# Patient Record
Sex: MALE | Race: WHITE | NOT HISPANIC OR LATINO | Employment: OTHER | URBAN - METROPOLITAN AREA
[De-identification: names, ages, dates, MRNs, and addresses within clinical notes are randomized per-mention and may not be internally consistent; named-entity substitution may affect disease eponyms.]

---

## 2017-06-15 ENCOUNTER — DOCUMENTATION ONLY (OUTPATIENT)
Dept: TRANSPLANT | Facility: CLINIC | Age: 77
End: 2017-06-15

## 2017-06-15 NOTE — PROGRESS NOTES
Received in basket message from this patients daughter.  I have returned the call and spoken with her.  She advises the following:  That her father has had placement of an LVAD in October of 2016.  This implant at [facility] in [place].  That she is relocating her father to [place] and in with her in July.  That she wishes to have our LVAD team take over his care due to this relocation..  When asked that  the LVAD was placed as destination therapy.  I have provided the process for communication with his LVAD team there and ours here.  That his medical records will be needed along with demographic and health insurance documentation.  That his anticoagulation therapy will need to be addressed, as well as establishment of Primary Care.  Mr. Vasquez's daughter advises that she will have this placed in motion today and will call his implant team with this information..  I have provided her with my name and contact information  For questions and assistance until his care is assumed for our LVAD group.  Her specific physician request is to Dr. Boss.

## 2017-07-14 ENCOUNTER — TELEPHONE (OUTPATIENT)
Dept: TRANSPLANT | Facility: CLINIC | Age: 77
End: 2017-07-14

## 2017-07-14 DIAGNOSIS — Z95.811 HEART REPLACED BY HEART ASSIST DEVICE: Primary | ICD-10-CM

## 2017-07-14 DIAGNOSIS — I50.32 CHRONIC DIASTOLIC HEART FAILURE: ICD-10-CM

## 2017-07-14 DIAGNOSIS — I50.22 CHRONIC SYSTOLIC CONGESTIVE HEART FAILURE: ICD-10-CM

## 2017-07-14 NOTE — TELEPHONE ENCOUNTER
Attempted to contact pt to notify him of his appts on 7/20/2017 on the only contact number in the system. I didn't get an answer but I did leave him a voice mail letting him know of the date and time of all appts and where the different clinics are located as well.We also will be mailing out a reminder as well.

## 2017-07-20 ENCOUNTER — RESEARCH ENCOUNTER (OUTPATIENT)
Dept: RESEARCH | Facility: HOSPITAL | Age: 77
End: 2017-07-20

## 2017-07-20 ENCOUNTER — CLINICAL SUPPORT (OUTPATIENT)
Dept: TRANSPLANT | Facility: CLINIC | Age: 77
End: 2017-07-20
Payer: MEDICARE

## 2017-07-20 ENCOUNTER — TELEPHONE (OUTPATIENT)
Dept: INFECTIOUS DISEASES | Facility: CLINIC | Age: 77
End: 2017-07-20

## 2017-07-20 ENCOUNTER — OFFICE VISIT (OUTPATIENT)
Dept: TRANSPLANT | Facility: CLINIC | Age: 77
End: 2017-07-20
Attending: INTERNAL MEDICINE
Payer: MEDICARE

## 2017-07-20 ENCOUNTER — LAB VISIT (OUTPATIENT)
Dept: LAB | Facility: HOSPITAL | Age: 77
End: 2017-07-20
Attending: INTERNAL MEDICINE
Payer: MEDICARE

## 2017-07-20 VITALS — HEIGHT: 72 IN | BODY MASS INDEX: 23.7 KG/M2 | TEMPERATURE: 98 F | WEIGHT: 175 LBS | SYSTOLIC BLOOD PRESSURE: 86 MMHG

## 2017-07-20 DIAGNOSIS — R55 SYNCOPE, UNSPECIFIED SYNCOPE TYPE: ICD-10-CM

## 2017-07-20 DIAGNOSIS — R53.81 DEBILITY: ICD-10-CM

## 2017-07-20 DIAGNOSIS — Z95.811 HEART REPLACED BY HEART ASSIST DEVICE: ICD-10-CM

## 2017-07-20 DIAGNOSIS — I25.5 ISCHEMIC CARDIOMYOPATHY: ICD-10-CM

## 2017-07-20 DIAGNOSIS — Z95.811 LVAD (LEFT VENTRICULAR ASSIST DEVICE) PRESENT: ICD-10-CM

## 2017-07-20 DIAGNOSIS — I50.22 CHRONIC SYSTOLIC CONGESTIVE HEART FAILURE: ICD-10-CM

## 2017-07-20 DIAGNOSIS — Z95.811 HEART REPLACED BY HEART ASSIST DEVICE: Primary | ICD-10-CM

## 2017-07-20 DIAGNOSIS — N18.2 STAGE 2 CHRONIC KIDNEY DISEASE: ICD-10-CM

## 2017-07-20 LAB
ALBUMIN SERPL BCP-MCNC: 3.4 G/DL
ALP SERPL-CCNC: 91 U/L
ALT SERPL W/O P-5'-P-CCNC: 14 U/L
ANION GAP SERPL CALC-SCNC: 7 MMOL/L
AST SERPL-CCNC: 20 U/L
BASOPHILS # BLD AUTO: 0.01 K/UL
BASOPHILS NFR BLD: 0.2 %
BILIRUB DIRECT SERPL-MCNC: 0.3 MG/DL
BILIRUB SERPL-MCNC: 0.6 MG/DL
BNP SERPL-MCNC: 828 PG/ML
BUN SERPL-MCNC: 23 MG/DL
CALCIUM SERPL-MCNC: 10 MG/DL
CHLORIDE SERPL-SCNC: 104 MMOL/L
CO2 SERPL-SCNC: 26 MMOL/L
CREAT SERPL-MCNC: 1.7 MG/DL
CRP SERPL-MCNC: 16.4 MG/L
DIFFERENTIAL METHOD: ABNORMAL
EOSINOPHIL # BLD AUTO: 0.2 K/UL
EOSINOPHIL NFR BLD: 3.1 %
ERYTHROCYTE [DISTWIDTH] IN BLOOD BY AUTOMATED COUNT: 17.4 %
EST. GFR  (AFRICAN AMERICAN): 44.9 ML/MIN/1.73 M^2
EST. GFR  (NON AFRICAN AMERICAN): 38.9 ML/MIN/1.73 M^2
GLUCOSE SERPL-MCNC: 98 MG/DL
HCT VFR BLD AUTO: 39.8 %
HGB BLD-MCNC: 13 G/DL
INR PPP: 3.4
LDH SERPL L TO P-CCNC: 172 U/L
LYMPHOCYTES # BLD AUTO: 0.8 K/UL
LYMPHOCYTES NFR BLD: 12.6 %
MAGNESIUM SERPL-MCNC: 2.1 MG/DL
MCH RBC QN AUTO: 30.2 PG
MCHC RBC AUTO-ENTMCNC: 32.7 G/DL
MCV RBC AUTO: 93 FL
MONOCYTES # BLD AUTO: 0.9 K/UL
MONOCYTES NFR BLD: 14.7 %
NEUTROPHILS # BLD AUTO: 4.4 K/UL
NEUTROPHILS NFR BLD: 69.2 %
PHOSPHATE SERPL-MCNC: 3.7 MG/DL
PLATELET # BLD AUTO: 149 K/UL
PMV BLD AUTO: 11.9 FL
POTASSIUM SERPL-SCNC: 4.2 MMOL/L
PREALB SERPL-MCNC: 22 MG/DL
PROT SERPL-MCNC: 7.2 G/DL
PROTHROMBIN TIME: 34.2 SEC
RBC # BLD AUTO: 4.3 M/UL
SODIUM SERPL-SCNC: 137 MMOL/L
T4 FREE SERPL-MCNC: 0.74 NG/DL
TSH SERPL DL<=0.005 MIU/L-ACNC: 1.44 UIU/ML
WBC # BLD AUTO: 6.41 K/UL

## 2017-07-20 PROCEDURE — 1159F MED LIST DOCD IN RCRD: CPT | Mod: ,,, | Performed by: INTERNAL MEDICINE

## 2017-07-20 PROCEDURE — 85610 PROTHROMBIN TIME: CPT

## 2017-07-20 PROCEDURE — 99215 OFFICE O/P EST HI 40 MIN: CPT | Mod: S$PBB,,, | Performed by: INTERNAL MEDICINE

## 2017-07-20 PROCEDURE — 80053 COMPREHEN METABOLIC PANEL: CPT

## 2017-07-20 PROCEDURE — 84100 ASSAY OF PHOSPHORUS: CPT

## 2017-07-20 PROCEDURE — 99999 PR PBB SHADOW E&M-EST. PATIENT-LVL V: CPT | Mod: PBBFAC,,, | Performed by: INTERNAL MEDICINE

## 2017-07-20 PROCEDURE — 86140 C-REACTIVE PROTEIN: CPT

## 2017-07-20 PROCEDURE — 1126F AMNT PAIN NOTED NONE PRSNT: CPT | Mod: ,,, | Performed by: INTERNAL MEDICINE

## 2017-07-20 PROCEDURE — 36415 COLL VENOUS BLD VENIPUNCTURE: CPT

## 2017-07-20 PROCEDURE — 84134 ASSAY OF PREALBUMIN: CPT

## 2017-07-20 PROCEDURE — 83880 ASSAY OF NATRIURETIC PEPTIDE: CPT

## 2017-07-20 PROCEDURE — 83615 LACTATE (LD) (LDH) ENZYME: CPT

## 2017-07-20 PROCEDURE — 82248 BILIRUBIN DIRECT: CPT

## 2017-07-20 PROCEDURE — 84443 ASSAY THYROID STIM HORMONE: CPT

## 2017-07-20 PROCEDURE — 83735 ASSAY OF MAGNESIUM: CPT

## 2017-07-20 PROCEDURE — 85025 COMPLETE CBC W/AUTO DIFF WBC: CPT

## 2017-07-20 PROCEDURE — 99999 PR PBB SHADOW E&M-EST. PATIENT-LVL I: CPT | Mod: PBBFAC,,,

## 2017-07-20 PROCEDURE — 84439 ASSAY OF FREE THYROXINE: CPT

## 2017-07-20 RX ORDER — WARFARIN SODIUM 5 MG/1
5 TABLET ORAL
Status: ON HOLD | COMMUNITY
End: 2017-08-08 | Stop reason: HOSPADM

## 2017-07-20 RX ORDER — DOXYCYCLINE 100 MG/1
100 CAPSULE ORAL EVERY 12 HOURS
Qty: 20 CAPSULE | Refills: 0 | Status: SHIPPED | OUTPATIENT
Start: 2017-07-20 | End: 2017-07-25

## 2017-07-20 RX ORDER — AMLODIPINE BESYLATE 2.5 MG/1
2.5 TABLET ORAL DAILY
Qty: 30 TABLET | Refills: 11 | Status: SHIPPED | OUTPATIENT
Start: 2017-07-20 | End: 2017-07-21 | Stop reason: SDUPTHER
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
## 2017-07-20 NOTE — PROGRESS NOTES
[facility]   Heart Transplant/VAD Clinic   [address] [place] [address]   [phone] after hours          [phone] fax     VAD HOME  HEALTH ORDERS      Admit to Home Health    Diagnosis:   Patient Active Problem List   Diagnosis    SOB (shortness of breath)    Chronic systolic heart failure    HTN (hypertension)    CAD (coronary artery disease)    S/P CABG (coronary artery bypass graft)    ICD (implantable cardioverter-defibrillator), biventricular, in situ    HILLARY on CPAP    Pulmonary hypertension due to left ventricular systolic dysfunction       Patient is homebound due to:      Diet: Low Fat, Low cholesterol, 2Gm Na, Coumadin restrictions.    Acitivities: No Swimming, bathing, vacuuming, contact sports.    Fresh implants= Sternal Precautions    Nursing:   SN to complete comprehensive assessment including routine vital signs. Instruct on disease process and s/s of complications to report to MD. Review/verify medication list sent home with the patient at time of discharge  and instruct patient/caregiver as needed. Frequency may be adjusted depending on start of care date.    **LVAD driveline exit site dressing change is to be completed per LVAD patient/caregiver only**.    Notify MD if SBP > 160 or < 90; DBP > 90 or < 50; HR > 120 or < 50; Temp > 101; Weight gain >3lbs in 1 day or 5lbs in 1 week.  Other:         LABS:  SN to perform labs: PT/INR per Coumadin clinic [phone].   Follow up INR date: 7/24/17  No Finger Sticks      CONSULTS:    Physical Therapy to evaluate and treat. Evaluate for home safety and equipment needs; Establish/upgrade home exercise program. Perform / instruct on therapeutic exercises, gait training, transfer training, and Range of Motion.    Occupational Therapy to evaluate and treat. Evaluate home environment for safety and equipment needs. Perform/Instruct on transfers, ADL training, ROM, and therapeutic  exercises.      Send initial Home Health orders to HTS attending physician on call.  Send follow up questions to VAD clinic MD [phone] or [phone].

## 2017-07-20 NOTE — PROGRESS NOTES
Subjective:   Mr. Vasquez is a 76 y.o. year old White male           HPI     Mr. Vasquez is a pleasant 76 year old gentleman with a past medical history of ischemic cardiomyopathy status post Heartmate III 10/16/16 LVAD , CKD II , CAD , VT on Tikosyn, HTN, HLD, Gout, Obesity , SSS, Depression, here to establish care. Mr Vasquez was a former resident of the [facility] area who had moved to [place] many years ago where he lived with his wife. He had a Heartmate III implanted 10/16 had a subsequently prolonged clinical course. He had prolonged extubation after implantation , lasting about 3 weeks. From this hospitalization he had a prolonged course in a skilled nursing facility. He returned home to live with his younger daughter and remained fairly weak. At his best he was able to ambulate with a cane however he has become progressively more debilitated. He moved to [place] with his wife to live with their older daughter. He remains debilitated, unable to walk , and is essentially maximally assisted with transfers. Prior to last week, he had a 12 hour aid, but now has 24 hour care . He sleeps upstairs in a bedroom with his wife. He has issues with adhering to a sleep schedule, staying up late and sleeping late per his daughter. She notes there are no windows and he is at times not oriented to time. His daughter states that he has a good appetite but notes he has lost weight and that his legs are weak.     Review of Systems   Constitution: Positive for malaise/fatigue and weight loss. Negative for chills, decreased appetite, fever and weakness.   Cardiovascular: Negative for chest pain, dyspnea on exertion, irregular heartbeat, leg swelling, orthopnea, palpitations, paroxysmal nocturnal dyspnea and syncope.   Respiratory: Negative for cough, shortness of breath, sputum production and wheezing.    Skin: Negative for poor wound healing.   Musculoskeletal: Positive for muscle weakness.   Gastrointestinal:  Negative for bloating, abdominal pain, constipation, diarrhea, nausea and vomiting.   Psychiatric/Behavioral: Negative for altered mental status.       Objective:   Blood pressure (!) 86/0, temperature 98 °F (36.7 °C), height 6' (1.829 m), weight 79.4 kg (175 lb).body mass index is 23.73 kg/m².    Physical Exam   Constitutional: He is oriented to person, place, and time. He appears well-developed and well-nourished.   HENT:   Head: Normocephalic and atraumatic.   Eyes: Pupils are equal, round, and reactive to light.   Neck: Neck supple. No JVD present. No tracheal deviation present.   Cardiovascular: Normal rate and regular rhythm.  Exam reveals no gallop and no friction rub.    No murmur heard.  Heartmate III sounds normal    Pulmonary/Chest: Effort normal and breath sounds normal. No respiratory distress. He has no wheezes. He exhibits no tenderness.   Abdominal: Soft. Bowel sounds are normal. He exhibits no distension. There is no tenderness. There is no rebound and no guarding.   Musculoskeletal:   Loss of lower extremity muscle mass   Neurological: He is alert and oriented to person, place, and time.   Skin: Skin is warm and dry. No erythema.   Psychiatric: He has a normal mood and affect.   Driveline Site :       Lab Results   Component Value Date    WBC 6.41 07/20/2017    HGB 13.0 (L) 07/20/2017    HCT 39.8 (L) 07/20/2017    MCV 93 07/20/2017     (L) 07/20/2017    CO2 26 07/20/2017    CREATININE 1.7 (H) 07/20/2017    CALCIUM 10.0 07/20/2017    ALBUMIN 3.4 (L) 07/20/2017    AST 20 07/20/2017     (H) 07/20/2017    ALT 14 07/20/2017       Lab Results   Component Value Date    INR 3.4 (H) 07/20/2017    INR 1.1 07/27/2015       BNP   Date Value Ref Range Status   07/20/2017 828 (H) 0 - 99 pg/mL Final     Comment:     Values of less than 100 pg/ml are consistent with non-CHF populations.   07/28/2015 1,649 (H) 0 - 99 pg/mL Final     Comment:     Values of less than 100 pg/ml are consistent with  non-CHF populations.   07/27/2015 1,647 (H) 0 - 99 pg/mL Final     Comment:     Values of less than 100 pg/ml are consistent with non-CHF populations.       LD   Date Value Ref Range Status   07/20/2017 172 110 - 260 U/L Final     Comment:     Results are increased in hemolyzed samples.       No results found for: TACROLIMUS  No results found for: SIROLIMUS  No results found for: CYCLOSPORINE    No results found for this or any previous visit.  No results found for this or any previous visit.    Labs were reviewed with the patient.    Assessment:     1. Heart replaced by heart assist device    2. Ischemic cardiomyopathy    3. Stage 2 chronic kidney disease    4. Syncope, unspecified syncope type    5. Debility    6. LVAD (left ventricular assist device) present        Plan:     Mr. Vasquez is a pleasant 76 year old gentleman with a past medical history of ischemic cardiomyopathy status post Heartmate III 10/16/16 LVAD , CKD II , CAD , VT on Tikosyn, HTN, HLD, Gout, Obesity , SSS, Depression, here to establish care.       1. ICM s/p Heartmate III   Warm and Dry   NYHA - Stage D   Debility precludes assessment of functional capacity , no shortness of breath at rest     No alarms/issues on VAD   Drive line site concerning for infection               -Cultures sent              -Doxy 100mg BID              -ID consult     Anticoag             -ASA 81mg             -Coumadin             -Refer to INR clinic    Elevated MAP             -Check echo            -Amlodepine 2.5mg 1      2. VT on Tikosyn   Check EKG   Refer to EP     3. Debility   Lower extremity strength markedly decreased/ decreased lower extremity mass  ? Neuropathy diagnosed prior to move here , ? Follow up with daughters neurologist   Home PT / OT     4. CKD   Monitor  Check EKG given tikosyn             UNOS Patient Status  Functional Status: 40% - Disabled: requires special care and assistance  Physical Capacity: Wheelchair bound or more limited  Working  for Income: Unknown    Jonn Bolaños MD

## 2017-07-20 NOTE — PATIENT INSTRUCTIONS
Start Doxycycline 100mg BID and follow up with ID for drive line infection concern. Start Amlodepine 2.5mg for elevated pressure. Home PT/OT .

## 2017-07-20 NOTE — PROGRESS NOTES
Met with pt in clinic. Went over monthly equipment checklist with pt and his family. Requested pt bring in his UBC, MPU, and 14V batteries x8 to next visit for visual inspection and inventory. Pt and family verbalized understanding and agreed. Will follow up with pt at next visit.

## 2017-07-20 NOTE — PROGRESS NOTES
WHITNEY met with pt, pt wife and dtr in LVAD clinic today. Pt received his LVAD in October of 2016 in [place] and has moved here 2 weeks ago to live with his dtr, Mitra and son in law.    MDs requested WHITNEY to arrange for home health. Orders written, whitney contacted Ximena at [facility], [id number] for referral. Referral accepted. Pt has Medicare and needs PT/OT eval and treat and INR labs drawn.     Pt and family had no agency preference.    SW continuing to follow and remains available.

## 2017-07-20 NOTE — PROGRESS NOTES
Date Consent signed: [id number]    Sponsor: [employer]    Study Title/IRB Number: [facility] 3 CAP [id number]    Principle Investigator: Dr. Bradley Hemphill    Present for Discussion: Cathleen Munson, Kev Vasquez, Mrs. Vasquez, patient's daughter    Prior to the Informed Consent (IC) being signed, or any study protocol required testing, procedure, or intervention being performed, the following was done or discussed:   Patient was given a copy of the IC for review    Purpose of the study    Follow up schedule and tests or procedures done at each follow up    Confidentiality and HIPAA Authorization for Release of Medical Records for the research trial/ subject's rights/research related injury   Risk, Benefits, Costs and Alternatives to the study   Participation in the research trial is voluntary and patient may withdraw at anytime   Contact information for study related questions    Patient verbalizes understanding of the above: Yes  Patients cardiologist Dr. Valladares notified patient signed consent form for study: Yes  Contact information for CRC and PI given to patient: Yes  Patient able to adequately summarize: the purpose of the study, the risks associated with the study, and all procedures, testing, and follow-ups associated with the study    Mr. Vasquez signed the informed consent form for the [facility] 3 CAP research study with an IRB approval date of 5/8/2017.  Each page of the consent form was reviewed with the patient (and pts family) and all questions answered satisfactorily. Mr. Vasquez signed the consent form and received a copy of same. The original consent was scanned into electronic medical records (EPIC) and filed into the subject's research study binder. Patient was transferred from [place] and was implanted with a HM 3 on 10/19/2016. One year follow up will be completed within the 1 year window.

## 2017-07-20 NOTE — TELEPHONE ENCOUNTER
----- Message from Nora Paulino MA sent at 7/20/2017  4:40 PM CDT -----  Contact: Dr. Young / Benjy  ext.   [phone]   Can you see this patient tomorrow??  ----- Message -----  From: Janay Iwona  Sent: 7/20/2017   4:24 PM  To: Elier Chambers MA    Triage dr. For this afternoon -/   Caller is asking if any of our drs. Can see this LVAD pt. At any time tomorrow.   Referral is in .

## 2017-07-21 ENCOUNTER — TELEPHONE (OUTPATIENT)
Dept: TRANSPLANT | Facility: CLINIC | Age: 77
End: 2017-07-21

## 2017-07-21 ENCOUNTER — TELEPHONE (OUTPATIENT)
Dept: INFECTIOUS DISEASES | Facility: CLINIC | Age: 77
End: 2017-07-21

## 2017-07-21 ENCOUNTER — TELEPHONE (OUTPATIENT)
Dept: INTERNAL MEDICINE | Facility: CLINIC | Age: 77
End: 2017-07-21

## 2017-07-21 ENCOUNTER — ANTI-COAG VISIT (OUTPATIENT)
Dept: CARDIOLOGY | Facility: CLINIC | Age: 77
End: 2017-07-21

## 2017-07-21 DIAGNOSIS — Z95.811 LVAD (LEFT VENTRICULAR ASSIST DEVICE) PRESENT: ICD-10-CM

## 2017-07-21 DIAGNOSIS — Z79.01 LONG TERM (CURRENT) USE OF ANTICOAGULANTS: ICD-10-CM

## 2017-07-21 LAB
GRAM STN SPEC: NORMAL
GRAM STN SPEC: NORMAL

## 2017-07-21 RX ORDER — WARFARIN 2.5 MG/1
2.5 TABLET ORAL DAILY
Status: ON HOLD | COMMUNITY
End: 2017-08-08

## 2017-07-21 RX ORDER — WARFARIN 4 MG/1
4 TABLET ORAL DAILY
Status: ON HOLD | COMMUNITY
End: 2017-08-08 | Stop reason: HOSPADM

## 2017-07-21 NOTE — PROGRESS NOTES
Spoke with Daughter Ena whom in charge regarding patient coumadin.  Dose for this week Mon, Tue ,Wed, and Thu 6.5 mg , Fri, sat, and sun 4 mg.  Daughter could not confirm previous dose but start an inr 3.2 Friday 7/14. Stated she will call doctors from [place] to find out.  Also reports patient having  broccoli on Sunday 7/16 and bruising on right lower ribcage area and a yellow (patch) on left breast .  Nothing else reported.

## 2017-07-21 NOTE — TELEPHONE ENCOUNTER
Went through pt's medication list with EVONNE/ Albino a lot of the listed medication pt does not have them in the home. Albino/ EVONNE wanted to let PCP know that the pt's family decided to catheterize pt at night. Please advise.

## 2017-07-21 NOTE — PROGRESS NOTES
" 76 year old with a past medical history of ischemic cardiomyopathy status post Heartmate III 10/16/16 LVAD , CKD II , CAD , VT on Tikosyn, HTN, HLD, Gout, Obesity , SSS, Depression,  establishing care with [facility]. Mr Vasquez was a former resident of the [facility] area who had moved to [place] many years ago where he lived with his wife. He had a Heartmate III implanted 10/16 had a subsequently prolonged clinical course. He had prolonged extubation after implantation , lasting about 3 weeks. From this hospitalization he had a prolonged course in a skilled nursing facility. He returned home to live with his younger daughter and remained fairly weak He moved to [place] with his wife to live with their older daughter. He remains debilitated, unable to walk , and is essentially maximally assisted with transfers. Of note, he was started on doxycycline 7/20 x 10 day course for concern for drive line infection. It looks as though INR was drawn yesterday. Will need to confirm his previous/current coumadin dose to address current INR if this has not already been done.  Per notes in [facility], next INR will need to be 7/24.   Ena (patient's daughter) was questioned. Per Ena, coumadin Dose for this week Mon, Tue ,Wed, and Thu = 6.5 mg , Fri, sat, and sun = 4 mg. She stateds he has the 4mg and 2.5mg tablets, and does not "think" he has the 5mg tablets but would have to check when she gets home  Daughter could not confirm previous dose prior to 7/14 but start an inr 3.2 Friday 7/14. Stated she will call doctors from [place] to find out.  Also reports patient having  broccoli on Sunday 7/16 and bruising on right lower ribcage area and a yellow (patch) on left breast .  Nothing else reported. Keeping in mind INR was high yesterday , pt took his warfarin last night, and he started an interacting antibiotic, we will hold the warfarin this evening    "

## 2017-07-21 NOTE — TELEPHONE ENCOUNTER
Spoke with the patients' wife and notified her of all times and dates of upcoming appointments next week.She was fine with all appointment times and stated that they will be here.    Hortensia

## 2017-07-21 NOTE — TELEPHONE ENCOUNTER
----- Message from Florinda Lo sent at 7/21/2017 12:58 PM CDT -----  Contact: [facility]/nicolette/[phone]  [facility] called in regards to getting clarification on the pt Rx for famotidine/iron polysaccharides/lansoprazole/buPROPion (WELLBUTRIN XL) 300 MG 24 hr tablet/atorvastatin/hydrALAZINE (APRESOLINE) 25 MG tablet. [facility] wants to let the dr know that the family decide to catheterize  the pt at night time.      Please advise

## 2017-07-21 NOTE — PROGRESS NOTES
Daughter reports patient llast week dose was 5 mg on tue,wed, and thu  And 6 mg all other days   Stated Friday 7/14 inr was 2.4 instead of 3.2.  Patient goal is 2.0-3.0  .  Nothing else reported .    Daughter advised  FAXED.

## 2017-07-21 NOTE — PROGRESS NOTES
Daughter confirms dose patient was given. Will hold as INR possibly higher today. Will start new dose. New LVAD.

## 2017-07-23 ENCOUNTER — HOSPITAL ENCOUNTER (EMERGENCY)
Facility: HOSPITAL | Age: 77
Discharge: HOME | End: 2017-07-23
Attending: EMERGENCY MEDICINE
Payer: MEDICARE

## 2017-07-23 VITALS
BODY MASS INDEX: 27.09 KG/M2 | HEIGHT: 72 IN | TEMPERATURE: 98 F | WEIGHT: 200 LBS | HEART RATE: 61 BPM | OXYGEN SATURATION: 97 % | RESPIRATION RATE: 20 BRPM
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
## 2017-07-23 DIAGNOSIS — S00.03XA CONTUSION OF SCALP, INITIAL ENCOUNTER: ICD-10-CM

## 2017-07-23 LAB
BASOPHILS # BLD AUTO: 0.02 K/UL
BASOPHILS NFR BLD: 0.3 %
DIFFERENTIAL METHOD: ABNORMAL
EOSINOPHIL # BLD AUTO: 0.3 K/UL
EOSINOPHIL NFR BLD: 3.1 %
ERYTHROCYTE [DISTWIDTH] IN BLOOD BY AUTOMATED COUNT: 17.2 %
HCT VFR BLD AUTO: 38.7 %
HGB BLD-MCNC: 12.5 G/DL
INR PPP: 2.2
LYMPHOCYTES # BLD AUTO: 0.9 K/UL
LYMPHOCYTES NFR BLD: 11.5 %
MCH RBC QN AUTO: 29.6 PG
MCHC RBC AUTO-ENTMCNC: 32.3 G/DL
MCV RBC AUTO: 92 FL
MONOCYTES # BLD AUTO: 1.1 K/UL
MONOCYTES NFR BLD: 13.4 %
NEUTROPHILS # BLD AUTO: 5.7 K/UL
NEUTROPHILS NFR BLD: 71.4 %
PLATELET # BLD AUTO: 151 K/UL
PMV BLD AUTO: 11.5 FL
PROTHROMBIN TIME: 22.7 SEC
RBC # BLD AUTO: 4.22 M/UL
WBC # BLD AUTO: 8 K/UL

## 2017-07-23 PROCEDURE — 85610 PROTHROMBIN TIME: CPT

## 2017-07-23 PROCEDURE — 99284 EMERGENCY DEPT VISIT MOD MDM: CPT

## 2017-07-23 PROCEDURE — 85025 COMPLETE CBC W/AUTO DIFF WBC: CPT

## 2017-07-23 RX ORDER — ATORVASTATIN CALCIUM 10 MG/1
10 TABLET, FILM COATED ORAL DAILY
COMMUNITY

## 2017-07-23 RX ORDER — FOLIC ACID 1 MG/1
1 TABLET ORAL DAILY
COMMUNITY

## 2017-07-23 RX ORDER — CALCIUM CARBONATE/VITAMIN D3 600MG-5MCG
1 TABLET ORAL DAILY
Status: ON HOLD | COMMUNITY
End: 2017-08-08 | Stop reason: HOSPADM

## 2017-07-24 ENCOUNTER — OFFICE VISIT (OUTPATIENT)
Dept: INFECTIOUS DISEASES | Facility: CLINIC | Age: 77
End: 2017-07-24
Payer: MEDICARE

## 2017-07-24 ENCOUNTER — ANTI-COAG VISIT (OUTPATIENT)
Dept: CARDIOLOGY | Facility: CLINIC | Age: 77
End: 2017-07-24

## 2017-07-24 ENCOUNTER — LAB VISIT (OUTPATIENT)
Dept: LAB | Facility: HOSPITAL | Age: 77
End: 2017-07-24
Payer: MEDICARE

## 2017-07-24 VITALS — BODY MASS INDEX: 24.92 KG/M2 | WEIGHT: 184 LBS | HEIGHT: 72 IN

## 2017-07-24 DIAGNOSIS — Z79.01 LONG TERM (CURRENT) USE OF ANTICOAGULANTS: ICD-10-CM

## 2017-07-24 DIAGNOSIS — T84.7XXA HARDWARE COMPLICATING WOUND INFECTION, INITIAL ENCOUNTER: Primary | ICD-10-CM

## 2017-07-24 DIAGNOSIS — Z95.811 LVAD (LEFT VENTRICULAR ASSIST DEVICE) PRESENT: ICD-10-CM

## 2017-07-24 DIAGNOSIS — Z95.811 HEART REPLACED BY HEART ASSIST DEVICE: ICD-10-CM

## 2017-07-24 DIAGNOSIS — T82.9XXA LEFT VENTRICULAR ASSIST DEVICE (LVAD) COMPLICATION, INITIAL ENCOUNTER: ICD-10-CM

## 2017-07-24 DIAGNOSIS — Z95.810 AUTOMATIC IMPLANTABLE CARDIAC DEFIBRILLATOR IN SITU: Primary | ICD-10-CM

## 2017-07-24 LAB
BACTERIA SPEC AEROBE CULT: NORMAL
INR PPP: 2.2
PROTHROMBIN TIME: 22.4 SEC

## 2017-07-24 PROCEDURE — 99212 OFFICE O/P EST SF 10 MIN: CPT | Mod: PBBFAC | Performed by: INTERNAL MEDICINE

## 2017-07-24 PROCEDURE — 1159F MED LIST DOCD IN RCRD: CPT | Mod: ,,, | Performed by: INTERNAL MEDICINE

## 2017-07-24 PROCEDURE — 99204 OFFICE O/P NEW MOD 45 MIN: CPT | Mod: S$PBB,,, | Performed by: INTERNAL MEDICINE

## 2017-07-24 PROCEDURE — 1126F AMNT PAIN NOTED NONE PRSNT: CPT | Mod: ,,, | Performed by: INTERNAL MEDICINE

## 2017-07-24 PROCEDURE — 99999 PR PBB SHADOW E&M-EST. PATIENT-LVL II: CPT | Mod: PBBFAC,,, | Performed by: INTERNAL MEDICINE

## 2017-07-24 RX ORDER — CIPROFLOXACIN 500 MG/1
500 TABLET ORAL 2 TIMES DAILY
Qty: 42 TABLET | Refills: 0 | Status: ON HOLD | OUTPATIENT
Start: 2017-07-24 | End: 2017-08-08 | Stop reason: HOSPADM

## 2017-07-24 NOTE — PROGRESS NOTES
Subjective:      Patient ID: Kev Vasquez is a 76 y.o. male.    Chief Complaint:No chief complaint on file.      History of Present Illness    75 y/o male with a history of CAD, ischemic cardiomyopathy s/p LVAD placement on October 19, 2016 at [facility] in [place], [place].  His post LVAD course was complicated by an episode of hospitalization due to orthostatic hypotension. All his BP meds were stopped and he was discharged to a rehab facility for 100 days.  He lost about 50 lbs over the course of his rehab stay.  He had some confusion while in rehab thought to be due to medications.  He moved down to [place] to be with his daughter due to his health.  Prior to his move, he had a dog bite on July 5 and was treated with antibiotics in the ER.    He arrived in [place] on July 9.  He has been feeling well since.  He is working with physical therapy.  His wife and daughter have been changing dressings on LVAD.  Noticed redness and oozing around the LVAD DLES last week.  Cultures were obtained and he was started empirically on doxycycline.  He denies any fevers or chills.      Review of Systems   Constitution: Negative for chills, decreased appetite, fever, weakness, malaise/fatigue, night sweats, weight gain and weight loss.   HENT: Negative for congestion, ear pain, headaches, hearing loss, hoarse voice, sore throat and tinnitus.    Eyes: Negative for blurred vision, redness and visual disturbance.   Cardiovascular: Negative for chest pain, leg swelling and palpitations.   Respiratory: Negative for cough, hemoptysis, shortness of breath and sputum production.    Hematologic/Lymphatic: Negative for adenopathy. Bruises/bleeds easily.   Skin: Negative for dry skin, itching, rash and suspicious lesions.   Musculoskeletal: Negative for back pain, joint pain, myalgias and neck pain.   Gastrointestinal: Negative for abdominal pain, constipation, diarrhea, heartburn, nausea and vomiting.    Genitourinary: Negative for dysuria, flank pain, frequency, hematuria, hesitancy and urgency.   Neurological: Negative for dizziness, numbness and paresthesias.   Psychiatric/Behavioral: Negative for depression and memory loss. The patient does not have insomnia and is not nervous/anxious.      Objective:   Physical Exam   Constitutional: He is oriented to person, place, and time. He appears well-developed and well-nourished. No distress.   HENT:   Head: Normocephalic and atraumatic.   Right Ear: External ear normal.   Left Ear: External ear normal.   Nose: Nose normal.   Mouth/Throat: Oropharynx is clear and moist. No oropharyngeal exudate.   Eyes: Conjunctivae and EOM are normal. Pupils are equal, round, and reactive to light. Right eye exhibits no discharge. Left eye exhibits no discharge. No scleral icterus.   Neck: Normal range of motion. Neck supple. No JVD present. No tracheal deviation present. No thyromegaly present.   Cardiovascular: Normal rate, regular rhythm and intact distal pulses.  Exam reveals no gallop and no friction rub.    No murmur heard.  Pulmonary/Chest: Effort normal and breath sounds normal. No stridor. No respiratory distress. He has no wheezes. He has no rales. He exhibits no tenderness.   Abdominal: Soft. Bowel sounds are normal. He exhibits no distension and no mass. There is no tenderness. There is no rebound and no guarding.   There is drainage around the LVAD DLES with surrounding erythema.     Musculoskeletal: Normal range of motion. He exhibits no edema or tenderness.   Lymphadenopathy:     He has no cervical adenopathy.   Neurological: He is alert and oriented to person, place, and time. He has normal reflexes. He displays normal reflexes. No cranial nerve deficit. He exhibits normal muscle tone. Coordination normal.   Skin: Skin is warm. No rash noted. He is not diaphoretic. No erythema. No pallor.   Psychiatric: He has a normal mood and affect. His behavior is normal. Judgment  and thought content normal.   Nursing note and vitals reviewed.             Assessment:       1. Hardware complicating wound infection, initial encounter    2. 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  Will plan to treat with ciprofloxacin for 3 weeks.  Will see how he does at that time.  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  I will try to manage these with intermittent courses of ciprofloxacin.  As the quinolones are our only oral option, I am hoping to preserve this for future use and avoid drug resistance.  Plan:       Hardware complicating wound infection, initial encounter  -     ciprofloxacin HCl (CIPRO) 500 MG tablet; Take 1 tablet (500 mg total) by mouth 2 (two) times daily.  Dispense: 42 tablet; Refill: 0    Left ventricular assist device (LVAD) complication, initial encounter  -     ciprofloxacin HCl (CIPRO) 500 MG tablet; Take 1 tablet (500 mg total) by mouth 2 (two) times daily.  Dispense: 42 tablet; Refill: 0

## 2017-07-24 NOTE — ED NOTES
Pt reports that he fell and hit his head on a bookshelf. Pt states that he takes blood thinner and was told by his dr that if he ever hits his head, he should go to the ER to get evaluated. Denies headache, dizziness, or blurry vision. No vomiting, speech is clear. AAO, VSS. No obvious signs of trauma noted.     APPEARANCE: Alert, oriented and in no acute distress.  CARDIAC: LVAD in place  PERIPHERAL VASCULAR: peripheral pulses present. Normal cap refill. No edema. Warm to touch.    RESPIRATORY:Normal rate and effort, breath sounds clear bilaterally throughout chest. Respirations are equal and unlabored no obvious signs of distress.  GASTRO: soft, no tenderness, no abdominal distention.  MUSC: Full ROM. No bony tenderness or soft tissue tenderness. No obvious deformity.  SKIN: Skin is warm and dry, normal skin turgor, mucous membranes moist.  NEURO: 5/5 strength major flexors/extensors bilaterally.  Providence coma scale: eyes open spontaneously-4, oriented & converses-5, obeys commands-6. No neurological abnormalities.   MENTAL STATUS: awake, alert and aware of environment.

## 2017-07-24 NOTE — LETTER
July 24, 2017      Karen Valladares MD  [address]  [facility] [address]           [facility] Infectious [facility]  [address]  [facility] [address]  Phone: [phone]  Fax: [phone]          Patient: Kev Vasquez   MR Number: [id number]   YOB: 1940   Date of Visit: 7/24/2017       Dear Dr. Karen Valladares:    Thank you for referring Kev Vasquez to me for evaluation. Attached you will find relevant portions of my assessment and plan of care.    If you have questions, please do not hesitate to call me. I look forward to following Kev Vasquez along with you.    Sincerely,    Payam Muhammad MD    Enclosure  CC:  No Recipients    If you would like to receive this communication electronically, please contact [email or web address] or [phone] to request more information on [employer] access.    For providers and/or their staff who would like to refer a patient to [facility], please contact us through our one-stop-shop provider referral line, [facility], at [phone].    If you feel you have received this communication in error or would no longer like to receive these types of communications, please e-mail [email or web address]

## 2017-07-24 NOTE — ED PROVIDER NOTES
Encounter Date: 7/23/2017       History     Chief Complaint   Patient presents with    Fall     reports fell a couple of hours ago hit head. reports it on coumadin. Denies any LOC, family reports pt has been acting normally since. reports lost balance when standing up. states uses a walker to ambulate     Pt w/ a LVAD on coumadin presents for a fall that occurred when he stood up to go to the bathroom.  He has chronic issues w/ balance and falling.  He uses a walker but is frequently ambitious trying to get up on his own.  He fell backwards and hit his head but had no LOC.  He denies any neck pain.  He is on coumadin.  He has no other complaints.           Review of patient's allergies indicates:   Allergen Reactions    Sulfa (sulfonamide antibiotics)      Past Medical History:   Diagnosis Date    Acute on chronic systolic heart failure 7/27/2015    AICD (automatic cardioverter/defibrillator) present 2014    [employer]    CAD (coronary artery disease) 7/27/2015    CHF (congestive heart failure)     Gait instability     HTN (hypertension) 7/27/2015    ICD (implantable cardioverter-defibrillator), biventricular, in situ 7/27/2015    Kidney stones     HILLARY treated with BiPAP 7/27/2015    Peripheral neuropathy     Pulmonary hypertension due to left ventricular systolic dysfunction 7/29/2015    Restless leg syndrome 1992    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    Skin cancer     excision 2013    Sleep apnea 1992     Past Surgical History:   Procedure Laterality Date    CARDIAC PACEMAKER PLACEMENT      pacemaker previously, then AICD in 2006. AICD [employer] serial #[id number]    CORONARY ARTERY BYPASS GRAFT  1993    KNEE SURGERY Left 2001    complicated by infection, hardware had to be taken out and replaced    LEFT VENTRICULAR ASSIST DEVICE  10/19/2016     Family History   Problem Relation Age of Onset    Cancer Daughter 50     breast    Heart disease Daughter      MI x 3, CABG     "Diabetes Daughter      Social History   Substance Use Topics    Smoking status: Never Smoker    Smokeless tobacco: Never Used    Alcohol use No     Review of Systems   Constitutional: Negative for fatigue and fever.   Respiratory: Negative for cough, shortness of breath and wheezing.    Cardiovascular: Negative for chest pain.   Gastrointestinal: Negative for abdominal pain, blood in stool, diarrhea, nausea and vomiting.   Genitourinary: Negative for dysuria and flank pain.   Musculoskeletal: Negative for arthralgias, back pain and myalgias.   Skin: Negative for color change, rash and wound.   Neurological: Negative for dizziness, syncope, speech difficulty, light-headedness, numbness and headaches.        "balance issues"       Physical Exam     Initial Vitals [07/23/17 1939]   BP Pulse Resp Temp SpO2   -- -- 20 98.3 °F (36.8 °C) 95 %      MAP       --         Physical Exam    Nursing note and vitals reviewed.  Constitutional: He appears well-developed and well-nourished. No distress.   HENT:   Head: Normocephalic and atraumatic.   No hematoma or abrasions       Eyes: Conjunctivae and EOM are normal. Pupils are equal, round, and reactive to light.   Neck: Normal range of motion. Neck supple.   Cardiovascular: Normal rate, regular rhythm, normal heart sounds and intact distal pulses.   LVAD and pacemaker in the chest wall.  Audible pump on auscultation.  As expected, no pulses.     Pulmonary/Chest: Breath sounds normal. He has no wheezes. He has no rhonchi. He has no rales.   Abdominal: Soft. There is no tenderness.   Musculoskeletal: He exhibits no edema or tenderness.   0ld scar to the right leg     Neurological: He is alert and oriented to person, place, and time. He has normal strength. No sensory deficit.   Skin: Skin is warm and dry. Capillary refill takes less than 2 seconds.         ED Course   Procedures  Labs Reviewed   CBC W/ AUTO DIFFERENTIAL   PROTIME-INR             Medical Decision Making:   Ct " head is negative.  Ct cervical spine shows no evidence of acute fracture or lsthesis, but he does have djd.  I will notify him of his advanced atherosclerosis. Ct head wnl.     INR is stable.  CBC shows slight anemia.  Patient will need to follow-up with his doctor this week.  He has an LVAD and likely doesn't need a transfusion now, but needs follow up labs in the next week or so.  No complaints of gi bleeding.  I feel he is stable for discharge and he lives with his wife and daughter who assist caring for him.                    ED Course     Clinical Impression:   The primary encounter diagnosis was Fall, initial encounter. A diagnosis of Contusion of scalp, initial encounter was also pertinent to this visit.                           Jesus Hall MD  07/23/17 0213

## 2017-07-25 ENCOUNTER — OFFICE VISIT (OUTPATIENT)
Dept: TRANSPLANT | Facility: CLINIC | Age: 77
DRG: 315 | End: 2017-07-25
Payer: MEDICARE

## 2017-07-25 ENCOUNTER — HOSPITAL ENCOUNTER (OUTPATIENT)
Dept: CARDIOLOGY | Facility: CLINIC | Age: 77
Discharge: HOME | DRG: 315 | End: 2017-07-25
Payer: MEDICARE

## 2017-07-25 ENCOUNTER — HOSPITAL ENCOUNTER (OUTPATIENT)
Dept: RADIOLOGY | Facility: HOSPITAL | Age: 77
Discharge: HOME | End: 2017-07-25
Attending: INTERNAL MEDICINE
Payer: MEDICARE

## 2017-07-25 ENCOUNTER — CLINICAL SUPPORT (OUTPATIENT)
Dept: TRANSPLANT | Facility: CLINIC | Age: 77
DRG: 315 | End: 2017-07-25
Payer: MEDICARE

## 2017-07-25 ENCOUNTER — CLINICAL SUPPORT (OUTPATIENT)
Dept: ELECTROPHYSIOLOGY | Facility: CLINIC | Age: 77
DRG: 315 | End: 2017-07-25
Payer: MEDICARE

## 2017-07-25 ENCOUNTER — INITIAL CONSULT (OUTPATIENT)
Dept: ELECTROPHYSIOLOGY | Facility: CLINIC | Age: 77
DRG: 315 | End: 2017-07-25
Payer: MEDICARE

## 2017-07-25 VITALS — BODY MASS INDEX: 23.57 KG/M2 | TEMPERATURE: 98 F | WEIGHT: 174 LBS | HEIGHT: 72 IN | SYSTOLIC BLOOD PRESSURE: 116 MMHG

## 2017-07-25 VITALS — BODY MASS INDEX: 24.92 KG/M2 | HEIGHT: 72 IN | WEIGHT: 184 LBS | HEART RATE: 68 BPM

## 2017-07-25 DIAGNOSIS — I50.22 CHRONIC SYSTOLIC CONGESTIVE HEART FAILURE: ICD-10-CM

## 2017-07-25 DIAGNOSIS — Z95.811 HEART REPLACED BY HEART ASSIST DEVICE: Primary | ICD-10-CM

## 2017-07-25 DIAGNOSIS — Z95.811 HEART REPLACED BY HEART ASSIST DEVICE: ICD-10-CM

## 2017-07-25 DIAGNOSIS — I25.5 ISCHEMIC CARDIOMYOPATHY: ICD-10-CM

## 2017-07-25 DIAGNOSIS — R53.81 DEBILITY: ICD-10-CM

## 2017-07-25 DIAGNOSIS — Z79.01 LONG TERM (CURRENT) USE OF ANTICOAGULANTS: ICD-10-CM

## 2017-07-25 DIAGNOSIS — Z95.810 AUTOMATIC IMPLANTABLE CARDIAC DEFIBRILLATOR IN SITU: ICD-10-CM

## 2017-07-25 DIAGNOSIS — Z95.811 LVAD (LEFT VENTRICULAR ASSIST DEVICE) PRESENT: ICD-10-CM

## 2017-07-25 DIAGNOSIS — Z95.810 ICD (IMPLANTABLE CARDIOVERTER-DEFIBRILLATOR), BIVENTRICULAR, IN SITU: Primary | ICD-10-CM

## 2017-07-25 DIAGNOSIS — I50.22 CHRONIC SYSTOLIC HEART FAILURE: ICD-10-CM

## 2017-07-25 DIAGNOSIS — Z95.1 S/P CABG (CORONARY ARTERY BYPASS GRAFT): ICD-10-CM

## 2017-07-25 DIAGNOSIS — Z95.810 ICD (IMPLANTABLE CARDIOVERTER-DEFIBRILLATOR) IN PLACE: Primary | ICD-10-CM

## 2017-07-25 DIAGNOSIS — I47.20 VENTRICULAR TACHYCARDIA: ICD-10-CM

## 2017-07-25 DIAGNOSIS — I10 ESSENTIAL HYPERTENSION: ICD-10-CM

## 2017-07-25 LAB
DIASTOLIC DYSFUNCTION: YES
ESTIMATED PA SYSTOLIC PRESSURE: 29.59
RETIRED EF AND QEF - SEE NOTES: 15 (ref 55–65)
TRICUSPID VALVE REGURGITATION: ABNORMAL

## 2017-07-25 PROCEDURE — 99214 OFFICE O/P EST MOD 30 MIN: CPT | Mod: S$PBB,,, | Performed by: INTERNAL MEDICINE

## 2017-07-25 PROCEDURE — 1159F MED LIST DOCD IN RCRD: CPT | Mod: ,,, | Performed by: INTERNAL MEDICINE

## 2017-07-25 PROCEDURE — 71010 XR CHEST 1 VIEW: CPT | Mod: TC,PO

## 2017-07-25 PROCEDURE — 1126F AMNT PAIN NOTED NONE PRSNT: CPT | Mod: ,,, | Performed by: INTERNAL MEDICINE

## 2017-07-25 PROCEDURE — 93284 PRGRMG EVAL IMPLANTABLE DFB: CPT | Mod: 26,S$PBB,, | Performed by: INTERNAL MEDICINE

## 2017-07-25 PROCEDURE — 99999 PR PBB SHADOW E&M-EST. PATIENT-LVL III: CPT | Mod: PBBFAC,,, | Performed by: INTERNAL MEDICINE

## 2017-07-25 PROCEDURE — 93010 ELECTROCARDIOGRAM REPORT: CPT | Mod: ,,, | Performed by: INTERNAL MEDICINE

## 2017-07-25 PROCEDURE — 93306 TTE W/DOPPLER COMPLETE: CPT | Mod: 26,S$PBB,, | Performed by: INTERNAL MEDICINE

## 2017-07-25 PROCEDURE — 99999 PR PBB SHADOW E&M-EST. PATIENT-LVL V: CPT | Mod: PBBFAC,,, | Performed by: INTERNAL MEDICINE

## 2017-07-25 RX ORDER — DOFETILIDE 0.25 MG/1
250 CAPSULE ORAL EVERY 12 HOURS
Qty: 60 CAPSULE | Refills: 11 | Status: SHIPPED | OUTPATIENT
Start: 2017-07-25 | End: 2018-01-02 | Stop reason: SDUPTHER

## 2017-07-25 NOTE — PROGRESS NOTES
Subjective:    Patient ID:  Kev Vasquez is a 76 y.o. male who presents for evaluation of Pacemaker Check and Congestive Heart Failure    Primary Cardiologist: [facility] team    HPI  I had the pleasure of seeing Mr. Vasquez today in our electrophysiology clinic to establish care for his ICD.  As you are aware he is a pleasant 76 year-old man who recently moved to [place] from [place].  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  He was cared for at [facility] in [place]. His original ICD was implanted in 2010.  He then underwent upgrade to CRT-D for depressed EF and LBBB (ECG of LBBB unavailable). He reportedly had some response to CRT.  He however had worsening of his cardiomyopathy and underwent LVAD placement 10/2016.  He also had VT on prior ICD interrogations with reported rate of ~190 bpm that was rx with ATP, no shocks.  He was treated with amiodarone but subsequently stopped due to issues with gait instability that improved with holding amiodarone.  He was subsequently admitted for tikosyn initiation, had prolonged QT on 500mcg q12h but tolerated 250mcg q12h.  He was recently started on antibiotics for driveline infection.  His device was interrogated today showing stable lead parameters.  His underlying rhythm is sinus rhythm IVCD and intermittent competing slow idioventricular rhythm at ~70bpm. BiV pacing is 89%. No arrhythmias noted.  His ICD is under [facility] battery advisory however has 2-3 years of battery life remaining.      Review of Systems   Constitution: Positive for weakness. Negative for fever.   HENT: Negative.    Eyes: Negative.    Cardiovascular: Negative for chest pain, dyspnea on exertion, irregular heartbeat, leg swelling, near-syncope, orthopnea, palpitations and paroxysmal nocturnal dyspnea.   Respiratory: Negative for cough.    Hematologic/Lymphatic: Negative for bleeding  problem. Does not bruise/bleed easily.   Skin: Negative.    Gastrointestinal: Negative for hematochezia and melena.        Objective:    Physical Exam   Constitutional: He is oriented to person, place, and time. He appears well-developed and well-nourished. No distress.   HENT:   Head: Normocephalic and atraumatic.   Eyes: Conjunctivae are normal. Right eye exhibits no discharge. Left eye exhibits no discharge.   Neck: Neck supple. No JVD present.   Cardiovascular:   Smooth LVAD hum   Pulmonary/Chest: Effort normal and breath sounds normal. No respiratory distress. He has no wheezes. He has no rales.   ICD pocket well healed   Abdominal: Soft. Bowel sounds are normal. He exhibits no distension. There is no tenderness. There is no rebound.   Musculoskeletal: He exhibits no edema.   Neurological: He is alert and oriented to person, place, and time.   Skin: Skin is warm and dry. He is not diaphoretic.   Psychiatric: He has a normal mood and affect. His behavior is normal. Thought content normal.   Vitals reviewed.        Assessment:       1. ICD (implantable cardioverter-defibrillator), biventricular, in situ    2. LVAD (left ventricular assist device) present    3. Long term (current) use of anticoagulants    4. S/P CABG (coronary artery bypass graft)    5. Ischemic cardiomyopathy    6. Essential hypertension    7. Ventricular tachycardia         Plan:       In summary, Mr. 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  His device is operating appropriately.  He has had no recent VT. Continue tikosyn, QT biv paced is ~500msec. Continue remote checks every 3 months. RTC with me in 1 year, sooner if needed.    Thank you for allowing me to participate in the care of this patient. Please do not hesitate to call me with any questions or concerns.    Bharat Baker MD,  PhD  Cardiac Electrophysiology

## 2017-07-25 NOTE — LETTER
July 25, 2017      Karen Valladares MD  [address]  [facility] [address]           [facility] Arrhythmia  [address]  [facility] [address]  Phone: [phone]  Fax: [phone]          Patient: Kev Vasquez   MR Number: [id number]   YOB: 1940   Date of Visit: 7/25/2017       Dear Dr. Karen Valladares:    Thank you for referring Kev Vasquez to me for evaluation. Attached you will find relevant portions of my assessment and plan of care.    If you have questions, please do not hesitate to call me. I look forward to following Kev Vasquez along with you.    Sincerely,    Bharat Baker MD    Enclosure  CC:  No Recipients    If you would like to receive this communication electronically, please contact [email or web address] or [phone] to request more information on [employer] access.    For providers and/or their staff who would like to refer a patient to [facility], please contact us through our one-stop-shop provider referral line, [facility], at [phone].    If you feel you have received this communication in error or would no longer like to receive these types of communications, please e-mail [email or web address]

## 2017-07-25 NOTE — PROGRESS NOTES
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  Will plan to treat with ciprofloxacin for 3 weeks.

## 2017-07-26 ENCOUNTER — ANTI-COAG VISIT (OUTPATIENT)
Dept: CARDIOLOGY | Facility: CLINIC | Age: 77
End: 2017-07-26

## 2017-07-26 DIAGNOSIS — Z95.811 LVAD (LEFT VENTRICULAR ASSIST DEVICE) PRESENT: ICD-10-CM

## 2017-07-26 DIAGNOSIS — Z79.01 LONG TERM (CURRENT) USE OF ANTICOAGULANTS: ICD-10-CM

## 2017-07-26 NOTE — PROGRESS NOTES
Subjective:   Mr. Vasquez is a 76 y.o. year old White male presents to Advanced Heart Failure and LVAD clinic for follow up.     Chief Complaint: Fatigue    HPI   Mr. Vasquez is a pleasant 76 y.o. WM with ischemic cardiomyopathy status post Heartmate III 10/16/16 LVAD, CKD II, CAD, VT on Tikosyn, HTN, HLD, Gout, Obesity, SSS, and depression who presents for followup. He was last seen last week (7/20) to establish care. Mr Vasquez was a former resident of the [facility] area who had moved to [place] many years ago where he lived with his wife. He had a Heartmate III implanted 10/16/16 and had a subsequently prolonged clinical course. He had prolonged intubation lasting about 3 weeks. From this hospitalization he had a prolonged course in a skilled nursing facility/rehab center where he proceeded to lose around 50lbs. Earlier this month (July 5,2017), he returned home to [place] with his wife to live with their older daughter. He remains debilitated, unable to walk, and is essentially maximally assisted with transfers. He now has 24 hour care at home (caregiver is present with wife and daughter in clinic) as well as he is getting PT twice weekly at home and OT once weekly. They are very pleased so far (only 2 visits thus far) with physical therapy. He sleeps upstairs in a bedroom with his wife. He has issues with adhering to a sleep schedule, staying up late and sleeping late per his daughter. She notes there are no windows and he is at times not oriented to time. His daughter states that he has a good appetite but that he eats mostly sugary foods (donuts). He has not lost anymore weight since the spring but remains thin and weak.     At last visit, his driveline appeared infected and was cultured. The cultures grew back pan-sensitive pseudomonas. He has since seen ID (this am by Dr. Muhammad) who changed him from Doxy to Cipro. He has also been seen by EP for device check today as well as had an echo  (speed echo). Speed echo shows LVAD appears to unload the LV normally and RV looks enlarged (see report below).     Today, he reports that he is still very weak and tired all the time. We talked at length about a protein rich diet and, for now, he can be very liberal with his diet. He denies CP, fevers, chills, n/v/d, syncope, palpitations, bleeding, PND, or orthopnea. Of note, he is very sensitive to BP meds and has a history of falling due to orthostasis. In the past when BP meds have been up-titrated he has tolerated them poorly. As a result, Dr. Marcelo started a very low-dose amlodipine last visit (2.5mg daily) for elevated MAPs. His doppler is elevated again today but he has no new complaints.     Review of Systems   Constitution: Positive for weakness and malaise/fatigue. Negative for chills, decreased appetite and fever.   HENT: Negative for congestion and nosebleeds.    Eyes: Negative for blurred vision and visual disturbance.   Cardiovascular: Negative for chest pain, dyspnea on exertion, irregular heartbeat, leg swelling, orthopnea, palpitations, paroxysmal nocturnal dyspnea and syncope.   Respiratory: Negative for cough, shortness of breath, sputum production and wheezing.    Endocrine: Positive for cold intolerance. Negative for polydipsia.   Hematologic/Lymphatic: Negative for bleeding problem. Bruises/bleeds easily.   Skin: Negative for itching and poor wound healing.   Musculoskeletal: Positive for falls and muscle weakness.   Gastrointestinal: Negative for bloating, abdominal pain, constipation, diarrhea, nausea and vomiting.   Genitourinary: Negative for dysuria and hematuria.   Neurological: Negative for numbness and seizures.   Psychiatric/Behavioral: Negative for altered mental status. The patient is nervous/anxious.        Objective:   Blood pressure (!) 116/0, temperature 98.4 °F (36.9 °C), temperature source Oral, height 6' (1.829 m), weight 78.9 kg (174 lb).body mass index is 23.6  kg/m².    Physical Exam   Constitutional: He is oriented to person, place, and time. He appears well-developed and well-nourished.   HENT:   Head: Normocephalic and atraumatic.   Eyes: Pupils are equal, round, and reactive to light.   Neck: Neck supple. No JVD present. No tracheal deviation present.   Cardiovascular: Normal rate and regular rhythm.  Exam reveals no gallop and no friction rub.    No murmur heard.  Heartmate III sounds normal    Pulmonary/Chest: Effort normal and breath sounds normal. No respiratory distress. He has no wheezes. He exhibits no tenderness.   Abdominal: Soft. Bowel sounds are normal. He exhibits no distension. There is no tenderness. There is no rebound and no guarding.   Musculoskeletal:   Loss of lower extremity muscle mass   Neurological: He is alert and oriented to person, place, and time.   Skin: Skin is warm and dry. No erythema.   Psychiatric: He has a normal mood and affect.   Driveline Site :           Lab Results   Component Value Date    WBC 7.34 07/25/2017    HGB 12.7 (L) 07/25/2017    HCT 39.3 (L) 07/25/2017    MCV 92 07/25/2017     07/25/2017    CO2 25 07/25/2017    CREATININE 1.4 07/25/2017    CALCIUM 10.3 07/25/2017    ALBUMIN 3.6 07/25/2017    AST 18 07/25/2017     (H) 07/25/2017    ALT 13 07/25/2017       Lab Results   Component Value Date    INR 2.4 (H) 07/25/2017    INR 2.2 (H) 07/24/2017    INR 2.2 (H) 07/23/2017       BNP   Date Value Ref Range Status   07/25/2017 389 (H) 0 - 99 pg/mL Final     Comment:     Values of less than 100 pg/ml are consistent with non-CHF populations.   07/20/2017 828 (H) 0 - 99 pg/mL Final     Comment:     Values of less than 100 pg/ml are consistent with non-CHF populations.   07/28/2015 1,649 (H) 0 - 99 pg/mL Final     Comment:     Values of less than 100 pg/ml are consistent with non-CHF populations.       LD   Date Value Ref Range Status   07/25/2017 158 110 - 260 U/L Final     Comment:     Results are increased in  hemolyzed samples.   07/20/2017 172 110 - 260 U/L Final     Comment:     Results are increased in hemolyzed samples.       No results found for: TACROLIMUS  No results found for: SIROLIMUS  No results found for: CYCLOSPORINE    Cardiac Studies    1 - Severely depressed left ventricular systolic function (EF 15-20%).     2 - Impaired LV relaxation, normal LAP (grade 1 diastolic dysfunction).     3 - The estimated PA systolic pressure is greater than 30 mmHg.     4 - Mild to moderate tricuspid regurgitation.     5 - Moderate left ventricular enlargement.     6 - There is a Heartmate III LVAD in place. Baseline speed is 5800 rpms. The aortic valve opens with every beat. Septum is midline.     Labs were reviewed with the patient.    Assessment:     1. Heart replaced by heart assist device    2. Long term (current) use of anticoagulants    3. LVAD (left ventricular assist device) present    4. Debility    5. Ischemic cardiomyopathy    6. Chronic systolic heart failure        Plan:   ICM s/p Heartmate III   - Debility precludes assessment of functional capacity; no shortness of breath at rest and appears euvolemic on exam  - No alarms/issues on VAD   - supplies ordered and given  - doppler still elevated; will increase amlodipine to 5mg daily; instructed that if he feels dizzy or lightheaded that okay to go back to 2.5mg daily  - cont ASA 81mg and Warfarin    DLES infection  - pan-sensitive pseudomonas  - on cipro; ID following    Debility   - Lower extremity strength markedly decreased/ decreased lower extremity mass  - has Follow up with daughters neurologist for possible neuropathy  - cont Home PT / OT       UNOS Patient Status  Functional Status: 40% - Disabled: requires special care and assistance  Physical Capacity: Wheelchair bound or more limited  Working for Income: Unknown    Jesus To MD   Transplant Cardiology

## 2017-07-26 NOTE — PROGRESS NOTES
Pt and family here today for multiple appts.  We are seeing him at the end of the day. Pt started on cipro per ID x 3 weeks. No alarms noted in history. VAD interrogation completed inMD note.  Modular cable CDI, no yellow visible. VAD sounds HM 3 static. Refer to MD note for full details.  Will see him again next week in clinic

## 2017-07-26 NOTE — PROCEDURES
TXP SANTOSH INTERROGATIONS 7/25/2017 7/20/2017   Type HeartMate3 HeartMate3   Flow 5.0 3.9   Speed 5850 5800   PI 2.9 6.7   Power (Mendez) 4.5 4.6   LSL 5600 5600   Pulsatility Pulse No Pulse   }    Reviewed: No alarms    NEYDA To MD  [facility] Cardiology

## 2017-07-27 ENCOUNTER — ANTI-COAG VISIT (OUTPATIENT)
Dept: CARDIOLOGY | Facility: CLINIC | Age: 77
End: 2017-07-27

## 2017-07-27 ENCOUNTER — LAB VISIT (OUTPATIENT)
Dept: LAB | Facility: HOSPITAL | Age: 77
End: 2017-07-27
Attending: INTERNAL MEDICINE
Payer: MEDICARE

## 2017-07-27 DIAGNOSIS — Z95.811 LVAD (LEFT VENTRICULAR ASSIST DEVICE) PRESENT: ICD-10-CM

## 2017-07-27 DIAGNOSIS — Z95.811 HEART REPLACED BY HEART ASSIST DEVICE: ICD-10-CM

## 2017-07-27 DIAGNOSIS — Z79.01 LONG TERM (CURRENT) USE OF ANTICOAGULANTS: ICD-10-CM

## 2017-07-27 LAB
INR PPP: 2.6
PROTHROMBIN TIME: 28.4 SEC

## 2017-07-27 PROCEDURE — 85610 PROTHROMBIN TIME: CPT | Mod: PO

## 2017-07-28 ENCOUNTER — HOSPITAL ENCOUNTER (INPATIENT)
Facility: HOSPITAL | Age: 77
LOS: 11 days | Discharge: NURSING, REHAB OR LONG-TERM CARE | DRG: 315 | End: 2017-08-08
Attending: EMERGENCY MEDICINE | Admitting: INTERNAL MEDICINE
Payer: MEDICARE

## 2017-07-28 ENCOUNTER — TELEPHONE (OUTPATIENT)
Dept: TRANSPLANT | Facility: CLINIC | Age: 77
End: 2017-07-28

## 2017-07-28 DIAGNOSIS — Z79.899 VISIT FOR MONITORING TIKOSYN THERAPY: ICD-10-CM

## 2017-07-28 DIAGNOSIS — R10.84 GENERALIZED ABDOMINAL PAIN: ICD-10-CM

## 2017-07-28 DIAGNOSIS — R41.0 DELIRIUM: ICD-10-CM

## 2017-07-28 DIAGNOSIS — Z95.811 LVAD (LEFT VENTRICULAR ASSIST DEVICE) PRESENT: ICD-10-CM

## 2017-07-28 DIAGNOSIS — Z12.11 COLON CANCER SCREENING: ICD-10-CM

## 2017-07-28 DIAGNOSIS — I16.0 HYPERTENSIVE URGENCY: ICD-10-CM

## 2017-07-28 DIAGNOSIS — R25.9 INVOLUNTARY MOVEMENTS: ICD-10-CM

## 2017-07-28 DIAGNOSIS — R10.32 LLQ ABDOMINAL PAIN: ICD-10-CM

## 2017-07-28 DIAGNOSIS — T82.9XXA COMPLICATION INVOLVING LEFT VENTRICULAR ASSIST DEVICE (LVAD): ICD-10-CM

## 2017-07-28 DIAGNOSIS — G25.81 RESTLESS LEG SYNDROME: ICD-10-CM

## 2017-07-28 DIAGNOSIS — R10.9 ABDOMINAL PAIN: ICD-10-CM

## 2017-07-28 DIAGNOSIS — Z51.81 VISIT FOR MONITORING TIKOSYN THERAPY: ICD-10-CM

## 2017-07-28 DIAGNOSIS — Z74.09 IMPAIRED FUNCTIONAL MOBILITY AND ENDURANCE: ICD-10-CM

## 2017-07-28 DIAGNOSIS — Z79.899 MEDICATION MANAGEMENT: ICD-10-CM

## 2017-07-28 DIAGNOSIS — T82.9XXD COMPLICATION INVOLVING LEFT VENTRICULAR ASSIST DEVICE (LVAD), SUBSEQUENT ENCOUNTER: Primary | ICD-10-CM

## 2017-07-28 DIAGNOSIS — I10 HYPERTENSION: ICD-10-CM

## 2017-07-28 DIAGNOSIS — T82.9XXA COMPLICATION INVOLVING LEFT VENTRICULAR ASSIST DEVICE (LVAD), INITIAL ENCOUNTER: ICD-10-CM

## 2017-07-28 DIAGNOSIS — I25.10 CORONARY ARTERY DISEASE INVOLVING NATIVE CORONARY ARTERY OF NATIVE HEART WITHOUT ANGINA PECTORIS: ICD-10-CM

## 2017-07-28 DIAGNOSIS — T82.9XXA LEFT VENTRICULAR ASSIST DEVICE (LVAD) COMPLICATION, INITIAL ENCOUNTER: ICD-10-CM

## 2017-07-28 LAB
ALBUMIN SERPL BCP-MCNC: 3.3 G/DL
ALP SERPL-CCNC: 91 U/L
ALT SERPL W/O P-5'-P-CCNC: 13 U/L
ANION GAP SERPL CALC-SCNC: 11 MMOL/L
AST SERPL-CCNC: 19 U/L
BASOPHILS # BLD AUTO: 0.04 K/UL
BASOPHILS NFR BLD: 0.6 %
BILIRUB SERPL-MCNC: 0.4 MG/DL
BNP SERPL-MCNC: 628 PG/ML
BUN SERPL-MCNC: 26 MG/DL
BUN SERPL-MCNC: 27 MG/DL (ref 6–30)
CALCIUM SERPL-MCNC: 10.1 MG/DL
CHLORIDE SERPL-SCNC: 103 MMOL/L (ref 95–110)
CHLORIDE SERPL-SCNC: 104 MMOL/L
CO2 SERPL-SCNC: 24 MMOL/L
CREAT SERPL-MCNC: 1.5 MG/DL
CREAT SERPL-MCNC: 1.6 MG/DL (ref 0.5–1.4)
DIFFERENTIAL METHOD: ABNORMAL
EOSINOPHIL # BLD AUTO: 0.2 K/UL
EOSINOPHIL NFR BLD: 3.4 %
ERYTHROCYTE [DISTWIDTH] IN BLOOD BY AUTOMATED COUNT: 16.5 %
EST. GFR  (AFRICAN AMERICAN): 52.3 ML/MIN/1.73 M^2
EST. GFR  (NON AFRICAN AMERICAN): 45.2 ML/MIN/1.73 M^2
GLUCOSE SERPL-MCNC: 123 MG/DL
GLUCOSE SERPL-MCNC: 127 MG/DL (ref 70–110)
HCT VFR BLD AUTO: 38.3 %
HCT VFR BLD CALC: 40 %PCV (ref 36–54)
HGB BLD-MCNC: 12.6 G/DL
INR PPP: 2.2
LACTATE SERPL-SCNC: 2.6 MMOL/L
LYMPHOCYTES # BLD AUTO: 1.1 K/UL
LYMPHOCYTES NFR BLD: 16.6 %
MCH RBC QN AUTO: 29.9 PG
MCHC RBC AUTO-ENTMCNC: 32.9 G/DL
MCV RBC AUTO: 91 FL
MONOCYTES # BLD AUTO: 0.8 K/UL
MONOCYTES NFR BLD: 12 %
NEUTROPHILS # BLD AUTO: 4.6 K/UL
NEUTROPHILS NFR BLD: 67.1 %
PLATELET # BLD AUTO: 155 K/UL
PMV BLD AUTO: 11.3 FL
POC IONIZED CALCIUM: 1.29 MMOL/L (ref 1.06–1.42)
POC TCO2 (MEASURED): 28 MMOL/L (ref 23–29)
POTASSIUM BLD-SCNC: 4.3 MMOL/L (ref 3.5–5.1)
POTASSIUM SERPL-SCNC: 4.3 MMOL/L
PROT SERPL-MCNC: 6.9 G/DL
PROTHROMBIN TIME: 22.1 SEC
RBC # BLD AUTO: 4.22 M/UL
SAMPLE: ABNORMAL
SODIUM BLD-SCNC: 142 MMOL/L (ref 136–145)
SODIUM SERPL-SCNC: 139 MMOL/L
WBC # BLD AUTO: 6.86 K/UL

## 2017-07-28 PROCEDURE — 99284 EMERGENCY DEPT VISIT MOD MDM: CPT | Mod: ,,, | Performed by: EMERGENCY MEDICINE

## 2017-07-28 PROCEDURE — 80053 COMPREHEN METABOLIC PANEL: CPT

## 2017-07-28 PROCEDURE — 87040 BLOOD CULTURE FOR BACTERIA: CPT

## 2017-07-28 PROCEDURE — 99285 EMERGENCY DEPT VISIT HI MDM: CPT

## 2017-07-28 PROCEDURE — 85610 PROTHROMBIN TIME: CPT

## 2017-07-28 PROCEDURE — 86141 C-REACTIVE PROTEIN HS: CPT

## 2017-07-28 PROCEDURE — 83036 HEMOGLOBIN GLYCOSYLATED A1C: CPT

## 2017-07-28 PROCEDURE — 83880 ASSAY OF NATRIURETIC PEPTIDE: CPT

## 2017-07-28 PROCEDURE — 27000248 HC VAD-ADDITIONAL DAY

## 2017-07-28 PROCEDURE — 85025 COMPLETE CBC W/AUTO DIFF WBC: CPT

## 2017-07-28 PROCEDURE — 99223 1ST HOSP IP/OBS HIGH 75: CPT | Mod: ,,, | Performed by: INTERNAL MEDICINE

## 2017-07-28 PROCEDURE — 84443 ASSAY THYROID STIM HORMONE: CPT

## 2017-07-28 PROCEDURE — 83605 ASSAY OF LACTIC ACID: CPT

## 2017-07-28 PROCEDURE — 25500020 PHARM REV CODE 255: Performed by: EMERGENCY MEDICINE

## 2017-07-28 PROCEDURE — 20600001 HC STEP DOWN PRIVATE ROOM

## 2017-07-28 RX ORDER — AMLODIPINE BESYLATE 10 MG/1
10 TABLET ORAL DAILY
Status: DISCONTINUED | OUTPATIENT
Start: 2017-07-28 | End: 2017-07-29

## 2017-07-28 RX ORDER — SODIUM CHLORIDE 0.9 % (FLUSH) 0.9 %
3 SYRINGE (ML) INJECTION EVERY 8 HOURS
Status: DISCONTINUED | OUTPATIENT
Start: 2017-07-28 | End: 2017-08-08 | Stop reason: HOSPADM

## 2017-07-28 RX ADMIN — IOHEXOL 75 ML: 350 INJECTION, SOLUTION INTRAVENOUS at 10:07

## 2017-07-28 NOTE — TELEPHONE ENCOUNTER
Patients' daughter called and stated that  was in some pain and experiencing some discomfort from his DLES. I told her that if his CIPRO 500 mg (abx) was not working than they need to go to the ER to get evaluated.

## 2017-07-29 PROBLEM — T82.9XXA COMPLICATION INVOLVING LEFT VENTRICULAR ASSIST DEVICE (LVAD): Status: ACTIVE | Noted: 2017-07-29

## 2017-07-29 PROBLEM — R41.82 ALTERED MENTAL STATE: Status: ACTIVE | Noted: 2017-07-29

## 2017-07-29 LAB
ALBUMIN SERPL BCP-MCNC: 2.8 G/DL
ALBUMIN SERPL BCP-MCNC: 2.8 G/DL
ALLENS TEST: ABNORMAL
ALP SERPL-CCNC: 66 U/L
ALP SERPL-CCNC: 66 U/L
ALT SERPL W/O P-5'-P-CCNC: 11 U/L
ALT SERPL W/O P-5'-P-CCNC: 11 U/L
ANION GAP SERPL CALC-SCNC: 6 MMOL/L
ANION GAP SERPL CALC-SCNC: 9 MMOL/L
AST SERPL-CCNC: 17 U/L
AST SERPL-CCNC: 17 U/L
BASOPHILS # BLD AUTO: 0.03 K/UL
BASOPHILS NFR BLD: 0.4 %
BILIRUB DIRECT SERPL-MCNC: 0.2 MG/DL
BILIRUB SERPL-MCNC: 0.4 MG/DL
BILIRUB SERPL-MCNC: 0.4 MG/DL
BILIRUB UR QL STRIP: NEGATIVE
BNP SERPL-MCNC: 365 PG/ML
BUN SERPL-MCNC: 24 MG/DL
BUN SERPL-MCNC: 25 MG/DL
CALCIUM SERPL-MCNC: 8.2 MG/DL
CALCIUM SERPL-MCNC: 9.6 MG/DL
CHLORIDE SERPL-SCNC: 106 MMOL/L
CHLORIDE SERPL-SCNC: 109 MMOL/L
CLARITY UR REFRACT.AUTO: CLEAR
CO2 SERPL-SCNC: 20 MMOL/L
CO2 SERPL-SCNC: 25 MMOL/L
COLOR UR AUTO: YELLOW
CREAT SERPL-MCNC: 1.3 MG/DL
CREAT SERPL-MCNC: 1.4 MG/DL
CRP SERPL-MCNC: 14.6 MG/L
CRP SERPL-MCNC: 9.4 MG/L
DELSYS: ABNORMAL
DIFFERENTIAL METHOD: ABNORMAL
EOSINOPHIL # BLD AUTO: 0.2 K/UL
EOSINOPHIL NFR BLD: 3.1 %
ERYTHROCYTE [DISTWIDTH] IN BLOOD BY AUTOMATED COUNT: 16.6 %
EST. GFR  (AFRICAN AMERICAN): 56.8 ML/MIN/1.73 M^2
EST. GFR  (AFRICAN AMERICAN): >60 ML/MIN/1.73 M^2
EST. GFR  (NON AFRICAN AMERICAN): 49.1 ML/MIN/1.73 M^2
EST. GFR  (NON AFRICAN AMERICAN): 53.8 ML/MIN/1.73 M^2
ESTIMATED AVG GLUCOSE: 105 MG/DL
GLUCOSE SERPL-MCNC: 116 MG/DL
GLUCOSE SERPL-MCNC: 142 MG/DL
GLUCOSE UR QL STRIP: NEGATIVE
HBA1C MFR BLD HPLC: 5.3 %
HCO3 UR-SCNC: 27.8 MMOL/L (ref 24–28)
HCT VFR BLD AUTO: 33.6 %
HGB BLD-MCNC: 10.7 G/DL
HGB UR QL STRIP: NEGATIVE
INR PPP: 2.5
KETONES UR QL STRIP: NEGATIVE
LDH SERPL L TO P-CCNC: 149 U/L
LEUKOCYTE ESTERASE UR QL STRIP: NEGATIVE
LYMPHOCYTES # BLD AUTO: 1 K/UL
LYMPHOCYTES NFR BLD: 14.9 %
MAGNESIUM SERPL-MCNC: 1.7 MG/DL
MAGNESIUM SERPL-MCNC: 1.9 MG/DL
MCH RBC QN AUTO: 29.6 PG
MCHC RBC AUTO-ENTMCNC: 31.8 G/DL
MCV RBC AUTO: 93 FL
MODE: ABNORMAL
MONOCYTES # BLD AUTO: 0.8 K/UL
MONOCYTES NFR BLD: 12.1 %
NEUTROPHILS # BLD AUTO: 4.8 K/UL
NEUTROPHILS NFR BLD: 69.2 %
NITRITE UR QL STRIP: NEGATIVE
OSMOLALITY SERPL: 294 MOSM/KG
PCO2 BLDA: 39.9 MMHG (ref 35–45)
PH SMN: 7.45 [PH] (ref 7.35–7.45)
PH UR STRIP: 6 [PH] (ref 5–8)
PHOSPHATE SERPL-MCNC: 2.6 MG/DL
PHOSPHATE SERPL-MCNC: 3.2 MG/DL
PLATELET # BLD AUTO: 134 K/UL
PMV BLD AUTO: 11.4 FL
PO2 BLDA: 73 MMHG (ref 80–100)
POC BE: 4 MMOL/L
POC SATURATED O2: 95 % (ref 95–100)
POC TCO2: 29 MMOL/L (ref 23–27)
POTASSIUM SERPL-SCNC: 3.4 MMOL/L
POTASSIUM SERPL-SCNC: 4.4 MMOL/L
PREALB SERPL-MCNC: 17 MG/DL
PROT SERPL-MCNC: 5.6 G/DL
PROT SERPL-MCNC: 5.6 G/DL
PROT UR QL STRIP: NEGATIVE
PROTHROMBIN TIME: 24.7 SEC
RBC # BLD AUTO: 3.62 M/UL
SAMPLE: ABNORMAL
SITE: ABNORMAL
SODIUM SERPL-SCNC: 137 MMOL/L
SODIUM SERPL-SCNC: 138 MMOL/L
SP GR UR STRIP: 1.02 (ref 1–1.03)
SP02: 93
TSH SERPL DL<=0.005 MIU/L-ACNC: 1.81 UIU/ML
URN SPEC COLLECT METH UR: NORMAL
UROBILINOGEN UR STRIP-ACNC: NEGATIVE EU/DL
WBC # BLD AUTO: 6.86 K/UL

## 2017-07-29 PROCEDURE — 80048 BASIC METABOLIC PNL TOTAL CA: CPT

## 2017-07-29 PROCEDURE — G8996 SWALLOW CURRENT STATUS: HCPCS | Mod: CH

## 2017-07-29 PROCEDURE — 84100 ASSAY OF PHOSPHORUS: CPT | Mod: 91

## 2017-07-29 PROCEDURE — 25000003 PHARM REV CODE 250: Performed by: INTERNAL MEDICINE

## 2017-07-29 PROCEDURE — 80053 COMPREHEN METABOLIC PANEL: CPT

## 2017-07-29 PROCEDURE — A4216 STERILE WATER/SALINE, 10 ML: HCPCS | Performed by: INTERNAL MEDICINE

## 2017-07-29 PROCEDURE — 82803 BLOOD GASES ANY COMBINATION: CPT

## 2017-07-29 PROCEDURE — 87205 SMEAR GRAM STAIN: CPT

## 2017-07-29 PROCEDURE — 83615 LACTATE (LD) (LDH) ENZYME: CPT

## 2017-07-29 PROCEDURE — 92610 EVALUATE SWALLOWING FUNCTION: CPT

## 2017-07-29 PROCEDURE — 97530 THERAPEUTIC ACTIVITIES: CPT

## 2017-07-29 PROCEDURE — 99233 SBSQ HOSP IP/OBS HIGH 50: CPT | Mod: ,,, | Performed by: INTERNAL MEDICINE

## 2017-07-29 PROCEDURE — 87070 CULTURE OTHR SPECIMN AEROBIC: CPT

## 2017-07-29 PROCEDURE — 87086 URINE CULTURE/COLONY COUNT: CPT

## 2017-07-29 PROCEDURE — 86140 C-REACTIVE PROTEIN: CPT

## 2017-07-29 PROCEDURE — 83880 ASSAY OF NATRIURETIC PEPTIDE: CPT

## 2017-07-29 PROCEDURE — 97162 PT EVAL MOD COMPLEX 30 MIN: CPT

## 2017-07-29 PROCEDURE — 97802 MEDICAL NUTRITION INDIV IN: CPT

## 2017-07-29 PROCEDURE — 87077 CULTURE AEROBIC IDENTIFY: CPT

## 2017-07-29 PROCEDURE — 85610 PROTHROMBIN TIME: CPT

## 2017-07-29 PROCEDURE — 27000248 HC VAD-ADDITIONAL DAY

## 2017-07-29 PROCEDURE — 99223 1ST HOSP IP/OBS HIGH 75: CPT | Mod: GC,,, | Performed by: INTERNAL MEDICINE

## 2017-07-29 PROCEDURE — 36600 WITHDRAWAL OF ARTERIAL BLOOD: CPT

## 2017-07-29 PROCEDURE — 63600175 PHARM REV CODE 636 W HCPCS: Performed by: INTERNAL MEDICINE

## 2017-07-29 PROCEDURE — 97166 OT EVAL MOD COMPLEX 45 MIN: CPT

## 2017-07-29 PROCEDURE — 20000000 HC ICU ROOM

## 2017-07-29 PROCEDURE — G8997 SWALLOW GOAL STATUS: HCPCS | Mod: CH

## 2017-07-29 PROCEDURE — 99900035 HC TECH TIME PER 15 MIN (STAT)

## 2017-07-29 PROCEDURE — 83930 ASSAY OF BLOOD OSMOLALITY: CPT

## 2017-07-29 PROCEDURE — 87075 CULTR BACTERIA EXCEPT BLOOD: CPT

## 2017-07-29 PROCEDURE — 85025 COMPLETE CBC W/AUTO DIFF WBC: CPT

## 2017-07-29 PROCEDURE — 83735 ASSAY OF MAGNESIUM: CPT | Mod: 91

## 2017-07-29 PROCEDURE — 87186 SC STD MICRODIL/AGAR DIL: CPT

## 2017-07-29 PROCEDURE — 84100 ASSAY OF PHOSPHORUS: CPT

## 2017-07-29 PROCEDURE — 84134 ASSAY OF PREALBUMIN: CPT

## 2017-07-29 PROCEDURE — 83735 ASSAY OF MAGNESIUM: CPT

## 2017-07-29 PROCEDURE — 81003 URINALYSIS AUTO W/O SCOPE: CPT

## 2017-07-29 PROCEDURE — 25000003 PHARM REV CODE 250: Performed by: STUDENT IN AN ORGANIZED HEALTH CARE EDUCATION/TRAINING PROGRAM

## 2017-07-29 RX ORDER — POTASSIUM CHLORIDE 20 MEQ/1
60 TABLET, EXTENDED RELEASE ORAL ONCE
Status: COMPLETED | OUTPATIENT
Start: 2017-07-29 | End: 2017-07-29

## 2017-07-29 RX ORDER — SPIRONOLACTONE 25 MG/1
25 TABLET ORAL DAILY
Status: DISCONTINUED | OUTPATIENT
Start: 2017-07-29 | End: 2017-08-08 | Stop reason: HOSPADM

## 2017-07-29 RX ORDER — SODIUM,POTASSIUM PHOSPHATES 280-250MG
1 POWDER IN PACKET (EA) ORAL ONCE
Status: COMPLETED | OUTPATIENT
Start: 2017-07-29 | End: 2017-07-29

## 2017-07-29 RX ORDER — ALLOPURINOL 300 MG/1
300 TABLET ORAL DAILY
Status: DISCONTINUED | OUTPATIENT
Start: 2017-07-29 | End: 2017-08-08 | Stop reason: HOSPADM

## 2017-07-29 RX ORDER — CIPROFLOXACIN 500 MG/1
500 TABLET ORAL 2 TIMES DAILY
Status: DISCONTINUED | OUTPATIENT
Start: 2017-07-29 | End: 2017-07-29

## 2017-07-29 RX ORDER — FERROUS SULFATE, DRIED 160(50) MG
1 TABLET, EXTENDED RELEASE ORAL 2 TIMES DAILY
Status: DISCONTINUED | OUTPATIENT
Start: 2017-07-29 | End: 2017-08-08 | Stop reason: HOSPADM

## 2017-07-29 RX ORDER — WARFARIN 2.5 MG/1
2.5 TABLET ORAL DAILY
Status: DISCONTINUED | OUTPATIENT
Start: 2017-07-29 | End: 2017-08-01

## 2017-07-29 RX ORDER — ATORVASTATIN CALCIUM 10 MG/1
10 TABLET, FILM COATED ORAL DAILY
Status: DISCONTINUED | OUTPATIENT
Start: 2017-07-29 | End: 2017-08-08 | Stop reason: HOSPADM

## 2017-07-29 RX ORDER — TAMSULOSIN HYDROCHLORIDE 0.4 MG/1
0.4 CAPSULE ORAL DAILY
Status: DISCONTINUED | OUTPATIENT
Start: 2017-07-29 | End: 2017-08-08 | Stop reason: HOSPADM

## 2017-07-29 RX ORDER — LANOLIN ALCOHOL/MO/W.PET/CERES
400 CREAM (GRAM) TOPICAL ONCE
Status: COMPLETED | OUTPATIENT
Start: 2017-07-29 | End: 2017-07-29

## 2017-07-29 RX ORDER — AMLODIPINE BESYLATE 10 MG/1
10 TABLET ORAL DAILY
Status: DISCONTINUED | OUTPATIENT
Start: 2017-07-30 | End: 2017-08-08 | Stop reason: HOSPADM

## 2017-07-29 RX ORDER — AMLODIPINE BESYLATE 5 MG/1
5 TABLET ORAL DAILY
Status: DISCONTINUED | OUTPATIENT
Start: 2017-07-29 | End: 2017-07-29

## 2017-07-29 RX ORDER — FOLIC ACID 1 MG/1
1 TABLET ORAL DAILY
Status: DISCONTINUED | OUTPATIENT
Start: 2017-07-29 | End: 2017-08-08 | Stop reason: HOSPADM

## 2017-07-29 RX ORDER — AMLODIPINE BESYLATE 5 MG/1
5 TABLET ORAL ONCE
Status: COMPLETED | OUTPATIENT
Start: 2017-07-29 | End: 2017-07-29

## 2017-07-29 RX ORDER — ROPINIROLE 0.25 MG/1
0.5 TABLET, FILM COATED ORAL 3 TIMES DAILY
Status: DISCONTINUED | OUTPATIENT
Start: 2017-07-29 | End: 2017-08-08 | Stop reason: HOSPADM

## 2017-07-29 RX ORDER — NICARDIPINE HYDROCHLORIDE 0.2 MG/ML
1 INJECTION INTRAVENOUS CONTINUOUS
Status: DISCONTINUED | OUTPATIENT
Start: 2017-07-29 | End: 2017-08-02

## 2017-07-29 RX ORDER — DOFETILIDE 0.25 MG/1
250 CAPSULE ORAL EVERY 12 HOURS
Status: DISCONTINUED | OUTPATIENT
Start: 2017-07-29 | End: 2017-08-08 | Stop reason: HOSPADM

## 2017-07-29 RX ORDER — NAPROXEN SODIUM 220 MG/1
81 TABLET, FILM COATED ORAL DAILY
Status: DISCONTINUED | OUTPATIENT
Start: 2017-07-29 | End: 2017-08-08 | Stop reason: HOSPADM

## 2017-07-29 RX ORDER — CEFEPIME HYDROCHLORIDE 2 G/50ML
2 INJECTION, SOLUTION INTRAVENOUS
Status: DISCONTINUED | OUTPATIENT
Start: 2017-07-29 | End: 2017-08-08 | Stop reason: HOSPADM

## 2017-07-29 RX ORDER — BUPROPION HYDROCHLORIDE 150 MG/1
300 TABLET ORAL DAILY
Status: DISCONTINUED | OUTPATIENT
Start: 2017-07-29 | End: 2017-08-08 | Stop reason: HOSPADM

## 2017-07-29 RX ORDER — BISACODYL 10 MG
10 SUPPOSITORY, RECTAL RECTAL DAILY PRN
Status: DISCONTINUED | OUTPATIENT
Start: 2017-07-29 | End: 2017-08-08 | Stop reason: HOSPADM

## 2017-07-29 RX ADMIN — CEFEPIME HYDROCHLORIDE 2 G: 2 INJECTION, SOLUTION INTRAVENOUS at 02:07

## 2017-07-29 RX ADMIN — THERA TABS 1 TABLET: TAB at 09:07

## 2017-07-29 RX ADMIN — VANCOMYCIN HYDROCHLORIDE 1250 MG: 100 INJECTION, POWDER, LYOPHILIZED, FOR SOLUTION INTRAVENOUS at 03:07

## 2017-07-29 RX ADMIN — NICARDIPINE HYDROCHLORIDE 1 MG/HR: 0.2 INJECTION, SOLUTION INTRAVENOUS at 11:07

## 2017-07-29 RX ADMIN — NICARDIPINE HYDROCHLORIDE 7.5 MG/HR: 0.2 INJECTION, SOLUTION INTRAVENOUS at 07:07

## 2017-07-29 RX ADMIN — ASPIRIN 81 MG CHEWABLE TABLET 81 MG: 81 TABLET CHEWABLE at 09:07

## 2017-07-29 RX ADMIN — Medication 3 ML: at 01:07

## 2017-07-29 RX ADMIN — ROPINIROLE 0.5 MG: 0.5 TABLET, FILM COATED ORAL at 05:07

## 2017-07-29 RX ADMIN — OYSTER SHELL CALCIUM WITH VITAMIN D 1 TABLET: 500; 200 TABLET, FILM COATED ORAL at 09:07

## 2017-07-29 RX ADMIN — ALLOPURINOL 300 MG: 300 TABLET ORAL at 09:07

## 2017-07-29 RX ADMIN — Medication 3 ML: at 02:07

## 2017-07-29 RX ADMIN — Medication 3 ML: at 06:07

## 2017-07-29 RX ADMIN — FOLIC ACID 1 MG: 1 TABLET ORAL at 09:07

## 2017-07-29 RX ADMIN — MAGNESIUM OXIDE TAB 400 MG (241.3 MG ELEMENTAL MG) 400 MG: 400 (241.3 MG) TAB at 08:07

## 2017-07-29 RX ADMIN — WARFARIN SODIUM 2.5 MG: 2.5 TABLET ORAL at 05:07

## 2017-07-29 RX ADMIN — NICARDIPINE HYDROCHLORIDE 5 MG/HR: 0.2 INJECTION, SOLUTION INTRAVENOUS at 06:07

## 2017-07-29 RX ADMIN — ATORVASTATIN CALCIUM 10 MG: 10 TABLET, FILM COATED ORAL at 09:07

## 2017-07-29 RX ADMIN — AMLODIPINE BESYLATE 5 MG: 5 TABLET ORAL at 01:07

## 2017-07-29 RX ADMIN — DOFETILIDE 250 MCG: 0.12 CAPSULE ORAL at 09:07

## 2017-07-29 RX ADMIN — NICARDIPINE HYDROCHLORIDE 5 MG/HR: 0.2 INJECTION, SOLUTION INTRAVENOUS at 03:07

## 2017-07-29 RX ADMIN — Medication 3 ML: at 10:07

## 2017-07-29 RX ADMIN — TAMSULOSIN HYDROCHLORIDE 0.4 MG: 0.4 CAPSULE ORAL at 09:07

## 2017-07-29 RX ADMIN — ROPINIROLE 0.5 MG: 0.5 TABLET, FILM COATED ORAL at 10:07

## 2017-07-29 RX ADMIN — AMLODIPINE BESYLATE 5 MG: 5 TABLET ORAL at 12:07

## 2017-07-29 RX ADMIN — BUPROPION HYDROCHLORIDE 300 MG: 150 TABLET, FILM COATED, EXTENDED RELEASE ORAL at 09:07

## 2017-07-29 RX ADMIN — ROPINIROLE 0.5 MG: 0.5 TABLET, FILM COATED ORAL at 02:07

## 2017-07-29 RX ADMIN — POTASSIUM & SODIUM PHOSPHATES POWDER PACK 280-160-250 MG 1 PACKET: 280-160-250 PACK at 09:07

## 2017-07-29 RX ADMIN — SPIRONOLACTONE 25 MG: 25 TABLET, FILM COATED ORAL at 09:07

## 2017-07-29 RX ADMIN — MAGNESIUM OXIDE TAB 400 MG (241.3 MG ELEMENTAL MG) 400 MG: 400 (241.3 MG) TAB at 09:07

## 2017-07-29 RX ADMIN — POTASSIUM CHLORIDE 60 MEQ: 1500 TABLET, EXTENDED RELEASE ORAL at 09:07

## 2017-07-29 NOTE — ASSESSMENT & PLAN NOTE
-pending CT scan of the abdomen to evaluate abscess   -ID to see the patient and obtain cultures from the DL  -follow up blood cultures

## 2017-07-29 NOTE — PROGRESS NOTES
[facility]  Heart Transplant  Progress Note    Patient Name: Kev Vasquez  MRN: [id number]  Admission Date: 7/28/2017  Hospital Length of Stay: 1 days  Attending Physician: Carlito Santana MD  Primary Care Provider: Mega Collins MD  Principal Problem:Complication involving left ventricular assist device (LVAD)    Subjective:     Interval History: Patient still reports pain; denies fevers, chills or diaphoresis.     Continuous Infusions:   nicardipine 5 mg/hr (07/29/17 1000)     Scheduled Meds:   allopurinol  300 mg Oral Daily    amlodipine  5 mg Oral Daily    aspirin  81 mg Oral Daily    atorvastatin  10 mg Oral Daily    buPROPion  300 mg Oral Daily    calcium-vitamin D3  1 tablet Oral BID    ceFEPime (MAXIPIME) IVPB  2 g Intravenous Q12H    dofetilide  250 mcg Oral Q12H    folic acid  1 mg Oral Daily    multivitamin  1 tablet Oral Daily    ropinirole  0.5 mg Oral TID    sodium chloride 0.9%  3 mL Intravenous Q8H    spironolactone  25 mg Oral Daily    tamsulosin  0.4 mg Oral Daily    vancomycin (VANCOCIN) IVPB  15 mg/kg Intravenous Q12H    warfarin  2.5 mg Oral Daily     PRN Meds:bisacodyl    Review of patient's allergies indicates:   Allergen Reactions    Sulfa (sulfonamide antibiotics)      Objective:     Vital Signs (Most Recent):  Temp: 98.7 °F (37.1 °C) (07/29/17 0701)  Pulse: 65 (07/29/17 1000)  Resp: 20 (07/29/17 1000)  BP: (!) 84/63 (07/29/17 1000)  SpO2: (!) 94 % (07/29/17 1000) Vital Signs (24h Range):  Temp:  [98.4 °F (36.9 °C)-98.7 °F (37.1 °C)] 98.7 °F (37.1 °C)  Pulse:  [] 65  Resp:  [18-28] 20  SpO2:  [93 %-97 %] 94 %  BP: ()/(0-111) 84/63     Weight: 74.3 kg (163 lb 12.8 oz)  Body mass index is 22.22 kg/m².      Intake/Output Summary (Last 24 hours) at 07/29/17 1102  Last data filed at 07/29/17 1000   Gross per 24 hour   Intake           524.17 ml   Output              270 ml   Net           254.17 ml       Hemodynamic Parameters:         Physical  Exam   Constitutional: He appears well-developed and well-nourished.   HENT:   Head: Normocephalic and atraumatic.   Eyes: EOM are normal. Pupils are equal, round, and reactive to light.   Neck: Normal range of motion. Neck supple.   Cardiovascular: Regular rhythm.    Pulses:       Carotid pulses are 2+ on the right side, and 2+ on the left side.       Radial pulses are 2+ on the right side, and 2+ on the left side.        Femoral pulses are 2+ on the right side, and 2+ on the left side.       Popliteal pulses are 2+ on the right side, and 2+ on the left side.        Dorsalis pedis pulses are 2+ on the right side, and 2+ on the left side.        Posterior tibial pulses are 2+ on the right side, and 2+ on the left side.   VAD hum +   Pulmonary/Chest: Effort normal and breath sounds normal.   Abdominal: Soft. He exhibits no distension and no mass. There is tenderness.   Nursing note and vitals reviewed.      Significant Labs:  CBC:    Recent Labs  Lab 07/29/17  0635   WBC 6.86   RBC 3.62*   HGB 10.7*   HCT 33.6*   *   MCV 93   MCH 29.6   MCHC 31.8*     BNP:    Recent Labs  Lab 07/29/17  0635   *     CMP:    Recent Labs  Lab 07/29/17  0635   *   CALCIUM 8.2*   ALBUMIN 2.8*  2.8*   PROT 5.6*  5.6*      K 3.4*   CO2 20*      BUN 24*   CREATININE 1.3   ALKPHOS 66  66   ALT 11  11   AST 17  17   BILITOT 0.4  0.4      Coagulation:     Recent Labs  Lab 07/29/17  0635   INR 2.5*     LDH:    Recent Labs  Lab 07/29/17  0635        Microbiology:  Microbiology Results (last 7 days)     Procedure Component Value Units Date/Time    Gram stain [[id number]] Collected:  07/29/17 1056    Order Status:  Sent Specimen:  Wound from Abdomen Updated:  07/29/17 1056    Culture, Anaerobe [[id number]] Collected:  07/29/17 1056    Order Status:  Sent Specimen:  Wound from Abdomen Updated:  07/29/17 1056    Aerobic culture [[id number]] Collected:  07/29/17 1056    Order Status:  Sent Specimen:  Wound  from Abdomen Updated:  07/29/17 1056    Urine culture [[id number]] Collected:  07/29/17 0553    Order Status:  Sent Specimen:  Urine from Urine, Clean Catch Updated:  07/29/17 0554    Blood culture #1 **CANNOT BE ORDERED STAT** [[id number]] Collected:  07/28/17 2042    Order Status:  Completed Specimen:  Blood from Peripheral, Forearm, Right Updated:  07/29/17 0515     Blood Culture, Routine No Growth to date    Blood culture #2 **CANNOT BE ORDERED STAT** [[id number]] Collected:  07/28/17 2058    Order Status:  Completed Specimen:  Blood from Peripheral, Forearm, Left Updated:  07/29/17 0515     Blood Culture, Routine No Growth to date    Aerobic culture [[id number]]     Order Status:  No result Specimen:  Wound from Abdomen             Assessment and Plan:     Abdominal pain    -pending CT scan of the abdomen to evaluate abscess   -ID to see the patient and obtain cultures from the DL  -follow up blood cultures        LVAD (left ventricular assist device) present    -VAD orders placed  -should have VAD interrogated this week  -no alarms so far  -continue bp control and warfarin; current INR therapeutic        Hypertensive urgency    -currently on cardene drip  -will try to wean off today  -increase amlodipine to 10- mg po daily  -continue in the unit until BP normalizes        * Complication involving left ventricular assist device (LVAD)    -please see alicia Khoury MD  Heart Transplant  [facility]

## 2017-07-29 NOTE — SUBJECTIVE & OBJECTIVE
Past Medical History:   Diagnosis Date    Acute on chronic systolic heart failure 7/27/2015    AICD (automatic cardioverter/defibrillator) present 2014    [employer]    CAD (coronary artery disease) 7/27/2015    CHF (congestive heart failure)     Gait instability     HTN (hypertension) 7/27/2015    ICD (implantable cardioverter-defibrillator), biventricular, in situ 7/27/2015    Kidney stones     HILLARY treated with BiPAP 7/27/2015    Peripheral neuropathy     Pulmonary hypertension due to left ventricular systolic dysfunction 7/29/2015    Restless leg syndrome 1992    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    Skin cancer     excision 2013    Sleep apnea 1992       Past Surgical History:   Procedure Laterality Date    CARDIAC PACEMAKER PLACEMENT      pacemaker previously, then AICD in 2006. AICD [employer] serial #[id number]    CORONARY ARTERY BYPASS GRAFT  1993    KNEE SURGERY Left 2001    complicated by infection, hardware had to be taken out and replaced    LEFT VENTRICULAR ASSIST DEVICE  10/19/2016       Review of patient's allergies indicates:   Allergen Reactions    Sulfa (sulfonamide antibiotics)        Medications:  Prescriptions Prior to Admission   Medication Sig    allopurinol (ZYLOPRIM) 300 MG tablet Take 300 mg by mouth once daily.    amlodipine (NORVASC) 2.5 MG tablet Take 5 mg by mouth once daily.     aspirin 81 MG Chew Take 81 mg by mouth once daily.    atorvastatin (LIPITOR) 10 MG tablet Take 10 mg by mouth once daily.    buPROPion (WELLBUTRIN XL) 300 MG 24 hr tablet Take 300 mg by mouth once daily.    calcium-vitamin D tablet 600 mg-200 units Take 1 tablet by mouth once daily.    ciprofloxacin HCl (CIPRO) 500 MG tablet Take 1 tablet (500 mg total) by mouth 2 (two) times daily.    DULCOLAX, BISACODYL, RECT Place 10 mg rectally daily as needed.    folic acid (FOLVITE) 1 MG tablet Take 1 mg by mouth once daily.    multivitamin capsule Take 1 capsule by mouth once  daily.    nitroGLYCERIN (NITROSTAT) 0.4 MG SL tablet Place 0.4 mg under the tongue every 5 (five) minutes as needed for Chest pain.    ropinirole (REQUIP) 0.5 MG tablet Take 0.5 mg by mouth 3 (three) times daily.     spironolactone (ALDACTONE) 25 MG tablet Take 1 tablet (25 mg total) by mouth once daily.    tamsulosin (FLOMAX) 0.4 mg Cp24 Take 0.4 mg by mouth once daily.    TIKOSYN 250 mcg Cap Take 1 capsule (250 mcg total) by mouth every 12 (twelve) hours.    warfarin (COUMADIN) 2.5 MG tablet Take 2.5 mg by mouth once daily. Take as directed by Coumadin Clinic    warfarin (COUMADIN) 4 MG tablet Take 4 mg by mouth once daily. Take as directed by Coumadin Clinic  Uses with 2.5mg tablet    warfarin (COUMADIN) 5 MG tablet Take 5 mg by mouth. 6.5mg Sat-Monday  5mg Tues-Fri     Antibiotics     Start     Stop Route Frequency Ordered    07/29/17 0245  vancomycin (VANCOCIN) 1,250 mg in dextrose 5 % 250 mL IVPB  (Vancomycin IVPB with levels panel)      -- IV Every 12 hours (non-standard times) 07/29/17 0132    07/29/17 0245  ceFEPIme in dextrose 5% 2 gram/50 mL IVPB 2 g      -- IV Every 12 hours (non-standard times) 07/29/17 0132        Antifungals     None        Antivirals     None             There is no immunization history on file for this patient.    Family History     Problem Relation (Age of Onset)    Cancer Daughter (50)    Diabetes Daughter    Heart disease Daughter        Social History     Social History    Marital status:      Spouse name: N/A    Number of children: N/A    Years of education: N/A     Social History Main Topics    Smoking status: Never Smoker    Smokeless tobacco: Never Used    Alcohol use No    Drug use: No    Sexual activity: Not Asked      Comment:      Other Topics Concern    None     Social History Narrative    , lives in [place]. Retired .      Review of Systems   Constitutional: Negative for chills and fever.   HENT:  Negative for congestion.    Respiratory: Negative for cough and shortness of breath.    Cardiovascular: Negative for chest pain.   Gastrointestinal: Positive for abdominal pain. Negative for diarrhea, nausea and vomiting.   Genitourinary: Negative for dysuria.   Skin: Positive for wound.   Neurological: Negative for dizziness and headaches.     Objective:     Vital Signs (Most Recent):  Temp: 98.7 °F (37.1 °C) (07/29/17 0701)  Pulse: 61 (07/29/17 0800)  Resp: 18 (07/29/17 0800)  BP: 90/72 (07/29/17 0800)  SpO2: 95 % (07/29/17 0800) Vital Signs (24h Range):  Temp:  [98.4 °F (36.9 °C)-98.7 °F (37.1 °C)] 98.7 °F (37.1 °C)  Pulse:  [61-70] 61  Resp:  [18-28] 18  SpO2:  [93 %-97 %] 95 %  BP: ()/(0-111) 90/72     Weight: 74.3 kg (163 lb 12.8 oz)  Body mass index is 22.22 kg/m².    Estimated Creatinine Clearance: 45.4 mL/min (based on Cr of 1.5).    Physical Exam   Constitutional: He appears well-developed and well-nourished. No distress.   HENT:   Head: Normocephalic.   Eyes: Pupils are equal, round, and reactive to light.   Cardiovascular: Normal rate, regular rhythm and normal heart sounds.    No murmur heard.  LVAD Hum   Pulmonary/Chest: Effort normal. No respiratory distress. He has no wheezes. He has no rales.   Abdominal: Soft. He exhibits no distension.   driveline with minima surrounding erythema and drainage noted.    Musculoskeletal: Normal range of motion.   Neurological: He is alert.   Skin: Skin is warm and dry. No rash noted. He is not diaphoretic. No erythema.   Psychiatric: He has a normal mood and affect.   Nursing note and vitals reviewed.      Significant Labs:   Blood Culture:   Recent Labs  Lab 07/28/17 2042 07/28/17 2058   LABBLOO No Growth to date No Growth to date     CBC:   Recent Labs  Lab 07/28/17 2042 07/28/17 2048   WBC 6.86  --    HGB 12.6*  --    HCT 38.3* 40     --      CMP:   Recent Labs  Lab 07/28/17 2042 07/29/17  0635     --    K 4.3  --      --    CO2 24   --    *  --    BUN 26*  --    CREATININE 1.5*  --    CALCIUM 10.1  --    PROT 6.9 5.6*  5.6*   ALBUMIN 3.3* 2.8*  2.8*   BILITOT 0.4 0.4  0.4   ALKPHOS 91 66  66   AST 19 17  17   ALT 13 11  11   ANIONGAP 11  --    EGFRNONAA 45.2*  --      Wound Culture:   Recent Labs  Lab 07/20/17  1604   LABAERO PSEUDOMONAS AERUGINOSAMany     All pertinent labs within the past 24 hours have been reviewed.    Significant Imaging: I have reviewed all pertinent imaging results/findings within the past 24 hours.

## 2017-07-29 NOTE — CONSULTS
[facility]  Infectious Disease  Consult Note    Patient Name: Kev Vasquez  MRN: [id number]  Admission Date: 7/28/2017  Hospital Length of Stay: 1 days  Attending Physician: Carlito Santana MD  Primary Care Provider: Mega Collins MD     Isolation Status: No active isolations    Patient information was obtained from patient and past medical records.      Inpatient consult to Infectious Diseases  Consult performed by: BRANDON CAMPOS  Consult ordered by: ADRIAN KELLY        Assessment/Plan:     * Complication involving left ventricular assist device (LVAD)    75 y/o male with ICM s/p LVAD 10/2016 at [facility] seen in ID clinic 7/24 driveline infection with pseduomonas. Pt is currently on oral ciprofloxacin. Pt presented to ED for worsening drainage from driveline site for the past couple of days with associated abdominal pain. CT scan negative for fluid collections. Pt afebrile, without a leukocytosis, stable on examination. We are seeing him today for abx management    Plan:  1.Continue IV vancomycin and cefepime.  2.Discontinued ciprofloxacin at this time  3.Repeat cultures from driveline site.   4.Will continue to monitor closely              Thank you for your consult. I will follow-up with patient. Please contact us if you have any additional questions.    Brandon Campos PA-C  Infectious Disease  [facility]gr [phone]    Subjective:     Principal Problem: Complication involving left ventricular assist device (LVAD)    HPI: 76 male with ICM s/p LVAD 10/2016 at [facility] was seen in ID clinic 7/24 for drainage from driveline. Pt recently moved to [place] to stay with his daughter and initially noticed drainage from driveline about 2 weeks ago. Cultures +pseduomonas pansensitive and patient was placed on ciprofloxacin x 3 weeks. Pt reports doing well and tolerating medication well but noticed increase drainage from driveline  site a few days ago. Pt reports having associated abdominal pain. Denies having any fever,chills,n/v/d, or night sweats. In ED, no documented fevers, no leukocytosis, pt stable on examination. CT abdomen negative for fluid collections. Pt currently on IV vancomycin and cefepime.     Past Medical History:   Diagnosis Date    Acute on chronic systolic heart failure 7/27/2015    AICD (automatic cardioverter/defibrillator) present 2014    [employer]    CAD (coronary artery disease) 7/27/2015    CHF (congestive heart failure)     Gait instability     HTN (hypertension) 7/27/2015    ICD (implantable cardioverter-defibrillator), biventricular, in situ 7/27/2015    Kidney stones     HILLARY treated with BiPAP 7/27/2015    Peripheral neuropathy     Pulmonary hypertension due to left ventricular systolic dysfunction 7/29/2015    Restless leg syndrome 1992    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    Skin cancer     excision 2013    Sleep apnea 1992       Past Surgical History:   Procedure Laterality Date    CARDIAC PACEMAKER PLACEMENT      pacemaker previously, then AICD in 2006. AICD [employer] serial #[id number]    CORONARY ARTERY BYPASS GRAFT  1993    KNEE SURGERY Left 2001    complicated by infection, hardware had to be taken out and replaced    LEFT VENTRICULAR ASSIST DEVICE  10/19/2016       Review of patient's allergies indicates:   Allergen Reactions    Sulfa (sulfonamide antibiotics)        Medications:  Prescriptions Prior to Admission   Medication Sig    allopurinol (ZYLOPRIM) 300 MG tablet Take 300 mg by mouth once daily.    amlodipine (NORVASC) 2.5 MG tablet Take 5 mg by mouth once daily.     aspirin 81 MG Chew Take 81 mg by mouth once daily.    atorvastatin (LIPITOR) 10 MG tablet Take 10 mg by mouth once daily.    buPROPion (WELLBUTRIN XL) 300 MG 24 hr tablet Take 300 mg by mouth once daily.    calcium-vitamin D tablet 600 mg-200 units Take 1 tablet by mouth once daily.     ciprofloxacin HCl (CIPRO) 500 MG tablet Take 1 tablet (500 mg total) by mouth 2 (two) times daily.    DULCOLAX, BISACODYL, RECT Place 10 mg rectally daily as needed.    folic acid (FOLVITE) 1 MG tablet Take 1 mg by mouth once daily.    multivitamin capsule Take 1 capsule by mouth once daily.    nitroGLYCERIN (NITROSTAT) 0.4 MG SL tablet Place 0.4 mg under the tongue every 5 (five) minutes as needed for Chest pain.    ropinirole (REQUIP) 0.5 MG tablet Take 0.5 mg by mouth 3 (three) times daily.     spironolactone (ALDACTONE) 25 MG tablet Take 1 tablet (25 mg total) by mouth once daily.    tamsulosin (FLOMAX) 0.4 mg Cp24 Take 0.4 mg by mouth once daily.    TIKOSYN 250 mcg Cap Take 1 capsule (250 mcg total) by mouth every 12 (twelve) hours.    warfarin (COUMADIN) 2.5 MG tablet Take 2.5 mg by mouth once daily. Take as directed by [facility]    warfarin (COUMADIN) 4 MG tablet Take 4 mg by mouth once daily. Take as directed by Coumadin Clinic  Uses with 2.5mg tablet    warfarin (COUMADIN) 5 MG tablet Take 5 mg by mouth. 6.5mg Sat-Monday  5mg Tues-Fri     Antibiotics     Start     Stop Route Frequency Ordered    07/29/17 0245  vancomycin (VANCOCIN) 1,250 mg in dextrose 5 % 250 mL IVPB  (Vancomycin IVPB with levels panel)      -- IV Every 12 hours (non-standard times) 07/29/17 0132    07/29/17 0245  ceFEPIme in dextrose 5% 2 gram/50 mL IVPB 2 g      -- IV Every 12 hours (non-standard times) 07/29/17 0132        Antifungals     None        Antivirals     None             There is no immunization history on file for this patient.    Family History     Problem Relation (Age of Onset)    Cancer Daughter (50)    Diabetes Daughter    Heart disease Daughter        Social History     Social History    Marital status:      Spouse name: N/A    Number of children: N/A    Years of education: N/A     Social History Main Topics    Smoking status: Never Smoker    Smokeless tobacco: Never Used    Alcohol use No     Drug use: No    Sexual activity: Not Asked      Comment:      Other Topics Concern    None     Social History Narrative    , lives in [place]. Retired .      Review of Systems   Constitutional: Negative for chills and fever.   HENT: Negative for congestion.    Respiratory: Negative for cough and shortness of breath.    Cardiovascular: Negative for chest pain.   Gastrointestinal: Positive for abdominal pain. Negative for diarrhea, nausea and vomiting.   Genitourinary: Negative for dysuria.   Skin: Positive for wound.   Neurological: Negative for dizziness and headaches.     Objective:     Vital Signs (Most Recent):  Temp: 98.7 °F (37.1 °C) (07/29/17 0701)  Pulse: 61 (07/29/17 0800)  Resp: 18 (07/29/17 0800)  BP: 90/72 (07/29/17 0800)  SpO2: 95 % (07/29/17 0800) Vital Signs (24h Range):  Temp:  [98.4 °F (36.9 °C)-98.7 °F (37.1 °C)] 98.7 °F (37.1 °C)  Pulse:  [61-70] 61  Resp:  [18-28] 18  SpO2:  [93 %-97 %] 95 %  BP: ()/(0-111) 90/72     Weight: 74.3 kg (163 lb 12.8 oz)  Body mass index is 22.22 kg/m².    Estimated Creatinine Clearance: 45.4 mL/min (based on Cr of 1.5).    Physical Exam   Constitutional: He appears well-developed and well-nourished. No distress.   HENT:   Head: Normocephalic.   Eyes: Pupils are equal, round, and reactive to light.   Cardiovascular: Normal rate, regular rhythm and normal heart sounds.    No murmur heard.  LVAD Hum   Pulmonary/Chest: Effort normal. No respiratory distress. He has no wheezes. He has no rales.   Abdominal: Soft. He exhibits no distension.   driveline with minima surrounding erythema and drainage noted.    Musculoskeletal: Normal range of motion.   Neurological: He is alert.   Skin: Skin is warm and dry. No rash noted. He is not diaphoretic. No erythema.   Psychiatric: He has a normal mood and affect.   Nursing note and vitals reviewed.      Significant Labs:   Blood Culture:   Recent Labs  Lab 07/28/17 2042  07/28/17 2058   LABBLOO No Growth to date No Growth to date     CBC:   Recent Labs  Lab 07/28/17 2042 07/28/17 2048   WBC 6.86  --    HGB 12.6*  --    HCT 38.3* 40     --      CMP:   Recent Labs  Lab 07/28/17 2042 07/29/17  0635     --    K 4.3  --      --    CO2 24  --    *  --    BUN 26*  --    CREATININE 1.5*  --    CALCIUM 10.1  --    PROT 6.9 5.6*  5.6*   ALBUMIN 3.3* 2.8*  2.8*   BILITOT 0.4 0.4  0.4   ALKPHOS 91 66  66   AST 19 17  17   ALT 13 11  11   ANIONGAP 11  --    EGFRNONAA 45.2*  --      Wound Culture:   Recent Labs  Lab 07/20/17  1604   LABAERO PSEUDOMONAS AERUGINOSAMany     All pertinent labs within the past 24 hours have been reviewed.    Significant Imaging: I have reviewed all pertinent imaging results/findings within the past 24 hours.

## 2017-07-29 NOTE — SUBJECTIVE & OBJECTIVE
Interval History: Patient still reports pain; denies fevers, chills or diaphoresis.     Continuous Infusions:   nicardipine 5 mg/hr (07/29/17 1000)     Scheduled Meds:   allopurinol  300 mg Oral Daily    amlodipine  5 mg Oral Daily    aspirin  81 mg Oral Daily    atorvastatin  10 mg Oral Daily    buPROPion  300 mg Oral Daily    calcium-vitamin D3  1 tablet Oral BID    ceFEPime (MAXIPIME) IVPB  2 g Intravenous Q12H    dofetilide  250 mcg Oral Q12H    folic acid  1 mg Oral Daily    multivitamin  1 tablet Oral Daily    ropinirole  0.5 mg Oral TID    sodium chloride 0.9%  3 mL Intravenous Q8H    spironolactone  25 mg Oral Daily    tamsulosin  0.4 mg Oral Daily    vancomycin (VANCOCIN) IVPB  15 mg/kg Intravenous Q12H    warfarin  2.5 mg Oral Daily     PRN Meds:bisacodyl    Review of patient's allergies indicates:   Allergen Reactions    Sulfa (sulfonamide antibiotics)      Objective:     Vital Signs (Most Recent):  Temp: 98.7 °F (37.1 °C) (07/29/17 0701)  Pulse: 65 (07/29/17 1000)  Resp: 20 (07/29/17 1000)  BP: (!) 84/63 (07/29/17 1000)  SpO2: (!) 94 % (07/29/17 1000) Vital Signs (24h Range):  Temp:  [98.4 °F (36.9 °C)-98.7 °F (37.1 °C)] 98.7 °F (37.1 °C)  Pulse:  [] 65  Resp:  [18-28] 20  SpO2:  [93 %-97 %] 94 %  BP: ()/(0-111) 84/63     Weight: 74.3 kg (163 lb 12.8 oz)  Body mass index is 22.22 kg/m².      Intake/Output Summary (Last 24 hours) at 07/29/17 1102  Last data filed at 07/29/17 1000   Gross per 24 hour   Intake           524.17 ml   Output              270 ml   Net           254.17 ml       Hemodynamic Parameters:         Physical Exam   Constitutional: He appears well-developed and well-nourished.   HENT:   Head: Normocephalic and atraumatic.   Eyes: EOM are normal. Pupils are equal, round, and reactive to light.   Neck: Normal range of motion. Neck supple.   Cardiovascular: Regular rhythm.    Pulses:       Carotid pulses are 2+ on the right side, and 2+ on the left side.        Radial pulses are 2+ on the right side, and 2+ on the left side.        Femoral pulses are 2+ on the right side, and 2+ on the left side.       Popliteal pulses are 2+ on the right side, and 2+ on the left side.        Dorsalis pedis pulses are 2+ on the right side, and 2+ on the left side.        Posterior tibial pulses are 2+ on the right side, and 2+ on the left side.   VAD hum +   Pulmonary/Chest: Effort normal and breath sounds normal.   Abdominal: Soft. He exhibits no distension and no mass. There is tenderness.   Nursing note and vitals reviewed.      Significant Labs:  CBC:    Recent Labs  Lab 07/29/17  0635   WBC 6.86   RBC 3.62*   HGB 10.7*   HCT 33.6*   *   MCV 93   MCH 29.6   MCHC 31.8*     BNP:    Recent Labs  Lab 07/29/17  0635   *     CMP:    Recent Labs  Lab 07/29/17  0635   *   CALCIUM 8.2*   ALBUMIN 2.8*  2.8*   PROT 5.6*  5.6*      K 3.4*   CO2 20*      BUN 24*   CREATININE 1.3   ALKPHOS 66  66   ALT 11  11   AST 17  17   BILITOT 0.4  0.4      Coagulation:     Recent Labs  Lab 07/29/17  0635   INR 2.5*     LDH:    Recent Labs  Lab 07/29/17  0635        Microbiology:  Microbiology Results (last 7 days)     Procedure Component Value Units Date/Time    Gram stain [[id number]] Collected:  07/29/17 1056    Order Status:  Sent Specimen:  Wound from Abdomen Updated:  07/29/17 1056    Culture, Anaerobe [[id number]] Collected:  07/29/17 1056    Order Status:  Sent Specimen:  Wound from Abdomen Updated:  07/29/17 1056    Aerobic culture [[id number]] Collected:  07/29/17 1056    Order Status:  Sent Specimen:  Wound from Abdomen Updated:  07/29/17 1056    Urine culture [[id number]] Collected:  07/29/17 0553    Order Status:  Sent Specimen:  Urine from Urine, Clean Catch Updated:  07/29/17 0554    Blood culture #1 **CANNOT BE ORDERED STAT** [[id number]] Collected:  07/28/17 2042    Order Status:  Completed Specimen:  Blood from Peripheral, Forearm, Right Updated:   07/29/17 0515     Blood Culture, Routine No Growth to date    Blood culture #2 **CANNOT BE ORDERED STAT** [[id number]] Collected:  07/28/17 2058    Order Status:  Completed Specimen:  Blood from Peripheral, Forearm, Left Updated:  07/29/17 0515     Blood Culture, Routine No Growth to date    Aerobic culture [[id number]]     Order Status:  No result Specimen:  Wound from Abdomen

## 2017-07-29 NOTE — NURSING TRANSFER
Nursing Transfer Note      7/29/2017     Transfer From: ED to room 322    Transfer via stretcher    Transfer with cardiac monitoring    Transported by Transporter    Medicines sent: No    Chart send with patient: Yes    Notified: Family at bedside    Patient reassessed at: 07/28 2320    Upon arrival to floor: cardiac monitor applied, patient oriented to room, call bell in reach and bed in lowest position    Pt is AAOX4. He denies any chest pain, headache, nausea or discomfort. C/o mild abd tenderness. LVAD- HMIII- S&W dsg change done in ED. VAD inventory done (4 batteries, Primary and extra controller, 4 battery clips). Emergency bag at bedside. Tele monitoring on. BP high. Family members concerned related to BP meds. Will notify the MD. Family at bedside. NPO at this time. Will follow the MD orders and monitor the patient.

## 2017-07-29 NOTE — ASSESSMENT & PLAN NOTE
In view of elevated lactate and worsening symptoms, start broad spectrum abx  Consult ID for weigh in.  Called and spoke with Dr Muhammad who approved vanc/cef for now with repeat wound and blood cultures  Trend lactate  No alarms noted by patient/family  vad interrogation  CTS consult for eval of drive line

## 2017-07-29 NOTE — PLAN OF CARE
Problem: Physical Therapy Goal  Goal: Physical Therapy Goal  Goals to be met by: 17     Patient will increase functional independence with mobility by performin. Supine to sit with Moderate Assistance  2. Sit to supine with Moderate Assistance  3. Sit to stand transfer with Moderate Assistance  4. Bed to chair transfer with Moderate Assistance using Rolling Walker or appropriate AD  5. Gait  x 10 feet with Moderate Assistance using Rolling Walker or appropriate AD.   6. Stand for 2 minutes with Minimal Assistance using Rolling Walker or appropriate AD without LOB  7. Lower extremity exercise program x10 reps, with supervision, in order to increase LE strength and (I) with functional mobility.     Outcome: Ongoing (interventions implemented as appropriate)  PT evaluation complete and appropriate goals established.    Wendy Ackerman, PT, DPT   2017  [phone]

## 2017-07-29 NOTE — PT/OT/SLP EVAL
Physical Therapy  Evaluation    Kev Vasquez   MRN: [id number]   Admitting Diagnosis: Complication involving left ventricular assist device (LVAD)    PT Received On: 07/29/17  PT Start Time: 1348     PT Stop Time: 1425    PT Total Time (min): 37 min       Billable Minutes:  Evaluation 20 and Therapeutic Activity 8 (co-tx with OT)    Diagnosis: Complication involving left ventricular assist device (LVAD)  LVAD inserted in [place] 10/2016    Past Medical History:   Diagnosis Date    Acute on chronic systolic heart failure 7/27/2015    AICD (automatic cardioverter/defibrillator) present 2014    [employer]    CAD (coronary artery disease) 7/27/2015    CHF (congestive heart failure)     Gait instability     HTN (hypertension) 7/27/2015    ICD (implantable cardioverter-defibrillator), biventricular, in situ 7/27/2015    Kidney stones     HILLARY treated with BiPAP 7/27/2015    Peripheral neuropathy     Pulmonary hypertension due to left ventricular systolic dysfunction 7/29/2015    Restless leg syndrome 1992    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    Skin cancer     excision 2013    Sleep apnea 1992      Past Surgical History:   Procedure Laterality Date    CARDIAC PACEMAKER PLACEMENT      pacemaker previously, then AICD in 2006. AICD [facility] [employer] serial #[id number]    CORONARY ARTERY BYPASS GRAFT  1993    KNEE SURGERY Left 2001    complicated by infection, hardware had to be taken out and replaced    LEFT VENTRICULAR ASSIST DEVICE  10/19/2016       Referring physician: Jim Khoury MD   Date referred to PT: 7/29/17    General Precautions: Standard, LVAD, fall, aspiration  Orthopedic Precautions: N/A   Braces: N/A       Do you have any cultural, spiritual, Caodaism conflicts, given your current situation?: none noted     Patient History:  Lives With: spouse, child([place]), adult  Living Arrangements: house  Home Layout: Able to live on 1st floor  Transportation Available: family or friend will  "provide  Living Environment Comment: Pt lives with his wife, daughter, and son in law in a 2SH with TH to enter; pt and wife stay on 1st floor of home. Pt was ambulating short distances with RW and assist PTA and using w/c for longer, community distances. Pt required assist to transfer T/F tub and with some ADLs. Pt has a caregiver and was receiving HHPT PTA. Pt's wife reports that pt has 24-hour assist.   Equipment Currently Used at Home: wheelchair, walker, rolling, cane, quad, grab bar, raised toilet, shower chair    Previous Level of Function:  Ambulation Skills: needs device and assist  Transfer Skills: needs device  ADL Skills: needs assist    Subjective:  Communicated with RN prior to session.  "So how'd I do?" Pt asked at the end of session.   Chief Complaint: pain at driveline site with mobility   Patient goals: improve mobility and return home     Pain/Comfort  Pain Rating 1: 0/10  Pain Rating Post-Intervention 1:  (reports pain at driveline site with mobility but did not rate)      Objective:   Patient found with: telemetry, pulse ox (continuous), blood pressure cuff, silverio catheter, peripheral IV (LVAD to wall power)     Cognitive Exam:  Oriented to: Person, Place, Time and Situation    Follows Commands/attention: Follows one-step commands  Communication: clear/fluent  Safety awareness/insight to disability: impaired    Physical Exam:  Postural examination/scapula alignment: Rounded shoulder and Head forward    Skin integrity: Visible skin intact  Edema: None noted     Sensation:   Intact    Lower Extremity Range of Motion:  Right Lower Extremity: WFL  Left Lower Extremity: WFL    Lower Extremity Strength:  Right Lower Extremity: WFL  Left Lower Extremity: WFL    Functional Mobility:  Bed Mobility:  Supine to Sit: Total Assistance  Sit to Supine: Total Assistance    Transfers:  Sit <> Stand Assistance: Total Assistance (with assist of 2 to complete transfer to full standing )  Sit <> Stand Assistive " Device: No Assistive Device    Gait:   Gait Distance: 4 R lateral steps at EOB  Assistance 1: Total assistance (with assist of 2)  Gait Assistive Device: No device  Gait Pattern: 2-point gait  Gait Deviation(s): decreased william, increased time in double stance, decreased toe-to-floor clearance, decreased weight-shifting ability, decreased step length, backward lean, decreased velocity of limb motion   Increased difficulty advancing LLE>RLE with decreased floor clearance   Increased posterior lean throughout. Max v/c and t/c for anterior weight-shift but pt with limited response, briefly shifting upper trunk anteriorly but lower trunk maintaining posterior lean    Balance:   Static Sit: ranged from CGA-totalA to maintain   Dynamic Sit: max-totalA  Static Stand: totalA  Dynamic stand: totalA    Therapeutic Activities and Exercises:  PT/OT eval complete with increased time to review full history and PLOF  Pt educated on role of PT and PT POC. Pt verbalized understanding.   Sitting at EOB x~10 min with assist ranging from CGA-totalA (totalA during dynamic tasks with increased posterior lean). Max v/c and t/c for anterior weight-shift and positioning BLE on floor for balance assist   Standing at EOB and R lateral steps at EOB x~2 minutes with totalAx2. Pt with increased posterior lean throughout. Max v/c and t/c for anterior weight-shift with limited response noted. Pt maintaining knees in full extension and WB through B heels.   Vital signs monitored and stable throughout     AM-PAC 6 CLICK MOBILITY  How much help from another person does this patient currently need?   1 = Unable, Total/Dependent Assistance  2 = A lot, Maximum/Moderate Assistance  3 = A little, Minimum/Contact Guard/Supervision  4 = None, Modified [place]/Independent    Turning over in bed (including adjusting bedclothes, sheets and blankets)?: 2  Sitting down on and standing up from a chair with arms (e.g., wheelchair, bedside commode, etc.):  2  Moving from lying on back to sitting on the side of the bed?: 2  Moving to and from a bed to a chair (including a wheelchair)?: 2  Need to walk in hospital room?: 1  Climbing 3-5 steps with a railing?: 1  Total Score: 10     AM-PAC Raw Score CMS G-Code Modifier Level of Impairment Assistance   6 % Total / Unable   7 - 9 CM 80 - 100% Maximal Assist   10 - 14 CL 60 - 80% Moderate Assist   15 - 19 CK 40 - 60% Moderate Assist   20 - 22 CJ 20 - 40% Minimal Assist   23 CI 1-20% SBA / CGA   24 CH 0% Independent/ Mod I     Patient left supine with all lines intact, call button in reach, RN notified and pt's wife and caregiver present.    Assessment:   Kev Vasquez is a 76 y.o. male with a medical diagnosis of Complication involving left ventricular assist device (LVAD) and presents with limited functional mobility and rehab impairments listed below. Pt with rigid movements and posterior lean throughout sitting and standing, with pt demo'ing poor balance and requiring assist to maintain. Pt required increased assist to perform bed mobility, transfers, and maintain standing. Pt has good LE strength and ROM but difficulty with motor control and sequencing of movements. Pt would benefit from skilled acute PT in order to address current deficits and progress functional mobility. Pt will require IP rehab upon d/c as he requires increased assist with all functional mobility and is unsafe to return home at current level of function.     Rehab identified problem list/impairments: Rehab identified problem list/impairments: weakness, impaired functional mobilty, decreased safety awareness, impaired endurance, gait instability, impaired balance, impaired self care skills, abnormal tone, impaired cardiopulmonary response to activity, pain, decreased coordination, impaired coordination    Rehab potential is good.    Activity tolerance: Good    Discharge recommendations: Discharge Facility/Level Of Care Needs: rehabilitation  facility     Barriers to discharge: Barriers to Discharge: None    Equipment recommendations: Equipment Needed After Discharge:  (TBD)     GOALS:    Physical Therapy Goals        Problem: Physical Therapy Goal    Goal Priority Disciplines Outcome Goal Variances Interventions   Physical Therapy Goal     PT/OT, PT Ongoing (interventions implemented as appropriate)     Description:  Goals to be met by: 17     Patient will increase functional independence with mobility by performin. Supine to sit with Moderate Assistance  2. Sit to supine with Moderate Assistance  3. Sit to stand transfer with Moderate Assistance  4. Bed to chair transfer with Moderate Assistance using Rolling Walker or appropriate AD  5. Gait  x 10 feet with Moderate Assistance using Rolling Walker or appropriate AD.   6. Stand for 2 minutes with Minimal Assistance using Rolling Walker or appropriate AD without LOB  7. Lower extremity exercise program x10 reps, with supervision, in order to increase LE strength and (I) with functional mobility.                       PLAN:    Patient to be seen 5 x/week to address the above listed problems via gait training, therapeutic activities, therapeutic exercises, neuromuscular re-education  Plan of Care expires: 17  Plan of Care reviewed with: patient, spouse, caregiver        Wendy Ackerman, PT, DPT   2017  [phone]

## 2017-07-29 NOTE — ED NOTES
LOC: Patient name and date of birth verified.  The patient is awake, alert and aware of environment with an appropriate affect, the patient is oriented x 3 and speaking appropriately.  Pt in NAD.    APPEARANCE: Patient resting comfortably and in no acute distress, patient is clean and well groomed, patient's clothing is properly fastened.  SKIN: The skin is warm and dry, color consistent with ethnicity, patient has normal skin turgor and moist mucus membranes, skin intact, no breakdown or brusing noted.  MUSCULOSKELETAL: Patient moving all extremities well, no obvious swelling or deformities noted.  RESPIRATORY: Airway is open and patent, respirations are spontaneous, patient has a normal effort and rate, no accessory muscle use noted.  CARDIAC: Patient has a normal rate and rhythm, no periphreal edema noted, capillary refill < 3 seconds.  ABDOMEN: Soft and non tender to palpation, no distention noted. Bowel sounds present in all four quadrants.  NEUROLOGIC: Eyes open spontaneously, behavior appropriate to situation, follows commands, facial expression symmetrical, bilateral hand grasp equal and even, purposeful motor response noted, normal sensation in all extremities when touched with a finger.

## 2017-07-29 NOTE — ASSESSMENT & PLAN NOTE
-currently on cardene drip  -will try to wean off today  -increase amlodipine to 10- mg po daily  -continue in the unit until BP normalizes

## 2017-07-29 NOTE — SUBJECTIVE & OBJECTIVE
Past Medical History:   Diagnosis Date    Acute on chronic systolic heart failure 7/27/2015    AICD (automatic cardioverter/defibrillator) present 2014    [facility] [employer]    CAD (coronary artery disease) 7/27/2015    CHF (congestive heart failure)     Gait instability     HTN (hypertension) 7/27/2015    ICD (implantable cardioverter-defibrillator), biventricular, in situ 7/27/2015    Kidney stones     HILLARY treated with BiPAP 7/27/2015    Peripheral neuropathy     Pulmonary hypertension due to left ventricular systolic dysfunction 7/29/2015    Restless leg syndrome 1992    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    Skin cancer     excision 2013    Sleep apnea 1992       Past Surgical History:   Procedure Laterality Date    CARDIAC PACEMAKER PLACEMENT      pacemaker previously, then AICD in 2006. AICD [facility] [employer] serial #[id number]    CORONARY ARTERY BYPASS GRAFT  1993    KNEE SURGERY Left 2001    complicated by infection, hardware had to be taken out and replaced    LEFT VENTRICULAR ASSIST DEVICE  10/19/2016       Review of patient's allergies indicates:   Allergen Reactions    Sulfa (sulfonamide antibiotics)        No current facility-administered medications on file prior to encounter.      Current Outpatient Prescriptions on File Prior to Encounter   Medication Sig    allopurinol (ZYLOPRIM) 300 MG tablet Take 300 mg by mouth once daily.    amlodipine (NORVASC) 2.5 MG tablet Take 5 mg by mouth once daily.     aspirin 81 MG Chew Take 81 mg by mouth once daily.    atorvastatin (LIPITOR) 10 MG tablet Take 10 mg by mouth once daily.    buPROPion (WELLBUTRIN XL) 300 MG 24 hr tablet Take 300 mg by mouth once daily.    calcium-vitamin D tablet 600 mg-200 units Take 1 tablet by mouth once daily.    ciprofloxacin HCl (CIPRO) 500 MG tablet Take 1 tablet (500 mg total) by mouth 2 (two) times daily.    DULCOLAX, BISACODYL, RECT Place 10 mg rectally daily as needed.    folic acid (FOLVITE) 1 MG  tablet Take 1 mg by mouth once daily.    multivitamin capsule Take 1 capsule by mouth once daily.    nitroGLYCERIN (NITROSTAT) 0.4 MG SL tablet Place 0.4 mg under the tongue every 5 (five) minutes as needed for Chest pain.    ropinirole (REQUIP) 0.5 MG tablet Take 0.5 mg by mouth 3 (three) times daily.     spironolactone (ALDACTONE) 25 MG tablet Take 1 tablet (25 mg total) by mouth once daily.    tamsulosin (FLOMAX) 0.4 mg Cp24 Take 0.4 mg by mouth once daily.    TIKOSYN 250 mcg Cap Take 1 capsule (250 mcg total) by mouth every 12 (twelve) hours.    warfarin (COUMADIN) 2.5 MG tablet Take 2.5 mg by mouth once daily. Take as directed by [facility]    warfarin (COUMADIN) 4 MG tablet Take 4 mg by mouth once daily. Take as directed by [facility]  Uses with 2.5mg tablet    warfarin (COUMADIN) 5 MG tablet Take 5 mg by mouth. 6.5mg Sat-Monday  5mg Tues-Fri     Family History     Problem Relation (Age of Onset)    Cancer Daughter (50)    Diabetes Daughter    Heart disease Daughter        Social History Main Topics    Smoking status: Never Smoker    Smokeless tobacco: Never Used    Alcohol use No    Drug use: No    Sexual activity: Not on file      Comment:      Review of Systems   Constitution: Negative.   HENT: Negative.    Eyes: Negative.    Cardiovascular: Negative.    Respiratory: Negative.    Endocrine: Negative.    Skin: Positive for color change.   Musculoskeletal: Negative.    Gastrointestinal: Positive for abdominal pain.   Genitourinary: Negative.    Neurological: Negative.      Objective:     Vital Signs (Most Recent):  Temp: 98.4 °F (36.9 °C) (07/28/17 2330)  Pulse: 65 (07/28/17 2330)  Resp: 18 (07/28/17 2330)  BP: (!) 122/0 (07/29/17 0009)  SpO2: 97 % (07/28/17 2102) Vital Signs (24h Range):  Temp:  [98.4 °F (36.9 °C)-98.6 °F (37 °C)] 98.4 °F (36.9 °C)  Pulse:  [62-70] 65  Resp:  [18] 18  SpO2:  [96 %-97 %] 97 %  BP: (112-137)/(0-111) 122/0     Weight: 82 kg (180 lb 12.4 oz)  Body  mass index is 24.52 kg/m².    SpO2: 97 %  O2 Device (Oxygen Therapy): room air    No intake or output data in the 24 hours ending 07/29/17 0123    Lines/Drains/Airways     Line                 VAD Left ventricular assist device HeartMate 3 40191 days          Peripheral Intravenous Line                 Peripheral IV - Single Lumen 07/28/17 2043 Right Forearm less than 1 day                Physical Exam   Constitutional: He is oriented to person, place, and time. He appears well-developed and well-nourished.   HENT:   Head: Normocephalic and atraumatic.   Eyes: Conjunctivae and EOM are normal. Pupils are equal, round, and reactive to light.   Neck: Normal range of motion. Neck supple. No JVD present.   Cardiovascular: Exam reveals no gallop and no friction rub.    No murmur heard.  Smooth vad sounds   Pulmonary/Chest: Effort normal and breath sounds normal. No respiratory distress. He has no wheezes. He has no rales. He exhibits no tenderness.   Abdominal: Soft. Bowel sounds are normal. There is tenderness.   Erythema and drainage around DL site, pain around site as well, dressing taken down to view   Musculoskeletal: Normal range of motion. He exhibits no edema or tenderness.   Neurological: He is alert and oriented to person, place, and time.   Skin: Skin is warm and dry. No erythema. No pallor.       Significant Labs:   CMP   Recent Labs  Lab 07/28/17 2042      K 4.3      CO2 24   *   BUN 26*   CREATININE 1.5*   CALCIUM 10.1   PROT 6.9   ALBUMIN 3.3*   BILITOT 0.4   ALKPHOS 91   AST 19   ALT 13   ANIONGAP 11   ESTGFRAFRICA 52.3*   EGFRNONAA 45.2*   , CBC   Recent Labs  Lab 07/28/17 2042 07/28/17 2048   WBC 6.86  --    HGB 12.6*  --    HCT 38.3* 40     --     and INR   Recent Labs  Lab 07/27/17  1500 07/28/17 2042   INR 2.6* 2.2*       Significant Imaging: Echocardiogram:   2D echo with color flow doppler:   Results for orders placed or performed during the hospital encounter of  07/25/17   2D echo with color flow doppler   Result Value Ref Range    EF 15 (A) 55 - 65    Diastolic Dysfunction Yes (A)     Est. PA Systolic Pressure 29.59     Tricuspid Valve Regurgitation MILD TO MODERATE

## 2017-07-29 NOTE — PROGRESS NOTES
Pt inadvertently removed external urinary catheter. Urine saturated front of gown and sheets. Pt cleaned, sheets and gown changed. New external urinary catheter applied.     0900 - Pt inadvertently removed external urinary catheter. Front of gown and sheets again saturated with urine. MD Khoury contacted and updated. MD agrees with concerns regarding urine compromising VAD dressing site. Orders received.

## 2017-07-29 NOTE — ED PROVIDER NOTES
Encounter Date: 7/28/2017    SCRIBE #1 NOTE: ISon am scribing for, and in the presence of, Dr. Lee.       History     Chief Complaint   Patient presents with    LVAD Problem     Pt was seen by infectious disease doctor Monday for infected drive line. Pt is having pain at site     Time seen by provider: 8:25 PM    This is a 76 y.o. male with pertinent PMHx of CHF, AICD placement, HTN, CAD, s/p CABG, ICD placement, and LVAD placement, who presents to the ED with a chief complaint of acute on chronic moderate LLQ abdominal pain around his drive line today with associated increased drainage from the site and improving erythema surrounding the site. Pt had previously been evaluated and told the drive line was infected and was placed on oral antibiotics by ID. Pt states he had been having pain to the site for ~1 week with palpation but today the pt started to have more acute pain and pain with moving his left leg. Family notes that the erythema surrounding the site has improved with antibiotics but the drainage has worsened. Pt denies any fevers. There are no other associated symptoms or mitigating/aggravating factors reported at this time.           Review of patient's allergies indicates:   Allergen Reactions    Sulfa (sulfonamide antibiotics)      Past Medical History:   Diagnosis Date    Acute on chronic systolic heart failure 7/27/2015    AICD (automatic cardioverter/defibrillator) present 2014    [employer]    CAD (coronary artery disease) 7/27/2015    CHF (congestive heart failure)     Gait instability     HTN (hypertension) 7/27/2015    ICD (implantable cardioverter-defibrillator), biventricular, in situ 7/27/2015    Kidney stones     HILLARY treated with BiPAP 7/27/2015    Peripheral neuropathy     Pulmonary hypertension due to left ventricular systolic dysfunction 7/29/2015    Restless leg syndrome 1992    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    Skin cancer      excision 2013    Sleep apnea 1992     Past Surgical History:   Procedure Laterality Date    CARDIAC PACEMAKER PLACEMENT      pacemaker previously, then AICD in 2006. AICD [employer] serial #[id number]    CORONARY ARTERY BYPASS GRAFT  1993    KNEE SURGERY Left 2001    complicated by infection, hardware had to be taken out and replaced    LEFT VENTRICULAR ASSIST DEVICE  10/19/2016     Family History   Problem Relation Age of Onset    Cancer Daughter 50     breast    Heart disease Daughter      MI x 3, CABG    Diabetes Daughter      Social History   Substance Use Topics    Smoking status: Never Smoker    Smokeless tobacco: Never Used    Alcohol use No     Review of Systems   Constitutional: Negative for chills and fever.   HENT: Negative for congestion.    Eyes: Negative for pain.   Respiratory: Negative for cough and shortness of breath.    Cardiovascular: Negative for chest pain.   Gastrointestinal: Positive for abdominal pain. Negative for nausea and vomiting.   Genitourinary: Negative for difficulty urinating and dysuria.   Musculoskeletal: Negative for back pain and neck pain.   Skin: Positive for color change (erythema around drive line site). Negative for rash.   Neurological: Negative for weakness and headaches.       Physical Exam     Initial Vitals [07/28/17 2015]   BP Pulse Resp Temp SpO2   -- 70 18 98.6 °F (37 °C) 96 %      MAP       --         Physical Exam    Nursing note and vitals reviewed.  Constitutional: He appears well-developed and well-nourished. No distress.   HENT:   Head: Normocephalic and atraumatic.   Mouth/Throat: Oropharynx is clear and moist.   Eyes: Conjunctivae are normal.   Neck: Normal range of motion.   Cardiovascular: Normal rate and regular rhythm.   Mechanical heart sounds.    Pulmonary/Chest: Breath sounds normal. No respiratory distress.   Abdominal: Soft. He exhibits no distension. There is tenderness.   Drive line in the LLQ with surrounding erythema and some purulent  drainage. Tenderness to palpation of the LLQ and positive psoas sign with left leg lift.    Musculoskeletal: Normal range of motion.   Neurological: He is alert and oriented to person, place, and time.   Skin: Skin is warm and dry.         ED Course   Procedures  Labs Reviewed   CBC W/ AUTO DIFFERENTIAL - Abnormal; Notable for the following:        Result Value    RBC 4.22 (*)     Hemoglobin 12.6 (*)     Hematocrit 38.3 (*)     RDW 16.5 (*)     Lymph% 16.6 (*)     All other components within normal limits   COMPREHENSIVE METABOLIC PANEL - Abnormal; Notable for the following:     Glucose 123 (*)     BUN, Bld 26 (*)     Creatinine 1.5 (*)     Albumin 3.3 (*)     eGFR if  52.3 (*)     eGFR if non  45.2 (*)     All other components within normal limits   PROTIME-INR - Abnormal; Notable for the following:     Prothrombin Time 22.1 (*)     INR 2.2 (*)     All other components within normal limits   LACTIC ACID, PLASMA - Abnormal; Notable for the following:     Lactate (Lactic Acid) 2.6 (*)     All other components within normal limits   ISTAT PROCEDURE - Abnormal; Notable for the following:     POC Glucose 127 (*)     POC Creatinine 1.6 (*)     All other components within normal limits   HEMOGLOBIN A1C   HIGH SENSITIVITY CRP   LIPID PANEL   TSH   B-TYPE NATRIURETIC PEPTIDE   ISTAT CHEM8             Medical Decision Making:   History:   Old Medical Records: I decided to obtain old medical records.  Initial Assessment:   This is an emergent evaluation of a 76 y.o. male who presents with worsening symptoms of infection concerning for intraabdominal abscess, bacteremia, and sepsis. Plan to discuss with cardiology, check labs, cultures, and obtain imaging of the abdomen to evaluate for abscess.   Clinical Tests:   Lab Tests: Ordered and Reviewed  Radiological Study: Ordered and Reviewed  Other:   I have discussed this case with another health care provider.            Scribe Attestation:    Scribe #1: I performed the above scribed service and the documentation accurately describes the services I performed. I attest to the accuracy of the note.    Attending Attestation:           Physician Attestation for Scribe:  Physician Attestation Statement for Scribe #1: I, Dr. Lee, reviewed documentation, as scribed by Son Kong in my presence, and it is both accurate and complete.         Attending ED Notes:   9:43 pm  Pt evaluated by cardiology and will be admitted to their service.           ED Course     Clinical Impression:   The primary encounter diagnosis was Complication involving left ventricular assist device (LVAD), subsequent encounter. Diagnoses of Abdominal pain, Complication involving left ventricular assist device (LVAD), Complication involving left ventricular assist device (LVAD), initial encounter, Generalized abdominal pain, and Left ventricular assist device (LVAD) complication, initial encounter were also pertinent to this visit.    Disposition:   Disposition: Admitted                        Fletcher Lee MD  07/30/17 2015

## 2017-07-29 NOTE — PT/OT/SLP EVAL
"Speech Language Pathology  Clinical Swallow Evaluation    Kev Vasquez   MRN: [id number]   Admitting Diagnosis: Complication involving left ventricular assist device (LVAD)    Diet recommendations: Solid Diet Level: Regular  Liquid Diet Level: Thin general aspiration precautions    SLP Treatment Date: 07/29/17  Speech Start Time: 1312     Speech Stop Time: 1323     Speech Total (min): 11 min       TREATMENT BILLABLE MINUTES:  Eval Swallow and Oral Function 11    Diagnosis: Complication involving left ventricular assist device (LVAD)      Past Medical History:   Diagnosis Date    Acute on chronic systolic heart failure 7/27/2015    AICD (automatic cardioverter/defibrillator) present 2014    [employer]    CAD (coronary artery disease) 7/27/2015    CHF (congestive heart failure)     Gait instability     HTN (hypertension) 7/27/2015    ICD (implantable cardioverter-defibrillator), biventricular, in situ 7/27/2015    Kidney stones     HILLARY treated with BiPAP 7/27/2015    Peripheral neuropathy     Pulmonary hypertension due to left ventricular systolic dysfunction 7/29/2015    Restless leg syndrome 1992    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    Skin cancer     excision 2013    Sleep apnea 1992     Past Surgical History:   Procedure Laterality Date    CARDIAC PACEMAKER PLACEMENT      pacemaker previously, then AICD in 2006. AICD [employer] serial #[id number]    CORONARY ARTERY BYPASS GRAFT  1993    KNEE SURGERY Left 2001    complicated by infection, hardware had to be taken out and replaced    LEFT VENTRICULAR ASSIST DEVICE  10/19/2016       Has the patient been evaluated by SLP for swallowing? : Yes  Keep patient NPO?: No   General Precautions: Standard, fall, LVAD          Prior diet: regular/thins    Subjective:  "My short term memory isn't as sharp as it used to be." Pt stated when SLP discussed orders for pt to undergo a cognitive evaluation, which SLP will initiate during next scheduled " visit.  Pt reports noticing changes in his short term memory after receiving LVAD in October 2016.  He feels it has further declined somewhat during this hospitalization.     Pain/Comfort  Pain Rating 1: 0/10    Objective:   Patient found with: telemetry, pulse ox (continuous), blood pressure cuff, silverio catheter, peripheral IV    Oral Musculature Evaluation  Oral Musculature: WFL  Dentition:  (missing some molars)  Mucosal Quality: good  Mandibular Strength and Mobility: WFL  Oral Labial Strength and Mobility: WFL  Lingual Strength and Mobility: WFL  Velar Elevation: WFL  Buccal Strength and Mobility: WFL  Volitional Cough: good in strength  Volitional Swallow: elicited  Voice Prior to PO Intake: dry, clear     Bedside Swallow Eval:  Consistencies Assessed: Thin liquids cup and straw sips and Solids 1/2 cracker  Oral Phase: WNL  Pharyngeal Phase: no overt clinical  signs/symptoms of aspiration and no overt clinical signs/symptoms of pharyngeal dysphagia    Additional Treatment:  SLP discussed role of ST, purpose of swallow and cognitive evaluation, swallowing function appearing to be WFL, and plans to initiate cognitive evaluation during next scheduled visit. Pt verbalized understanding.       Assessment:  Kev Vasquez is a 76 y.o. male with a medical diagnosis of Complication involving left ventricular assist device (LVAD) and presents with functional oral and pharyngeal phases of the swallow.  Cognitive evaluation to be performed to determine if cognitive-linguistic impairments are present.         Discharge recommendations: Discharge Facility/Level Of Care Needs:  (TBD pending cog eval)     Goals:    SLP Goals     Not on file                 Plan:   Patient to be seen     Plan of Care expires:    Plan of Care reviewed with: patient  SLP Follow-up?: Yes         SLP G-Codes  Functional Assessment Tool Used: noms  Score: 7  Functional Limitations: Swallowing  Swallow Current Status ():   Swallow Goal Status  ():     SANTOSH Ames, CCC-SLP  07/29/2017    SANTOSH Ames, CCC-SLP  Speech Language Pathologist  [phone]  7/29/2017

## 2017-07-29 NOTE — HPI
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 When abx initially started last Tuesday, patient improved.  Abx switch to cipro on Monday.  Since then he has had increased mucopurulent drainage from driveline as well as worsening abdominal pain near exit site with conversely decreasing surrounding erythema. Denies symptoms of systemic infection. Of note, his blood pressure is known to be particularly labile.  He has been taken off of many of his bp meds in the past due to profound hypotension and orthostasis.  He was just very recently increased to 5mg po amlodipine daily due to his lack of ability to tolerate more.  His wife states his doc back in [place] said 'we are just going to have to live with your blood pressure being high at times.'

## 2017-07-29 NOTE — H&P
[facility]  Cardiology  History and Physical     Patient Name: Kev Vasquez  MRN: [id number]  Admission Date: 7/28/2017  Code Status: Full Code   Attending Provider: Carlito Santana MD   Primary Care Physician: Mega Collins MD  Principal Problem:Complication involving left ventricular assist device (LVAD)    Patient information was obtained from patient, relative(s) and past medical records.     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 When abx initially started last Tuesday, patient improved.  Abx switch to cipro on Monday.  Since then he has had increased mucopurulent drainage from driveline as well as worsening abdominal pain near exit site with conversely decreasing surrounding erythema. Denies symptoms of systemic infection. Of note, his blood pressure is known to be particularly labile.  He has been taken off of many of his bp meds in the past due to profound hypotension and orthostasis.  He was just very recently increased to 5mg po amlodipine daily due to his lack of ability to tolerate more.  His wife states his doc back in [place] said 'we are just going to have to live with your blood pressure being high at times.'    Past Medical History:   Diagnosis Date    Acute on chronic systolic heart failure 7/27/2015    AICD (automatic cardioverter/defibrillator) present 2014    [employer]    CAD (coronary artery disease) 7/27/2015    CHF (congestive heart failure)     Gait instability     HTN (hypertension) 7/27/2015    ICD (implantable cardioverter-defibrillator), biventricular, in situ 7/27/2015    Kidney stones     HILLARY treated with BiPAP 7/27/2015     Peripheral neuropathy     Pulmonary hypertension due to left ventricular systolic dysfunction 7/29/2015    Restless leg syndrome 1992    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    Skin cancer     excision 2013    Sleep apnea 1992       Past Surgical History:   Procedure Laterality Date    CARDIAC PACEMAKER PLACEMENT      pacemaker previously, then AICD in 2006. AICD [employer] serial #[id number]    CORONARY ARTERY BYPASS GRAFT  1993    KNEE SURGERY Left 2001    complicated by infection, hardware had to be taken out and replaced    LEFT VENTRICULAR ASSIST DEVICE  10/19/2016       Review of patient's allergies indicates:   Allergen Reactions    Sulfa (sulfonamide antibiotics)        No current facility-administered medications on file prior to encounter.      Current Outpatient Prescriptions on File Prior to Encounter   Medication Sig    allopurinol (ZYLOPRIM) 300 MG tablet Take 300 mg by mouth once daily.    amlodipine (NORVASC) 2.5 MG tablet Take 5 mg by mouth once daily.     aspirin 81 MG Chew Take 81 mg by mouth once daily.    atorvastatin (LIPITOR) 10 MG tablet Take 10 mg by mouth once daily.    buPROPion (WELLBUTRIN XL) 300 MG 24 hr tablet Take 300 mg by mouth once daily.    calcium-vitamin D tablet 600 mg-200 units Take 1 tablet by mouth once daily.    ciprofloxacin HCl (CIPRO) 500 MG tablet Take 1 tablet (500 mg total) by mouth 2 (two) times daily.    DULCOLAX, BISACODYL, RECT Place 10 mg rectally daily as needed.    folic acid (FOLVITE) 1 MG tablet Take 1 mg by mouth once daily.    multivitamin capsule Take 1 capsule by mouth once daily.    nitroGLYCERIN (NITROSTAT) 0.4 MG SL tablet Place 0.4 mg under the tongue every 5 (five) minutes as needed for Chest pain.    ropinirole (REQUIP) 0.5 MG tablet Take 0.5 mg by mouth 3 (three) times daily.     spironolactone (ALDACTONE) 25 MG tablet Take 1 tablet (25 mg total) by mouth once daily.    tamsulosin (FLOMAX) 0.4 mg Cp24 Take  0.4 mg by mouth once daily.    TIKOSYN 250 mcg Cap Take 1 capsule (250 mcg total) by mouth every 12 (twelve) hours.    warfarin (COUMADIN) 2.5 MG tablet Take 2.5 mg by mouth once daily. Take as directed by [facility]    warfarin (COUMADIN) 4 MG tablet Take 4 mg by mouth once daily. Take as directed by [facility] Clinic  Uses with 2.5mg tablet    warfarin (COUMADIN) 5 MG tablet Take 5 mg by mouth. 6.5mg Sat-Monday  5mg Tues-Fri     Family History     Problem Relation (Age of Onset)    Cancer Daughter (50)    Diabetes Daughter    Heart disease Daughter        Social History Main Topics    Smoking status: Never Smoker    Smokeless tobacco: Never Used    Alcohol use No    Drug use: No    Sexual activity: Not on file      Comment:      Review of Systems   Constitution: Negative.   HENT: Negative.    Eyes: Negative.    Cardiovascular: Negative.    Respiratory: Negative.    Endocrine: Negative.    Skin: Positive for color change.   Musculoskeletal: Negative.    Gastrointestinal: Positive for abdominal pain.   Genitourinary: Negative.    Neurological: Negative.      Objective:     Vital Signs (Most Recent):  Temp: 98.4 °F (36.9 °C) (07/28/17 2330)  Pulse: 65 (07/28/17 2330)  Resp: 18 (07/28/17 2330)  BP: (!) 122/0 (07/29/17 0009)  SpO2: 97 % (07/28/17 2102) Vital Signs (24h Range):  Temp:  [98.4 °F (36.9 °C)-98.6 °F (37 °C)] 98.4 °F (36.9 °C)  Pulse:  [62-70] 65  Resp:  [18] 18  SpO2:  [96 %-97 %] 97 %  BP: (112-137)/(0-111) 122/0     Weight: 82 kg (180 lb 12.4 oz)  Body mass index is 24.52 kg/m².    SpO2: 97 %  O2 Device (Oxygen Therapy): room air    No intake or output data in the 24 hours ending 07/29/17 0123    Lines/Drains/Airways     Line                 VAD Left ventricular assist device HeartMate 3 02749 days          Peripheral Intravenous Line                 Peripheral IV - Single Lumen 07/28/17 2043 Right Forearm less than 1 day                Physical Exam   Constitutional: He is oriented to  person, place, and time. He appears well-developed and well-nourished.   HENT:   Head: Normocephalic and atraumatic.   Eyes: Conjunctivae and EOM are normal. Pupils are equal, round, and reactive to light.   Neck: Normal range of motion. Neck supple. No JVD present.   Cardiovascular: Exam reveals no gallop and no friction rub.    No murmur heard.  Smooth vad sounds   Pulmonary/Chest: Effort normal and breath sounds normal. No respiratory distress. He has no wheezes. He has no rales. He exhibits no tenderness.   Abdominal: Soft. Bowel sounds are normal. There is tenderness.   Erythema and drainage around DL site, pain around site as well, dressing taken down to view   Musculoskeletal: Normal range of motion. He exhibits no edema or tenderness.   Neurological: He is alert and oriented to person, place, and time.   Skin: Skin is warm and dry. No erythema. No pallor.       Significant Labs:   CMP   Recent Labs  Lab 07/28/17 2042      K 4.3      CO2 24   *   BUN 26*   CREATININE 1.5*   CALCIUM 10.1   PROT 6.9   ALBUMIN 3.3*   BILITOT 0.4   ALKPHOS 91   AST 19   ALT 13   ANIONGAP 11   ESTGFRAFRICA 52.3*   EGFRNONAA 45.2*   , CBC   Recent Labs  Lab 07/28/17 2042 07/28/17 2048   WBC 6.86  --    HGB 12.6*  --    HCT 38.3* 40     --     and INR   Recent Labs  Lab 07/27/17  1500 07/28/17 2042   INR 2.6* 2.2*       Significant Imaging: Echocardiogram:   2D echo with color flow doppler:   Results for orders placed or performed during the hospital encounter of 07/25/17   2D echo with color flow doppler   Result Value Ref Range    EF 15 (A) 55 - 65    Diastolic Dysfunction Yes (A)     Est. PA Systolic Pressure 29.59     Tricuspid Valve Regurgitation MILD TO MODERATE      Assessment and Plan:     * Complication involving left ventricular assist device (LVAD)    In view of elevated lactate and worsening symptoms, start broad spectrum abx  Consult ID for weigh in.  Called and spoke with Dr Muhammad who  approved vanc/cef for now with repeat wound and blood cultures  Trend lactate  No alarms noted by patient/family  vad interrogation  CTS consult for eval of drive line        Hypertensive urgency    Patient's MAP is persistently 100-110  Patient would need to go to [facility] on cardene drip with MAP goal 60-80  Spoke with family  They accept plan            Altered mental state    Waxing and waning  Has a history of delirium while at rehab prior  Check UA r/o UTI  Currently lucid        Abdominal pain    Treat underlying infection  ID consult        LVAD (left ventricular assist device) present    HMIII As above  Continue COumadin -- he is on mutliple different doses at home described only as 'dosed per INR.'    Started on 2.5mg po daily while here, uptitrate as needed        Stage 2 chronic kidney disease    Stable creat  Renally dose meds        CAD (coronary artery disease)    Continue asa  Continue lipitor                VTE Risk Mitigation         Ordered     warfarin (COUMADIN) tablet 2.5 mg  Daily     Route:  Oral        07/29/17 0113          Henry Murguia MD  Cardiology   [facility]

## 2017-07-29 NOTE — HPI
76 male with ICM s/p LVAD 10/2016 at [place] in [facility] was seen in ID clinic 7/24 for drainage from driveline. Pt recently moved to [place] to stay with his daughter and initially noticed drainage from driveline about 2 weeks ago. Cultures +pseduomonas pansensitive and patient was placed on ciprofloxacin x 3 weeks. Pt reports doing well and tolerating medication well but noticed increase drainage from driveline site a few days ago. Pt reports having associated abdominal pain. Denies having any fever,chills,n/v/d, or night sweats. In ED, no documented fevers, no leukocytosis, pt stable on examination. CT abdomen negative for fluid collections. Pt currently on IV vancomycin and cefepime.

## 2017-07-29 NOTE — ASSESSMENT & PLAN NOTE
77 y/o male with ICM s/p LVAD 10/2016 at [place] in [place] seen in ID clinic 7/24 driveline infection with pseduomonas. Pt is currently on oral ciprofloxacin. Pt presented to ED for worsening drainage from driveline site for the past couple of days with associated abdominal pain. CT scan negative for fluid collections. Pt afebrile, without a leukocytosis, stable on examination. We are seeing him today for abx management    Plan:  1.Continue IV vancomycin and cefepime.  2.Discontinued ciprofloxacin at this time  3.will follow repeat cultures and tailor abx accordingly  4.Will continue to monitor closely

## 2017-07-29 NOTE — ED TRIAGE NOTES
Pt presents to ED c/o tenderness and redness that started about a week ago. Pt stated that he went to [facility] a week ago and was told that his driveline on LVAD is infected. Pt stated that he noticed redness and pain becoming worse today. Pt noticed some drainage from drive line dressing. Pt has 2 extra batteries and spare controller with him at this time.     LVAD settings:   Speed- 5800  Flow- 4.5  PI- 4.2  Power-4.5

## 2017-07-29 NOTE — PLAN OF CARE
Problem: Patient Care Overview  Goal: Plan of Care Review  Outcome: Ongoing (interventions implemented as appropriate)  POC reviewed with pt. Pt admitted from ED during shift and was a rapid response from CSU dt Doppler in the 120s. Pt started on cardene and was able to titrate to maintain a MAP of <80 for pt. No c/o pain. Pt cultured during shift. No events on VAD. WCTM

## 2017-07-29 NOTE — PLAN OF CARE
Problem: Patient Care Overview  Goal: Plan of Care Review  Outcome: Ongoing (interventions implemented as appropriate)  1. Continue current 2 gram sodium diet.   2. RD to monitor & follow-up.

## 2017-07-29 NOTE — PLAN OF CARE
Problem: Occupational Therapy Goal  Goal: Occupational Therapy Goal  Goals to be met by:  2 weeks 8/12/17     Patient will increase functional independence with ADLs by performing:    Feeding with Supervision.  UE Dressing with Minimal Assistance.  LE Dressing with Minimal Assistance.  Grooming while seated with Supervision.  Toileting from bedside commode with Minimal Assistance for hygiene and clothing management.   Stand pivot transfers with Minimal Assistance.  Toilet transfer to bedside commode with Minimal Assistance.    Goals and POC established today.

## 2017-07-29 NOTE — ASSESSMENT & PLAN NOTE
Patient's MAP is persistently 100-110  Patient would need to go to CMICU on cardene drip with MAP goal 60-80  Spoke with family  They accept plan

## 2017-07-29 NOTE — ASSESSMENT & PLAN NOTE
-VAD orders placed  -should have VAD interrogated this week  -no alarms so far  -continue bp control and warfarin; current INR therapeutic

## 2017-07-29 NOTE — CONSULTS
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 Initially improved symptoms on ciprofloxacin with improved redness. However, has had increased mucopurulent drainage from driveline as well as worsening abdominal pain near exit site. Denies symptoms of systemic infection. Did not take his amlodipine tonight.    Hypertensive but otherwise hemodynamically stable    Limited exam with pulsatile flow, clear lungs, no edema, warm and well perfused.    No WBC, Cr slightly elevated to 1.6 from baseline. Lactate 2.6. INR 2.2.    Plan:  --Admit to \[facility]\"", Dr. Santana and Dr. Murguia notified  --CT abdomen/pelvis to evaluate for ?abscess  --Increase amlodipine to 10mg (first dose tonight)  --Will need infectious disease input re: antibiotic choice after CT scan    Thank you for this interesting consult. Further recommendations per attending addendum    Julián Blanco MD  PGY-[phone])  Cardiology Fellow

## 2017-07-29 NOTE — PLAN OF CARE
Problem: Patient Care Overview  Goal: Plan of Care Review  Outcome: Ongoing (interventions implemented as appropriate)  Pt remains afebrile and VSS. See flow sheet for full assessment. Pt remains on continuous tele and pulse ox monitor. No falls. No complaints of pain or nausea. Pt remains on cardene gtt. VAD remains in place. Drive line cultured per ID. POC reviewed w/Pt and family who verbalized understanding.       Problem: Hypertensive Disease/Crisis (Arterial) (Adult)  Intervention: Gradually Decrease Blood Pressure  Cardene gtt titrated per MD order.

## 2017-07-29 NOTE — ASSESSMENT & PLAN NOTE
Waxing and waning  Has a history of delirium while at rehab prior  Check UA r/o UTI  Currently lucid

## 2017-07-29 NOTE — PROGRESS NOTES
07/28/17 2330 07/29/17 0000 07/29/17 0009   Vital Signs   BP (!) 132/103 (!) 137/111 (!) 122/0   MAP (mmHg) 115 120 --    BP Location Right arm --  Right arm   BP Method Automatic --  Doppler   Patient Position Lying --  Lying   Pt has scheduled 10 mg po amlodipine. Per family, he gets symptomatic with BP meds. He takes 5mg po amlodipine at home which was recently increased from 2.5mg. Dr. Murguia notified. MD is coming to the bedside. Will wait for the MD orders.

## 2017-07-29 NOTE — PT/OT/SLP EVAL
Occupational Therapy  Evaluation    Kev Vasquez   MRN: [id number]   Admitting Diagnosis: Complication involving left ventricular assist device (LVAD)    OT Date of Treatment: 07/29/17   OT Start Time: 1345  OT Stop Time: 1440  OT Total Time (min): 55 min    Billable Minutes:  Evaluation 25  Therapeutic Activity 15    Diagnosis: Complication involving left ventricular assist device (LVAD)   Pt is s/p LVAD  [facility] 10/16.  Pt was seen in ID clinic and found to have driveline infection with pseudomonas.  Pt was t/f to ICU 7/28/17 after doppler 120. New orders obtained upon t/f to ICU.       Past Medical History:   Diagnosis Date    Acute on chronic systolic heart failure 7/27/2015    AICD (automatic cardioverter/defibrillator) present 2014    [employer]    CAD (coronary artery disease) 7/27/2015    CHF (congestive heart failure)     Gait instability     HTN (hypertension) 7/27/2015    ICD (implantable cardioverter-defibrillator), biventricular, in situ 7/27/2015    Kidney stones     HILLARY treated with BiPAP 7/27/2015    Peripheral neuropathy     Pulmonary hypertension due to left ventricular systolic dysfunction 7/29/2015    Restless leg syndrome 1992    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    Skin cancer     excision 2013    Sleep apnea 1992      Past Surgical History:   Procedure Laterality Date    CARDIAC PACEMAKER PLACEMENT      pacemaker previously, then AICD in 2006. AICD [employer] serial #[id number]    CORONARY ARTERY BYPASS GRAFT  1993    KNEE SURGERY Left 2001    complicated by infection, hardware had to be taken out and replaced    LEFT VENTRICULAR ASSIST DEVICE  10/19/2016       General Precautions: Standard, LVAD, fall  Orthopedic Precautions: N/A      Patient History:  Living Environment  Lives With: spouse, child([place]), adult  Living Arrangements: house  Living Environment Comment: Pt and spouse have recently moved from [facility] to live with their daughter and  "son-n-law. Home has no steps to enter and they will stay on first floor. Pt has 24 hour caregiver PTA. Pt uses RW for household distances wtih CGA/SBA.  Pt required MIN A for dressing and toileting and requires set-up feeding and g/h. Pt reports he is able to manage his LVAD but caregiver and family also assist with the LVAD. Pt wears fishing vest to hold equipment.   Equipment Currently Used at Home: shower chair, walker, rolling, grab bar, raised toilet      Subjective:  Communicated with nsg prior to session.  "I think that's a great idea" pt states upon OT suggesting OOB activity.     Pain/Comfort  Pain Rating 1:  (Pt reports pain at drive line site. Pt unable to rate pain.)  Pain Addressed 2: Reposition, Distraction, Nurse notified    Objective:    Pt found supine in bed with LVAD to wall power. Spouse present in room sitting in w/c and personal caregiver present.     Cognitive Exam:  Pt awake and oriented. Pt following simple commands. Pt pleasant and joking appropriately.   Pt does appear to have decreased  Insight into extent of current functional deficits.     Physical Exam:  Pt is right hand dominant and demo WFL UE strength. Pt with intact coordination.    Functional Mobility:  Bed Mobility:  Supine to Sit: Total Assistance  Sit to Supine: Total Assistance  MAX vc for initiation and technique    Transfers:  Sit <> Stand Assistance: Total Assistance x 2 persons   Sit <> Stand Assistive Device: No Assistive Device x 2 persons     Activities of Daily Living:     UE Dressing Level of Assistance: Total assistance  LE Dressing Level of Assistance: Total assistance    Extensive times spent with pt/spouse reviewing current living situation and PLOF including recent fall.  Pt tolerated sitting EOB x 10 min with Fair to Poor sitting balance. Pt with posterior lean and only attempted to lean forward with vc/tc.   Pt required TOTAL A for standing and few side steps to HOB. Initial transition to stand was smooth; " "however, once he stood fully upright, pt with extensive posterior lean for which he was not able to correct despite cues/education.   Education provided re: role of OT/PT and safety with functional mobility/ADL skills. OT discussed pt performance with nsg and pts current need for extensive assist with functional mobility.  OT monitored vital signs with activity which remained WFL throughout session.     AM-PAC 6 CLICK ADL  How much help from another person does this patient currently need?  1 = Unable, Total/Dependent Assistance  2 = A lot, Maximum/Moderate Assistance  3 = A little, Minimum/Contact Guard/Supervision  4 = None, Modified [place]/Independent    Putting on and taking off regular lower body clothing? : 1  Bathing (including washing, rinsing, drying)?: 1  Toileting, which includes using toilet, bedpan, or urinal? : 1  Putting on and taking off regular upper body clothing?: 1  Taking care of personal grooming such as brushing teeth?: 1  Eating meals?: 1  Total Score: 6    AM-PAC Raw Score CMS "G-Code Modifier Level of Impairment Assistance   6 % Total / Unable   7 - 9 CM 80 - 100% Maximal Assist   10-14 CL 60 - 80% Moderate Assist   15 - 19 CK 40 - 60% Moderate Assist   20 - 22 CJ 20 - 40% Minimal Assist   23 CI 1-20% SBA / CGA   24 CH 0% Independent/ Mod I       Patient left supine with all lines intact, call button in reach, nsg notified and spouse and caregiver present    Assessment:  Kev Vasquez is a 76 y.o. male with a medical diagnosis of Complication involving left ventricular assist device (LVAD). Pt tolerated session fair. Although pts VSS throughout session and pt cooperative, pt with extensive deficits with functional mobility and ADL skills. Pt demo good overall strength, but he demo poor balance, motor control and functional skills. Pt is not safe to return home at this time and requires more assistance with mobility and ADL skills than he reports he needed prior to arrival. Pt to " benefit from cont OT to address stated goals. .    Rehab identified problem list/impairments: Rehab identified problem list/impairments: weakness, impaired functional mobilty, gait instability, impaired endurance, impaired balance, impaired self care skills, visual deficits, pain, decreased coordination    Rehab potential is good.    Activity tolerance: Good    Discharge recommendations: Discharge Facility/Level Of Care Needs: rehabilitation facility         GOALS:    Occupational Therapy Goals        Problem: Occupational Therapy Goal    Goal Priority Disciplines Outcome Interventions   Occupational Therapy Goal     OT, PT/OT     Description:  Goals to be met by:  2 weeks 8/12/17     Patient will increase functional independence with ADLs by performing:    Feeding with Supervision.  UE Dressing with Minimal Assistance.  LE Dressing with Minimal Assistance.  Grooming while seated with Supervision.  Toileting from bedside commode with Minimal Assistance for hygiene and clothing management.   Stand pivot transfers with Minimal Assistance.  Toilet transfer to bedside commode with Minimal Assistance.                      PLAN:  Patient to be seen 5 x/week to address the above listed problems via self-care/home management, therapeutic activities, therapeutic exercises  Plan of Care expires:    Plan of Care reviewed with: patient, spouse         SHAUNA Roberts  07/29/2017

## 2017-07-29 NOTE — CONSULTS
[facility]  Adult Nutrition  Consult Note    SUMMARY     Recommendations    1. Continue current 2 gram sodium diet.   2. RD to monitor & follow-up.    Goals: PO intake >50%  Nutrition Goal Status: new  Communication of RD Recs: reviewed with RN    Reason for Assessment    Reason for Assessment: physician consult  Diagnosis: other (see comments) (LVAD complication)  Relevent Medical History: CHF, LVAD (10/2016)   Interdisciplinary Rounds: attended     General Information Comments: Pt with good appetite, consumed 100% of breakfast this AM. Wt loss noted, doesnt want ONS.  Nutrition Discharge Planning: Adequate PO intake    Nutrition Prescription Ordered    Current Diet Order: 2 gram sodium    Nutrition Risk Screen     Nutrition Risk Screen: no indicators present    Nutrition/Diet History    Patient Reported Diet/Restrictions/Preferences: general, low salt     Factors Affecting Nutritional Intake: other (see comments) (None)    Labs/Tests/Procedures/Meds    Pertinent Labs Reviewed: reviewed, pertinent  Pertinent Labs Comments: BUN 26, Creat 1.5, GFR 52.3, Gluc 123  Pertinent Medications Reviewed: reviewed, pertinent  Pertinent Medications Comments: Statin, MVI, Coumadin, Nicardipine    Physical Findings    Overall Physical Appearance: underweight  Oral/Mouth Cavity: WDL  Skin: intact    Anthropometrics    Height: 6' (182.9 cm)  Weight Method: Bed Scale  Weight: 74.3 kg (163 lb 12.8 oz)    Ideal Body Weight (IBW), Male: 178 lb  % Ideal Body Weight, Male (lb): 92.02 lb     BMI (Calculated): 22.3  BMI Grade: 18.5-24.9 - normal  Weight Loss: unintentional  Usual Body Weight (UBW), k.3 kg     % Usual Body Weight: 66.9  % Weight Change From Usual Weight: 33.1 %    Estimated/Assessed Needs    Weight Used For Calorie Calculations: 74.3 kg (163 lb 12.8 oz)      Energy Calorie Requirements (kcal): 1901 kcal/d  Energy Need Method: [place]-St Jeor     Weight Used For Protein Calculations: 74.3 kg (163  lb 12.8 oz)  Protein Requirements: 74-97 g/d     Fluid Need Method: RDA Method, other (see comments) (Per MD or 1 mL/kcal)    Assessment and Plan    No nutritional dx at this time.    Monitor and Evaluation    Food and Nutrient Intake: energy intake, food and beverage intake  Food and Nutrient Adminstration: diet order     Physical Activity and Function: nutrition-related ADLs and IADLs  Anthropometric Measurements: weight, body mass index, weight change  Biochemical Data, Medical Tests and Procedures: electrolyte and renal panel, gastrointestinal profile, lipid profile, glucose/endocrine profile, inflammatory profile  Nutrition-Focused Physical Findings: overall appearance    Nutrition Risk    Level of Risk: other (see comments) (1x/week)    Nutrition Follow-Up    RD Follow-up?: Yes

## 2017-07-30 PROBLEM — R25.9 INVOLUNTARY MOVEMENTS: Status: ACTIVE | Noted: 2017-07-30

## 2017-07-30 PROBLEM — R41.0 DELIRIUM: Status: ACTIVE | Noted: 2017-07-29

## 2017-07-30 PROBLEM — G25.81 RESTLESS LEG SYNDROME: Status: ACTIVE | Noted: 2017-07-30

## 2017-07-30 PROBLEM — R25.1 TREMORS OF NERVOUS SYSTEM: Status: ACTIVE | Noted: 2017-07-30

## 2017-07-30 LAB
ALBUMIN SERPL BCP-MCNC: 3 G/DL
ALP SERPL-CCNC: 81 U/L
ALT SERPL W/O P-5'-P-CCNC: 14 U/L
ANION GAP SERPL CALC-SCNC: 9 MMOL/L
AST SERPL-CCNC: 19 U/L
BACTERIA UR CULT: NO GROWTH
BASOPHILS # BLD AUTO: 0.02 K/UL
BASOPHILS NFR BLD: 0.3 %
BILIRUB SERPL-MCNC: 0.6 MG/DL
BUN SERPL-MCNC: 19 MG/DL
CALCIUM SERPL-MCNC: 9.4 MG/DL
CHLORIDE SERPL-SCNC: 105 MMOL/L
CO2 SERPL-SCNC: 22 MMOL/L
CREAT SERPL-MCNC: 1.3 MG/DL
DIFFERENTIAL METHOD: ABNORMAL
EOSINOPHIL # BLD AUTO: 0.2 K/UL
EOSINOPHIL NFR BLD: 3.3 %
ERYTHROCYTE [DISTWIDTH] IN BLOOD BY AUTOMATED COUNT: 16.4 %
EST. GFR  (AFRICAN AMERICAN): >60 ML/MIN/1.73 M^2
EST. GFR  (NON AFRICAN AMERICAN): 53.8 ML/MIN/1.73 M^2
GLUCOSE SERPL-MCNC: 113 MG/DL
GRAM STN SPEC: NORMAL
GRAM STN SPEC: NORMAL
HCT VFR BLD AUTO: 35 %
HGB BLD-MCNC: 11.6 G/DL
INR PPP: 2.8
LDH SERPL L TO P-CCNC: 161 U/L
LYMPHOCYTES # BLD AUTO: 1 K/UL
LYMPHOCYTES NFR BLD: 14.2 %
MAGNESIUM SERPL-MCNC: 2.1 MG/DL
MCH RBC QN AUTO: 30.2 PG
MCHC RBC AUTO-ENTMCNC: 33.1 G/DL
MCV RBC AUTO: 91 FL
MONOCYTES # BLD AUTO: 0.9 K/UL
MONOCYTES NFR BLD: 12.2 %
NEUTROPHILS # BLD AUTO: 4.8 K/UL
NEUTROPHILS NFR BLD: 69.7 %
PHOSPHATE SERPL-MCNC: 2.4 MG/DL
PLATELET # BLD AUTO: 143 K/UL
PMV BLD AUTO: 10.7 FL
POTASSIUM SERPL-SCNC: 4.1 MMOL/L
PROT SERPL-MCNC: 6.5 G/DL
PROTHROMBIN TIME: 28.2 SEC
RBC # BLD AUTO: 3.84 M/UL
SODIUM SERPL-SCNC: 136 MMOL/L
WBC # BLD AUTO: 6.95 K/UL

## 2017-07-30 PROCEDURE — A4216 STERILE WATER/SALINE, 10 ML: HCPCS | Performed by: INTERNAL MEDICINE

## 2017-07-30 PROCEDURE — 85610 PROTHROMBIN TIME: CPT

## 2017-07-30 PROCEDURE — 99223 1ST HOSP IP/OBS HIGH 75: CPT | Mod: ,,, | Performed by: PSYCHIATRY & NEUROLOGY

## 2017-07-30 PROCEDURE — 20000000 HC ICU ROOM

## 2017-07-30 PROCEDURE — 83615 LACTATE (LD) (LDH) ENZYME: CPT

## 2017-07-30 PROCEDURE — 25000003 PHARM REV CODE 250: Performed by: INTERNAL MEDICINE

## 2017-07-30 PROCEDURE — 36415 COLL VENOUS BLD VENIPUNCTURE: CPT

## 2017-07-30 PROCEDURE — 25000003 PHARM REV CODE 250: Performed by: STUDENT IN AN ORGANIZED HEALTH CARE EDUCATION/TRAINING PROGRAM

## 2017-07-30 PROCEDURE — 99233 SBSQ HOSP IP/OBS HIGH 50: CPT | Mod: ,,, | Performed by: INTERNAL MEDICINE

## 2017-07-30 PROCEDURE — 80053 COMPREHEN METABOLIC PANEL: CPT

## 2017-07-30 PROCEDURE — 83735 ASSAY OF MAGNESIUM: CPT

## 2017-07-30 PROCEDURE — 84100 ASSAY OF PHOSPHORUS: CPT

## 2017-07-30 PROCEDURE — 85025 COMPLETE CBC W/AUTO DIFF WBC: CPT

## 2017-07-30 PROCEDURE — 63600175 PHARM REV CODE 636 W HCPCS: Performed by: INTERNAL MEDICINE

## 2017-07-30 PROCEDURE — 27000248 HC VAD-ADDITIONAL DAY

## 2017-07-30 RX ORDER — LISINOPRIL 5 MG/1
5 TABLET ORAL 2 TIMES DAILY
Status: DISCONTINUED | OUTPATIENT
Start: 2017-07-31 | End: 2017-08-04

## 2017-07-30 RX ORDER — LISINOPRIL 5 MG/1
5 TABLET ORAL DAILY
Status: DISCONTINUED | OUTPATIENT
Start: 2017-07-30 | End: 2017-07-30

## 2017-07-30 RX ORDER — LISINOPRIL 5 MG/1
5 TABLET ORAL ONCE
Status: COMPLETED | OUTPATIENT
Start: 2017-07-30 | End: 2017-07-30

## 2017-07-30 RX ADMIN — ROPINIROLE 0.5 MG: 0.5 TABLET, FILM COATED ORAL at 08:07

## 2017-07-30 RX ADMIN — LISINOPRIL 5 MG: 5 TABLET ORAL at 11:07

## 2017-07-30 RX ADMIN — OYSTER SHELL CALCIUM WITH VITAMIN D 1 TABLET: 500; 200 TABLET, FILM COATED ORAL at 08:07

## 2017-07-30 RX ADMIN — CEFEPIME HYDROCHLORIDE 2 G: 2 INJECTION, SOLUTION INTRAVENOUS at 02:07

## 2017-07-30 RX ADMIN — ROPINIROLE 0.5 MG: 0.5 TABLET, FILM COATED ORAL at 02:07

## 2017-07-30 RX ADMIN — THERA TABS 1 TABLET: TAB at 08:07

## 2017-07-30 RX ADMIN — ALLOPURINOL 300 MG: 300 TABLET ORAL at 08:07

## 2017-07-30 RX ADMIN — AMLODIPINE BESYLATE 10 MG: 10 TABLET ORAL at 08:07

## 2017-07-30 RX ADMIN — Medication 3 ML: at 06:07

## 2017-07-30 RX ADMIN — ATORVASTATIN CALCIUM 10 MG: 10 TABLET, FILM COATED ORAL at 08:07

## 2017-07-30 RX ADMIN — ROPINIROLE 0.5 MG: 0.5 TABLET, FILM COATED ORAL at 09:07

## 2017-07-30 RX ADMIN — ASPIRIN 81 MG CHEWABLE TABLET 81 MG: 81 TABLET CHEWABLE at 08:07

## 2017-07-30 RX ADMIN — WARFARIN SODIUM 2.5 MG: 2.5 TABLET ORAL at 06:07

## 2017-07-30 RX ADMIN — Medication 3 ML: at 09:07

## 2017-07-30 RX ADMIN — FOLIC ACID 1 MG: 1 TABLET ORAL at 08:07

## 2017-07-30 RX ADMIN — LISINOPRIL 5 MG: 5 TABLET ORAL at 06:07

## 2017-07-30 RX ADMIN — BUPROPION HYDROCHLORIDE 300 MG: 150 TABLET, FILM COATED, EXTENDED RELEASE ORAL at 08:07

## 2017-07-30 RX ADMIN — NICARDIPINE HYDROCHLORIDE 4 MG/HR: 0.2 INJECTION, SOLUTION INTRAVENOUS at 01:07

## 2017-07-30 RX ADMIN — NICARDIPINE HYDROCHLORIDE 6 MG/HR: 0.2 INJECTION, SOLUTION INTRAVENOUS at 10:07

## 2017-07-30 RX ADMIN — DOFETILIDE 250 MCG: 0.12 CAPSULE ORAL at 08:07

## 2017-07-30 RX ADMIN — SPIRONOLACTONE 25 MG: 25 TABLET, FILM COATED ORAL at 08:07

## 2017-07-30 RX ADMIN — TAMSULOSIN HYDROCHLORIDE 0.4 MG: 0.4 CAPSULE ORAL at 08:07

## 2017-07-30 RX ADMIN — Medication 3 ML: at 02:07

## 2017-07-30 RX ADMIN — DIBASIC SODIUM PHOSPHATE, MONOBASIC POTASSIUM PHOSPHATE AND MONOBASIC SODIUM PHOSPHATE 2 TABLET: 852; 155; 130 TABLET ORAL at 06:07

## 2017-07-30 NOTE — HOSPITAL COURSE
"7/28/2017  Admitted to Cardiology for abdominal pain, LVAD infection.  7/30/17:  Neurology consulted for "history of Parkinson's disease"      "

## 2017-07-30 NOTE — ASSESSMENT & PLAN NOTE
75 y/o male with ICM s/p LVAD 10/2016 at [facility] in [place] seen in ID clinic 7/24 driveline infection with pseduomonas. Pt is currently on oral ciprofloxacin. Pt presented to ED for worsening drainage from driveline site for the past couple of days with associated abdominal pain. CT scan negative for fluid collections. Pt afebrile, without a leukocytosis, stable on examination. We are seeing him today for abx management    Plan:  1.discontinued vancomycin. Continue cefepime. Per wife, reports mental status changes, will continue to monitor closely. UA negative.   2.gram stain +GNRs. will follow cultures and tailor abx accordingly  3.Will continue to monitor closely

## 2017-07-30 NOTE — SUBJECTIVE & OBJECTIVE
Interval History:   NAEON. Gram stain +GNRs. Afebrile, abdominal pain resolved.     Review of Systems   Constitutional: Negative for chills and fever.   HENT: Negative for congestion.    Respiratory: Negative for cough and shortness of breath.    Cardiovascular: Negative for chest pain.   Gastrointestinal: Positive for abdominal pain ( improved). Negative for diarrhea, nausea and vomiting.   Genitourinary: Negative for dysuria.   Skin: Positive for wound.   Neurological: Negative for dizziness and headaches.     Objective:     Vital Signs (Most Recent):  Temp: 98.3 °F (36.8 °C) (07/30/17 0701)  Pulse: 64 (07/30/17 0800)  Resp: 11 (07/30/17 0800)  BP: (!) 89/67 (07/30/17 0800)  SpO2: 97 % (07/30/17 0800) Vital Signs (24h Range):  Temp:  [98.2 °F (36.8 °C)-98.7 °F (37.1 °C)] 98.3 °F (36.8 °C)  Pulse:  [] 64  Resp:  [11-29] 11  SpO2:  [94 %-99 %] 97 %  BP: ()/(0-75) 89/67     Weight: 74.3 kg (163 lb 12.8 oz)  Body mass index is 22.22 kg/m².    Estimated Creatinine Clearance: 52.4 mL/min (based on Cr of 1.3).    Physical Exam   Constitutional: He appears well-developed and well-nourished. No distress.   HENT:   Head: Normocephalic.   Eyes: Pupils are equal, round, and reactive to light.   Cardiovascular: Normal rate, regular rhythm and normal heart sounds.    No murmur heard.  LVAD Hum   Pulmonary/Chest: Effort normal. No respiratory distress. He has no wheezes. He has no rales.   Abdominal: Soft. He exhibits no distension.   driveline with minima surrounding erythema and drainage noted.    Musculoskeletal: Normal range of motion.   Neurological: He is alert.   Skin: Skin is warm and dry. No rash noted. He is not diaphoretic. No erythema.   Psychiatric: He has a normal mood and affect.   Nursing note and vitals reviewed.      Significant Labs:   Blood Culture:   Recent Labs  Lab 07/28/17 2042 07/28/17 2058   LABBLOO No Growth to date  No Growth to date No Growth to date  No Growth to date     CBC:    Recent Labs  Lab 07/28/17 2042 07/28/17 2048 07/29/17  0635 07/30/17  0350   WBC 6.86  --  6.86 6.95   HGB 12.6*  --  10.7* 11.6*   HCT 38.3* 40 33.6* 35.0*     --  134* 143*     CMP:   Recent Labs  Lab 07/28/17 2042 07/29/17  0635 07/29/17  1715 07/30/17  0745    138 137 136   K 4.3 3.4* 4.4 4.1    109 106 105   CO2 24 20* 25 22*   * 116* 142* 113*   BUN 26* 24* 25* 19   CREATININE 1.5* 1.3 1.4 1.3   CALCIUM 10.1 8.2* 9.6 9.4   PROT 6.9 5.6*  5.6*  --  6.5   ALBUMIN 3.3* 2.8*  2.8*  --  3.0*   BILITOT 0.4 0.4  0.4  --  0.6   ALKPHOS 91 66  66  --  81   AST 19 17  17  --  19   ALT 13 11  11  --  14   ANIONGAP 11 9 6* 9   EGFRNONAA 45.2* 53.8* 49.1* 53.8*     Wound Culture:   Recent Labs  Lab 07/20/17  1604   LABAERO PSEUDOMONAS AERUGINOSAMany     All pertinent labs within the past 24 hours have been reviewed.    Significant Imaging: I have reviewed all pertinent imaging results/findings within the past 24 hours.

## 2017-07-30 NOTE — SUBJECTIVE & OBJECTIVE
Interval History:     Continuous Infusions:   nicardipine 5 mg/hr (07/30/17 0100)     Scheduled Meds:   allopurinol  300 mg Oral Daily    amlodipine  10 mg Oral Daily    aspirin  81 mg Oral Daily    atorvastatin  10 mg Oral Daily    buPROPion  300 mg Oral Daily    calcium-vitamin D3  1 tablet Oral BID    ceFEPime (MAXIPIME) IVPB  2 g Intravenous Q12H    dofetilide  250 mcg Oral Q12H    folic acid  1 mg Oral Daily    multivitamin  1 tablet Oral Daily    ropinirole  0.5 mg Oral TID    sodium chloride 0.9%  3 mL Intravenous Q8H    spironolactone  25 mg Oral Daily    tamsulosin  0.4 mg Oral Daily    vancomycin (VANCOCIN) IVPB  15 mg/kg Intravenous Q12H    warfarin  2.5 mg Oral Daily     PRN Meds:bisacodyl    Review of patient's allergies indicates:   Allergen Reactions    Sulfa (sulfonamide antibiotics)      Objective:     Vital Signs (Most Recent):  Temp: 98.5 °F (36.9 °C) (07/29/17 2300)  Pulse: 63 (07/30/17 0100)  Resp: (!) 26 (07/30/17 0100)  BP: 109/70 (07/30/17 0100)  SpO2: 96 % (07/30/17 0100) Vital Signs (24h Range):  Temp:  [98.2 °F (36.8 °C)-98.7 °F (37.1 °C)] 98.5 °F (36.9 °C)  Pulse:  [] 63  Resp:  [11-29] 26  SpO2:  [94 %-99 %] 96 %  BP: ()/(0-76) 109/70     Weight: 74.3 kg (163 lb 12.8 oz)  Body mass index is 22.22 kg/m².      Intake/Output Summary (Last 24 hours) at 07/30/17 0723  Last data filed at 07/30/17 0100   Gross per 24 hour   Intake          1222.54 ml   Output             1165 ml   Net            57.54 ml       Hemodynamic Parameters:       Physical Exam   Constitutional: He appears well-developed and well-nourished.   HENT:   Head: Normocephalic and atraumatic.   Eyes: EOM are normal. Pupils are equal, round, and reactive to light.   Neck: Normal range of motion. Neck supple.   Cardiovascular: Regular rhythm.    Pulses:       Carotid pulses are 2+ on the right side, and 2+ on the left side.       Radial pulses are 2+ on the right side, and 2+ on the left side.         Femoral pulses are 2+ on the right side, and 2+ on the left side.       Popliteal pulses are 2+ on the right side, and 2+ on the left side.        Dorsalis pedis pulses are 2+ on the right side, and 2+ on the left side.        Posterior tibial pulses are 2+ on the right side, and 2+ on the left side.   VAD hum +   Pulmonary/Chest: Effort normal and breath sounds normal.   Abdominal: Soft. He exhibits no distension and no mass. There is tenderness.   Nursing note and vitals reviewed.      Significant Labs:  CBC:    Recent Labs  Lab 07/30/17  0350   WBC 6.95   RBC 3.84*   HGB 11.6*   HCT 35.0*   *   MCV 91   MCH 30.2   MCHC 33.1     BNP:    Recent Labs  Lab 07/29/17  0635   *     CMP:    Recent Labs  Lab 07/29/17  0635 07/29/17  1715   * 142*   CALCIUM 8.2* 9.6   ALBUMIN 2.8*  2.8*  --    PROT 5.6*  5.6*  --     137   K 3.4* 4.4   CO2 20* 25    106   BUN 24* 25*   CREATININE 1.3 1.4   ALKPHOS 66  66  --    ALT 11  11  --    AST 17  17  --    BILITOT 0.4  0.4  --       Coagulation:     Recent Labs  Lab 07/30/17  0350   INR 2.8*     LDH:    Recent Labs  Lab 07/29/17  0635 07/30/17  0350    161     Microbiology:  Microbiology Results (last 7 days)     Procedure Component Value Units Date/Time    Gram stain [[id number]] Collected:  07/29/17 1056    Order Status:  Completed Specimen:  Wound from Abdomen Updated:  07/30/17 0001     Gram Stain Result Few WBC's      Rare Gram negative rods    Blood culture #1 **CANNOT BE ORDERED STAT** [[id number]] Collected:  07/28/17 2042    Order Status:  Completed Specimen:  Blood from Peripheral, Forearm, Right Updated:  07/29/17 2212     Blood Culture, Routine No Growth to date     Blood Culture, Routine No Growth to date    Blood culture #2 **CANNOT BE ORDERED STAT** [[id number]] Collected:  07/28/17 2058    Order Status:  Completed Specimen:  Blood from Peripheral, Forearm, Left Updated:  07/29/17 2212     Blood Culture, Routine No Growth  to date     Blood Culture, Routine No Growth to date    Urine culture [[id number]] Collected:  07/29/17 1111    Order Status:  Sent Specimen:  Urine from Urine, Catheterized Updated:  07/29/17 1210    Aerobic culture [[id number]] Collected:  07/29/17 1056    Order Status:  Sent Specimen:  Wound from Abdomen Updated:  07/29/17 1207    Culture, Anaerobe [[id number]] Collected:  07/29/17 1056    Order Status:  Sent Specimen:  Wound from Abdomen Updated:  07/29/17 1206    Urine culture [[id number]] Collected:  07/29/17 0553    Order Status:  Canceled Specimen:  Urine from Urine, Clean Catch Updated:  07/29/17 0554    Aerobic culture [[id number]]     Order Status:  No result Specimen:  Wound from Abdomen

## 2017-07-30 NOTE — PROGRESS NOTES
[facility]  Heart Transplant  Progress Note    Patient Name: Kev Vasquez  MRN: [id number]  Admission Date: 7/28/2017  Hospital Length of Stay: 2 days  Attending Physician: Carlito Santana MD  Primary Care Provider: Mega Collins MD  Principal Problem:Complication involving left ventricular assist device (LVAD)    Subjective:     Interval History:     Continuous Infusions:   nicardipine 5 mg/hr (07/30/17 0100)     Scheduled Meds:   allopurinol  300 mg Oral Daily    amlodipine  10 mg Oral Daily    aspirin  81 mg Oral Daily    atorvastatin  10 mg Oral Daily    buPROPion  300 mg Oral Daily    calcium-vitamin D3  1 tablet Oral BID    ceFEPime (MAXIPIME) IVPB  2 g Intravenous Q12H    dofetilide  250 mcg Oral Q12H    folic acid  1 mg Oral Daily    multivitamin  1 tablet Oral Daily    ropinirole  0.5 mg Oral TID    sodium chloride 0.9%  3 mL Intravenous Q8H    spironolactone  25 mg Oral Daily    tamsulosin  0.4 mg Oral Daily    vancomycin (VANCOCIN) IVPB  15 mg/kg Intravenous Q12H    warfarin  2.5 mg Oral Daily     PRN Meds:bisacodyl    Review of patient's allergies indicates:   Allergen Reactions    Sulfa (sulfonamide antibiotics)      Objective:     Vital Signs (Most Recent):  Temp: 98.5 °F (36.9 °C) (07/29/17 2300)  Pulse: 63 (07/30/17 0100)  Resp: (!) 26 (07/30/17 0100)  BP: 109/70 (07/30/17 0100)  SpO2: 96 % (07/30/17 0100) Vital Signs (24h Range):  Temp:  [98.2 °F (36.8 °C)-98.7 °F (37.1 °C)] 98.5 °F (36.9 °C)  Pulse:  [] 63  Resp:  [11-29] 26  SpO2:  [94 %-99 %] 96 %  BP: ()/(0-76) 109/70     Weight: 74.3 kg (163 lb 12.8 oz)  Body mass index is 22.22 kg/m².      Intake/Output Summary (Last 24 hours) at 07/30/17 0723  Last data filed at 07/30/17 0100   Gross per 24 hour   Intake          1222.54 ml   Output             1165 ml   Net            57.54 ml       Hemodynamic Parameters:       Physical Exam   Constitutional: He appears well-developed and well-nourished.    HENT:   Head: Normocephalic and atraumatic.   Eyes: EOM are normal. Pupils are equal, round, and reactive to light.   Neck: Normal range of motion. Neck supple.   Cardiovascular: Regular rhythm.    Pulses:       Carotid pulses are 2+ on the right side, and 2+ on the left side.       Radial pulses are 2+ on the right side, and 2+ on the left side.        Femoral pulses are 2+ on the right side, and 2+ on the left side.       Popliteal pulses are 2+ on the right side, and 2+ on the left side.        Dorsalis pedis pulses are 2+ on the right side, and 2+ on the left side.        Posterior tibial pulses are 2+ on the right side, and 2+ on the left side.   VAD hum +   Pulmonary/Chest: Effort normal and breath sounds normal.   Abdominal: Soft. He exhibits no distension and no mass. There is tenderness.   Nursing note and vitals reviewed.      Significant Labs:  CBC:    Recent Labs  Lab 07/30/17  0350   WBC 6.95   RBC 3.84*   HGB 11.6*   HCT 35.0*   *   MCV 91   MCH 30.2   MCHC 33.1     BNP:    Recent Labs  Lab 07/29/17  0635   *     CMP:    Recent Labs  Lab 07/29/17  0635 07/29/17  1715   * 142*   CALCIUM 8.2* 9.6   ALBUMIN 2.8*  2.8*  --    PROT 5.6*  5.6*  --     137   K 3.4* 4.4   CO2 20* 25    106   BUN 24* 25*   CREATININE 1.3 1.4   ALKPHOS 66  66  --    ALT 11  11  --    AST 17  17  --    BILITOT 0.4  0.4  --       Coagulation:     Recent Labs  Lab 07/30/17  0350   INR 2.8*     LDH:    Recent Labs  Lab 07/29/17  0635 07/30/17  0350    161     Microbiology:  Microbiology Results (last 7 days)     Procedure Component Value Units Date/Time    Gram stain [[id number]] Collected:  07/29/17 1056    Order Status:  Completed Specimen:  Wound from Abdomen Updated:  07/30/17 0001     Gram Stain Result Few WBC's      Rare Gram negative rods    Blood culture #1 **CANNOT BE ORDERED STAT** [[id number]] Collected:  07/28/17 2042    Order Status:  Completed Specimen:  Blood from  Peripheral, Forearm, Right Updated:  07/29/17 2212     Blood Culture, Routine No Growth to date     Blood Culture, Routine No Growth to date    Blood culture #2 **CANNOT BE ORDERED STAT** [[id number]] Collected:  07/28/17 2058    Order Status:  Completed Specimen:  Blood from Peripheral, Forearm, Left Updated:  07/29/17 2212     Blood Culture, Routine No Growth to date     Blood Culture, Routine No Growth to date    Urine culture [[id number]] Collected:  07/29/17 1111    Order Status:  Sent Specimen:  Urine from Urine, Catheterized Updated:  07/29/17 1210    Aerobic culture [[id number]] Collected:  07/29/17 1056    Order Status:  Sent Specimen:  Wound from Abdomen Updated:  07/29/17 1207    Culture, Anaerobe [[id number]] Collected:  07/29/17 1056    Order Status:  Sent Specimen:  Wound from Abdomen Updated:  07/29/17 1206    Urine culture [[id number]] Collected:  07/29/17 0553    Order Status:  Canceled Specimen:  Urine from Urine, Clean Catch Updated:  07/29/17 0554    Aerobic culture [[id number]]     Order Status:  No result Specimen:  Wound from Abdomen             Assessment and Plan:     Tremors of nervous system    -History of Parkinson;s disease in the past  -Currently not active as he should be given tremors and leg pain  -Will consult neurology for further assessment and recommendations        Abdominal pain    -pending CT scan of the abdomen to evaluate abscess   -ID to see the patient and obtain cultures from the DL  -follow up blood cultures        LVAD (left ventricular assist device) present    -VAD orders placed  -should have VAD interrogated this week  -no alarms so far  -continue bp control and warfarin; current INR therapeutic        Hypertensive urgency    -currently on cardene drip  -will try to wean off today  -increase amlodipine to 10- mg po daily  -continue in the unit until BP normalizes        * Complication involving left ventricular assist device (LVAD)    -please see alicia KING  MD Peng  Heart Transplant  [facility]

## 2017-07-30 NOTE — PROGRESS NOTES
[facility]  Heart Transplant  Progress Note    Patient Name: Kev Vasquez  MRN: [id number]  Admission Date: 7/28/2017  Hospital Length of Stay: 2 days  Attending Physician: Carlito Santana MD  Primary Care Provider: Mega Collins MD  Principal Problem:Complication involving left ventricular assist device (LVAD)    Subjective:   No new subjective & objective note has been filed under this hospital service since the last note was generated.    Assessment and Plan:     Tremors of nervous system    -History of Parkinson;s disease in the past  -Currently not active as he should be given tremors and leg pain  -Will consult neurology for further assessment and recommendations        Abdominal pain    -pending CT scan of the abdomen to evaluate abscess   -ID to see the patient and obtain cultures from the DL  -follow up blood cultures        LVAD (left ventricular assist device) present    -VAD orders placed  -should have VAD interrogated this week  -no alarms so far  -continue bp control and warfarin; current INR therapeutic        Hypertensive urgency    -currently on cardene drip  -will try to wean off today  -increase amlodipine to 10- mg po daily  -continue in the unit until BP normalizes        * Complication involving left ventricular assist device (LVAD)    -please see alicia Khoury MD  Heart Transplant  [facility]

## 2017-07-30 NOTE — CONSULTS
"[facility]  Neurology  Consult Note    Patient Name: Kev Vasquez  MRN: [id number]  Admission Date: 7/28/2017  Hospital Length of Stay: 2 days  Code Status: Full Code   Attending Provider: Carlito Santana MD   Consulting Provider: Tabitha Alford MD  Primary Care Physician: Mega Collins MD  Principal Problem:Complication involving left ventricular assist device (LVAD)    Inpatient consult to Neurology  Consult performed by: TABITHA ALFORD  Consult ordered by: LARRY COTO         Subjective:     Reason for consult:  history of parkinson disease; currently symptomatic    HPI:   Kev Abdullahi is a 75 y/o male with h/o RLS, ICM s/p LVAD 10/2016 at [facility] in [place] with prior treatment with driveline infection with pseudomonas, presenting again with worsening drainage from driveline site for the past couple of days with associated abdominal pain. Currently on broad spectrum Abx. However concerns for increase in intensiy of baseline tremors, hence general neurology consulted for the same.     Wife at bedside says he does not have PD.  He is on requip for RLS.  She also says that he has no dementia- was actually tested twice with neuropsychological testing in [place] and she was told "no dementia."    He is currently encephalopathic and tells me that he is in the hospital for his right hip pain.  He goes on to tell me about injuring it while playing with some kids.  Wife is shaking her head at bedside.  I asked her about similar false stories and she reports an episode of delusional paranoa while in rehab- thinking she was having an affair with a  of a white van.     Past Medical History:   Diagnosis Date    Acute on chronic systolic heart failure 7/27/2015    AICD (automatic cardioverter/defibrillator) present 2014    [employer]    CAD (coronary artery disease) 7/27/2015    CHF (congestive heart failure)     Gait instability     HTN (hypertension) 7/27/2015    ICD " (implantable cardioverter-defibrillator), biventricular, in situ 7/27/2015    Kidney stones     HILLARY treated with BiPAP 7/27/2015    Peripheral neuropathy     Pulmonary hypertension due to left ventricular systolic dysfunction 7/29/2015    Restless leg syndrome 1992    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    Skin cancer     excision 2013    Sleep apnea 1992       Past Surgical History:   Procedure Laterality Date    CARDIAC PACEMAKER PLACEMENT      pacemaker previously, then AICD in 2006. AICD [employer] serial #[id number]    CORONARY ARTERY BYPASS GRAFT  1993    KNEE SURGERY Left 2001    complicated by infection, hardware had to be taken out and replaced    LEFT VENTRICULAR ASSIST DEVICE  10/19/2016       Review of patient's allergies indicates:   Allergen Reactions    Sulfa (sulfonamide antibiotics)        Current Neurological Medications: requip 0.5mg tid    No current facility-administered medications on file prior to encounter.      Current Outpatient Prescriptions on File Prior to Encounter   Medication Sig    allopurinol (ZYLOPRIM) 300 MG tablet Take 300 mg by mouth once daily.    amlodipine (NORVASC) 2.5 MG tablet Take 5 mg by mouth once daily.     aspirin 81 MG Chew Take 81 mg by mouth once daily.    atorvastatin (LIPITOR) 10 MG tablet Take 10 mg by mouth once daily.    buPROPion (WELLBUTRIN XL) 300 MG 24 hr tablet Take 300 mg by mouth once daily.    calcium-vitamin D tablet 600 mg-200 units Take 1 tablet by mouth once daily.    ciprofloxacin HCl (CIPRO) 500 MG tablet Take 1 tablet (500 mg total) by mouth 2 (two) times daily.    DULCOLAX, BISACODYL, RECT Place 10 mg rectally daily as needed.    folic acid (FOLVITE) 1 MG tablet Take 1 mg by mouth once daily.    multivitamin capsule Take 1 capsule by mouth once daily.    nitroGLYCERIN (NITROSTAT) 0.4 MG SL tablet Place 0.4 mg under the tongue every 5 (five) minutes as needed for Chest pain.    ropinirole (REQUIP) 0.5 MG  "tablet Take 0.5 mg by mouth 3 (three) times daily.     spironolactone (ALDACTONE) 25 MG tablet Take 1 tablet (25 mg total) by mouth once daily.    tamsulosin (FLOMAX) 0.4 mg Cp24 Take 0.4 mg by mouth once daily.    TIKOSYN 250 mcg Cap Take 1 capsule (250 mcg total) by mouth every 12 (twelve) hours.    warfarin (COUMADIN) 2.5 MG tablet Take 2.5 mg by mouth once daily. Take as directed by [facility]    warfarin (COUMADIN) 4 MG tablet Take 4 mg by mouth once daily. Take as directed by [facility] Clinic  Uses with 2.5mg tablet    warfarin (COUMADIN) 5 MG tablet Take 5 mg by mouth. 6.5mg Sat-Monday  5mg Tues-Fri     Family History     Problem Relation (Age of Onset)    Cancer Daughter (50)    Diabetes Daughter    Heart disease Daughter        Social History Main Topics    Smoking status: Never Smoker    Smokeless tobacco: Never Used    Alcohol use No    Drug use: No    Sexual activity: Not on file      Comment:      Review of Systems  Objective:     Vital Signs (Most Recent):  Temp: 98.7 °F (37.1 °C) (07/30/17 1100)  Pulse: 64 (07/30/17 1400)  Resp: (!) 21 (07/30/17 1400)  BP: (!) 88/72 (07/30/17 1400)  SpO2: 95 % (07/30/17 1400) Vital Signs (24h Range):  Temp:  [98.2 °F (36.8 °C)-98.7 °F (37.1 °C)] 98.7 °F (37.1 °C)  Pulse:  [61-67] 64  Resp:  [11-29] 21  SpO2:  [94 %-99 %] 95 %  BP: ()/(0-75) 88/72     Weight: 74.3 kg (163 lb 12.8 oz)  Body mass index is 22.22 kg/m².    Physical Exam     General appearance: Well nourished, well developed, no acute distress.         Cardiovascular:  pedal pulses 2, no edema or cyanosis, heart regular rate and rhythym, no carotid bruits.         -------------------------------------------------------------  Facial Expression: normal       Affect: full       Orientation to time & place:  Oriented to person, October 2017, but not to situation ("oh, it's not my heart")       Attention & concentration:  decreased       Memory:  0/3 at 5 min  Language: Spontaneous, " fluent; able to repeat and name objects        Fund of knowledge:  Aware of current events        Speech:  normal (not dysarthric)  -------------------------------------------------------  Cranial nerves: normal visual acuity, visual fields full, optic discs not well visualized due small pupils, pupils equal round and reactive, extraocular movements intact,       facial sensation intact, face symmetrical, hearing intact to whisper, palate raises midline, shoulder shrug strength normal, tongue protrudes midline.        -------------------------------------------------------  Musculoskeletal  Muscle tone: all 4 extremities normal        Muscle Bulk: all 4 extremities normal        Muscle strength:  5/5 in all 4 extremities        No pronator drift  Sensation: Intact to light touch in all extremities        Deep tendon Reflexes: 2+ bilateral biceps, triceps, patella and ankles        --------------------------------------------------------------  Cerebellar and Coordination  Gait:  Not tested     Finger-nose: no dysmetria       Rapid Alternating Movements (pronation/supination):  R normal; L normal  --------------------------------------------------------------  MOVEMENT DISORDERS FOCUSED EXAM  Abnormality of movement (bradykinesia, hyperkinesia) present? No    Tremor present?   no tremor, but had repetitive movements of left foot until asked to stop   Posture:  normal  Postural stability:  no Rhomberg        Significant Labs:   CBC:   Recent Labs  Lab 07/28/17 2042 07/28/17 2048 07/29/17  0635 07/30/17  0350   WBC 6.86  --  6.86 6.95   HGB 12.6*  --  10.7* 11.6*   HCT 38.3* 40 33.6* 35.0*     --  134* 143*     CMP:   Recent Labs  Lab 07/28/17  2042 07/29/17  0635 07/29/17  1715 07/30/17  0745   * 116* 142* 113*    138 137 136   K 4.3 3.4* 4.4 4.1    109 106 105   CO2 24 20* 25 22*   BUN 26* 24* 25* 19   CREATININE 1.5* 1.3 1.4 1.3   CALCIUM 10.1 8.2* 9.6 9.4   MG  --  1.7 1.9 2.1   PROT 6.9  "5.6*  5.6*  --  6.5   ALBUMIN 3.3* 2.8*  2.8*  --  3.0*   BILITOT 0.4 0.4  0.4  --  0.6   ALKPHOS 91 66  66  --  81   AST 19 17  17  --  19   ALT 13 11  11  --  14   ANIONGAP 11 9 6* 9   EGFRNONAA 45.2* 53.8* 49.1* 53.8*     No results found for: [id number]   No results found for: FERRITIN    Lab Results   Component Value Date    TSH 1.810 07/28/2017       Significant Imaging: I have reviewed all pertinent imaging results/findings within the past 24 hours.    Assessment and Plan:     Delirium    Disoriented to place and situation.  Confabulates reason for being in hospital.      Patient currently with signs/symptoms of delirium.  Tremulous, myoclonic jerks as well as his disorientation suggest this.  Suspect current infection as primary cause.  Though wife reports "normal" neuropsych testing, I would be sure no underlying contributors present.   -> check B12, B1, RPR   -> defer treatment of infection to primary team   -> delirium precautions (windows open, reorient)        Involuntary movements    Tremulousness of head/arms.  Also repetitive tremors of legs (though this may be his RLS).      RLS is a chronic syndrome, but we are also impressed by his tremulousness and myoclonic jerks on exam.  NOT suggestive of PD, nor does he have a diagnosis of this.  He does have infection and mild uremia, mild anemia which may be contributing to myoclonus.      DDx: delirium / myoclonic jerks from infectious etiologies / metabolic >>> postoperative states >> Fluid and electrolyte disturbances / toxicity >>> cns pathology / seziures     Recommendations:   -check labs as above (Delirium)  - Evaluate and manage infectious etiologies as you are doing.    - Continue ropinirole home dosing  - correct lytes as needed / avoid hypoglycemia / MA            * Complication involving left ventricular assist device (LVAD)    As per primary team          Restless leg syndrome    Repetitive movements of legs, long history.  On TID " requip.      continue ropinirole               Thank you for your consult. I will sign off. Please contact us if you have any additional questions.    Alyssa Rico MD  Neurology  [facility]

## 2017-07-30 NOTE — PROGRESS NOTES
[facility]  Heart Transplant  Progress Note    Patient Name: Kev Vasquez  MRN: [id number]  Admission Date: 7/28/2017  Hospital Length of Stay: 2 days  Attending Physician: Carlito Santana MD  Primary Care Provider: Mega Collins MD  Principal Problem:Complication involving left ventricular assist device (LVAD)    Subjective:     Interval History: Remains clinically stable, no acute events last night.     Continuous Infusions:   nicardipine 5 mg/hr (07/30/17 0100)     Scheduled Meds:   allopurinol  300 mg Oral Daily    amlodipine  10 mg Oral Daily    aspirin  81 mg Oral Daily    atorvastatin  10 mg Oral Daily    buPROPion  300 mg Oral Daily    calcium-vitamin D3  1 tablet Oral BID    ceFEPime (MAXIPIME) IVPB  2 g Intravenous Q12H    dofetilide  250 mcg Oral Q12H    folic acid  1 mg Oral Daily    multivitamin  1 tablet Oral Daily    ropinirole  0.5 mg Oral TID    sodium chloride 0.9%  3 mL Intravenous Q8H    spironolactone  25 mg Oral Daily    tamsulosin  0.4 mg Oral Daily    vancomycin (VANCOCIN) IVPB  15 mg/kg Intravenous Q12H    warfarin  2.5 mg Oral Daily     PRN Meds:bisacodyl    Review of patient's allergies indicates:   Allergen Reactions    Sulfa (sulfonamide antibiotics)      Objective:     Vital Signs (Most Recent):  Temp: 98.5 °F (36.9 °C) (07/29/17 2300)  Pulse: 63 (07/30/17 0100)  Resp: (!) 26 (07/30/17 0100)  BP: 109/70 (07/30/17 0100)  SpO2: 96 % (07/30/17 0100) Vital Signs (24h Range):  Temp:  [98.2 °F (36.8 °C)-98.7 °F (37.1 °C)] 98.5 °F (36.9 °C)  Pulse:  [] 63  Resp:  [11-29] 26  SpO2:  [94 %-99 %] 96 %  BP: ()/(0-76) 109/70     Weight: 74.3 kg (163 lb 12.8 oz)  Body mass index is 22.22 kg/m².      Intake/Output Summary (Last 24 hours) at 07/30/17 0723  Last data filed at 07/30/17 0100   Gross per 24 hour   Intake          1222.54 ml   Output             1165 ml   Net            57.54 ml       Hemodynamic Parameters:       Physical Exam    Constitutional: He appears well-developed and well-nourished.   HENT:   Head: Normocephalic and atraumatic.   Eyes: EOM are normal. Pupils are equal, round, and reactive to light.   Neck: Normal range of motion. Neck supple.   Cardiovascular: Regular rhythm.    Pulses:       Carotid pulses are 2+ on the right side, and 2+ on the left side.       Radial pulses are 2+ on the right side, and 2+ on the left side.        Femoral pulses are 2+ on the right side, and 2+ on the left side.       Popliteal pulses are 2+ on the right side, and 2+ on the left side.        Dorsalis pedis pulses are 2+ on the right side, and 2+ on the left side.        Posterior tibial pulses are 2+ on the right side, and 2+ on the left side.   VAD hum +   Pulmonary/Chest: Effort normal and breath sounds normal.   Abdominal: Soft. He exhibits no distension and no mass. There is tenderness.   Nursing note and vitals reviewed.      Significant Labs:  CBC:    Recent Labs  Lab 07/30/17  0350   WBC 6.95   RBC 3.84*   HGB 11.6*   HCT 35.0*   *   MCV 91   MCH 30.2   MCHC 33.1     BNP:    Recent Labs  Lab 07/29/17  0635   *     CMP:    Recent Labs  Lab 07/29/17  0635 07/29/17  1715   * 142*   CALCIUM 8.2* 9.6   ALBUMIN 2.8*  2.8*  --    PROT 5.6*  5.6*  --     137   K 3.4* 4.4   CO2 20* 25    106   BUN 24* 25*   CREATININE 1.3 1.4   ALKPHOS 66  66  --    ALT 11  11  --    AST 17  17  --    BILITOT 0.4  0.4  --       Coagulation:     Recent Labs  Lab 07/30/17  0350   INR 2.8*     LDH:    Recent Labs  Lab 07/29/17  0635 07/30/17  0350    161     Microbiology:  Microbiology Results (last 7 days)     Procedure Component Value Units Date/Time    Gram stain [[id number]] Collected:  07/29/17 1056    Order Status:  Completed Specimen:  Wound from Abdomen Updated:  07/30/17 0001     Gram Stain Result Few WBC's      Rare Gram negative rods    Blood culture #1 **CANNOT BE ORDERED STAT** [[id number]] Collected:  07/28/17  2042    Order Status:  Completed Specimen:  Blood from Peripheral, Forearm, Right Updated:  07/29/17 2212     Blood Culture, Routine No Growth to date     Blood Culture, Routine No Growth to date    Blood culture #2 **CANNOT BE ORDERED STAT** [[id number]] Collected:  07/28/17 2058    Order Status:  Completed Specimen:  Blood from Peripheral, Forearm, Left Updated:  07/29/17 2212     Blood Culture, Routine No Growth to date     Blood Culture, Routine No Growth to date    Urine culture [[id number]] Collected:  07/29/17 1111    Order Status:  Sent Specimen:  Urine from Urine, Catheterized Updated:  07/29/17 1210    Aerobic culture [[id number]] Collected:  07/29/17 1056    Order Status:  Sent Specimen:  Wound from Abdomen Updated:  07/29/17 1207    Culture, Anaerobe [[id number]] Collected:  07/29/17 1056    Order Status:  Sent Specimen:  Wound from Abdomen Updated:  07/29/17 1206    Urine culture [[id number]] Collected:  07/29/17 0553    Order Status:  Canceled Specimen:  Urine from Urine, Clean Catch Updated:  07/29/17 0554    Aerobic culture [[id number]]     Order Status:  No result Specimen:  Wound from Abdomen             Assessment and Plan:     Abdominal pain    -CT scan of the abdomen :Driveline in the subcutaneous tissue of the anterior abdominal wall with minimal surrounding soft tissue stranding without evidence of drainable fluid collections in this patient with 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  LVAD resulting in beam hardening artifact limiting the evaluation of the surrounding structures.   -ID obtained cultures from the DL; pending results  -recommended to continue vanco and cefepime        LVAD (left ventricular assist device) present    -VAD orders placed  -should have VAD interrogated this week  -no alarms so far  -continue bp control and warfarin; current INR therapeutic        Hypertensive urgency    -currently on cardene drip  -will try to wean off today  -continue amlodipine to 10- mg po daily  -will add  lisinopril 5 mg po daily  -continue in the unit until BP normalizes        * Complication involving left ventricular assist device (LVAD)    -please see alicia Khoury MD  Heart Transplant  [facility]

## 2017-07-30 NOTE — ASSESSMENT & PLAN NOTE
"Patient currently with signs/symptoms of delirium.  Tremulous, myoclonic jerks as well as his disorientation suggest this.  Suspect current infection as primary cause.  Though wife reports "normal" neuropsych testing, I would be sure no underlying contributors present.   -> check B12, B1, RPR   -> defer treatment of infection to primary team   -> delirium precautions (windows open, reorient)  "

## 2017-07-30 NOTE — PROGRESS NOTES
[facility]  Infectious Disease  Progress Note    Patient Name: Kev Vasquez  MRN: [id number]  Admission Date: 7/28/2017  Length of Stay: 2 days  Attending Physician: Carlito Santana MD  Primary Care Provider: Mega Collins MD    Isolation Status: No active isolations  Assessment/Plan:      * Complication involving left ventricular assist device (LVAD)    77 y/o male with ICM s/p LVAD 10/2016 at [facility] seen in ID clinic 7/24 driveline infection with pseduomonas. Pt is currently on oral ciprofloxacin. Pt presented to ED for worsening drainage from driveline site for the past couple of days with associated abdominal pain. CT scan negative for fluid collections. Pt afebrile, without a leukocytosis, stable on examination. We are seeing him today for abx management    Plan:  1.discontinued vancomycin. Continue cefepime. Per wife, reports mental status changes, will continue to monitor closely. UA negative.   2.gram stain +GNRs. will follow cultures and tailor abx accordingly  3.Will continue to monitor closely             Thank you for your consult. I will follow-up with patient. Please contact us if you have any additional questions.    Aura Spence PA-C  Infectious Disease  [facility] [phone]    Subjective:     Principal Problem:Complication involving left ventricular assist device (LVAD)    HPI: 76 male with ICM s/p LVAD 10/2016 at [facility] was seen in ID clinic 7/24 for drainage from driveline. Pt recently moved to [place] to stay with his daughter and initially noticed drainage from driveline about 2 weeks ago. Cultures +pseduomonas pansensitive and patient was placed on ciprofloxacin x 3 weeks. Pt reports doing well and tolerating medication well but noticed increase drainage from driveline site a few days ago. Pt reports having associated abdominal pain. Denies having any fever,chills,n/v/d, or night sweats. In ED, no  documented fevers, no leukocytosis, pt stable on examination. CT abdomen negative for fluid collections. Pt currently on IV vancomycin and cefepime.   Interval History:   NAEON. Gram stain +GNRs. Afebrile, abdominal pain resolved.     Review of Systems   Constitutional: Negative for chills and fever.   HENT: Negative for congestion.    Respiratory: Negative for cough and shortness of breath.    Cardiovascular: Negative for chest pain.   Gastrointestinal: Positive for abdominal pain ( improved). Negative for diarrhea, nausea and vomiting.   Genitourinary: Negative for dysuria.   Skin: Positive for wound.   Neurological: Negative for dizziness and headaches.     Objective:     Vital Signs (Most Recent):  Temp: 98.3 °F (36.8 °C) (07/30/17 0701)  Pulse: 64 (07/30/17 0800)  Resp: 11 (07/30/17 0800)  BP: (!) 89/67 (07/30/17 0800)  SpO2: 97 % (07/30/17 0800) Vital Signs (24h Range):  Temp:  [98.2 °F (36.8 °C)-98.7 °F (37.1 °C)] 98.3 °F (36.8 °C)  Pulse:  [] 64  Resp:  [11-29] 11  SpO2:  [94 %-99 %] 97 %  BP: ()/(0-75) 89/67     Weight: 74.3 kg (163 lb 12.8 oz)  Body mass index is 22.22 kg/m².    Estimated Creatinine Clearance: 52.4 mL/min (based on Cr of 1.3).    Physical Exam   Constitutional: He appears well-developed and well-nourished. No distress.   HENT:   Head: Normocephalic.   Eyes: Pupils are equal, round, and reactive to light.   Cardiovascular: Normal rate, regular rhythm and normal heart sounds.    No murmur heard.  LVAD Hum   Pulmonary/Chest: Effort normal. No respiratory distress. He has no wheezes. He has no rales.   Abdominal: Soft. He exhibits no distension.   driveline with minima surrounding erythema and drainage noted.    Musculoskeletal: Normal range of motion.   Neurological: He is alert.   Skin: Skin is warm and dry. No rash noted. He is not diaphoretic. No erythema.   Psychiatric: He has a normal mood and affect.   Nursing note and vitals reviewed.      Significant Labs:   Blood Culture:    Recent Labs  Lab 07/28/17 2042 07/28/17 2058   LABBLOO No Growth to date  No Growth to date No Growth to date  No Growth to date     CBC:   Recent Labs  Lab 07/28/17 2042 07/28/17 2048 07/29/17  0635 07/30/17  0350   WBC 6.86  --  6.86 6.95   HGB 12.6*  --  10.7* 11.6*   HCT 38.3* 40 33.6* 35.0*     --  134* 143*     CMP:   Recent Labs  Lab 07/28/17 2042 07/29/17 0635 07/29/17  1715 07/30/17  0745    138 137 136   K 4.3 3.4* 4.4 4.1    109 106 105   CO2 24 20* 25 22*   * 116* 142* 113*   BUN 26* 24* 25* 19   CREATININE 1.5* 1.3 1.4 1.3   CALCIUM 10.1 8.2* 9.6 9.4   PROT 6.9 5.6*  5.6*  --  6.5   ALBUMIN 3.3* 2.8*  2.8*  --  3.0*   BILITOT 0.4 0.4  0.4  --  0.6   ALKPHOS 91 66  66  --  81   AST 19 17  17  --  19   ALT 13 11  11  --  14   ANIONGAP 11 9 6* 9   EGFRNONAA 45.2* 53.8* 49.1* 53.8*     Wound Culture:   Recent Labs  Lab 07/20/17  1604   LABAERO PSEUDOMONAS AERUGINOSAMany     All pertinent labs within the past 24 hours have been reviewed.    Significant Imaging: I have reviewed all pertinent imaging results/findings within the past 24 hours.

## 2017-07-30 NOTE — PLAN OF CARE
Problem: Patient Care Overview  Goal: Plan of Care Review  Outcome: Ongoing (interventions implemented as appropriate)  Pt remains afebrile and VSS. Pt in and out of confusion & disorientation to place throughout shift. Reoriented pt & updated MD . MD ordered neuro consult. See flow sheet for full assessment. Pt remains on continuous tele and pulse ox monitor. No falls. No complaints of pain or nausea. Pt remains on cardene gtt.  POC reviewed w/ pt & daughter who verbalized understanding.

## 2017-07-30 NOTE — ASSESSMENT & PLAN NOTE
RLS is a chronic syndrome, but we are also impressed by his tremulousness and myoclonic jerks on exam.  NOT suggestive of PD, nor does he have a diagnosis of this.  He does have infection and mild uremia, mild anemia which may be contributing to myoclonus.      DDx: delirium / myoclonic jerks from infectious etiologies / metabolic >>> postoperative states >> Fluid and electrolyte disturbances / toxicity >>> cns pathology / seziures     Recommendations:   -check labs as above (Delirium)  - Evaluate and manage infectious etiologies as you are doing.    - Continue ropinirole home dosing  - correct lytes as needed / avoid hypoglycemia / MA

## 2017-07-30 NOTE — HPI
"Kev Abdullahi is a 77 y/o male with h/o RLS, ICM s/p LVAD 10/2016 at [facility] in [place] with prior treatment with driveline infection with pseudomonas, presenting again with worsening drainage from driveline site for the past couple of days with associated abdominal pain. Currently on broad spectrum Abx. However concerns for increase in intensiy of baseline tremors, hence general neurology consulted for the same.     Wife at bedside says he does not have PD.  He is on requip for RLS.  She also says that he has no dementia- was actually tested twice with neuropsychological testing in [place] and she was told "no dementia."    He is currently encephalopathic and tells me that he is in the hospital for his right hip pain.  He goes on to tell me about injuring it while playing with some kids.  Wife is shaking her head at bedside.  I asked her about similar false stories and she reports an episode of delusional paranoa while in rehab- thinking she was having an affair with a  of a white van.  "

## 2017-07-30 NOTE — SUBJECTIVE & OBJECTIVE
Past Medical History:   Diagnosis Date    Acute on chronic systolic heart failure 7/27/2015    AICD (automatic cardioverter/defibrillator) present 2014    [employer]    CAD (coronary artery disease) 7/27/2015    CHF (congestive heart failure)     Gait instability     HTN (hypertension) 7/27/2015    ICD (implantable cardioverter-defibrillator), biventricular, in situ 7/27/2015    Kidney stones     HILLARY treated with BiPAP 7/27/2015    Peripheral neuropathy     Pulmonary hypertension due to left ventricular systolic dysfunction 7/29/2015    Restless leg syndrome 1992    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    Skin cancer     excision 2013    Sleep apnea 1992       Past Surgical History:   Procedure Laterality Date    CARDIAC PACEMAKER PLACEMENT      pacemaker previously, then AICD in 2006. AICD [employer] serial #[id number]    CORONARY ARTERY BYPASS GRAFT  1993    KNEE SURGERY Left 2001    complicated by infection, hardware had to be taken out and replaced    LEFT VENTRICULAR ASSIST DEVICE  10/19/2016       Review of patient's allergies indicates:   Allergen Reactions    Sulfa (sulfonamide antibiotics)        Current Neurological Medications: requip 0.5mg tid    No current facility-administered medications on file prior to encounter.      Current Outpatient Prescriptions on File Prior to Encounter   Medication Sig    allopurinol (ZYLOPRIM) 300 MG tablet Take 300 mg by mouth once daily.    amlodipine (NORVASC) 2.5 MG tablet Take 5 mg by mouth once daily.     aspirin 81 MG Chew Take 81 mg by mouth once daily.    atorvastatin (LIPITOR) 10 MG tablet Take 10 mg by mouth once daily.    buPROPion (WELLBUTRIN XL) 300 MG 24 hr tablet Take 300 mg by mouth once daily.    calcium-vitamin D tablet 600 mg-200 units Take 1 tablet by mouth once daily.    ciprofloxacin HCl (CIPRO) 500 MG tablet Take 1 tablet (500 mg total) by mouth 2 (two) times daily.    DULCOLAX, BISACODYL, RECT Place 10 mg  rectally daily as needed.    folic acid (FOLVITE) 1 MG tablet Take 1 mg by mouth once daily.    multivitamin capsule Take 1 capsule by mouth once daily.    nitroGLYCERIN (NITROSTAT) 0.4 MG SL tablet Place 0.4 mg under the tongue every 5 (five) minutes as needed for Chest pain.    ropinirole (REQUIP) 0.5 MG tablet Take 0.5 mg by mouth 3 (three) times daily.     spironolactone (ALDACTONE) 25 MG tablet Take 1 tablet (25 mg total) by mouth once daily.    tamsulosin (FLOMAX) 0.4 mg Cp24 Take 0.4 mg by mouth once daily.    TIKOSYN 250 mcg Cap Take 1 capsule (250 mcg total) by mouth every 12 (twelve) hours.    warfarin (COUMADIN) 2.5 MG tablet Take 2.5 mg by mouth once daily. Take as directed by [facility]    warfarin (COUMADIN) 4 MG tablet Take 4 mg by mouth once daily. Take as directed by Coumadin Clinic  Uses with 2.5mg tablet    warfarin (COUMADIN) 5 MG tablet Take 5 mg by mouth. 6.5mg Sat-Monday  5mg Tues-Fri     Family History     Problem Relation (Age of Onset)    Cancer Daughter (50)    Diabetes Daughter    Heart disease Daughter        Social History Main Topics    Smoking status: Never Smoker    Smokeless tobacco: Never Used    Alcohol use No    Drug use: No    Sexual activity: Not on file      Comment:      Review of Systems  Objective:     Vital Signs (Most Recent):  Temp: 98.7 °F (37.1 °C) (07/30/17 1100)  Pulse: 64 (07/30/17 1400)  Resp: (!) 21 (07/30/17 1400)  BP: (!) 88/72 (07/30/17 1400)  SpO2: 95 % (07/30/17 1400) Vital Signs (24h Range):  Temp:  [98.2 °F (36.8 °C)-98.7 °F (37.1 °C)] 98.7 °F (37.1 °C)  Pulse:  [61-67] 64  Resp:  [11-29] 21  SpO2:  [94 %-99 %] 95 %  BP: ()/(0-75) 88/72     Weight: 74.3 kg (163 lb 12.8 oz)  Body mass index is 22.22 kg/m².    Physical Exam     General appearance: Well nourished, well developed, no acute distress.         Cardiovascular:  pedal pulses 2, no edema or cyanosis, heart regular rate and rhythym, no carotid bruits.        "  -------------------------------------------------------------  Facial Expression: normal       Affect: full       Orientation to time & place:  Oriented to person, October 2017, but not to situation ("oh, it's not my heart")       Attention & concentration:  decreased       Memory:  0/3 at 5 min  Language: Spontaneous, fluent; able to repeat and name objects        Fund of knowledge:  Aware of current events        Speech:  normal (not dysarthric)  -------------------------------------------------------  Cranial nerves: normal visual acuity, visual fields full, optic discs not well visualized due small pupils, pupils equal round and reactive, extraocular movements intact,       facial sensation intact, face symmetrical, hearing intact to whisper, palate raises midline, shoulder shrug strength normal, tongue protrudes midline.        -------------------------------------------------------  Musculoskeletal  Muscle tone: all 4 extremities normal        Muscle Bulk: all 4 extremities normal        Muscle strength:  5/5 in all 4 extremities        No pronator drift  Sensation: Intact to light touch in all extremities        Deep tendon Reflexes: 2+ bilateral biceps, triceps, patella and ankles        --------------------------------------------------------------  Cerebellar and Coordination  Gait:  Not tested     Finger-nose: no dysmetria       Rapid Alternating Movements (pronation/supination):  R normal; L normal  --------------------------------------------------------------  MOVEMENT DISORDERS FOCUSED EXAM  Abnormality of movement (bradykinesia, hyperkinesia) present? No    Tremor present?   no tremor, but had repetitive movements of left foot until asked to stop   Posture:  normal  Postural stability:  no Rhomberg        Significant Labs:   CBC:   Recent Labs  Lab 07/28/17 2042 07/28/17 2048 07/29/17  0635 07/30/17  0350   WBC 6.86  --  6.86 6.95   HGB 12.6*  --  10.7* 11.6*   HCT 38.3* 40 33.6* 35.0*     " --  134* 143*     CMP:   Recent Labs  Lab 07/28/17  2042 07/29/17  0635 07/29/17  1715 07/30/17  0745   * 116* 142* 113*    138 137 136   K 4.3 3.4* 4.4 4.1    109 106 105   CO2 24 20* 25 22*   BUN 26* 24* 25* 19   CREATININE 1.5* 1.3 1.4 1.3   CALCIUM 10.1 8.2* 9.6 9.4   MG  --  1.7 1.9 2.1   PROT 6.9 5.6*  5.6*  --  6.5   ALBUMIN 3.3* 2.8*  2.8*  --  3.0*   BILITOT 0.4 0.4  0.4  --  0.6   ALKPHOS 91 66  66  --  81   AST 19 17  17  --  19   ALT 13 11  11  --  14   ANIONGAP 11 9 6* 9   EGFRNONAA 45.2* 53.8* 49.1* 53.8*     No results found for: AWDNDTMK27   No results found for: FERRITIN    Lab Results   Component Value Date    TSH 1.810 07/28/2017       Significant Imaging: I have reviewed all pertinent imaging results/findings within the past 24 hours.

## 2017-07-30 NOTE — PROGRESS NOTES
[facility]  Heart Transplant  Progress Note    Patient Name: Kev Vasquez  MRN: [id number]  Admission Date: 7/28/2017  Hospital Length of Stay: 2 days  Attending Physician: Carlito Santana MD  Primary Care Provider: Mega Collins MD  Principal Problem:Complication involving left ventricular assist device (LVAD)    Subjective:     Interval History: Remains clinically stable, no acute events last night.     Continuous Infusions:   nicardipine 5 mg/hr (07/30/17 0100)     Scheduled Meds:   allopurinol  300 mg Oral Daily    amlodipine  10 mg Oral Daily    aspirin  81 mg Oral Daily    atorvastatin  10 mg Oral Daily    buPROPion  300 mg Oral Daily    calcium-vitamin D3  1 tablet Oral BID    ceFEPime (MAXIPIME) IVPB  2 g Intravenous Q12H    dofetilide  250 mcg Oral Q12H    folic acid  1 mg Oral Daily    multivitamin  1 tablet Oral Daily    ropinirole  0.5 mg Oral TID    sodium chloride 0.9%  3 mL Intravenous Q8H    spironolactone  25 mg Oral Daily    tamsulosin  0.4 mg Oral Daily    vancomycin (VANCOCIN) IVPB  15 mg/kg Intravenous Q12H    warfarin  2.5 mg Oral Daily     PRN Meds:bisacodyl    Review of patient's allergies indicates:   Allergen Reactions    Sulfa (sulfonamide antibiotics)      Objective:     Vital Signs (Most Recent):  Temp: 98.5 °F (36.9 °C) (07/29/17 2300)  Pulse: 63 (07/30/17 0100)  Resp: (!) 26 (07/30/17 0100)  BP: 109/70 (07/30/17 0100)  SpO2: 96 % (07/30/17 0100) Vital Signs (24h Range):  Temp:  [98.2 °F (36.8 °C)-98.7 °F (37.1 °C)] 98.5 °F (36.9 °C)  Pulse:  [] 63  Resp:  [11-29] 26  SpO2:  [94 %-99 %] 96 %  BP: ()/(0-76) 109/70     Weight: 74.3 kg (163 lb 12.8 oz)  Body mass index is 22.22 kg/m².      Intake/Output Summary (Last 24 hours) at 07/30/17 0723  Last data filed at 07/30/17 0100   Gross per 24 hour   Intake          1222.54 ml   Output             1165 ml   Net            57.54 ml       Hemodynamic Parameters:       Physical Exam    Constitutional: He appears well-developed and well-nourished.   HENT:   Head: Normocephalic and atraumatic.   Eyes: EOM are normal. Pupils are equal, round, and reactive to light.   Neck: Normal range of motion. Neck supple.   Cardiovascular: Regular rhythm.    Pulses:       Carotid pulses are 2+ on the right side, and 2+ on the left side.       Radial pulses are 2+ on the right side, and 2+ on the left side.        Femoral pulses are 2+ on the right side, and 2+ on the left side.       Popliteal pulses are 2+ on the right side, and 2+ on the left side.        Dorsalis pedis pulses are 2+ on the right side, and 2+ on the left side.        Posterior tibial pulses are 2+ on the right side, and 2+ on the left side.   VAD hum +   Pulmonary/Chest: Effort normal and breath sounds normal.   Abdominal: Soft. He exhibits no distension and no mass. There is tenderness.   Nursing note and vitals reviewed.      Significant Labs:  CBC:    Recent Labs  Lab 07/30/17  0350   WBC 6.95   RBC 3.84*   HGB 11.6*   HCT 35.0*   *   MCV 91   MCH 30.2   MCHC 33.1     BNP:    Recent Labs  Lab 07/29/17  0635   *     CMP:    Recent Labs  Lab 07/29/17  0635 07/29/17  1715   * 142*   CALCIUM 8.2* 9.6   ALBUMIN 2.8*  2.8*  --    PROT 5.6*  5.6*  --     137   K 3.4* 4.4   CO2 20* 25    106   BUN 24* 25*   CREATININE 1.3 1.4   ALKPHOS 66  66  --    ALT 11  11  --    AST 17  17  --    BILITOT 0.4  0.4  --       Coagulation:     Recent Labs  Lab 07/30/17  0350   INR 2.8*     LDH:    Recent Labs  Lab 07/29/17  0635 07/30/17  0350    161     Microbiology:  Microbiology Results (last 7 days)     Procedure Component Value Units Date/Time    Gram stain [[id number]] Collected:  07/29/17 1056    Order Status:  Completed Specimen:  Wound from Abdomen Updated:  07/30/17 0001     Gram Stain Result Few WBC's      Rare Gram negative rods    Blood culture #1 **CANNOT BE ORDERED STAT** [[id number]] Collected:  07/28/17  2042    Order Status:  Completed Specimen:  Blood from Peripheral, Forearm, Right Updated:  07/29/17 2212     Blood Culture, Routine No Growth to date     Blood Culture, Routine No Growth to date    Blood culture #2 **CANNOT BE ORDERED STAT** [[id number]] Collected:  07/28/17 2058    Order Status:  Completed Specimen:  Blood from Peripheral, Forearm, Left Updated:  07/29/17 2212     Blood Culture, Routine No Growth to date     Blood Culture, Routine No Growth to date    Urine culture [[id number]] Collected:  07/29/17 1111    Order Status:  Sent Specimen:  Urine from Urine, Catheterized Updated:  07/29/17 1210    Aerobic culture [[id number]] Collected:  07/29/17 1056    Order Status:  Sent Specimen:  Wound from Abdomen Updated:  07/29/17 1207    Culture, Anaerobe [[id number]] Collected:  07/29/17 1056    Order Status:  Sent Specimen:  Wound from Abdomen Updated:  07/29/17 1206    Urine culture [[id number]] Collected:  07/29/17 0553    Order Status:  Canceled Specimen:  Urine from Urine, Clean Catch Updated:  07/29/17 0554    Aerobic culture [[id number]]     Order Status:  No result Specimen:  Wound from Abdomen             Assessment and Plan:     Abdominal pain    -CT scan of the abdomen :Driveline in the subcutaneous tissue of the anterior abdominal wall with minimal surrounding soft tissue stranding without evidence of drainable fluid collections in this patient with 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  LVAD resulting in beam hardening artifact limiting the evaluation of the surrounding structures.   -ID obtained cultures from the DL; pending results  -recommended to continue vanco and cefepime        LVAD (left ventricular assist device) present    -VAD orders placed  -should have VAD interrogated this week  -no alarms so far  -continue bp control and warfarin; current INR therapeutic        Hypertensive urgency    -currently on cardene drip  -will try to wean off today  -continue amlodipine to 10- mg po daily  -will add  lisinopril 5 mg po daily  -continue in the unit until BP normalizes        * Complication involving left ventricular assist device (LVAD)    -please see alicia Khoury MD  Heart Transplant  [facility]

## 2017-07-30 NOTE — ASSESSMENT & PLAN NOTE
- No known history of parkinsons disease, experiencing myoclonic jerks/tremors (not observed by me)  - Currently not active as he should be given tremors and leg pain, very deconditioned  - Kal recs B12, B1, and RPR labs- ordered today   - ddx per neuro delirium / myoclonic jerks from infectious etiologies / metabolic >>> postoperative states >> Fluid and electrolyte disturbances / toxicity >>> cns pathology / seziures. Appreciate assistance.

## 2017-07-31 PROBLEM — Z74.09 IMPAIRED FUNCTIONAL MOBILITY AND ENDURANCE: Status: ACTIVE | Noted: 2017-07-31

## 2017-07-31 LAB
ALBUMIN SERPL BCP-MCNC: 2.8 G/DL
ALP SERPL-CCNC: 77 U/L
ALT SERPL W/O P-5'-P-CCNC: 13 U/L
ANION GAP SERPL CALC-SCNC: 6 MMOL/L
AST SERPL-CCNC: 19 U/L
BASOPHILS # BLD AUTO: 0.03 K/UL
BASOPHILS NFR BLD: 0.4 %
BILIRUB SERPL-MCNC: 0.6 MG/DL
BUN SERPL-MCNC: 17 MG/DL
CALCIUM SERPL-MCNC: 9.1 MG/DL
CHLORIDE SERPL-SCNC: 108 MMOL/L
CO2 SERPL-SCNC: 22 MMOL/L
CREAT SERPL-MCNC: 1.1 MG/DL
DIFFERENTIAL METHOD: ABNORMAL
EOSINOPHIL # BLD AUTO: 0.2 K/UL
EOSINOPHIL NFR BLD: 2.5 %
ERYTHROCYTE [DISTWIDTH] IN BLOOD BY AUTOMATED COUNT: 16.3 %
EST. GFR  (AFRICAN AMERICAN): >60 ML/MIN/1.73 M^2
EST. GFR  (NON AFRICAN AMERICAN): >60 ML/MIN/1.73 M^2
GLUCOSE SERPL-MCNC: 89 MG/DL
HCT VFR BLD AUTO: 34.5 %
HGB BLD-MCNC: 11.5 G/DL
INR PPP: 2.4
LDH SERPL L TO P-CCNC: 211 U/L
LYMPHOCYTES # BLD AUTO: 0.9 K/UL
LYMPHOCYTES NFR BLD: 11.1 %
MAGNESIUM SERPL-MCNC: 1.8 MG/DL
MCH RBC QN AUTO: 30.1 PG
MCHC RBC AUTO-ENTMCNC: 33.3 G/DL
MCV RBC AUTO: 90 FL
MONOCYTES # BLD AUTO: 1 K/UL
MONOCYTES NFR BLD: 12.1 %
NEUTROPHILS # BLD AUTO: 6 K/UL
NEUTROPHILS NFR BLD: 73.7 %
PHOSPHATE SERPL-MCNC: 2.8 MG/DL
PLATELET # BLD AUTO: 154 K/UL
PMV BLD AUTO: 11 FL
POTASSIUM SERPL-SCNC: 4 MMOL/L
PROT SERPL-MCNC: 6 G/DL
PROTHROMBIN TIME: 23.7 SEC
RBC # BLD AUTO: 3.82 M/UL
SODIUM SERPL-SCNC: 136 MMOL/L
VIT B12 SERPL-MCNC: 504 PG/ML
WBC # BLD AUTO: 8.11 K/UL

## 2017-07-31 PROCEDURE — 97116 GAIT TRAINING THERAPY: CPT

## 2017-07-31 PROCEDURE — 92523 SPEECH SOUND LANG COMPREHEN: CPT

## 2017-07-31 PROCEDURE — 25000003 PHARM REV CODE 250: Performed by: INTERNAL MEDICINE

## 2017-07-31 PROCEDURE — 85025 COMPLETE CBC W/AUTO DIFF WBC: CPT

## 2017-07-31 PROCEDURE — 85610 PROTHROMBIN TIME: CPT

## 2017-07-31 PROCEDURE — 63600175 PHARM REV CODE 636 W HCPCS: Performed by: INTERNAL MEDICINE

## 2017-07-31 PROCEDURE — A4216 STERILE WATER/SALINE, 10 ML: HCPCS | Performed by: INTERNAL MEDICINE

## 2017-07-31 PROCEDURE — 25000003 PHARM REV CODE 250: Performed by: STUDENT IN AN ORGANIZED HEALTH CARE EDUCATION/TRAINING PROGRAM

## 2017-07-31 PROCEDURE — 84100 ASSAY OF PHOSPHORUS: CPT

## 2017-07-31 PROCEDURE — 99222 1ST HOSP IP/OBS MODERATE 55: CPT | Mod: ,,, | Performed by: NURSE PRACTITIONER

## 2017-07-31 PROCEDURE — 94761 N-INVAS EAR/PLS OXIMETRY MLT: CPT

## 2017-07-31 PROCEDURE — 99223 1ST HOSP IP/OBS HIGH 75: CPT | Mod: ,,, | Performed by: NURSE PRACTITIONER

## 2017-07-31 PROCEDURE — 93750 INTERROGATION VAD IN PERSON: CPT | Performed by: INTERNAL MEDICINE

## 2017-07-31 PROCEDURE — 93750 INTERROGATION VAD IN PERSON: CPT | Mod: 77,,, | Performed by: INTERNAL MEDICINE

## 2017-07-31 PROCEDURE — 83735 ASSAY OF MAGNESIUM: CPT

## 2017-07-31 PROCEDURE — 80053 COMPREHEN METABOLIC PANEL: CPT

## 2017-07-31 PROCEDURE — 82607 VITAMIN B-12: CPT

## 2017-07-31 PROCEDURE — 86592 SYPHILIS TEST NON-TREP QUAL: CPT

## 2017-07-31 PROCEDURE — 93750 INTERROGATION VAD IN PERSON: CPT | Mod: GC,,, | Performed by: NURSE PRACTITIONER

## 2017-07-31 PROCEDURE — 97535 SELF CARE MNGMENT TRAINING: CPT

## 2017-07-31 PROCEDURE — 99233 SBSQ HOSP IP/OBS HIGH 50: CPT | Mod: ,,, | Performed by: PHYSICIAN ASSISTANT

## 2017-07-31 PROCEDURE — 83615 LACTATE (LD) (LDH) ENZYME: CPT

## 2017-07-31 PROCEDURE — 99232 SBSQ HOSP IP/OBS MODERATE 35: CPT | Mod: 25,,, | Performed by: INTERNAL MEDICINE

## 2017-07-31 PROCEDURE — 97530 THERAPEUTIC ACTIVITIES: CPT

## 2017-07-31 PROCEDURE — 27000248 HC VAD-ADDITIONAL DAY

## 2017-07-31 PROCEDURE — 20000000 HC ICU ROOM

## 2017-07-31 RX ORDER — MAGNESIUM SULFATE HEPTAHYDRATE 40 MG/ML
2 INJECTION, SOLUTION INTRAVENOUS ONCE
Status: COMPLETED | OUTPATIENT
Start: 2017-07-31 | End: 2017-07-31

## 2017-07-31 RX ORDER — LANOLIN ALCOHOL/MO/W.PET/CERES
400 CREAM (GRAM) TOPICAL 2 TIMES DAILY
Status: DISCONTINUED | OUTPATIENT
Start: 2017-08-01 | End: 2017-08-08 | Stop reason: HOSPADM

## 2017-07-31 RX ADMIN — ROPINIROLE 0.5 MG: 0.5 TABLET, FILM COATED ORAL at 09:07

## 2017-07-31 RX ADMIN — Medication 3 ML: at 09:07

## 2017-07-31 RX ADMIN — AMLODIPINE BESYLATE 10 MG: 10 TABLET ORAL at 09:07

## 2017-07-31 RX ADMIN — DOFETILIDE 250 MCG: 0.12 CAPSULE ORAL at 09:07

## 2017-07-31 RX ADMIN — ALLOPURINOL 300 MG: 300 TABLET ORAL at 09:07

## 2017-07-31 RX ADMIN — MAGNESIUM SULFATE IN WATER 2 G: 40 INJECTION, SOLUTION INTRAVENOUS at 09:07

## 2017-07-31 RX ADMIN — LISINOPRIL 5 MG: 5 TABLET ORAL at 09:07

## 2017-07-31 RX ADMIN — THERA TABS 1 TABLET: TAB at 09:07

## 2017-07-31 RX ADMIN — ROPINIROLE 0.5 MG: 0.5 TABLET, FILM COATED ORAL at 06:07

## 2017-07-31 RX ADMIN — CEFEPIME HYDROCHLORIDE 2 G: 2 INJECTION, SOLUTION INTRAVENOUS at 02:07

## 2017-07-31 RX ADMIN — WARFARIN SODIUM 2.5 MG: 2.5 TABLET ORAL at 05:07

## 2017-07-31 RX ADMIN — NICARDIPINE HYDROCHLORIDE 5 MG/HR: 0.2 INJECTION, SOLUTION INTRAVENOUS at 06:07

## 2017-07-31 RX ADMIN — NICARDIPINE HYDROCHLORIDE 5 MG/HR: 0.2 INJECTION, SOLUTION INTRAVENOUS at 09:07

## 2017-07-31 RX ADMIN — OYSTER SHELL CALCIUM WITH VITAMIN D 1 TABLET: 500; 200 TABLET, FILM COATED ORAL at 09:07

## 2017-07-31 RX ADMIN — FOLIC ACID 1 MG: 1 TABLET ORAL at 09:07

## 2017-07-31 RX ADMIN — ROPINIROLE 0.5 MG: 0.5 TABLET, FILM COATED ORAL at 02:07

## 2017-07-31 RX ADMIN — Medication 3 ML: at 02:07

## 2017-07-31 RX ADMIN — ASPIRIN 81 MG CHEWABLE TABLET 81 MG: 81 TABLET CHEWABLE at 09:07

## 2017-07-31 RX ADMIN — SPIRONOLACTONE 25 MG: 25 TABLET, FILM COATED ORAL at 09:07

## 2017-07-31 RX ADMIN — BUPROPION HYDROCHLORIDE 300 MG: 150 TABLET, FILM COATED, EXTENDED RELEASE ORAL at 09:07

## 2017-07-31 RX ADMIN — Medication 3 ML: at 06:07

## 2017-07-31 RX ADMIN — TAMSULOSIN HYDROCHLORIDE 0.4 MG: 0.4 CAPSULE ORAL at 09:07

## 2017-07-31 RX ADMIN — ATORVASTATIN CALCIUM 10 MG: 10 TABLET, FILM COATED ORAL at 09:07

## 2017-07-31 NOTE — CONSULTS
Consult Note  Cardiothoracic Surgery    Inpatient consult to Cardiothoracic Surgery  Consult performed by: KAYLEY CHARLES  Consult ordered by: ADRIAN KELLY        SUBJECTIVE:     History of Present Illness:  Patient is a 76 y.o. 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 When abx initially started last Tuesday, patient improved.  Abx switch to cipro on Monday.  Since then he has had increased mucopurulent drainage from driveline as well as worsening abdominal pain near exit site with conversely decreasing surrounding erythema. Denies symptoms of systemic infection. He has been taken off of many of his bp meds in the past due to profound hypotension and orthostasis.     Scheduled Meds:   allopurinol  300 mg Oral Daily    amlodipine  10 mg Oral Daily    aspirin  81 mg Oral Daily    atorvastatin  10 mg Oral Daily    buPROPion  300 mg Oral Daily    calcium-vitamin D3  1 tablet Oral BID    ceFEPime (MAXIPIME) IVPB  2 g Intravenous Q12H    dofetilide  250 mcg Oral Q12H    folic acid  1 mg Oral Daily    lisinopril  5 mg Oral BID    multivitamin  1 tablet Oral Daily    ropinirole  0.5 mg Oral TID    sodium chloride 0.9%  3 mL Intravenous Q8H    spironolactone  25 mg Oral Daily    tamsulosin  0.4 mg Oral Daily    warfarin  2.5 mg Oral Daily     Infusions/Drips:   nicardipine 5 mg/hr (07/31/17 0900)     PRN Meds:bisacodyl    Review of patient's allergies indicates:   Allergen Reactions    Sulfa (sulfonamide antibiotics)        Past Medical History:   Diagnosis Date    Acute on chronic systolic heart failure 7/27/2015    AICD (automatic cardioverter/defibrillator) present 2014    [employer]    CAD (coronary artery disease) 7/27/2015     CHF (congestive heart failure)     Gait instability     HTN (hypertension) 7/27/2015    ICD (implantable cardioverter-defibrillator), biventricular, in situ 7/27/2015    Kidney stones     HILLARY treated with BiPAP 7/27/2015    Peripheral neuropathy     Pulmonary hypertension due to left ventricular systolic dysfunction 7/29/2015    Restless leg syndrome 1992    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    Skin cancer     excision 2013    Sleep apnea 1992     Past Surgical History:   Procedure Laterality Date    CARDIAC PACEMAKER PLACEMENT      pacemaker previously, then AICD in 2006. AICD [employer] serial #[id number]    CORONARY ARTERY BYPASS GRAFT  1993    KNEE SURGERY Left 2001    complicated by infection, hardware had to be taken out and replaced    LEFT VENTRICULAR ASSIST DEVICE  10/19/2016     Family History   Problem Relation Age of Onset    Cancer Daughter 50     breast    Heart disease Daughter      MI x 3, CABG    Diabetes Daughter      Social History   Substance Use Topics    Smoking status: Never Smoker    Smokeless tobacco: Never Used    Alcohol use No        Review of Systems:  Constitutional: no fever or chills, pain well controlled, positive for near syncope and orthostatic  Respiratory: no cough or shortness of breath  Cardiovascular: no chest pain or palpitations  Gastrointestinal: no nausea or vomiting, tolerating diet  Integument/Breast: no rash or pruritis  Musculoskeletal: no arthralgias or myalgias  Neurological: no seizures or tremors  Behavioral/Psych: no auditory or visual hallucinations    OBJECTIVE:     Vital Signs (Most Recent)  Temp: 99 °F (37.2 °C) (07/31/17 0745)  Pulse: 64 (07/31/17 0900)  Resp: (!) 24 (07/31/17 0900)  BP: 98/70 (07/31/17 0800)  SpO2: 96 % (07/31/17 0900)    Admission Weight: 83.5 kg (184 lb) (07/28/17 2015)   Most Recent Weight: 74.3 kg (163 lb 12.8 oz) (07/29/17 1000)    Vital Signs Range (Last 24H):  Temp:  [98.4 °F (36.9 °C)-99 °F  (37.2 °C)]   Pulse:  [61-77]   Resp:  [14-40]   BP: ()/(0-74)   SpO2:  [90 %-100 %]     Physical Exam:  General: well developed, mild distress  Lungs:  clear to auscultation bilaterally and normal respiratory effort  Heart: regular rate and rhythm, S1, S2 normal, no murmur, VAD sounds smooth   Abdomen: soft nontender   Skin: Skin color, texture, turgor normal. No rashes or lesions  Neurologic: Alert and oriented. Thought content appropriate.     Laboratory:  CBC:   Recent Labs  Lab 07/31/17 0400   WBC 8.11   RBC 3.82*   HGB 11.5*   HCT 34.5*      MCV 90   MCH 30.1   MCHC 33.3     BMP:   Recent Labs  Lab 07/31/17 0400   GLU 89      K 4.0      CO2 22*   BUN 17   CREATININE 1.1   CALCIUM 9.1   MG 1.8     CMP:   Recent Labs  Lab 07/31/17 0400   GLU 89   CALCIUM 9.1   ALBUMIN 2.8*   PROT 6.0      K 4.0   CO2 22*      BUN 17   CREATININE 1.1   ALKPHOS 77   ALT 13   AST 19   BILITOT 0.6     Coagulation:   Recent Labs  Lab 07/31/17 0400   LABPROT 23.7*   INR 2.4*       Diagnostic Results:  X-Ray: Reviewed     CT reviewed: Driveline in the subcutaneous tissue of the anterior abdominal wall with minimal surrounding soft tissue stranding without evidence of drainable fluid collections in this patient with 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  LVAD resulting in beam hardening artifact limiting the evaluation of the surrounding structures.    ASSESSMENT/PLAN:   Patient is a 76 y.o. 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 Pt did have a near syncopal event while conducting consult while walking with PT no flow drops on VAD and VAD re interrogated. Multiple PI events noted.      Plan: Optimize medical management. The team will reviewed CT to evaluate drive line infection. Dr. Hemphill to staff

## 2017-07-31 NOTE — PROCEDURES
TXP LVAD INTERROGATIONS 7/31/2017 7/31/2017 7/31/2017 7/31/2017 7/31/2017 7/31/2017 7/31/2017   Type HeartMate3 HeartMate3 HeartMate3 HeartMate3 HeartMate3 HeartMate3 HeartMate3   Flow 5.1 5.3 5.2 5.4 5.1 5.1 5.2   Speed 5800 5800 5800 5800 5800 5800 5800   PI 2.1 2.3 3.2 3.2 3.2 3.0 3.2   Power (Mendez) 4.2 4.5 4.4 4.5 4.6 4.5 4.4   Pulsatility Intermittent pulse Intermittent pulse Intermittent pulse Intermittent pulse Pulse Pulse -     Pt asleep. Wife and sitter AAAO  HCT= 35  VAD sounds HM 3   No alarms noted in history.  Many PI events noted.   pulsatile

## 2017-07-31 NOTE — ASSESSMENT & PLAN NOTE
Recommendations  -  Encourage mobility, OOB in chair at least 3 hours per day, and early ambulation as appropriate  -  PT/OT evaluate and treat  -  Pain management  -  Monitor for and prevent skin breakdown and pressure ulcers  · Early mobility, repositioning/weight shifting every 20-30 minutes when sitting, turn patient every 2 hours, proper mattress/overlay and chair cushioning, pressure relief/heel protector boots  -  DVT prophylaxis:  Coumadin  -  Reviewed discharge options (IP rehab, SNF, HH therapy, and OP therapy)

## 2017-07-31 NOTE — CONSULTS
PM&R consult received.    Reason for consult:  assess rehabilitation needs    Reviewed patient history and current admission.  Rehab team following.  Full consult to follow.    SHIRA Plaza, FNP-C  Physical Medicine & Rehabilitation   07/31/2017  Spectralink: [id number]

## 2017-07-31 NOTE — HPI
Kev Vasquez is a 76-year-old male with PMHx of s/p CAGB X 5, CHF, AICD,  s/p LVAD (10/2016 at [facility] in [place]), ID, HTN, CKD II, sleep apnea, skin cancer, RLS, peripheral neuropathy, gait instability and chronic debility since LVAD.  He was recently diagnosed with a Pseudomonas infection to his LVAD driveline site and was started in antibiotics on 7/18.  Some improvement was noted and was switched to Cipro on 7/24. Patient presented to C on 7/28 with increased drainage, tenderness to site and worsening abdominal pain.  Blood cx and urine cx were negative. Driveline site gram stain resulted as GNRs.  He was started on Vancomycin and it was later changed to Cefepime. CT abd/pelvis was unremarkable for abscess collection. Neurology was consulted for tremors and recommended to continue Ropinirole.       Functional History: Patient lives in [place] with wife and children in a single story home with no steps to enter.  Of noted, he recently moved down from [place] 2 weeks ago.  Patient has a 24hr caregiver.  Prior to admission, he used a RW for household distances with CGA/SBA.  He required Ida for dressing and toileting and required set-upA for feedings.  DME: RW, shower chair, grab bar, and raised toilet.

## 2017-07-31 NOTE — ASSESSMENT & PLAN NOTE
- CT abdomen negative for acute processes  - gram stain w/ GNR  - ID following, appreciate recs. Obtained driveline cultures. Pt now on cefepime. Blood cultures from 7/28 NGTD  - follow up blood cultures

## 2017-07-31 NOTE — PT/OT/SLP PROGRESS
Physical Therapy  Treatment    Kev Vasquez   MRN: [id number]   Admitting Diagnosis: Complication involving left ventricular assist device (LVAD)    PT Received On: 07/31/17  PT Start Time: 1044     PT Stop Time: 1112    PT Total Time (min): 28 min       Billable Minutes:  Gait Training 10 mins and Therapeutic Activity 18 mins    Treatment Type: Treatment  PT/PTA: PT     PTA Visit Number: 0       General Precautions: Standard, fall, LVAD  Orthopedic Precautions: N/A   Braces: N/A    Do you have any cultural, spiritual, Spiritism conflicts, given your current situation?: none noted     Subjective:  Communicated with RN prior to session.  Pt and dtr agreeable to session    Pain/Comfort  Pain Rating 1: 0/10    Objective:   Patient found with: blood pressure cuff, pulse ox (continuous), telemetry, silverio catheter, peripheral IV    Functional Mobility:  Bed Mobility:   Scooting/Bridging: Minimum Assistance  Supine to Sit: Minimum Assistance  Sit to Supine: Maximum Assistance    Transfers:  Sit <> Stand Assistance: Moderate Assistance  Sit <> Stand Assistive Device:  (HHA)    Gait:   Gait Distance: 6 feet total (fwd/bkwd/sidesteps to L)  Assistance 1: Maximum assistance (with assist x2)  Gait Assistive Device: Hand held assist  Gait Pattern: 2-point gait  Gait Deviation(s): decreased william, increased time in double stance, decreased step length, decreased stride length, decreased swing-to-stance ratio, decreased toe-to-floor clearance, decreased weight-shifting ability, decreased velocity of limb motion, backward lean (ataxic, scissoring)    Balance:   Static Sit: FAIR+: Able to take MINIMAL challenges from all directions  Dynamic Sit: FAIR+: Maintains balance through MINIMAL excursions of active trunk motion  Static Stand: POOR: Needs MODERATE assist to maintain  Dynamic stand: 0: N/A     Therapeutic Activities and Exercises:  Pt is educated on upright posture with standing as pt demos posterior lean and increased heel  WBing. Pt is noted to have scissoring, ataxic gait, given verbal and tactile cues as well as increased posterior support to maintain upright position. Pt reports dizziness once standing EOB, PT instructed pt to return to sitting. Pt then noted to become unresponsive and placed back in bed with (Sophie Correa NP) present during this time and monitor reporting v-tach, RN to room. Pt noted to follow commands after returning to supine in bed.     AM-PAC 6 CLICK MOBILITY  How much help from another person does this patient currently need?   1 = Unable, Total/Dependent Assistance  2 = A lot, Maximum/Moderate Assistance  3 = A little, Minimum/Contact Guard/Supervision  4 = None, Modified [place]/Independent    Turning over in bed (including adjusting bedclothes, sheets and blankets)?: 2  Sitting down on and standing up from a chair with arms (e.g., wheelchair, bedside commode, etc.): 2  Moving from lying on back to sitting on the side of the bed?: 2  Moving to and from a bed to a chair (including a wheelchair)?: 2  Need to walk in hospital room?: 2  Climbing 3-5 steps with a railing?: 1  Total Score: 11    AM-PAC Raw Score CMS G-Code Modifier Level of Impairment Assistance   6 % Total / Unable   7 - 9 CM 80 - 100% Maximal Assist   10 - 14 CL 60 - 80% Moderate Assist   15 - 19 CK 40 - 60% Moderate Assist   20 - 22 CJ 20 - 40% Minimal Assist   23 CI 1-20% SBA / CGA   24 CH 0% Independent/ Mod I     Patient left supine with all lines intact, call button in reach and RN notified.    Assessment:  Kev Vasquez is a 76 y.o. male with a medical diagnosis of Complication involving left ventricular assist device (LVAD) and presents with deficits listed below. Pt will need skilled PT to address deficits and increase functional mobility as able.    Rehab identified problem list/impairments: Rehab identified problem list/impairments: weakness, impaired endurance, impaired functional mobilty, gait instability, impaired  balance, decreased lower extremity function, decreased upper extremity function, decreased coordination, decreased safety awareness, impaired cardiopulmonary response to activity    Rehab potential is good.    Activity tolerance: Fair    Discharge recommendations: Discharge Facility/Level Of Care Needs: rehabilitation facility     Barriers to discharge: Barriers to Discharge: None    Equipment recommendations: Equipment Needed After Discharge:  (TBD)     GOALS:    Physical Therapy Goals        Problem: Physical Therapy Goal    Goal Priority Disciplines Outcome Goal Variances Interventions   Physical Therapy Goal     PT/OT, PT Ongoing (interventions implemented as appropriate)     Description:  Goals to be met by: 17     Patient will increase functional independence with mobility by performin. Supine to sit with Moderate Assistance  2. Sit to supine with Moderate Assistance  3. Sit to stand transfer with Moderate Assistance  4. Bed to chair transfer with Moderate Assistance using Rolling Walker or appropriate AD  5. Gait  x 10 feet with Moderate Assistance using Rolling Walker or appropriate AD.   6. Stand for 2 minutes with Minimal Assistance using Rolling Walker or appropriate AD without LOB  7. Lower extremity exercise program x10 reps, with supervision, in order to increase LE strength and (I) with functional mobility.                       PLAN:    Patient to be seen 5 x/week  to address the above listed problems via gait training, therapeutic activities, neuromuscular re-education  Plan of Care expires: 17  Plan of Care reviewed with: patient, daughter         Cindy Green, PT  2017

## 2017-07-31 NOTE — SUBJECTIVE & OBJECTIVE
Past Medical History:   Diagnosis Date    Acute on chronic systolic heart failure 7/27/2015    AICD (automatic cardioverter/defibrillator) present 2014    [employer]    CAD (coronary artery disease) 7/27/2015    CHF (congestive heart failure)     Gait instability     HTN (hypertension) 7/27/2015    ICD (implantable cardioverter-defibrillator), biventricular, in situ 7/27/2015    Kidney stones     HILLARY treated with BiPAP 7/27/2015    Peripheral neuropathy     Pulmonary hypertension due to left ventricular systolic dysfunction 7/29/2015    Restless leg syndrome 1992    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    Skin cancer     excision 2013    Sleep apnea 1992     Past Surgical History:   Procedure Laterality Date    CARDIAC PACEMAKER PLACEMENT      pacemaker previously, then AICD in 2006. AICD [employer] serial #[id number]    CORONARY ARTERY BYPASS GRAFT  1993    KNEE SURGERY Left 2001    complicated by infection, hardware had to be taken out and replaced    LEFT VENTRICULAR ASSIST DEVICE  10/19/2016     Review of patient's allergies indicates:   Allergen Reactions    Sulfa (sulfonamide antibiotics)        Scheduled Medications:    allopurinol  300 mg Oral Daily    amlodipine  10 mg Oral Daily    aspirin  81 mg Oral Daily    atorvastatin  10 mg Oral Daily    buPROPion  300 mg Oral Daily    calcium-vitamin D3  1 tablet Oral BID    ceFEPime (MAXIPIME) IVPB  2 g Intravenous Q12H    dofetilide  250 mcg Oral Q12H    folic acid  1 mg Oral Daily    lisinopril  5 mg Oral BID    [START ON 8/1/2017] magnesium oxide  400 mg Oral BID    magnesium sulfate in water  2 g Intravenous Once    multivitamin  1 tablet Oral Daily    ropinirole  0.5 mg Oral TID    sodium chloride 0.9%  3 mL Intravenous Q8H    spironolactone  25 mg Oral Daily    tamsulosin  0.4 mg Oral Daily    warfarin  2.5 mg Oral Daily       PRN Medications: bisacodyl    Family History     Problem Relation (Age of Onset)     Cancer Daughter (50)    Diabetes Daughter    Heart disease Daughter        Social History Main Topics    Smoking status: Never Smoker    Smokeless tobacco: Never Used    Alcohol use No    Drug use: No    Sexual activity: Not on file      Comment:      Review of Systems   Constitutional: Negative for chills and fever.   HENT: Negative for trouble swallowing and voice change.    Eyes: Negative for photophobia and visual disturbance.   Respiratory: Negative for cough, shortness of breath and wheezing.    Cardiovascular: Negative for chest pain and palpitations.   Gastrointestinal: Negative for abdominal distention and nausea.   Genitourinary: Negative for difficulty urinating and flank pain.   Musculoskeletal: Positive for gait problem. Negative for arthralgias and myalgias.   Skin: Positive for wound (driveline site infection). Negative for color change.   Neurological: Positive for tremors and weakness. Negative for numbness and headaches.   Psychiatric/Behavioral: Negative for agitation and confusion.     Objective:     Vital Signs (Most Recent):  Temp: 99 °F (37.2 °C) (07/31/17 0745)  Pulse: 64 (07/31/17 0900)  Resp: (!) 24 (07/31/17 0900)  BP: (!) 82/0 (07/31/17 0830)  SpO2: 96 % (07/31/17 0900)    Vital Signs (24h Range):  Temp:  [98.4 °F (36.9 °C)-99 °F (37.2 °C)] 99 °F (37.2 °C)  Pulse:  [62-77] 64  Resp:  [14-40] 24  SpO2:  [90 %-100 %] 96 %  BP: ()/(0-74) 82/0     Body mass index is 22.22 kg/m².    Physical Exam   Constitutional: He is oriented to person, place, and time. He appears well-developed and well-nourished.   HENT:   Head: Normocephalic and atraumatic.   Eyes: EOM are normal. Pupils are equal, round, and reactive to light.   Neck: Normal range of motion.   Cardiovascular: Normal rate and regular rhythm.    Pulmonary/Chest: Effort normal. No respiratory distress.   LVAD noted    Abdominal: Soft. There is no tenderness.   Musculoskeletal: Normal range of motion.   Neurological: He is  alert and oriented to person, place, and time. He displays tremor. No sensory deficit. He exhibits normal muscle tone.   RUE: 5/5, 5/5 .  LUE: 5/5, 5/5 .  RLE: 4/5, DF 5/5, PF 5/5.  LLE: 5/5, DF 5/5, PF 5/5.  Generalized weakness noted through upper body.   Skin:   Bandage to driveline site C/D/I  Mild tenderness to site    Psychiatric: He has a normal mood and affect. His behavior is normal.     NEUROLOGICAL EXAMINATION:     MENTAL STATUS   Oriented to person, place, and time.     CRANIAL NERVES     CN III, IV, VI   Pupils are equal, round, and reactive to light.  Extraocular motions are normal.       Diagnostic Results:   Labs: Reviewed  CT: Reviewed

## 2017-07-31 NOTE — PLAN OF CARE
Problem: Patient Care Overview  Goal: Plan of Care Review  Outcome: Ongoing (interventions implemented as appropriate)  Shift free of acute events. Unable to wean Cardene to maintain MAPs < 80. Patient is intermittently disoriented. POC reviewed with patient at bedside - questions and concerns addressed.

## 2017-07-31 NOTE — SUBJECTIVE & OBJECTIVE
Interval History:   NAEON. Denies having any fever,chills,n/v/d. Abdominal pain stable. Mental status unchanged from yesterday. Will follow closely.     Review of Systems   Constitutional: Negative for chills and fever.   HENT: Negative for congestion.    Respiratory: Negative for cough and shortness of breath.    Cardiovascular: Negative for chest pain.   Gastrointestinal: Positive for abdominal pain ( improved). Negative for diarrhea, nausea and vomiting.   Genitourinary: Negative for dysuria.   Skin: Positive for wound.   Neurological: Negative for dizziness and headaches.     Objective:     Vital Signs (Most Recent):  Temp: 99 °F (37.2 °C) (07/31/17 0745)  Pulse: 64 (07/31/17 1000)  Resp: (!) 30 (07/31/17 1000)  BP: (!) 93/51 (07/31/17 1000)  SpO2: (!) 93 % (07/31/17 1000) Vital Signs (24h Range):  Temp:  [98.4 °F (36.9 °C)-99 °F (37.2 °C)] 99 °F (37.2 °C)  Pulse:  [62-77] 64  Resp:  [14-40] 30  SpO2:  [90 %-100 %] 93 %  BP: ()/(0-74) 93/51     Weight: 74.3 kg (163 lb 12.8 oz)  Body mass index is 22.22 kg/m².    Estimated Creatinine Clearance: 61.9 mL/min (based on Cr of 1.1).    Physical Exam   Constitutional: He appears well-developed and well-nourished. No distress.   HENT:   Head: Normocephalic.   Eyes: Pupils are equal, round, and reactive to light.   Cardiovascular: Normal rate, regular rhythm and normal heart sounds.    No murmur heard.  LVAD Hum   Pulmonary/Chest: Effort normal. No respiratory distress. He has no wheezes. He has no rales.   Abdominal: Soft. He exhibits no distension.   driveline dressed at this time. No strikethrough noted   Musculoskeletal: Normal range of motion.   Neurological: He is alert.   Skin: Skin is warm and dry. No rash noted. He is not diaphoretic. No erythema.   Psychiatric: He has a normal mood and affect.   Nursing note and vitals reviewed.      Significant Labs:   Blood Culture:   Recent Labs  Lab 07/28/17 2042 07/28/17 2058   LABBLOO No Growth to date  No Growth  to date  No Growth to date No Growth to date  No Growth to date  No Growth to date     CBC:   Recent Labs  Lab 07/30/17  0350 07/31/17  0400   WBC 6.95 8.11   HGB 11.6* 11.5*   HCT 35.0* 34.5*   * 154     CMP:   Recent Labs  Lab 07/29/17  1715 07/30/17  0745 07/31/17  0400    136 136   K 4.4 4.1 4.0    105 108   CO2 25 22* 22*   * 113* 89   BUN 25* 19 17   CREATININE 1.4 1.3 1.1   CALCIUM 9.6 9.4 9.1   PROT  --  6.5 6.0   ALBUMIN  --  3.0* 2.8*   BILITOT  --  0.6 0.6   ALKPHOS  --  81 77   AST  --  19 19   ALT  --  14 13   ANIONGAP 6* 9 6*   EGFRNONAA 49.1* 53.8* >60.0     Wound Culture:   Recent Labs  Lab 07/20/17  1604 07/29/17  1056   LABAERO PSEUDOMONAS AERUGINOSAMany GRAM NEGATIVE IWONA, NON-LACTOSE FERMENTERFewIdentification and susceptibility pending     All pertinent labs within the past 24 hours have been reviewed.    Significant Imaging: I have reviewed all pertinent imaging results/findings within the past 24 hours.

## 2017-07-31 NOTE — SUBJECTIVE & OBJECTIVE
Interval History: No complaints overnight. States he feels fine, daughter in room. States delirium has improved, AAO. Denies chest pain, SOB, NVD.     Continuous Infusions:   nicardipine Stopped (07/31/17 0939)     Scheduled Meds:   allopurinol  300 mg Oral Daily    amlodipine  10 mg Oral Daily    aspirin  81 mg Oral Daily    atorvastatin  10 mg Oral Daily    buPROPion  300 mg Oral Daily    calcium-vitamin D3  1 tablet Oral BID    ceFEPime (MAXIPIME) IVPB  2 g Intravenous Q12H    dofetilide  250 mcg Oral Q12H    folic acid  1 mg Oral Daily    lisinopril  5 mg Oral BID    [START ON 8/1/2017] magnesium oxide  400 mg Oral BID    multivitamin  1 tablet Oral Daily    ropinirole  0.5 mg Oral TID    sodium chloride 0.9%  3 mL Intravenous Q8H    spironolactone  25 mg Oral Daily    tamsulosin  0.4 mg Oral Daily    warfarin  2.5 mg Oral Daily     PRN Meds:bisacodyl    Review of patient's allergies indicates:   Allergen Reactions    Sulfa (sulfonamide antibiotics)      Objective:     Vital Signs (Most Recent):  Temp: 98.9 °F (37.2 °C) (07/31/17 1101)  Pulse: 60 (07/31/17 1230)  Resp: (!) 9 (07/31/17 1230)  BP: 102/77 (07/31/17 1230)  SpO2: 100 % (07/31/17 1230) Vital Signs (24h Range):  Temp:  [98.4 °F (36.9 °C)-99 °F (37.2 °C)] 98.9 °F (37.2 °C)  Pulse:  [60-77] 60  Resp:  [9-40] 9  SpO2:  [90 %-100 %] 100 %  BP: ()/(0-77) 102/77     Weight: 74.3 kg (163 lb 12.8 oz)  Body mass index is 22.22 kg/m².      Intake/Output Summary (Last 24 hours) at 07/31/17 1314  Last data filed at 07/31/17 1200   Gross per 24 hour   Intake           696.25 ml   Output             2200 ml   Net         -1503.75 ml       Hemodynamic Parameters:    Telemetry: No events overnight, paced rhythm    Physical Exam   Constitutional: He is oriented to person, place, and time. He appears well-developed and well-nourished. No distress.   HENT:   Head: Normocephalic and atraumatic.   Eyes: Right eye exhibits no discharge. Left eye  exhibits no discharge.   Neck: Normal range of motion. Neck supple. No JVD present.   Cardiovascular: Normal rate and regular rhythm.    VAD hum smooth   Pulmonary/Chest: Effort normal and breath sounds normal. No respiratory distress. He has no wheezes.   Abdominal: Soft. Bowel sounds are normal.   Musculoskeletal: Normal range of motion.   Neurological: He is alert and oriented to person, place, and time.   Skin: Skin is warm and dry. Capillary refill takes less than 2 seconds. He is not diaphoretic.   Psychiatric: He has a normal mood and affect. His behavior is normal. Judgment and thought content normal.   Vitals reviewed.      Significant Labs:  CBC:    Recent Labs  Lab 07/31/17  0400   WBC 8.11   RBC 3.82*   HGB 11.5*   HCT 34.5*      MCV 90   MCH 30.1   MCHC 33.3     BNP:    Recent Labs  Lab 07/29/17  0635   *     CMP:    Recent Labs  Lab 07/31/17  0400   GLU 89   CALCIUM 9.1   ALBUMIN 2.8*   PROT 6.0      K 4.0   CO2 22*      BUN 17   CREATININE 1.1   ALKPHOS 77   ALT 13   AST 19   BILITOT 0.6      Coagulation:     Recent Labs  Lab 07/31/17  0400   INR 2.4*     LDH:    Recent Labs  Lab 07/29/17  0635 07/30/17  0350 07/31/17  0400    161 211     Microbiology:  Microbiology Results (last 7 days)     Procedure Component Value Units Date/Time    Aerobic culture [[id number]] Collected:  07/29/17 1056    Order Status:  Completed Specimen:  Wound from Abdomen Updated:  07/31/17 0737     Aerobic Bacterial Culture --     GRAM NEGATIVE IWONA, NON-LACTOSE   Few  Identification and susceptibility pending      Blood culture #1 **CANNOT BE ORDERED STAT** [[id number]] Collected:  07/28/17 2042    Order Status:  Completed Specimen:  Blood from Peripheral, Forearm, Right Updated:  07/30/17 2212     Blood Culture, Routine No Growth to date     Blood Culture, Routine No Growth to date     Blood Culture, Routine No Growth to date    Blood culture #2 **CANNOT BE ORDERED STAT** [[id number]]  Collected:  07/28/17 2058    Order Status:  Completed Specimen:  Blood from Peripheral, Forearm, Left Updated:  07/30/17 2212     Blood Culture, Routine No Growth to date     Blood Culture, Routine No Growth to date     Blood Culture, Routine No Growth to date    Urine culture [[id number]] Collected:  07/29/17 1111    Order Status:  Completed Specimen:  Urine from Urine, Catheterized Updated:  07/30/17 1956     Urine Culture, Routine No growth    Gram stain [[id number]] Collected:  07/29/17 1056    Order Status:  Completed Specimen:  Wound from Abdomen Updated:  07/30/17 0001     Gram Stain Result Few WBC's      Rare Gram negative rods    Culture, Anaerobe [[id number]] Collected:  07/29/17 1056    Order Status:  Sent Specimen:  Wound from Abdomen Updated:  07/29/17 1206    Urine culture [[id number]] Collected:  07/29/17 0553    Order Status:  Canceled Specimen:  Urine from Urine, Clean Catch Updated:  07/29/17 0554    Aerobic culture [[id number]]     Order Status:  No result Specimen:  Wound from Abdomen           I have reviewed all pertinent labs within the past 24 hours.    Estimated Creatinine Clearance: 61.9 mL/min (based on Cr of 1.1).    Diagnostic Results:  I have reviewed all pertinent imaging results/findings within the past 24 hours.

## 2017-07-31 NOTE — PLAN OF CARE
Problem: Patient Care Overview  Goal: Plan of Care Review  Outcome: Ongoing (interventions implemented as appropriate)  No acute events. Unable to wean Cardene to maintain MAP <80. Patient intermittently confused. VAD without complications and alarms. VAD dressing changed, site remains red and tender with creamy discharge present. Plan of care reviewed with patient. All questions and concerns addressed. Will continue to monitor.

## 2017-07-31 NOTE — PLAN OF CARE
Problem: Occupational Therapy Goal  Goal: Occupational Therapy Goal  Goals to be met by:  2 weeks 8/12/17     Patient will increase functional independence with ADLs by performing:    Feeding with Supervision.  UE Dressing with Minimal Assistance.  LE Dressing with Minimal Assistance.  Grooming while seated with Supervision.  Toileting from bedside commode with Minimal Assistance for hygiene and clothing management.   Stand pivot transfers with Minimal Assistance.  Toilet transfer to bedside commode with Minimal Assistance.     Outcome: Ongoing (interventions implemented as appropriate)  Continue OT plan of care, Pt making good progress with OT goals and outcomes

## 2017-07-31 NOTE — PT/OT/SLP EVAL
Speech Language Pathology Evaluation    Kev Vasquez   MRN: [id number]   Admitting Diagnosis: Complication involving left ventricular assist device (LVAD)    Diet recommendations: Solid Diet Level: Regular  Liquid Diet Level: Thin general aspiration precautions    SLP Treatment Date: 07/31/17  Speech Start Time: 0925     Speech Stop Time: 0947     Speech Total (min): 22 min       TREATMENT BILLABLE MINUTES:  Eval 22     Diagnosis: Complication involving left ventricular assist device (LVAD)      Past Medical History:   Diagnosis Date    Acute on chronic systolic heart failure 7/27/2015    AICD (automatic cardioverter/defibrillator) present 2014    [employer]    CAD (coronary artery disease) 7/27/2015    CHF (congestive heart failure)     Gait instability     HTN (hypertension) 7/27/2015    ICD (implantable cardioverter-defibrillator), biventricular, in situ 7/27/2015    Kidney stones     HILLARY treated with BiPAP 7/27/2015    Peripheral neuropathy     Pulmonary hypertension due to left ventricular systolic dysfunction 7/29/2015    Restless leg syndrome 1992    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    Skin cancer     excision 2013    Sleep apnea 1992     Past Surgical History:   Procedure Laterality Date    CARDIAC PACEMAKER PLACEMENT      pacemaker previously, then AICD in 2006. AICD [employer] serial #[id number]    CORONARY ARTERY BYPASS GRAFT  1993    KNEE SURGERY Left 2001    complicated by infection, hardware had to be taken out and replaced    LEFT VENTRICULAR ASSIST DEVICE  10/19/2016       Has the patient been evaluated by SLP for swallowing? : Yes  Keep patient NPO?: No   General Precautions: Standard, fall, LVAD          Social Hx: Patient lives with spouse.    Occupational/hobbies/homemaking: Pt states he was still driving PTA. Did not obtain information regarding independence with managing LVAD, ADL's, managing medications, and finances.    Subjective:  Pt pleasant and  cooperative. Making jokes when responding to assessment questions at times, perhaps to mask difficulty or delay responses.    Pain/Comfort  Pain Rating 1: 0/10     Objective:   Patient found with: blood pressure cuff, pulse ox (continuous), telemetry, silverio catheter, peripheral IV    Oral Musculature Evaluation  Oral Musculature: WFL  Dentition:  (missing some molars)  Mucosal Quality: good  Mandibular Strength and Mobility: WFL  Oral Labial Strength and Mobility: WFL  Lingual Strength and Mobility: WFL  Velar Elevation: WFL  Buccal Strength and Mobility: WFL  Volitional Cough: good in strength  Volitional Swallow: elicited  Voice Prior to PO Intake: dry, clear     Cognitive Status:  Behavioral Observations: alert and cooperative-  Memory and Orientation: Pt was oriented to month given supervision, year and place ind'ly (delay for recalling place), but not to situation.  Recall of general information was 70% ind'ly/80% given cues.  Pt immediately recalled up to 7 digits in a series. Following a 3 minute delay, pt recalled 2/3 unrelated words ind'ly and 3/3 given cues.   Attention:  fair  Problem Solving: Responses to simple problem solving q's were accurate without A.  Supervision needed for some moderate level q's. Pt generated multiple causes for a hypothetical situation ind'ly.  Pragmatics: [facility]  Executive Function: insight/awareness    Language: delayed    Auditory Comprehension: answers yes/no questions complex; pt was unable to follow a 3-step command despite cues and multiple reps    Verbal Expression: naming delay in recalling lower frequency named items. Able to communicate basic wants/needs and some more complex information    Motor Speech: no motor speech deficits evident    Voice: wfl    Reading: not yet assessed    Writing: not yet assessed    Visual-Spatial: not yet assessed      Assessment:  Kev Vasquez is a 76 y.o. male with a SLP diagnosis of Cognitive-Linguistic Impairment.        Discharge  recommendations: Discharge Facility/Level Of Care Needs: rehabilitation facility     Goals:    SLP Goals        Problem: SLP Goal    Goal Priority Disciplines Outcome   SLP Goal     SLP    Description:  Speech Language Pathology Goals  Goals expected to be met by 8/7:  1. Pt will orient x 4 given min cues.  2. Pt will recall general information with 80% accuracy given min cues.  3. Following a 3 minute delay, pt will recall 3/3 unrelated words given modified independence.  4. Pt will follow complex commands with 70% accuracy given mod cues.  5. Pt will complete problem solving tasks with 80% accuracy given min cues.  6. Pt will participate in ongoing evaluation of word fluency, categorization, reading, writing, visual spatial, and math/time/money management abilities.                         Plan:   Patient to be seen Therapy Frequency: 5 x/week   Plan of Care expires: 08/29/17  Plan of Care reviewed with: patient, daughter  SLP Follow-up?: Yes              SANTOSH Ames, CCC-SLP  07/31/2017    SANTOSH Ames, CCC-SLP  Speech Language Pathologist  [phone]  7/31/2017

## 2017-07-31 NOTE — ASSESSMENT & PLAN NOTE
- HM III implanted at [facility] in [place]   - VAD orders placed  - No alarms or events noted on interrogation  - continue bp control and warfarin; current INR therapeutic @ 2.4 today   - continue coumadin 2.5 QD

## 2017-07-31 NOTE — PROGRESS NOTES
[facility]  Heart Transplant  Progress Note    Patient Name: Kev Vasquez  MRN: [id number]  Admission Date: 7/28/2017  Hospital Length of Stay: 3 days  Attending Physician: Carlito Santana MD  Primary Care Provider: Mega Collins MD  Principal Problem:Complication involving left ventricular assist device (LVAD)    Subjective:     Interval History: No complaints overnight. States he feels fine, daughter in room. States delirium has improved, AAO. Denies chest pain, SOB, NVD.     Continuous Infusions:   nicardipine Stopped (07/31/17 0939)     Scheduled Meds:   allopurinol  300 mg Oral Daily    amlodipine  10 mg Oral Daily    aspirin  81 mg Oral Daily    atorvastatin  10 mg Oral Daily    buPROPion  300 mg Oral Daily    calcium-vitamin D3  1 tablet Oral BID    ceFEPime (MAXIPIME) IVPB  2 g Intravenous Q12H    dofetilide  250 mcg Oral Q12H    folic acid  1 mg Oral Daily    lisinopril  5 mg Oral BID    [START ON 8/1/2017] magnesium oxide  400 mg Oral BID    multivitamin  1 tablet Oral Daily    ropinirole  0.5 mg Oral TID    sodium chloride 0.9%  3 mL Intravenous Q8H    spironolactone  25 mg Oral Daily    tamsulosin  0.4 mg Oral Daily    warfarin  2.5 mg Oral Daily     PRN Meds:bisacodyl    Review of patient's allergies indicates:   Allergen Reactions    Sulfa (sulfonamide antibiotics)      Objective:     Vital Signs (Most Recent):  Temp: 98.9 °F (37.2 °C) (07/31/17 1101)  Pulse: 60 (07/31/17 1230)  Resp: (!) 9 (07/31/17 1230)  BP: 102/77 (07/31/17 1230)  SpO2: 100 % (07/31/17 1230) Vital Signs (24h Range):  Temp:  [98.4 °F (36.9 °C)-99 °F (37.2 °C)] 98.9 °F (37.2 °C)  Pulse:  [60-77] 60  Resp:  [9-40] 9  SpO2:  [90 %-100 %] 100 %  BP: ()/(0-77) 102/77     Weight: 74.3 kg (163 lb 12.8 oz)  Body mass index is 22.22 kg/m².      Intake/Output Summary (Last 24 hours) at 07/31/17 1314  Last data filed at 07/31/17 1200   Gross per 24 hour   Intake           696.25 ml   Output              2200 ml   Net         -1503.75 ml       Hemodynamic Parameters:    Telemetry: No events overnight, paced rhythm    Physical Exam   Constitutional: He is oriented to person, place, and time. He appears well-developed and well-nourished. No distress.   HENT:   Head: Normocephalic and atraumatic.   Eyes: Right eye exhibits no discharge. Left eye exhibits no discharge.   Neck: Normal range of motion. Neck supple. No JVD present.   Cardiovascular: Normal rate and regular rhythm.    VAD hum smooth   Pulmonary/Chest: Effort normal and breath sounds normal. No respiratory distress. He has no wheezes.   Abdominal: Soft. Bowel sounds are normal.   Musculoskeletal: Normal range of motion.   Neurological: He is alert and oriented to person, place, and time.   Skin: Skin is warm and dry. Capillary refill takes less than 2 seconds. He is not diaphoretic.   Psychiatric: He has a normal mood and affect. His behavior is normal. Judgment and thought content normal.   Vitals reviewed.      Significant Labs:  CBC:    Recent Labs  Lab 07/31/17  0400   WBC 8.11   RBC 3.82*   HGB 11.5*   HCT 34.5*      MCV 90   MCH 30.1   MCHC 33.3     BNP:    Recent Labs  Lab 07/29/17  0635   *     CMP:    Recent Labs  Lab 07/31/17  0400   GLU 89   CALCIUM 9.1   ALBUMIN 2.8*   PROT 6.0      K 4.0   CO2 22*      BUN 17   CREATININE 1.1   ALKPHOS 77   ALT 13   AST 19   BILITOT 0.6      Coagulation:     Recent Labs  Lab 07/31/17  0400   INR 2.4*     LDH:    Recent Labs  Lab 07/29/17  0635 07/30/17  0350 07/31/17  0400    161 211     Microbiology:  Microbiology Results (last 7 days)     Procedure Component Value Units Date/Time    Aerobic culture [[id number]] Collected:  07/29/17 1056    Order Status:  Completed Specimen:  Wound from Abdomen Updated:  07/31/17 0737     Aerobic Bacterial Culture --     GRAM NEGATIVE IWONA, NON-LACTOSE   Few  Identification and susceptibility pending      Blood culture #1 **CANNOT BE  ORDERED STAT** [[id number]] Collected:  07/28/17 2042    Order Status:  Completed Specimen:  Blood from Peripheral, Forearm, Right Updated:  07/30/17 2212     Blood Culture, Routine No Growth to date     Blood Culture, Routine No Growth to date     Blood Culture, Routine No Growth to date    Blood culture #2 **CANNOT BE ORDERED STAT** [[id number]] Collected:  07/28/17 2058    Order Status:  Completed Specimen:  Blood from Peripheral, Forearm, Left Updated:  07/30/17 2212     Blood Culture, Routine No Growth to date     Blood Culture, Routine No Growth to date     Blood Culture, Routine No Growth to date    Urine culture [[id number]] Collected:  07/29/17 1111    Order Status:  Completed Specimen:  Urine from Urine, Catheterized Updated:  07/30/17 1956     Urine Culture, Routine No growth    Gram stain [[id number]] Collected:  07/29/17 1056    Order Status:  Completed Specimen:  Wound from Abdomen Updated:  07/30/17 0001     Gram Stain Result Few WBC's      Rare Gram negative rods    Culture, Anaerobe [[id number]] Collected:  07/29/17 1056    Order Status:  Sent Specimen:  Wound from Abdomen Updated:  07/29/17 1206    Urine culture [[id number]] Collected:  07/29/17 0553    Order Status:  Canceled Specimen:  Urine from Urine, Clean Catch Updated:  07/29/17 0554    Aerobic culture [[id number]]     Order Status:  No result Specimen:  Wound from Abdomen           I have reviewed all pertinent labs within the past 24 hours.    Estimated Creatinine Clearance: 61.9 mL/min (based on Cr of 1.1).    Diagnostic Results:  I have reviewed all pertinent imaging results/findings within the past 24 hours.    Assessment and Plan:     Impaired functional mobility and endurance    - consulted PT/OT, physical medicine        Restless leg syndrome    - continue ropinirole        Involuntary movements    - No known history of parkinsons disease, experiencing myoclonic jerks/tremors (not observed by me)  - Currently not active as he should be  given tremors and leg pain, very deconditioned  - Kal recs B12, B1, and RPR labs- ordered today   - ddx per neuro delirium / myoclonic jerks from infectious etiologies / metabolic >>> postoperative states >> Fluid and electrolyte disturbances / toxicity >>> cns pathology / seziures. Appreciate assistance.         Abdominal pain    - CT abdomen negative for acute processes  - gram stain w/ GNR  - ID following, appreciate recs. Obtained driveline cultures. Pt now on cefepime. Blood cultures from 7/28 NGTD  - follow up blood cultures        LVAD (left ventricular assist device) present    - HM III implanted at [facility]   - VAD orders placed  - No alarms or events noted on interrogation  - continue bp control and warfarin; current INR therapeutic @ 2.4 today   - continue coumadin 2.5 QD        Hypertensive urgency    - cardene drip weaned off today. Parameters changed to wean for MAP < 90.   - Still awaiting all outside records from [place] [facility]  - increased amlodipine to 10- mg po daily, now on 5 BID of lisinopril. Will not up titrate ACE due to patient experiencing orthostasis during PT.   - Transfer out of the CMICU if BP remains stable        * Complication involving left ventricular assist device (LVAD)    -please see above            Elena Raman PA-C  Heart Transplant  [facility]

## 2017-07-31 NOTE — CONSULTS
[facility]  Physical Medicine & Rehab  Consult Note    Patient Name: Kev Vasquez  MRN: [id number]  Admission Date: 7/28/2017  Hospital Length of Stay: 3 days  Attending Physician: Carlito Santana MD   Collaborating Physician: Rashad Garcia MD    Inpatient consult to Physical Medicine & Rehabilitation  Consult requested by:  Carlito Santana MD    Reason for Consult: assess rehabilitation needs    Consults  Subjective:     Principal Problem: Complication involving left ventricular assist device (LVAD)    HPI: Kev Vasquez is a 76-year-old male with PMHx of s/p CAGB X 5, CHF, AICD,  s/p LVAD (10/2016 at [facility]), HTN, CKD II, sleep apnea, skin cancer, RLS, peripheral neuropathy, gait instability and chronic debility since LVAD.  He was recently diagnosed with a Pseudomonas infection to his LVAD driveline site and was started in antibiotics on 7/18.  Some improvement was noted and was switched to Cipro on 7/24. Patient presented to C on 7/28 with increased drainage, tenderness to site and worsening abdominal pain.  Blood cx and urine cx were negative. Driveline site gram stain resulted as GNRs.  He was started on Vancomycin and it was later changed to Cefepime (end date 9/23/17).  CT abd/pelvis was unremarkable for abscess collection. Neurology was consulted for tremors and recommended to continue home Ropinirole.       Functional History: Patient lives in [place] with wife and daughter in a single story home with no steps to enter.  Of note, he recently moved down from [place] 2 weeks ago.  Patient has a 24hr caregiver.  Prior to admission, he used a RW for household distances with CGA/SBA.  He required Ida for dressing and toileting and required set-upA for feedings.  DME: RW, shower chair, grab bar, and raised toilet.     Patient had an IRP stay for 2 weeks Nov 2016-Dec 2016 s/p LVAD placement at [facility].     Hospital Course:   7/29/17:  Evaluated by  therapy.  Bed mobility totalA.  Sit to stand totalA.  Ambulated 4 lateral step totalA.  UBD totalA and LBD totalA.    AM-PAC 6 CLICK MOBILITY (PT) - Total score: 10 (7/30)  AM-PAC 6 CLICK ADL (OT) - Total score: 6 (7/30)    AM-PAC Raw Score Level of Impairment Assistance   6 100% Total / Unable   7 - 8 80 - 100% Maximal Assist   9-13 60 - 80% Moderate Assist   14 - 19 40 - 60% Moderate Assist   20 - 22 20 - 40% Minimal Assist   23 1-20% SBA / CGA   24 0% Independent/ Mod I     Past Medical History:   Diagnosis Date    Acute on chronic systolic heart failure 7/27/2015    AICD (automatic cardioverter/defibrillator) present 2014    [employer]    CAD (coronary artery disease) 7/27/2015    CHF (congestive heart failure)     Gait instability     HTN (hypertension) 7/27/2015    ICD (implantable cardioverter-defibrillator), biventricular, in situ 7/27/2015    Kidney stones     HILLARY treated with BiPAP 7/27/2015    Peripheral neuropathy     Pulmonary hypertension due to left ventricular systolic dysfunction 7/29/2015    Restless leg syndrome 1992    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    Skin cancer     excision 2013    Sleep apnea 1992     Past Surgical History:   Procedure Laterality Date    CARDIAC PACEMAKER PLACEMENT      pacemaker previously, then AICD in 2006. AICD [employer] serial #[id number]    CORONARY ARTERY BYPASS GRAFT  1993    KNEE SURGERY Left 2001    complicated by infection, hardware had to be taken out and replaced    LEFT VENTRICULAR ASSIST DEVICE  10/19/2016     Review of patient's allergies indicates:   Allergen Reactions    Sulfa (sulfonamide antibiotics)        Scheduled Medications:    allopurinol  300 mg Oral Daily    amlodipine  10 mg Oral Daily    aspirin  81 mg Oral Daily    atorvastatin  10 mg Oral Daily    buPROPion  300 mg Oral Daily    calcium-vitamin D3  1 tablet Oral BID    ceFEPime (MAXIPIME) IVPB  2 g Intravenous Q12H    dofetilide  250 mcg Oral Q12H     folic acid  1 mg Oral Daily    lisinopril  5 mg Oral BID    [START ON 8/1/2017] magnesium oxide  400 mg Oral BID    magnesium sulfate in water  2 g Intravenous Once    multivitamin  1 tablet Oral Daily    ropinirole  0.5 mg Oral TID    sodium chloride 0.9%  3 mL Intravenous Q8H    spironolactone  25 mg Oral Daily    tamsulosin  0.4 mg Oral Daily    warfarin  2.5 mg Oral Daily       PRN Medications: bisacodyl    Family History     Problem Relation (Age of Onset)    Cancer Daughter (50)    Diabetes Daughter    Heart disease Daughter        Social History Main Topics    Smoking status: Never Smoker    Smokeless tobacco: Never Used    Alcohol use No    Drug use: No    Sexual activity: Not on file      Comment:      Review of Systems   Constitutional: Negative for chills and fever.   HENT: Negative for trouble swallowing and voice change.    Eyes: Negative for photophobia and visual disturbance.   Respiratory: Negative for cough, shortness of breath and wheezing.    Cardiovascular: Negative for chest pain and palpitations.   Gastrointestinal: Negative for abdominal distention and nausea.   Genitourinary: Negative for difficulty urinating and flank pain.   Musculoskeletal: Positive for gait problem. Negative for arthralgias and myalgias.   Skin: Positive for wound (driveline site infection). Negative for color change.   Neurological: Positive for tremors and weakness. Negative for numbness and headaches.   Psychiatric/Behavioral: Negative for agitation, positive for confusion.     Objective:     Vital Signs (Most Recent):  Temp: 99 °F (37.2 °C) (07/31/17 0745)  Pulse: 64 (07/31/17 0900)  Resp: (!) 24 (07/31/17 0900)  BP: (!) 82/0 (07/31/17 0830)  SpO2: 96 % (07/31/17 0900)    Vital Signs (24h Range):  Temp:  [98.4 °F (36.9 °C)-99 °F (37.2 °C)] 99 °F (37.2 °C)  Pulse:  [62-77] 64  Resp:  [14-40] 24  SpO2:  [90 %-100 %] 96 %  BP: ()/(0-74) 82/0     Body mass index is 22.22 kg/m².    Physical  Exam   Constitutional: He is oriented to person, place, and time. He appears well-developed and well-nourished.   HENT:   Head: Normocephalic and atraumatic.   Eyes: EOM are normal. Pupils are equal, round, and reactive to light.   Neck: Normal range of motion.   Cardiovascular: Normal rate and regular rhythm.    Pulmonary/Chest: Effort normal. No respiratory distress.   LVAD noted    Abdominal: Soft. There is no tenderness.   Musculoskeletal: Normal range of motion.   Neurological: He is alert and oriented to person. Disoriented to place and . He displays tremor. No sensory deficit. He exhibits normal muscle tone.   RUE: 5/5, 5/5 .  LUE: 5/5, 5/5 .  RLE: 4/5, DF 5/5, PF 5/5.  LLE: 5/5, DF 5/5, PF 5/5.  Generalized weakness noted through upper body.   Skin:   Bandage to driveline site C/D/I  Mild tenderness to site    Psychiatric: He has a normal mood and affect. His behavior is normal.     NEUROLOGICAL EXAMINATION:     MENTAL STATUS   Oriented to person, place, and time.     CRANIAL NERVES     CN III, IV, VI   Pupils are equal, round, and reactive to light.  Extraocular motions are normal.       Diagnostic Results:   Labs: Reviewed  CT: Reviewed    Assessment/Plan:     Impaired functional mobility and endurance    Recommendations  -  Encourage mobility, OOB in chair at least 3 hours per day, and early ambulation as appropriate  -  PT/OT evaluate and treat  -  Pain management  -  Monitor for and prevent skin breakdown and pressure ulcers  · Early mobility, repositioning/weight shifting every 20-30 minutes when sitting, turn patient every 2 hours, proper mattress/overlay and chair cushioning, pressure relief/heel protector boots  -  DVT prophylaxis:  Coumadin  -  Reviewed discharge options (IP rehab, SNF, HH therapy, and OP therapy)          Restless leg syndrome    -on Requip  -continue Ropinirole per Neuro        Involuntary movements    -Neurology consulted  -continue home Ropinirole per Neuro           Delirium    Recommendations  -  Monitor sleep disturbances and establish consistent sleep-wake cycle  ·  Day time- lights on and shades open, night time- lights dim/off  -  Environmental modifications to limit agitation/confusion   · Appropriate lighting, family at bedside, visible clock and calendar, updated white board, reduce noise, limited visitors, clustered nursing care  -  Reorient patient to person, place, time, and situation on each encounter  -  If possible, avoid restraints  -  May benefit from 24/7 supervision by sitter or camera sitter  -  Avoid/limit medications that can worsen delirium (benzodiazepines, antihistamines, anticholinergics, hypnotics, opiates)  -  Encourage mobility, OOB in chair at least 3 hours per day, and early ambulation as appropriate  -  PT/OT evaluate and treat            LVAD (left ventricular assist device) present    -s/p 10/2016        * Complication involving left ventricular assist device (LVAD)    -per primary team  - LVAD driveline site infection   -Currently on IV Cefepime         Participating with therapy. Will follow and discuss with rehab team for rehab recommendation.      Thank you for your consult.     Cathleen Allen NP  Department of Physical Medicine & Rehab  [facility]

## 2017-07-31 NOTE — ASSESSMENT & PLAN NOTE
Recommendations  -  Monitor sleep disturbances and establish consistent sleep-wake cycle  ·  Day time- lights on and shades open, night time- lights dim/off  -  Environmental modifications to limit agitation/confusion   · Appropriate lighting, family at bedside, visible clock and calendar, updated white board, reduce noise, limited visitors, clustered nursing care  -  Reorient patient to person, place, time, and situation on each encounter  -  If possible, avoid restraints  -  May benefit from 24/7 supervision by sitter or camera sitter  -  Avoid/limit medications that can worsen delirium (benzodiazepines, antihistamines, anticholinergics, hypnotics, opiates)  -  Encourage mobility, OOB in chair at least 3 hours per day, and early ambulation as appropriate  -  PT/OT evaluate and treat

## 2017-07-31 NOTE — PLAN OF CARE
Problem: Patient Care Overview  Goal: Plan of Care Review  Outcome: Ongoing (interventions implemented as appropriate)  No acute events throughout day. See vital signs and assessments in flowsheets. See below for updates on today's progress.     Pulmonary: RA, sats %    Cardiovascular: remains 100% paced, cardene drip stopped at 0940 with orders for MAP <90. Doppler pressures 82--90. HM3 - no alarms or events, driveline dressing CDI. Run of VT while working with PT this morning - SHARONA Parker notified and no new orders given, no other ectopy since then.     Neurological: disoriented to place and situation. RASS = 0    Gastrointestinal: tolerating ordered diet, no BM    Genitourinary: Escobedo removed, due to void    Integumentary/Other: Possible step down to floor.     Infusions: none    Patient progressing towards goals as tolerated, plan of care communicated and reviewed with Kev Vasquez and family. All concerns addressed. Will continue to monitor.

## 2017-07-31 NOTE — PROGRESS NOTES
[facility]  Infectious Disease  Progress Note    Patient Name: Kev Vasquez  MRN: [id number]  Admission Date: 7/28/2017  Length of Stay: 3 days  Attending Physician: Carlito Santana MD  Primary Care Provider: Mega Collins MD    Isolation Status: No active isolations  Assessment/Plan:      * Complication involving left ventricular assist device (LVAD)    75 y/o male with ICM s/p LVAD 10/2016 at [facility] in [place] seen in ID clinic 7/24 driveline infection with pseduomonas. Pt was on oral ciprofloxacin. Pt presented to ED for worsening drainage from driveline site for the past couple of days with associated abdominal pain. CT scan negative for fluid collections. Pt afebrile, without a leukocytosis, stable on examination. We are seeing him for abx management    Plan:  1.Continue cefepime. Per wife, reports mental status changes, will continue to monitor closely. UA negative.   2.cultures GNR. Will follow ID and sensitivities  3.Will continue to monitor closely             Thank you for your consult. I will follow-up with patient. Please contact us if you have any additional questions.    Aura Spence PA-C  Infectious Disease  [facility] [phone]    Subjective:     Principal Problem:Complication involving left ventricular assist device (LVAD)    HPI: 76 male with ICM s/p LVAD 10/2016 at [facility] was seen in ID clinic 7/24 for drainage from driveline. Pt recently moved to [place] to stay with his daughter and initially noticed drainage from driveline about 2 weeks ago. Cultures +pseduomonas pansensitive and patient was placed on ciprofloxacin x 3 weeks. Pt reports doing well and tolerating medication well but noticed increase drainage from driveline site a few days ago. Pt reports having associated abdominal pain. Denies having any fever,chills,n/v/d, or night sweats. In ED, no documented fevers, no leukocytosis, pt stable on examination. CT  abdomen negative for fluid collections. Pt currently on IV vancomycin and cefepime.   Interval History:   NAEON. Denies having any fever,chills,n/v/d. Abdominal pain stable. Mental status unchanged from yesterday. Will follow closely.     Review of Systems   Constitutional: Negative for chills and fever.   HENT: Negative for congestion.    Respiratory: Negative for cough and shortness of breath.    Cardiovascular: Negative for chest pain.   Gastrointestinal: Positive for abdominal pain ( improved). Negative for diarrhea, nausea and vomiting.   Genitourinary: Negative for dysuria.   Skin: Positive for wound.   Neurological: Negative for dizziness and headaches.     Objective:     Vital Signs (Most Recent):  Temp: 99 °F (37.2 °C) (07/31/17 0745)  Pulse: 64 (07/31/17 1000)  Resp: (!) 30 (07/31/17 1000)  BP: (!) 93/51 (07/31/17 1000)  SpO2: (!) 93 % (07/31/17 1000) Vital Signs (24h Range):  Temp:  [98.4 °F (36.9 °C)-99 °F (37.2 °C)] 99 °F (37.2 °C)  Pulse:  [62-77] 64  Resp:  [14-40] 30  SpO2:  [90 %-100 %] 93 %  BP: ()/(0-74) 93/51     Weight: 74.3 kg (163 lb 12.8 oz)  Body mass index is 22.22 kg/m².    Estimated Creatinine Clearance: 61.9 mL/min (based on Cr of 1.1).    Physical Exam   Constitutional: He appears well-developed and well-nourished. No distress.   HENT:   Head: Normocephalic.   Eyes: Pupils are equal, round, and reactive to light.   Cardiovascular: Normal rate, regular rhythm and normal heart sounds.    No murmur heard.  LVAD Hum   Pulmonary/Chest: Effort normal. No respiratory distress. He has no wheezes. He has no rales.   Abdominal: Soft. He exhibits no distension.   driveline dressed at this time. No strikethrough noted   Musculoskeletal: Normal range of motion.   Neurological: He is alert.   Skin: Skin is warm and dry. No rash noted. He is not diaphoretic. No erythema.   Psychiatric: He has a normal mood and affect.   Nursing note and vitals reviewed.      Significant Labs:   Blood Culture:    Recent Labs  Lab 07/28/17 2042 07/28/17 2058   LABBLOO No Growth to date  No Growth to date  No Growth to date No Growth to date  No Growth to date  No Growth to date     CBC:   Recent Labs  Lab 07/30/17  0350 07/31/17  0400   WBC 6.95 8.11   HGB 11.6* 11.5*   HCT 35.0* 34.5*   * 154     CMP:   Recent Labs  Lab 07/29/17  1715 07/30/17  0745 07/31/17  0400    136 136   K 4.4 4.1 4.0    105 108   CO2 25 22* 22*   * 113* 89   BUN 25* 19 17   CREATININE 1.4 1.3 1.1   CALCIUM 9.6 9.4 9.1   PROT  --  6.5 6.0   ALBUMIN  --  3.0* 2.8*   BILITOT  --  0.6 0.6   ALKPHOS  --  81 77   AST  --  19 19   ALT  --  14 13   ANIONGAP 6* 9 6*   EGFRNONAA 49.1* 53.8* >60.0     Wound Culture:   Recent Labs  Lab 07/20/17  1604 07/29/17  1056   LABAERO PSEUDOMONAS AERUGINOSAMany GRAM NEGATIVE IWONA, NON-LACTOSE FERMENTERFewIdentification and susceptibility pending     All pertinent labs within the past 24 hours have been reviewed.    Significant Imaging: I have reviewed all pertinent imaging results/findings within the past 24 hours.

## 2017-07-31 NOTE — PROGRESS NOTES
Admit Note     Met with patient and daughter to assess patients needs due to patient being somewhat confused.  Patient is a 76 y.o.  male, admitted LVAD driveling infection and low blood pressure upon standing.     Patient admitted from the ED on 7/28/2017 .  At this time, patient presents as pleasant and slightly confused. Pt thinks he is in [place], [place].  At this time, patients caregiver presents as alert and oriented x 4, pleasant, communicative and cooperative.      Household/Family Systems (as reported by patients caregiver)     Patient resides with patient's spouse, daughter and son in law, at:     [address]  [place] [address].      Mitra dtr, ph: [phone]  Son in law, Yadiel, ph: [phone]  Ena Vasquez, daughter, ph: [phone]    Support system includes two daughters, wife and son in law.  Patient does not have dependents that are need of being cared for.     Patients primary caregiver is bill Manriquez daughter, phone number [phone].  Confirmed patients contact information is [phone] (home);   Telephone Information:   Mobile [phone]   .    During admission, patient's caregiver plans to stay at home.  Confirmed patient and patients caregivers do have access to reliable transportation.    Cognitive Status/Learning     Patients caregiver reports patients reading ability as college and states patient does have difficulty with comprehension and memory at this time. Patients caregiver reports patient learns best by reading and verbal instruction.   Needed: No.   Highest education level: Associate/Bachelor Degree    Vocation/Disability (as reported by patients caregiver)    Working for Income: No  If no, reason not working: Patient Choice - Retired    Patient is retired, pt was a School Super Intendent. Pt has 3 college degrees.    Adherence     Patients caregiver reports patient has a medium level of adherence to patients health care  regimen.  Adherence counseling and education provided.  Patient's caregiver verbalizes understanding.    Substance Use    Patients caregiver reports patients substance usage as the following:    Tobacco: none, patient denies any use.  Alcohol: none, patient denies any use.  Illicit Drugs/Non-prescribed Medications: none, patient denies any use.  Patients caregiver states clear understanding of the potential impact of substance use.  Substance abstinence/cessation counseling, education and resources provided and reviewed.     Services Utilizing/ADLS (as reported by patients caregiver)    Infusion Service: Prior to admission, patient utilizing? no  Home Health: Prior to admission, patient utilizing? [facility] pt's family is also paying for 24/7 aids at this time.  DME: Prior to admission, w/c, walker with wheels, rollator, shower chair and rails in bathroom/shower. Family requesting a 3-n-1 commode  Pulmonary/Cardiac Rehab: Prior to admission, no  Dialysis:  Prior to admission, no  Transplant Specialty Pharmacy:  Prior to admission, no.    Prior to admission, patients caregiver reports patient was not independent with ADLS and was not driving.  Patients caregiver reports patient is not able to care for self at this time due to compromised medical condition (as documented in medical record) and physical weakness..  Patients caregiver reports patient indicates a willingness to care for self once medically cleared to do so.    Insurance/Medications    Insured by   Payor/Plan Subscr  Sex Relation Sub. Ins. ID Effective Group Num   1. MEDICARE - MELC MARTINEZ 1940 Male  [id number]                                    [address]   2. [facility]* LC JEAN-BAPTISTE 1940 Male  [id number]                                   [address] [address]      Primary Insurance (for UNOS reporting): Public Insurance - Medicare FFS (Fee For Service)  Secondary Insurance (for UNOS reporting): Private  Insurance    Patients caregiver reports patient is able to obtain and afford medications at this time and at time of discharge.    Living Will/Healthcare Power of     Patients caregiver reports patient has a LW and/or HCPA.  Per daughter, Mitra's report, it is her sister, Alannah Vasquez, ph: [phone].    Coping/Mental Health (as reported by patients caregiver)    Patient is coping adequately with the aid of  family members.  Patients caregiver is coping adequately with the aid of  family members.      Discharge Planning (as reported by patients caregiver)    At time of discharge, patient plans to return to patient's home under the care of aids and family.  Patients daughter will transport patient.  Per rounds today, expected discharge date has not been medically determined at this time. Patients caretaker verbalizes understanding and is involved in treatment planning and discharge process.    Additional Concerns    Patient's caretaker denies additional needs and/or concerns at this time. Patient is being followed for needs, education, resources, information, emotional support, supportive counseling, and for supportive and skilled discharge plan of care.  remains available.

## 2017-07-31 NOTE — ASSESSMENT & PLAN NOTE
77 y/o male with ICM s/p LVAD 10/2016 at [facility] in [place] seen in ID clinic 7/24 driveline infection with pseduomonas. Pt was on oral ciprofloxacin. Pt presented to ED for worsening drainage from driveline site for the past couple of days with associated abdominal pain. CT scan negative for fluid collections. Pt afebrile, without a leukocytosis, stable on examination. We are seeing him for abx management    Plan:  1.Continue cefepime. Per wife, reports mental status changes, will continue to monitor closely. UA negative.   2.cultures GNR. Will follow ID and sensitivities  3.Will continue to monitor closely

## 2017-07-31 NOTE — DOWNTIME EVENT NOTE
The EMR was down from 0200 to 0620 on 7/31/2017.    Vikki Welch RN was responsible for completing the paper charting during this time period.     The following information was re-entered into the system by Vikki Welch: Critical Care Flowsheet    Refer to the manual downtime form/flowsheet for documentation during this time period.    Vikki Welch  7/31/2017

## 2017-07-31 NOTE — HOSPITAL COURSE
7/29/17:  Evaluated by therapy.  Bed mobility totalA.  Sit to stand totalA.  Ambulated 4 lateral step totalA.  UBD totalA and LBD totalA.  7/31/17: Participating with therapy.  Bed mobility Min-MaxA.  Sit to stand ModA.  Ambulated 6 ft. MaxA.  LBD MaxA.  8/1/17: SLP recommending regular diet and thin liquids. Cognitive linguistic impairments noted.  8/1/17: Participating with OT.  Bed mobility Min-MaxA.  Sit to stand ModA.  Ambulated 4 ft x2 trials MaxA.  UBD MaxA and LBD MaxA.  8/2/17: Participating with therapy.  Bed mobility Ida.  Sit to stand Ida and transfers Ida.  Ambulated 8 ft + 12 ft. Ida.  UBD ModA and LBD ModA.  8/3/17: Participating with therapy.  Bed mobility SBA.  Sit to stand ModA.  Ambulated 5 ft fwd ModA and 8 ft fwd/bwd Ida.  8/5/17: Participating with PT.  Bed mobility CGA.  Sit to stand Ida & RW.  Ambulated 100 + 50 ft. Ida & RW.  8/7/17: Participating with therapy.  Bed mobility SV-CGA.  Sit to stand SBA-Ida & RW.  Ambulated 125 ft + 25 ft CGA & RW.  UBD ModA and LBD MaxA.          AM-PAC 6 CLICK MOBILITY (PT) - Total score: 10 (7/30), 11 (7/31), 15 (8/2), 17 (8/5)  AM-PAC 6 CLICK ADL (OT) - Total score: 6 (7/30), 6 (7/31), 14 (8/1), 15 (8/2), 15 (8/3)    AM-PAC Raw Score Level of Impairment Assistance   6 100% Total / Unable   7 - 8 80 - 100% Maximal Assist   9-13 60 - 80% Moderate Assist   14 - 19 40 - 60% Moderate Assist   20 - 22 20 - 40% Minimal Assist   23 1-20% SBA / CGA   24 0% Independent/ Mod I

## 2017-07-31 NOTE — PT/OT/SLP PROGRESS
Occupational Therapy  Treatment    Kev Vasquez   MRN: [id number]   Admitting Diagnosis: Complication involving left ventricular assist device (LVAD)    OT Date of Treatment: 07/31/17   OT Start Time: 1050  OT Stop Time: 1124  OT Total Time (min): 34 min    Billable Minutes:  Self care: 34 min    General Precautions: Standard, LVAD, fall    Subjective:  Communicated with RN prior to session, post session handoff discussed progress and plan of care   Pain/Comfort  Pain Rating 1: 0/10    Objective:  Patient found reclined in bed with: blood pressure cuff, peripheral IV, telemetry, pulse ox (continuous), arterial line (LVAD HMIII), daughter at bedside      Functional Mobility:  Bed Mobility:  Scooting/Bridging: Minimum Assistance  Supine to Sit: Minimum Assistance  Sit to Supine: Maximum Assistance    Transfers:   Sit <> Stand Assistance: Moderate Assistance  Sit <> Stand Assistive Device: No Assistive Device (with handheld assist)    Functional Ambulation: Pt walked 6 feet with Max Assist. Pt with ataxic gait pattern     Activities of Daily Living:    LE Dressing Level of Assistance: Maximum assistance (donning socks long sitting in bed)  Grooming Position: other (brushing teeth long sitting in bed)  Grooming Level of Assistance: Supervision    Balance:   Static Sit: GOOD-: Takes MODERATE challenges from all directions but inconsistently  Dynamic Sit: FAIR+: Maintains balance through MINIMAL excursions of active trunk motion  Static Stand: POOR+: Needs MINIMAL assist to maintain  Dynamic stand: POOR: N/A    Therapeutic Activities and Exercises:  Bed mobility, supine to sit, sit to stand  Pt educated on role of OT, plan of care, safety awareness, DME, importance of out of bed activity, LVAD management       AM-PAC 6 CLICK ADL   How much help from another person does this patient currently need?   1 = Unable, Total/Dependent Assistance  2 = A lot, Maximum/Moderate Assistance  3 = A little, Minimum/Contact  "Guard/Supervision  4 = None, Modified [place]/Independent    Putting on and taking off regular lower body clothing? : 1  Bathing (including washing, rinsing, drying)?: 1  Toileting, which includes using toilet, bedpan, or urinal? : 1  Putting on and taking off regular upper body clothing?: 1  Taking care of personal grooming such as brushing teeth?: 1  Eating meals?: 1  Total Score: 6     AM-PAC Raw Score CMS "G-Code Modifier Level of Impairment Assistance   6 % Total / Unable   7 - 8 CM 80 - 100% Maximal Assist   9-13 CL 60 - 80% Moderate Assist   14 - 19 CK 40 - 60% Moderate Assist   20 - 22 CJ 20 - 40% Minimal Assist   23 CI 1-20% SBA / CGA   24 CH 0% Independent/ Mod I       Patient left supine with all lines intact, call button in reach, RN notified and daughter  present    ASSESSMENT:  Kev Vasquez is a 76 y.o. male with a medical diagnosis of Complication involving left ventricular assist device (LVAD) and presents with decreased activity tolerance, impaired cognition impacting indep and safety with ADL/ Self care and functional mobility. Pt experienced one episode of c/o of dizziness and brief period of "passing out" after standing. Pt returned supine in bed. NP Sophie at bedside during episode. Pt will benefit from skilled OT services to maximize independence and safety with ADL/Self care and functional mobility.         Rehab identified problem list/impairments: Rehab identified problem list/impairments: weakness, impaired endurance, gait instability, impaired functional mobilty, impaired self care skills, impaired balance, decreased coordination, decreased safety awareness, impaired cardiopulmonary response to activity    Rehab potential is good.    Activity tolerance: Fair    Discharge recommendations: Discharge Facility/Level Of Care Needs: rehabilitation facility     Barriers to discharge: None identified at this time    Equipment recommendations:  (TBD)     GOALS:    Occupational Therapy " Goals        Problem: Occupational Therapy Goal    Goal Priority Disciplines Outcome Interventions   Occupational Therapy Goal     OT, PT/OT Ongoing (interventions implemented as appropriate)    Description:  Goals to be met by:  2 weeks 8/12/17     Patient will increase functional independence with ADLs by performing:    Feeding with Supervision.  UE Dressing with Minimal Assistance.  LE Dressing with Minimal Assistance.  Grooming while seated with Supervision.  Toileting from bedside commode with Minimal Assistance for hygiene and clothing management.   Stand pivot transfers with Minimal Assistance.  Toilet transfer to bedside commode with Minimal Assistance.                      Plan:  Patient to be seen 5 x/week to address the above listed problems via self-care/home management, therapeutic activities, therapeutic exercises  Plan of Care expires: 08/14/17  Plan of Care reviewed with: patient, daughter         Stanley LOPEZ OPAL Mueller  07/31/2017

## 2017-08-01 PROBLEM — N17.9 AKI (ACUTE KIDNEY INJURY): Status: ACTIVE | Noted: 2017-08-01

## 2017-08-01 LAB
ANION GAP SERPL CALC-SCNC: 9 MMOL/L
APTT BLDCRRT: 28.2 SEC
BACTERIA SPEC AEROBE CULT: NORMAL
BASOPHILS # BLD AUTO: 0.01 K/UL
BASOPHILS NFR BLD: 0.1 %
BUN SERPL-MCNC: 25 MG/DL
CALCIUM SERPL-MCNC: 9.6 MG/DL
CHLORIDE SERPL-SCNC: 105 MMOL/L
CO2 SERPL-SCNC: 22 MMOL/L
CREAT SERPL-MCNC: 1.6 MG/DL
DIFFERENTIAL METHOD: ABNORMAL
EOSINOPHIL # BLD AUTO: 0.2 K/UL
EOSINOPHIL NFR BLD: 2.6 %
ERYTHROCYTE [DISTWIDTH] IN BLOOD BY AUTOMATED COUNT: 16.3 %
EST. GFR  (AFRICAN AMERICAN): 48.3 ML/MIN/1.73 M^2
EST. GFR  (NON AFRICAN AMERICAN): 41.8 ML/MIN/1.73 M^2
GLUCOSE SERPL-MCNC: 99 MG/DL
HCT VFR BLD AUTO: 35.1 %
HGB BLD-MCNC: 12 G/DL
INR PPP: 2.2
LDH SERPL L TO P-CCNC: 157 U/L
LYMPHOCYTES # BLD AUTO: 1.1 K/UL
LYMPHOCYTES NFR BLD: 12.4 %
MAGNESIUM SERPL-MCNC: 2.1 MG/DL
MCH RBC QN AUTO: 30.8 PG
MCHC RBC AUTO-ENTMCNC: 34.2 G/DL
MCV RBC AUTO: 90 FL
MONOCYTES # BLD AUTO: 0.9 K/UL
MONOCYTES NFR BLD: 9.9 %
NEUTROPHILS # BLD AUTO: 6.4 K/UL
NEUTROPHILS NFR BLD: 74.5 %
PHOSPHATE SERPL-MCNC: 2.8 MG/DL
PLATELET # BLD AUTO: 154 K/UL
PMV BLD AUTO: 10.9 FL
POTASSIUM SERPL-SCNC: 4.2 MMOL/L
PROTHROMBIN TIME: 22.5 SEC
RBC # BLD AUTO: 3.89 M/UL
RPR SER QL: NORMAL
SODIUM SERPL-SCNC: 136 MMOL/L
WBC # BLD AUTO: 8.57 K/UL

## 2017-08-01 PROCEDURE — 25000003 PHARM REV CODE 250: Performed by: NURSE PRACTITIONER

## 2017-08-01 PROCEDURE — 80048 BASIC METABOLIC PNL TOTAL CA: CPT

## 2017-08-01 PROCEDURE — 97116 GAIT TRAINING THERAPY: CPT

## 2017-08-01 PROCEDURE — 85025 COMPLETE CBC W/AUTO DIFF WBC: CPT

## 2017-08-01 PROCEDURE — 97530 THERAPEUTIC ACTIVITIES: CPT

## 2017-08-01 PROCEDURE — 27000248 HC VAD-ADDITIONAL DAY

## 2017-08-01 PROCEDURE — 63600175 PHARM REV CODE 636 W HCPCS: Performed by: INTERNAL MEDICINE

## 2017-08-01 PROCEDURE — 25000003 PHARM REV CODE 250: Performed by: INTERNAL MEDICINE

## 2017-08-01 PROCEDURE — 93750 INTERROGATION VAD IN PERSON: CPT | Performed by: PHYSICIAN ASSISTANT

## 2017-08-01 PROCEDURE — 99232 SBSQ HOSP IP/OBS MODERATE 35: CPT | Mod: ,,, | Performed by: INTERNAL MEDICINE

## 2017-08-01 PROCEDURE — 83735 ASSAY OF MAGNESIUM: CPT

## 2017-08-01 PROCEDURE — 83615 LACTATE (LD) (LDH) ENZYME: CPT

## 2017-08-01 PROCEDURE — 20000000 HC ICU ROOM

## 2017-08-01 PROCEDURE — A4216 STERILE WATER/SALINE, 10 ML: HCPCS | Performed by: INTERNAL MEDICINE

## 2017-08-01 PROCEDURE — 92507 TX SP LANG VOICE COMM INDIV: CPT

## 2017-08-01 PROCEDURE — 84100 ASSAY OF PHOSPHORUS: CPT

## 2017-08-01 PROCEDURE — 93750 INTERROGATION VAD IN PERSON: CPT | Mod: GC,,, | Performed by: NURSE PRACTITIONER

## 2017-08-01 PROCEDURE — 25000003 PHARM REV CODE 250: Performed by: STUDENT IN AN ORGANIZED HEALTH CARE EDUCATION/TRAINING PROGRAM

## 2017-08-01 PROCEDURE — 93750 INTERROGATION VAD IN PERSON: CPT | Mod: ,,, | Performed by: INTERNAL MEDICINE

## 2017-08-01 PROCEDURE — 85610 PROTHROMBIN TIME: CPT

## 2017-08-01 PROCEDURE — 85730 THROMBOPLASTIN TIME PARTIAL: CPT

## 2017-08-01 PROCEDURE — 99233 SBSQ HOSP IP/OBS HIGH 50: CPT | Mod: ,,, | Performed by: PHYSICIAN ASSISTANT

## 2017-08-01 PROCEDURE — 99232 SBSQ HOSP IP/OBS MODERATE 35: CPT | Mod: ,,, | Performed by: NURSE PRACTITIONER

## 2017-08-01 RX ORDER — HYDRALAZINE HYDROCHLORIDE 25 MG/1
25 TABLET, FILM COATED ORAL EVERY 8 HOURS
Status: DISCONTINUED | OUTPATIENT
Start: 2017-08-01 | End: 2017-08-02

## 2017-08-01 RX ADMIN — OYSTER SHELL CALCIUM WITH VITAMIN D 1 TABLET: 500; 200 TABLET, FILM COATED ORAL at 09:08

## 2017-08-01 RX ADMIN — ALLOPURINOL 300 MG: 300 TABLET ORAL at 09:08

## 2017-08-01 RX ADMIN — CEFEPIME HYDROCHLORIDE 2 G: 2 INJECTION, SOLUTION INTRAVENOUS at 03:08

## 2017-08-01 RX ADMIN — HYDRALAZINE HYDROCHLORIDE 25 MG: 25 TABLET, FILM COATED ORAL at 09:08

## 2017-08-01 RX ADMIN — ATORVASTATIN CALCIUM 10 MG: 10 TABLET, FILM COATED ORAL at 09:08

## 2017-08-01 RX ADMIN — ASPIRIN 81 MG CHEWABLE TABLET 81 MG: 81 TABLET CHEWABLE at 09:08

## 2017-08-01 RX ADMIN — ROPINIROLE 0.5 MG: 0.5 TABLET, FILM COATED ORAL at 06:08

## 2017-08-01 RX ADMIN — THERA TABS 1 TABLET: TAB at 09:08

## 2017-08-01 RX ADMIN — MAGNESIUM OXIDE TAB 400 MG (241.3 MG ELEMENTAL MG) 400 MG: 400 (241.3 MG) TAB at 09:08

## 2017-08-01 RX ADMIN — Medication 3 ML: at 03:08

## 2017-08-01 RX ADMIN — SPIRONOLACTONE 25 MG: 25 TABLET, FILM COATED ORAL at 09:08

## 2017-08-01 RX ADMIN — FOLIC ACID 1 MG: 1 TABLET ORAL at 09:08

## 2017-08-01 RX ADMIN — AMLODIPINE BESYLATE 10 MG: 10 TABLET ORAL at 09:08

## 2017-08-01 RX ADMIN — BUPROPION HYDROCHLORIDE 300 MG: 150 TABLET, FILM COATED, EXTENDED RELEASE ORAL at 09:08

## 2017-08-01 RX ADMIN — TAMSULOSIN HYDROCHLORIDE 0.4 MG: 0.4 CAPSULE ORAL at 09:08

## 2017-08-01 RX ADMIN — LISINOPRIL 5 MG: 5 TABLET ORAL at 09:08

## 2017-08-01 RX ADMIN — HYDRALAZINE HYDROCHLORIDE 25 MG: 25 TABLET, FILM COATED ORAL at 03:08

## 2017-08-01 RX ADMIN — Medication 3 ML: at 06:08

## 2017-08-01 RX ADMIN — WARFARIN SODIUM 6 MG: 5 TABLET ORAL at 06:08

## 2017-08-01 RX ADMIN — DOFETILIDE 250 MCG: 0.12 CAPSULE ORAL at 09:08

## 2017-08-01 RX ADMIN — NICARDIPINE HYDROCHLORIDE 5 MG/HR: 0.2 INJECTION, SOLUTION INTRAVENOUS at 04:08

## 2017-08-01 RX ADMIN — NICARDIPINE HYDROCHLORIDE 10 MG/HR: 0.2 INJECTION, SOLUTION INTRAVENOUS at 10:08

## 2017-08-01 RX ADMIN — ROPINIROLE 0.5 MG: 0.5 TABLET, FILM COATED ORAL at 09:08

## 2017-08-01 RX ADMIN — CEFEPIME HYDROCHLORIDE 2 G: 2 INJECTION, SOLUTION INTRAVENOUS at 02:08

## 2017-08-01 RX ADMIN — ROPINIROLE 0.5 MG: 0.5 TABLET, FILM COATED ORAL at 03:08

## 2017-08-01 NOTE — SUBJECTIVE & OBJECTIVE
Past Medical History:   Diagnosis Date    Acute on chronic systolic heart failure 7/27/2015    AICD (automatic cardioverter/defibrillator) present 2014    [employer]    CAD (coronary artery disease) 7/27/2015    CHF (congestive heart failure)     Gait instability     HTN (hypertension) 7/27/2015    ICD (implantable cardioverter-defibrillator), biventricular, in situ 7/27/2015    Kidney stones     HILLARY treated with BiPAP 7/27/2015    Peripheral neuropathy     Pulmonary hypertension due to left ventricular systolic dysfunction 7/29/2015    Restless leg syndrome 1992    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    Skin cancer     excision 2013    Sleep apnea 1992     Past Surgical History:   Procedure Laterality Date    CARDIAC PACEMAKER PLACEMENT      pacemaker previously, then AICD in 2006. AICD [employer] serial #[id number]    CORONARY ARTERY BYPASS GRAFT  1993    KNEE SURGERY Left 2001    complicated by infection, hardware had to be taken out and replaced    LEFT VENTRICULAR ASSIST DEVICE  10/19/2016     Review of patient's allergies indicates:   Allergen Reactions    Sulfa (sulfonamide antibiotics)        Scheduled Medications:    allopurinol  300 mg Oral Daily    amlodipine  10 mg Oral Daily    aspirin  81 mg Oral Daily    atorvastatin  10 mg Oral Daily    buPROPion  300 mg Oral Daily    calcium-vitamin D3  1 tablet Oral BID    ceFEPime (MAXIPIME) IVPB  2 g Intravenous Q12H    dofetilide  250 mcg Oral Q12H    folic acid  1 mg Oral Daily    hydrALAZINE  25 mg Oral Q8H    lisinopril  5 mg Oral BID    magnesium oxide  400 mg Oral BID    multivitamin  1 tablet Oral Daily    ropinirole  0.5 mg Oral TID    sodium chloride 0.9%  3 mL Intravenous Q8H    spironolactone  25 mg Oral Daily    tamsulosin  0.4 mg Oral Daily    warfarin  6 mg Oral Daily       PRN Medications: bisacodyl    Family History     Problem Relation (Age of Onset)    Cancer Daughter (50)    Diabetes Daughter     Heart disease Daughter        Social History Main Topics    Smoking status: Never Smoker    Smokeless tobacco: Never Used    Alcohol use No    Drug use: No    Sexual activity: Not on file      Comment:      Review of Systems   Constitutional: Negative for chills and fever.   HENT: Negative for trouble swallowing and voice change.    Eyes: Negative for photophobia and visual disturbance.   Respiratory: Negative for cough, shortness of breath and wheezing.    Cardiovascular: Negative for chest pain and palpitations.   Gastrointestinal: Negative for abdominal distention and nausea.   Genitourinary: Negative for difficulty urinating and flank pain.   Musculoskeletal: Positive for gait problem. Negative for arthralgias and myalgias.   Skin: Positive for wound (driveline site infection). Negative for color change.   Neurological: Positive for tremors and weakness. Negative for numbness and headaches.   Psychiatric/Behavioral: Negative for agitation and confusion.     Objective:     Vital Signs (Most Recent):  Temp: 98 °F (36.7 °C) (08/01/17 0715)  Pulse: 67 (08/01/17 0900)  Resp: (!) 24 (08/01/17 0900)  BP: 131/78 (08/01/17 0900)  SpO2: 100 % (08/01/17 0900)    Vital Signs (24h Range):  Temp:  [97.9 °F (36.6 °C)-98.9 °F (37.2 °C)] 98 °F (36.7 °C)  Pulse:  [60-87] 67  Resp:  [9-35] 24  SpO2:  [98 %-100 %] 100 %  BP: ()/(0-91) 131/78     Body mass index is 22.22 kg/m².    Physical Exam   Constitutional: He is oriented to person, place, and time. He appears well-developed and well-nourished.   HENT:   Head: Normocephalic and atraumatic.   Eyes: EOM are normal. Pupils are equal, round, and reactive to light.   Neck: Normal range of motion.   Cardiovascular: Normal rate and regular rhythm.    Pulmonary/Chest: Effort normal. No respiratory distress.   LVAD noted    Abdominal: Soft. There is no tenderness.   Musculoskeletal: Normal range of motion.   Neurological: He is alert and oriented to person, place,  and time. He displays tremor. No sensory deficit. He exhibits normal muscle tone.   RUE: 5/5, 5/5 .  LUE: 5/5, 5/5 .  RLE: 4/5, DF 5/5, PF 5/5.  LLE: 5/5, DF 5/5, PF 5/5.  Generalized weakness noted through upper body.   Skin:   Bandage to driveline site C/D/I  Mild tenderness to site    Psychiatric: He has a normal mood and affect. His behavior is normal.     NEUROLOGICAL EXAMINATION:     MENTAL STATUS   Oriented to person, place, and time.     CRANIAL NERVES     CN III, IV, VI   Pupils are equal, round, and reactive to light.  Extraocular motions are normal.       Diagnostic Results:   Labs: Reviewed  CT: Reviewed

## 2017-08-01 NOTE — PT/OT/SLP PROGRESS
"Speech Language Pathology  Treatment    Kev Vasquez   MRN: [id number]   Admitting Diagnosis: Complication involving left ventricular assist device (LVAD)    Diet recommendations: Solid Diet Level: Regular  Liquid Diet Level: Thin   Universal aspiration precautions     SLP Treatment Date: 08/01/17  Speech Start Time: 1613     Speech Stop Time: 1625     Speech Total (min): 12 min       TREATMENT BILLABLE MINUTES:  Speech Therapy Individual 12    Has the patient been evaluated by SLP for swallowing? : Yes  Keep patient NPO?: No   General Precautions: Standard, fall, LVAD        Subjective:  "I haven't too much rest lately"      no pain pre/post    Objective:    New order set received and therapeutic services able to be resumed. Pt presenting with fatigue however easily roused with gentle tactile stimulation. Pt with poor orientation to location reporting "on a boat" which was related to television program previously on prior to SLP turning off TV for treatment session.  Pt incorrectly reporting date and year but self correcting independently. SLP reviewed orientation information with Pt at length and demonstrated where to locate information on whiteboard. Following review Pt able to report correct location and date. Pt recalled general information with 70% acc independently. Given lethargic state Pt warranting verbal cueing to maintain awake state and attend to task stimuli. Pt with eyes closed and additional treatment tasks deferred this date.     Assessment:  Kev Vasquez is a 76 y.o. male with a medical diagnosis of Complication involving left ventricular assist device (LVAD) and presents with cognitive linguistic impairments.     Discharge recommendations: Discharge Facility/Level Of Care Needs: rehabilitation facility     Goals:    SLP Goals        Problem: SLP Goal    Goal Priority Disciplines Outcome   SLP Goal     SLP    Description:  Speech Language Pathology Goals  Goals expected to be met by 8/7:  1. Pt will " orient x 4 given min cues.  2. Pt will recall general information with 80% accuracy given min cues.  3. Following a 3 minute delay, pt will recall 3/3 unrelated words given modified independence.  4. Pt will follow complex commands with 70% accuracy given mod cues.  5. Pt will complete problem solving tasks with 80% accuracy given min cues.  6. Pt will participate in ongoing evaluation of word fluency, categorization, reading, writing, visual spatial, and math/time/money management abilities.                         Plan:   Patient to be seen Therapy Frequency: 5 x/week   Plan of Care expires: 08/29/17  Plan of Care reviewed with: patient  SLP Follow-up?: Yes              Sara Mahmood CCC-SLP  08/01/2017

## 2017-08-01 NOTE — PROGRESS NOTES
[facility]  Physical Medicine & Rehab  Progress Note    Patient Name: Kev Vasquez  MRN: [id number]  Admission Date: 7/28/2017  Length of Stay: 4 days  Attending Physician: Carlito Santana MD  Collaborating Physician: Rashad Garcia MD    Subjective:     Principal Problem:Complication involving left ventricular assist device (LVAD)    Interval History (08/01/2017): Patient is seen for follow-up rehab evaluation and recommendations.  No acute events over night. Cardene gtt restarted yesterday.  Paticpating with therapy.  Barriers for discharge/rehab admission: not medically ready for discharge.     HPI, Past Medical, Surgical, Family, and Social History remains the same as documented in the initial encounter.    Hospital Course:   7/29/17:  Evaluated by therapy.  Bed mobility totalA.  Sit to stand totalA.  Ambulated 4 lateral step totalA.  UBD totalA and LBD totalA.  7/31/17: Participating with therapy.  Bed mobility Min-MaxA.  Sit to stand ModA.  Ambulated 6 ft. MaxA.  LBD MaxA.  7/31/17: Evaluated by SLP. Cognitive- linguistic impairments noted.     AM-PAC 6 CLICK MOBILITY (PT) - Total score: 10 (7/30), 11 (7/31)  AM-PAC 6 CLICK ADL (OT) - Total score: 6 (7/30), 6 (7/31)    AM-PAC Raw Score Level of Impairment Assistance   6 100% Total / Unable   7 - 8 80 - 100% Maximal Assist   9-13 60 - 80% Moderate Assist   14 - 19 40 - 60% Moderate Assist   20 - 22 20 - 40% Minimal Assist   23 1-20% SBA / CGA   24 0% Independent/ Mod I       Past Medical History:   Diagnosis Date    Acute on chronic systolic heart failure 7/27/2015    AICD (automatic cardioverter/defibrillator) present 2014    [employer]    CAD (coronary artery disease) 7/27/2015    CHF (congestive heart failure)     Gait instability     HTN (hypertension) 7/27/2015    ICD (implantable cardioverter-defibrillator), biventricular, in situ 7/27/2015    Kidney stones     HILLARY treated with BiPAP 7/27/2015    Peripheral neuropathy      Pulmonary hypertension due to left ventricular systolic dysfunction 7/29/2015    Restless leg syndrome 1992    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    Skin cancer     excision 2013    Sleep apnea 1992     Past Surgical History:   Procedure Laterality Date    CARDIAC PACEMAKER PLACEMENT      pacemaker previously, then AICD in 2006. AICD [employer] serial #[id number]    CORONARY ARTERY BYPASS GRAFT  1993    KNEE SURGERY Left 2001    complicated by infection, hardware had to be taken out and replaced    LEFT VENTRICULAR ASSIST DEVICE  10/19/2016     Review of patient's allergies indicates:   Allergen Reactions    Sulfa (sulfonamide antibiotics)        Scheduled Medications:    allopurinol  300 mg Oral Daily    amlodipine  10 mg Oral Daily    aspirin  81 mg Oral Daily    atorvastatin  10 mg Oral Daily    buPROPion  300 mg Oral Daily    calcium-vitamin D3  1 tablet Oral BID    ceFEPime (MAXIPIME) IVPB  2 g Intravenous Q12H    dofetilide  250 mcg Oral Q12H    folic acid  1 mg Oral Daily    hydrALAZINE  25 mg Oral Q8H    lisinopril  5 mg Oral BID    magnesium oxide  400 mg Oral BID    multivitamin  1 tablet Oral Daily    ropinirole  0.5 mg Oral TID    sodium chloride 0.9%  3 mL Intravenous Q8H    spironolactone  25 mg Oral Daily    tamsulosin  0.4 mg Oral Daily    warfarin  6 mg Oral Daily       PRN Medications: bisacodyl    Family History     Problem Relation (Age of Onset)    Cancer Daughter (50)    Diabetes Daughter    Heart disease Daughter        Social History Main Topics    Smoking status: Never Smoker    Smokeless tobacco: Never Used    Alcohol use No    Drug use: No    Sexual activity: Not on file      Comment:      Review of Systems   Constitutional: Negative for chills and fever.   HENT: Negative for trouble swallowing and voice change.    Eyes: Negative for photophobia and visual disturbance.   Respiratory: Negative for cough, shortness of breath and  wheezing.    Cardiovascular: Negative for chest pain and palpitations.   Gastrointestinal: Negative for abdominal distention and nausea.   Genitourinary: Negative for difficulty urinating and flank pain.   Musculoskeletal: Positive for gait problem. Negative for arthralgias and myalgias.   Skin: Positive for wound (driveline site infection). Negative for color change.   Neurological: Positive for tremors and weakness. Negative for numbness and headaches.   Psychiatric/Behavioral: Negative for agitation and positive for confusion.     Objective:     Vital Signs (Most Recent):  Temp: 98 °F (36.7 °C) (08/01/17 0715)  Pulse: 67 (08/01/17 0900)  Resp: (!) 24 (08/01/17 0900)  BP: 131/78 (08/01/17 0900)  SpO2: 100 % (08/01/17 0900)    Vital Signs (24h Range):  Temp:  [97.9 °F (36.6 °C)-98.9 °F (37.2 °C)] 98 °F (36.7 °C)  Pulse:  [60-87] 67  Resp:  [9-35] 24  SpO2:  [98 %-100 %] 100 %  BP: ()/(0-91) 131/78     Body mass index is 22.22 kg/m².    Physical Exam   Constitutional: He is oriented to person, place, and time. He appears well-developed and well-nourished.   HENT:   Head: Normocephalic and atraumatic.   Eyes: EOM are normal. Pupils are equal, round, and reactive to light.   Neck: Normal range of motion.   Cardiovascular: Normal rate and regular rhythm.    Pulmonary/Chest: Effort normal. No respiratory distress.   LVAD noted    Abdominal: Soft. There is no tenderness.   Musculoskeletal: Normal range of motion.   Neurological: He is alert and oriented to person and place. He displays tremor. No sensory deficit. He exhibits normal muscle tone.   RUE: 5/5, 5/5 .  LUE: 5/5, 5/5 .  RLE: 4/5, DF 5/5, PF 5/5.  LLE: 5/5, DF 5/5, PF 5/5.  Generalized weakness noted through upper body.   Skin:   Bandage to driveline site C/D/I  Mild tenderness to site    Psychiatric: He has a normal mood and affect. Patient does have cognitive impairment.     NEUROLOGICAL EXAMINATION:     MENTAL STATUS   Oriented to person, place,  and time.     CRANIAL NERVES     CN III, IV, VI   Pupils are equal, round, and reactive to light.  Extraocular motions are normal.       Diagnostic Results:   Labs: Reviewed  CT: Reviewed    Assessment/Plan:      Impaired functional mobility and endurance    Recommendations  -  Encourage mobility, OOB in chair at least 3 hours per day, and early ambulation as appropriate  -  PT/OT evaluate and treat  -  Pain management  -  Monitor for and prevent skin breakdown and pressure ulcers  · Early mobility, repositioning/weight shifting every 20-30 minutes when sitting, turn patient every 2 hours, proper mattress/overlay and chair cushioning, pressure relief/heel protector boots  -  DVT prophylaxis:  Coumadin  -  Reviewed discharge options (IP rehab, SNF, HH therapy, and OP therapy)          Restless leg syndrome    -on Requip  -continue Ropinirole per Neuro        Involuntary movements    -Neurology consulted  -continue home Ropinirole per Neuro          Delirium    Recommendations  -  Monitor sleep disturbances and establish consistent sleep-wake cycle  ·  Day time- lights on and shades open, night time- lights dim/off  -  Environmental modifications to limit agitation/confusion   · Appropriate lighting, family at bedside, visible clock and calendar, updated white board, reduce noise, limited visitors, clustered nursing care  -  Reorient patient to person, place, time, and situation on each encounter  -  If possible, avoid restraints  -  May benefit from 24/7 supervision by sitter or camera sitter  -  Avoid/limit medications that can worsen delirium (benzodiazepines, antihistamines, anticholinergics, hypnotics, opiates)  -  Encourage mobility, OOB in chair at least 3 hours per day, and early ambulation as appropriate  -  PT/OT evaluate and treat            LVAD (left ventricular assist device) present    -s/p 10/2016        * Complication involving left ventricular assist device (LVAD)    -per primary team  - LVAD  driveline site infection   -Currently on IV Cefepime         Participating with therapy.  Will follow and discuss with rehab team for rehab recommendation.        Cathleen Allen NP  Department of Physical Medicine & Rehab   [facility]

## 2017-08-01 NOTE — ASSESSMENT & PLAN NOTE
77 y/o male with ICM s/p LVAD 10/2016 at [facility] in [place] seen in ID clinic 7/24 driveline infection with pseduomonas. Pt was on oral ciprofloxacin. Pt presented to ED for worsening drainage from driveline site for the past couple of days with associated abdominal pain. CT scan negative for fluid collections. Pt afebrile, without a leukocytosis, stable on examination. We are seeing him for abx management    Plan:  1.Continue cefepime 2gq12. Cultures +pseduomonas resistant to ciprofloxacin. Continue cefepime x 8 weeks for driveline infection end date 9/23/17.   2.will need ID to follow outpatient labs as patient will need weekly CBC, CMP, ESR, and CRP (our clinic fax is [phone])    Discussed with ID staff. Will sign off at this time. Please call if you have any questions.

## 2017-08-01 NOTE — PLAN OF CARE
Problem: Occupational Therapy Goal  Goal: Occupational Therapy Goal  Goals to be met by:  2 weeks 8/12/17     Patient will increase functional independence with ADLs by performing:    Feeding with Supervision.  UE Dressing with Minimal Assistance.  LE Dressing with Minimal Assistance.  Grooming while seated with Supervision.  Toileting from bedside commode with Minimal Assistance for hygiene and clothing management.   Stand pivot transfers with Minimal Assistance.  Toilet transfer to bedside commode with Minimal Assistance.     Outcome: Ongoing (interventions implemented as appropriate)  The above goals remain appropriate. SHAUNA Hernandez  8/1/2017

## 2017-08-01 NOTE — PROGRESS NOTES
Daily E and M and VAD Interrogation Note    Reason for Visit: Driveline evaluation   Patient is seen in follow up for management of:  [x] HeartMate III [] Heartware [] Total artificial heart       [] ECMO           [] Other     Interval History:  [] Interval history unobtainable due to intubation.  The [x] implant 10/2016 at [facility]  No Events overnight      Review of Systems:  Pt states he is feeling better today   Denies CP, SOB, ABD pain, N/V  All other systems reviewed and negative    Medications:  Current Facility-Administered Medications   Medication Dose Route Frequency Provider Last Rate Last Dose    allopurinol tablet 300 mg  300 mg Oral Daily Henry Murguia MD   300 mg at 08/01/17 0942    amlodipine tablet 10 mg  10 mg Oral Daily Jim Khoury MD   10 mg at 08/01/17 0942    aspirin chewable tablet 81 mg  81 mg Oral Daily Henry Murguia MD   81 mg at 08/01/17 0942    atorvastatin tablet 10 mg  10 mg Oral Daily Henry Murguia MD   10 mg at 08/01/17 0942    bisacodyl suppository 10 mg  10 mg Rectal Daily PRN Henry Murguia MD        buPROPion TB24 tablet 300 mg  300 mg Oral Daily Henry Murguia MD   300 mg at 08/01/17 0942    calcium-vitamin D3 500 mg(1,250mg) -200 unit per tablet 1 tablet  1 tablet Oral BID Henry Murguia MD   1 tablet at 08/01/17 0943    ceFEPIme in dextrose 5% 2 gram/50 mL IVPB 2 g  2 g Intravenous Q12H Henry Murguia  mL/hr at 08/01/17 0243 2 g at 08/01/17 0243    dofetilide capsule 250 mcg  250 mcg Oral Q12H Henry Murguia MD   250 mcg at 08/01/17 0943    folic acid tablet 1 mg  1 mg Oral Daily Henry Murguia MD   1 mg at 08/01/17 0942    hydrALAZINE tablet 25 mg  25 mg Oral Q8H Charlette Jasmine NP   25 mg at 08/01/17 0941    lisinopril tablet 5 mg  5 mg Oral BID Jim Khoury MD   5 mg at 08/01/17 0942    magnesium oxide tablet 400 mg  400 mg Oral BID Carlito Santana MD   400 mg at 08/01/17 0910     multivitamin tablet 1 tablet  1 tablet Oral Daily Henry Murguia MD   1 tablet at 08/01/17 0942    niCARdipine 40 mg/200 mL infusion  1 mg/hr Intravenous Continuous Henry Murguia MD 50 mL/hr at 08/01/17 1021 10 mg/hr at 08/01/17 1021    ropinirole tablet 0.5 mg  0.5 mg Oral TID Henry Murguia MD   0.5 mg at 08/01/17 0617    sodium chloride 0.9% flush 3 mL  3 mL Intravenous Q8H Julián Sanket Blanco MD   3 mL at 08/01/17 0618    spironolactone tablet 25 mg  25 mg Oral Daily Henry Murguia MD   25 mg at 08/01/17 0942    tamsulosin 24 hr capsule 0.4 mg  0.4 mg Oral Daily Henry Murguia MD   0.4 mg at 08/01/17 0942    warfarin tablet 6 mg  6 mg Oral Daily Carlito Santana MD           Physical Examination:  Vital Signs:   Vitals:    08/01/17 0900   BP: 131/78   Pulse: 67   Resp: (!) 24   Temp:      Cardiovascular:  [x] Regular rate and rhythm. Smooth VAD sounds    [x]  No edema []  Edema present  [x]  Clear to auscultation    [] Pedal Pulses absent  []  Pulses + throughout  Skin:  Incision is [x]  Clean, dry and intact.   Sternum:  [x]  Stable []  Unstable  Driveline(s):   [x]  Clean, dry and intact.      Labs:  CBC, CMP, LDH and Coags     X-Rays:  [x]  I reviewed today's Chest x-ray    Procedure:  Device Interrogation including analysis of device parameters.  Current Settings  [x]  Ventricular Assist Device  []  Total Artificial Heart interrogated  Review of device function is [x]  Stable     TXP LVAD INTERROGATIONS 8/1/2017 8/1/2017 8/1/2017 8/1/2017 8/1/2017 8/1/2017 8/1/2017   Type HeartMate3 HeartMate3 HeartMate3 HeartMate3 HeartMate3 HeartMate3 HeartMate3   Flow 5.2 5.1 5.2 5.3 5.1 5.2 4.9   Speed 5800 5800 5800 5800 5800 5800 5800   PI 3.1 3.1 3.1 3.0 3.0 3.1 3.9   Power (Mendez) 4.5 4.5 4.5 4.5 4.6 4.5 4.4   Pulsatility Intermittent pulse Intermittent pulse Intermittent pulse Intermittent pulse Intermittent pulse Intermittent pulse Intermittent pulse       Assessment:  []  Primary  Cardiomyopathy [x]  Congestive Heart Failure   []  Atrial Fibrillation []  Ventricular Tachycardia   []  Aftercare cardiac device [x]  Long term (current) use of anticoagulants   []  Ventilator-associated pneumonia []  Pneumonia viral, unspecified   []  Pneumonia, bacterial, unspecified []  Pneumonia, organism unspecified   []  Hemorrhage of GI tract, unspecified    []  Nosebleed []  Driveline infection   []  Infection VAD device          Plan:  [x]  Interval history obtained from HTS attending team member during rounding today  [x]  VAD/JOSE teaching performed with patient  [x]  Mobilization / Physical Therapy ongoing  [x]  Anticoagulation [x]  Ongoing []  Held  -MAP can be higher than 80 with HM 3, if pt can tolerate  -No surgical indication for any intervention at this stage.  -Multiple PI events     Total time spent was 32 minutes.  Of which more than 50 percent of the care dominated counseling and coordinating care with different team members. The VAD was interrogated and all parameters were WNL and no significant findings were found in the history. All these findings are documented in the note above.      Date of Service: 08/01/2017

## 2017-08-01 NOTE — ASSESSMENT & PLAN NOTE
- HM III implanted at [facility] in [place] 10/2016  - Speed 5800  - INR therapeutic at 2.2 - continue Coumadin  - LD stable - continue ASA 81 mg qd  - Pulsatile with MAP's > 90's - BP control with Cardene gtt as above, Lisinopril, Norvasc  - CT of abdomen/pelvis with no fluid collections  - DLES culture with GNR. Appreciate ID's help. Continue Cefepime and await sensitivities

## 2017-08-01 NOTE — PT/OT/SLP PROGRESS
Speech Language Pathology      Kev Vasquez  MRN: [id number]    Patient not seen today secondary to Other (Comment) (speech service orders for cognition have been discontinued. When team contacted, reported no knowledge of orders being cancelled. Team suggesting contacting provider who canceled orders directly. Unable to determine who and why speech services discontinued. Should team wish for speech service to resume treatment please place new order set.    Sara Mahmood, IRINEO-SLP

## 2017-08-01 NOTE — ASSESSMENT & PLAN NOTE
- No known history of parkinsons disease, experiencing myoclonic jerks/tremors NOT suggestive of PD  - Currently not active as he should be given tremors and leg pain, very deconditioned.  - ddx per neuro delirium / myoclonic jerks from infectious etiologies / metabolic >>> postoperative states >> Fluid and electrolyte disturbances / toxicity >>> cns pathology / seziures. Appreciate assistance.

## 2017-08-01 NOTE — ASSESSMENT & PLAN NOTE
- consulted PT/OT/ST  - Appreciate PM&R consult. They plan to discuss with rehab team for rehab recs. He is not medically ready for discharge

## 2017-08-01 NOTE — PLAN OF CARE
Problem: SLP Goal  Goal: SLP Goal  Speech Language Pathology Goals  Goals expected to be met by 8/7:  1. Pt will orient x 4 given min cues.  2. Pt will recall general information with 80% accuracy given min cues.  3. Following a 3 minute delay, pt will recall 3/3 unrelated words given modified independence.  4. Pt will follow complex commands with 70% accuracy given mod cues.  5. Pt will complete problem solving tasks with 80% accuracy given min cues.  6. Pt will participate in ongoing evaluation of word fluency, categorization, reading, writing, visual spatial, and math/time/money management abilities.           Pt with slow progress towards goals     Sara Mahmood MS, CCC-SLP  Speech Language Pathologist  Pager: [phone]  Date 8/1/2017

## 2017-08-01 NOTE — PROGRESS NOTES
[facility]  Infectious Disease  Progress Note    Patient Name: Kev Vasquez  MRN: [id number]  Admission Date: 7/28/2017  Length of Stay: 4 days  Attending Physician: Carlito Santana MD  Primary Care Provider: Mega Collins MD    Isolation Status: No active isolations  Assessment/Plan:      * Complication involving left ventricular assist device (LVAD)    75 y/o male with ICM s/p LVAD 10/2016 at [facility] seen in ID clinic 7/24 driveline infection with pseduomonas. Pt was on oral ciprofloxacin. Pt presented to ED for worsening drainage from driveline site for the past couple of days with associated abdominal pain. CT scan negative for fluid collections. Pt afebrile, without a leukocytosis, stable on examination. We are seeing him for abx management    Plan:  1.Continue cefepime 2gq12. Cultures +pseduomonas resistant to ciprofloxacin. Continue cefepime x 8 weeks for driveline infection end date 9/23/17.   2.will need ID to follow outpatient labs as patient will need weekly CBC, CMP, ESR, and CRP (our clinic fax is [phone])    Discussed with ID staff. Will sign off at this time. Please call if you have any questions.               Thank you for your consult. I will sign off. Please contact us if you have any additional questions.    Aura Spence PA-C  Infectious Disease  [facility] [phone]    Subjective:     Principal Problem:Complication involving left ventricular assist device (LVAD)    HPI: 76 male with ICM s/p LVAD 10/2016 at [facility] was seen in ID clinic 7/24 for drainage from driveline. Pt recently moved to [place] to stay with his daughter and initially noticed drainage from driveline about 2 weeks ago. Cultures +pseduomonas pansensitive and patient was placed on ciprofloxacin x 3 weeks. Pt reports doing well and tolerating medication well but noticed increase drainage from driveline site a few days ago. Pt reports  having associated abdominal pain. Denies having any fever,chills,n/v/d, or night sweats. In ED, no documented fevers, no leukocytosis, pt stable on examination. CT abdomen negative for fluid collections. Pt currently on IV vancomycin and cefepime.   Interval History:   NAEON. Oriented to name and time, not place. Mental status unchanged from yesterday. Cultures +pseduomonas resistant to ciprofloxacin. Will continue cefepime at this time. Afebrile, stable, without a leukocytosis.     Review of Systems   Constitutional: Negative for chills and fever.   HENT: Negative for congestion.    Respiratory: Negative for cough and shortness of breath.    Cardiovascular: Negative for chest pain.   Gastrointestinal: Positive for abdominal pain ( improved). Negative for diarrhea, nausea and vomiting.   Genitourinary: Negative for dysuria.   Skin: Positive for wound.   Neurological: Negative for dizziness and headaches.     Objective:     Vital Signs (Most Recent):  Temp: 98.1 °F (36.7 °C) (08/01/17 1101)  Pulse: 62 (08/01/17 1101)  Resp: 20 (08/01/17 1101)  BP: (!) 87/63 (08/01/17 1101)  SpO2: 100 % (08/01/17 1101) Vital Signs (24h Range):  Temp:  [97.9 °F (36.6 °C)-98.6 °F (37 °C)] 98.1 °F (36.7 °C)  Pulse:  [60-87] 62  Resp:  [9-35] 20  SpO2:  [98 %-100 %] 100 %  BP: ()/(0-91) 87/63     Weight: 74.3 kg (163 lb 12.8 oz)  Body mass index is 22.22 kg/m².    Estimated Creatinine Clearance: 42.6 mL/min (based on Cr of 1.6).    Physical Exam   Constitutional: He appears well-developed and well-nourished. No distress.   HENT:   Head: Normocephalic.   Eyes: Pupils are equal, round, and reactive to light.   Cardiovascular: Normal rate, regular rhythm and normal heart sounds.    No murmur heard.  LVAD Hum   Pulmonary/Chest: Effort normal. No respiratory distress. He has no wheezes. He has no rales.   Abdominal: Soft. He exhibits no distension.   driveline dressed at this time. No strikethrough noted   Musculoskeletal: Normal range  of motion.   Neurological: He is alert.   Skin: Skin is warm and dry. No rash noted. He is not diaphoretic. No erythema.   Psychiatric: He has a normal mood and affect.   Nursing note and vitals reviewed.      Significant Labs:   Blood Culture:   Recent Labs  Lab 07/28/17 2042 07/28/17 2058   LABBLOO No Growth to date  No Growth to date  No Growth to date  No Growth to date No Growth to date  No Growth to date  No Growth to date  No Growth to date     CBC:   Recent Labs  Lab 07/31/17 0400 08/01/17  0323   WBC 8.11 8.57   HGB 11.5* 12.0*   HCT 34.5* 35.1*    154     CMP:   Recent Labs  Lab 07/31/17 0400 08/01/17 0323    136   K 4.0 4.2    105   CO2 22* 22*   GLU 89 99   BUN 17 25*   CREATININE 1.1 1.6*   CALCIUM 9.1 9.6   PROT 6.0  --    ALBUMIN 2.8*  --    BILITOT 0.6  --    ALKPHOS 77  --    AST 19  --    ALT 13  --    ANIONGAP 6* 9   EGFRNONAA >60.0 41.8*     Wound Culture:   Recent Labs  Lab 07/20/17  1604 07/29/17  1056   LABAERO PSEUDOMONAS AERUGINOSAMany PSEUDOMONAS AERUGINOSAFew     All pertinent labs within the past 24 hours have been reviewed.    Significant Imaging: I have reviewed all pertinent imaging results/findings within the past 24 hours.

## 2017-08-01 NOTE — SUBJECTIVE & OBJECTIVE
Interval History: Confused. Incontinent of bladder and bowel, and won't keep a condom cath on. Generalized tremors. Back on [facility] gtt since 5 PM yesterday.    Continuous Infusions:   nicardipine 5 mg/hr (08/01/17 0414)     Scheduled Meds:   allopurinol  300 mg Oral Daily    amlodipine  10 mg Oral Daily    aspirin  81 mg Oral Daily    atorvastatin  10 mg Oral Daily    buPROPion  300 mg Oral Daily    calcium-vitamin D3  1 tablet Oral BID    ceFEPime (MAXIPIME) IVPB  2 g Intravenous Q12H    dofetilide  250 mcg Oral Q12H    folic acid  1 mg Oral Daily    lisinopril  5 mg Oral BID    magnesium oxide  400 mg Oral BID    multivitamin  1 tablet Oral Daily    ropinirole  0.5 mg Oral TID    sodium chloride 0.9%  3 mL Intravenous Q8H    spironolactone  25 mg Oral Daily    tamsulosin  0.4 mg Oral Daily    warfarin  2.5 mg Oral Daily     PRN Meds:bisacodyl    Review of patient's allergies indicates:   Allergen Reactions    Sulfa (sulfonamide antibiotics)      Objective:     Vital Signs (Most Recent):  Temp: 98.6 °F (37 °C) (08/01/17 0300)  Pulse: 60 (08/01/17 0300)  Resp: 16 (08/01/17 0300)  BP: 111/80 (08/01/17 0300)  SpO2: 99 % (08/01/17 0300) Vital Signs (24h Range):  Temp:  [97.9 °F (36.6 °C)-99 °F (37.2 °C)] 98.6 °F (37 °C)  Pulse:  [60-87] 60  Resp:  [9-35] 16  SpO2:  [93 %-100 %] 99 %  BP: ()/(0-91) 111/80     Weight: 74.3 kg (163 lb 12.8 oz)  Body mass index is 22.22 kg/m².      Intake/Output Summary (Last 24 hours) at 08/01/17 0557  Last data filed at 08/01/17 0400   Gross per 24 hour   Intake          1308.75 ml   Output             1500 ml   Net          -191.25 ml       Hemodynamic Parameters:       Telemetry: SR, BiV paced    Physical Exam   Constitutional: He appears well-developed and well-nourished.   HENT:   Head: Normocephalic and atraumatic.   Eyes: Conjunctivae and EOM are normal. Pupils are equal, round, and reactive to light.   Neck: Normal range of motion. Neck supple. No JVD  present.   Cardiovascular:   Smooth VAD hum. Pulsatile   Pulmonary/Chest: Effort normal and breath sounds normal.   Abdominal: Soft. Bowel sounds are normal.   Musculoskeletal: He exhibits no edema.   Neurological: He is alert.   Confused. Generalized tremors   Skin: Skin is warm and dry. Capillary refill takes 2 to 3 seconds.   Psychiatric:   Confused                           Significant Labs:  CBC:    Recent Labs  Lab 08/01/17  0323   WBC 8.57   RBC 3.89*   HGB 12.0*   HCT 35.1*      MCV 90   MCH 30.8   MCHC 34.2     BNP:    Recent Labs  Lab 07/29/17  0635   *     CMP:    Recent Labs  Lab 07/31/17  0400 08/01/17  0323   GLU 89 99   CALCIUM 9.1 9.6   ALBUMIN 2.8*  --    PROT 6.0  --     136   K 4.0 4.2   CO2 22* 22*    105   BUN 17 25*   CREATININE 1.1 1.6*   ALKPHOS 77  --    ALT 13  --    AST 19  --    BILITOT 0.6  --       Coagulation:     Recent Labs  Lab 08/01/17  0323   INR 2.2*   APTT 28.2     LDH:    Recent Labs  Lab 07/29/17  0635 07/30/17  0350 07/31/17  0400 08/01/17  0323    161 211 157     Microbiology:  Microbiology Results (last 7 days)     Procedure Component Value Units Date/Time    Blood culture #1 **CANNOT BE ORDERED STAT** [[id number]] Collected:  07/28/17 2042    Order Status:  Completed Specimen:  Blood from Peripheral, Forearm, Right Updated:  07/31/17 2212     Blood Culture, Routine No Growth to date     Blood Culture, Routine No Growth to date     Blood Culture, Routine No Growth to date     Blood Culture, Routine No Growth to date    Blood culture #2 **CANNOT BE ORDERED STAT** [[id number]] Collected:  07/28/17 2058    Order Status:  Completed Specimen:  Blood from Peripheral, Forearm, Left Updated:  07/31/17 2212     Blood Culture, Routine No Growth to date     Blood Culture, Routine No Growth to date     Blood Culture, Routine No Growth to date     Blood Culture, Routine No Growth to date    Aerobic culture [[id number]] Collected:  07/29/17 1056    Order  Status:  Completed Specimen:  Wound from Abdomen Updated:  07/31/17 0737     Aerobic Bacterial Culture --     GRAM NEGATIVE IWONA, NON-LACTOSE   Few  Identification and susceptibility pending      Urine culture [[id number]] Collected:  07/29/17 1111    Order Status:  Completed Specimen:  Urine from Urine, Catheterized Updated:  07/30/17 1956     Urine Culture, Routine No growth    Gram stain [[id number]] Collected:  07/29/17 1056    Order Status:  Completed Specimen:  Wound from Abdomen Updated:  07/30/17 0001     Gram Stain Result Few WBC's      Rare Gram negative rods    Culture, Anaerobe [[id number]] Collected:  07/29/17 1056    Order Status:  Sent Specimen:  Wound from Abdomen Updated:  07/29/17 1206    Urine culture [[id number]] Collected:  07/29/17 0553    Order Status:  Canceled Specimen:  Urine from Urine, Clean Catch Updated:  07/29/17 0554    Aerobic culture [[id number]]     Order Status:  No result Specimen:  Wound from Abdomen           I have reviewed all pertinent labs within the past 24 hours.    Estimated Creatinine Clearance: 42.6 mL/min (based on Cr of 1.6).

## 2017-08-01 NOTE — PROCEDURES
TXP LVAD INTERROGATIONS 8/1/2017 8/1/2017 8/1/2017 8/1/2017 8/1/2017 8/1/2017 8/1/2017   Type HeartMate3 HeartMate3 HeartMate3 HeartMate3 HeartMate3 HeartMate3 HeartMate3   Flow 5.2 5.1 5.2 5.3 5.1 5.2 4.9   Speed 5800 5800 5800 5800 5800 5800 5800   PI 3.1 3.1 3.1 3.0 3.0 3.1 3.9   Power (Mendez) 4.5 4.5 4.5 4.5 4.6 4.5 4.4   Pulsatility Intermittent pulse Intermittent pulse Intermittent pulse Intermittent pulse Intermittent pulse Intermittent pulse Intermittent pulse     Pt and daughter AAAO.  No alarms noted in history.  Many PI events noted.  HCT 35.1  Pulsatile  VAD sounds HM 3

## 2017-08-01 NOTE — ASSESSMENT & PLAN NOTE
- Appreciate Neuro's help: Patient currently with signs/symptoms of delirium.  Tremulous, myoclonic jerks as well as his disorientation suggest this.  Suspect current infection as primary cause.    - RPR non-reactive, B12 WNL

## 2017-08-01 NOTE — ASSESSMENT & PLAN NOTE
- HM III implanted at [facility] in [place] 10/2016  - Speed 5800  - INR therapeutic at 2.2 - continue Coumadin  - LD stable - continue ASA 81 mg qd  - Pulsatile with MAP's > 90's - BP control with Cardene gtt as above, Lisinopril, Norvasc  - DLES culture with GNR

## 2017-08-01 NOTE — ASSESSMENT & PLAN NOTE
- Cardene gtt resumed at ~ 5 PM yesterday to keep MAP < 90  - Still awaiting all outside records from [facility] clinic  - increased amlodipine to 10- mg po daily, now on 5 BID of lisinopril. Creatinine has bumped to 1.6, so will not increase Lisinopril but will add Hydralazine 25 mg tid

## 2017-08-01 NOTE — PT/OT/SLP PROGRESS
Occupational Therapy  Treatment    Kev Vasquez   MRN: [id number]   Admitting Diagnosis: Complication involving left ventricular assist device (LVAD)    OT Date of Treatment: 08/01/17   OT Start Time: 1336  OT Stop Time: 1414  OT Total Time (min): 38 min    Billable Minutes:  Self Care/Home Management 38    General Precautions: Standard, fall, LVAD  Orthopedic Precautions:    Braces:           Subjective:  Communicated with RN prior to session.    Pain/Comfort  Pain Rating 1: 0/10  Pain Rating Post-Intervention 1: 0/10    Objective:  Patient found with: blood pressure cuff, pulse ox (continuous), telemetry, peripheral IV     Functional Mobility:  Bed Mobility:  Rolling/Turning to Left: Maximum assistance  Scooting/Bridging: Maximum Assistance  Supine to Sit: Maximum Assistance  Sit to Supine: Minimum Assistance    Transfers:   Sit <> Stand Assistance: Moderate Assistance (with B HHA x 3 trials from EOB)  Sit <> Stand Assistive Device: No Assistive Device    Functional Ambulation: Max A x 2 with B HHA x 4 feet for two trials; Max A for sidestepping along EOB    Activities of Daily Living:  Feeding Level of Assistance: Stand by assistance  Feeding adaptive equipment:   UE Dressing Level of Assistance: Maximum assistance (donning back gown)  UE adaptive equipment:   LE Dressing Level of Assistance: Maximum assistance (donning socks seated EOB)  LE adaptive equipment:   Grooming Position: Standing  Grooming Level of Assistance: Contact guard assistance (Min<>Mod A for balance)  Toileting Where Assessed: Other (Comment)  Toileting Level of Assistance: Maximum assistance (ced care in standing at b/s )      Therapeutic Activities and Exercises:  Pt sat EOB with CGA<>Min A for static and dynamic sitting balance while engaged in dressing. Pt participated in standing activity multiple times during grooming and hygiene tasks with min<>mod A for balance d/t posterior lean. Pt required verbal and tactile cueing throughout to  "shift weight anteriorly. Pt stood for pericare at b/s x 2 minutes    AM-PAC 6 CLICK ADL   How much help from another person does this patient currently need?   1 = Unable, Total/Dependent Assistance  2 = A lot, Maximum/Moderate Assistance  3 = A little, Minimum/Contact Guard/Supervision  4 = None, Modified [place]/Independent    Putting on and taking off regular lower body clothing? : 2  Bathing (including washing, rinsing, drying)?: 2  Toileting, which includes using toilet, bedpan, or urinal? : 2  Putting on and taking off regular upper body clothing?: 2  Taking care of personal grooming such as brushing teeth?: 3  Eating meals?: 3  Total Score: 14     AM-PAC Raw Score CMS "G-Code Modifier Level of Impairment Assistance   6 % Total / Unable   7 - 8 CM 80 - 100% Maximal Assist   9-13 CL 60 - 80% Moderate Assist   14 - 19 CK 40 - 60% Moderate Assist   20 - 22 CJ 20 - 40% Minimal Assist   23 CI 1-20% SBA / CGA   24 CH 0% Independent/ Mod I       Patient left with bed in chair position with all lines intact, call button in reach, RN notified and spouse present    ASSESSMENT:  Kev Vasquez is a 76 y.o. male with a medical diagnosis of Complication involving left ventricular assist device (LVAD) and presents with confusion, weakness and decreased coordination/ataxia during functional tasks affecting ADL (I).    Rehab identified problem list/impairments: Rehab identified problem list/impairments: weakness, impaired endurance, impaired self care skills, gait instability, impaired functional mobilty, impaired balance, decreased lower extremity function, decreased safety awareness, impaired coordination, impaired cardiopulmonary response to activity    Rehab potential is good.    Activity tolerance: Good    Discharge recommendations: Discharge Facility/Level Of Care Needs: rehabilitation facility     Barriers to discharge: Barriers to Discharge: None    Equipment recommendations:  (TBD closer to d/c)     GOALS: "    Occupational Therapy Goals        Problem: Occupational Therapy Goal    Goal Priority Disciplines Outcome Interventions   Occupational Therapy Goal     OT, PT/OT Ongoing (interventions implemented as appropriate)    Description:  Goals to be met by:  2 weeks 8/12/17     Patient will increase functional independence with ADLs by performing:    Feeding with Supervision.  UE Dressing with Minimal Assistance.  LE Dressing with Minimal Assistance.  Grooming while seated with Supervision.  Toileting from bedside commode with Minimal Assistance for hygiene and clothing management.   Stand pivot transfers with Minimal Assistance.  Toilet transfer to bedside commode with Minimal Assistance.                      Plan:  Patient to be seen 5 x/week to address the above listed problems via self-care/home management, therapeutic activities, therapeutic exercises  Plan of Care expires: 08/14/17  Plan of Care reviewed with: patient, spouse         SHAUNA Hernandez  08/01/2017

## 2017-08-01 NOTE — PLAN OF CARE
Problem: Patient Care Overview  Goal: Plan of Care Review  Outcome: Ongoing (interventions implemented as appropriate)  No acute events throughout day. See vital signs and assessments in flowsheets. See below for updates on today's progress.      Pulmonary: RA, sats %     Cardiovascular: remains 100% paced, cardene drip stopped at 1100 with orders for MAP <90. Doppler pressures . HM3 - no alarms or events, driveline dressing CDI.       Neurological: disoriented to place and situation. RASS = 0     Gastrointestinal: tolerating ordered diet, no BM     Genitourinary: incontinent     Integumentary/Other: Plan for cardene to remain off, continue working with rehab therapy, continue antibiotics.     Infusions: none     Patient progressing towards goals as tolerated, plan of care communicated and reviewed with Kev Vasquez and family. All concerns addressed. Will continue to monitor.

## 2017-08-01 NOTE — SUBJECTIVE & OBJECTIVE
Interval History:   NAEON. Oriented to name and time, not place. Mental status unchanged from yesterday. Cultures +pseduomonas resistant to ciprofloxacin. Will continue cefepime at this time. Afebrile, stable, without a leukocytosis.     Review of Systems   Constitutional: Negative for chills and fever.   HENT: Negative for congestion.    Respiratory: Negative for cough and shortness of breath.    Cardiovascular: Negative for chest pain.   Gastrointestinal: Positive for abdominal pain ( improved). Negative for diarrhea, nausea and vomiting.   Genitourinary: Negative for dysuria.   Skin: Positive for wound.   Neurological: Negative for dizziness and headaches.     Objective:     Vital Signs (Most Recent):  Temp: 98.1 °F (36.7 °C) (08/01/17 1101)  Pulse: 62 (08/01/17 1101)  Resp: 20 (08/01/17 1101)  BP: (!) 87/63 (08/01/17 1101)  SpO2: 100 % (08/01/17 1101) Vital Signs (24h Range):  Temp:  [97.9 °F (36.6 °C)-98.6 °F (37 °C)] 98.1 °F (36.7 °C)  Pulse:  [60-87] 62  Resp:  [9-35] 20  SpO2:  [98 %-100 %] 100 %  BP: ()/(0-91) 87/63     Weight: 74.3 kg (163 lb 12.8 oz)  Body mass index is 22.22 kg/m².    Estimated Creatinine Clearance: 42.6 mL/min (based on Cr of 1.6).    Physical Exam   Constitutional: He appears well-developed and well-nourished. No distress.   HENT:   Head: Normocephalic.   Eyes: Pupils are equal, round, and reactive to light.   Cardiovascular: Normal rate, regular rhythm and normal heart sounds.    No murmur heard.  LVAD Hum   Pulmonary/Chest: Effort normal. No respiratory distress. He has no wheezes. He has no rales.   Abdominal: Soft. He exhibits no distension.   driveline dressed at this time. No strikethrough noted   Musculoskeletal: Normal range of motion.   Neurological: He is alert.   Skin: Skin is warm and dry. No rash noted. He is not diaphoretic. No erythema.   Psychiatric: He has a normal mood and affect.   Nursing note and vitals reviewed.      Significant Labs:   Blood Culture:    Recent Labs  Lab 07/28/17 2042 07/28/17 2058   LABBLOO No Growth to date  No Growth to date  No Growth to date  No Growth to date No Growth to date  No Growth to date  No Growth to date  No Growth to date     CBC:   Recent Labs  Lab 07/31/17  0400 08/01/17  0323   WBC 8.11 8.57   HGB 11.5* 12.0*   HCT 34.5* 35.1*    154     CMP:   Recent Labs  Lab 07/31/17 0400 08/01/17 0323    136   K 4.0 4.2    105   CO2 22* 22*   GLU 89 99   BUN 17 25*   CREATININE 1.1 1.6*   CALCIUM 9.1 9.6   PROT 6.0  --    ALBUMIN 2.8*  --    BILITOT 0.6  --    ALKPHOS 77  --    AST 19  --    ALT 13  --    ANIONGAP 6* 9   EGFRNONAA >60.0 41.8*     Wound Culture:   Recent Labs  Lab 07/20/17  1604 07/29/17  1056   LABAERO PSEUDOMONAS AERUGINOSAMany PSEUDOMONAS AERUGINOSAFew     All pertinent labs within the past 24 hours have been reviewed.    Significant Imaging: I have reviewed all pertinent imaging results/findings within the past 24 hours.

## 2017-08-01 NOTE — PT/OT/SLP PROGRESS
Occupational Therapy      Kev Vasquez  MRN: [id number]    Patient not seen today secondary to  (OT orders discontinued by PA). Will follow-up when new OT orders received.    SHAUNA Hernandez  8/1/2017

## 2017-08-01 NOTE — PROGRESS NOTES
[facility]  Heart Transplant  Progress Note    Patient Name: Kev Vasquez  MRN: [id number]  Admission Date: 7/28/2017  Hospital Length of Stay: 4 days  Attending Physician: Carlito Santana MD  Primary Care Provider: Mega Collins MD  Principal Problem:Complication involving left ventricular assist device (LVAD)    Subjective:     Interval History: Confused. Incontinent of bladder and bowel, and won't keep a condom cath on. Generalized tremors. Back on Cardene gtt since 5 PM yesterday.    Continuous Infusions:   nicardipine 5 mg/hr (08/01/17 0414)     Scheduled Meds:   allopurinol  300 mg Oral Daily    amlodipine  10 mg Oral Daily    aspirin  81 mg Oral Daily    atorvastatin  10 mg Oral Daily    buPROPion  300 mg Oral Daily    calcium-vitamin D3  1 tablet Oral BID    ceFEPime (MAXIPIME) IVPB  2 g Intravenous Q12H    dofetilide  250 mcg Oral Q12H    folic acid  1 mg Oral Daily    lisinopril  5 mg Oral BID    magnesium oxide  400 mg Oral BID    multivitamin  1 tablet Oral Daily    ropinirole  0.5 mg Oral TID    sodium chloride 0.9%  3 mL Intravenous Q8H    spironolactone  25 mg Oral Daily    tamsulosin  0.4 mg Oral Daily    warfarin  2.5 mg Oral Daily     PRN Meds:bisacodyl    Review of patient's allergies indicates:   Allergen Reactions    Sulfa (sulfonamide antibiotics)      Objective:     Vital Signs (Most Recent):  Temp: 98.6 °F (37 °C) (08/01/17 0300)  Pulse: 60 (08/01/17 0300)  Resp: 16 (08/01/17 0300)  BP: 111/80 (08/01/17 0300)  SpO2: 99 % (08/01/17 0300) Vital Signs (24h Range):  Temp:  [97.9 °F (36.6 °C)-99 °F (37.2 °C)] 98.6 °F (37 °C)  Pulse:  [60-87] 60  Resp:  [9-35] 16  SpO2:  [93 %-100 %] 99 %  BP: ()/(0-91) 111/80     Weight: 74.3 kg (163 lb 12.8 oz)  Body mass index is 22.22 kg/m².      Intake/Output Summary (Last 24 hours) at 08/01/17 0557  Last data filed at 08/01/17 0400   Gross per 24 hour   Intake          1308.75 ml   Output             1500 ml   Net           -191.25 ml       Hemodynamic Parameters:       Telemetry: SR, BiV paced    Physical Exam   Constitutional: He appears well-developed and well-nourished.   HENT:   Head: Normocephalic and atraumatic.   Eyes: Conjunctivae and EOM are normal. Pupils are equal, round, and reactive to light.   Neck: Normal range of motion. Neck supple. No JVD present.   Cardiovascular:   Smooth VAD hum. Pulsatile   Pulmonary/Chest: Effort normal and breath sounds normal.   Abdominal: Soft. Bowel sounds are normal.   Musculoskeletal: He exhibits no edema.   Neurological: He is alert.   Confused. Generalized tremors   Skin: Skin is warm and dry. Capillary refill takes 2 to 3 seconds.   Psychiatric:   Confused                           Significant Labs:  CBC:    Recent Labs  Lab 08/01/17  0323   WBC 8.57   RBC 3.89*   HGB 12.0*   HCT 35.1*      MCV 90   MCH 30.8   MCHC 34.2     BNP:    Recent Labs  Lab 07/29/17  0635   *     CMP:    Recent Labs  Lab 07/31/17  0400 08/01/17  0323   GLU 89 99   CALCIUM 9.1 9.6   ALBUMIN 2.8*  --    PROT 6.0  --     136   K 4.0 4.2   CO2 22* 22*    105   BUN 17 25*   CREATININE 1.1 1.6*   ALKPHOS 77  --    ALT 13  --    AST 19  --    BILITOT 0.6  --       Coagulation:     Recent Labs  Lab 08/01/17  0323   INR 2.2*   APTT 28.2     LDH:    Recent Labs  Lab 07/29/17  0635 07/30/17  0350 07/31/17  0400 08/01/17  0323    161 211 157     Microbiology:  Microbiology Results (last 7 days)     Procedure Component Value Units Date/Time    Blood culture #1 **CANNOT BE ORDERED STAT** [[id number]] Collected:  07/28/17 2042    Order Status:  Completed Specimen:  Blood from Peripheral, Forearm, Right Updated:  07/31/17 2212     Blood Culture, Routine No Growth to date     Blood Culture, Routine No Growth to date     Blood Culture, Routine No Growth to date     Blood Culture, Routine No Growth to date    Blood culture #2 **CANNOT BE ORDERED STAT** [[id number]] Collected:  07/28/17 2058     Order Status:  Completed Specimen:  Blood from Peripheral, Forearm, Left Updated:  07/31/17 2212     Blood Culture, Routine No Growth to date     Blood Culture, Routine No Growth to date     Blood Culture, Routine No Growth to date     Blood Culture, Routine No Growth to date    Aerobic culture [[id number]] Collected:  07/29/17 1056    Order Status:  Completed Specimen:  Wound from Abdomen Updated:  07/31/17 0737     Aerobic Bacterial Culture --     GRAM NEGATIVE IWONA, NON-LACTOSE   Few  Identification and susceptibility pending      Urine culture [[id number]] Collected:  07/29/17 1111    Order Status:  Completed Specimen:  Urine from Urine, Catheterized Updated:  07/30/17 1956     Urine Culture, Routine No growth    Gram stain [[id number]] Collected:  07/29/17 1056    Order Status:  Completed Specimen:  Wound from Abdomen Updated:  07/30/17 0001     Gram Stain Result Few WBC's      Rare Gram negative rods    Culture, Anaerobe [[id number]] Collected:  07/29/17 1056    Order Status:  Sent Specimen:  Wound from Abdomen Updated:  07/29/17 1206    Urine culture [[id number]] Collected:  07/29/17 0553    Order Status:  Canceled Specimen:  Urine from Urine, Clean Catch Updated:  07/29/17 0554    Aerobic culture [[id number]]     Order Status:  No result Specimen:  Wound from Abdomen           I have reviewed all pertinent labs within the past 24 hours.    Estimated Creatinine Clearance: 42.6 mL/min (based on Cr of 1.6).        Assessment and Plan:     Hypertensive urgency    - Cardene gtt resumed at ~ 5 PM yesterday to keep MAP < 90  - Still awaiting all outside records from [place] [facility]  - increased amlodipine to 10- mg po daily, now on 5 BID of lisinopril. Creatinine has bumped to 1.6, so will not increase Lisinopril but will add Hydralazine 25 mg tid          * Complication involving left ventricular assist device (LVAD)    - HM III implanted at [facility] in [place] 10/2016  - Speed 5800  - INR  therapeutic at 2.2 - continue Coumadin  - LD stable - continue ASA 81 mg qd  - Pulsatile with MAP's > 90's - BP control with Cardene gtt as above, Lisinopril, Norvasc  - Interrogation shows many PI events  -CT of abd/pelvis with no fluid collections  - DLES culture with GNR. Appreciate ID's help. Continue Cefepime and await sensitivities          Delirium    - Appreciate Neuro's help: Patient currently with signs/symptoms of delirium.  Tremulous, myoclonic jerks as well as his disorientation suggest this.  Suspect current infection as primary cause.    - RPR non-reactive, B12 WNL           Involuntary movements    - No known history of parkinsons disease, experiencing myoclonic jerks/tremors NOT suggestive of PD  - Currently not active as he should be given tremors and leg pain, very deconditioned.  - ddx per neuro delirium / myoclonic jerks from infectious etiologies / metabolic >>> postoperative states >> Fluid and electrolyte disturbances / toxicity >>> cns pathology / seziures. Appreciate assistance.         Restless leg syndrome    - continue ropinirole        Impaired functional mobility and endurance    - consulted PT/OT/ST  - Appreciate PM&R consult. They plan to discuss with rehab team for rehab recs. He is not medically ready for discharge        TANESHA (acute kidney injury)    - Creatinine has bumped to 1.6 (1.5 on admit, baseline ~ 1.1-1.3 - will monitor)                                [place] [id number]  Heart Transplant  [facility]

## 2017-08-01 NOTE — PLAN OF CARE
Problem: Patient Care Overview  Goal: Plan of Care Review  Outcome: Ongoing (interventions implemented as appropriate)  No acute events overnight. MAP remained between 80s-90s throughout shift. Patient remains on cardene at 5. Patient incontinent, placed an external catheter overnight and patient pulled it off. VAD without complication and alarms. VAD dressing changed, site remained red with creamy discharge. Patient disoriented to place. Refer to flowsheet for full assessment. POC reviewed with patient who verbalized understanding.

## 2017-08-02 LAB
ALBUMIN SERPL BCP-MCNC: 3 G/DL
ALP SERPL-CCNC: 83 U/L
ALT SERPL W/O P-5'-P-CCNC: 14 U/L
ANION GAP SERPL CALC-SCNC: 8 MMOL/L
APTT BLDCRRT: 24.5 SEC
AST SERPL-CCNC: 24 U/L
BACTERIA BLD CULT: NORMAL
BACTERIA BLD CULT: NORMAL
BACTERIA SPEC ANAEROBE CULT: NORMAL
BASOPHILS # BLD AUTO: 0.03 K/UL
BASOPHILS NFR BLD: 0.4 %
BILIRUB DIRECT SERPL-MCNC: 0.3 MG/DL
BILIRUB SERPL-MCNC: 0.7 MG/DL
BNP SERPL-MCNC: 122 PG/ML
BUN SERPL-MCNC: 21 MG/DL
CALCIUM SERPL-MCNC: 9.9 MG/DL
CHLORIDE SERPL-SCNC: 105 MMOL/L
CO2 SERPL-SCNC: 23 MMOL/L
CREAT SERPL-MCNC: 1.4 MG/DL
CRP SERPL-MCNC: 7.8 MG/L
DIFFERENTIAL METHOD: ABNORMAL
EOSINOPHIL # BLD AUTO: 0.2 K/UL
EOSINOPHIL NFR BLD: 3.2 %
ERYTHROCYTE [DISTWIDTH] IN BLOOD BY AUTOMATED COUNT: 16.5 %
EST. GFR  (AFRICAN AMERICAN): 56.8 ML/MIN/1.73 M^2
EST. GFR  (NON AFRICAN AMERICAN): 49.1 ML/MIN/1.73 M^2
GLUCOSE SERPL-MCNC: 87 MG/DL
HCT VFR BLD AUTO: 37.6 %
HGB BLD-MCNC: 12.7 G/DL
INR PPP: 1.9
LDH SERPL L TO P-CCNC: 208 U/L
LYMPHOCYTES # BLD AUTO: 1.1 K/UL
LYMPHOCYTES NFR BLD: 14.5 %
MAGNESIUM SERPL-MCNC: 2 MG/DL
MCH RBC QN AUTO: 30.5 PG
MCHC RBC AUTO-ENTMCNC: 33.8 G/DL
MCV RBC AUTO: 90 FL
MONOCYTES # BLD AUTO: 1 K/UL
MONOCYTES NFR BLD: 13.7 %
NEUTROPHILS # BLD AUTO: 5.1 K/UL
NEUTROPHILS NFR BLD: 67.9 %
PHOSPHATE SERPL-MCNC: 2.6 MG/DL
PLATELET # BLD AUTO: 171 K/UL
PMV BLD AUTO: 10.9 FL
POTASSIUM SERPL-SCNC: 3.9 MMOL/L
PREALB SERPL-MCNC: 18 MG/DL
PROT SERPL-MCNC: 6.7 G/DL
PROTHROMBIN TIME: 19.5 SEC
RBC # BLD AUTO: 4.17 M/UL
SODIUM SERPL-SCNC: 136 MMOL/L
WBC # BLD AUTO: 7.51 K/UL

## 2017-08-02 PROCEDURE — 80048 BASIC METABOLIC PNL TOTAL CA: CPT

## 2017-08-02 PROCEDURE — 93750 INTERROGATION VAD IN PERSON: CPT | Mod: ,,, | Performed by: THORACIC SURGERY (CARDIOTHORACIC VASCULAR SURGERY)

## 2017-08-02 PROCEDURE — 99233 SBSQ HOSP IP/OBS HIGH 50: CPT | Mod: 24,,, | Performed by: THORACIC SURGERY (CARDIOTHORACIC VASCULAR SURGERY)

## 2017-08-02 PROCEDURE — 86140 C-REACTIVE PROTEIN: CPT

## 2017-08-02 PROCEDURE — 25000003 PHARM REV CODE 250: Performed by: INTERNAL MEDICINE

## 2017-08-02 PROCEDURE — 83735 ASSAY OF MAGNESIUM: CPT

## 2017-08-02 PROCEDURE — 25000003 PHARM REV CODE 250: Performed by: STUDENT IN AN ORGANIZED HEALTH CARE EDUCATION/TRAINING PROGRAM

## 2017-08-02 PROCEDURE — 84134 ASSAY OF PREALBUMIN: CPT

## 2017-08-02 PROCEDURE — 99232 SBSQ HOSP IP/OBS MODERATE 35: CPT | Mod: ,,, | Performed by: INTERNAL MEDICINE

## 2017-08-02 PROCEDURE — 83615 LACTATE (LD) (LDH) ENZYME: CPT

## 2017-08-02 PROCEDURE — 83880 ASSAY OF NATRIURETIC PEPTIDE: CPT

## 2017-08-02 PROCEDURE — 97530 THERAPEUTIC ACTIVITIES: CPT

## 2017-08-02 PROCEDURE — 27000248 HC VAD-ADDITIONAL DAY

## 2017-08-02 PROCEDURE — 25000003 PHARM REV CODE 250: Performed by: NURSE PRACTITIONER

## 2017-08-02 PROCEDURE — 99232 SBSQ HOSP IP/OBS MODERATE 35: CPT | Mod: ,,, | Performed by: NURSE PRACTITIONER

## 2017-08-02 PROCEDURE — 20600001 HC STEP DOWN PRIVATE ROOM

## 2017-08-02 PROCEDURE — A4216 STERILE WATER/SALINE, 10 ML: HCPCS | Performed by: INTERNAL MEDICINE

## 2017-08-02 PROCEDURE — 97535 SELF CARE MNGMENT TRAINING: CPT

## 2017-08-02 PROCEDURE — 85610 PROTHROMBIN TIME: CPT

## 2017-08-02 PROCEDURE — 92507 TX SP LANG VOICE COMM INDIV: CPT

## 2017-08-02 PROCEDURE — 80076 HEPATIC FUNCTION PANEL: CPT

## 2017-08-02 PROCEDURE — 84100 ASSAY OF PHOSPHORUS: CPT

## 2017-08-02 PROCEDURE — 63600175 PHARM REV CODE 636 W HCPCS: Performed by: INTERNAL MEDICINE

## 2017-08-02 PROCEDURE — 85730 THROMBOPLASTIN TIME PARTIAL: CPT

## 2017-08-02 PROCEDURE — 85025 COMPLETE CBC W/AUTO DIFF WBC: CPT

## 2017-08-02 RX ORDER — WARFARIN 1 MG/1
1 TABLET ORAL ONCE
Status: COMPLETED | OUTPATIENT
Start: 2017-08-02 | End: 2017-08-02

## 2017-08-02 RX ORDER — HYDRALAZINE HYDROCHLORIDE 50 MG/1
50 TABLET, FILM COATED ORAL EVERY 8 HOURS
Status: DISCONTINUED | OUTPATIENT
Start: 2017-08-02 | End: 2017-08-07

## 2017-08-02 RX ORDER — NICARDIPINE HYDROCHLORIDE 0.2 MG/ML
1 INJECTION INTRAVENOUS CONTINUOUS
Status: DISCONTINUED | OUTPATIENT
Start: 2017-08-02 | End: 2017-08-02

## 2017-08-02 RX ORDER — PROMETHAZINE HYDROCHLORIDE 12.5 MG/1
12.5 TABLET ORAL EVERY 6 HOURS PRN
Status: COMPLETED | OUTPATIENT
Start: 2017-08-02 | End: 2017-08-08

## 2017-08-02 RX ORDER — ACETAMINOPHEN 325 MG/1
650 TABLET ORAL EVERY 6 HOURS PRN
Status: DISCONTINUED | OUTPATIENT
Start: 2017-08-03 | End: 2017-08-08 | Stop reason: HOSPADM

## 2017-08-02 RX ADMIN — HYDRALAZINE HYDROCHLORIDE 50 MG: 50 TABLET ORAL at 01:08

## 2017-08-02 RX ADMIN — Medication 3 ML: at 09:08

## 2017-08-02 RX ADMIN — OYSTER SHELL CALCIUM WITH VITAMIN D 1 TABLET: 500; 200 TABLET, FILM COATED ORAL at 09:08

## 2017-08-02 RX ADMIN — THERA TABS 1 TABLET: TAB at 08:08

## 2017-08-02 RX ADMIN — CEFEPIME HYDROCHLORIDE 2 G: 2 INJECTION, SOLUTION INTRAVENOUS at 01:08

## 2017-08-02 RX ADMIN — HYDRALAZINE HYDROCHLORIDE 50 MG: 50 TABLET ORAL at 09:08

## 2017-08-02 RX ADMIN — WARFARIN SODIUM 6 MG: 5 TABLET ORAL at 04:08

## 2017-08-02 RX ADMIN — ALLOPURINOL 300 MG: 300 TABLET ORAL at 08:08

## 2017-08-02 RX ADMIN — LISINOPRIL 5 MG: 5 TABLET ORAL at 08:08

## 2017-08-02 RX ADMIN — TAMSULOSIN HYDROCHLORIDE 0.4 MG: 0.4 CAPSULE ORAL at 08:08

## 2017-08-02 RX ADMIN — ROPINIROLE 0.5 MG: 0.5 TABLET, FILM COATED ORAL at 01:08

## 2017-08-02 RX ADMIN — ASPIRIN 81 MG CHEWABLE TABLET 81 MG: 81 TABLET CHEWABLE at 08:08

## 2017-08-02 RX ADMIN — NICARDIPINE HYDROCHLORIDE 2 MG/HR: 0.2 INJECTION, SOLUTION INTRAVENOUS at 04:08

## 2017-08-02 RX ADMIN — FOLIC ACID 1 MG: 1 TABLET ORAL at 08:08

## 2017-08-02 RX ADMIN — SPIRONOLACTONE 25 MG: 25 TABLET, FILM COATED ORAL at 08:08

## 2017-08-02 RX ADMIN — DOFETILIDE 250 MCG: 0.12 CAPSULE ORAL at 09:08

## 2017-08-02 RX ADMIN — CEFEPIME HYDROCHLORIDE 2 G: 2 INJECTION, SOLUTION INTRAVENOUS at 03:08

## 2017-08-02 RX ADMIN — DOFETILIDE 250 MCG: 0.12 CAPSULE ORAL at 08:08

## 2017-08-02 RX ADMIN — MAGNESIUM OXIDE TAB 400 MG (241.3 MG ELEMENTAL MG) 400 MG: 400 (241.3 MG) TAB at 08:08

## 2017-08-02 RX ADMIN — WARFARIN SODIUM 1 MG: 1 TABLET ORAL at 06:08

## 2017-08-02 RX ADMIN — OYSTER SHELL CALCIUM WITH VITAMIN D 1 TABLET: 500; 200 TABLET, FILM COATED ORAL at 08:08

## 2017-08-02 RX ADMIN — AMLODIPINE BESYLATE 10 MG: 10 TABLET ORAL at 08:08

## 2017-08-02 RX ADMIN — PROMETHAZINE HYDROCHLORIDE 12.5 MG: 12.5 TABLET ORAL at 08:08

## 2017-08-02 RX ADMIN — MAGNESIUM OXIDE TAB 400 MG (241.3 MG ELEMENTAL MG) 400 MG: 400 (241.3 MG) TAB at 09:08

## 2017-08-02 RX ADMIN — ATORVASTATIN CALCIUM 10 MG: 10 TABLET, FILM COATED ORAL at 08:08

## 2017-08-02 RX ADMIN — HYDRALAZINE HYDROCHLORIDE 50 MG: 50 TABLET ORAL at 06:08

## 2017-08-02 RX ADMIN — BUPROPION HYDROCHLORIDE 300 MG: 150 TABLET, FILM COATED, EXTENDED RELEASE ORAL at 08:08

## 2017-08-02 RX ADMIN — LISINOPRIL 5 MG: 5 TABLET ORAL at 09:08

## 2017-08-02 RX ADMIN — NICARDIPINE HYDROCHLORIDE 2 MG/HR: 0.2 INJECTION, SOLUTION INTRAVENOUS at 07:08

## 2017-08-02 RX ADMIN — ACETAMINOPHEN 650 MG: 325 TABLET ORAL at 11:08

## 2017-08-02 RX ADMIN — ROPINIROLE 0.5 MG: 0.5 TABLET, FILM COATED ORAL at 09:08

## 2017-08-02 RX ADMIN — Medication 3 ML: at 01:08

## 2017-08-02 RX ADMIN — ROPINIROLE 0.5 MG: 0.5 TABLET, FILM COATED ORAL at 06:08

## 2017-08-02 NOTE — PT/OT/SLP PROGRESS
"Occupational Therapy  Treatment    Kev Vasquez   MRN: [id number]   Admitting Diagnosis: Complication involving left ventricular assist device (LVAD)    OT Date of Treatment: 08/02/17   OT Start Time: 1037  OT Stop Time: 1112  OT Total Time (min): 35 min    Billable Minutes:  Self Care/Home Management 25    General Precautions: Standard, LVAD, fall  Orthopedic Precautions:    Braces:           Subjective:  Communicated with RN prior to session.  "I'm going to play golf later."    Pain/Comfort  Pain Rating 1: 0/10  Pain Rating Post-Intervention 1: 0/10    Objective:  Patient found with: blood pressure cuff, pulse ox (continuous), telemetry, peripheral IV (LVAD)     Functional Mobility:  Bed Mobility:  Rolling/Turning to Left: Minimum assistance  Scooting/Bridging: Minimum Assistance (to EOB)  Supine to Sit: Minimum Assistance    Transfers:   Sit <> Stand Assistance: Minimum Assistance (x 2 persons with B HHA from EOB and b/s chair)  Sit <> Stand Assistive Device: No Assistive Device  Bed <> Chair Technique: Stand Pivot  Bed <> Chair Transfer Assistance: Minimum Assistance  Bed <> Chair Assistive Device:  (B HHA)    Functional Ambulation: Min A x 2 to/from bathroom    Activities of Daily Living:  Feeding Level of Assistance: Stand by assistance  Feeding adaptive equipment:   UE Dressing Level of Assistance: Moderate assistance  UE adaptive equipment:   LE Dressing Level of Assistance: Moderate assistance (doffing slipper socks and donning socks/tennis shoes seated EOB)  LE adaptive equipment:   Grooming Position: Standing  Grooming Level of Assistance: Contact guard assistance (with CGA<> Mod A for balance)      Therapeutic Activities and Exercises:  Pt participated in dressing and grooming/hygiene tasks as above.  Pt performed functional mobility during ADL with B HHA and minimal A with decreased posterior lean noted compared to previous session  Pt left [facility]    AM-PAC 6 CLICK ADL   How much help from another person " "does this patient currently need?   1 = Unable, Total/Dependent Assistance  2 = A lot, Maximum/Moderate Assistance  3 = A little, Minimum/Contact Guard/Supervision  4 = None, Modified [place]/Independent    Putting on and taking off regular lower body clothing? : 2  Bathing (including washing, rinsing, drying)?: 2  Toileting, which includes using toilet, bedpan, or urinal? : 2  Putting on and taking off regular upper body clothing?: 3  Taking care of personal grooming such as brushing teeth?: 3  Eating meals?: 3  Total Score: 15     AM-PAC Raw Score CMS "G-Code Modifier Level of Impairment Assistance   6 % Total / Unable   7 - 8 CM 80 - 100% Maximal Assist   9-13 CL 60 - 80% Moderate Assist   14 - 19 CK 40 - 60% Moderate Assist   20 - 22 CJ 20 - 40% Minimal Assist   23 CI 1-20% SBA / CGA   24 CH 0% Independent/ Mod I       Patient left up in chair with all lines intact, call button in reach and RN notified    ASSESSMENT:  Kev Vasquez is a 76 y.o. male with a medical diagnosis of Complication involving left ventricular assist device (LVAD) and presents with decreased coordination, confusion and generalized weakness affecting ADL (I).    Rehab identified problem list/impairments: Rehab identified problem list/impairments: weakness, impaired endurance, impaired self care skills, impaired balance, gait instability, impaired functional mobilty, decreased coordination, impaired cognition, decreased safety awareness, impaired cardiopulmonary response to activity    Rehab potential is good.    Activity tolerance: Good    Discharge recommendations: Discharge Facility/Level Of Care Needs: rehabilitation facility     Barriers to discharge: Barriers to Discharge: None    Equipment recommendations:  (TBD closer to d/c)     GOALS:    Occupational Therapy Goals        Problem: Occupational Therapy Goal    Goal Priority Disciplines Outcome Interventions   Occupational Therapy Goal     OT, PT/OT Ongoing (interventions " implemented as appropriate)    Description:  Goals to be met by:  2 weeks 8/12/17     Patient will increase functional independence with ADLs by performing:    Feeding with Supervision.  UE Dressing with Minimal Assistance.  LE Dressing with Minimal Assistance.  Grooming while seated with Supervision.  Toileting from bedside commode with Minimal Assistance for hygiene and clothing management.   Stand pivot transfers with Minimal Assistance.  Toilet transfer to bedside commode with Minimal Assistance.                      Plan:  Patient to be seen 5 x/week to address the above listed problems via self-care/home management, therapeutic activities, therapeutic exercises  Plan of Care expires: 08/14/17  Plan of Care reviewed with: patient         She SHAUNA Huang  08/02/2017

## 2017-08-02 NOTE — NURSING
LVAD dressing change performed. Noted purulent drainage, swelling and redness. In addition, the site is very sensitive to touch. Next due on tomorrow 8/3/17 with soap and water. Notified Charlette Can NP on findings. No further orders given. Will continue to monitor.

## 2017-08-02 NOTE — PT/OT/SLP PROGRESS
"Speech Language Pathology  Treatment    Kev Vasquez   MRN: [id number]   Admitting Diagnosis: Complication involving left ventricular assist device (LVAD)    Diet recommendations: Solid Diet Level: Regular  Liquid Diet Level: Thin   Aspiration Precautions.    SLP Treatment Date: 08/02/17  Speech Start Time: 1113     Speech Stop Time: 1140     Speech Total (min): 27 min       TREATMENT BILLABLE MINUTES:  Speech Therapy Individual 27    Has the patient been evaluated by SLP for swallowing? : Yes  Keep patient NPO?: No   General Precautions: Standard, fall, LVAD          Subjective:  "I don't have much balance" (pt stated when asked situation)    Pain/Comfort  Pain Rating 1: 0/10  Pain Rating Post-Intervention 1: 0/10    Objective:    Pt seen this am sitting up in chair at bedside.  He was awake/alert and cooperative throughout session.  No family was present.  Mr. Vasquez was oriented to self, month, date, and year.  He recalled partial situation and needed cues to state place.  Pt recalled personal information with fair ability in conversation.  Following a delay, pt recalled 2/3 words ind'ly and 3/3 give cues.  Receptively, Mr. Vasquez responded to complex y/n questions with 100% accuracy and followed 3 step commands with 75% ind'ly/100% given cues/repetition.  Pt performed categorization task with 100% accuracy given min cues to comprehend directions at start of session.   Ongoing education provided regarding continued ST role, situation, and importance of cognitive stimulation. Pt verbalized some understanding; however, full is extent is likely reduced.    Assessment:  Kev Vasquez is a 76 y.o. male with a medical diagnosis of Complication involving left ventricular assist device (LVAD) and presents with cognitive/linguistic deficits.    Discharge recommendations: Discharge Facility/Level Of Care Needs: rehabilitation facility     Goals:    SLP Goals        Problem: SLP Goal    Goal Priority Disciplines Outcome   SLP " Goal     SLP    Description:  Speech Language Pathology Goals  Goals expected to be met by 8/7:  1. Pt will orient x 4 given min cues.  2. Pt will recall general information with 80% accuracy given min cues.  3. Following a 3 minute delay, pt will recall 3/3 unrelated words given modified independence.  4. Pt will follow complex commands with 70% accuracy given mod cues.  5. Pt will complete problem solving tasks with 80% accuracy given min cues.  6. Pt will participate in ongoing evaluation of word fluency, categorization, reading, writing, visual spatial, and math/time/money management abilities.                         Plan:   Patient to be seen Therapy Frequency: 5 x/week   Plan of Care expires: 08/29/17  Plan of Care reviewed with: patient  SLP Follow-up?: Yes             SANTOSH Parker, CCC-SLP  Speech Language Pathologist  [phone]  8/2/2017

## 2017-08-02 NOTE — PROGRESS NOTES
[facility]  Physical Medicine & Rehab  Progress Note    Patient Name: Kev Vasquez  MRN: [id number]  Admission Date: 7/28/2017  Length of Stay: 5 days  Attending Physician: Carlito Santana MD  Collaborating Physician: Rashad Garcia MD    Subjective:     Principal Problem:Complication involving left ventricular assist device (LVAD)     Interval History (08/02/2017): Patient is seen for follow-up rehab evaluation and recommendations.  No acute events over night.  Cardene gtt stopped.  Participating with therapy.  Barriers for discharge/rehab admission: not medically ready for discharge.     HPI, Past Medical, Surgical, Family, and Social History remains the same as documented in the initial encounter.      Hospital Course:   7/29/17:  Evaluated by therapy.  Bed mobility totalA.  Sit to stand totalA.  Ambulated 4 lateral step totalA.  UBD totalA and LBD totalA.  7/31/17: Participating with therapy.  Bed mobility Min-MaxA.  Sit to stand ModA.  Ambulated 6 ft. MaxA.  LBD MaxA.  8/1/17: SLP recommending regular diet and thin liquids. Cognitive linguistic impairments noted.  8/1/17: Participating with OT.  Bed mobility Min-MaxA.  Sit to stand ModA.  Ambulated 4 ft x2 trials MaxA.  UBD MaxA and LBD MaxA.    AM-PAC 6 CLICK MOBILITY (PT) - Total score: 10 (7/30), 11 (7/31)  AM-PAC 6 CLICK ADL (OT) - Total score: 6 (7/30), 6 (7/31), 14 (8/1)    AM-PAC Raw Score Level of Impairment Assistance   6 100% Total / Unable   7 - 8 80 - 100% Maximal Assist   9-13 60 - 80% Moderate Assist   14 - 19 40 - 60% Moderate Assist   20 - 22 20 - 40% Minimal Assist   23 1-20% SBA / CGA   24 0% Independent/ Mod I       Past Medical History:   Diagnosis Date    Acute on chronic systolic heart failure 7/27/2015    AICD (automatic cardioverter/defibrillator) present 2014    [employer]    CAD (coronary artery disease) 7/27/2015    CHF (congestive heart failure)     Gait instability     HTN (hypertension) 7/27/2015    ICD  (implantable cardioverter-defibrillator), biventricular, in situ 7/27/2015    Kidney stones     HILLARY treated with BiPAP 7/27/2015    Peripheral neuropathy     Pulmonary hypertension due to left ventricular systolic dysfunction 7/29/2015    Restless leg syndrome 1992    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    Skin cancer     excision 2013    Sleep apnea 1992     Past Surgical History:   Procedure Laterality Date    CARDIAC PACEMAKER PLACEMENT      pacemaker previously, then AICD in 2006. AICD [employer] serial #[id number]    CORONARY ARTERY BYPASS GRAFT  1993    KNEE SURGERY Left 2001    complicated by infection, hardware had to be taken out and replaced    LEFT VENTRICULAR ASSIST DEVICE  10/19/2016     Review of patient's allergies indicates:   Allergen Reactions    Sulfa (sulfonamide antibiotics)        Scheduled Medications:    allopurinol  300 mg Oral Daily    amlodipine  10 mg Oral Daily    aspirin  81 mg Oral Daily    atorvastatin  10 mg Oral Daily    buPROPion  300 mg Oral Daily    calcium-vitamin D3  1 tablet Oral BID    ceFEPime (MAXIPIME) IVPB  2 g Intravenous Q12H    dofetilide  250 mcg Oral Q12H    folic acid  1 mg Oral Daily    hydrALAZINE  50 mg Oral Q8H    lisinopril  5 mg Oral BID    magnesium oxide  400 mg Oral BID    multivitamin  1 tablet Oral Daily    ropinirole  0.5 mg Oral TID    sodium chloride 0.9%  3 mL Intravenous Q8H    spironolactone  25 mg Oral Daily    tamsulosin  0.4 mg Oral Daily    warfarin  6 mg Oral Daily       PRN Medications: bisacodyl    Family History     Problem Relation (Age of Onset)    Cancer Daughter (50)    Diabetes Daughter    Heart disease Daughter        Social History Main Topics    Smoking status: Never Smoker    Smokeless tobacco: Never Used    Alcohol use No    Drug use: No    Sexual activity: Not on file      Comment:      Review of Systems   Constitutional: Negative for chills and fever.   HENT: Negative  for trouble swallowing and voice change.    Eyes: Negative for photophobia and visual disturbance.   Respiratory: Negative for cough, shortness of breath and wheezing.    Cardiovascular: Negative for chest pain and palpitations.   Gastrointestinal: Negative for abdominal distention and nausea.   Genitourinary: Negative for difficulty urinating and flank pain.   Musculoskeletal: Positive for gait problem. Negative for arthralgias and myalgias.   Skin: Positive for wound (driveline site infection). Negative for color change.   Neurological: Positive for tremors and weakness. Negative for numbness and headaches.   Psychiatric/Behavioral: Negative for agitation and confusion.     Objective:     Vital Signs (Most Recent):  Temp: 98 °F (36.7 °C) (08/02/17 0705)  Pulse: 89 (08/02/17 0800)  Resp: (!) 27 (08/02/17 0800)  BP: (!) 120/58 (08/02/17 0800)  SpO2: 99 % (08/02/17 0800)    Vital Signs (24h Range):  Temp:  [98 °F (36.7 °C)-99 °F (37.2 °C)] 98 °F (36.7 °C)  Pulse:  [60-89] 89  Resp:  [12-35] 27  SpO2:  [98 %-100 %] 99 %  BP: ()/(0-96) 120/58     Body mass index is 22.22 kg/m².    Physical Exam   Constitutional: He is oriented to person, place, and time. He appears well-developed and well-nourished.   HENT:   Head: Normocephalic and atraumatic.   Eyes: EOM are normal. Pupils are equal, round, and reactive to light.   Neck: Normal range of motion.   Cardiovascular: Normal rate and regular rhythm.    Pulmonary/Chest: Effort normal. No respiratory distress.   LVAD noted    Abdominal: Soft. There is no tenderness.   Musculoskeletal: Normal range of motion.   Neurological: He is alert and oriented to person, place, and time. He displays tremor. No sensory deficit. He exhibits normal muscle tone.   RUE: 5/5, 5/5 .  LUE: 5/5, 5/5 .  RLE: 4/5, DF 5/5, PF 5/5.  LLE: 5/5, DF 5/5, PF 5/5.  Generalized weakness noted through upper body.   Skin:   Bandage to driveline site C/D/I  Mild tenderness to site     Psychiatric: He has a normal mood and affect. His behavior is normal.     NEUROLOGICAL EXAMINATION:     MENTAL STATUS   Oriented to person, place, and time.     CRANIAL NERVES     CN III, IV, VI   Pupils are equal, round, and reactive to light.  Extraocular motions are normal.       Diagnostic Results:   Labs: Reviewed  CT: Reviewed      Assessment/Plan:      Impaired functional mobility and endurance    Recommendations  -  Encourage mobility, OOB in chair at least 3 hours per day, and early ambulation as appropriate  -  PT/OT evaluate and treat  -  Pain management  -  Monitor for and prevent skin breakdown and pressure ulcers  · Early mobility, repositioning/weight shifting every 20-30 minutes when sitting, turn patient every 2 hours, proper mattress/overlay and chair cushioning, pressure relief/heel protector boots  -  DVT prophylaxis:  Coumadin  -  Reviewed discharge options (IP rehab, SNF, HH therapy, and OP therapy)          Restless leg syndrome    -on Requip  -continue Ropinirole per Neuro        Involuntary movements    -Neurology consulted  -continue home Ropinirole per Neuro          Delirium    Recommendations  -  Monitor sleep disturbances and establish consistent sleep-wake cycle  ·  Day time- lights on and shades open, night time- lights dim/off  -  Environmental modifications to limit agitation/confusion   · Appropriate lighting, family at bedside, visible clock and calendar, updated white board, reduce noise, limited visitors, clustered nursing care  -  Reorient patient to person, place, time, and situation on each encounter  -  If possible, avoid restraints  -  May benefit from 24/7 supervision by sitter or camera sitter  -  Avoid/limit medications that can worsen delirium (benzodiazepines, antihistamines, anticholinergics, hypnotics, opiates)  -  Encourage mobility, OOB in chair at least 3 hours per day, and early ambulation as appropriate  -  PT/OT evaluate and treat            LVAD (left  ventricular assist device) present    -s/p 10/2016        * Complication involving left ventricular assist device (LVAD)    -per primary team  - LVAD driveline site infection   -Currently on IV Cefepime         Patient approved for [facility].  Transfer to rehab whe he is medically ready for discharge.        Cathleen Allen NP  Department of Physical Medicine & Rehab   [facility]

## 2017-08-02 NOTE — PLAN OF CARE
Problem: Occupational Therapy Goal  Goal: Occupational Therapy Goal  Goals to be met by:  2 weeks 8/12/17     Patient will increase functional independence with ADLs by performing:    Feeding with Supervision.  UE Dressing with Minimal Assistance.  LE Dressing with Minimal Assistance.  Grooming while seated with Supervision.  Toileting from bedside commode with Minimal Assistance for hygiene and clothing management.   Stand pivot transfers with Minimal Assistance.  Toilet transfer to bedside commode with Minimal Assistance.     Outcome: Ongoing (interventions implemented as appropriate)  The above goals remain appropriate. SHAUNA Hernandez 8/2/2017

## 2017-08-02 NOTE — PT/OT/SLP PROGRESS
Physical Therapy  Treatment    Kev Vasquez   MRN: [id number]   Admitting Diagnosis: Complication involving left ventricular assist device (LVAD)    PT Received On: 08/01/17  PT Start Time: 1334     PT Stop Time: 1412    PT Total Time (min): 38 min       Billable Minutes:  Gait Training 10 mins and Therapeutic Activity 28 mins    Treatment Type: Treatment  PT/PTA: PT     PTA Visit Number: 0       General Precautions: Standard, fall, LVAD  Orthopedic Precautions: N/A   Braces: N/A    Do you have any cultural, spiritual, Church conflicts, given your current situation?: none noted     Subjective:  Communicated with RN prior to session.  Pt agreeable to session    Pain/Comfort  Pain Rating 1: 0/10  Pain Rating Post-Intervention 1: 0/10    Objective:   Patient found with: blood pressure cuff, pulse ox (continuous), telemetry, peripheral IV (LVAD)    Functional Mobility:  Bed Mobility:   Rolling/Turning to Left: Maximum assistance  Scooting/Bridging: Maximum Assistance  Supine to Sit: Maximum Assistance  Sit to Supine: Minimum Assistance    Transfers:  Sit <> Stand Assistance: Moderate Assistance  Sit <> Stand Assistive Device:  (with B HHA x 3 trials from EOB)    Gait:   Gait Distance: 4 feet x2 (fwd/bkwd) + 3 feet (sidesteps to HOB)  Assistance 1: Maximum assistance (with assist x2)  Gait Assistive Device: Hand held assist  Gait Pattern: 2-point gait  Gait Deviation(s): decreased william, increased time in double stance, decreased step length, decreased stride length, decreased swing-to-stance ratio, decreased toe-to-floor clearance, decreased weight-shifting ability, decreased velocity of limb motion, backward lean (ataxic, scissoring)    Balance:   Static Sit: POOR+: Needs MINIMAL assist to maintain  Dynamic Sit: POOR: N/A  Static Stand: POOR+: Needs MINIMAL assist to maintain  Dynamic stand: POOR: N/A     Therapeutic Activities and Exercises:  Pt sat EOB with CGA<>Min A for static and dynamic sitting balance while  engaged in dressing. Pt participated in standing activity multiple times during grooming and hygiene tasks with min<>mod A for balance d/t posterior lean. Pt required verbal and tactile cueing throughout to shift weight anteriorly. Pt stood for pericare at b/s x 2 minutes     AM-PAC 6 CLICK MOBILITY  How much help from another person does this patient currently need?   1 = Unable, Total/Dependent Assistance  2 = A lot, Maximum/Moderate Assistance  3 = A little, Minimum/Contact Guard/Supervision  4 = None, Modified [place]/Independent    Turning over in bed (including adjusting bedclothes, sheets and blankets)?: 2  Sitting down on and standing up from a chair with arms (e.g., wheelchair, bedside commode, etc.): 2  Moving from lying on back to sitting on the side of the bed?: 2  Moving to and from a bed to a chair (including a wheelchair)?: 2  Need to walk in hospital room?: 2  Climbing 3-5 steps with a railing?: 1  Total Score: 11    AM-PAC Raw Score CMS G-Code Modifier Level of Impairment Assistance   6 % Total / Unable   7 - 9 CM 80 - 100% Maximal Assist   10 - 14 CL 60 - 80% Moderate Assist   15 - 19 CK 40 - 60% Moderate Assist   20 - 22 CJ 20 - 40% Minimal Assist   23 CI 1-20% SBA / CGA   24 CH 0% Independent/ Mod I     Patient left supine with all lines intact, call button in reach, RN notified and wife present.    Assessment:  Kev Vasquez is a 76 y.o. male with a medical diagnosis of Complication involving left ventricular assist device (LVAD) and presents with deficits listed below. Pt will need skilled PT to address deficits and increase functional mobility as able.    Rehab identified problem list/impairments: Rehab identified problem list/impairments: weakness, impaired endurance, impaired functional mobilty, gait instability, impaired balance, decreased coordination, decreased lower extremity function, decreased safety awareness, impaired cardiopulmonary response to activity    Rehab  potential is good.    Activity tolerance: Good    Discharge recommendations: Discharge Facility/Level Of Care Needs: rehabilitation facility     Barriers to discharge: Barriers to Discharge: None    Equipment recommendations: Equipment Needed After Discharge:  (TBD closer to d/c)     GOALS:    Physical Therapy Goals        Problem: Physical Therapy Goal    Goal Priority Disciplines Outcome Goal Variances Interventions   Physical Therapy Goal     PT/OT, PT Ongoing (interventions implemented as appropriate)     Description:  Goals to be met by: 17     Patient will increase functional independence with mobility by performin. Supine to sit with Moderate Assistance  2. Sit to supine with Moderate Assistance  3. Sit to stand transfer with Moderate Assistance  4. Bed to chair transfer with Moderate Assistance using Rolling Walker or appropriate AD  5. Gait  x 10 feet with Moderate Assistance using Rolling Walker or appropriate AD.   6. Stand for 2 minutes with Minimal Assistance using Rolling Walker or appropriate AD without LOB  7. Lower extremity exercise program x10 reps, with supervision, in order to increase LE strength and (I) with functional mobility.                       PLAN:    Patient to be seen 5 x/week  to address the above listed problems via gait training, therapeutic activities, therapeutic exercises, neuromuscular re-education  Plan of Care expires: 17  Plan of Care reviewed with: patient, spouse         Cindy JAYLA Green, PT  2017

## 2017-08-02 NOTE — ASSESSMENT & PLAN NOTE
- HM III implanted at [facility] in [place] 10/2016  - Speed 5800  - INR has dipped to 1.9 - will give 1 mg Coumadin boost now. Dose was increased to 6 mg yesterday from 2.5.  - LD stable - continue ASA 81 mg qd  - Pulsatile with MAP's > 80's - BP control with Cardene gtt as above, Lisinopril, Norvasc and Hydralazine  - CT of abdomen/pelvis with no fluid collections  - DLES culture with Pseudomonas resistant to Cipro. Appreciate ID's help - continue Cefepime X 8 weeks (end 9/23/17). Weekly CBC, CMP, ESR, CRP to ID ([phone]) once discharged

## 2017-08-02 NOTE — PROGRESS NOTES
Daily E and M and VAD Interrogation Note    Reason for Visit: Driveline evaluation   Patient is seen in follow up for management of:  [x] HeartMate III [] Heartware [] Total artificial heart       [] ECMO           [] Other     Interval History:  [] Interval history unobtainable due to intubation.  The [x] implant 10/2016 at [facility]  No Events overnight      Review of Systems:  Pt states he is feeling better today   Denies CP, SOB, ABD pain, N/V  All other systems reviewed and negative    Medications:  Current Facility-Administered Medications   Medication Dose Route Frequency Provider Last Rate Last Dose    allopurinol tablet 300 mg  300 mg Oral Daily Henry Murguai MD   300 mg at 08/02/17 0812    amlodipine tablet 10 mg  10 mg Oral Daily Jim Khoury MD   10 mg at 08/02/17 0811    aspirin chewable tablet 81 mg  81 mg Oral Daily Henry Murguia MD   81 mg at 08/02/17 0811    atorvastatin tablet 10 mg  10 mg Oral Daily Henry Murguia MD   10 mg at 08/02/17 0811    bisacodyl suppository 10 mg  10 mg Rectal Daily PRN Henry Murguia MD        buPROPion TB24 tablet 300 mg  300 mg Oral Daily Henry Murguia MD   300 mg at 08/02/17 0812    calcium-vitamin D3 500 mg(1,250mg) -200 unit per tablet 1 tablet  1 tablet Oral BID Henry Murguia MD   1 tablet at 08/02/17 0812    ceFEPIme in dextrose 5% 2 gram/50 mL IVPB 2 g  2 g Intravenous Q12H Henry Murguia  mL/hr at 08/02/17 0355 2 g at 08/02/17 0355    dofetilide capsule 250 mcg  250 mcg Oral Q12H Henry Murguia MD   250 mcg at 08/02/17 0811    folic acid tablet 1 mg  1 mg Oral Daily Henry Murguia MD   1 mg at 08/02/17 0811    hydrALAZINE tablet 50 mg  50 mg Oral Q8H Charlette Jasmine NP   50 mg at 08/02/17 0609    lisinopril tablet 5 mg  5 mg Oral BID Jim Khoury MD   5 mg at 08/02/17 0811    magnesium oxide tablet 400 mg  400 mg Oral BID Carlito Santana MD   400 mg at 08/02/17 0812     multivitamin tablet 1 tablet  1 tablet Oral Daily Henry Murguia MD   1 tablet at 08/02/17 0812    ropinirole tablet 0.5 mg  0.5 mg Oral TID Henry Murguia MD   0.5 mg at 08/02/17 0609    sodium chloride 0.9% flush 3 mL  3 mL Intravenous Q8H Julián Blanco MD   3 mL at 08/01/17 1500    spironolactone tablet 25 mg  25 mg Oral Daily Henry Murguia MD   25 mg at 08/02/17 0811    tamsulosin 24 hr capsule 0.4 mg  0.4 mg Oral Daily Henry Murguia MD   0.4 mg at 08/02/17 0811    warfarin tablet 6 mg  6 mg Oral Daily Carlito Santana MD   6 mg at 08/01/17 1800       Physical Examination:  Vital Signs:   Vitals:    08/02/17 1300   BP:    Pulse: 65   Resp: (!) 24   Temp:      Cardiovascular:  [x] Regular rate and rhythm. Smooth VAD sounds    [x]  No edema []  Edema present  [x]  Clear to auscultation    [] Pedal Pulses absent  []  Pulses + throughout  Skin:  Incision is [x]  Clean, dry and intact.   Sternum:  [x]  Stable []  Unstable  Driveline(s):   [x]  Clean, dry and intact.      Labs:  CBC, CMP, LDH and Coags     X-Rays:  [x]  I reviewed today's Chest x-ray    Procedure:  Device Interrogation including analysis of device parameters.  Current Settings  [x]  Ventricular Assist Device  []  Total Artificial Heart interrogated  Review of device function is [x]  Stable     TXP LVAD INTERROGATIONS 8/2/2017 8/2/2017 8/2/2017 8/2/2017 8/2/2017 8/2/2017 8/2/2017   Type HeartMate3 HeartMate3 HeartMate3 HeartMate3 HeartMate3 HeartMate3 HeartMate3   Flow 5.4 5.1 5.0 5.1 5.3 5.4 4.9   Speed 5800 5800 5800 5800 5800 5800 5800   PI 3.2 4.2 3.3 3.2 3.2 3.1 3.2   Power (Mendez) 4.2 4.5 4.5 4.4 4.5 4.5 4.5   Pulsatility Intermittent pulse Intermittent pulse Intermittent pulse Intermittent pulse Intermittent pulse Intermittent pulse -       Assessment:  []  Primary Cardiomyopathy [x]  Congestive Heart Failure   []  Atrial Fibrillation []  Ventricular Tachycardia   []  Aftercare cardiac device [x]  Long term (current)  use of anticoagulants   []  Ventilator-associated pneumonia []  Pneumonia viral, unspecified   []  Pneumonia, bacterial, unspecified []  Pneumonia, organism unspecified   []  Hemorrhage of GI tract, unspecified    []  Nosebleed []  Driveline infection   []  Infection VAD device          Plan:  [x]  Interval history obtained from HTS attending team member during rounding today  [x]  VAD/JOSE teaching performed with patient  [x]  Mobilization / Physical Therapy ongoing  [x]  Anticoagulation [x]  Ongoing []  Held  -MAP can be higher than 80 with HM 3, if pt can tolerate  -No surgical indication for any intervention at this stage.  -Multiple PI events   -Pending Rehab placement     Total time spent was 32 minutes.  Of which more than 50 percent of the care dominated counseling and coordinating care with different team members. The VAD was interrogated and all parameters were WNL and no significant findings were found in the history. All these findings are documented in the note above.      Date of Service: 08/02/2017       Cardiac Surgery Attending E and M (VAD) Note along with VAD Interrogation    I have seen and examined the patient and agree with the findings above    I also reviewed the patients clinical course and:  [x]  Hemodynamic & Respiratory parameters  [x]  Laboratory Data  [x]  Radiological studies     VAD Interrogated [x]      VAD Function is normal. Changes made []  none [x]      Interrogation of Ventricular assist device was performed with physician analysis of device parameters and review of device function. I have personally reviewed the interrogation findings and agree with findings as stated.

## 2017-08-02 NOTE — ASSESSMENT & PLAN NOTE
- Back on Cardene gtt when I first saw patient this morning. Changed MAP goal to < 90 with instructions to obtain BP only when patient is sitting or standing given his h/o orthostasis. Cardene gtt is now off.  - increased amlodipine to 10- mg po daily, now on 5 BID of lisinopril. Will increase Hydralazine to 50 mg tid  - Has been OOB to chair with PT   - Transfer to step down

## 2017-08-02 NOTE — SUBJECTIVE & OBJECTIVE
Past Medical History:   Diagnosis Date    Acute on chronic systolic heart failure 7/27/2015    AICD (automatic cardioverter/defibrillator) present 2014    [employer]    CAD (coronary artery disease) 7/27/2015    CHF (congestive heart failure)     Gait instability     HTN (hypertension) 7/27/2015    ICD (implantable cardioverter-defibrillator), biventricular, in situ 7/27/2015    Kidney stones     HILLARY treated with BiPAP 7/27/2015    Peripheral neuropathy     Pulmonary hypertension due to left ventricular systolic dysfunction 7/29/2015    Restless leg syndrome 1992    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    Skin cancer     excision 2013    Sleep apnea 1992     Past Surgical History:   Procedure Laterality Date    CARDIAC PACEMAKER PLACEMENT      pacemaker previously, then AICD in 2006. AICD [employer] serial #[id number]    CORONARY ARTERY BYPASS GRAFT  1993    KNEE SURGERY Left 2001    complicated by infection, hardware had to be taken out and replaced    LEFT VENTRICULAR ASSIST DEVICE  10/19/2016     Review of patient's allergies indicates:   Allergen Reactions    Sulfa (sulfonamide antibiotics)        Scheduled Medications:    allopurinol  300 mg Oral Daily    amlodipine  10 mg Oral Daily    aspirin  81 mg Oral Daily    atorvastatin  10 mg Oral Daily    buPROPion  300 mg Oral Daily    calcium-vitamin D3  1 tablet Oral BID    ceFEPime (MAXIPIME) IVPB  2 g Intravenous Q12H    dofetilide  250 mcg Oral Q12H    folic acid  1 mg Oral Daily    hydrALAZINE  50 mg Oral Q8H    lisinopril  5 mg Oral BID    magnesium oxide  400 mg Oral BID    multivitamin  1 tablet Oral Daily    ropinirole  0.5 mg Oral TID    sodium chloride 0.9%  3 mL Intravenous Q8H    spironolactone  25 mg Oral Daily    tamsulosin  0.4 mg Oral Daily    warfarin  6 mg Oral Daily       PRN Medications: bisacodyl    Family History     Problem Relation (Age of Onset)    Cancer Daughter (50)    Diabetes Daughter     Heart disease Daughter        Social History Main Topics    Smoking status: Never Smoker    Smokeless tobacco: Never Used    Alcohol use No    Drug use: No    Sexual activity: Not on file      Comment:      Review of Systems   Constitutional: Negative for chills and fever.   HENT: Negative for trouble swallowing and voice change.    Eyes: Negative for photophobia and visual disturbance.   Respiratory: Negative for cough, shortness of breath and wheezing.    Cardiovascular: Negative for chest pain and palpitations.   Gastrointestinal: Negative for abdominal distention and nausea.   Genitourinary: Negative for difficulty urinating and flank pain.   Musculoskeletal: Positive for gait problem. Negative for arthralgias and myalgias.   Skin: Positive for wound (driveline site infection). Negative for color change.   Neurological: Positive for tremors and weakness. Negative for numbness and headaches.   Psychiatric/Behavioral: Negative for agitation and confusion.     Objective:     Vital Signs (Most Recent):  Temp: 98 °F (36.7 °C) (08/02/17 0705)  Pulse: 89 (08/02/17 0800)  Resp: (!) 27 (08/02/17 0800)  BP: (!) 120/58 (08/02/17 0800)  SpO2: 99 % (08/02/17 0800)    Vital Signs (24h Range):  Temp:  [98 °F (36.7 °C)-99 °F (37.2 °C)] 98 °F (36.7 °C)  Pulse:  [60-89] 89  Resp:  [12-35] 27  SpO2:  [98 %-100 %] 99 %  BP: ()/(0-96) 120/58     Body mass index is 22.22 kg/m².    Physical Exam   Constitutional: He is oriented to person, place, and time. He appears well-developed and well-nourished.   HENT:   Head: Normocephalic and atraumatic.   Eyes: EOM are normal. Pupils are equal, round, and reactive to light.   Neck: Normal range of motion.   Cardiovascular: Normal rate and regular rhythm.    Pulmonary/Chest: Effort normal. No respiratory distress.   LVAD noted    Abdominal: Soft. There is no tenderness.   Musculoskeletal: Normal range of motion.   Neurological: He is alert and oriented to person, place,  and time. He displays tremor. No sensory deficit. He exhibits normal muscle tone.   RUE: 5/5, 5/5 .  LUE: 5/5, 5/5 .  RLE: 4/5, DF 5/5, PF 5/5.  LLE: 5/5, DF 5/5, PF 5/5.  Generalized weakness noted through upper body.   Skin:   Bandage to driveline site C/D/I  Mild tenderness to site    Psychiatric: He has a normal mood and affect. His behavior is normal.     NEUROLOGICAL EXAMINATION:     MENTAL STATUS   Oriented to person, place, and time.     CRANIAL NERVES     CN III, IV, VI   Pupils are equal, round, and reactive to light.  Extraocular motions are normal.       Diagnostic Results:   Labs: Reviewed  CT: Reviewed

## 2017-08-02 NOTE — NURSING TRANSFER
Nursing Transfer Note      8/2/2017     Transfer From: [facility]    Transfer via bed    Transfer with cardiac monitoring    Transported by nurse    Medicines sent: no    Chart send with patient: Yes    Notified: nurse    Patient reassessed at: 08/02/17    Upon arrival to floor: cardiac monitor applied, patient oriented to room, call bell in reach and bed in lowest position

## 2017-08-02 NOTE — PHYSICIAN QUERY
PT Name: Kev Vasquez  MR #: [id number]    Physician Query Form - Neurological Condition Clarification       CDS/: Unique Rice RN CDS             Contact information: Ext. [phone]    This form is a permanent document in the medical record.     Query Date: August 2, 2017    By submitting this query, we are merely seeking further clarification of documentation. Please utilize your independent clinical judgment when addressing the question(s) below.    The Medical record contains the following:   Indicators   Supporting Clinical Findings Location in Medical Record   x AMS, Confusion, LOC, etc. Met with patient and daughter to assess patients needs due to patient being somewhat confused.    At this time, patient presents as pleasant and slightly confused.    Pt in and out of confusion & disorientation to place throughout shift    Psychiatric/Behavioral: Negative for agitation, positive for confusion.    Environmental modifications to limit agitation/confusion          Confused again this morning          Progress Note 7/31              Nursing Care Plan Note 7/30        Physical Medicine & Rehabilitation Progress Note 8/1                Transplant Heart MD Progress Note 8/2   x Acute / Chronic Illness ICM s/p LVAD (HM3) 10/2016 at [facility] in [facility] infection on ciprofloxacin (pansensitive PSEUDOMONAS AERUGINOSA) , HTN, CKD II and chronic  debility since LVAD presents with worsening abdominal pain over the past 24 hours particularly around infection site.  H & P 7/29    Radiology Findings     x Electrolyte Imbalance DDx: delirium / myoclonic jerks from infectious etiologies / metabolic >>> postoperative states >> Fluid and electrolyte disturbances / toxicity >>> cns pathology / seziures   Neurology MD Consult Note 7/30    Medication      Treatment      Other       Provider, please specify the diagnosis or diagnoses associated with above clinical findings.      [  ] Hypertensive  Encephalopathy    [  ] Metabolic Encephalopathy    [  ] Toxic Encephalopathy    [  ] Wernickes Encephalopathy    [  ] Other Encephalopathy    [  ] Other (please specify): _____________________________________    [ x ] Clinically Undetermined      Please document in your progress notes daily for the duration of treatment until resolved, and  include in your discharge summary.

## 2017-08-02 NOTE — PROGRESS NOTES
[facility]  Heart Transplant  Progress Note    Patient Name: Kev Vasquez  MRN: [id number]  Admission Date: 7/28/2017  Hospital Length of Stay: 5 days  Attending Physician: Carlito Santana MD  Primary Care Provider: Mega Collins MD  Principal Problem:Complication involving left ventricular assist device (LVAD)    Subjective:     Interval History: Confused again this morning    Continuous Infusions:   nicardipine       Scheduled Meds:   allopurinol  300 mg Oral Daily    amlodipine  10 mg Oral Daily    aspirin  81 mg Oral Daily    atorvastatin  10 mg Oral Daily    buPROPion  300 mg Oral Daily    calcium-vitamin D3  1 tablet Oral BID    ceFEPime (MAXIPIME) IVPB  2 g Intravenous Q12H    dofetilide  250 mcg Oral Q12H    folic acid  1 mg Oral Daily    hydrALAZINE  50 mg Oral Q8H    lisinopril  5 mg Oral BID    magnesium oxide  400 mg Oral BID    multivitamin  1 tablet Oral Daily    ropinirole  0.5 mg Oral TID    sodium chloride 0.9%  3 mL Intravenous Q8H    spironolactone  25 mg Oral Daily    tamsulosin  0.4 mg Oral Daily    warfarin  1 mg Oral Once    warfarin  6 mg Oral Daily     PRN Meds:bisacodyl    Review of patient's allergies indicates:   Allergen Reactions    Sulfa (sulfonamide antibiotics)      Objective:     Vital Signs (Most Recent):  Temp: 99 °F (37.2 °C) (08/02/17 0300)  Pulse: 65 (08/02/17 0500)  Resp: 18 (08/02/17 0500)  BP: 107/60 (08/02/17 0500)  SpO2: 99 % (08/02/17 0500) Vital Signs (24h Range):  Temp:  [98 °F (36.7 °C)-99 °F (37.2 °C)] 99 °F (37.2 °C)  Pulse:  [60-78] 65  Resp:  [12-35] 18  SpO2:  [98 %-100 %] 99 %  BP: ()/(0-96) 107/60     Weight: 74.3 kg (163 lb 12.8 oz)  Body mass index is 22.22 kg/m².      Intake/Output Summary (Last 24 hours) at 08/02/17 0602  Last data filed at 08/02/17 0427   Gross per 24 hour   Intake          1252.08 ml   Output              700 ml   Net           552.08 ml       Hemodynamic Parameters:       Telemetry: BiV  paced    Physical Exam   Constitutional: He appears well-developed and well-nourished.   HENT:   Head: Normocephalic and atraumatic.   Eyes: Conjunctivae and EOM are normal. Pupils are equal, round, and reactive to light.   Neck: Normal range of motion. Neck supple. No JVD present.   Cardiovascular:   Smooth VAD hum. Pulsatile   Pulmonary/Chest: Effort normal and breath sounds normal.   Abdominal: Soft. Bowel sounds are normal.   Musculoskeletal: He exhibits no edema.   Neurological: He is alert.   Confused. Generalized tremors   Skin: Skin is warm and dry. Capillary refill takes 2 to 3 seconds.   Psychiatric:   Confused                           Significant Labs:  CBC:    Recent Labs  Lab 08/02/17 0207   WBC 7.51   RBC 4.17*   HGB 12.7*   HCT 37.6*      MCV 90   MCH 30.5   MCHC 33.8     BNP:    Recent Labs  Lab 08/02/17 0207   *     CMP:    Recent Labs  Lab 08/02/17 0207   GLU 87   CALCIUM 9.9   ALBUMIN 3.0*   PROT 6.7      K 3.9   CO2 23      BUN 21   CREATININE 1.4   ALKPHOS 83   ALT 14   AST 24   BILITOT 0.7      Coagulation:     Recent Labs  Lab 08/02/17  0207   INR 1.9*   APTT 24.5     LDH:    Recent Labs  Lab 07/31/17  0400 08/01/17  0323 08/02/17  0207    157 208     Microbiology:  Microbiology Results (last 7 days)     Procedure Component Value Units Date/Time    Blood culture #1 **CANNOT BE ORDERED STAT** [[id number]] Collected:  07/28/17 2042    Order Status:  Completed Specimen:  Blood from Peripheral, Forearm, Right Updated:  08/01/17 2212     Blood Culture, Routine No Growth to date     Blood Culture, Routine No Growth to date     Blood Culture, Routine No Growth to date     Blood Culture, Routine No Growth to date     Blood Culture, Routine No Growth to date    Blood culture #2 **CANNOT BE ORDERED STAT** [[id number]] Collected:  07/28/17 2058    Order Status:  Completed Specimen:  Blood from Peripheral, Forearm, Left Updated:  08/01/17 2212     Blood Culture, Routine  No Growth to date     Blood Culture, Routine No Growth to date     Blood Culture, Routine No Growth to date     Blood Culture, Routine No Growth to date     Blood Culture, Routine No Growth to date    Aerobic culture [[id number]]  (Susceptibility) Collected:  07/29/17 1056    Order Status:  Completed Specimen:  Wound from Abdomen Updated:  08/01/17 1035     Aerobic Bacterial Culture --     PSEUDOMONAS AERUGINOSA  Few      Culture, Anaerobe [[id number]] Collected:  07/29/17 1056    Order Status:  Completed Specimen:  Wound from Abdomen Updated:  08/01/17 0745     Anaerobic Culture Culture in progress    Urine culture [[id number]] Collected:  07/29/17 1111    Order Status:  Completed Specimen:  Urine from Urine, Catheterized Updated:  07/30/17 1956     Urine Culture, Routine No growth    Gram stain [[id number]] Collected:  07/29/17 1056    Order Status:  Completed Specimen:  Wound from Abdomen Updated:  07/30/17 0001     Gram Stain Result Few WBC's      Rare Gram negative rods    Urine culture [[id number]] Collected:  07/29/17 0553    Order Status:  Canceled Specimen:  Urine from Urine, Clean Catch Updated:  07/29/17 0554    Aerobic culture [[id number]]     Order Status:  No result Specimen:  Wound from Abdomen           I have reviewed all pertinent labs within the past 24 hours.    Estimated Creatinine Clearance: 48.6 mL/min (based on Cr of 1.4).    Diagnostic Results:      Assessment and Plan:     Hypertensive urgency    - Back on Cardene gtt when I first saw patient this morning. Changed MAP goal to < 90 with instructions to obtain BP only when patient is sitting or standing given his h/o orthostasis. Cardene gtt is now off.  - increased amlodipine to 10- mg po daily, now on 5 BID of lisinopril. Will increase Hydralazine to 50 mg tid  - Has been OOB to chair with PT   - Transfer to step down          * Complication involving left ventricular assist device (LVAD)    - HM III implanted at [facility] in [place]  10/2016  - Speed 5800  - INR has dipped to 1.9 - will give 1 mg Coumadin boost now. Dose was increased to 6 mg yesterday from 2.5.  - LD stable - continue ASA 81 mg qd  - Pulsatile with MAP's > 80's - BP control with Cardene gtt as above, Lisinopril, Norvasc and Hydralazine  - CT of abdomen/pelvis with no fluid collections  - DLES culture with Pseudomonas resistant to Cipro. Appreciate ID's help - continue Cefepime X 8 weeks (end 9/23/17). Weekly CBC, CMP, ESR, CRP to ID ([phone]) once discharged          Delirium    - Appreciate Neuro's help: Patient currently with signs/symptoms of delirium.  Tremulous, myoclonic jerks as well as his disorientation suggest this.  Suspect current infection as primary cause.    - RPR non-reactive, B12 WNL           Involuntary movements    - No known history of parkinsons disease, experiencing myoclonic jerks/tremors NOT suggestive of PD  - Currently not active as he should be given tremors and leg pain, very deconditioned.  - ddx per neuro delirium / myoclonic jerks from infectious etiologies / metabolic >>> postoperative states >> Fluid and electrolyte disturbances / toxicity >>> cns pathology / seziures. Appreciate assistance.         Restless leg syndrome    - continue ropinirole        Impaired functional mobility and endurance    - consulted PT/OT/ST  - Appreciate PM&R consult. They plan to discuss with rehab team for rehab recs. He is not medically ready for discharge        TANESHA (acute kidney injury)    - Creatinine has trended down to 1.4 (1.5 on admit, baseline ~ 1.1-1.3 - will monitor)        Abdominal pain    - CT abdomen negative for acute processes  - gram stain w/ GNR  - ID following, appreciate recs. Obtained driveline cultures. Pt now on cefepime. Blood cultures from 7/28 NGTD  - follow up blood cultures        LVAD (left ventricular assist device) present    - HM III implanted at [facility] in [place]   - VAD orders placed  - No alarms or events noted on  interrogation  - continue bp control and warfarin; current INR therapeutic @ 2.4 today   - continue coumadin 2.5 [place] [id number]  Heart Transplant  [facility]

## 2017-08-02 NOTE — SUBJECTIVE & OBJECTIVE
Interval History: Confused again this morning    Continuous Infusions:   nicardipine       Scheduled Meds:   allopurinol  300 mg Oral Daily    amlodipine  10 mg Oral Daily    aspirin  81 mg Oral Daily    atorvastatin  10 mg Oral Daily    buPROPion  300 mg Oral Daily    calcium-vitamin D3  1 tablet Oral BID    ceFEPime (MAXIPIME) IVPB  2 g Intravenous Q12H    dofetilide  250 mcg Oral Q12H    folic acid  1 mg Oral Daily    hydrALAZINE  50 mg Oral Q8H    lisinopril  5 mg Oral BID    magnesium oxide  400 mg Oral BID    multivitamin  1 tablet Oral Daily    ropinirole  0.5 mg Oral TID    sodium chloride 0.9%  3 mL Intravenous Q8H    spironolactone  25 mg Oral Daily    tamsulosin  0.4 mg Oral Daily    warfarin  1 mg Oral Once    warfarin  6 mg Oral Daily     PRN Meds:bisacodyl    Review of patient's allergies indicates:   Allergen Reactions    Sulfa (sulfonamide antibiotics)      Objective:     Vital Signs (Most Recent):  Temp: 99 °F (37.2 °C) (08/02/17 0300)  Pulse: 65 (08/02/17 0500)  Resp: 18 (08/02/17 0500)  BP: 107/60 (08/02/17 0500)  SpO2: 99 % (08/02/17 0500) Vital Signs (24h Range):  Temp:  [98 °F (36.7 °C)-99 °F (37.2 °C)] 99 °F (37.2 °C)  Pulse:  [60-78] 65  Resp:  [12-35] 18  SpO2:  [98 %-100 %] 99 %  BP: ()/(0-96) 107/60     Weight: 74.3 kg (163 lb 12.8 oz)  Body mass index is 22.22 kg/m².      Intake/Output Summary (Last 24 hours) at 08/02/17 0602  Last data filed at 08/02/17 0427   Gross per 24 hour   Intake          1252.08 ml   Output              700 ml   Net           552.08 ml       Hemodynamic Parameters:       Telemetry: BiV paced    Physical Exam   Constitutional: He appears well-developed and well-nourished.   HENT:   Head: Normocephalic and atraumatic.   Eyes: Conjunctivae and EOM are normal. Pupils are equal, round, and reactive to light.   Neck: Normal range of motion. Neck supple. No JVD present.   Cardiovascular:   Smooth VAD hum. Pulsatile   Pulmonary/Chest: Effort  normal and breath sounds normal.   Abdominal: Soft. Bowel sounds are normal.   Musculoskeletal: He exhibits no edema.   Neurological: He is alert.   Confused. Generalized tremors   Skin: Skin is warm and dry. Capillary refill takes 2 to 3 seconds.   Psychiatric:   Confused                           Significant Labs:  CBC:    Recent Labs  Lab 08/02/17 0207   WBC 7.51   RBC 4.17*   HGB 12.7*   HCT 37.6*      MCV 90   MCH 30.5   MCHC 33.8     BNP:    Recent Labs  Lab 08/02/17 0207   *     CMP:    Recent Labs  Lab 08/02/17 0207   GLU 87   CALCIUM 9.9   ALBUMIN 3.0*   PROT 6.7      K 3.9   CO2 23      BUN 21   CREATININE 1.4   ALKPHOS 83   ALT 14   AST 24   BILITOT 0.7      Coagulation:     Recent Labs  Lab 08/02/17 0207   INR 1.9*   APTT 24.5     LDH:    Recent Labs  Lab 07/31/17  0400 08/01/17  0323 08/02/17 0207    157 208     Microbiology:  Microbiology Results (last 7 days)     Procedure Component Value Units Date/Time    Blood culture #1 **CANNOT BE ORDERED STAT** [[id number]] Collected:  07/28/17 2042    Order Status:  Completed Specimen:  Blood from Peripheral, Forearm, Right Updated:  08/01/17 2212     Blood Culture, Routine No Growth to date     Blood Culture, Routine No Growth to date     Blood Culture, Routine No Growth to date     Blood Culture, Routine No Growth to date     Blood Culture, Routine No Growth to date    Blood culture #2 **CANNOT BE ORDERED STAT** [[id number]] Collected:  07/28/17 2058    Order Status:  Completed Specimen:  Blood from Peripheral, Forearm, Left Updated:  08/01/17 2212     Blood Culture, Routine No Growth to date     Blood Culture, Routine No Growth to date     Blood Culture, Routine No Growth to date     Blood Culture, Routine No Growth to date     Blood Culture, Routine No Growth to date    Aerobic culture [[id number]]  (Susceptibility) Collected:  07/29/17 1056    Order Status:  Completed Specimen:  Wound from Abdomen Updated:  08/01/17 1035      Aerobic Bacterial Culture --     PSEUDOMONAS AERUGINOSA  Few      Culture, Anaerobe [[id number]] Collected:  07/29/17 1056    Order Status:  Completed Specimen:  Wound from Abdomen Updated:  08/01/17 0745     Anaerobic Culture Culture in progress    Urine culture [[id number]] Collected:  07/29/17 1111    Order Status:  Completed Specimen:  Urine from Urine, Catheterized Updated:  07/30/17 1956     Urine Culture, Routine No growth    Gram stain [[id number]] Collected:  07/29/17 1056    Order Status:  Completed Specimen:  Wound from Abdomen Updated:  07/30/17 0001     Gram Stain Result Few WBC's      Rare Gram negative rods    Urine culture [[id number]] Collected:  07/29/17 0553    Order Status:  Canceled Specimen:  Urine from Urine, Clean Catch Updated:  07/29/17 0554    Aerobic culture [[id number]]     Order Status:  No result Specimen:  Wound from Abdomen           I have reviewed all pertinent labs within the past 24 hours.    Estimated Creatinine Clearance: 48.6 mL/min (based on Cr of 1.4).    Diagnostic Results:

## 2017-08-02 NOTE — PLAN OF CARE
Problem: Physical Therapy Goal  Goal: Physical Therapy Goal  Goals to be met by: 17     Patient will increase functional independence with mobility by performin. Supine to sit with Moderate Assistance  2. Sit to supine with Moderate Assistance  3. Sit to stand transfer with Moderate Assistance  4. Bed to chair transfer with Moderate Assistance using Rolling Walker or appropriate AD  5. Gait  x 10 feet with Moderate Assistance using Rolling Walker or appropriate AD.   6. Stand for 2 minutes with Minimal Assistance using Rolling Walker or appropriate AD without LOB  7. Lower extremity exercise program x10 reps, with supervision, in order to increase LE strength and (I) with functional mobility.      Outcome: Ongoing (interventions implemented as appropriate)  Goals remain appropriate

## 2017-08-02 NOTE — PROGRESS NOTES
RON Jasmine notified of run of VT while patient working with PT. No new orders given. Will continue to monitor.

## 2017-08-02 NOTE — PLAN OF CARE
Problem: SLP Goal  Goal: SLP Goal  Speech Language Pathology Goals  Goals expected to be met by 8/7:  1. Pt will orient x 4 given min cues.  2. Pt will recall general information with 80% accuracy given min cues.  3. Following a 3 minute delay, pt will recall 3/3 unrelated words given modified independence.  4. Pt will follow complex commands with 70% accuracy given mod cues.  5. Pt will complete problem solving tasks with 80% accuracy given min cues.  6. Pt will participate in ongoing evaluation of word fluency, categorization, reading, writing, visual spatial, and math/time/money management abilities.       Pt with good participation in ST tasks.  Progress toward goals evident.  Continue current plan of care as goals remain appropriate.      SANTOSH Parker, CCC-SLP  Speech Language Pathologist  [phone]  8/2/2017

## 2017-08-02 NOTE — TELEPHONE ENCOUNTER
I have not seen this patient since 2016; he is following closely with cardiology, so I will forward this message to his outpatient cardiology team as well.  He is currently inpatient but it appears he will need close attention to medication reconciliation once he is discharged.

## 2017-08-03 LAB
ANION GAP SERPL CALC-SCNC: 8 MMOL/L
APTT BLDCRRT: 28.6 SEC
BASOPHILS # BLD AUTO: 0.02 K/UL
BASOPHILS NFR BLD: 0.2 %
BUN SERPL-MCNC: 26 MG/DL
CALCIUM SERPL-MCNC: 10 MG/DL
CHLORIDE SERPL-SCNC: 108 MMOL/L
CO2 SERPL-SCNC: 22 MMOL/L
CREAT SERPL-MCNC: 1.4 MG/DL
DIFFERENTIAL METHOD: ABNORMAL
EOSINOPHIL # BLD AUTO: 0.2 K/UL
EOSINOPHIL NFR BLD: 2.1 %
ERYTHROCYTE [DISTWIDTH] IN BLOOD BY AUTOMATED COUNT: 16.5 %
EST. GFR  (AFRICAN AMERICAN): 56.8 ML/MIN/1.73 M^2
EST. GFR  (NON AFRICAN AMERICAN): 49.1 ML/MIN/1.73 M^2
GLUCOSE SERPL-MCNC: 90 MG/DL
HCT VFR BLD AUTO: 37.8 %
HGB BLD-MCNC: 12.5 G/DL
INR PPP: 2.4
INR PPP: 2.5
LDH SERPL L TO P-CCNC: 173 U/L
LYMPHOCYTES # BLD AUTO: 1 K/UL
LYMPHOCYTES NFR BLD: 10.5 %
MAGNESIUM SERPL-MCNC: 2 MG/DL
MAGNESIUM SERPL-MCNC: 2.4 MG/DL
MCH RBC QN AUTO: 30.3 PG
MCHC RBC AUTO-ENTMCNC: 33.1 G/DL
MCV RBC AUTO: 92 FL
MONOCYTES # BLD AUTO: 1.1 K/UL
MONOCYTES NFR BLD: 12.2 %
NEUTROPHILS # BLD AUTO: 6.8 K/UL
NEUTROPHILS NFR BLD: 74.7 %
PHOSPHATE SERPL-MCNC: 2.4 MG/DL
PHOSPHATE SERPL-MCNC: 2.9 MG/DL
PLATELET # BLD AUTO: 169 K/UL
PMV BLD AUTO: 11.7 FL
POTASSIUM SERPL-SCNC: 3.9 MMOL/L
PROTHROMBIN TIME: 23.7 SEC
PROTHROMBIN TIME: 24.7 SEC
RBC # BLD AUTO: 4.13 M/UL
SODIUM SERPL-SCNC: 138 MMOL/L
WBC # BLD AUTO: 9.05 K/UL

## 2017-08-03 PROCEDURE — 97530 THERAPEUTIC ACTIVITIES: CPT

## 2017-08-03 PROCEDURE — 85610 PROTHROMBIN TIME: CPT | Mod: 91

## 2017-08-03 PROCEDURE — 25000003 PHARM REV CODE 250: Performed by: NURSE PRACTITIONER

## 2017-08-03 PROCEDURE — 80048 BASIC METABOLIC PNL TOTAL CA: CPT

## 2017-08-03 PROCEDURE — 84100 ASSAY OF PHOSPHORUS: CPT | Mod: 91

## 2017-08-03 PROCEDURE — 83735 ASSAY OF MAGNESIUM: CPT

## 2017-08-03 PROCEDURE — 83615 LACTATE (LD) (LDH) ENZYME: CPT

## 2017-08-03 PROCEDURE — 93750 INTERROGATION VAD IN PERSON: CPT | Mod: ,,, | Performed by: NURSE PRACTITIONER

## 2017-08-03 PROCEDURE — 99232 SBSQ HOSP IP/OBS MODERATE 35: CPT | Mod: ,,, | Performed by: INTERNAL MEDICINE

## 2017-08-03 PROCEDURE — 63600175 PHARM REV CODE 636 W HCPCS: Performed by: INTERNAL MEDICINE

## 2017-08-03 PROCEDURE — A4216 STERILE WATER/SALINE, 10 ML: HCPCS | Performed by: INTERNAL MEDICINE

## 2017-08-03 PROCEDURE — 36415 COLL VENOUS BLD VENIPUNCTURE: CPT

## 2017-08-03 PROCEDURE — 25000003 PHARM REV CODE 250: Performed by: STUDENT IN AN ORGANIZED HEALTH CARE EDUCATION/TRAINING PROGRAM

## 2017-08-03 PROCEDURE — 97535 SELF CARE MNGMENT TRAINING: CPT

## 2017-08-03 PROCEDURE — 85730 THROMBOPLASTIN TIME PARTIAL: CPT

## 2017-08-03 PROCEDURE — 25000003 PHARM REV CODE 250: Performed by: INTERNAL MEDICINE

## 2017-08-03 PROCEDURE — 93750 INTERROGATION VAD IN PERSON: CPT | Mod: ,,, | Performed by: INTERNAL MEDICINE

## 2017-08-03 PROCEDURE — 92507 TX SP LANG VOICE COMM INDIV: CPT

## 2017-08-03 PROCEDURE — 27000248 HC VAD-ADDITIONAL DAY

## 2017-08-03 PROCEDURE — 20600001 HC STEP DOWN PRIVATE ROOM

## 2017-08-03 PROCEDURE — 99232 SBSQ HOSP IP/OBS MODERATE 35: CPT | Mod: ,,, | Performed by: NURSE PRACTITIONER

## 2017-08-03 PROCEDURE — 93750 INTERROGATION VAD IN PERSON: CPT | Performed by: PHYSICIAN ASSISTANT

## 2017-08-03 PROCEDURE — 85025 COMPLETE CBC W/AUTO DIFF WBC: CPT

## 2017-08-03 RX ORDER — WARFARIN 4 MG/1
4 TABLET ORAL
Status: DISCONTINUED | OUTPATIENT
Start: 2017-08-06 | End: 2017-08-08 | Stop reason: HOSPADM

## 2017-08-03 RX ORDER — WARFARIN 4 MG/1
4 TABLET ORAL
Status: DISCONTINUED | OUTPATIENT
Start: 2017-08-03 | End: 2017-08-08 | Stop reason: HOSPADM

## 2017-08-03 RX ADMIN — CEFEPIME HYDROCHLORIDE 2 G: 2 INJECTION, SOLUTION INTRAVENOUS at 03:08

## 2017-08-03 RX ADMIN — DOFETILIDE 250 MCG: 0.12 CAPSULE ORAL at 08:08

## 2017-08-03 RX ADMIN — LISINOPRIL 5 MG: 5 TABLET ORAL at 08:08

## 2017-08-03 RX ADMIN — Medication 3 ML: at 02:08

## 2017-08-03 RX ADMIN — THERA TABS 1 TABLET: TAB at 08:08

## 2017-08-03 RX ADMIN — Medication 3 ML: at 04:08

## 2017-08-03 RX ADMIN — OYSTER SHELL CALCIUM WITH VITAMIN D 1 TABLET: 500; 200 TABLET, FILM COATED ORAL at 08:08

## 2017-08-03 RX ADMIN — TAMSULOSIN HYDROCHLORIDE 0.4 MG: 0.4 CAPSULE ORAL at 08:08

## 2017-08-03 RX ADMIN — ROPINIROLE 0.5 MG: 0.5 TABLET, FILM COATED ORAL at 04:08

## 2017-08-03 RX ADMIN — ROPINIROLE 0.5 MG: 0.5 TABLET, FILM COATED ORAL at 10:08

## 2017-08-03 RX ADMIN — ALLOPURINOL 300 MG: 300 TABLET ORAL at 08:08

## 2017-08-03 RX ADMIN — CEFEPIME HYDROCHLORIDE 2 G: 2 INJECTION, SOLUTION INTRAVENOUS at 01:08

## 2017-08-03 RX ADMIN — DOFETILIDE 250 MCG: 0.12 CAPSULE ORAL at 10:08

## 2017-08-03 RX ADMIN — MAGNESIUM OXIDE TAB 400 MG (241.3 MG ELEMENTAL MG) 400 MG: 400 (241.3 MG) TAB at 08:08

## 2017-08-03 RX ADMIN — ASPIRIN 81 MG CHEWABLE TABLET 81 MG: 81 TABLET CHEWABLE at 08:08

## 2017-08-03 RX ADMIN — AMLODIPINE BESYLATE 10 MG: 10 TABLET ORAL at 08:08

## 2017-08-03 RX ADMIN — HYDRALAZINE HYDROCHLORIDE 50 MG: 50 TABLET ORAL at 01:08

## 2017-08-03 RX ADMIN — BUPROPION HYDROCHLORIDE 300 MG: 150 TABLET, FILM COATED, EXTENDED RELEASE ORAL at 08:08

## 2017-08-03 RX ADMIN — ROPINIROLE 0.5 MG: 0.5 TABLET, FILM COATED ORAL at 01:08

## 2017-08-03 RX ADMIN — OYSTER SHELL CALCIUM WITH VITAMIN D 1 TABLET: 500; 200 TABLET, FILM COATED ORAL at 10:08

## 2017-08-03 RX ADMIN — SPIRONOLACTONE 25 MG: 25 TABLET, FILM COATED ORAL at 08:08

## 2017-08-03 RX ADMIN — LISINOPRIL 5 MG: 5 TABLET ORAL at 10:08

## 2017-08-03 RX ADMIN — HYDRALAZINE HYDROCHLORIDE 50 MG: 50 TABLET ORAL at 04:08

## 2017-08-03 RX ADMIN — FOLIC ACID 1 MG: 1 TABLET ORAL at 08:08

## 2017-08-03 RX ADMIN — ATORVASTATIN CALCIUM 10 MG: 10 TABLET, FILM COATED ORAL at 08:08

## 2017-08-03 RX ADMIN — WARFARIN SODIUM 4 MG: 4 TABLET ORAL at 04:08

## 2017-08-03 RX ADMIN — HYDRALAZINE HYDROCHLORIDE 50 MG: 50 TABLET ORAL at 10:08

## 2017-08-03 RX ADMIN — MAGNESIUM OXIDE TAB 400 MG (241.3 MG ELEMENTAL MG) 400 MG: 400 (241.3 MG) TAB at 10:08

## 2017-08-03 NOTE — ASSESSMENT & PLAN NOTE
- All MAP's have been < 90 since Cardene gtt D/C'd yesterday and patient transferred to step down  - Continue Norvasc, Lisinopril, Hydralazine and Aldactone  - All BP's need to be checked while patient sitting on edge of bed or OOB in chair given h/o orthostasis

## 2017-08-03 NOTE — PLAN OF CARE
Problem: Patient Care Overview  Goal: Plan of Care Review  Outcome: Ongoing (interventions implemented as appropriate)  Pt c/o an headache 4/10 overnight that was relieved by PRN Tylenol, see note for details.  Pt remained free from falls/trauma/injury. VSS, VAD hum smooth. VAD numbers WDL.  Denies any CP, SOB, palpitations, dizziness, pain and discomfort. Turning/repositioning independently in bed. Plan of care reviewed with patient and all questions answered, verbalizes understanding. No acute distress noted. Will continue to monitor.

## 2017-08-03 NOTE — ASSESSMENT & PLAN NOTE
- Appreciate Neuro's help: Patient currently with signs/symptoms of delirium.  Tremulous, myoclonic jerks as well as his disorientation suggest this.  Suspect current infection as primary cause. Less tremulous past 2 days, and is more oriented as the day goes on  - RPR non-reactive, B12 WNL

## 2017-08-03 NOTE — PROGRESS NOTES
08/02/17 1937 08/02/17 1944   Vital Signs   BP (!) 90/0 100/80   MAP (mmHg) --  86   BP Location Right arm Right arm   BP Method Doppler Automatic   Patient Position Lying Lying     MD Nneka notified of vital signs. Instructed to give 2200 dose of hydralazine early. Will follow orders and continue to monitor.     Pt vomited x1, no PRN orders for nausea medicine. MD notified and orders placed in EPIC by MD. Will administer nausea medicine and then give night time medication. Will continue to monitor.

## 2017-08-03 NOTE — PT/OT/SLP PROGRESS
Physical Therapy  Treatment    Kev Vasquez   MRN: [id number]   Admitting Diagnosis: Complication involving left ventricular assist device (LVAD)    PT Received On: 08/02/17  PT Start Time: 1038     PT Stop Time: 1111    PT Total Time (min): 33 min       Billable Minutes:  Therapeutic Activity 33 mins    Treatment Type: Treatment  PT/PTA: PT     PTA Visit Number: 0       General Precautions: Standard, LVAD, fall  Orthopedic Precautions: N/A   Braces: N/A    Do you have any cultural, spiritual, Jew conflicts, given your current situation?: none noted     Subjective:  Communicated with RN prior to session.  Pt agreeable to session    Pain/Comfort  Pain Rating 1: 0/10  Pain Rating Post-Intervention 1: 0/10    Objective:   Patient found with: blood pressure cuff, pulse ox (continuous), telemetry, peripheral IV (LVAD)    Functional Mobility:  Bed Mobility:   Rolling/Turning to Left: Minimum assistance  Scooting/Bridging: Minimum Assistance  Supine to Sit: Minimum Assistance    Transfers:  Sit <> Stand Assistance: Minimum Assistance (x 2 persons with B HHA from EOB and b/s chair)  Sit <> Stand Assistive Device:  (B HHA)  Bed <> Chair Assistance: Minimum Assistance  Bed <> Chair Assistive Device:  (B HHA)    Gait:   Gait Distance: ~8 feet + 12 feet  Assistance 1: Minimum assistance (x2 assist)  Gait Assistive Device: Hand held assist  Gait Pattern: 2-point gait  Gait Deviation(s): decreased william, increased time in double stance, decreased step length, decreased stride length, decreased velocity of limb motion, decreased weight-shifting ability (ataxic)    Balance:   Static Sit: FAIR-: Maintains without assist but inconsistent   Dynamic Sit: POOR: N/A  Static Stand: 0: Needs MAXIMAL assist to maintain   Dynamic stand: 0: N/A     Therapeutic Activities and Exercises:  Pt performed functional mobility with B HHA and minimal A with decreased posterior lean noted compared to previous session.      AM-PAC 6 CLICK  MOBILITY  How much help from another person does this patient currently need?   1 = Unable, Total/Dependent Assistance  2 = A lot, Maximum/Moderate Assistance  3 = A little, Minimum/Contact Guard/Supervision  4 = None, Modified Rice/Independent    Turning over in bed (including adjusting bedclothes, sheets and blankets)?: 3  Sitting down on and standing up from a chair with arms (e.g., wheelchair, bedside commode, etc.): 3  Moving from lying on back to sitting on the side of the bed?: 3  Moving to and from a bed to a chair (including a wheelchair)?: 3  Need to walk in hospital room?: 2  Climbing 3-5 steps with a railing?: 1  Total Score: 15    AM-PAC Raw Score CMS G-Code Modifier Level of Impairment Assistance   6 % Total / Unable   7 - 9 CM 80 - 100% Maximal Assist   10 - 14 CL 60 - 80% Moderate Assist   15 - 19 CK 40 - 60% Moderate Assist   20 - 22 CJ 20 - 40% Minimal Assist   23 CI 1-20% SBA / CGA   24 CH 0% Independent/ Mod I     Patient left up in chair with all lines intact, call button in reach and RN notified.    Assessment:  Kev Vasquez is a 76 y.o. male with a medical diagnosis of Complication involving left ventricular assist device (LVAD) and presents with deficits listed below. Pt will need skilled PT to address deficits and increase functional mobility as able.    Rehab identified problem list/impairments: Rehab identified problem list/impairments: weakness, impaired endurance, impaired functional mobilty, gait instability, impaired balance, decreased coordination, decreased lower extremity function, decreased safety awareness, impaired cardiopulmonary response to activity    Rehab potential is good.    Activity tolerance: Good    Discharge recommendations: Discharge Facility/Level Of Care Needs: rehabilitation facility     Barriers to discharge: Barriers to Discharge: None    Equipment recommendations: Equipment Needed After Discharge:  (TBD closer to d/c)     GOALS:    Physical  Therapy Goals        Problem: Physical Therapy Goal    Goal Priority Disciplines Outcome Goal Variances Interventions   Physical Therapy Goal     PT/OT, PT Ongoing (interventions implemented as appropriate)     Description:  Goals to be met by: 17     Patient will increase functional independence with mobility by performin. Supine to sit with Moderate Assistance - met   A. Supine to sit with SBA  2. Sit to supine with Moderate Assistance  3. Sit to stand transfer with Moderate Assistance  4. Bed to chair transfer with Moderate Assistance using Rolling Walker or appropriate AD  5. Gait  x 10 feet with Moderate Assistance using Rolling Walker or appropriate AD.   6. Stand for 2 minutes with Minimal Assistance using Rolling Walker or appropriate AD without LOB  7. Lower extremity exercise program x10 reps, with supervision, in order to increase LE strength and (I) with functional mobility.                        PLAN:    Patient to be seen 5 x/week  to address the above listed problems via gait training, therapeutic activities, therapeutic exercises, neuromuscular re-education  Plan of Care expires: 17  Plan of Care reviewed with: patient         Cindy Green, PT  2017

## 2017-08-03 NOTE — SUBJECTIVE & OBJECTIVE
Past Medical History:   Diagnosis Date    Acute on chronic systolic heart failure 7/27/2015    AICD (automatic cardioverter/defibrillator) present 2014    [employer]    CAD (coronary artery disease) 7/27/2015    CHF (congestive heart failure)     Gait instability     HTN (hypertension) 7/27/2015    ICD (implantable cardioverter-defibrillator), biventricular, in situ 7/27/2015    Kidney stones     HILLARY treated with BiPAP 7/27/2015    Peripheral neuropathy     Pulmonary hypertension due to left ventricular systolic dysfunction 7/29/2015    Restless leg syndrome 1992    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    Skin cancer     excision 2013    Sleep apnea 1992     Past Surgical History:   Procedure Laterality Date    CARDIAC PACEMAKER PLACEMENT      pacemaker previously, then AICD in 2006. AICD [employer] serial #[id number]    CORONARY ARTERY BYPASS GRAFT  1993    KNEE SURGERY Left 2001    complicated by infection, hardware had to be taken out and replaced    LEFT VENTRICULAR ASSIST DEVICE  10/19/2016     Review of patient's allergies indicates:   Allergen Reactions    Sulfa (sulfonamide antibiotics)        Scheduled Medications:    allopurinol  300 mg Oral Daily    amlodipine  10 mg Oral Daily    aspirin  81 mg Oral Daily    atorvastatin  10 mg Oral Daily    buPROPion  300 mg Oral Daily    calcium-vitamin D3  1 tablet Oral BID    ceFEPime (MAXIPIME) IVPB  2 g Intravenous Q12H    dofetilide  250 mcg Oral Q12H    folic acid  1 mg Oral Daily    hydrALAZINE  50 mg Oral Q8H    lisinopril  5 mg Oral BID    magnesium oxide  400 mg Oral BID    multivitamin  1 tablet Oral Daily    ropinirole  0.5 mg Oral TID    sodium chloride 0.9%  3 mL Intravenous Q8H    spironolactone  25 mg Oral Daily    tamsulosin  0.4 mg Oral Daily    warfarin  6 mg Oral Daily       PRN Medications: acetaminophen, bisacodyl, promethazine    Family History     Problem Relation (Age of Onset)    Cancer  Daughter (50)    Diabetes Daughter    Heart disease Daughter        Social History Main Topics    Smoking status: Never Smoker    Smokeless tobacco: Never Used    Alcohol use No    Drug use: No    Sexual activity: Not on file      Comment:      Review of Systems   Constitutional: Negative for chills and fever.   HENT: Negative for trouble swallowing and voice change.    Eyes: Negative for photophobia and visual disturbance.   Respiratory: Negative for cough, shortness of breath and wheezing.    Cardiovascular: Negative for chest pain and palpitations.   Gastrointestinal: Negative for abdominal distention and nausea.   Genitourinary: Negative for difficulty urinating and flank pain.   Musculoskeletal: Positive for gait problem. Negative for arthralgias and myalgias.   Skin: Positive for wound (driveline site infection). Negative for color change.   Neurological: Positive for tremors and weakness. Negative for numbness and headaches.   Psychiatric/Behavioral: Negative for agitation and confusion.     Objective:     Vital Signs (Most Recent):  Temp: 99.5 °F (37.5 °C) (08/03/17 0500)  Pulse: 70 (08/03/17 0700)  Resp: 20 (08/02/17 1937)  BP: (!) 80/0 (08/03/17 0854)  SpO2: 96 % (08/03/17 0500)    Vital Signs (24h Range):  Temp:  [97.7 °F (36.5 °C)-99.5 °F (37.5 °C)] 99.5 °F (37.5 °C)  Pulse:  [50-76] 70  Resp:  [15-27] 20  SpO2:  [94 %-100 %] 96 %  BP: ()/(0-80) 80/0     Body mass index is 25.12 kg/m².    Physical Exam   Constitutional: He is oriented to person, place, and time. He appears well-developed and well-nourished.   HENT:   Head: Normocephalic and atraumatic.   Eyes: EOM are normal. Pupils are equal, round, and reactive to light.   Neck: Normal range of motion.   Cardiovascular: Normal rate and regular rhythm.    Pulmonary/Chest: Effort normal. No respiratory distress.   LVAD noted    Abdominal: Soft. There is no tenderness.   Musculoskeletal: Normal range of motion.   Neurological: He is  alert and oriented to person, place, and time. He displays tremor. No sensory deficit. He exhibits normal muscle tone.   RUE: 5/5, 5/5 .  LUE: 5/5, 5/5 .  RLE: 4/5, DF 5/5, PF 5/5.  LLE: 5/5, DF 5/5, PF 5/5.  Generalized weakness noted through upper body.   Skin:   Bandage to driveline site C/D/I  Mild tenderness to site    Psychiatric: He has a normal mood and affect. His behavior is normal.     NEUROLOGICAL EXAMINATION:     MENTAL STATUS   Oriented to person, place, and time.     CRANIAL NERVES     CN III, IV, VI   Pupils are equal, round, and reactive to light.  Extraocular motions are normal.       Diagnostic Results:   Labs: Reviewed  CT: Reviewed

## 2017-08-03 NOTE — PLAN OF CARE
Problem: Occupational Therapy Goal  Goal: Occupational Therapy Goal  Goals to be met by:  2 weeks 8/12/17     Patient will increase functional independence with ADLs by performing:    Feeding with Supervision.  UE Dressing with Minimal Assistance.  LE Dressing with Minimal Assistance.  Grooming while seated with Supervision.  Revised: Grooming while standing with SBA.  Toileting from bedside commode with Minimal Assistance for hygiene and clothing management.   Stand pivot transfers with Minimal Assistance.  Toilet transfer to bedside commode with Minimal Assistance.     Outcome: Ongoing (interventions implemented as appropriate)  Continue OT plan of care.

## 2017-08-03 NOTE — SUBJECTIVE & OBJECTIVE
Interval History: Confused early this morning, but became more oriented when seen later    Continuous Infusions:   Scheduled Meds:   allopurinol  300 mg Oral Daily    amlodipine  10 mg Oral Daily    aspirin  81 mg Oral Daily    atorvastatin  10 mg Oral Daily    buPROPion  300 mg Oral Daily    calcium-vitamin D3  1 tablet Oral BID    ceFEPime (MAXIPIME) IVPB  2 g Intravenous Q12H    dofetilide  250 mcg Oral Q12H    folic acid  1 mg Oral Daily    hydrALAZINE  50 mg Oral Q8H    lisinopril  5 mg Oral BID    magnesium oxide  400 mg Oral BID    multivitamin  1 tablet Oral Daily    ropinirole  0.5 mg Oral TID    sodium chloride 0.9%  3 mL Intravenous Q8H    spironolactone  25 mg Oral Daily    tamsulosin  0.4 mg Oral Daily    warfarin  4 mg Oral Every Tues, Thurs, Sat    [START ON 8/6/2017] warfarin  4 mg Oral Every Sun    [START ON 8/4/2017] warfarin  6 mg Oral Every Mon, Wed, Fri     PRN Meds:acetaminophen, bisacodyl, promethazine    Review of patient's allergies indicates:   Allergen Reactions    Sulfa (sulfonamide antibiotics)      Objective:     Vital Signs (Most Recent):  Temp: 98.1 °F (36.7 °C) (08/03/17 1210)  Pulse: 72 (08/03/17 1200)  Resp: 18 (08/03/17 1210)  BP: (!) 74/0 (08/03/17 1230)  SpO2: 95 % (08/03/17 1210) Vital Signs (24h Range):  Temp:  [97.7 °F (36.5 °C)-99.5 °F (37.5 °C)] 98.1 °F (36.7 °C)  Pulse:  [50-76] 72  Resp:  [18-25] 18  SpO2:  [94 %-99 %] 95 %  BP: ()/(0-80) 74/0     Weight: 84 kg (185 lb 3 oz) (185 WEIGHT)  Body mass index is 25.12 kg/m².      Intake/Output Summary (Last 24 hours) at 08/03/17 1343  Last data filed at 08/03/17 0500   Gross per 24 hour   Intake              700 ml   Output                0 ml   Net              700 ml       Hemodynamic Parameters:           Physical Exam   Constitutional: He appears well-developed and well-nourished.   HENT:   Head: Normocephalic and atraumatic.   Eyes: Conjunctivae and EOM are normal. Pupils are equal, round, and  reactive to light.   Neck: Normal range of motion. Neck supple. No JVD present.   Cardiovascular:   Smooth VAD hum. Pulsatile   Pulmonary/Chest: Effort normal and breath sounds normal.   Abdominal: Soft. Bowel sounds are normal.   Musculoskeletal: He exhibits no edema.   Neurological: He is alert.   Confused. Generalized tremors   Skin: Skin is warm and dry. Capillary refill takes 2 to 3 seconds.   Psychiatric:   Confused at times                           Significant Labs:  CBC:    Recent Labs  Lab 08/03/17  0809   WBC 9.05   RBC 4.13*   HGB 12.5*   HCT 37.8*      MCV 92   MCH 30.3   MCHC 33.1     BNP:    Recent Labs  Lab 08/02/17  0207   *     CMP:    Recent Labs  Lab 08/02/17  0207 08/03/17  0809   GLU 87 90   CALCIUM 9.9 10.0   ALBUMIN 3.0*  --    PROT 6.7  --     138   K 3.9 3.9   CO2 23 22*    108   BUN 21 26*   CREATININE 1.4 1.4   ALKPHOS 83  --    ALT 14  --    AST 24  --    BILITOT 0.7  --       Coagulation:     Recent Labs  Lab 08/03/17  0809   INR 2.5*   APTT 28.6     LDH:    Recent Labs  Lab 08/01/17  0323 08/02/17  0207 08/03/17  0809    208 173     Microbiology:  Microbiology Results (last 7 days)     Procedure Component Value Units Date/Time    Blood culture #1 **CANNOT BE ORDERED STAT** [[id number]] Collected:  07/28/17 2042    Order Status:  Completed Specimen:  Blood from Peripheral, Forearm, Right Updated:  08/02/17 2212     Blood Culture, Routine No growth after 5 days.    Blood culture #2 **CANNOT BE ORDERED STAT** [[id number]] Collected:  07/28/17 2058    Order Status:  Completed Specimen:  Blood from Peripheral, Forearm, Left Updated:  08/02/17 2212     Blood Culture, Routine No growth after 5 days.    Culture, Anaerobe [[id number]] Collected:  07/29/17 1056    Order Status:  Completed Specimen:  Wound from Abdomen Updated:  08/02/17 0859     Anaerobic Culture No anaerobes isolated    Aerobic culture [[id number]]  (Susceptibility) Collected:  07/29/17 1056     Order Status:  Completed Specimen:  Wound from Abdomen Updated:  08/01/17 1035     Aerobic Bacterial Culture --     PSEUDOMONAS AERUGINOSA  Few      Urine culture [[id number]] Collected:  07/29/17 1111    Order Status:  Completed Specimen:  Urine from Urine, Catheterized Updated:  07/30/17 1956     Urine Culture, Routine No growth    Gram stain [[id number]] Collected:  07/29/17 1056    Order Status:  Completed Specimen:  Wound from Abdomen Updated:  07/30/17 0001     Gram Stain Result Few WBC's      Rare Gram negative rods    Urine culture [[id number]] Collected:  07/29/17 0553    Order Status:  Canceled Specimen:  Urine from Urine, Clean Catch Updated:  07/29/17 0554    Aerobic culture [[id number]]     Order Status:  No result Specimen:  Wound from Abdomen           I have reviewed all pertinent labs within the past 24 hours.    Estimated Creatinine Clearance: 50.8 mL/min (based on Cr of 1.4).    Diagnostic Results:

## 2017-08-03 NOTE — PROGRESS NOTES
[facility]  Heart Transplant  Progress Note    Patient Name: Kev Vasquez  MRN: [id number]  Admission Date: 7/28/2017  Hospital Length of Stay: 6 days  Attending Physician: Carlito Santana MD  Primary Care Provider: Mega Collins MD  Principal Problem:Complication involving left ventricular assist device (LVAD)    Subjective:     Interval History: Confused early this morning, but became more oriented when seen later    Continuous Infusions:   Scheduled Meds:   allopurinol  300 mg Oral Daily    amlodipine  10 mg Oral Daily    aspirin  81 mg Oral Daily    atorvastatin  10 mg Oral Daily    buPROPion  300 mg Oral Daily    calcium-vitamin D3  1 tablet Oral BID    ceFEPime (MAXIPIME) IVPB  2 g Intravenous Q12H    dofetilide  250 mcg Oral Q12H    folic acid  1 mg Oral Daily    hydrALAZINE  50 mg Oral Q8H    lisinopril  5 mg Oral BID    magnesium oxide  400 mg Oral BID    multivitamin  1 tablet Oral Daily    ropinirole  0.5 mg Oral TID    sodium chloride 0.9%  3 mL Intravenous Q8H    spironolactone  25 mg Oral Daily    tamsulosin  0.4 mg Oral Daily    warfarin  4 mg Oral Every Tues, Thurs, Sat    [START ON 8/6/2017] warfarin  4 mg Oral Every Sun    [START ON 8/4/2017] warfarin  6 mg Oral Every Mon, Wed, Fri     PRN Meds:acetaminophen, bisacodyl, promethazine    Review of patient's allergies indicates:   Allergen Reactions    Sulfa (sulfonamide antibiotics)      Objective:     Vital Signs (Most Recent):  Temp: 98.1 °F (36.7 °C) (08/03/17 1210)  Pulse: 72 (08/03/17 1200)  Resp: 18 (08/03/17 1210)  BP: (!) 74/0 (08/03/17 1230)  SpO2: 95 % (08/03/17 1210) Vital Signs (24h Range):  Temp:  [97.7 °F (36.5 °C)-99.5 °F (37.5 °C)] 98.1 °F (36.7 °C)  Pulse:  [50-76] 72  Resp:  [18-25] 18  SpO2:  [94 %-99 %] 95 %  BP: ()/(0-80) 74/0     Weight: 84 kg (185 lb 3 oz) (185 WEIGHT)  Body mass index is 25.12 kg/m².      Intake/Output Summary (Last 24 hours) at 08/03/17 1342  Last data filed at  08/03/17 0500   Gross per 24 hour   Intake              700 ml   Output                0 ml   Net              700 ml       Hemodynamic Parameters:           Physical Exam   Constitutional: He appears well-developed and well-nourished.   HENT:   Head: Normocephalic and atraumatic.   Eyes: Conjunctivae and EOM are normal. Pupils are equal, round, and reactive to light.   Neck: Normal range of motion. Neck supple. No JVD present.   Cardiovascular:   Smooth VAD hum. Pulsatile   Pulmonary/Chest: Effort normal and breath sounds normal.   Abdominal: Soft. Bowel sounds are normal.   Musculoskeletal: He exhibits no edema.   Neurological: He is alert.   Confused. Generalized tremors   Skin: Skin is warm and dry. Capillary refill takes 2 to 3 seconds.   Psychiatric:   Confused at times                           Significant Labs:  CBC:    Recent Labs  Lab 08/03/17  0809   WBC 9.05   RBC 4.13*   HGB 12.5*   HCT 37.8*      MCV 92   MCH 30.3   MCHC 33.1     BNP:    Recent Labs  Lab 08/02/17  0207   *     CMP:    Recent Labs  Lab 08/02/17  0207 08/03/17  0809   GLU 87 90   CALCIUM 9.9 10.0   ALBUMIN 3.0*  --    PROT 6.7  --     138   K 3.9 3.9   CO2 23 22*    108   BUN 21 26*   CREATININE 1.4 1.4   ALKPHOS 83  --    ALT 14  --    AST 24  --    BILITOT 0.7  --       Coagulation:     Recent Labs  Lab 08/03/17  0809   INR 2.5*   APTT 28.6     LDH:    Recent Labs  Lab 08/01/17  0323 08/02/17  0207 08/03/17  0809    208 173     Microbiology:  Microbiology Results (last 7 days)     Procedure Component Value Units Date/Time    Blood culture #1 **CANNOT BE ORDERED STAT** [[id number]] Collected:  07/28/17 2042    Order Status:  Completed Specimen:  Blood from Peripheral, Forearm, Right Updated:  08/02/17 2212     Blood Culture, Routine No growth after 5 days.    Blood culture #2 **CANNOT BE ORDERED STAT** [[id number]] Collected:  07/28/17 2058    Order Status:  Completed Specimen:  Blood from Peripheral,  Forearm, Left Updated:  08/02/17 2212     Blood Culture, Routine No growth after 5 days.    Culture, Anaerobe [[id number]] Collected:  07/29/17 1056    Order Status:  Completed Specimen:  Wound from Abdomen Updated:  08/02/17 0859     Anaerobic Culture No anaerobes isolated    Aerobic culture [[id number]]  (Susceptibility) Collected:  07/29/17 1056    Order Status:  Completed Specimen:  Wound from Abdomen Updated:  08/01/17 1035     Aerobic Bacterial Culture --     PSEUDOMONAS AERUGINOSA  Few      Urine culture [[id number]] Collected:  07/29/17 1111    Order Status:  Completed Specimen:  Urine from Urine, Catheterized Updated:  07/30/17 1956     Urine Culture, Routine No growth    Gram stain [[id number]] Collected:  07/29/17 1056    Order Status:  Completed Specimen:  Wound from Abdomen Updated:  07/30/17 0001     Gram Stain Result Few WBC's      Rare Gram negative rods    Urine culture [[id number]] Collected:  07/29/17 0553    Order Status:  Canceled Specimen:  Urine from Urine, Clean Catch Updated:  07/29/17 0554    Aerobic culture [[id number]]     Order Status:  No result Specimen:  Wound from Abdomen           I have reviewed all pertinent labs within the past 24 hours.    Estimated Creatinine Clearance: 50.8 mL/min (based on Cr of 1.4).    Diagnostic Results:      Assessment and Plan:     Hypertensive urgency    - All MAP's have been < 90 since Cardene gtt D/C'd yesterday and patient transferred to step down  - Continue Norvasc, Lisinopril, Hydralazine and Aldactone  - All BP's need to be checked while patient sitting on edge of bed or OOB in chair given h/o orthostasis             * Complication involving left ventricular assist device (LVAD)    - HM III implanted at [facility] in [place] 10/2016  - Speed 5800  - INR therapeutic again at 2.4 after 1 mg boost of Coumadin yesterday and increasing Coumadin to 6 mg qd  - LD stable - continue ASA 81 mg qd  - Pulsatile with MAP's < 90's - BP control with   Lisinopril, Norvasc, Aldactone and Hydralazine  - CT of abdomen/pelvis with no fluid collections  - DLES culture with Pseudomonas resistant to Cipro. Appreciate ID's help - continue Cefepime X 8 weeks (end 9/23/17). Weekly CBC, CMP, ESR, CRP to ID ([phone]) once discharged          Delirium    - Appreciate Neuro's help: Patient currently with signs/symptoms of delirium.  Tremulous, myoclonic jerks as well as his disorientation suggest this.  Suspect current infection as primary cause. Less tremulous past 2 days, and is more oriented as the day goes on  - RPR non-reactive, B12 WNL           Involuntary movements    - No known history of parkinsons disease, experiencing myoclonic jerks/tremors NOT suggestive of PD  - Currently not active as he should be given tremors and leg pain, very deconditioned.  - ddx per neuro delirium / myoclonic jerks from infectious etiologies / metabolic >>> postoperative states >> Fluid and electrolyte disturbances / toxicity >>> cns pathology / seziures. Appreciate assistance.         Restless leg syndrome    - continue ropinirole        Impaired functional mobility and endurance    - consulted PT/OT/ST  - Appreciate PM&R consult. Plan D/C to rehab likely next week        TANESHA (acute kidney injury)    - Creatinine has trended down to 1.4 (1.5 on admit, baseline ~ 1.1-1.3 - will monitor)                                [place] [id number]  Heart Transplant  [facility]

## 2017-08-03 NOTE — PT/OT/SLP PROGRESS
"Speech Language Pathology  Treatment    Kev Vasquez   MRN: [id number]   Admitting Diagnosis: Complication involving left ventricular assist device (LVAD)    Diet recommendations: Solid Diet Level: Regular  Liquid Diet Level: Thin general aspiration precautions    SLP Treatment Date: 08/03/17  Speech Start Time: 1044     Speech Stop Time: 1059     Speech Total (min): 15 min       TREATMENT BILLABLE MINUTES:  Speech Therapy Individual 15    Has the patient been evaluated by SLP for swallowing? : Yes  Keep patient NPO?: No   General Precautions: Standard, fall, LVAD          Subjective:  "[place]."  Pt not oriented to city/state.    Pain/Comfort  Pain Rating 1: 0/10    Objective:   Patient found with: telemetry, peripheral IV    Pt awake and alert lying in bed upon entry. Pt's bed sheets and pad noted to be soiled with urine. Pt stated he pressed the call button much earlier and was not sure if the call was picked up. SLP called and notified PCT. Pt willing to participate in cognitive therapy while waiting for PCT to arrive to clean pt and change bed sheets.  Pt oriented to place/[facility], month, year, and time of day. However, he was not oriented to city and state.  Pt wrote "[place]" above the [employer] logo on the whiteboard and encouraged pt to utilize as an external aide to assist with orientation to place.  Following a 3 minute delay, pt was able to recall 2/3 unrelated words ind'ly and 3/3 given cues. Repetition and association had been utilize as compensatory memory strategies prior to 3 minute delay.  Given hypothetical solutions, pt stated possible problems with 100% accuracy given supervision x 1 only.     Assessment:  Kev Vasquez is a 76 y.o. male with a medical diagnosis of Complication involving left ventricular assist device (LVAD) and presents with cognitive-linguistic deficits.     Discharge recommendations: Discharge Facility/Level Of Care Needs: rehabilitation facility     Goals:    SLP Goals  "       Problem: SLP Goal    Goal Priority Disciplines Outcome   SLP Goal     SLP Ongoing (interventions implemented as appropriate)   Description:  Speech Language Pathology Goals  Goals expected to be met by 8/7:  1. Pt will orient x 4 given min cues.  2. Pt will recall general information with 80% accuracy given min cues.  3. Following a 3 minute delay, pt will recall 3/3 unrelated words given modified independence.  4. Pt will follow complex commands with 70% accuracy given mod cues.  5. Pt will complete problem solving tasks with 80% accuracy given min cues.  6. Pt will participate in ongoing evaluation of word fluency, categorization, reading, writing, visual spatial, and math/time/money management abilities.                         Plan:   Patient to be seen Therapy Frequency: 5 x/week   Plan of Care expires: 08/29/17  Plan of Care reviewed with: patient  SLP Follow-up?: Yes              SANTOSH Ames, CCC-SLP  08/03/2017     SANTOSH Ames, CCC-SLP  Speech Language Pathologist  [phone]  8/3/2017

## 2017-08-03 NOTE — PROGRESS NOTES
Daily E and M and VAD Interrogation Note    Reason for Visit: Driveline evaluation   Patient is seen in follow up for management of:  [x] HeartMate III [] Heartware [] Total artificial heart       [] ECMO           [] Other     Interval History:  [] Interval history unobtainable due to intubation.  The [x] implant 10/2016 at [facility]  No Events overnight      Review of Systems:  Pt states he is feeling better today   Denies CP, SOB, ABD pain, N/V  All other systems reviewed and negative    Medications:  Current Facility-Administered Medications   Medication Dose Route Frequency Provider Last Rate Last Dose    acetaminophen tablet 650 mg  650 mg Oral Q6H PRN Clotilde Early MD   650 mg at 08/02/17 2359    allopurinol tablet 300 mg  300 mg Oral Daily Henry Murguia MD   300 mg at 08/03/17 0855    amlodipine tablet 10 mg  10 mg Oral Daily Jim Khoury MD   10 mg at 08/03/17 0855    aspirin chewable tablet 81 mg  81 mg Oral Daily Henry Murguia MD   81 mg at 08/03/17 0855    atorvastatin tablet 10 mg  10 mg Oral Daily Henry Murguia MD   10 mg at 08/03/17 0855    bisacodyl suppository 10 mg  10 mg Rectal Daily PRN Henry Murguia MD        buPROPion TB24 tablet 300 mg  300 mg Oral Daily Henry Murguia MD   300 mg at 08/03/17 0854    calcium-vitamin D3 500 mg(1,250mg) -200 unit per tablet 1 tablet  1 tablet Oral BID Henry Murguia MD   1 tablet at 08/03/17 0855    ceFEPIme in dextrose 5% 2 gram/50 mL IVPB 2 g  2 g Intravenous Q12H Henry Murguia  mL/hr at 08/03/17 0301 2 g at 08/03/17 0301    dofetilide capsule 250 mcg  250 mcg Oral Q12H Henry Murguia MD   250 mcg at 08/03/17 0854    folic acid tablet 1 mg  1 mg Oral Daily Henry Murguia MD   1 mg at 08/03/17 0854    hydrALAZINE tablet 50 mg  50 mg Oral Q8H Charlette Jasmine, NP   50 mg at 08/03/17 0456    lisinopril tablet 5 mg  5 mg Oral BID Jim Khoury MD   5 mg at 08/03/17 3327     magnesium oxide tablet 400 mg  400 mg Oral BID Carlito Santana MD   400 mg at 08/03/17 0854    multivitamin tablet 1 tablet  1 tablet Oral Daily Henry Murguia MD   1 tablet at 08/03/17 0855    promethazine tablet 12.5 mg  12.5 mg Oral Q6H PRN Clotilde Early MD   12.5 mg at 08/02/17 2055    ropinirole tablet 0.5 mg  0.5 mg Oral TID Henry Murguia MD   0.5 mg at 08/03/17 0455    sodium chloride 0.9% flush 3 mL  3 mL Intravenous Q8H Julián Sanket Blanco MD   3 mL at 08/03/17 0456    spironolactone tablet 25 mg  25 mg Oral Daily Henry Murguia MD   25 mg at 08/03/17 0854    tamsulosin 24 hr capsule 0.4 mg  0.4 mg Oral Daily Henry Murguia MD   0.4 mg at 08/03/17 0854    warfarin (COUMADIN) tablet 4 mg  4 mg Oral Every Tues, Thurs, Sat Carlito Santana MD        [START ON 8/6/2017] warfarin (COUMADIN) tablet 4 mg  4 mg Oral Every Sun Carlito Santana MD        [START ON 8/4/2017] warfarin tablet 6 mg  6 mg Oral Every Mon, Wed, Fri Carlito Santana MD           Physical Examination:  Vital Signs:   Vitals:    08/03/17 1100   BP:    Pulse: 64   Resp:    Temp:      Cardiovascular:  [x] Regular rate and rhythm. Smooth VAD sounds    [x]  No edema []  Edema present  [x]  Clear to auscultation    [] Pedal Pulses absent  []  Pulses + throughout  Skin:  Incision is [x]  Clean, dry and intact.   Sternum:  [x]  Stable []  Unstable  Driveline(s):   [x]  Clean, dry and intact.      Labs:  CBC, CMP, LDH and Coags     X-Rays:  [x]  I reviewed today's Chest x-ray    Procedure:  Device Interrogation including analysis of device parameters.  Current Settings  [x]  Ventricular Assist Device  []  Total Artificial Heart interrogated  Review of device function is [x]  Stable     TXP LVAD INTERROGATIONS 8/3/2017 8/3/2017 8/2/2017 8/2/2017 8/2/2017 8/2/2017 8/2/2017   Type HeartMate3 HeartMate3 HeartMate3 HeartMate3 HeartMate3 HeartMate3 HeartMate3   Flow 5.3 5.3 5.0 5.1 5.0 5.4 5.1   Speed 5800 5800 5800 5800 5800 5800  5800   PI 2.9 3.0 3.0 3.2 3.1 3.2 4.2   Power (Mendez) 4.5 4.6 4.4 4.5 4.3 4.2 4.5   Pulsatility Intermittent pulse - Intermittent pulse Intermittent pulse Intermittent pulse Intermittent pulse Intermittent pulse       Assessment:  []  Primary Cardiomyopathy [x]  Congestive Heart Failure   []  Atrial Fibrillation []  Ventricular Tachycardia   []  Aftercare cardiac device [x]  Long term (current) use of anticoagulants   []  Ventilator-associated pneumonia []  Pneumonia viral, unspecified   []  Pneumonia, bacterial, unspecified []  Pneumonia, organism unspecified   []  Hemorrhage of GI tract, unspecified    []  Nosebleed []  Driveline infection   []  Infection VAD device          Plan:  [x]  Interval history obtained from HTS attending team member during rounding today  [x]  VAD/JOSE teaching performed with patient  [x]  Mobilization / Physical Therapy ongoing  [x]  Anticoagulation [x]  Ongoing []  Held  -MAP can be higher than 80 with HM 3, if pt can tolerate  -No surgical indication for any intervention at this stage.  -Multiple PI events   -Pending Rehab placement, likely next week     Total time spent was 32 minutes.  Of which more than 50 percent of the care dominated counseling and coordinating care with different team members. The VAD was interrogated and all parameters were WNL and no significant findings were found in the history. All these findings are documented in the note above.      Date of Service: 08/03/2017

## 2017-08-03 NOTE — PROGRESS NOTES
[facility]  Physical Medicine & Rehab  Progress Note    Patient Name: Kev Vasquez  MRN: [id number]  Admission Date: 7/28/2017  Length of Stay: 6 days  Attending Physician: Carlito Santana MD  Collaborating Physician: Rashad Garcia MD    Subjective:     Principal Problem:Complication involving left ventricular assist device (LVAD)     Interval History (08/03/2017): Patient is seen for follow-up rehab evaluation and recommendations.  No acute events over night.  Participating with therapy.  Barriers for discharge/rehab admission: not medically ready for discharge.     HPI, Past Medical, Surgical, Family, and Social History remains the same as documented in the initial encounter.      Hospital Course:   7/29/17:  Evaluated by therapy.  Bed mobility totalA.  Sit to stand totalA.  Ambulated 4 lateral step totalA.  UBD totalA and LBD totalA.  7/31/17: Participating with therapy.  Bed mobility Min-MaxA.  Sit to stand ModA.  Ambulated 6 ft. MaxA.  LBD MaxA.  8/1/17: SLP recommending regular diet and thin liquids. Cognitive linguistic impairments noted.  8/1/17: Participating with OT.  Bed mobility Min-MaxA.  Sit to stand ModA.  Ambulated 4 ft x2 trials MaxA.  UBD MaxA and LBD MaxA.  8/2/17: Participating with therapy.  Bed mobility Ida.  Sit to stand Ida and transfers Ida.  Ambulated 8 ft + 12 ft. Ida.  UBD ModA and LBD ModA.      AM-PAC 6 CLICK MOBILITY (PT) - Total score: 10 (7/30), 11 (7/31), 15 (8/2)  AM-PAC 6 CLICK ADL (OT) - Total score: 6 (7/30), 6 (7/31), 14 (8/1), 15 (8/2)    AM-PAC Raw Score Level of Impairment Assistance   6 100% Total / Unable   7 - 8 80 - 100% Maximal Assist   9-13 60 - 80% Moderate Assist   14 - 19 40 - 60% Moderate Assist   20 - 22 20 - 40% Minimal Assist   23 1-20% SBA / CGA   24 0% Independent/ Mod I       Past Medical History:   Diagnosis Date    Acute on chronic systolic heart failure 7/27/2015    AICD (automatic cardioverter/defibrillator) present 2014    [employer]  [employer]    CAD (coronary artery disease) 7/27/2015    CHF (congestive heart failure)     Gait instability     HTN (hypertension) 7/27/2015    ICD (implantable cardioverter-defibrillator), biventricular, in situ 7/27/2015    Kidney stones     HILLARY treated with BiPAP 7/27/2015    Peripheral neuropathy     Pulmonary hypertension due to left ventricular systolic dysfunction 7/29/2015    Restless leg syndrome 1992    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    Skin cancer     excision 2013    Sleep apnea 1992     Past Surgical History:   Procedure Laterality Date    CARDIAC PACEMAKER PLACEMENT      pacemaker previously, then AICD in 2006. AICD [employer] serial #[id number]    CORONARY ARTERY BYPASS GRAFT  1993    KNEE SURGERY Left 2001    complicated by infection, hardware had to be taken out and replaced    LEFT VENTRICULAR ASSIST DEVICE  10/19/2016     Review of patient's allergies indicates:   Allergen Reactions    Sulfa (sulfonamide antibiotics)        Scheduled Medications:    allopurinol  300 mg Oral Daily    amlodipine  10 mg Oral Daily    aspirin  81 mg Oral Daily    atorvastatin  10 mg Oral Daily    buPROPion  300 mg Oral Daily    calcium-vitamin D3  1 tablet Oral BID    ceFEPime (MAXIPIME) IVPB  2 g Intravenous Q12H    dofetilide  250 mcg Oral Q12H    folic acid  1 mg Oral Daily    hydrALAZINE  50 mg Oral Q8H    lisinopril  5 mg Oral BID    magnesium oxide  400 mg Oral BID    multivitamin  1 tablet Oral Daily    ropinirole  0.5 mg Oral TID    sodium chloride 0.9%  3 mL Intravenous Q8H    spironolactone  25 mg Oral Daily    tamsulosin  0.4 mg Oral Daily    warfarin  6 mg Oral Daily       PRN Medications: acetaminophen, bisacodyl, promethazine    Family History     Problem Relation (Age of Onset)    Cancer Daughter (50)    Diabetes Daughter    Heart disease Daughter        Social History Main Topics    Smoking status: Never Smoker    Smokeless tobacco: Never Used     Alcohol use No    Drug use: No    Sexual activity: Not on file      Comment:      Review of Systems   Constitutional: Negative for chills and fever.   HENT: Negative for trouble swallowing and voice change.    Eyes: Negative for photophobia and visual disturbance.   Respiratory: Negative for cough, shortness of breath and wheezing.    Cardiovascular: Negative for chest pain and palpitations.   Gastrointestinal: Negative for abdominal distention and nausea.   Genitourinary: Negative for difficulty urinating and flank pain.   Musculoskeletal: Positive for gait problem. Negative for arthralgias and myalgias.   Skin: Positive for wound (driveline site infection). Negative for color change.   Neurological: Positive for tremors and weakness. Negative for numbness and headaches.   Psychiatric/Behavioral: Negative for agitation and confusion.     Objective:     Vital Signs (Most Recent):  Temp: 99.5 °F (37.5 °C) (08/03/17 0500)  Pulse: 70 (08/03/17 0700)  Resp: 20 (08/02/17 1937)  BP: (!) 80/0 (08/03/17 0854)  SpO2: 96 % (08/03/17 0500)    Vital Signs (24h Range):  Temp:  [97.7 °F (36.5 °C)-99.5 °F (37.5 °C)] 99.5 °F (37.5 °C)  Pulse:  [50-76] 70  Resp:  [15-27] 20  SpO2:  [94 %-100 %] 96 %  BP: ()/(0-80) 80/0     Body mass index is 25.12 kg/m².    Physical Exam   Constitutional: He is oriented to person, place, and time. He appears well-developed and well-nourished.   HENT:   Head: Normocephalic and atraumatic.   Eyes: EOM are normal. Pupils are equal, round, and reactive to light.   Neck: Normal range of motion.   Cardiovascular: Normal rate and regular rhythm.    Pulmonary/Chest: Effort normal. No respiratory distress.   LVAD noted    Abdominal: Soft. There is no tenderness.   Musculoskeletal: Normal range of motion.   Neurological: He is alert and oriented to person, place, and time. He displays tremor. No sensory deficit. He exhibits normal muscle tone.   RUE: 5/5, 5/5 .  LUE: 5/5, 5/5 .  RLE:  4/5, DF 5/5, PF 5/5.  LLE: 5/5, DF 5/5, PF 5/5.  Generalized weakness noted through upper body.   Skin:   Bandage to driveline site C/D/I  Mild tenderness to site    Psychiatric: He has a normal mood and affect. His behavior is normal.     NEUROLOGICAL EXAMINATION:     MENTAL STATUS   Oriented to person, place, and time.     CRANIAL NERVES     CN III, IV, VI   Pupils are equal, round, and reactive to light.  Extraocular motions are normal.       Diagnostic Results:   Labs: Reviewed  CT: Reviewed    Assessment/Plan:      Impaired functional mobility and endurance    Recommendations  -  Encourage mobility, OOB in chair at least 3 hours per day, and early ambulation as appropriate  -  PT/OT evaluate and treat  -  Pain management  -  Monitor for and prevent skin breakdown and pressure ulcers  · Early mobility, repositioning/weight shifting every 20-30 minutes when sitting, turn patient every 2 hours, proper mattress/overlay and chair cushioning, pressure relief/heel protector boots  -  DVT prophylaxis:  Coumadin  -  Reviewed discharge options (IP rehab, SNF, HH therapy, and OP therapy)          Restless leg syndrome    -on Requip  -continue Ropinirole per Neuro        Involuntary movements    -Neurology consulted  -continue home Ropinirole per Neuro          Delirium    Recommendations  -  Monitor sleep disturbances and establish consistent sleep-wake cycle  ·  Day time- lights on and shades open, night time- lights dim/off  -  Environmental modifications to limit agitation/confusion   · Appropriate lighting, family at bedside, visible clock and calendar, updated white board, reduce noise, limited visitors, clustered nursing care  -  Reorient patient to person, place, time, and situation on each encounter  -  If possible, avoid restraints  -  May benefit from 24/7 supervision by sitter or camera sitter  -  Avoid/limit medications that can worsen delirium (benzodiazepines, antihistamines, anticholinergics, hypnotics,  opiates)  -  Encourage mobility, OOB in chair at least 3 hours per day, and early ambulation as appropriate  -  PT/OT evaluate and treat            LVAD (left ventricular assist device) present    -s/p 10/2016        * Complication involving left ventricular assist device (LVAD)    -per primary team  - LVAD driveline site infection   -Currently on IV Cefepime         Patient approved for [facility].  Medical clearance pending admission and per primary team will not be ready until next week sometime.  Transfer to rehab when  he is medically ready for discharge.          Cathleen Allen NP  Department of Physical Medicine & Rehab   [facility]

## 2017-08-03 NOTE — PROCEDURES
TXP LVAD INTERROGATIONS 8/3/2017 8/3/2017 8/2/2017 8/2/2017 8/2/2017 8/2/2017 8/2/2017   Type HeartMate3 HeartMate3 HeartMate3 HeartMate3 HeartMate3 HeartMate3 HeartMate3   Flow 5.3 5.3 5.0 5.1 5.0 5.4 5.1   Speed 5800 5800 5800 5800 5800 5800 5800   PI 2.9 3.0 3.0 3.2 3.1 3.2 4.2   Power (Mendez) 4.5 4.6 4.4 4.5 4.3 4.2 4.5   Pulsatility Intermittent pulse - Intermittent pulse Intermittent pulse Intermittent pulse Intermittent pulse Intermittent pulse     Pt and daughter AAAO.  No alarms noted in history  HCT= 37.8

## 2017-08-03 NOTE — PLAN OF CARE
Problem: Patient Care Overview  Goal: Individualization & Mutuality  Past Medical History:  Diagnosis Date   Acute on chronic systolic heart failure 7/27/2015   AICD (automatic cardioverter/defibrillator) present 2014    [facility] [employer]   CAD (coronary artery disease) 7/27/2015   CHF (congestive heart failure)     Gait instability     HTN (hypertension) 7/27/2015   ICD (implantable cardioverter-defibrillator), biventricular, in situ 7/27/2015   Kidney stones     HILLARY treated with BiPAP 7/27/2015   Peripheral neuropathy     Pulmonary hypertension due to left ventricular systolic dysfunction 7/29/2015   Restless leg syndrome 1992   S/P CABG (coronary artery bypass graft) 1993    bypass x 5   Skin cancer      excision 2013   Sleep apnea 1992        Past Surgical History:  Procedure Laterality Date   CARDIAC PACEMAKER PLACEMENT        pacemaker previously, then AICD in 2006. AICD [facility] [employer] serial #[id number]   CORONARY ARTERY BYPASS GRAFT   1993   KNEE SURGERY Left 2001    complicated by infection, hardware had to be taken out and replaced   LEFT VENTRICULAR ASSIST DEVICE   10/19/2016        Review of patient's allergies indicates:  Allergen Reactions   Sulfa (sulfonamide antibiotics)        Plan of care discussed with patient.  Patient ambulating with assistance, fall precautions in place. LVAD DP and numbers WNL, smooth LVAD hum. Patient has no complaints of pain or signs of distress. Discussed medications and care. VS stable. AAOx2. IV antibiotics administered as ordered. Patient has no questions at this time. Will continue to monitor.

## 2017-08-03 NOTE — PLAN OF CARE
Problem: Physical Therapy Goal  Goal: Physical Therapy Goal  Goals to be met by: 17     Patient will increase functional independence with mobility by performin. Supine to sit with Moderate Assistance - met   A. Supine to sit with SBA  2. Sit to supine with Moderate Assistance  3. Sit to stand transfer with Moderate Assistance  4. Bed to chair transfer with Moderate Assistance using Rolling Walker or appropriate AD  5. Gait  x 10 feet with Moderate Assistance using Rolling Walker or appropriate AD.   6. Stand for 2 minutes with Minimal Assistance using Rolling Walker or appropriate AD without LOB  7. Lower extremity exercise program x10 reps, with supervision, in order to increase LE strength and (I) with functional mobility.      Outcome: Ongoing (interventions implemented as appropriate)  Goals remain appropriate

## 2017-08-03 NOTE — PLAN OF CARE
Problem: SLP Goal  Goal: SLP Goal  Speech Language Pathology Goals  Goals expected to be met by 8/7:  1. Pt will orient x 4 given min cues.  2. Pt will recall general information with 80% accuracy given min cues.  3. Following a 3 minute delay, pt will recall 3/3 unrelated words given modified independence.  4. Pt will follow complex commands with 70% accuracy given mod cues.  5. Pt will complete problem solving tasks with 80% accuracy given min cues.  6. Pt will participate in ongoing evaluation of word fluency, categorization, reading, writing, visual spatial, and math/time/money management abilities.       Outcome: Ongoing (interventions implemented as appropriate)  Pt seen for ongoing cog-ling tx.  Pt continues to have difficulty oriented to current city/state and situation, but is well oriented to self and time.  Cont POC. SANTOSH Ames, CCC-SLP  Speech Language Pathologist  [phone]  8/3/2017

## 2017-08-03 NOTE — PROGRESS NOTES
"VAD dressing changed, site 3. Site remains red but not warm with moderate creamy discharge. Skin is pulled away from driveline. Pt states the site "only hurts when touched or bumped."   "

## 2017-08-03 NOTE — PROGRESS NOTES
"Reassessment of BP completed following the administration of BP medication. Pt's doppler currently 78/0. Pt states that his nausea has not quite resolved and now endorses a "slight headache", rating it  4/10. MD Nneka notified. Orders placed in MAR for Tylenol. Instructed by MD to give Tylenol and reassess pain. Orders to be carried out, will continue to monitor.      0100- Pt asleep. Pt arises easily to voice. Asked Pt if he was still having a headache/pain. Pt denies. Will continue to monitor.   "

## 2017-08-03 NOTE — PT/OT/SLP PROGRESS
Occupational Therapy  Treatment    Kev Vasquez   MRN: [id number]   Admitting Diagnosis: Complication involving left ventricular assist device (LVAD)    OT Date of Treatment: 08/03/17   OT Start Time: 1122  OT Stop Time: 1153  OT Total Time (min): 31 min    Billable Minutes:  Self Care/Home Management 15 and Therapeutic Activity 16    General Precautions: Standard, LVAD, fall  Orthopedic Precautions: N/A  Braces: N/A    Subjective:  Communicated with RN prior to session. Pt up in chair and agreeable to OT.  Reports he did not have LVAD equipment present to switch to battery power, but wife later reported his vest is in the room.    Pain/Comfort  Pain Rating 1: 0/10  Pain Rating Post-Intervention 1: 0/10    Objective:  Patient found with: telemetry (LVAD to wall power)     Functional Mobility:  Bed Mobility:  Scooting/Bridging: Stand by Assistance to edge of chair    Transfers:  Sit <> Stand Assistance: Moderate Assistance initial stand from bedside chair; Min A next 2 trials  Sit <> Stand Assistive Device: No Assistive Device    Functional Ambulation: First trial ~5 ft forward with Mod A for balance due to posterior lean and small steps, HHA; second strial ~8 ft forward/backward with Min A HHA and improved balance    Activities of Daily Living:  Grooming Position: Standing at sink  Grooming Level of Assistance: Contact guard assistance for oral hygiene and to wash face ~8 min standing; pt required CGA throughout and no posterior lean or LOB while standing at sink    Balance:   Static Sit: GOOD: Takes MODERATE challenges from all directions  Dynamic Sit: GOOD-: Maintains balance through MODERATE excursions of active trunk movement,     Static Stand: FAIR: Maintains without assist but unable to take challenges  Dynamic stand: POOR: N/A    Therapeutic Activities and Exercises:  Pt up in chair upon OT entry; initial stand required Mod A from bedside chair; able to ambulate few feet with Mod A, unsteady gait and posterior  "lean in standing; returned to bedside chair for seated rest break; able to stand with Min A at sink to perform ADLs with CGA; participated in standing dynamic balance reaching with B UE x 10 reps each side, including high, low, and across midline, requiring CGA-Min A for balance; returned to bedside chair and family present at end of session with lunch for pt; pt with mild confusion noted throughout session    AM-PAC 6 CLICK ADL   How much help from another person does this patient currently need?   1 = Unable, Total/Dependent Assistance  2 = A lot, Maximum/Moderate Assistance  3 = A little, Minimum/Contact Guard/Supervision  4 = None, Modified [place]/Independent    Putting on and taking off regular lower body clothing? : 2  Bathing (including washing, rinsing, drying)?: 2  Toileting, which includes using toilet, bedpan, or urinal? : 2  Putting on and taking off regular upper body clothing?: 3  Taking care of personal grooming such as brushing teeth?: 3  Eating meals?: 3  Total Score: 15     AM-PAC Raw Score CMS "G-Code Modifier Level of Impairment Assistance   6 % Total / Unable   7 - 8 CM 80 - 100% Maximal Assist   9-13 CL 60 - 80% Moderate Assist   14 - 19 CK 40 - 60% Moderate Assist   20 - 22 CJ 20 - 40% Minimal Assist   23 CI 1-20% SBA / CGA   24 CH 0% Independent/ Mod I       Patient left up in chair with all lines intact, call button in reach and family present    ASSESSMENT:  Kev Vasquez is a 76 y.o. male with a medical diagnosis of Complication involving left ventricular assist device (LVAD) and presents with deficits listed below. Pt would continue to benefit from OT to increase functional independence and safety. Recommend rehab upon D/C.    Rehab identified problem list/impairments: Rehab identified problem list/impairments: weakness, impaired endurance, impaired self care skills, impaired functional mobilty, gait instability, impaired balance, impaired cardiopulmonary response to " activity    Rehab potential is good.    Activity tolerance: Good    Discharge recommendations: Discharge Facility/Level Of Care Needs: rehabilitation facility     Barriers to discharge: Barriers to Discharge: None    Equipment recommendations:  (TBD)     GOALS:    Occupational Therapy Goals        Problem: Occupational Therapy Goal    Goal Priority Disciplines Outcome Interventions   Occupational Therapy Goal     OT, PT/OT Ongoing (interventions implemented as appropriate)    Description:  Goals to be met by:  2 weeks 8/12/17     Patient will increase functional independence with ADLs by performing:    Feeding with Supervision.  UE Dressing with Minimal Assistance.  LE Dressing with Minimal Assistance.  Grooming while seated with Supervision.  Revised: Grooming while standing with SBA.  Toileting from bedside commode with Minimal Assistance for hygiene and clothing management.   Stand pivot transfers with Minimal Assistance.  Toilet transfer to bedside commode with Minimal Assistance.                       Plan:  Patient to be seen 5 x/week to address the above listed problems via self-care/home management, therapeutic activities, therapeutic exercises  Plan of Care expires: 08/14/17  Plan of Care reviewed with: patient         SHAUNA Hanks  08/03/2017

## 2017-08-03 NOTE — ASSESSMENT & PLAN NOTE
- HM III implanted at [facility] in [place] 10/2016  - Speed 5800  - INR therapeutic again at 2.4 after 1 mg boost of Coumadin yesterday and increasing Coumadin to 6 mg qd  - LD stable - continue ASA 81 mg qd  - Pulsatile with MAP's < 90's - BP control with  Lisinopril, Norvasc, Aldactone and Hydralazine  - CT of abdomen/pelvis with no fluid collections  - DLES culture with Pseudomonas resistant to Cipro. Appreciate ID's help - continue Cefepime X 8 weeks (end 9/23/17). Weekly CBC, CMP, ESR, CRP to ID ([phone]) once discharged

## 2017-08-04 LAB
ALBUMIN SERPL BCP-MCNC: 3.2 G/DL
ALP SERPL-CCNC: 81 U/L
ALT SERPL W/O P-5'-P-CCNC: 18 U/L
ANION GAP SERPL CALC-SCNC: 7 MMOL/L
APTT BLDCRRT: 29.4 SEC
AST SERPL-CCNC: 24 U/L
BASOPHILS # BLD AUTO: 0.03 K/UL
BASOPHILS NFR BLD: 0.4 %
BILIRUB DIRECT SERPL-MCNC: 0.2 MG/DL
BILIRUB SERPL-MCNC: 0.4 MG/DL
BNP SERPL-MCNC: 131 PG/ML
BUN SERPL-MCNC: 33 MG/DL
CALCIUM SERPL-MCNC: 10.3 MG/DL
CHLORIDE SERPL-SCNC: 106 MMOL/L
CO2 SERPL-SCNC: 22 MMOL/L
CREAT SERPL-MCNC: 1.5 MG/DL
CRP SERPL-MCNC: 9.9 MG/L
DIFFERENTIAL METHOD: ABNORMAL
EOSINOPHIL # BLD AUTO: 0.3 K/UL
EOSINOPHIL NFR BLD: 4.3 %
ERYTHROCYTE [DISTWIDTH] IN BLOOD BY AUTOMATED COUNT: 16.5 %
EST. GFR  (AFRICAN AMERICAN): 52.3 ML/MIN/1.73 M^2
EST. GFR  (NON AFRICAN AMERICAN): 45.2 ML/MIN/1.73 M^2
GLUCOSE SERPL-MCNC: 93 MG/DL
HCT VFR BLD AUTO: 39.3 %
HGB BLD-MCNC: 12.8 G/DL
INR PPP: 2.8
LDH SERPL L TO P-CCNC: 152 U/L
LYMPHOCYTES # BLD AUTO: 1.3 K/UL
LYMPHOCYTES NFR BLD: 17.5 %
MAGNESIUM SERPL-MCNC: 2.3 MG/DL
MCH RBC QN AUTO: 29.7 PG
MCHC RBC AUTO-ENTMCNC: 32.6 G/DL
MCV RBC AUTO: 91 FL
MONOCYTES # BLD AUTO: 0.8 K/UL
MONOCYTES NFR BLD: 10.9 %
NEUTROPHILS # BLD AUTO: 4.9 K/UL
NEUTROPHILS NFR BLD: 66.5 %
PHOSPHATE SERPL-MCNC: 2.6 MG/DL
PLATELET # BLD AUTO: 162 K/UL
PMV BLD AUTO: 11 FL
POTASSIUM SERPL-SCNC: 4 MMOL/L
PREALB SERPL-MCNC: 24 MG/DL
PROT SERPL-MCNC: 6.9 G/DL
PROTHROMBIN TIME: 27.9 SEC
RBC # BLD AUTO: 4.31 M/UL
SODIUM SERPL-SCNC: 135 MMOL/L
WBC # BLD AUTO: 7.37 K/UL

## 2017-08-04 PROCEDURE — 85610 PROTHROMBIN TIME: CPT

## 2017-08-04 PROCEDURE — 25000003 PHARM REV CODE 250: Performed by: INTERNAL MEDICINE

## 2017-08-04 PROCEDURE — 99232 SBSQ HOSP IP/OBS MODERATE 35: CPT | Mod: ,,, | Performed by: NURSE PRACTITIONER

## 2017-08-04 PROCEDURE — 97530 THERAPEUTIC ACTIVITIES: CPT

## 2017-08-04 PROCEDURE — 20600001 HC STEP DOWN PRIVATE ROOM

## 2017-08-04 PROCEDURE — 85025 COMPLETE CBC W/AUTO DIFF WBC: CPT

## 2017-08-04 PROCEDURE — 84100 ASSAY OF PHOSPHORUS: CPT

## 2017-08-04 PROCEDURE — 99232 SBSQ HOSP IP/OBS MODERATE 35: CPT | Mod: ,,, | Performed by: INTERNAL MEDICINE

## 2017-08-04 PROCEDURE — 83880 ASSAY OF NATRIURETIC PEPTIDE: CPT

## 2017-08-04 PROCEDURE — A4216 STERILE WATER/SALINE, 10 ML: HCPCS | Performed by: INTERNAL MEDICINE

## 2017-08-04 PROCEDURE — 99233 SBSQ HOSP IP/OBS HIGH 50: CPT | Mod: 24,,, | Performed by: THORACIC SURGERY (CARDIOTHORACIC VASCULAR SURGERY)

## 2017-08-04 PROCEDURE — 63600175 PHARM REV CODE 636 W HCPCS: Performed by: INTERNAL MEDICINE

## 2017-08-04 PROCEDURE — 85730 THROMBOPLASTIN TIME PARTIAL: CPT

## 2017-08-04 PROCEDURE — 93750 INTERROGATION VAD IN PERSON: CPT | Mod: ,,, | Performed by: THORACIC SURGERY (CARDIOTHORACIC VASCULAR SURGERY)

## 2017-08-04 PROCEDURE — 97803 MED NUTRITION INDIV SUBSEQ: CPT | Performed by: DIETITIAN, REGISTERED

## 2017-08-04 PROCEDURE — 80076 HEPATIC FUNCTION PANEL: CPT

## 2017-08-04 PROCEDURE — 27000248 HC VAD-ADDITIONAL DAY

## 2017-08-04 PROCEDURE — 97116 GAIT TRAINING THERAPY: CPT

## 2017-08-04 PROCEDURE — 84134 ASSAY OF PREALBUMIN: CPT

## 2017-08-04 PROCEDURE — 25000003 PHARM REV CODE 250: Performed by: STUDENT IN AN ORGANIZED HEALTH CARE EDUCATION/TRAINING PROGRAM

## 2017-08-04 PROCEDURE — 36415 COLL VENOUS BLD VENIPUNCTURE: CPT

## 2017-08-04 PROCEDURE — 80048 BASIC METABOLIC PNL TOTAL CA: CPT

## 2017-08-04 PROCEDURE — 86140 C-REACTIVE PROTEIN: CPT

## 2017-08-04 PROCEDURE — 83615 LACTATE (LD) (LDH) ENZYME: CPT

## 2017-08-04 PROCEDURE — 25000003 PHARM REV CODE 250: Performed by: NURSE PRACTITIONER

## 2017-08-04 PROCEDURE — 83735 ASSAY OF MAGNESIUM: CPT

## 2017-08-04 RX ORDER — LISINOPRIL 5 MG/1
5 TABLET ORAL DAILY
Status: DISCONTINUED | OUTPATIENT
Start: 2017-08-05 | End: 2017-08-05

## 2017-08-04 RX ORDER — HYDRALAZINE HYDROCHLORIDE 20 MG/ML
5 INJECTION INTRAMUSCULAR; INTRAVENOUS ONCE
Status: COMPLETED | OUTPATIENT
Start: 2017-08-04 | End: 2017-08-04

## 2017-08-04 RX ORDER — LISINOPRIL 10 MG/1
10 TABLET ORAL DAILY
Status: DISCONTINUED | OUTPATIENT
Start: 2017-08-04 | End: 2017-08-05

## 2017-08-04 RX ORDER — LISINOPRIL 5 MG/1
5 TABLET ORAL ONCE
Status: COMPLETED | OUTPATIENT
Start: 2017-08-04 | End: 2017-08-04

## 2017-08-04 RX ADMIN — AMLODIPINE BESYLATE 10 MG: 10 TABLET ORAL at 08:08

## 2017-08-04 RX ADMIN — ASPIRIN 81 MG CHEWABLE TABLET 81 MG: 81 TABLET CHEWABLE at 08:08

## 2017-08-04 RX ADMIN — HYDRALAZINE HYDROCHLORIDE 50 MG: 50 TABLET ORAL at 01:08

## 2017-08-04 RX ADMIN — HYDRALAZINE HYDROCHLORIDE 5 MG: 20 INJECTION INTRAMUSCULAR; INTRAVENOUS at 03:08

## 2017-08-04 RX ADMIN — LISINOPRIL 10 MG: 10 TABLET ORAL at 08:08

## 2017-08-04 RX ADMIN — SPIRONOLACTONE 25 MG: 25 TABLET, FILM COATED ORAL at 08:08

## 2017-08-04 RX ADMIN — ROPINIROLE 0.5 MG: 0.5 TABLET, FILM COATED ORAL at 08:08

## 2017-08-04 RX ADMIN — ROPINIROLE 0.5 MG: 0.5 TABLET, FILM COATED ORAL at 01:08

## 2017-08-04 RX ADMIN — DOFETILIDE 250 MCG: 0.12 CAPSULE ORAL at 08:08

## 2017-08-04 RX ADMIN — HYDRALAZINE HYDROCHLORIDE 50 MG: 50 TABLET ORAL at 05:08

## 2017-08-04 RX ADMIN — FOLIC ACID 1 MG: 1 TABLET ORAL at 08:08

## 2017-08-04 RX ADMIN — WARFARIN SODIUM 6 MG: 2 TABLET ORAL at 05:08

## 2017-08-04 RX ADMIN — LISINOPRIL 5 MG: 5 TABLET ORAL at 12:08

## 2017-08-04 RX ADMIN — Medication 3 ML: at 10:08

## 2017-08-04 RX ADMIN — ALLOPURINOL 300 MG: 300 TABLET ORAL at 08:08

## 2017-08-04 RX ADMIN — ROPINIROLE 0.5 MG: 0.5 TABLET, FILM COATED ORAL at 05:08

## 2017-08-04 RX ADMIN — CEFEPIME HYDROCHLORIDE 2 G: 2 INJECTION, SOLUTION INTRAVENOUS at 02:08

## 2017-08-04 RX ADMIN — OYSTER SHELL CALCIUM WITH VITAMIN D 1 TABLET: 500; 200 TABLET, FILM COATED ORAL at 08:08

## 2017-08-04 RX ADMIN — TAMSULOSIN HYDROCHLORIDE 0.4 MG: 0.4 CAPSULE ORAL at 08:08

## 2017-08-04 RX ADMIN — HYDRALAZINE HYDROCHLORIDE 5 MG: 20 INJECTION INTRAMUSCULAR; INTRAVENOUS at 05:08

## 2017-08-04 RX ADMIN — ATORVASTATIN CALCIUM 10 MG: 10 TABLET, FILM COATED ORAL at 08:08

## 2017-08-04 RX ADMIN — LISINOPRIL 5 MG: 5 TABLET ORAL at 08:08

## 2017-08-04 RX ADMIN — THERA TABS 1 TABLET: TAB at 08:08

## 2017-08-04 RX ADMIN — HYDRALAZINE HYDROCHLORIDE 50 MG: 50 TABLET ORAL at 08:08

## 2017-08-04 RX ADMIN — Medication 3 ML: at 02:08

## 2017-08-04 RX ADMIN — BUPROPION HYDROCHLORIDE 300 MG: 150 TABLET, FILM COATED, EXTENDED RELEASE ORAL at 08:08

## 2017-08-04 RX ADMIN — CEFEPIME HYDROCHLORIDE 2 G: 2 INJECTION, SOLUTION INTRAVENOUS at 04:08

## 2017-08-04 RX ADMIN — MAGNESIUM OXIDE TAB 400 MG (241.3 MG ELEMENTAL MG) 400 MG: 400 (241.3 MG) TAB at 08:08

## 2017-08-04 NOTE — PLAN OF CARE
Problem: Patient Care Overview  Goal: Plan of Care Review  Outcome: Ongoing (interventions implemented as appropriate)  Fall precautions maintained. Pt free from falls, injury, and trauma. VS stable. Will continue to monitor.

## 2017-08-04 NOTE — ASSESSMENT & PLAN NOTE
- Creatinine stable at 1.5 (1.5 on admit, baseline ~ 1.1-1.3 - will monitor).   - Peaked at 1.6 this admission

## 2017-08-04 NOTE — PROGRESS NOTES
Confirmed with Loraine Rincon PA, pt's 6mg dose of coumadin scheduled for today. pt's INR 2.8 this AM. Sergey ordered to give coumadin. Will continue to monitor pt.

## 2017-08-04 NOTE — SUBJECTIVE & OBJECTIVE
Past Medical History:   Diagnosis Date    Acute on chronic systolic heart failure 7/27/2015    AICD (automatic cardioverter/defibrillator) present 2014    [employer]    CAD (coronary artery disease) 7/27/2015    CHF (congestive heart failure)     Gait instability     HTN (hypertension) 7/27/2015    ICD (implantable cardioverter-defibrillator), biventricular, in situ 7/27/2015    Kidney stones     HILLARY treated with BiPAP 7/27/2015    Peripheral neuropathy     Pulmonary hypertension due to left ventricular systolic dysfunction 7/29/2015    Restless leg syndrome 1992    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    Skin cancer     excision 2013    Sleep apnea 1992     Past Surgical History:   Procedure Laterality Date    CARDIAC PACEMAKER PLACEMENT      pacemaker previously, then AICD in 2006. AICD [employer] serial #[id number]    CORONARY ARTERY BYPASS GRAFT  1993    KNEE SURGERY Left 2001    complicated by infection, hardware had to be taken out and replaced    LEFT VENTRICULAR ASSIST DEVICE  10/19/2016     Review of patient's allergies indicates:   Allergen Reactions    Sulfa (sulfonamide antibiotics)        Scheduled Medications:    allopurinol  300 mg Oral Daily    amlodipine  10 mg Oral Daily    aspirin  81 mg Oral Daily    atorvastatin  10 mg Oral Daily    buPROPion  300 mg Oral Daily    calcium-vitamin D3  1 tablet Oral BID    ceFEPime (MAXIPIME) IVPB  2 g Intravenous Q12H    dofetilide  250 mcg Oral Q12H    folic acid  1 mg Oral Daily    hydrALAZINE  50 mg Oral Q8H    lisinopril  5 mg Oral BID    magnesium oxide  400 mg Oral BID    multivitamin  1 tablet Oral Daily    ropinirole  0.5 mg Oral TID    sodium chloride 0.9%  3 mL Intravenous Q8H    spironolactone  25 mg Oral Daily    tamsulosin  0.4 mg Oral Daily    warfarin  4 mg Oral Every Tues, Thurs, Sat    [START ON 8/6/2017] warfarin  4 mg Oral Every Sun    warfarin  6 mg Oral Every Mon, Wed, Fri       PRN  Medications: acetaminophen, bisacodyl, promethazine    Family History     Problem Relation (Age of Onset)    Cancer Daughter (50)    Diabetes Daughter    Heart disease Daughter        Social History Main Topics    Smoking status: Never Smoker    Smokeless tobacco: Never Used    Alcohol use No    Drug use: No    Sexual activity: Not on file      Comment:      Review of Systems   Constitutional: Negative for chills and fever.   HENT: Negative for trouble swallowing and voice change.    Eyes: Negative for photophobia and visual disturbance.   Respiratory: Negative for cough, shortness of breath and wheezing.    Cardiovascular: Negative for chest pain and palpitations.   Gastrointestinal: Negative for abdominal distention and nausea.   Genitourinary: Negative for difficulty urinating and flank pain.   Musculoskeletal: Positive for gait problem. Negative for arthralgias and myalgias.   Skin: Positive for wound (driveline site infection). Negative for color change.   Neurological: Positive for tremors and weakness. Negative for numbness and headaches.   Psychiatric/Behavioral: Negative for agitation and confusion.     Objective:     Vital Signs (Most Recent):  Temp: 98.6 °F (37 °C) (08/04/17 0403)  Pulse: 84 (08/04/17 0700)  Resp: 18 (08/03/17 2000)  BP: (!) 102/0 (08/04/17 0551)  SpO2: 96 % (08/03/17 2000)    Vital Signs (24h Range):  Temp:  [98.1 °F (36.7 °C)-98.6 °F (37 °C)] 98.6 °F (37 °C)  Pulse:  [64-84] 84  Resp:  [16-18] 18  SpO2:  [95 %-96 %] 96 %  BP: ()/(0-93) 102/0     Body mass index is 23.82 kg/m².    Physical Exam   Constitutional: He is oriented to person, place, and time. He appears well-developed and well-nourished.   HENT:   Head: Normocephalic and atraumatic.   Eyes: EOM are normal. Pupils are equal, round, and reactive to light.   Neck: Normal range of motion.   Cardiovascular: Normal rate and regular rhythm.    Pulmonary/Chest: Effort normal. No respiratory distress.   LVAD noted     Abdominal: Soft. There is no tenderness.   Musculoskeletal: Normal range of motion.   Neurological: He is alert and oriented to person, place, and time. He displays tremor. No sensory deficit. He exhibits normal muscle tone.   RUE: 5/5, 5/5 .  LUE: 5/5, 5/5 .  RLE: 4/5, DF 5/5, PF 5/5.  LLE: 5/5, DF 5/5, PF 5/5.  Generalized weakness noted through upper body.   Skin:   Bandage to driveline site C/D/I  Mild tenderness to site    Psychiatric: He has a normal mood and affect. His behavior is normal.     NEUROLOGICAL EXAMINATION:     MENTAL STATUS   Oriented to person, place, and time.     CRANIAL NERVES     CN III, IV, VI   Pupils are equal, round, and reactive to light.  Extraocular motions are normal.       Diagnostic Results:   Labs: Reviewed  CT: Reviewed

## 2017-08-04 NOTE — PROGRESS NOTES
Nurse called with doppler of 100.  Giving extra dose of lisinopril 5 mg. Nurse with continue to monitor.  If remains elevated, please call back.

## 2017-08-04 NOTE — SUBJECTIVE & OBJECTIVE
Interval History: Patient annoyed that he was woken up at 3 am for BP check.  Otherwise he feels fine and has no complaints    Continuous Infusions:   Scheduled Meds:   allopurinol  300 mg Oral Daily    amlodipine  10 mg Oral Daily    aspirin  81 mg Oral Daily    atorvastatin  10 mg Oral Daily    buPROPion  300 mg Oral Daily    calcium-vitamin D3  1 tablet Oral BID    ceFEPime (MAXIPIME) IVPB  2 g Intravenous Q12H    dofetilide  250 mcg Oral Q12H    folic acid  1 mg Oral Daily    hydrALAZINE  50 mg Oral Q8H    lisinopril  10 mg Oral Daily    [START ON 8/5/2017] lisinopril  5 mg Oral Daily    magnesium oxide  400 mg Oral BID    multivitamin  1 tablet Oral Daily    ropinirole  0.5 mg Oral TID    sodium chloride 0.9%  3 mL Intravenous Q8H    spironolactone  25 mg Oral Daily    tamsulosin  0.4 mg Oral Daily    warfarin  4 mg Oral Every Tues, Thurs, Sat    [START ON 8/6/2017] warfarin  4 mg Oral Every Sun    warfarin  6 mg Oral Every Mon, Wed, Fri     PRN Meds:acetaminophen, bisacodyl, promethazine    Review of patient's allergies indicates:   Allergen Reactions    Sulfa (sulfonamide antibiotics)      Objective:     Vital Signs (Most Recent):  Temp: 98 °F (36.7 °C) (08/04/17 0800)  Pulse: 69 (08/04/17 1200)  Resp: 18 (08/04/17 1200)  BP: 105/87 (08/04/17 1200)  SpO2: 97 % (08/04/17 0800) Vital Signs (24h Range):  Temp:  [98 °F (36.7 °C)-98.6 °F (37 °C)] 98 °F (36.7 °C)  Pulse:  [65-84] 69  Resp:  [16-18] 18  SpO2:  [96 %-97 %] 97 %  BP: ()/(0-93) 105/87     Weight: 79.7 kg (175 lb 9.6 oz)  Body mass index is 23.82 kg/m².      Intake/Output Summary (Last 24 hours) at 08/04/17 1319  Last data filed at 08/04/17 0600   Gross per 24 hour   Intake              640 ml   Output              450 ml   Net              190 ml       Hemodynamic Parameters:           Physical Exam   Constitutional: He appears well-developed and well-nourished.   HENT:   Head: Normocephalic and atraumatic.   Eyes:  Conjunctivae and EOM are normal. Pupils are equal, round, and reactive to light.   Neck: Normal range of motion. Neck supple. No JVD present.   Cardiovascular:   Smooth VAD hum. Pulsatile   Pulmonary/Chest: Effort normal and breath sounds normal.   Abdominal: Soft. Bowel sounds are normal.   Musculoskeletal: He exhibits no edema.   Neurological: He is alert.   Confused. Generalized tremors   Skin: Skin is warm and dry. Capillary refill takes 2 to 3 seconds.   Psychiatric:   Confused at times                           Significant Labs:  CBC:    Recent Labs  Lab 08/04/17  0601   WBC 7.37   RBC 4.31*   HGB 12.8*   HCT 39.3*      MCV 91   MCH 29.7   MCHC 32.6     BNP:    Recent Labs  Lab 08/04/17  0601   *     CMP:    Recent Labs  Lab 08/04/17  0601   GLU 93   CALCIUM 10.3   ALBUMIN 3.2*   PROT 6.9   *   K 4.0   CO2 22*      BUN 33*   CREATININE 1.5*   ALKPHOS 81   ALT 18   AST 24   BILITOT 0.4      Coagulation:     Recent Labs  Lab 08/04/17  0601   INR 2.8*   APTT 29.4     LDH:    Recent Labs  Lab 08/02/17  0207 08/03/17  0809 08/04/17  0601    173 152     Microbiology:  Microbiology Results (last 7 days)     Procedure Component Value Units Date/Time    Blood culture #1 **CANNOT BE ORDERED STAT** [[id number]] Collected:  07/28/17 2042    Order Status:  Completed Specimen:  Blood from Peripheral, Forearm, Right Updated:  08/02/17 2212     Blood Culture, Routine No growth after 5 days.    Blood culture #2 **CANNOT BE ORDERED STAT** [[id number]] Collected:  07/28/17 2058    Order Status:  Completed Specimen:  Blood from Peripheral, Forearm, Left Updated:  08/02/17 2212     Blood Culture, Routine No growth after 5 days.    Culture, Anaerobe [[id number]] Collected:  07/29/17 1056    Order Status:  Completed Specimen:  Wound from Abdomen Updated:  08/02/17 0859     Anaerobic Culture No anaerobes isolated    Aerobic culture [[id number]]  (Susceptibility) Collected:  07/29/17 1056    Order Status:   Completed Specimen:  Wound from Abdomen Updated:  08/01/17 1035     Aerobic Bacterial Culture --     PSEUDOMONAS AERUGINOSA  Few      Urine culture [[id number]] Collected:  07/29/17 1111    Order Status:  Completed Specimen:  Urine from Urine, Catheterized Updated:  07/30/17 1956     Urine Culture, Routine No growth    Gram stain [[id number]] Collected:  07/29/17 1056    Order Status:  Completed Specimen:  Wound from Abdomen Updated:  07/30/17 0001     Gram Stain Result Few WBC's      Rare Gram negative rods    Urine culture [[id number]] Collected:  07/29/17 0553    Order Status:  Canceled Specimen:  Urine from Urine, Clean Catch Updated:  07/29/17 0554    Aerobic culture [[id number]]     Order Status:  No result Specimen:  Wound from Abdomen           I have reviewed all pertinent labs within the past 24 hours.    Estimated Creatinine Clearance: 47.4 mL/min (based on Cr of 1.5).

## 2017-08-04 NOTE — PROGRESS NOTES
[facility]  Adult Nutrition  Progress Note    SUMMARY     Recommendations    Recommendation/Intervention:   1. Return to low Na diet encouraging high protein intake 2. RD following  Goals: PO intake to meet > 75% EEN and EPN with low Na diet and healthy intake of nutrients.  Nutrition Goal Status: new  Communication of RD Recs: reviewed with RN        Reason for Assessment    Reason for Assessment: RD follow-up  Diagnosis: other (see comments) (LVAD complication)  Relevent Medical History: chf, lvad, htn, ckd II   Interdisciplinary Rounds: did not attend     General Information Comments: Pt with good appetite and intake, complained of disliking food but on regular diet, wants more fruit    Nutrition Discharge Planning: Adequate PO intake with knowledge of high protein/high fiber diet    Nutrition Prescription Ordered    Current Diet Order: Regular  Nutrition Order Comments: Sodium restrictions discontinued. Pt still needs Na restriction.        Evaluation of Received Nutrients/Fluid Intake    % Intake of Estimated Energy Needs: 75 - 100 %  % Meal Intake: 75%     Nutrition Risk Screen     Nutrition Risk Screen: no indicators present    Nutrition/Diet History    Patient Reported Diet/Restrictions/Preferences: general, low salt              Factors Affecting Nutritional Intake: other (see comments) (None)                Labs/Tests/Procedures/Meds    Diagnostic Test/Procedure Review: reviewed, pertinent  Pertinent Labs Reviewed: reviewed, pertinent  Pertinent Labs Comments: Na 135, CUN 33, Cr 1.5, P 2.6, alb 3.2  Pertinent Medications Reviewed: reviewed, pertinent  Pertinent Medications Comments: statin, Ca/vit D, folic acid, MVI, warfarin    Physical Findings    Overall Physical Appearance: loss of muscle mass, edematous     Oral/Mouth Cavity: WDL  Skin: intact    Anthropometrics    Temp: 98 °F (36.7 °C)     Height: 6' (182.9 cm)  Weight Method: Bed Scale  Weight: 79.7 kg (175 lb 9.6 oz)  Ideal Body  Weight (IBW), Male: 178 lb     % Ideal Body Weight, Male (lb): 92.02 lb     BMI (Calculated): 22.3  BMI Grade: 18.5-24.9 - normal  Weight Loss: unintentional  Usual Body Weight (UBW), k.3 kg     % Usual Body Weight: 66.9  % Weight Change From Usual Weight: 33.1 %             Estimated/Assessed Needs    Weight Used For Calorie Calculations: 74.3 kg (163 lb 12.8 oz)      Energy Calorie Requirements (kcal): 1901 kcal/d  Energy Need Method: [place]-St Jeor        RMR ([place]-St. Jeor Equation): 1521        Weight Used For Protein Calculations: 74.3 kg (163 lb 12.8 oz)  Protein Requirements: 74-97 g/d     Fluid Need Method: RDA Method, other (see comments) (Per MD or 1 mL/kcal)           RDA Method (mL): 1901               Assessment and Plan    No new Assessment & Plan notes have been filed under this hospital service since the last note was generated.  Service: Nutrition    Nutrition Problem  Reduced sodium needs    Related to (etiology):   Fluid retention    Signs and Symptoms (as evidenced by):    Pt with CHF and LVAD     Interventions/Recommendations (treatment strategy):  Modify diet to low Na diet, with optimal intake of protein to meet estimated needs and replace lost muscle mass    Nutrition Diagnosis Status:   New        Monitor and Evaluation    Food and Nutrient Intake: food and beverage intake, energy intake  Food and Nutrient Adminstration: diet order  Knowledge/Beliefs/Attitudes: beliefs and attitudes, food and nutrition knowledge/skill  Physical Activity and Function: nutrition-related ADLs and IADLs  Anthropometric Measurements: weight, weight change, body mass index  Biochemical Data, Medical Tests and Procedures: gastrointestinal profile, glucose/endocrine profile, inflammatory profile, electrolyte and renal panel, lipid profile  Nutrition-Focused Physical Findings: overall appearance    Nutrition Risk    Level of Risk:  (1x/week)    Nutrition Follow-Up    RD Follow-up?: Yes

## 2017-08-04 NOTE — PROGRESS NOTES
[facility]  Heart Transplant  Progress Note    Patient Name: Kev Vasquez  MRN: [id number]  Admission Date: 7/28/2017  Hospital Length of Stay: 7 days  Attending Physician: Carlito Santana MD  Primary Care Provider: Mega Collins MD  Principal Problem:Complication involving left ventricular assist device (LVAD)    Subjective:     Interval History: Patient annoyed that he was woken up at 3 am for BP check.  Otherwise he feels fine and has no complaints    Continuous Infusions:   Scheduled Meds:   allopurinol  300 mg Oral Daily    amlodipine  10 mg Oral Daily    aspirin  81 mg Oral Daily    atorvastatin  10 mg Oral Daily    buPROPion  300 mg Oral Daily    calcium-vitamin D3  1 tablet Oral BID    ceFEPime (MAXIPIME) IVPB  2 g Intravenous Q12H    dofetilide  250 mcg Oral Q12H    folic acid  1 mg Oral Daily    hydrALAZINE  50 mg Oral Q8H    lisinopril  10 mg Oral Daily    [START ON 8/5/2017] lisinopril  5 mg Oral Daily    magnesium oxide  400 mg Oral BID    multivitamin  1 tablet Oral Daily    ropinirole  0.5 mg Oral TID    sodium chloride 0.9%  3 mL Intravenous Q8H    spironolactone  25 mg Oral Daily    tamsulosin  0.4 mg Oral Daily    warfarin  4 mg Oral Every Tues, Thurs, Sat    [START ON 8/6/2017] warfarin  4 mg Oral Every Sun    warfarin  6 mg Oral Every Mon, Wed, Fri     PRN Meds:acetaminophen, bisacodyl, promethazine    Review of patient's allergies indicates:   Allergen Reactions    Sulfa (sulfonamide antibiotics)      Objective:     Vital Signs (Most Recent):  Temp: 98 °F (36.7 °C) (08/04/17 0800)  Pulse: 69 (08/04/17 1200)  Resp: 18 (08/04/17 1200)  BP: 105/87 (08/04/17 1200)  SpO2: 97 % (08/04/17 0800) Vital Signs (24h Range):  Temp:  [98 °F (36.7 °C)-98.6 °F (37 °C)] 98 °F (36.7 °C)  Pulse:  [65-84] 69  Resp:  [16-18] 18  SpO2:  [96 %-97 %] 97 %  BP: ()/(0-93) 105/87     Weight: 79.7 kg (175 lb 9.6 oz)  Body mass index is 23.82 kg/m².      Intake/Output Summary  (Last 24 hours) at 08/04/17 1319  Last data filed at 08/04/17 0600   Gross per 24 hour   Intake              640 ml   Output              450 ml   Net              190 ml       Hemodynamic Parameters:           Physical Exam   Constitutional: He appears well-developed and well-nourished.   HENT:   Head: Normocephalic and atraumatic.   Eyes: Conjunctivae and EOM are normal. Pupils are equal, round, and reactive to light.   Neck: Normal range of motion. Neck supple. No JVD present.   Cardiovascular:   Smooth VAD hum. Pulsatile   Pulmonary/Chest: Effort normal and breath sounds normal.   Abdominal: Soft. Bowel sounds are normal.   Musculoskeletal: He exhibits no edema.   Neurological: He is alert.   Confused. Generalized tremors   Skin: Skin is warm and dry. Capillary refill takes 2 to 3 seconds.   Psychiatric:   Confused at times                           Significant Labs:  CBC:    Recent Labs  Lab 08/04/17  0601   WBC 7.37   RBC 4.31*   HGB 12.8*   HCT 39.3*      MCV 91   MCH 29.7   MCHC 32.6     BNP:    Recent Labs  Lab 08/04/17  0601   *     CMP:    Recent Labs  Lab 08/04/17  0601   GLU 93   CALCIUM 10.3   ALBUMIN 3.2*   PROT 6.9   *   K 4.0   CO2 22*      BUN 33*   CREATININE 1.5*   ALKPHOS 81   ALT 18   AST 24   BILITOT 0.4      Coagulation:     Recent Labs  Lab 08/04/17  0601   INR 2.8*   APTT 29.4     LDH:    Recent Labs  Lab 08/02/17  0207 08/03/17  0809 08/04/17  0601    173 152     Microbiology:  Microbiology Results (last 7 days)     Procedure Component Value Units Date/Time    Blood culture #1 **CANNOT BE ORDERED STAT** [[id number]] Collected:  07/28/17 2042    Order Status:  Completed Specimen:  Blood from Peripheral, Forearm, Right Updated:  08/02/17 2212     Blood Culture, Routine No growth after 5 days.    Blood culture #2 **CANNOT BE ORDERED STAT** [[id number]] Collected:  07/28/17 2058    Order Status:  Completed Specimen:  Blood from Peripheral, Forearm, Left Updated:   08/02/17 2212     Blood Culture, Routine No growth after 5 days.    Culture, Anaerobe [[id number]] Collected:  07/29/17 1056    Order Status:  Completed Specimen:  Wound from Abdomen Updated:  08/02/17 0859     Anaerobic Culture No anaerobes isolated    Aerobic culture [[id number]]  (Susceptibility) Collected:  07/29/17 1056    Order Status:  Completed Specimen:  Wound from Abdomen Updated:  08/01/17 1035     Aerobic Bacterial Culture --     PSEUDOMONAS AERUGINOSA  Few      Urine culture [[id number]] Collected:  07/29/17 1111    Order Status:  Completed Specimen:  Urine from Urine, Catheterized Updated:  07/30/17 1956     Urine Culture, Routine No growth    Gram stain [[id number]] Collected:  07/29/17 1056    Order Status:  Completed Specimen:  Wound from Abdomen Updated:  07/30/17 0001     Gram Stain Result Few WBC's      Rare Gram negative rods    Urine culture [[id number]] Collected:  07/29/17 0553    Order Status:  Canceled Specimen:  Urine from Urine, Clean Catch Updated:  07/29/17 0554    Aerobic culture [[id number]]     Order Status:  No result Specimen:  Wound from Abdomen           I have reviewed all pertinent labs within the past 24 hours.    Estimated Creatinine Clearance: 47.4 mL/min (based on Cr of 1.5).        Assessment and Plan:     Hypertensive urgency    - Cardene off, but BP still on the higher side.  Will increase Lisinopril to 5QAM and 10mg QPM  - Continue Norvasc, Hydralazine and Aldactone  - All BP's need to be checked while patient sitting on edge of bed or OOB in chair given h/o orthostasis             LVAD (left ventricular assist device) present    - HM III implanted at [facility]   - VAD orders placed  - No alarms or events noted on interrogation  - continue bp control and warfarin;         * Complication involving left ventricular assist device (LVAD)    - HM III implanted at [facility] 10/2016  - Speed 5800  - INR therapeutic 2.8. Coumadin dose increase to 4  mg every Tues, Thurs, Sat and 6 mg Mon, Wed, Fri.    - LD stable - continue ASA 81 mg qd  - Pulsatile with MAP's improved: see above-  Continue BP control with  Lisinopril, Norvasc, Aldactone and Hydralazine  - CT of abdomen/pelvis with no fluid collections  - DLES culture with Pseudomonas resistant to Cipro. Appreciate ID's help - continue Cefepime X 8 weeks (end 9/23/17). Weekly CBC, CMP, ESR, CRP to ID ([phone]) once discharged          Delirium    - Appreciate Neuro's help: Patient currently with signs/symptoms of delirium.  Tremulous, myoclonic jerks as well as his disorientation suggest this.  Suspect current infection as primary cause. Less tremulous past 2 days, and is more oriented as the day goes on  - RPR non-reactive, B12 WNL           Abdominal pain    - CT abdomen negative for acute processes  - gram stain w/ GNR  - ID following, appreciate recs. Obtained driveline cultures. Pt now on cefepime. Blood cultures from 7/28 NGTD          Involuntary movements    - No known history of parkinsons disease, experiencing myoclonic jerks/tremors NOT suggestive of PD  - Currently not active as he should be given tremors and leg pain, very deconditioned.  - ddx per neuro delirium / myoclonic jerks from infectious etiologies / metabolic >>> postoperative states >> Fluid and electrolyte disturbances / toxicity >>> cns pathology / seziures. Appreciate assistance.         Restless leg syndrome    - continue ropinirole        TANESHA (acute kidney injury)    - Creatinine stable at 1.5 (1.5 on admit, baseline ~ 1.1-1.3 - will monitor).   - Peaked at 1.6 this admission        Impaired functional mobility and endurance    - consulted PT/OT/ST  - Appreciate PM&R consult. Plan D/C to rehab on Monday            SHARONA Hayward  Heart Transplant spectralink: [id number]  [facility]

## 2017-08-04 NOTE — PLAN OF CARE
Problem: Patient Care Overview  Goal: Individualization & Mutuality  Past Medical History:  Diagnosis Date   Acute on chronic systolic heart failure 7/27/2015   AICD (automatic cardioverter/defibrillator) present 2014    [employer]   CAD (coronary artery disease) 7/27/2015   CHF (congestive heart failure)     Gait instability     HTN (hypertension) 7/27/2015   ICD (implantable cardioverter-defibrillator), biventricular, in situ 7/27/2015   Kidney stones     HILLARY treated with BiPAP 7/27/2015   Peripheral neuropathy     Pulmonary hypertension due to left ventricular systolic dysfunction 7/29/2015   Restless leg syndrome 1992   S/P CABG (coronary artery bypass graft) 1993    bypass x 5   Skin cancer      excision 2013   Sleep apnea 1992        Past Surgical History:  Procedure Laterality Date   CARDIAC PACEMAKER PLACEMENT        pacemaker previously, then AICD in 2006. AICD [employer] serial #[id number]   CORONARY ARTERY BYPASS GRAFT   1993   KNEE SURGERY Left 2001    complicated by infection, hardware had to be taken out and replaced   LEFT VENTRICULAR ASSIST DEVICE   10/19/2016        Review of patient's allergies indicates:  Allergen Reactions   Sulfa (sulfonamide antibiotics)        Plan of care discussed with patient.  Patient ambulating with assistance, fall precautions in place. LVAD DP and numbers WNL, smooth LVAD hum. Patient has no complaints of pain or signs of distress. Discussed medications and care. VS stable. AAOx2. IV antibiotics administered as ordered. Patient has no questions at this time. Will continue to monitor.

## 2017-08-04 NOTE — PROGRESS NOTES
Patient seen with NP today and please see her note under separate cover  Patient still confused about State that he is in   No change on exam  Working on rehab disposition and looks like bed will be available Monday

## 2017-08-04 NOTE — PROGRESS NOTES
[facility]  Physical Medicine & Rehab  Progress Note    Patient Name: Kev Vasquez  MRN: [id number]  Admission Date: 7/28/2017  Length of Stay: 7 days  Attending Physician: Carlito Santana MD  Collaborating Physician: Rashad Garcia MD    Subjective:     Principal Problem:Complication involving left ventricular assist device (LVAD)    Interval History (08/04/2017): Patient is seen for follow-up rehab evaluation and recommendations.  No acute events over night.  Participating with therapy.  Barriers for discharge/rehab admission: not medically ready for discharge.     HPI, Past Medical, Surgical, Family, and Social History remains the same as documented in the initial encounter.      Hospital Course:   7/29/17:  Evaluated by therapy.  Bed mobility totalA.  Sit to stand totalA.  Ambulated 4 lateral step totalA.  UBD totalA and LBD totalA.  7/31/17: Participating with therapy.  Bed mobility Min-MaxA.  Sit to stand ModA.  Ambulated 6 ft. MaxA.  LBD MaxA.  8/1/17: SLP recommending regular diet and thin liquids. Cognitive linguistic impairments noted.  8/1/17: Participating with OT.  Bed mobility Min-MaxA.  Sit to stand ModA.  Ambulated 4 ft x2 trials MaxA.  UBD MaxA and LBD MaxA.  8/2/17: Participating with therapy.  Bed mobility Ida.  Sit to stand Ida and transfers Ida.  Ambulated 8 ft + 12 ft. Ida.  UBD ModA and LBD ModA.  8/3/17: Participating with therapy.  Bed mobility SBA.  Sit to stand ModA.  Ambulated 5 ft fwd ModA and 8 ft fwd/bwd Ida.      AM-PAC 6 CLICK MOBILITY (PT) - Total score: 10 (7/30), 11 (7/31), 15 (8/2)  AM-PAC 6 CLICK ADL (OT) - Total score: 6 (7/30), 6 (7/31), 14 (8/1), 15 (8/2), 15 (8/3)    AM-PAC Raw Score Level of Impairment Assistance   6 100% Total / Unable   7 - 8 80 - 100% Maximal Assist   9-13 60 - 80% Moderate Assist   14 - 19 40 - 60% Moderate Assist   20 - 22 20 - 40% Minimal Assist   23 1-20% SBA / CGA   24 0% Independent/ Mod I       Past Medical History:    Diagnosis Date    Acute on chronic systolic heart failure 7/27/2015    AICD (automatic cardioverter/defibrillator) present 2014    [employer]    CAD (coronary artery disease) 7/27/2015    CHF (congestive heart failure)     Gait instability     HTN (hypertension) 7/27/2015    ICD (implantable cardioverter-defibrillator), biventricular, in situ 7/27/2015    Kidney stones     HILLARY treated with BiPAP 7/27/2015    Peripheral neuropathy     Pulmonary hypertension due to left ventricular systolic dysfunction 7/29/2015    Restless leg syndrome 1992    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    Skin cancer     excision 2013    Sleep apnea 1992     Past Surgical History:   Procedure Laterality Date    CARDIAC PACEMAKER PLACEMENT      pacemaker previously, then AICD in 2006. AICD [employer] serial #[id number]    CORONARY ARTERY BYPASS GRAFT  1993    KNEE SURGERY Left 2001    complicated by infection, hardware had to be taken out and replaced    LEFT VENTRICULAR ASSIST DEVICE  10/19/2016     Review of patient's allergies indicates:   Allergen Reactions    Sulfa (sulfonamide antibiotics)        Scheduled Medications:    allopurinol  300 mg Oral Daily    amlodipine  10 mg Oral Daily    aspirin  81 mg Oral Daily    atorvastatin  10 mg Oral Daily    buPROPion  300 mg Oral Daily    calcium-vitamin D3  1 tablet Oral BID    ceFEPime (MAXIPIME) IVPB  2 g Intravenous Q12H    dofetilide  250 mcg Oral Q12H    folic acid  1 mg Oral Daily    hydrALAZINE  50 mg Oral Q8H    lisinopril  5 mg Oral BID    magnesium oxide  400 mg Oral BID    multivitamin  1 tablet Oral Daily    ropinirole  0.5 mg Oral TID    sodium chloride 0.9%  3 mL Intravenous Q8H    spironolactone  25 mg Oral Daily    tamsulosin  0.4 mg Oral Daily    warfarin  4 mg Oral Every Tues, Thurs, Sat    [START ON 8/6/2017] warfarin  4 mg Oral Every Sun    warfarin  6 mg Oral Every Mon, Wed, Fri       PRN Medications: acetaminophen,  bisacodyl, promethazine    Family History     Problem Relation (Age of Onset)    Cancer Daughter (50)    Diabetes Daughter    Heart disease Daughter        Social History Main Topics    Smoking status: Never Smoker    Smokeless tobacco: Never Used    Alcohol use No    Drug use: No    Sexual activity: Not on file      Comment:      Review of Systems   Constitutional: Negative for chills and fever.   HENT: Negative for trouble swallowing and voice change.    Eyes: Negative for photophobia and visual disturbance.   Respiratory: Negative for cough, shortness of breath and wheezing.    Cardiovascular: Negative for chest pain and palpitations.   Gastrointestinal: Negative for abdominal distention and nausea.   Genitourinary: Negative for difficulty urinating and flank pain.   Musculoskeletal: Positive for gait problem. Negative for arthralgias and myalgias.   Skin: Positive for wound (driveline site infection). Negative for color change.   Neurological: Positive for tremors and weakness. Negative for numbness and headaches.   Psychiatric/Behavioral: Negative for agitation and confusion.     Objective:     Vital Signs (Most Recent):  Temp: 98.6 °F (37 °C) (08/04/17 0403)  Pulse: 84 (08/04/17 0700)  Resp: 18 (08/03/17 2000)  BP: (!) 102/0 (08/04/17 0551)  SpO2: 96 % (08/03/17 2000)    Vital Signs (24h Range):  Temp:  [98.1 °F (36.7 °C)-98.6 °F (37 °C)] 98.6 °F (37 °C)  Pulse:  [64-84] 84  Resp:  [16-18] 18  SpO2:  [95 %-96 %] 96 %  BP: ()/(0-93) 102/0     Body mass index is 23.82 kg/m².    Physical Exam   Constitutional: He is oriented to person, place, and time. He appears well-developed and well-nourished.   HENT:   Head: Normocephalic and atraumatic.   Eyes: EOM are normal. Pupils are equal, round, and reactive to light.   Neck: Normal range of motion.   Cardiovascular: Normal rate and regular rhythm.    Pulmonary/Chest: Effort normal. No respiratory distress.   LVAD noted    Abdominal: Soft. There is  no tenderness.   Musculoskeletal: Normal range of motion.   Neurological: He is alert and oriented to person, place, and time. He displays tremor. No sensory deficit. He exhibits normal muscle tone.   RUE: 5/5, 5/5 .  LUE: 5/5, 5/5 .  RLE: 4/5, DF 5/5, PF 5/5.  LLE: 5/5, DF 5/5, PF 5/5.  Generalized weakness noted through upper body.   Skin:   Bandage to driveline site C/D/I  Mild tenderness to site    Psychiatric: He has a normal mood and affect. His behavior is normal.     NEUROLOGICAL EXAMINATION:     MENTAL STATUS   Oriented to person, place, and time.     CRANIAL NERVES     CN III, IV, VI   Pupils are equal, round, and reactive to light.  Extraocular motions are normal.       Diagnostic Results:   Labs: Reviewed  CT: Reviewed    Assessment/Plan:      Impaired functional mobility and endurance    Recommendations  -  Encourage mobility, OOB in chair at least 3 hours per day, and early ambulation as appropriate  -  PT/OT evaluate and treat  -  Pain management  -  Monitor for and prevent skin breakdown and pressure ulcers  · Early mobility, repositioning/weight shifting every 20-30 minutes when sitting, turn patient every 2 hours, proper mattress/overlay and chair cushioning, pressure relief/heel protector boots  -  DVT prophylaxis:  Coumadin  -  Reviewed discharge options (IP rehab, SNF, HH therapy, and OP therapy)          Restless leg syndrome    -on Requip  -continue Ropinirole per Neuro        Involuntary movements    -Neurology consulted  -continue home Ropinirole per Neuro          Delirium    Recommendations  -  Monitor sleep disturbances and establish consistent sleep-wake cycle  ·  Day time- lights on and shades open, night time- lights dim/off  -  Environmental modifications to limit agitation/confusion   · Appropriate lighting, family at bedside, visible clock and calendar, updated white board, reduce noise, limited visitors, clustered nursing care  -  Reorient patient to person, place, time,  and situation on each encounter  -  If possible, avoid restraints  -  May benefit from 24/7 supervision by sitter or camera sitter  -  Avoid/limit medications that can worsen delirium (benzodiazepines, antihistamines, anticholinergics, hypnotics, opiates)  -  Encourage mobility, OOB in chair at least 3 hours per day, and early ambulation as appropriate  -  PT/OT evaluate and treat            LVAD (left ventricular assist device) present    -s/p 10/2016        * Complication involving left ventricular assist device (LVAD)    -per primary team  - LVAD driveline site infection   -Currently on IV Cefepime         Patient approved for [facility].  Primary team expects him to be medically ready for discharge on Monday.  Transfer to rehab when he is medically ready for discharge.          Cathleen Allen NP  Department of Physical Medicine & Rehab   [facility]

## 2017-08-04 NOTE — ASSESSMENT & PLAN NOTE
- HM III implanted at [facility] in [place] 10/2016  - Speed 5800  - INR therapeutic 2.8. Coumadin dose increase to 4 mg every Tues, Thurs, Sat and 6 mg Mon, Wed, Fri.    - LD stable - continue ASA 81 mg qd  - Pulsatile with MAP's improved: see above-  Continue BP control with  Lisinopril, Norvasc, Aldactone and Hydralazine  - CT of abdomen/pelvis with no fluid collections  - DLES culture with Pseudomonas resistant to Cipro. Appreciate ID's help - continue Cefepime X 8 weeks (end 9/23/17). Weekly CBC, CMP, ESR, CRP to ID ([phone]) once discharged

## 2017-08-04 NOTE — PLAN OF CARE
Problem: Physical Therapy Goal  Goal: Physical Therapy Goal  Goals to be met by: 17     Patient will increase functional independence with mobility by performin. Supine to sit with Moderate Assistance - MET       Revised:  Supine to sit with SBA.   2. Sit to supine with Moderate Assistance  3. Sit to stand transfer with Moderate Assistance -- MET       Revised: Sit to stand transfer with SBA.   4. Gait  x 10 feet with Moderate Assistance using Rolling Walker or appropriate AD. -- MET       Revised: Gait x 150 ft with contact guard assistance using rolling walker.   6. Stand for 2 minutes with Minimal Assistance using Rolling Walker or appropriate AD without LOB  7. Lower extremity exercise program x10 reps, with supervision, in order to increase LE strength and (I) with functional mobility.       Outcome: Ongoing (interventions implemented as appropriate)   goals met and updated; continue current POC.     Catina Matthews PT, DPT   2017  Pager: [phone]

## 2017-08-04 NOTE — ASSESSMENT & PLAN NOTE
- HM III implanted at [facility] in [place]   - VAD orders placed  - No alarms or events noted on interrogation  - continue bp control and warfarin;

## 2017-08-04 NOTE — PROGRESS NOTES
Daily E and M and VAD Interrogation Note    Reason for Visit: Driveline evaluation   Patient is seen in follow up for management of:  [x] HeartMate III [] Heartware [] Total artificial heart       [] ECMO           [] Other     Interval History:  [] Interval history unobtainable due to intubation.  The [x] implant 10/2016 at [facility] in [place]  No Events overnight      Review of Systems:  Pt states he is feeling better today   Denies CP, SOB, ABD pain, N/V  All other systems reviewed and negative    Medications:  Current Facility-Administered Medications   Medication Dose Route Frequency Provider Last Rate Last Dose    acetaminophen tablet 650 mg  650 mg Oral Q6H PRN Clotilde Early MD   650 mg at 08/02/17 2359    allopurinol tablet 300 mg  300 mg Oral Daily Henry Murguia MD   300 mg at 08/04/17 0846    amlodipine tablet 10 mg  10 mg Oral Daily Jim Khoury MD   10 mg at 08/04/17 0845    aspirin chewable tablet 81 mg  81 mg Oral Daily Henry Murguia MD   81 mg at 08/04/17 0845    atorvastatin tablet 10 mg  10 mg Oral Daily Henry Murguia MD   10 mg at 08/04/17 0845    bisacodyl suppository 10 mg  10 mg Rectal Daily PRN Henry Murguia MD        buPROPion TB24 tablet 300 mg  300 mg Oral Daily Henry Murguia MD   300 mg at 08/04/17 0845    calcium-vitamin D3 500 mg(1,250mg) -200 unit per tablet 1 tablet  1 tablet Oral BID Henry Murguia MD   1 tablet at 08/04/17 0845    ceFEPIme in dextrose 5% 2 gram/50 mL IVPB 2 g  2 g Intravenous Q12H Henry Murguia  mL/hr at 08/04/17 0255 2 g at 08/04/17 0255    dofetilide capsule 250 mcg  250 mcg Oral Q12H Henry Murguia MD   250 mcg at 08/04/17 0845    folic acid tablet 1 mg  1 mg Oral Daily Henry Murguia MD   1 mg at 08/04/17 0845    hydrALAZINE tablet 50 mg  50 mg Oral Q8H Charlette Jasmine, NP   50 mg at 08/04/17 1329    lisinopril tablet 10 mg  10 mg Oral Daily Carlito Santana MD        [START ON 8/5/2017]  lisinopril tablet 5 mg  5 mg Oral Daily Carlito Santana MD        magnesium oxide tablet 400 mg  400 mg Oral BID Carlito Santana MD   400 mg at 08/04/17 0845    multivitamin tablet 1 tablet  1 tablet Oral Daily Henry Murguia MD   1 tablet at 08/04/17 0845    promethazine tablet 12.5 mg  12.5 mg Oral Q6H PRN Clotilde Early MD   12.5 mg at 08/02/17 2055    ropinirole tablet 0.5 mg  0.5 mg Oral TID Henry Murguia MD   0.5 mg at 08/04/17 1329    sodium chloride 0.9% flush 3 mL  3 mL Intravenous Q8H Julián Blanco MD   3 mL at 08/04/17 1400    spironolactone tablet 25 mg  25 mg Oral Daily Henry Murguia MD   25 mg at 08/04/17 0846    tamsulosin 24 hr capsule 0.4 mg  0.4 mg Oral Daily Henry Murguia MD   0.4 mg at 08/04/17 0845    warfarin (COUMADIN) tablet 4 mg  4 mg Oral Every Tues, Thurs, Sat Carlito Santana MD   4 mg at 08/03/17 1625    [START ON 8/6/2017] warfarin (COUMADIN) tablet 4 mg  4 mg Oral Every Sun Carlito Santana MD        warfarin tablet 6 mg  6 mg Oral Every Mon, Wed, Fri Carlito Santana MD           Physical Examination:  Vital Signs:   Vitals:    08/04/17 1500   BP:    Pulse: 72   Resp:    Temp:      Cardiovascular:  [x] Regular rate and rhythm. Smooth VAD sounds    [x]  No edema []  Edema present  [x]  Clear to auscultation    [] Pedal Pulses absent  []  Pulses + throughout  Skin:  Incision is [x]  Clean, dry and intact.   Sternum:  [x]  Stable []  Unstable  Driveline(s):   [x]  Clean, dry and intact.      Labs:  CBC, CMP, LDH and Coags     X-Rays:  [x]  I reviewed today's Chest x-ray    Procedure:  Device Interrogation including analysis of device parameters.  Current Settings  [x]  Ventricular Assist Device  []  Total Artificial Heart interrogated  Review of device function is [x]  Stable     TXP LVAD INTERROGATIONS 8/4/2017 8/4/2017 8/4/2017 8/3/2017 8/3/2017 8/3/2017 8/3/2017   Type HeartMate3 HeartMate3 HeartMate3 HeartMate3 HeartMate3 HeartMate3 HeartMate3   Flow  5.4 5.0 4.3 4.9 5.4 5.4 5.7   Speed 5800 5800 5800 5800 5800 5800 5800   PI 2.7 3.1 4.3 3.0 3.1 3.1 2.7   Power (Mendez) 4.6 4.5 4.4 4.4 4.5 4.5 4.6   LSL - 5600 - - 5600 - -   Pulsatility - - - Intermittent pulse - - -       Assessment:  []  Primary Cardiomyopathy [x]  Congestive Heart Failure   []  Atrial Fibrillation []  Ventricular Tachycardia   []  Aftercare cardiac device [x]  Long term (current) use of anticoagulants   []  Ventilator-associated pneumonia []  Pneumonia viral, unspecified   []  Pneumonia, bacterial, unspecified []  Pneumonia, organism unspecified   []  Hemorrhage of GI tract, unspecified    []  Nosebleed []  Driveline infection   []  Infection VAD device          Plan:  [x]  Interval history obtained from HTS attending team member during rounding today  [x]  VAD/JOSE teaching performed with patient  [x]  Mobilization / Physical Therapy ongoing  [x]  Anticoagulation [x]  Ongoing []  Held  -MAP can be higher than 80 with HM 3, if pt can tolerate  -No surgical indication for any intervention at this stage.  -Multiple PI events   -Rehab on Monday     Total time spent was 32 minutes.  Of which more than 50 percent of the care dominated counseling and coordinating care with different team members. The VAD was interrogated and all parameters were WNL and no significant findings were found in the history. All these findings are documented in the note above.      Date of Service: 08/04/2017       Cardiac Surgery Attending E and M (VAD) Note along with VAD Interrogation    I have seen and examined the patient and agree with the findings above    I also reviewed the patients clinical course and:  [x]  Hemodynamic & Respiratory parameters  [x]  Laboratory Data  [x]  Radiological studies     VAD Interrogated [x]      VAD Function is normal. Changes made []  none [x]      Interrogation of Ventricular assist device was performed with physician analysis of device parameters and review of device function. I have  personally reviewed the interrogation findings and agree with findings as stated.     Pt has started to walk and feeling better, with plan to go to rehab on Monday.

## 2017-08-04 NOTE — PT/OT/SLP PROGRESS
Physical Therapy  Treatment    Kev Vasquez   MRN: [id number]   Admitting Diagnosis: Complication involving left ventricular assist device (LVAD)    PT Received On: 08/04/17  PT Start Time: 1332     PT Stop Time: 1410    PT Total Time (min): 38 min       Billable Minutes:  Gait [id number] min and Therapeutic Activity 25 min    Treatment Type: Treatment  PT/PTA: PT     PTA Visit Number: 0       General Precautions: Standard, LVAD, fall  Orthopedic Precautions: N/A   Braces: N/A    Do you have any cultural, spiritual, Shinto conflicts, given your current situation?: none noted     Subjective:  Communicated with RN prior to session. Pt agreeable to participate in therapy session.     Pain/Comfort  Pain Rating 1: 0/10  Pain Rating Post-Intervention 1: 0/10    Objective:   Patient found with: telemetry (LVAD to wall power)    Functional Mobility:  Bed Mobility:    Pt sitting EOB upon PT arrival and left sitting up in chair; bed mobility N/T this visit.     Transfers:  Sit <> Stand Assistance: Contact Guard Assistance, Minimum Assistance (CGA from EOB x 1 trial; min A from bedside chair x 2 trials)  Sit <> Stand Assistive Device: Rolling Walker    Gait:   Gait Distance: 45 ft. + 45 ft.; seated rest break at CHCF point  Assistance 1: Minimum assistance  Gait Assistive Device: Rolling walker  Gait Pattern: reciprocal  Gait Deviation(s): decreased william, decreased step length, decreased stride length, decreased toe-to-floor clearance    Balance:   Static Sit: GOOD-: Takes MODERATE challenges from all directions but inconsistently  Dynamic Sit: GOOD-: Maintains balance through MODERATE excursions of active trunk movement,     Static Stand: FAIR: Maintains without assist but unable to take challenges  Dynamic stand: POOR: requires min A during gait     Therapeutic Activities and Exercises:  Pt sitting EOB with RN upon PT arrival. Pt's LVAD connected to [email or web address]. Therapist facilitated pt practicing self-management of  transition from wall power to battery power prior to gait training. Pt required frequent cueing and moderate assistance to perform LVAD management-- pt attempting to un-connect leads by pulling instead of twisting screw to loosen first, did not know to put vest on first prior to placing batteries in vest, no recall of bringing emergency bag during gait training, etc. Therapist educated pt on LVAD management, sequencing of steps, and safety awareness.     Therex for BLE strengthening: LAQ x 10 reps, marches x 10 reps performed in sitting EOB     Therapist facilitated progression of gait training to improve gait stability, endurance, and independence with functional ambulation.  Chair follow provided by [employer] for safety due to pt's fall risk. Pt with ataxic gait, requiring cueing to improve heel strike bilaterally and manage RW.     AM-PAC 6 CLICK MOBILITY  How much help from another person does this patient currently need?   1 = Unable, Total/Dependent Assistance  2 = A lot, Maximum/Moderate Assistance  3 = A little, Minimum/Contact Guard/Supervision  4 = None, Modified [place]/Independent    Turning over in bed (including adjusting bedclothes, sheets and blankets)?: 3  Sitting down on and standing up from a chair with arms (e.g., wheelchair, bedside commode, etc.): 3  Moving from lying on back to sitting on the side of the bed?: 3  Moving to and from a bed to a chair (including a wheelchair)?: 3  Need to walk in hospital room?: 3  Climbing 3-5 steps with a railing?: 2  Total Score: 17    AM-PAC Raw Score CMS G-Code Modifier Level of Impairment Assistance   6 % Total / Unable   7 - 9 CM 80 - 100% Maximal Assist   10 - 14 CL 60 - 80% Moderate Assist   15 - 19 CK 40 - 60% Moderate Assist   20 - 22 CJ 20 - 40% Minimal Assist   23 CI 1-20% SBA / CGA   24 CH 0% Independent/ Mod I     Patient left up in chair with all lines intact, call button in reach, RN notified and wife and sitter  present.    Assessment:  Kev Vasquez is a 76 y.o. male with a medical diagnosis of Complication involving left ventricular assist device (LVAD). Pt demonstrated good effort during therapy session, tolerating progression of gait distance and therapeutic activities. Pt displays ataxic gait, requiring min A to maintain balance and manage RW. Pt also demonstrated poor self-management of LVAD equipment, requiring moderate assistance from therapist to safely transition from wall power to battery power. Pt would benefit from continued PT intervention to address below listed deficits and maximize return to PLOF.       Rehab identified problem list/impairments: Rehab identified problem list/impairments: weakness, impaired endurance, impaired functional mobilty, gait instability, impaired balance, impaired self care skills, decreased safety awareness, impaired cardiopulmonary response to activity    Rehab potential is good.    Activity tolerance: Good    Discharge recommendations: Discharge Facility/Level Of Care Needs: rehabilitation facility     Barriers to discharge: Barriers to Discharge: None    Equipment recommendations: Equipment Needed After Discharge:  (TBD at next level of care)     GOALS:    Physical Therapy Goals        Problem: Physical Therapy Goal    Goal Priority Disciplines Outcome Goal Variances Interventions   Physical Therapy Goal     PT/OT, PT Ongoing (interventions implemented as appropriate)     Description:  Goals to be met by: 17     Patient will increase functional independence with mobility by performin. Supine to sit with Moderate Assistance - MET       Revised:  Supine to sit with SBA.   2. Sit to supine with Moderate Assistance  3. Sit to stand transfer with Moderate Assistance -- MET       Revised: Sit to stand transfer with SBA.   4. Gait  x 10 feet with Moderate Assistance using Rolling Walker or appropriate AD. -- MET       Revised: Gait x 150 ft with contact guard  assistance using rolling walker.   6. Stand for 2 minutes with Minimal Assistance using Rolling Walker or appropriate AD without LOB  7. Lower extremity exercise program x10 reps, with supervision, in order to increase LE strength and (I) with functional mobility.                         PLAN:    Patient to be seen 5 x/week  to address the above listed problems via gait training, therapeutic activities, therapeutic exercises, neuromuscular re-education  Plan of Care expires: 08/27/17  Plan of Care reviewed with: patient, spouse        Catina Matthews, PT, DPT   8/4/2017  Pager: [phone]

## 2017-08-04 NOTE — ASSESSMENT & PLAN NOTE
- Cardene off, but BP still on the higher side.  Will increase Lisinopril to 5QAM and 10mg QPM  - Continue Norvasc, Hydralazine and Aldactone  - All BP's need to be checked while patient sitting on edge of bed or OOB in chair given h/o orthostasis

## 2017-08-04 NOTE — ASSESSMENT & PLAN NOTE
- CT abdomen negative for acute processes  - gram stain w/ GNR  - ID following, appreciate recs. Obtained driveline cultures. Pt now on cefepime. Blood cultures from 7/28 TD

## 2017-08-05 LAB
ANION GAP SERPL CALC-SCNC: 7 MMOL/L
APTT BLDCRRT: 29 SEC
BASOPHILS # BLD AUTO: 0.04 K/UL
BASOPHILS NFR BLD: 0.5 %
BUN SERPL-MCNC: 30 MG/DL
CALCIUM SERPL-MCNC: 10.5 MG/DL
CHLORIDE SERPL-SCNC: 108 MMOL/L
CO2 SERPL-SCNC: 22 MMOL/L
CREAT SERPL-MCNC: 1.4 MG/DL
DIFFERENTIAL METHOD: ABNORMAL
EOSINOPHIL # BLD AUTO: 0.3 K/UL
EOSINOPHIL NFR BLD: 4 %
ERYTHROCYTE [DISTWIDTH] IN BLOOD BY AUTOMATED COUNT: 16.6 %
EST. GFR  (AFRICAN AMERICAN): 56.8 ML/MIN/1.73 M^2
EST. GFR  (NON AFRICAN AMERICAN): 49.1 ML/MIN/1.73 M^2
GLUCOSE SERPL-MCNC: 97 MG/DL
HCT VFR BLD AUTO: 39.6 %
HGB BLD-MCNC: 13.1 G/DL
INR PPP: 2.6
LDH SERPL L TO P-CCNC: 164 U/L
LYMPHOCYTES # BLD AUTO: 1.4 K/UL
LYMPHOCYTES NFR BLD: 17.2 %
MAGNESIUM SERPL-MCNC: 2.1 MG/DL
MCH RBC QN AUTO: 30.2 PG
MCHC RBC AUTO-ENTMCNC: 33.1 G/DL
MCV RBC AUTO: 91 FL
MONOCYTES # BLD AUTO: 0.9 K/UL
MONOCYTES NFR BLD: 11.2 %
NEUTROPHILS # BLD AUTO: 5.3 K/UL
NEUTROPHILS NFR BLD: 66.7 %
PHOSPHATE SERPL-MCNC: 2.5 MG/DL
PLATELET # BLD AUTO: 177 K/UL
PMV BLD AUTO: 11.6 FL
POTASSIUM SERPL-SCNC: 4.1 MMOL/L
PROTHROMBIN TIME: 26.4 SEC
RBC # BLD AUTO: 4.34 M/UL
SODIUM SERPL-SCNC: 137 MMOL/L
WBC # BLD AUTO: 7.92 K/UL

## 2017-08-05 PROCEDURE — 99232 SBSQ HOSP IP/OBS MODERATE 35: CPT | Mod: ,,, | Performed by: INTERNAL MEDICINE

## 2017-08-05 PROCEDURE — 85610 PROTHROMBIN TIME: CPT

## 2017-08-05 PROCEDURE — 83615 LACTATE (LD) (LDH) ENZYME: CPT

## 2017-08-05 PROCEDURE — 25000003 PHARM REV CODE 250: Performed by: INTERNAL MEDICINE

## 2017-08-05 PROCEDURE — 20600001 HC STEP DOWN PRIVATE ROOM

## 2017-08-05 PROCEDURE — 36415 COLL VENOUS BLD VENIPUNCTURE: CPT

## 2017-08-05 PROCEDURE — 63600175 PHARM REV CODE 636 W HCPCS: Performed by: INTERNAL MEDICINE

## 2017-08-05 PROCEDURE — 97116 GAIT TRAINING THERAPY: CPT

## 2017-08-05 PROCEDURE — 85730 THROMBOPLASTIN TIME PARTIAL: CPT

## 2017-08-05 PROCEDURE — 80048 BASIC METABOLIC PNL TOTAL CA: CPT

## 2017-08-05 PROCEDURE — 25000003 PHARM REV CODE 250: Performed by: PHYSICIAN ASSISTANT

## 2017-08-05 PROCEDURE — 84100 ASSAY OF PHOSPHORUS: CPT

## 2017-08-05 PROCEDURE — 97530 THERAPEUTIC ACTIVITIES: CPT

## 2017-08-05 PROCEDURE — 25000003 PHARM REV CODE 250: Performed by: NURSE PRACTITIONER

## 2017-08-05 PROCEDURE — 83735 ASSAY OF MAGNESIUM: CPT

## 2017-08-05 PROCEDURE — 27000248 HC VAD-ADDITIONAL DAY

## 2017-08-05 PROCEDURE — 85025 COMPLETE CBC W/AUTO DIFF WBC: CPT

## 2017-08-05 PROCEDURE — A4216 STERILE WATER/SALINE, 10 ML: HCPCS | Performed by: INTERNAL MEDICINE

## 2017-08-05 PROCEDURE — 25000003 PHARM REV CODE 250: Performed by: STUDENT IN AN ORGANIZED HEALTH CARE EDUCATION/TRAINING PROGRAM

## 2017-08-05 RX ORDER — LISINOPRIL 10 MG/1
10 TABLET ORAL 2 TIMES DAILY
Status: DISCONTINUED | OUTPATIENT
Start: 2017-08-05 | End: 2017-08-08 | Stop reason: HOSPADM

## 2017-08-05 RX ADMIN — LISINOPRIL 5 MG: 5 TABLET ORAL at 07:08

## 2017-08-05 RX ADMIN — Medication 3 ML: at 02:08

## 2017-08-05 RX ADMIN — OYSTER SHELL CALCIUM WITH VITAMIN D 1 TABLET: 500; 200 TABLET, FILM COATED ORAL at 08:08

## 2017-08-05 RX ADMIN — ROPINIROLE 0.5 MG: 0.5 TABLET, FILM COATED ORAL at 02:08

## 2017-08-05 RX ADMIN — WARFARIN SODIUM 4 MG: 4 TABLET ORAL at 05:08

## 2017-08-05 RX ADMIN — DOFETILIDE 250 MCG: 0.12 CAPSULE ORAL at 08:08

## 2017-08-05 RX ADMIN — FOLIC ACID 1 MG: 1 TABLET ORAL at 09:08

## 2017-08-05 RX ADMIN — BUPROPION HYDROCHLORIDE 300 MG: 150 TABLET, FILM COATED, EXTENDED RELEASE ORAL at 09:08

## 2017-08-05 RX ADMIN — HYDRALAZINE HYDROCHLORIDE 50 MG: 50 TABLET ORAL at 09:08

## 2017-08-05 RX ADMIN — THERA TABS 1 TABLET: TAB at 09:08

## 2017-08-05 RX ADMIN — HYDRALAZINE HYDROCHLORIDE 50 MG: 50 TABLET ORAL at 06:08

## 2017-08-05 RX ADMIN — Medication 3 ML: at 07:08

## 2017-08-05 RX ADMIN — CEFEPIME HYDROCHLORIDE 2 G: 2 INJECTION, SOLUTION INTRAVENOUS at 02:08

## 2017-08-05 RX ADMIN — ROPINIROLE 0.5 MG: 0.5 TABLET, FILM COATED ORAL at 06:08

## 2017-08-05 RX ADMIN — ALLOPURINOL 300 MG: 300 TABLET ORAL at 09:08

## 2017-08-05 RX ADMIN — TAMSULOSIN HYDROCHLORIDE 0.4 MG: 0.4 CAPSULE ORAL at 09:08

## 2017-08-05 RX ADMIN — MAGNESIUM OXIDE TAB 400 MG (241.3 MG ELEMENTAL MG) 400 MG: 400 (241.3 MG) TAB at 09:08

## 2017-08-05 RX ADMIN — LISINOPRIL 10 MG: 10 TABLET ORAL at 08:08

## 2017-08-05 RX ADMIN — ROPINIROLE 0.5 MG: 0.5 TABLET, FILM COATED ORAL at 09:08

## 2017-08-05 RX ADMIN — DOFETILIDE 250 MCG: 0.12 CAPSULE ORAL at 09:08

## 2017-08-05 RX ADMIN — SPIRONOLACTONE 25 MG: 25 TABLET, FILM COATED ORAL at 09:08

## 2017-08-05 RX ADMIN — HYDRALAZINE HYDROCHLORIDE 50 MG: 50 TABLET ORAL at 02:08

## 2017-08-05 RX ADMIN — ATORVASTATIN CALCIUM 10 MG: 10 TABLET, FILM COATED ORAL at 09:08

## 2017-08-05 RX ADMIN — Medication 3 ML: at 09:08

## 2017-08-05 RX ADMIN — MAGNESIUM OXIDE TAB 400 MG (241.3 MG ELEMENTAL MG) 400 MG: 400 (241.3 MG) TAB at 08:08

## 2017-08-05 RX ADMIN — OYSTER SHELL CALCIUM WITH VITAMIN D 1 TABLET: 500; 200 TABLET, FILM COATED ORAL at 09:08

## 2017-08-05 RX ADMIN — ASPIRIN 81 MG CHEWABLE TABLET 81 MG: 81 TABLET CHEWABLE at 09:08

## 2017-08-05 RX ADMIN — AMLODIPINE BESYLATE 10 MG: 10 TABLET ORAL at 07:08

## 2017-08-05 NOTE — PLAN OF CARE
Problem: Patient Care Overview  Goal: Plan of Care Review  Outcome: Ongoing (interventions implemented as appropriate)  Plan of care reviewed with patient/family; all questions and concerns addressed; no distress at present.

## 2017-08-05 NOTE — ASSESSMENT & PLAN NOTE
- Cardene off, but BP still on the higher side.  Will increase Lisinopril to 10QAM and 10mg QPM  - Continue Norvasc, Hydralazine and Aldactone  - All BP's need to be checked while patient sitting on edge of bed or OOB in chair given h/o orthostasis

## 2017-08-05 NOTE — PROGRESS NOTES
[facility]  Heart Transplant  Progress Note    Patient Name: Kev Vasquez  MRN: [id number]  Admission Date: 7/28/2017  Hospital Length of Stay: 8 days  Attending Physician: Carlito Santana MD  Primary Care Provider: Mega Colilns MD  Principal Problem:Complication involving left ventricular assist device (LVAD)    Subjective:     Interval History: No complaints overnight. Denies dizziness, SOB, NVD.    Continuous Infusions:   Scheduled Meds:   allopurinol  300 mg Oral Daily    amlodipine  10 mg Oral Daily    aspirin  81 mg Oral Daily    atorvastatin  10 mg Oral Daily    buPROPion  300 mg Oral Daily    calcium-vitamin D3  1 tablet Oral BID    ceFEPime (MAXIPIME) IVPB  2 g Intravenous Q12H    dofetilide  250 mcg Oral Q12H    folic acid  1 mg Oral Daily    hydrALAZINE  50 mg Oral Q8H    lisinopril  10 mg Oral BID    magnesium oxide  400 mg Oral BID    multivitamin  1 tablet Oral Daily    ropinirole  0.5 mg Oral TID    sodium chloride 0.9%  3 mL Intravenous Q8H    spironolactone  25 mg Oral Daily    tamsulosin  0.4 mg Oral Daily    warfarin  4 mg Oral Every Tues, Thurs, Sat    [START ON 8/6/2017] warfarin  4 mg Oral Every Sun    warfarin  6 mg Oral Every Mon, Wed, Fri     PRN Meds:acetaminophen, bisacodyl, promethazine    Review of patient's allergies indicates:   Allergen Reactions    Sulfa (sulfonamide antibiotics)      Objective:     Vital Signs (Most Recent):  Temp: 98.7 °F (37.1 °C) (08/05/17 0830)  Pulse: 73 (08/05/17 0830)  Resp: 18 (08/05/17 0830)  BP: (!) 64/0 (08/05/17 0830)  SpO2: 97 % (08/05/17 0615) Vital Signs (24h Range):  Temp:  [97.9 °F (36.6 °C)-98.7 °F (37.1 °C)] 98.7 °F (37.1 °C)  Pulse:  [63-78] 73  Resp:  [16-18] 18  SpO2:  [95 %-97 %] 97 %  BP: ()/(0-90) 64/0     Weight: 79.7 kg (175 lb 9.6 oz)  Body mass index is 23.82 kg/m².      Intake/Output Summary (Last 24 hours) at 08/05/17 0950  Last data filed at 08/05/17 0600   Gross per 24 hour   Intake               580 ml   Output              800 ml   Net             -220 ml       Hemodynamic Parameters:       Telemetry: no events    Physical Exam  Constitutional: He appears well-developed and well-nourished.   Head: Normocephalic and atraumatic.   Eyes: Conjunctivae and EOM are normal. Pupils are equal, round, and reactive to light.   Neck: Normal range of motion. Neck supple. No JVD present.   Cardiovascular: Smooth VAD hum. Pulsatile   Pulmonary/Chest: Effort normal and breath sounds normal.   Abdominal: Soft. Bowel sounds are normal.   Musculoskeletal: He exhibits no edema.   Neurological: He is alert.   Confused. Generalized tremors   Skin: Skin is warm and dry. Capillary refill takes 2 to 3 seconds.   Psychiatric: Confused at times, oriented to person and time, not oriented to place       Significant Labs:  CBC:    Recent Labs  Lab 08/05/17  0601   WBC 7.92   RBC 4.34*   HGB 13.1*   HCT 39.6*      MCV 91   MCH 30.2   MCHC 33.1     BNP:    Recent Labs  Lab 08/04/17  0601   *     CMP:    Recent Labs  Lab 08/04/17  0601 08/05/17  0601   GLU 93 97   CALCIUM 10.3 10.5   ALBUMIN 3.2*  --    PROT 6.9  --    * 137   K 4.0 4.1   CO2 22* 22*    108   BUN 33* 30*   CREATININE 1.5* 1.4   ALKPHOS 81  --    ALT 18  --    AST 24  --    BILITOT 0.4  --       Coagulation:     Recent Labs  Lab 08/05/17  0601   INR 2.6*   APTT 29.0     LDH:    Recent Labs  Lab 08/03/17  0809 08/04/17  0601 08/05/17  0601    152 164     Microbiology:  Microbiology Results (last 7 days)     Procedure Component Value Units Date/Time    Blood culture #1 **CANNOT BE ORDERED STAT** [[id number]] Collected:  07/28/17 2042    Order Status:  Completed Specimen:  Blood from Peripheral, Forearm, Right Updated:  08/02/17 2212     Blood Culture, Routine No growth after 5 days.    Blood culture #2 **CANNOT BE ORDERED STAT** [[id number]] Collected:  07/28/17 2058    Order Status:  Completed Specimen:  Blood from Peripheral,  Forearm, Left Updated:  08/02/17 2212     Blood Culture, Routine No growth after 5 days.    Culture, Anaerobe [[id number]] Collected:  07/29/17 1056    Order Status:  Completed Specimen:  Wound from Abdomen Updated:  08/02/17 0859     Anaerobic Culture No anaerobes isolated    Aerobic culture [[id number]]  (Susceptibility) Collected:  07/29/17 1056    Order Status:  Completed Specimen:  Wound from Abdomen Updated:  08/01/17 1035     Aerobic Bacterial Culture --     PSEUDOMONAS AERUGINOSA  Few      Urine culture [[id number]] Collected:  07/29/17 1111    Order Status:  Completed Specimen:  Urine from Urine, Catheterized Updated:  07/30/17 1956     Urine Culture, Routine No growth    Gram stain [[id number]] Collected:  07/29/17 1056    Order Status:  Completed Specimen:  Wound from Abdomen Updated:  07/30/17 0001     Gram Stain Result Few WBC's      Rare Gram negative rods          I have reviewed all pertinent labs within the past 24 hours.    Estimated Creatinine Clearance: 50.8 mL/min (based on Cr of 1.4).    Diagnostic Results:  I have reviewed all pertinent imaging results/findings within the past 24 hours.    Assessment and Plan:     TANESHA (acute kidney injury)    - Creatinine stable at 1.4 (1.5 on admit, baseline ~ 1.1-1.3 - will monitor).   - Peaked at 1.6 this admission        Impaired functional mobility and endurance    - consulted PT/OT/ST  - Appreciate PM&R consult. Plan D/C to rehab on Monday        Restless leg syndrome    - continue ropinirole        Involuntary movements    - No known history of parkinsons disease, experiencing myoclonic jerks/tremors NOT suggestive of PD  - Currently not active as he should be given tremors and leg pain, very deconditioned.  - ddx per neuro delirium / myoclonic jerks from infectious etiologies / metabolic >>> postoperative states >> Fluid and electrolyte disturbances / toxicity >>> cns pathology / seziures. Appreciate assistance.         Delirium    - Appreciate Neuro's  help: Patient currently with signs/symptoms of delirium.  Tremulous, myoclonic jerks as well as his disorientation suggest this.  Suspect current infection as primary cause. Less tremulous past 2 days, and is more oriented as the day goes on  - RPR non-reactive, B12 WNL           Abdominal pain    - CT abdomen negative for acute processes  - gram stain w/ GNR  - ID following, appreciate recs. Obtained driveline cultures. Pt now on cefepime. Blood cultures from 7/28 NGTD          LVAD (left ventricular assist device) present    - HM III implanted at [facility]   - VAD orders placed  - No alarms or events noted on interrogation  - continue bp control and warfarin;         Hypertensive urgency    - Cardene off, but BP still on the higher side.  Will increase Lisinopril to 10QAM and 10mg QPM  - Continue Norvasc, Hydralazine and Aldactone  - All BP's need to be checked while patient sitting on edge of bed or OOB in chair given h/o orthostasis             * Complication involving left ventricular assist device (LVAD)    - HM III implanted at [facility] 10/2016  - Speed 5800  - INR therapeutic 2.8. Coumadin dose increase to 4 mg every Tues, Thurs, Sat and 6 mg Mon, Wed, Fri.    - LD stable - continue ASA 81 mg qd  - Pulsatile with MAP's improved: see above-  Continue BP control with  Lisinopril, Norvasc, Aldactone and Hydralazine  - CT of abdomen/pelvis with no fluid collections  - DLES culture with Pseudomonas resistant to Cipro. Appreciate ID's help - continue Cefepime X 8 weeks (end 9/23/17). Weekly CBC, CMP, ESR, CRP to ID ([phone]) once discharged              Elena Raman PA-C  Heart Transplant  [facility]

## 2017-08-05 NOTE — ASSESSMENT & PLAN NOTE
- HM III implanted at [facility] in [place]   - VAD orders placed  - No alarms or events noted on interrogation  - continue bp control and warfarin;

## 2017-08-05 NOTE — PT/OT/SLP PROGRESS
Physical Therapy  Treatment    Kev Vasquez   MRN: [id number]   Admitting Diagnosis: Complication involving left ventricular assist device (LVAD)    PT Received On: 08/05/17  PT Start Time: 0952     PT Stop Time: 1020    PT Total Time (min): 28 min       Billable Minutes:  Gait [id number] min and Therapeutic Activity 13 min    Treatment Type: Treatment  PT/PTA: PT     PTA Visit Number: 0       General Precautions: Standard, LVAD, fall  Orthopedic Precautions: N/A   Braces: N/A    Do you have any cultural, spiritual, Congregation conflicts, given your current situation?: none noted     Subjective:  Communicated with RN prior to session. Pt agreeable to participate in therapy session.     Pain/Comfort  Pain Rating 1: 0/10  Pain Rating Post-Intervention 1: 0/10    Objective:   Patient found with: telemetry (LVAD to wall power)    Functional Mobility:  Bed Mobility:   Supine to Sit: Contact Guard Assistance    Transfers:  Sit <> Stand Assistance: Minimum Assistance, Contact Guard Assistance (min A from EOB x 1 trial; from bedside chair x 2 trials)  Sit <> Stand Assistive Device: Rolling Walker    Gait:   Gait Distance: 100' + 50' with seated rest break in between trials  Assistance 1: Minimum assistance  Gait Assistive Device: Rolling walker  Gait Pattern: reciprocal  Gait Deviation(s): decreased william, decreased step length, decreased stride length, foot flat (decreased heel strike bilaterally; ataxic gait)     Balance:   Static Sit: FAIR+: Able to take MINIMAL challenges from all directions  Dynamic Sit: FAIR+: Maintains balance through MINIMAL excursions of active trunk motion  Static Stand: FAIR: Maintains without assist but unable to take challenges  Dynamic stand: POOR: requires min A during gait      Therapeutic Activities and Exercises:  Pt supine with HOB elevated upon PT arrival. Pt's LVAD connected to [employer] LS15 power. Therapist facilitated pt practicing self-management of transition from wall power to battery power  prior to gait training. Pt required moderate to maximum assistance to perform LVAD management-- pt with limited recall of education provided yesterday on LVAD management. Therapist educated pt on LVAD management, sequencing of steps, and safety awareness.      Therapist facilitated progression of gait training to improve gait stability, endurance, and independence with functional ambulation.  Chair follow provided by [place] for safety due to pt's fall risk. Pt with ataxic gait, requiring cueing to improve heel strike bilaterally and manage RW.     Pt also performed dynamic marching in standing x 1 minute with no UE support; min A provided for pt to maintain balance during activity; verbal cueing and demonstration provided to improve BLE coordination and rhythm of marching.     AM-PAC 6 CLICK MOBILITY  How much help from another person does this patient currently need?   1 = Unable, Total/Dependent Assistance  2 = A lot, Maximum/Moderate Assistance  3 = A little, Minimum/Contact Guard/Supervision  4 = None, Modified [place]/Independent    Turning over in bed (including adjusting bedclothes, sheets and blankets)?: 3  Sitting down on and standing up from a chair with arms (e.g., wheelchair, bedside commode, etc.): 3  Moving from lying on back to sitting on the side of the bed?: 3  Moving to and from a bed to a chair (including a wheelchair)?: 3  Need to walk in hospital room?: 3  Climbing 3-5 steps with a railing?: 2  Total Score: 17    AM-PAC Raw Score CMS G-Code Modifier Level of Impairment Assistance   6 % Total / Unable   7 - 9 CM 80 - 100% Maximal Assist   10 - 14 CL 60 - 80% Moderate Assist   15 - 19 CK 40 - 60% Moderate Assist   20 - 22 CJ 20 - 40% Minimal Assist   23 CI 1-20% SBA / CGA   24 CH 0% Independent/ Mod I     Patient left up in chair with all lines intact, call button in reach and RN notified.    Assessment:  Kev Vasquez is a 76 y.o. male with a medical diagnosis of Complication  involving left ventricular assist device (LVAD). Pt progressing well with functional mobility; however, continues to demonstrate ataxic gait, poor safety awareness, and decreased endurance. Pt cooperative and motivated to work during therapy sessions. Pt would benefit from continued PT intervention to address below listed deficits and maximize return to PLOF.       Rehab identified problem list/impairments: Rehab identified problem list/impairments: weakness, impaired endurance, impaired functional mobilty, gait instability, decreased safety awareness, impaired cognition, impaired cardiopulmonary response to activity    Rehab potential is good.    Activity tolerance: Good    Discharge recommendations: Discharge Facility/Level Of Care Needs: rehabilitation facility     Barriers to discharge: Barriers to Discharge: None    Equipment recommendations: Equipment Needed After Discharge:  (TBD at next level of care)     GOALS:    Physical Therapy Goals        Problem: Physical Therapy Goal    Goal Priority Disciplines Outcome Goal Variances Interventions   Physical Therapy Goal     PT/OT, PT Ongoing (interventions implemented as appropriate)     Description:  Goals to be met by: 17     Patient will increase functional independence with mobility by performin. Supine to sit with Moderate Assistance - MET       Revised:  Supine to sit with SBA.   2. Sit to supine with Moderate Assistance  3. Sit to stand transfer with Moderate Assistance -- MET       Revised: Sit to stand transfer with SBA.   4. Gait  x 10 feet with Moderate Assistance using Rolling Walker or appropriate AD. -- MET       Revised: Gait x 150 ft with contact guard assistance using rolling walker.   6. Stand for 2 minutes with Minimal Assistance using Rolling Walker or appropriate AD without LOB  7. Lower extremity exercise program x10 reps, with supervision, in order to increase LE strength and (I) with functional mobility.                          PLAN:    Patient to be seen 5 x/week  to address the above listed problems via gait training, therapeutic activities, therapeutic exercises, neuromuscular re-education  Plan of Care expires: 08/27/17  Plan of Care reviewed with: patient        Catina Matthews PT, DPT   8/5/2017  Pager: [phone]

## 2017-08-05 NOTE — ASSESSMENT & PLAN NOTE
- HM III implanted at [facility] in [place] 10/2016  - Speed 5800  - INR therapeutic 2.8. Coumadin dose increase to 4 mg every Tues, Thurs, Sat and 6 mg Mon, Wed, Fri.    - LD stable - continue ASA 81 mg qd  - Pulsatile with MAP's improved: see above-  Continue BP control with  Lisinopril, Norvasc, Aldactone and Hydralazine  - CT of abdomen/pelvis with no fluid collections  - DLES culture with Pseudomonas resistant to Cipro. Appreciate ID's help - continue Cefepime X 8 weeks (end 9/23/17). Weekly CBC, CMP, ESR, CRP to ID ([phone]) once discharged

## 2017-08-05 NOTE — PROGRESS NOTES
Called by nurse that pt's MAP by automatic cuff was 100.  Asked nurses to take it manually and they got a BP of 116/90, which I calculated MAP to be 99.  I asked them to administer AM BP meds early.  Please note that pt's BP is uncontrolled at night.  ? Nocturnal HTN.  ?Possibly needs extra BP med w shorter half life, Captopril, at night.  BP ok daytime.

## 2017-08-05 NOTE — SUBJECTIVE & OBJECTIVE
Interval History: No complaints overnight. Denies dizziness, SOB, NVD.    Continuous Infusions:   Scheduled Meds:   allopurinol  300 mg Oral Daily    amlodipine  10 mg Oral Daily    aspirin  81 mg Oral Daily    atorvastatin  10 mg Oral Daily    buPROPion  300 mg Oral Daily    calcium-vitamin D3  1 tablet Oral BID    ceFEPime (MAXIPIME) IVPB  2 g Intravenous Q12H    dofetilide  250 mcg Oral Q12H    folic acid  1 mg Oral Daily    hydrALAZINE  50 mg Oral Q8H    lisinopril  10 mg Oral BID    magnesium oxide  400 mg Oral BID    multivitamin  1 tablet Oral Daily    ropinirole  0.5 mg Oral TID    sodium chloride 0.9%  3 mL Intravenous Q8H    spironolactone  25 mg Oral Daily    tamsulosin  0.4 mg Oral Daily    warfarin  4 mg Oral Every Tues, Thurs, Sat    [START ON 8/6/2017] warfarin  4 mg Oral Every Sun    warfarin  6 mg Oral Every Mon, Wed, Fri     PRN Meds:acetaminophen, bisacodyl, promethazine    Review of patient's allergies indicates:   Allergen Reactions    Sulfa (sulfonamide antibiotics)      Objective:     Vital Signs (Most Recent):  Temp: 98.7 °F (37.1 °C) (08/05/17 0830)  Pulse: 73 (08/05/17 0830)  Resp: 18 (08/05/17 0830)  BP: (!) 64/0 (08/05/17 0830)  SpO2: 97 % (08/05/17 0615) Vital Signs (24h Range):  Temp:  [97.9 °F (36.6 °C)-98.7 °F (37.1 °C)] 98.7 °F (37.1 °C)  Pulse:  [63-78] 73  Resp:  [16-18] 18  SpO2:  [95 %-97 %] 97 %  BP: ()/(0-90) 64/0     Weight: 79.7 kg (175 lb 9.6 oz)  Body mass index is 23.82 kg/m².      Intake/Output Summary (Last 24 hours) at 08/05/17 0950  Last data filed at 08/05/17 0600   Gross per 24 hour   Intake              580 ml   Output              800 ml   Net             -220 ml       Hemodynamic Parameters:       Telemetry: no events    Physical Exam  Constitutional: He appears well-developed and well-nourished.   Head: Normocephalic and atraumatic.   Eyes: Conjunctivae and EOM are normal. Pupils are equal, round, and reactive to light.   Neck: Normal  range of motion. Neck supple. No JVD present.   Cardiovascular: Smooth VAD hum. Pulsatile   Pulmonary/Chest: Effort normal and breath sounds normal.   Abdominal: Soft. Bowel sounds are normal.   Musculoskeletal: He exhibits no edema.   Neurological: He is alert.   Confused. Generalized tremors   Skin: Skin is warm and dry. Capillary refill takes 2 to 3 seconds.   Psychiatric: Confused at times, oriented to person and time, not oriented to place       Significant Labs:  CBC:    Recent Labs  Lab 08/05/17  0601   WBC 7.92   RBC 4.34*   HGB 13.1*   HCT 39.6*      MCV 91   MCH 30.2   MCHC 33.1     BNP:    Recent Labs  Lab 08/04/17  0601   *     CMP:    Recent Labs  Lab 08/04/17  0601 08/05/17  0601   GLU 93 97   CALCIUM 10.3 10.5   ALBUMIN 3.2*  --    PROT 6.9  --    * 137   K 4.0 4.1   CO2 22* 22*    108   BUN 33* 30*   CREATININE 1.5* 1.4   ALKPHOS 81  --    ALT 18  --    AST 24  --    BILITOT 0.4  --       Coagulation:     Recent Labs  Lab 08/05/17  0601   INR 2.6*   APTT 29.0     LDH:    Recent Labs  Lab 08/03/17  0809 08/04/17  0601 08/05/17  0601    152 164     Microbiology:  Microbiology Results (last 7 days)     Procedure Component Value Units Date/Time    Blood culture #1 **CANNOT BE ORDERED STAT** [[id number]] Collected:  07/28/17 2042    Order Status:  Completed Specimen:  Blood from Peripheral, Forearm, Right Updated:  08/02/17 2212     Blood Culture, Routine No growth after 5 days.    Blood culture #2 **CANNOT BE ORDERED STAT** [[id number]] Collected:  07/28/17 2058    Order Status:  Completed Specimen:  Blood from Peripheral, Forearm, Left Updated:  08/02/17 2212     Blood Culture, Routine No growth after 5 days.    Culture, Anaerobe [[id number]] Collected:  07/29/17 1056    Order Status:  Completed Specimen:  Wound from Abdomen Updated:  08/02/17 0859     Anaerobic Culture No anaerobes isolated    Aerobic culture [[id number]]  (Susceptibility) Collected:  07/29/17 1056    Order  Status:  Completed Specimen:  Wound from Abdomen Updated:  08/01/17 1035     Aerobic Bacterial Culture --     PSEUDOMONAS AERUGINOSA  Few      Urine culture [[id number]] Collected:  07/29/17 1111    Order Status:  Completed Specimen:  Urine from Urine, Catheterized Updated:  07/30/17 1956     Urine Culture, Routine No growth    Gram stain [[id number]] Collected:  07/29/17 1056    Order Status:  Completed Specimen:  Wound from Abdomen Updated:  07/30/17 0001     Gram Stain Result Few WBC's      Rare Gram negative rods          I have reviewed all pertinent labs within the past 24 hours.    Estimated Creatinine Clearance: 50.8 mL/min (based on Cr of 1.4).    Diagnostic Results:  I have reviewed all pertinent imaging results/findings within the past 24 hours.

## 2017-08-05 NOTE — NURSING
Drive line dressing changed. Site with scant amt bleeding, re, swollen. Line not incorporated to skin split gauze with small amt reddish brown drainage. Outer skin clean dry and intact. Tolerated well

## 2017-08-05 NOTE — PLAN OF CARE
Problem: Physical Therapy Goal  Goal: Physical Therapy Goal  Goals to be met by: 17     Patient will increase functional independence with mobility by performin. Supine to sit with Moderate Assistance - MET       Revised:  Supine to sit with SBA.   2. Sit to supine with Moderate Assistance  3. Sit to stand transfer with Moderate Assistance -- MET       Revised: Sit to stand transfer with SBA.   4. Gait  x 10 feet with Moderate Assistance using Rolling Walker or appropriate AD. -- MET       Revised: Gait x 150 ft with contact guard assistance using rolling walker.   6. Stand for 2 minutes with Minimal Assistance using Rolling Walker or appropriate AD without LOB  7. Lower extremity exercise program x10 reps, with supervision, in order to increase LE strength and (I) with functional mobility.        Outcome: Ongoing (interventions implemented as appropriate)  No goals met this visit; continue current POC.     Catina Matthews PT, DPT   2017  Pager: [phone]

## 2017-08-05 NOTE — PLAN OF CARE
Problem: Patient Care Overview  Goal: Plan of Care Review  Outcome: Ongoing (interventions implemented as appropriate)  Afebrile, cefepime q 12 hours as ordered. Fall precautions maintained. dirve line  dressing changed daily. L-vad multiple pi events, asymptomatic. Non pulsatile today maps 62-74. No fall related injury. Care explained to pt. Will continue to monitor discharge to rehab with 8 week course antibiotics when appropriate.

## 2017-08-05 NOTE — ASSESSMENT & PLAN NOTE
- Creatinine stable at 1.4 (1.5 on admit, baseline ~ 1.1-1.3 - will monitor).   - Peaked at 1.6 this admission

## 2017-08-06 PROBLEM — N17.9 AKI (ACUTE KIDNEY INJURY): Status: ACTIVE | Noted: 2017-08-06

## 2017-08-06 PROBLEM — Z74.09 IMPAIRED FUNCTIONAL MOBILITY AND ENDURANCE: Status: ACTIVE | Noted: 2017-08-06

## 2017-08-06 PROBLEM — R25.9 INVOLUNTARY MOVEMENTS: Status: ACTIVE | Noted: 2017-08-06

## 2017-08-06 LAB
ANION GAP SERPL CALC-SCNC: 6 MMOL/L
APTT BLDCRRT: 29.6 SEC
BASOPHILS # BLD AUTO: 0.04 K/UL
BASOPHILS NFR BLD: 0.5 %
BUN SERPL-MCNC: 32 MG/DL
CALCIUM SERPL-MCNC: 10.6 MG/DL
CHLORIDE SERPL-SCNC: 108 MMOL/L
CO2 SERPL-SCNC: 24 MMOL/L
CREAT SERPL-MCNC: 1.6 MG/DL
DIFFERENTIAL METHOD: ABNORMAL
EOSINOPHIL # BLD AUTO: 0.3 K/UL
EOSINOPHIL NFR BLD: 3.5 %
ERYTHROCYTE [DISTWIDTH] IN BLOOD BY AUTOMATED COUNT: 16.7 %
EST. GFR  (AFRICAN AMERICAN): 48.3 ML/MIN/1.73 M^2
EST. GFR  (NON AFRICAN AMERICAN): 41.8 ML/MIN/1.73 M^2
GLUCOSE SERPL-MCNC: 98 MG/DL
HCT VFR BLD AUTO: 40.5 %
HGB BLD-MCNC: 13.3 G/DL
INR PPP: 2.7
LDH SERPL L TO P-CCNC: 138 U/L
LYMPHOCYTES # BLD AUTO: 1.2 K/UL
LYMPHOCYTES NFR BLD: 14.8 %
MAGNESIUM SERPL-MCNC: 2 MG/DL
MCH RBC QN AUTO: 30.2 PG
MCHC RBC AUTO-ENTMCNC: 32.8 G/DL
MCV RBC AUTO: 92 FL
MONOCYTES # BLD AUTO: 1 K/UL
MONOCYTES NFR BLD: 12 %
NEUTROPHILS # BLD AUTO: 5.6 K/UL
NEUTROPHILS NFR BLD: 68.8 %
PHOSPHATE SERPL-MCNC: 2.2 MG/DL
PLATELET # BLD AUTO: 181 K/UL
PMV BLD AUTO: 12 FL
POTASSIUM SERPL-SCNC: 4.6 MMOL/L
PROTHROMBIN TIME: 27 SEC
RBC # BLD AUTO: 4.41 M/UL
SODIUM SERPL-SCNC: 138 MMOL/L
WBC # BLD AUTO: 8.19 K/UL

## 2017-08-06 PROCEDURE — 25000003 PHARM REV CODE 250: Performed by: INTERNAL MEDICINE

## 2017-08-06 PROCEDURE — 25000003 PHARM REV CODE 250: Performed by: STUDENT IN AN ORGANIZED HEALTH CARE EDUCATION/TRAINING PROGRAM

## 2017-08-06 PROCEDURE — 93005 ELECTROCARDIOGRAM TRACING: CPT

## 2017-08-06 PROCEDURE — 84100 ASSAY OF PHOSPHORUS: CPT

## 2017-08-06 PROCEDURE — 80048 BASIC METABOLIC PNL TOTAL CA: CPT

## 2017-08-06 PROCEDURE — 99232 SBSQ HOSP IP/OBS MODERATE 35: CPT | Mod: ,,, | Performed by: INTERNAL MEDICINE

## 2017-08-06 PROCEDURE — 36415 COLL VENOUS BLD VENIPUNCTURE: CPT

## 2017-08-06 PROCEDURE — 83615 LACTATE (LD) (LDH) ENZYME: CPT

## 2017-08-06 PROCEDURE — 25000003 PHARM REV CODE 250: Performed by: PHYSICIAN ASSISTANT

## 2017-08-06 PROCEDURE — 85610 PROTHROMBIN TIME: CPT

## 2017-08-06 PROCEDURE — 63600175 PHARM REV CODE 636 W HCPCS: Performed by: INTERNAL MEDICINE

## 2017-08-06 PROCEDURE — 93010 ELECTROCARDIOGRAM REPORT: CPT | Mod: ,,, | Performed by: INTERNAL MEDICINE

## 2017-08-06 PROCEDURE — 85025 COMPLETE CBC W/AUTO DIFF WBC: CPT

## 2017-08-06 PROCEDURE — 83735 ASSAY OF MAGNESIUM: CPT

## 2017-08-06 PROCEDURE — 85730 THROMBOPLASTIN TIME PARTIAL: CPT

## 2017-08-06 PROCEDURE — 25000003 PHARM REV CODE 250: Performed by: NURSE PRACTITIONER

## 2017-08-06 PROCEDURE — A4216 STERILE WATER/SALINE, 10 ML: HCPCS | Performed by: INTERNAL MEDICINE

## 2017-08-06 PROCEDURE — 20600001 HC STEP DOWN PRIVATE ROOM

## 2017-08-06 PROCEDURE — 27000248 HC VAD-ADDITIONAL DAY

## 2017-08-06 RX ADMIN — MAGNESIUM OXIDE TAB 400 MG (241.3 MG ELEMENTAL MG) 400 MG: 400 (241.3 MG) TAB at 09:08

## 2017-08-06 RX ADMIN — WARFARIN SODIUM 4 MG: 4 TABLET ORAL at 04:08

## 2017-08-06 RX ADMIN — ROPINIROLE 0.5 MG: 0.5 TABLET, FILM COATED ORAL at 06:08

## 2017-08-06 RX ADMIN — OYSTER SHELL CALCIUM WITH VITAMIN D 1 TABLET: 500; 200 TABLET, FILM COATED ORAL at 09:08

## 2017-08-06 RX ADMIN — Medication 3 ML: at 06:08

## 2017-08-06 RX ADMIN — ALLOPURINOL 300 MG: 300 TABLET ORAL at 09:08

## 2017-08-06 RX ADMIN — Medication 3 ML: at 09:08

## 2017-08-06 RX ADMIN — SPIRONOLACTONE 25 MG: 25 TABLET, FILM COATED ORAL at 09:08

## 2017-08-06 RX ADMIN — CEFEPIME HYDROCHLORIDE 2 G: 2 INJECTION, SOLUTION INTRAVENOUS at 03:08

## 2017-08-06 RX ADMIN — ASPIRIN 81 MG CHEWABLE TABLET 81 MG: 81 TABLET CHEWABLE at 09:08

## 2017-08-06 RX ADMIN — Medication 3 ML: at 01:08

## 2017-08-06 RX ADMIN — HYDRALAZINE HYDROCHLORIDE 50 MG: 50 TABLET ORAL at 01:08

## 2017-08-06 RX ADMIN — ROPINIROLE 0.5 MG: 0.5 TABLET, FILM COATED ORAL at 01:08

## 2017-08-06 RX ADMIN — ATORVASTATIN CALCIUM 10 MG: 10 TABLET, FILM COATED ORAL at 09:08

## 2017-08-06 RX ADMIN — HYDRALAZINE HYDROCHLORIDE 50 MG: 50 TABLET ORAL at 09:08

## 2017-08-06 RX ADMIN — DOFETILIDE 250 MCG: 0.12 CAPSULE ORAL at 09:08

## 2017-08-06 RX ADMIN — BISACODYL 10 MG: 10 SUPPOSITORY RECTAL at 06:08

## 2017-08-06 RX ADMIN — TAMSULOSIN HYDROCHLORIDE 0.4 MG: 0.4 CAPSULE ORAL at 09:08

## 2017-08-06 RX ADMIN — AMLODIPINE BESYLATE 10 MG: 10 TABLET ORAL at 09:08

## 2017-08-06 RX ADMIN — BUPROPION HYDROCHLORIDE 300 MG: 150 TABLET, FILM COATED, EXTENDED RELEASE ORAL at 09:08

## 2017-08-06 RX ADMIN — FOLIC ACID 1 MG: 1 TABLET ORAL at 09:08

## 2017-08-06 RX ADMIN — THERA TABS 1 TABLET: TAB at 09:08

## 2017-08-06 RX ADMIN — HYDRALAZINE HYDROCHLORIDE 50 MG: 50 TABLET ORAL at 06:08

## 2017-08-06 RX ADMIN — ROPINIROLE 0.5 MG: 0.5 TABLET, FILM COATED ORAL at 10:08

## 2017-08-06 RX ADMIN — LISINOPRIL 10 MG: 10 TABLET ORAL at 09:08

## 2017-08-06 NOTE — ASSESSMENT & PLAN NOTE
- HM III implanted at [facility] in [place] 10/2016  - Speed 5800  - INR therapeutic 2.7. Coumadin dose increase to 4 mg every Tues, Thurs, Sat and 6 mg Mon, Wed, Fri.    - LD stable - continue ASA 81 mg qd  - Pulsatile with MAP's improved: see above-  Continue BP control with  Lisinopril, Norvasc, Aldactone and Hydralazine  - Interrogation notable for PI events  - CT of abdomen/pelvis with no fluid collections  - DLES culture with Pseudomonas resistant to Cipro. Appreciate ID's help - continue Cefepime X 8 weeks (end 9/23/17). Weekly CBC, CMP, ESR, CRP to ID ([phone]) once discharged

## 2017-08-06 NOTE — ASSESSMENT & PLAN NOTE
- Cardene off, but BP better controlled overnight.  Continue Lisinopril to 10QAM and 10mg QPM  - Continue Norvasc, Hydralazine and Aldactone  - All BP's need to be checked while patient sitting on edge of bed or OOB in chair given h/o orthostasis

## 2017-08-06 NOTE — SUBJECTIVE & OBJECTIVE
Interval History: No complaints overnight. Denies chest pain, SOB, NVD. Oriented to place. Most likely d/c with rehab tomorrow. BP better controlled. Asked nurse to use manual cuff for pressure instead of automated BP cuff.     Continuous Infusions:   Scheduled Meds:   allopurinol  300 mg Oral Daily    amlodipine  10 mg Oral Daily    aspirin  81 mg Oral Daily    atorvastatin  10 mg Oral Daily    buPROPion  300 mg Oral Daily    calcium-vitamin D3  1 tablet Oral BID    ceFEPime (MAXIPIME) IVPB  2 g Intravenous Q12H    dofetilide  250 mcg Oral Q12H    folic acid  1 mg Oral Daily    hydrALAZINE  50 mg Oral Q8H    lisinopril  10 mg Oral BID    magnesium oxide  400 mg Oral BID    multivitamin  1 tablet Oral Daily    ropinirole  0.5 mg Oral TID    sodium chloride 0.9%  3 mL Intravenous Q8H    spironolactone  25 mg Oral Daily    tamsulosin  0.4 mg Oral Daily    warfarin  4 mg Oral Every Tues, Thurs, Sat    warfarin  4 mg Oral Every Sun    warfarin  6 mg Oral Every Mon, Wed, Fri     PRN Meds:acetaminophen, bisacodyl, promethazine    Review of patient's allergies indicates:   Allergen Reactions    Sulfa (sulfonamide antibiotics)      Objective:     Vital Signs (Most Recent):  Temp: 98.4 °F (36.9 °C) (08/06/17 0400)  Pulse: 65 (08/06/17 0700)  Resp: 18 (08/06/17 0400)  BP: (!) 88/0 (08/06/17 0400)  SpO2: 96 % (08/06/17 0400) Vital Signs (24h Range):  Temp:  [97.1 °F (36.2 °C)-98.9 °F (37.2 °C)] 98.4 °F (36.9 °C)  Pulse:  [64-80] 65  Resp:  [17-18] 18  SpO2:  [96 %-99 %] 96 %  BP: (62-88)/(0) 88/0     Weight: 79.6 kg (175 lb 7.8 oz)  Body mass index is 23.8 kg/m².      Intake/Output Summary (Last 24 hours) at 08/06/17 1007  Last data filed at 08/06/17 0600   Gross per 24 hour   Intake              870 ml   Output              175 ml   Net              695 ml     Hemodynamic Parameters:    Telemetry: no events    Physical Exam  Constitutional: He appears well-developed and well-nourished.   Head:  Normocephalic and atraumatic.   Eyes: Conjunctivae and EOM are normal. Pupils are equal, round, and reactive to light.   Neck: Normal range of motion. Neck supple. No JVD present.   Cardiovascular: Smooth VAD hum. Pulsatile   Pulmonary/Chest: Effort normal and breath sounds normal.   Abdominal: Soft. Bowel sounds are normal.   Musculoskeletal: He exhibits no edema.   Neurological: He is alert.   Confused. Generalized tremors   Skin: Skin is warm and dry. Capillary refill takes 2 to 3 seconds.   Psychiatric: Confused at times, oriented to person place and time this morning    Significant Labs:  CBC:    Recent Labs  Lab 08/06/17  0608   WBC 8.19   RBC 4.41*   HGB 13.3*   HCT 40.5      MCV 92   MCH 30.2   MCHC 32.8     BNP:    Recent Labs  Lab 08/04/17  0601   *     CMP:    Recent Labs  Lab 08/04/17  0601  08/06/17  0608   GLU 93  < > 98   CALCIUM 10.3  < > 10.6*   ALBUMIN 3.2*  --   --    PROT 6.9  --   --    *  < > 138   K 4.0  < > 4.6   CO2 22*  < > 24     < > 108   BUN 33*  < > 32*   CREATININE 1.5*  < > 1.6*   ALKPHOS 81  --   --    ALT 18  --   --    AST 24  --   --    BILITOT 0.4  --   --    < > = values in this interval not displayed.   Coagulation:     Recent Labs  Lab 08/06/17  0608   INR 2.7*   APTT 29.6     LDH:    Recent Labs  Lab 08/04/17  0601 08/05/17  0601 08/06/17  0608    164 138     Microbiology:  Microbiology Results (last 7 days)     Procedure Component Value Units Date/Time    Blood culture #1 **CANNOT BE ORDERED STAT** [[id number]] Collected:  07/28/17 2042    Order Status:  Completed Specimen:  Blood from Peripheral, Forearm, Right Updated:  08/02/17 2212     Blood Culture, Routine No growth after 5 days.    Blood culture #2 **CANNOT BE ORDERED STAT** [[id number]] Collected:  07/28/17 2058    Order Status:  Completed Specimen:  Blood from Peripheral, Forearm, Left Updated:  08/02/17 2212     Blood Culture, Routine No growth after 5 days.    Culture, Anaerobe  [[id number]] Collected:  07/29/17 1056    Order Status:  Completed Specimen:  Wound from Abdomen Updated:  08/02/17 0859     Anaerobic Culture No anaerobes isolated    Aerobic culture [[id number]]  (Susceptibility) Collected:  07/29/17 1056    Order Status:  Completed Specimen:  Wound from Abdomen Updated:  08/01/17 1035     Aerobic Bacterial Culture --     PSEUDOMONAS AERUGINOSA  Few      Urine culture [[id number]] Collected:  07/29/17 1111    Order Status:  Completed Specimen:  Urine from Urine, Catheterized Updated:  07/30/17 1956     Urine Culture, Routine No growth          I have reviewed all pertinent labs within the past 24 hours.    Estimated Creatinine Clearance: 44.5 mL/min (based on Cr of 1.6).    Diagnostic Results:  I have reviewed all pertinent imaging results/findings within the past 24 hours.

## 2017-08-06 NOTE — PROGRESS NOTES
[facility]  Heart Transplant  Progress Note    Patient Name: Kev Vasquez  MRN: [id number]  Admission Date: 7/28/2017  Hospital Length of Stay: 9 days  Attending Physician: Carlito Santana MD  Primary Care Provider: Mega Collins MD  Principal Problem:Complication involving left ventricular assist device (LVAD)    Subjective:     Interval History: No complaints overnight. Denies chest pain, SOB, NVD. Oriented to place. Most likely d/c with rehab tomorrow. BP better controlled. Asked nurse to use manual cuff for pressure instead of automated BP cuff.     Continuous Infusions:   Scheduled Meds:   allopurinol  300 mg Oral Daily    amlodipine  10 mg Oral Daily    aspirin  81 mg Oral Daily    atorvastatin  10 mg Oral Daily    buPROPion  300 mg Oral Daily    calcium-vitamin D3  1 tablet Oral BID    ceFEPime (MAXIPIME) IVPB  2 g Intravenous Q12H    dofetilide  250 mcg Oral Q12H    folic acid  1 mg Oral Daily    hydrALAZINE  50 mg Oral Q8H    lisinopril  10 mg Oral BID    magnesium oxide  400 mg Oral BID    multivitamin  1 tablet Oral Daily    ropinirole  0.5 mg Oral TID    sodium chloride 0.9%  3 mL Intravenous Q8H    spironolactone  25 mg Oral Daily    tamsulosin  0.4 mg Oral Daily    warfarin  4 mg Oral Every Tues, Thurs, Sat    warfarin  4 mg Oral Every Sun    warfarin  6 mg Oral Every Mon, Wed, Fri     PRN Meds:acetaminophen, bisacodyl, promethazine    Review of patient's allergies indicates:   Allergen Reactions    Sulfa (sulfonamide antibiotics)      Objective:     Vital Signs (Most Recent):  Temp: 98.4 °F (36.9 °C) (08/06/17 0400)  Pulse: 65 (08/06/17 0700)  Resp: 18 (08/06/17 0400)  BP: (!) 88/0 (08/06/17 0400)  SpO2: 96 % (08/06/17 0400) Vital Signs (24h Range):  Temp:  [97.1 °F (36.2 °C)-98.9 °F (37.2 °C)] 98.4 °F (36.9 °C)  Pulse:  [64-80] 65  Resp:  [17-18] 18  SpO2:  [96 %-99 %] 96 %  BP: (62-88)/(0) 88/0     Weight: 79.6 kg (175 lb 7.8 oz)  Body mass index is 23.8  kg/m².      Intake/Output Summary (Last 24 hours) at 08/06/17 1007  Last data filed at 08/06/17 0600   Gross per 24 hour   Intake              870 ml   Output              175 ml   Net              695 ml     Hemodynamic Parameters:    Telemetry: no events    Physical Exam  Constitutional: He appears well-developed and well-nourished.   Head: Normocephalic and atraumatic.   Eyes: Conjunctivae and EOM are normal. Pupils are equal, round, and reactive to light.   Neck: Normal range of motion. Neck supple. No JVD present.   Cardiovascular: Smooth VAD hum. Pulsatile   Pulmonary/Chest: Effort normal and breath sounds normal.   Abdominal: Soft. Bowel sounds are normal.   Musculoskeletal: He exhibits no edema.   Neurological: He is alert.   Confused. Generalized tremors   Skin: Skin is warm and dry. Capillary refill takes 2 to 3 seconds.   Psychiatric: Confused at times, oriented to person place and time this morning    Significant Labs:  CBC:    Recent Labs  Lab 08/06/17  0608   WBC 8.19   RBC 4.41*   HGB 13.3*   HCT 40.5      MCV 92   MCH 30.2   MCHC 32.8     BNP:    Recent Labs  Lab 08/04/17  0601   *     CMP:    Recent Labs  Lab 08/04/17  0601  08/06/17  0608   GLU 93  < > 98   CALCIUM 10.3  < > 10.6*   ALBUMIN 3.2*  --   --    PROT 6.9  --   --    *  < > 138   K 4.0  < > 4.6   CO2 22*  < > 24     < > 108   BUN 33*  < > 32*   CREATININE 1.5*  < > 1.6*   ALKPHOS 81  --   --    ALT 18  --   --    AST 24  --   --    BILITOT 0.4  --   --    < > = values in this interval not displayed.   Coagulation:     Recent Labs  Lab 08/06/17  0608   INR 2.7*   APTT 29.6     LDH:    Recent Labs  Lab 08/04/17  0601 08/05/17  0601 08/06/17  0608    164 138     Microbiology:  Microbiology Results (last 7 days)     Procedure Component Value Units Date/Time    Blood culture #1 **CANNOT BE ORDERED STAT** [[id number]] Collected:  07/28/17 2042    Order Status:  Completed Specimen:  Blood from Peripheral,  Forearm, Right Updated:  08/02/17 2212     Blood Culture, Routine No growth after 5 days.    Blood culture #2 **CANNOT BE ORDERED STAT** [[id number]] Collected:  07/28/17 2058    Order Status:  Completed Specimen:  Blood from Peripheral, Forearm, Left Updated:  08/02/17 2212     Blood Culture, Routine No growth after 5 days.    Culture, Anaerobe [[id number]] Collected:  07/29/17 1056    Order Status:  Completed Specimen:  Wound from Abdomen Updated:  08/02/17 0859     Anaerobic Culture No anaerobes isolated    Aerobic culture [[id number]]  (Susceptibility) Collected:  07/29/17 1056    Order Status:  Completed Specimen:  Wound from Abdomen Updated:  08/01/17 1035     Aerobic Bacterial Culture --     PSEUDOMONAS AERUGINOSA  Few      Urine culture [[id number]] Collected:  07/29/17 1111    Order Status:  Completed Specimen:  Urine from Urine, Catheterized Updated:  07/30/17 1956     Urine Culture, Routine No growth          I have reviewed all pertinent labs within the past 24 hours.    Estimated Creatinine Clearance: 44.5 mL/min (based on Cr of 1.6).    Diagnostic Results:  I have reviewed all pertinent imaging results/findings within the past 24 hours.    Assessment and Plan:     TANESHA (acute kidney injury)    - Creatinine 1.6 (1.5 on admit, baseline ~ 1.1-1.3 - will monitor).          Impaired functional mobility and endurance    - consulted PT/OT/ST  - Appreciate PM&R consult. Plan D/C to rehab on Monday        Restless leg syndrome    - continue ropinirole        Involuntary movements    - No known history of parkinsons disease, experiencing myoclonic jerks/tremors NOT suggestive of PD  - Currently not active as he should be given tremors and leg pain, very deconditioned.  - ddx per neuro delirium / myoclonic jerks from infectious etiologies / metabolic >>> postoperative states >> Fluid and electrolyte disturbances / toxicity >>> cns pathology / seziures. Appreciate assistance.         Delirium    - Appreciate Neuro's  help: Patient currently with signs/symptoms of delirium.  Tremulous, myoclonic jerks as well as his disorientation suggest this.  Suspect current infection as primary cause. Less tremulous past 2 days, and is more oriented as the day goes on  - RPR non-reactive, B12 WNL           Abdominal pain    - CT abdomen negative for acute processes  - gram stain w/ GNR  - ID following, appreciate recs. Obtained driveline cultures. Pt now on cefepime. Blood cultures from 7/28 NGTD          LVAD (left ventricular assist device) present    - HM III implanted at [facility]   - VAD orders placed  - No alarms or events noted on interrogation  - continue bp control and warfarin  - EKG today as patient is on Tikosyn        Hypertensive urgency    - Cardene off, but BP better controlled overnight.  Continue Lisinopril to 10QAM and 10mg QPM  - Continue Norvasc, Hydralazine and Aldactone  - All BP's need to be checked while patient sitting on edge of bed or OOB in chair given h/o orthostasis             * Complication involving left ventricular assist device (LVAD)    - HM III implanted at [facility] 10/2016  - Speed 5800  - INR therapeutic 2.7. Coumadin dose increase to 4 mg every Tues, Thurs, Sat and 6 mg Mon, Wed, Fri.    - LD stable - continue ASA 81 mg qd  - Pulsatile with MAP's improved: see above-  Continue BP control with  Lisinopril, Norvasc, Aldactone and Hydralazine  - Interrogation notable for PI events  - CT of abdomen/pelvis with no fluid collections  - DLES culture with Pseudomonas resistant to Cipro. Appreciate ID's help - continue Cefepime X 8 weeks (end 9/23/17). Weekly CBC, CMP, ESR, CRP to ID ([phone]) once discharged              Elena Raman PA-C  Heart Transplant  [facility]

## 2017-08-06 NOTE — ASSESSMENT & PLAN NOTE
- HM III implanted at [facility] in [place]   - VAD orders placed  - No alarms or events noted on interrogation  - continue bp control and warfarin  - EKG today as patient is on Tikosyn

## 2017-08-06 NOTE — PLAN OF CARE
Problem: Patient Care Overview  Goal: Plan of Care Review  Outcome: Ongoing (interventions implemented as appropriate)  L-VAD WITH MULTIPLE PI EVENTS, ASYMPTOMATIC. SITTING DOPPLERS 86& 70. UNABLE TO OBTAIN CUFF PRESSURES WHILE SITTING. CEFEPIME ADMIN Q 12 HOURS. DAILY DRIVE LINE DRESSING CHANGES. OOB TO CHAIR, FALL PRECAUTION MAINTAINED. DISCHARGE TO REHAB ON Monday.WILL CONTINUE TO MONITOR.

## 2017-08-07 LAB
ALBUMIN SERPL BCP-MCNC: 3.2 G/DL
ALP SERPL-CCNC: 81 U/L
ALT SERPL W/O P-5'-P-CCNC: 21 U/L
ANION GAP SERPL CALC-SCNC: 6 MMOL/L
APTT BLDCRRT: 29.6 SEC
AST SERPL-CCNC: 24 U/L
BASOPHILS # BLD AUTO: 0.02 K/UL
BASOPHILS NFR BLD: 0.2 %
BILIRUB DIRECT SERPL-MCNC: 0.4 MG/DL
BILIRUB SERPL-MCNC: 0.7 MG/DL
BNP SERPL-MCNC: 187 PG/ML
BUN SERPL-MCNC: 33 MG/DL
CALCIUM SERPL-MCNC: 10.5 MG/DL
CHLORIDE SERPL-SCNC: 106 MMOL/L
CO2 SERPL-SCNC: 23 MMOL/L
CREAT SERPL-MCNC: 1.5 MG/DL
CRP SERPL-MCNC: 2.4 MG/L
DIFFERENTIAL METHOD: ABNORMAL
EOSINOPHIL # BLD AUTO: 0.1 K/UL
EOSINOPHIL NFR BLD: 1.5 %
ERYTHROCYTE [DISTWIDTH] IN BLOOD BY AUTOMATED COUNT: 16.8 %
EST. GFR  (AFRICAN AMERICAN): 52.3 ML/MIN/1.73 M^2
EST. GFR  (NON AFRICAN AMERICAN): 45.2 ML/MIN/1.73 M^2
GLUCOSE SERPL-MCNC: 99 MG/DL
HCT VFR BLD AUTO: 38.4 %
HGB BLD-MCNC: 13 G/DL
INR PPP: 2.6
LDH SERPL L TO P-CCNC: 159 U/L
LYMPHOCYTES # BLD AUTO: 0.9 K/UL
LYMPHOCYTES NFR BLD: 10.3 %
MAGNESIUM SERPL-MCNC: 2.2 MG/DL
MCH RBC QN AUTO: 30.3 PG
MCHC RBC AUTO-ENTMCNC: 33.9 G/DL
MCV RBC AUTO: 90 FL
MONOCYTES # BLD AUTO: 0.7 K/UL
MONOCYTES NFR BLD: 7.9 %
NEUTROPHILS # BLD AUTO: 6.9 K/UL
NEUTROPHILS NFR BLD: 79.8 %
PHOSPHATE SERPL-MCNC: 2.3 MG/DL
PLATELET # BLD AUTO: 162 K/UL
PMV BLD AUTO: 11.3 FL
POTASSIUM SERPL-SCNC: 4.3 MMOL/L
PREALB SERPL-MCNC: 28 MG/DL
PROT SERPL-MCNC: 6.8 G/DL
PROTHROMBIN TIME: 25.8 SEC
RBC # BLD AUTO: 4.29 M/UL
SODIUM SERPL-SCNC: 135 MMOL/L
WBC # BLD AUTO: 8.71 K/UL

## 2017-08-07 PROCEDURE — 93010 ELECTROCARDIOGRAM REPORT: CPT | Mod: ,,, | Performed by: INTERNAL MEDICINE

## 2017-08-07 PROCEDURE — 97116 GAIT TRAINING THERAPY: CPT

## 2017-08-07 PROCEDURE — 25000003 PHARM REV CODE 250: Performed by: INTERNAL MEDICINE

## 2017-08-07 PROCEDURE — 84100 ASSAY OF PHOSPHORUS: CPT

## 2017-08-07 PROCEDURE — 97530 THERAPEUTIC ACTIVITIES: CPT

## 2017-08-07 PROCEDURE — A4216 STERILE WATER/SALINE, 10 ML: HCPCS | Performed by: INTERNAL MEDICINE

## 2017-08-07 PROCEDURE — 63600175 PHARM REV CODE 636 W HCPCS: Performed by: INTERNAL MEDICINE

## 2017-08-07 PROCEDURE — 27000248 HC VAD-ADDITIONAL DAY

## 2017-08-07 PROCEDURE — 25000003 PHARM REV CODE 250: Performed by: STUDENT IN AN ORGANIZED HEALTH CARE EDUCATION/TRAINING PROGRAM

## 2017-08-07 PROCEDURE — 80048 BASIC METABOLIC PNL TOTAL CA: CPT

## 2017-08-07 PROCEDURE — 92507 TX SP LANG VOICE COMM INDIV: CPT

## 2017-08-07 PROCEDURE — 99233 SBSQ HOSP IP/OBS HIGH 50: CPT | Mod: 24,,, | Performed by: THORACIC SURGERY (CARDIOTHORACIC VASCULAR SURGERY)

## 2017-08-07 PROCEDURE — 20600001 HC STEP DOWN PRIVATE ROOM

## 2017-08-07 PROCEDURE — 83880 ASSAY OF NATRIURETIC PEPTIDE: CPT

## 2017-08-07 PROCEDURE — 85730 THROMBOPLASTIN TIME PARTIAL: CPT

## 2017-08-07 PROCEDURE — 80076 HEPATIC FUNCTION PANEL: CPT

## 2017-08-07 PROCEDURE — 85610 PROTHROMBIN TIME: CPT

## 2017-08-07 PROCEDURE — 85025 COMPLETE CBC W/AUTO DIFF WBC: CPT

## 2017-08-07 PROCEDURE — 83615 LACTATE (LD) (LDH) ENZYME: CPT

## 2017-08-07 PROCEDURE — 02HV33Z INSERTION OF INFUSION DEVICE INTO SUPERIOR VENA CAVA, PERCUTANEOUS APPROACH: ICD-10-PCS | Performed by: INTERNAL MEDICINE

## 2017-08-07 PROCEDURE — 84134 ASSAY OF PREALBUMIN: CPT

## 2017-08-07 PROCEDURE — 97535 SELF CARE MNGMENT TRAINING: CPT

## 2017-08-07 PROCEDURE — 25000003 PHARM REV CODE 250: Performed by: NURSE PRACTITIONER

## 2017-08-07 PROCEDURE — 83735 ASSAY OF MAGNESIUM: CPT

## 2017-08-07 PROCEDURE — 93010 ELECTROCARDIOGRAM REPORT: CPT | Mod: 76,,, | Performed by: INTERNAL MEDICINE

## 2017-08-07 PROCEDURE — 93750 INTERROGATION VAD IN PERSON: CPT | Mod: ,,, | Performed by: THORACIC SURGERY (CARDIOTHORACIC VASCULAR SURGERY)

## 2017-08-07 PROCEDURE — 93005 ELECTROCARDIOGRAM TRACING: CPT

## 2017-08-07 PROCEDURE — 99232 SBSQ HOSP IP/OBS MODERATE 35: CPT | Mod: ,,, | Performed by: INTERNAL MEDICINE

## 2017-08-07 PROCEDURE — 25000003 PHARM REV CODE 250: Performed by: PHYSICIAN ASSISTANT

## 2017-08-07 PROCEDURE — 86140 C-REACTIVE PROTEIN: CPT

## 2017-08-07 PROCEDURE — 93750 INTERROGATION VAD IN PERSON: CPT | Performed by: NURSE PRACTITIONER

## 2017-08-07 PROCEDURE — 36415 COLL VENOUS BLD VENIPUNCTURE: CPT

## 2017-08-07 PROCEDURE — 97110 THERAPEUTIC EXERCISES: CPT

## 2017-08-07 RX ORDER — SODIUM CHLORIDE 0.9 % (FLUSH) 0.9 %
10 SYRINGE (ML) INJECTION EVERY 6 HOURS
Status: DISCONTINUED | OUTPATIENT
Start: 2017-08-07 | End: 2017-08-08 | Stop reason: HOSPADM

## 2017-08-07 RX ORDER — SODIUM CHLORIDE 0.9 % (FLUSH) 0.9 %
10 SYRINGE (ML) INJECTION
Status: DISCONTINUED | OUTPATIENT
Start: 2017-08-07 | End: 2017-08-08 | Stop reason: HOSPADM

## 2017-08-07 RX ADMIN — HYDRALAZINE HYDROCHLORIDE 50 MG: 50 TABLET ORAL at 01:08

## 2017-08-07 RX ADMIN — HYDRALAZINE HYDROCHLORIDE 75 MG: 50 TABLET ORAL at 10:08

## 2017-08-07 RX ADMIN — TAMSULOSIN HYDROCHLORIDE 0.4 MG: 0.4 CAPSULE ORAL at 08:08

## 2017-08-07 RX ADMIN — THERA TABS 1 TABLET: TAB at 08:08

## 2017-08-07 RX ADMIN — MAGNESIUM OXIDE TAB 400 MG (241.3 MG ELEMENTAL MG) 400 MG: 400 (241.3 MG) TAB at 09:08

## 2017-08-07 RX ADMIN — DOFETILIDE 250 MCG: 0.12 CAPSULE ORAL at 10:08

## 2017-08-07 RX ADMIN — OYSTER SHELL CALCIUM WITH VITAMIN D 1 TABLET: 500; 200 TABLET, FILM COATED ORAL at 08:08

## 2017-08-07 RX ADMIN — AMLODIPINE BESYLATE 10 MG: 10 TABLET ORAL at 08:08

## 2017-08-07 RX ADMIN — LISINOPRIL 10 MG: 10 TABLET ORAL at 08:08

## 2017-08-07 RX ADMIN — WARFARIN SODIUM 6 MG: 2 TABLET ORAL at 05:08

## 2017-08-07 RX ADMIN — SPIRONOLACTONE 25 MG: 25 TABLET, FILM COATED ORAL at 08:08

## 2017-08-07 RX ADMIN — FOLIC ACID 1 MG: 1 TABLET ORAL at 08:08

## 2017-08-07 RX ADMIN — CEFEPIME HYDROCHLORIDE 2 G: 2 INJECTION, SOLUTION INTRAVENOUS at 03:08

## 2017-08-07 RX ADMIN — SODIUM CHLORIDE, PRESERVATIVE FREE 10 ML: 5 INJECTION INTRAVENOUS at 10:08

## 2017-08-07 RX ADMIN — Medication 3 ML: at 10:08

## 2017-08-07 RX ADMIN — BUPROPION HYDROCHLORIDE 300 MG: 150 TABLET, FILM COATED, EXTENDED RELEASE ORAL at 08:08

## 2017-08-07 RX ADMIN — MAGNESIUM OXIDE TAB 400 MG (241.3 MG ELEMENTAL MG) 400 MG: 400 (241.3 MG) TAB at 08:08

## 2017-08-07 RX ADMIN — Medication 3 ML: at 05:08

## 2017-08-07 RX ADMIN — ROPINIROLE 0.5 MG: 0.5 TABLET, FILM COATED ORAL at 01:08

## 2017-08-07 RX ADMIN — ACETAMINOPHEN 650 MG: 325 TABLET ORAL at 08:08

## 2017-08-07 RX ADMIN — ATORVASTATIN CALCIUM 10 MG: 10 TABLET, FILM COATED ORAL at 08:08

## 2017-08-07 RX ADMIN — HYDRALAZINE HYDROCHLORIDE 50 MG: 50 TABLET ORAL at 05:08

## 2017-08-07 RX ADMIN — Medication 3 ML: at 03:08

## 2017-08-07 RX ADMIN — ROPINIROLE 0.5 MG: 0.5 TABLET, FILM COATED ORAL at 10:08

## 2017-08-07 RX ADMIN — ALLOPURINOL 300 MG: 300 TABLET ORAL at 08:08

## 2017-08-07 RX ADMIN — OYSTER SHELL CALCIUM WITH VITAMIN D 1 TABLET: 500; 200 TABLET, FILM COATED ORAL at 09:08

## 2017-08-07 RX ADMIN — ASPIRIN 81 MG CHEWABLE TABLET 81 MG: 81 TABLET CHEWABLE at 08:08

## 2017-08-07 RX ADMIN — ROPINIROLE 0.5 MG: 0.5 TABLET, FILM COATED ORAL at 05:08

## 2017-08-07 RX ADMIN — LISINOPRIL 10 MG: 10 TABLET ORAL at 09:08

## 2017-08-07 NOTE — ASSESSMENT & PLAN NOTE
- Cardene off. BP still with some elevated maps.  Continue Lisinopril to 10QAM and 10mg QPM  - Continue Norvasc, Hydralazine and Aldactone  - All BP's need to be checked while patient sitting on edge of bed or OOB in chair given h/o orthostasis. Do not want to continue to up titrate BP meds with patient's history of syncope and severe orthostasis.

## 2017-08-07 NOTE — PT/OT/SLP PROGRESS
Physical Therapy  Treatment    Kev Vasquez   MRN: [id number]   Admitting Diagnosis: Complication involving left ventricular assist device (LVAD)    PT Received On: 08/07/17  PT Start Time: 1513     PT Stop Time: 1540    PT Total Time (min): 27 min       Billable Minutes:  Gait [id number] and Therapeutic Exercise 12    Treatment Type: Treatment  PT/PTA: PT     PTA Visit Number: 0       General Precautions: Standard, LVAD, fall  Orthopedic Precautions: N/A   Braces: N/A    Do you have any cultural, spiritual, Mandaeism conflicts, given your current situation?: none noted     Subjective:  Communicated with RN prior to session.  Pt agreeable to therapy.     Pain/Comfort  Pain Rating 1: 0/10    Objective:   Patient found with: telemetry, peripheral IV (LVAD to battery power)    Functional Mobility:  Bed Mobility:   Scooting/Bridging: Stand by Assistance, With side rail (bridging to scoot to HOB )  Supine to Sit: Stand by Assistance (with HOB elevated)  Sit to Supine: Supervision    Transfers:  Sit <> Stand Assistance: Stand By Assistance (x2 reps)  Sit <> Stand Assistive Device: Rolling Walker    Gait:   Gait Distance: ~125 ft. + ~125 ft. with seated rest breaks between trials  Assistance 1: Contact Guard Assistance  Gait Assistive Device: Rolling walker  Gait Pattern: swing-to gait  Gait Deviation(s): decreased william, increased time in double stance, decreased velocity of limb motion, decreased step length, decreased weight-shifting ability, foot flat (decreased heel strike BLE; ataxia gait; decreased knee flexion BLE)    Pt with forward flexed posture and rigid BLE movements. Pt also with decreased LE coordination and intermittently kicking RW with RLE when during R swing.    Required mod v/c for upright posture and forward gaze.    Chair follow throughout   Emergency bag present throughout     Balance:   Static Sit: supervision   Dynamic Sit: SBA  Static Stand: SBA-CGA  Dynamic stand: CGA     Therapeutic Activities  and Exercises:  Pt found on battery power with personal vest donned to hold LVAD equipment.  Donned second down for robe and shoes while seated EOB.  Performed ambulation in hallway as described above.   Following ambulation, pt performed seated therex. Performed AP, LAQ, hip flexion x15 reps each. Ida for increased knee ext ROM with LAQ.  Pt instructed in supine hamstring stretch using sheet. Pt verbalized understanding.     AM-PAC 6 CLICK MOBILITY  How much help from another person does this patient currently need?   1 = Unable, Total/Dependent Assistance  2 = A lot, Maximum/Moderate Assistance  3 = A little, Minimum/Contact Guard/Supervision  4 = None, Modified [place]/Independent    Turning over in bed (including adjusting bedclothes, sheets and blankets)?: 4  Sitting down on and standing up from a chair with arms (e.g., wheelchair, bedside commode, etc.): 3  Moving from lying on back to sitting on the side of the bed?: 3  Moving to and from a bed to a chair (including a wheelchair)?: 3  Need to walk in hospital room?: 3  Climbing 3-5 steps with a railing?: 2  Total Score: 18    AM-PAC Raw Score CMS G-Code Modifier Level of Impairment Assistance   6 % Total / Unable   7 - 9 CM 80 - 100% Maximal Assist   10 - 14 CL 60 - 80% Moderate Assist   15 - 19 CK 40 - 60% Moderate Assist   20 - 22 CJ 20 - 40% Minimal Assist   23 CI 1-20% SBA / CGA   24 CH 0% Independent/ Mod I     Patient left supine with all lines intact, call button in reach, RN notified and wife and caregiver present.    Assessment:  Kev Vasquez is a 76 y.o. male with a medical diagnosis of Complication involving left ventricular assist device (LVAD) and presents with ataxic gait and decreased balance, limiting (I) with functional mobility. Pt continuing to progress gait distance and is requiring less assist for functional mobility. However, pt continues to demo altered gait mechanics, requiring RW and cues to correct. Pt would continue to  benefit from skilled acute PT in order to address current deficits and progress functional mobility. Pt is a great candidate for IP rehab in order to further progress safety and (I) with mobility and maximize return to PLOF.      Rehab identified problem list/impairments: Rehab identified problem list/impairments: weakness, impaired functional mobilty, gait instability, impaired endurance, impaired balance, impaired cardiopulmonary response to activity, decreased safety awareness, decreased coordination    Rehab potential is good.     Activity tolerance: Good    Discharge recommendations: Discharge Facility/Level Of Care Needs: rehabilitation facility     Barriers to discharge: Barriers to Discharge: None    Equipment recommendations: Equipment Needed After Discharge:  (TBD)     GOALS:    Physical Therapy Goals        Problem: Physical Therapy Goal    Goal Priority Disciplines Outcome Goal Variances Interventions   Physical Therapy Goal     PT/OT, PT Ongoing (interventions implemented as appropriate)     Description:  Goals to be met by: 17     Patient will increase functional independence with mobility by performin. Supine to sit with Moderate Assistance - MET       Revised:  Supine to sit with SBA. - met   2. Sit to supine with Moderate Assistance - met   3. Sit to stand transfer with Moderate Assistance -- MET        Revised: Sit to stand transfer with SBA. - met   4. Gait  x 10 feet with Moderate Assistance using Rolling Walker or appropriate AD. -- MET       Revised: Gait x 150 ft with contact guard assistance using rolling walker.   6. Stand for 2 minutes with Minimal Assistance using Rolling Walker or appropriate AD without LOB  7. Lower extremity exercise program x10 reps, with supervision, in order to increase LE strength and (I) with functional mobility. - met                          PLAN:    Patient to be seen 5 x/week  to address the above listed problems via gait  training, therapeutic activities, therapeutic exercises, neuromuscular re-education  Plan of Care expires: 08/27/17  Plan of Care reviewed with: patient        Wendy Hinojosaard, PT, DPT   8/7/2017  [phone]

## 2017-08-07 NOTE — ASSESSMENT & PLAN NOTE
- CT abdomen negative for acute processes  - gram stain w/ GNR  - ID following, appreciate recs. Obtained driveline cultures. Pt now on cefepime. Blood cultures from 7/28 NGTD.

## 2017-08-07 NOTE — CONSULTS
Double lumen PICC placed in right basilic vein of RUE, 34cm in length with 0cm exposed and 31cm arm circumference. Lot#[id number].

## 2017-08-07 NOTE — PROGRESS NOTES
[facility]  Heart Transplant  Progress Note    Patient Name: Kev Vasquez  MRN: [id number]  Admission Date: 7/28/2017  Hospital Length of Stay: 10 days  Attending Physician: Anni Henderson MD  Primary Care Provider: Mega Collins MD  Principal Problem:Complication involving left ventricular assist device (LVAD)    Subjective:     Interval History: No complaints overnight, still with elevated blood pressures. Denies chest pain, SOB, NVD.    Continuous Infusions:   Scheduled Meds:   allopurinol  300 mg Oral Daily    amlodipine  10 mg Oral Daily    aspirin  81 mg Oral Daily    atorvastatin  10 mg Oral Daily    buPROPion  300 mg Oral Daily    calcium-vitamin D3  1 tablet Oral BID    ceFEPime (MAXIPIME) IVPB  2 g Intravenous Q12H    dofetilide  250 mcg Oral Q12H    folic acid  1 mg Oral Daily    hydrALAZINE  50 mg Oral Q8H    lisinopril  10 mg Oral BID    magnesium oxide  400 mg Oral BID    multivitamin  1 tablet Oral Daily    ropinirole  0.5 mg Oral TID    sodium chloride 0.9%  3 mL Intravenous Q8H    spironolactone  25 mg Oral Daily    tamsulosin  0.4 mg Oral Daily    warfarin  4 mg Oral Every Tues, Thurs, Sat    warfarin  4 mg Oral Every Sun    warfarin  6 mg Oral Every Mon, Wed, Fri     PRN Meds:acetaminophen, bisacodyl, promethazine    Review of patient's allergies indicates:   Allergen Reactions    Sulfa (sulfonamide antibiotics)      Objective:     Vital Signs (Most Recent):  Temp: 97.7 °F (36.5 °C) (08/07/17 1140)  Pulse: 68 (08/07/17 1400)  Resp: 20 (08/07/17 1140)  BP: (!) 82/0 (08/07/17 1140)  SpO2: 98 % (08/07/17 1140) Vital Signs (24h Range):  Temp:  [97.7 °F (36.5 °C)-98.5 °F (36.9 °C)] 97.7 °F (36.5 °C)  Pulse:  [64-80] 68  Resp:  [18-20] 20  SpO2:  [90 %-98 %] 98 %  BP: ()/(0-85) 82/0     Weight: 79 kg (174 lb 2.6 oz)  Body mass index is 23.62 kg/m².      Intake/Output Summary (Last 24 hours) at 08/07/17 1436  Last data filed at 08/07/17 1400   Gross per  24 hour   Intake              900 ml   Output                0 ml   Net              900 ml       Hemodynamic Parameters:    Telemetry: NSVTx2    Physical Exam   Constitutional: He appears well-developed and well-nourished.   Head: Normocephalic and atraumatic.   Eyes: Conjunctivae and EOM are normal. Pupils are equal, round, and reactive to light.   Neck: Normal range of motion. Neck supple. No JVD present.   Cardiovascular: Smooth VAD hum. Pulsatile   Pulmonary/Chest: Effort normal and breath sounds normal.   Abdominal: Soft. Bowel sounds are normal.   Musculoskeletal: He exhibits no edema.   Neurological: He is alert.   Confused. Generalized tremors   Skin: Skin is warm and dry. Capillary refill takes 2 to 3 seconds.   Psychiatric: Confused at times, oriented to person place and time this morning    Significant Labs:  CBC:    Recent Labs  Lab 08/07/17  0710   WBC 8.71   RBC 4.29*   HGB 13.0*   HCT 38.4*      MCV 90   MCH 30.3   MCHC 33.9     BNP:    Recent Labs  Lab 08/07/17  0710   *     CMP:    Recent Labs  Lab 08/07/17  0710   GLU 99   CALCIUM 10.5   ALBUMIN 3.2*   PROT 6.8   *   K 4.3   CO2 23      BUN 33*   CREATININE 1.5*   ALKPHOS 81   ALT 21   AST 24   BILITOT 0.7      Coagulation:     Recent Labs  Lab 08/07/17  0710   INR 2.6*   APTT 29.6     LDH:    Recent Labs  Lab 08/05/17  0601 08/06/17  0608 08/07/17  0710    138 159     Microbiology:  Microbiology Results (last 7 days)     Procedure Component Value Units Date/Time    Blood culture #1 **CANNOT BE ORDERED STAT** [[id number]] Collected:  07/28/17 2042    Order Status:  Completed Specimen:  Blood from Peripheral, Forearm, Right Updated:  08/02/17 2212     Blood Culture, Routine No growth after 5 days.    Blood culture #2 **CANNOT BE ORDERED STAT** [[id number]] Collected:  07/28/17 2058    Order Status:  Completed Specimen:  Blood from Peripheral, Forearm, Left Updated:  08/02/17 2212     Blood Culture, Routine No growth  after 5 days.    Culture, Anaerobe [[id number]] Collected:  07/29/17 1056    Order Status:  Completed Specimen:  Wound from Abdomen Updated:  08/02/17 0859     Anaerobic Culture No anaerobes isolated    Aerobic culture [[id number]]  (Susceptibility) Collected:  07/29/17 1056    Order Status:  Completed Specimen:  Wound from Abdomen Updated:  08/01/17 1035     Aerobic Bacterial Culture --     PSEUDOMONAS AERUGINOSA  Few            I have reviewed all pertinent labs within the past 24 hours.    Estimated Creatinine Clearance: 47.4 mL/min (based on Cr of 1.5).    Diagnostic Results:  I have reviewed all pertinent imaging results/findings within the past 24 hours.    Assessment and Plan:     TANESHA (acute kidney injury)    - Creatinine 1.5 (1.5 on admit, baseline ~ 1.1-1.3 - will monitor).          Impaired functional mobility and endurance    - consulted PT/OT/ST  - Appreciate PM&R consult. Plan D/C to rehab on Tomorrow        Restless leg syndrome    - continue ropinirole        Involuntary movements    - No known history of parkinsons disease, experiencing myoclonic jerks/tremors NOT suggestive of PD  - Currently not active as he should be given tremors and leg pain, very deconditioned.  - ddx per neuro delirium / myoclonic jerks from infectious etiologies / metabolic >>> postoperative states >> Fluid and electrolyte disturbances / toxicity >>> cns pathology / seziures. Appreciate assistance.         Delirium    - Appreciate Neuro's help: Patient currently with signs/symptoms of delirium.  Tremulous, myoclonic jerks as well as his disorientation suggest this.  Suspect current infection as primary cause. Less tremulous past 2 days, and is more oriented as the day goes on  - RPR non-reactive, B12 WNL           Abdominal pain    - CT abdomen negative for acute processes  - gram stain w/ GNR  - ID following, appreciate recs. Obtained driveline cultures. Pt now on cefepime. Blood cultures from 7/28 NGTD.          LVAD (left  ventricular assist device) present    - HM III implanted at [facility]   - VAD orders placed  - No alarms or events noted on interrogation  - continue bp control and warfarin  - EKG today QTc 485. Will leave on current dose ot dofetilide        Hypertensive urgency    - Cardene off. BP still with some elevated maps.  Continue Lisinopril to 10QAM and 10mg QPM  - Continue Norvasc, Hydralazine and Aldactone  - All BP's need to be checked while patient sitting on edge of bed or OOB in chair given h/o orthostasis. Do not want to continue to up titrate BP meds with patient's history of syncope and severe orthostasis.         * Complication involving left ventricular assist device (LVAD)    - HM III implanted at [facility] 10/2016  - Speed 5800  - INR therapeutic 2.6. Coumadin dose increase to 4 mg every Tues, Thurs, Sat and 6 mg Mon, Wed, Fri.    - LD stable - continue ASA 81 mg qd  - Pulsatile with MAP's improved: see above-  Continue BP control with  Lisinopril, Norvasc, Aldactone and Hydralazine  - Interrogation notable for PI events  - CT of abdomen/pelvis with no fluid collections  - DLES culture with Pseudomonas resistant to Cipro. Appreciate ID's help - continue Cefepime X 8 weeks (end 9/23/17). Weekly CBC, CMP, ESR, CRP to ID ([phone]) once discharged              Elena Raman PA-C  Heart Transplant  [facility]

## 2017-08-07 NOTE — PROGRESS NOTES
UPDATE    SW to pt's room for update.  Pt presents as sitting up in bed with wife at bedside.  Pt and wife both present as aao x4, calm, cooperative, and asking and answering questions appropriately.  Pt and wife verbalize understanding and agreement with plan for d/c to Inpatient Rehab tomorrow.  SW explained that w/c van transportation will be arranged when pt is ready for d/c tomorrow.  Pt and wife verbalize understanding of [facility] level of care.  Pt reports coping adequately at this time and reports high level motivation for working with PT/OT at [facility].  Pt and wife deny any needs or concerns to SW at this time.  SW providing ongoing psychosocial and counseling support, education, resources, assistance, and discharge planning as indicated.  SW following and remains available.

## 2017-08-07 NOTE — SUBJECTIVE & OBJECTIVE
Interval History: No complaints overnight, still with elevated blood pressures. Denies chest pain, SOB, NVD.    Continuous Infusions:   Scheduled Meds:   allopurinol  300 mg Oral Daily    amlodipine  10 mg Oral Daily    aspirin  81 mg Oral Daily    atorvastatin  10 mg Oral Daily    buPROPion  300 mg Oral Daily    calcium-vitamin D3  1 tablet Oral BID    ceFEPime (MAXIPIME) IVPB  2 g Intravenous Q12H    dofetilide  250 mcg Oral Q12H    folic acid  1 mg Oral Daily    hydrALAZINE  50 mg Oral Q8H    lisinopril  10 mg Oral BID    magnesium oxide  400 mg Oral BID    multivitamin  1 tablet Oral Daily    ropinirole  0.5 mg Oral TID    sodium chloride 0.9%  3 mL Intravenous Q8H    spironolactone  25 mg Oral Daily    tamsulosin  0.4 mg Oral Daily    warfarin  4 mg Oral Every Tues, Thurs, Sat    warfarin  4 mg Oral Every Sun    warfarin  6 mg Oral Every Mon, Wed, Fri     PRN Meds:acetaminophen, bisacodyl, promethazine    Review of patient's allergies indicates:   Allergen Reactions    Sulfa (sulfonamide antibiotics)      Objective:     Vital Signs (Most Recent):  Temp: 97.7 °F (36.5 °C) (08/07/17 1140)  Pulse: 68 (08/07/17 1400)  Resp: 20 (08/07/17 1140)  BP: (!) 82/0 (08/07/17 1140)  SpO2: 98 % (08/07/17 1140) Vital Signs (24h Range):  Temp:  [97.7 °F (36.5 °C)-98.5 °F (36.9 °C)] 97.7 °F (36.5 °C)  Pulse:  [64-80] 68  Resp:  [18-20] 20  SpO2:  [90 %-98 %] 98 %  BP: ()/(0-85) 82/0     Weight: 79 kg (174 lb 2.6 oz)  Body mass index is 23.62 kg/m².      Intake/Output Summary (Last 24 hours) at 08/07/17 1436  Last data filed at 08/07/17 1400   Gross per 24 hour   Intake              900 ml   Output                0 ml   Net              900 ml       Hemodynamic Parameters:    Telemetry: NSVTx2    Physical Exam   Constitutional: He appears well-developed and well-nourished.   Head: Normocephalic and atraumatic.   Eyes: Conjunctivae and EOM are normal. Pupils are equal, round, and reactive to light.    Neck: Normal range of motion. Neck supple. No JVD present.   Cardiovascular: Smooth VAD hum. Pulsatile   Pulmonary/Chest: Effort normal and breath sounds normal.   Abdominal: Soft. Bowel sounds are normal.   Musculoskeletal: He exhibits no edema.   Neurological: He is alert.   Confused. Generalized tremors   Skin: Skin is warm and dry. Capillary refill takes 2 to 3 seconds.   Psychiatric: Confused at times, oriented to person place and time this morning    Significant Labs:  CBC:    Recent Labs  Lab 08/07/17  0710   WBC 8.71   RBC 4.29*   HGB 13.0*   HCT 38.4*      MCV 90   MCH 30.3   MCHC 33.9     BNP:    Recent Labs  Lab 08/07/17  0710   *     CMP:    Recent Labs  Lab 08/07/17  0710   GLU 99   CALCIUM 10.5   ALBUMIN 3.2*   PROT 6.8   *   K 4.3   CO2 23      BUN 33*   CREATININE 1.5*   ALKPHOS 81   ALT 21   AST 24   BILITOT 0.7      Coagulation:     Recent Labs  Lab 08/07/17  0710   INR 2.6*   APTT 29.6     LDH:    Recent Labs  Lab 08/05/17  0601 08/06/17  0608 08/07/17  0710    138 159     Microbiology:  Microbiology Results (last 7 days)     Procedure Component Value Units Date/Time    Blood culture #1 **CANNOT BE ORDERED STAT** [[id number]] Collected:  07/28/17 2042    Order Status:  Completed Specimen:  Blood from Peripheral, Forearm, Right Updated:  08/02/17 2212     Blood Culture, Routine No growth after 5 days.    Blood culture #2 **CANNOT BE ORDERED STAT** [[id number]] Collected:  07/28/17 2058    Order Status:  Completed Specimen:  Blood from Peripheral, Forearm, Left Updated:  08/02/17 2212     Blood Culture, Routine No growth after 5 days.    Culture, Anaerobe [[id number]] Collected:  07/29/17 1056    Order Status:  Completed Specimen:  Wound from Abdomen Updated:  08/02/17 0859     Anaerobic Culture No anaerobes isolated    Aerobic culture [[id number]]  (Susceptibility) Collected:  07/29/17 1056    Order Status:  Completed Specimen:  Wound from Abdomen Updated:  08/01/17  1035     Aerobic Bacterial Culture --     PSEUDOMONAS AERUGINOSA  Few            I have reviewed all pertinent labs within the past 24 hours.    Estimated Creatinine Clearance: 47.4 mL/min (based on Cr of 1.5).    Diagnostic Results:  I have reviewed all pertinent imaging results/findings within the past 24 hours.

## 2017-08-07 NOTE — PLAN OF CARE
Problem: Physical Therapy Goal  Goal: Physical Therapy Goal  Goals to be met by: 17     Patient will increase functional independence with mobility by performin. Supine to sit with Moderate Assistance - MET       Revised:  Supine to sit with SBA. - met   2. Sit to supine with Moderate Assistance - met   3. Sit to stand transfer with Moderate Assistance -- MET        Revised: Sit to stand transfer with SBA. - met   4. Gait  x 10 feet with Moderate Assistance using Rolling Walker or appropriate AD. -- MET       Revised: Gait x 150 ft with contact guard assistance using rolling walker.   6. Stand for 2 minutes with Minimal Assistance using Rolling Walker or appropriate AD without LOB  7. Lower extremity exercise program x10 reps, with supervision, in order to increase LE strength and (I) with functional mobility. - met        Outcome: Ongoing (interventions implemented as appropriate)  Goals reviewed and remain appropriate. Pt progressing towards goals.    Wendy Ackerman, PT, DPT   2017  [phone]

## 2017-08-07 NOTE — PROCEDURES
Kev Vasquez is a 76 y.o. male patient.    Temp: 98.4 °F (36.9 °C) (08/07/17 1459)  Pulse: 67 (08/07/17 1500)  Resp: 18 (08/07/17 1459)  BP: (!) 100/0 (08/07/17 1501)  SpO2: 98 % (08/07/17 1459)  Weight: 79 kg (174 lb 2.6 oz) (08/07/17 0600)  Height: 6' (182.9 cm) (07/29/17 1000)    PICC  Date/Time: 8/7/2017 4:55 PM  Performed by: MASSIEL MARCIAL  Consent Done: Yes  Time out: Immediately prior to procedure a time out was called to verify the correct patient, procedure, equipment, support staff and site/side marked as required  Indications: med administration and vascular access  Anesthesia: local infiltration  Local anesthetic: lidocaine 1% without epinephrine  Anesthetic Total (mL): 3  Preparation: skin prepped with ChloraPrep  Skin prep agent dried: skin prep agent completely dried prior to procedure  Sterile barriers: all five maximum sterile barriers used - cap, mask, sterile gown, sterile gloves, and large sterile sheet  Hand hygiene: hand hygiene performed prior to central venous catheter insertion  Location details: right basilic  Catheter type: double lumen  Catheter size: 5 Fr  Catheter Length: 34cm    Ultrasound guidance: yes  Vessel Caliber: medium and patent, compressibility normal  Vascular Doppler: not done  Needle advanced into vessel with real time Ultrasound guidance.  Guidewire confirmed in vessel.  Image recorded and saved.  Sterile sheath used.  no esophageal manometryNumber of attempts: 1  Post-procedure: blood return through all ports, chlorhexidine patch and sterile dressing applied  Specimens: No  Implants: No  Assessment: placement verified by x-ray  Complications: none        Sophie Claudio  8/7/2017

## 2017-08-07 NOTE — PROGRESS NOTES
pT stating hit right shoulder on bench in room post standing. pT denying hitting head and/or falling to ground. No skin tears, redness, and/or bruising at site, however pT reports soreness. Rachna Russell RN updated; stated Avasys required since pT noncompliant to calling for assistance. MD Alana updated. No new orders. Bed alarm set, call light in reach, bed in lowest position, 2 side-rails up.

## 2017-08-07 NOTE — PROGRESS NOTES
08/07/17 1459 08/07/17 1501   Vital Signs   /76 (!) 100/0   MAP (mmHg) 88 --    BP Location Left arm Left arm   BP Method Automatic Doppler   Patient Position Sitting Sitting   SHARONA Raman updated. Stated okay with p hx orthostatic hypotension. pT updated on POC.

## 2017-08-07 NOTE — PROCEDURES
Patient aao x3  with wife and careggiver at bedside. VAD interrogation completed this AM in the event changes needed to be made. PT will likely d/c to rehab tomorrow.  PT wife instructed to have family bring UBC and MPU to use while in rehab.  Pt family verbalizes understanding.  Will continue to monitor for further issues.     Pulsatile: Yes, intermittent   VAD Sounds: HM3  Smooth  Problems / Issues / Alarms with VAD if any: None noted  HCT: 38.4   Modular Cable Connection Intact(no yellow exposed):yes  VAD Interrogation:  TXP LVAD INTERROGATIONS 8/7/2017 8/7/2017 8/7/2017 8/7/2017 8/7/2017 8/6/2017 8/6/2017   Type HeartMate3 HeartMate3 HeartMate3 HeartMate3 HeartMate3 HeartMate3 HeartMate3   Flow 5.3 5.1 4.8 4.7 4.7 4.8 5.0   Speed 5800 5800 5800 5800 5800 5850 5800   PI 3.2 3.0 3.1 3.6 3.5 3.2 2.9   Power (Mendez) 4.6 4.6 4.5 4.2 4.6 4.6 -   LSL - - - - - 5600 4.5   Pulsatility Intermittent pulse Intermittent pulse - Intermittent pulse Pulse Pulse No Pulse   }

## 2017-08-07 NOTE — PT/OT/SLP PROGRESS
Occupational Therapy  Treatment    Kev Vasquez   MRN: [id number]   Admitting Diagnosis: Complication involving left ventricular assist device (LVAD)    OT Date of Treatment: 08/07/17   OT Start Time: 1313  OT Stop Time: 1400  OT Total Time (min): 47 min    Billable Minutes:  Self Care/Home Management 32 and Therapeutic Activity 15    General Precautions: Standard, LVAD, fall  Orthopedic Precautions: N/A  Braces: N/A    Subjective:  Communicated with RN prior to session. Pt agreeable to OT.  Pain/Comfort  Pain Rating 1: 0/10  Pain Rating Post-Intervention 1: 0/10    Objective:  Patient found with: telemetry (condom catheter, LVAD to battery power)     Functional Mobility:  Bed Mobility:  Supine to Sit: Stand by Assistance, With side rail  Sit to Supine: Contact Guard Assistance    Transfers:  Sit <> Stand Assistance: Minimum Assistance from EOB and W/C  Sit <> Stand Assistive Device: Rolling Walker    Functional Ambulation: Within room to sink and within hallway ~150 ft with CGA using RW    Activities of Daily Living:  UE Dressing Level of Assistance: Moderate assistance to don gown around back  LE Dressing Level of Assistance: Maximum assistance to don tennis shoes EOB; SBA to doff  Grooming Position: Standing at sink  Grooming Level of Assistance: Contact guard assistance for oral hygiene, to wash face and hands    Balance:   Static Sit: GOOD+: Takes MAXIMAL challenges from all directions.    Dynamic Sit: GOOD: Maintains balance through MODERATE excursions of active trunk movement  Static Stand: FAIR: Maintains without assist but unable to take challenges  Dynamic stand: FAIR: Needs CONTACT GUARD during gait    Therapeutic Activities and Exercises:  Pt supine in bed on battery power; SBA for supine to sit; performed functional mobility to sink for standing ADLs; pt with bowel accident while standing and also removed condom catheter while standing; required Max A to clean while standing; performed further functional  "mobility in hallway with CGA using RW and W/C follow for safety; returned to EOB and to supine to CGA; MDs present    AM-PAC 6 CLICK ADL   How much help from another person does this patient currently need?   1 = Unable, Total/Dependent Assistance  2 = A lot, Maximum/Moderate Assistance  3 = A little, Minimum/Contact Guard/Supervision  4 = None, Modified [place]/Independent    Putting on and taking off regular lower body clothing? : 2  Bathing (including washing, rinsing, drying)?: 2  Toileting, which includes using toilet, bedpan, or urinal? : 2  Putting on and taking off regular upper body clothing?: 3  Taking care of personal grooming such as brushing teeth?: 3  Eating meals?: 3  Total Score: 15     AM-PAC Raw Score CMS "G-Code Modifier Level of Impairment Assistance   6 % Total / Unable   7 - 8 CM 80 - 100% Maximal Assist   9-13 CL 60 - 80% Moderate Assist   14 - 19 CK 40 - 60% Moderate Assist   20 - 22 CJ 20 - 40% Minimal Assist   23 CI 1-20% SBA / CGA   24 CH 0% Independent/ Mod I       Patient left supine with all lines intact, call button in reach and MDs, wife, caregiver present    ASSESSMENT:  Kev Vasquez is a 76 y.o. male with a medical diagnosis of Complication involving left ventricular assist device (LVAD) and presents with deficits listed below. Pt demo some confusion but following commands during session; able to ambulate in hallway and perform standing ADLs. Pt would continue to benefit from OT to maximize safety and independence. Recommend rehab upon D/C.    Rehab identified problem list/impairments: Rehab identified problem list/impairments: weakness, impaired endurance, impaired self care skills, impaired functional mobilty, gait instability, impaired cognition, impaired cardiopulmonary response to activity, decreased safety awareness    Rehab potential is good.    Activity tolerance: Good    Discharge recommendations: Discharge Facility/Level Of Care Needs: rehabilitation facility "     Barriers to discharge: Barriers to Discharge: None    Equipment recommendations:  (TBD next level of care)     GOALS:    Occupational Therapy Goals        Problem: Occupational Therapy Goal    Goal Priority Disciplines Outcome Interventions   Occupational Therapy Goal     OT, PT/OT Ongoing (interventions implemented as appropriate)    Description:  Goals to be met by:  2 weeks 8/12/17     Patient will increase functional independence with ADLs by performing:    Feeding with Supervision.  UE Dressing with Minimal Assistance.  LE Dressing with Minimal Assistance.  Grooming while seated with Supervision.  Revised: Grooming while standing with SBA.  Toileting from bedside commode with Minimal Assistance for hygiene and clothing management.   Stand pivot transfers with Minimal Assistance.  Toilet transfer to bedside commode with Minimal Assistance.                       Plan:  Patient to be seen 5 x/week to address the above listed problems via self-care/home management, therapeutic activities, therapeutic exercises  Plan of Care expires: 08/14/17  Plan of Care reviewed with: patient, spouse         SHAUNA Hanks  08/07/2017

## 2017-08-07 NOTE — PT/OT/SLP PROGRESS
"Speech Language Pathology  Treatment    Kev Vasquez   MRN: [id number]   Admitting Diagnosis: Complication involving left ventricular assist device (LVAD)    Diet recommendations: Solid Diet Level: Regular  Liquid Diet Level: Thin   [place] Aspiration Precautions.  Upright for all intake. Small bites/sips.    SLP Treatment Date: 08/07/17  Speech Start Time: 1045     Speech Stop Time: 1129     Speech Total (min): 44 min       TREATMENT BILLABLE MINUTES:  Speech Therapy Individual 44    Has the patient been evaluated by SLP for swallowing? : Yes  Keep patient NPO?: No   General Precautions: Standard, LVAD, fall          Subjective:  "I know they want me to call" (pt stated indicating awareness of recommendation to call nursing prior to ambulation/standing in room)    Pain/Comfort  Pain Rating 1: 0/10  Pain Rating Post-Intervention 1: 0/10    Objective:    Pt seen this am with wife present.  Pt verbalizing awareness of recommendation for assistance in room; however, overall insight/safety awareness remains reduced in some conversation with pt.  ST emphasized importance of assistance and potential negative outcomes if pt's attempts certain tasks ind'ly.  Mr. Vasquez was oriented x4 this date with min cues.  Reviewed plan of care including potential discharge for additional rehab.  Pt followed 3 step verbal commands with 100% accuracy.  On a word fluency task, pt listed 13 items in one minute (15-20 is considered wnl).  Mr. Vasquez demonstrated ability to read at the sentence level and copied a sentence with fair legibility.  Pt/wife indicate some decline in fine motor and handwriting over past year.  Convergent categories were responded to with 100% accuracy as well.    Education provided throughout session to pt/wife regarding continued ST role, cognitive stimulation, importance of safety awareness and ongoing plan of care.  All questions were addressed.     Assessment:  Kev Vasquez is a 76 y.o. male with a medical " diagnosis of Complication involving left ventricular assist device (LVAD) and presents with cognitive/linguistic deficits.    Discharge recommendations: Discharge Facility/Level Of Care Needs: rehabilitation facility     Goals:    SLP Goals        Problem: SLP Goal    Goal Priority Disciplines Outcome   SLP Goal     SLP Ongoing (interventions implemented as appropriate)   Description:  Speech Language Pathology Goals  Goals expected to be met by 8/14:  1. Pt will orient x 4 ind'ly.  2. Pt will recall general information with 80% accuracy given min cues.    3. Following a 3 minute delay, pt will recall 3/3 unrelated words given modified independence.   4. Pt will complete problem solving tasks with 80% accuracy given min cues.  6. Pt will participate in ongoing evaluation of  visual spatial and math/time/money management abilities.        Speech Language Pathology Goals  Goals expected to be met by 8/7:  1. Pt will orient x 4 given min cues. Progress: goal met with min cues this date.  2. Pt will recall general information with 80% accuracy given min cues. Progress: goal not met.   3. Following a 3 minute delay, pt will recall 3/3 unrelated words given modified independence.  Progress: goal not met.  4. Pt will follow complex commands with 70% accuracy given mod cues.  Progress: goal met.   5. Pt will complete problem solving tasks with 80% accuracy given min cues. Progress: goal not met consistently. Insight/safey awareness remains reduced as well.  6. Pt will participate in ongoing evaluation of word fluency, categorization, reading, writing, visual spatial, and math/time/money management abilities.  Progress: goal met for word fluency, categorization, reading and writing.                             Plan:   Patient to be seen Therapy Frequency: 5 x/week   Plan of Care expires: 08/29/17  Plan of Care reviewed with: patient, spouse  SLP Follow-up?: Yes               SANTOSH Parker, CCC-SLP  Speech Language  Pathologist  [phone]  8/7/2017

## 2017-08-07 NOTE — PROGRESS NOTES
Daily E and M and VAD Interrogation Note    Reason for Visit: Driveline evaluation   Patient is seen in follow up for management of:  [x] HeartMate III [] Heartware [] Total artificial heart       [] ECMO           [] Other     Interval History:  [] Interval history unobtainable due to intubation.  The [x] implant 10/2016 at [facility] in [place]  No Events overnight      Review of Systems:  Pt states he is feeling better today   Denies CP, SOB, ABD pain, N/V  All other systems reviewed and negative    Medications:  Current Facility-Administered Medications   Medication Dose Route Frequency Provider Last Rate Last Dose    acetaminophen tablet 650 mg  650 mg Oral Q6H PRN Clotilde Early MD   650 mg at 08/07/17 0852    allopurinol tablet 300 mg  300 mg Oral Daily Henry Murguia MD   300 mg at 08/07/17 0852    amlodipine tablet 10 mg  10 mg Oral Daily Jim Khoury MD   10 mg at 08/07/17 0852    aspirin chewable tablet 81 mg  81 mg Oral Daily Henry Murguia MD   81 mg at 08/07/17 0852    atorvastatin tablet 10 mg  10 mg Oral Daily Henry Murguia MD   10 mg at 08/07/17 0852    bisacodyl suppository 10 mg  10 mg Rectal Daily PRN Henry Murguia MD   10 mg at 08/06/17 1853    buPROPion TB24 tablet 300 mg  300 mg Oral Daily Henry Murguia MD   300 mg at 08/07/17 0852    calcium-vitamin D3 500 mg(1,250mg) -200 unit per tablet 1 tablet  1 tablet Oral BID Henry Murguia MD   1 tablet at 08/07/17 0852    ceFEPIme in dextrose 5% 2 gram/50 mL IVPB 2 g  2 g Intravenous Q12H Henry Murguia  mL/hr at 08/07/17 0308 2 g at 08/07/17 0308    dofetilide capsule 250 mcg  250 mcg Oral Q12H Henry Murguia MD   250 mcg at 08/07/17 1010    folic acid tablet 1 mg  1 mg Oral Daily Henry Murguia MD   1 mg at 08/07/17 0852    hydrALAZINE tablet 50 mg  50 mg Oral Q8H Charlette Jasmine NP   50 mg at 08/07/17 0549    lisinopril tablet 10 mg  10 mg Oral BID Elena Raman PA-C    10 mg at 08/07/17 0852    magnesium oxide tablet 400 mg  400 mg Oral BID Carlito Santana MD   400 mg at 08/07/17 0852    multivitamin tablet 1 tablet  1 tablet Oral Daily Henry Murguia MD   1 tablet at 08/07/17 0852    promethazine tablet 12.5 mg  12.5 mg Oral Q6H PRN Clotilde Early MD   12.5 mg at 08/02/17 2055    ropinirole tablet 0.5 mg  0.5 mg Oral TID Henry Murguia MD   0.5 mg at 08/07/17 0549    sodium chloride 0.9% flush 3 mL  3 mL Intravenous Q8H Julián Blanco MD   3 mL at 08/07/17 0550    spironolactone tablet 25 mg  25 mg Oral Daily Henry Murguia MD   25 mg at 08/07/17 0852    tamsulosin 24 hr capsule 0.4 mg  0.4 mg Oral Daily Henry Murguia MD   0.4 mg at 08/07/17 0852    warfarin (COUMADIN) tablet 4 mg  4 mg Oral Every Tues, Thurs, Sat Carlito Santana MD   4 mg at 08/05/17 1717    warfarin (COUMADIN) tablet 4 mg  4 mg Oral Every Sun Carlito Santana MD   4 mg at 08/06/17 1650    warfarin tablet 6 mg  6 mg Oral Every Mon, Wed, Fri Carlito Santana MD   6 mg at 08/04/17 1715       Physical Examination:  Vital Signs:   Vitals:    08/07/17 1140   BP: (!) 82/0   Pulse: 64   Resp: 20   Temp: 97.7 °F (36.5 °C)     Cardiovascular:  [x] Regular rate and rhythm. Smooth VAD sounds    [x]  No edema []  Edema present  [x]  Clear to auscultation    [] Pedal Pulses absent  []  Pulses + throughout  Skin:  Incision is [x]  Clean, dry and intact.   Sternum:  [x]  Stable []  Unstable  Driveline(s):   [x]  Clean, dry and intact.      Labs:  CBC, CMP, LDH and Coags     X-Rays:  [x]  I reviewed today's Chest x-ray    Procedure:  Device Interrogation including analysis of device parameters.  Current Settings  [x]  Ventricular Assist Device  []  Total Artificial Heart interrogated  Review of device function is [x]  Stable     TXP LVAD INTERROGATIONS 8/7/2017 8/7/2017 8/7/2017 8/7/2017 8/6/2017 8/6/2017 8/6/2017   Type HeartMate3 HeartMate3 HeartMate3 HeartMate3 HeartMate3 HeartMate3  HeartMate3   Flow 5.1 4.8 4.7 4.7 4.8 5.0 5.6   Speed 5800 5800 5800 5800 5850 5800 5800   PI 3.0 3.1 3.6 3.5 3.2 2.9 2.4   Power (Mendez) 4.6 4.5 4.2 4.6 4.6 - 4.5   LSL - - - - 5600 4.5 -   Pulsatility Intermittent pulse - Intermittent pulse Pulse Pulse No Pulse -       Assessment:  []  Primary Cardiomyopathy [x]  Congestive Heart Failure   []  Atrial Fibrillation []  Ventricular Tachycardia   []  Aftercare cardiac device [x]  Long term (current) use of anticoagulants   []  Ventilator-associated pneumonia []  Pneumonia viral, unspecified   []  Pneumonia, bacterial, unspecified []  Pneumonia, organism unspecified   []  Hemorrhage of GI tract, unspecified    []  Nosebleed []  Driveline infection   []  Infection VAD device          Plan:  [x]  Interval history obtained from HTS attending team member during rounding today  [x]  VAD/JOSE teaching performed with patient  [x]  Mobilization / Physical Therapy ongoing  [x]  Anticoagulation [x]  Ongoing []  Held  -MAP can be higher than 80 with HM 3, if pt can tolerate  -No surgical indication for any intervention at this stage.  -Multiple PI events   -Rehab possibly today     Total time spent was 32 minutes.  Of which more than 50 percent of the care dominated counseling and coordinating care with different team members. The VAD was interrogated and all parameters were WNL and no significant findings were found in the history. All these findings are documented in the note above.      Cardiac Surgery Attending E and M (VAD) Note along with VAD Interrogation    I have seen and examined the patient and agree with the findings above    I also reviewed the patients clinical course and:  [x]  Hemodynamic & Respiratory paramters  [x]  Laboratory Data  [x]  Radiological studies     VAD Interrogated [x]      VAD Function is normal. Changes made []  None [x]        Interrogation of Ventricular assist device was performed with physician analysis of device parameters and review of  device function. I have personally reviewed the interrogation findings and agree with findings as stated

## 2017-08-07 NOTE — PLAN OF CARE
Problem: Patient Care Overview  Goal: Plan of Care Review  Outcome: Revised  Plan of care discussed with patient and pT family at bedside. VSS. Smooth LVAD hum. LVAD #s documented in MAR. Patient is free of fall/trauma/injury. pT ambulating with assist x2. Barrier cream applied to bottom & groin to prevent skin breakdown; External condom cath placement attempted, however d/c per pT & brief requested. pT oriented to self and place; pT disoriented to time; reoriented prn. Denies CP, SOB, lightheadedness, and/or dizziness. Pain tx with prn pain medication. EKG monitoring post Tikosyn implemented. All questions addressed. Personal sitter at bedside. Will continue to monitor

## 2017-08-07 NOTE — ASSESSMENT & PLAN NOTE
- HM III implanted at [facility] in [place] 10/2016  - Speed 5800  - INR therapeutic 2.6. Coumadin dose increase to 4 mg every Tues, Thurs, Sat and 6 mg Mon, Wed, Fri.    - LD stable - continue ASA 81 mg qd  - Pulsatile with MAP's improved: see above-  Continue BP control with  Lisinopril, Norvasc, Aldactone and Hydralazine  - Interrogation notable for PI events  - CT of abdomen/pelvis with no fluid collections  - DLES culture with Pseudomonas resistant to Cipro. Appreciate ID's help - continue Cefepime X 8 weeks (end 9/23/17). Weekly CBC, CMP, ESR, CRP to ID ([phone]) once discharged

## 2017-08-08 ENCOUNTER — HOSPITAL ENCOUNTER (INPATIENT)
Facility: HOSPITAL | Age: 77
LOS: 16 days | Discharge: HOME HEALTH | DRG: 949 | End: 2017-08-24
Attending: PHYSICAL MEDICINE & REHABILITATION | Admitting: PHYSICAL MEDICINE & REHABILITATION
Payer: MEDICARE

## 2017-08-08 VITALS
BODY MASS INDEX: 23.71 KG/M2 | RESPIRATION RATE: 18 BRPM | OXYGEN SATURATION: 95 % | HEIGHT: 72 IN | SYSTOLIC BLOOD PRESSURE: 80 MMHG | TEMPERATURE: 98 F | WEIGHT: 175.06 LBS | HEART RATE: 68 BPM

## 2017-08-08 DIAGNOSIS — R26.81 GAIT INSTABILITY: ICD-10-CM

## 2017-08-08 PROBLEM — N18.2 STAGE 2 CHRONIC KIDNEY DISEASE: Chronic | Status: ACTIVE | Noted: 2017-07-20

## 2017-08-08 LAB
ANION GAP SERPL CALC-SCNC: 5 MMOL/L
APTT BLDCRRT: 30.1 SEC
BASOPHILS # BLD AUTO: 0.03 K/UL
BASOPHILS NFR BLD: 0.4 %
BUN SERPL-MCNC: 28 MG/DL
CALCIUM SERPL-MCNC: 10.2 MG/DL
CHLORIDE SERPL-SCNC: 105 MMOL/L
CO2 SERPL-SCNC: 26 MMOL/L
CREAT SERPL-MCNC: 1.3 MG/DL
DIFFERENTIAL METHOD: ABNORMAL
EOSINOPHIL # BLD AUTO: 0.2 K/UL
EOSINOPHIL NFR BLD: 3.1 %
ERYTHROCYTE [DISTWIDTH] IN BLOOD BY AUTOMATED COUNT: 16.5 %
EST. GFR  (AFRICAN AMERICAN): >60 ML/MIN/1.73 M^2
EST. GFR  (NON AFRICAN AMERICAN): 53.8 ML/MIN/1.73 M^2
GLUCOSE SERPL-MCNC: 84 MG/DL
HCT VFR BLD AUTO: 36.6 %
HGB BLD-MCNC: 11.9 G/DL
INR PPP: 2.7
LDH SERPL L TO P-CCNC: 157 U/L
LYMPHOCYTES # BLD AUTO: 1.2 K/UL
LYMPHOCYTES NFR BLD: 17.5 %
MAGNESIUM SERPL-MCNC: 2.2 MG/DL
MCH RBC QN AUTO: 29.8 PG
MCHC RBC AUTO-ENTMCNC: 32.5 G/DL
MCV RBC AUTO: 92 FL
MONOCYTES # BLD AUTO: 0.9 K/UL
MONOCYTES NFR BLD: 13.2 %
NEUTROPHILS # BLD AUTO: 4.6 K/UL
NEUTROPHILS NFR BLD: 65.2 %
PHOSPHATE SERPL-MCNC: 2.3 MG/DL
PLATELET # BLD AUTO: 174 K/UL
PMV BLD AUTO: 11.9 FL
POTASSIUM SERPL-SCNC: 4.2 MMOL/L
PROTHROMBIN TIME: 26.6 SEC
RBC # BLD AUTO: 4 M/UL
SODIUM SERPL-SCNC: 136 MMOL/L
WBC # BLD AUTO: 7.02 K/UL

## 2017-08-08 PROCEDURE — A4216 STERILE WATER/SALINE, 10 ML: HCPCS | Performed by: PHYSICIAN ASSISTANT

## 2017-08-08 PROCEDURE — 25000003 PHARM REV CODE 250: Performed by: PHYSICIAN ASSISTANT

## 2017-08-08 PROCEDURE — 99238 HOSP IP/OBS DSCHRG MGMT 30/<: CPT | Mod: ,,, | Performed by: INTERNAL MEDICINE

## 2017-08-08 PROCEDURE — 63600175 PHARM REV CODE 636 W HCPCS: Performed by: INTERNAL MEDICINE

## 2017-08-08 PROCEDURE — 93750 INTERROGATION VAD IN PERSON: CPT | Mod: GC,,, | Performed by: NURSE PRACTITIONER

## 2017-08-08 PROCEDURE — 84100 ASSAY OF PHOSPHORUS: CPT

## 2017-08-08 PROCEDURE — 93005 ELECTROCARDIOGRAM TRACING: CPT

## 2017-08-08 PROCEDURE — 25000003 PHARM REV CODE 250: Performed by: STUDENT IN AN ORGANIZED HEALTH CARE EDUCATION/TRAINING PROGRAM

## 2017-08-08 PROCEDURE — 99223 1ST HOSP IP/OBS HIGH 75: CPT | Mod: ,,, | Performed by: PHYSICAL MEDICINE & REHABILITATION

## 2017-08-08 PROCEDURE — 85025 COMPLETE CBC W/AUTO DIFF WBC: CPT

## 2017-08-08 PROCEDURE — 93010 ELECTROCARDIOGRAM REPORT: CPT | Mod: 76,,, | Performed by: INTERNAL MEDICINE

## 2017-08-08 PROCEDURE — 25000003 PHARM REV CODE 250: Performed by: INTERNAL MEDICINE

## 2017-08-08 PROCEDURE — 80048 BASIC METABOLIC PNL TOTAL CA: CPT

## 2017-08-08 PROCEDURE — 85610 PROTHROMBIN TIME: CPT

## 2017-08-08 PROCEDURE — 85730 THROMBOPLASTIN TIME PARTIAL: CPT

## 2017-08-08 PROCEDURE — 12800000 HC REHAB SEMI-PRIVATE ROOM

## 2017-08-08 PROCEDURE — 63600175 PHARM REV CODE 636 W HCPCS: Performed by: PHYSICIAN ASSISTANT

## 2017-08-08 PROCEDURE — 27000248 HC VAD-ADDITIONAL DAY

## 2017-08-08 PROCEDURE — A4216 STERILE WATER/SALINE, 10 ML: HCPCS | Performed by: INTERNAL MEDICINE

## 2017-08-08 PROCEDURE — 83615 LACTATE (LD) (LDH) ENZYME: CPT

## 2017-08-08 PROCEDURE — 83735 ASSAY OF MAGNESIUM: CPT

## 2017-08-08 PROCEDURE — 93010 ELECTROCARDIOGRAM REPORT: CPT | Mod: ,,, | Performed by: INTERNAL MEDICINE

## 2017-08-08 RX ORDER — ONDANSETRON 8 MG/1
8 TABLET, ORALLY DISINTEGRATING ORAL EVERY 6 HOURS PRN
Status: DISCONTINUED | OUTPATIENT
Start: 2017-08-08 | End: 2017-08-24 | Stop reason: HOSPADM

## 2017-08-08 RX ORDER — SODIUM CHLORIDE 0.9 % (FLUSH) 0.9 %
10 SYRINGE (ML) INJECTION
Status: CANCELLED | OUTPATIENT
Start: 2017-08-08

## 2017-08-08 RX ORDER — FOLIC ACID 1 MG/1
1 TABLET ORAL DAILY
Status: CANCELLED | OUTPATIENT
Start: 2017-08-09

## 2017-08-08 RX ORDER — SPIRONOLACTONE 25 MG/1
25 TABLET ORAL DAILY
Status: DISCONTINUED | OUTPATIENT
Start: 2017-08-09 | End: 2017-08-24 | Stop reason: HOSPADM

## 2017-08-08 RX ORDER — ATORVASTATIN CALCIUM 10 MG/1
10 TABLET, FILM COATED ORAL DAILY
Status: CANCELLED | OUTPATIENT
Start: 2017-08-09

## 2017-08-08 RX ORDER — ROPINIROLE 0.5 MG/1
0.5 TABLET, FILM COATED ORAL 3 TIMES DAILY
Status: DISCONTINUED | OUTPATIENT
Start: 2017-08-08 | End: 2017-08-24 | Stop reason: HOSPADM

## 2017-08-08 RX ORDER — BUPROPION HYDROCHLORIDE 150 MG/1
300 TABLET ORAL DAILY
Status: CANCELLED | OUTPATIENT
Start: 2017-08-09

## 2017-08-08 RX ORDER — SPIRONOLACTONE 25 MG/1
25 TABLET ORAL DAILY
Status: CANCELLED | OUTPATIENT
Start: 2017-08-09

## 2017-08-08 RX ORDER — NAPROXEN SODIUM 220 MG/1
81 TABLET, FILM COATED ORAL DAILY
Status: CANCELLED | OUTPATIENT
Start: 2017-08-09

## 2017-08-08 RX ORDER — CEFEPIME HYDROCHLORIDE 2 G/50ML
2 INJECTION, SOLUTION INTRAVENOUS
Status: CANCELLED | OUTPATIENT
Start: 2017-08-08

## 2017-08-08 RX ORDER — CEFEPIME HYDROCHLORIDE 2 G/50ML
2 INJECTION, SOLUTION INTRAVENOUS EVERY 12 HOURS
Status: ON HOLD
Start: 2017-08-08 | End: 2017-08-24 | Stop reason: HOSPADM

## 2017-08-08 RX ORDER — AMLODIPINE BESYLATE 10 MG/1
10 TABLET ORAL DAILY
Status: CANCELLED | OUTPATIENT
Start: 2017-08-09

## 2017-08-08 RX ORDER — LISINOPRIL 10 MG/1
10 TABLET ORAL 2 TIMES DAILY
Status: CANCELLED | OUTPATIENT
Start: 2017-08-08

## 2017-08-08 RX ORDER — BUPROPION HYDROCHLORIDE 150 MG/1
300 TABLET ORAL DAILY
Status: DISCONTINUED | OUTPATIENT
Start: 2017-08-09 | End: 2017-08-24 | Stop reason: HOSPADM

## 2017-08-08 RX ORDER — SODIUM CHLORIDE 0.9 % (FLUSH) 0.9 %
10 SYRINGE (ML) INJECTION
Status: DISCONTINUED | OUTPATIENT
Start: 2017-08-08 | End: 2017-08-24 | Stop reason: HOSPADM

## 2017-08-08 RX ORDER — FERROUS SULFATE, DRIED 160(50) MG
1 TABLET, EXTENDED RELEASE ORAL 2 TIMES DAILY
Status: CANCELLED | OUTPATIENT
Start: 2017-08-08

## 2017-08-08 RX ORDER — CEFEPIME HYDROCHLORIDE 2 G/50ML
2 INJECTION, SOLUTION INTRAVENOUS
Status: DISCONTINUED | OUTPATIENT
Start: 2017-08-08 | End: 2017-08-24 | Stop reason: HOSPADM

## 2017-08-08 RX ORDER — ADHESIVE BANDAGE
30 BANDAGE TOPICAL DAILY PRN
Status: DISCONTINUED | OUTPATIENT
Start: 2017-08-08 | End: 2017-08-24 | Stop reason: HOSPADM

## 2017-08-08 RX ORDER — ATORVASTATIN CALCIUM 10 MG/1
10 TABLET, FILM COATED ORAL DAILY
Status: DISCONTINUED | OUTPATIENT
Start: 2017-08-09 | End: 2017-08-24 | Stop reason: HOSPADM

## 2017-08-08 RX ORDER — LISINOPRIL 10 MG/1
10 TABLET ORAL 2 TIMES DAILY
Status: DISCONTINUED | OUTPATIENT
Start: 2017-08-08 | End: 2017-08-24 | Stop reason: HOSPADM

## 2017-08-08 RX ORDER — BISACODYL 10 MG
10 SUPPOSITORY, RECTAL RECTAL DAILY PRN
Status: CANCELLED | OUTPATIENT
Start: 2017-08-08

## 2017-08-08 RX ORDER — SODIUM CHLORIDE 0.9 % (FLUSH) 0.9 %
3 SYRINGE (ML) INJECTION EVERY 8 HOURS
Status: DISCONTINUED | OUTPATIENT
Start: 2017-08-08 | End: 2017-08-19

## 2017-08-08 RX ORDER — FERROUS SULFATE, DRIED 160(50) MG
1 TABLET, EXTENDED RELEASE ORAL 2 TIMES DAILY
Status: DISCONTINUED | OUTPATIENT
Start: 2017-08-08 | End: 2017-08-24 | Stop reason: HOSPADM

## 2017-08-08 RX ORDER — ALLOPURINOL 100 MG/1
300 TABLET ORAL DAILY
Status: DISCONTINUED | OUTPATIENT
Start: 2017-08-09 | End: 2017-08-24 | Stop reason: HOSPADM

## 2017-08-08 RX ORDER — TAMSULOSIN HYDROCHLORIDE 0.4 MG/1
0.4 CAPSULE ORAL DAILY
Status: CANCELLED | OUTPATIENT
Start: 2017-08-09

## 2017-08-08 RX ORDER — ACETAMINOPHEN 325 MG/1
650 TABLET ORAL EVERY 6 HOURS PRN
Status: CANCELLED | OUTPATIENT
Start: 2017-08-08

## 2017-08-08 RX ORDER — LANOLIN ALCOHOL/MO/W.PET/CERES
400 CREAM (GRAM) TOPICAL 2 TIMES DAILY
Status: DISCONTINUED | OUTPATIENT
Start: 2017-08-08 | End: 2017-08-24 | Stop reason: HOSPADM

## 2017-08-08 RX ORDER — LANOLIN ALCOHOL/MO/W.PET/CERES
400 CREAM (GRAM) TOPICAL 2 TIMES DAILY
Status: CANCELLED | OUTPATIENT
Start: 2017-08-08

## 2017-08-08 RX ORDER — WARFARIN 4 MG/1
4 TABLET ORAL
Status: CANCELLED | OUTPATIENT
Start: 2017-08-13

## 2017-08-08 RX ORDER — SODIUM CHLORIDE 0.9 % (FLUSH) 0.9 %
3 SYRINGE (ML) INJECTION EVERY 8 HOURS
Status: CANCELLED | OUTPATIENT
Start: 2017-08-08

## 2017-08-08 RX ORDER — SODIUM CHLORIDE 0.9 % (FLUSH) 0.9 %
10 SYRINGE (ML) INJECTION EVERY 6 HOURS
Status: DISCONTINUED | OUTPATIENT
Start: 2017-08-08 | End: 2017-08-24 | Stop reason: HOSPADM

## 2017-08-08 RX ORDER — LANOLIN ALCOHOL/MO/W.PET/CERES
400 CREAM (GRAM) TOPICAL 2 TIMES DAILY
Qty: 60 TABLET | Refills: 11 | Status: SHIPPED | OUTPATIENT
Start: 2017-08-08

## 2017-08-08 RX ORDER — HYDRALAZINE HYDROCHLORIDE 25 MG/1
75 TABLET, FILM COATED ORAL EVERY 8 HOURS
Qty: 90 TABLET | Refills: 11 | Status: SHIPPED | OUTPATIENT
Start: 2017-08-08 | End: 2017-10-18 | Stop reason: SDUPTHER

## 2017-08-08 RX ORDER — SODIUM CHLORIDE 0.9 % (FLUSH) 0.9 %
10 SYRINGE (ML) INJECTION EVERY 6 HOURS
Status: CANCELLED | OUTPATIENT
Start: 2017-08-08

## 2017-08-08 RX ORDER — WARFARIN 4 MG/1
4 TABLET ORAL
Status: CANCELLED | OUTPATIENT
Start: 2017-08-08

## 2017-08-08 RX ORDER — BISACODYL 10 MG
10 SUPPOSITORY, RECTAL RECTAL DAILY PRN
Status: DISCONTINUED | OUTPATIENT
Start: 2017-08-08 | End: 2017-08-24 | Stop reason: HOSPADM

## 2017-08-08 RX ORDER — NAPROXEN SODIUM 220 MG/1
81 TABLET, FILM COATED ORAL DAILY
Status: DISCONTINUED | OUTPATIENT
Start: 2017-08-09 | End: 2017-08-24 | Stop reason: HOSPADM

## 2017-08-08 RX ORDER — AMLODIPINE BESYLATE 10 MG/1
10 TABLET ORAL DAILY
Status: DISCONTINUED | OUTPATIENT
Start: 2017-08-09 | End: 2017-08-24 | Stop reason: HOSPADM

## 2017-08-08 RX ORDER — TRAZODONE HYDROCHLORIDE 50 MG/1
50 TABLET ORAL NIGHTLY PRN
Status: DISCONTINUED | OUTPATIENT
Start: 2017-08-08 | End: 2017-08-24 | Stop reason: HOSPADM

## 2017-08-08 RX ORDER — DOFETILIDE 0.25 MG/1
250 CAPSULE ORAL EVERY 12 HOURS
Status: DISCONTINUED | OUTPATIENT
Start: 2017-08-08 | End: 2017-08-24 | Stop reason: HOSPADM

## 2017-08-08 RX ORDER — DOFETILIDE 0.25 MG/1
250 CAPSULE ORAL EVERY 12 HOURS
Status: CANCELLED | OUTPATIENT
Start: 2017-08-08

## 2017-08-08 RX ORDER — WARFARIN 4 MG/1
4 TABLET ORAL
Status: DISCONTINUED | OUTPATIENT
Start: 2017-08-13 | End: 2017-08-24 | Stop reason: HOSPADM

## 2017-08-08 RX ORDER — WARFARIN 4 MG/1
4 TABLET ORAL
Status: DISCONTINUED | OUTPATIENT
Start: 2017-08-08 | End: 2017-08-24 | Stop reason: HOSPADM

## 2017-08-08 RX ORDER — FOLIC ACID 1 MG/1
1 TABLET ORAL DAILY
Status: DISCONTINUED | OUTPATIENT
Start: 2017-08-09 | End: 2017-08-24 | Stop reason: HOSPADM

## 2017-08-08 RX ORDER — DOCUSATE SODIUM 100 MG/1
100 CAPSULE, LIQUID FILLED ORAL 2 TIMES DAILY
Status: DISCONTINUED | OUTPATIENT
Start: 2017-08-08 | End: 2017-08-24 | Stop reason: HOSPADM

## 2017-08-08 RX ORDER — TAMSULOSIN HYDROCHLORIDE 0.4 MG/1
0.4 CAPSULE ORAL DAILY
Status: DISCONTINUED | OUTPATIENT
Start: 2017-08-09 | End: 2017-08-24 | Stop reason: HOSPADM

## 2017-08-08 RX ORDER — ALLOPURINOL 300 MG/1
300 TABLET ORAL DAILY
Status: CANCELLED | OUTPATIENT
Start: 2017-08-09

## 2017-08-08 RX ORDER — WARFARIN 4 MG/1
TABLET ORAL
Start: 2017-08-08 | End: 2017-09-07 | Stop reason: SDUPTHER

## 2017-08-08 RX ORDER — LISINOPRIL 10 MG/1
10 TABLET ORAL 2 TIMES DAILY
Qty: 60 TABLET | Refills: 11 | Status: ON HOLD | OUTPATIENT
Start: 2017-08-08 | End: 2017-11-20

## 2017-08-08 RX ORDER — ACETAMINOPHEN 325 MG/1
650 TABLET ORAL EVERY 6 HOURS PRN
Status: DISCONTINUED | OUTPATIENT
Start: 2017-08-08 | End: 2017-08-24 | Stop reason: HOSPADM

## 2017-08-08 RX ORDER — ROPINIROLE 0.25 MG/1
0.5 TABLET, FILM COATED ORAL 3 TIMES DAILY
Status: CANCELLED | OUTPATIENT
Start: 2017-08-08

## 2017-08-08 RX ORDER — WARFARIN 6 MG/1
6 TABLET ORAL
Status: DISCONTINUED | OUTPATIENT
Start: 2017-08-09 | End: 2017-08-24 | Stop reason: HOSPADM

## 2017-08-08 RX ORDER — POLYETHYLENE GLYCOL 3350 17 G/17G
17 POWDER, FOR SOLUTION ORAL EVERY 12 HOURS PRN
Status: DISCONTINUED | OUTPATIENT
Start: 2017-08-08 | End: 2017-08-24 | Stop reason: HOSPADM

## 2017-08-08 RX ORDER — DIPHENOXYLATE HYDROCHLORIDE AND ATROPINE SULFATE 2.5; .025 MG/1; MG/1
2 TABLET ORAL
Status: DISCONTINUED | OUTPATIENT
Start: 2017-08-08 | End: 2017-08-24 | Stop reason: HOSPADM

## 2017-08-08 RX ORDER — AMLODIPINE BESYLATE 10 MG/1
5 TABLET ORAL DAILY
Qty: 30 TABLET | Refills: 11 | Status: SHIPPED | OUTPATIENT
Start: 2017-08-08 | End: 2017-09-11 | Stop reason: SDUPTHER

## 2017-08-08 RX ADMIN — ROPINIROLE 0.5 MG: 0.5 TABLET, FILM COATED ORAL at 05:08

## 2017-08-08 RX ADMIN — DOFETILIDE 250 MCG: 0.12 CAPSULE ORAL at 09:08

## 2017-08-08 RX ADMIN — SODIUM CHLORIDE, PRESERVATIVE FREE 10 ML: 5 INJECTION INTRAVENOUS at 11:08

## 2017-08-08 RX ADMIN — LISINOPRIL 10 MG: 10 TABLET ORAL at 09:08

## 2017-08-08 RX ADMIN — SPIRONOLACTONE 25 MG: 25 TABLET, FILM COATED ORAL at 09:08

## 2017-08-08 RX ADMIN — SODIUM CHLORIDE, PRESERVATIVE FREE 10 ML: 5 INJECTION INTRAVENOUS at 05:08

## 2017-08-08 RX ADMIN — Medication 3 ML: at 09:08

## 2017-08-08 RX ADMIN — Medication 10 ML: at 05:08

## 2017-08-08 RX ADMIN — THERA TABS 1 TABLET: TAB at 09:08

## 2017-08-08 RX ADMIN — Medication 400 MG: at 09:08

## 2017-08-08 RX ADMIN — BUPROPION HYDROCHLORIDE 300 MG: 150 TABLET, FILM COATED, EXTENDED RELEASE ORAL at 09:08

## 2017-08-08 RX ADMIN — WARFARIN SODIUM 4 MG: 4 TABLET ORAL at 05:08

## 2017-08-08 RX ADMIN — ALLOPURINOL 300 MG: 300 TABLET ORAL at 09:08

## 2017-08-08 RX ADMIN — TAMSULOSIN HYDROCHLORIDE 0.4 MG: 0.4 CAPSULE ORAL at 09:08

## 2017-08-08 RX ADMIN — ROPINIROLE 0.5 MG: 0.5 TABLET, FILM COATED ORAL at 10:08

## 2017-08-08 RX ADMIN — OYSTER SHELL CALCIUM WITH VITAMIN D 1 TABLET: 500; 200 TABLET, FILM COATED ORAL at 09:08

## 2017-08-08 RX ADMIN — PROMETHAZINE HYDROCHLORIDE 12.5 MG: 12.5 TABLET ORAL at 09:08

## 2017-08-08 RX ADMIN — CEFEPIME HYDROCHLORIDE 2 G: 2 INJECTION, SOLUTION INTRAVENOUS at 05:08

## 2017-08-08 RX ADMIN — CEFEPIME HYDROCHLORIDE 2 G: 2 INJECTION, SOLUTION INTRAVENOUS at 03:08

## 2017-08-08 RX ADMIN — ACETAMINOPHEN 650 MG: 325 TABLET ORAL at 09:08

## 2017-08-08 RX ADMIN — FOLIC ACID 1 MG: 1 TABLET ORAL at 09:08

## 2017-08-08 RX ADMIN — ASPIRIN 81 MG CHEWABLE TABLET 81 MG: 81 TABLET CHEWABLE at 09:08

## 2017-08-08 RX ADMIN — HYDRALAZINE HYDROCHLORIDE 75 MG: 50 TABLET ORAL at 09:08

## 2017-08-08 RX ADMIN — HYDRALAZINE HYDROCHLORIDE 75 MG: 50 TABLET ORAL at 05:08

## 2017-08-08 RX ADMIN — MAGNESIUM OXIDE TAB 400 MG (241.3 MG ELEMENTAL MG) 400 MG: 400 (241.3 MG) TAB at 09:08

## 2017-08-08 RX ADMIN — ATORVASTATIN CALCIUM 10 MG: 10 TABLET, FILM COATED ORAL at 09:08

## 2017-08-08 RX ADMIN — AMLODIPINE BESYLATE 10 MG: 10 TABLET ORAL at 09:08

## 2017-08-08 NOTE — ASSESSMENT & PLAN NOTE
With impaired mobility and ability to perform ADLs.  I will order PT for the mobility impairments, OT for ADL deficits, RT and  for community reintegration and Rehab nursing for education.

## 2017-08-08 NOTE — PROGRESS NOTES
Daily E and M and VAD Interrogation Note    Reason for Visit: Driveline evaluation   Patient is seen in follow up for management of:  [x] HeartMate III [] Heartware [] Total artificial heart       [] ECMO           [] Other     Interval History:  [] Interval history unobtainable due to intubation.  The [x] implant 10/2016 at [facility] in [place]  No Events overnight      Review of Systems:  Pt states he is feeling better today   Denies CP, SOB, ABD pain, N/V  All other systems reviewed and negative    Medications:  Current Facility-Administered Medications   Medication Dose Route Frequency Provider Last Rate Last Dose    acetaminophen tablet 650 mg  650 mg Oral Q6H PRN Clotilde Early MD   650 mg at 08/08/17 0937    allopurinol tablet 300 mg  300 mg Oral Daily Henry Murguia MD   300 mg at 08/08/17 0909    amlodipine tablet 10 mg  10 mg Oral Daily Jim Khoury MD   10 mg at 08/08/17 0908    aspirin chewable tablet 81 mg  81 mg Oral Daily Henry Murguia MD   81 mg at 08/08/17 0910    atorvastatin tablet 10 mg  10 mg Oral Daily Henry Murugia MD   10 mg at 08/08/17 0910    bisacodyl suppository 10 mg  10 mg Rectal Daily PRN Henry Murguia MD   10 mg at 08/06/17 1853    buPROPion TB24 tablet 300 mg  300 mg Oral Daily Henry Murguia MD   300 mg at 08/08/17 0909    calcium-vitamin D3 500 mg(1,250mg) -200 unit per tablet 1 tablet  1 tablet Oral BID Henry Murguia MD   1 tablet at 08/08/17 0910    ceFEPIme in dextrose 5% 2 gram/50 mL IVPB 2 g  2 g Intravenous Q12H Henry Murguia  mL/hr at 08/08/17 0340 2 g at 08/08/17 0340    dofetilide capsule 250 mcg  250 mcg Oral Q12H Henry Murguia MD   250 mcg at 08/08/17 0910    folic acid tablet 1 mg  1 mg Oral Daily Henry Murguia MD   1 mg at 08/08/17 0909    hydrALAZINE tablet 75 mg  75 mg Oral Q8H Elena Raman PA-C   75 mg at 08/08/17 0504    lisinopril tablet 10 mg  10 mg Oral BID Elena Raman,  PA-C   10 mg at 08/08/17 0910    magnesium oxide tablet 400 mg  400 mg Oral BID Carlito Santana MD   400 mg at 08/08/17 0910    multivitamin tablet 1 tablet  1 tablet Oral Daily Henry Murguia MD   1 tablet at 08/08/17 0909    ropinirole tablet 0.5 mg  0.5 mg Oral TID Henry Murguia MD   0.5 mg at 08/08/17 0504    sodium chloride 0.9% flush 10 mL  10 mL Intravenous Q6H Anni Henderson MD   10 mL at 08/08/17 1100    And    sodium chloride 0.9% flush 10 mL  10 mL Intravenous PRN Anni Henderson MD        sodium chloride 0.9% flush 3 mL  3 mL Intravenous Q8H Julián Blanco MD   3 mL at 08/07/17 2230    spironolactone tablet 25 mg  25 mg Oral Daily Henry Murguia MD   25 mg at 08/08/17 0909    tamsulosin 24 hr capsule 0.4 mg  0.4 mg Oral Daily Henry Murguia MD   0.4 mg at 08/08/17 0910    warfarin (COUMADIN) tablet 4 mg  4 mg Oral Every Tues, Thurs, Sat Carlito Santana MD   4 mg at 08/05/17 1717    warfarin (COUMADIN) tablet 4 mg  4 mg Oral Every Sun Carlito Satnana MD   4 mg at 08/06/17 1650    warfarin tablet 6 mg  6 mg Oral Every Mon, Wed, Fri Carlito Santana MD   6 mg at 08/07/17 1755       Physical Examination:  Vital Signs:   Vitals:    08/08/17 1100   BP:    Pulse: 67   Resp:    Temp:      Cardiovascular:  [x] Regular rate and rhythm. Smooth VAD sounds    [x]  No edema []  Edema present  [x]  Clear to auscultation    [] Pedal Pulses absent  []  Pulses + throughout  Skin:  Incision is [x]  Clean, dry and intact.   Sternum:  [x]  Stable []  Unstable  Driveline(s):   [x]  Clean, dry and intact.      Labs:  CBC, CMP, LDH and Coags     X-Rays:  [x]  I reviewed today's Chest x-ray    Procedure:  Device Interrogation including analysis of device parameters.  Current Settings  [x]  Ventricular Assist Device  []  Total Artificial Heart interrogated  Review of device function is [x]  Stable     TXP LVAD INTERROGATIONS 8/8/2017 8/8/2017 8/8/2017 8/8/2017 8/8/2017 8/7/2017 8/7/2017   Type  HeartMate3 HeartMate3 (No Data) HeartMate3 HeartMate3 HeartMate3 HeartMate3   Flow 5.1 5.1 - 4.6 4.8 5.0 5.3   Speed 5800 5800 - 5800 5800 5800 5800   PI 3.8 3.1 - 3.3 3.1 3.2 3.2   Power (Mendez) 4.6 4.5 - 4.4 4.4 4.5 4.6   LSL 5600 5600 - - - 5600 -   Pulsatility - - - Intermittent pulse Intermittent pulse Intermittent pulse Intermittent pulse       Assessment:  []  Primary Cardiomyopathy [x]  Congestive Heart Failure   []  Atrial Fibrillation []  Ventricular Tachycardia   []  Aftercare cardiac device [x]  Long term (current) use of anticoagulants   []  Ventilator-associated pneumonia []  Pneumonia viral, unspecified   []  Pneumonia, bacterial, unspecified []  Pneumonia, organism unspecified   []  Hemorrhage of GI tract, unspecified    []  Nosebleed []  Driveline infection   []  Infection VAD device          Plan:  [x]  Interval history obtained from HTS attending team member during rounding today  [x]  VAD/JOSE teaching performed with patient  [x]  Mobilization / Physical Therapy ongoing  [x]  Anticoagulation [x]  Ongoing []  Held  -MAP can be higher than 80 with HM 3, if pt can tolerate  -pt hypertensive overnight, hydralazine given   -No surgical indication for any intervention at this stage.  -8 PI events today  -Rehab possibly today      Total time spent was 31 minutes.  Of which more than 50 percent of the care dominated counseling and coordinating care with different team members. The VAD was interrogated and all parameters were WNL and no significant findings were found in the history. All these findings are documented in the note above.

## 2017-08-08 NOTE — SUBJECTIVE & OBJECTIVE
Past Medical History:   Diagnosis Date    Acute on chronic systolic heart failure 7/27/2015    AICD (automatic cardioverter/defibrillator) present 2014    [employer]    CAD (coronary artery disease) 7/27/2015    CHF (congestive heart failure)     Gait instability     HTN (hypertension) 7/27/2015    ICD (implantable cardioverter-defibrillator), biventricular, in situ 7/27/2015    Kidney stones     HILLARY treated with BiPAP 7/27/2015    Peripheral neuropathy     Pulmonary hypertension due to left ventricular systolic dysfunction 7/29/2015    Restless leg syndrome 1992    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    Skin cancer     excision 2013    Sleep apnea 1992     Past Surgical History:   Procedure Laterality Date    CARDIAC PACEMAKER PLACEMENT      pacemaker previously, then AICD in 2006. AICD [employer] serial #[id number]    CORONARY ARTERY BYPASS GRAFT  1993    KNEE SURGERY Left 2001    complicated by infection, hardware had to be taken out and replaced    LEFT VENTRICULAR ASSIST DEVICE  10/19/2016     Review of patient's allergies indicates:   Allergen Reactions    Sulfa (sulfonamide antibiotics)        Scheduled Medications:     PRN Medications:     Family History     Problem Relation (Age of Onset)    Cancer Daughter (50)    Diabetes Daughter    Heart disease Daughter        Social History Main Topics    Smoking status: Never Smoker    Smokeless tobacco: Never Used    Alcohol use No    Drug use: No    Sexual activity: Not on file      Comment:      Review of Systems   Constitutional: Negative for appetite change and fatigue.   Eyes: Negative for visual disturbance.   Respiratory: Negative for shortness of breath.    Cardiovascular: Negative for chest pain.   Gastrointestinal: Negative for constipation and diarrhea.   Genitourinary: Negative for dysuria, frequency and urgency.   Musculoskeletal: Positive for gait problem. Negative for arthralgias, back pain, joint  swelling, myalgias, neck pain and neck stiffness.   Neurological: Positive for tremors and weakness. Negative for dizziness, numbness and headaches.   Psychiatric/Behavioral: Negative for dysphoric mood.   All other systems reviewed and are negative.    Objective:     Vital Signs (Most Recent):  Temp: 98.4 °F (36.9 °C) (08/08/17 1430)  Pulse: 65 (08/08/17 1430)  Resp: 18 (08/08/17 1430)  BP: (!) 78/0 (08/08/17 1430)  SpO2: 96 % (08/08/17 1430)    Vital Signs (24h Range):  Temp:  [97.8 °F (36.6 °C)-98.5 °F (36.9 °C)] 98.4 °F (36.9 °C)  Pulse:  [64-84] 65  Resp:  [18-20] 18  SpO2:  [95 %-97 %] 96 %  BP: ()/(0-80) 78/0     There is no height or weight on file to calculate BMI.    Physical Exam   Constitutional: He is oriented to person, place, and time. He appears well-nourished.   Eyes: EOM are normal. Pupils are equal, round, and reactive to light.   Neck: Normal range of motion. Neck supple.   Cardiovascular: Normal rate.    Pulmonary/Chest: Effort normal.   Musculoskeletal: Normal range of motion.   Neurological: He is oriented to person, place, and time. He has normal strength.   Skin: No rash noted.   Psychiatric: He has a normal mood and affect.     NEUROLOGICAL EXAMINATION:     MENTAL STATUS   Oriented to person, place, and time.     CRANIAL NERVES     CN III, IV, VI   Pupils are equal, round, and reactive to light.  Extraocular motions are normal.     MOTOR EXAM     Strength   Strength 5/5 throughout.       Diagnostic Results: Labs: Reviewed

## 2017-08-08 NOTE — PROGRESS NOTES
"LVAD dressing change completed using sterile technique with soap and water. DLES is a "3" with scant bloody, creamy drainage noted on the drain sponge.  Redness, tenderness, and pain at site. pT could barely tolerate dressing change because of pain. pT stating sometimes doing own dressing changes, and patient intermittently confused. Concern of sterile field being broken & leading to Driveline infection. Ziat LVAD coordinator on call notified; stated would have Mignon LVAD coordinator come to bedside tomorrow to assess.  "

## 2017-08-08 NOTE — HPI
"Kev Vasquez is a 76-year-old male with PMHx of s/p CAGB X 5, CHF, AICD,  s/p LVAD (10/2016 at [facility] in [place]), HTN, CKD II, sleep apnea, skin cancer, RLS, peripheral neuropathy, gait instability and chronic debility since LVAD.  He was recently diagnosed with a Pseudomonas infection to his LVAD driveline site and was started on antibiotics on 7/18.  Some improvement was noted and was switched to Cipro on 7/24. Patient presented to [facility] on 7/28 with increased drainage, tenderness to site and worsening abdominal pain.  Blood cx and urine cx were negative. Driveline site gram stain resulted as GNRs.  He was started on Vancomycin and later changed to Cefepime (end date 9/23/17).  CT abd/pelvis was not remarkable for abscess collection. Neurology was consulted for tremors and recommended to continue home Ropinirole.  The pt demonstrated significant mobility and ADL impairments including sit-->stand CGA, T/F CGA, ability to walk 125' CGA, UB dressing MDA, LB dressing MXA.  He was admitted to [facility] to address the mobility and ADL impairments.  His CC was "walking".    "

## 2017-08-08 NOTE — H&P
"[facility]  Physical Medicine & Rehab  History & Physical    Patient Name: Kev Vasquez  MRN: [id number]  Admission Date: 8/8/2017  Attending Physician: Rashad Garcia MD   Primary Care Provider: Mega Collins MD    Subjective:     Principal Problem:Gait instability    HPI: Kev Vasquez is a 76-year-old male with PMHx of s/p CAGB X 5, CHF, AICD,  s/p LVAD (10/2016 at [facility]), HTN, CKD II, sleep apnea, skin cancer, RLS, peripheral neuropathy, gait instability and chronic debility since LVAD.  He was recently diagnosed with a Pseudomonas infection to his LVAD driveline site and was started on antibiotics on 7/18.  Some improvement was noted and was switched to Cipro on 7/24. Patient presented to [facility] on 7/28 with increased drainage, tenderness to site and worsening abdominal pain.  Blood cx and urine cx were negative. Driveline site gram stain resulted as GNRs.  He was started on Vancomycin and later changed to Cefepime (end date 9/23/17).  CT abd/pelvis was not remarkable for abscess collection. Neurology was consulted for tremors and recommended to continue home Ropinirole.  The pt demonstrated significant mobility and ADL impairments including sit-->stand CGA, T/F CGA, ability to walk 125' CGA, UB dressing MDA, LB dressing MXA.  He was admitted to [facility] to address the mobility and ADL impairments.  His CC is "walking".          Past Medical History:   Diagnosis Date    Acute on chronic systolic heart failure 7/27/2015    AICD (automatic cardioverter/defibrillator) present 2014    [employer]    CAD (coronary artery disease) 7/27/2015    CHF (congestive heart failure)     Gait instability     HTN (hypertension) 7/27/2015    ICD (implantable cardioverter-defibrillator), biventricular, in situ 7/27/2015    Kidney stones     HILLARY treated with BiPAP 7/27/2015    Peripheral neuropathy     Pulmonary hypertension due to left ventricular systolic dysfunction 7/29/2015    " Restless leg syndrome 1992    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    Skin cancer     excision 2013    Sleep apnea 1992     Past Surgical History:   Procedure Laterality Date    CARDIAC PACEMAKER PLACEMENT      pacemaker previously, then AICD in 2006. AICD [employer] serial #[id number]    CORONARY ARTERY BYPASS GRAFT  1993    KNEE SURGERY Left 2001    complicated by infection, hardware had to be taken out and replaced    LEFT VENTRICULAR ASSIST DEVICE  10/19/2016     Review of patient's allergies indicates:   Allergen Reactions    Sulfa (sulfonamide antibiotics)        Scheduled Medications:     PRN Medications:     Family History     Problem Relation (Age of Onset)    Cancer Daughter (50)    Diabetes Daughter    Heart disease Daughter        Social History Main Topics    Smoking status: Never Smoker    Smokeless tobacco: Never Used    Alcohol use No    Drug use: No    Sexual activity: Not on file      Comment:      Review of Systems   Constitutional: Negative for appetite change and fatigue.   Eyes: Negative for visual disturbance.   Respiratory: Negative for shortness of breath.    Cardiovascular: Negative for chest pain.   Gastrointestinal: Negative for constipation and diarrhea.   Genitourinary: Negative for dysuria, frequency and urgency.   Musculoskeletal: Positive for gait problem. Negative for arthralgias, back pain, joint swelling, myalgias, neck pain and neck stiffness.   Neurological: Positive for tremors and weakness. Negative for dizziness, numbness and headaches.   Psychiatric/Behavioral: Negative for dysphoric mood.   All other systems reviewed and are negative.    Objective:     Vital Signs (Most Recent):  Temp: 98.4 °F (36.9 °C) (08/08/17 1430)  Pulse: 65 (08/08/17 1430)  Resp: 18 (08/08/17 1430)  BP: (!) 78/0 (08/08/17 1430)  SpO2: 96 % (08/08/17 1430)    Vital Signs (24h Range):  Temp:  [97.8 °F (36.6 °C)-98.5 °F (36.9 °C)] 98.4 °F (36.9 °C)  Pulse:  [64-84]  65  Resp:  [18-20] 18  SpO2:  [95 %-97 %] 96 %  BP: ()/(0-80) 78/0     There is no height or weight on file to calculate BMI.    Physical Exam   Constitutional: He is oriented to person, place, and time. He appears well-nourished.   Eyes: EOM are normal. Pupils are equal, round, and reactive to light.   Neck: Normal range of motion. Neck supple.   Cardiovascular: Normal rate.    Pulmonary/Chest: Effort normal.   Musculoskeletal: Normal range of motion.   Neurological: He is oriented to person, place, and time. He has normal strength.   Skin: No rash noted.   Psychiatric: He has a normal mood and affect.     NEUROLOGICAL EXAMINATION:     MENTAL STATUS   Oriented to person, place, and time.     CRANIAL NERVES     CN III, IV, VI   Pupils are equal, round, and reactive to light.  Extraocular motions are normal.     MOTOR EXAM     Strength   Strength 5/5 throughout.       Diagnostic Results: Labs: Reviewed    Assessment/Plan:     Kev Vasquez is a 76 y.o. male being admitted to inpatient rehabilitation on 8/8/2017 for Gait instability with impaired mobility and ADLs.     * Gait instability    With impaired mobility and ability to perform ADLs.  I will order PT for the mobility impairments, OT for ADL deficits, RT and  for community reintegration and Rehab nursing for education.            Chronic systolic heart failure    Compensated with LVAD        Stage 2 chronic kidney disease    Stable        Complication involving left ventricular assist device (LVAD)    Still with some drainage -- monitor.  On cefepime through 9/23.             Rashad Garcia MD  Department of Physical Medicine & Rehab   [facility]    Post Admission Assessment:    Kev Vasquez is a 76 y.o. male being admitted to inpatient rehabilitation on 8/8/2017 for gait instability with impaired mobility and ADLs.   Patient is appropriate for inpatient rehabilitation and is expected to tolerate 3 hours of therapy a  day or 15 hours of therapy a week.  Patient is expected to benefit from the following therapy services: Physical Therapy, Occupational Therapy as well as Recreational Therapy    I have had the opportunity to examine the patient within 24 hours of admission and have reviewed the Pre-Admission Assessment and find it consistent with my examination and evaluation of the patient. I confirm that this patient is appropriate for admission and treatment in this inpatient rehabilitation hospital, needs intense interdisciplinary rehabilitation care under my direction and is expected to achieve meaningful goals within a reasonable period of time that are consistent with the planned discharge disposition.     The patient will be admitted for inpatient comprehensive interdisciplinary rehabilitation to address the impairments and medical conditions listed above while assessing equipment needs and compensatory strategies, with coordinated interdisciplinary services that will include physical therapy, occupational therapy, and close monitoring and treatment with 24-hour rehabilitative nursing. This interdisciplinary program will be performed under the direction of a physiatrist.

## 2017-08-08 NOTE — NURSING
Patient is ready for discharge. Patient stable alert and oriented.Tele, and PIV removed. Right UA PICC left in place. No complaints of pain. Discussed discharge plan. Reviewed medications and side effects, appointments, and answered questions with patient and family.VAD emergency bag packed and ready for transport with patient. Patient connected to 2 charged batteries. SPD at bedside for transportation to Rehab.

## 2017-08-08 NOTE — ASSESSMENT & PLAN NOTE
- Cardene off. BP still with some elevated maps.  Continue Lisinopril to 10QAM and 10mg QPM  - Continue Norvasc, increased Hydralazine yesterday and Aldactone  - All BP's need to be checked while patient sitting on edge of bed or OOB in chair given h/o orthostasis. Do not want to continue to up titrate BP meds with patient's history of syncope and severe orthostasis. VAD working, no low flow alarms. Will send patient home on current regimen despite elevated MAPs.

## 2017-08-08 NOTE — NURSING
Called Mignon (VAD Coordinator) to notify them of Patient's transfer to Rehab at noon today. Night nurse had notified the on-call about low PI events.  Patient's wife at the bedside with batteries from home, Justin notified.

## 2017-08-08 NOTE — PLAN OF CARE
Problem: Patient Care Overview  Goal: Plan of Care Review  Outcome: Revised  Plan of care discussed with patient.  LVAD DP and numbers WNL, smooth LVAD hum. Patient has no complaints of pain. Discussed medications and care. For Rehab today. Patient has no questions at this time. Will continue to monitor.

## 2017-08-08 NOTE — ASSESSMENT & PLAN NOTE
- HM III implanted at [facility]   - VAD orders placed  - No alarms or events noted on interrogation  - continue bp control and warfarin  - EKG yesterday QTc 485. Will leave on current dose ot dofetilide. Patient is followed by Dr Bharat Baker in EP clinic

## 2017-08-08 NOTE — ASSESSMENT & PLAN NOTE
- HM III implanted at [facility] in [place] 10/2016  - Speed 5800  - INR therapeutic 2.7. Coumadin dose increase to 4 mg every Tues, Thurs, Sat and 6 mg Mon, Wed, Fri.    - LD stable 157 today - continue ASA 81 mg qd  - Pulsatile with MAP's improved: see above-  Continue BP control with  Lisinopril, Norvasc, Aldactone and Hydralazine  - Interrogation notable for PI events  - CT of abdomen/pelvis with no fluid collections  - DLES culture with Pseudomonas resistant to Cipro. Appreciate ID's help - continue Cefepime X 8 weeks (end 9/23/17). Weekly CBC, CMP, ESR, CRP to ID ([phone]) once discharged

## 2017-08-08 NOTE — DISCHARGE SUMMARY
[facility]  Heart Transplant  Discharge Summary      Patient Name: Kev Vasquez  MRN: [id number]  Admission Date: 7/28/2017  Hospital Length of Stay: 11 days  Discharge Date and Time: 08/08/2017 3:01 PM  Attending Physician: No att. 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 When abx initially started last Tuesday, patient improved.  Abx switch to cipro on Monday.  Since then he has had increased mucopurulent drainage from driveline as well as worsening abdominal pain near exit site with conversely decreasing surrounding erythema. Denies symptoms of systemic infection. Of note, his blood pressure is known to be particularly labile.  He has been taken off of many of his bp meds in the past due to profound hypotension and orthostasis.  He was just very recently increased to 5mg po amlodipine daily due to his lack of ability to tolerate more.  His wife states his doc back in [place] said 'we are just going to have to live with your blood pressure being high at times.'    * No surgery found *     Hospital Course: Patient was admitted to CMICU and placed on cardene ggt. His BP regimen was altered q day due to consistently elevated MAPs and patients inability to tolerate due to orthostasis and syncopal spells. VAD continued to function appropriately with no changes in flows or speed drops. No alarms noted. Only consistent PI events which are not new for the patient. He was severely debilitated in the hospital and prior to discharge, and PT/OT advised patient to discharge to Rehab.   He was seen by ID for chronic DLES with  Pseudomonas resistant to Cipro.Discharged on Cefepime X 8 weeks (end 9/23/17). Weekly CBC, CMP, ESR, CRP to ID ([phone]) once discharged.   Patient also has tremors and AMS at times, but after several days of reorienting the patient he was able to correctly identify to person, place, and time. Neuro had no recommendations other than this is chronic in nature and may be worse currently due to infection and signed off.     Consults         Status Ordering Provider     Inpatient consult to Cardiology  Once     Provider:  (Not yet assigned)    Completed VENKAT GREENWOOD     Inpatient consult to Cardiothoracic Surgery  Once     Provider:  (Not yet assigned)    Completed ADRIAN KELLY     Inpatient consult to Infectious Diseases  Once     Provider:  (Not yet assigned)    Completed ADRIAN KELLY     Inpatient consult to Neurology  Once     Provider:  (Not yet assigned)    Completed LARRY COTO     Inpatient consult to Physical Medicine Rehab  Once     Provider:  (Not yet assigned)    Completed ARISTEO DOMINGUEZ     Inpatient consult to PICC team (NIAS)  Once     Provider:  (Not yet assigned)    Completed OPAL HOBBS     Inpatient consult to Social Work/Case Management  Once     Provider:  (Not yet assigned)    Completed ARISTEO DOMINGUEZ     Inpatient consult to Vascular (Stroke) Neurology  Once     Provider:  (Not yet assigned)    Completed ARISTEO DOMINGUEZ     IP consult to dietary  Once     Provider:  (Not yet assigned)    Completed ARISTEO DOMINGUEZ        Significant Diagnostic Studies: Labs: All labs within the past 24 hours have been reviewed    Pending Diagnostic Studies:     None        Final Active Diagnoses:    Diagnosis Date Noted POA    PRINCIPAL PROBLEM:  Complication involving left ventricular assist device (LVAD) [T82.9XXA] 07/29/2017 Yes    Involuntary movements [R25.2] 08/06/2017 Yes    Impaired functional mobility and endurance [Z74.09] 08/06/2017 Yes     TANESHA (acute kidney injury) [N17.9] 08/06/2017 Yes    Restless leg syndrome [G25.81] 07/30/2017 Yes    Delirium [R41.0] 07/29/2017 Yes    Abdominal pain [R10.9] 07/28/2017 Yes    Stage 2 chronic kidney disease [N18.2] 07/20/2017 Yes    LVAD (left ventricular assist device) present [Z95.811] 07/20/2017 Not Applicable    Hypertensive urgency [I16.0] 07/27/2015 Yes    Coronary artery disease involving native coronary artery of native heart without angina pectoris [I25.10] 07/27/2015 Yes      Problems Resolved During this Admission:    Diagnosis Date Noted Date Resolved POA      Discharged Condition: stable    Disposition: Rehab Facility    Follow Up: 2 weeks with VAD clinic    Patient Instructions:     Walker rolling       Medications:  Reconciled Home Medications:   Discharge Medication List as of 8/8/2017 11:23 AM      START taking these medications    Details   ceFEPIme in dextrose 5% (MAXIPIME) 2 gram/50 mL PgBk Inject 50 mLs (2 g total) into the vein every 12 (twelve) hours., Starting Tue 8/8/2017, No Print      hydrALAZINE (APRESOLINE) 25 MG tablet Take 3 tablets (75 mg total) by mouth every 8 (eight) hours., Starting Tue 8/8/2017, Until Wed 8/8/2018, Normal      lisinopril 10 MG tablet Take 1 tablet (10 mg total) by mouth 2 (two) times daily., Starting Tue 8/8/2017, Until Wed 8/8/2018, Normal      magnesium oxide (MAG-OX) 400 mg tablet Take 1 tablet (400 mg total) by mouth 2 (two) times daily., Starting Tue 8/8/2017, Normal         CONTINUE these medications which have CHANGED    Details   amlodipine (NORVASC) 10 MG tablet Take 0.5 tablets (5 mg total) by mouth once daily., Starting Tue 8/8/2017, Normal      warfarin (COUMADIN) 4 MG tablet 6 mg orally daily on Mon, Wed, Fri; and 4 mg orally daily on all other days, No Print         CONTINUE these medications which have NOT CHANGED    Details   allopurinol (ZYLOPRIM) 300 MG tablet Take 300 mg by mouth once daily., Until Discontinued, Historical Med       aspirin 81 MG Chew Take 81 mg by mouth once daily., Until Discontinued, Historical Med      atorvastatin (LIPITOR) 10 MG tablet Take 10 mg by mouth once daily., Historical Med      buPROPion (WELLBUTRIN XL) 300 MG 24 hr tablet Take 300 mg by mouth once daily., Until Discontinued, Historical Med      folic acid (FOLVITE) 1 MG tablet Take 1 mg by mouth once daily., Historical Med      multivitamin capsule Take 1 capsule by mouth once daily., Historical Med      ropinirole (REQUIP) 0.5 MG tablet Take 0.5 mg by mouth 3 (three) times daily. , Starting Sun 1/31/2016, Historical Med      spironolactone (ALDACTONE) 25 MG tablet Take 1 tablet (25 mg total) by mouth once daily., Starting Sat 8/1/2015, Until Sat 7/29/2017, Print      tamsulosin (FLOMAX) 0.4 mg Cp24 Take 0.4 mg by mouth once daily., Until Discontinued, Historical Med      TIKOSYN 250 mcg Cap Take 1 capsule (250 mcg total) by mouth every 12 (twelve) hours., Starting Tue 7/25/2017, Normal         STOP taking these medications       calcium-vitamin D tablet 600 mg-200 units Comments:   Reason for Stopping:         DULCOLAX, BISACODYL, RECT Comments:   Reason for Stopping:               Elena Raman PA-C  Heart Transplant  [facility]

## 2017-08-08 NOTE — PROGRESS NOTES
Seen pt in hospital. Inventoried pt's equipment before being discharged to rehab. P: [id number] B: [id number], BATTERIES: [id number], [id number], [id number], [id number], BATTERY CLIPS: [id number], [id number], [id number], [id number], [id number]. Also charged backup controller and conducted self test. Replaced 3x AA batteries in MPU for 6MO maintenance.

## 2017-08-08 NOTE — NURSING
Called Report regarding patient to [facility] Guz[facility] now. Awaiting SW to notify nurse about patient  time.

## 2017-08-08 NOTE — PROGRESS NOTES
[facility]  Heart Transplant  Progress Note    Patient Name: Kev Vasquez  MRN: [id number]  Admission Date: 7/28/2017  Hospital Length of Stay: 11 days  Attending Physician: Anni Henderson MD  Primary Care Provider: Mega Collins MD  Principal Problem:Complication involving left ventricular assist device (LVAD)    Subjective:     Interval History: Patient has no complaints overnight. Ready to go to rehab. Daughter at bedside. Patient understands we will send him home with MAPs slightly elevated due to orthostasis and syncope w/ up titration.     Continuous Infusions:   Scheduled Meds:   allopurinol  300 mg Oral Daily    amlodipine  10 mg Oral Daily    aspirin  81 mg Oral Daily    atorvastatin  10 mg Oral Daily    buPROPion  300 mg Oral Daily    calcium-vitamin D3  1 tablet Oral BID    ceFEPime (MAXIPIME) IVPB  2 g Intravenous Q12H    dofetilide  250 mcg Oral Q12H    folic acid  1 mg Oral Daily    hydrALAZINE  75 mg Oral Q8H    lisinopril  10 mg Oral BID    magnesium oxide  400 mg Oral BID    multivitamin  1 tablet Oral Daily    ropinirole  0.5 mg Oral TID    sodium chloride 0.9%  10 mL Intravenous Q6H    sodium chloride 0.9%  3 mL Intravenous Q8H    spironolactone  25 mg Oral Daily    tamsulosin  0.4 mg Oral Daily    warfarin  4 mg Oral Every Tues, Thurs, Sat    warfarin  4 mg Oral Every Sun    warfarin  6 mg Oral Every Mon, Wed, Fri     PRN Meds:acetaminophen, bisacodyl, Flushing PICC Protocol **AND** sodium chloride 0.9% **AND** sodium chloride 0.9%    Review of patient's allergies indicates:   Allergen Reactions    Sulfa (sulfonamide antibiotics)      Objective:     Vital Signs (Most Recent):  Temp: 98.5 °F (36.9 °C) (08/08/17 0900)  Pulse: 64 (08/08/17 0900)  Resp: 18 (08/08/17 0900)  BP: (!) 80/0 (08/08/17 0906)  SpO2: 95 % (08/08/17 0900) Vital Signs (24h Range):  Temp:  [97.7 °F (36.5 °C)-98.5 °F (36.9 °C)] 98.5 °F (36.9 °C)  Pulse:  [64-84] 64  Resp:  [18-20]  18  SpO2:  [95 %-98 %] 95 %  BP: ()/(0-80) 80/0     Weight: 79.4 kg (175 lb 0.7 oz)  Body mass index is 23.74 kg/m².      Intake/Output Summary (Last 24 hours) at 08/08/17 0948  Last data filed at 08/08/17 0600   Gross per 24 hour   Intake              880 ml   Output                0 ml   Net              880 ml     Hemodynamic Parameters:    Physical Exam   Constitutional: He appears well-developed and well-nourished.   Head: Normocephalic and atraumatic.   Eyes: Conjunctivae and EOM are normal. Pupils are equal, round, and reactive to light.   Neck: Normal range of motion. Neck supple. No JVD present.   Cardiovascular: Smooth VAD hum. Pulsatile   Pulmonary/Chest: Effort normal and breath sounds normal.   Abdominal: Soft. Bowel sounds are normal.   Musculoskeletal: He exhibits no edema.   Neurological: He is alert. Confused. Generalized tremors   Skin: Skin is warm and dry. Capillary refill takes 2 to 3 seconds.   Psychiatric: Confused at times, oriented to person place and time this morning    Significant Labs:  CBC:    Recent Labs  Lab 08/08/17 0359   WBC 7.02   RBC 4.00*   HGB 11.9*   HCT 36.6*      MCV 92   MCH 29.8   MCHC 32.5     BNP:    Recent Labs  Lab 08/07/17  0710   *     CMP:    Recent Labs  Lab 08/07/17  0710 08/08/17  0359   GLU 99 84   CALCIUM 10.5 10.2   ALBUMIN 3.2*  --    PROT 6.8  --    * 136   K 4.3 4.2   CO2 23 26    105   BUN 33* 28*   CREATININE 1.5* 1.3   ALKPHOS 81  --    ALT 21  --    AST 24  --    BILITOT 0.7  --       Coagulation:     Recent Labs  Lab 08/08/17 0359   INR 2.7*   APTT 30.1     LDH:    Recent Labs  Lab 08/06/17  0608 08/07/17  0710 08/08/17 0359    159 157     Microbiology:  Microbiology Results (last 7 days)     Procedure Component Value Units Date/Time    Blood culture #1 **CANNOT BE ORDERED STAT** [[id number]] Collected:  07/28/17 2042    Order Status:  Completed Specimen:  Blood from Peripheral, Forearm, Right Updated:  08/02/17  2212     Blood Culture, Routine No growth after 5 days.    Blood culture #2 **CANNOT BE ORDERED STAT** [[id number]] Collected:  07/28/17 2058    Order Status:  Completed Specimen:  Blood from Peripheral, Forearm, Left Updated:  08/02/17 2212     Blood Culture, Routine No growth after 5 days.    Culture, Anaerobe [[id number]] Collected:  07/29/17 1056    Order Status:  Completed Specimen:  Wound from Abdomen Updated:  08/02/17 0859     Anaerobic Culture No anaerobes isolated    Aerobic culture [[id number]]  (Susceptibility) Collected:  07/29/17 1056    Order Status:  Completed Specimen:  Wound from Abdomen Updated:  08/01/17 1035     Aerobic Bacterial Culture --     PSEUDOMONAS AERUGINOSA  Few            I have reviewed all pertinent labs within the past 24 hours.    Estimated Creatinine Clearance: 54.7 mL/min (based on Cr of 1.3).    Diagnostic Results:  I have reviewed all pertinent imaging results/findings within the past 24 hours.    Assessment and Plan:     TANESHA (acute kidney injury)    - Creatinine 1.3 (1.5 on admit, baseline ~ 1.1-1.3 - will monitor).          Impaired functional mobility and endurance    - consulted PT/OT/ST  - Appreciate PM&R consult. Plan D/C to rehab on Tomorrow        Restless leg syndrome    - continue ropinirole        Involuntary movements    - No known history of parkinsons disease, experiencing myoclonic jerks/tremors NOT suggestive of PD  - Currently not active as he should be given tremors and leg pain, very deconditioned.  - ddx per neuro delirium / myoclonic jerks from infectious etiologies / metabolic >>> postoperative states >> Fluid and electrolyte disturbances / toxicity >>> cns pathology / seziures. Appreciate assistance.         Delirium    - Appreciate Neuro's help: Patient currently with signs/symptoms of delirium.  Tremulous, myoclonic jerks as well as his disorientation suggest this.  Suspect current infection as primary cause. Less tremulous past 2 days, and is more  oriented as the day goes on  - RPR non-reactive, B12 WNL           Abdominal pain    - CT abdomen negative for acute processes  - gram stain w/ GNR  - ID following, appreciate recs. Obtained driveline cultures. Pt now on cefepime. Blood cultures from 7/28 NGTD.          LVAD (left ventricular assist device) present    - HM III implanted at [facility]   - VAD orders placed  - No alarms or events noted on interrogation  - continue bp control and warfarin  - EKG yesterday QTc 485. Will leave on current dose ot dofetilide. Patient is followed by Dr Bharat Baker in EP clinic        Hypertensive urgency    - Cardene off. BP still with some elevated maps.  Continue Lisinopril to 10QAM and 10mg QPM  - Continue Norvasc, increased Hydralazine yesterday and Aldactone  - All BP's need to be checked while patient sitting on edge of bed or OOB in chair given h/o orthostasis. Do not want to continue to up titrate BP meds with patient's history of syncope and severe orthostasis. VAD working, no low flow alarms. Will send patient home on current regimen despite elevated MAPs.        * Complication involving left ventricular assist device (LVAD)    - HM III implanted at [facility] 10/2016  - Speed 5800  - INR therapeutic 2.7. Coumadin dose increase to 4 mg every Tues, Thurs, Sat and 6 mg Mon, Wed, Fri.    - LD stable 157 today - continue ASA 81 mg qd  - Pulsatile with MAP's improved: see above-  Continue BP control with  Lisinopril, Norvasc, Aldactone and Hydralazine  - Interrogation notable for PI events  - CT of abdomen/pelvis with no fluid collections  - DLES culture with Pseudomonas resistant to Cipro. Appreciate ID's help - continue Cefepime X 8 weeks (end 9/23/17). Weekly CBC, CMP, ESR, CRP to ID ([phone]) once discharged            Dispo:  Discharge today to rehab      Elena Raman PA-C  Heart Transplant  [facility]

## 2017-08-08 NOTE — SUBJECTIVE & OBJECTIVE
Interval History: Patient has no complaints overnight. Ready to go to rehab. Daughter at bedside. Patient understands we will send him home with MAPs slightly elevated due to orthostasis and syncope w/ up titration.     Continuous Infusions:   Scheduled Meds:   allopurinol  300 mg Oral Daily    amlodipine  10 mg Oral Daily    aspirin  81 mg Oral Daily    atorvastatin  10 mg Oral Daily    buPROPion  300 mg Oral Daily    calcium-vitamin D3  1 tablet Oral BID    ceFEPime (MAXIPIME) IVPB  2 g Intravenous Q12H    dofetilide  250 mcg Oral Q12H    folic acid  1 mg Oral Daily    hydrALAZINE  75 mg Oral Q8H    lisinopril  10 mg Oral BID    magnesium oxide  400 mg Oral BID    multivitamin  1 tablet Oral Daily    ropinirole  0.5 mg Oral TID    sodium chloride 0.9%  10 mL Intravenous Q6H    sodium chloride 0.9%  3 mL Intravenous Q8H    spironolactone  25 mg Oral Daily    tamsulosin  0.4 mg Oral Daily    warfarin  4 mg Oral Every Tues, Thurs, Sat    warfarin  4 mg Oral Every Sun    warfarin  6 mg Oral Every Mon, Wed, Fri     PRN Meds:acetaminophen, bisacodyl, Flushing PICC Protocol **AND** sodium chloride 0.9% **AND** sodium chloride 0.9%    Review of patient's allergies indicates:   Allergen Reactions    Sulfa (sulfonamide antibiotics)      Objective:     Vital Signs (Most Recent):  Temp: 98.5 °F (36.9 °C) (08/08/17 0900)  Pulse: 64 (08/08/17 0900)  Resp: 18 (08/08/17 0900)  BP: (!) 80/0 (08/08/17 0906)  SpO2: 95 % (08/08/17 0900) Vital Signs (24h Range):  Temp:  [97.7 °F (36.5 °C)-98.5 °F (36.9 °C)] 98.5 °F (36.9 °C)  Pulse:  [64-84] 64  Resp:  [18-20] 18  SpO2:  [95 %-98 %] 95 %  BP: ()/(0-80) 80/0     Weight: 79.4 kg (175 lb 0.7 oz)  Body mass index is 23.74 kg/m².      Intake/Output Summary (Last 24 hours) at 08/08/17 0948  Last data filed at 08/08/17 0600   Gross per 24 hour   Intake              880 ml   Output                0 ml   Net              880 ml     Hemodynamic  Parameters:    Physical Exam   Constitutional: He appears well-developed and well-nourished.   Head: Normocephalic and atraumatic.   Eyes: Conjunctivae and EOM are normal. Pupils are equal, round, and reactive to light.   Neck: Normal range of motion. Neck supple. No JVD present.   Cardiovascular: Smooth VAD hum. Pulsatile   Pulmonary/Chest: Effort normal and breath sounds normal.   Abdominal: Soft. Bowel sounds are normal.   Musculoskeletal: He exhibits no edema.   Neurological: He is alert. Confused. Generalized tremors   Skin: Skin is warm and dry. Capillary refill takes 2 to 3 seconds.   Psychiatric: Confused at times, oriented to person place and time this morning    Significant Labs:  CBC:    Recent Labs  Lab 08/08/17 0359   WBC 7.02   RBC 4.00*   HGB 11.9*   HCT 36.6*      MCV 92   MCH 29.8   MCHC 32.5     BNP:    Recent Labs  Lab 08/07/17  0710   *     CMP:    Recent Labs  Lab 08/07/17  0710 08/08/17 0359   GLU 99 84   CALCIUM 10.5 10.2   ALBUMIN 3.2*  --    PROT 6.8  --    * 136   K 4.3 4.2   CO2 23 26    105   BUN 33* 28*   CREATININE 1.5* 1.3   ALKPHOS 81  --    ALT 21  --    AST 24  --    BILITOT 0.7  --       Coagulation:     Recent Labs  Lab 08/08/17  0359   INR 2.7*   APTT 30.1     LDH:    Recent Labs  Lab 08/06/17  0608 08/07/17  0710 08/08/17  0359    159 157     Microbiology:  Microbiology Results (last 7 days)     Procedure Component Value Units Date/Time    Blood culture #1 **CANNOT BE ORDERED STAT** [[id number]] Collected:  07/28/17 2042    Order Status:  Completed Specimen:  Blood from Peripheral, Forearm, Right Updated:  08/02/17 2212     Blood Culture, Routine No growth after 5 days.    Blood culture #2 **CANNOT BE ORDERED STAT** [[id number]] Collected:  07/28/17 2058    Order Status:  Completed Specimen:  Blood from Peripheral, Forearm, Left Updated:  08/02/17 2212     Blood Culture, Routine No growth after 5 days.    Culture, Anaerobe [[id number]] Collected:   07/29/17 1056    Order Status:  Completed Specimen:  Wound from Abdomen Updated:  08/02/17 0859     Anaerobic Culture No anaerobes isolated    Aerobic culture [[id number]]  (Susceptibility) Collected:  07/29/17 1056    Order Status:  Completed Specimen:  Wound from Abdomen Updated:  08/01/17 1035     Aerobic Bacterial Culture --     PSEUDOMONAS AERUGINOSA  Few            I have reviewed all pertinent labs within the past 24 hours.    Estimated Creatinine Clearance: 54.7 mL/min (based on Cr of 1.3).    Diagnostic Results:  I have reviewed all pertinent imaging results/findings within the past 24 hours.

## 2017-08-08 NOTE — PROGRESS NOTES
[facility]  Physical Medicine & Rehab  Progress Note    Patient Name: Kev Vasquez  MRN: [id number]  Patient Class: IP- Rehab   Admission Date: 8/8/2017  Length of Stay: 0 days  Attending Physician: Rashad Garcia MD  Primary Care Provider: Mega Collins MD    Subjective:     Principal Problem:Gait instability    Past Medical History:   Diagnosis Date    Acute on chronic systolic heart failure 7/27/2015    AICD (automatic cardioverter/defibrillator) present 2014    [employer]    CAD (coronary artery disease) 7/27/2015    CHF (congestive heart failure)     Gait instability     HTN (hypertension) 7/27/2015    ICD (implantable cardioverter-defibrillator), biventricular, in situ 7/27/2015    Kidney stones     HILLARY treated with BiPAP 7/27/2015    Peripheral neuropathy     Pulmonary hypertension due to left ventricular systolic dysfunction 7/29/2015    Restless leg syndrome 1992    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    Skin cancer     excision 2013    Sleep apnea 1992     Past Surgical History:   Procedure Laterality Date    CARDIAC PACEMAKER PLACEMENT      pacemaker previously, then AICD in 2006. AICD [employer] Bal[employer]inder serial #[id number]    CORONARY ARTERY BYPASS GRAFT  1993    KNEE SURGERY Left 2001    complicated by infection, hardware had to be taken out and replaced    LEFT VENTRICULAR ASSIST DEVICE  10/19/2016     Review of patient's allergies indicates:   Allergen Reactions    Sulfa (sulfonamide antibiotics)        Scheduled Medications:     PRN Medications:     Family History     Problem Relation (Age of Onset)    Cancer Daughter (50)    Diabetes Daughter    Heart disease Daughter        Social History Main Topics    Smoking status: Never Smoker    Smokeless tobacco: Never Used    Alcohol use No    Drug use: No    Sexual activity: Not on file      Comment:      Review of Systems   Constitutional: Negative for appetite change and fatigue.   Eyes:  Negative for visual disturbance.   Respiratory: Negative for shortness of breath.    Cardiovascular: Negative for chest pain.   Gastrointestinal: Negative for constipation and diarrhea.   Genitourinary: Negative for dysuria, frequency and urgency.   Musculoskeletal: Positive for gait problem. Negative for arthralgias, back pain, joint swelling, myalgias, neck pain and neck stiffness.   Neurological: Positive for tremors and weakness. Negative for dizziness, numbness and headaches.   Psychiatric/Behavioral: Negative for dysphoric mood.   All other systems reviewed and are negative.    Objective:     Vital Signs (Most Recent):  Temp: 98.4 °F (36.9 °C) (08/08/17 1430)  Pulse: 65 (08/08/17 1430)  Resp: 18 (08/08/17 1430)  BP: (!) 78/0 (08/08/17 1430)  SpO2: 96 % (08/08/17 1430)    Vital Signs (24h Range):  Temp:  [97.8 °F (36.6 °C)-98.5 °F (36.9 °C)] 98.4 °F (36.9 °C)  Pulse:  [64-84] 65  Resp:  [18-20] 18  SpO2:  [95 %-97 %] 96 %  BP: ()/(0-80) 78/0     There is no height or weight on file to calculate BMI.    Physical Exam   Constitutional: He is oriented to person, place, and time. He appears well-nourished.   Eyes: EOM are normal. Pupils are equal, round, and reactive to light.   Neck: Normal range of motion. Neck supple.   Cardiovascular: Normal rate.    Pulmonary/Chest: Effort normal.   Musculoskeletal: Normal range of motion.   Neurological: He is oriented to person, place, and time. He has normal strength.   Skin: No rash noted.   Psychiatric: He has a normal mood and affect.     NEUROLOGICAL EXAMINATION:     MENTAL STATUS   Oriented to person, place, and time.     CRANIAL NERVES     CN III, IV, VI   Pupils are equal, round, and reactive to light.  Extraocular motions are normal.     MOTOR EXAM     Strength   Strength 5/5 throughout.       Diagnostic Results: Labs: Reviewed    Assessment/Plan:      Kev Vasquez is a 76 y.o. male admitted to inpatient rehabilitation on 8/8/2017 for Gait instability with  impaired mobility and ADLs. Patient remains appropriate for PT, OT, and as required Speech therapy. Patient continues to require 24 hour nursing care as well as daily Physician assessment.    * Gait instability    With impaired mobility and ability to perform ADLs.  I will order PT for the mobility impairments, OT for ADL deficits, RT and  for community reintegration and Rehab nursing for education.            Chronic systolic heart failure    Compensated with LVAD        Stage 2 chronic kidney disease    Stable        Complication involving left ventricular assist device (LVAD)    Still with some drainage -- monitor.  On cefepime through 9/23.             DISCHARGE PLANNING:  Tentative Discharge Date:     Future Appointments  Date Time Provider Department Center   8/10/2017 3:00 PM Payam Muhammad MD [facility] ID [facility]   8/23/2017 12:00 PM LAB, APPOINTMENT [facility] LAB [facility]   8/23/2017 1:30 PM [facility]   8/23/2017 1:30 PM Carlito Santana MD [facility] HEARTTX [facility]   10/26/2017 8:00 AM HOME MONITOR DEVICE CHECK, [facility] ARRHYTH [facility]       Rashad Garcia MD  Department of Physical Medicine & Rehab   [facility]

## 2017-08-08 NOTE — PLAN OF CARE
[facility]                        Heart Transplant/VAD Clinic   [address] [place] [address]                        [phone] after hours          [phone] fax                           VAD HOME  HEALTH ORDERS        Admit to Home Health     Diagnosis:       Patient Active Problem List   Diagnosis    SOB (shortness of breath)    Chronic systolic heart failure    HTN (hypertension)    CAD (coronary artery disease)    S/P CABG (coronary artery bypass graft)    ICD (implantable cardioverter-defibrillator), biventricular, in situ    HILLARY on CPAP    Pulmonary hypertension due to left ventricular systolic dysfunction      Patient is homebound due to:  s/p LVAD     Diet: Low Fat, Low cholesterol, 2Gm Na, Coumadin restrictions.     Acitivities: No Swimming, bathing, vacuuming, contact sports.     Fresh implants= Sternal Precautions     Nursing:   SN to complete comprehensive assessment including routine vital signs. Instruct on disease process and s/s of complications to report to MD. Review/verify medication list sent home with the patient at time of discharge  and instruct patient/caregiver as needed. Frequency may be adjusted depending on start of care date.     **LVAD driveline exit site dressing change is to be completed per LVAD patient/caregiver only**.     Notify MD if SBP > 160 or < 90; DBP > 90 or < 50; HR > 120 or < 50; Temp > 101; Weight gain >3lbs in 1 day or 5lbs in 1 week.  Other:       HOME INFUSION THERAPY:    SN to perform Infusion Therapy/Central Line Care.  Review Central Line Care & Central Line Flush with patient.    Administer (drug and dose): IV Cefepime 2 g q 12 h, end date 9/23/17    LABS:  SN to perform labs: PT/INR per Coumadin clinic [phone].   Weekly CBC, CMP, ESR, CRP to ID ([phone]) once discharged  Follow up INR date: TBD, patient currently in rehab  No Finger Sticks       Central line care:  Scrub the Hub:  Prior to accessing the line, always perform a 30 second alcohol scrub  Each lumen of the central line is to be flushed at least daily with 10 mL Normal Saline and 3 mL Heparin flush (100 units/mL)  Skilled Nurse (SN) may draw blood from IV access  Blood Draw Procedure:   - Aspirate at least 5 mL of blood   - Discard   - Obtain specimen   - Change posiflow cap   - Flush with 20 mL Normal Saline followed by a                 3-5 mL Heparin flush (100 units/mL)  Central :   - Sterile dressing changes are done weekly and as needed.   - Use chlor-hexadine scrub to cleanse site, apply Biopatch to insertion site,       apply securement device dressing   - Posi-flow caps are changed weekly and after EVERY lab draw.   - If sterile gauze is under dressing to control oozing,                 dressing change must be performed every 24 hours until gauze is not needed.      CONSULTS:     Physical Therapy to evaluate and treat. Evaluate for home safety and equipment needs; Establish/upgrade home exercise program. Perform / instruct on therapeutic exercises, gait training, transfer training, and Range of Motion.     Occupational Therapy to evaluate and treat. Evaluate home environment for safety and equipment needs. Perform/Instruct on transfers, ADL training, ROM, and therapeutic exercises.        Send initial Home Health orders to [facility] attending physician on call.  Send follow up questions to VAD clinic MD [phone] or [phone].

## 2017-08-08 NOTE — PROGRESS NOTES
Mr. Kev Vasquez is being discharged today to inpatient rehab. His pMH is significant for HM3 lvad from [place], pseudomonas driveline infection, HTN, and CKD.     During his hospital stay he was evaluated for worsening abdominal pain over his driveline site, which grew pseudomonas. This was now resistant to oral cipro he was taking previously. He was evaluated by infectious disease, and started on an 8 week course of IV cefepime.     He is discharging to inpatient rehab. He was continued on his prior aspirin dose of 81mg orally daily and coumadin for inr goal of 2-3. This was a dose of coumadin 6mg on MWF and 4mg on other days.     Please contact me on discharge from rehab for a new medication card and education, to account for any medication changes. Thank you.

## 2017-08-09 PROCEDURE — 97530 THERAPEUTIC ACTIVITIES: CPT

## 2017-08-09 PROCEDURE — A4216 STERILE WATER/SALINE, 10 ML: HCPCS | Performed by: PHYSICIAN ASSISTANT

## 2017-08-09 PROCEDURE — 99232 SBSQ HOSP IP/OBS MODERATE 35: CPT | Mod: ,,, | Performed by: PHYSICAL MEDICINE & REHABILITATION

## 2017-08-09 PROCEDURE — 25000003 PHARM REV CODE 250: Performed by: PHYSICAL MEDICINE & REHABILITATION

## 2017-08-09 PROCEDURE — 63600175 PHARM REV CODE 636 W HCPCS: Performed by: PHYSICIAN ASSISTANT

## 2017-08-09 PROCEDURE — 97163 PT EVAL HIGH COMPLEX 45 MIN: CPT

## 2017-08-09 PROCEDURE — 12800000 HC REHAB SEMI-PRIVATE ROOM

## 2017-08-09 PROCEDURE — 97535 SELF CARE MNGMENT TRAINING: CPT

## 2017-08-09 PROCEDURE — 25000003 PHARM REV CODE 250: Performed by: PHYSICIAN ASSISTANT

## 2017-08-09 PROCEDURE — 97110 THERAPEUTIC EXERCISES: CPT

## 2017-08-09 PROCEDURE — 97167 OT EVAL HIGH COMPLEX 60 MIN: CPT

## 2017-08-09 RX ADMIN — SPIRONOLACTONE 25 MG: 25 TABLET, FILM COATED ORAL at 07:08

## 2017-08-09 RX ADMIN — LISINOPRIL 10 MG: 10 TABLET ORAL at 07:08

## 2017-08-09 RX ADMIN — Medication 10 ML: at 12:08

## 2017-08-09 RX ADMIN — Medication 3 ML: at 02:08

## 2017-08-09 RX ADMIN — DOFETILIDE 250 MCG: 0.12 CAPSULE ORAL at 07:08

## 2017-08-09 RX ADMIN — BUPROPION HYDROCHLORIDE 300 MG: 150 TABLET, EXTENDED RELEASE ORAL at 07:08

## 2017-08-09 RX ADMIN — ATORVASTATIN CALCIUM 10 MG: 10 TABLET, FILM COATED ORAL at 07:08

## 2017-08-09 RX ADMIN — Medication 10 ML: at 05:08

## 2017-08-09 RX ADMIN — AMLODIPINE BESYLATE 10 MG: 10 TABLET ORAL at 07:08

## 2017-08-09 RX ADMIN — DOFETILIDE 250 MCG: 0.12 CAPSULE ORAL at 09:08

## 2017-08-09 RX ADMIN — Medication 3 ML: at 05:08

## 2017-08-09 RX ADMIN — HYDRALAZINE HYDROCHLORIDE 75 MG: 50 TABLET ORAL at 09:08

## 2017-08-09 RX ADMIN — WARFARIN SODIUM 6 MG: 6 TABLET ORAL at 05:08

## 2017-08-09 RX ADMIN — OYSTER SHELL CALCIUM WITH VITAMIN D 1 TABLET: 500; 200 TABLET, FILM COATED ORAL at 07:08

## 2017-08-09 RX ADMIN — ALLOPURINOL 300 MG: 100 TABLET ORAL at 07:08

## 2017-08-09 RX ADMIN — ACETAMINOPHEN 650 MG: 325 TABLET ORAL at 12:08

## 2017-08-09 RX ADMIN — ASPIRIN 81 MG CHEWABLE TABLET 81 MG: 81 TABLET CHEWABLE at 07:08

## 2017-08-09 RX ADMIN — ROPINIROLE 0.5 MG: 0.5 TABLET, FILM COATED ORAL at 02:08

## 2017-08-09 RX ADMIN — DOCUSATE SODIUM 100 MG: 100 CAPSULE, LIQUID FILLED ORAL at 07:08

## 2017-08-09 RX ADMIN — TAMSULOSIN HYDROCHLORIDE 0.4 MG: 0.4 CAPSULE ORAL at 07:08

## 2017-08-09 RX ADMIN — CEFEPIME HYDROCHLORIDE 2 G: 2 INJECTION, SOLUTION INTRAVENOUS at 04:08

## 2017-08-09 RX ADMIN — Medication 3 ML: at 09:08

## 2017-08-09 RX ADMIN — HYDRALAZINE HYDROCHLORIDE 75 MG: 50 TABLET ORAL at 02:08

## 2017-08-09 RX ADMIN — OYSTER SHELL CALCIUM WITH VITAMIN D 1 TABLET: 500; 200 TABLET, FILM COATED ORAL at 09:08

## 2017-08-09 RX ADMIN — LISINOPRIL 10 MG: 10 TABLET ORAL at 09:08

## 2017-08-09 RX ADMIN — Medication 400 MG: at 09:08

## 2017-08-09 RX ADMIN — FOLIC ACID 1 MG: 1 TABLET ORAL at 07:08

## 2017-08-09 RX ADMIN — HYDRALAZINE HYDROCHLORIDE 75 MG: 50 TABLET ORAL at 05:08

## 2017-08-09 RX ADMIN — CEFEPIME HYDROCHLORIDE 2 G: 2 INJECTION, SOLUTION INTRAVENOUS at 03:08

## 2017-08-09 RX ADMIN — THERA TABS 1 TABLET: TAB at 07:08

## 2017-08-09 RX ADMIN — ROPINIROLE 0.5 MG: 0.5 TABLET, FILM COATED ORAL at 09:08

## 2017-08-09 RX ADMIN — Medication 400 MG: at 07:08

## 2017-08-09 NOTE — PROGRESS NOTES
Occupational Therapy   Evaluation/Treatment    Kev Vasquez  MRN: [id number]  Room/Bed: E280/E280 B     08/09/17 0829   OT Time Calculation   OT Start Time 0829   OT Stop Time 1000   OT Total Time (min) 91 min   General   OT Date of Treatment 08/09/17   Chart Reviewed Yes   Onset of Illness/Injury or Date of Surgery 08/09/17   Diagnosis Gait instability   Additional Pertinent History From H&P: 76-year-old male with PMHx of s/p CAGB X 5, CHF, AICD,  s/p LVAD (10/2016 at [facility] in [place]), HTN, CKD II, sleep apnea, skin cancer, RLS, peripheral neuropathy, gait instability and chronic debility since LVAD.  He was recently diagnosed with a Pseudomonas infection to his LVAD driveline site and was started on antibiotics on 7/18.  Some improvement was noted and was switched to Cipro on 7/24. Patient presented to [facility] on 7/28 with increased drainage, tenderness to site and worsening abdominal pain.  Blood cx and urine cx were negative. Driveline site gram stain resulted as GNRs.  He was started on Vancomycin and later changed to Cefepime (end date 9/23/17).  CT abd/pelvis was not remarkable for abscess collection. Neurology was consulted for tremors and recommended to continue home Ropinirole.  The pt demonstrated significant mobility and ADL impairments.  He was admitted to [facility] to address the mobility and ADL impairments.    Past Medical History:   Diagnosis Date    Acute on chronic systolic heart failure 7/27/2015    AICD (automatic cardioverter/defibrillator) present 2014    St Balwinder    CAD (coronary artery disease) 7/27/2015    CHF (congestive heart failure)     Gait instability     HTN (hypertension) 7/27/2015    ICD (implantable cardioverter-defibrillator), biventricular, in situ 7/27/2015    Kidney stones     HILLARY treated with BiPAP 7/27/2015    Peripheral neuropathy     Pulmonary hypertension due to left ventricular systolic dysfunction 7/29/2015    Restless leg syndrome 1992    S/P CABG (coronary  "artery bypass graft) 1993    bypass x 5    Skin cancer     excision 2013    Sleep apnea 1992     Past Surgical History:   Procedure Laterality Date    CARDIAC PACEMAKER PLACEMENT      pacemaker previously, then AICD in 2006. AICD [employer] Balwinder serial #[id number]    CORONARY ARTERY BYPASS GRAFT  1993    KNEE SURGERY Left 2001    complicated by infection, hardware had to be taken out and replaced    LEFT VENTRICULAR ASSIST DEVICE  10/19/2016      Date Referred to OT 08/08/17   Referring Physician Radha   Family/Caregiver Present Yes  (daughter)   Patient Found (position) Seated in bedside chair   Pt found with (LVAD)   Precautions   General Precautions LVAD;fall   Cardiovascular/Pulmonary LVAD   BADL History   Bed Mobility/Transfers needs device   Grooming independent   Bathing needs device   Upper Body Dressing independent   Lower Body Dressing independent   Toileting needs device   Home Management Skills needs assist   IADL History   Driving License Yes   Mode of Transportation Car   Education college   Occupation Retired   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 Pt and spouse live on first floor and have walk-in shower with grab bars in shower and around slightly raised toilet. Was using RW in home for gait and wc for community distances.   Equipment Currently Used at Home cane, quad;grab bar;raised toilet;wheelchair;walker, rolling;shower chair   Subjective   Patient states "I can't walk."   Pain/Comfort   Pain Rating no pain   Cognitive " Status  Comprehension:  - Needs cues to understand (basic tasks)  (5)  Expression:  - Needs cues to express basic needs  (5)  Social Interaction:  - Interacts appropriately 90% of time - needs monitoring or encouragement for participation or interaction  (5)  Problem Solving:  - Solves complex problems with cues  (5)  Memory:  - Recognizes, recalls, or executes 3 steps of 3 step request 90% of time (cueing, reminders<10%, loses track of time)  (5)   Level of Consciousness (AVPU) alert   Arousal Level arouses to voice   Orientation person;place;situation   Able to Follow Commands (Communication) WFL   Speech clear/fluent;follows commands   Mood/Behavior calm;cooperative   Sensory Exam   Additional Documenation yes   Light Touch   LUE w/i normal limits   RUE w/i normal limits   Range of Motion (ROM)   Range of Motion Examination bilateral upper extremity ROM was WFL   Manual Muscle Testing (MMT)   Manual Muscle Testing Results (4/5 B UE)   Fine Motor Coordination Examination   Additional Documentation Fine Motor Coordination   Fine Motor Coordination   Left Hand, Manipulation of Objects moderate impairment   Right Hand, Manipulation of Objects moderate impairment   Tone   Right UE  normal   Left UE normal   Observation   Appearance appears well nourished and well developed with LVAD intact   Posture Rounded shoulders   Skin intact;Other (see comments)  (with VAD driving line exiting abdomen)   Transfers   Transfer yes   Sit to Stand   Sit <> Stand Assistance Moderate Assistance   Sit <> Stand Assistive Device No Assistive Device   Trials/Comments from BS/chair   Stand to Sit   Assistance Moderate Assistance   Assistive Device No Assistive Device   Trials/Comments to BS/chair   Bed to Chair   Bed <> Chair Technique Stand Pivot   Bed <> Chair Transfer Assistance Moderate Assistance   Bed <> Chair Assistive Device No Assistive Device   Trials/Comments from BS/chair>wc   Tub Bench Transfer   Tub Transfer Technique Stand  "Pivot   Tub Bench Transfer Assistance Moderate Assistance   Tub Transfer Assistive Device No Assistive Device   Trials/Comments from  to step over 4" ledge   Toilet Transfer   Toilet Transfer Technique Stand Pivot   Toilet Transfer Assistance Moderate Assistance   Toilet Transfer Assistive Device No Assistive Device   Trials/Comments from <>BSC with use of grab bars as has avail at home.   Feeding   Feeding Level of Assistance Stand by assistance   Feeding Where Assessed (BS/chair)   Feeding Comments assist with cutting up food   Grooming   Grooming Level of Assistance Minimum assistance   Grooming Where Assessed Wheelchair   Grooming Comments assist to comb hair   Bathing   Bathing Level of Assistance Moderate assistance   Bathing Where Assessed Edge of bed   Bathing Comments assist with chest for thoroghness, steadying assist from stance for pericare and some assist with buttocks   UE Dressing   UE Dressing Level of Assistance Minimum assistance   UE Dressing Where Assessed Edge of bed   UE Dressing Comments assist with clothing management down trunk and pt required assist with connecting LVAD batteries this AM.   LE Dressing   LE Dressing Yes   LE Dressing Level of Assistance Maximum assistance   LE Dressing Where Assessed Edge of bed   LE Dressing Comments assist with tying shoes, threading pants, steadying from stance for clothing mangement over hips and tying pants.   Toileting   Toileting Level of Assistance Total assistance   Toileting Where Assessed Bedside commode   Toileting Comments pt found incontinent bladder this AM   Balance   Balance Yes   Dynamic Sitting Balance   Dynamic Sitting-Balance Support No upper extremity supported   Dynamic Sitting-Balance Reaching for objects;Reaching across midline   Dynamic Sitting-Comments (S) for safety during functional activities   Dynamic Standing Balance   Dynamic Standing-Balance Support Right upper extremity supported;Left upper extremity supported "   Dynamic Standing-Balance Reaching for objects;Reaching across midline   Dynamic Standing-Comments Max (A) during functional activities.   Treatment   Treatment OT eval; ADLs   Activity Tolerance   Activity Tolerance Patient tolerated treatment well   After Treatment   Patient Position After Treatment Seated in wheelchair   Patient after treatment left (with PT and posey belt intact)   Assessment   Prognosis Good   Problem List Decreased Self Care skills;Decreased upper extremity range of motion;Decreased upper extremity strength;Decreased safe judgment during ADL;Decreased cognition;Decreased endurance;Decreased fine motor control;Decreased functional mobility;Decreased gross motor control;Decreased IADLs;Decreased Function of right upper extremity;Decreased Function of left upper extremity;Decreased trunk control for functional activities   Assessment Pt participated well in tx session but remains with decreased (I) with ADLs and functional mobility. Pt also has decreased endurance and balance. Pt would continue to benefit from skilled OT services.   Level of Motivation/Participation Good   Short Term Goals   Additional Documentation yes   Time For Goal Achievement 7 days   Pt Will Perform Bathing With minimal assistance;New goal   Bathing - Met/Not Met Not Met   Pt Will Perform LE Dressing With moderate assistance;New goal   LE Dressing - Met/Not Met Not Met   Pt Will Perform Toileting With maximum assistance;New goal   Toileting-Met/Not Met Not Met   Long Term Goals   Additional Documentation yes   Time For Goal Achievement (12 days)   Pt Will Transfer Bed/Chair With contact guard assistance;New goal   Transfer Bed/Chair - Met/Not Met Not Met   Pt Will Transfer To Bedside Commode With contact guard assist;New goal   Transfer Bedside Commode -Met/Not Met Not Met   Pt Will Transfer To Shower With contact guard assist;New goal   Transfer to Shower-Met/Not Met Not Met   Pt Will Perform Eating Independently;New goal    Eating - Met/Not Met Not Met   Pt Will Perform Grooming Independently;New goal   Grooming - Met/Not Met Not Met   Pt Will Perform Bathing With contact guard assistance;New goal   Bathing - Met/Not Met Not Met   Pt Will Perform UE Dressing Independently;New goal   UE Dressing - Met/Not Met Not Met   Pt Will Perform LE Dressing With minimal assistance;New goal   LE Dressing - Met/Not Met Not Met   Pt Will Perform Toileting With contact guard assistance;New goal   Toileting-Met/Not Met Not Met   Discharge Recommendations   Equipment Needed After Discharge bedside commode   Discharge Facility/Level Of Care Needs home with home health   Plan   Plan Self care retraining;Functional transfer training;UE thereex;Equipment Training;Family training;Safety training;Fine motor training;Functional endurance training;Patient education;Postural control;Neuromuscular Re-education;Functional Standing Activities   Therapy Frequency 2 times/day   Occupational Therapy Follow-up   OT Follow-up? Yes   Treatment/Billable Minutes   Evaluation 60   Self Care/Home Management 31   Total Time 91       SHAUNA Bland  8/9/2017    LEGEND:   CGA: Contact Guard Assist   EOB: Edge of Bed   HHA: Hand Held Assist   HOB: Head of Bed   (I): Independent-patient performs task in a timely manner   Max (A): Maximal Assist-patient performs 25-49% of task   Min (A): Minimal Assist- patient performs 75% or more of task   Mod (A): Moderate Assist- patient performs 50-74% of task   NA: Not applicable   NT: Not tested   OOB: Out of Bed   PTA: Prior to admit   QC: Quad Cane   RW: Rolling Walker   (S): Supervision- patient requires cues, coaxing, prompting   SBA: Stand By Assist   SC: Straight Cane   SW: Standard Walker   TBA: To be assessed   Total (A): Total Assist- patient performs less than 25% of task   WC: Wheelchair   WFL: Within Functional Limits   WNL: Within Normal Limits

## 2017-08-09 NOTE — PT/OT/SLP DISCHARGE
Occupational Therapy Discharge Summary    Kev Vasquez  MRN: [id number]   Complication involving left ventricular assist device (LVAD)   Patient Discharged from acute Occupational Therapy on 8/8/17.  Please refer to prior OT note dated on 8/7/17 for functional status.     Assessment:   Patient appropriate for care in another setting.  GOALS:    Occupational Therapy Goals     Not on file          Multidisciplinary Problems (Resolved)        Problem: Occupational Therapy Goal    Goal Priority Disciplines Outcome Interventions   Occupational Therapy Goal   (Resolved)     OT, PT/OT Outcome(s) achieved    Description:  Goals to be met by:  2 weeks 8/12/17     Patient will increase functional independence with ADLs by performing:    Feeding with Supervision.  UE Dressing with Minimal Assistance.  LE Dressing with Minimal Assistance.  Grooming while seated with Supervision.  Revised: Grooming while standing with SBA.  Toileting from bedside commode with Minimal Assistance for hygiene and clothing management.   Stand pivot transfers with Minimal Assistance.  Toilet transfer to bedside commode with Minimal Assistance.                     Reasons for Discontinuation of Therapy Services  Transfer to alternate level of care.      Plan:  Patient Discharged to: Inpatient Rehab     SHAUNA Hanks  8/9/2017

## 2017-08-09 NOTE — PROGRESS NOTES
Admit Assessment    Patient Identification  Kev Vasquze   :  1940  Admit Date:  2017  Attending Provider:  Rashad Garcia MD                EXPECTED DATE OF DISCHARGE:  To be determined at first treatment team staffing.     Pt was admitted to inpatient rehab.   is involved.    NAME OF PERSON PROVIDING INFORMATION FOR ASSESSMENT:  Patient    EMERGENCY CONTACT:  Primary Contact: Lucretia Vasquez                             Mobile Phone: [phone]                Relation: Spouse    LIVING ARRANGEMENTS:  [address], [place] [address].  Pt lives with spouse along with daughter and son-in-law in a two story home.  Pt lives on first floor.      LEVEL OF INDEPENDENCE/ASSISTANCE REQUIRED PRIOR TO ADMISSION: Assistance required.      HOME HEALTH OR OUTPATIENT THERAPY USAGE PRIOR TO ADMISSION:  None      DURABLE MEDICAL EQUIPMENT:  Wheelchair, rolling walker, quad cane and raised toilet seat.    Patient Preference of agencies include:  None stated  Patient/Caregiver informed of right to choose providers or agencies.  Patient provides permission to release any necessary information to [facility] and to Non[facility] agencies as needed to facilitate patient care, treatment planning, and patient discharge planning.  Written and verbal resources provided.      Outpatient Pharmacy:     [id number] - [place], [place] - [address] [facility] AT [facility] [address]  [address] [facility] [address]  Phone: [phone] Fax: [phone]    [facility] [address] [address] AT [facility] & [address] [address]  Phone: [phone] Fax: [phone]      FAMILY/CAREGIVER SUPPORT:  Pt has full-time caregiver to assist with needs.  Pt's spouse is primary support.      LEVEL OF ORIENTATION:  AA&Ox4      ADJUSTMENT TO DIAGNOSIS AND TREATMENT:  Pt adjusting appropriately.      EMOTIONAL/BEHAVIORAL STATUS/COGNITIVE ISSUES:  Pt  presented with calm mood, good recall, judgement and concentration.  Pt asked and answered questions appropriately.        History/Current Substance Use:   Social History     Social History Main Topics    Smoking status: Never Smoker    Smokeless tobacco: Never Used    Alcohol use No    Drug use: No    Sexual activity: Not on file      Comment:          Financial:  Payor/Plan Subscr  Sex Relation Sub. Ins. ID Effective Group Num   1. [place]* LC JEAN-BAPTISTE 1940 Male  [id number]                                    [address]   2. [facility]* LC JEAN-BAPTISTE 1940 Male  [id number]                                   [address] [address]          DISCHARGE PLAN:  Home with family and fulltime caregiver      PATIENT PREFERENCE OF AGENCIES:  None stated      PATIENT/CAREGIVER CONCERNS EXPRESSED DURING ASSESSMENT:  None      INTERVENTIONS/REFERRALS:  Pt caregiver engaged in treatment planning process. SW will continue to follow for all resources, education, discharge planning and psychosocial needs.  Patient/caregiver engaged in treatment planning process.  SW available as needed.

## 2017-08-09 NOTE — PROGRESS NOTES
Occupational Therapy  ADMIT FIM SCORES    Kev Vasquez  MRN: [id number]  Room/Bed: E280/E280 B       08/09/17 1600   Transfers   Bed/Chair/WC 3   Toilet 3   Shower 3   Self Care   Eating 5   Grooming 4   Bathing 3   Dressing-Upper 4   Dressing-Lower 2   Toileting 1   Communication   Comprehension 5   Mode B   Expression 5   Mode B   Social Cognition   Social Interaction 5   Problem Solving 5   Memory 5       SHAUNA Bland  8/9/2017

## 2017-08-09 NOTE — PROGRESS NOTES
"Physical Therapy   Evaluation    Kev Vasquez   MRN: [id number]    08/09/17 1000   PT Time Calculation   PT Start Time 1000   PT Stop Time 1130   PT Total Time (min) 90 min   Treatment   Treatment Type Evaluation   General   PT Received On 08/09/17   Chart Reviewed Yes   Onset of Illness/Injury or Date of Surgery 7/18/17   Diagnosis gait instability   Additional Pertinent History See MD H&P   Date Referred to PT 08/09/17   Referring Physician Radha   Missed Time Reason Not applicable   Family/Caregiver Present No   Patient Found (position) Seated in wheelchair   Pt found with (LVAD)   Precautions   General Precautions LVAD;fall   Orthopedic No   Required Braces or Orthoses No   Cardiovascular/Pulmonary LVAD   Previous Level of Function   Ambulation Skills needs device   Transfer Skills needs device   ADL Skills needs assist   Living Environment   Lives With spouse;child([place]), adult  (wife, daughter, and son in law)   Living Arrangements house   Home Accessibility stairs within home   Home Layout Able to live on 1st floor   Number of Stairs Within Home 1   Stair Railings at Home none   Transportation Available family or friend will provide   Living Environment Comment Pt reports living in a 2 SH, but he lives on the first floor. Pt has a small step down into the living room and a step up into the bedroom. Pt has a walk in shower with no step to enter and was sitting on his shower chair to bathe.  Pt has a 24/7 aid every day of the week for assistance.  Pt has a raised toilet seat (~1 inch) and two grab bars by toilet and one to get into and out of shower. Pt reports that PTA, he was using a RW for gait in the house and for short distances and was using wheelchair for long community distances.    Equipment Currently Used at Home cane, quad;grab bar;raised toilet;wheelchair;walker, rolling   Subjective   Patient states "I fell a few months ago and my hip has been weak ever since."   Patient stated goals "to walk with " "better balance"   Pain/Comfort   Pain Rating 1 no pain   Cognitive Status   Level of Consciousness (AVPU) alert   Arousal Level opens eyes spontaneously   Orientation oriented x 4   Able to Follow Commands (Communication) WNL   Speech clear/fluent;follows commands   Mood/Behavior cooperative;calm   Sensory Examination   Additional Documentation yes   Light Touch   LLE w/i normal limits   RLE w/i normal limits   Range of Motion (ROM)   Range of Motion Examination no ROM deficits were identified   Manual Muscle Testing (MMT)   Manual Muscle Testing Results no strength deficits were identified  (B LEs 5/5 at all joints)   Tone   Right LE normal   Left LE normal   Observation   Appearance tall male seated in wheelchair with LVAD in fishing vest   Posture Normal for age;Rounded shoulders   Skin intact   Bed Mobility   Bed Mobility yes   Rolling/Turning to Left Stand by assistance   Rolling/Turning Right Stand by assistance   Supine to Sit Stand by Assistance   Supine to Sit Comments x 1 on mat   Sit to Supine Stand by Assistance   Transfers   Transfer yes   Sit to Stand   Sit <> Stand Assistance Contact Guard Assistance   Sit <> Stand Assistive Device Rolling Walker   Trials/Comments Pt was using a RW PTA; pt able to perform with CGA   Stand to Sit   Assistance Maximum Assistance;Contact Guard Assistance   Assistive Device Rolling Walker   Trials/Comments Pt was using RW; pt was able to consistently perform stand >sit with CGA with the exception of one time after gait where he lost his balance due to posterior lean and required Max A for controlled lower into chair   Chair to Mat   Chair<> Mat Technique Stand Pivot   Chair<>Mat Assistance Minimum Assistance   Chair <> Mat Assistive Device Rolling Walker   Trials/Comments CGA for sit <> stand portion of transfer and Min A for balance during pivot   Mat to Chair   Technique Stand Pivot   Assistance Minimum Assistance   Assistive Device Rolling Walker   Trials/Comments CGA " for sit <> stand portion of transfer and Min A for balance during pivot   Shower Transfer   Technique Stand Pivot   Assistance Minimum Assistance   Assistive Device Rolling Walker   Trials/Comments CGA for sit <> stand portion of transfer and Min A for balance during pivot   Toilet Transfer   Toilet Transfer Technique Stand Pivot   Toilet Transfer Assistance Minimum Assistance   Toilet Transfer Assistive Device Rolling Walker   Trials/Comments CGA for sit <> stand portion of transfer and Min A for balance during pivot   Car Transfer   Car Transfer Technique Stand Pivot   Car Transfer Assistance Minimum Assistance   Car Transfer Assistive Device No Assistive Device   Trials/Comments CGA for sit <> stand portion of transfer and Min A for balance during pivot   Wheelchair Activities   Propulsion Yes   Propulsion Type 1 Manual   Level 1 Level tile   Method 1 Left upper extremity;Right upper extremity   Level of Assistance 1 Stand by assistsance   Description/ Details 1 100ft with SBA and increased time due to slow pace   Gait   Gait Yes   Weight Bearing Status FWB   Gait 1   Surface 1 Level tile;Carpet;Ramp   Gait Assistive Device Rolling walker   Other Apparatus 1 Wheelchair follow   Assistance 1 Minimum assistance;Moderate assistance   Gait Distance 50ft over level tile with Mod A for balance and stability due to posterior lean; ~20 ft over carpeted ramp with Min A for balance during turning with RW   Gait Pattern swing-through gait   Gait Deviation(s) decreased william;increased time in double stance;decreased velocity of limb motion;decreased step length;decreased stride length;backward lean   Impairments Contributing to Gait Deviations impaired balance   Stairs   Stairs Yes   Stairs/Curb Step   Rails 1 Bilateral   Device 1 No device   Assistance 1 Contact guard   Comment/# Steps 1 Pt navigated 12 steps with CGA for safety   Stairs/ Curb Step  2   Rails 2 None (Comment)   Device 2 Rolling walker   Assistance 2  "Minimum assistance   Comment/# Steps 2 Pt navigated a 4" curb step x 2 trials with Min A for balance   Endurance Deficit   Endurance Deficit Yes   Endurance Deficit Description not at PLOF   Activity Tolerance   Activity Tolerance Patient tolerated treatment well   After Treatment   Patient Position After Treatment Seated in wheelchair   Patient after treatment left call button in reach  (safety belt in place)   Treatment   Treatment leg press for 15 minutes in order to improve B LE strength and endurance for activity, bridges wtih bolster for 3 x 15 reps   Assessment   Prognosis Good   Problem List Decreased endurance;Impaired balance;Decreased mobility;Decreased safety awareness   Session Assessment other (see comments)  (Evaluation completed)   Assessment Pt presents to IPR with diagnosis of gait instability. Pt has an LVAD since 10/2016, with an infection that caused him to be hospitalized. Pt has good family support, but has multiple steps within his home. PTA, patient was using a RW for gait, so PT utilized a RW during all transfers in evaluation. Pt is CGA for sit <> stand with RW, but Min A for stand pivot portion of transfer due to decreased balance. Pt is limited by decreased balance, decreased safety awareness, and overall decreased mobility. Pt will benefit from skilled PT intervention in order ot address impairments.    Level of Motivation/Participation good   Barriers to Discharge Inaccessible home environment   Barriers to Discharge Comments steps within home   Short Term Goals   Additional Documentation yes   Pt Will Perform Supine To Sit New goal;Supervision   Pt Will Perform Sit to Supine New goal;Supervision   Pt Will Logroll New goal;Supervision   Pt Will Transfer Sit to Stand New goal;With RW;With stand by assist   Pt Will Transfer Bed/Chair New goal;Stand Pivot;With RW;With contact guard assistance   Pt Will Ambulate New goal;31-50 feet; feet;With RW;With minimal assist;With contact guard " "assistance   Pt Will Go Up / Down Stairs New goal;1 flight;With stand by assist   Pt Will Go Up / Down Curb Step New goal;4" curb step;With RW;With contact guard assist   Pt Will Tolerate Exercise New goal;11-15 reps;With stand by assist   Pt Will Propel Wheelchair New goal;> 150 feet;Supervision;With (B) UE;With (B) LE   Other Goal Pt will transfer wheelchair <> shower chair with STPT, RW, and CGA   Other Goal 2 Pt will transfer wheelchair <> BSC with STPT, RW and CGA   Long Term Goals   Additional Documentation yes   Time For Goal Achievement (12-14 days)   Pt Will Perform Supine To Sit New goal;Modified Independently   Pt Will Perform Sit to Supine New goal;Modified Independently   Pt Will Logroll New goal;Modified Indepent   Pt Will Transfer Sit to Stand New goal;With RW;With stand by assist;Supervision   Pt Will Transfer Bed/Chair New goal;Stand Pivot;With RW;With stand by assist;Supervision   Pt Will Ambulate New goal;101-150 feet;151-200 feet;> 200 feet;With RW;With stand by assist;Supervision   Pt Will Go Up / Down Stairs New goal;1 flight;With stand by assist;Supervision   Pt Will Go Up / Down Curb Step New goal;4" curb step;With RW;With stand by assist;Supervision   Pt Will Tolerate Exercise New goal;With stand by assist;15-20 reps   Pt Will Propel Wheelchair New goal;> 150 feet;Supervision;With (B) UE;With (B) LE   Other Goal pt will transfer wheelchair <> shower chair with STPT, RW, and SBA/SPV   Other Goal 2 Pt will transfer wheelchair <> BSC wtih STPT, RW, and SBA/SPV   Discharge Recommendations   Equipment Needed After Discharge bedside commode   Discharge Facility/Level Of Care Needs home health PT   Plan   Planned Therapy Intervention Plan of care initiated;Bed mobility training;Transfer training;Gait training;Balance Training;Wheelchair Management/Propulsion;Strengthening   Therapy Frequency 2 times/day;Monday-Friday;Saturday or Sunday   Plan of Care Expires on 09/08/17   Physical Therapy Follow-up "   PT Follow-up? Yes   PT - Next Visit Date 08/10/17   Treatment/Billable Minutes   Evaluation 60   Therapeutic Activity 10   Therapeutic Exercise 20   Total Time 90   Addendum to above: Pt not appropriate to  object off floor due to safety concerns.     Georgette Early DPT  8/9/2017

## 2017-08-09 NOTE — PROGRESS NOTES
Physical Therapy   Admit FIM Scores    Kev Vasquez   MRN: [id number]      08/09/17 1200   Transfers   Bed/Chair/WC 3   Toilet 3   Shower 3   Locomotion   Distance Walked 2   Distance Wheelchair 2   Walk 2   Wheelchair 2   Mode C   Stairs 4   Georgette Early DPT  8/9/2017

## 2017-08-09 NOTE — PT/OT/SLP DISCHARGE
Physical Therapy Discharge Summary    Kev Vasquez  MRN: [id number]   Complication involving left ventricular assist device (LVAD)   Patient Discharged from acute Physical Therapy on 17.  Please refer to prior PT noted date on 17 for functional status.     Assessment:   Goals partially met. Patient appropriate for care in another setting.  GOALS:    Physical Therapy Goals     Not on file          Multidisciplinary Problems (Resolved)        Problem: Physical Therapy Goal    Goal Priority Disciplines Outcome Goal Variances Interventions   Physical Therapy Goal   (Resolved)     PT/OT, PT Outcome(s) achieved     Description:  Goals to be met by: 17     Patient will increase functional independence with mobility by performin. Supine to sit with Moderate Assistance - MET       Revised:  Supine to sit with SBA. - met   2. Sit to supine with Moderate Assistance - met   3. Sit to stand transfer with Moderate Assistance -- MET        Revised: Sit to stand transfer with SBA. - met   4. Gait  x 10 feet with Moderate Assistance using Rolling Walker or appropriate AD. -- MET       Revised: Gait x 150 ft with contact guard assistance using rolling walker.   6. Stand for 2 minutes with Minimal Assistance using Rolling Walker or appropriate AD without LOB  7. Lower extremity exercise program x10 reps, with supervision, in order to increase LE strength and (I) with functional mobility. - met                        Reasons for Discontinuation of Therapy Services  Transfer to alternate level of care.      Plan:  Patient Discharged to: Inpatient Rehab.    Wendy Ackerman, PT, DPT   2017  [phone]

## 2017-08-09 NOTE — PROGRESS NOTES
[facility]  Physical Medicine & Rehab  Progress Note    Patient Name: Kev Vasquez  MRN: [id number]  Patient Class: IP- Rehab   Admission Date: 8/8/2017  Length of Stay: 1 days  Attending Physician: Rashad Garcia MD  Primary Care Provider: Mega Collins MD    Subjective:     Principal Problem:Gait instability    Interval History: Pt without new c/o's.  I have reviewed the HPI and PMFSH and there are no changes from admission.        Scheduled Medications:    allopurinol  300 mg Oral Daily    amlodipine  10 mg Oral Daily    aspirin  81 mg Oral Daily    atorvastatin  10 mg Oral Daily    buPROPion  300 mg Oral Daily    calcium-vitamin D3  1 tablet Oral BID    ceFEPime (MAXIPIME) IVPB  2 g Intravenous Q12H    docusate sodium  100 mg Oral BID    dofetilide  250 mcg Oral Q12H    folic acid  1 mg Oral Daily    hydrALAZINE  75 mg Oral Q8H    lisinopril  10 mg Oral BID    magnesium oxide  400 mg Oral BID    multivitamin  1 tablet Oral Daily    ropinirole  0.5 mg Oral TID    sodium chloride 0.9%  10 mL Intravenous Q6H    sodium chloride 0.9%  3 mL Intravenous Q8H    spironolactone  25 mg Oral Daily    tamsulosin  0.4 mg Oral Daily    warfarin  4 mg Oral Every Tues, Thurs, Sat    [START ON 8/13/2017] warfarin  4 mg Oral Every Sun    warfarin  6 mg Oral Every Mon, Wed, Fri       PRN Medications: acetaminophen, bisacodyl, diphenoxylate-atropine 2.5-0.025 mg, magnesium hydroxide 400 mg/5 ml, ondansetron, polyethylene glycol, Flushing PICC Protocol **AND** sodium chloride 0.9% **AND** sodium chloride 0.9%, sodium phosphates, trazodone    Review of Systems   Constitutional: Negative for appetite change and fatigue.   Eyes: Negative for visual disturbance.   Respiratory: Negative for shortness of breath.    Cardiovascular: Negative for chest pain.   Gastrointestinal: Negative for constipation and diarrhea.   Genitourinary: Negative for dysuria, frequency and urgency.    Musculoskeletal: Positive for gait problem. Negative for arthralgias, back pain, joint swelling, myalgias, neck pain and neck stiffness.   Neurological: Positive for tremors and weakness. Negative for dizziness, numbness and headaches.   Psychiatric/Behavioral: Negative for dysphoric mood.   All other systems reviewed and are negative.    Objective:     Vital Signs (Most Recent):  Temp: 98.1 °F (36.7 °C) (08/09/17 0745)  Pulse: 68 (08/09/17 0745)  Resp: 18 (08/09/17 0745)  BP: (!) 80/0 (08/09/17 1408)  SpO2: 95 % (08/09/17 0745)    Vital Signs (24h Range):  Temp:  [98.1 °F (36.7 °C)-98.4 °F (36.9 °C)] 98.1 °F (36.7 °C)  Pulse:  [61-68] 68  Resp:  [18] 18  SpO2:  [95 %-97 %] 95 %  BP: (80-86)/(0) 80/0     Physical Exam   Constitutional: He is oriented to person, place, and time. He appears well-nourished.   Eyes: EOM are normal. Pupils are equal, round, and reactive to light.   Neck: Normal range of motion. Neck supple.   Cardiovascular: Normal rate.    Pulmonary/Chest: Effort normal.   Musculoskeletal: Normal range of motion.   Neurological: He is oriented to person, place, and time. He has normal strength.   Skin: No rash noted.   Psychiatric: He has a normal mood and affect.     NEUROLOGICAL EXAMINATION:     MENTAL STATUS   Oriented to person, place, and time.     CRANIAL NERVES     CN III, IV, VI   Pupils are equal, round, and reactive to light.  Extraocular motions are normal.     MOTOR EXAM     Strength   Strength 5/5 throughout.       Assessment/Plan:      Kev Vasquez is a 76 y.o. male admitted to inpatient rehabilitation on 8/8/2017 for Gait instability with impaired mobility and ADLs. Patient remains appropriate for PT, OT, and as required Speech therapy. Patient continues to require 24 hour nursing care as well as daily Physician assessment.    * Gait instability    PT/OT evals          Chronic systolic heart failure    Compensated with LVAD        Stage 2 chronic kidney disease    Stable         Complication involving left ventricular assist device (LVAD)    Still with some drainage -- monitor.  On cefepime through 9/23.             DISCHARGE PLANNING:  Tentative Discharge Date:     Future Appointments  Date Time Provider Department Center   8/23/2017 12:00 PM LAB, APPOINTMENT [facility] LAB [facility]   8/23/2017 1:30 PM [facility]TRANSPL[facility]   8/23/2017 1:30 PM Carlito Santana MD [facility]   10/26/2017 8:00 AM HOME MONITOR DEVICE CHECK, [facility] AR[facility]       Rashad Garcia MD  Department of Physical Medicine & Rehab   [facility]

## 2017-08-09 NOTE — SUBJECTIVE & OBJECTIVE
Interval History: Pt without new c/o's.  I have reviewed the HPI and PMFSH and there are no changes from admission.        Scheduled Medications:    allopurinol  300 mg Oral Daily    amlodipine  10 mg Oral Daily    aspirin  81 mg Oral Daily    atorvastatin  10 mg Oral Daily    buPROPion  300 mg Oral Daily    calcium-vitamin D3  1 tablet Oral BID    ceFEPime (MAXIPIME) IVPB  2 g Intravenous Q12H    docusate sodium  100 mg Oral BID    dofetilide  250 mcg Oral Q12H    folic acid  1 mg Oral Daily    hydrALAZINE  75 mg Oral Q8H    lisinopril  10 mg Oral BID    magnesium oxide  400 mg Oral BID    multivitamin  1 tablet Oral Daily    ropinirole  0.5 mg Oral TID    sodium chloride 0.9%  10 mL Intravenous Q6H    sodium chloride 0.9%  3 mL Intravenous Q8H    spironolactone  25 mg Oral Daily    tamsulosin  0.4 mg Oral Daily    warfarin  4 mg Oral Every Tues, Thurs, Sat    [START ON 8/13/2017] warfarin  4 mg Oral Every Sun    warfarin  6 mg Oral Every Mon, Wed, Fri       PRN Medications: acetaminophen, bisacodyl, diphenoxylate-atropine 2.5-0.025 mg, magnesium hydroxide 400 mg/5 ml, ondansetron, polyethylene glycol, Flushing PICC Protocol **AND** sodium chloride 0.9% **AND** sodium chloride 0.9%, sodium phosphates, trazodone    Review of Systems   Constitutional: Negative for appetite change and fatigue.   Eyes: Negative for visual disturbance.   Respiratory: Negative for shortness of breath.    Cardiovascular: Negative for chest pain.   Gastrointestinal: Negative for constipation and diarrhea.   Genitourinary: Negative for dysuria, frequency and urgency.   Musculoskeletal: Positive for gait problem. Negative for arthralgias, back pain, joint swelling, myalgias, neck pain and neck stiffness.   Neurological: Positive for tremors and weakness. Negative for dizziness, numbness and headaches.   Psychiatric/Behavioral: Negative for dysphoric mood.   All other systems reviewed and are negative.    Objective:      Vital Signs (Most Recent):  Temp: 98.1 °F (36.7 °C) (08/09/17 0745)  Pulse: 68 (08/09/17 0745)  Resp: 18 (08/09/17 0745)  BP: (!) 80/0 (08/09/17 1408)  SpO2: 95 % (08/09/17 0745)    Vital Signs (24h Range):  Temp:  [98.1 °F (36.7 °C)-98.4 °F (36.9 °C)] 98.1 °F (36.7 °C)  Pulse:  [61-68] 68  Resp:  [18] 18  SpO2:  [95 %-97 %] 95 %  BP: (80-86)/(0) 80/0     Physical Exam   Constitutional: He is oriented to person, place, and time. He appears well-nourished.   Eyes: EOM are normal. Pupils are equal, round, and reactive to light.   Neck: Normal range of motion. Neck supple.   Cardiovascular: Normal rate.    Pulmonary/Chest: Effort normal.   Musculoskeletal: Normal range of motion.   Neurological: He is oriented to person, place, and time. He has normal strength.   Skin: No rash noted.   Psychiatric: He has a normal mood and affect.     NEUROLOGICAL EXAMINATION:     MENTAL STATUS   Oriented to person, place, and time.     CRANIAL NERVES     CN III, IV, VI   Pupils are equal, round, and reactive to light.  Extraocular motions are normal.     MOTOR EXAM     Strength   Strength 5/5 throughout.

## 2017-08-09 NOTE — PLAN OF CARE
Problem: Patient Care Overview  Goal: Plan of Care Review  Outcome: Ongoing (interventions implemented as appropriate)  Patient AAOx4 with no complaints. Vs stable. Call light within reach instructed patient to use call light for assistance and before getting. Patient fed self and tolerated breakfast well. Will continue to monitor patient.

## 2017-08-09 NOTE — NURSING
BLADDER  [] Pt. uses toilet (7)  [] Pt. is on dialysis - does not urinate (7)  [] Pt. uses bedside commode (5)  []Pt. uses bedpan - staff does___% (includes rolling on/off, holding bedpan in place                                                                   and emptying pan)   [] Pt. uses urinal - staff empties                          []   Pt. needs help with positioning the urinal (4)                          []   Pt. needs help holding the urinal (2)  []  Pt. has silverio catheter/suprapubic catheter - staff empties (1)  []  Pt. Is on ICP program:                           []  Pt. does catheterization (6)                           [] Staff does catheterization (1)  [x] Pt. Is incontinent - staff does ______% of tasks  Number of accidents during this shift _____    BOWEL  [] Pt. uses toilet (7)  [] Pt. uses bedside commode (5)  [] Pt. uses bedpan - staff does ______% of task  [] Pt. is on bowel program::                         []   Pt. inserts suppository (6)                         []   Nurse inserts suppository (4)                         []  Nurse does dig. stim. (1)  [] Pt. has colostomy - staff maintains care and empties (1)                       Pt. does ____% of care  [] Pt. is incontinent - staff does ____% of tasks  [x] No bowel movement during this shift  [] Number of accidents during this shift ______    EATING  [x] Pt. opens packages, cuts food, pours liquids, and feeds self, regular consistency (7)  [] Pt. needs dentures, swallow technique, special foods or drink consistency (6)  [] Pt. needs supervision (cueing), setup (open containers, cut food, etc.) (5)  []Pt. needs assist with occasional scooping, or checking for food pocketing (75-90%) (4)  []Pt. needs assist to lead each bite on utensils, patient brings food to mouth (50-74%)(3)  []Pt. needs assist to scoop, bring food to mouth (hand over hand) (25-49%) (2)  []AxillaryPt. Is receiving IV fluids for hydration or tube feeding  (1)    GROOMING  []Pt. performs all tasks independently and safely (7)  []Pt. obtains all articles, needs adaptive/assistive device (wash mitt, etc.) (6)  []Pt. needs supervision (cueing), setup (5)  []Pt. doing 3 of 4 or 4 of 5 tasks, needs assist to put in or remove dentures, steadying (Patient doing 75-90%) (4)   []Pt. doing 2 of 4 or 3 of 5 tasks (3)  []Pt. doing 1 of 4 of 2 of 5 tasks (25-49%) (2)  []Pt. doing 0 of tasks, needs assistance of 2 helpers (1)  [x]Activity occurred with OT    BATHING  []Pt. safety bathes whole body (10 parts of 10) (7)  []Pt. doing 10 of 10 body parts, uses adaptive devices (wash mitt, etc.) (6)  []Pt. doing 10 of 10 body parts or needs supervision or setup (5)  []Pt. doing 8-9 of 10 body parts (75-99%) (4)  []Pt. doing 5-7 of 10 body parts (50-74%) (3)   []Pt. doing 3-4 of 10 body parts (25-49%( (2)  []Pt. doing 0-2 of 10 body parts (0-24%0 (1)  [x]Activity occurred with OT    DRESSING UPPER BODY  []Pt. dresses or undresses independently, obtaining clothes from storage area (7)  []Pt. needs adaptive devices (6)  []Pt. needs supervision (cueing), setup (5)  []Pt. doing 75-99% (4)  []Pt. doing 50-74% (3)  []Pt. doing 25-49% (2)  []Pt. doing 0-24% or needs assist of 2 helpers (1)  [x]Activity occurred with OT    DRESSING LOWER BODY  []Pt. dresses or undresses independently (7)   []Pt. needs adaptive devices (6)  []Pt. needs supervision (cueing), set up helper applies JUSTEN hose or AFO (5)  []Pt. doing 75-99%, needs steadying assist, fastening a belt, etc.) (4)  []Pt. doing 50-74% (3)  []Pt. doing 25-49% (2)  []Pt. doing 0-24% or needs assist of 2 helpers (1)  [x]Activity occurred with OT    TOILETING  []Pt. doing 3 of 3 tasks (pulling down pants, cleaning ced area, pulling up pants) (7)  []Pt. doing all 3 parts but needs assistive device (grab bar) (6)  []Pt. needs supervision or setup (5)  []Pt. doing 3 of 3 parts (75-99%) (4)  []Pt. doing 2 of 3 parts (50-74%) (3)  []Pt. doing 1 of 3  parts (25.49%) (2)  []Pt. needs assist (more than steadying) with all 3 parts or needs assist of 2 helpers,  Incontinent of B/B today (0-24%) (1)  Did not occur    BED/ CHAIR/WHEELCHAIR TRANSFER  []Pt. transfers without assistive device into and out of wheelchair/bed/chair (7)  []Pt. uses assistive/adaptive devices (grab bars, sliding board, walker, bed rails,   wheelchair arm rests, etc.) (6)  [x]Pt. needs supervision, cues, head of bed raised by staff (5)  []Pt. needs steadying assist with any or all parts of transfer, needs assist with one   leg during transfer ( 4)  []Pt. needs lifting or lowering, needs assist with 2 legs during transfer (3)  []Pt. needs lifting and lowering (2)  []Pt. needs assist of 2 helpers(even if 2nd person is just there for safety) or mechanical lift (1)  []Activity did not occur     TOILET TRANSFER  []Pt. transfers from wheelchair or walking without assistive device (7)  []Pt. uses assistive/adaptive device (commode, grab bars, sliding board, walker,   prosthesis, orthotic, raised toilet seat, etc.) (6)  []Pt. needs supervision, cues, or setup (needs assist to lock brakes, remove leg or arm  rest, position sliding board) (5)  []Pt. needs steadying assist with any or all parts of transfer, needs assist with one leg  during transfer (4)  []Pt. needs lifting or lowering, needs assist with two legs during transfer (3)  []Pt. needs lifting and lowering (2)  []Pt. needs assist of 2 helpers or mechanical lift (1)  [x]Activity did not occur    SHOWER TRANSFER  []Pt. transfers without assistive device into and out of shower (7)  []Pt. uses assistive/adaptive device (shower bench, grab bars, etc.) (6)  []Pt. needs supervision, cues, or setup (5)  []Pt. needs steadying assist with any or all parts of transfer, needs assist with one leg  during transfer (4)  []Pt. needs lifting or lowering, needs assist with 2 legs during transfer (3)  []Pt. needs lifting and lowering (2)  []Pt. needs assist of 2  helpers, helper pushes shower chair on wheels in and out of  shower (1)  [x]Activity did not occur

## 2017-08-09 NOTE — PROGRESS NOTES
BLADDER  [] Pt. uses toilet (7)  [] Pt. is on dialysis - does not urinate (7)  [] Pt. uses bedside commode (5)  []Pt. uses bedpan - staff does____% (includes rolling on/off, holding bedpan in place                                                                   and emptying pan)   [] Pt. uses urinal - staff empties                          []   Pt. needs help with positioning the urinal (4)                          []   Pt. needs help holding the urinal (2)  []  Pt. has silverio catheter/suprapubic catheter - staff empties (1)  []  Pt. Is on ICP program:                           []  Pt. does catheterization (6)                           [] Staff does catheterization (1)  [x] Pt. Is incontinent - staff does __100_____% of tasks  Number of accidents during this shift ___0___    BOWEL  [] Pt. uses toilet (7)  [] Pt. uses bedside commode (5)  [] Pt. uses bedpan - staff does _______% of task  [] Pt. is on bowel program::                         []   Pt. inserts suppository (6)                         []   Nurse inserts suppository (4)                         []  Nurse does dig. stim. (1)  [] Pt. has colostomy - staff maintains care and empties (1)                       Pt. does _____% of care  [x] Pt. is incontinent - staff does __100____% of tasks  [x] No bowel movement during this shift  [] Number of accidents during this shift ______    EATING  [x] Pt. opens packages, cuts food, pours liquids, and feeds self, regular consistency (7)  [] Pt. needs dentures, swallow technique, special foods or drink consistency (6)  [] Pt. needs supervision (cueing), setup (open containers, cut food, etc.) (5)  []Pt. needs assist with occasional scooping, or checking for food pocketing (75-90%) (4)  []Pt. needs assist to lead each bite on utensils, patient brings food to mouth (50-74%)(3)  []Pt. needs assist to scoop, bring food to mouth (hand over hand) (25-49%) (2)  []AxillaryPt. Is receiving IV fluids for hydration or tube feeding  (1)    GROOMING  []Pt. performs all tasks independently and safely (7)  []Pt. obtains all articles, needs adaptive/assistive device (wash mitt, etc.) (6)  []Pt. needs supervision (cueing), setup (5)  []Pt. doing 3 of 4 or 4 of 5 tasks, needs assist to put in or remove dentures, steadying (Patient doing 75-90%) (4)   []Pt. doing 2 of 4 or 3 of 5 tasks (3)  []Pt. doing 1 of 4 of 2 of 5 tasks (25-49%) (2)  []Pt. doing 0 of tasks, needs assistance of 2 helpers (1)  []Activity occurred with OT    BATHING  []Pt. safety bathes whole body (10 parts of 10) (7)  []Pt. doing 10 of 10 body parts, uses adaptive devices (wash mitt, etc.) (6)  []Pt. doing 10 of 10 body parts or needs supervision or setup (5)  []Pt. doing 8-9 of 10 body parts (75-99%) (4)  []Pt. doing 5-7 of 10 body parts (50-74%) (3)   []Pt. doing 3-4 of 10 body parts (25-49%( (2)  []Pt. doing 0-2 of 10 body parts (0-24%0 (1)  []Activity occurred with OT    DRESSING UPPER BODY  []Pt. dresses or undresses independently, obtaining clothes from storage area (7)  []Pt. needs adaptive devices (6)  []Pt. needs supervision (cueing), setup (5)  []Pt. doing 75-99% (4)  []Pt. doing 50-74% (3)  []Pt. doing 25-49% (2)  []Pt. doing 0-24% or needs assist of 2 helpers (1)  []Activity occurred with OT    DRESSING LOWER BODY  []Pt. dresses or undresses independently (7)   []Pt. needs adaptive devices (6)  []Pt. needs supervision (cueing), set up helper applies JUSTEN hose or AFO (5)  []Pt. doing 75-99%, needs steadying assist, fastening a belt, etc.) (4)  []Pt. doing 50-74% (3)  []Pt. doing 25-49% (2)  []Pt. doing 0-24% or needs assist of 2 helpers (1)    TOILETING  []Pt. doing 3 of 3 tasks (pulling down pants, cleaning ced area, pulling up pants) (7)  []Pt. doing all 3 parts but needs assistive device (grab bar) (6)  []Pt. needs supervision or setup (5)  []Pt. doing 3 of 3 parts (75-99%) (4)  []Pt. doing 2 of 3 parts (50-74%) (3)  []Pt. doing 1 of 3 parts (25.49%) (2)  []Pt. needs  assist (more than steadying) with all 3 parts or needs assist of 2 helpers,  Incontinent of B/B today (0-24%) (1)    BED/ CHAIR/WHEELCHAIR TRANSFER  []Pt. transfers without assistive device into and out of wheelchair/bed/chair (7)  []Pt. uses assistive/adaptive devices (grab bars, sliding board, walker, bed rails,   wheelchair arm rests, etc.) (6)  []Pt. needs supervision, cues, head of bed raised by staff (5)  []Pt. needs steadying assist with any or all parts of transfer, needs assist with one   leg during transfer ( 4)  []Pt. needs lifting or lowering, needs assist with 2 legs during transfer (3)  []Pt. needs lifting and lowering (2)  []Pt. needs assist of 2 helpers(even if 2nd person is just there for safety) or mechanical lift (1)  [x]Activity did not occur     TOILET TRANSFER  []Pt. transfers from wheelchair or walking without assistive device (7)  []Pt. uses assistive/adaptive device (commode, grab bars, sliding board, walker,   prosthesis, orthotic, raised toilet seat, etc.) (6)  []Pt. needs supervision, cues, or setup (needs assist to lock brakes, remove leg or arm  rest, position sliding board) (5)  []Pt. needs steadying assist with any or all parts of transfer, needs assist with one leg  during transfer (4)  []Pt. needs lifting or lowering, needs assist with two legs during transfer (3)  []Pt. needs lifting and lowering (2)  []Pt. needs assist of 2 helpers or mechanical lift (1)  [x]Activity did not occur    SHOWER TRANSFER  []Pt. transfers without assistive device into and out of shower (7)  []Pt. uses assistive/adaptive device (shower bench, grab bars, etc.) (6)  []Pt. needs supervision, cues, or setup (5)  []Pt. needs steadying assist with any or all parts of transfer, needs assist with one leg  during transfer (4)  []Pt. needs lifting or lowering, needs assist with 2 legs during transfer (3)  []Pt. needs lifting and lowering (2)  []Pt. needs assist of 2 helpers, helper pushes shower chair on wheels in  and out of  shower (1)

## 2017-08-10 ENCOUNTER — PATIENT MESSAGE (OUTPATIENT)
Dept: INFECTIOUS DISEASES | Facility: CLINIC | Age: 77
End: 2017-08-10

## 2017-08-10 ENCOUNTER — TELEPHONE (OUTPATIENT)
Dept: INFECTIOUS DISEASES | Facility: CLINIC | Age: 77
End: 2017-08-10

## 2017-08-10 LAB
ANION GAP SERPL CALC-SCNC: 8 MMOL/L
BUN SERPL-MCNC: 28 MG/DL
CALCIUM SERPL-MCNC: 10.5 MG/DL
CHLORIDE SERPL-SCNC: 106 MMOL/L
CO2 SERPL-SCNC: 23 MMOL/L
CREAT SERPL-MCNC: 1.5 MG/DL
EST. GFR  (AFRICAN AMERICAN): 52.3 ML/MIN/1.73 M^2
EST. GFR  (NON AFRICAN AMERICAN): 45.2 ML/MIN/1.73 M^2
GLUCOSE SERPL-MCNC: 83 MG/DL
INR PPP: 2.5
POTASSIUM SERPL-SCNC: 4.8 MMOL/L
PROTHROMBIN TIME: 25.7 SEC
SODIUM SERPL-SCNC: 137 MMOL/L

## 2017-08-10 PROCEDURE — 12800000 HC REHAB SEMI-PRIVATE ROOM

## 2017-08-10 PROCEDURE — 63600175 PHARM REV CODE 636 W HCPCS: Performed by: PHYSICIAN ASSISTANT

## 2017-08-10 PROCEDURE — 97110 THERAPEUTIC EXERCISES: CPT

## 2017-08-10 PROCEDURE — 25000003 PHARM REV CODE 250: Performed by: PHYSICIAN ASSISTANT

## 2017-08-10 PROCEDURE — 25000003 PHARM REV CODE 250: Performed by: PHYSICAL MEDICINE & REHABILITATION

## 2017-08-10 PROCEDURE — 80048 BASIC METABOLIC PNL TOTAL CA: CPT

## 2017-08-10 PROCEDURE — 85610 PROTHROMBIN TIME: CPT

## 2017-08-10 PROCEDURE — 36415 COLL VENOUS BLD VENIPUNCTURE: CPT

## 2017-08-10 PROCEDURE — 97530 THERAPEUTIC ACTIVITIES: CPT

## 2017-08-10 PROCEDURE — 97802 MEDICAL NUTRITION INDIV IN: CPT | Performed by: NUTRITIONIST

## 2017-08-10 PROCEDURE — 99233 SBSQ HOSP IP/OBS HIGH 50: CPT | Mod: ,,, | Performed by: PHYSICAL MEDICINE & REHABILITATION

## 2017-08-10 PROCEDURE — A4216 STERILE WATER/SALINE, 10 ML: HCPCS | Performed by: PHYSICIAN ASSISTANT

## 2017-08-10 PROCEDURE — 97150 GROUP THERAPEUTIC PROCEDURES: CPT

## 2017-08-10 RX ADMIN — HYDRALAZINE HYDROCHLORIDE 75 MG: 50 TABLET ORAL at 02:08

## 2017-08-10 RX ADMIN — CEFEPIME HYDROCHLORIDE 2 G: 2 INJECTION, SOLUTION INTRAVENOUS at 02:08

## 2017-08-10 RX ADMIN — Medication 3 ML: at 05:08

## 2017-08-10 RX ADMIN — WARFARIN SODIUM 4 MG: 4 TABLET ORAL at 05:08

## 2017-08-10 RX ADMIN — BUPROPION HYDROCHLORIDE 300 MG: 150 TABLET, EXTENDED RELEASE ORAL at 08:08

## 2017-08-10 RX ADMIN — DOFETILIDE 250 MCG: 0.12 CAPSULE ORAL at 07:08

## 2017-08-10 RX ADMIN — Medication 400 MG: at 07:08

## 2017-08-10 RX ADMIN — FOLIC ACID 1 MG: 1 TABLET ORAL at 08:08

## 2017-08-10 RX ADMIN — AMLODIPINE BESYLATE 10 MG: 10 TABLET ORAL at 08:08

## 2017-08-10 RX ADMIN — SPIRONOLACTONE 25 MG: 25 TABLET, FILM COATED ORAL at 08:08

## 2017-08-10 RX ADMIN — Medication 10 ML: at 06:08

## 2017-08-10 RX ADMIN — LISINOPRIL 10 MG: 10 TABLET ORAL at 08:08

## 2017-08-10 RX ADMIN — ALLOPURINOL 300 MG: 100 TABLET ORAL at 08:08

## 2017-08-10 RX ADMIN — HYDRALAZINE HYDROCHLORIDE 75 MG: 50 TABLET ORAL at 05:08

## 2017-08-10 RX ADMIN — ROPINIROLE 0.5 MG: 0.5 TABLET, FILM COATED ORAL at 09:08

## 2017-08-10 RX ADMIN — ROPINIROLE 0.5 MG: 0.5 TABLET, FILM COATED ORAL at 02:08

## 2017-08-10 RX ADMIN — ROPINIROLE 0.5 MG: 0.5 TABLET, FILM COATED ORAL at 05:08

## 2017-08-10 RX ADMIN — Medication 10 ML: at 05:08

## 2017-08-10 RX ADMIN — ATORVASTATIN CALCIUM 10 MG: 10 TABLET, FILM COATED ORAL at 08:08

## 2017-08-10 RX ADMIN — TAMSULOSIN HYDROCHLORIDE 0.4 MG: 0.4 CAPSULE ORAL at 08:08

## 2017-08-10 RX ADMIN — TRAZODONE HYDROCHLORIDE 50 MG: 50 TABLET ORAL at 12:08

## 2017-08-10 RX ADMIN — Medication 3 ML: at 02:08

## 2017-08-10 RX ADMIN — Medication 400 MG: at 08:08

## 2017-08-10 RX ADMIN — Medication 10 ML: at 12:08

## 2017-08-10 RX ADMIN — CEFEPIME HYDROCHLORIDE 2 G: 2 INJECTION, SOLUTION INTRAVENOUS at 04:08

## 2017-08-10 RX ADMIN — OYSTER SHELL CALCIUM WITH VITAMIN D 1 TABLET: 500; 200 TABLET, FILM COATED ORAL at 08:08

## 2017-08-10 RX ADMIN — ASPIRIN 81 MG CHEWABLE TABLET 81 MG: 81 TABLET CHEWABLE at 08:08

## 2017-08-10 RX ADMIN — THERA TABS 1 TABLET: TAB at 08:08

## 2017-08-10 RX ADMIN — OYSTER SHELL CALCIUM WITH VITAMIN D 1 TABLET: 500; 200 TABLET, FILM COATED ORAL at 07:08

## 2017-08-10 RX ADMIN — LISINOPRIL 10 MG: 10 TABLET ORAL at 07:08

## 2017-08-10 RX ADMIN — DOFETILIDE 250 MCG: 0.12 CAPSULE ORAL at 08:08

## 2017-08-10 RX ADMIN — HYDRALAZINE HYDROCHLORIDE 75 MG: 50 TABLET ORAL at 09:08

## 2017-08-10 RX ADMIN — Medication 3 ML: at 10:08

## 2017-08-10 NOTE — PROGRESS NOTES
"Occupational Therapy  PM Treatment  Kev Vasquez   MRN: [id number]   Room/Bed: E280/E280 B       08/10/17 1310   OT Time Calculation   OT Start Time 1310   OT Stop Time 1356   OT Total Time (min) 46 min   General   OT Date of Treatment 08/10/17   Family/Caregiver Present No   Patient Found (position) Seated in wheelchair   Precautions   General Precautions LVAD;fall   Cardiovascular/Pulmonary LVAD   Subjective   Patient states "I guess they were getting a little tired." referring to YENNY CURRY.   Pain/Comfort   Pain Rating no pain   Sit to Stand   Sit <> Stand Assistance Moderate Assistance   Sit <> Stand Assistive Device Rolling Walker   Trials/Comments from wc   Stand to Sit   Assistance Moderate Assistance   Assistive Device Rolling Walker   Trials/Comments to wc with assist to lower   Exercise Tools   Exercise Tools Yes   Rickshaw 20# x 100 reps   Other Exercise Tool 1 rows: 20# x 100 reps to increase strength in prep for ADLs   Dynamic Standing Balance   Dynamic Standing-Balance Support Unilateral upper extremity supported   Dynamic Standing-Balance Lateral lean;Reaching for objects;Reaching across midline  (Posterior lean)   Dynamic Standing-Comments Mod (A) to complete 50% of spatial relations board. Pt with R lateral lean and posterior lean noted with fatigue   Activity Tolerance   Activity Tolerance Patient tolerated treatment well   After Treatment   Patient Position After Treatment Supine in bed   Patient after treatment left call button in reach  (family and sitter present)   Assessment   Prognosis Good   Problem List Decreased Self Care skills;Decreased upper extremity range of motion;Decreased upper extremity strength;Decreased safe judgment during ADL;Decreased cognition;Decreased endurance;Decreased fine motor control;Decreased functional mobility;Decreased gross motor control;Decreased IADLs;Decreased Function of right upper extremity;Decreased Function of left upper extremity;Decreased trunk control for " functional activities   Assessment Pt participated well in tx session but remains with decreased (I) with ADLs and functional mobility. Pt also with balance deficits and lateral and posterior lean affecting safety in standing once fatigued. Pt would continue to benefit form skilled OT services to increase (I) with ADLs and functional mobility.   Level of Motivation/Participation Good   Discharge Recommendations   Equipment Needed After Discharge bedside commode   Discharge Facility/Level Of Care Needs home with home health   Plan   Plan Self care retraining;Functional transfer training;UE thereex;Equipment Training;Family training;Safety training;Fine motor training;Functional endurance training;Patient education;Postural control;Neuromuscular Re-education;Functional Standing Activities   Therapy Frequency 2 times/day   Occupational Therapy Follow-up   OT Follow-up? Yes   Treatment/Billable Minutes   Therapeutic Activity 30   Therapeutic Exercise 16   Total Time 46       SHAUNA Bland  8/10/2017    LEGEND:   CGA: Contact Guard Assist   EOB: Edge[place] Bed   HHA: Hand Held Assist   HOB: Head of Bed   (I): Independent-patient performs task in a timely manner   Max (A): Maximal Assist-patient performs 25-49% of task   Min (A): Minimal Assist- patient performs 75% or more of task   Mod (A): Moderate Assist- patient performs 50-74% of task   NA: Not applicable   NT: Not tested   OOB: Out of Bed   PTA: Prior to admit   QC: Quad Cane   RW: Rolling Walker   (S): Supervision- patient requires cues, coaxing, prompting   SBA: Stand By Assist   SC: Straight Cane   SW: Standard Walker   TBA: To be assessed   Total (A): Total Assist- patient performs less than 25% of task   WC: Wheelchair   WFL: Within Functional Limits   WNL: Within Normal Limits

## 2017-08-10 NOTE — CONSULTS
[facility]  Adult Nutrition  Consult Note     SUMMARY      Recommendations     Recommendation/Intervention:   1. Continue Rx diet   2.  Encourage >50% consumption of all meals   3..  Honor food requests/preferences within diet restrictions   4.  RD to follow     Goals: PO intake to meet > 85% EEN/EPN   Nutrition Goal Status: new  Communication of RD Recs:            Reason for Assessment     Reason for Assessment: MD Consult  Diagnosis: other (see comments) (LVAD complication)  Relevent Medical History: chf, lvad, htn, ckd II   Interdisciplinary Rounds: did not attend     General Information Comments: Pt with fair appetite and intake, consuming 50-75% of meals   Nutrition Discharge Planning: Adequate PO intake      Nutrition Prescription Ordered     Current Diet Order: Regular, 2gm NA 1500ml FR  Nutrition Order Comments:         Evaluation of Received Nutrients/Fluid Intake     % Intake of Estimated Energy Needs:  50 -75 %  % Meal Intake: 50%     Nutrition Risk Screen     Nutrition Risk Screen: no indicators present     Nutrition/Diet History     Patient Reported Diet/Restrictions/Preferences: general, low salt  Factors Affecting Nutritional Intake: other (see comments) (None)     Labs/Tests/Procedures/Meds     Diagnostic Test/Procedure Review: reviewed, pertinent  Pertinent Labs Reviewed: reviewed, pertinent  Pertinent Labs Comments: Na 135, CUN 33, Cr 1.5, P 2.6, alb 3.2  Pertinent Medications Reviewed: reviewed, pertinent  Pertinent Medications Comments: statin, Ca/vit D, folic acid, MVI, warfarin     Physical Findings     Overall Physical Appearance: loss of muscle mass, edematous     Oral/Mouth Cavity: WDL  Skin: intact     Anthropometrics     Temp: 98 °F (36.7 °C)     Height: 6' (182.9 cm)  Weight Method: Bed Scale  Weight: 79.7 kg (175 lb 9.6 oz)  Ideal Body Weight (IBW), Male: 178 lb     % Ideal Body Weight, Male (lb): 92.02 lb     BMI (Calculated): 22.3  BMI Grade: 18.5-24.9 -  normal  Weight Loss: unintentional  Usual Body Weight (UBW), k.3 kg     % Usual Body Weight: 66.9  % Weight Change From Usual Weight: 33.1 %              Estimated/Assessed Needs     Weight Used For Calorie Calculations: 74.3 kg (163 lb 12.8 oz)      Energy Calorie Requirements (kcal): 1901 kcal/d  Energy Need Method: Collins-St Jeor        RMR (Collins-St. Jeor Equation): 1521        Weight Used For Protein Calculations: 74.3 kg (163 lb 12.8 oz)  Protein Requirements: 74-97 g/d     Fluid Need Method: RDA Method, other (see comments) (Per MD or 1 mL/kcal)           RDA Method (mL): 1900     Assessment and Plan     Nutrition Problem  Reduced sodium needs     Related to (etiology):   Fluid retention     Signs and Symptoms (as evidenced by):    Pt with CHF                         Interventions/Recommendations (treatment strategy):  Modify diet to low Na diet, with optimal intake of protein to meet estimated needs and replace lost muscle mass     Nutrition Diagnosis Status:   New           Monitor and Evaluation     Food and Nutrient Intake: food and beverage intake, energy intake  Food and Nutrient Adminstration: diet order  Knowledge/Beliefs/Attitudes: beliefs and attitudes, food and nutrition knowledge/skill  Physical Activity and Function: nutrition-related ADLs and IADLs  Anthropometric Measurements: weight, weight change, body mass index  Biochemical Data, Medical Tests and Procedures: gastrointestinal profile, glucose/endocrine profile, inflammatory profile, electrolyte and renal panel, lipid profile  Nutrition-Focused Physical Findings: overall appearance     Nutrition Risk     Level of Risk:  (1x/week)     Nutrition Follow-Up     RD Follow-up?: Yes

## 2017-08-10 NOTE — PROGRESS NOTES
[facility]  Physical Medicine & Rehab  Progress Note    Patient Name: Kev Vasquez  MRN: [id number]  Patient Class: IP- Rehab   Admission Date: 8/8/2017  Length of Stay: 2 days  Attending Physician: Rashad Garcia MD  Primary Care Provider: Mega Collins MD    Subjective:     Principal Problem:Gait instability    Interval History: Pt without new c/o's.  I have reviewed the HPI and PMFSH and there are no changes from admission.        Scheduled Medications:    allopurinol  300 mg Oral Daily    amlodipine  10 mg Oral Daily    aspirin  81 mg Oral Daily    atorvastatin  10 mg Oral Daily    buPROPion  300 mg Oral Daily    calcium-vitamin D3  1 tablet Oral BID    ceFEPime (MAXIPIME) IVPB  2 g Intravenous Q12H    docusate sodium  100 mg Oral BID    dofetilide  250 mcg Oral Q12H    folic acid  1 mg Oral Daily    hydrALAZINE  75 mg Oral Q8H    lisinopril  10 mg Oral BID    magnesium oxide  400 mg Oral BID    multivitamin  1 tablet Oral Daily    ropinirole  0.5 mg Oral TID    sodium chloride 0.9%  10 mL Intravenous Q6H    sodium chloride 0.9%  3 mL Intravenous Q8H    spironolactone  25 mg Oral Daily    tamsulosin  0.4 mg Oral Daily    warfarin  4 mg Oral Every Tues, Thurs, Sat    [START ON 8/13/2017] warfarin  4 mg Oral Every Sun    warfarin  6 mg Oral Every Mon, Wed, Fri       PRN Medications: acetaminophen, bisacodyl, diphenoxylate-atropine 2.5-0.025 mg, magnesium hydroxide 400 mg/5 ml, ondansetron, polyethylene glycol, Flushing PICC Protocol **AND** sodium chloride 0.9% **AND** sodium chloride 0.9%, sodium phosphates, trazodone    Review of Systems   Constitutional: Negative for appetite change and fatigue.   Eyes: Negative for visual disturbance.   Respiratory: Negative for shortness of breath.    Cardiovascular: Negative for chest pain.   Gastrointestinal: Negative for constipation and diarrhea.   Genitourinary: Negative for dysuria, frequency and urgency.    Musculoskeletal: Positive for gait problem. Negative for arthralgias, back pain, joint swelling, myalgias, neck pain and neck stiffness.   Neurological: Positive for tremors and weakness. Negative for dizziness, numbness and headaches.   Psychiatric/Behavioral: Negative for dysphoric mood.   All other systems reviewed and are negative.    Objective:     Vital Signs (Most Recent):  Temp: 98.3 °F (36.8 °C) (08/10/17 0627)  Pulse: 62 (08/10/17 0627)  Resp: 17 (08/10/17 0627)  BP: (!) 78/0 (08/10/17 1436)  SpO2: 99 % (08/10/17 0627)    Vital Signs (24h Range):  Temp:  [98.2 °F (36.8 °C)-98.3 °F (36.8 °C)] 98.3 °F (36.8 °C)  Pulse:  [62-63] 62  Resp:  [17-18] 17  SpO2:  [95 %-99 %] 99 %  BP: (78-84)/(0) 78/0     Physical Exam   Constitutional: He is oriented to person, place, and time. He appears well-nourished.   Eyes: EOM are normal. Pupils are equal, round, and reactive to light.   Neck: Normal range of motion. Neck supple.   Cardiovascular: Normal rate.    Pulmonary/Chest: Effort normal.   Musculoskeletal: Normal range of motion.   Neurological: He is oriented to person, place, and time. He has normal strength.   Skin: No rash noted.   Psychiatric: He has a normal mood and affect.     NEUROLOGICAL EXAMINATION:     MENTAL STATUS   Oriented to person, place, and time.     CRANIAL NERVES     CN III, IV, VI   Pupils are equal, round, and reactive to light.  Extraocular motions are normal.     MOTOR EXAM     Strength   Strength 5/5 throughout.       Assessment/Plan:      Kev Vasquez is a 76 y.o. male admitted to inpatient rehabilitation on 8/8/2017 for Gait instability with impaired mobility and ADLs. Patient remains appropriate for PT, OT, and as required Speech therapy. Patient continues to require 24 hour nursing care as well as daily Physician assessment.    * Gait instability    Sit-->stand CGA, T/F MNA, gait 50' MNA/MDA RW.  UBD MNA, LBD MXA, toileting DEP on 8/9.      Pt also appears to have mild cognitive  deficits -- will ask ST to see him.          Chronic systolic heart failure    Compensated with LVAD        Stage 2 chronic kidney disease    Stable        Complication involving left ventricular assist device (LVAD)    Still with some drainage -- monitor.  On cefepime through 9/23.             DISCHARGE PLANNING:  Tentative Discharge Date:     Future Appointments  Date Time Provider Department Center   8/23/2017 12:00 PM LAB, APPOINTMENT [facility] LAB VNP [facility]   8/23/2017 1:30 PM HEARTTRANSPLANT, LVADN [facility]   8/23/2017 1:30 PM Carlito Santana MD [facility] HEAR[facility]   10/26/2017 8:00 AM HOME MONITOR DEVICE CHECK, [facility] ARR[facility]       Rashad Garcia MD  Department of Physical Medicine & Rehab   [facility]

## 2017-08-10 NOTE — PROGRESS NOTES
BLADDER  [] Pt. uses toilet (7)  [] Pt. is on dialysis - does not urinate (7)  [] Pt. uses bedside commode (5)  []Pt. uses bedpan - staff does____% (includes rolling on/off, holding bedpan in place                                                                   and emptying pan)   [] Pt. uses urinal - staff empties                          []   Pt. needs help with positioning the urinal (4)                          []   Pt. needs help holding the urinal (2)  []  Pt. has silverio catheter/suprapubic catheter - staff empties (1)  []  Pt. Is on ICP program:                           []  Pt. does catheterization (6)                           [] Staff does catheterization (1)  [x] Pt. Is incontinent - staff does ___99___% of tasks  Number of accidents during this shift ___2___    BOWEL  [] Pt. uses toilet (7)  [] Pt. uses bedside commode (5)  [] Pt. uses bedpan - staff does _______% of task  [] Pt. is on bowel program::                         []   Pt. inserts suppository (6)                         []   Nurse inserts suppository (4)                         []  Nurse does dig. stim. (1)  [] Pt. has colostomy - staff maintains care and empties (1)                       Pt. does _____% of care  [] Pt. is incontinent - staff does ______% of tasks  [x] No bowel movement during this shift  [] Number of accidents during this shift ______    EATING  [x] Pt. opens packages, cuts food, pours liquids, and feeds self, regular consistency (7)  [] Pt. needs dentures, swallow technique, special foods or drink consistency (6)  [] Pt. needs supervision (cueing), setup (open containers, cut food, etc.) (5)  []Pt. needs assist with occasional scooping, or checking for food pocketing (75-90%) (4)  []Pt. needs assist to lead each bite on utensils, patient brings food to mouth (50-74%)(3)  []Pt. needs assist to scoop, bring food to mouth (hand over hand) (25-49%) (2)  []AxillaryPt. Is receiving IV fluids for hydration or tube feeding  (1)    GROOMING  []Pt. performs all tasks independently and safely (7)  []Pt. obtains all articles, needs adaptive/assistive device (wash mitt, etc.) (6)  []Pt. needs supervision (cueing), setup (5)  []Pt. doing 3 of 4 or 4 of 5 tasks, needs assist to put in or remove dentures, steadying (Patient doing 75-90%) (4)   []Pt. doing 2 of 4 or 3 of 5 tasks (3)  []Pt. doing 1 of 4 of 2 of 5 tasks (25-49%) (2)  []Pt. doing 0 of tasks, needs assistance of 2 helpers (1)  []Activity occurred with OT    BATHING  []Pt. safety bathes whole body (10 parts of 10) (7)  []Pt. doing 10 of 10 body parts, uses adaptive devices (wash mitt, etc.) (6)  []Pt. doing 10 of 10 body parts or needs supervision or setup (5)  []Pt. doing 8-9 of 10 body parts (75-99%) (4)  []Pt. doing 5-7 of 10 body parts (50-74%) (3)   []Pt. doing 3-4 of 10 body parts (25-49%( (2)  []Pt. doing 0-2 of 10 body parts (0-24%0 (1)  []Activity occurred with OT    DRESSING UPPER BODY  []Pt. dresses or undresses independently, obtaining clothes from storage area (7)  []Pt. needs adaptive devices (6)  []Pt. needs supervision (cueing), setup (5)  []Pt. doing 75-99% (4)  []Pt. doing 50-74% (3)  []Pt. doing 25-49% (2)  []Pt. doing 0-24% or needs assist of 2 helpers (1)  []Activity occurred with OT    DRESSING LOWER BODY  []Pt. dresses or undresses independently (7)   []Pt. needs adaptive devices (6)  []Pt. needs supervision (cueing), set up helper applies JUSTEN hose or AFO (5)  []Pt. doing 75-99%, needs steadying assist, fastening a belt, etc.) (4)  []Pt. doing 50-74% (3)  []Pt. doing 25-49% (2)  []Pt. doing 0-24% or needs assist of 2 helpers (1)    TOILETING  []Pt. doing 3 of 3 tasks (pulling down pants, cleaning ced area, pulling up pants) (7)  []Pt. doing all 3 parts but needs assistive device (grab bar) (6)  []Pt. needs supervision or setup (5)  []Pt. doing 3 of 3 parts (75-99%) (4)  []Pt. doing 2 of 3 parts (50-74%) (3)  []Pt. doing 1 of 3 parts (25.49%) (2)  []Pt. needs  assist (more than steadying) with all 3 parts or needs assist of 2 helpers,  Incontinent of B/B today (0-24%) (1)    BED/ CHAIR/WHEELCHAIR TRANSFER  []Pt. transfers without assistive device into and out of wheelchair/bed/chair (7)  []Pt. uses assistive/adaptive devices (grab bars, sliding board, walker, bed rails,   wheelchair arm rests, etc.) (6)  []Pt. needs supervision, cues, head of bed raised by staff (5)  []Pt. needs steadying assist with any or all parts of transfer, needs assist with one   leg during transfer ( 4)  []Pt. needs lifting or lowering, needs assist with 2 legs during transfer (3)  []Pt. needs lifting and lowering (2)  []Pt. needs assist of 2 helpers(even if 2nd person is just there for safety) or mechanical lift (1)  [x]Activity did not occur     TOILET TRANSFER  []Pt. transfers from wheelchair or walking without assistive device (7)  []Pt. uses assistive/adaptive device (commode, grab bars, sliding board, walker,   prosthesis, orthotic, raised toilet seat, etc.) (6)  []Pt. needs supervision, cues, or setup (needs assist to lock brakes, remove leg or arm  rest, position sliding board) (5)  []Pt. needs steadying assist with any or all parts of transfer, needs assist with one leg  during transfer (4)  []Pt. needs lifting or lowering, needs assist with two legs during transfer (3)  []Pt. needs lifting and lowering (2)  []Pt. needs assist of 2 helpers or mechanical lift (1)  [x]Activity did not occur    SHOWER TRANSFER  []Pt. transfers without assistive device into and out of shower (7)  []Pt. uses assistive/adaptive device (shower bench, grab bars, etc.) (6)  []Pt. needs supervision, cues, or setup (5)  []Pt. needs steadying assist with any or all parts of transfer, needs assist with one leg  during transfer (4)  []Pt. needs lifting or lowering, needs assist with 2 legs during transfer (3)  []Pt. needs lifting and lowering (2)  []Pt. needs assist of 2 helpers, helper pushes shower chair on wheels in  and out of  shower (1)

## 2017-08-10 NOTE — PROGRESS NOTES
Physical Therapy   Seated Endurance Group    Kev Vasquez   MRN: [id number]        08/10/17 1010   PT Time Calculation   PT Start Time 1010   PT Stop Time 1055   PT Total Time (min) 45 min   Treatment   Treatment Type Treatment  (Seated Endurance Group)   PT/PTA PT   General   PT Received On 08/10/17   Family/Caregiver Present No   Patient Found (position) Seated in wheelchair   Pt found with (LVAD, posey belt donned)   Precautions   General Precautions LVAD;fall   Orthopedic No   Required Braces or Orthoses No   Cardiovascular/Pulmonary LVAD   Subjective   Patient states Pt agreeable to participate in group treatment session   Pain/Comfort   Pain Rating 1 no pain   Pain Rating Post-Intervention 1 no pain   Activity Tolerance   Activity Tolerance Patient tolerated treatment well   Other Comments   Comments Patient participated in 45 minute seated high endurance group. The group activity challenged bilateral upper extremity and lower extremity strengthening. In addition, cardiovascular and functional endurance were challenged during this group. Pt utulized 2# dowel weights 2 x 15 reps to B UE including: bicep curls and chest press.    Patient completed 20 reps x 5 sets bicep curls, side raises, shoulder flexion, shoulder extension, shoulder shrugs (forward/backward), shoulder scrubs (upper, middle, lower) (in UE circuit)    Patient completed 20 reps x 5 sets hip flexion, knee flexion, knee extension, hip ABD toe and heel taps (in LE circuit)    - Alternating punches, side punches, diagonal reaches, upper cut punches x 20 reps x 2 sets    Pt required no rest breaks during therapeutic exercises. These activities were performed in a group setting to encourage participation with peers and social interaction skills.      After Treatment   Patient Position After Treatment Seated in wheelchair   Treatment/Billable Minutes   Therapeutic Activity Group 45   Total Time 45         Sophie Daigle, PT  8/10/2017

## 2017-08-10 NOTE — PROGRESS NOTES
"Physical Therapy   Treatment    Kev Vasquez   MRN: [id number]        08/10/17 0909   PT Time Calculation   PT Start Time 0909   PT Stop Time 0958   PT Total Time (min) 49 min   Treatment   Treatment Type Treatment   PT/PTA PT   General   PT Received On 08/10/17   Family/Caregiver Present No   Patient Found (position) Seated in wheelchair   Pt found with (LVAD, posey belt donned)   Precautions   General Precautions LVAD;fall   Orthopedic No   Required Braces or Orthoses No   Cardiovascular/Pulmonary LVAD   Subjective   Patient states "I am ok"   Pain/Comfort   Pain Rating 1 no pain   Pain Rating Post-Intervention 1 no pain   Bed Mobility   Bed Mobility yes   Rolling/Turning to Left Stand by assistance  (x 1 trial on mat)   Supine to Sit Stand by Assistance   Supine to Sit Comments x 1 trial on mat    Sit to Supine Stand by Assistance  (x 1 trial on mat)   Transfers   Transfer yes   Sit to Stand   Sit <> Stand Assistance Contact Guard Assistance;Minimum Assistance   Sit <> Stand Assistive Device Rolling Walker;Other (see comments)  (parallel bars)   Trials/Comments Pt performed x 5 trials with RW, x 2 trials in parallel bars, slight minimal assistance required from PT to maintain appropriate alignment due to posterior lean during transfer   Stand to Sit   Assistance Minimum Assistance   Assistive Device Rolling Walker;Other (see comments)  (parallel bars)   Trials/Comments Pt performed x 7 trials, assistance required to control pt's descent into wheelchair   Chair to Mat   Chair<> Mat Technique Stand Pivot   Chair<>Mat Assistance Moderate Assistance;Minimum Assistance   Chair <> Mat Assistive Device No Assistive Device   Trials/Comments x 1 trial, pt required maximum verbal cues for appropriate sequence, including verbal cues to increase upright position of trunk. At times pt required verbal cues to initate steps during transfer    Mat to Chair   Technique Stand Pivot   Assistance Moderate Assistance;Minimum Assistance "   Assistive Device No Assistive Device   Trials/Comments x 1 trial, pt required physical assistance to maintain balance during transfer, pt required intermittent verbal cues to take steps to complete this transfer. Strong forward lean observed, pt required assist from PT to elevate trunk   Wheelchair Activities   Propulsion Yes   Propulsion Type 1 Manual   Level 1 Level tile   Method 1 Right upper extremity;Left upper extremity   Level of Assistance 1 Contact guard   Description/ Details 1 Pt propelled self in wheelchair x 100 feet, increased time required for pt to complete activity. Verbal cues from PT required for appropriate navigation of wheelchair    Gait   Gait Yes   Weight Bearing Status full   Gait 1   Surface 1 Level tile   Gait Assistive Device Rolling walker   Assistance 1 Maximum assistance;Moderate assistance;Total assistance   Gait Distance Pt ambulated x 2 trials with RW: 1st trial: 76 feet, 2nd trial: 48 feet. Strong posterior lean observed, decreased R great toe clearance observed with decreased R LE step length, Narrow TOMY, Pt experienced multiple episodes of LOB throughout ambulation trials which required assistance from PT to maintain balance. No observable balance strategies were observed. Pt required total assist from PT to maintain balance.    Gait Pattern swing-through gait   Gait Deviation(s) decreased william;decreased velocity of limb motion;decreased step length;decreased stride length;decreased toe-to-floor clearance;decreased weight-shifting ability;foot flat;toe drag;backward lean   Impairments Contributing to Gait Deviations impaired balance;decreased strength;impaired postural control;impaired motor control;impaired coordination;other (see comments)  (impaired cognition)   Supine   Supine-Exercises Lower extremity;Specific exercises   Supine-Exercise Type Bridging without bolster   Supine-Exercise Comments Pt performed 2 x 10 reps, maximum verbal cues from PT required to maintain  "pt's attention to activity    Side Lying   Side Lying-Exercises Lower extremity;Specific exercises   Side Lying-Exercise Type Hip ABduction with flexed knee   Side Lying-Exercise Comments Pt performed 2 x 10 reps. Pt required maximum verbal cues from PT to maintain attention to activity and to perform exercises appropriately.    Other Activities   Comments Patient performed step up's onto 4" step to promote increased B LE strength in stance phase and to promote increased forward position in order to decrease posterior lean    PT assessed pt's B great toe proprioception: B great toe intact    Patient tolerated static standing in parallel bars with unilateral UE support with CGA, patient able to tolerate static standing with B UE shoulder width apart with no UE support and minimal/moderate assistance to maintain balance due to posterior and R lateral lean  PT attempted to have pt perform balance with extremely narrow TOMY, pt unable to tolerate standing without use of B UE and assist from PT.    Activity Tolerance   Activity Tolerance Patient tolerated treatment well   After Treatment   Patient Position After Treatment Seated in wheelchair   Patient after treatment left (posey belt donned)   Assessment   Prognosis Good   Problem List Decreased strength;Decreased endurance;Impaired balance;Decreased mobility;Decreased cognition;Decreased safety awareness;Decreased coordination   Session Assessment required increased assistance for functional mobility   Assessment Pt required maximum verbal cues for all functional mobility. Pt is greatly limited during functional mobility by strong posterior lean and poor balance. Pt has difficulty processing instructions from PT and requires frequent redirection throughout treatment. At this time pt will require significant 24 hour care for all mobility. Pt will continue to benefit from further skilled PT intervention in order to address these above impairments and to improve pt's " functional independence with mobility   Level of Motivation/Participation good   Barriers to Discharge Inaccessible home environment;Decreased caregiver support   Barriers to Discharge Comments steps within home, pt will require 24 hour supervision and assistance   Discharge Recommendations   Equipment Needed After Discharge bedside commode   Discharge Facility/Level Of Care Needs home health PT   Plan   Planned Therapy Intervention Continue with current plan   Therapy Frequency 2 times/day;daily;Monday-Friday;Saturday or Sunday   Physical Therapy Follow-up   PT Follow-up? Yes   PT - Next Visit Date 08/11/17   Treatment/Billable Minutes   Therapeutic Activity 30   Therapeutic Exercise 15   Total Time 45     Sophie Daigle, PT  8/10/2017

## 2017-08-10 NOTE — PLAN OF CARE
Problem: Patient Care Overview  Goal: Plan of Care Review  Recommendations     Recommendation/Intervention:   1. Continue Rx diet   2.  Encourage >50% consumption of all meals   3..  Honor food requests/preferences within diet restrictions   4.  RD to follow      Goals: PO intake to meet > 85% EEN/EPN   Nutrition Goal Status: new  Communication of RD Recs:     Assessment and Plan     Nutrition Problem  Reduced sodium needs     Related to (etiology):   Fluid retention     Signs and Symptoms (as evidenced by):    Pt with CHF                         Interventions/Recommendations (treatment strategy):  Modify diet to low Na diet, with optimal intake of protein to meet estimated needs and replace lost muscle mass     Nutrition Diagnosis Status:   New

## 2017-08-10 NOTE — TELEPHONE ENCOUNTER
----- Message from Nora Paulino MA sent at 8/10/2017  8:54 AM CDT -----  Contact: Daughter:  Lou       tel:  [phone]      ----- Message -----  From: Janay Iwona  Sent: 8/10/2017   8:30 AM  To: VANDANA Bower Dr[address]s pt./    Daughter of pt. Says she cancelled the 3pm appt. Today by mistake, she was not aware that transport was already set up to bring pt. Here.     Asking if Dr. sethi can give him back his appt. For today .     Pls call.

## 2017-08-10 NOTE — PROGRESS NOTES
Occupational Therapy  PM Treatment  Kev Vasquez   MRN: [id number]   Room/Bed: E280/E280 B       08/10/17 1700   Transfers   Bed/Chair/WC 3   Self Care   Eating 5   Grooming 4   Bathing 3   Dressing-Upper 4   Dressing-Lower 2   Toileting 1       SHAUNA Bland  8/10/2017    LEGEND:   CGA: Contact Guard Assist   EOB: Edgeof Bed   HHA: Hand Held Assist   HOB: Head of Bed   (I): Independent-patient performs task in a timely manner   Max (A): Maximal Assist-patient performs 25-49% of task   Min (A): Minimal Assist- patient performs 75% or more of task   Mod (A): Moderate Assist- patient performs 50-74% of task   NA: Not applicable   NT: Not tested   OOB: Out of Bed   PTA: Prior to admit   QC: Quad Cane   RW: Rolling Walker   (S): Supervision- patient requires cues, coaxing, prompting   SBA: Stand By Assist   SC: Straight Cane   SW: Standard Walker   TBA: To be assessed   Total (A): Total Assist- patient performs less than 25% of task   WC: Wheelchair   WFL: Within Functional Limits   WNL: Within Normal Limits

## 2017-08-10 NOTE — NURSING
BLADDER  [] Pt. uses toilet (7)  [] Pt. is on dialysis - does not urinate (7)  [] Pt. uses bedside commode (5)  []Pt. uses bedpan - staff does___% (includes rolling on/off, holding bedpan in place                                                                   and emptying pan)   [] Pt. uses urinal - staff empties                          []   Pt. needs help with positioning the urinal (4)                          []   Pt. needs help holding the urinal (2)  []  Pt. has silverio catheter/suprapubic catheter - staff empties (1)  []  Pt. Is on ICP program:                           []  Pt. does catheterization (6)                           [] Staff does catheterization (1)  [x] Pt. Is incontinent - staff does _____% of tasks  Number of accidents during this shift ____    BOWEL  [] Pt. uses toilet (7)  [] Pt. uses bedside commode (5)  [] Pt. uses bedpan - staff does _____% of task  [] Pt. is on bowel program::                         []   Pt. inserts suppository (6)                         []   Nurse inserts suppository (4)                         []  Nurse does dig. stim. (1)  [] Pt. has colostomy - staff maintains care and empties (1)                       Pt. does ___% of care  [] Pt. is incontinent - staff does _____% of tasks  [x] No bowel movement during this shift  [] Number of accidents during this shift ______    EATING  [x] Pt. opens packages, cuts food, pours liquids, and feeds self, regular consistency (7)  [] Pt. needs dentures, swallow technique, special foods or drink consistency (6)  [] Pt. needs supervision (cueing), setup (open containers, cut food, etc.) (5)  []Pt. needs assist with occasional scooping, or checking for food pocketing (75-90%) (4)  []Pt. needs assist to lead each bite on utensils, patient brings food to mouth (50-74%)(3)  []Pt. needs assist to scoop, bring food to mouth (hand over hand) (25-49%) (2)  []AxillaryPt. Is receiving IV fluids for hydration or tube feeding  (1)    GROOMING  []Pt. performs all tasks independently and safely (7)  []Pt. obtains all articles, needs adaptive/assistive device (wash mitt, etc.) (6)  [x]Pt. needs supervision (cueing), setup (5)  []Pt. doing 3 of 4 or 4 of 5 tasks, needs assist to put in or remove dentures, steadying (Patient doing 75-90%) (4)   []Pt. doing 2 of 4 or 3 of 5 tasks (3)  []Pt. doing 1 of 4 of 2 of 5 tasks (25-49%) (2)  []Pt. doing 0 of tasks, needs assistance of 2 helpers (1)  []Activity occurred with OT    BATHING  []Pt. safety bathes whole body (10 parts of 10) (7)  []Pt. doing 10 of 10 body parts, uses adaptive devices (wash mitt, etc.) (6)  []Pt. doing 10 of 10 body parts or needs supervision or setup (5)  []Pt. doing 8-9 of 10 body parts (75-99%) (4)  []Pt. doing 5-7 of 10 body parts (50-74%) (3)   []Pt. doing 3-4 of 10 body parts (25-49%( (2)  []Pt. doing 0-2 of 10 body parts (0-24%0 (1)  []Activity occurred with OT    DRESSING UPPER BODY  []Pt. dresses or undresses independently, obtaining clothes from storage area (7)  []Pt. needs adaptive devices (6)  [x]Pt. needs supervision (cueing), setup (5)  []Pt. doing 75-99% (4)  []Pt. doing 50-74% (3)  []Pt. doing 25-49% (2)  []Pt. doing 0-24% or needs assist of 2 helpers (1)  []Activity occurred with OT    DRESSING LOWER BODY  []Pt. dresses or undresses independently (7)   []Pt. needs adaptive devices (6)  [x]Pt. needs supervision (cueing), set up helper applies JUSTEN hose or AFO (5)  []Pt. doing 75-99%, needs steadying assist, fastening a belt, etc.) (4)  []Pt. doing 50-74% (3)  []Pt. doing 25-49% (2)  []Pt. doing 0-24% or needs assist of 2 helpers (1)    TOILETING  []Pt. doing 3 of 3 tasks (pulling down pants, cleaning ced area, pulling up pants) (7)  []Pt. doing all 3 parts but needs assistive device (grab bar) (6)  []Pt. needs supervision or setup (5)  []Pt. doing 3 of 3 parts (75-99%) (4)  []Pt. doing 2 of 3 parts (50-74%) (3)  []Pt. doing 1 of 3 parts (25.49%) (2)  [x]Pt.  needs assist (more than steadying) with all 3 parts or needs assist of 2 helpers,  Incontinent of B/B today (0-24%) (1)    BED/ CHAIR/WHEELCHAIR TRANSFER  []Pt. transfers without assistive device into and out of wheelchair/bed/chair (7)  [x]Pt. uses assistive/adaptive devices (grab bars, sliding board, walker, bed rails,   wheelchair arm rests, etc.) (6)  []Pt. needs supervision, cues, head of bed raised by staff (5)  []Pt. needs steadying assist with any or all parts of transfer, needs assist with one   leg during transfer ( 4)  []Pt. needs lifting or lowering, needs assist with 2 legs during transfer (3)  []Pt. needs lifting and lowering (2)  []Pt. needs assist of 2 helpers(even if 2nd person is just there for safety) or mechanical lift (1)  []Activity did not occur     TOILET TRANSFER  []Pt. transfers from wheelchair or walking without assistive device (7)  []Pt. uses assistive/adaptive device (commode, grab bars, sliding board, walker,   prosthesis, orthotic, raised toilet seat, etc.) (6)  []Pt. needs supervision, cues, or setup (needs assist to lock brakes, remove leg or arm  rest, position sliding board) (5)  []Pt. needs steadying assist with any or all parts of transfer, needs assist with one leg  during transfer (4)  []Pt. needs lifting or lowering, needs assist with two legs during transfer (3)  []Pt. needs lifting and lowering (2)  []Pt. needs assist of 2 helpers or mechanical lift (1)  [x]Activity did not occur    SHOWER TRANSFER  []Pt. transfers without assistive device into and out of shower (7)  []Pt. uses assistive/adaptive device (shower bench, grab bars, etc.) (6)  []Pt. needs supervision, cues, or setup (5)  []Pt. needs steadying assist with any or all parts of transfer, needs assist with one leg  during transfer (4)  []Pt. needs lifting or lowering, needs assist with 2 legs during transfer (3)  []Pt. needs lifting and lowering (2)  []Pt. needs assist of 2 helpers, helper pushes shower chair on  wheels in and out of  shower (1)   [x]Activity did not occur

## 2017-08-10 NOTE — SUBJECTIVE & OBJECTIVE
Interval History: Pt without new c/o's.  I have reviewed the HPI and PMFSH and there are no changes from admission.        Scheduled Medications:    allopurinol  300 mg Oral Daily    amlodipine  10 mg Oral Daily    aspirin  81 mg Oral Daily    atorvastatin  10 mg Oral Daily    buPROPion  300 mg Oral Daily    calcium-vitamin D3  1 tablet Oral BID    ceFEPime (MAXIPIME) IVPB  2 g Intravenous Q12H    docusate sodium  100 mg Oral BID    dofetilide  250 mcg Oral Q12H    folic acid  1 mg Oral Daily    hydrALAZINE  75 mg Oral Q8H    lisinopril  10 mg Oral BID    magnesium oxide  400 mg Oral BID    multivitamin  1 tablet Oral Daily    ropinirole  0.5 mg Oral TID    sodium chloride 0.9%  10 mL Intravenous Q6H    sodium chloride 0.9%  3 mL Intravenous Q8H    spironolactone  25 mg Oral Daily    tamsulosin  0.4 mg Oral Daily    warfarin  4 mg Oral Every Tues, Thurs, Sat    [START ON 8/13/2017] warfarin  4 mg Oral Every Sun    warfarin  6 mg Oral Every Mon, Wed, Fri       PRN Medications: acetaminophen, bisacodyl, diphenoxylate-atropine 2.5-0.025 mg, magnesium hydroxide 400 mg/5 ml, ondansetron, polyethylene glycol, Flushing PICC Protocol **AND** sodium chloride 0.9% **AND** sodium chloride 0.9%, sodium phosphates, trazodone    Review of Systems   Constitutional: Negative for appetite change and fatigue.   Eyes: Negative for visual disturbance.   Respiratory: Negative for shortness of breath.    Cardiovascular: Negative for chest pain.   Gastrointestinal: Negative for constipation and diarrhea.   Genitourinary: Negative for dysuria, frequency and urgency.   Musculoskeletal: Positive for gait problem. Negative for arthralgias, back pain, joint swelling, myalgias, neck pain and neck stiffness.   Neurological: Positive for tremors and weakness. Negative for dizziness, numbness and headaches.   Psychiatric/Behavioral: Negative for dysphoric mood.   All other systems reviewed and are negative.    Objective:      Vital Signs (Most Recent):  Temp: 98.3 °F (36.8 °C) (08/10/17 0627)  Pulse: 62 (08/10/17 0627)  Resp: 17 (08/10/17 0627)  BP: (!) 78/0 (08/10/17 1436)  SpO2: 99 % (08/10/17 0627)    Vital Signs (24h Range):  Temp:  [98.2 °F (36.8 °C)-98.3 °F (36.8 °C)] 98.3 °F (36.8 °C)  Pulse:  [62-63] 62  Resp:  [17-18] 17  SpO2:  [95 %-99 %] 99 %  BP: (78-84)/(0) 78/0     Physical Exam   Constitutional: He is oriented to person, place, and time. He appears well-nourished.   Eyes: EOM are normal. Pupils are equal, round, and reactive to light.   Neck: Normal range of motion. Neck supple.   Cardiovascular: Normal rate.    Pulmonary/Chest: Effort normal.   Musculoskeletal: Normal range of motion.   Neurological: He is oriented to person, place, and time. He has normal strength.   Skin: No rash noted.   Psychiatric: He has a normal mood and affect.     NEUROLOGICAL EXAMINATION:     MENTAL STATUS   Oriented to person, place, and time.     CRANIAL NERVES     CN III, IV, VI   Pupils are equal, round, and reactive to light.  Extraocular motions are normal.     MOTOR EXAM     Strength   Strength 5/5 throughout.

## 2017-08-10 NOTE — ASSESSMENT & PLAN NOTE
Sit-->stand CGA, T/F MNA, gait 50' MNA/MDA RW.  UBD MNA, LBD MXA, toileting DEP on 8/9.      Pt also appears to have mild cognitive deficits -- will ask ST to see him.

## 2017-08-11 ENCOUNTER — DOCUMENTATION ONLY (OUTPATIENT)
Dept: ELECTROPHYSIOLOGY | Facility: CLINIC | Age: 77
End: 2017-08-11

## 2017-08-11 PROCEDURE — 25000003 PHARM REV CODE 250: Performed by: PHYSICIAN ASSISTANT

## 2017-08-11 PROCEDURE — 97116 GAIT TRAINING THERAPY: CPT

## 2017-08-11 PROCEDURE — 97535 SELF CARE MNGMENT TRAINING: CPT

## 2017-08-11 PROCEDURE — 25000003 PHARM REV CODE 250: Performed by: PHYSICAL MEDICINE & REHABILITATION

## 2017-08-11 PROCEDURE — 92523 SPEECH SOUND LANG COMPREHEN: CPT

## 2017-08-11 PROCEDURE — 97150 GROUP THERAPEUTIC PROCEDURES: CPT

## 2017-08-11 PROCEDURE — 97530 THERAPEUTIC ACTIVITIES: CPT

## 2017-08-11 PROCEDURE — 63600175 PHARM REV CODE 636 W HCPCS: Performed by: PHYSICIAN ASSISTANT

## 2017-08-11 PROCEDURE — A4216 STERILE WATER/SALINE, 10 ML: HCPCS | Performed by: PHYSICIAN ASSISTANT

## 2017-08-11 PROCEDURE — 97110 THERAPEUTIC EXERCISES: CPT

## 2017-08-11 PROCEDURE — 12800000 HC REHAB SEMI-PRIVATE ROOM

## 2017-08-11 PROCEDURE — 99233 SBSQ HOSP IP/OBS HIGH 50: CPT | Mod: ,,, | Performed by: PHYSICAL MEDICINE & REHABILITATION

## 2017-08-11 RX ADMIN — HYDRALAZINE HYDROCHLORIDE 75 MG: 50 TABLET ORAL at 10:08

## 2017-08-11 RX ADMIN — LISINOPRIL 10 MG: 10 TABLET ORAL at 08:08

## 2017-08-11 RX ADMIN — WARFARIN SODIUM 6 MG: 6 TABLET ORAL at 04:08

## 2017-08-11 RX ADMIN — THERA TABS 1 TABLET: TAB at 08:08

## 2017-08-11 RX ADMIN — ROPINIROLE 0.5 MG: 0.5 TABLET, FILM COATED ORAL at 10:08

## 2017-08-11 RX ADMIN — OYSTER SHELL CALCIUM WITH VITAMIN D 1 TABLET: 500; 200 TABLET, FILM COATED ORAL at 08:08

## 2017-08-11 RX ADMIN — HYDRALAZINE HYDROCHLORIDE 75 MG: 50 TABLET ORAL at 05:08

## 2017-08-11 RX ADMIN — DOCUSATE SODIUM 100 MG: 100 CAPSULE, LIQUID FILLED ORAL at 08:08

## 2017-08-11 RX ADMIN — HYDRALAZINE HYDROCHLORIDE 75 MG: 50 TABLET ORAL at 01:08

## 2017-08-11 RX ADMIN — DOFETILIDE 250 MCG: 0.12 CAPSULE ORAL at 08:08

## 2017-08-11 RX ADMIN — ROPINIROLE 0.5 MG: 0.5 TABLET, FILM COATED ORAL at 05:08

## 2017-08-11 RX ADMIN — SODIUM CHLORIDE, PRESERVATIVE FREE 10 ML: 5 INJECTION INTRAVENOUS at 04:08

## 2017-08-11 RX ADMIN — Medication 400 MG: at 08:08

## 2017-08-11 RX ADMIN — TAMSULOSIN HYDROCHLORIDE 0.4 MG: 0.4 CAPSULE ORAL at 08:08

## 2017-08-11 RX ADMIN — SPIRONOLACTONE 25 MG: 25 TABLET, FILM COATED ORAL at 08:08

## 2017-08-11 RX ADMIN — AMLODIPINE BESYLATE 10 MG: 10 TABLET ORAL at 08:08

## 2017-08-11 RX ADMIN — CEFEPIME HYDROCHLORIDE 2 G: 2 INJECTION, SOLUTION INTRAVENOUS at 03:08

## 2017-08-11 RX ADMIN — ROPINIROLE 0.5 MG: 0.5 TABLET, FILM COATED ORAL at 01:08

## 2017-08-11 RX ADMIN — ALLOPURINOL 300 MG: 100 TABLET ORAL at 08:08

## 2017-08-11 RX ADMIN — ACETAMINOPHEN 650 MG: 325 TABLET ORAL at 01:08

## 2017-08-11 RX ADMIN — FOLIC ACID 1 MG: 1 TABLET ORAL at 08:08

## 2017-08-11 RX ADMIN — TRAZODONE HYDROCHLORIDE 50 MG: 50 TABLET ORAL at 10:08

## 2017-08-11 RX ADMIN — Medication 3 ML: at 05:08

## 2017-08-11 RX ADMIN — CEFEPIME HYDROCHLORIDE 2 G: 2 INJECTION, SOLUTION INTRAVENOUS at 04:08

## 2017-08-11 RX ADMIN — BUPROPION HYDROCHLORIDE 300 MG: 150 TABLET, EXTENDED RELEASE ORAL at 08:08

## 2017-08-11 RX ADMIN — ASPIRIN 81 MG CHEWABLE TABLET 81 MG: 81 TABLET CHEWABLE at 08:08

## 2017-08-11 RX ADMIN — ATORVASTATIN CALCIUM 10 MG: 10 TABLET, FILM COATED ORAL at 08:08

## 2017-08-11 NOTE — PROGRESS NOTES
Physical Therapy   Daily Physical Therapy FIM Scores    Kev Vasquez   MRN: [id number]        08/11/17 1500   Transfers   Bed/Chair/WC 3   Locomotion   Distance Walked 2   Distance Wheelchair 2   Walk 2   Wheelchair 2   Mode C     Sophie Daigle, PT  8/11/2017

## 2017-08-11 NOTE — PLAN OF CARE
Problem: Patient Care Overview  Goal: Plan of Care Review  Outcome: Ongoing (interventions implemented as appropriate)  Pt care plan updated and individualized as needed and progress continues.  See associated flowsheets for relevant documentation. Frequent rounds made per protocol to maintain pt safety and meet pt needs.  Continuing to monitor and follow up as needed.      Problem: Infection, Risk/Actual (Adult)  Goal: Infection Prevention/Resolution  Patient will demonstrate the desired outcomes by discharge/transition of care.   Outcome: Ongoing (interventions implemented as appropriate)  Pt continues on IV antibiotics as ordered.  Vital signs remain stable with no new signs of infection noted.    Problem: Fall Risk (Adult)  Goal: Absence of Falls  Patient will demonstrate the desired outcomes by discharge/transition of care.   Outcome: Ongoing (interventions implemented as appropriate)  Pt remains free from falls or trauma thus far this shift.      Problem: Pressure Ulcer Risk (Candelario Scale) (Adult,Obstetrics,Pediatric)  Goal: Skin Integrity  Patient will demonstrate the desired outcomes by discharge/transition of care.   Outcome: Ongoing (interventions implemented as appropriate)  Pt turns and repositions as needed to maintain skin integrity.  No breakdown noted.      Problem: Ventricular Assist Device (Adult)  Goal: Signs and Symptoms of Listed Potential Problems Will be Absent, Minimized or Managed (Ventricular Assist Device)  Signs and symptoms of listed potential problems will be absent, minimized or managed by discharge/transition of care (reference Ventricular Assist Device (Adult) CPG).  Outcome: Ongoing (interventions implemented as appropriate)  Infection being treated with IV antibiotics, with no new problems identified.

## 2017-08-11 NOTE — PLAN OF CARE
Problem: Patient Care Overview  Goal: Plan of Care Review  Outcome: Ongoing (interventions implemented as appropriate)  Plan of care reviewed.  Problem: Infection, Risk/Actual (Adult)  Goal: Infection Prevention/Resolution  Patient will demonstrate the desired outcomes by discharge/transition of care.   Outcome: Ongoing (interventions implemented as appropriate)  IV antibiotic q 12 hours. Driveline site still with signs of infection.  Problem: Fall Risk (Adult)  Goal: Absence of Falls  Patient will demonstrate the desired outcomes by discharge/transition of care.   Outcome: Ongoing (interventions implemented as appropriate)  Remained free from falls, trauma.

## 2017-08-11 NOTE — PROGRESS NOTES
BLADDER  [] Pt. uses toilet (7)  [] Pt. is on dialysis - does not urinate (7)  [] Pt. uses bedside commode (5)  []Pt. uses bedpan - staff does____% (includes rolling on/off, holding bedpan in place                                                                   and emptying pan)   [] Pt. uses urinal - staff empties                          []   Pt. needs help with positioning the urinal (4)                          []   Pt. needs help holding the urinal (2)  []  Pt. has silverio catheter/suprapubic catheter - staff empties (1)  []  Pt. Is on ICP program:                           []  Pt. does catheterization (6)                           [] Staff does catheterization (1)  [x] Pt. Is incontinent - staff does ___60____% of tasks  Number of accidents during this shift ___1___    BOWEL  [] Pt. uses toilet (7)  [] Pt. uses bedside commode (5)  [] Pt. uses bedpan - staff does _______% of task  [] Pt. is on bowel program::                         []   Pt. inserts suppository (6)                         []   Nurse inserts suppository (4)                         []  Nurse does dig. stim. (1)  [] Pt. has colostomy - staff maintains care and empties (1)                       Pt. does _____% of care  [] Pt. is incontinent - staff does ______% of tasks  [x] No bowel movement during this shift  [] Number of accidents during this shift ______    EATING  [] Pt. opens packages, cuts food, pours liquids, and feeds self, regular consistency (7)  [] Pt. needs dentures, swallow technique, special foods or drink consistency (6)  [] Pt. needs supervision (cueing), setup (open containers, cut food, etc.) (5)  []Pt. needs assist with occasional scooping, or checking for food pocketing (75-90%) (4)  []Pt. needs assist to lead each bite on utensils, patient brings food to mouth (50-74%)(3)  []Pt. needs assist to scoop, bring food to mouth (hand over hand) (25-49%) (2)  []AxillaryPt. Is receiving IV fluids for hydration or tube feeding  (1)    GROOMING  []Pt. performs all tasks independently and safely (7)  []Pt. obtains all articles, needs adaptive/assistive device (wash mitt, etc.) (6)  []Pt. needs supervision (cueing), setup (5)  []Pt. doing 3 of 4 or 4 of 5 tasks, needs assist to put in or remove dentures, steadying (Patient doing 75-90%) (4)   []Pt. doing 2 of 4 or 3 of 5 tasks (3)  []Pt. doing 1 of 4 of 2 of 5 tasks (25-49%) (2)  []Pt. doing 0 of tasks, needs assistance of 2 helpers (1)  []Activity occurred with OT    BATHING  []Pt. safety bathes whole body (10 parts of 10) (7)  []Pt. doing 10 of 10 body parts, uses adaptive devices (wash mitt, etc.) (6)  []Pt. doing 10 of 10 body parts or needs supervision or setup (5)  []Pt. doing 8-9 of 10 body parts (75-99%) (4)  []Pt. doing 5-7 of 10 body parts (50-74%) (3)   []Pt. doing 3-4 of 10 body parts (25-49%( (2)  []Pt. doing 0-2 of 10 body parts (0-24%0 (1)  []Activity occurred with OT    DRESSING UPPER BODY  []Pt. dresses or undresses independently, obtaining clothes from storage area (7)  []Pt. needs adaptive devices (6)  []Pt. needs supervision (cueing), setup (5)  []Pt. doing 75-99% (4)  []Pt. doing 50-74% (3)  []Pt. doing 25-49% (2)  []Pt. doing 0-24% or needs assist of 2 helpers (1)  []Activity occurred with OT    DRESSING LOWER BODY  []Pt. dresses or undresses independently (7)   []Pt. needs adaptive devices (6)  []Pt. needs supervision (cueing), set up helper applies JUSTEN hose or AFO (5)  []Pt. doing 75-99%, needs steadying assist, fastening a belt, etc.) (4)  []Pt. doing 50-74% (3)  []Pt. doing 25-49% (2)  []Pt. doing 0-24% or needs assist of 2 helpers (1)    TOILETING  []Pt. doing 3 of 3 tasks (pulling down pants, cleaning ced area, pulling up pants) (7)  []Pt. doing all 3 parts but needs assistive device (grab bar) (6)  []Pt. needs supervision or setup (5)  []Pt. doing 3 of 3 parts (75-99%) (4)  []Pt. doing 2 of 3 parts (50-74%) (3)  []Pt. doing 1 of 3 parts (25.49%) (2)  []Pt. needs  assist (more than steadying) with all 3 parts or needs assist of 2 helpers,  Incontinent of B/B today (0-24%) (1)    BED/ CHAIR/WHEELCHAIR TRANSFER  []Pt. transfers without assistive device into and out of wheelchair/bed/chair (7)  []Pt. uses assistive/adaptive devices (grab bars, sliding board, walker, bed rails,   wheelchair arm rests, etc.) (6)  []Pt. needs supervision, cues, head of bed raised by staff (5)  []Pt. needs steadying assist with any or all parts of transfer, needs assist with one   leg during transfer ( 4)  []Pt. needs lifting or lowering, needs assist with 2 legs during transfer (3)  []Pt. needs lifting and lowering (2)  []Pt. needs assist of 2 helpers(even if 2nd person is just there for safety) or mechanical lift (1)  [x]Activity did not occur     TOILET TRANSFER  []Pt. transfers from wheelchair or walking without assistive device (7)  []Pt. uses assistive/adaptive device (commode, grab bars, sliding board, walker,   prosthesis, orthotic, raised toilet seat, etc.) (6)  []Pt. needs supervision, cues, or setup (needs assist to lock brakes, remove leg or arm  rest, position sliding board) (5)  []Pt. needs steadying assist with any or all parts of transfer, needs assist with one leg  during transfer (4)  []Pt. needs lifting or lowering, needs assist with two legs during transfer (3)  []Pt. needs lifting and lowering (2)  []Pt. needs assist of 2 helpers or mechanical lift (1)  [x]Activity did not occur    SHOWER TRANSFER  []Pt. transfers without assistive device into and out of shower (7)  []Pt. uses assistive/adaptive device (shower bench, grab bars, etc.) (6)  []Pt. needs supervision, cues, or setup (5)  []Pt. needs steadying assist with any or all parts of transfer, needs assist with one leg  during transfer (4)  []Pt. needs lifting or lowering, needs assist with 2 legs during transfer (3)  []Pt. needs lifting and lowering (2)  []Pt. needs assist of 2 helpers, helper pushes shower chair on wheels in  and out of  shower (1)

## 2017-08-11 NOTE — PROGRESS NOTES
Submitted for Remote ICD monitoring transfer from patient's prior clinic in [place] per patient's request.  (See ICD programming note on 7/25/17.  [facility]

## 2017-08-11 NOTE — PROGRESS NOTES
"Physical Therapy   Treatment    Kev Vasquez   MRN: [id number]      08/11/17 1351   PT Time Calculation   PT Start Time 1351   PT Stop Time 1437   PT Total Time (min) 46 min   Treatment   Treatment Type Treatment   PT/PTA PT   General   PT Received On 08/11/17   Family/Caregiver Present No   Patient Found (position) Supine in bed   Pt found with (posey belt donned, LVAD)   Precautions   General Precautions LVAD;fall   Orthopedic No   Required Braces or Orthoses No   Cardiovascular/Pulmonary LVAD   Visual/Auditory Vision impaired   Subjective   Patient states "I guess I am ready to go"   Pain/Comfort   Pain Rating 1 no pain   Pain Rating Post-Intervention 1 no pain   Bed Mobility   Bed Mobility yes   Supine to Sit Stand by Assistance   Supine to Sit Comments x 1 trial in bed, with HOB elevated   Transfers   Transfer yes   Sit to Stand   Sit <> Stand Assistance Contact Guard Assistance;Minimum Assistance   Sit <> Stand Assistive Device Rolling Walker;Other (see comments)  (parallel bars)   Trials/Comments Pt performed x 4 trials, maximum verbal cues required for appropriate B UE placement. Strong posterior lean observed   Stand to Sit   Assistance Contact Guard Assistance;Minimum Assistance   Assistive Device Rolling Walker;Other (see comments)  (parallel bars)   Trials/Comments Pt performed x 4 trials, assistance required to control descent into wheelchair. Pt often attempts to sit abruptly in wheelchair causing wheelchair to rock backward requiring assistance from PT to maintain upright position of wheelchair   Bed to Chair   Bed <> Chair Technique Stand Pivot   Bed <> Chair Assistance Minimum Assistance   Bed <> Chair Assistive Device No Assistive Device   Trials/Comments x 1 trial, strong posterior lean. Pt required verbal cues for appropriate stepping sequence and especially to bring R foot forward to complete transfer appropriately    Wheelchair Activities   Propulsion Yes   Propulsion Type 1 Manual   Level 1 " "Level tile   Method 1 Right upper extremity;Left upper extremity   Level of Assistance 1 Stand by assistsance   Description/ Details 1 Pt propelled self in wheelchair x 100 feet, maximum verbal cues required for pt's appropriate navigation around obstacles in gym   Gait   Gait Yes   Weight Bearing Status full   Gait 1   Surface 1 Level tile   Gait Assistive Device Rolling walker   Assistance 1 Moderate assistance;Maximum assistance   Gait Distance Pt ambulated x 4 trials total : 1st trail: 82 feet with moderate/maximum assistance, 2nd trial: 84 feet with moderate assistance, 3rd trial: 32 feet, 2nd trial: 28 feet. Pt continues to demonstrate strong posterior lean and B LE scissoring. Decreased R great toe clearance observed. Pt requires assistance for management of RW. Pt required more maximum assistance to maintain balance while negotiating turns with RW. During pt's 3rd and 4th trial PT attempted to have pt increase TOMY while using line in tile. Pt had difficulty processing and implementing PT instructions   Gait Pattern 3-point gait   Gait Deviation(s) decreased william;increased time in double stance;decreased velocity of limb motion;decreased step length;decreased stride length;decreased toe-to-floor clearance;decreased weight-shifting ability;forward lean   Impairments Contributing to Gait Deviations impaired balance;decreased strength;impaired postural control;impaired motor control;other (see comments)  (B LE scissoring)   Other Activities   Comments Patient performed step up's on 3" step in parallel bars x 20 reps with R LE leading to assist with R LE strengthening and to improve forward flexion of trunk    Patient tolerated 3 minutes of standing beach ball toss with unilateral UE support with CGA to minimal assistance to maintain balance. Pt required maximum tactile and verbal cues from PT to maintain upright posture in standing due to strong posterior lean with balance challenges.    Activity Tolerance "   Activity Tolerance Patient tolerated treatment well   After Treatment   Patient Position After Treatment Seated in wheelchair   Patient after treatment left call button in reach  (posey belt donned)   Assessment   Prognosis Good   Problem List Decreased strength;Decreased endurance;Impaired balance;Decreased mobility;Decreased cognition;Decreased safety awareness;Impaired judgement;Decreased coordination   Session Assessment progressing toward goals   Assessment Pt continues to require between moderate to maximum assistance to ambulate safely with PT. Pt continues to demonstrate strong posterior and lateral leans with no balance strategies exhibted. Pt attempted multiple times throughout treatment session to perform sit to stand transfer without wheelchair brakes being locked and without use of RW and PT assistance. Due to these safety concerns pt currently requires 24 hour supervision. Pt will continue to benefit from further skilled PT intervention in order to address these above impairments and to improve pt's functional independence with mobility.    Level of Motivation/Participation good   Barriers to Discharge Inaccessible home environment   Barriers to Discharge Comments steps within julius   Discharge Recommendations   Equipment Needed After Discharge bedside commode   Discharge Facility/Level Of Care Needs home health PT   Plan   Planned Therapy Intervention Continue with current plan   Therapy Frequency daily;Monday-Friday;Saturday or Sunday   Physical Therapy Follow-up   PT Follow-up? Yes   PT - Next Visit Date 08/12/17   Treatment/Billable Minutes   Gait training 25   Therapeutic Activity 20   Total Time 45     Sophie Daigle, PT  8/11/2017

## 2017-08-11 NOTE — PROGRESS NOTES
Pt discussed during treatment team staffing.  Expected discharge date is 8/24/17.  Pt and spouse informed and agree with discharge date.  Discharge plan is home with caregivers and home health.

## 2017-08-11 NOTE — SUBJECTIVE & OBJECTIVE
Interval History: Pt without new c/o's.  I have reviewed the HPI and PMFSH and there are no changes from admission.        Scheduled Medications:    allopurinol  300 mg Oral Daily    amlodipine  10 mg Oral Daily    aspirin  81 mg Oral Daily    atorvastatin  10 mg Oral Daily    buPROPion  300 mg Oral Daily    calcium-vitamin D3  1 tablet Oral BID    ceFEPime (MAXIPIME) IVPB  2 g Intravenous Q12H    docusate sodium  100 mg Oral BID    dofetilide  250 mcg Oral Q12H    folic acid  1 mg Oral Daily    hydrALAZINE  75 mg Oral Q8H    lisinopril  10 mg Oral BID    magnesium oxide  400 mg Oral BID    multivitamin  1 tablet Oral Daily    ropinirole  0.5 mg Oral TID    sodium chloride 0.9%  10 mL Intravenous Q6H    sodium chloride 0.9%  3 mL Intravenous Q8H    spironolactone  25 mg Oral Daily    tamsulosin  0.4 mg Oral Daily    warfarin  4 mg Oral Every Tues, Thurs, Sat    [START ON 8/13/2017] warfarin  4 mg Oral Every Sun    warfarin  6 mg Oral Every Mon, Wed, Fri       PRN Medications: acetaminophen, bisacodyl, diphenoxylate-atropine 2.5-0.025 mg, magnesium hydroxide 400 mg/5 ml, ondansetron, polyethylene glycol, Flushing PICC Protocol **AND** sodium chloride 0.9% **AND** sodium chloride 0.9%, sodium phosphates, trazodone    Review of Systems   Constitutional: Negative for appetite change and fatigue.   Eyes: Negative for visual disturbance.   Respiratory: Negative for shortness of breath.    Cardiovascular: Negative for chest pain.   Gastrointestinal: Negative for constipation and diarrhea.   Genitourinary: Negative for dysuria, frequency and urgency.   Musculoskeletal: Positive for gait problem. Negative for arthralgias, back pain, joint swelling, myalgias, neck pain and neck stiffness.   Neurological: Positive for tremors and weakness. Negative for dizziness, numbness and headaches.   Psychiatric/Behavioral: Negative for dysphoric mood.   All other systems reviewed and are negative.    Objective:      Vital Signs (Most Recent):  Temp: 98.6 °F (37 °C) (08/11/17 0623)  Pulse: 70 (08/11/17 0623)  Resp: 16 (08/11/17 0623)  BP: (!) 72/0 (08/11/17 0623)  SpO2: 97 % (08/11/17 0623)    Vital Signs (24h Range):  Temp:  [98.2 °F (36.8 °C)-98.6 °F (37 °C)] 98.6 °F (37 °C)  Pulse:  [64-70] 70  Resp:  [16] 16  SpO2:  [95 %-97 %] 97 %  BP: (72-84)/(0) 72/0     Physical Exam   Constitutional: He is oriented to person, place, and time. He appears well-nourished.   Eyes: EOM are normal. Pupils are equal, round, and reactive to light.   Neck: Normal range of motion. Neck supple.   Cardiovascular: Normal rate.    Pulmonary/Chest: Effort normal.   Musculoskeletal: Normal range of motion.   Neurological: He is oriented to person, place, and time. He has normal strength.   Skin: No rash noted.   Psychiatric: He has a normal mood and affect.     NEUROLOGICAL EXAMINATION:     MENTAL STATUS   Oriented to person, place, and time.     CRANIAL NERVES     CN III, IV, VI   Pupils are equal, round, and reactive to light.  Extraocular motions are normal.     MOTOR EXAM     Strength   Strength 5/5 throughout.

## 2017-08-11 NOTE — PROGRESS NOTES
Physical Therapy   Volleyball Group Treatment    Kev Vasquez   MRN: [id number]        08/11/17 1115   PT Time Calculation   PT Start Time 1115   PT Stop Time 1200   PT Total Time (min) 45 min   Treatment   Treatment Type Treatment  (Volleyball Group Treatment)   PT/PTA PT   General   PT Received On 08/11/17   Family/Caregiver Present No   Patient Found (position) Seated in wheelchair   Pt found with (posey belt donned, LVAD)   Precautions   General Precautions LVAD;fall   Orthopedic No   Required Braces or Orthoses No   Cardiovascular/Pulmonary LVAD   Visual/Auditory Vision impaired   Subjective   Patient states Pt agreeable to participate in group treatment session   Pain/Comfort   Pain Rating 1 no pain   Pain Rating Post-Intervention 1 no pain   Other Activities   Comments Patient participated in 45 minute volleyball group activity.  The group activity challenged dynamic standing/sitting skills, bilateral upper extremity strengthening, cardiovascular and respiratory capacity, hand -eye coordination, visual scanning and social affect/mood.  The patient performed this activity at w/c level with stand by assist. The patient required no rest breaks during this activity.  Patient performed activity using bilateral upper extremities to volley the ball, lateral arm movement noted throughout the activity.  Endurance was fair, no pain complaints voiced are observed , social affect good as patient was observed throughout this activity ie., appropriately engaged in social exchange with peers and staff.     Treatment/Billable Minutes   Therapeutic Activity Group 45   Total Time 45       Sophie Daigle, PT  8/11/2017

## 2017-08-11 NOTE — ASSESSMENT & PLAN NOTE
Sit-->stand CGA, T/F MNA, gait 50' MNA/MDA RW, limited by post lean and impaired trunk balance.  UBD MNA, LBD MXA, toileting DEP on 8/9.      Mild cognitive deficits.

## 2017-08-11 NOTE — PROGRESS NOTES
Occupational Therapy  FIM SCORES    Kev Vasquez  MRN: [id number]  Room/Bed: E280/E280 B       08/11/17 1600   Transfers   Bed/Chair/WC 3   Self Care   Eating 5   Grooming 4   Bathing 3   Dressing-Upper 4   Dressing-Lower 2   Toileting 1       SHAUNA Bland  8/11/2017

## 2017-08-11 NOTE — PROGRESS NOTES
"Occupational Therapy  AM Treatment  Kev Vasquez   MRN: [id number]   Room/Bed: E280/E280 B       08/11/17 0916   OT Time Calculation   OT Start Time 0916   OT Stop Time 1003   OT Total Time (min) 47 min   General   OT Date of Treatment 08/11/17   Family/Caregiver Present No   Patient Found (position) Seated in wheelchair   Precautions   General Precautions LVAD;fall   Cardiovascular/Pulmonary LVAD   Visual/Auditory Vision impaired   Subjective   Patient states "I'd like to get this leg stronger."   Family states "We have a 3 in. step down into the den."   Pain/Comfort   Pain Rating no pain   Transfers   Transfer yes   Sit to Stand   Sit <> Stand Assistance Moderate Assistance   Sit <> Stand Assistive Device Rolling Walker   Trials/Comments from  with Lateral and posterior lean noted   Stand to Sit   Assistance Moderate Assistance   Assistive Device Rolling Walker   Trials/Comments to    Chair to Mat   Chair <>Mat Technique Stand Pivot   Chair <> Mat Assistance Moderate Assistance   Chair<> Mat Assistive Device No Assistive Device   Trials/Comments from <>EOM   Dynamic Sitting Balance   Dynamic Sitting-Balance Support No upper extremity supported   Dynamic Sitting-Balance Reaching for objects;Reaching across midline;Reaching for weighted objects   Dynamic Sitting-Comments Pt completed side taps with 10# dumbells tapping mat alternating sides x 75 reps   Dynamic Standing Balance   Dynamic Standing-Balance Support Unilateral upper extremity supported   Dynamic Standing-Balance Lateral lean;Reaching for objects;Reaching across midline  (posterior lean)   Dynamic Standing-Comments Min (A) to complete [place] rate of manip board. Pt required 1 seated rest break to complete   Activity Tolerance   Activity Tolerance Patient tolerated treatment well   After Treatment   Patient Position After Treatment Seated in wheelchair   Patient after treatment left call button in reach  (with posey belt intact)   Assessment   Prognosis " Good   Problem List Decreased Self Care skills;Decreased upper extremity range of motion;Decreased upper extremity strength;Decreased safe judgment during ADL;Decreased cognition;Decreased endurance;Decreased fine motor control;Decreased functional mobility;Decreased gross motor control;Decreased IADLs;Decreased Function of right upper extremity;Decreased Function of left upper extremity;Decreased trunk control for functional activities   Assessment Pt particiapted well in tx session but remains with decreased (I) with ADLs and moderate lateral and posterior lean during stance. Pt would continue to benefit from skilled OT services.   Level of Motivation/Participation Good   Discharge Recommendations   Equipment Needed After Discharge bedside commode   Discharge Facility/Level Of Care Needs home with home health   Plan   Plan Self care retraining;Functional transfer training;UE thereex;Equipment Training;Family training;Safety training;Fine motor training;Functional endurance training;Patient education;Postural control;Neuromuscular Re-education;Functional Standing Activities   Therapy Frequency 2 times/day   Occupational Therapy Follow-up   OT Follow-up? Yes   Treatment/Billable Minutes   Self Care/Home Management 15   Therapeutic Activity 22   Therapeutic Exercise 10   Total Time 47       SHAUNA Bland  8/11/2017    LEGEND:   CGA: Contact Guard Assist   EOB: Edgeof Bed   HHA: Hand Held Assist   HOB: Head of Bed   (I): Independent-patient performs task in a timely manner   Max (A): Maximal Assist-patient performs 25-49% of task   Min (A): Minimal Assist- patient performs 75% or more of task   Mod (A): Moderate Assist- patient performs 50-74% of task   NA: Not applicable   NT: Not tested   OOB: Out of Bed   PTA: Prior to admit   QC: Quad Cane   RW: Rolling Walker   (S): Supervision- patient requires cues, coaxing, prompting   SBA: Stand By Assist   SC: Straight Cane   SW: Standard Walker   TBA: To be assessed    Total (A): Total Assist- patient performs less than 25% of task   WC: Wheelchair   WFL: Within Functional Limits   WNL: Within Normal Limits

## 2017-08-11 NOTE — PROGRESS NOTES
"Speech Therapy   Evaluation    Kev Vasquez   MRN: [id number]        08/11/17 1030   Speech Time Calculation   Speech Start Time 1030   Speech Stop Time 1115   Speech Total (min) 45 min   General Information   SLP Treatment Date 08/11/17   Referring Physician Dr Garcia   General Observations Pt seen initially at bedside then taken to ST office while sitting upright in w/c.   Pertinent History of Current Problem Past Medical History:   Diagnosis Date    Acute on chronic systolic heart failure 7/27/2015    AICD (automatic cardioverter/defibrillator) present 2014    [facility]    CAD (coronary artery disease) 7/27/2015    CHF (congestive heart failure)     Gait instability     HTN (hypertension) 7/27/2015    ICD (implantable cardioverter-defibrillator), biventricular, in situ 7/27/2015    Kidney stones     HILLARY treated with BiPAP 7/27/2015    Peripheral neuropathy     Pulmonary hypertension due to left ventricular systolic dysfunction 7/29/2015    Restless leg syndrome 1992    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    Skin cancer     excision 2013    Sleep apnea 1992      General Precautions LVAD;fall   Visual/Auditory Vision impaired  (reading glasses)   Subjective   Patient states Pt stated "My wife was bitching so thats why I get speech therapy now."   Pain/Comfort   Pain Rating no pain   Assessment   SLP Diagnosis moderate cognitive-linguistic deficits   Prognosis Fair   Problem List decreased recall; poor orientation skills; decreased motivation for cognition; poor problem solving skills; decreased safety awareness; decreased sequencing skills   Assessment Pt is a 77 y/o male admitted to IP rehab following drive line infection of LVAD with significant changes in mobility and strength/coordination. Pt previously living with spouse with pt's self report of "in and out of the hospital all the time." Pt denies cognitive changes and also stated "I'll do this because my wife wants me to, " "but maybe it will help." Pt currently oriented to person and place only. Pt disoriented to time and situation at time of evaluation. Pt completed complex YNQ task with 90% acc, completed following directions task with 80% acc, and completed auditory comprehension task of paragraph level with 100% acc. Pt noted with decreased attention skills, poor safety awareness, and deficits of processing skills. Pt also noted with tangential speech and poor topic maintenance within social interaction situations. Pt completed rapid naming task within one minute of time with 6 concrete items listed (norm 15-20). Pt completed immediate memory task with 28% acc, completed STM task with 66% acc, and completed LTM task with 100% acc. Simple verbal sequencing task completed with 75% acc., simple home problem solving task completed with 60% acc, and simple verbal reasoning task completed with 100% acc. Pt engaged in simple math calculations task with moderate-severe confusion observed with 50% acc. Pt also completed witting task with noted deficits of line spacing and report of decline in legibility of print.     Pt to benefit from skilled ST services targeting cognition for return to baseline with decrease in burden of care from cognitive standpoint upon spouse/family. Education provided to pt regarding ST role and POC, pt agreeable at this time.    Level of Motivation/Participation fair   Short Term Goals   Goal 1 Pt will complete concrete convergent/divergent naming tasks given supervision with 90% acc.    Goal 2 Pt will complete problem solving tasks given min verbal cues with 75% acc of home/hospital environment.    Goal 3 Pt will complete reasoning/sequencing tasks given min verbal cues with 75% acc.    Goal 4 Pt will be oriented x4 given min verbal cues with 80% acc.    Goal 5 Pt will complete recent/functional recall tasks given min verbal cues with 75% acc.    Long Term Goals   Goal 1 Pt will complete concrete " convergent/divergent naming tasks with modified independence with % acc.    Goal 2 Pt will complete problem solving tasks given supervision with 90% acc of home/hospital environment.    Goal 3 Pt will complete reasoning/sequencing tasks given supervision with 90% acc.   Goal 4 Pt will be oriented x4 given supervision with 90% acc.   Goal 5 Pt will complete recent/functional recall tasks given supervision with 90% acc.    Discharge Recommendations   Discharge Facility/Level Of Care Needs home health speech therapy   Plan   Plan Plan of care intiated;Speech Language/Cognitive Therapy;Patient Education;Family Education   Therapy Frequency Monday-Friday   Plan of Care Expires on 09/11/17   SLP Follow-up   SLP Follow-up? Yes   Treatment/Billable Minutes   Eval 45   Total Time 45     The patient's spiritual, cultural, social, and educational needs were considered with no evidence of barriers noted, and the patient is agreeable to plan of care.    FIM Scores  Comprehension:6  Expression: 5  Social Interaction:4  Problem Solving:3  Memory: 3    Comprehension:  Complex= humor, finances, rationale for medical treatment(hip precautions, pressure relief)  Basic= pain, hunger, thirst, bathroom needs, cold, nutrition, sleep    Understands complex/abstract conversation/directions with extra time, assistance device(glasses, if visual mode or both; hearing aide if auditory mode or both) (6)    Expression:  Needs cues to express basic needs  (5)    Social Interaction:  Interacts appropriately with others with medication or extra time(anti-anxiety, antidepressant)  (6)    Problem Solving:  Solves basic problems 50-74% of the time  (3)    Memory:  Recognizes, recalls, or executes 50-74% of time 2 steps of 2 step request (3)      Enedina Nguyễn CCC-SLP  8/11/2017

## 2017-08-11 NOTE — PROGRESS NOTES
[facility]  Physical Medicine & Rehab  Progress Note    Patient Name: Kev Vasquez  MRN: [id number]  Patient Class: IP- Rehab   Admission Date: 8/8/2017  Length of Stay: 3 days  Attending Physician: Rashad Garcia MD  Primary Care Provider: Mega Collins MD    Subjective:     Principal Problem:Gait instability    Interval History: Pt without new c/o's.  I have reviewed the HPI and PMFSH and there are no changes from admission.        Scheduled Medications:    allopurinol  300 mg Oral Daily    amlodipine  10 mg Oral Daily    aspirin  81 mg Oral Daily    atorvastatin  10 mg Oral Daily    buPROPion  300 mg Oral Daily    calcium-vitamin D3  1 tablet Oral BID    ceFEPime (MAXIPIME) IVPB  2 g Intravenous Q12H    docusate sodium  100 mg Oral BID    dofetilide  250 mcg Oral Q12H    folic acid  1 mg Oral Daily    hydrALAZINE  75 mg Oral Q8H    lisinopril  10 mg Oral BID    magnesium oxide  400 mg Oral BID    multivitamin  1 tablet Oral Daily    ropinirole  0.5 mg Oral TID    sodium chloride 0.9%  10 mL Intravenous Q6H    sodium chloride 0.9%  3 mL Intravenous Q8H    spironolactone  25 mg Oral Daily    tamsulosin  0.4 mg Oral Daily    warfarin  4 mg Oral Every Tues, Thurs, Sat    [START ON 8/13/2017] warfarin  4 mg Oral Every Sun    warfarin  6 mg Oral Every Mon, Wed, Fri       PRN Medications: acetaminophen, bisacodyl, diphenoxylate-atropine 2.5-0.025 mg, magnesium hydroxide 400 mg/5 ml, ondansetron, polyethylene glycol, Flushing PICC Protocol **AND** sodium chloride 0.9% **AND** sodium chloride 0.9%, sodium phosphates, trazodone    Review of Systems   Constitutional: Negative for appetite change and fatigue.   Eyes: Negative for visual disturbance.   Respiratory: Negative for shortness of breath.    Cardiovascular: Negative for chest pain.   Gastrointestinal: Negative for constipation and diarrhea.   Genitourinary: Negative for dysuria, frequency and urgency.    Musculoskeletal: Positive for gait problem. Negative for arthralgias, back pain, joint swelling, myalgias, neck pain and neck stiffness.   Neurological: Positive for tremors and weakness. Negative for dizziness, numbness and headaches.   Psychiatric/Behavioral: Negative for dysphoric mood.   All other systems reviewed and are negative.    Objective:     Vital Signs (Most Recent):  Temp: 98.6 °F (37 °C) (08/11/17 0623)  Pulse: 70 (08/11/17 0623)  Resp: 16 (08/11/17 0623)  BP: (!) 72/0 (08/11/17 0623)  SpO2: 97 % (08/11/17 0623)    Vital Signs (24h Range):  Temp:  [98.2 °F (36.8 °C)-98.6 °F (37 °C)] 98.6 °F (37 °C)  Pulse:  [64-70] 70  Resp:  [16] 16  SpO2:  [95 %-97 %] 97 %  BP: (72-84)/(0) 72/0     Physical Exam   Constitutional: He is oriented to person, place, and time. He appears well-nourished.   Eyes: EOM are normal. Pupils are equal, round, and reactive to light.   Neck: Normal range of motion. Neck supple.   Cardiovascular: Normal rate.    Pulmonary/Chest: Effort normal.   Musculoskeletal: Normal range of motion.   Neurological: He is oriented to person, place, and time. He has normal strength.   Skin: No rash noted.   Psychiatric: He has a normal mood and affect.     NEUROLOGICAL EXAMINATION:     MENTAL STATUS   Oriented to person, place, and time.     CRANIAL NERVES     CN III, IV, VI   Pupils are equal, round, and reactive to light.  Extraocular motions are normal.     MOTOR EXAM     Strength   Strength 5/5 throughout.       Assessment/Plan:      Kev Vasquez is a 76 y.o. male admitted to inpatient rehabilitation on 8/8/2017 for Gait instability with impaired mobility and ADLs. Patient remains appropriate for PT, OT, and as required Speech therapy. Patient continues to require 24 hour nursing care as well as daily Physician assessment.    * Gait instability    Sit-->stand CGA, T/F MNA, gait 50' MNA/MDA RW, limited by post lean and impaired trunk balance.  UBD MNA, LBD MXA, toileting DEP on 8/9.      Mild  cognitive deficits.          Chronic systolic heart failure    Compensated with LVAD    8/8:   INR 2.7  8/10: INR 2.5 -- check 8/14        Stage 2 chronic kidney disease    Stable        Complication involving left ventricular assist device (LVAD)    Still with some drainage -- monitor.  On cefepime through 9/23.               DISCHARGE PLANNING:  Tentative Discharge Date: 8/24/2017    Future Appointments  Date Time Provider Department Center   8/14/2017 2:15 PM Payam Muhammad MD [facility] ID [facility]   8/23/2017 12:00 PM LAB, APPOINTMENT [facility] LAB VNP [facility]   8/23/2017 1:30 PM HEARTTRANSPLANT, LVADN [facility] HEARTTX [facility]   8/23/2017 1:30 PM Carlito Santana MD [facility] HEARTTX [facility]   10/26/2017 8:00 AM HOME MONITOR DEVICE CHECK, [facility] ARRHYTH [facility] myriam Garcia MD  Department of Physical Medicine & Rehab   [facility]

## 2017-08-12 PROCEDURE — 99232 SBSQ HOSP IP/OBS MODERATE 35: CPT | Mod: ,,, | Performed by: PHYSICAL MEDICINE & REHABILITATION

## 2017-08-12 PROCEDURE — 92507 TX SP LANG VOICE COMM INDIV: CPT

## 2017-08-12 PROCEDURE — 97542 WHEELCHAIR MNGMENT TRAINING: CPT

## 2017-08-12 PROCEDURE — 25000003 PHARM REV CODE 250: Performed by: PHYSICAL MEDICINE & REHABILITATION

## 2017-08-12 PROCEDURE — 12800000 HC REHAB SEMI-PRIVATE ROOM

## 2017-08-12 PROCEDURE — 97535 SELF CARE MNGMENT TRAINING: CPT

## 2017-08-12 PROCEDURE — 97116 GAIT TRAINING THERAPY: CPT

## 2017-08-12 PROCEDURE — 97110 THERAPEUTIC EXERCISES: CPT

## 2017-08-12 PROCEDURE — 97530 THERAPEUTIC ACTIVITIES: CPT

## 2017-08-12 PROCEDURE — A4216 STERILE WATER/SALINE, 10 ML: HCPCS | Performed by: PHYSICIAN ASSISTANT

## 2017-08-12 PROCEDURE — 97150 GROUP THERAPEUTIC PROCEDURES: CPT

## 2017-08-12 PROCEDURE — 25000003 PHARM REV CODE 250: Performed by: PHYSICIAN ASSISTANT

## 2017-08-12 PROCEDURE — 63600175 PHARM REV CODE 636 W HCPCS: Performed by: PHYSICIAN ASSISTANT

## 2017-08-12 RX ADMIN — THERA TABS 1 TABLET: TAB at 09:08

## 2017-08-12 RX ADMIN — DOCUSATE SODIUM 100 MG: 100 CAPSULE, LIQUID FILLED ORAL at 09:08

## 2017-08-12 RX ADMIN — Medication 10 ML: at 06:08

## 2017-08-12 RX ADMIN — ROPINIROLE 0.5 MG: 0.5 TABLET, FILM COATED ORAL at 03:08

## 2017-08-12 RX ADMIN — DOFETILIDE 250 MCG: 0.12 CAPSULE ORAL at 08:08

## 2017-08-12 RX ADMIN — ALLOPURINOL 300 MG: 100 TABLET ORAL at 09:08

## 2017-08-12 RX ADMIN — HYDRALAZINE HYDROCHLORIDE 75 MG: 50 TABLET ORAL at 05:08

## 2017-08-12 RX ADMIN — CEFEPIME HYDROCHLORIDE 2 G: 2 INJECTION, SOLUTION INTRAVENOUS at 03:08

## 2017-08-12 RX ADMIN — DOCUSATE SODIUM 100 MG: 100 CAPSULE, LIQUID FILLED ORAL at 08:08

## 2017-08-12 RX ADMIN — FOLIC ACID 1 MG: 1 TABLET ORAL at 09:08

## 2017-08-12 RX ADMIN — Medication 400 MG: at 08:08

## 2017-08-12 RX ADMIN — OYSTER SHELL CALCIUM WITH VITAMIN D 1 TABLET: 500; 200 TABLET, FILM COATED ORAL at 08:08

## 2017-08-12 RX ADMIN — AMLODIPINE BESYLATE 10 MG: 10 TABLET ORAL at 09:08

## 2017-08-12 RX ADMIN — ASPIRIN 81 MG CHEWABLE TABLET 81 MG: 81 TABLET CHEWABLE at 09:08

## 2017-08-12 RX ADMIN — Medication 3 ML: at 02:08

## 2017-08-12 RX ADMIN — BUPROPION HYDROCHLORIDE 300 MG: 150 TABLET, EXTENDED RELEASE ORAL at 09:08

## 2017-08-12 RX ADMIN — SPIRONOLACTONE 25 MG: 25 TABLET, FILM COATED ORAL at 09:08

## 2017-08-12 RX ADMIN — ATORVASTATIN CALCIUM 10 MG: 10 TABLET, FILM COATED ORAL at 09:08

## 2017-08-12 RX ADMIN — ROPINIROLE 0.5 MG: 0.5 TABLET, FILM COATED ORAL at 05:08

## 2017-08-12 RX ADMIN — TAMSULOSIN HYDROCHLORIDE 0.4 MG: 0.4 CAPSULE ORAL at 09:08

## 2017-08-12 RX ADMIN — DOFETILIDE 250 MCG: 0.12 CAPSULE ORAL at 09:08

## 2017-08-12 RX ADMIN — CEFEPIME HYDROCHLORIDE 2 G: 2 INJECTION, SOLUTION INTRAVENOUS at 04:08

## 2017-08-12 RX ADMIN — WARFARIN SODIUM 4 MG: 4 TABLET ORAL at 05:08

## 2017-08-12 RX ADMIN — MAGNESIUM HYDROXIDE 2400 MG: 400 SUSPENSION ORAL at 05:08

## 2017-08-12 RX ADMIN — HYDRALAZINE HYDROCHLORIDE 75 MG: 50 TABLET ORAL at 03:08

## 2017-08-12 RX ADMIN — ROPINIROLE 0.5 MG: 0.5 TABLET, FILM COATED ORAL at 09:08

## 2017-08-12 RX ADMIN — OYSTER SHELL CALCIUM WITH VITAMIN D 1 TABLET: 500; 200 TABLET, FILM COATED ORAL at 09:08

## 2017-08-12 RX ADMIN — Medication 10 ML: at 12:08

## 2017-08-12 RX ADMIN — Medication 3 ML: at 09:08

## 2017-08-12 RX ADMIN — LISINOPRIL 10 MG: 10 TABLET ORAL at 09:08

## 2017-08-12 RX ADMIN — Medication 400 MG: at 09:08

## 2017-08-12 RX ADMIN — LISINOPRIL 10 MG: 10 TABLET ORAL at 08:08

## 2017-08-12 RX ADMIN — HYDRALAZINE HYDROCHLORIDE 75 MG: 50 TABLET ORAL at 09:08

## 2017-08-12 NOTE — PLAN OF CARE
Problem: Patient Care Overview  Goal: Plan of Care Review  Sterile LVAD dressing change performed per family. Procedure tolerated well. Drive line remains secured. Safety measures maintained. Call light is within reach. Will continue with plan of care.

## 2017-08-12 NOTE — PROGRESS NOTES
"Speech Therapy   Treatment    Kev Vasquez   MRN: [id number]            08/12/17 1000   Speech Time Calculation   Speech Start Time 1000   Speech Stop Time 1045   Speech Total (min) 45 min   General Information   SLP Treatment Date 08/12/17   General Observations Patient seen while sitting upright in w/c in ST office.   General Precautions LVAD;fall   Visual/Auditory Vision impaired   Subjective   Patient states "I was in a rehab or nursing facility in [place] for about 125 days."   Pain/Comfort   Pain Rating no pain       SLP Cognitive Treatment   Treatment Detail (SLP Cognitive Treatment) Patient oriented to person, place, and situation independently. Patient disoriented to time/date. Answered temporal orientation questions with 60% accuracy given mod verbal cues. On second attempt, patient disoriented to time/date at the end of the session, despite max verbal cues per ST. ST provided patient with calendar to orient to date, patient agreeable to use. In an inferential reasoning task, patient dia conclusions based on a short story with 86% accuracy independently. In a functional problem solving task, patient identified x2 possible causes of a hypothetical situation with 90% accuracy independently. Patient answered questions regarding home safety/medication management with 100% accuracy independently. Patient demonstrated moderate difficulty recalling events from previous day, given moderate verbal cues.        SLP Treatment: Verbal Expression   Treatment Detail (SLP Verbal Expression) To increase word fluency, patient participated in various naming tasks. In a concrete convergent naming task, patient identified category of x3 words with 70% accuracy independently, 80% accuracy given moderate verbal cues. In a concrete divergent naming task, patient listed x15 states in 1 minute given min verbal cues. Patient listed x9 clothing items in 1 minute, given max verbal cues.    Assessment   SLP Diagnosis moderate " cognitive-linguistic deficits   Prognosis Good   Problem List decreased orientation; decreased recall   Session Assessment progressing toward goals   Level of Motivation/Participation Good   Discharge Recommendations   Discharge Facility/Level Of Care Needs home health speech therapy   Plan   Plan Continue with current plan   Treatment/Billable Minutes   Speech Therapy Individual 45   Total Time 45       FIM Scores  Comprehension:6  Expression: 5  Social Interaction:4  Problem Solving:3  Memory: 3     Comprehension:  Complex= humor, finances, rationale for medical treatment(hip precautions, pressure relief)  Basic= pain, hunger, thirst, bathroom needs, cold, nutrition, sleep     Understands complex/abstract conversation/directions with extra time, assistance device(glasses, if visual mode or both; hearing aide if auditory mode or both) (6)     Expression:  Needs cues to express basic needs  (5)     Social Interaction:  Interacts appropriately with others with medication or extra time(anti-anxiety, antidepressant)  (6)     Problem Solving:  Solves basic problems 50-74% of the time  (3)     Memory:  Recognizes, recalls, or executes 50-74% of time 2 steps of 2 step request (3)    ZACH Daigle-SLP  8/12/2017

## 2017-08-12 NOTE — PROGRESS NOTES
Physical Therapy   Daily FIM    Kev Vasquez   MRN: [id number]      08/12/17 0825   Locomotion   Distance Walked 2   Distance Wheelchair 2   Walk 2   Wheelchair 2   Mode C         She Benavides, PTA  8/12/2017

## 2017-08-12 NOTE — PLAN OF CARE
Problem: Patient Care Overview  Goal: Plan of Care Review  Outcome: Ongoing (interventions implemented as appropriate)  Pt care plan updated and individualized as needed and progress continues.  See associated flowsheets for relevant documentation. Frequent rounds made per protocol to maintain pt safety and meet pt needs.  Continuing to monitor and follow up as needed.      Problem: Infection, Risk/Actual (Adult)  Goal: Infection Prevention/Resolution  Patient will demonstrate the desired outcomes by discharge/transition of care.   Outcome: Ongoing (interventions implemented as appropriate)  No new signs or symptoms noted.  Pt remains on IV antibiotics to patent and intact PICC.    Problem: Fall Risk (Adult)  Goal: Absence of Falls  Patient will demonstrate the desired outcomes by discharge/transition of care.   Outcome: Ongoing (interventions implemented as appropriate)  Pt remains free from falls or trauma thus far this shift.      Problem: Pressure Ulcer Risk (Candelario Scale) (Adult,Obstetrics,Pediatric)  Goal: Skin Integrity  Patient will demonstrate the desired outcomes by discharge/transition of care.   Outcome: Ongoing (interventions implemented as appropriate)  Pt turns and repositions as needed to maintain skin integrity.  No breakdown noted.      Problem: Ventricular Assist Device (Adult)  Goal: Signs and Symptoms of Listed Potential Problems Will be Absent, Minimized or Managed (Ventricular Assist Device)  Signs and symptoms of listed potential problems will be absent, minimized or managed by discharge/transition of care (reference Ventricular Assist Device (Adult) CPG).   Outcome: Ongoing (interventions implemented as appropriate)  No new signs or symptoms noted.  VAD functioning without noted problems.  Being treated for infection.

## 2017-08-12 NOTE — PROGRESS NOTES
Occupational Therapy  [place] [facility]  Kev Vasquez   MRN: [id number]              08/12/17 1200   Transfers   Bed/Chair/WC 3   Self Care   Eating 7   Grooming 5       JERALD Heath 8/12/2017

## 2017-08-12 NOTE — SUBJECTIVE & OBJECTIVE
Interval History: Pt without new c/o's.  I have reviewed the HPI and PMFSH and there are no changes from admission.        Scheduled Medications:    allopurinol  300 mg Oral Daily    amlodipine  10 mg Oral Daily    aspirin  81 mg Oral Daily    atorvastatin  10 mg Oral Daily    buPROPion  300 mg Oral Daily    calcium-vitamin D3  1 tablet Oral BID    ceFEPime (MAXIPIME) IVPB  2 g Intravenous Q12H    docusate sodium  100 mg Oral BID    dofetilide  250 mcg Oral Q12H    folic acid  1 mg Oral Daily    hydrALAZINE  75 mg Oral Q8H    lisinopril  10 mg Oral BID    magnesium oxide  400 mg Oral BID    multivitamin  1 tablet Oral Daily    ropinirole  0.5 mg Oral TID    sodium chloride 0.9%  10 mL Intravenous Q6H    sodium chloride 0.9%  3 mL Intravenous Q8H    spironolactone  25 mg Oral Daily    tamsulosin  0.4 mg Oral Daily    warfarin  4 mg Oral Every Tues, Thurs, Sat    [START ON 8/13/2017] warfarin  4 mg Oral Every Sun    warfarin  6 mg Oral Every Mon, Wed, Fri       PRN Medications: acetaminophen, bisacodyl, diphenoxylate-atropine 2.5-0.025 mg, magnesium hydroxide 400 mg/5 ml, ondansetron, polyethylene glycol, Flushing PICC Protocol **AND** sodium chloride 0.9% **AND** sodium chloride 0.9%, sodium phosphates, trazodone    Review of Systems   Constitutional: Negative for appetite change and fatigue.   Eyes: Negative for visual disturbance.   Respiratory: Negative for shortness of breath.    Cardiovascular: Negative for chest pain.   Gastrointestinal: Negative for constipation and diarrhea.   Genitourinary: Negative for dysuria, frequency and urgency.   Musculoskeletal: Positive for gait problem. Negative for arthralgias, back pain, joint swelling, myalgias, neck pain and neck stiffness.   Neurological: Positive for tremors and weakness. Negative for dizziness, numbness and headaches.   Psychiatric/Behavioral: Negative for dysphoric mood.   All other systems reviewed and are negative.    Objective:      Vital Signs (Most Recent):  Temp: 97.7 °F (36.5 °C) (08/12/17 0715)  Pulse: 64 (08/12/17 0715)  Resp: 16 (08/12/17 0715)  BP: (!) 74/0 (08/12/17 1500)  SpO2: 96 % (08/11/17 1900)    Vital Signs (24h Range):  Temp:  [97.7 °F (36.5 °C)-98.5 °F (36.9 °C)] 97.7 °F (36.5 °C)  Pulse:  [64-65] 64  Resp:  [16] 16  SpO2:  [96 %] 96 %  BP: (74-86)/(0) 74/0     Physical Exam   Constitutional: He is oriented to person, place, and time. He appears well-nourished.   Eyes: EOM are normal. Pupils are equal, round, and reactive to light.   Neck: Normal range of motion. Neck supple.   Cardiovascular: Normal rate.    Pulmonary/Chest: Effort normal.   Musculoskeletal: Normal range of motion.   Neurological: He is oriented to person, place, and time. He has normal strength.   Skin: No rash noted.   Psychiatric: He has a normal mood and affect.     NEUROLOGICAL EXAMINATION:     MENTAL STATUS   Oriented to person, place, and time.     CRANIAL NERVES     CN III, IV, VI   Pupils are equal, round, and reactive to light.  Extraocular motions are normal.     MOTOR EXAM     Strength   Strength 5/5 throughout.

## 2017-08-12 NOTE — PROGRESS NOTES
"Physical Therapy   Treatment    Kev Vasquez   MRN: [id number]   PTA visit #: 1     08/12/17 0825   PT Time Calculation   PT Start Time 0825   PT Stop Time 0910   PT Total Time (min) 45 min   Treatment   Treatment Type Treatment   PT/PTA PTA   PTA Visit Number 1   General   PT Received On 08/12/17   Family/Caregiver Present No   Patient Found (position) Seated in wheelchair   Pt found with (seat belt, LVAD)   Precautions   General Precautions LVAD;fall   Orthopedic No   Required Braces or Orthoses No   Cardiovascular/Pulmonary LVAD   Visual/Auditory Vision impaired   Subjective   Patient states " Are my wires ok"  Had nsg check lines and stated that they were all good   Pain/Comfort   Pain Rating 1 no pain   Bed Mobility   Bed Mobility no   Transfers   Transfer yes   Sit to Stand   Sit <> Stand Assistance Minimum Assistance   Sit <> Stand Assistive Device Rolling Walker   Trials/Comments post lean   Stand to Sit   Assistance Contact Guard Assistance;Minimum Assistance   Assistive Device Rolling Walker   Wheelchair Activities   Propulsion Yes   Propulsion Type 1 Manual   Level 1 Level tile   Method 1 Right upper extremity;Left upper extremity   Level of Assistance 1 Stand by assistsance   Description/ Details 1 100' X 1 with vc   Gait   Gait Yes   Weight Bearing Status FWB   Gait 1   Surface 1 Level tile   Gait Assistive Device Rolling walker   Assistance 1 Moderate assistance   Gait Distance 50' x 2 with mod A for balance and vc for sequencing.   Gait Pattern swing-to gait   Gait Deviation(s) decreased william;increased time in double stance;decreased velocity of limb motion;decreased step length;decreased stride length;decreased swing-to-stance ratio;decreased toe-to-floor clearance;decreased weight-shifting ability;forward lean   Impairments Contributing to Gait Deviations impaired balance;impaired motor control;impaired postural control;decreased strength   Seated   Seated-Exercises Lower extremity;Specific " exercises   Seated-Exercise Type Ankle pumps;Hip flexion;Long arc quads;ADduction   Seated-Exercise Comments B LE 2 x 20 reps 2.5 #   Activity Tolerance   Activity Tolerance Patient tolerated treatment well   After Treatment   Patient Position After Treatment Seated in wheelchair   Patient after treatment left (seat belt, with OT)   Assessment   Prognosis Good   Problem List Decreased strength;Decreased endurance;Impaired balance;Decreased mobility;Decreased coordination;Decreased cognition;Impaired judgement;Decreased safety awareness   Session Assessment progressing toward goals   Assessment Pt sheryl tx well but continues to req mod A for amb due to decreased balance.  Cont POC   Level of Motivation/Participation good   Barriers to Discharge Inaccessible home environment   Plan   Planned Therapy Intervention Continue with current plan   Therapy Frequency 2 times/day;Monday-Friday;Saturday or Sunday   Physical Therapy Follow-up   PT Follow-up? Yes   Treatment/Billable Minutes   Gait training 15   Therapeutic Exercise 20   Train/Wheelchair Management 10   Total Time 45       She Benavides, PTA  8/12/2017

## 2017-08-12 NOTE — PROGRESS NOTES
Occupational Therapy  AM Group Session  Kev Vasquez   MRN: [id number]     A client care conference was performed between the LOTR and WHEELER, prior to treatment by SUMMER, to discuss the patient's status, treatment plan and established goals.          08/12/17 1045   OT Time Calculation   OT Start Time 1045   OT Stop Time 1130   OT Total Time (min) 45 min   General   OT Date of Treatment 08/12/17   Treatment/Billable Minutes   Therapeutic Group 45   Total Time 45   Patient participated in 45 minute seated high endurance group. The group activity challenged bilateral upper extremity and lower extremity strengthening. In addition, cardiovascular and functional endurance were challenged during this group. Pt utulized 2# dowel during the following exercises.    Patient completed 20 reps x 3 sets bicep curls, side raises, shoulder flexion, shoulder extension (in UE circuit)    Patient completed 20 reps x 3 sets hip flexion, knee flexion, knee extension, toe and heel taps (in LE circuit)    - Alternating punches, side punches, diagonal reaches, front/side/overhead arm clap, running (UE) 3 x 20 reps    Pt required short rest breaks during therapeutic exercises. These activities were performed in a group setting to encourage participation with peers and social interaction skills.         JERALD Heath 8/12/2017

## 2017-08-12 NOTE — PROGRESS NOTES
"Occupational Therapy  AM Treatment Session  Kev Vasquez   MRN: [id number]     08/12/17 0914   OT Time Calculation   OT Start Time 0914   OT Stop Time 0959   OT Total Time (min) 45 min   General   OT Date of Treatment 08/12/17   Family/Caregiver Present No   Patient Found (position) Seated in wheelchair   Pt found with (LVAD)   Precautions   General Precautions LVAD;fall   Orthopedic No   Required Braces or Orthoses No   Cardiovascular/Pulmonary LVAD   Visual/Auditory Vision impaired   Subjective   Patient states "yes, i can" re: propelling wc   Pain/Comfort   Pain Rating no pain   Chair to Mat   Chair <>Mat Technique Stand Pivot   Chair <> Mat Assistance Moderate Assistance   Chair<> Mat Assistive Device No Assistive Device   Trials/Comments WC <> EOM   Feeding   Feeding Level of Assistance Independent   Feeding Where Assessed Wheelchair   Feeding Comments no (A) or cue; Pt used spoon to cut  food and managing  containers   Grooming   Grooming Level of Assistance Supervision   Grooming Where Assessed Wheelchair   Grooming Comments wash /dry hands   Exercise Tools   Exercise Tools Yes   Bilateral Boo 2# on incline 5/20x reps   Additional Activities   Additional Activities Other (Comment)   Additional Activities Comments Pt (S) for propelling wc ~79' from room > rehab gym using BUE AROM for endurance; -Pt @ EOM using BITS seated @ EOM UE unsupported : visual scanning x1 trial 3min using BUE AROM to promote trunk control   Activity Tolerance   Activity Tolerance Patient tolerated treatment well   After Treatment   Patient Position After Treatment Seated in wheelchair  (safety belt)   Patient after treatment left call button in reach   Assessment   Prognosis Good   Problem List Decreased Self Care skills;Decreased upper extremity range of motion;Decreased upper extremity strength;Decreased safe judgment during ADL;Decreased cognition;Decreased endurance;Decreased fine motor control;Decreased functional " mobility;Decreased gross motor control;Decreased IADLs;Decreased Function of right upper extremity;Decreased Function of left upper extremity;Decreased trunk control for functional activities   Assessment Pt with good participation requiring MOD A for TF and cues for wc locks. Pt would continue to benefit from OT services to increase functional mobility.   Level of Motivation/Participation Good   Discharge Recommendations   Equipment Needed After Discharge bedside commode   Discharge Facility/Level Of Care Needs home with home health   Plan   Plan Continue with current plan   Treatment/Billable Minutes   Self Care/Home Management 20   Therapeutic Activity 10   Therapeutic Exercise 15   Total Time 45           The SHAUNA and WHEELER have collaborated and discussed the patient's status, treatment plan and progress toward established goals.     Pérez Pavon, JERALD 8/12/2017

## 2017-08-12 NOTE — PROGRESS NOTES
[facility]  Physical Medicine & Rehab  Progress Note    Patient Name: Kev Vasquez  MRN: [id number]  Patient Class: IP- Rehab   Admission Date: 8/8/2017  Length of Stay: 4 days  Attending Physician: Rashad Garcia MD  Primary Care Provider: Mega Collins MD    Subjective:     Principal Problem:Gait instability    Interval History: Pt without new c/o's.  I have reviewed the HPI and PMFSH and there are no changes from admission.        Scheduled Medications:    allopurinol  300 mg Oral Daily    amlodipine  10 mg Oral Daily    aspirin  81 mg Oral Daily    atorvastatin  10 mg Oral Daily    buPROPion  300 mg Oral Daily    calcium-vitamin D3  1 tablet Oral BID    ceFEPime (MAXIPIME) IVPB  2 g Intravenous Q12H    docusate sodium  100 mg Oral BID    dofetilide  250 mcg Oral Q12H    folic acid  1 mg Oral Daily    hydrALAZINE  75 mg Oral Q8H    lisinopril  10 mg Oral BID    magnesium oxide  400 mg Oral BID    multivitamin  1 tablet Oral Daily    ropinirole  0.5 mg Oral TID    sodium chloride 0.9%  10 mL Intravenous Q6H    sodium chloride 0.9%  3 mL Intravenous Q8H    spironolactone  25 mg Oral Daily    tamsulosin  0.4 mg Oral Daily    warfarin  4 mg Oral Every Tues, Thurs, Sat    [START ON 8/13/2017] warfarin  4 mg Oral Every Sun    warfarin  6 mg Oral Every Mon, Wed, Fri       PRN Medications: acetaminophen, bisacodyl, diphenoxylate-atropine 2.5-0.025 mg, magnesium hydroxide 400 mg/5 ml, ondansetron, polyethylene glycol, Flushing PICC Protocol **AND** sodium chloride 0.9% **AND** sodium chloride 0.9%, sodium phosphates, trazodone    Review of Systems   Constitutional: Negative for appetite change and fatigue.   Eyes: Negative for visual disturbance.   Respiratory: Negative for shortness of breath.    Cardiovascular: Negative for chest pain.   Gastrointestinal: Negative for constipation and diarrhea.   Genitourinary: Negative for dysuria, frequency and urgency.    Musculoskeletal: Positive for gait problem. Negative for arthralgias, back pain, joint swelling, myalgias, neck pain and neck stiffness.   Neurological: Positive for tremors and weakness. Negative for dizziness, numbness and headaches.   Psychiatric/Behavioral: Negative for dysphoric mood.   All other systems reviewed and are negative.    Objective:     Vital Signs (Most Recent):  Temp: 97.7 °F (36.5 °C) (08/12/17 0715)  Pulse: 64 (08/12/17 0715)  Resp: 16 (08/12/17 0715)  BP: (!) 74/0 (08/12/17 1500)  SpO2: 96 % (08/11/17 1900)    Vital Signs (24h Range):  Temp:  [97.7 °F (36.5 °C)-98.5 °F (36.9 °C)] 97.7 °F (36.5 °C)  Pulse:  [64-65] 64  Resp:  [16] 16  SpO2:  [96 %] 96 %  BP: (74-86)/(0) 74/0     Physical Exam   Constitutional: He is oriented to person, place, and time. He appears well-nourished.   Eyes: EOM are normal. Pupils are equal, round, and reactive to light.   Neck: Normal range of motion. Neck supple.   Cardiovascular: Normal rate.    Pulmonary/Chest: Effort normal.   Musculoskeletal: Normal range of motion.   Neurological: He is oriented to person, place, and time. He has normal strength.   Skin: No rash noted.   Psychiatric: He has a normal mood and affect.     NEUROLOGICAL EXAMINATION:     MENTAL STATUS   Oriented to person, place, and time.     CRANIAL NERVES     CN III, IV, VI   Pupils are equal, round, and reactive to light.  Extraocular motions are normal.     MOTOR EXAM     Strength   Strength 5/5 throughout.       Assessment/Plan:      Kev Vasquez is a 76 y.o. male admitted to inpatient rehabilitation on 8/8/2017 for Gait instability with impaired mobility and ADLs. Patient remains appropriate for PT, OT, and as required Speech therapy. Patient continues to require 24 hour nursing care as well as daily Physician assessment.    Complication involving left ventricular assist device (LVAD)    Still with some drainage -- monitor.  On cefepime through 9/23.           Stage 2 chronic kidney  disease    Stable        Chronic systolic heart failure    Compensated with LVAD    8/8:   INR 2.7  8/10: INR 2.5 -- check 8/14        * Gait instability    Sit-->stand CGA, T/F MNA, gait 50' MNA/MDA RW, limited by post lean and impaired trunk balance.  UBD MNA, LBD MXA, toileting DEP on 8/9.      Mild cognitive deficits.              DISCHARGE PLANNING:  Tentative Discharge Date: 8/24/2017    Future Appointments  Date Time Provider Department Center   8/14/2017 2:15 PM Payam Muhammad MD [facility] ID [facility]   8/23/2017 12:00 PM LAB, APPOINTMENT [facility] LAB VNP [facility]   8/23/2017 1:30 PM [facility]   8/23/2017 1:30 PM Carlito Santana MD [facility] HEAR[facility]X [facility]   10/26/2017 8:00 AM HOME MONITOR DEVICE CHECK, [facility] ARRHYTH [facility]       Milena Krishnamurthy MD  Department of Physical Medicine & Rehab   [facility]

## 2017-08-13 PROCEDURE — 25000003 PHARM REV CODE 250: Performed by: PHYSICAL MEDICINE & REHABILITATION

## 2017-08-13 PROCEDURE — 25000003 PHARM REV CODE 250: Performed by: PHYSICIAN ASSISTANT

## 2017-08-13 PROCEDURE — 63600175 PHARM REV CODE 636 W HCPCS: Performed by: PHYSICIAN ASSISTANT

## 2017-08-13 PROCEDURE — 99232 SBSQ HOSP IP/OBS MODERATE 35: CPT | Mod: ,,, | Performed by: PHYSICAL MEDICINE & REHABILITATION

## 2017-08-13 PROCEDURE — A4216 STERILE WATER/SALINE, 10 ML: HCPCS | Performed by: PHYSICIAN ASSISTANT

## 2017-08-13 PROCEDURE — 12800000 HC REHAB SEMI-PRIVATE ROOM

## 2017-08-13 RX ORDER — LACTULOSE 10 G/15ML
20 SOLUTION ORAL EVERY 6 HOURS PRN
Status: DISCONTINUED | OUTPATIENT
Start: 2017-08-13 | End: 2017-08-24 | Stop reason: HOSPADM

## 2017-08-13 RX ADMIN — OYSTER SHELL CALCIUM WITH VITAMIN D 1 TABLET: 500; 200 TABLET, FILM COATED ORAL at 09:08

## 2017-08-13 RX ADMIN — LISINOPRIL 10 MG: 10 TABLET ORAL at 09:08

## 2017-08-13 RX ADMIN — FOLIC ACID 1 MG: 1 TABLET ORAL at 08:08

## 2017-08-13 RX ADMIN — Medication 10 ML: at 05:08

## 2017-08-13 RX ADMIN — HYDRALAZINE HYDROCHLORIDE 75 MG: 50 TABLET ORAL at 05:08

## 2017-08-13 RX ADMIN — CEFEPIME HYDROCHLORIDE 2 G: 2 INJECTION, SOLUTION INTRAVENOUS at 04:08

## 2017-08-13 RX ADMIN — DOCUSATE SODIUM 100 MG: 100 CAPSULE, LIQUID FILLED ORAL at 08:08

## 2017-08-13 RX ADMIN — ALLOPURINOL 300 MG: 100 TABLET ORAL at 08:08

## 2017-08-13 RX ADMIN — WARFARIN SODIUM 4 MG: 4 TABLET ORAL at 05:08

## 2017-08-13 RX ADMIN — Medication 10 ML: at 12:08

## 2017-08-13 RX ADMIN — LISINOPRIL 10 MG: 10 TABLET ORAL at 08:08

## 2017-08-13 RX ADMIN — Medication 400 MG: at 09:08

## 2017-08-13 RX ADMIN — SODIUM CHLORIDE, PRESERVATIVE FREE 10 ML: 5 INJECTION INTRAVENOUS at 02:08

## 2017-08-13 RX ADMIN — AMLODIPINE BESYLATE 10 MG: 10 TABLET ORAL at 08:08

## 2017-08-13 RX ADMIN — THERA TABS 1 TABLET: TAB at 08:08

## 2017-08-13 RX ADMIN — CEFEPIME HYDROCHLORIDE 2 G: 2 INJECTION, SOLUTION INTRAVENOUS at 02:08

## 2017-08-13 RX ADMIN — Medication 3 ML: at 05:08

## 2017-08-13 RX ADMIN — TAMSULOSIN HYDROCHLORIDE 0.4 MG: 0.4 CAPSULE ORAL at 08:08

## 2017-08-13 RX ADMIN — ASPIRIN 81 MG CHEWABLE TABLET 81 MG: 81 TABLET CHEWABLE at 08:08

## 2017-08-13 RX ADMIN — ROPINIROLE 0.5 MG: 0.5 TABLET, FILM COATED ORAL at 05:08

## 2017-08-13 RX ADMIN — Medication 3 ML: at 09:08

## 2017-08-13 RX ADMIN — Medication 400 MG: at 08:08

## 2017-08-13 RX ADMIN — SPIRONOLACTONE 25 MG: 25 TABLET, FILM COATED ORAL at 08:08

## 2017-08-13 RX ADMIN — Medication 10 ML: at 06:08

## 2017-08-13 RX ADMIN — ROPINIROLE 0.5 MG: 0.5 TABLET, FILM COATED ORAL at 09:08

## 2017-08-13 RX ADMIN — HYDRALAZINE HYDROCHLORIDE 75 MG: 50 TABLET ORAL at 01:08

## 2017-08-13 RX ADMIN — DOFETILIDE 250 MCG: 0.12 CAPSULE ORAL at 09:08

## 2017-08-13 RX ADMIN — DOCUSATE SODIUM 100 MG: 100 CAPSULE, LIQUID FILLED ORAL at 09:08

## 2017-08-13 RX ADMIN — DOFETILIDE 250 MCG: 0.12 CAPSULE ORAL at 01:08

## 2017-08-13 RX ADMIN — HYDRALAZINE HYDROCHLORIDE 75 MG: 50 TABLET ORAL at 09:08

## 2017-08-13 RX ADMIN — ATORVASTATIN CALCIUM 10 MG: 10 TABLET, FILM COATED ORAL at 08:08

## 2017-08-13 RX ADMIN — ROPINIROLE 0.5 MG: 0.5 TABLET, FILM COATED ORAL at 01:08

## 2017-08-13 RX ADMIN — LACTULOSE 20 G: 20 SOLUTION ORAL at 11:08

## 2017-08-13 RX ADMIN — BUPROPION HYDROCHLORIDE 300 MG: 150 TABLET, EXTENDED RELEASE ORAL at 08:08

## 2017-08-13 RX ADMIN — OYSTER SHELL CALCIUM WITH VITAMIN D 1 TABLET: 500; 200 TABLET, FILM COATED ORAL at 08:08

## 2017-08-13 NOTE — PLAN OF CARE
Problem: Patient Care Overview  Goal: Plan of Care Review  Outcome: Ongoing (interventions implemented as appropriate)  Plan of care reviewed with pt. Pt resting in bed. Bed in low position, wheels locked. Call light within reach. Side rails up x2. Frequent rounds made to ensure pt safety, comfort, and pain control management. Will continue to monitor.      Problem: Infection, Risk/Actual (Adult)  Goal: Identify Related Risk Factors and Signs and Symptoms  Related risk factors and signs and symptoms are identified upon initiation of Human Response Clinical Practice Guideline (CPG)   Outcome: Ongoing (interventions implemented as appropriate)  IV antibiotic infused Q12H to prevent bacterial growth and promote continued healing. Will continue to monitor.     Problem: Fall Risk (Adult)  Goal: Identify Related Risk Factors and Signs and Symptoms  Related risk factors and signs and symptoms are identified upon initiation of Human Response Clinical Practice Guideline (CPG)   Outcome: Ongoing (interventions implemented as appropriate)  Instructed pt on safety, oriented pt to call light and pt verbalized understanding to call before getting out of bed. Pt free from falls so far this shift. Will continue to monitor.      Problem: Pressure Ulcer Risk (Candelario Scale) (Adult,Obstetrics,Pediatric)  Goal: Identify Related Risk Factors and Signs and Symptoms  Related risk factors and signs and symptoms are identified upon initiation of Human Response Clinical Practice Guideline (CPG)   Outcome: Ongoing (interventions implemented as appropriate)  Pt turns self in bed. Pt free from skin breakdown so far this shift. Will continue to monitor.         Problem: Ventricular Assist Device (Adult)  Goal: Signs and Symptoms of Listed Potential Problems Will be Absent, Minimized or Managed (Ventricular Assist Device)  Signs and symptoms of listed potential problems will be absent, minimized or managed by discharge/transition of care  (reference Ventricular Assist Device (Adult) CPG).   Outcome: Ongoing (interventions implemented as appropriate)  Pt following fall precautions as well as all other safety measures implemented to ensure a safe environment for maximum rehabilitation.

## 2017-08-13 NOTE — SUBJECTIVE & OBJECTIVE
Interval History: Pt without new c/o's.  I have reviewed the HPI and PMFSH and there are no changes from admission.        Scheduled Medications:    allopurinol  300 mg Oral Daily    amlodipine  10 mg Oral Daily    aspirin  81 mg Oral Daily    atorvastatin  10 mg Oral Daily    buPROPion  300 mg Oral Daily    calcium-vitamin D3  1 tablet Oral BID    ceFEPime (MAXIPIME) IVPB  2 g Intravenous Q12H    docusate sodium  100 mg Oral BID    dofetilide  250 mcg Oral Q12H    folic acid  1 mg Oral Daily    hydrALAZINE  75 mg Oral Q8H    lisinopril  10 mg Oral BID    magnesium oxide  400 mg Oral BID    multivitamin  1 tablet Oral Daily    ropinirole  0.5 mg Oral TID    sodium chloride 0.9%  10 mL Intravenous Q6H    sodium chloride 0.9%  3 mL Intravenous Q8H    spironolactone  25 mg Oral Daily    tamsulosin  0.4 mg Oral Daily    warfarin  4 mg Oral Every Tues, Thurs, Sat    warfarin  4 mg Oral Every Sun    warfarin  6 mg Oral Every Mon, Wed, Fri       PRN Medications: acetaminophen, bisacodyl, diphenoxylate-atropine 2.5-0.025 mg, lactulose, magnesium hydroxide 400 mg/5 ml, ondansetron, polyethylene glycol, Flushing PICC Protocol **AND** sodium chloride 0.9% **AND** sodium chloride 0.9%, sodium phosphates, trazodone    Review of Systems   Constitutional: Negative for appetite change and fatigue.   Eyes: Negative for visual disturbance.   Respiratory: Negative for shortness of breath.    Cardiovascular: Negative for chest pain.   Gastrointestinal: Negative for constipation and diarrhea.   Genitourinary: Negative for dysuria, frequency and urgency.   Musculoskeletal: Positive for gait problem. Negative for arthralgias, back pain, joint swelling, myalgias, neck pain and neck stiffness.   Neurological: Positive for tremors and weakness. Negative for dizziness, numbness and headaches.   Psychiatric/Behavioral: Negative for dysphoric mood.   All other systems reviewed and are negative.    Objective:     Vital  Signs (Most Recent):  Temp: 97.7 °F (36.5 °C) (08/13/17 0625)  Pulse: 67 (08/13/17 0625)  Resp: 18 (08/13/17 0625)  BP: (!) 84/0 (08/13/17 1315)  SpO2: 95 % (08/13/17 0625)    Vital Signs (24h Range):  Temp:  [97.7 °F (36.5 °C)-97.9 °F (36.6 °C)] 97.7 °F (36.5 °C)  Pulse:  [66-67] 67  Resp:  [16-18] 18  SpO2:  [94 %-95 %] 95 %  BP: (74-84)/(0) 84/0     Physical Exam   Constitutional: He is oriented to person, place, and time. He appears well-nourished.   Eyes: EOM are normal. Pupils are equal, round, and reactive to light.   Neck: Normal range of motion. Neck supple.   Cardiovascular: Normal rate.    Pulmonary/Chest: Effort normal.   Musculoskeletal: Normal range of motion.   Neurological: He is oriented to person, place, and time. He has normal strength.   Skin: No rash noted.   Psychiatric: He has a normal mood and affect.     NEUROLOGICAL EXAMINATION:     MENTAL STATUS   Oriented to person, place, and time.     CRANIAL NERVES     CN III, IV, VI   Pupils are equal, round, and reactive to light.  Extraocular motions are normal.     MOTOR EXAM     Strength   Strength 5/5 throughout.

## 2017-08-13 NOTE — PROGRESS NOTES
[facility]  Physical Medicine & Rehab  Progress Note    Patient Name: Kev Vasquez  MRN: [id number]  Patient Class: IP- Rehab   Admission Date: 8/8/2017  Length of Stay: 5 days  Attending Physician: Rashad Garcia MD  Primary Care Provider: Mega Collins MD    Subjective:     Principal Problem:Gait instability    Interval History: Pt without new c/o's.  I have reviewed the HPI and PMFSH and there are no changes from admission.        Scheduled Medications:    allopurinol  300 mg Oral Daily    amlodipine  10 mg Oral Daily    aspirin  81 mg Oral Daily    atorvastatin  10 mg Oral Daily    buPROPion  300 mg Oral Daily    calcium-vitamin D3  1 tablet Oral BID    ceFEPime (MAXIPIME) IVPB  2 g Intravenous Q12H    docusate sodium  100 mg Oral BID    dofetilide  250 mcg Oral Q12H    folic acid  1 mg Oral Daily    hydrALAZINE  75 mg Oral Q8H    lisinopril  10 mg Oral BID    magnesium oxide  400 mg Oral BID    multivitamin  1 tablet Oral Daily    ropinirole  0.5 mg Oral TID    sodium chloride 0.9%  10 mL Intravenous Q6H    sodium chloride 0.9%  3 mL Intravenous Q8H    spironolactone  25 mg Oral Daily    tamsulosin  0.4 mg Oral Daily    warfarin  4 mg Oral Every Tues, Thurs, Sat    warfarin  4 mg Oral Every Sun    warfarin  6 mg Oral Every Mon, Wed, Fri       PRN Medications: acetaminophen, bisacodyl, diphenoxylate-atropine 2.5-0.025 mg, lactulose, magnesium hydroxide 400 mg/5 ml, ondansetron, polyethylene glycol, Flushing PICC Protocol **AND** sodium chloride 0.9% **AND** sodium chloride 0.9%, sodium phosphates, trazodone    Review of Systems   Constitutional: Negative for appetite change and fatigue.   Eyes: Negative for visual disturbance.   Respiratory: Negative for shortness of breath.    Cardiovascular: Negative for chest pain.   Gastrointestinal: Negative for constipation and diarrhea.   Genitourinary: Negative for dysuria, frequency and urgency.   Musculoskeletal:  Positive for gait problem. Negative for arthralgias, back pain, joint swelling, myalgias, neck pain and neck stiffness.   Neurological: Positive for tremors and weakness. Negative for dizziness, numbness and headaches.   Psychiatric/Behavioral: Negative for dysphoric mood.   All other systems reviewed and are negative.    Objective:     Vital Signs (Most Recent):  Temp: 97.7 °F (36.5 °C) (08/13/17 0625)  Pulse: 67 (08/13/17 0625)  Resp: 18 (08/13/17 0625)  BP: (!) 84/0 (08/13/17 1315)  SpO2: 95 % (08/13/17 0625)    Vital Signs (24h Range):  Temp:  [97.7 °F (36.5 °C)-97.9 °F (36.6 °C)] 97.7 °F (36.5 °C)  Pulse:  [66-67] 67  Resp:  [16-18] 18  SpO2:  [94 %-95 %] 95 %  BP: (74-84)/(0) 84/0     Physical Exam   Constitutional: He is oriented to person, place, and time. He appears well-nourished.   Eyes: EOM are normal. Pupils are equal, round, and reactive to light.   Neck: Normal range of motion. Neck supple.   Cardiovascular: Normal rate.    Pulmonary/Chest: Effort normal.   Musculoskeletal: Normal range of motion.   Neurological: He is oriented to person, place, and time. He has normal strength.   Skin: No rash noted.   Psychiatric: He has a normal mood and affect.     NEUROLOGICAL EXAMINATION:     MENTAL STATUS   Oriented to person, place, and time.     CRANIAL NERVES     CN III, IV, VI   Pupils are equal, round, and reactive to light.  Extraocular motions are normal.     MOTOR EXAM     Strength   Strength 5/5 throughout.       Assessment/Plan:      Kev Vasquez is a 76 y.o. male admitted to inpatient rehabilitation on 8/8/2017 for Gait instability with impaired mobility and ADLs. Patient remains appropriate for PT, OT, and as required Speech therapy. Patient continues to require 24 hour nursing care as well as daily Physician assessment.    Complication involving left ventricular assist device (LVAD)    Still with some drainage -- monitor.  On cefepime through 9/23.           Stage 2 chronic kidney disease     Stable        Chronic systolic heart failure    Compensated with LVAD    8/8:   INR 2.7  8/10: INR 2.5 -- check 8/14        * Gait instability    Sit-->stand CGA, T/F MNA, gait 50' MNA/MDA RW, limited by post lean and impaired trunk balance.  UBD MNA, LBD MXA, toileting DEP on 8/9.      Mild cognitive deficits.              DISCHARGE PLANNING:  Tentative Discharge Date: 8/24/2017    Future Appointments  Date Time Provider Department Center   8/14/2017 2:15 PM Payam Muhammad MD [facility] ID [facility]   8/23/2017 12:00 PM LAB, APPOINTMENT [facility] LAB VNP [facility]   8/23/2017 1:30 [facility]   8/23/2017 1:30 PM Carlito Santana MD [facility]   10/26/2017 8:00 AM HOME MONITOR DEVICE CHECK, [facility]       Milena Krishnamurthy MD  Department of Physical Medicine & Rehab   [facility]

## 2017-08-13 NOTE — PLAN OF CARE
Problem: Patient Care Overview  Goal: Plan of Care Review  Outcome: Ongoing (interventions implemented as appropriate)  Mr Vasquez remains AAO; he is sitting up in w/c with safety belt with alarm in progress. LVAD is on battery power. Pt is watching television with his daughter. No distress noted. Call light is within reach. Mr Vasquez was instructed to use the call button to signal staff for assistance. He verbalized understanding of information. Will continue with plan of care.

## 2017-08-13 NOTE — PROGRESS NOTES
Sterile LVAD dressing changed performed per patient's daughter. A small amt of brownish drainage was noted on old dressing, and dried drainage noted around drive line insertion site. Procedure tolerated well. LVAD placed on power module. Safety measures maintained. Will continue with plan of care.

## 2017-08-14 ENCOUNTER — OFFICE VISIT (OUTPATIENT)
Dept: INFECTIOUS DISEASES | Facility: CLINIC | Age: 77
End: 2017-08-14
Payer: MEDICARE

## 2017-08-14 VITALS
SYSTOLIC BLOOD PRESSURE: 100 MMHG | HEART RATE: 80 BPM | HEIGHT: 72 IN | BODY MASS INDEX: 24.11 KG/M2 | WEIGHT: 178 LBS | TEMPERATURE: 99 F | DIASTOLIC BLOOD PRESSURE: 60 MMHG

## 2017-08-14 DIAGNOSIS — T82.9XXA LEFT VENTRICULAR ASSIST DEVICE (LVAD) COMPLICATION, INITIAL ENCOUNTER: ICD-10-CM

## 2017-08-14 DIAGNOSIS — T84.7XXA HARDWARE COMPLICATING WOUND INFECTION, INITIAL ENCOUNTER: Primary | ICD-10-CM

## 2017-08-14 LAB
ANION GAP SERPL CALC-SCNC: 7 MMOL/L
BUN SERPL-MCNC: 33 MG/DL
CALCIUM SERPL-MCNC: 10.8 MG/DL
CHLORIDE SERPL-SCNC: 104 MMOL/L
CO2 SERPL-SCNC: 25 MMOL/L
CREAT SERPL-MCNC: 1.7 MG/DL
EST. GFR  (AFRICAN AMERICAN): 44.9 ML/MIN/1.73 M^2
EST. GFR  (NON AFRICAN AMERICAN): 38.9 ML/MIN/1.73 M^2
GLUCOSE SERPL-MCNC: 95 MG/DL
INR PPP: 2.6
POTASSIUM SERPL-SCNC: 4.8 MMOL/L
PROTHROMBIN TIME: 26.6 SEC
SODIUM SERPL-SCNC: 136 MMOL/L

## 2017-08-14 PROCEDURE — 99214 OFFICE O/P EST MOD 30 MIN: CPT | Mod: S$PBB,,, | Performed by: INTERNAL MEDICINE

## 2017-08-14 PROCEDURE — A4216 STERILE WATER/SALINE, 10 ML: HCPCS | Performed by: PHYSICIAN ASSISTANT

## 2017-08-14 PROCEDURE — 3078F DIAST BP <80 MM HG: CPT | Mod: ,,, | Performed by: INTERNAL MEDICINE

## 2017-08-14 PROCEDURE — 99999 PR PBB SHADOW E&M-EST. PATIENT-LVL III: CPT | Mod: PBBFAC,,, | Performed by: INTERNAL MEDICINE

## 2017-08-14 PROCEDURE — 25000003 PHARM REV CODE 250: Performed by: PHYSICAL MEDICINE & REHABILITATION

## 2017-08-14 PROCEDURE — 36415 COLL VENOUS BLD VENIPUNCTURE: CPT

## 2017-08-14 PROCEDURE — 97110 THERAPEUTIC EXERCISES: CPT

## 2017-08-14 PROCEDURE — 97535 SELF CARE MNGMENT TRAINING: CPT

## 2017-08-14 PROCEDURE — 25000003 PHARM REV CODE 250: Performed by: PHYSICIAN ASSISTANT

## 2017-08-14 PROCEDURE — 80048 BASIC METABOLIC PNL TOTAL CA: CPT

## 2017-08-14 PROCEDURE — 85610 PROTHROMBIN TIME: CPT

## 2017-08-14 PROCEDURE — 97116 GAIT TRAINING THERAPY: CPT

## 2017-08-14 PROCEDURE — 63600175 PHARM REV CODE 636 W HCPCS: Performed by: PHYSICIAN ASSISTANT

## 2017-08-14 PROCEDURE — 92507 TX SP LANG VOICE COMM INDIV: CPT

## 2017-08-14 PROCEDURE — 97542 WHEELCHAIR MNGMENT TRAINING: CPT

## 2017-08-14 PROCEDURE — 3074F SYST BP LT 130 MM HG: CPT | Mod: ,,, | Performed by: INTERNAL MEDICINE

## 2017-08-14 PROCEDURE — 99213 OFFICE O/P EST LOW 20 MIN: CPT | Mod: PBBFAC | Performed by: INTERNAL MEDICINE

## 2017-08-14 PROCEDURE — 12800000 HC REHAB SEMI-PRIVATE ROOM

## 2017-08-14 PROCEDURE — 1126F AMNT PAIN NOTED NONE PRSNT: CPT | Mod: ,,, | Performed by: INTERNAL MEDICINE

## 2017-08-14 PROCEDURE — 1159F MED LIST DOCD IN RCRD: CPT | Mod: ,,, | Performed by: INTERNAL MEDICINE

## 2017-08-14 PROCEDURE — 99233 SBSQ HOSP IP/OBS HIGH 50: CPT | Mod: ,,, | Performed by: PHYSICAL MEDICINE & REHABILITATION

## 2017-08-14 PROCEDURE — 97150 GROUP THERAPEUTIC PROCEDURES: CPT

## 2017-08-14 RX ADMIN — DOCUSATE SODIUM 100 MG: 100 CAPSULE, LIQUID FILLED ORAL at 07:08

## 2017-08-14 RX ADMIN — DOFETILIDE 250 MCG: 0.12 CAPSULE ORAL at 12:08

## 2017-08-14 RX ADMIN — FOLIC ACID 1 MG: 1 TABLET ORAL at 07:08

## 2017-08-14 RX ADMIN — Medication 10 ML: at 05:08

## 2017-08-14 RX ADMIN — ALLOPURINOL 300 MG: 100 TABLET ORAL at 07:08

## 2017-08-14 RX ADMIN — TAMSULOSIN HYDROCHLORIDE 0.4 MG: 0.4 CAPSULE ORAL at 07:08

## 2017-08-14 RX ADMIN — ROPINIROLE 0.5 MG: 0.5 TABLET, FILM COATED ORAL at 05:08

## 2017-08-14 RX ADMIN — OYSTER SHELL CALCIUM WITH VITAMIN D 1 TABLET: 500; 200 TABLET, FILM COATED ORAL at 07:08

## 2017-08-14 RX ADMIN — Medication 400 MG: at 08:08

## 2017-08-14 RX ADMIN — Medication 10 ML: at 01:08

## 2017-08-14 RX ADMIN — HYDRALAZINE HYDROCHLORIDE 75 MG: 50 TABLET ORAL at 05:08

## 2017-08-14 RX ADMIN — Medication 10 ML: at 12:08

## 2017-08-14 RX ADMIN — CEFEPIME HYDROCHLORIDE 2 G: 2 INJECTION, SOLUTION INTRAVENOUS at 03:08

## 2017-08-14 RX ADMIN — Medication 3 ML: at 03:08

## 2017-08-14 RX ADMIN — WARFARIN SODIUM 6 MG: 6 TABLET ORAL at 05:08

## 2017-08-14 RX ADMIN — TRAZODONE HYDROCHLORIDE 50 MG: 50 TABLET ORAL at 08:08

## 2017-08-14 RX ADMIN — OYSTER SHELL CALCIUM WITH VITAMIN D 1 TABLET: 500; 200 TABLET, FILM COATED ORAL at 08:08

## 2017-08-14 RX ADMIN — THERA TABS 1 TABLET: TAB at 07:08

## 2017-08-14 RX ADMIN — ROPINIROLE 0.5 MG: 0.5 TABLET, FILM COATED ORAL at 08:08

## 2017-08-14 RX ADMIN — ATORVASTATIN CALCIUM 10 MG: 10 TABLET, FILM COATED ORAL at 07:08

## 2017-08-14 RX ADMIN — SPIRONOLACTONE 25 MG: 25 TABLET, FILM COATED ORAL at 07:08

## 2017-08-14 RX ADMIN — HYDRALAZINE HYDROCHLORIDE 75 MG: 50 TABLET ORAL at 08:08

## 2017-08-14 RX ADMIN — ROPINIROLE 0.5 MG: 0.5 TABLET, FILM COATED ORAL at 03:08

## 2017-08-14 RX ADMIN — AMLODIPINE BESYLATE 10 MG: 10 TABLET ORAL at 07:08

## 2017-08-14 RX ADMIN — Medication 3 ML: at 05:08

## 2017-08-14 RX ADMIN — LISINOPRIL 10 MG: 10 TABLET ORAL at 08:08

## 2017-08-14 RX ADMIN — CEFEPIME HYDROCHLORIDE 2 G: 2 INJECTION, SOLUTION INTRAVENOUS at 04:08

## 2017-08-14 RX ADMIN — HYDRALAZINE HYDROCHLORIDE 75 MG: 50 TABLET ORAL at 03:08

## 2017-08-14 RX ADMIN — Medication 400 MG: at 07:08

## 2017-08-14 RX ADMIN — LISINOPRIL 10 MG: 10 TABLET ORAL at 07:08

## 2017-08-14 RX ADMIN — BUPROPION HYDROCHLORIDE 300 MG: 150 TABLET, EXTENDED RELEASE ORAL at 07:08

## 2017-08-14 RX ADMIN — DOFETILIDE 250 MCG: 0.12 CAPSULE ORAL at 08:08

## 2017-08-14 RX ADMIN — ASPIRIN 81 MG CHEWABLE TABLET 81 MG: 81 TABLET CHEWABLE at 07:08

## 2017-08-14 NOTE — PROGRESS NOTES
Occupational Therapy  [facility]    Kev Vasquez  MRN: [id number]  Room/Bed: E280/E280 B       08/14/17 1600   Transfers   Bed/Chair/WC 3   Toilet 3   Shower 3   Self Care   Eating 7   Grooming 7   Bathing 3   Dressing-Upper 5   Dressing-Lower 3   Toileting 1       SHAUNA Bland  8/14/2017

## 2017-08-14 NOTE — PROGRESS NOTES
"Physical Therapy   Treatment    Kev Vasquez   MRN: [id number]      08/14/17 1032   PT Time Calculation   PT Start Time 1032   PT Stop Time 1117   PT Total Time (min) 45 min   Treatment   Treatment Type Treatment   PT/PTA PT   General   PT Received On 08/14/17   Missed Time Reason Not applicable   Family/Caregiver Present No   Patient Found (position) Seated in wheelchair   Precautions   General Precautions LVAD;fall   Orthopedic No   Required Braces or Orthoses No   Cardiovascular/Pulmonary LVAD   Visual/Auditory Vision impaired   Subjective   Patient states "I didn't sleep well last night."   Pain/Comfort   Pain Rating 1 no pain   Transfers   Transfer yes   Sit to Stand   Sit <> Stand Assistance Contact Guard Assistance   Sit <> Stand Assistive Device Rolling Walker   Trials/Comments x 2 trials with cueing for hand placement   Stand to Sit   Assistance Contact Guard Assistance   Assistive Device Rolling Walker   Trials/Comments x 2 with cueing for hand placement   Wheelchair Activities   Propulsion Yes   Propulsion Type 1 Manual   Level 1 Level tile   Method 1 Left upper extremity;Right upper extremity   Level of Assistance 1 Stand by assistsance   Description/ Details 1 150ft with cueing for attention to task   Gait   Gait Yes   Weight Bearing Status FWB   Gait 1   Surface 1 Level tile   Gait Assistive Device Rolling walker   Assistance 1 Minimum assistance   Gait Distance 63ft, 74ft with seated rest break between trials with Min A for balance   Gait Pattern swing-through gait   Gait Deviation(s) decreased william;increased time in double stance;decreased step length;decreased velocity of limb motion;decreased stride length   Impairments Contributing to Gait Deviations impaired balance;impaired coordination;decreased strength;impaired postural control   Equipment Use   Recumbent Bike Comments LBE x 15 minutes in order to improve B LE strength, endurance, and coordination    Activity Tolerance   Activity Tolerance " Patient tolerated treatment well   After Treatment   Patient Position After Treatment Seated in wheelchair   Patient after treatment left call button in reach  (safety belt in place, in care of OT)   Assessment   Prognosis Good   Problem List Decreased strength;Decreased endurance;Impaired balance;Decreased mobility;Decreased coordination;Decreased safety awareness   Session Assessment progressing toward goals   Assessment Pt tolerated treatment well despite reports of not sleeping well last night. Pt did report being more tired today. Pt was able to increase gait distance to 74ft with RW and Min A for balance. Decreased balance and endurance seem to be his biggest barriers to progress, but PT expects that this will improve with further PT intervention. Continue PT POC.   Level of Motivation/Participation good   Barriers to Discharge Inaccessible home environment   Barriers to Discharge Comments steps within home   Discharge Recommendations   Equipment Needed After Discharge bedside commode   Discharge Facility/Level Of Care Needs home health PT   Plan   Planned Therapy Intervention Continue with current plan   Therapy Frequency 2 times/day;Monday-Friday;Saturday or Sunday   Physical Therapy Follow-up   PT Follow-up? Yes   PT - Next Visit Date 08/15/17   Treatment/Billable Minutes   Gait training 15   Therapeutic Activity 5   Therapeutic Exercise 15   Train/Wheelchair Management 10   Total Time 45   Georgette Early DPT  8/14/2017

## 2017-08-14 NOTE — PROGRESS NOTES
Occupational Therapy   Dressing Group    Kev Vasquez   MRN: [id number]     Pt participated in 45 min functional dressing group. The group activity reviewed safety considerations, energy conservation, and adaptive strategies for upper body and lower body dressing. Patient educated on adaptive equipment available to assist with dressing (button hook, long handled shoe horn, reacher, dressing stick, elastic shoe laces). Patient also educated on dressing tips that promote increased (I) with dressing such as wearing loose fit clothing, using footstool, and adapted clothing available.     LB dressing (socks)  Supervision Assist                      (pants)   Min Assist for steadying and assist to tie pants                      (shoes)  Min Assist to tie laces    These activities were performed in a group setting to encourage participation with peers and social interaction skills.      08/14/17 1117   OT Time Calculation   OT Start Time 1117   OT Stop Time 1202   OT Total Time (min) 45 min   General   OT Date of Treatment 08/14/17   Precautions   Cardiovascular/Pulmonary LVAD   Plan   Plan Continue with current plan   Occupational Therapy Follow-up   OT Follow-up? Yes   Treatment/Billable Minutes   Therapeutic Group 45   Total Time 45     SHAUNA Romero   8/14/2017

## 2017-08-14 NOTE — PROGRESS NOTES
[facility]  Physical Medicine & Rehab  Progress Note    Patient Name: Kev Vasquez  MRN: [id number]  Patient Class: IP- Rehab   Admission Date: 8/8/2017  Length of Stay: 6 days  Attending Physician: Rashad Garcia MD  Primary Care Provider: Mega Collins MD    Subjective:     Principal Problem:Gait instability    Interval History: Pt without new c/o's.  I have reviewed the HPI and PMFSH and there are no changes from admission.        Scheduled Medications:    allopurinol  300 mg Oral Daily    amlodipine  10 mg Oral Daily    aspirin  81 mg Oral Daily    atorvastatin  10 mg Oral Daily    buPROPion  300 mg Oral Daily    calcium-vitamin D3  1 tablet Oral BID    ceFEPime (MAXIPIME) IVPB  2 g Intravenous Q12H    docusate sodium  100 mg Oral BID    dofetilide  250 mcg Oral Q12H    folic acid  1 mg Oral Daily    hydrALAZINE  75 mg Oral Q8H    lisinopril  10 mg Oral BID    magnesium oxide  400 mg Oral BID    multivitamin  1 tablet Oral Daily    ropinirole  0.5 mg Oral TID    sodium chloride 0.9%  10 mL Intravenous Q6H    sodium chloride 0.9%  3 mL Intravenous Q8H    spironolactone  25 mg Oral Daily    tamsulosin  0.4 mg Oral Daily    warfarin  4 mg Oral Every Tues, Thurs, Sat    warfarin  4 mg Oral Every Sun    warfarin  6 mg Oral Every Mon, Wed, Fri       PRN Medications: acetaminophen, bisacodyl, diphenoxylate-atropine 2.5-0.025 mg, lactulose, magnesium hydroxide 400 mg/5 ml, ondansetron, polyethylene glycol, Flushing PICC Protocol **AND** sodium chloride 0.9% **AND** sodium chloride 0.9%, sodium phosphates, trazodone    Review of Systems   Constitutional: Negative for appetite change and fatigue.   Eyes: Negative for visual disturbance.   Respiratory: Negative for shortness of breath.    Cardiovascular: Negative for chest pain.   Gastrointestinal: Negative for constipation and diarrhea.   Genitourinary: Negative for dysuria, frequency and urgency.   Musculoskeletal:  Positive for gait problem. Negative for arthralgias, back pain, joint swelling, myalgias, neck pain and neck stiffness.   Neurological: Positive for tremors and weakness. Negative for dizziness, numbness and headaches.   Psychiatric/Behavioral: Negative for dysphoric mood.   All other systems reviewed and are negative.    Objective:     Vital Signs (Most Recent):  Temp: 98.7 °F (37.1 °C) (08/14/17 0700)  Pulse: 69 (08/14/17 0700)  Resp: 18 (08/14/17 0700)  BP: (!) 86/0 (08/14/17 0700)  SpO2: 95 % (08/14/17 0700)    Vital Signs (24h Range):  Temp:  [98 °F (36.7 °C)-98.7 °F (37.1 °C)] 98.7 °F (37.1 °C)  Pulse:  [64-69] 69  Resp:  [18] 18  SpO2:  [95 %-96 %] 95 %  BP: (82-86)/(0) 86/0     Physical Exam   Constitutional: He is oriented to person, place, and time. He appears well-nourished.   Eyes: EOM are normal. Pupils are equal, round, and reactive to light.   Neck: Normal range of motion. Neck supple.   Cardiovascular: Normal rate.    Pulmonary/Chest: Effort normal.   Musculoskeletal: Normal range of motion.   Neurological: He is oriented to person, place, and time. He has normal strength.   Skin: No rash noted.   Psychiatric: He has a normal mood and affect.     NEUROLOGICAL EXAMINATION:     MENTAL STATUS   Oriented to person, place, and time.     CRANIAL NERVES     CN III, IV, VI   Pupils are equal, round, and reactive to light.  Extraocular motions are normal.     MOTOR EXAM     Strength   Strength 5/5 throughout.       Assessment/Plan:      Kev Vasquez is a 76 y.o. male admitted to inpatient rehabilitation on 8/8/2017 for Gait instability with impaired mobility and ADLs. Patient remains appropriate for PT, OT, and as required Speech therapy. Patient continues to require 24 hour nursing care as well as daily Physician assessment.    * Gait instability    Sit-->stand MNA, T/F MNA, gait 50' x 2 MDA RW, limited by post lean and impaired trunk balance.  UBD MNA, LBD MXA, toileting DEP on 8/9.      Mild cognitive  deficits.          Chronic systolic heart failure    Compensated with LVAD    8/8:   INR 2.7  8/10: INR 2.5 -- check 8/14        Stage 2 chronic kidney disease    Stable    8/14:  CRT 1.7 -- d/c fluid restriction        Complication involving left ventricular assist device (LVAD)    Still with some drainage -- monitor.  On cefepime through 9/23.               DISCHARGE PLANNING:  Tentative Discharge Date: 8/24/2017    Future Appointments  Date Time Provider Department Center   8/14/2017 2:15 PM Payam Muhammad MD [facility] ID [facility]   8/23/2017 12:00 PM LAB, APPOINTMENT [facility] LAB VNP [facility]   8/23/2017 1:30 PM HEARTTRANSPLANT, LVADN [facility]   8/23/2017 1:30 PM Carlito Santana MD [facility]   10/26/2017 8:00 AM HOME MONITOR DEVICE CHECK, [facility]       Rashad Garcia MD  Department of Physical Medicine & Rehab   [facility]

## 2017-08-14 NOTE — PROGRESS NOTES
"Occupational Therapy  Weekly Reassessment/AM Treatment  Kev Vasquez   MRN: [id number]   Room/Bed: E280/E280 B     08/14/17 0828   OT Time Calculation   OT Start Time 0828   OT Stop Time 0915   OT Total Time (min) 47 min   General   OT Date of Treatment 08/14/17   Family/Caregiver Present No   Patient Found (position) Supine in bed   Precautions   General Precautions LVAD;fall   Cardiovascular/Pulmonary LVAD   Subjective   Patient states "I didn't sleep at all last night. Even after taking a sleeping pill."   Pain/Comfort   Pain Rating no pain   Bed Mobility   Sit to Supine Moderate Assistance   Sit to Supine Comments for trunk elevation to EOB   Sit to Stand   Sit <> Stand Assistance Moderate Assistance   Sit <> Stand Assistive Device Rolling Walker   Trials/Comments from EOB with posterior lean and LOB   Stand to Sit   Assistance Moderate Assistance   Assistive Device Rolling Walker   Trials/Comments to wc and EOB   Bed to Chair   Bed <> Chair Technique Stand Pivot   Bed <> Chair Transfer Assistance Moderate Assistance   Bed <> Chair Assistive Device Rolling Walker   Trials/Comments from EOB>wc with posterior and lateral lean noted.   Tub Bench Transfer   Tub Transfer Technique Stand Pivot   Tub Bench Transfer Assistance Moderate Assistance   Tub Transfer Assistive Device Rolling Walker   Trials/Comments from wc<>TTB   Toilet Transfer   Toilet Transfer Technique Stand Pivot   Toilet Transfer Assistance Moderate Assistance   Toilet Transfer Assistive Device Rolling Walker   Trials/Comments from wc<>BSC   Feeding   Feeding Level of Assistance Independent   Feeding Where Assessed Wheelchair   Grooming   Grooming Level of Assistance Independent   Grooming Where Assessed Wheelchair   Bathing   Bathing Level of Assistance Moderate assistance   Bathing Where Assessed Edge of bed   Bathing Comments assist with steadying from stance and B LE below knees.   UE Dressing   UE Dressing Level of Assistance Stand by assistance "   UE Dressing Where Assessed Edge of bed   UE Dressing Comments with pullover shirt   LE Dressing   LE Dressing Level of Assistance Moderate assistance   LE Dressing Where Assessed Edge of bed   LE Dressing Comments assist with shoes, threading RLE into pant leg, steadying assist from stance for clothing management over hips   Toileting   Toileting Level of Assistance Total assistance   Toileting Where Assessed Bedside commode   Toileting Comments pt continues to be found incontinent bladder this AM   Dynamic Sitting Balance   Dynamic Sitting-Balance Support Unilateral upper extremity supported   Dynamic Sitting-Balance Reaching for objects;Reaching across midline   Dynamic Sitting-Comments Pt required steadying assist from sitting during functional dressing activity with occasional LOB. Pt reported not sleeping the previous night and was very tired.   Dynamic Standing Balance   Dynamic Standing-Balance Support Unilateral upper extremity supported   Dynamic Standing-Balance Forward lean;Lateral lean;Reaching for objects;Reaching across midline   Dynamic Standing-Comments Min>Mod (A) during functional activities.   Activity Tolerance   Activity Tolerance Patient tolerated treatment well   After Treatment   Patient Position After Treatment Seated in wheelchair   Patient after treatment left call button in reach  (posey belt intact)   Assessment   Prognosis Good   Problem List Decreased Self Care skills;Decreased upper extremity range of motion;Decreased upper extremity strength;Decreased safe judgment during ADL;Decreased cognition;Decreased endurance;Decreased fine motor control;Decreased functional mobility;Decreased gross motor control;Decreased IADLs;Decreased Function of right upper extremity;Decreased Function of left upper extremity;Decreased trunk control for functional activities   Assessment Pt participated well in tx session but remains with decreased (I) with ADLs and sitting and standing balance.    Level  of Motivation/Participation Good   Short Term Goals   Pt Will Perform Bathing With minimal assistance   Bathing - Met/Not Met Not Met   Pt Will Perform LE Dressing With moderate assistance   LE Dressing - Met/Not Met Not Met   Pt Will Perform Toileting With maximum assistance   Toileting-Met/Not Met Not Met   Discharge Recommendations   Equipment Needed After Discharge bedside commode   Discharge Facility/Level Of Care Needs home with home health   Plan   Plan Continue with current plan   Therapy Frequency 2 times/day   Occupational Therapy Follow-up   OT Follow-up? Yes   Treatment/Billable Minutes   Self Care/Home Management 47   Total Time 47       SHAUNA Bland  8/14/2017    LEGEND:   CGA: Contact Guard Assist   EOB: Edgeof Bed   HHA: Hand Held Assist   HOB: Head of Bed   (I): Independent-patient performs task in a timely manner   Max (A): Maximal Assist-patient performs 25-49% of task   Min (A): Minimal Assist- patient performs 75% or more of task   Mod (A): Moderate Assist- patient performs 50-74% of task   NA: Not applicable   NT: Not tested   OOB: Out of Bed   PTA: Prior to admit   QC: Quad Cane   RW: Rolling Walker   (S): Supervision- patient requires cues, coaxing, prompting   SBA: Stand By Assist   SC: Straight Cane   SW: Standard Walker   TBA: To be assessed   Total (A): Total Assist- patient performs less than 25% of task   WC: Wheelchair   WFL: Within Functional Limits   WNL: Within Normal Limits

## 2017-08-14 NOTE — PROGRESS NOTES
Physical Therapy   Care Plan/FIM    Kev Vasquez   MRN: [id number]      08/14/17 1100   Locomotion   Distance Walked 2   Distance Wheelchair 3   Walk 2   Wheelchair 5   Mode C   Georgette Early DPT  8/14/2017

## 2017-08-14 NOTE — PLAN OF CARE
Problem: Patient Care Overview  Goal: Plan of Care Review  Outcome: Ongoing (interventions implemented as appropriate)  Patient AAOx4 with no complaints. Vs stable. Patient tolerated breakfast well. Instructed patient to use call light for assistance. Will continue to monitor patient.

## 2017-08-14 NOTE — ASSESSMENT & PLAN NOTE
Sit-->stand MNA, T/F MNA, gait 50' x 2 MDA RW, limited by post lean and impaired trunk balance.  UBD MNA, LBD MXA, toileting DEP on 8/9.      Mild cognitive deficits.

## 2017-08-14 NOTE — PROGRESS NOTES
"Speech Therapy  Progress Note/ Treatment    Kev Vasquez   MRN: [id number]     Short Term Goals   Goal 1 Pt will complete concrete convergent/divergent naming tasks given supervision with 90% acc.  CONTINUE   Goal 2 Pt will complete problem solving tasks given min verbal cues with 75% acc of home/hospital environment. CONTINUE   Goal 3 Pt will complete reasoning/sequencing tasks given min verbal cues with 75% acc.  CONTINUE   Goal 4 Pt will be oriented x4 given min verbal cues with 80% acc.  CONTINUE   Goal 5 Pt will complete recent/functional recall tasks given min verbal cues with 75% acc.  CONTINUE   Long Term Goals   Goal 1 Pt will complete concrete convergent/divergent naming tasks with modified independence with % acc.    Goal 2 Pt will complete problem solving tasks given supervision with 90% acc of home/hospital environment.    Goal 3 Pt will complete reasoning/sequencing tasks given supervision with 90% acc.   Goal 4 Pt will be oriented x4 given supervision with 90% acc.   Goal 5 Pt will complete recent/functional recall tasks given supervision with 90% acc.         08/14/17 0915   Speech Time Calculation   Speech Start Time 0915   Speech Stop Time 1000   Speech Total (min) 45 min   General Information   SLP Treatment Date 08/14/17   General Observations Pt seen individually and taken outside for change of environment.    General Precautions LVAD;fall   Visual/Auditory Vision impaired  (reading glasses)   Subjective   Patient states pt stated "i didn't get any sleep last night and I think it is affecting my thinking today."   Pain/Comfort   Pain Rating no pain       SLP Cognitive Treatment   Treatment Detail (SLP Cognitive Treatment) Pt engaged in orientation task x4 indly with 100% acc. Pt then later engaged in similar task with 70% acc with disorientation to date, but with increase to 100% acc given min verbal cues. Pt noted with tangential speech and hyperverbosity within interaction today with poor " thought organization skills. Pt also noted with dry mouth, mild SOB, and open mouth posture. Verbal reasoning task of safety within hospital/home environment completed given min-moderate verbal cues with pt verbalizing unrealistic expectation of returning to independent life style following IP rehab. Pt completed recall task of ST role given supervision with 90% acc. Pt continues to require 24 hour supervision with poor safety awareness and poor insight skills.        SLP Treatment: Verbal Expression   Treatment Detail (SLP Verbal Expression) Word finding task completed of divergent naming given min-moderate verbal cues to list 10 items of concrete category. Education provided regarding word finding strategies with ability to verbalize, but required moderate cues to utilize within task.    Assessment   SLP Diagnosis moderate cog-ling deficits   Prognosis Fair   Problem List decreased recall; poor thought organization; word finding deficits; fluctuating motivation; poor safety and insight skills   Session Assessment progressing toward goals   Level of Motivation/Participation fair   Discharge Recommendations   Discharge Facility/Level Of Care Needs home health speech therapy   Plan   Plan Continue with current plan   Treatment/Billable Minutes   Speech Therapy Individual 45   Total Time 45     FIM Scores  Comprehension:6  Expression: 5  Social Interaction:4  Problem Solving:3  Memory: 3     Comprehension:  Complex= humor, finances, rationale for medical treatment(hip precautions, pressure relief)  Basic= pain, hunger, thirst, bathroom needs, cold, nutrition, sleep     Understands complex/abstract conversation/directions with extra time, assistance device(glasses, if visual mode or both; hearing aide if auditory mode or both) (6)     Expression:  Needs cues to express basic needs  (5)     Social Interaction:  Interacts appropriately with others with medication or extra time(anti-anxiety,  antidepressant)  (6)     Problem Solving:  Solves basic problems 50-74% of the time  (3)     Memory:  Recognizes, recalls, or executes 50-74% of time 2 steps of 2 step request (3)    Enedina Nguyễn CCC-SLP  8/14/2017

## 2017-08-14 NOTE — PROGRESS NOTES
Subjective:      Patient ID: Kev Vasquez is a 76 y.o. male.    Chief Complaint:Follow-up      History of Present Illness    Mr. Vasquez is a 75 y/o male with a history of LVAD DLES placement as part of management for CHF.  He developed a pseudomonas infection of his site that was initially sensitive to ciprofloxacin.  He failed oral ciprofloxacin therapy and a repeat culture showed emergence of resistance to cipro.  He is now on cefepime and appears to be tolerating this well.    Review of Systems   Constitution: Positive for weakness. Negative for chills, decreased appetite, fever, malaise/fatigue, night sweats, weight gain and weight loss.   HENT: Negative for congestion, ear pain, headaches, hearing loss, hoarse voice, sore throat and tinnitus.    Eyes: Negative for blurred vision, redness and visual disturbance.   Cardiovascular: Negative for chest pain, leg swelling and palpitations.   Respiratory: Negative for cough, hemoptysis, shortness of breath and sputum production.    Hematologic/Lymphatic: Negative for adenopathy. Does not bruise/bleed easily.   Skin: Negative for dry skin, itching, rash and suspicious lesions.   Musculoskeletal: Negative for back pain, joint pain, myalgias and neck pain.   Gastrointestinal: Negative for abdominal pain, constipation, diarrhea, heartburn, nausea and vomiting.   Genitourinary: Negative for dysuria, flank pain, frequency, hematuria, hesitancy and urgency.   Neurological: Negative for dizziness, numbness and paresthesias.   Psychiatric/Behavioral: Negative for depression and memory loss. The patient does not have insomnia and is not nervous/anxious.      Objective:   Physical Exam   Constitutional: He is oriented to person, place, and time. He appears well-developed and well-nourished. No distress.   HENT:   Head: Normocephalic and atraumatic.   Right Ear: External ear normal.   Left Ear: External ear normal.   Nose: Nose normal.   Mouth/Throat: Oropharynx is clear and  moist. No oropharyngeal exudate.   Eyes: Conjunctivae and EOM are normal. Pupils are equal, round, and reactive to light. Right eye exhibits no discharge. Left eye exhibits no discharge. No scleral icterus.   Neck: Normal range of motion. Neck supple. No JVD present. No tracheal deviation present. No thyromegaly present.   Cardiovascular: Normal rate, regular rhythm and intact distal pulses.  Exam reveals no gallop and no friction rub.    No murmur heard.  Pulmonary/Chest: Effort normal and breath sounds normal. No stridor. No respiratory distress. He has no wheezes. He has no rales. He exhibits no tenderness.   Abdominal: Soft. Bowel sounds are normal. He exhibits no distension and no mass. There is no tenderness. There is no rebound and no guarding.   There is very minimal drainage from the site.  However there is some skin that has ripped off and there is some bleeding from that.    Musculoskeletal: Normal range of motion. He exhibits no edema or tenderness.   Lymphadenopathy:     He has no cervical adenopathy.   Neurological: He is alert and oriented to person, place, and time. He has normal reflexes. He displays normal reflexes. No cranial nerve deficit. He exhibits normal muscle tone. Coordination normal.   Skin: Skin is warm. No rash noted. He is not diaphoretic. No erythema. No pallor.   Psychiatric: He has a normal mood and affect. His behavior is normal. Judgment and thought content normal.   Nursing note and vitals reviewed.               Assessment:       1. Hardware complicating wound infection, initial encounter    2. Left ventricular assist device (LVAD) complication, initial encounter        77 y/o with pseudomonas infection of his LVAD drive line exit site.  Cultures were positive for pseudomonas.  He is on cefepime for a planned 8 week course to end on September 23.  Afterwards, there will be no oral options for long term suppression.  So we will have to monitor the site and re-start antibiotics as  needed.  Plan:       Hardware complicating wound infection, initial encounter        - IV cefepime    Left ventricular assist device (LVAD) complication, initial encounter        - IV cefepime

## 2017-08-15 PROCEDURE — 99232 SBSQ HOSP IP/OBS MODERATE 35: CPT | Mod: ,,, | Performed by: PHYSICAL MEDICINE & REHABILITATION

## 2017-08-15 PROCEDURE — 12800000 HC REHAB SEMI-PRIVATE ROOM

## 2017-08-15 PROCEDURE — 97150 GROUP THERAPEUTIC PROCEDURES: CPT

## 2017-08-15 PROCEDURE — 63600175 PHARM REV CODE 636 W HCPCS: Performed by: PHYSICIAN ASSISTANT

## 2017-08-15 PROCEDURE — 97116 GAIT TRAINING THERAPY: CPT

## 2017-08-15 PROCEDURE — 92507 TX SP LANG VOICE COMM INDIV: CPT

## 2017-08-15 PROCEDURE — 97110 THERAPEUTIC EXERCISES: CPT

## 2017-08-15 PROCEDURE — 97542 WHEELCHAIR MNGMENT TRAINING: CPT

## 2017-08-15 PROCEDURE — 25000003 PHARM REV CODE 250: Performed by: PHYSICIAN ASSISTANT

## 2017-08-15 PROCEDURE — A4216 STERILE WATER/SALINE, 10 ML: HCPCS | Performed by: PHYSICIAN ASSISTANT

## 2017-08-15 PROCEDURE — 97530 THERAPEUTIC ACTIVITIES: CPT

## 2017-08-15 RX ADMIN — OYSTER SHELL CALCIUM WITH VITAMIN D 1 TABLET: 500; 200 TABLET, FILM COATED ORAL at 08:08

## 2017-08-15 RX ADMIN — ROPINIROLE 0.5 MG: 0.5 TABLET, FILM COATED ORAL at 05:08

## 2017-08-15 RX ADMIN — HYDRALAZINE HYDROCHLORIDE 75 MG: 50 TABLET ORAL at 05:08

## 2017-08-15 RX ADMIN — DOFETILIDE 250 MCG: 0.12 CAPSULE ORAL at 09:08

## 2017-08-15 RX ADMIN — Medication 10 ML: at 05:08

## 2017-08-15 RX ADMIN — BUPROPION HYDROCHLORIDE 300 MG: 150 TABLET, EXTENDED RELEASE ORAL at 07:08

## 2017-08-15 RX ADMIN — DOFETILIDE 250 MCG: 0.12 CAPSULE ORAL at 08:08

## 2017-08-15 RX ADMIN — Medication 10 ML: at 12:08

## 2017-08-15 RX ADMIN — OYSTER SHELL CALCIUM WITH VITAMIN D 1 TABLET: 500; 200 TABLET, FILM COATED ORAL at 07:08

## 2017-08-15 RX ADMIN — THERA TABS 1 TABLET: TAB at 07:08

## 2017-08-15 RX ADMIN — CEFEPIME HYDROCHLORIDE 2 G: 2 INJECTION, SOLUTION INTRAVENOUS at 03:08

## 2017-08-15 RX ADMIN — ASPIRIN 81 MG CHEWABLE TABLET 81 MG: 81 TABLET CHEWABLE at 07:08

## 2017-08-15 RX ADMIN — HYDRALAZINE HYDROCHLORIDE 75 MG: 50 TABLET ORAL at 08:08

## 2017-08-15 RX ADMIN — WARFARIN SODIUM 4 MG: 4 TABLET ORAL at 05:08

## 2017-08-15 RX ADMIN — TAMSULOSIN HYDROCHLORIDE 0.4 MG: 0.4 CAPSULE ORAL at 07:08

## 2017-08-15 RX ADMIN — HYDRALAZINE HYDROCHLORIDE 75 MG: 50 TABLET ORAL at 03:08

## 2017-08-15 RX ADMIN — ATORVASTATIN CALCIUM 10 MG: 10 TABLET, FILM COATED ORAL at 07:08

## 2017-08-15 RX ADMIN — ROPINIROLE 0.5 MG: 0.5 TABLET, FILM COATED ORAL at 08:08

## 2017-08-15 RX ADMIN — Medication 3 ML: at 08:08

## 2017-08-15 RX ADMIN — AMLODIPINE BESYLATE 10 MG: 10 TABLET ORAL at 07:08

## 2017-08-15 RX ADMIN — LISINOPRIL 10 MG: 10 TABLET ORAL at 07:08

## 2017-08-15 RX ADMIN — CEFEPIME HYDROCHLORIDE 2 G: 2 INJECTION, SOLUTION INTRAVENOUS at 04:08

## 2017-08-15 RX ADMIN — ROPINIROLE 0.5 MG: 0.5 TABLET, FILM COATED ORAL at 03:08

## 2017-08-15 RX ADMIN — Medication 3 ML: at 03:08

## 2017-08-15 RX ADMIN — ALLOPURINOL 300 MG: 100 TABLET ORAL at 07:08

## 2017-08-15 RX ADMIN — FOLIC ACID 1 MG: 1 TABLET ORAL at 07:08

## 2017-08-15 RX ADMIN — Medication 3 ML: at 06:08

## 2017-08-15 RX ADMIN — SPIRONOLACTONE 25 MG: 25 TABLET, FILM COATED ORAL at 07:08

## 2017-08-15 RX ADMIN — Medication 400 MG: at 07:08

## 2017-08-15 RX ADMIN — Medication 400 MG: at 08:08

## 2017-08-15 RX ADMIN — LISINOPRIL 10 MG: 10 TABLET ORAL at 08:08

## 2017-08-15 NOTE — PROGRESS NOTES
Physical Therapy   PT Transfer Group    Kev Vasquez   MRN: [id number]        08/15/17 1006   PT Time Calculation   PT Start Time 1006   PT Stop Time 1051   PT Total Time (min) 45 min   Treatment   Treatment Type Treatment  (Transfer Group)   PT/PTA PT   General   PT Received On 08/15/17   Patient Found (position) Seated in wheelchair  (posey belt donned)   Precautions   General Precautions fall;LVAD   Visual/Auditory Vision impaired   Subjective   Patient states Pt is agreeable to participate in transfer group   Pain/Comfort   Pain Rating 1 no pain   Other Activities   Comments Patient participated in 45 minute functional transfers group. The group activity challenged bilateral upper extremity and lower extremity strengthening. In addition, cardiovascular and functional endurance were challenged during this group. Patient completed bed mobility from supine to sitting edge of mat with CGA to max A- pt would have a posterior LOB with self correction. Pt particularly had difficulty with pivoting and would experience a posterior LOB. Pt required SBA assistance for w/c management parts with max VC. Patient completed toilet transfer with min- max assistance and use of RW with posterior LOB during transfer. Patient completed 3 x5 reps of sit to stand transfers from  with SBA assistance and RW. Patient would benefit from continued practice in order to demonstrate appropriate safety awareness with functional transfers. Educated patient on compensatory and adaptive techniques for functional home transfers. Patient demonstrated increased independence with all transfers. These activities were performed in a group setting to encourage increased participation with peers and social interaction skills.    Patient also performed various seated UE and LE exercises such as ankle pumps, LAQ, marches, bicep curls, and chest press 4 x 15 reps of each exercise. Patient also participated in seated ball toss for dynamic seated balance and  UE coordination.    Activity Tolerance   Activity Tolerance Patient tolerated treatment well   After Treatment   Patient Position After Treatment Seated in wheelchair  (with posey belt donned)   Physical Therapy Follow-up   PT Follow-up? Yes   PT - Next Visit Date 08/16/17   Treatment/Billable Minutes   Therapeutic Activity Group 45   Total Time 45        Edwina Richards, PT  8/15/2017

## 2017-08-15 NOTE — PROGRESS NOTES
"Speech Therapy   Treatment    Kev Vasquez   MRN: [id number]        08/15/17 0830   Speech Time Calculation   Speech Start Time 0830   Speech Stop Time 0915   Speech Total (min) 45 min   General Information   SLP Treatment Date 08/15/17   General Observations Pt seen individually in ST office while sitting upright in w/c.    General Precautions LVAD;fall   Visual/Auditory Vision impaired  (glasses)   Subjective   Patient states Pt stated "Some of this is confusing."   Pain/Comfort   Pain Rating no pain       SLP Cognitive Treatment   Treatment Detail (SLP Cognitive Treatment) Pt engaged in problem solving task of breakfast meal and inability to open items, requiring moderate verbal cues for pt to request assistance vs abandoning task with 40% acc. Pt completed orientation task to time given visual aid of calendar with 95% acc and modified independence. Pt noted with poor attention skills t/o session today. Written problem solving task of simple prescription label completed with 40% acc indly. Pt required extended time to complete and moderate-max verbal cues for increase to 80% acc. Mental manipulation task completed of F=3 with 0% acc, given max verbal cues pt with increase to 50% acc. Pt noted with decreased motivation today with c/o fatigue and poor sleep the last two nights. Pt requires supervision at this time.    Assessment   SLP Diagnosis moderate cog-ling deficits   Prognosis Fair   Problem List decreased recall; poor problem solving and attention skills   Session Assessment progressing toward goals   Level of Motivation/Participation fair   Discharge Recommendations   Discharge Facility/Level Of Care Needs home health speech therapy   Plan   Plan Continue with current plan   Treatment/Billable Minutes   Speech Therapy Individual 45   Total Time 45     FIM Scores  Comprehension:6  Expression: 5  Social Interaction:4  Problem Solving:3  Memory: 3     Comprehension:  Complex= humor, finances, rationale for " medical treatment(hip precautions, pressure relief)  Basic= pain, hunger, thirst, bathroom needs, cold, nutrition, sleep     Understands complex/abstract conversation/directions with extra time, assistance device(glasses, if visual mode or both; hearing aide if auditory mode or both) (6)     Expression:  Needs cues to express basic needs  (5)     Social Interaction:  Interacts appropriately with others with medication or extra time(anti-anxiety, antidepressant)  (6)     Problem Solving:  Solves basic problems 50-74% of the time  (3)     Memory:  Recognizes, recalls, or executes 50-74% of time 2 steps of 2 step request (3)       Enedina Nguyễn CCC-SLP  8/15/2017

## 2017-08-15 NOTE — PROGRESS NOTES
[facility]  Physical Medicine & Rehab  Progress Note    Patient Name: Kev Vasquez  MRN: [id number]  Patient Class: IP- Rehab   Admission Date: 8/8/2017  Length of Stay: 7 days  Attending Physician: Rashad Gacria MD  Primary Care Provider: Mega Collins MD    Subjective:     Principal Problem:Gait instability    Interval History: Pt without new c/o's.  I have reviewed the HPI and PMFSH and there are no changes from admission.        Scheduled Medications:    allopurinol  300 mg Oral Daily    amlodipine  10 mg Oral Daily    aspirin  81 mg Oral Daily    atorvastatin  10 mg Oral Daily    buPROPion  300 mg Oral Daily    calcium-vitamin D3  1 tablet Oral BID    ceFEPime (MAXIPIME) IVPB  2 g Intravenous Q12H    docusate sodium  100 mg Oral BID    dofetilide  250 mcg Oral Q12H    folic acid  1 mg Oral Daily    hydrALAZINE  75 mg Oral Q8H    lisinopril  10 mg Oral BID    magnesium oxide  400 mg Oral BID    multivitamin  1 tablet Oral Daily    ropinirole  0.5 mg Oral TID    sodium chloride 0.9%  10 mL Intravenous Q6H    sodium chloride 0.9%  3 mL Intravenous Q8H    spironolactone  25 mg Oral Daily    tamsulosin  0.4 mg Oral Daily    warfarin  4 mg Oral Every Tues, Thurs, Sat    warfarin  4 mg Oral Every Sun    warfarin  6 mg Oral Every Mon, Wed, Fri       PRN Medications: acetaminophen, bisacodyl, diphenoxylate-atropine 2.5-0.025 mg, lactulose, magnesium hydroxide 400 mg/5 ml, ondansetron, polyethylene glycol, Flushing PICC Protocol **AND** sodium chloride 0.9% **AND** sodium chloride 0.9%, sodium phosphates, trazodone    Review of Systems   Constitutional: Negative for appetite change and fatigue.   Eyes: Negative for visual disturbance.   Respiratory: Negative for shortness of breath.    Cardiovascular: Negative for chest pain.   Gastrointestinal: Negative for constipation and diarrhea.   Genitourinary: Negative for dysuria, frequency and urgency.   Musculoskeletal:  Positive for gait problem. Negative for arthralgias, back pain, joint swelling, myalgias, neck pain and neck stiffness.   Neurological: Positive for tremors and weakness. Negative for dizziness, numbness and headaches.   Psychiatric/Behavioral: Negative for dysphoric mood.   All other systems reviewed and are negative.    Objective:     Vital Signs (Most Recent):  Temp: 98.3 °F (36.8 °C) (08/15/17 0700)  Pulse: 70 (08/15/17 0700)  Resp: 18 (08/15/17 0700)  BP: (!) 78/0 (08/15/17 0700)  SpO2: 95 % (08/15/17 0700)    Vital Signs (24h Range):  Temp:  [98.2 °F (36.8 °C)-98.3 °F (36.8 °C)] 98.3 °F (36.8 °C)  Pulse:  [66-70] 70  Resp:  [14-18] 18  SpO2:  [95 %-96 %] 95 %  BP: (78-98)/(0) 78/0     Physical Exam   Constitutional: He is oriented to person, place, and time. He appears well-nourished.   Eyes: EOM are normal. Pupils are equal, round, and reactive to light.   Neck: Normal range of motion. Neck supple.   Cardiovascular: Normal rate.    Pulmonary/Chest: Effort normal.   Musculoskeletal: Normal range of motion.   Neurological: He is oriented to person, place, and time. He has normal strength.   Skin: No rash noted.   Psychiatric: He has a normal mood and affect.     NEUROLOGICAL EXAMINATION:     MENTAL STATUS   Oriented to person, place, and time.     CRANIAL NERVES     CN III, IV, VI   Pupils are equal, round, and reactive to light.  Extraocular motions are normal.     MOTOR EXAM     Strength   Strength 5/5 throughout.       Assessment/Plan:      Kev Vasquez is a 76 y.o. male admitted to inpatient rehabilitation on 8/8/2017 for Gait instability with impaired mobility and ADLs. Patient remains appropriate for PT, OT, and as required Speech therapy. Patient continues to require 24 hour nursing care as well as daily Physician assessment.    * Gait instability    Sit-->stand CGA (improved), T/F CGA (improved), gait 65', 35', 100' MNA RW (improved), limited by impaired trunk balance.  UBD SBA (improved), LBD MDA  (improved), toileting DEP on 8/14.      Mild cognitive deficits.          Chronic systolic heart failure    Compensated with LVAD    8/8:   INR 2.7  8/10: INR 2.5 -- check 8/14        Stage 2 chronic kidney disease    Stable    8/14:  CRT 1.7 -- d/c fluid restriction        Complication involving left ventricular assist device (LVAD)    Still with some drainage -- monitor.  On cefepime through 9/23.               DISCHARGE PLANNING:  Tentative Discharge Date: 8/24/2017    Future Appointments  Date Time Provider Department Center   8/23/2017 12:00 PM LAB, APPOINTMENT [facility] LAB VNP [facility]   8/23/2017 1:30 PM HEARTTRANSPLANT, LVADN [facility] HEARTT[facility]   8/23/2017 1:30 PM Carlito Santana MD [facility]   9/12/2017 2:30 PM Payam Muhammad MD [facility] ID [facility]   10/26/2017 8:00 AM HOME MONITOR DEVICE CHECK, [facility] AR[facility]TH [facility]       Rashad Garcia MD  Department of Physical Medicine & Rehab   [facility]

## 2017-08-15 NOTE — PLAN OF CARE
Problem: Patient Care Overview  Goal: Plan of Care Review  Outcome: Ongoing (interventions implemented as appropriate)  Pt care plan updated and individualized as needed and progress continues.  See associated flowsheets for relevant documentation. Frequent rounds made per protocol to maintain pt safety and meet pt needs.  Continuing to monitor and follow up as needed.      Problem: Infection, Risk/Actual (Adult)  Goal: Infection Prevention/Resolution  Patient will demonstrate the desired outcomes by discharge/transition of care.   Outcome: Ongoing (interventions implemented as appropriate)  No new signs or symptoms noted.  Antibiotics administered to patent PICC as ordered.    Problem: Fall Risk (Adult)  Goal: Absence of Falls  Patient will demonstrate the desired outcomes by discharge/transition of care.   Outcome: Ongoing (interventions implemented as appropriate)  Pt remains free from falls or trauma thus far this shift.      Problem: Pressure Ulcer Risk (Candelario Scale) (Adult,Obstetrics,Pediatric)  Goal: Skin Integrity  Patient will demonstrate the desired outcomes by discharge/transition of care.   Outcome: Ongoing (interventions implemented as appropriate)  Pt turns and repositions as needed to maintain skin integrity.  No breakdown noted.      Problem: Ventricular Assist Device (Adult)  Goal: Signs and Symptoms of Listed Potential Problems Will be Absent, Minimized or Managed (Ventricular Assist Device)  Signs and symptoms of listed potential problems will be absent, minimized or managed by discharge/transition of care (reference Ventricular Assist Device (Adult) CPG).   Outcome: Ongoing (interventions implemented as appropriate)  Antibiotics administered as ordered and dressing maintained clean, dry, and intact.

## 2017-08-15 NOTE — PROGRESS NOTES
Occupational Therapy  FIM SCORES    Kev Vasquez  MRN: [id number]  Room/Bed: E280/E280 B       08/15/17 1600   Transfers   Bed/Chair/WC 3   Self Care   Eating 7   Grooming 7   Bathing 3   Dressing-Upper 5   Dressing-Lower 3   Toileting 1       SHAUNA Bland  8/15/2017

## 2017-08-15 NOTE — ASSESSMENT & PLAN NOTE
Sit-->stand CGA (improved), T/F CGA (improved), gait 65', 35', 100' MNA RW (improved), limited by impaired trunk balance.  UBD SBA (improved), LBD MDA (improved), toileting DEP on 8/14.      Mild cognitive deficits.

## 2017-08-15 NOTE — PROGRESS NOTES
"Physical Therapy   Treatment    Kev Vasquez   MRN: [id number]    08/15/17 1300   PT Time Calculation   PT Start Time 1300   PT Stop Time 1345   PT Total Time (min) 45 min   Treatment   Treatment Type Treatment   PT/PTA PT   General   PT Received On 08/15/17   Missed Time Reason Not applicable   Family/Caregiver Present No   Patient Found (position) Seated in bedside chair   Precautions   General Precautions LVAD;fall   Orthopedic No   Required Braces or Orthoses No   Cardiovascular/Pulmonary LVAD   Visual/Auditory Vision impaired   Subjective   Patient states "I don't have as much zip today"   Pain/Comfort   Pain Rating 1 no pain   Transfers   Transfer yes   Sit to Stand   Sit <> Stand Assistance Contact Guard Assistance   Sit <> Stand Assistive Device Rolling Walker;No Assistive Device   Trials/Comments multiple trials   Stand to Sit   Assistance Contact Guard Assistance   Assistive Device Rolling Walker   Trials/Comments multiple trials   Wheelchair Activities   Propulsion Yes   Propulsion Type 1 Manual   Level 1 Level tile   Method 1 Left upper extremity;Right upper extremity   Level of Assistance 1 Stand by assistsance   Description/ Details 1 100ft with increased time to perform   Gait   Gait Yes   Weight Bearing Status FWB   Gait 1   Surface 1 Level tile   Gait Assistive Device Rolling walker   Assistance 1 Minimum assistance   Gait Distance 65ft, 35ft, 100ft with rest breaks between trials   Gait Pattern swing-through gait   Gait Deviation(s) decreased william;increased time in double stance;decreased velocity of limb motion;decreased step length;decreased stride length;backward lean   Impairments Contributing to Gait Deviations impaired balance;impaired coordination;impaired postural control;decreased strength   Balance   Balance Yes   Dynamic Standing Balance   Dynamic Standing-Balance Support Unilateral upper extremity supported   Dynamic Standing-Balance Reaching for objects   Dynamic Standing-Comments Pt " "stood at counter and played table top game for 6'37" with CGA generally, but required Mod A to correct a loss of balance when patient suddenly attempted to bend over and  a  off the floor that he dropped   Seated   Seated-Exercises Lower extremity   Seated-Exercise Type Long arc quads   Seated-Exercise Comments 3 x 15 reps with 3# ankle weights   Activity Tolerance   Activity Tolerance Patient tolerated treatment well   After Treatment   Patient Position After Treatment Seated in wheelchair  (safety belt in place)   Patient after treatment left call button in reach   Assessment   Prognosis Good   Problem List Decreased strength;Decreased endurance;Impaired balance;Decreased mobility;Decreased coordination;Decreased cognition;Decreased safety awareness   Session Assessment progressing toward goals   Assessment Pt's cognitive deficits were more apparent today during gait trials. Pt was smoothly walking with CGA for about 50 feet. Once he turned to walk back to his wheelchair it seemed to PT like he forgot how to move his feet to take steps with use of the RW all of a sudden, and required Min/Mod A for balance and stability.  He stated to PT after sitting back down to his wheelchair "I think it was because I was thinking too hard about how I needed to walk." Pt continues to struggle with maintaining his balance during dynamic standing activity as well as during functional transfers and gait. Pt remains an appropriate rehab candidate in order to further address mobility and strength impairments.    Level of Motivation/Participation good   Barriers to Discharge Inaccessible home environment   Barriers to Discharge Comments steps within home   Discharge Recommendations   Equipment Needed After Discharge 3-in-1 commode   Discharge Facility/Level Of Care Needs home health PT   Plan   Planned Therapy Intervention Continue with current plan   Therapy Frequency 2 times/day;Monday-Friday;Saturday or Sunday "   Physical Therapy Follow-up   PT Follow-up? Yes   PT - Next Visit Date 08/16/17   Treatment/Billable Minutes   Gait training 15   Therapeutic Activity 12   Therapeutic Exercise 8   Train/Wheelchair Management 10   Total Time 45   Georgette Early DPT  8/15/2017

## 2017-08-15 NOTE — PROGRESS NOTES
Physical Therapy  Care Plan/FIM    Kev Vasquez   MRN: [id number]        08/15/17 1300   Locomotion   Distance Walked 2   Distance Wheelchair 2   Walk 2   Wheelchair 2   Mode C   Georgette Early DPT  8/15/2017

## 2017-08-15 NOTE — PROGRESS NOTES
"Occupational Therapy  AM Treatment  Kev Vasquez   MRN: [id number]   Room/Bed: E280/E280 B       08/15/17 1103   OT Time Calculation   OT Start Time 1103   OT Stop Time 1149   OT Total Time (min) 46 min   General   OT Date of Treatment 08/15/17   Family/Caregiver Present No   Patient Found (position) Supine in bed   Precautions   General Precautions LVAD;fall   Visual/Auditory Vision impaired   Subjective   Patient states "I slept better last night."   Pain/Comfort   Pain Rating no pain   Sit to Stand   Sit <> Stand Assistance Moderate Assistance   Sit <> Stand Assistive Device Rolling Walker   Trials/Comments from    Stand to Sit   Assistance Moderate Assistance   Assistive Device Rolling Walker   Trials/Comments to    Exercise Tools   Exercise Tools Yes   Rickshaw 20# x 100 reps   Bilateral Boo 3# x 100 reps on incline   Theraputty Blue: putty and pegs   Activity Tolerance   Activity Tolerance Patient tolerated treatment well   After Treatment   Patient Position After Treatment Seated in wheelchair   Patient after treatment left call button in reach  (posey belt intact)   Assessment   Prognosis Good   Problem List Decreased Self Care skills;Decreased upper extremity range of motion;Decreased upper extremity strength;Decreased safe judgment during ADL;Decreased cognition;Decreased endurance;Decreased fine motor control;Decreased functional mobility;Decreased gross motor control;Decreased IADLs;Decreased Function of right upper extremity;Decreased Function of left upper extremity;Decreased trunk control for functional activities   Assessment Pt participated well in tx but remains with decreased balance and (I) with ADLs. Pt would continue to benefit from skilled OT services.   Level of Motivation/Participation Good   Discharge Recommendations   Equipment Needed After Discharge 3-in-1 commode   Discharge Facility/Level Of Care Needs home with home health   Plan   Plan Continue with current plan   Therapy " Frequency 2 times/day   Occupational Therapy Follow-up   OT Follow-up? Yes   Treatment/Billable Minutes   Therapeutic Exercise 46   Total Time 46       SHAUNA Bland  8/15/2017    LEGEND:   CGA: Contact Guard Assist   EOB: [place] Bed   HHA: Hand Held Assist   HOB: Head of Bed   (I): Independent-patient performs task in a timely manner   Max (A): Maximal Assist-patient performs 25-49% of task   Min (A): Minimal Assist- patient performs 75% or more of task   Mod (A): Moderate Assist- patient performs 50-74% of task   NA: Not applicable   NT: Not tested   OOB: Out of Bed   PTA: Prior to admit   QC: Quad Cane   RW: Rolling Walker   (S): Supervision- patient requires cues, coaxing, prompting   SBA: Stand By Assist   SC: Straight Cane   SW: Standard Walker   TBA: To be assessed   Total (A): Total Assist- patient performs less than 25% of task   WC: Wheelchair   WFL: Within Functional Limits   WNL: Within Normal Limits

## 2017-08-15 NOTE — SUBJECTIVE & OBJECTIVE
Interval History: Pt without new c/o's.  I have reviewed the HPI and PMFSH and there are no changes from admission.        Scheduled Medications:    allopurinol  300 mg Oral Daily    amlodipine  10 mg Oral Daily    aspirin  81 mg Oral Daily    atorvastatin  10 mg Oral Daily    buPROPion  300 mg Oral Daily    calcium-vitamin D3  1 tablet Oral BID    ceFEPime (MAXIPIME) IVPB  2 g Intravenous Q12H    docusate sodium  100 mg Oral BID    dofetilide  250 mcg Oral Q12H    folic acid  1 mg Oral Daily    hydrALAZINE  75 mg Oral Q8H    lisinopril  10 mg Oral BID    magnesium oxide  400 mg Oral BID    multivitamin  1 tablet Oral Daily    ropinirole  0.5 mg Oral TID    sodium chloride 0.9%  10 mL Intravenous Q6H    sodium chloride 0.9%  3 mL Intravenous Q8H    spironolactone  25 mg Oral Daily    tamsulosin  0.4 mg Oral Daily    warfarin  4 mg Oral Every Tues, Thurs, Sat    warfarin  4 mg Oral Every Sun    warfarin  6 mg Oral Every Mon, Wed, Fri       PRN Medications: acetaminophen, bisacodyl, diphenoxylate-atropine 2.5-0.025 mg, lactulose, magnesium hydroxide 400 mg/5 ml, ondansetron, polyethylene glycol, Flushing PICC Protocol **AND** sodium chloride 0.9% **AND** sodium chloride 0.9%, sodium phosphates, trazodone    Review of Systems   Constitutional: Negative for appetite change and fatigue.   Eyes: Negative for visual disturbance.   Respiratory: Negative for shortness of breath.    Cardiovascular: Negative for chest pain.   Gastrointestinal: Negative for constipation and diarrhea.   Genitourinary: Negative for dysuria, frequency and urgency.   Musculoskeletal: Positive for gait problem. Negative for arthralgias, back pain, joint swelling, myalgias, neck pain and neck stiffness.   Neurological: Positive for tremors and weakness. Negative for dizziness, numbness and headaches.   Psychiatric/Behavioral: Negative for dysphoric mood.   All other systems reviewed and are negative.    Objective:     Vital  Signs (Most Recent):  Temp: 98.3 °F (36.8 °C) (08/15/17 0700)  Pulse: 70 (08/15/17 0700)  Resp: 18 (08/15/17 0700)  BP: (!) 78/0 (08/15/17 0700)  SpO2: 95 % (08/15/17 0700)    Vital Signs (24h Range):  Temp:  [98.2 °F (36.8 °C)-98.3 °F (36.8 °C)] 98.3 °F (36.8 °C)  Pulse:  [66-70] 70  Resp:  [14-18] 18  SpO2:  [95 %-96 %] 95 %  BP: (78-98)/(0) 78/0     Physical Exam   Constitutional: He is oriented to person, place, and time. He appears well-nourished.   Eyes: EOM are normal. Pupils are equal, round, and reactive to light.   Neck: Normal range of motion. Neck supple.   Cardiovascular: Normal rate.    Pulmonary/Chest: Effort normal.   Musculoskeletal: Normal range of motion.   Neurological: He is oriented to person, place, and time. He has normal strength.   Skin: No rash noted.   Psychiatric: He has a normal mood and affect.     NEUROLOGICAL EXAMINATION:     MENTAL STATUS   Oriented to person, place, and time.     CRANIAL NERVES     CN III, IV, VI   Pupils are equal, round, and reactive to light.  Extraocular motions are normal.     MOTOR EXAM     Strength   Strength 5/5 throughout.

## 2017-08-16 PROCEDURE — 97116 GAIT TRAINING THERAPY: CPT

## 2017-08-16 PROCEDURE — 97535 SELF CARE MNGMENT TRAINING: CPT

## 2017-08-16 PROCEDURE — 97530 THERAPEUTIC ACTIVITIES: CPT

## 2017-08-16 PROCEDURE — 12800000 HC REHAB SEMI-PRIVATE ROOM

## 2017-08-16 PROCEDURE — 25000003 PHARM REV CODE 250: Performed by: PHYSICAL MEDICINE & REHABILITATION

## 2017-08-16 PROCEDURE — 97110 THERAPEUTIC EXERCISES: CPT

## 2017-08-16 PROCEDURE — 97150 GROUP THERAPEUTIC PROCEDURES: CPT

## 2017-08-16 PROCEDURE — 92507 TX SP LANG VOICE COMM INDIV: CPT

## 2017-08-16 PROCEDURE — 25000003 PHARM REV CODE 250: Performed by: PHYSICIAN ASSISTANT

## 2017-08-16 PROCEDURE — A4216 STERILE WATER/SALINE, 10 ML: HCPCS | Performed by: PHYSICIAN ASSISTANT

## 2017-08-16 PROCEDURE — 99232 SBSQ HOSP IP/OBS MODERATE 35: CPT | Mod: ,,, | Performed by: PHYSICAL MEDICINE & REHABILITATION

## 2017-08-16 PROCEDURE — 63600175 PHARM REV CODE 636 W HCPCS: Performed by: PHYSICIAN ASSISTANT

## 2017-08-16 RX ADMIN — CEFEPIME HYDROCHLORIDE 2 G: 2 INJECTION, SOLUTION INTRAVENOUS at 04:08

## 2017-08-16 RX ADMIN — CEFEPIME HYDROCHLORIDE 2 G: 2 INJECTION, SOLUTION INTRAVENOUS at 02:08

## 2017-08-16 RX ADMIN — Medication 400 MG: at 08:08

## 2017-08-16 RX ADMIN — Medication 10 ML: at 12:08

## 2017-08-16 RX ADMIN — BUPROPION HYDROCHLORIDE 300 MG: 150 TABLET, EXTENDED RELEASE ORAL at 08:08

## 2017-08-16 RX ADMIN — DOFETILIDE 250 MCG: 0.12 CAPSULE ORAL at 08:08

## 2017-08-16 RX ADMIN — HYDRALAZINE HYDROCHLORIDE 75 MG: 50 TABLET ORAL at 10:08

## 2017-08-16 RX ADMIN — OYSTER SHELL CALCIUM WITH VITAMIN D 1 TABLET: 500; 200 TABLET, FILM COATED ORAL at 08:08

## 2017-08-16 RX ADMIN — ALLOPURINOL 300 MG: 100 TABLET ORAL at 08:08

## 2017-08-16 RX ADMIN — THERA TABS 1 TABLET: TAB at 08:08

## 2017-08-16 RX ADMIN — ONDANSETRON 8 MG: 8 TABLET, ORALLY DISINTEGRATING ORAL at 10:08

## 2017-08-16 RX ADMIN — AMLODIPINE BESYLATE 10 MG: 10 TABLET ORAL at 08:08

## 2017-08-16 RX ADMIN — FOLIC ACID 1 MG: 1 TABLET ORAL at 08:08

## 2017-08-16 RX ADMIN — HYDRALAZINE HYDROCHLORIDE 75 MG: 50 TABLET ORAL at 05:08

## 2017-08-16 RX ADMIN — Medication 3 ML: at 05:08

## 2017-08-16 RX ADMIN — SPIRONOLACTONE 25 MG: 25 TABLET, FILM COATED ORAL at 08:08

## 2017-08-16 RX ADMIN — LISINOPRIL 10 MG: 10 TABLET ORAL at 08:08

## 2017-08-16 RX ADMIN — ROPINIROLE 0.5 MG: 0.5 TABLET, FILM COATED ORAL at 10:08

## 2017-08-16 RX ADMIN — Medication 10 ML: at 05:08

## 2017-08-16 RX ADMIN — Medication 3 ML: at 10:08

## 2017-08-16 RX ADMIN — WARFARIN SODIUM 6 MG: 6 TABLET ORAL at 05:08

## 2017-08-16 RX ADMIN — ROPINIROLE 0.5 MG: 0.5 TABLET, FILM COATED ORAL at 02:08

## 2017-08-16 RX ADMIN — ASPIRIN 81 MG CHEWABLE TABLET 81 MG: 81 TABLET CHEWABLE at 08:08

## 2017-08-16 RX ADMIN — TAMSULOSIN HYDROCHLORIDE 0.4 MG: 0.4 CAPSULE ORAL at 08:08

## 2017-08-16 RX ADMIN — ATORVASTATIN CALCIUM 10 MG: 10 TABLET, FILM COATED ORAL at 08:08

## 2017-08-16 RX ADMIN — SODIUM CHLORIDE, PRESERVATIVE FREE 10 ML: 5 INJECTION INTRAVENOUS at 02:08

## 2017-08-16 RX ADMIN — OYSTER SHELL CALCIUM WITH VITAMIN D 1 TABLET: 500; 200 TABLET, FILM COATED ORAL at 10:08

## 2017-08-16 RX ADMIN — ROPINIROLE 0.5 MG: 0.5 TABLET, FILM COATED ORAL at 05:08

## 2017-08-16 RX ADMIN — HYDRALAZINE HYDROCHLORIDE 75 MG: 50 TABLET ORAL at 02:08

## 2017-08-16 NOTE — PROGRESS NOTES
"Occupational Therapy  PM Treatment Session  Kev Vasquez   MRN: [id number]      08/16/17 1300   OT Time Calculation   OT Start Time 1300   OT Stop Time 1353   OT Total Time (min) 53 min   General   OT Date of Treatment 08/16/17   Family/Caregiver Present Yes   Patient Found (position) Seated in bedside chair   Pt found with (LVAD)   Precautions   General Precautions LVAD;fall   Orthopedic No   Required Braces or Orthoses No   Cardiovascular/Pulmonary LVAD   Visual/Auditory Vision impaired   Subjective   Patient states "just a little trouble" re: tf wc <> EOM   Pain/Comfort   Pain Rating no pain   Bed Mobility   Scooting/Bridging Stand by Assistance   Scooting/Bridging Comments to EOM   Sit to Stand   Sit <> Stand Assistance Moderate Assistance   Sit <> Stand Assistive Device Rolling Walker   Trials/Comments from wc   Stand to Sit   Assistance Moderate Assistance   Assistive Device Rolling Walker   Trials/Comments to EOM   Chair to Mat   Chair <>Mat Technique Stand Pivot   Chair <> Mat Assistance Moderate Assistance   Chair<> Mat Assistive Device Rolling Walker   Trials/Comments WC <> EOM   Toilet Transfer   Toilet Transfer Technique Stand Pivot   Toilet Transfer Assistance Moderate Assistance   Toilet Transfer Assistive Device Rolling Walker   LE Dressing   LE Dressing Level of Assistance Moderate assistance   LE Dressing Where Assessed Other (Comment)   LE Dressing Comments using AD in stance   Toileting   Toileting Level of Assistance Moderate assistance   Toileting Where Assessed Bedside commode   Toileting Comments performs clothing management    Exercise Tools   Bilateral Boo 3# on incline 5/20x reps   Other Exercise Tool 1 rows: 20# x 100 reps to increase strength in prep for ADLs   Activity Tolerance   Activity Tolerance Patient tolerated treatment well   After Treatment   Patient Position After Treatment Seated in wheelchair  (safety belt)   Patient after treatment left call button in reach  (family, care " giver present)   Assessment   Prognosis Good   Problem List Decreased Self Care skills;Decreased upper extremity range of motion;Decreased upper extremity strength;Decreased safe judgment during ADL;Decreased cognition;Decreased endurance;Decreased fine motor control;Decreased functional mobility;Decreased gross motor control;Decreased IADLs;Decreased Function of right upper extremity;Decreased Function of left upper extremity;Decreased trunk control for functional activities   Assessment Pt with good participation this session and conversational tolerating TFs well, however required (A) for trunk elevation some trials. Pt would cont to benefit from OT to increase functional mobiltiy.   Level of Motivation/Participation Good   Discharge Recommendations   Equipment Needed After Discharge 3-in-1 commode   Discharge Facility/Level Of Care Needs home with home health   Plan   Plan Continue with current plan   Therapy Frequency 2 times/day;Monday-Friday;Saturday or Sunday   Treatment/Billable Minutes   Self Care/Home Management 20   Therapeutic Activity 15   Therapeutic Exercise 18   Total Time 53           The SHAUNA and [facility] have collaborated and discussed the patient's status, treatment plan and progress toward established goals.     JERALD Heath 8/16/2017

## 2017-08-16 NOTE — PROGRESS NOTES
"Speech Therapy   Treatment    Kev Vasquez   MRN: [id number]        08/16/17 1530   Speech Time Calculation   Speech Start Time 1530   Speech Stop Time 1615   Speech Total (min) 45 min   General Information   SLP Treatment Date 08/16/17   General Observations pt seen for individual treatment session at bedside with spouse present.    General Precautions LVAD;fall   Visual/Auditory Vision impaired  (reading glasses)   Subjective   Patient states Pt stated "I forget what I'm doing in the middle of doing it."   Pain/Comfort   Pain Rating no pain       SLP Cognitive Treatment   Treatment Detail (SLP Cognitive Treatment) Pt completed recall task of therapy tasks completed in ST with 70% acc, given min verbal cues pt with increase to 100% acc. Pt engaged in structured conversation task targeting thought organization and processing skills regarding IP rehab program and therapies involved. Pt able to verbalize therapies and examples of activities within each given moderate verbal cues with 70% acc. Pt completed problem solving task of mobility with poor attention skills, given min verbal cues with 90% ac. 4 step written sequencing task completed with 75% acc indly and extended time, given min verbal cues for attention skills to specific details, pt with increase to 100% acc. Pt then completed written problem solving task of directions/flyer with 25% acc. Pt required redirection towards task and auditory cues with all information read aloud for comprehension. Pt requires 24 hour supervision at this time.    Assessment   SLP Diagnosis moderate cog-ling deficits   Prognosis Fair   Problem List decreased recall; decreased orientation skills; poor motivation towards speech goals; decreased safety awareness skills   Session Assessment progressing toward goals   Level of Motivation/Participation good   Discharge Recommendations   Discharge Facility/Level Of Care Needs home health speech therapy   Plan   Plan Continue with current " plan   Treatment/Billable Minutes   Speech Therapy Individual 45   Total Time 45     FIM Scores  Comprehension:6  Expression: 5  Social Interaction:4  Problem Solving:3  Memory: 3     Comprehension:  Complex= humor, finances, rationale for medical treatment(hip precautions, pressure relief)  Basic= pain, hunger, thirst, bathroom needs, cold, nutrition, sleep     Understands complex/abstract conversation/directions with extra time, assistance device(glasses, if visual mode or both; hearing aide if auditory mode or both) (6)     Expression:  Needs cues to express basic needs  (5)     Social Interaction:  Interacts appropriately with others with medication or extra time(anti-anxiety, antidepressant)  (6)     Problem Solving:  Solves basic problems 50-74% of the time  (3)     Memory:  Recognizes, recalls, or executes 50-74% of time 2 steps of 2 step request (3)    Enedina Nguyễn CCC-SLP  8/16/2017

## 2017-08-16 NOTE — PROGRESS NOTES
"Physical Therapy   Treatment    Kev Vasquez   MRN: [id number]      08/16/17 0900   PT Time Calculation   PT Start Time 0900   PT Stop Time 0945   PT Total Time (min) 45 min   Treatment   Treatment Type Treatment   PT/PTA PT   General   PT Received On 08/16/17   Missed Time Reason Not applicable   Family/Caregiver Present No   Patient Found (position) Seated in wheelchair   Precautions   General Precautions LVAD;fall   Orthopedic No   Required Braces or Orthoses No   Cardiovascular/Pulmonary LVAD   Visual/Auditory Vision impaired   Subjective   Patient states "I need to brush my teeth first, if that's ok."   Pain/Comfort   Pain Rating 1 no pain   Transfers   Transfer yes   Sit to Stand   Sit <> Stand Assistance Contact Guard Assistance   Sit <> Stand Assistive Device Rolling Walker   Trials/Comments multiple trials throughout session   Stand to Sit   Assistance Contact Guard Assistance   Assistive Device Rolling Walker   Trials/Comments multiple trials throughout session with cues for controlled descent   Wheelchair Activities   Propulsion Yes   Propulsion Type 1 Manual   Level 1 Level tile   Method 1 Left upper extremity;Right upper extremity   Level of Assistance 1 Stand by assistsance   Description/ Details 1 100 ft with increased time to perform   Gait   Gait Yes   Weight Bearing Status FWB   Gait 1   Surface 1 Level tile   Gait Assistive Device Rolling walker   Assistance 1 Minimum assistance   Gait Distance 154ft x 2 trials - patient started walking with smooth step through pattern and CGA, but after ~50ft he started taking more staggerred, shuffling steps and his balance required Min A from PT to maintain stability   Gait Pattern swing-through gait   Gait Deviation(s) decreased william;increased time in double stance;decreased velocity of limb motion;decreased step length;decreased stride length;backward lean   Impairments Contributing to Gait Deviations impaired balance;decreased strength;impaired postural " control   Activity Tolerance   Activity Tolerance Patient tolerated treatment well   Other Comments   Comments Pt sat forward in wheelchair at sinkside and brushed his hair, teeth, and washed his hands    After Treatment   Patient Position After Treatment Seated in wheelchair   Patient after treatment left (in care of PTA for group session, safety belt in place)   Assessment   Prognosis Good   Problem List Decreased strength;Decreased endurance;Impaired balance;Decreased mobility;Decreased coordination;Decreased safety awareness;Decreased cognition   Session Assessment progressing toward goals   Assessment Pt continues to require Min A for balance during gait. PT can't explain why patient starts walking with smooth gait pattern and CGA and then as the trial goes on, he starts to lose his balance more and has trouble sequencing his steps with the RW. PT wonders if it could be cognition related. Pt practiced multiple sit <> stand transfer in order to practice control of descent back to chair. Pt remains an appropriate rehab candidate. Continue PT POC.   Level of Motivation/Participation good   Barriers to Discharge Inaccessible home environment   Barriers to Discharge Comments steps within home   Discharge Recommendations   Equipment Needed After Discharge 3-in-1 commode   Discharge Facility/Level Of Care Needs home health PT   Plan   Planned Therapy Intervention Continue with current plan   Therapy Frequency 2 times/day;Monday-Friday;Saturday or Sunday   Physical Therapy Follow-up   PT Follow-up? Yes   PT - Next Visit Date 08/17/17   Treatment/Billable Minutes   Gait training 25   Therapeutic Activity 10   Train/Wheelchair Management 10   Total Time 45   Georgette Early DPT  8/16/2017

## 2017-08-16 NOTE — PLAN OF CARE
Problem: Patient Care Overview  Goal: Plan of Care Review  Outcome: Ongoing (interventions implemented as appropriate)  Plan of care reviewed with pt. Pt resting in bed. Bed in low position, wheels locked. Call light within reach. Side rails up x2. Frequent rounds made to ensure pt safety, comfort, and pain control management. Will continue to monitor.      Problem: Infection, Risk/Actual (Adult)  Goal: Identify Related Risk Factors and Signs and Symptoms  Related risk factors and signs and symptoms are identified upon initiation of Human Response Clinical Practice Guideline (CPG)   Outcome: Ongoing (interventions implemented as appropriate)  IV antibiotic infused to prevent bacterial growth and promote continued healing. Will continue to monitor.     Problem: Fall Risk (Adult)  Goal: Identify Related Risk Factors and Signs and Symptoms  Related risk factors and signs and symptoms are identified upon initiation of Human Response Clinical Practice Guideline (CPG)   Outcome: Ongoing (interventions implemented as appropriate)  Instructed pt on safety, oriented pt to call light and pt verbalized understanding to call before getting out of bed. Pt free from falls so far this shift. Will continue to monitor.      Problem: Pressure Ulcer Risk (Candelario Scale) (Adult,Obstetrics,Pediatric)  Goal: Identify Related Risk Factors and Signs and Symptoms  Related risk factors and signs and symptoms are identified upon initiation of Human Response Clinical Practice Guideline (CPG)   Outcome: Ongoing (interventions implemented as appropriate)  Pt turned q2h. Pt free from skin breakdown so far this shift. Will continue to monitor.       Problem: Ventricular Assist Device (Adult)  Goal: Signs and Symptoms of Listed Potential Problems Will be Absent, Minimized or Managed (Ventricular Assist Device)  Signs and symptoms of listed potential problems will be absent, minimized or managed by discharge/transition of care (reference Ventricular  Assist Device (Adult) CPG).   Outcome: Ongoing (interventions implemented as appropriate)  Pt following fall precautions as well as all other safety measures implemented to ensure a safe environment for maximum rehabilitation.      Problem: Mobility, Physical Impaired (Adult)  Goal: Identify Related Risk Factors and Signs and Symptoms  Related risk factors and signs and symptoms are identified upon initiation of Human Response Clinical Practice Guideline (CPG)  Outcome: Ongoing (interventions implemented as appropriate)  Pt verbalized understanding to call for assistance before exiting the bed. Will continue to monitor.

## 2017-08-16 NOTE — PROGRESS NOTES
Occupational Therapy  [place] FIM  Kev Vasquez   MRN: [id number]          08/16/17 1600   Transfers   Bed/Chair/WC 3   Toilet 3   Self Care   Dressing-Lower 3   Toileting 3       JERALD Heath 8/16/2017

## 2017-08-16 NOTE — ASSESSMENT & PLAN NOTE
Sit-->stand CGA (improved), T/F CGA (improved), gait 154' x 2 MNA RW (improved), limited by impaired trunk balance.  UBD SBA (improved), LBD MDA (improved), toileting DEP on 8/14.      Mild cognitive deficits.

## 2017-08-16 NOTE — PROGRESS NOTES
[facility]  Physical Medicine & Rehab  Progress Note    Patient Name: Kev Vasquez  MRN: [id number]  Patient Class: IP- Rehab   Admission Date: 8/8/2017  Length of Stay: 8 days  Attending Physician: Rashad Garcia MD  Primary Care Provider: Mega Collins MD    Subjective:     Principal Problem:Gait instability    Interval History: Pt without new c/o's.  I have reviewed the HPI and PMFSH and there are no changes from admission.        Scheduled Medications:    allopurinol  300 mg Oral Daily    amlodipine  10 mg Oral Daily    aspirin  81 mg Oral Daily    atorvastatin  10 mg Oral Daily    buPROPion  300 mg Oral Daily    calcium-vitamin D3  1 tablet Oral BID    ceFEPime (MAXIPIME) IVPB  2 g Intravenous Q12H    docusate sodium  100 mg Oral BID    dofetilide  250 mcg Oral Q12H    folic acid  1 mg Oral Daily    hydrALAZINE  75 mg Oral Q8H    lisinopril  10 mg Oral BID    magnesium oxide  400 mg Oral BID    multivitamin  1 tablet Oral Daily    ropinirole  0.5 mg Oral TID    sodium chloride 0.9%  10 mL Intravenous Q6H    sodium chloride 0.9%  3 mL Intravenous Q8H    spironolactone  25 mg Oral Daily    tamsulosin  0.4 mg Oral Daily    warfarin  4 mg Oral Every Tues, Thurs, Sat    warfarin  4 mg Oral Every Sun    warfarin  6 mg Oral Every Mon, Wed, Fri       PRN Medications: acetaminophen, bisacodyl, diphenoxylate-atropine 2.5-0.025 mg, lactulose, magnesium hydroxide 400 mg/5 ml, ondansetron, polyethylene glycol, Flushing PICC Protocol **AND** sodium chloride 0.9% **AND** sodium chloride 0.9%, sodium phosphates, trazodone    Review of Systems   Constitutional: Negative for appetite change and fatigue.   Eyes: Negative for visual disturbance.   Respiratory: Negative for shortness of breath.    Cardiovascular: Negative for chest pain.   Gastrointestinal: Negative for constipation and diarrhea.   Genitourinary: Negative for dysuria, frequency and urgency.   Musculoskeletal:  Positive for gait problem. Negative for arthralgias, back pain, joint swelling, myalgias, neck pain and neck stiffness.   Neurological: Positive for tremors and weakness. Negative for dizziness, numbness and headaches.   Psychiatric/Behavioral: Negative for dysphoric mood.   All other systems reviewed and are negative.    Objective:     Vital Signs (Most Recent):  Temp: 98 °F (36.7 °C) (08/16/17 0641)  Pulse: 65 (08/16/17 0641)  Resp: 15 (08/16/17 0641)  BP: (!) 84/0 (08/16/17 0641)  SpO2: 97 % (08/16/17 0641)    Vital Signs (24h Range):  Temp:  [98 °F (36.7 °C)-98.1 °F (36.7 °C)] 98 °F (36.7 °C)  Pulse:  [64-65] 65  Resp:  [15-18] 15  SpO2:  [94 %-97 %] 97 %  BP: (84-92)/(0) 84/0     Physical Exam   Constitutional: He is oriented to person, place, and time. He appears well-nourished.   Eyes: EOM are normal. Pupils are equal, round, and reactive to light.   Neck: Normal range of motion. Neck supple.   Cardiovascular: Normal rate.    Pulmonary/Chest: Effort normal.   Musculoskeletal: Normal range of motion.   Neurological: He is oriented to person, place, and time. He has normal strength.   Skin: No rash noted.   Psychiatric: He has a normal mood and affect.     NEUROLOGICAL EXAMINATION:     MENTAL STATUS   Oriented to person, place, and time.     CRANIAL NERVES     CN III, IV, VI   Pupils are equal, round, and reactive to light.  Extraocular motions are normal.     MOTOR EXAM     Strength   Strength 5/5 throughout.       Assessment/Plan:      Kev Vasquez is a 76 y.o. male admitted to inpatient rehabilitation on 8/8/2017 for Gait instability with impaired mobility and ADLs. Patient remains appropriate for PT, OT, and as required Speech therapy. Patient continues to require 24 hour nursing care as well as daily Physician assessment.    * Gait instability    Sit-->stand CGA (improved), T/F CGA (improved), gait 154' x 2 MNA RW (improved), limited by impaired trunk balance.  UBD SBA (improved), LBD MDA (improved),  toileting DEP on 8/14.      Mild cognitive deficits.          Chronic systolic heart failure    Compensated with LVAD    8/8:   INR 2.7  8/10: INR 2.5 -- check 8/14 8/14: INR 2.6        Stage 2 chronic kidney disease    Stable    8/14:  CRT 1.7 -- d/c fluid restriction        Complication involving left ventricular assist device (LVAD)    Still with some drainage -- monitor.  On cefepime through 9/23.               DISCHARGE PLANNING:  Tentative Discharge Date: 8/24/2017    Future Appointments  Date Time Provider Department Center   8/23/2017 12:00 PM LAB, APPOINTMENT [facility] LAB [facility]   8/23/2017 1:30 PM HEARTTRANSPLANT, LVADN [facility] HEARTTX [facility]   8/23/2017 1:30 PM Carlito Santana MD [facility] HEARTTX [facility]   9/12/2017 2:30 PM Payam Muhammad MD [facility] ID [facility]   10/26/2017 8:00 AM HOME MONITOR DEVICE CHECK, [facility]       Rashad Garcia MD  Department of Physical Medicine & Rehab   [facility]

## 2017-08-16 NOTE — SUBJECTIVE & OBJECTIVE
Interval History: Pt without new c/o's.  I have reviewed the HPI and PMFSH and there are no changes from admission.        Scheduled Medications:    allopurinol  300 mg Oral Daily    amlodipine  10 mg Oral Daily    aspirin  81 mg Oral Daily    atorvastatin  10 mg Oral Daily    buPROPion  300 mg Oral Daily    calcium-vitamin D3  1 tablet Oral BID    ceFEPime (MAXIPIME) IVPB  2 g Intravenous Q12H    docusate sodium  100 mg Oral BID    dofetilide  250 mcg Oral Q12H    folic acid  1 mg Oral Daily    hydrALAZINE  75 mg Oral Q8H    lisinopril  10 mg Oral BID    magnesium oxide  400 mg Oral BID    multivitamin  1 tablet Oral Daily    ropinirole  0.5 mg Oral TID    sodium chloride 0.9%  10 mL Intravenous Q6H    sodium chloride 0.9%  3 mL Intravenous Q8H    spironolactone  25 mg Oral Daily    tamsulosin  0.4 mg Oral Daily    warfarin  4 mg Oral Every Tues, Thurs, Sat    warfarin  4 mg Oral Every Sun    warfarin  6 mg Oral Every Mon, Wed, Fri       PRN Medications: acetaminophen, bisacodyl, diphenoxylate-atropine 2.5-0.025 mg, lactulose, magnesium hydroxide 400 mg/5 ml, ondansetron, polyethylene glycol, Flushing PICC Protocol **AND** sodium chloride 0.9% **AND** sodium chloride 0.9%, sodium phosphates, trazodone    Review of Systems   Constitutional: Negative for appetite change and fatigue.   Eyes: Negative for visual disturbance.   Respiratory: Negative for shortness of breath.    Cardiovascular: Negative for chest pain.   Gastrointestinal: Negative for constipation and diarrhea.   Genitourinary: Negative for dysuria, frequency and urgency.   Musculoskeletal: Positive for gait problem. Negative for arthralgias, back pain, joint swelling, myalgias, neck pain and neck stiffness.   Neurological: Positive for tremors and weakness. Negative for dizziness, numbness and headaches.   Psychiatric/Behavioral: Negative for dysphoric mood.   All other systems reviewed and are negative.    Objective:     Vital  Signs (Most Recent):  Temp: 98 °F (36.7 °C) (08/16/17 0641)  Pulse: 65 (08/16/17 0641)  Resp: 15 (08/16/17 0641)  BP: (!) 84/0 (08/16/17 0641)  SpO2: 97 % (08/16/17 0641)    Vital Signs (24h Range):  Temp:  [98 °F (36.7 °C)-98.1 °F (36.7 °C)] 98 °F (36.7 °C)  Pulse:  [64-65] 65  Resp:  [15-18] 15  SpO2:  [94 %-97 %] 97 %  BP: (84-92)/(0) 84/0     Physical Exam   Constitutional: He is oriented to person, place, and time. He appears well-nourished.   Eyes: EOM are normal. Pupils are equal, round, and reactive to light.   Neck: Normal range of motion. Neck supple.   Cardiovascular: Normal rate.    Pulmonary/Chest: Effort normal.   Musculoskeletal: Normal range of motion.   Neurological: He is oriented to person, place, and time. He has normal strength.   Skin: No rash noted.   Psychiatric: He has a normal mood and affect.     NEUROLOGICAL EXAMINATION:     MENTAL STATUS   Oriented to person, place, and time.     CRANIAL NERVES     CN III, IV, VI   Pupils are equal, round, and reactive to light.  Extraocular motions are normal.     MOTOR EXAM     Strength   Strength 5/5 throughout.

## 2017-08-16 NOTE — PROGRESS NOTES
Physical Therapy   Care Plan/FIM    Kev Vasquez   MRN: [id number]      08/16/17 1200   Locomotion   Distance Walked 3   Distance Wheelchair 2   Walk 3   Wheelchair 2   Mode C   Georgette Early DPT  8/16/2017

## 2017-08-17 LAB
ANION GAP SERPL CALC-SCNC: 5 MMOL/L
BUN SERPL-MCNC: 36 MG/DL
CALCIUM SERPL-MCNC: 11.1 MG/DL
CHLORIDE SERPL-SCNC: 101 MMOL/L
CO2 SERPL-SCNC: 28 MMOL/L
CREAT SERPL-MCNC: 1.9 MG/DL
EST. GFR  (AFRICAN AMERICAN): 39.3 ML/MIN/1.73 M^2
EST. GFR  (NON AFRICAN AMERICAN): 34 ML/MIN/1.73 M^2
GLUCOSE SERPL-MCNC: 74 MG/DL
INR PPP: 2.5
POTASSIUM SERPL-SCNC: 4.5 MMOL/L
PROTHROMBIN TIME: 25.9 SEC
SODIUM SERPL-SCNC: 134 MMOL/L

## 2017-08-17 PROCEDURE — 97530 THERAPEUTIC ACTIVITIES: CPT

## 2017-08-17 PROCEDURE — 36415 COLL VENOUS BLD VENIPUNCTURE: CPT

## 2017-08-17 PROCEDURE — 80048 BASIC METABOLIC PNL TOTAL CA: CPT

## 2017-08-17 PROCEDURE — 12800000 HC REHAB SEMI-PRIVATE ROOM

## 2017-08-17 PROCEDURE — 97542 WHEELCHAIR MNGMENT TRAINING: CPT

## 2017-08-17 PROCEDURE — 63600175 PHARM REV CODE 636 W HCPCS: Performed by: PHYSICIAN ASSISTANT

## 2017-08-17 PROCEDURE — 99233 SBSQ HOSP IP/OBS HIGH 50: CPT | Mod: ,,, | Performed by: PHYSICAL MEDICINE & REHABILITATION

## 2017-08-17 PROCEDURE — 85610 PROTHROMBIN TIME: CPT

## 2017-08-17 PROCEDURE — 97110 THERAPEUTIC EXERCISES: CPT

## 2017-08-17 PROCEDURE — 25000003 PHARM REV CODE 250: Performed by: PHYSICAL MEDICINE & REHABILITATION

## 2017-08-17 PROCEDURE — A4216 STERILE WATER/SALINE, 10 ML: HCPCS | Performed by: PHYSICIAN ASSISTANT

## 2017-08-17 PROCEDURE — 97116 GAIT TRAINING THERAPY: CPT

## 2017-08-17 PROCEDURE — 92507 TX SP LANG VOICE COMM INDIV: CPT

## 2017-08-17 PROCEDURE — 25000003 PHARM REV CODE 250: Performed by: PHYSICIAN ASSISTANT

## 2017-08-17 PROCEDURE — 97150 GROUP THERAPEUTIC PROCEDURES: CPT

## 2017-08-17 RX ADMIN — TAMSULOSIN HYDROCHLORIDE 0.4 MG: 0.4 CAPSULE ORAL at 08:08

## 2017-08-17 RX ADMIN — DOFETILIDE 250 MCG: 0.12 CAPSULE ORAL at 08:08

## 2017-08-17 RX ADMIN — DOCUSATE SODIUM 100 MG: 100 CAPSULE, LIQUID FILLED ORAL at 09:08

## 2017-08-17 RX ADMIN — LISINOPRIL 10 MG: 10 TABLET ORAL at 08:08

## 2017-08-17 RX ADMIN — AMLODIPINE BESYLATE 10 MG: 10 TABLET ORAL at 08:08

## 2017-08-17 RX ADMIN — FOLIC ACID 1 MG: 1 TABLET ORAL at 08:08

## 2017-08-17 RX ADMIN — Medication 400 MG: at 08:08

## 2017-08-17 RX ADMIN — ATORVASTATIN CALCIUM 10 MG: 10 TABLET, FILM COATED ORAL at 08:08

## 2017-08-17 RX ADMIN — ROPINIROLE 0.5 MG: 0.5 TABLET, FILM COATED ORAL at 09:08

## 2017-08-17 RX ADMIN — ROPINIROLE 0.5 MG: 0.5 TABLET, FILM COATED ORAL at 01:08

## 2017-08-17 RX ADMIN — HYDRALAZINE HYDROCHLORIDE 75 MG: 50 TABLET ORAL at 01:08

## 2017-08-17 RX ADMIN — Medication 3 ML: at 10:08

## 2017-08-17 RX ADMIN — ALLOPURINOL 300 MG: 100 TABLET ORAL at 08:08

## 2017-08-17 RX ADMIN — Medication 10 ML: at 12:08

## 2017-08-17 RX ADMIN — HYDRALAZINE HYDROCHLORIDE 75 MG: 50 TABLET ORAL at 05:08

## 2017-08-17 RX ADMIN — CEFEPIME HYDROCHLORIDE 2 G: 2 INJECTION, SOLUTION INTRAVENOUS at 03:08

## 2017-08-17 RX ADMIN — HYDRALAZINE HYDROCHLORIDE 75 MG: 50 TABLET ORAL at 10:08

## 2017-08-17 RX ADMIN — WARFARIN SODIUM 4 MG: 4 TABLET ORAL at 04:08

## 2017-08-17 RX ADMIN — SPIRONOLACTONE 25 MG: 25 TABLET, FILM COATED ORAL at 08:08

## 2017-08-17 RX ADMIN — OYSTER SHELL CALCIUM WITH VITAMIN D 1 TABLET: 500; 200 TABLET, FILM COATED ORAL at 08:08

## 2017-08-17 RX ADMIN — BUPROPION HYDROCHLORIDE 300 MG: 150 TABLET, EXTENDED RELEASE ORAL at 08:08

## 2017-08-17 RX ADMIN — ROPINIROLE 0.5 MG: 0.5 TABLET, FILM COATED ORAL at 06:08

## 2017-08-17 RX ADMIN — DOCUSATE SODIUM 100 MG: 100 CAPSULE, LIQUID FILLED ORAL at 08:08

## 2017-08-17 RX ADMIN — THERA TABS 1 TABLET: TAB at 08:08

## 2017-08-17 RX ADMIN — Medication 3 ML: at 05:08

## 2017-08-17 RX ADMIN — ASPIRIN 81 MG CHEWABLE TABLET 81 MG: 81 TABLET CHEWABLE at 08:08

## 2017-08-17 NOTE — PROGRESS NOTES
[facility]  Physical Medicine & Rehab  Progress Note    Patient Name: Kev Vasquez  MRN: [id number]  Patient Class: IP- Rehab   Admission Date: 8/8/2017  Length of Stay: 9 days  Attending Physician: Rashad Garcia MD  Primary Care Provider: Mega Collins MD    Subjective:     Principal Problem:Gait instability    Interval History: Pt without new c/o's.  I have reviewed the HPI and PMFSH and there are no changes from admission.        Scheduled Medications:    allopurinol  300 mg Oral Daily    amlodipine  10 mg Oral Daily    aspirin  81 mg Oral Daily    atorvastatin  10 mg Oral Daily    buPROPion  300 mg Oral Daily    calcium-vitamin D3  1 tablet Oral BID    ceFEPime (MAXIPIME) IVPB  2 g Intravenous Q12H    docusate sodium  100 mg Oral BID    dofetilide  250 mcg Oral Q12H    folic acid  1 mg Oral Daily    hydrALAZINE  75 mg Oral Q8H    lisinopril  10 mg Oral BID    magnesium oxide  400 mg Oral BID    multivitamin  1 tablet Oral Daily    ropinirole  0.5 mg Oral TID    sodium chloride 0.9%  10 mL Intravenous Q6H    sodium chloride 0.9%  3 mL Intravenous Q8H    spironolactone  25 mg Oral Daily    tamsulosin  0.4 mg Oral Daily    warfarin  4 mg Oral Every Tues, Thurs, Sat    warfarin  4 mg Oral Every Sun    warfarin  6 mg Oral Every Mon, Wed, Fri       PRN Medications: acetaminophen, bisacodyl, diphenoxylate-atropine 2.5-0.025 mg, lactulose, magnesium hydroxide 400 mg/5 ml, ondansetron, polyethylene glycol, Flushing PICC Protocol **AND** sodium chloride 0.9% **AND** sodium chloride 0.9%, sodium phosphates, trazodone    Review of Systems   Constitutional: Negative for appetite change and fatigue.   Eyes: Negative for visual disturbance.   Respiratory: Negative for shortness of breath.    Cardiovascular: Negative for chest pain.   Gastrointestinal: Negative for constipation and diarrhea.   Genitourinary: Negative for dysuria, frequency and urgency.   Musculoskeletal:  Positive for gait problem. Negative for arthralgias, back pain, joint swelling, myalgias, neck pain and neck stiffness.   Neurological: Positive for tremors and weakness. Negative for dizziness, numbness and headaches.   Psychiatric/Behavioral: Negative for dysphoric mood.   All other systems reviewed and are negative.    Objective:     Vital Signs (Most Recent):  Temp: 98.2 °F (36.8 °C) (08/17/17 0630)  Pulse: 60 (08/17/17 0630)  Resp: 16 (08/17/17 0630)  BP: (!) 86/0 (08/17/17 0630)  SpO2: 97 % (08/17/17 0630)    Vital Signs (24h Range):  Temp:  [98.2 °F (36.8 °C)-98.4 °F (36.9 °C)] 98.2 °F (36.8 °C)  Pulse:  [60-70] 60  Resp:  [16-18] 16  SpO2:  [97 %] 97 %  BP: ()/(0) 86/0     Physical Exam   Constitutional: He is oriented to person, place, and time. He appears well-nourished.   Eyes: EOM are normal. Pupils are equal, round, and reactive to light.   Neck: Normal range of motion. Neck supple.   Cardiovascular: Normal rate.    Pulmonary/Chest: Effort normal.   Musculoskeletal: Normal range of motion.   Neurological: He is oriented to person, place, and time. He has normal strength.   Skin: No rash noted.   Psychiatric: He has a normal mood and affect.     NEUROLOGICAL EXAMINATION:     MENTAL STATUS   Oriented to person, place, and time.     CRANIAL NERVES     CN III, IV, VI   Pupils are equal, round, and reactive to light.  Extraocular motions are normal.     MOTOR EXAM     Strength   Strength 5/5 throughout.       Assessment/Plan:      Kev Vasquez is a 76 y.o. male admitted to inpatient rehabilitation on 8/8/2017 for Gait instability with impaired mobility and ADLs. Patient remains appropriate for PT, OT, and as required Speech therapy. Patient continues to require 24 hour nursing care as well as daily Physician assessment.    * Gait instability    Sit-->stand CGA (improved), T/F CGA (improved), gait 105' CGA/MNA RW (improved), limited by impaired trunk balance.  UBD SBA (improved), LBD MDA (improved),  "toileting MDA (improved) on 8/16.      Mild cognitive deficits -- in the most recent ST session:  "Pt recalled yesterday's ST session given min verbal cues to supervision with 80% acc. Pt completed mental manipulation task of F=4 with 0% acc indly, given visual aid of word bank, pt with increase to 60% acc. Verbal insight task completed with moderate verbal cues provided for awareness of current physical limitations with 60% acc. 5 step written sequencing task completed with 50% acc, given min verbal cues for attention to specific details and redirections for attention to task, pt with increase to 100% acc."          Chronic systolic heart failure    Compensated with LVAD    8/8:   INR 2.7  8/10: INR 2.5 -- check 8/14 8/14: INR 2.6  8/17: INR 2.5        Stage 2 chronic kidney disease    Stable    8/14:  CRT 1.7 -- d/c fluid restriction  8/17:  CRT 1.9 -- encourage fluids to 1600cc/day        Complication involving left ventricular assist device (LVAD)    Still with some drainage -- monitor.  On cefepime through 9/23.               DISCHARGE PLANNING:  Tentative Discharge Date: 8/24/2017    Future Appointments  Date Time Provider Department Center   8/23/2017 12:00 PM LAB, APPOINTMENT [facility]   8/23/2017 1:30 PM HEARTTRANSPLANT, LVADN [facility] HEARTTX [facility]   8/23/2017 1:30 PM Carlito Santana MD [facility] HEARTTX [facility]   9/12/2017 2:30 PM Payam Muhammad MD [facility] ID [facility]   10/26/2017 8:00 AM HOME MONITOR DEVICE CHECK, [facility] ARRHYTH [facility] Garcia MD  Department of Physical Medicine & Rehab   [facility]  "

## 2017-08-17 NOTE — PROGRESS NOTES
"Occupational Therapy  PM Treatment  Kev Vasquez   MRN: [id number]   Room/Bed: E280/E280 B       08/17/17 1400   OT Time Calculation   OT Start Time 1400   OT Stop Time 1446   OT Total Time (min) 46 min   General   OT Date of Treatment 08/17/17   Patient Found (position) Seated in wheelchair   Precautions   General Precautions LVAD;fall   Subjective   Patient states "I guess I can't feel very well with my hands."   Pain/Comfort   Pain Rating no pain   Bed Mobility   Scooting/Bridging Supervision   Scooting/Bridging Comments to scoot to EOM   Supine to Sit Stand by Assistance   Supine to Sit Comments with rails to EOB   Sit to Stand   Sit <> Stand Assistance Moderate Assistance   Sit <> Stand Assistive Device Rolling Walker   Trials/Comments from wc   Stand to Sit   Assistance Moderate Assistance   Assistive Device Rolling Walker   Trials/Comments to wc   Bed to Chair   Bed <> Chair Technique Stand Pivot   Bed <> Chair Transfer Assistance Moderate Assistance   Bed <> Chair Assistive Device No Assistive Device   Trials/Comments from EOB>wc   LE Dressing   LE Dressing Level of Assistance Moderate assistance   LE Dressing Where Assessed Edge of bed   LE Dressing Comments assist with tying shoes, clothing management over hips and steadying from stance   Exercise Tools   Rickshaw 20# x 100 reps   Bilateral Boo 4# x 100 reps on incline   Coordination   Fine Motor Pt completed 2 x nut&bolt box with vision occluded. Pt had significant difficulty completing this activity and this could contribute to the dificulty with tying shoes and fastners.   Activity Tolerance   Activity Tolerance Patient tolerated treatment well   After Treatment   Patient Position After Treatment Supine in bed   Patient after treatment left call button in reach  (with RN and spouse present)   Assessment   Prognosis Good   Problem List Decreased Self Care skills;Decreased upper extremity range of motion;Decreased upper extremity strength;Decreased safe " judgment during ADL;Decreased cognition;Decreased endurance;Decreased fine motor control;Decreased functional mobility;Decreased gross motor control;Decreased IADLs;Decreased Function of right upper extremity;Decreased Function of left upper extremity;Decreased trunk control for functional activities   Assessment Pt particiapted well in tx session but remains with decreased balance, t/fs and ADLs. Pt would continue to benefit from skilled OT services.   Level of Motivation/Participation Good   Discharge Recommendations   Equipment Needed After Discharge bedside commode   Discharge Facility/Level Of Care Needs home with home health   Plan   Plan Continue with current plan   Therapy Frequency 2 times/day   Occupational Therapy Follow-up   OT Follow-up? Yes   Treatment/Billable Minutes   Therapeutic Activity 31   Therapeutic Exercise 15   Total Time 46       SHAUNA Bland  8/17/2017    LEGEND:   CGA: Contact Guard Assist   EOB: Edgeof Bed   HHA: Hand Held Assist   HOB: Head of Bed   (I): Independent-patient performs task in a timely manner   Max (A): Maximal Assist-patient performs 25-49% of task   Min (A): Minimal Assist- patient performs 75% or more of task   Mod (A): Moderate Assist- patient performs 50-74% of task   NA: Not applicable   NT: Not tested   OOB: Out of Bed   PTA: Prior to admit   QC: Quad Cane   RW: Rolling Walker   (S): Supervision- patient requires cues, coaxing, prompting   SBA: Stand By Assist   SC: Straight Cane   SW: Standard Walker   TBA: To be assessed   Total (A): Total Assist- patient performs less than 25% of task   WC: Wheelchair   WFL: Within Functional Limits   WNL: Within Normal Limits

## 2017-08-17 NOTE — PROGRESS NOTES
Patient noted coughing after taking meds. Small amount of brown emesis noted. Zofran  8mg given Po. Will continue to monitor.

## 2017-08-17 NOTE — ASSESSMENT & PLAN NOTE
"Sit-->stand CGA (improved), T/F CGA (improved), gait 105' CGA/MNA RW (improved), limited by impaired trunk balance.  UBD SBA (improved), LBD MDA (improved), toileting MDA (improved) on 8/16.      Mild cognitive deficits -- in the most recent ST session:  "Pt recalled yesterday's ST session given min verbal cues to supervision with 80% acc. Pt completed mental manipulation task of F=4 with 0% acc indly, given visual aid of word bank, pt with increase to 60% acc. Verbal insight task completed with moderate verbal cues provided for awareness of current physical limitations with 60% acc. 5 step written sequencing task completed with 50% acc, given min verbal cues for attention to specific details and redirections for attention to task, pt with increase to 100% acc."    "

## 2017-08-17 NOTE — PROGRESS NOTES
Occupational Therapy  FIM SCORES    Kev Vasquez  MRN: [id number]  Room/Bed: E280/E280 B       08/17/17 1600   Transfers   Bed/Chair/WC 3   Self Care   Eating 7   Grooming 7   Bathing 3   Dressing-Upper 5   Dressing-Lower 3   Toileting 3       SHAUNA Bland  8/17/2017

## 2017-08-17 NOTE — PROGRESS NOTES
Occupational Therapy   Transfer Group    Kev Vasquez   MRN: [id number]     Patient participated in 45 minute functional transfers group. The group activity challenged bilateral upper extremity and lower extremity strengthening. In addition, cardiovascular and functional endurance were challenged during this group. Patient completed bed mobility from supine to sitting edge of mat with _SBA__ assistance. Pt required _Max_ assistance for w/c management parts. Patient completed toilet transfer with MOD_ assistance and use of (RW). Patient completed (shower and/or tub) transfer with __Max____ assistance and use of (any AE/DME).  Patient demonstrated appropriate safety awareness with functional transfers. Educated patient on compensatory and adaptive techniques for functional home transfers. Patient demonstrated increased independence with all transfers (or list specific transfers). These activities were performed in a group setting to encourage increased participation with peers and social interaction skills.           08/17/17 1115   OT Time Calculation   OT Start Time 1115   OT Stop Time 1200   OT Total Time (min) 45 min   General   OT Date of Treatment 08/17/17   Plan   Plan Continue with current plan   Occupational Therapy Follow-up   OT Follow-up? Yes   Treatment/Billable Minutes   Therapeutic Group 45   Total Time 45       SHAUNA Romero   8/17/2017

## 2017-08-17 NOTE — SUBJECTIVE & OBJECTIVE
Interval History: Pt without new c/o's.  I have reviewed the HPI and PMFSH and there are no changes from admission.        Scheduled Medications:    allopurinol  300 mg Oral Daily    amlodipine  10 mg Oral Daily    aspirin  81 mg Oral Daily    atorvastatin  10 mg Oral Daily    buPROPion  300 mg Oral Daily    calcium-vitamin D3  1 tablet Oral BID    ceFEPime (MAXIPIME) IVPB  2 g Intravenous Q12H    docusate sodium  100 mg Oral BID    dofetilide  250 mcg Oral Q12H    folic acid  1 mg Oral Daily    hydrALAZINE  75 mg Oral Q8H    lisinopril  10 mg Oral BID    magnesium oxide  400 mg Oral BID    multivitamin  1 tablet Oral Daily    ropinirole  0.5 mg Oral TID    sodium chloride 0.9%  10 mL Intravenous Q6H    sodium chloride 0.9%  3 mL Intravenous Q8H    spironolactone  25 mg Oral Daily    tamsulosin  0.4 mg Oral Daily    warfarin  4 mg Oral Every Tues, Thurs, Sat    warfarin  4 mg Oral Every Sun    warfarin  6 mg Oral Every Mon, Wed, Fri       PRN Medications: acetaminophen, bisacodyl, diphenoxylate-atropine 2.5-0.025 mg, lactulose, magnesium hydroxide 400 mg/5 ml, ondansetron, polyethylene glycol, Flushing PICC Protocol **AND** sodium chloride 0.9% **AND** sodium chloride 0.9%, sodium phosphates, trazodone    Review of Systems   Constitutional: Negative for appetite change and fatigue.   Eyes: Negative for visual disturbance.   Respiratory: Negative for shortness of breath.    Cardiovascular: Negative for chest pain.   Gastrointestinal: Negative for constipation and diarrhea.   Genitourinary: Negative for dysuria, frequency and urgency.   Musculoskeletal: Positive for gait problem. Negative for arthralgias, back pain, joint swelling, myalgias, neck pain and neck stiffness.   Neurological: Positive for tremors and weakness. Negative for dizziness, numbness and headaches.   Psychiatric/Behavioral: Negative for dysphoric mood.   All other systems reviewed and are negative.    Objective:     Vital  Signs (Most Recent):  Temp: 98.2 °F (36.8 °C) (08/17/17 0630)  Pulse: 60 (08/17/17 0630)  Resp: 16 (08/17/17 0630)  BP: (!) 86/0 (08/17/17 0630)  SpO2: 97 % (08/17/17 0630)    Vital Signs (24h Range):  Temp:  [98.2 °F (36.8 °C)-98.4 °F (36.9 °C)] 98.2 °F (36.8 °C)  Pulse:  [60-70] 60  Resp:  [16-18] 16  SpO2:  [97 %] 97 %  BP: ()/(0) 86/0     Physical Exam   Constitutional: He is oriented to person, place, and time. He appears well-nourished.   Eyes: EOM are normal. Pupils are equal, round, and reactive to light.   Neck: Normal range of motion. Neck supple.   Cardiovascular: Normal rate.    Pulmonary/Chest: Effort normal.   Musculoskeletal: Normal range of motion.   Neurological: He is oriented to person, place, and time. He has normal strength.   Skin: No rash noted.   Psychiatric: He has a normal mood and affect.     NEUROLOGICAL EXAMINATION:     MENTAL STATUS   Oriented to person, place, and time.     CRANIAL NERVES     CN III, IV, VI   Pupils are equal, round, and reactive to light.  Extraocular motions are normal.     MOTOR EXAM     Strength   Strength 5/5 throughout.

## 2017-08-17 NOTE — PROGRESS NOTES
"Speech Therapy   Treatment    Kev Vasquez   MRN: [id number]        08/17/17 1315   Speech Time Calculation   Speech Start Time 1315   Speech Stop Time 1400   Speech Total (min) 45 min   General Information   SLP Treatment Date 08/17/17   General Observations Pt seen at bedside with spouse present.   General Precautions LVAD;fall   Visual/Auditory Vision impaired  (reading glasses)   Subjective   Patient states Pt stated "I think the hardest thing to do here is to walk."   Pain/Comfort   Pain Rating no pain       SLP Cognitive Treatment   Treatment Detail (SLP Cognitive Treatment) Pt completed orientation task to time utilizing visual aid of calendar with modified independence and 100% acc. Pt recalled yesterday's ST session given min verbal cues to supervision with 80% acc. Pt completed mental manipulation task of F=4 with 0% acc indly, given visual aid of word bank, pt with increase to 60% acc. Verbal insight task completed with moderate verbal cues provided for awareness of current physical limitations with 60% acc. 5 step written sequencing task completed with 50% acc, given min verbal cues for attention to specific details and redirections for attention to task, pt with increase to 100% acc.        SLP Treatment: Verbal Expression   Treatment Detail (SLP Verbal Expression) Divergent concrete naming task completed given supervision to min verbal cues for use of word finding strategies and attention skills towards task.   Assessment   SLP Diagnosis moderate cog-ling deficits   Prognosis Fair   Problem List decreased recall; decreased problem solving, safety, and reasoning skills   Session Assessment progressing toward goals   Level of Motivation/Participation good   Discharge Recommendations   Discharge Facility/Level Of Care Needs home health speech therapy   Plan   Plan Continue with current plan   Treatment/Billable Minutes   Speech Therapy Individual 45   Total Time 45     FIM " Scores  Comprehension:6  Expression: 5  Social Interaction:5  Problem Solving:3  Memory: 3    Comprehension:  Complex= humor, finances, rationale for medical treatment(hip precautions, pressure relief)  Basic= pain, hunger, thirst, bathroom needs, cold, nutrition, sleep    Understands complex/abstract conversation/directions with extra time, assistance device(glasses, if visual mode or both; hearing aide if auditory mode or both) (6)    Expression:  Expresses basic needs or ideas 90% of the time-mild word finding deficits  (5)    Social Interaction:  Interacts appropriately 90% of time - needs monitoring or encouragement for participation or interaction  (5)    Problem Solving:  Solves basic problems 50-74% of the time  (3)    Memory:  Recognizes, recalls, or executes 50-74% of time 2 steps of 2 step request (3)    Enedina Nguyễn CCC-SLP  8/17/2017

## 2017-08-17 NOTE — PROGRESS NOTES
Physical Therapy  FIM scores    Kev Vasquez   MRN: [id number]                  08/17/17 1247   Locomotion   Distance Walked 2   Distance Wheelchair 3   Walk 4   Wheelchair 5   Mode C   Stairs 0           Ismael Liao, PT 8/17/2017

## 2017-08-17 NOTE — PROGRESS NOTES
"Physical Therapy   Treatment    Kev Vasquez   MRN: [id number]              08/17/17 0944   PT Time Calculation   PT Start Time 0944   PT Stop Time 1029   PT Total Time (min) 45 min   Treatment   Treatment Type Treatment   PT/PTA PT   General   PT Received On 08/17/17   Family/Caregiver Present Yes  (PCT present)   Patient Found (position) Seated in wheelchair;Other (comment)  (in room)   Pt found with (LVAD to power source, posey belt)   Precautions   General Precautions LVAD;fall   Cardiovascular/Pulmonary LVAD   Visual/Auditory Vision impaired;Other (see comments)  (reading glasses)   Subjective   Patient states " I've got to brush my teeth."   Pain/Comfort   Pain Rating 1 no pain   Pain Rating Post-Intervention 1 no pain   Bed Mobility   Bed Mobility no   Transfers   Transfer yes   Sit to Stand   Sit <> Stand Assistance Contact Guard Assistance   Sit <> Stand Assistive Device Rolling Walker;Other (see comments)  (parallel bars)   Trials/Comments 2 trials in parallel bars and 1 trial from w/c to RW   Stand to Sit   Assistance Contact Guard Assistance   Assistive Device Rolling Walker;Other (see comments)  (parallel bars)   Trials/Comments 2 trials in parallel bars and 1 trial from RW to w/c   Wheelchair Activities   Propulsion Yes   Propulsion Type 1 Manual   Level 1 Level tile   Method 1 Right upper extremity;Left upper extremity;Right lower extremity;Left lower extremity   Level of Assistance 1 Stand by assistsance  (pt with difficulty coordinating movement with extremities)   Description/ Details 1 200 feet   Gait   Gait Yes   Weight Bearing Status full   Gait 1   Gait Assistive Device Parallel bars   Assistance 1 Contact Guard Assistance   Gait Distance pt ambulates length of bars x 8 trials( 4 trials forward, 180 degree turn and back to w/c)   Gait Pattern swing-to gait  (occasionally reciprocal)   Gait Deviation(s) decreased william;increased time in double stance;decreased velocity of limb motion;decreased " step length;decreased stride length;decreased swing-to-stance ratio;decreased weight-shifting ability;backward lean   Impairments Contributing to Gait Deviations impaired balance;impaired coordination;impaired motor control;impaired postural control;decreased strength;other (see comments)  (decreased endurance)   Gait 2   Surface 2 Level tile   Device 2 Rolling walker   Assistance 2 Contact guard;Minimum assistance   Quality of Gait 2 decreased coordination of movement and increased backward lean as trial progresses   Comments/Distance (ft) 2 105 feet   Gait Pattern Used swing-to gait  (occasionally reciprocal)   Gait Deviations Noted other (see comments)  (as above)   Impairments Contributing to Gait Deviations other (see comments)  (as above)   Stairs   Stairs No   Balance   Balance Yes   Static Standing Balance   Static Standing-Balance Support Right upper extremity supported;Left upper extremity supported;Unilateral upper extremity supported;No upper extremity supported   Static Standing-Level of Assistance Contact guard   Static Standing-Comment/# of Minutes pt stands in parallel bars approx. 4 minutes under varying conditions of observation( one hand support, no hand support, hands on PT's shoulders, etc.)   Dynamic Standing Balance   Dynamic Standing-Balance Support Unilateral upper extremity supported;No upper extremity supported   Dynamic Standing-Balance Reaching for objects;Reaching across midline   Dynamic Standing-Comments pt stands in parallel bars approx. 2: 11 while moving rings from ring tree from L to R and R to L~ CGA provided by PT for balance and safety   Other Activities   Other Activities Other (Comment)  (nurse assisted PT with changing pt from wall power to [facility].)   Activity Tolerance   Activity Tolerance Patient tolerated treatment well   After Treatment   Patient Position After Treatment Seated in wheelchair;Other (comment)  (in room; posey belt in place)   Patient after treatment left  call button in reach   Assessment   Prognosis Fair   Problem List Decreased strength;Decreased endurance;Impaired balance;Decreased mobility;Decreased cognition;Impaired judgement;Decreased safety awareness   Session Assessment progressing toward goals   Assessment Pt continues to make slow progress in PT but remains limited by decreased coordination of movement, impaired balance in standing and decreased ability to perform fluid gait pattern.  Pt should continue to benefit from additional PT intervention in rehab environment.   Level of Motivation/Participation good   Barriers to Discharge Inaccessible home environment   Barriers to Discharge Comments steps within home   Discharge Recommendations   Equipment Needed After Discharge 3-in-1 commode   Discharge Facility/Level Of Care Needs home health PT   Plan   Planned Therapy Intervention Continue with current plan;Bed mobility training;Transfer training;Gait training;Balance Training;ROM   Therapy Frequency 2 times/day;daily;Monday-Friday;Saturday or Sunday   Physical Therapy Follow-up   PT Follow-up? Yes   PT - Next Visit Date 08/18/17   Treatment/Billable Minutes   Gait training 22   Therapeutic Activity 13   Train/Wheelchair Management 10   Total Time 45             Ismael Liao, PT 8/17/2017

## 2017-08-18 PROCEDURE — 97530 THERAPEUTIC ACTIVITIES: CPT

## 2017-08-18 PROCEDURE — 97110 THERAPEUTIC EXERCISES: CPT

## 2017-08-18 PROCEDURE — 63600175 PHARM REV CODE 636 W HCPCS: Performed by: PHYSICIAN ASSISTANT

## 2017-08-18 PROCEDURE — 92507 TX SP LANG VOICE COMM INDIV: CPT

## 2017-08-18 PROCEDURE — 99232 SBSQ HOSP IP/OBS MODERATE 35: CPT | Mod: ,,, | Performed by: PHYSICAL MEDICINE & REHABILITATION

## 2017-08-18 PROCEDURE — 25000003 PHARM REV CODE 250: Performed by: PHYSICAL MEDICINE & REHABILITATION

## 2017-08-18 PROCEDURE — 97802 MEDICAL NUTRITION INDIV IN: CPT

## 2017-08-18 PROCEDURE — 97150 GROUP THERAPEUTIC PROCEDURES: CPT

## 2017-08-18 PROCEDURE — 97116 GAIT TRAINING THERAPY: CPT

## 2017-08-18 PROCEDURE — A4216 STERILE WATER/SALINE, 10 ML: HCPCS | Performed by: PHYSICIAN ASSISTANT

## 2017-08-18 PROCEDURE — 25000003 PHARM REV CODE 250: Performed by: PHYSICIAN ASSISTANT

## 2017-08-18 PROCEDURE — 12800000 HC REHAB SEMI-PRIVATE ROOM

## 2017-08-18 RX ADMIN — ROPINIROLE 0.5 MG: 0.5 TABLET, FILM COATED ORAL at 09:08

## 2017-08-18 RX ADMIN — Medication 10 ML: at 12:08

## 2017-08-18 RX ADMIN — Medication 3 ML: at 10:08

## 2017-08-18 RX ADMIN — DOCUSATE SODIUM 100 MG: 100 CAPSULE, LIQUID FILLED ORAL at 08:08

## 2017-08-18 RX ADMIN — DOFETILIDE 250 MCG: 0.12 CAPSULE ORAL at 09:08

## 2017-08-18 RX ADMIN — Medication 400 MG: at 09:08

## 2017-08-18 RX ADMIN — LISINOPRIL 10 MG: 10 TABLET ORAL at 09:08

## 2017-08-18 RX ADMIN — WARFARIN SODIUM 6 MG: 6 TABLET ORAL at 05:08

## 2017-08-18 RX ADMIN — ASPIRIN 81 MG CHEWABLE TABLET 81 MG: 81 TABLET CHEWABLE at 08:08

## 2017-08-18 RX ADMIN — ATORVASTATIN CALCIUM 10 MG: 10 TABLET, FILM COATED ORAL at 08:08

## 2017-08-18 RX ADMIN — BUPROPION HYDROCHLORIDE 300 MG: 150 TABLET, EXTENDED RELEASE ORAL at 08:08

## 2017-08-18 RX ADMIN — OYSTER SHELL CALCIUM WITH VITAMIN D 1 TABLET: 500; 200 TABLET, FILM COATED ORAL at 08:08

## 2017-08-18 RX ADMIN — FOLIC ACID 1 MG: 1 TABLET ORAL at 08:08

## 2017-08-18 RX ADMIN — ROPINIROLE 0.5 MG: 0.5 TABLET, FILM COATED ORAL at 06:08

## 2017-08-18 RX ADMIN — HYDRALAZINE HYDROCHLORIDE 75 MG: 50 TABLET ORAL at 09:08

## 2017-08-18 RX ADMIN — TRAZODONE HYDROCHLORIDE 50 MG: 50 TABLET ORAL at 09:08

## 2017-08-18 RX ADMIN — ALLOPURINOL 300 MG: 100 TABLET ORAL at 08:08

## 2017-08-18 RX ADMIN — OYSTER SHELL CALCIUM WITH VITAMIN D 1 TABLET: 500; 200 TABLET, FILM COATED ORAL at 09:08

## 2017-08-18 RX ADMIN — DOCUSATE SODIUM 100 MG: 100 CAPSULE, LIQUID FILLED ORAL at 09:08

## 2017-08-18 RX ADMIN — CEFEPIME HYDROCHLORIDE 2 G: 2 INJECTION, SOLUTION INTRAVENOUS at 03:08

## 2017-08-18 RX ADMIN — DOFETILIDE 250 MCG: 0.12 CAPSULE ORAL at 12:08

## 2017-08-18 RX ADMIN — AMLODIPINE BESYLATE 10 MG: 10 TABLET ORAL at 08:08

## 2017-08-18 RX ADMIN — Medication 400 MG: at 08:08

## 2017-08-18 RX ADMIN — CEFEPIME HYDROCHLORIDE 2 G: 2 INJECTION, SOLUTION INTRAVENOUS at 02:08

## 2017-08-18 RX ADMIN — HYDRALAZINE HYDROCHLORIDE 75 MG: 50 TABLET ORAL at 06:08

## 2017-08-18 RX ADMIN — ROPINIROLE 0.5 MG: 0.5 TABLET, FILM COATED ORAL at 12:08

## 2017-08-18 RX ADMIN — THERA TABS 1 TABLET: TAB at 08:08

## 2017-08-18 RX ADMIN — SPIRONOLACTONE 25 MG: 25 TABLET, FILM COATED ORAL at 08:08

## 2017-08-18 RX ADMIN — TAMSULOSIN HYDROCHLORIDE 0.4 MG: 0.4 CAPSULE ORAL at 08:08

## 2017-08-18 RX ADMIN — LISINOPRIL 10 MG: 10 TABLET ORAL at 08:08

## 2017-08-18 RX ADMIN — HYDRALAZINE HYDROCHLORIDE 75 MG: 50 TABLET ORAL at 12:08

## 2017-08-18 NOTE — PROGRESS NOTES
[facility]  Adult Nutrition  Progress Note    SUMMARY     Recommendations    Recommendation/Intervention: Continue 2 gm Na, DC double portions  Goals: Po intake to meet 85% of EEN  Nutrition Goal Status: progressing towards goal  Communication of RD Recs: discussed on rounds      Reason for Assessment  Reason for Assessment: RD follow-up  Diagnosis:  (gait instability)  Relevent Medical History:  (CHF, CAD, HTN,LVAD complications)   Interdisciplinary Rounds: attended     General Information Comments: wife suggests pt not eating well enough to merit double portions    Nutrition Discharge Planning: DC on low sodium diet    Nutrition Prescription Ordered    Current Diet Order: 2 gm Na         Oral Nutrition Supplement: pt refuses     Evaluation of Received Nutrients/Fluid Intake           Energy Calories Required: not meeting needs         Protein Required: meeting needs              Fluid Required: meeting needs     Comments:  (no longer on fluid restriction but he drinks lots of fluid)  Tolerance: tolerating     % Intake of Estimated Energy Needs: 75 - 100 %  % Meal Intake: 75%     Nutrition Risk Screen     Nutrition Risk Screen: no indicators present    Nutrition/Diet History    Patient Reported Diet/Restrictions/Preferences: general         Labs/Tests/Procedures/Meds  Pertinent Labs Reviewed: other (see comments)     Pertinent Medications Reviewed: reviewed, pertinent     Physical Findings  Overall Physical Appearance: loss of muscle mass   Oral/Mouth Cavity: WDL  Skin: intact    Anthropometrics     Height: 6' (182.9 cm)   Weight: 78.9 kg (173 lb 15.1 oz)  Ideal Body Weight (IBW), Male: 178 lb   % Ideal Body Weight, Male (lb): 97.72 lb   BMI (Calculated): 23.6   Weight Loss: unintentional          Estimated/Assessed Needs    Weight Used For Calorie Calculations: 74.3 kg (163 lb 12.8 oz)      Energy Calorie Requirements (kcal): 1900  Energy Need Method: [place]-St Jeor         Weight Used For  Protein Calculations: 74.3 kg (163 lb 12.8 oz)  Protein Requirements: 75-90g       Fluid Need Method: RDA Method    RDA Method (mL): 1900         Assessment and Plan    Inadequate oral food and beverage intake related to poor appetite and dislike of special diet food as evidenced by weight loss of 33% and po of 50-75% with refusal of ONS    Monitor and Evaluation    Food and Nutrient Intake: energy intake  Food and Nutrient Adminstration: diet order   Physical Activity and Function: nutrition-related ADLs and IADLs   Biochemical Data, Medical Tests and Procedures: electrolyte and renal panel, gastrointestinal profile, glucose/endocrine profile  Nutrition-Focused Physical Findings: overall appearance    Nutrition Risk  Level of Risk:  (follow weekly)  Nutrition Follow-Up  RD Follow-up?: Yes

## 2017-08-18 NOTE — PROGRESS NOTES
"Occupational Therapy  AM Treatment  Kev Vasquez   MRN: [id number]   Room/Bed: E280/E280 B       08/18/17 1013   OT Time Calculation   OT Start Time 1013   OT Stop Time 1113   OT Total Time (min) 60 min   General   OT Date of Treatment 08/18/17   Family/Caregiver Present No   Patient Found (position) Seated in wheelchair   Precautions   General Precautions fall;LVAD   Cardiovascular/Pulmonary LVAD   Subjective   Patient states "I'm bored."   Pain/Comfort   Pain Rating no pain   Sit to Stand   Sit <> Stand Assistance Moderate Assistance   Sit <> Stand Assistive Device No Assistive Device   Trials/Comments from wc   Stand to Sit   Assistance Moderate Assistance   Assistive Device No Assistive Device   Trials/Comments to    Exercise Tools   Rickshaw 20# x 100 reps   Bilateral Boo 4# x 100 reps on incline   Theraputty Blue: putty and pegs to strengthen B hands in prep for removing and replacing battery cords to wall unit of LVAD   Other Exercise Tool 1 unigym: chest press 20# x 100 reps   Activity Tolerance   Activity Tolerance Patient tolerated treatment well   After Treatment   Patient Position After Treatment Seated in wheelchair   Patient after treatment left call button in reach  (posey belt intact)   Assessment   Prognosis Good   Problem List Decreased Self Care skills;Decreased upper extremity range of motion;Decreased upper extremity strength;Decreased safe judgment during ADL;Decreased cognition;Decreased endurance;Decreased fine motor control;Decreased functional mobility;Decreased gross motor control;Decreased IADLs;Decreased Function of right upper extremity;Decreased Function of left upper extremity;Decreased trunk control for functional activities   Assessment Pt participated well in tx session but remains with decreased balance and decreased (I) with ADLs. Pt would continue to benefit from skilled OT services.   Level of Motivation/Participation Good   Discharge Recommendations   Equipment Needed After " Discharge bedside commode   Discharge Facility/Level Of Care Needs home with home health   Plan   Plan Continue with current plan   Therapy Frequency 2 times/day   Occupational Therapy Follow-up   OT Follow-up? Yes   Treatment/Billable Minutes   Therapeutic Activity 20   Therapeutic Exercise 40   Total Time 60       SHAUNA Bland  8/18/2017    LEGEND:   CGA: Contact Guard Assist   EOB: Edge[place] Bed   HHA: Hand Held Assist   HOB: Head of Bed   (I): Independent-patient performs task in a timely manner   Max (A): Maximal Assist-patient performs 25-49% of task   Min (A): Minimal Assist- patient performs 75% or more of task   Mod (A): Moderate Assist- patient performs 50-74% of task   NA: Not applicable   NT: Not tested   OOB: Out of Bed   PTA: Prior to admit   QC: Quad Cane   RW: Rolling Walker   (S): Supervision- patient requires cues, coaxing, prompting   SBA: Stand By Assist   SC: Straight Cane   SW: Standard Walker   TBA: To be assessed   Total (A): Total Assist- patient performs less than 25% of task   WC: Wheelchair   WFL: Within Functional Limits   WNL: Within Normal Limits

## 2017-08-18 NOTE — PROGRESS NOTES
"Physical Therapy   Treatment    Kev Vasquez   MRN: [id number]      08/18/17 1601   PT Time Calculation   PT Start Time 1601   PT Stop Time 1646   PT Total Time (min) 45 min   Treatment   Treatment Type Treatment   PT/PTA PT   General   PT Received On 08/18/17   Missed Time Reason Not applicable   Family/Caregiver Present Yes   Patient Found (position) Seated in wheelchair   Precautions   General Precautions LVAD;fall   Orthopedic No   Required Braces or Orthoses No   Cardiovascular/Pulmonary LVAD   Visual/Auditory Vision impaired   Subjective   Patient states "What are we going to do?"   Pain/Comfort   Pain Rating 1 no pain   Transfers   Transfer yes   Sit to Stand   Sit <> Stand Assistance Stand By Assistance   Sit <> Stand Assistive Device Rolling Walker   Trials/Comments x 6 trials   Stand to Sit   Assistance Stand By Assistance   Assistive Device Rolling Walker   Trials/Comments x 6 trials    Wheelchair Activities   Propulsion Yes   Propulsion Type 1 Manual   Level 1 Level tile   Method 1 Right upper extremity;Left upper extremity   Level of Assistance 1 Supervision   Description/ Details 1 150ft   Gait   Gait Yes   Weight Bearing Status FWB   Gait 1   Surface 1 Level tile   Gait Assistive Device Rolling walker   Assistance 1 Minimum assistance   Gait Distance 61ft, 117ft, 100ft with rest breaks between trials due to fatigue   Gait Pattern swing-through gait   Gait Deviation(s) decreased william;increased time in double stance;decreased velocity of limb motion;decreased stride length;decreased step length   Impairments Contributing to Gait Deviations impaired balance;impaired coordination;decreased strength;impaired postural control   Activity Tolerance   Activity Tolerance Patient tolerated treatment well   After Treatment   Patient Position After Treatment Seated in wheelchair   Patient after treatment left call button in reach  (safety belt in place)   Assessment   Prognosis Fair   Problem List Decreased " strength;Impaired balance;Decreased mobility;Decreased endurance;Decreased coordination;Decreased cognition;Decreased safety awareness   Session Assessment progressing toward goals   Assessment Pt continues to have issues with maintaining balance due to posterior lean. Pt remains Min/CGA for gait due to balance deficits. Pt continues to require cueing for safety during transfers, especially for control of descent during stand > sit and turning with RW. Pt remains an appropriate rehab candidate. Continue PT POC.   Level of Motivation/Participation good   Barriers to Discharge Inaccessible home environment   Barriers to Discharge Comments steps within home   Discharge Recommendations   Equipment Needed After Discharge none   Discharge Facility/Level Of Care Needs home health PT   Plan   Planned Therapy Intervention Continue with current plan   Therapy Frequency 2 times/day;Monday-Friday;Saturday or Sunday   Physical Therapy Follow-up   PT Follow-up? Yes   PT - Next Visit Date 08/19/17   Treatment/Billable Minutes   Gait training 25   Therapeutic Activity 10   Train/Wheelchair Management 10   Total Time 45   Georgette Early DPT  8/18/2017

## 2017-08-18 NOTE — PROGRESS NOTES
Physical Therapy  Volleyball Group Treatment    Kev Vasquez   MRN: [id number]        08/18/17 1330   PT Time Calculation   PT Start Time 1330   PT Stop Time 1415   PT Total Time (min) 45 min   Treatment   Treatment Type Treatment   PT/PTA PT   General   PT Received On 08/18/17   Missed Time Reason Not applicable   Family/Caregiver Present Yes   Patient Found (position) Seated in wheelchair   Precautions   General Precautions fall;LVAD   Orthopedic No   Required Braces or Orthoses No   Cardiovascular/Pulmonary LVAD   Visual/Auditory Vision impaired   Subjective   Patient states Pt agreeable to group treatment   Pain/Comfort   Pain Rating 1 no pain   Activity Tolerance   Activity Tolerance Patient tolerated treatment well   Other Comments   Comments Patient participated in 45 minute volleyball group activity.  The group activity challenged dynamic standing/sitting skills, bilateral upper extremity strengthening, cardiovascular and respiratory capacity, hand -eye coordination, visual scanning and social affect/mood.  The patient performed this activity at w/c level with SPV. The patient required 0 rest breaks during this activity.  Patient performed activity using bilateral upper extremities to volley the ball, lateral arm movement noted throughout the activity.  Endurance improved, no pain complaints voiced are observed , social affect good as patient was observed throughout this activity ie., appropriately engaged in social exchange with peers and staff.     After Treatment   Patient Position After Treatment Seated in wheelchair   Patient after treatment left call button in reach  (safety belt in place)   Plan   Planned Therapy Intervention Continue with current plan   Therapy Frequency 2 times/day;Monday-Friday;Saturday or Sunday   Physical Therapy Follow-up   PT Follow-up? Yes   PT - Next Visit Date 08/19/17   Treatment/Billable Minutes   Therapeutic Activity Group 45   Total Time 45   Georgette Early  DPT  8/18/2017

## 2017-08-18 NOTE — PROGRESS NOTES
Physical Therapy   Care Plan/FIM    Kev Vasquez   MRN: [id number]        08/18/17 1600   Locomotion   Distance Walked 2   Distance Wheelchair 3   Walk 2   Wheelchair 5   Mode C   Georgette Early DPT  8/18/2017

## 2017-08-18 NOTE — PROGRESS NOTES
Pt discussed during treatment team staffing.  Expected discharge date is 8/24/17.  Pt and spouse informed and agree with discharge date.

## 2017-08-18 NOTE — ASSESSMENT & PLAN NOTE
"Sit-->stand CGA (improved), T/F CGA (improved), gait 105' CGA/MNA RW (improved), limited by impaired trunk balance.  UBD SBA (improved), LBD MDA (improved), toileting MDA (improved) on 8/16.      Mild cognitive deficits -- in the most recent ST session:  "Pt completed orientation task to time utilizing visual aid with supervision and 90% acc. Pt completed mental manipulation task of F=3 according to progression with 80% acc indly, given minimal verbal cues with repetitions of information, pt with increase to 90% acc. Word deduction task completed of thought organization with 50% acc indly, given moderate verbal cues for reasoning pt with increase to 100% acc."    "

## 2017-08-18 NOTE — PLAN OF CARE
Problem: Patient Care Overview  Goal: Plan of Care Review  Outcome: Ongoing (interventions implemented as appropriate)  Plan of care reviewed.    Problem: Infection, Risk/Actual (Adult)  Goal: Infection Prevention/Resolution  Patient will demonstrate the desired outcomes by discharge/transition of care.   Outcome: Ongoing (interventions implemented as appropriate)  IV antibiotic q 12 hours. Driveline site still with signs of infection.    Problem: Fall Risk (Adult)  Goal: Absence of Falls  Patient will demonstrate the desired outcomes by discharge/transition of care.   Outcome: Ongoing (interventions implemented as appropriate)  Remained free from falls, trauma. Bed and WC alarms used. Called for assistance as needed.

## 2017-08-18 NOTE — PROGRESS NOTES
[facility]  Physical Medicine & Rehab  Progress Note    Patient Name: Kev Vasquez  MRN: [id number]  Patient Class: IP- Rehab   Admission Date: 8/8/2017  Length of Stay: 10 days  Attending Physician: Rashad Garcia MD  Primary Care Provider: Mega Collins MD    Subjective:     Principal Problem:Gait instability    Interval History: Pt without new c/o's.  I have reviewed the HPI and PMFSH and there are no changes from admission.        Scheduled Medications:    allopurinol  300 mg Oral Daily    amlodipine  10 mg Oral Daily    aspirin  81 mg Oral Daily    atorvastatin  10 mg Oral Daily    buPROPion  300 mg Oral Daily    calcium-vitamin D3  1 tablet Oral BID    ceFEPime (MAXIPIME) IVPB  2 g Intravenous Q12H    docusate sodium  100 mg Oral BID    dofetilide  250 mcg Oral Q12H    folic acid  1 mg Oral Daily    hydrALAZINE  75 mg Oral Q8H    lisinopril  10 mg Oral BID    magnesium oxide  400 mg Oral BID    multivitamin  1 tablet Oral Daily    ropinirole  0.5 mg Oral TID    sodium chloride 0.9%  10 mL Intravenous Q6H    sodium chloride 0.9%  3 mL Intravenous Q8H    spironolactone  25 mg Oral Daily    tamsulosin  0.4 mg Oral Daily    warfarin  4 mg Oral Every Tues, Thurs, Sat    warfarin  4 mg Oral Every Sun    warfarin  6 mg Oral Every Mon, Wed, Fri       PRN Medications: acetaminophen, bisacodyl, diphenoxylate-atropine 2.5-0.025 mg, lactulose, magnesium hydroxide 400 mg/5 ml, ondansetron, polyethylene glycol, Flushing PICC Protocol **AND** sodium chloride 0.9% **AND** sodium chloride 0.9%, sodium phosphates, trazodone    Review of Systems   Constitutional: Negative for appetite change and fatigue.   Eyes: Negative for visual disturbance.   Respiratory: Negative for shortness of breath.    Cardiovascular: Negative for chest pain.   Gastrointestinal: Negative for constipation and diarrhea.   Genitourinary: Negative for dysuria, frequency and urgency.   Musculoskeletal:  Positive for gait problem. Negative for arthralgias, back pain, joint swelling, myalgias, neck pain and neck stiffness.   Neurological: Positive for tremors and weakness. Negative for dizziness, numbness and headaches.   Psychiatric/Behavioral: Negative for dysphoric mood.   All other systems reviewed and are negative.    Objective:     Vital Signs (Most Recent):  Temp: 98.3 °F (36.8 °C) (08/18/17 0637)  Pulse: 66 (08/18/17 0637)  Resp: 16 (08/18/17 0637)  BP: (!) 90/0 (08/18/17 0637)  SpO2: 97 % (08/18/17 0637)    Vital Signs (24h Range):  Temp:  [98.3 °F (36.8 °C)-98.7 °F (37.1 °C)] 98.3 °F (36.8 °C)  Pulse:  [62-66] 66  Resp:  [16-18] 16  SpO2:  [96 %-97 %] 97 %  BP: ()/(0) 90/0     Physical Exam   Constitutional: He is oriented to person, place, and time. He appears well-nourished.   Eyes: EOM are normal. Pupils are equal, round, and reactive to light.   Neck: Normal range of motion. Neck supple.   Cardiovascular: Normal rate.    Pulmonary/Chest: Effort normal.   Musculoskeletal: Normal range of motion.   Neurological: He is oriented to person, place, and time. He has normal strength.   Skin: No rash noted.   Psychiatric: He has a normal mood and affect.     NEUROLOGICAL EXAMINATION:     MENTAL STATUS   Oriented to person, place, and time.     CRANIAL NERVES     CN III, IV, VI   Pupils are equal, round, and reactive to light.  Extraocular motions are normal.     MOTOR EXAM     Strength   Strength 5/5 throughout.       Assessment/Plan:      Kev Vasquez is a 76 y.o. male admitted to inpatient rehabilitation on 8/8/2017 for Gait instability with impaired mobility and ADLs. Patient remains appropriate for PT, OT, and as required Speech therapy. Patient continues to require 24 hour nursing care as well as daily Physician assessment.    * Gait instability    Sit-->stand CGA (improved), T/F CGA (improved), gait 105' CGA/MNA RW (improved), limited by impaired trunk balance.  UBD SBA (improved), LBD MDA  "(improved), toileting MDA (improved) on 8/16.      Mild cognitive deficits -- in the most recent ST session:  "Pt completed orientation task to time utilizing visual aid with supervision and 90% acc. Pt completed mental manipulation task of F=3 according to progression with 80% acc indly, given minimal verbal cues with repetitions of information, pt with increase to 90% acc. Word deduction task completed of thought organization with 50% acc indly, given moderate verbal cues for reasoning pt with increase to 100% acc."          Chronic systolic heart failure    Compensated with LVAD    8/8:   INR 2.7  8/10: INR 2.5 -- check 8/14 8/14: INR 2.6  8/17: INR 2.5        Stage 2 chronic kidney disease    Stable    8/14:  CRT 1.7 -- d/c fluid restriction  8/17:  CRT 1.9 -- encourage fluids to 1600cc/day        Complication involving left ventricular assist device (LVAD)    Still with some drainage -- monitor.  On cefepime through 9/23.               DISCHARGE PLANNING:  Tentative Discharge Date: 8/24/2017    Future Appointments  Date Time Provider Department Center   8/23/2017 12:00 PM LAB, APPOINTMENT [facility] LAB VNP [facility]   8/23/2017 1:30 PM HEARTTRANSPLANT, LVADN [facility] HEARTTX [facility]   8/23/2017 1:30 PM Carlito Santana MD [facility] HEARTTX [facility]   9/12/2017 2:30 PM Payam Muhammad MD [facility] ID [facility]   10/26/2017 8:00 AM HOME MONITOR DEVICE CHECK, [facility] ARRHYTH [facility] Garcia MD  Department of Physical Medicine & Rehab   [facility]  "

## 2017-08-18 NOTE — TREATMENT PLAN
"Rehab Services' DME recommendations  DME to be delivered home unless otherwise specified.         [x] NO DME NEEDED ________________________________________________________________________    []Walker:(can only select one of these)  []Adult (5'4"-6'6") []Ariel (4'4"-5'7")                           [] Wide/ Heavy Duty         []Nigel                  []  Rollator                                                 [] knee walker       Accessories/Other:____________________________________     Wheels:(must complete) [] Yes  [] No     []Crutches:  []Axillary []Loft strand    []Cane:              []Straight []Narrow based quad   []Wide based quad         [] Other:____________________________________________    [] Transfer Devices:   []Asha Lift    []Slide Board ( [] with cut out  []26  []29)    ______________________________________________________________________    []Wheelchair: (please check)    Number of hours up in wheelchair per     day:_____________     Style:  [] Standard [] Pediatric  [] Light weight  []Reclining     []Amputee [] Nigel [] POV []Custom     Custom Vendor:________________________________________                                                      Seat Width: []18 (standard adult)    []16" (small adult)   []14(child)      [] 20  [] 22 [] 24         Seat Depth:   []16    []18  []20      Back Height:    []Standard      []Nigel          []Other     Leg Support: []Standard []Heel loops []Elevating leg rest    []Articulating              []Swing Away     Arm Height:  []Detachable []Full   [] Desk  []Swing Away [] Adjustable Height          Lap Belt: []Velcro []Buckle                               Accessories: [] Front brakes          [] Brake Extensions  []Anti-tippers                          []Safety Belt    Other:_______________________________________________     Cushion: []Basic []Foam []Gel  []Roho []Jv I      Justification for Cushion(Must " complete):________________________________    ______________________________________________________________________        For wheelchair order: (please choose all that apply)  - Caregiver is capable and willing to operate wheelchair safely. []  - Patients upper body strength is sufficient for propulsion. []   - The patient has a cast, brace, or musculoskeletal condition which prevents 90 degree flexion of the knee. []  - The patient has significant edema of the lower extremities that requires elevating leg rests.  []  - A reclining back is ordered. []  - The patient requires the use of a wheel chair for ADLs within the home. []  - Patient mobility limitations cannot be sufficiently resolved by the use of other ambulatory therapies. []         __    []3 in 1 commode: []Standard     []Wide-Heavy Duty           []Drop arm                                      []Heavy Duty Drop Arm                              []Tub bench:  []Padded      [](Unpadded=standard)     []Commode Opening                                          [] Heavy Duty    []Shower Chair: []With back   []Without back      []Hospital Bed: []Semi Electric    []Manual    []Electric   []Heavy Duty  []Extra Heavy Duty(>600#)  Patient requires a bed height different than a fixed height hospital bed to permit transfers to chair, wheel chair or standing. []Yes         []N/A     []Accessories: [] Gel Overlay Mattress     []Low Air Mattress   []Alternating Pressure Pad                              []Trapeze    [] Hip Kit:  [] Short Horn     [] Long Horn         []Other:________________________________________________________________    ________________________________________________________________________    [x]Home Health:  [x]OT [x]PT [x]SLP   [] MSW   [] Aide    []SN        []Outpatient:  []OT []PT []SLP [] MSW   [] CM      [] Internal Referral      [] External Referral  ________________________________________________________________________    [facility]  Nneka, PT 8/18/2017

## 2017-08-18 NOTE — PROGRESS NOTES
Occupational Therapy  [facility]    Kev Vasquez  MRN: [id number]  Room/Bed: E280/E280 B       08/18/17 1300   Transfers   Bed/Chair/WC 3   Self Care   Eating 7   Grooming 7   Bathing 3   Dressing-Upper 5   Dressing-Lower 3   Toileting 3       SHAUNA Bland  8/18/2017

## 2017-08-18 NOTE — PROGRESS NOTES
"Speech Therapy   Treatment    Kev Vasquez   MRN: [id number]        08/18/17 1115   Speech Time Calculation   Speech Start Time 1115   Speech Stop Time 1200   Speech Total (min) 45 min   General Information   SLP Treatment Date 08/18/17   General Observations pt seen individually in St office while sitting upright in w/c.    General Precautions fall;LVAD   Visual/Auditory Vision impaired  (reading glasses)   Subjective   Patient states Pt stated 'I was ticked this morning. maybe thats why today is difficult."   Pain/Comfort   Pain Rating no pain       SLP Cognitive Treatment   Treatment Detail (SLP Cognitive Treatment) Pt completed reasoning and safety task regarding progress so far within IP rehab with pt verbalizing "I don't think I can get better." Max coaxing and encouragement provided for continued efforts of therapy program. Pt completed orientation task to time utilizing visual aid with supervision and 90% acc. Pt completed mental manipulation task of F=3 according to progression with 80% acc indly, given minimal verbal cues with repetitions of information, pt with increase to 90% acc. Word deduction task completed of thought organization with 50% acc indly, given moderate verbal cues for reasoning pt with increase to 100% acc. Functional recall task of paragraph read aloud from SLP completed with 0% acc, pt given max verbal cues with repetitions; however, cues not successful for increase in accuracy for this targeted task.    Assessment   SLP Diagnosis moderate cog-ling deficits   Prognosis Fair   Problem List decreased recall; fluctuating orientation skills; poor problem solving skills; decreased reasoing and safety skills   Session Assessment progressing toward goals   Level of Motivation/Participation good   Discharge Recommendations   Discharge Facility/Level Of Care Needs home health speech therapy   Plan   Plan Continue with current plan   Treatment/Billable Minutes   Speech Therapy Individual 45   Total " Time 45     FIM Scores  Comprehension:6  Expression: 5  Social Interaction:5  Problem Solving:3  Memory: 3     Comprehension:  Complex= humor, finances, rationale for medical treatment(hip precautions, pressure relief)  Basic= pain, hunger, thirst, bathroom needs, cold, nutrition, sleep     Understands complex/abstract conversation/directions with extra time, assistance device(glasses, if visual mode or both; hearing aide if auditory mode or both) (6)     Expression:  Expresses basic needs or ideas 90% of the time-mild word finding deficits  (5)     Social Interaction:  Interacts appropriately 90% of time - needs monitoring or encouragement for participation or interaction  (5)     Problem Solving:  Solves basic problems 50-74% of the time  (3)     Memory:  Recognizes, recalls, or executes 50-74% of time 2 steps of 2 step request (3)    Enedina Nguyễn CCC-SLP  8/18/2017

## 2017-08-18 NOTE — SUBJECTIVE & OBJECTIVE
Interval History: Pt without new c/o's.  I have reviewed the HPI and PMFSH and there are no changes from admission.        Scheduled Medications:    allopurinol  300 mg Oral Daily    amlodipine  10 mg Oral Daily    aspirin  81 mg Oral Daily    atorvastatin  10 mg Oral Daily    buPROPion  300 mg Oral Daily    calcium-vitamin D3  1 tablet Oral BID    ceFEPime (MAXIPIME) IVPB  2 g Intravenous Q12H    docusate sodium  100 mg Oral BID    dofetilide  250 mcg Oral Q12H    folic acid  1 mg Oral Daily    hydrALAZINE  75 mg Oral Q8H    lisinopril  10 mg Oral BID    magnesium oxide  400 mg Oral BID    multivitamin  1 tablet Oral Daily    ropinirole  0.5 mg Oral TID    sodium chloride 0.9%  10 mL Intravenous Q6H    sodium chloride 0.9%  3 mL Intravenous Q8H    spironolactone  25 mg Oral Daily    tamsulosin  0.4 mg Oral Daily    warfarin  4 mg Oral Every Tues, Thurs, Sat    warfarin  4 mg Oral Every Sun    warfarin  6 mg Oral Every Mon, Wed, Fri       PRN Medications: acetaminophen, bisacodyl, diphenoxylate-atropine 2.5-0.025 mg, lactulose, magnesium hydroxide 400 mg/5 ml, ondansetron, polyethylene glycol, Flushing PICC Protocol **AND** sodium chloride 0.9% **AND** sodium chloride 0.9%, sodium phosphates, trazodone    Review of Systems   Constitutional: Negative for appetite change and fatigue.   Eyes: Negative for visual disturbance.   Respiratory: Negative for shortness of breath.    Cardiovascular: Negative for chest pain.   Gastrointestinal: Negative for constipation and diarrhea.   Genitourinary: Negative for dysuria, frequency and urgency.   Musculoskeletal: Positive for gait problem. Negative for arthralgias, back pain, joint swelling, myalgias, neck pain and neck stiffness.   Neurological: Positive for tremors and weakness. Negative for dizziness, numbness and headaches.   Psychiatric/Behavioral: Negative for dysphoric mood.   All other systems reviewed and are negative.    Objective:     Vital  Signs (Most Recent):  Temp: 98.3 °F (36.8 °C) (08/18/17 0637)  Pulse: 66 (08/18/17 0637)  Resp: 16 (08/18/17 0637)  BP: (!) 90/0 (08/18/17 0637)  SpO2: 97 % (08/18/17 0637)    Vital Signs (24h Range):  Temp:  [98.3 °F (36.8 °C)-98.7 °F (37.1 °C)] 98.3 °F (36.8 °C)  Pulse:  [62-66] 66  Resp:  [16-18] 16  SpO2:  [96 %-97 %] 97 %  BP: ()/(0) 90/0     Physical Exam   Constitutional: He is oriented to person, place, and time. He appears well-nourished.   Eyes: EOM are normal. Pupils are equal, round, and reactive to light.   Neck: Normal range of motion. Neck supple.   Cardiovascular: Normal rate.    Pulmonary/Chest: Effort normal.   Musculoskeletal: Normal range of motion.   Neurological: He is oriented to person, place, and time. He has normal strength.   Skin: No rash noted.   Psychiatric: He has a normal mood and affect.     NEUROLOGICAL EXAMINATION:     MENTAL STATUS   Oriented to person, place, and time.     CRANIAL NERVES     CN III, IV, VI   Pupils are equal, round, and reactive to light.  Extraocular motions are normal.     MOTOR EXAM     Strength   Strength 5/5 throughout.

## 2017-08-19 PROCEDURE — 25000003 PHARM REV CODE 250: Performed by: PHYSICAL MEDICINE & REHABILITATION

## 2017-08-19 PROCEDURE — 99233 SBSQ HOSP IP/OBS HIGH 50: CPT | Mod: ,,, | Performed by: PHYSICAL MEDICINE & REHABILITATION

## 2017-08-19 PROCEDURE — 63600175 PHARM REV CODE 636 W HCPCS: Performed by: PHYSICIAN ASSISTANT

## 2017-08-19 PROCEDURE — 12800000 HC REHAB SEMI-PRIVATE ROOM

## 2017-08-19 PROCEDURE — A4216 STERILE WATER/SALINE, 10 ML: HCPCS | Performed by: PHYSICIAN ASSISTANT

## 2017-08-19 PROCEDURE — 25000003 PHARM REV CODE 250: Performed by: PHYSICIAN ASSISTANT

## 2017-08-19 RX ADMIN — ATORVASTATIN CALCIUM 10 MG: 10 TABLET, FILM COATED ORAL at 08:08

## 2017-08-19 RX ADMIN — DOFETILIDE 250 MCG: 0.12 CAPSULE ORAL at 08:08

## 2017-08-19 RX ADMIN — DOCUSATE SODIUM 100 MG: 100 CAPSULE, LIQUID FILLED ORAL at 08:08

## 2017-08-19 RX ADMIN — BUPROPION HYDROCHLORIDE 300 MG: 150 TABLET, EXTENDED RELEASE ORAL at 08:08

## 2017-08-19 RX ADMIN — SPIRONOLACTONE 25 MG: 25 TABLET, FILM COATED ORAL at 08:08

## 2017-08-19 RX ADMIN — Medication 3 ML: at 06:08

## 2017-08-19 RX ADMIN — Medication 400 MG: at 08:08

## 2017-08-19 RX ADMIN — ROPINIROLE 0.5 MG: 0.5 TABLET, FILM COATED ORAL at 01:08

## 2017-08-19 RX ADMIN — Medication 10 ML: at 06:08

## 2017-08-19 RX ADMIN — OYSTER SHELL CALCIUM WITH VITAMIN D 1 TABLET: 500; 200 TABLET, FILM COATED ORAL at 08:08

## 2017-08-19 RX ADMIN — HYDRALAZINE HYDROCHLORIDE 75 MG: 50 TABLET ORAL at 05:08

## 2017-08-19 RX ADMIN — Medication 10 ML: at 12:08

## 2017-08-19 RX ADMIN — Medication 400 MG: at 09:08

## 2017-08-19 RX ADMIN — OYSTER SHELL CALCIUM WITH VITAMIN D 1 TABLET: 500; 200 TABLET, FILM COATED ORAL at 09:08

## 2017-08-19 RX ADMIN — ASPIRIN 81 MG CHEWABLE TABLET 81 MG: 81 TABLET CHEWABLE at 08:08

## 2017-08-19 RX ADMIN — ROPINIROLE 0.5 MG: 0.5 TABLET, FILM COATED ORAL at 09:08

## 2017-08-19 RX ADMIN — LISINOPRIL 10 MG: 10 TABLET ORAL at 08:08

## 2017-08-19 RX ADMIN — DOFETILIDE 250 MCG: 0.12 CAPSULE ORAL at 09:08

## 2017-08-19 RX ADMIN — AMLODIPINE BESYLATE 10 MG: 10 TABLET ORAL at 08:08

## 2017-08-19 RX ADMIN — TAMSULOSIN HYDROCHLORIDE 0.4 MG: 0.4 CAPSULE ORAL at 08:08

## 2017-08-19 RX ADMIN — FOLIC ACID 1 MG: 1 TABLET ORAL at 08:08

## 2017-08-19 RX ADMIN — ROPINIROLE 0.5 MG: 0.5 TABLET, FILM COATED ORAL at 05:08

## 2017-08-19 RX ADMIN — CEFEPIME HYDROCHLORIDE 2 G: 2 INJECTION, SOLUTION INTRAVENOUS at 03:08

## 2017-08-19 RX ADMIN — ALLOPURINOL 300 MG: 100 TABLET ORAL at 08:08

## 2017-08-19 RX ADMIN — LISINOPRIL 10 MG: 10 TABLET ORAL at 09:08

## 2017-08-19 RX ADMIN — HYDRALAZINE HYDROCHLORIDE 75 MG: 50 TABLET ORAL at 09:08

## 2017-08-19 RX ADMIN — DOCUSATE SODIUM 100 MG: 100 CAPSULE, LIQUID FILLED ORAL at 10:08

## 2017-08-19 RX ADMIN — CEFEPIME HYDROCHLORIDE 2 G: 2 INJECTION, SOLUTION INTRAVENOUS at 04:08

## 2017-08-19 RX ADMIN — THERA TABS 1 TABLET: TAB at 08:08

## 2017-08-19 RX ADMIN — TRAZODONE HYDROCHLORIDE 50 MG: 50 TABLET ORAL at 09:08

## 2017-08-19 RX ADMIN — WARFARIN SODIUM 4 MG: 4 TABLET ORAL at 05:08

## 2017-08-19 RX ADMIN — Medication 10 ML: at 05:08

## 2017-08-19 RX ADMIN — HYDRALAZINE HYDROCHLORIDE 75 MG: 50 TABLET ORAL at 01:08

## 2017-08-19 NOTE — PLAN OF CARE
Problem: Patient Care Overview  Goal: Plan of Care Review  Mr Vasquez LVAD was switched to battery power. Controller, and batteries are secured in patient's vest. Verbal teaching was provided on locking w/c wheels before getting in/out of bed to prevent falls. Return demonstration given. Mr Vasquez required a moderate amt of assistance with his transfer. Carry case with the extra controller and batteries is located on the handles on the back of patient's wheelchair. No distress noted. Safety measures maintained. Call light is within reach, and wife is at the bedside. Will continue with plan of care.

## 2017-08-19 NOTE — PLAN OF CARE
Problem: Patient Care Overview  Goal: Plan of Care Review  Outcome: Ongoing (interventions implemented as appropriate)  Pt care plan updated and individualized as needed and progress continues.  See associated flowsheets for relevant documentation. Frequent rounds made per protocol to maintain pt safety and meet pt needs.  Continuing to monitor and follow up as needed.  Pt states he has been feeling a bit down lately.  Emotional support and encouragement provided.    Problem: Infection, Risk/Actual (Adult)  Goal: Infection Prevention/Resolution  Patient will demonstrate the desired outcomes by discharge/transition of care.   Outcome: Ongoing (interventions implemented as appropriate)  Continues on IV antibiotics as ordered.  No new signs or symptoms noted.      Problem: Fall Risk (Adult)  Goal: Absence of Falls  Patient will demonstrate the desired outcomes by discharge/transition of care.   Outcome: Ongoing (interventions implemented as appropriate)  Pt remains free from falls or trauma thus far this shift.      Problem: Ventricular Assist Device (Adult)  Goal: Signs and Symptoms of Listed Potential Problems Will be Absent, Minimized or Managed (Ventricular Assist Device)  Signs and symptoms of listed potential problems will be absent, minimized or managed by discharge/transition of care (reference Ventricular Assist Device (Adult) CPG).   Outcome: Ongoing (interventions implemented as appropriate)  Treating driveline infection with IV antibiotics as ordered.  Dressing maintained.

## 2017-08-19 NOTE — ASSESSMENT & PLAN NOTE
Stable    8/14:  CRT 1.7 -- d/c fluid restriction  8/17:  CRT 1.9 -- encourage fluids to 1600cc/day

## 2017-08-19 NOTE — SUBJECTIVE & OBJECTIVE
Interval History: Pt without new c/o's.  I have reviewed the HPI and PMFSH and there are no changes from admission.        Scheduled Medications:    allopurinol  300 mg Oral Daily    amlodipine  10 mg Oral Daily    aspirin  81 mg Oral Daily    atorvastatin  10 mg Oral Daily    buPROPion  300 mg Oral Daily    calcium-vitamin D3  1 tablet Oral BID    ceFEPime (MAXIPIME) IVPB  2 g Intravenous Q12H    docusate sodium  100 mg Oral BID    dofetilide  250 mcg Oral Q12H    folic acid  1 mg Oral Daily    hydrALAZINE  75 mg Oral Q8H    lisinopril  10 mg Oral BID    magnesium oxide  400 mg Oral BID    multivitamin  1 tablet Oral Daily    ropinirole  0.5 mg Oral TID    sodium chloride 0.9%  10 mL Intravenous Q6H    sodium chloride 0.9%  3 mL Intravenous Q8H    spironolactone  25 mg Oral Daily    tamsulosin  0.4 mg Oral Daily    warfarin  4 mg Oral Every Tues, Thurs, Sat    warfarin  4 mg Oral Every Sun    warfarin  6 mg Oral Every Mon, Wed, Fri       PRN Medications: acetaminophen, bisacodyl, diphenoxylate-atropine 2.5-0.025 mg, lactulose, magnesium hydroxide 400 mg/5 ml, ondansetron, polyethylene glycol, Flushing PICC Protocol **AND** sodium chloride 0.9% **AND** sodium chloride 0.9%, sodium phosphates, trazodone    Review of Systems   Constitutional: Negative for appetite change and fatigue.   Eyes: Negative for visual disturbance.   Respiratory: Negative for shortness of breath.    Cardiovascular: Negative for chest pain.   Gastrointestinal: Negative for constipation and diarrhea.   Genitourinary: Negative for dysuria, frequency and urgency.   Musculoskeletal: Positive for gait problem. Negative for arthralgias, back pain, joint swelling, myalgias, neck pain and neck stiffness.   Neurological: Positive for tremors and weakness. Negative for dizziness, numbness and headaches.   Psychiatric/Behavioral: Negative for dysphoric mood.   All other systems reviewed and are negative.    Objective:     Vital  Signs (Most Recent):  Temp: 98.4 °F (36.9 °C) (08/19/17 0645)  Pulse: 67 (08/19/17 0645)  Resp: 18 (08/19/17 0645)  BP: (!) 90/0 (08/19/17 0645)  SpO2: 97 % (08/19/17 0645)    Vital Signs (24h Range):  Temp:  [98.4 °F (36.9 °C)-98.5 °F (36.9 °C)] 98.4 °F (36.9 °C)  Pulse:  [64-67] 67  Resp:  [16-18] 18  SpO2:  [96 %-97 %] 97 %  BP: (90-92)/(0) 90/0     Physical Exam   Constitutional: He is oriented to person, place, and time. He appears well-nourished.   Eyes: EOM are normal. Pupils are equal, round, and reactive to light.   Neck: Normal range of motion. Neck supple.   Cardiovascular: Normal rate.    Pulmonary/Chest: Effort normal.   Musculoskeletal: Normal range of motion.   Neurological: He is oriented to person, place, and time. He has normal strength.   Skin: No rash noted.   Psychiatric: He has a normal mood and affect.     NEUROLOGICAL EXAMINATION:     MENTAL STATUS   Oriented to person, place, and time.     CRANIAL NERVES     CN III, IV, VI   Pupils are equal, round, and reactive to light.  Extraocular motions are normal.     MOTOR EXAM     Strength   Strength 5/5 throughout.

## 2017-08-19 NOTE — PROGRESS NOTES
[facility]  Physical Medicine & Rehab  Progress Note    Patient Name: Kev Vasquez  MRN: [id number]  Patient Class: IP- Rehab   Admission Date: 8/8/2017  Length of Stay: 11 days  Attending Physician: Rashad Garcia MD  Primary Care Provider: Mega Collins MD    Subjective:     Principal Problem:Gait instability    Interval History: Pt without new c/o's.  I have reviewed the HPI and PMFSH and there are no changes from admission.        Scheduled Medications:    allopurinol  300 mg Oral Daily    amlodipine  10 mg Oral Daily    aspirin  81 mg Oral Daily    atorvastatin  10 mg Oral Daily    buPROPion  300 mg Oral Daily    calcium-vitamin D3  1 tablet Oral BID    ceFEPime (MAXIPIME) IVPB  2 g Intravenous Q12H    docusate sodium  100 mg Oral BID    dofetilide  250 mcg Oral Q12H    folic acid  1 mg Oral Daily    hydrALAZINE  75 mg Oral Q8H    lisinopril  10 mg Oral BID    magnesium oxide  400 mg Oral BID    multivitamin  1 tablet Oral Daily    ropinirole  0.5 mg Oral TID    sodium chloride 0.9%  10 mL Intravenous Q6H    sodium chloride 0.9%  3 mL Intravenous Q8H    spironolactone  25 mg Oral Daily    tamsulosin  0.4 mg Oral Daily    warfarin  4 mg Oral Every Tues, Thurs, Sat    warfarin  4 mg Oral Every Sun    warfarin  6 mg Oral Every Mon, Wed, Fri       PRN Medications: acetaminophen, bisacodyl, diphenoxylate-atropine 2.5-0.025 mg, lactulose, magnesium hydroxide 400 mg/5 ml, ondansetron, polyethylene glycol, Flushing PICC Protocol **AND** sodium chloride 0.9% **AND** sodium chloride 0.9%, sodium phosphates, trazodone    Review of Systems   Constitutional: Negative for appetite change and fatigue.   Eyes: Negative for visual disturbance.   Respiratory: Negative for shortness of breath.    Cardiovascular: Negative for chest pain.   Gastrointestinal: Negative for constipation and diarrhea.   Genitourinary: Negative for dysuria, frequency and urgency.   Musculoskeletal:  Positive for gait problem. Negative for arthralgias, back pain, joint swelling, myalgias, neck pain and neck stiffness.   Neurological: Positive for tremors and weakness. Negative for dizziness, numbness and headaches.   Psychiatric/Behavioral: Negative for dysphoric mood.   All other systems reviewed and are negative.    Objective:     Vital Signs (Most Recent):  Temp: 98.4 °F (36.9 °C) (08/19/17 0645)  Pulse: 67 (08/19/17 0645)  Resp: 18 (08/19/17 0645)  BP: (!) 90/0 (08/19/17 0645)  SpO2: 97 % (08/19/17 0645)    Vital Signs (24h Range):  Temp:  [98.4 °F (36.9 °C)-98.5 °F (36.9 °C)] 98.4 °F (36.9 °C)  Pulse:  [64-67] 67  Resp:  [16-18] 18  SpO2:  [96 %-97 %] 97 %  BP: (90-92)/(0) 90/0     Physical Exam   Constitutional: He is oriented to person, place, and time. He appears well-nourished.   Eyes: EOM are normal. Pupils are equal, round, and reactive to light.   Neck: Normal range of motion. Neck supple.   Cardiovascular: Normal rate.    Pulmonary/Chest: Effort normal.   Musculoskeletal: Normal range of motion.   Neurological: He is oriented to person, place, and time. He has normal strength.   Skin: No rash noted.   Psychiatric: He has a normal mood and affect.     NEUROLOGICAL EXAMINATION:     MENTAL STATUS   Oriented to person, place, and time.     CRANIAL NERVES     CN III, IV, VI   Pupils are equal, round, and reactive to light.  Extraocular motions are normal.     MOTOR EXAM     Strength   Strength 5/5 throughout.       Assessment/Plan:      Kev Vasquez is a 76 y.o. male admitted to inpatient rehabilitation on 8/8/2017 for Gait instability with impaired mobility and ADLs. Patient remains appropriate for PT, OT, and as required Speech therapy. Patient continues to require 24 hour nursing care as well as daily Physician assessment.    * Gait instability    Sit-->stand CGA (improved), T/F CGA (improved), gait 105' CGA/MNA RW (improved), limited by impaired trunk balance.  UBD SBA (improved), LBD MDA (improved),  "toileting MDA (improved) on 8/16.      Mild cognitive deficits -- in the most recent ST session:  "Pt completed orientation task to time utilizing visual aid with supervision and 90% acc. Pt completed mental manipulation task of F=3 according to progression with 80% acc indly, given minimal verbal cues with repetitions of information, pt with increase to 90% acc. Word deduction task completed of thought organization with 50% acc indly, given moderate verbal cues for reasoning pt with increase to 100% acc."          Complication involving left ventricular assist device (LVAD)    Still with some drainage -- monitor.  On cefepime through 9/23.           Chronic systolic heart failure    Compensated with LVAD    8/8:   INR 2.7  8/10: INR 2.5 -- check 8/14 8/14: INR 2.6  8/17: INR 2.5        Stage 2 chronic kidney disease    Stable    8/14:  CRT 1.7 -- d/c fluid restriction  8/17:  CRT 1.9 -- encourage fluids to 1600cc/day            DISCHARGE PLANNING:  Tentative Discharge Date: 8/24/2017    Future Appointments  Date Time Provider Department Center   8/23/2017 12:00 PM LAB, APPOINTMENT [facility] LAB [facility]   8/23/2017 1:30 PM HEARTTRANSPLANT, LVADN [facility]   8/23/2017 1:30 PM Carlito Santana MD [facility]   9/12/2017 2:30 PM Payam Muhammad MD [facility] ID [facility]   10/26/2017 8:00 AM [facility]IT[facility] DEVICE CHECK[facility]  Department of Physical Medicine & Rehab   [facility]  "

## 2017-08-20 PROCEDURE — 25000003 PHARM REV CODE 250: Performed by: PHYSICIAN ASSISTANT

## 2017-08-20 PROCEDURE — 12800000 HC REHAB SEMI-PRIVATE ROOM

## 2017-08-20 PROCEDURE — 25000003 PHARM REV CODE 250: Performed by: PHYSICAL MEDICINE & REHABILITATION

## 2017-08-20 PROCEDURE — A4216 STERILE WATER/SALINE, 10 ML: HCPCS | Performed by: PHYSICIAN ASSISTANT

## 2017-08-20 PROCEDURE — 99233 SBSQ HOSP IP/OBS HIGH 50: CPT | Mod: ,,, | Performed by: PHYSICAL MEDICINE & REHABILITATION

## 2017-08-20 PROCEDURE — 63600175 PHARM REV CODE 636 W HCPCS: Performed by: PHYSICIAN ASSISTANT

## 2017-08-20 RX ADMIN — ALLOPURINOL 300 MG: 100 TABLET ORAL at 09:08

## 2017-08-20 RX ADMIN — OYSTER SHELL CALCIUM WITH VITAMIN D 1 TABLET: 500; 200 TABLET, FILM COATED ORAL at 07:08

## 2017-08-20 RX ADMIN — DOFETILIDE 250 MCG: 0.12 CAPSULE ORAL at 08:08

## 2017-08-20 RX ADMIN — THERA TABS 1 TABLET: TAB at 09:08

## 2017-08-20 RX ADMIN — Medication 10 ML: at 12:08

## 2017-08-20 RX ADMIN — ROPINIROLE 0.5 MG: 0.5 TABLET, FILM COATED ORAL at 01:08

## 2017-08-20 RX ADMIN — Medication 400 MG: at 07:08

## 2017-08-20 RX ADMIN — AMLODIPINE BESYLATE 10 MG: 10 TABLET ORAL at 09:08

## 2017-08-20 RX ADMIN — DOCUSATE SODIUM 100 MG: 100 CAPSULE, LIQUID FILLED ORAL at 07:08

## 2017-08-20 RX ADMIN — HYDRALAZINE HYDROCHLORIDE 75 MG: 50 TABLET ORAL at 05:08

## 2017-08-20 RX ADMIN — ROPINIROLE 0.5 MG: 0.5 TABLET, FILM COATED ORAL at 05:08

## 2017-08-20 RX ADMIN — HYDRALAZINE HYDROCHLORIDE 75 MG: 50 TABLET ORAL at 01:08

## 2017-08-20 RX ADMIN — CEFEPIME HYDROCHLORIDE 2 G: 2 INJECTION, SOLUTION INTRAVENOUS at 03:08

## 2017-08-20 RX ADMIN — DOCUSATE SODIUM 100 MG: 100 CAPSULE, LIQUID FILLED ORAL at 09:08

## 2017-08-20 RX ADMIN — Medication 10 ML: at 05:08

## 2017-08-20 RX ADMIN — ASPIRIN 81 MG CHEWABLE TABLET 81 MG: 81 TABLET CHEWABLE at 09:08

## 2017-08-20 RX ADMIN — BUPROPION HYDROCHLORIDE 300 MG: 150 TABLET, EXTENDED RELEASE ORAL at 09:08

## 2017-08-20 RX ADMIN — CEFEPIME HYDROCHLORIDE 2 G: 2 INJECTION, SOLUTION INTRAVENOUS at 04:08

## 2017-08-20 RX ADMIN — TAMSULOSIN HYDROCHLORIDE 0.4 MG: 0.4 CAPSULE ORAL at 09:08

## 2017-08-20 RX ADMIN — DOFETILIDE 250 MCG: 0.12 CAPSULE ORAL at 07:08

## 2017-08-20 RX ADMIN — ROPINIROLE 0.5 MG: 0.5 TABLET, FILM COATED ORAL at 09:08

## 2017-08-20 RX ADMIN — HYDRALAZINE HYDROCHLORIDE 75 MG: 50 TABLET ORAL at 09:08

## 2017-08-20 RX ADMIN — FOLIC ACID 1 MG: 1 TABLET ORAL at 09:08

## 2017-08-20 RX ADMIN — LISINOPRIL 10 MG: 10 TABLET ORAL at 09:08

## 2017-08-20 RX ADMIN — LISINOPRIL 10 MG: 10 TABLET ORAL at 07:08

## 2017-08-20 RX ADMIN — ATORVASTATIN CALCIUM 10 MG: 10 TABLET, FILM COATED ORAL at 09:08

## 2017-08-20 RX ADMIN — Medication 400 MG: at 09:08

## 2017-08-20 RX ADMIN — Medication 10 ML: at 06:08

## 2017-08-20 RX ADMIN — WARFARIN SODIUM 4 MG: 4 TABLET ORAL at 06:08

## 2017-08-20 RX ADMIN — SPIRONOLACTONE 25 MG: 25 TABLET, FILM COATED ORAL at 09:08

## 2017-08-20 RX ADMIN — OYSTER SHELL CALCIUM WITH VITAMIN D 1 TABLET: 500; 200 TABLET, FILM COATED ORAL at 09:08

## 2017-08-20 NOTE — PLAN OF CARE
Problem: Patient Care Overview  Goal: Plan of Care Review  Outcome: Ongoing (interventions implemented as appropriate)  Mr Vasquez was incontinent of bladder. Pt was cleaned, and assisted with bathing. Barrier cream was applied to perineal area. LVAD was switched to battery power. Mr Vasquez was assisted into the recliner. Call light is within reach, and family is at the bedside. Safety measures maintained. Will continue with plan of care.

## 2017-08-20 NOTE — PROGRESS NOTES
[facility]  Physical Medicine & Rehab  Progress Note    Patient Name: Kev Vasquez  MRN: [id number]  Patient Class: IP- Rehab   Admission Date: 8/8/2017  Length of Stay: 12 days  Attending Physician: Rashad Garcia MD  Primary Care Provider: Mega Collins MD    Subjective:     Principal Problem:Gait instability    Interval History: Pt without new c/o's.  I have reviewed the HPI and PMFSH and there are no changes from admission.        Scheduled Medications:    allopurinol  300 mg Oral Daily    amlodipine  10 mg Oral Daily    aspirin  81 mg Oral Daily    atorvastatin  10 mg Oral Daily    buPROPion  300 mg Oral Daily    calcium-vitamin D3  1 tablet Oral BID    ceFEPime (MAXIPIME) IVPB  2 g Intravenous Q12H    docusate sodium  100 mg Oral BID    dofetilide  250 mcg Oral Q12H    folic acid  1 mg Oral Daily    hydrALAZINE  75 mg Oral Q8H    lisinopril  10 mg Oral BID    magnesium oxide  400 mg Oral BID    multivitamin  1 tablet Oral Daily    ropinirole  0.5 mg Oral TID    sodium chloride 0.9%  10 mL Intravenous Q6H    spironolactone  25 mg Oral Daily    tamsulosin  0.4 mg Oral Daily    warfarin  4 mg Oral Every Tues, Thurs, Sat    warfarin  4 mg Oral Every Sun    warfarin  6 mg Oral Every Mon, Wed, Fri       PRN Medications: acetaminophen, bisacodyl, diphenoxylate-atropine 2.5-0.025 mg, lactulose, magnesium hydroxide 400 mg/5 ml, ondansetron, polyethylene glycol, Flushing PICC Protocol **AND** sodium chloride 0.9% **AND** sodium chloride 0.9%, sodium phosphates, trazodone    Review of Systems   Constitutional: Negative for appetite change and fatigue.   Eyes: Negative for visual disturbance.   Respiratory: Negative for shortness of breath.    Cardiovascular: Negative for chest pain.   Gastrointestinal: Negative for constipation and diarrhea.   Genitourinary: Negative for dysuria, frequency and urgency.   Musculoskeletal: Positive for gait problem. Negative for arthralgias,  back pain, joint swelling, myalgias, neck pain and neck stiffness.   Neurological: Positive for tremors and weakness. Negative for dizziness, numbness and headaches.   Psychiatric/Behavioral: Negative for dysphoric mood.   All other systems reviewed and are negative.    Objective:     Vital Signs (Most Recent):  Temp: 98.1 °F (36.7 °C) (08/20/17 0644)  Pulse: 62 (08/20/17 0644)  Resp: 16 (08/20/17 0644)  BP: (!) 96/0 (08/20/17 0644)  SpO2: 95 % (08/20/17 0644)    Vital Signs (24h Range):  Temp:  [98.1 °F (36.7 °C)-98.2 °F (36.8 °C)] 98.1 °F (36.7 °C)  Pulse:  [62-65] 62  Resp:  [16] 16  SpO2:  [95 %-96 %] 95 %  BP: (86-98)/(0) 96/0     Physical Exam   Constitutional: He is oriented to person, place, and time. He appears well-nourished.   Eyes: EOM are normal. Pupils are equal, round, and reactive to light.   Neck: Normal range of motion. Neck supple.   Cardiovascular: Normal rate.    Pulmonary/Chest: Effort normal.   Musculoskeletal: Normal range of motion.   Neurological: He is oriented to person, place, and time. He has normal strength.   Skin: No rash noted.   Psychiatric: He has a normal mood and affect.     NEUROLOGICAL EXAMINATION:     MENTAL STATUS   Oriented to person, place, and time.     CRANIAL NERVES     CN III, IV, VI   Pupils are equal, round, and reactive to light.  Extraocular motions are normal.     MOTOR EXAM     Strength   Strength 5/5 throughout.       Assessment/Plan:      Kev Vasquez is a 76 y.o. male admitted to inpatient rehabilitation on 8/8/2017 for Gait instability with impaired mobility and ADLs. Patient remains appropriate for PT, OT, and as required Speech therapy. Patient continues to require 24 hour nursing care as well as daily Physician assessment.    * Gait instability    Sit-->stand CGA (improved), T/F CGA (improved), gait 105' CGA/MNA RW (improved), limited by impaired trunk balance.  UBD SBA (improved), LBD MDA (improved), toileting MDA (improved) on 8/16.      Mild cognitive  "deficits -- in the most recent ST session:  "Pt completed orientation task to time utilizing visual aid with supervision and 90% acc. Pt completed mental manipulation task of F=3 according to progression with 80% acc indly, given minimal verbal cues with repetitions of information, pt with increase to 90% acc. Word deduction task completed of thought organization with 50% acc indly, given moderate verbal cues for reasoning pt with increase to 100% acc."          Complication involving left ventricular assist device (LVAD)    Still with some drainage -- monitor.  On cefepime through 9/23.           Chronic systolic heart failure    Compensated with LVAD    8/8:   INR 2.7  8/10: INR 2.5 -- check 8/14 8/14: INR 2.6  8/17: INR 2.5        Stage 2 chronic kidney disease    Stable    8/14:  CRT 1.7 -- d/c fluid restriction  8/17:  CRT 1.9 -- encourage fluids to 1600cc/day            DISCHARGE PLANNING:  Tentative Discharge Date: 8/24/2017    Future Appointments  Date Time Provider Department Center   8/23/2017 12:00 PM LAB, APPOINTMENT [facility] LAB [facility]   8/23/2017 1:30 PM HEARTTRANSPLANT, LVADN [facility]   8/23/2017 1:30 PM Carlito Santana MD [facility]   9/12/2017 2:30 PM Payam Muhammad MD [facility] ID [facility]   10/26/2017 8:00 AM HOME MONITOR DEVICE CHECK, [facility] myriam Camilo MD  Department of Physical Medicine & Rehab   [facility]  "

## 2017-08-20 NOTE — SUBJECTIVE & OBJECTIVE
Interval History: Pt without new c/o's.  I have reviewed the HPI and PMFSH and there are no changes from admission.        Scheduled Medications:    allopurinol  300 mg Oral Daily    amlodipine  10 mg Oral Daily    aspirin  81 mg Oral Daily    atorvastatin  10 mg Oral Daily    buPROPion  300 mg Oral Daily    calcium-vitamin D3  1 tablet Oral BID    ceFEPime (MAXIPIME) IVPB  2 g Intravenous Q12H    docusate sodium  100 mg Oral BID    dofetilide  250 mcg Oral Q12H    folic acid  1 mg Oral Daily    hydrALAZINE  75 mg Oral Q8H    lisinopril  10 mg Oral BID    magnesium oxide  400 mg Oral BID    multivitamin  1 tablet Oral Daily    ropinirole  0.5 mg Oral TID    sodium chloride 0.9%  10 mL Intravenous Q6H    spironolactone  25 mg Oral Daily    tamsulosin  0.4 mg Oral Daily    warfarin  4 mg Oral Every Tues, Thurs, Sat    warfarin  4 mg Oral Every Sun    warfarin  6 mg Oral Every Mon, Wed, Fri       PRN Medications: acetaminophen, bisacodyl, diphenoxylate-atropine 2.5-0.025 mg, lactulose, magnesium hydroxide 400 mg/5 ml, ondansetron, polyethylene glycol, Flushing PICC Protocol **AND** sodium chloride 0.9% **AND** sodium chloride 0.9%, sodium phosphates, trazodone    Review of Systems   Constitutional: Negative for appetite change and fatigue.   Eyes: Negative for visual disturbance.   Respiratory: Negative for shortness of breath.    Cardiovascular: Negative for chest pain.   Gastrointestinal: Negative for constipation and diarrhea.   Genitourinary: Negative for dysuria, frequency and urgency.   Musculoskeletal: Positive for gait problem. Negative for arthralgias, back pain, joint swelling, myalgias, neck pain and neck stiffness.   Neurological: Positive for tremors and weakness. Negative for dizziness, numbness and headaches.   Psychiatric/Behavioral: Negative for dysphoric mood.   All other systems reviewed and are negative.    Objective:     Vital Signs (Most Recent):  Temp: 98.1 °F (36.7 °C)  (08/20/17 0644)  Pulse: 62 (08/20/17 0644)  Resp: 16 (08/20/17 0644)  BP: (!) 96/0 (08/20/17 0644)  SpO2: 95 % (08/20/17 0644)    Vital Signs (24h Range):  Temp:  [98.1 °F (36.7 °C)-98.2 °F (36.8 °C)] 98.1 °F (36.7 °C)  Pulse:  [62-65] 62  Resp:  [16] 16  SpO2:  [95 %-96 %] 95 %  BP: (86-98)/(0) 96/0     Physical Exam   Constitutional: He is oriented to person, place, and time. He appears well-nourished.   Eyes: EOM are normal. Pupils are equal, round, and reactive to light.   Neck: Normal range of motion. Neck supple.   Cardiovascular: Normal rate.    Pulmonary/Chest: Effort normal.   Musculoskeletal: Normal range of motion.   Neurological: He is oriented to person, place, and time. He has normal strength.   Skin: No rash noted.   Psychiatric: He has a normal mood and affect.     NEUROLOGICAL EXAMINATION:     MENTAL STATUS   Oriented to person, place, and time.     CRANIAL NERVES     CN III, IV, VI   Pupils are equal, round, and reactive to light.  Extraocular motions are normal.     MOTOR EXAM     Strength   Strength 5/5 throughout.

## 2017-08-21 LAB
ANION GAP SERPL CALC-SCNC: 7 MMOL/L
BUN SERPL-MCNC: 34 MG/DL
CALCIUM SERPL-MCNC: 10.5 MG/DL
CHLORIDE SERPL-SCNC: 105 MMOL/L
CO2 SERPL-SCNC: 24 MMOL/L
CREAT SERPL-MCNC: 1.8 MG/DL
EST. GFR  (AFRICAN AMERICAN): 41.9 ML/MIN/1.73 M^2
EST. GFR  (NON AFRICAN AMERICAN): 36.3 ML/MIN/1.73 M^2
GLUCOSE SERPL-MCNC: 85 MG/DL
INR PPP: 2.4
POTASSIUM SERPL-SCNC: 4.2 MMOL/L
PROTHROMBIN TIME: 24.9 SEC
SODIUM SERPL-SCNC: 136 MMOL/L

## 2017-08-21 PROCEDURE — 97116 GAIT TRAINING THERAPY: CPT

## 2017-08-21 PROCEDURE — 97530 THERAPEUTIC ACTIVITIES: CPT

## 2017-08-21 PROCEDURE — 97535 SELF CARE MNGMENT TRAINING: CPT

## 2017-08-21 PROCEDURE — 80048 BASIC METABOLIC PNL TOTAL CA: CPT

## 2017-08-21 PROCEDURE — 99232 SBSQ HOSP IP/OBS MODERATE 35: CPT | Mod: ,,, | Performed by: PHYSICAL MEDICINE & REHABILITATION

## 2017-08-21 PROCEDURE — 25000003 PHARM REV CODE 250: Performed by: PHYSICIAN ASSISTANT

## 2017-08-21 PROCEDURE — 92508 TX SP LANG VOICE COMM GROUP: CPT

## 2017-08-21 PROCEDURE — 25000003 PHARM REV CODE 250: Performed by: PHYSICAL MEDICINE & REHABILITATION

## 2017-08-21 PROCEDURE — 36415 COLL VENOUS BLD VENIPUNCTURE: CPT

## 2017-08-21 PROCEDURE — A4216 STERILE WATER/SALINE, 10 ML: HCPCS | Performed by: PHYSICIAN ASSISTANT

## 2017-08-21 PROCEDURE — 85610 PROTHROMBIN TIME: CPT

## 2017-08-21 PROCEDURE — 63600175 PHARM REV CODE 636 W HCPCS: Performed by: PHYSICIAN ASSISTANT

## 2017-08-21 PROCEDURE — 12800000 HC REHAB SEMI-PRIVATE ROOM

## 2017-08-21 PROCEDURE — 97110 THERAPEUTIC EXERCISES: CPT

## 2017-08-21 RX ADMIN — POLYETHYLENE GLYCOL 3350 17 G: 17 POWDER, FOR SOLUTION ORAL at 08:08

## 2017-08-21 RX ADMIN — SPIRONOLACTONE 25 MG: 25 TABLET, FILM COATED ORAL at 09:08

## 2017-08-21 RX ADMIN — HYDRALAZINE HYDROCHLORIDE 75 MG: 50 TABLET ORAL at 02:08

## 2017-08-21 RX ADMIN — OYSTER SHELL CALCIUM WITH VITAMIN D 1 TABLET: 500; 200 TABLET, FILM COATED ORAL at 09:08

## 2017-08-21 RX ADMIN — DOCUSATE SODIUM 100 MG: 100 CAPSULE, LIQUID FILLED ORAL at 09:08

## 2017-08-21 RX ADMIN — ROPINIROLE 0.5 MG: 0.5 TABLET, FILM COATED ORAL at 05:08

## 2017-08-21 RX ADMIN — ASPIRIN 81 MG CHEWABLE TABLET 81 MG: 81 TABLET CHEWABLE at 09:08

## 2017-08-21 RX ADMIN — Medication 400 MG: at 09:08

## 2017-08-21 RX ADMIN — Medication 10 ML: at 12:08

## 2017-08-21 RX ADMIN — WARFARIN SODIUM 6 MG: 6 TABLET ORAL at 05:08

## 2017-08-21 RX ADMIN — ATORVASTATIN CALCIUM 10 MG: 10 TABLET, FILM COATED ORAL at 09:08

## 2017-08-21 RX ADMIN — Medication 10 ML: at 05:08

## 2017-08-21 RX ADMIN — FOLIC ACID 1 MG: 1 TABLET ORAL at 09:08

## 2017-08-21 RX ADMIN — BUPROPION HYDROCHLORIDE 300 MG: 150 TABLET, EXTENDED RELEASE ORAL at 09:08

## 2017-08-21 RX ADMIN — CEFEPIME HYDROCHLORIDE 2 G: 2 INJECTION, SOLUTION INTRAVENOUS at 02:08

## 2017-08-21 RX ADMIN — THERA TABS 1 TABLET: TAB at 09:08

## 2017-08-21 RX ADMIN — ALLOPURINOL 300 MG: 100 TABLET ORAL at 09:08

## 2017-08-21 RX ADMIN — AMLODIPINE BESYLATE 10 MG: 10 TABLET ORAL at 09:08

## 2017-08-21 RX ADMIN — HYDRALAZINE HYDROCHLORIDE 75 MG: 50 TABLET ORAL at 09:08

## 2017-08-21 RX ADMIN — LISINOPRIL 10 MG: 10 TABLET ORAL at 09:08

## 2017-08-21 RX ADMIN — HYDRALAZINE HYDROCHLORIDE 75 MG: 50 TABLET ORAL at 05:08

## 2017-08-21 RX ADMIN — CEFEPIME HYDROCHLORIDE 2 G: 2 INJECTION, SOLUTION INTRAVENOUS at 03:08

## 2017-08-21 RX ADMIN — DOFETILIDE 250 MCG: 0.12 CAPSULE ORAL at 09:08

## 2017-08-21 RX ADMIN — ROPINIROLE 0.5 MG: 0.5 TABLET, FILM COATED ORAL at 08:08

## 2017-08-21 RX ADMIN — ROPINIROLE 0.5 MG: 0.5 TABLET, FILM COATED ORAL at 02:08

## 2017-08-21 RX ADMIN — TAMSULOSIN HYDROCHLORIDE 0.4 MG: 0.4 CAPSULE ORAL at 09:08

## 2017-08-21 NOTE — ASSESSMENT & PLAN NOTE
Compensated with LVAD    8/8:   INR 2.7  8/10: INR 2.5 -- check 8/14 8/14: INR 2.6  8/17: INR 2.5  8/21: INR 2.4

## 2017-08-21 NOTE — PROGRESS NOTES
Occupational Therapy  WEEKLY REASSESSMENT/FIM SCORES    Kev Vasquez  MRN: [id number]  Room/Bed: E280/E280 B       08/21/17 0800   Transfers   Bed/Chair/WC 3   Self Care   Eating 7   Grooming 4   Bathing 4   Dressing-Upper 5   Dressing-Lower 4   Toileting 4       SHAUNA Bland  8/21/2017

## 2017-08-21 NOTE — PROGRESS NOTES
Physical Therapy   Daily Physical Therapy FIM Scores    Kev Vasquez   MRN: [id number]        08/21/17 1400   Transfers   Bed/Chair/WC 4   Toilet 3   Locomotion   Distance Walked 2   Distance Wheelchair 3   Walk 2   Wheelchair 5   Mode C   Stairs 2     Sophie Daigle, PT  8/21/2017

## 2017-08-21 NOTE — PROGRESS NOTES
"Occupational Therapy  Weekly Reassessment/AM Treatment  Kev Vasquez   MRN: [id number]   Room/Bed: E280/E280 B     08/21/17 0729   OT Time Calculation   OT Start Time 0729   OT Stop Time 0816   OT Total Time (min) 47 min   General   OT Date of Treatment 08/21/17   Family/Caregiver Present Yes  (daughter)   Patient Found (position) Supine in bed   Precautions   General Precautions LVAD;fall   Cardiovascular/Pulmonary LVAD   Subjective   Patient states "I threw up on myself last night."   Family states "I don't remember him throwing up at home." stated daughter   Pain/Comfort   Pain Rating 3/10   Location - Side Left   Location abdomen   Pain Addressed Reposition;Distraction   Pain Comment reported pain at drive line site   Bed Mobility   Scooting/Bridging Supervision   Scooting/Bridging Comments to scoot to EOB   Supine to Sit Minimum Assistance   Supine to Sit Comments with rails and assist for trunk elevation to EOB   Sit to Supine Minimum Assistance   Sit to Supine Comments to assist with lowering trunk to flat bed   Sit to Stand   Sit <> Stand Assistance Moderate Assistance   Sit <> Stand Assistive Device No Assistive Device   Trials/Comments from EOB   Stand to Sit   Assistance Moderate Assistance   Assistive Device No Assistive Device   Trials/Comments with assist to lower into wc and to EOB   Bed to Chair   Bed <> Chair Technique Stand Pivot   Bed <> Chair Transfer Assistance Moderate Assistance   Bed <> Chair Assistive Device No Assistive Device   Trials/Comments from EOB>wc   Feeding   Feeding Level of Assistance Independent   Feeding Where Assessed Wheelchair   Grooming   Grooming Level of Assistance Contact guard assistance   Grooming Where Assessed Wheelchair   Grooming Comments to shave face   Bathing   Bathing Level of Assistance Minimum assistance   Bathing Where Assessed Edge of bed   Bathing Comments steadying assist from sitting and standing   UE Dressing   UE Dressing Level of Assistance Supervision "   UE Dressing Where Assessed Edge of bed   UE Dressing Comments set up for pullover shirt   LE Dressing   LE Dressing Level of Assistance Minimum assistance   LE Dressing Where Assessed Edge of bed   LE Dressing Comments steadying from stance for clothing managment over hips and for steadying from dynamic sitting   Toileting   Toileting Level of Assistance Minimum assistance   Toileting Where Assessed Bed level   Toileting Comments Pt diaper fround wet but had not saturated linens or clothing. Pt only required min (A) for standing balance for pericare   Dynamic Sitting Balance   Dynamic Sitting-Balance Support Unilateral upper extremity supported   Dynamic Sitting-Balance Reaching for objects;Reaching across midline   Dynamic Sitting-Comments Min>Mod (A) during functional activity with pt gets distracted   Dynamic Standing Balance   Dynamic Standing-Balance Support Unilateral upper extremity supported   Dynamic Standing-Balance Lateral lean;Reaching for objects;Reaching across midline  (posterior)   Dynamic Standing-Comments Min>Mod (A) viries during functional activities   Activity Tolerance   Activity Tolerance Patient tolerated treatment well   After Treatment   Patient Position After Treatment Seated in wheelchair   Patient after treatment left call button in reach  (posey belt intact)   Assessment   Prognosis Good   Problem List Decreased Self Care skills;Decreased upper extremity range of motion;Decreased upper extremity strength;Decreased safe judgment during ADL;Decreased cognition;Decreased endurance;Decreased fine motor control;Decreased functional mobility;Decreased gross motor control;Decreased IADLs;Decreased Function of right upper extremity;Decreased Function of left upper extremity;Decreased trunk control for functional activities   Assessment Pt particiapted well in tx session but remains with decreased (I) with ADLs and functional balance. Pt would continue ot benefit from skilled OT services.    Level of Motivation/Participation Good   Long Term Goals   Pt Will Transfer Bed/Chair With contact guard assistance   Transfer Bed/Chair - Met/Not Met Not Met   Pt Will Transfer To Bedside Commode With contact guard assist   Transfer Bedside Commode -Met/Not Met Not Met   Pt Will Transfer To Shower With contact guard assist   Transfer to Shower-Met/Not Met Not Met   Pt Will Perform Eating Independently   Eating - Met/Not Met Met   Pt Will Perform Grooming Independently   Grooming - Met/Not Met Not Met   Pt Will Perform Bathing With contact guard assistance   Bathing - Met/Not Met Not Met   Pt Will Perform UE Dressing Independently   UE Dressing - Met/Not Met Not Met   Pt Will Perform LE Dressing With minimal assistance   LE Dressing - Met/Not Met Met   Pt Will Perform Toileting With contact guard assistance   Toileting-Met/Not Met Not Met   Discharge Recommendations   Equipment Needed After Discharge none   Discharge Facility/Level Of Care Needs home with home health   Plan   Plan Continue with current plan   Therapy Frequency 2 times/day   Occupational Therapy Follow-up   OT Follow-up? Yes   Treatment/Billable Minutes   Self Care/Home Management 47   Total Time 47       SHAUNA Bland  8/21/2017    LEGEND:   CGA: Contact Guard Assist   EOB: Edgeof Bed   HHA: Hand Held Assist   HOB: Head of Bed   (I): Independent-patient performs task in a timely manner   Max (A): Maximal Assist-patient performs 25-49% of task   Min (A): Minimal Assist- patient performs 75% or more of task   Mod (A): Moderate Assist- patient performs 50-74% of task   NA: Not applicable   NT: Not tested   OOB: Out of Bed   PTA: Prior to admit   QC: Quad Cane   RW: Rolling Walker   (S): Supervision- patient requires cues, coaxing, prompting   SBA: Stand By Assist   SC: Straight Cane   SW: Standard Walker   TBA: To be assessed   Total (A): Total Assist- patient performs less than 25% of task   WC: Wheelchair   WFL: Within Functional Limits    WNL: Within Normal Limits

## 2017-08-21 NOTE — ASSESSMENT & PLAN NOTE
Stable    8/14:  CRT 1.7 -- d/c fluid restriction  8/17:  CRT 1.9 -- encourage fluids to 1600cc/day  8/21:  CRT 1.8

## 2017-08-21 NOTE — PROGRESS NOTES
[facility]  Physical Medicine & Rehab  Progress Note    Patient Name: Kev Vasquez  MRN: [id number]  Patient Class: IP- Rehab   Admission Date: 8/8/2017  Length of Stay: 13 days  Attending Physician: Rashad Garcia MD  Primary Care Provider: Mega Collins MD    Subjective:     Principal Problem:Gait instability    Interval History: Pt without new c/o's.  I have reviewed the HPI and PMFSH and there are no changes from admission.        Scheduled Medications:    allopurinol  300 mg Oral Daily    amlodipine  10 mg Oral Daily    aspirin  81 mg Oral Daily    atorvastatin  10 mg Oral Daily    buPROPion  300 mg Oral Daily    calcium-vitamin D3  1 tablet Oral BID    ceFEPime (MAXIPIME) IVPB  2 g Intravenous Q12H    docusate sodium  100 mg Oral BID    dofetilide  250 mcg Oral Q12H    folic acid  1 mg Oral Daily    hydrALAZINE  75 mg Oral Q8H    lisinopril  10 mg Oral BID    magnesium oxide  400 mg Oral BID    multivitamin  1 tablet Oral Daily    ropinirole  0.5 mg Oral TID    sodium chloride 0.9%  10 mL Intravenous Q6H    spironolactone  25 mg Oral Daily    tamsulosin  0.4 mg Oral Daily    warfarin  4 mg Oral Every Tues, Thurs, Sat    warfarin  4 mg Oral Every Sun    warfarin  6 mg Oral Every Mon, Wed, Fri       PRN Medications: acetaminophen, bisacodyl, diphenoxylate-atropine 2.5-0.025 mg, lactulose, magnesium hydroxide 400 mg/5 ml, ondansetron, polyethylene glycol, Flushing PICC Protocol **AND** sodium chloride 0.9% **AND** sodium chloride 0.9%, sodium phosphates, trazodone    Review of Systems   Constitutional: Negative for appetite change and fatigue.   Eyes: Negative for visual disturbance.   Respiratory: Negative for shortness of breath.    Cardiovascular: Negative for chest pain.   Gastrointestinal: Negative for constipation and diarrhea.   Genitourinary: Negative for dysuria, frequency and urgency.   Musculoskeletal: Positive for gait problem. Negative for arthralgias,  back pain, joint swelling, myalgias, neck pain and neck stiffness.   Neurological: Positive for tremors and weakness. Negative for dizziness, numbness and headaches.   Psychiatric/Behavioral: Negative for dysphoric mood.   All other systems reviewed and are negative.    Objective:     Vital Signs (Most Recent):  Temp: 98.4 °F (36.9 °C) (08/21/17 0700)  Pulse: 62 (08/21/17 0700)  Resp: 18 (08/21/17 0700)  BP: (!) 108/0 (08/21/17 0700)  SpO2: 95 % (08/21/17 0700)    Vital Signs (24h Range):  Temp:  [98.3 °F (36.8 °C)-98.4 °F (36.9 °C)] 98.4 °F (36.9 °C)  Pulse:  [62-70] 62  Resp:  [16-18] 18  SpO2:  [95 %-97 %] 95 %  BP: ()/(0) 108/0     Physical Exam   Constitutional: He is oriented to person, place, and time. He appears well-nourished.   Eyes: EOM are normal. Pupils are equal, round, and reactive to light.   Neck: Normal range of motion. Neck supple.   Cardiovascular: Normal rate.    Pulmonary/Chest: Effort normal.   Musculoskeletal: Normal range of motion.   Neurological: He is oriented to person, place, and time. He has normal strength.   Skin: No rash noted.   Psychiatric: He has a normal mood and affect.     NEUROLOGICAL EXAMINATION:     MENTAL STATUS   Oriented to person, place, and time.     CRANIAL NERVES     CN III, IV, VI   Pupils are equal, round, and reactive to light.  Extraocular motions are normal.     MOTOR EXAM     Strength   Strength 5/5 throughout.       Assessment/Plan:      Kev Vasquez is a 76 y.o. male admitted to inpatient rehabilitation on 8/8/2017 for Gait instability with impaired mobility and ADLs. Patient remains appropriate for PT, OT, and as required Speech therapy. Patient continues to require 24 hour nursing care as well as daily Physician assessment.    * Gait instability    Sit-->stand CGA (improved), T/F CGA (improved), gait 105' CGA/MNA RW (improved), limited by impaired trunk balance.  UBD SUP (improved), LBD MNA (improved), toileting MNA (improved) on 8/21.      Mild  "cognitive deficits -- in the most recent ST session:  "Pt completed orientation task to time utilizing visual aid with supervision and 90% acc. Pt completed mental manipulation task of F=3 according to progression with 80% acc indly, given minimal verbal cues with repetitions of information, pt with increase to 90% acc. Word deduction task completed of thought organization with 50% acc indly, given moderate verbal cues for reasoning pt with increase to 100% acc."          Chronic systolic heart failure    Compensated with LVAD    8/8:   INR 2.7  8/10: INR 2.5 -- check 8/14 8/14: INR 2.6  8/17: INR 2.5  8/21: INR 2.4        Stage 2 chronic kidney disease    Stable    8/14:  CRT 1.7 -- d/c fluid restriction  8/17:  CRT 1.9 -- encourage fluids to 1600cc/day  8/21:  CRT 1.8        Complication involving left ventricular assist device (LVAD)    Still with some drainage -- monitor.  On cefepime through 9/23.               DISCHARGE PLANNING:  Tentative Discharge Date: 8/24/2017    Future Appointments  Date Time Provider Department Center   8/23/2017 12:00 PM LAB, APPOINTMENT [facility] LAB [facility]   8/23/2017 1:30 PM HEARTTRANSPLANT, LVADN [facility]   8/23/2017 1:30 PM Carlito Santana MD [facility]   9/12/2017 2:30 PM Payam Muhammad MD [facility] ID [facility]   10/26/2017 8:00 AM [facility] MONITOR DEVICE CHECK[facility]  Department of Physical Medicine & Rehab   [facility]  "

## 2017-08-21 NOTE — PROGRESS NOTES
"Physical Therapy   Treatment/Weekly Reassessment    Kev aVsquez   MRN: [id number]      08/21/17 1259   PT Time Calculation   PT Start Time 1259   PT Stop Time 1429   PT Total Time (min) 90 min   Treatment   Treatment Type Treatment  (Weekly Reassessment)   PT/PTA PT   General   PT Received On 08/21/17   Family/Caregiver Present Yes  (Pt's wife present)   Patient Found (position) Seated in wheelchair   Pt found with (posey belt donned, LVAD, extra battery pack)   Precautions   General Precautions LVAD;fall   Orthopedic No   Required Braces or Orthoses No   Cardiovascular/Pulmonary LVAD   Visual/Auditory Vision impaired   Subjective   Patient states "I am not going to therapy until I talk to the VAD team"-- referring to pain at driveline site   Pain/Comfort   Pain Rating 1 4/10  (Pt reported pain at driveline insertion. Pt reported pain with movement.)   Location - Side 1 Left   Location - Orientation 1 lateral   Location 1 flank   Pain Addressed 1 Reposition;Distraction   Pain Rating Post-Intervention 1 4/10   Bed Mobility   Bed Mobility yes   Rolling/Turning to Left Supervision  (x 1 trial on mat )   Rolling/Turning Right Supervision  (x 1 trial on mat )   Supine to Sit Supervision   Supine to Sit Comments x 1 trial on mat, increased time required to complete transfer   Sit to Supine Supervision  (x 1 trial on mat )   Transfers   Transfer yes   Sit to Stand   Sit <> Stand Assistance Contact Guard Assistance;Stand By Assistance   Sit <> Stand Assistive Device Rolling Walker  (stair rails, parallel bars)   Trials/Comments Pt performed multiple trials of this transfer throughout PT treatment session. Posterior lean observed   Stand to Sit   Assistance Stand By Assistance;Contact Guard Assistance   Assistive Device Rolling Walker;Other (see comments)  (stair rails, parallel bars)   Trials/Comments Pt performed multiple trials of this transfer throughout PT treatment session, pt required verbal cues to decrease descent " into wheelchair. At times strong posterior lean observed during sitting which required assistance from PT to maintain upright position of wheelchair    Chair to Mat   Chair<> Mat Technique Stand Pivot   Chair<>Mat Assistance Contact Guard Assistance   Chair <> Mat Assistive Device No Assistive Device   Trials/Comments x 1 trial, verbal cues required for appropriate sequence of this transfer. Posterior lean observed.    Mat to Chair   Technique Stand Pivot   Assistance Contact Guard Assistance;Minimum Assistance   Assistive Device No Assistive Device   Trials/Comments x 1 trial, slight minimal assistance required for pt to maintain balance during pivot portion of transfer    Shower Transfer   Technique Stand Pivot   Assistance Minimum Assistance   Assistive Device Rolling Walker   Trials/Comments x 1 trial, pt required minimal assistance to maintain balance during transfer due to posterior lean.    Wheelchair Activities   Propulsion Yes   Propulsion Type 1 Manual   Level 1 Level tile   Method 1 Right upper extremity;Left upper extremity   Level of Assistance 1 Supervision   Description/ Details 1 Pt propelled self in wheelchair x 150 feet, increased time required to complete activity. Pt required verbal cues from PT for appropriate navigation around obstacles in gym    Gait   Gait Yes   Weight Bearing Status full   Gait 1   Surface 1 Level tile   Gait Assistive Device Rolling walker   Assistance 1 Minimum assistance   Gait Distance Pt ambulated x 2 trials with RW: 1st trial: 101 feet, 2nd trial: 132 feet. Posterior lean observed at times which required minimal assistance to maintain balance. 2 episods of LOB occurred during ambulation trials which required assistance from PT to maintain balance. Pt required minimal assistance to maintain TOMY inside RW, especially during negotiation of turns.    Gait Pattern 3-point gait   Gait Deviation(s) decreased william;decreased velocity of limb motion;decreased step  "length;decreased stride length;decreased toe-to-floor clearance;decreased weight-shifting ability;forward lean   Impairments Contributing to Gait Deviations impaired balance;decreased strength;impaired postural control;impaired motor control;decreased flexibility;other (see comments)  (decreased safety awareness, no balance reactions during LOB)   Stairs   Stairs Yes   Stairs/Curb Step   Rails 1 Bilateral   Device 1 No device   Assistance 1 Contact guard   Comment/# Steps 1 Pt ascended 8 6" steps x 1 trial with intermittent minimal assistance required while descending stairs. Pt required seated rest break following trial due to fatigue.    Stairs/ Curb Step  2   Rails 2 None (Comment)   Device 2 Rolling walker   Assistance 2 Minimum assistance   Comment/# Steps 2 Pt negotiated 4" curb step x 1 trial, minimal assistance required for management of RW up onto curb step and to maintain TOMY inside RW    Equipment Use   Comments Pt tolerated 15 minutes of LBE under supervision of this PT in order to increase pt's B LE strength, B LE coordination, and to improve pt's overall functional endurance for activity    Other Activities   Comments Patient performed stand pivot transfer from wheelchair to bedside commode with no AD and CGA/minimal assistance required to maintain balance due to posterior lean.    Activity Tolerance   Activity Tolerance Patient tolerated treatment well   After Treatment   Patient Position After Treatment Seated in wheelchair   Patient after treatment left call button in reach  (posey belt donned)   Assessment   Prognosis Fair   Problem List Decreased strength;Decreased range of motion;Decreased endurance;Impaired balance;Decreased mobility;Decreased cognition;Decreased safety awareness;Impaired judgement;Pain   Session Assessment progressing toward goals   Assessment Pt required increased motivation from PT to participate in treatment session, pt was not willing to participate initally. Pt continues " "to demonstrate strong posterior lean which required minimal assistance from PT to maintain balance during ambulation trials. Pt will require 24 hour supervision and assistance upon D/C. Pt was able 6 of 12 short term goals for physical therapy. Pt's long term goals were revised and reflect pt's current functional level. These revised long term goals remain appropriate for pt's next 7 days on IP rehab.    Level of Motivation/Participation good   Barriers to Discharge Inaccessible home environment   Barriers to Discharge Comments steps within home, pt will have 24 hour care at home   Short Term Goals   Supine to Sit-Met/Not Met Met   Sit to Supine - Met/Not Met Met   Logroll - Met/Not Met Met   Transfer Sit to stand - Met/Not Met Not Met   Transfer Bed/Chair - Met/Not Met Met   Ambulate - Met/Not Met Not met   Go Up/Down Stairs - Met/Not Met Not met   Go Up/Down Curb- Met/Not Met Not Met   Tolerate Exercise - Met/Not Met Met   Propel Wheelchair - Met/Not Met Met   Other Goal Not met    Other Goal 2 Not met    Long Term Goals   Pt Will Transfer Sit to Stand Revised goal;With RW;With contact guard assistance;With stand by assist   Pt Will Transfer Bed/Chair Revised goal;Stand Pivot;With contact guard assistance  (with or without RW)   Pt Will Ambulate Revised goal;101-150 feet;With RW;With contact guard assistance   Pt Will Go Up / Down Stairs New goal;1 flight;With Bilateral Rails;With stand by assist   Pt Will Go Up / Down Curb Step New goal;4" curb step;With RW;With contact guard assist   Propel Wheelchair - Met/Not Met Met   Other Goal Pt will perform stand pivot transfer from wheelchair to bedside commode with or without RW and CGA   Other Goal 2 Pt will perform stand pivot/step over transfer into walk in shower with or without RW and CGA   Discharge Recommendations   Equipment Needed After Discharge none   Discharge Facility/Level Of Care Needs home health PT   Plan   Planned Therapy Intervention Continue with " current plan   Therapy Frequency daily;Monday-Friday;Saturday or Sunday   Physical Therapy Follow-up   PT Follow-up? Yes   PT - Next Visit Date 08/22/17   Treatment/Billable Minutes   Gait training 15   Therapeutic Activity 40   Therapeutic Exercise 35   Total Time 90       Sophie Daigle, PT  8/21/2017

## 2017-08-21 NOTE — ASSESSMENT & PLAN NOTE
"Sit-->stand CGA (improved), T/F CGA (improved), gait 105' CGA/MNA RW (improved), limited by impaired trunk balance.  UBD SUP (improved), LBD MNA (improved), toileting MNA (improved) on 8/21.      Mild cognitive deficits -- in the most recent ST session:  "Pt completed orientation task to time utilizing visual aid with supervision and 90% acc. Pt completed mental manipulation task of F=3 according to progression with 80% acc indly, given minimal verbal cues with repetitions of information, pt with increase to 90% acc. Word deduction task completed of thought organization with 50% acc indly, given moderate verbal cues for reasoning pt with increase to 100% acc."    "

## 2017-08-21 NOTE — SUBJECTIVE & OBJECTIVE
Interval History: Pt without new c/o's.  I have reviewed the HPI and PMFSH and there are no changes from admission.        Scheduled Medications:    allopurinol  300 mg Oral Daily    amlodipine  10 mg Oral Daily    aspirin  81 mg Oral Daily    atorvastatin  10 mg Oral Daily    buPROPion  300 mg Oral Daily    calcium-vitamin D3  1 tablet Oral BID    ceFEPime (MAXIPIME) IVPB  2 g Intravenous Q12H    docusate sodium  100 mg Oral BID    dofetilide  250 mcg Oral Q12H    folic acid  1 mg Oral Daily    hydrALAZINE  75 mg Oral Q8H    lisinopril  10 mg Oral BID    magnesium oxide  400 mg Oral BID    multivitamin  1 tablet Oral Daily    ropinirole  0.5 mg Oral TID    sodium chloride 0.9%  10 mL Intravenous Q6H    spironolactone  25 mg Oral Daily    tamsulosin  0.4 mg Oral Daily    warfarin  4 mg Oral Every Tues, Thurs, Sat    warfarin  4 mg Oral Every Sun    warfarin  6 mg Oral Every Mon, Wed, Fri       PRN Medications: acetaminophen, bisacodyl, diphenoxylate-atropine 2.5-0.025 mg, lactulose, magnesium hydroxide 400 mg/5 ml, ondansetron, polyethylene glycol, Flushing PICC Protocol **AND** sodium chloride 0.9% **AND** sodium chloride 0.9%, sodium phosphates, trazodone    Review of Systems   Constitutional: Negative for appetite change and fatigue.   Eyes: Negative for visual disturbance.   Respiratory: Negative for shortness of breath.    Cardiovascular: Negative for chest pain.   Gastrointestinal: Negative for constipation and diarrhea.   Genitourinary: Negative for dysuria, frequency and urgency.   Musculoskeletal: Positive for gait problem. Negative for arthralgias, back pain, joint swelling, myalgias, neck pain and neck stiffness.   Neurological: Positive for tremors and weakness. Negative for dizziness, numbness and headaches.   Psychiatric/Behavioral: Negative for dysphoric mood.   All other systems reviewed and are negative.    Objective:     Vital Signs (Most Recent):  Temp: 98.4 °F (36.9 °C)  (08/21/17 0700)  Pulse: 62 (08/21/17 0700)  Resp: 18 (08/21/17 0700)  BP: (!) 108/0 (08/21/17 0700)  SpO2: 95 % (08/21/17 0700)    Vital Signs (24h Range):  Temp:  [98.3 °F (36.8 °C)-98.4 °F (36.9 °C)] 98.4 °F (36.9 °C)  Pulse:  [62-70] 62  Resp:  [16-18] 18  SpO2:  [95 %-97 %] 95 %  BP: ()/(0) 108/0     Physical Exam   Constitutional: He is oriented to person, place, and time. He appears well-nourished.   Eyes: EOM are normal. Pupils are equal, round, and reactive to light.   Neck: Normal range of motion. Neck supple.   Cardiovascular: Normal rate.    Pulmonary/Chest: Effort normal.   Musculoskeletal: Normal range of motion.   Neurological: He is oriented to person, place, and time. He has normal strength.   Skin: No rash noted.   Psychiatric: He has a normal mood and affect.     NEUROLOGICAL EXAMINATION:     MENTAL STATUS   Oriented to person, place, and time.     CRANIAL NERVES     CN III, IV, VI   Pupils are equal, round, and reactive to light.  Extraocular motions are normal.     MOTOR EXAM     Strength   Strength 5/5 throughout.

## 2017-08-21 NOTE — PROGRESS NOTES
Speech Therapy  Group Treatment  Progress Note  Kev Vasquez   MRN: [id number]     Short Term Goals   Goal 1 Pt will complete concrete convergent/divergent naming tasks given supervision with 90% acc.  MET   Goal 2 Pt will complete problem solving tasks given min verbal cues with 75% acc of home/hospital environment. MET   Goal 3 Pt will complete reasoning/sequencing tasks given min verbal cues with 75% acc.  CONTINUE   Goal 4 Pt will be oriented x4 given min verbal cues with 80% acc.  MET   Goal 5 Pt will complete recent/functional recall tasks given min verbal cues with 75% acc.  CONTINUE   Long Term Goals   Goal 1 Pt will complete concrete convergent/divergent naming tasks with modified independence with % acc.  COTNINUE   Goal 2 Pt will complete problem solving tasks given supervision with 90% acc of home/hospital environment.  CONTINUE   Goal 3 Pt will complete reasoning/sequencing tasks given supervision with 90% acc.   Goal 4 Pt will be oriented x4 given supervision with 90% acc. CONTINUE   Goal 5 Pt will complete recent/functional recall tasks given supervision with 90% acc.            08/21/17 1030   Speech Time Calculation   Speech Start Time 1030   Speech Stop Time 1115   Speech Total (min) 45 min   General Information   SLP Treatment Date 08/21/17   General Observations Pt participated in language group treatment   Pain/Comfort   Pain Rating no pain       SLP Treatment: Verbal Expression   Treatment Detail (SLP Verbal Expression) Patient participated in a 45 minute verbal thought formation group.  The group activity assessed and trained improved efficiency and accuracy with the patients ability to express their wants and needs in a clear, organized manner.  The patient described semantic features of items with 85% accuracy with minimal verbal cues to improve efficiency and accuracy of verbal expression/thought organization.  The patient recalled instructions for completing the task with 95% accuracy  with supervision.  The patient attended to task for 45 minutes with supervision.  The patient interacted appropriately with members of the group and leader with _100% consistency with independence.   Treatment/Billable Minutes   Speech Therapy Individual 45   Total Time 45     Pt with continued progress towards stated goals. Pt will require 24 hour supervision at home with decreased problem solving skills and safety awareness.     FIM Scores  Comprehension:6  Expression: 5  Social Interaction:6  Problem Solvin  Memory: 3     Comprehension:  Complex= humor, finances, rationale for medical treatment(hip precautions, pressure relief)  Basic= pain, hunger, thirst, bathroom needs, cold, nutrition, sleep     Understands complex/abstract conversation/directions with extra time, assistance device(glasses, if visual mode or both; hearing aide if auditory mode or both) (6)     Expression:  Expresses basic needs or ideas 90% of the time-mild word finding deficits  (5)     Social Interaction:  Interacts appropriately with others with medication or extra time(anti-anxiety, antidepressant)  (6)     Problem Solving:  Solves basic problems 75-89% of the time  (4)     Memory:  Recognizes, recalls, or executes 50-74% of time 2 steps of 2 step request (3)    Enedina Nguyễn CCC-SLP  2017

## 2017-08-22 PROCEDURE — A4216 STERILE WATER/SALINE, 10 ML: HCPCS | Performed by: PHYSICIAN ASSISTANT

## 2017-08-22 PROCEDURE — 25000003 PHARM REV CODE 250: Performed by: PHYSICAL MEDICINE & REHABILITATION

## 2017-08-22 PROCEDURE — 63600175 PHARM REV CODE 636 W HCPCS: Performed by: PHYSICIAN ASSISTANT

## 2017-08-22 PROCEDURE — 97116 GAIT TRAINING THERAPY: CPT

## 2017-08-22 PROCEDURE — 97110 THERAPEUTIC EXERCISES: CPT

## 2017-08-22 PROCEDURE — 92508 TX SP LANG VOICE COMM GROUP: CPT

## 2017-08-22 PROCEDURE — 99232 SBSQ HOSP IP/OBS MODERATE 35: CPT | Mod: ,,, | Performed by: PHYSICAL MEDICINE & REHABILITATION

## 2017-08-22 PROCEDURE — 97530 THERAPEUTIC ACTIVITIES: CPT

## 2017-08-22 PROCEDURE — 12800000 HC REHAB SEMI-PRIVATE ROOM

## 2017-08-22 PROCEDURE — 97112 NEUROMUSCULAR REEDUCATION: CPT

## 2017-08-22 PROCEDURE — 25000003 PHARM REV CODE 250: Performed by: PHYSICIAN ASSISTANT

## 2017-08-22 RX ORDER — CEFEPIME HYDROCHLORIDE 2 G/50ML
2 INJECTION, SOLUTION INTRAVENOUS EVERY 12 HOURS
Qty: 3000 ML | Refills: 1 | OUTPATIENT
Start: 2017-08-22 | End: 2017-08-24 | Stop reason: HOSPADM

## 2017-08-22 RX ADMIN — Medication 10 ML: at 06:08

## 2017-08-22 RX ADMIN — HYDRALAZINE HYDROCHLORIDE 75 MG: 50 TABLET ORAL at 09:08

## 2017-08-22 RX ADMIN — Medication 400 MG: at 09:08

## 2017-08-22 RX ADMIN — ATORVASTATIN CALCIUM 10 MG: 10 TABLET, FILM COATED ORAL at 09:08

## 2017-08-22 RX ADMIN — Medication 10 ML: at 12:08

## 2017-08-22 RX ADMIN — AMLODIPINE BESYLATE 10 MG: 10 TABLET ORAL at 09:08

## 2017-08-22 RX ADMIN — OYSTER SHELL CALCIUM WITH VITAMIN D 1 TABLET: 500; 200 TABLET, FILM COATED ORAL at 08:08

## 2017-08-22 RX ADMIN — DOCUSATE SODIUM 100 MG: 100 CAPSULE, LIQUID FILLED ORAL at 09:08

## 2017-08-22 RX ADMIN — ROPINIROLE 0.5 MG: 0.5 TABLET, FILM COATED ORAL at 05:08

## 2017-08-22 RX ADMIN — TAMSULOSIN HYDROCHLORIDE 0.4 MG: 0.4 CAPSULE ORAL at 09:08

## 2017-08-22 RX ADMIN — LACTULOSE 20 G: 20 SOLUTION ORAL at 09:08

## 2017-08-22 RX ADMIN — ROPINIROLE 0.5 MG: 0.5 TABLET, FILM COATED ORAL at 02:08

## 2017-08-22 RX ADMIN — ASPIRIN 81 MG CHEWABLE TABLET 81 MG: 81 TABLET CHEWABLE at 09:08

## 2017-08-22 RX ADMIN — ROPINIROLE 0.5 MG: 0.5 TABLET, FILM COATED ORAL at 09:08

## 2017-08-22 RX ADMIN — WARFARIN SODIUM 4 MG: 4 TABLET ORAL at 05:08

## 2017-08-22 RX ADMIN — THERA TABS 1 TABLET: TAB at 09:08

## 2017-08-22 RX ADMIN — HYDRALAZINE HYDROCHLORIDE 75 MG: 50 TABLET ORAL at 05:08

## 2017-08-22 RX ADMIN — ALLOPURINOL 300 MG: 100 TABLET ORAL at 09:08

## 2017-08-22 RX ADMIN — LISINOPRIL 10 MG: 10 TABLET ORAL at 09:08

## 2017-08-22 RX ADMIN — DOFETILIDE 250 MCG: 0.12 CAPSULE ORAL at 08:08

## 2017-08-22 RX ADMIN — CEFEPIME HYDROCHLORIDE 2 G: 2 INJECTION, SOLUTION INTRAVENOUS at 04:08

## 2017-08-22 RX ADMIN — Medication 400 MG: at 08:08

## 2017-08-22 RX ADMIN — CEFEPIME HYDROCHLORIDE 2 G: 2 INJECTION, SOLUTION INTRAVENOUS at 03:08

## 2017-08-22 RX ADMIN — POLYETHYLENE GLYCOL 3350 17 G: 17 POWDER, FOR SOLUTION ORAL at 09:08

## 2017-08-22 RX ADMIN — BUPROPION HYDROCHLORIDE 300 MG: 150 TABLET, EXTENDED RELEASE ORAL at 09:08

## 2017-08-22 RX ADMIN — FOLIC ACID 1 MG: 1 TABLET ORAL at 09:08

## 2017-08-22 RX ADMIN — LISINOPRIL 10 MG: 10 TABLET ORAL at 08:08

## 2017-08-22 RX ADMIN — DOFETILIDE 250 MCG: 0.12 CAPSULE ORAL at 09:08

## 2017-08-22 RX ADMIN — OYSTER SHELL CALCIUM WITH VITAMIN D 1 TABLET: 500; 200 TABLET, FILM COATED ORAL at 09:08

## 2017-08-22 RX ADMIN — SPIRONOLACTONE 25 MG: 25 TABLET, FILM COATED ORAL at 09:08

## 2017-08-22 RX ADMIN — HYDRALAZINE HYDROCHLORIDE 75 MG: 50 TABLET ORAL at 02:08

## 2017-08-22 RX ADMIN — DOCUSATE SODIUM 100 MG: 100 CAPSULE, LIQUID FILLED ORAL at 08:08

## 2017-08-22 RX ADMIN — ONDANSETRON 8 MG: 8 TABLET, ORALLY DISINTEGRATING ORAL at 12:08

## 2017-08-22 NOTE — PLAN OF CARE
Problem: Patient Care Overview  Goal: Plan of Care Review  Outcome: Ongoing (interventions implemented as appropriate)  Pt care plan updated and individualized as needed and progress continues.  See associated flowsheets for relevant documentation. Frequent rounds made per protocol to maintain pt safety and meet pt needs.  Continuing to monitor and follow up as needed.      Problem: Infection, Risk/Actual (Adult)  Goal: Infection Prevention/Resolution  Patient will demonstrate the desired outcomes by discharge/transition of care.   Outcome: Ongoing (interventions implemented as appropriate)  Continues on IV antibiotics as ordered.  Dressing clean, dry, and intact to VAD site.    Problem: Fall Risk (Adult)  Goal: Absence of Falls  Patient will demonstrate the desired outcomes by discharge/transition of care.   Outcome: Ongoing (interventions implemented as appropriate)  Pt remains free from falls or trauma thus far this shift.      Problem: Pressure Ulcer Risk (Candelario Scale) (Adult,Obstetrics,Pediatric)  Goal: Skin Integrity  Patient will demonstrate the desired outcomes by discharge/transition of care.   Outcome: Ongoing (interventions implemented as appropriate)  Pt turns and repositions as needed to maintain skin integrity.  No breakdown noted.      Problem: Ventricular Assist Device (Adult)  Goal: Signs and Symptoms of Listed Potential Problems Will be Absent, Minimized or Managed (Ventricular Assist Device)  Signs and symptoms of listed potential problems will be absent, minimized or managed by discharge/transition of care (reference Ventricular Assist Device (Adult) CPG).   Outcome: Ongoing (interventions implemented as appropriate)  Maintaining IV antibiotics as ordered.

## 2017-08-22 NOTE — PROGRESS NOTES
Speech Therapy   Cognitive Group Treatment    Kev Vasquez   MRN: [id number]            08/22/17 1410   Speech Time Calculation   Speech Start Time 1410   Speech Stop Time 1455   Speech Total (min) 45 min   General Information   SLP Treatment Date 08/22/17   General Observations Patient seen for cognitive group therapy session.   General Precautions LVAD;fall   Visual/Auditory Vision impaired       SLP Cognitive Treatment   Treatment Detail (SLP Cognitive Treatment) Patient participated in a 45 minute functional cognitive task. The group activity focused on attention, reasoning, short term memory, sequencing, and auditory comprehension. Additionally, group discussion (when participating allows for functional carryover and self-monitoring of memory strategies/skills, reasoning, attention, and sequencing. The patient was able to use short term/cognitive strategies with min assistance. Pt was able to attend to tasks within a group setting for 45 minutes with min assistance. These activities were performed in a group setting to encourage increased participation with peers and social interaction.   Discharge Recommendations   Discharge Facility/Level Of Care Needs home health speech therapy   Plan   Plan Continue with current plan   SLP Follow-up   SLP Follow-up? Yes   Treatment/Billable Minutes   Speech Therapy Individual 45   Total Time 45       Felisha Hill, CF-SLP  8/22/2017

## 2017-08-22 NOTE — PROGRESS NOTES
[facility]  Physical Medicine & Rehab  Progress Note    Patient Name: Kev Vasquez  MRN: [id number]  Patient Class: IP- Rehab   Admission Date: 8/8/2017  Length of Stay: 14 days  Attending Physician: Rashad Garcia MD  Primary Care Provider: Mega Collins MD    Subjective:     Principal Problem:Gait instability    Interval History: Pt without new c/o's.  I have reviewed the HPI and PMFSH and there are no changes from admission.        Scheduled Medications:    allopurinol  300 mg Oral Daily    amlodipine  10 mg Oral Daily    aspirin  81 mg Oral Daily    atorvastatin  10 mg Oral Daily    buPROPion  300 mg Oral Daily    calcium-vitamin D3  1 tablet Oral BID    ceFEPime (MAXIPIME) IVPB  2 g Intravenous Q12H    docusate sodium  100 mg Oral BID    dofetilide  250 mcg Oral Q12H    folic acid  1 mg Oral Daily    hydrALAZINE  75 mg Oral Q8H    lisinopril  10 mg Oral BID    magnesium oxide  400 mg Oral BID    multivitamin  1 tablet Oral Daily    ropinirole  0.5 mg Oral TID    sodium chloride 0.9%  10 mL Intravenous Q6H    spironolactone  25 mg Oral Daily    tamsulosin  0.4 mg Oral Daily    warfarin  4 mg Oral Every Tues, Thurs, Sat    warfarin  4 mg Oral Every Sun    warfarin  6 mg Oral Every Mon, Wed, Fri       PRN Medications: acetaminophen, bisacodyl, diphenoxylate-atropine 2.5-0.025 mg, lactulose, magnesium hydroxide 400 mg/5 ml, ondansetron, polyethylene glycol, Flushing PICC Protocol **AND** sodium chloride 0.9% **AND** sodium chloride 0.9%, sodium phosphates, trazodone    Review of Systems   Constitutional: Negative for appetite change and fatigue.   Eyes: Negative for visual disturbance.   Respiratory: Negative for shortness of breath.    Cardiovascular: Negative for chest pain.   Gastrointestinal: Negative for constipation and diarrhea.   Genitourinary: Negative for dysuria, frequency and urgency.   Musculoskeletal: Positive for gait problem. Negative for arthralgias,  back pain, joint swelling, myalgias, neck pain and neck stiffness.   Neurological: Positive for tremors and weakness. Negative for dizziness, numbness and headaches.   Psychiatric/Behavioral: Negative for dysphoric mood.   All other systems reviewed and are negative.    Objective:     Vital Signs (Most Recent):  Temp: 98.3 °F (36.8 °C) (08/22/17 0718)  Pulse: 62 (08/22/17 0718)  Resp: 18 (08/22/17 0718)  BP: (!) 88/0 (08/22/17 0718)  SpO2: (!) 93 % (08/22/17 0718)    Vital Signs (24h Range):  Temp:  [98.3 °F (36.8 °C)-98.4 °F (36.9 °C)] 98.3 °F (36.8 °C)  Pulse:  [62-64] 62  Resp:  [16-18] 18  SpO2:  [93 %-95 %] 93 %  BP: ()/(0) 88/0     Physical Exam   Constitutional: He is oriented to person, place, and time. He appears well-nourished.   Eyes: EOM are normal. Pupils are equal, round, and reactive to light.   Neck: Normal range of motion. Neck supple.   Cardiovascular: Normal rate.    Pulmonary/Chest: Effort normal.   Musculoskeletal: Normal range of motion.   Neurological: He is oriented to person, place, and time. He has normal strength.   Skin: No rash noted.   Psychiatric: He has a normal mood and affect.     NEUROLOGICAL EXAMINATION:     MENTAL STATUS   Oriented to person, place, and time.     CRANIAL NERVES     CN III, IV, VI   Pupils are equal, round, and reactive to light.  Extraocular motions are normal.     MOTOR EXAM     Strength   Strength 5/5 throughout.       Assessment/Plan:      Kev Vasquez is a 76 y.o. male admitted to inpatient rehabilitation on 8/8/2017 for Gait instability with impaired mobility and ADLs. Patient remains appropriate for PT, OT, and as required Speech therapy. Patient continues to require 24 hour nursing care as well as daily Physician assessment.    * Gait instability    Sit-->stand SBA/CGA (improved), T/F CGA (improved), gait 101', 132' MNA RW (improved), limited by impaired trunk balance.  UBD SUP (improved), LBD MNA (improved), toileting MNA (improved) on 8/21.   "    Mild cognitive deficits -- in the most recent ST session:  "The patient described semantic features of items with 85% accuracy with minimal verbal cues to improve efficiency and accuracy of verbal expression/thought organization.  The patient recalled instructions for completing the task with 95% accuracy with supervision.  The patient attended to task for 45 minutes with supervision."          Chronic systolic heart failure    Compensated with LVAD    8/8:   INR 2.7  8/10: INR 2.5 -- check 8/14 8/14: INR 2.6  8/17: INR 2.5  8/21: INR 2.4        Stage 2 chronic kidney disease    Stable    8/14:  CRT 1.7 -- d/c fluid restriction  8/17:  CRT 1.9 -- encourage fluids to 1600cc/day  8/21:  CRT 1.8        Complication involving left ventricular assist device (LVAD)    Still with some drainage -- monitor.  On cefepime through 9/23.   8/22:  No drainage noted.  Cont cefepime.              DISCHARGE PLANNING:  Tentative Discharge Date: 8/24/2017    Future Appointments  Date Time Provider Department Center   8/23/2017 12:00 PM LAB, APPOINTMENT [facility] LAB VNP [facility]   8/23/2017 1:30 PM HEARTTRANSPLANT, LVADN [facility]   8/23/2017 1:30 PM Carlito Santana MD [facility]   9/12/2017 2:30 PM Payam Muhammad MD [facility] ID [facility]   10/26/2017 8:00 AM HOME MONITOR DEVICE CHECK, [facility] myriam Garcia MD  Department of Physical Medicine & Rehab   [facility]  "

## 2017-08-22 NOTE — PROGRESS NOTES
Occupational Therapy  FIM SCORES    Kev Vasquez  MRN: [id number]  Room/Bed: E280/E280 B       08/22/17 1200   Transfers   Bed/Chair/WC 4   Self Care   Eating 7   Grooming 4   Bathing 4   Dressing-Upper 5   Dressing-Lower 4   Toileting 4       SHAUNA Bland  8/22/2017

## 2017-08-22 NOTE — TREATMENT PLAN
"Rehab Services' DME recommendations  DME to be delivered home unless otherwise specified.         [x] NO DME NEEDED ________________________________________________________________________    []Walker:(can only select one of these) []Adult (5'4"-6'6") []Ariel (4'4"-5'7")   [] Wide/ Heavy Duty         []Nigel                  []  Rollator                  [] knee walker   Accessories/Other:____________   Wheels:(must complete) [] Yes  [] No     []Crutches:  []Axillary []Loft strand    []Cane:              []Straight []Narrow based quad   []Wide based quad         [] Other:____________________________________________    [] Transfer Devices:   []Asha Lift    []Slide Board ( [] with cut out  []26  []29)    ______________________________________________________________________    []Wheelchair: (please check)  Number of hours up in wheelchair per day:_____________     Style:  [] Standard [] Pediatric  [] Light weight  []Reclining     []Amputee [] Nigel [] POV []Custom     Custom Vendor:________________________________________                                                      Seat Width: []18 (standard adult)    []16" (small adult)   []14(child)      [] 20  [] 22 [] 24         Seat Depth:   []16    []18  []20      Back Height:    []Standard      []Nigel          []Other     Leg Support: []Standard []Heel loops []Elevating leg rest    []Articulating  []Swing Away     Arm Height:  []Detachable []Full   [] Desk  []Swing Away [] Adjustable Height      Lap Belt: []Velcro []Buckle                               Accessories: [] Front brakes          [] Brake Extensions  []Anti-tippers                          []Safety Belt    Other:_______________________________________________     Cushion: []Basic []Foam []Gel  []Roho []Jv I       Justification for Cushion(Must complete):_______________________________     For wheelchair order: (please choose all that apply)  - Caregiver is capable and willing to operate wheelchair " safely. []  - Patients upper body strength is sufficient for propulsion. []   - The patient has a cast, brace, or musculoskeletal condition which prevents 90 degree flexion of the knee. []  - The patient has significant edema of the lower extremities that requires elevating leg rests.  []  - A reclining back is ordered. []  - The patient requires the use of a wheel chair for ADLs within the home. []  - Patient mobility limitations cannot be sufficiently resolved by the use of other ambulatory therapies.    []3 in 1 commode: []Standard     []Wide-Heavy Duty           []Drop arm                                      []Heavy Duty Drop Arm                              []Tub bench:  []Padded      [](Unpadded=standard)     []Commode Opening                                          [] Heavy Duty    []Shower Chair: []With back   []Without back      []Hospital Bed: []Semi Electric    []Manual    []Electric   []Heavy Duty  []Extra Heavy Duty(>600#)  Patient requires a bed height different than a fixed height hospital bed to permit transfers to chair, wheel chair or standing. []Yes         []N/A     []Accessories: [] Gel Overlay Mattress     []Low Air Mattress   []Alternating Pressure Pad                              []Trapeze    [] Hip Kit:  [] Short Horn     [] Long Horn     []Other:_____________________________________________________________    [x]Home Health:  [x]OT [x]PT [x]SLP   [] MSW   [] Aide    []SN        []Outpatient:  []OT []PT []SLP [] MSW   [] CM      [] Internal Referral      [x] External Referral  ________________________________________________________________________    Cliff Frye 8/22/2017

## 2017-08-22 NOTE — PROGRESS NOTES
Pt called to report he is having a lot of pain to his DLES.  I asked if I could call him back as soon as I get out of rounds, yes.  1345:  Tried calling pt back, unable to get in touch with him. Called his daughter, spoke with her.  She told me he is having a lot of pain and not getting any pain medicine.  Spoke with rehab staff, they will look into it and talk with pt.

## 2017-08-22 NOTE — PROGRESS NOTES
"Physical Therapy   Treatment    Kev Vasquez   MRN: [id number]      08/22/17 0959   PT Time Calculation   PT Start Time 0959   PT Stop Time 1045   PT Total Time (min) 46 min   Treatment   Treatment Type Treatment   PT/PTA PT   General   PT Received On 08/22/17   Family/Caregiver Present No   Patient Found (position) Seated in wheelchair   Pt found with (LVAD on wall power, posey belt donned)   Precautions   General Precautions LVAD;fall   Orthopedic No   Required Braces or Orthoses No   Cardiovascular/Pulmonary LVAD   Visual/Auditory Vision impaired   Subjective   Patient states "I am on the schedule for Thursday"   Pain/Comfort   Pain Rating 1 no pain   Pain Rating Post-Intervention 1 no pain   Transfers   Transfer yes   Sit to Stand   Sit <> Stand Assistance Contact Guard Assistance;Stand By Assistance   Sit <> Stand Assistive Device Rolling Walker;Other (see comments)  (parallel bars, stair rails)   Trials/Comments Pt performed multiple trials of this transfer throughout PT treatment session   Stand to Sit   Assistance Contact Guard Assistance   Assistive Device Rolling Walker;Other (see comments)  (stair rails, parallel bars)   Trials/Comments Pt performed multiple trials of this transfer throughout PT treatment session, pt required verbal cues to control descent into sitting in wheelchair    Wheelchair Activities   Propulsion Yes   Propulsion Type 1 Manual   Level 1 Level tile   Method 1 Right upper extremity;Left upper extremity   Level of Assistance 1 Supervision   Description/ Details 1 Pt propelled self in wheelchair x 157 feet, verbal cues from PT required for pt to maintain navigation around obstacles and to maintain attention to activity    Gait   Gait Yes   Weight Bearing Status full   Gait 1   Surface 1 Level tile   Gait Assistive Device Rolling walker   Assistance 1 Contact Guard Assistance;Minimum assistance   Gait Distance Pt ambulated x 2 trials with RW: 1st trial: 168 feet, 2nd trial: 172 feet. " "Intermittent R great toe dragging observed which required slight minimal assistance from PT to maintain balance during this activity. Difficulty maintaining appropriate    Gait Pattern 3-point gait   Gait Deviation(s) decreased william;increased time in double stance;decreased velocity of limb motion;decreased step length;decreased stride length;decreased weight-shifting ability;decreased toe-to-floor clearance;toe drag   Impairments Contributing to Gait Deviations impaired balance;decreased strength;impaired postural control;impaired motor control;impaired coordination   Stairs   Stairs Yes   Stairs/Curb Step   Rails 1 Bilateral   Device 1 No device   Assistance 1 Contact guard   Comment/# Steps 1 Pt ascended and descended 12 6" steps with CGA, verbal cues required for pt to maintain full foot placement on step   Stairs/ Curb Step  2   Rails 2 None (Comment)   Device 2 Rolling walker   Assistance 2 Minimum assistance;Contact Guard Assistance   Comment/# Steps 2 Pt negotiated 4" curb step with RW, pt required verbal cues and manual cues to assist with maintaining appropriate RW management and safety during transfer. Posterior lean observed when pt attempted to place RW up onto curb step which required minimal assistance from PT to maintain balance    Other Activities   Comments Patient performed high level ambulation in parallel bars including: tandem walking x 4 lengths forward, marching x 4 lengths (approximately 8 feet each length)    At beginning of PT treatment session, PT transferred pt's LVAD unit from wall power to battery power.    Activity Tolerance   Activity Tolerance Patient tolerated treatment well   After Treatment   Patient Position After Treatment Seated in wheelchair   Patient after treatment left call button in reach  (LVAD on battery power)   Assessment   Prognosis Fair   Problem List Decreased strength;Decreased endurance;Impaired balance;Decreased mobility;Decreased safety awareness;Impaired " judgement;Pain   Session Assessment progressing toward goals   Assessment Pt demonstrated decreased posterior lean and episodes of LOB during ambulation trials as compared to previous treatment sessions. Pt continues to require maximum verbal cues to control descent while sitting in wheelchair, at time pt sits unprompted into wheelchair. Pt will require 24 hour supervision and assistance upon D/C due to safety concerns and pt's poor safety awareness. Pt will continue to benefit from further skilled PT intervention in order to address these above impairments and to improve pt's functional independence with mobility.    Level of Motivation/Participation good   Barriers to Discharge Inaccessible home environment   Barriers to Discharge Comments steps within home, pt has 24 hour care   Discharge Recommendations   Equipment Needed After Discharge none   Discharge Facility/Level Of Care Needs home health PT   Plan   Planned Therapy Intervention Continue with current plan   Therapy Frequency daily;Monday-Friday;Saturday or Sunday   Physical Therapy Follow-up   PT Follow-up? Yes   PT - Next Visit Date 08/23/17   Treatment/Billable Minutes   Gait training 15   Therapeutic Activity 20   Neuromuscular Re-education 11   Total Time 46       Sophie Daigle, PT  8/22/2017

## 2017-08-22 NOTE — PROGRESS NOTES
Order for home IV abx faxed to [facility] [phone].  Pt scheduled to discharge home Thursday, 8/24/17.  SW following to assist with discharge needs.

## 2017-08-22 NOTE — SUBJECTIVE & OBJECTIVE
Interval History: Pt without new c/o's.  I have reviewed the HPI and PMFSH and there are no changes from admission.        Scheduled Medications:    allopurinol  300 mg Oral Daily    amlodipine  10 mg Oral Daily    aspirin  81 mg Oral Daily    atorvastatin  10 mg Oral Daily    buPROPion  300 mg Oral Daily    calcium-vitamin D3  1 tablet Oral BID    ceFEPime (MAXIPIME) IVPB  2 g Intravenous Q12H    docusate sodium  100 mg Oral BID    dofetilide  250 mcg Oral Q12H    folic acid  1 mg Oral Daily    hydrALAZINE  75 mg Oral Q8H    lisinopril  10 mg Oral BID    magnesium oxide  400 mg Oral BID    multivitamin  1 tablet Oral Daily    ropinirole  0.5 mg Oral TID    sodium chloride 0.9%  10 mL Intravenous Q6H    spironolactone  25 mg Oral Daily    tamsulosin  0.4 mg Oral Daily    warfarin  4 mg Oral Every Tues, Thurs, Sat    warfarin  4 mg Oral Every Sun    warfarin  6 mg Oral Every Mon, Wed, Fri       PRN Medications: acetaminophen, bisacodyl, diphenoxylate-atropine 2.5-0.025 mg, lactulose, magnesium hydroxide 400 mg/5 ml, ondansetron, polyethylene glycol, Flushing PICC Protocol **AND** sodium chloride 0.9% **AND** sodium chloride 0.9%, sodium phosphates, trazodone    Review of Systems   Constitutional: Negative for appetite change and fatigue.   Eyes: Negative for visual disturbance.   Respiratory: Negative for shortness of breath.    Cardiovascular: Negative for chest pain.   Gastrointestinal: Negative for constipation and diarrhea.   Genitourinary: Negative for dysuria, frequency and urgency.   Musculoskeletal: Positive for gait problem. Negative for arthralgias, back pain, joint swelling, myalgias, neck pain and neck stiffness.   Neurological: Positive for tremors and weakness. Negative for dizziness, numbness and headaches.   Psychiatric/Behavioral: Negative for dysphoric mood.   All other systems reviewed and are negative.    Objective:     Vital Signs (Most Recent):  Temp: 98.3 °F (36.8 °C)  (08/22/17 0718)  Pulse: 62 (08/22/17 0718)  Resp: 18 (08/22/17 0718)  BP: (!) 88/0 (08/22/17 0718)  SpO2: (!) 93 % (08/22/17 0718)    Vital Signs (24h Range):  Temp:  [98.3 °F (36.8 °C)-98.4 °F (36.9 °C)] 98.3 °F (36.8 °C)  Pulse:  [62-64] 62  Resp:  [16-18] 18  SpO2:  [93 %-95 %] 93 %  BP: ()/(0) 88/0     Physical Exam   Constitutional: He is oriented to person, place, and time. He appears well-nourished.   Eyes: EOM are normal. Pupils are equal, round, and reactive to light.   Neck: Normal range of motion. Neck supple.   Cardiovascular: Normal rate.    Pulmonary/Chest: Effort normal.   Musculoskeletal: Normal range of motion.   Neurological: He is oriented to person, place, and time. He has normal strength.   Skin: No rash noted.   Psychiatric: He has a normal mood and affect.     NEUROLOGICAL EXAMINATION:     MENTAL STATUS   Oriented to person, place, and time.     CRANIAL NERVES     CN III, IV, VI   Pupils are equal, round, and reactive to light.  Extraocular motions are normal.     MOTOR EXAM     Strength   Strength 5/5 throughout.

## 2017-08-22 NOTE — ASSESSMENT & PLAN NOTE
"Sit-->stand SBA/CGA (improved), T/F CGA (improved), gait 101', 132' MNA RW (improved), limited by impaired trunk balance.  UBD SUP (improved), LBD MNA (improved), toileting MNA (improved) on 8/21.      Mild cognitive deficits -- in the most recent ST session:  "The patient described semantic features of items with 85% accuracy with minimal verbal cues to improve efficiency and accuracy of verbal expression/thought organization.  The patient recalled instructions for completing the task with 95% accuracy with supervision.  The patient attended to task for 45 minutes with supervision."    "

## 2017-08-22 NOTE — TREATMENT PLAN
"Rehab Services' DME recommendations  DME to be delivered home unless otherwise specified.         [x] NO DME NEEDED ________________________________________________________________________    []Walker:(can only select one of these)  []Adult (5'4"-6'6") []Ariel (4'4"-5'7")                           [] Wide/ Heavy Duty         []Nigel                  []  Rollator                                                 [] knee walker       Accessories/Other:____________________________________     Wheels:(must complete) [] Yes  [] No     []Crutches:  []Axillary []Loft strand    []Cane:              []Straight []Narrow based quad   []Wide based quad         [] Other:____________________________________________    [] Transfer Devices:   []Asha Lift    []Slide Board ( [] with cut out  []26  []29)    ______________________________________________________________________    []Wheelchair: (please check)    Number of hours up in wheelchair per     day:_____________     Style:  [] Standard [] Pediatric  [] Light weight  []Reclining     []Amputee [] Nigel [] POV []Custom     Custom Vendor:________________________________________                                                      Seat Width: []18 (standard adult)    []16" (small adult)   []14(child)      [] 20  [] 22 [] 24         Seat Depth:   []16    []18  []20      Back Height:    []Standard      []Nigel          []Other     Leg Support: []Standard []Heel loops []Elevating leg rest    []Articulating              []Swing Away     Arm Height:  []Detachable []Full   [] Desk  []Swing Away [] Adjustable Height          Lap Belt: []Velcro []Buckle                               Accessories: [] Front brakes          [] Brake Extensions  []Anti-tippers                          []Safety Belt    Other:_______________________________________________     Cushion: []Basic []Foam []Gel  []Roho []Jv I      Justification for Cushion(Must " complete):_______________________________    ______________________________________________________________________        For wheelchair order: (please choose all that apply)  - Caregiver is capable and willing to operate wheelchair safely. []  - Patients upper body strength is sufficient for propulsion. []   - The patient has a cast, brace, or musculoskeletal condition which prevents 90 degree flexion of the knee. []  - The patient has significant edema of the lower extremities that requires elevating leg rests.  []  - A reclining back is ordered. []  - The patient requires the use of a wheel chair for ADLs within the home. []  - Patient mobility limitations cannot be sufficiently resolved by the use of other ambulatory therapies. []         __    []3 in 1 commode: []Standard     []Wide-Heavy Duty           []Drop arm                                      []Heavy Duty Drop Arm                              []Tub bench:  []Padded      [](Unpadded=standard)     []Commode Opening                                          [] Heavy Duty    []Shower Chair: []With back   []Without back      []Hospital Bed: []Semi Electric    []Manual    []Electric   []Heavy Duty  []Extra Heavy Duty(>600#)  Patient requires a bed height different than a fixed height hospital bed to permit transfers to chair, wheel chair or standing. []Yes         []N/A     []Accessories: [] Gel Overlay Mattress     []Low Air Mattress   []Alternating Pressure Pad                              []Trapeze    [] Hip Kit:  [] Short Horn     [] Long Horn         []Other:________________________________________________________________    ________________________________________________________________________    [x]Home Health:  [x]OT [x]PT [x]SLP   [] MSW   [] Aide    []SN        []Outpatient:  []OT []PT []SLP [] MSW   [] CM      [] Internal Referral      [] External Referral  ________________________________________________________________________    Sophie  Pilo, PT 8/22/2017

## 2017-08-22 NOTE — PROGRESS NOTES
Physical Therapy   Daily Physical Therapy FIM Scores    Kev Vasquez   MRN: [id number]        08/22/17 1201   Locomotion   Distance Walked 3   Distance Wheelchair 3   Walk 4   Wheelchair 5   Mode C   Stairs 4     Sophie Daigle, PT  8/22/2017

## 2017-08-22 NOTE — PROGRESS NOTES
"Occupational Therapy  AM & PM Treatment  Kev Vasquez   MRN: [id number]   Room/Bed: E280/E280 B       08/22/17 1114 08/22/17 1257   OT Time Calculation   OT Start Time 1114 1257   OT Stop Time 3058 2358   OT Total Time (min) 43 min 48 min   General   OT Date of Treatment 08/22/17 08/22/17   Family/Caregiver Present No --    Patient Found (position) Seated in wheelchair --    Precautions   General Precautions LVAD;fall --    Cardiovascular/Pulmonary LVAD --    Subjective   Patient states "I threw up twice last night." --    Pain/Comfort   Pain Rating no pain --    Sit to Stand   Sit <> Stand Assistance Minimum Assistance --    Sit <> Stand Assistive Device Rolling Walker --    Trials/Comments from  --    Stand to Sit   Assistance Minimum Assistance --    Assistive Device Rolling Walker --    Trials/Comments to wc --    Bed to Chair   Bed <> Chair Technique Stand Pivot --    Bed <> Chair Transfer Assistance Minimum Assistance --    Bed <> Chair Assistive Device Rolling Walker --    Trials/Comments from <>wc --    Exercise Tools   Rickshaw --  25# for 12 min total with rest breaks following each minute   Bilateral Boo 3# x 10 min with rest breaks after each minute to build strength and endurance in prep for ADLs. --    Theraputty --  Blue: putty and pegs to strengthen B hands and increase FMS in prep for ADLs and LVAD operation.   Dynamic Standing Balance   Dynamic Standing-Balance Support Unilateral upper extremity supported --    Dynamic Standing-Balance Lateral lean;Reaching for objects;Reaching across midline;Forward lean --    Dynamic Standing-Comments Min (A) to complete functional standing activity stacking MN rate of manip board pcs 4 high into upper cabinet. Pt required 1 seated rest break to complete. Pt returned to activity following exercise then was able to return pcs into the board slot from stance with no seated rest breaks.  --    Activity Tolerance   Activity Tolerance Patient tolerated treatment " well --    After Treatment   Patient Position After Treatment Seated in wheelchair --    Patient after treatment left call button in reach  (posey belt intact) --    Assessment   Prognosis Good --    Problem List Decreased Self Care skills;Decreased upper extremity range of motion;Decreased upper extremity strength;Decreased safe judgment during ADL;Decreased cognition;Decreased endurance;Decreased fine motor control;Decreased functional mobility;Decreased gross motor control;Decreased IADLs;Decreased Function of right upper extremity;Decreased Function of left upper extremity;Decreased trunk control for functional activities --    Assessment Pt participated well in tx session but remains with decreassed balance and endurance and safety concerens at this time. Pt would continue to benefit from skilled OT services. --    Level of Motivation/Participation Good --    Discharge Recommendations   Equipment Needed After Discharge none --    Discharge Facility/Level Of Care Needs home with home health --    Plan   Plan Continue with current plan --    Therapy Frequency 2 times/day --    Occupational Therapy Follow-up   OT Follow-up? Yes --    Treatment/Billable Minutes   Therapeutic Activity 33 24   Therapeutic Exercise 10 14   Total Time 43 48       SHAUNA Bland  8/22/2017    LEGEND:   CGA: Contact Guard Assist   EOB: Edgeof Bed   HHA: Hand Held Assist   HOB: Head of Bed   (I): Independent-patient performs task in a timely manner   Max (A): Maximal Assist-patient performs 25-49% of task   Min (A): Minimal Assist- patient performs 75% or more of task   Mod (A): Moderate Assist- patient performs 50-74% of task   NA: Not applicable   NT: Not tested   OOB: Out of Bed   PTA: Prior to admit   QC: Quad Cane   RW: Rolling Walker   (S): Supervision- patient requires cues, coaxing, prompting   SBA: Stand By Assist   SC: Straight Cane   SW: Standard Walker   TBA: To be assessed   Total (A): Total Assist- patient performs  less than 25% of task   WC: Wheelchair   WFL: Within Functional Limits   WNL: Within Normal Limits

## 2017-08-23 PROCEDURE — 97530 THERAPEUTIC ACTIVITIES: CPT

## 2017-08-23 PROCEDURE — 97116 GAIT TRAINING THERAPY: CPT

## 2017-08-23 PROCEDURE — 12800000 HC REHAB SEMI-PRIVATE ROOM

## 2017-08-23 PROCEDURE — 25000003 PHARM REV CODE 250: Performed by: PHYSICAL MEDICINE & REHABILITATION

## 2017-08-23 PROCEDURE — 97150 GROUP THERAPEUTIC PROCEDURES: CPT

## 2017-08-23 PROCEDURE — 25000003 PHARM REV CODE 250: Performed by: PHYSICIAN ASSISTANT

## 2017-08-23 PROCEDURE — 63600175 PHARM REV CODE 636 W HCPCS: Performed by: PHYSICIAN ASSISTANT

## 2017-08-23 PROCEDURE — 99233 SBSQ HOSP IP/OBS HIGH 50: CPT | Mod: ,,, | Performed by: PHYSICAL MEDICINE & REHABILITATION

## 2017-08-23 PROCEDURE — 92507 TX SP LANG VOICE COMM INDIV: CPT

## 2017-08-23 PROCEDURE — A4216 STERILE WATER/SALINE, 10 ML: HCPCS | Performed by: PHYSICIAN ASSISTANT

## 2017-08-23 PROCEDURE — 97535 SELF CARE MNGMENT TRAINING: CPT

## 2017-08-23 RX ADMIN — Medication 10 ML: at 05:08

## 2017-08-23 RX ADMIN — LISINOPRIL 10 MG: 10 TABLET ORAL at 08:08

## 2017-08-23 RX ADMIN — ROPINIROLE 0.5 MG: 0.5 TABLET, FILM COATED ORAL at 01:08

## 2017-08-23 RX ADMIN — DOCUSATE SODIUM 100 MG: 100 CAPSULE, LIQUID FILLED ORAL at 08:08

## 2017-08-23 RX ADMIN — TAMSULOSIN HYDROCHLORIDE 0.4 MG: 0.4 CAPSULE ORAL at 08:08

## 2017-08-23 RX ADMIN — ATORVASTATIN CALCIUM 10 MG: 10 TABLET, FILM COATED ORAL at 08:08

## 2017-08-23 RX ADMIN — CEFEPIME HYDROCHLORIDE 2 G: 2 INJECTION, SOLUTION INTRAVENOUS at 03:08

## 2017-08-23 RX ADMIN — AMLODIPINE BESYLATE 10 MG: 10 TABLET ORAL at 08:08

## 2017-08-23 RX ADMIN — ROPINIROLE 0.5 MG: 0.5 TABLET, FILM COATED ORAL at 05:08

## 2017-08-23 RX ADMIN — ROPINIROLE 0.5 MG: 0.5 TABLET, FILM COATED ORAL at 09:08

## 2017-08-23 RX ADMIN — ASPIRIN 81 MG CHEWABLE TABLET 81 MG: 81 TABLET CHEWABLE at 08:08

## 2017-08-23 RX ADMIN — FOLIC ACID 1 MG: 1 TABLET ORAL at 08:08

## 2017-08-23 RX ADMIN — DOFETILIDE 250 MCG: 0.12 CAPSULE ORAL at 08:08

## 2017-08-23 RX ADMIN — WARFARIN SODIUM 6 MG: 6 TABLET ORAL at 04:08

## 2017-08-23 RX ADMIN — ALLOPURINOL 300 MG: 100 TABLET ORAL at 08:08

## 2017-08-23 RX ADMIN — SPIRONOLACTONE 25 MG: 25 TABLET, FILM COATED ORAL at 08:08

## 2017-08-23 RX ADMIN — BUPROPION HYDROCHLORIDE 300 MG: 150 TABLET, EXTENDED RELEASE ORAL at 08:08

## 2017-08-23 RX ADMIN — OYSTER SHELL CALCIUM WITH VITAMIN D 1 TABLET: 500; 200 TABLET, FILM COATED ORAL at 08:08

## 2017-08-23 RX ADMIN — Medication 400 MG: at 08:08

## 2017-08-23 RX ADMIN — Medication 10 ML: at 12:08

## 2017-08-23 RX ADMIN — HYDRALAZINE HYDROCHLORIDE 75 MG: 50 TABLET ORAL at 01:08

## 2017-08-23 RX ADMIN — HYDRALAZINE HYDROCHLORIDE 75 MG: 50 TABLET ORAL at 09:08

## 2017-08-23 RX ADMIN — THERA TABS 1 TABLET: TAB at 08:08

## 2017-08-23 RX ADMIN — HYDRALAZINE HYDROCHLORIDE 75 MG: 50 TABLET ORAL at 05:08

## 2017-08-23 NOTE — PROGRESS NOTES
"Physical Therapy   Treatment/Patient Discharge Summary    Kev Vasquez   MRN: [id number]        08/23/17 0944   PT Time Calculation   PT Start Time 0944   PT Stop Time 1029   PT Total Time (min) 45 min   Treatment   Treatment Type Treatment  (FIM Assessment/Patient Discharge Summary)   PT/PTA PT   General   PT Received On 08/23/17   Family/Caregiver Present No   Patient Found (position) Seated in wheelchair   Pt found with (LVAD on battery power, posey belt donned)   Precautions   General Precautions LVAD;fall   Orthopedic No   Required Braces or Orthoses No   Cardiovascular/Pulmonary LVAD   Visual/Auditory Vision impaired   Subjective   Patient states "I thought today was my day to leave"   Pain/Comfort   Pain Rating 1 no pain   Pain Rating Post-Intervention 1 no pain   Bed Mobility   Bed Mobility yes   Rolling/Turning to Left Modified independent  (x 1 trial on mat )   Rolling/Turning Right Modified independent  (x 1 trial on mat )   Supine to Sit Modified Independent  (x 1 trial on mat )   Sit to Supine Modified Independent  (x 1 trial on mat )   Transfers   Transfer yes   Sit to Stand   Sit <> Stand Assistance Stand By Assistance   Sit <> Stand Assistive Device Rolling Walker;Other (see comments)  (stair rails)   Trials/Comments Pt performed multiple trials of this transfer throughout PT treatment session   Stand to Sit   Assistance Stand By Assistance   Assistive Device Rolling Walker;Other (see comments)  (stair rails)   Trials/Comments Pt performed multiple trials of this transfer throughout PT treatment session. Pt required verbal cues during every transfer to perform transfer with appropriate B UE placement. Pt frequently would sit extremely posteriorly and required assistance from PT to maintain balance of wheelchair    Chair to Mat   Chair<> Mat Technique Stand Pivot   Chair<>Mat Assistance Stand By Assistance   Chair <> Mat Assistive Device Rolling Walker   Trials/Comments x 1 trial, verbal cues required " for appropriate sequence of activity    Mat to Chair   Technique Stand Pivot   Assistance Stand By Assistance   Assistive Device Rolling Walker   Trials/Comments x 1 trial, verbal cues required for appropriate sequence of transfer   Shower Transfer   Technique Stand Pivot   Assistance Contact Guard Assistance   Assistive Device Rolling Walker   Trials/Comments x 1 trial, verbal cues required for safety and management of RW while performing step over shower entrance for shower transfer    Car Transfer   Car Transfer Technique Stand Pivot   Car Transfer Assistance Stand By Assistance   Car Transfer Assistive Device No Assistive Device   Trials/Comments x 1 trial, pt required verbal cues for appropriate and safe sequence of this transfer. Pt required verbal cues for appropriate sequence and safety during this transfer    Wheelchair Activities   Propulsion Yes   Propulsion Type 1 Manual   Level 1 Level tile   Method 1 Right upper extremity;Left upper extremity   Level of Assistance 1 Supervision   Description/ Details 1 Pt propelled self in wheelchair x 200 feet, increased time required for pt to complete activity. Pt required verbal cues for appropriate navigation around obstacles   Gait   Gait Yes   Weight Bearing Status full   Gait 1   Surface 1 Level tile;Carpet;Ramp   Gait Assistive Device Rolling walker   Assistance 1 Contact Guard Assistance;Minimum assistance   Gait Distance Pt ambulated x 1 trial of 178 feet, CGA required for pt to maintain balance during activity. Decreased episodes of posterior lean. Pt ambulated x 28 feet up and down carpeted ramp with CGA to minimal assistance required to maintain balance during activity.    Gait Pattern 3-point gait   Gait Deviation(s) decreased william;decreased velocity of limb motion;decreased step length;decreased stride length;decreased weight-shifting ability;decreased toe-to-floor clearance;toe drag   Impairments Contributing to Gait Deviations impaired  "balance;decreased strength;impaired postural control;impaired motor control   Stairs   Stairs Yes   Stairs/Curb Step   Rails 1 Bilateral   Device 1 No device   Assistance 1 Contact guard   Comment/# Steps 1 Pt ascended and descended 12 6" steps with reciprocal LE gait pattern x 1 trial   Stairs/ Curb Step  2   Rails 2 None (Comment)   Device 2 Rolling walker   Assistance 2 Contact Guard Assistance;Minimum assistance   Comment/# Steps 2 Pt negotiated 4" curb step x 2 trials with RW< pt required CGA to minimal assistance to maintain balance during this activity and for appropriate RW management.    Equipment Use   Comments Pt tolerated LbE for 10 minutes under superviison of this PT in order to increase pt's B LE strengthand to improve pt's functional endurance for activity   Other Activities   Comments Patient performed stand pivot transfer from wheelchair to bedside commode with RW and SBA    PT deferred having pt pick object up off of floor due to safety concerns and pt's impaired balance during functional mobility.    Activity Tolerance   Activity Tolerance Patient tolerated treatment well   After Treatment   Patient Position After Treatment Seated in wheelchair   Patient after treatment left (posey belt donned, in gym with ST)   Assessment   Prognosis Fair   Problem List Decreased strength;Decreased endurance;Impaired balance;Decreased mobility;Decreased safety awareness;Decreased cognition   Session Assessment progressing toward goals   Assessment Pt has made slow but steady progress with skilled PT intervention. Pt continues to require 24 hour supervision due to pt's poor safety awareness, but pt has 24 hour supervision upon D/c. Pt has achieved 9 of 11 long term goals for physical therapy. Pt will require between CGA to SBA to complete all transfers and mobility. Pt will require this level of assistance upon D/C. Pt will continue to benefit from further home health PT upon D/C. Pt does not require any DME upon " D/C as pt has all required DME.    Level of Motivation/Participation good   Barriers to Discharge Inaccessible home environment   Barriers to Discharge Comments Pt has steps within home, pt has 24 hour caregivers upon D/C   Long Term Goals   Supine to Sit-Met/Not Met Met   Sit to Supine - Met/Not Met Met   Logroll - Met/Not Met Met   Transfer Sit to stand - Met/Not Met Met   Transfer Bed/Chair - Met/Not Met Met   Ambulate - Met/Not Met Met   Go Up/Down Stairs - Met/Not Met Not met   Go Up/Down Curb- Met/Not Met Not Met   Tolerate Exercise - Met/Not Met Met   Propel Wheelchair - Met/Not Met Met   Other Goal Met   Other Goal 2 Met   Discharge Recommendations   Equipment Needed After Discharge none   Discharge Facility/Level Of Care Needs home health PT   Treatment/Billable Minutes   Therapeutic Activity 35   Therapeutic Exercise 10   Total Time 45       Sophie Daigle, PT  8/23/2017

## 2017-08-23 NOTE — PROGRESS NOTES
[facility]  Physical Medicine & Rehab  Progress Note    Patient Name: Kev Vasquez  MRN: [id number]  Patient Class: IP- Rehab   Admission Date: 8/8/2017  Length of Stay: 15 days  Attending Physician: Rashad Garcia MD  Primary Care Provider: Mega Collins MD    Subjective:     Principal Problem:Gait instability    Interval History: Pt without new c/o's.  I have reviewed the HPI and PMFSH and there are no changes from admission.        Scheduled Medications:    allopurinol  300 mg Oral Daily    amlodipine  10 mg Oral Daily    aspirin  81 mg Oral Daily    atorvastatin  10 mg Oral Daily    buPROPion  300 mg Oral Daily    calcium-vitamin D3  1 tablet Oral BID    ceFEPime (MAXIPIME) IVPB  2 g Intravenous Q12H    docusate sodium  100 mg Oral BID    dofetilide  250 mcg Oral Q12H    folic acid  1 mg Oral Daily    hydrALAZINE  75 mg Oral Q8H    lisinopril  10 mg Oral BID    magnesium oxide  400 mg Oral BID    multivitamin  1 tablet Oral Daily    ropinirole  0.5 mg Oral TID    sodium chloride 0.9%  10 mL Intravenous Q6H    spironolactone  25 mg Oral Daily    tamsulosin  0.4 mg Oral Daily    warfarin  4 mg Oral Every Tues, Thurs, Sat    warfarin  4 mg Oral Every Sun    warfarin  6 mg Oral Every Mon, Wed, Fri       PRN Medications: acetaminophen, bisacodyl, diphenoxylate-atropine 2.5-0.025 mg, lactulose, magnesium hydroxide 400 mg/5 ml, ondansetron, polyethylene glycol, Flushing PICC Protocol **AND** sodium chloride 0.9% **AND** sodium chloride 0.9%, sodium phosphates, trazodone    Review of Systems   Constitutional: Negative for appetite change and fatigue.   Eyes: Negative for visual disturbance.   Respiratory: Negative for shortness of breath.    Cardiovascular: Negative for chest pain.   Gastrointestinal: Negative for constipation and diarrhea.   Genitourinary: Negative for dysuria, frequency and urgency.   Musculoskeletal: Positive for gait problem. Negative for arthralgias,  back pain, joint swelling, myalgias, neck pain and neck stiffness.   Neurological: Positive for tremors and weakness. Negative for dizziness, numbness and headaches.   Psychiatric/Behavioral: Negative for dysphoric mood.   All other systems reviewed and are negative.    Objective:     Vital Signs (Most Recent):  Temp: 98.1 °F (36.7 °C) (08/23/17 0622)  Pulse: 66 (08/23/17 0622)  Resp: 15 (08/23/17 0622)  BP: (!) 88/0 (08/23/17 0622)  SpO2: 95 % (08/23/17 0622)    Vital Signs (24h Range):  Temp:  [98.1 °F (36.7 °C)-98.3 °F (36.8 °C)] 98.1 °F (36.7 °C)  Pulse:  [64-66] 66  Resp:  [15-18] 15  SpO2:  [94 %-95 %] 95 %  BP: (88-90)/(0) 88/0     Physical Exam   Constitutional: He is oriented to person, place, and time. He appears well-nourished.   Eyes: EOM are normal. Pupils are equal, round, and reactive to light.   Neck: Normal range of motion. Neck supple.   Cardiovascular: Normal rate.    Pulmonary/Chest: Effort normal.   Musculoskeletal: Normal range of motion.   Neurological: He is oriented to person, place, and time. He has normal strength.   Skin: No rash noted.   Psychiatric: He has a normal mood and affect.     NEUROLOGICAL EXAMINATION:     MENTAL STATUS   Oriented to person, place, and time.     CRANIAL NERVES     CN III, IV, VI   Pupils are equal, round, and reactive to light.  Extraocular motions are normal.     MOTOR EXAM     Strength   Strength 5/5 throughout.       Assessment/Plan:      Kev Vasquez is a 76 y.o. male admitted to inpatient rehabilitation on 8/8/2017 for Gait instability with impaired mobility and ADLs. Patient remains appropriate for PT, OT, and as required Speech therapy. Patient continues to require 24 hour nursing care as well as daily Physician assessment.    * Gait instability    Sit-->stand SBA/CGA (improved), T/F CGA (improved), gait 178' CGA/MNA RW (improved).  UBD SUP (improved), LBD MNA (improved), toileting MNA (improved) on 8/21.      Mild cognitive deficits -- in the most  "recent ST session:  "Pt completed orientation task to time with 80% acc given min verbal cues pt with increase to 100% acc. Verbal conversation task completed regarding plan of care and recommendations of supervision with pt requiring min verbal cues for insight into current physical and cognitive limitations. Pt verbalized home safety solutions with 50% acc, given moderate verbal cues for reasoning skills and attention skills, pt with increase to 100% acc. Pt verbalized memory strategies given supervision-min verbal cues with 90% acc."          Chronic systolic heart failure    Compensated with LVAD    8/8:   INR 2.7  8/10: INR 2.5 -- check 8/14 8/14: INR 2.6  8/17: INR 2.5  8/21: INR 2.4        Stage 2 chronic kidney disease    Stable    8/14:  CRT 1.7 -- d/c fluid restriction  8/17:  CRT 1.9 -- encourage fluids to 1600cc/day  8/21:  CRT 1.8        Complication involving left ventricular assist device (LVAD)    Still with some drainage -- monitor.  On cefepime through 9/23.   8/22:  No drainage noted.  Cont cefepime.              DISCHARGE PLANNING:  Tentative Discharge Date: 8/24/2017    Future Appointments  Date Time Provider Department Center   9/7/2017 1:30 PM LAB, APPOINTMENT [facility] LAB VNP [facility]   9/7/2017 3:00 PM Anni Henderson MD [facility] HEARTTX [facility]   9/7/2017 3:00 PM [facility]   9/12/2017 2:30 PM Payam Muhammad MD [facility] ID [facility]   10/26/2017 8:00 AM HOME MONITOR DEVICE CHECK, [facility] ARRHYTH [facility]       Rashad Garcia MD  Department of Physical Medicine & Rehab   [facility]  "

## 2017-08-23 NOTE — PLAN OF CARE
08/21/17 0700 08/21/17 1900 08/22/17 0720       PICC Double Lumen 08/07/17 1653 right basilic   Placement Date/Time: 08/07/17 1653   Present Prior to Hospital Arrival?: No  Initial Arm Circumference(cm): 31 cm  Total Catheter Length (after trimming)(cm): 34 cm  Hand Hygiene: Performed  Barrier Precautions: Performed  Skin Antisepsis: ChloraPrep ...   Site Assessment Clean;Dry;Intact;No redness;No swelling Clean;Dry;Intact;No redness;No swelling Clean;Dry;Intact;No redness;No swelling   Lumen 1 Status Blood return noted;Flushed;Normal saline locked --  Blood return noted;Flushed;Normal saline locked   Lumen 2 Status Blood return noted;Flushed;Normal saline locked --  Blood return noted;Flushed;Normal saline locked   Dressing Type --  Transparent Transparent   Dressing Status Biopatch in place;Clean;Dry;Intact Clean;Dry;Intact Biopatch in place;Clean;Dry   Dressing Intervention --  --  --    Dressing Change Due --  --  08/23/17   Daily Line Review --  --  --        08/23/17 0622       PICC Double Lumen 08/07/17 1653 right basilic   Placement Date/Time: 08/07/17 1653   Present Prior to Hospital Arrival?: No  Initial Arm Circumference(cm): 31 cm  Total Catheter Length (after trimming)(cm): 34 cm  Hand Hygiene: Performed  Barrier Precautions: Performed  Skin Antisepsis: ChloraPrep ...   Site Assessment Clean;Dry;Intact   Lumen 1 Status --    Lumen 2 Status --    Dressing Type Transparent   Dressing Status Clean;Dry;Intact   Dressing Intervention Dressing changed   Dressing Change Due 08/29/17   Daily Line Review Performed

## 2017-08-23 NOTE — PROGRESS NOTES
"Occupational Therapy   Discharge Summary    Kev Vasquez  MRN: [id number]  Room/Bed: E280/E280 B     08/23/17 0828   OT Time Calculation   OT Start Time 0828   OT Stop Time 0916   OT Total Time (min) 48 min   General   OT Date of Treatment 08/23/17   Family/Caregiver Present No   Patient Found (position) Supine in bed   Precautions   General Precautions LVAD;fall   Subjective   Patient states "I'm still going home tomorrow, right?"   Pain/Comfort   Pain Rating no pain   Bed Mobility   Scooting/Bridging Modified Independent   Scooting/Bridging Comments with scoot to EOB   Supine to Sit Modified Independent   Supine to Sit Comments to EOB with use of rail   Sit to Supine Stand by Assistance   Sit to Supine Comments to flat bed   Sit to Stand   Sit <> Stand Assistance Stand By Assistance   Sit <> Stand Assistive Device Rolling Walker   Trials/Comments from EOB   Stand to Sit   Assistance Stand By Assistance   Assistive Device Rolling Walker   Trials/Comments to EOB   Bed to Chair   Bed <> Chair Technique Stand Pivot   Bed <> Chair Transfer Assistance Stand By Assistance   Bed <> Chair Assistive Device Rolling Walker   Trials/Comments from EOB>wc   Tub Bench Transfer   Tub Transfer Technique Stand Pivot   Tub Bench Transfer Assistance Stand By Assistance   Tub Transfer Assistive Device Rolling Walker   Trials/Comments from wc<>TTB   Toilet Transfer   Toilet Transfer Technique Stand Pivot   Toilet Transfer Assistance Stand By Assistance   Toilet Transfer Assistive Device Rolling Walker   Trials/Comments from wc<>BSC   Feeding   Feeding Level of Assistance Independent   Feeding Where Assessed Wheelchair   Grooming   Grooming Level of Assistance Independent   Grooming Where Assessed Wheelchair   Grooming Comments sitting for safety   Bathing   Bathing Level of Assistance Contact guard   Bathing Where Assessed Edge of bed   Bathing Comments steadying from stance   UE Dressing   UE Dressing Level of Assistance Independent   UE " Dressing Where Assessed Edge of bed   UE Dressing Comments for pullover shirt and LVAD vest   LE Dressing   LE Dressing Level of Assistance Contact guard   LE Dressing Where Assessed Edge of bed   LE Dressing Comments steadying from stance for clothing management over hips   Toileting   Toileting Level of Assistance Contact guard   Toileting Where Assessed Bedside commode   Toileting Comments for steadying from stance for clothing management over hips   Dynamic Standing Balance   Dynamic Standing-Balance Support Unilateral upper extremity supported   Dynamic Standing-Balance Lateral lean;Reaching for objects;Reaching across midline   Dynamic Standing-Comments SBA during functional standing activities during ADLs   Activity Tolerance   Activity Tolerance Patient tolerated treatment well   After Treatment   Patient Position After Treatment Seated in wheelchair   Patient after treatment left call button in reach;with all lines intact  (posey belt intact)   Assessment   Prognosis Good   Problem List Decreased Self Care skills;Decreased upper extremity strength;Decreased safe judgment during ADL;Decreased cognition;Decreased fine motor control;Decreased functional mobility;Decreased IADLs;Decreased trunk control for functional activities   Assessment Pt participated well in tx session but remains with decresaed (I) with ADLs and functional mobility and OT recommends sitters or (S) 24hr/day for safety with t/fs and ADLs. Pt would continue to benefit from HHOT.   Level of Motivation/Participation Good   Long Term Goals   Pt Will Transfer Bed/Chair With contact guard assistance   Transfer Bed/Chair - Met/Not Met Met   Pt Will Transfer To Bedside Commode With contact guard assist   Transfer Bedside Commode -Met/Not Met Met   Pt Will Transfer To Shower With contact guard assist   Transfer to Shower-Met/Not Met Met   Pt Will Perform Eating Independently   Eating - Met/Not Met Met   Pt Will Perform Grooming Independently    Grooming - Met/Not Met Met   Pt Will Perform Bathing With contact guard assistance   Bathing - Met/Not Met Met   Pt Will Perform UE Dressing Independently   UE Dressing - Met/Not Met Met   Pt Will Perform LE Dressing With minimal assistance   LE Dressing - Met/Not Met Met   Pt Will Perform Toileting With contact guard assistance   Toileting-Met/Not Met Met   Discharge Recommendations   Equipment Needed After Discharge none   Discharge Facility/Level Of Care Needs home with home health   Plan   Plan Alter current plan   Plan Comments continue with HHOT   Occupational Therapy Follow-up   OT Follow-up? No   Treatment/Billable Minutes   Self Care/Home Management 48   Total Time 48       SHAUNA Bland  8/23/2017    LEGEND:   CGA: Contact Guard Assist   EOB: Edge of Bed   HHA: Hand Held Assist   HOB: Head of Bed   (I): Independent-patient performs task in a timely manner   Max (A): Maximal Assist-patient performs 25-49% of task   Min (A): Minimal Assist- patient performs 75% or more of task   Mod (A): Moderate Assist- patient performs 50-74% of task   NA: Not applicable   NT: Not tested   OOB: Out of Bed   PTA: Prior to admit   QC: Quad Cane   RW: Rolling Walker   (S): Supervision- patient requires cues, coaxing, prompting   SBA: Stand By Assist   SC: Straight Cane   SW: Standard Walker   TBA: To be assessed   Total (A): Total Assist- patient performs less than 25% of task   WC: Wheelchair   WFL: Within Functional Limits   WNL: Within Normal Limits

## 2017-08-23 NOTE — SUBJECTIVE & OBJECTIVE
Interval History: Pt without new c/o's.  I have reviewed the HPI and PMFSH and there are no changes from admission.        Scheduled Medications:    allopurinol  300 mg Oral Daily    amlodipine  10 mg Oral Daily    aspirin  81 mg Oral Daily    atorvastatin  10 mg Oral Daily    buPROPion  300 mg Oral Daily    calcium-vitamin D3  1 tablet Oral BID    ceFEPime (MAXIPIME) IVPB  2 g Intravenous Q12H    docusate sodium  100 mg Oral BID    dofetilide  250 mcg Oral Q12H    folic acid  1 mg Oral Daily    hydrALAZINE  75 mg Oral Q8H    lisinopril  10 mg Oral BID    magnesium oxide  400 mg Oral BID    multivitamin  1 tablet Oral Daily    ropinirole  0.5 mg Oral TID    sodium chloride 0.9%  10 mL Intravenous Q6H    spironolactone  25 mg Oral Daily    tamsulosin  0.4 mg Oral Daily    warfarin  4 mg Oral Every Tues, Thurs, Sat    warfarin  4 mg Oral Every Sun    warfarin  6 mg Oral Every Mon, Wed, Fri       PRN Medications: acetaminophen, bisacodyl, diphenoxylate-atropine 2.5-0.025 mg, lactulose, magnesium hydroxide 400 mg/5 ml, ondansetron, polyethylene glycol, Flushing PICC Protocol **AND** sodium chloride 0.9% **AND** sodium chloride 0.9%, sodium phosphates, trazodone    Review of Systems   Constitutional: Negative for appetite change and fatigue.   Eyes: Negative for visual disturbance.   Respiratory: Negative for shortness of breath.    Cardiovascular: Negative for chest pain.   Gastrointestinal: Negative for constipation and diarrhea.   Genitourinary: Negative for dysuria, frequency and urgency.   Musculoskeletal: Positive for gait problem. Negative for arthralgias, back pain, joint swelling, myalgias, neck pain and neck stiffness.   Neurological: Positive for tremors and weakness. Negative for dizziness, numbness and headaches.   Psychiatric/Behavioral: Negative for dysphoric mood.   All other systems reviewed and are negative.    Objective:     Vital Signs (Most Recent):  Temp: 98.1 °F (36.7 °C)  (08/23/17 0622)  Pulse: 66 (08/23/17 0622)  Resp: 15 (08/23/17 0622)  BP: (!) 88/0 (08/23/17 0622)  SpO2: 95 % (08/23/17 0622)    Vital Signs (24h Range):  Temp:  [98.1 °F (36.7 °C)-98.3 °F (36.8 °C)] 98.1 °F (36.7 °C)  Pulse:  [64-66] 66  Resp:  [15-18] 15  SpO2:  [94 %-95 %] 95 %  BP: (88-90)/(0) 88/0     Physical Exam   Constitutional: He is oriented to person, place, and time. He appears well-nourished.   Eyes: EOM are normal. Pupils are equal, round, and reactive to light.   Neck: Normal range of motion. Neck supple.   Cardiovascular: Normal rate.    Pulmonary/Chest: Effort normal.   Musculoskeletal: Normal range of motion.   Neurological: He is oriented to person, place, and time. He has normal strength.   Skin: No rash noted.   Psychiatric: He has a normal mood and affect.     NEUROLOGICAL EXAMINATION:     MENTAL STATUS   Oriented to person, place, and time.     CRANIAL NERVES     CN III, IV, VI   Pupils are equal, round, and reactive to light.  Extraocular motions are normal.     MOTOR EXAM     Strength   Strength 5/5 throughout.

## 2017-08-23 NOTE — PROGRESS NOTES
Occupational Therapy  FIM SCORES    Kev Vasquez  MRN: [id number]  Room/Bed: E280/E280 B       08/23/17 0900   Transfers   Bed/Chair/WC 5   Toilet 5   Shower 5   Self Care   Eating 7   Grooming 7   Bathing 4   Dressing-Upper 7   Dressing-Lower 4   Toileting 4       SHAUNA Bland  8/23/2017

## 2017-08-23 NOTE — PROGRESS NOTES
Occupational Therapy  AM Group Session  Kev Vasquez   MRN: [id number]        08/23/17 1115   OT Time Calculation   OT Start Time 1115   OT Stop Time 1200   OT Total Time (min) 45 min   General   OT Date of Treatment 08/23/17   Treatment/Billable Minutes   Therapeutic Group 45   Total Time 45     Pt participated in 45 kitchen mobility group. The group activity reviewed safety techniques, energy conservation and work simplification for kitchen accessibility and safe mobility. The patient performed kitchen mobility in ADL Therapy Kitchen. Patient completed kitchen mobility (standing with RW or at w/c level) with __MIN___ assistance. Patient demonstrated appropriate safety awareness with item-retrieval from a variety of surfaces within kitchen. Patient able to successfully transfer items from kitchen to table with __MIN__ assistance. Patient completed setting table appropriately with efficient timing. Patient demonstrated increased awareness with compensatory techniques with any home barriers within kitchen.     These activities were performed in a group setting to encourage increased participation with peers and social interaction skills.           The SHAUNA and SUMMER have collaborated and discussed the patient's status, treatment plan and progress toward established goals.     Pérez Pavon, JERALD 8/23/2017

## 2017-08-23 NOTE — PROGRESS NOTES
"Speech Therapy   Treatment/Discharge Summary    Kev Vasquez   MRN: [id number]     Short Term Goals   Goal 1 Pt will complete concrete convergent/divergent naming tasks given supervision with 90% acc.  MET   Goal 2 Pt will complete problem solving tasks given min verbal cues with 75% acc of home/hospital environment. MET   Goal 3 Pt will complete reasoning/sequencing tasks given min verbal cues with 75% acc. INCONSISTENTLY MET   Goal 4 Pt will be oriented x4 given min verbal cues with 80% acc.  MET   Goal 5 Pt will complete recent/functional recall tasks given min verbal cues with 75% acc. NOT MET   Long Term Goals   Goal 1 Pt will complete concrete convergent/divergent naming tasks with modified independence with % acc.  NOT MET   Goal 2 Pt will complete problem solving tasks given supervision with 90% acc of home/hospital environment.  NOT MET   Goal 3 Pt will complete reasoning/sequencing tasks given supervision with 90% acc. NOT MET   Goal 4 Pt will be oriented x4 given supervision with 90% acc. NOT MET   Goal 5 Pt will complete recent/functional recall tasks given supervision with 90% acc.  NOT MET          08/23/17 1030   Speech Time Calculation   Speech Start Time 1030   Speech Stop Time 1115   Speech Total (min) 45 min   General Information   SLP Treatment Date 08/23/17   General Observations Pt seen individually while sitting upright in w/c. Pt observed with change in disposition from last session with agitation and and rude comments towards SLP. pt previously pleasant and cooperative within majority of sessions. Pt taken outdoors for possible increase in mood.    General Precautions LVAD;fall   Visual/Auditory Vision impaired  (reading glasses)   Subjective   Patient states Pt stated "I fired her day before yesterday." in reference to sitter aligned for home care.    Pain/Comfort   Pain Rating no pain       SLP Cognitive Treatment   Treatment Detail (SLP Cognitive Treatment) Pt completed orientation " task to time with 80% acc given min verbal cues pt with increase to 100% acc. Verbal conversation task completed regarding plan of care and recommendations of supervision with pt requiring min verbal cues for insight into current physical and cognitive limitations. Pt verbalized home safety solutions with 50% acc, given moderate verbal cues for reasoning skills and attention skills, pt with increase to 100% acc. Pt verbalized memory strategies given supervision-min verbal cues with 90% acc.        SLP Treatment: Verbal Expression   Treatment Detail (SLP Verbal Expression) Divergent naming task completed of concrete items given supervision for thought organization skills and attention to task to complete with high dis tractability observed.    Assessment   SLP Diagnosis moderate cog-ling deficits   Prognosis Fair   Problem List decreased recall; poor safety and problem solving skills   Session Assessment progressing toward goals   Level of Motivation/Participation fair   Discharge Recommendations   Discharge Facility/Level Of Care Needs home health speech therapy   Plan   Plan Alter current plan  (pt planned for d/c home 17)   Treatment/Billable Minutes   Speech Therapy Individual 45   Total Time 45     Recommend continued skilled St services via home health with 24 hour supervision from family/caregiver. Pt remains with decreased insight and safety awareness skills of self. Pt with progress towards STGs set here for IP rehab program.     FIM Scores  Comprehension:6  Expression: 5  Social Interaction:6  Problem Solvin  Memory: 3     Comprehension:  Complex= humor, finances, rationale for medical treatment(hip precautions, pressure relief)  Basic= pain, hunger, thirst, bathroom needs, cold, nutrition, sleep     Understands complex/abstract conversation/directions with extra time, assistance device(glasses, if visual mode or both; hearing aide if auditory mode or both) (6)     Expression:  Expresses basic needs  or ideas 90% of the time-mild word finding deficits  (5)     Social Interaction:  Interacts appropriately with others with medication or extra time(anti-anxiety, antidepressant)  (6)     Problem Solving:  Solves basic problems 75-89% of the time  (4)     Memory:  Recognizes, recalls, or executes 50-74% of time 2 steps of 2 step request (3)    Enedina Nguyễn CCC-SLP  8/23/2017

## 2017-08-23 NOTE — ASSESSMENT & PLAN NOTE
"Sit-->stand SBA/CGA (improved), T/F CGA (improved), gait 178' CGA/MNA RW (improved).  UBD SUP (improved), LBD MNA (improved), toileting MNA (improved) on 8/21.      Mild cognitive deficits -- in the most recent ST session:  "Pt completed orientation task to time with 80% acc given min verbal cues pt with increase to 100% acc. Verbal conversation task completed regarding plan of care and recommendations of supervision with pt requiring min verbal cues for insight into current physical and cognitive limitations. Pt verbalized home safety solutions with 50% acc, given moderate verbal cues for reasoning skills and attention skills, pt with increase to 100% acc. Pt verbalized memory strategies given supervision-min verbal cues with 90% acc."    "

## 2017-08-23 NOTE — PROGRESS NOTES
Physical Therapy   Daily Physicall Therapy FIM Scores    Kev Vasquez   MRN: [id number]        08/23/17 1500   Transfers   Bed/Chair/WC 5   Toilet 5   Shower 4   Locomotion   Distance Walked 3   Distance Wheelchair 3   Walk 4   Wheelchair 5   Mode B   Stairs 4     Sophie Daigle, PT  8/23/2017

## 2017-08-24 VITALS
SYSTOLIC BLOOD PRESSURE: 90 MMHG | RESPIRATION RATE: 16 BRPM | WEIGHT: 173.94 LBS | TEMPERATURE: 98 F | BODY MASS INDEX: 23.56 KG/M2 | OXYGEN SATURATION: 97 % | HEART RATE: 70 BPM | HEIGHT: 72 IN

## 2017-08-24 LAB
ANION GAP SERPL CALC-SCNC: 6 MMOL/L
BUN SERPL-MCNC: 30 MG/DL
CALCIUM SERPL-MCNC: 10.4 MG/DL
CHLORIDE SERPL-SCNC: 106 MMOL/L
CO2 SERPL-SCNC: 25 MMOL/L
CREAT SERPL-MCNC: 1.8 MG/DL
EST. GFR  (AFRICAN AMERICAN): 41.9 ML/MIN/1.73 M^2
EST. GFR  (NON AFRICAN AMERICAN): 36.3 ML/MIN/1.73 M^2
GLUCOSE SERPL-MCNC: 89 MG/DL
INR PPP: 3.2
POTASSIUM SERPL-SCNC: 4.4 MMOL/L
PROTHROMBIN TIME: 32.6 SEC
SODIUM SERPL-SCNC: 137 MMOL/L

## 2017-08-24 PROCEDURE — 80048 BASIC METABOLIC PNL TOTAL CA: CPT

## 2017-08-24 PROCEDURE — 63600175 PHARM REV CODE 636 W HCPCS: Performed by: PHYSICIAN ASSISTANT

## 2017-08-24 PROCEDURE — 36415 COLL VENOUS BLD VENIPUNCTURE: CPT

## 2017-08-24 PROCEDURE — 25000003 PHARM REV CODE 250: Performed by: PHYSICIAN ASSISTANT

## 2017-08-24 PROCEDURE — 85610 PROTHROMBIN TIME: CPT

## 2017-08-24 PROCEDURE — A4216 STERILE WATER/SALINE, 10 ML: HCPCS | Performed by: PHYSICIAN ASSISTANT

## 2017-08-24 PROCEDURE — 99238 HOSP IP/OBS DSCHRG MGMT 30/<: CPT | Mod: ,,, | Performed by: PHYSICAL MEDICINE & REHABILITATION

## 2017-08-24 RX ORDER — CEFEPIME HYDROCHLORIDE 2 G/50ML
2 INJECTION, SOLUTION INTRAVENOUS EVERY 12 HOURS
Start: 2017-08-24 | End: 2017-09-23

## 2017-08-24 RX ORDER — CALCIUM CARBONATE/VITAMIN D3 600MG-5MCG
1 TABLET ORAL DAILY
COMMUNITY
Start: 2017-08-24

## 2017-08-24 RX ADMIN — Medication 10 ML: at 12:08

## 2017-08-24 RX ADMIN — FOLIC ACID 1 MG: 1 TABLET ORAL at 08:08

## 2017-08-24 RX ADMIN — THERA TABS 1 TABLET: TAB at 08:08

## 2017-08-24 RX ADMIN — ASPIRIN 81 MG CHEWABLE TABLET 81 MG: 81 TABLET CHEWABLE at 08:08

## 2017-08-24 RX ADMIN — OYSTER SHELL CALCIUM WITH VITAMIN D 1 TABLET: 500; 200 TABLET, FILM COATED ORAL at 08:08

## 2017-08-24 RX ADMIN — CEFEPIME HYDROCHLORIDE 2 G: 2 INJECTION, SOLUTION INTRAVENOUS at 03:08

## 2017-08-24 RX ADMIN — LISINOPRIL 10 MG: 10 TABLET ORAL at 08:08

## 2017-08-24 RX ADMIN — SPIRONOLACTONE 25 MG: 25 TABLET, FILM COATED ORAL at 08:08

## 2017-08-24 RX ADMIN — TAMSULOSIN HYDROCHLORIDE 0.4 MG: 0.4 CAPSULE ORAL at 08:08

## 2017-08-24 RX ADMIN — ALLOPURINOL 300 MG: 100 TABLET ORAL at 08:08

## 2017-08-24 RX ADMIN — Medication 400 MG: at 08:08

## 2017-08-24 RX ADMIN — AMLODIPINE BESYLATE 10 MG: 10 TABLET ORAL at 08:08

## 2017-08-24 RX ADMIN — ATORVASTATIN CALCIUM 10 MG: 10 TABLET, FILM COATED ORAL at 08:08

## 2017-08-24 RX ADMIN — ROPINIROLE 0.5 MG: 0.5 TABLET, FILM COATED ORAL at 05:08

## 2017-08-24 RX ADMIN — BUPROPION HYDROCHLORIDE 300 MG: 150 TABLET, EXTENDED RELEASE ORAL at 08:08

## 2017-08-24 RX ADMIN — DOFETILIDE 250 MCG: 0.12 CAPSULE ORAL at 08:08

## 2017-08-24 RX ADMIN — HYDRALAZINE HYDROCHLORIDE 75 MG: 50 TABLET ORAL at 05:08

## 2017-08-24 NOTE — PROGRESS NOTES
Pt's home IV abx will be provided by [facility] and delivered to his home.  Pt, spouse and caregiver received training yesterday on use of home equipment.  Home health arranged with [facility] per pt request.  Pt will be transported home by spouse.

## 2017-08-24 NOTE — PROGRESS NOTES
Discharge: Patient and daughter stated understanding of instructions on medications, follow-up appointments (which also includes that the [facility]madin Clinic will call them to set-up schedule and VAD appointment that Mignon Licea, VAD Coordinator, confirmed with me yesterday), Home Health for IV antibiotic. Per wheelchair brought out to car accompanied by nurse, daughter, and wife. All VAD equipment brought too.

## 2017-08-24 NOTE — PROGRESS NOTES
Mayra ([phone]) ([facility]) called today 8/24/17 to inform us that pt was dc'd today. Pt is going home on (warfarin 4 mg daily, except 6mg M/W/Fri). Please advise

## 2017-08-24 NOTE — DISCHARGE INSTRUCTIONS
[facility] [phone].  Your home IV antibiotics will be provided by [facility] and delivered to your home.    [facility] [phone].  You will be contacted to schedule initial visit.  If you have not received a call the day after discharge please call to schedule a visit.

## 2017-08-25 ENCOUNTER — TELEPHONE (OUTPATIENT)
Dept: INTERNAL MEDICINE | Facility: CLINIC | Age: 77
End: 2017-08-25

## 2017-08-25 NOTE — TELEPHONE ENCOUNTER
----- Message from Jensen Chong sent at 8/25/2017 11:32 AM CDT -----  Contact: Alan [facility] [phone]  Type: Orders Request    What orders/ testing are being requested? Wound care    Is there a future appointment scheduled for the patient with PCP?No    Comments: Pt has skin tear on right hand, advice      Thanks

## 2017-08-25 NOTE — TELEPHONE ENCOUNTER
Should come in for evaluation, as I haven't seen this patient in >1 year, or since his recent hospitalization for severe illness. If significant laceration or difficulty controlling bleeding, please try to schedule for UC visit over the weekend. If minor, can use OTC antibiotic ointment and gauze dressing, but should come in if wound shows any redness, discharge, or other signs of infection

## 2017-08-25 NOTE — PROGRESS NOTES
Daughter confirms discharge dose of 4 mg daily, except 6mg M/W/Fri. Calender updated. Per Alan ([facility]), INR 8/28.

## 2017-08-28 ENCOUNTER — TELEPHONE (OUTPATIENT)
Dept: ADMINISTRATIVE | Facility: CLINIC | Age: 77
End: 2017-08-28

## 2017-08-28 ENCOUNTER — PATIENT OUTREACH (OUTPATIENT)
Dept: ADMINISTRATIVE | Facility: CLINIC | Age: 77
End: 2017-08-28

## 2017-08-29 ENCOUNTER — HOSPITAL ENCOUNTER (INPATIENT)
Facility: HOSPITAL | Age: 77
LOS: 4 days | Discharge: HOME | DRG: 948 | End: 2017-09-02
Attending: INTERNAL MEDICINE | Admitting: INTERNAL MEDICINE
Payer: MEDICARE

## 2017-08-29 ENCOUNTER — ANTI-COAG VISIT (OUTPATIENT)
Dept: CARDIOLOGY | Facility: CLINIC | Age: 77
End: 2017-08-29

## 2017-08-29 DIAGNOSIS — Z95.811 LVAD (LEFT VENTRICULAR ASSIST DEVICE) PRESENT: ICD-10-CM

## 2017-08-29 DIAGNOSIS — Z79.01 CHRONIC ANTICOAGULATION: ICD-10-CM

## 2017-08-29 DIAGNOSIS — I50.22 CHRONIC SYSTOLIC CONGESTIVE HEART FAILURE: ICD-10-CM

## 2017-08-29 DIAGNOSIS — R79.1 SUBTHERAPEUTIC INTERNATIONAL NORMALIZED RATIO (INR): ICD-10-CM

## 2017-08-29 DIAGNOSIS — T82.9XXD COMPLICATION INVOLVING LEFT VENTRICULAR ASSIST DEVICE (LVAD), SUBSEQUENT ENCOUNTER: ICD-10-CM

## 2017-08-29 DIAGNOSIS — I10 ESSENTIAL HYPERTENSION: ICD-10-CM

## 2017-08-29 DIAGNOSIS — Z79.01 LONG TERM (CURRENT) USE OF ANTICOAGULANTS: ICD-10-CM

## 2017-08-29 LAB
ALBUMIN SERPL BCP-MCNC: 2.7 G/DL
ALP SERPL-CCNC: 66 U/L
ALT SERPL W/O P-5'-P-CCNC: 16 U/L
ANION GAP SERPL CALC-SCNC: 5 MMOL/L
AST SERPL-CCNC: 20 U/L
BASOPHILS # BLD AUTO: 0.04 K/UL
BASOPHILS NFR BLD: 0.5 %
BILIRUB SERPL-MCNC: 0.3 MG/DL
BUN SERPL-MCNC: 25 MG/DL
CALCIUM SERPL-MCNC: 9.4 MG/DL
CHLORIDE SERPL-SCNC: 109 MMOL/L
CO2 SERPL-SCNC: 25 MMOL/L
CREAT SERPL-MCNC: 1.2 MG/DL
DIFFERENTIAL METHOD: ABNORMAL
EOSINOPHIL # BLD AUTO: 0.3 K/UL
EOSINOPHIL NFR BLD: 3.7 %
ERYTHROCYTE [DISTWIDTH] IN BLOOD BY AUTOMATED COUNT: 16.1 %
EST. GFR  (AFRICAN AMERICAN): >60 ML/MIN/1.73 M^2
EST. GFR  (NON AFRICAN AMERICAN): 59.2 ML/MIN/1.73 M^2
GLUCOSE SERPL-MCNC: 87 MG/DL
HCT VFR BLD AUTO: 29.5 %
HGB BLD-MCNC: 10.1 G/DL
INR PPP: 1.5
LDH SERPL L TO P-CCNC: 166 U/L
LYMPHOCYTES # BLD AUTO: 1.6 K/UL
LYMPHOCYTES NFR BLD: 19.7 %
MAGNESIUM SERPL-MCNC: 1.9 MG/DL
MCH RBC QN AUTO: 30.7 PG
MCHC RBC AUTO-ENTMCNC: 34.2 G/DL
MCV RBC AUTO: 90 FL
MONOCYTES # BLD AUTO: 0.7 K/UL
MONOCYTES NFR BLD: 8.7 %
NEUTROPHILS # BLD AUTO: 5.3 K/UL
NEUTROPHILS NFR BLD: 66.8 %
PLATELET # BLD AUTO: 130 K/UL
PMV BLD AUTO: 11.2 FL
POTASSIUM SERPL-SCNC: 4.6 MMOL/L
PROT SERPL-MCNC: 6.3 G/DL
PROTHROMBIN TIME: 15.4 SEC
RBC # BLD AUTO: 3.29 M/UL
SODIUM SERPL-SCNC: 139 MMOL/L
WBC # BLD AUTO: 7.86 K/UL

## 2017-08-29 PROCEDURE — 93010 ELECTROCARDIOGRAM REPORT: CPT | Mod: ,,, | Performed by: INTERNAL MEDICINE

## 2017-08-29 PROCEDURE — 83735 ASSAY OF MAGNESIUM: CPT

## 2017-08-29 PROCEDURE — 63600175 PHARM REV CODE 636 W HCPCS: Performed by: INTERNAL MEDICINE

## 2017-08-29 PROCEDURE — 27000248 HC VAD-ADDITIONAL DAY

## 2017-08-29 PROCEDURE — 25000003 PHARM REV CODE 250: Performed by: INTERNAL MEDICINE

## 2017-08-29 PROCEDURE — 85610 PROTHROMBIN TIME: CPT

## 2017-08-29 PROCEDURE — G0379 DIRECT REFER HOSPITAL OBSERV: HCPCS

## 2017-08-29 PROCEDURE — 85025 COMPLETE CBC W/AUTO DIFF WBC: CPT

## 2017-08-29 PROCEDURE — 20600001 HC STEP DOWN PRIVATE ROOM

## 2017-08-29 PROCEDURE — 93005 ELECTROCARDIOGRAM TRACING: CPT

## 2017-08-29 PROCEDURE — 80053 COMPREHEN METABOLIC PANEL: CPT

## 2017-08-29 PROCEDURE — 83615 LACTATE (LD) (LDH) ENZYME: CPT

## 2017-08-29 PROCEDURE — G0378 HOSPITAL OBSERVATION PER HR: HCPCS

## 2017-08-29 RX ORDER — DOFETILIDE 0.25 MG/1
250 CAPSULE ORAL EVERY 12 HOURS
Status: DISCONTINUED | OUTPATIENT
Start: 2017-08-29 | End: 2017-09-02 | Stop reason: HOSPADM

## 2017-08-29 RX ORDER — NAPROXEN SODIUM 220 MG/1
81 TABLET, FILM COATED ORAL DAILY
Status: DISCONTINUED | OUTPATIENT
Start: 2017-08-30 | End: 2017-09-02 | Stop reason: HOSPADM

## 2017-08-29 RX ORDER — FERROUS SULFATE, DRIED 160(50) MG
1 TABLET, EXTENDED RELEASE ORAL 2 TIMES DAILY
Status: DISCONTINUED | OUTPATIENT
Start: 2017-08-29 | End: 2017-09-02 | Stop reason: HOSPADM

## 2017-08-29 RX ORDER — AMLODIPINE BESYLATE 10 MG/1
10 TABLET ORAL DAILY
Status: DISCONTINUED | OUTPATIENT
Start: 2017-08-30 | End: 2017-09-02 | Stop reason: HOSPADM

## 2017-08-29 RX ORDER — SPIRONOLACTONE 25 MG/1
25 TABLET ORAL DAILY
Status: DISCONTINUED | OUTPATIENT
Start: 2017-08-30 | End: 2017-09-02 | Stop reason: HOSPADM

## 2017-08-29 RX ORDER — DOCUSATE SODIUM 100 MG/1
100 CAPSULE, LIQUID FILLED ORAL 2 TIMES DAILY
Status: DISCONTINUED | OUTPATIENT
Start: 2017-08-29 | End: 2017-09-02 | Stop reason: HOSPADM

## 2017-08-29 RX ORDER — ATORVASTATIN CALCIUM 10 MG/1
10 TABLET, FILM COATED ORAL DAILY
Status: DISCONTINUED | OUTPATIENT
Start: 2017-08-30 | End: 2017-09-02 | Stop reason: HOSPADM

## 2017-08-29 RX ORDER — CEFEPIME HYDROCHLORIDE 2 G/50ML
2 INJECTION, SOLUTION INTRAVENOUS
Status: DISCONTINUED | OUTPATIENT
Start: 2017-08-29 | End: 2017-08-30

## 2017-08-29 RX ORDER — TRAZODONE HYDROCHLORIDE 50 MG/1
50 TABLET ORAL NIGHTLY PRN
Status: DISCONTINUED | OUTPATIENT
Start: 2017-08-29 | End: 2017-09-02 | Stop reason: HOSPADM

## 2017-08-29 RX ORDER — HEPARIN SODIUM,PORCINE/D5W 25000/250
17 INTRAVENOUS SOLUTION INTRAVENOUS CONTINUOUS
Status: DISCONTINUED | OUTPATIENT
Start: 2017-08-29 | End: 2017-09-01

## 2017-08-29 RX ORDER — ALLOPURINOL 300 MG/1
300 TABLET ORAL DAILY
Status: DISCONTINUED | OUTPATIENT
Start: 2017-08-30 | End: 2017-09-02 | Stop reason: HOSPADM

## 2017-08-29 RX ORDER — TAMSULOSIN HYDROCHLORIDE 0.4 MG/1
0.4 CAPSULE ORAL DAILY
Status: DISCONTINUED | OUTPATIENT
Start: 2017-08-30 | End: 2017-09-02 | Stop reason: HOSPADM

## 2017-08-29 RX ORDER — ACETAMINOPHEN 325 MG/1
650 TABLET ORAL EVERY 6 HOURS PRN
Status: DISCONTINUED | OUTPATIENT
Start: 2017-08-29 | End: 2017-09-02 | Stop reason: HOSPADM

## 2017-08-29 RX ORDER — BUPROPION HYDROCHLORIDE 150 MG/1
300 TABLET ORAL DAILY
Status: DISCONTINUED | OUTPATIENT
Start: 2017-08-30 | End: 2017-09-02 | Stop reason: HOSPADM

## 2017-08-29 RX ORDER — ROPINIROLE 0.5 MG/1
0.5 TABLET, FILM COATED ORAL 3 TIMES DAILY
Status: DISCONTINUED | OUTPATIENT
Start: 2017-08-29 | End: 2017-09-02 | Stop reason: HOSPADM

## 2017-08-29 RX ORDER — LANOLIN ALCOHOL/MO/W.PET/CERES
400 CREAM (GRAM) TOPICAL 2 TIMES DAILY
Status: DISCONTINUED | OUTPATIENT
Start: 2017-08-29 | End: 2017-09-02 | Stop reason: HOSPADM

## 2017-08-29 RX ORDER — FOLIC ACID 1 MG/1
1 TABLET ORAL DAILY
Status: DISCONTINUED | OUTPATIENT
Start: 2017-08-30 | End: 2017-09-02 | Stop reason: HOSPADM

## 2017-08-29 RX ORDER — WARFARIN 3 MG/1
6 TABLET ORAL DAILY
Status: DISCONTINUED | OUTPATIENT
Start: 2017-08-30 | End: 2017-08-30

## 2017-08-29 RX ORDER — LISINOPRIL 10 MG/1
10 TABLET ORAL 2 TIMES DAILY
Status: DISCONTINUED | OUTPATIENT
Start: 2017-08-29 | End: 2017-09-02 | Stop reason: HOSPADM

## 2017-08-29 RX ADMIN — TRAZODONE HYDROCHLORIDE 50 MG: 50 TABLET ORAL at 11:08

## 2017-08-29 RX ADMIN — DOFETILIDE 250 MCG: 0.25 CAPSULE ORAL at 11:08

## 2017-08-29 RX ADMIN — HEPARIN SODIUM 17 UNITS/KG/HR: 10000 INJECTION, SOLUTION INTRAVENOUS at 11:08

## 2017-08-29 RX ADMIN — OYSTER SHELL CALCIUM WITH VITAMIN D 1 TABLET: 500; 200 TABLET, FILM COATED ORAL at 11:08

## 2017-08-29 RX ADMIN — HYDRALAZINE HYDROCHLORIDE 75 MG: 50 TABLET ORAL at 11:08

## 2017-08-29 RX ADMIN — ROPINIROLE 0.5 MG: 0.5 TABLET, FILM COATED ORAL at 11:08

## 2017-08-29 RX ADMIN — DOCUSATE SODIUM 100 MG: 100 CAPSULE, LIQUID FILLED ORAL at 11:08

## 2017-08-29 RX ADMIN — CEFEPIME HYDROCHLORIDE 2 G: 2 INJECTION, SOLUTION INTRAVENOUS at 11:08

## 2017-08-29 RX ADMIN — LISINOPRIL 10 MG: 10 TABLET ORAL at 11:08

## 2017-08-29 RX ADMIN — MAGNESIUM OXIDE TAB 400 MG (241.3 MG ELEMENTAL MG) 400 MG: 400 (241.3 MG) TAB at 11:08

## 2017-08-29 NOTE — PROGRESS NOTES
Ena questioned and confirmed correct dose .  Reports patient eating ice dahiana lettuce after testing on 8/28. No other changes reported.

## 2017-08-29 NOTE — TELEPHONE ENCOUNTER
Message rec'd from home health RN as follows:    Home health is notifying provider of the following medication interaction: WHILE ADMITTING PATIENT, SN FOUND SOME MED INTERACTIONS THAT I WOULD LIKE TO INFORM DR LANDA OF. SEE BELOW   SEVERITY 2   ALLOPURINOL WITH WARFARIN   ASPRIN WITH WARFARIN   SEVERITY 3   ASPIRIN WITH LISINOPRIL   WARFARIN WITH TYLENOL   LISINOPRIL WITH SPIRONOLACTONE   TYLENOL WITH WARFARIN     Thank you (Routing comment)

## 2017-08-29 NOTE — TELEPHONE ENCOUNTER
Reviewed medication interaction notes from home health.  I have not seen this patient in >1 year. He is following appropriately with Dr Henderson with cardiology, and with the coumadin clinic.     Will reach out to patient to schedule visit, and will forward notes re: possible med interactions to his cardiology team. Should continue ASA, warfarin, lisinopril, and spironolactone as recommended by cardiology.   For the allopurinol, this can increase the effect of his warfarin -- as long as he is taking the same dose of this every day, this can be monitored by the coumadin clinic. Will forward message to coumadin clinic as well.

## 2017-08-29 NOTE — TELEPHONE ENCOUNTER
Spoke with pt's daughter--- daughter states pt is in route to the Hospital and will call office to schedule f/u appt with PCP.

## 2017-08-30 PROBLEM — B37.2 SKIN YEAST INFECTION: Status: ACTIVE | Noted: 2017-08-30

## 2017-08-30 LAB
ALBUMIN SERPL BCP-MCNC: 2.7 G/DL
ALP SERPL-CCNC: 66 U/L
ALT SERPL W/O P-5'-P-CCNC: 16 U/L
ANION GAP SERPL CALC-SCNC: 6 MMOL/L
APTT BLDCRRT: 61.6 SEC
AST SERPL-CCNC: 20 U/L
BASOPHILS # BLD AUTO: 0.04 K/UL
BASOPHILS NFR BLD: 0.6 %
BILIRUB DIRECT SERPL-MCNC: 0.3 MG/DL
BILIRUB SERPL-MCNC: 0.5 MG/DL
BNP SERPL-MCNC: 331 PG/ML
BUN SERPL-MCNC: 25 MG/DL
CALCIUM SERPL-MCNC: 9.6 MG/DL
CHLORIDE SERPL-SCNC: 108 MMOL/L
CO2 SERPL-SCNC: 25 MMOL/L
CREAT SERPL-MCNC: 1.2 MG/DL
CRP SERPL-MCNC: 1.3 MG/L
DIFFERENTIAL METHOD: ABNORMAL
EOSINOPHIL # BLD AUTO: 0.2 K/UL
EOSINOPHIL NFR BLD: 3.3 %
ERYTHROCYTE [DISTWIDTH] IN BLOOD BY AUTOMATED COUNT: 16 %
EST. GFR  (AFRICAN AMERICAN): >60 ML/MIN/1.73 M^2
EST. GFR  (NON AFRICAN AMERICAN): 59.2 ML/MIN/1.73 M^2
FACT X PPP CHRO-ACNC: 0.4 IU/ML
GLUCOSE SERPL-MCNC: 85 MG/DL
HCT VFR BLD AUTO: 29.5 %
HGB BLD-MCNC: 9.9 G/DL
INR PPP: 1.5
LDH SERPL L TO P-CCNC: 172 U/L
LYMPHOCYTES # BLD AUTO: 1.4 K/UL
LYMPHOCYTES NFR BLD: 19.7 %
MAGNESIUM SERPL-MCNC: 1.9 MG/DL
MCH RBC QN AUTO: 30.5 PG
MCHC RBC AUTO-ENTMCNC: 33.6 G/DL
MCV RBC AUTO: 91 FL
MONOCYTES # BLD AUTO: 0.8 K/UL
MONOCYTES NFR BLD: 11.8 %
NEUTROPHILS # BLD AUTO: 4.5 K/UL
NEUTROPHILS NFR BLD: 64 %
PHOSPHATE SERPL-MCNC: 2.1 MG/DL
PLATELET # BLD AUTO: 137 K/UL
PMV BLD AUTO: 11.2 FL
POTASSIUM SERPL-SCNC: 4.4 MMOL/L
PREALB SERPL-MCNC: 24 MG/DL
PROT SERPL-MCNC: 6 G/DL
PROTHROMBIN TIME: 15.1 SEC
RBC # BLD AUTO: 3.25 M/UL
SODIUM SERPL-SCNC: 139 MMOL/L
WBC # BLD AUTO: 7.01 K/UL

## 2017-08-30 PROCEDURE — 97802 MEDICAL NUTRITION INDIV IN: CPT

## 2017-08-30 PROCEDURE — 87040 BLOOD CULTURE FOR BACTERIA: CPT

## 2017-08-30 PROCEDURE — 87186 SC STD MICRODIL/AGAR DIL: CPT

## 2017-08-30 PROCEDURE — 27000248 HC VAD-ADDITIONAL DAY

## 2017-08-30 PROCEDURE — 87075 CULTR BACTERIA EXCEPT BLOOD: CPT

## 2017-08-30 PROCEDURE — 83880 ASSAY OF NATRIURETIC PEPTIDE: CPT

## 2017-08-30 PROCEDURE — 86140 C-REACTIVE PROTEIN: CPT

## 2017-08-30 PROCEDURE — G8988 SELF CARE GOAL STATUS: HCPCS | Mod: CJ

## 2017-08-30 PROCEDURE — 99223 1ST HOSP IP/OBS HIGH 75: CPT | Mod: ,,, | Performed by: INTERNAL MEDICINE

## 2017-08-30 PROCEDURE — 97162 PT EVAL MOD COMPLEX 30 MIN: CPT

## 2017-08-30 PROCEDURE — 63600175 PHARM REV CODE 636 W HCPCS: Performed by: PHYSICIAN ASSISTANT

## 2017-08-30 PROCEDURE — 97530 THERAPEUTIC ACTIVITIES: CPT

## 2017-08-30 PROCEDURE — 83615 LACTATE (LD) (LDH) ENZYME: CPT

## 2017-08-30 PROCEDURE — 85610 PROTHROMBIN TIME: CPT

## 2017-08-30 PROCEDURE — 87070 CULTURE OTHR SPECIMN AEROBIC: CPT

## 2017-08-30 PROCEDURE — 25000003 PHARM REV CODE 250: Performed by: INTERNAL MEDICINE

## 2017-08-30 PROCEDURE — 99221 1ST HOSP IP/OBS SF/LOW 40: CPT | Mod: ,,, | Performed by: INTERNAL MEDICINE

## 2017-08-30 PROCEDURE — 80048 BASIC METABOLIC PNL TOTAL CA: CPT

## 2017-08-30 PROCEDURE — 97166 OT EVAL MOD COMPLEX 45 MIN: CPT

## 2017-08-30 PROCEDURE — 85025 COMPLETE CBC W/AUTO DIFF WBC: CPT

## 2017-08-30 PROCEDURE — 85730 THROMBOPLASTIN TIME PARTIAL: CPT

## 2017-08-30 PROCEDURE — 85520 HEPARIN ASSAY: CPT

## 2017-08-30 PROCEDURE — 36415 COLL VENOUS BLD VENIPUNCTURE: CPT

## 2017-08-30 PROCEDURE — 83735 ASSAY OF MAGNESIUM: CPT

## 2017-08-30 PROCEDURE — 20600001 HC STEP DOWN PRIVATE ROOM

## 2017-08-30 PROCEDURE — G8987 SELF CARE CURRENT STATUS: HCPCS | Mod: CK

## 2017-08-30 PROCEDURE — 97535 SELF CARE MNGMENT TRAINING: CPT

## 2017-08-30 PROCEDURE — 87077 CULTURE AEROBIC IDENTIFY: CPT

## 2017-08-30 PROCEDURE — 84100 ASSAY OF PHOSPHORUS: CPT

## 2017-08-30 PROCEDURE — 63600175 PHARM REV CODE 636 W HCPCS: Performed by: INTERNAL MEDICINE

## 2017-08-30 PROCEDURE — 84134 ASSAY OF PREALBUMIN: CPT

## 2017-08-30 PROCEDURE — 80076 HEPATIC FUNCTION PANEL: CPT

## 2017-08-30 PROCEDURE — 87205 SMEAR GRAM STAIN: CPT

## 2017-08-30 RX ORDER — CEFEPIME HYDROCHLORIDE 2 G/50ML
2 INJECTION, SOLUTION INTRAVENOUS
Status: DISCONTINUED | OUTPATIENT
Start: 2017-08-30 | End: 2017-09-02 | Stop reason: HOSPADM

## 2017-08-30 RX ORDER — WARFARIN 4 MG/1
8 TABLET ORAL DAILY
Status: DISCONTINUED | OUTPATIENT
Start: 2017-08-30 | End: 2017-09-01

## 2017-08-30 RX ADMIN — DOFETILIDE 250 MCG: 0.25 CAPSULE ORAL at 09:08

## 2017-08-30 RX ADMIN — DOFETILIDE 250 MCG: 0.25 CAPSULE ORAL at 08:08

## 2017-08-30 RX ADMIN — HYDRALAZINE HYDROCHLORIDE 75 MG: 50 TABLET ORAL at 03:08

## 2017-08-30 RX ADMIN — LISINOPRIL 10 MG: 10 TABLET ORAL at 08:08

## 2017-08-30 RX ADMIN — WARFARIN SODIUM 8 MG: 4 TABLET ORAL at 04:08

## 2017-08-30 RX ADMIN — CEFEPIME HYDROCHLORIDE 2 G: 2 INJECTION, SOLUTION INTRAVENOUS at 04:08

## 2017-08-30 RX ADMIN — ROPINIROLE 0.5 MG: 0.5 TABLET, FILM COATED ORAL at 04:08

## 2017-08-30 RX ADMIN — HEPARIN SODIUM 17 UNITS/KG/HR: 10000 INJECTION, SOLUTION INTRAVENOUS at 03:08

## 2017-08-30 RX ADMIN — ROPINIROLE 0.5 MG: 0.5 TABLET, FILM COATED ORAL at 11:08

## 2017-08-30 RX ADMIN — MAGNESIUM OXIDE TAB 400 MG (241.3 MG ELEMENTAL MG) 400 MG: 400 (241.3 MG) TAB at 08:08

## 2017-08-30 RX ADMIN — OYSTER SHELL CALCIUM WITH VITAMIN D 1 TABLET: 500; 200 TABLET, FILM COATED ORAL at 08:08

## 2017-08-30 RX ADMIN — ATORVASTATIN CALCIUM 10 MG: 10 TABLET, FILM COATED ORAL at 08:08

## 2017-08-30 RX ADMIN — LISINOPRIL 10 MG: 10 TABLET ORAL at 09:08

## 2017-08-30 RX ADMIN — AMLODIPINE BESYLATE 10 MG: 10 TABLET ORAL at 08:08

## 2017-08-30 RX ADMIN — OYSTER SHELL CALCIUM WITH VITAMIN D 1 TABLET: 500; 200 TABLET, FILM COATED ORAL at 09:08

## 2017-08-30 RX ADMIN — CEFEPIME HYDROCHLORIDE 2 G: 2 INJECTION, SOLUTION INTRAVENOUS at 08:08

## 2017-08-30 RX ADMIN — ROPINIROLE 0.5 MG: 0.5 TABLET, FILM COATED ORAL at 03:08

## 2017-08-30 RX ADMIN — MAGNESIUM OXIDE TAB 400 MG (241.3 MG ELEMENTAL MG) 400 MG: 400 (241.3 MG) TAB at 09:08

## 2017-08-30 RX ADMIN — SPIRONOLACTONE 25 MG: 25 TABLET, FILM COATED ORAL at 08:08

## 2017-08-30 RX ADMIN — BUPROPION HYDROCHLORIDE 300 MG: 150 TABLET, FILM COATED, EXTENDED RELEASE ORAL at 08:08

## 2017-08-30 RX ADMIN — ASPIRIN 81 MG CHEWABLE TABLET 81 MG: 81 TABLET CHEWABLE at 08:08

## 2017-08-30 RX ADMIN — FOLIC ACID 1 MG: 1 TABLET ORAL at 08:08

## 2017-08-30 RX ADMIN — HYDRALAZINE HYDROCHLORIDE 75 MG: 50 TABLET ORAL at 09:08

## 2017-08-30 RX ADMIN — HYDRALAZINE HYDROCHLORIDE 75 MG: 50 TABLET ORAL at 04:08

## 2017-08-30 RX ADMIN — ALLOPURINOL 300 MG: 300 TABLET ORAL at 08:08

## 2017-08-30 RX ADMIN — TAMSULOSIN HYDROCHLORIDE 0.4 MG: 0.4 CAPSULE ORAL at 08:08

## 2017-08-30 RX ADMIN — MICONAZOLE NITRATE: 20 OINTMENT TOPICAL at 09:08

## 2017-08-30 NOTE — PLAN OF CARE
Problem: Patient Care Overview  Goal: Plan of Care Review  Outcome: Ongoing (interventions implemented as appropriate)  Fall precautions in place. LVAD DP and numbers WNL, smooth LVAD hum. Patient has no complaints of pain. Discussed medications and care. IV abx therapy ongoing. Pt tolerating well. Heparin drip ongoing. Anti x a therapeutic this morning, will recheck in AM. Plan of care discussed with pt. Pt verbalizes understanding. Patient has no questions at this time. Will continue to monitor.

## 2017-08-30 NOTE — PLAN OF CARE
Problem: Occupational Therapy Goal  Goal: Occupational Therapy Goal  Goals to be met by: 7 days (9/6/17)     Patient will increase functional independence with ADLs by performing:    UE Dressing with Stand-by Assistance.  LE Dressing with Contact Guard Assistance.  Grooming while standing at sink with Contact Guard Assistance.  Toileting from toilet with Contact Guard Assistance for hygiene and clothing management.   Supine to sit with Supervision.  Toilet transfer to toilet with Contact Guard Assistance.  Pt will perform functional mobility household distance with CGA and AD as needed.  Outcome: Ongoing (interventions implemented as appropriate)  Eval and POC set 8/30/17

## 2017-08-30 NOTE — PROGRESS NOTES
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   Will begin Critic aid Antifungal to all affected area BID and prn each episode .   Discussed with patient and family .  Soheila Waddell RN CWON  [phone]

## 2017-08-30 NOTE — ASSESSMENT & PLAN NOTE
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   -Afebrile, without a leukocytosis, stable on examination  -wound cultures and blood cultures pending  -on IV cefepime     Plan:  1.Continue IV cefepime 2gq8hr for pseudomonas driveline infection. Continue until 9/23/17 as previously recommended  2.Blood cultures NGTD. wound cultures pending.   3.Pt remained afebrile, stable, without a leukocytosis.   4.Will need weekly CBC CMP faxed to ID clinic at  and have pt follow up in ID clinic towards end of therapy   5.Will sign off at this time and follow from afar.   However, please reconsult if cultures are positive.     Seen and discussed with ID staff.

## 2017-08-30 NOTE — ASSESSMENT & PLAN NOTE
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 DLE "4" per nursing  - Continue cefipime 8 week course to end on September 23; ID following  - Repeat blood cultures and DLES cultures sent off.  - ID consult placed, appreciate recs.   "

## 2017-08-30 NOTE — PROGRESS NOTES
"LVAD dressing change completed using sterile technique with soap and water. DLES is a "4" with moderate drainage noted on the drain sponge. Tolerated without any complication. Sutures remain intact, Redness present to driveline site, tenderness denied by pt. Cultures of driveline drainage sent to lab.   "

## 2017-08-30 NOTE — SUBJECTIVE & OBJECTIVE
Interval History: No complaints overnight. Patient states his appetite increased when he left rehab (because the food was no longer terrible). Otherwise no change in any medication or missed coumadin doses.     Continuous Infusions:   heparin (porcine) in D5W 17 Units/kg/hr (08/29/17 2308)     Scheduled Meds:   allopurinol  300 mg Oral Daily    amlodipine  10 mg Oral Daily    aspirin  81 mg Oral Daily    atorvastatin  10 mg Oral Daily    buPROPion  300 mg Oral Daily    calcium-vitamin D3  1 tablet Oral BID    ceFEPime (MAXIPIME) IVPB  2 g Intravenous Q8H    docusate sodium  100 mg Oral BID    dofetilide  250 mcg Oral Q12H    folic acid  1 mg Oral Daily    hydrALAZINE  75 mg Oral Q8H    lisinopril  10 mg Oral BID    magnesium oxide  400 mg Oral BID    ropinirole  0.5 mg Oral TID    spironolactone  25 mg Oral Daily    tamsulosin  0.4 mg Oral Daily    warfarin  8 mg Oral Daily     PRN Meds:acetaminophen, trazodone    Review of patient's allergies indicates:   Allergen Reactions    Sulfa (sulfonamide antibiotics)      Objective:     Vital Signs (Most Recent):  Temp: 99 °F (37.2 °C) (08/30/17 1230)  Pulse: 67 (08/30/17 1230)  Resp: 18 (08/30/17 1230)  BP: (!) 64/0 (08/30/17 1230)  SpO2: (!) 93 % (08/30/17 1230) Vital Signs (24h Range):  Temp:  [97.5 °F (36.4 °C)-99.1 °F (37.3 °C)] 99 °F (37.2 °C)  Pulse:  [65-89] 67  Resp:  [16-18] 18  SpO2:  [92 %-98 %] 93 %  BP: ()/(0-79) 64/0     Weight: 81 kg (178 lb 9.2 oz)  Body mass index is 24.22 kg/m².      Intake/Output Summary (Last 24 hours) at 08/30/17 1353  Last data filed at 08/30/17 0600   Gross per 24 hour   Intake           420.88 ml   Output                0 ml   Net           420.88 ml     Hemodynamic Parameters:    Telemetry: no events overnight    Physical Exam   Constitutional: He is oriented to person, place, and time. He appears well-developed and well-nourished. No distress.   HENT:   Head: Normocephalic and atraumatic.   Neck: Normal  "range of motion. Neck supple. No JVD present.   Cardiovascular: Normal rate and regular rhythm.    VAD hum smooth   Pulmonary/Chest: Effort normal and breath sounds normal. No respiratory distress. He has no wheezes.   Abdominal: Soft. Bowel sounds are normal.   Musculoskeletal: Normal range of motion. He exhibits no edema.   Neurological: He is alert and oriented to person, place, and time.   Skin: Skin is warm and dry. Capillary refill takes less than 2 seconds. He is not diaphoretic.   DLES "4" per nursing documentation. Dressing in place, clean and dry   Psychiatric: He has a normal mood and affect. His behavior is normal.   Nursing note and vitals reviewed.         Significant Labs:  CBC:    Recent Labs  Lab 08/30/17 0345   WBC 7.01   RBC 3.25*   HGB 9.9*   HCT 29.5*   *   MCV 91   MCH 30.5   MCHC 33.6     BNP:    Recent Labs  Lab 08/30/17  0345   *     CMP:    Recent Labs  Lab 08/30/17  0345   GLU 85   CALCIUM 9.6   ALBUMIN 2.7*   PROT 6.0      K 4.4   CO2 25      BUN 25*   CREATININE 1.2   ALKPHOS 66   ALT 16   AST 20   BILITOT 0.5      Coagulation:     Recent Labs  Lab 08/30/17  0345   INR 1.5*   APTT 61.6*     LDH:    Recent Labs  Lab 08/29/17 2222 08/30/17  0345    172     Microbiology:  Microbiology Results (last 7 days)     Procedure Component Value Units Date/Time    Blood culture [[id number]] Collected:  08/30/17 0048    Order Status:  Completed Specimen:  Blood Updated:  08/30/17 1115     Blood Culture, Routine No Growth to date    Gram stain [[id number]]     Order Status:  Completed Specimen:  Wound from Abdomen     Culture, Anaerobe [[id number]]     Order Status:  No result Specimen:  Incision site from Abdomen     Aerobic culture [[id number]]     Order Status:  No result Specimen:  Incision site from Abdomen     Culture, Anaerobe [[id number]] Collected:  08/29/17 2223    Order Status:  Sent Specimen:  Incision site from Abdomen Updated:  08/29/17 2223    Aerobic " culture [[id number]] Collected:  08/29/17 2223    Order Status:  Sent Specimen:  Incision site from Abdomen Updated:  08/29/17 2223    Blood culture [[id number]]     Order Status:  Canceled Specimen:  Blood           I have reviewed all pertinent labs within the past 24 hours.    Estimated Creatinine Clearance: 59.3 mL/min (based on Cr of 1.2).    Diagnostic Results:  I have reviewed all pertinent imaging results/findings within the past 24 hours.

## 2017-08-30 NOTE — ASSESSMENT & PLAN NOTE
- Pt reports having taken increased daily dose today of 6 mg at 5:30pm per recommendation of his nurse before being directly admitted. Changes in diet (eating much more per patient) may have attributed to drop   - INR 1.5 today, pamela boost tonight.   - home dose of coumadin 6 MF, 4 all others  - continue heparin ggt

## 2017-08-30 NOTE — SUBJECTIVE & OBJECTIVE
Past Medical History:   Diagnosis Date    Acute on chronic systolic heart failure 7/27/2015    AICD (automatic cardioverter/defibrillator) present 2014    [employer]    CAD (coronary artery disease) 7/27/2015    CHF (congestive heart failure)     Gait instability     HTN (hypertension) 7/27/2015    ICD (implantable cardioverter-defibrillator), biventricular, in situ 7/27/2015    Kidney stones     HILLARY treated with BiPAP 7/27/2015    Peripheral neuropathy     Pulmonary hypertension due to left ventricular systolic dysfunction 7/29/2015    Restless leg syndrome 1992    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    Skin cancer     excision 2013    Sleep apnea 1992       Past Surgical History:   Procedure Laterality Date    CARDIAC PACEMAKER PLACEMENT      pacemaker previously, then AICD in 2006. AICD [employer] serial #[id number]    CORONARY ARTERY BYPASS GRAFT  1993    KNEE SURGERY Left 2001    complicated by infection, hardware had to be taken out and replaced    LEFT VENTRICULAR ASSIST DEVICE  10/19/2016       Review of patient's allergies indicates:   Allergen Reactions    Sulfa (sulfonamide antibiotics)        No current facility-administered medications for this encounter.      Family History     Problem Relation (Age of Onset)    Cancer Daughter (50)    Diabetes Daughter    Heart disease Daughter        Social History Main Topics    Smoking status: Never Smoker    Smokeless tobacco: Never Used    Alcohol use No    Drug use: No    Sexual activity: Not on file      Comment:      Review of Systems   Constitutional: Negative for activity change, appetite change and unexpected weight change.   Respiratory: Negative for apnea, cough, choking, chest tightness and shortness of breath.    Cardiovascular: Negative for chest pain, palpitations and leg swelling.   Neurological: Negative for dizziness, syncope and weakness.     Objective:     Vital Signs (Most Recent):  Temp: 98.6 °F (37  °C) (08/29/17 1952)  Pulse: 73 (08/29/17 2000)  BP: (!) 88/0 (08/29/17 1954) Vital Signs (24h Range):  Temp:  [98.6 °F (37 °C)] 98.6 °F (37 °C)  Pulse:  [73] 73  BP: ()/(0-79) 88/0        There is no height or weight on file to calculate BMI.    No intake or output data in the 24 hours ending 08/29/17 2059    Physical Exam     General Appearance:    Alert, cooperative, no distress, talking   Lungs:     Clear to auscultation bilaterally although sound obscured by LVAD hum, respirations unlabored    Heart:    No JVP, Regular rate and rhythm, normal hum of LVAD   Abdomen:     Soft, non-tender, bowel sounds active, no masses, no organomegaly; driveline site is dressed with gauze   Extremities:   no edema b/l, pulses +2 b/l       Significant Labs:  CBC:    Recent Labs  Lab 08/28/17 1855   WBC 5.57   RBC 4.00*   HGB 12.1*   HCT 37.0*   *   MCV 93   MCH 30.3   MCHC 32.7     BNP:  No results for input(s): BNP in the last 72 hours.    Invalid input(s): BNPTRIAGELBLO  CMP:    Recent Labs  Lab 08/28/17 1855      CALCIUM 9.6   ALBUMIN 2.9*   PROT 6.6      K 4.1   CO2 24      BUN 34*   CREATININE 1.4   ALKPHOS 75   ALT 18   AST 22   BILITOT 0.3      Coagulation:     Recent Labs  Lab 08/28/17 1855   INR 1.4*   APTT 25.8     LDH:  No results for input(s): LDH in the last 72 hours.  Microbiology:  Microbiology Results (last 7 days)     ** No results found for the last 168 hours. **          BMP:   Recent Labs  Lab 08/28/17 1855         K 4.1      CO2 24   BUN 34*   CREATININE 1.4   CALCIUM 9.6     I have reviewed all pertinent labs within the past 24 hours.    Diagnostic Results:  CXR: No results found in the last 24 hours.

## 2017-08-30 NOTE — PROGRESS NOTES
Admit Note     Met with patient, pt wife and their sitter to assess patients needs.  Patient is a 76 y.o.  male, admitted for subtherapeutic INR.    Patient admitted from home on 8/29/2017 .  At this time, patient presents as alert/oriented x4.  At this time, patients wife and sitter present as alert and oriented x 4, pleasant, communicative and cooperative.      Household/Family Systems (as reported by patients caregiver)     Patient resides with patient's spouse, daughter and son in law, at:     [address]  [place] [address].      alaina Manriquez, ph: [phone]  Son in law, Yadiel, ph: [phone]    Support system includes two daughters, wife and son in law.  Patient does not have dependents that are need of being cared for.     Patients primary caregiver is Mitra bill daughter, phone number [phone].  Confirmed patients contact information is [phone] (home);     Telephone Information:   Mobile [phone]       During admission, patient's caregiver plans to stay at home, sitter will be in room with patient.  Confirmed patient and patients caregivers do have access to reliable transportation.    Cognitive Status/Learning     Patients caregiver reports patients reading ability as college and states patient does have difficulty with comprehension and memory at this time. Patients caregiver reports patient learns best by reading and verbal instruction.   Needed: No.   Highest education level: Associate/Bachelor Degree    Vocation/Disability (as reported by patients caregiver)    Working for Income: No  If no, reason not working: Patient Choice - Retired    Patient is retired, pt was a School Super Intendent. Pt has 3 college degrees.    Adherence     Patients caregiver reports patient has a medium level of adherence to patients health care regimen.  Adherence counseling and education provided.  Patient's caregiver verbalizes understanding.    Substance  Use    Patients caregiver reports patients substance usage as the following:    Tobacco: none, patient denies any use.  Alcohol: none, patient denies any use.  Illicit Drugs/Non-prescribed Medications: none, patient denies any use.  Patients caregiver states clear understanding of the potential impact of substance use.  Substance abstinence/cessation counseling, education and resources provided and reviewed.     Services Utilizing/ADLS (as reported by patients caregiver)    Infusion Service: Prior to admission, patient utilizing? Yes, [facility] for IV antibiotics  Home Health: Prior to admission, patient utilizing? [facility] pt's family is also paying for 24/7 aids at this time.  DME: Prior to admission, w/c, walker with wheels, rollator, shower chair and rails in bathroom/shower. Family requesting a 3-n-1 commode  Pulmonary/Cardiac Rehab: Prior to admission, no  Dialysis:  Prior to admission, no  Transplant Specialty Pharmacy:  Prior to admission, no.    Prior to admission, patients caregiver reports patient was not independent with ADLS and was not driving.  Patients caregiver reports patient is not able to care for self at this time due to compromised medical condition (as documented in medical record) and physical weakness.  Patients caregiver reports patient indicates a willingness to care for self once medically cleared to do so.    Insurance/Medications    Insured by   Payor/Plan Subscr  Sex Relation Sub. Ins. ID Effective Group Num   1. MEDICARE - MELC MARTINEZ 1940 Male  [id number]                                    [address]   2. [facility]* LC JEAN-BAPTISTE 1940 Male  [id number]                                   [address] [address]      Primary Insurance (for UNOS reporting): Public Insurance - Medicare FFS (Fee For Service)  Secondary Insurance (for UNOS reporting): Private Insurance    Patients caregiver reports patient is able to obtain and afford medications at  this time and at time of discharge.    Living Will/Healthcare Power of     Patients caregiver reports patient has a LW and/or HCPA.  Per daughter, Mitra's report, it is her sister, Alannah Vasquez, ph: [phone].    Coping/Mental Health (as reported by patients caregiver)    Patient is coping adequately with the aid of  family members.  Patients caregiver is coping adequately with the aid of  family members.      Discharge Planning (as reported by patients caregiver)    At time of discharge, patient plans to return to patient's home under the care of aids and family.  Patients daughter will transport patient.  Per rounds today, expected discharge date has not been medically determined at this time. Patients caretaker verbalizes understanding and is involved in treatment planning and discharge process.    Additional Concerns    Patient's caretaker denies additional needs and/or concerns at this time. Patient is being followed for needs, education, resources, information, emotional support, supportive counseling, and for supportive and skilled discharge plan of care.  remains available.

## 2017-08-30 NOTE — HPI
75 y/o male s/p LVAD 10/2016 AT [place] seen recently for new onset driveline infection with pseudomonas resistant to ciprofloxacin. Pt was placed on cefepime for 8 weeks end date 9/23. Pt admitted from clinic for subtherapeutic INR and worsening drainage since the past week. Repeat wound and blood cultures obtained yesterday. Pt denies having any fever,chills,n/v/d. Pt has RUE PICC line. Pt currently on cefepime.

## 2017-08-30 NOTE — CONSULTS
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         ID consult received. Chart being reviewed. Full note with recommendations to follow.    Thank you,  Brandon Campos PA-C  Pgr [phone]

## 2017-08-30 NOTE — NURSING
"LVAD DSG changed, site cleansed with CHG soap and water, sterile technique maintained.  A moderate amount of brownish tan drainage noted to old dressing. Erythema, swelling, and tenderness noted to DLES, malodorous. Site is a "4"  DSG due to be changed again tomorrow.    Dr. Early notified of DLES.  "

## 2017-08-30 NOTE — NURSING
Pt admitted to  303. Nneka Christiansen notified. VSS. NADN.  LVAD equipment accounted for on admit.  Oriented to room, call light within reach, bed in lowest position.

## 2017-08-30 NOTE — H&P
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  Unclear why INR is so low-pt denies dietary change (increased green leafy vegetables, etc..).  Noted in home meds to be on a "multivitamin", but unsure if this contains vitamin K.  Pt was recent admitted here and discharged to rehab at [facility] after being treated for worsening abdominal pain related to driveline infection site.      It is noted that patient is on 81 mg daily ASA and not the usual dose of 325 mg daily.  Looked through clinic notes and was not able to find a hx of GI so unclear why he is on reduced dose. Pt reports never having had a GIB.          Pt reports his speed to be 1411-1606 currently.  Last interrogation 8/8/2016:  Pulsatile: Yes, intermittent   VAD Sounds: HM3  Smooth  Problems / Issues / Alarms with VAD if any: None noted  HCT: 38.4   Modular Cable Connection Intact(no yellow exposed):yes  VAD Interrogation:  TXP LVAD INTERROGATIONS 8/7/2017 8/7/2017 8/7/2017 8/7/2017 8/7/2017 8/6/2017 8/6/2017   Type HeartMate3   HeartMate3 HeartMate3   Flow 5.3 5.1 4.8 4.7 4.7 4.8 5.0   Speed 5800 5800 5800 5800 5800 5850 5800   PI 3.2 3.0 3.1 3.6 3.5 3.2 2.9   Power (Mendez) 4.6 4.6 4.5 4.2 4.6 4.6 -   LSL - - - - - 5600 4.5           Last echo 7/25/2017:     1 - Severely depressed " left ventricular systolic function (EF 15-20%).     2 - Impaired LV relaxation, normal LAP (grade 1 diastolic dysfunction).     3 - The estimated PA systolic pressure is greater than 30 mmHg.     4 - Mild to moderate tricuspid regurgitation.     5 - Moderate left ventricular enlargement.     6 - There is a Heartmate III LVAD in place. Baseline speed is 5800 rpms. The aortic valve opens with every beat. Septum is midline.         Past Medical History:   Diagnosis Date    Acute on chronic systolic heart failure 7/27/2015    AICD (automatic cardioverter/defibrillator) present 2014    [employer]    CAD (coronary artery disease) 7/27/2015    CHF (congestive heart failure)     Gait instability     HTN (hypertension) 7/27/2015    ICD (implantable cardioverter-defibrillator), biventricular, in situ 7/27/2015    Kidney stones     HILLARY treated with BiPAP 7/27/2015    Peripheral neuropathy     Pulmonary hypertension due to left ventricular systolic dysfunction 7/29/2015    Restless leg syndrome 1992    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    Skin cancer     excision 2013    Sleep apnea 1992       Past Surgical History:   Procedure Laterality Date    CARDIAC PACEMAKER PLACEMENT      pacemaker previously, then AICD in 2006. AICD [employer] serial #[id number]    CORONARY ARTERY BYPASS GRAFT  1993    KNEE SURGERY Left 2001    complicated by infection, hardware had to be taken out and replaced    LEFT VENTRICULAR ASSIST DEVICE  10/19/2016       Review of patient's allergies indicates:   Allergen Reactions    Sulfa (sulfonamide antibiotics)        No current facility-administered medications for this encounter.      Family History     Problem Relation (Age of Onset)    Cancer Daughter (50)    Diabetes Daughter    Heart disease Daughter        Social History Main Topics    Smoking status: Never Smoker    Smokeless tobacco: Never Used    Alcohol use No    Drug use: No    Sexual activity: Not on  file      Comment:      Review of Systems   Constitutional: Negative for activity change, appetite change and unexpected weight change.   Respiratory: Negative for apnea, cough, choking, chest tightness and shortness of breath.    Cardiovascular: Negative for chest pain, palpitations and leg swelling.   Neurological: Negative for dizziness, syncope and weakness.     Objective:     Vital Signs (Most Recent):  Temp: 98.6 °F (37 °C) (08/29/17 1952)  Pulse: 73 (08/29/17 2000)  BP: (!) 88/0 (08/29/17 1954) Vital Signs (24h Range):  Temp:  [98.6 °F (37 °C)] 98.6 °F (37 °C)  Pulse:  [73] 73  BP: ()/(0-79) 88/0        There is no height or weight on file to calculate BMI.    No intake or output data in the 24 hours ending 08/29/17 2059    Physical Exam     General Appearance:    Alert, cooperative, no distress, talking   Lungs:     Clear to auscultation bilaterally although sound obscured by LVAD hum, respirations unlabored    Heart:    No JVP, Regular rate and rhythm, normal hum of LVAD   Abdomen:     Soft, non-tender, bowel sounds active, no masses, no organomegaly; driveline site is dressed with gauze   Extremities:   no edema b/l, pulses +2 b/l       Significant Labs:  CBC:    Recent Labs  Lab 08/28/17 1855   WBC 5.57   RBC 4.00*   HGB 12.1*   HCT 37.0*   *   MCV 93   MCH 30.3   MCHC 32.7     BNP:  No results for input(s): BNP in the last 72 hours.    Invalid input(s): BNPTRIAGELBLO  CMP:    Recent Labs  Lab 08/28/17 1855      CALCIUM 9.6   ALBUMIN 2.9*   PROT 6.6      K 4.1   CO2 24      BUN 34*   CREATININE 1.4   ALKPHOS 75   ALT 18   AST 22   BILITOT 0.3      Coagulation:     Recent Labs  Lab 08/28/17 1855   INR 1.4*   APTT 25.8     LDH:  No results for input(s): LDH in the last 72 hours.  Microbiology:  Microbiology Results (last 7 days)     ** No results found for the last 168 hours. **          BMP:   Recent Labs  Lab 08/28/17 1855         K 4.1       CO2 24   BUN 34*   CREATININE 1.4   CALCIUM 9.6     I have reviewed all pertinent labs within the past 24 hours.    Diagnostic Results:  CXR: No results found in the last 24 hours.    Assessment/Plan:       * Subtherapeutic international normalized ratio (INR)    Pt reports having taken increased daily dose today of 6 mg at 5:30pm per recommendation of his nurse before being directly admitted.  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 PA Systolic Pressure 29.59     Tricuspid Valve Regurgitation MILD TO MODERATE      - Last interrogation as above  -LVAD order set in place    Assessment of anticoagulation (LVAD assoc w/ pump thrombosis)  - Continue Coumadin, Goal INR 2.0-3.0. INR xx today.  - LDH is pending today. Will continue to monitor daily.   - Antiplatelets ASA 81 mg/day per clinic note    Assessment of BP control (LVAD assoc w risk of stroke)  - pulsitile; MAP is 90 but has not had his nightly BP meds; will reassess after giving these to him    Assessment of rhythm issues ( can be assoc w pump placement)  - hx of VT, on tykosin  -continue home dose            Stage 2  chronic kidney disease    Yesterday Cr 1.4 around baseline of 1.3  -continue to monitor        Restless leg syndrome    Controlled w ropinirole  -continue current home dose         Nutrition: Regular low sodium diet  DVTP: on heparin gtt/coumadin bridge  Code: Full    Assessment and plan discussed with HTS fellow (Donato).    Clotilde Early MD  Heart Transplant  [facility]

## 2017-08-30 NOTE — PLAN OF CARE
Problem: Patient Care Overview  Goal: Plan of Care Review  Outcome: Ongoing (interventions implemented as appropriate)    Pt admitted from clinic with subtherapeutic INR. Heparin gtt started, titration per anti-Xa nomogram. Wound care consulted for incontinence associated dermatitis, external male catheter placed on pt, barrier cream applied to affected area. Infection being treated with IV ABx, blood cx drawn, DLES culture to be collected today once equipment is available. Reviewed plan of care with pt and family; all questions/concerns addressed. VS and assessment per flow sheet, patient progressing towards goals as tolerated. Will continue to monitor.

## 2017-08-30 NOTE — PROGRESS NOTES
[facility]  Heart Transplant  Progress Note    Patient Name: Kev Vasquez  MRN: [id number]  Admission Date: 8/29/2017  Hospital Length of Stay: 1 days  Attending Physician: Miguel A Marcelo Jr.,*  Primary Care Provider: Mega Collins MD  Principal Problem:Subtherapeutic international normalized ratio (INR)    Subjective:     Interval History: No complaints overnight. Patient states his appetite increased when he left rehab (because the food was no longer terrible). Otherwise no change in any medication or missed coumadin doses.     Continuous Infusions:   heparin (porcine) in D5W 17 Units/kg/hr (08/29/17 2308)     Scheduled Meds:   allopurinol  300 mg Oral Daily    amlodipine  10 mg Oral Daily    aspirin  81 mg Oral Daily    atorvastatin  10 mg Oral Daily    buPROPion  300 mg Oral Daily    calcium-vitamin D3  1 tablet Oral BID    ceFEPime (MAXIPIME) IVPB  2 g Intravenous Q8H    docusate sodium  100 mg Oral BID    dofetilide  250 mcg Oral Q12H    folic acid  1 mg Oral Daily    hydrALAZINE  75 mg Oral Q8H    lisinopril  10 mg Oral BID    magnesium oxide  400 mg Oral BID    ropinirole  0.5 mg Oral TID    spironolactone  25 mg Oral Daily    tamsulosin  0.4 mg Oral Daily    warfarin  8 mg Oral Daily     PRN Meds:acetaminophen, trazodone    Review of patient's allergies indicates:   Allergen Reactions    Sulfa (sulfonamide antibiotics)      Objective:     Vital Signs (Most Recent):  Temp: 99 °F (37.2 °C) (08/30/17 1230)  Pulse: 67 (08/30/17 1230)  Resp: 18 (08/30/17 1230)  BP: (!) 64/0 (08/30/17 1230)  SpO2: (!) 93 % (08/30/17 1230) Vital Signs (24h Range):  Temp:  [97.5 °F (36.4 °C)-99.1 °F (37.3 °C)] 99 °F (37.2 °C)  Pulse:  [65-89] 67  Resp:  [16-18] 18  SpO2:  [92 %-98 %] 93 %  BP: ()/(0-79) 64/0     Weight: 81 kg (178 lb 9.2 oz)  Body mass index is 24.22 kg/m².      Intake/Output Summary (Last 24 hours) at 08/30/17 1353  Last data filed at 08/30/17 0600   Gross per 24  "hour   Intake           420.88 ml   Output                0 ml   Net           420.88 ml     Hemodynamic Parameters:    Telemetry: no events overnight    Physical Exam   Constitutional: He is oriented to person, place, and time. He appears well-developed and well-nourished. No distress.   HENT:   Head: Normocephalic and atraumatic.   Neck: Normal range of motion. Neck supple. No JVD present.   Cardiovascular: Normal rate and regular rhythm.    VAD hum smooth   Pulmonary/Chest: Effort normal and breath sounds normal. No respiratory distress. He has no wheezes.   Abdominal: Soft. Bowel sounds are normal.   Musculoskeletal: Normal range of motion. He exhibits no edema.   Neurological: He is alert and oriented to person, place, and time.   Skin: Skin is warm and dry. Capillary refill takes less than 2 seconds. He is not diaphoretic.   DLES "4" per nursing documentation. Dressing in place, clean and dry   Psychiatric: He has a normal mood and affect. His behavior is normal.   Nursing note and vitals reviewed.         Significant Labs:  CBC:    Recent Labs  Lab 08/30/17  0345   WBC 7.01   RBC 3.25*   HGB 9.9*   HCT 29.5*   *   MCV 91   MCH 30.5   MCHC 33.6     BNP:    Recent Labs  Lab 08/30/17  0345   *     CMP:    Recent Labs  Lab 08/30/17  0345   GLU 85   CALCIUM 9.6   ALBUMIN 2.7*   PROT 6.0      K 4.4   CO2 25      BUN 25*   CREATININE 1.2   ALKPHOS 66   ALT 16   AST 20   BILITOT 0.5      Coagulation:     Recent Labs  Lab 08/30/17  0345   INR 1.5*   APTT 61.6*     LDH:    Recent Labs  Lab 08/29/17  2222 08/30/17  0345    172     Microbiology:  Microbiology Results (last 7 days)     Procedure Component Value Units Date/Time    Blood culture [[id number]] Collected:  08/30/17 0048    Order Status:  Completed Specimen:  Blood Updated:  08/30/17 1115     Blood Culture, Routine No Growth to date    Gram stain [[id number]]     Order Status:  Completed Specimen:  Wound from Abdomen     Culture, " Anaerobe [[id number]]     Order Status:  No result Specimen:  Incision site from Abdomen     Aerobic culture [[id number]]     Order Status:  No result Specimen:  Incision site from Abdomen     Culture, Anaerobe [[id number]] Collected:  08/29/17 2223    Order Status:  Sent Specimen:  Incision site from Abdomen Updated:  08/29/17 2223    Aerobic culture [[id number]] Collected:  08/29/17 2223    Order Status:  Sent Specimen:  Incision site from Abdomen Updated:  08/29/17 2223    Blood culture [[id number]]     Order Status:  Canceled Specimen:  Blood           I have reviewed all pertinent labs within the past 24 hours.    Estimated Creatinine Clearance: 59.3 mL/min (based on Cr of 1.2).    Diagnostic Results:  I have reviewed all pertinent imaging results/findings within the past 24 hours.    Assessment and Plan:     LVAD (left ventricular assist device) present    - S/P LVAD Heartmate III Implanted 10/16/2016  - Speed 5800  - Continue Coumadin, Goal INR 2.0-3.0. INR 1.5 today.   - home dose of coumadin 6 MF, 4 all other days   - will boost with 8 mg tonight  - , stable.   - Antiplatelets ASA 81 (VAD from outside hospital, no hx of GI bleeds)  - MAP goal 60-80's (ok to be a bit higher given hx of orthostasis). All BP's need to be checked while patient sitting on edge of bed or OOB in chair.  - hx of VT, on tykosin          * Subtherapeutic international normalized ratio (INR)    - Pt reports having taken increased daily dose today of 6 mg at 5:30pm per recommendation of his nurse before being directly admitted. Changes in diet (eating much more per patient) may have attributed to drop   - INR 1.5 today, pamela boost tonight.   - home dose of coumadin 6 MF, 4 all others  - continue heparin ggt        Restless leg syndrome    - Controlled w ropinirole  - Continue current home dose          Complication involving left ventricular assist device (LVAD)    - Chronic driveline infection as per HPI, pansensitive to pseudomonas.  "DLE "4" per nursing  - Continue cefipime 8 week course to end on September 23; ID following  - Repeat blood cultures and DLES cultures sent off.  - ID consult placed, appreciate recs.         Stage 2 chronic kidney disease    - Cr 1.2 around baseline of 1.3  - continue to monitor            Elena Raman PA-C  Heart Transplant  [facility]  "

## 2017-08-30 NOTE — PLAN OF CARE
Problem: Physical Therapy Goal  Goal: Physical Therapy Goal  Goals to be met by: 17     Patient will increase functional independence with mobility by performin. Supine to sit with Modified Trevor  2. Sit to stand transfer with Supervision  3. Bed to chair transfer with SBA using Rolling Walker as needed.  4. Gait  x 200 feet with SBA using Rolling Walker.   5. Lower extremity exercise program x15 reps, with assistance as needed, in order to increase LE strength and (I) with functional mobility.     Outcome: Ongoing (interventions implemented as appropriate)  PT evaluation complete and appropriate goals established.    Wendy Ackerman, PT, DPT   2017  [phone]

## 2017-08-30 NOTE — CONSULTS
[facility]  Adult Nutrition  Consult Note    SUMMARY     Recommendations    Recommendation/Intervention:   1. Continue current diet order.   2. If PO intake <50%, recommend Boost Glucose TID (has less vitamin K).   3. Encourage good PO intake of meals. Will monitor. Noted Alb 2.7, PAB 24, and CPR 1.3.   Goals: PO intake >50%.   Nutrition Goal Status: new  Communication of RD Recs: reviewed with RN    Reason for Assessment    Reason for Assessment: physician consult  Diagnosis: other (see comments) (subtherapeutic INR)  Relevent Medical History: HF, s/p LVAD, CAD, HTN, HLD     Nutrition Discharge Planning: Adequate PO intake for optimal nutrition.     Nutrition Prescription Ordered    Current Diet Order: Low Sodium    Nutrition Risk Screen     Nutrition Risk Screen: no indicators present    Nutrition/Diet History    Typical Food/Fluid Intake: Unable to speak with pt upon multiple visits as pt with other healthcare providers.   Factors Affecting Nutritional Intake: other (see comments) (DOUG)    Labs/Tests/Procedures/Meds    Pertinent Labs Reviewed: reviewed  Pertinent Labs Comments: P 2.1  Pertinent Medications Reviewed: reviewed  Pertinent Medications Comments: Ca-Vit D3, docusate, folic acid, coumadin    Physical Findings    Overall Physical Appearance: nourished  Oral/Mouth Cavity: tooth/teeth missing  Skin: intact    Anthropometrics    Temp: 99.1 °F (37.3 °C)  Height: 6' (182.9 cm)  Weight Method: Bed Scale  Weight: 81 kg (178 lb 9.2 oz)  Ideal Body Weight (IBW), Male: 178 lb  % Ideal Body Weight, Male (lb): 100.32   BMI (Calculated): 24.3  BMI Grade: 18.5-24.9 - normal    Estimated/Assessed Needs    Weight Used For Calorie Calculations: 81 kg (178 lb 9.2 oz)   Energy Calorie Requirements (kcal): 2064  Energy Need Method: [place]-St Jeor (1.3 PAL)  RMR ([place]-St. Jeor Equation): 1588  Weight Used For Protein Calculations: 81 kg (178 lb 9.2 oz)  Protein Requirements: 81-97g  (1-1.2g/kg)  Fluid Requirements (mL): Per MD    Assessment and Plan    No nutrition related risk factor present at this time.     Monitor and Evaluation    Food and Nutrient Intake: energy intake, food and beverage intake  Food and Nutrient Adminstration: diet order  Anthropometric Measurements: weight, weight change  Biochemical Data, Medical Tests and Procedures: other (specify) (All labs)  Nutrition-Focused Physical Findings: overall appearance    Nutrition Risk    Level of Risk: other (see comments) (1X/week)    Nutrition Follow-Up    RD Follow-up?: Yes

## 2017-08-30 NOTE — ASSESSMENT & PLAN NOTE
Pt reports having taken increased daily dose today of 6 mg at 5:30pm per recommendation of his nurse before being directly admitted.  -INR yesterday was 1.4, reordered for now  -dose daily according to INR  -have put in temporary standing order of 6 mg coumadin   -daily INR ordered  -LDH ordered for now and daily  -heparin gtt started (no bolus w LVAD)

## 2017-08-30 NOTE — CONSULTS
[facility]  Infectious Disease  Consult Note    Patient Name: Kev Vasquez  MRN: [id number]  Admission Date: 8/29/2017  Hospital Length of Stay: 1 days  Attending Physician: Miguel A Marcelo Jr.,*  Primary Care Provider: Mega Collins MD     Isolation Status: No active isolations    Consults  Assessment/Plan:     Complication involving left ventricular assist device (LVAD)    75 y/o male with LVAD placed 10/2016 at [place] with chronic driveline infection with pseudomonas currently on cefepime for 8 weeks end date 9/23 admitted from clinic for subtherapeutic INR and worsening drainage from driveline.   -Afebrile, without a leukocytosis, stable on examination  -wound cultures and blood cultures pending  -on IV cefepime     Plan:  1.Continue IV cefepime 2gq8hr for pseudomonas driveline infection. Continue until 9/23/17 as previously recommended  2.Blood cultures NGTD. wound cultures pending.   3.Pt remained afebrile, stable, without a leukocytosis.   4.Will need weekly CBC CMP faxed to ID clinic at  and have pt follow up in ID clinic towards end of therapy   5.Will sign off at this time and follow from afar.   However, please reconsult if cultures are positive.     Seen and discussed with ID staff.           I have read the consultation, examined the patient, and discussed it with the PA, and agree with the findings and plan.        Thank you for your consult. I will sign off. Please contact us if you have any additional questions.    Aura Spence PA-C  Infectious Disease  [facility] [phone]    Subjective:     Principal Problem: Subtherapeutic international normalized ratio (INR)    HPI: 75 y/o male s/p LVAD 10/2016 AT [place] seen recently for new onset driveline infection with pseudomonas resistant to ciprofloxacin. Pt was placed on cefepime for 8 weeks end date 9/23. Pt admitted from clinic for subtherapeutic INR and worsening drainage since  the past week. Repeat wound and blood cultures obtained yesterday. Pt denies having any fever,chills,n/v/d. Pt has RUE PICC line. Pt currently on cefepime.     Past Medical History:   Diagnosis Date    Acute on chronic systolic heart failure 7/27/2015    AICD (automatic cardioverter/defibrillator) present 2014    [employer]    CAD (coronary artery disease) 7/27/2015    CHF (congestive heart failure)     Gait instability     HTN (hypertension) 7/27/2015    ICD (implantable cardioverter-defibrillator), biventricular, in situ 7/27/2015    Kidney stones     HILLARY treated with BiPAP 7/27/2015    Peripheral neuropathy     Pulmonary hypertension due to left ventricular systolic dysfunction 7/29/2015    Restless leg syndrome 1992    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    Skin cancer     excision 2013    Sleep apnea 1992       Past Surgical History:   Procedure Laterality Date    CARDIAC PACEMAKER PLACEMENT      pacemaker previously, then AICD in 2006. AICD [employer] serial #[id number]    CORONARY ARTERY BYPASS GRAFT  1993    KNEE SURGERY Left 2001    complicated by infection, hardware had to be taken out and replaced    LEFT VENTRICULAR ASSIST DEVICE  10/19/2016       Review of patient's allergies indicates:   Allergen Reactions    Sulfa (sulfonamide antibiotics)        Medications:  Prescriptions Prior to Admission   Medication Sig    allopurinol (ZYLOPRIM) 300 MG tablet Take 300 mg by mouth once daily.    amlodipine (NORVASC) 10 MG tablet Take 0.5 tablets (5 mg total) by mouth once daily.    aspirin 81 MG Chew Take 81 mg by mouth once daily.    atorvastatin (LIPITOR) 10 MG tablet Take 10 mg by mouth once daily.    buPROPion (WELLBUTRIN XL) 300 MG 24 hr tablet Take 300 mg by mouth once daily.    calcium-vitamin D tablet 600 mg-200 units Take 1 tablet by mouth once daily.    ceFEPIme in dextrose 5% (MAXIPIME) 2 gram/50 mL PgBk Inject 50 mLs (2 g total) into the vein every 12 (twelve)  hours.    folic acid (FOLVITE) 1 MG tablet Take 1 mg by mouth once daily.    lisinopril 10 MG tablet Take 1 tablet (10 mg total) by mouth 2 (two) times daily.    magnesium oxide (MAG-OX) 400 mg tablet Take 1 tablet (400 mg total) by mouth 2 (two) times daily.    multivitamin capsule Take 1 capsule by mouth once daily.    ropinirole (REQUIP) 0.5 MG tablet Take 0.5 mg by mouth 3 (three) times daily.     spironolactone (ALDACTONE) 25 MG tablet Take 1 tablet (25 mg total) by mouth once daily.    tamsulosin (FLOMAX) 0.4 mg Cp24 Take 0.4 mg by mouth once daily.    TIKOSYN 250 mcg Cap Take 1 capsule (250 mcg total) by mouth every 12 (twelve) hours.    warfarin (COUMADIN) 4 MG tablet 6 mg orally daily on Mon, Wed, Fri; and 4 mg orally daily on all other days    hydrALAZINE (APRESOLINE) 25 MG tablet Take 3 tablets (75 mg total) by mouth every 8 (eight) hours.     Antibiotics     Start     Stop Route Frequency Ordered    08/30/17 1644  ceFEPIme in dextrose 5% 2 gram/50 mL IVPB 2 g      -- IV Every 8 hours (non-standard times) 08/30/17 0948        Antifungals     None        Antivirals     None             There is no immunization history on file for this patient.    Family History     Problem Relation (Age of Onset)    Cancer Daughter (50)    Diabetes Daughter    Heart disease Daughter        Social History     Social History    Marital status:      Spouse name: N/A    Number of children: N/A    Years of education: N/A     Social History Main Topics    Smoking status: Never Smoker    Smokeless tobacco: Never Used    Alcohol use No    Drug use: No    Sexual activity: Not Asked      Comment:      Other Topics Concern    None     Social History Narrative    , lives in [place]. Retired .      Review of Systems   Constitutional: Negative for chills, diaphoresis, fatigue and fever.   HENT: Negative for congestion.    Respiratory: Negative for cough and shortness  of breath.    Cardiovascular: Negative for chest pain.   Gastrointestinal: Negative for abdominal pain, diarrhea, nausea and vomiting.   Genitourinary: Negative for dysuria.   Musculoskeletal: Negative for back pain.   Skin: Positive for wound.   Neurological: Negative for dizziness.     Objective:     Vital Signs (Most Recent):  Temp: 99.1 °F (37.3 °C) (08/30/17 0730)  Pulse: 65 (08/30/17 0730)  Resp: 18 (08/30/17 0730)  BP: (!) 72/0 (08/30/17 0735)  SpO2: (!) 92 % (08/30/17 0730) Vital Signs (24h Range):  Temp:  [97.5 °F (36.4 °C)-99.1 °F (37.3 °C)] 99.1 °F (37.3 °C)  Pulse:  [65-89] 65  Resp:  [16-18] 18  SpO2:  [92 %-98 %] 92 %  BP: ()/(0-79) 72/0     Weight: 81 kg (178 lb 9.2 oz)  Body mass index is 24.22 kg/m².    Estimated Creatinine Clearance: 59.3 mL/min (based on Cr of 1.2).    Physical Exam   Constitutional: He is oriented to person, place, and time. He appears well-developed and well-nourished. No distress.   HENT:   Head: Normocephalic.   Cardiovascular: Normal rate, regular rhythm and normal heart sounds.    LVAD hum present    Pulmonary/Chest: Effort normal. No respiratory distress. He has no wheezes. He has no rales.   Abdominal: Soft. He exhibits no distension. There is no tenderness. There is no guarding.   Musculoskeletal: Normal range of motion. He exhibits no edema or deformity.   Neurological: He is alert and oriented to person, place, and time.   Skin: Skin is dry. He is not diaphoretic.   driveline site with minimal brown discharge. No smell.    Psychiatric: He has a normal mood and affect.   Nursing note and vitals reviewed.      Significant Labs:   Blood Culture:   Recent Labs  Lab 07/28/17 2042 07/28/17 2058   LABBLOO No growth after 5 days. No growth after 5 days.     CBC:   Recent Labs  Lab 08/28/17  1855 08/29/17 2222 08/30/17  0345   WBC 5.57 7.86 7.01   HGB 12.1* 10.1* 9.9*   HCT 37.0* 29.5* 29.5*   * 130* 137*     CMP:   Recent Labs  Lab 08/28/17  1855 08/29/17 2222  08/30/17  0345    139 139   K 4.1 4.6 4.4    109 108   CO2 24 25 25    87 85   BUN 34* 25* 25*   CREATININE 1.4 1.2 1.2   CALCIUM 9.6 9.4 9.6   PROT 6.6 6.3 6.0   ALBUMIN 2.9* 2.7* 2.7*   BILITOT 0.3 0.3 0.5   ALKPHOS 75 66 66   AST 22 20 20   ALT 18 16 16   ANIONGAP 10 5* 6*   EGFRNONAA 49.1* 59.2* 59.2*     Wound Culture:   Recent Labs  Lab 07/20/17  1604 07/29/17  1056   LABAERO PSEUDOMONAS AERUGINOSAMany PSEUDOMONAS AERUGINOSAFew     All pertinent labs within the past 24 hours have been reviewed.    Significant Imaging: I have reviewed all pertinent imaging results/findings within the past 24 hours.

## 2017-08-30 NOTE — PT/OT/SLP EVAL
Occupational Therapy  Evaluation    Kev Vasquez   MRN: [id number]   Admitting Diagnosis: Subtherapeutic international normalized ratio (INR)    OT Date of Treatment: 08/30/17   OT Start Time: 0859  OT Stop Time: 0938  OT Total Time (min): 39 min    Billable Minutes:  Evaluation 16  Self Care/Home Management 23    Diagnosis: Subtherapeutic international normalized ratio (INR)   LVAD inserted Oct 2016    Past Medical History:   Diagnosis Date    Acute on chronic systolic heart failure 7/27/2015    AICD (automatic cardioverter/defibrillator) present 2014    [facility] [employer]    CAD (coronary artery disease) 7/27/2015    CHF (congestive heart failure)     Gait instability     HTN (hypertension) 7/27/2015    ICD (implantable cardioverter-defibrillator), biventricular, in situ 7/27/2015    Kidney stones     HILLARY treated with BiPAP 7/27/2015    Peripheral neuropathy     Pulmonary hypertension due to left ventricular systolic dysfunction 7/29/2015    Restless leg syndrome 1992    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    Skin cancer     excision 2013    Sleep apnea 1992      Past Surgical History:   Procedure Laterality Date    CARDIAC PACEMAKER PLACEMENT      pacemaker previously, then AICD in 2006. AICD [facility] [employer] serial #[id number]    CORONARY ARTERY BYPASS GRAFT  1993    KNEE SURGERY Left 2001    complicated by infection, hardware had to be taken out and replaced    LEFT VENTRICULAR ASSIST DEVICE  10/19/2016       Referring physician: Dr. Early  Date referred to OT: 8/29/17    General Precautions: Standard, LVAD, fall  Orthopedic Precautions: N/A  Braces: N/A    Do you have any cultural, spiritual, Zoroastrian conflicts, given your current situation?: None     Patient History:  Living Environment  Lives With: child([place]), adult, spouse  Living Arrangements: house  Living Environment Comment: Pt lives with his wife, daughter, and son-in-law in 2-story house with 1 HERRERA; stays on 1st floor of house. PTA,  pt was using RW for short distance and W/C for long distance and required assist with ADLs. Pt has 24/7 aid and was receiving HH therapy PTA.  Equipment Currently Used at Home: walker, rolling, wheelchair, grab bar, cane, quad, raised toilet    Prior level of function:   Bed Mobility/Transfers: needs device  Grooming: needs assist  Bathing: needs assist  Upper Body Dressing: needs assist  Lower Body Dressing: needs assist  Toileting: needs assist  Home Management Skills: needs assist      Dominant hand: right    Subjective:  Communicated with RN prior to session. Pt agreeable to OT/PT evaluation.    Chief Complaint: Being in hospital  Patient/Family stated goals: Return home    Pain/Comfort  Pain Rating 1: 0/10  Pain Rating Post-Intervention 1: 0/10    Objective:  Patient found with: telemetry, peripheral IV (LVAD to wall power; condom catheter)    Cognitive Exam:  Oriented to: Person, Place, Time and Situation  Follows Commands/attention: Inattentive, Easily distracted and Follows one-step commands  Communication: clear/fluent  Memory:  No Deficits noted  Safety awareness/insight to disability: impaired    Visual/perceptual:  Intact    Physical Exam:  Postural examination/scapula alignment: No postural abnormalities identified  Skin integrity: Visible skin intact  Edema: None noted     Sensation:   Intact B UE    Upper Extremity Range of Motion:  Right Upper Extremity: WFL  Left Upper Extremity: WFL    Upper Extremity Strength:  Right Upper Extremity: 4/5 throughout  Left Upper Extremity: 4/5 throughout   Strength: WFL    Fine motor coordination:   Intact    Functional Mobility:  Bed Mobility:  Supine to Sit: Stand by Assistance with increased time    Transfers:  Sit <> Stand Assistance: Contact Guard Assistance from EOB, Minimum Assistance from bedside chair  Sit <> Stand Assistive Device: Rolling Walker    Functional Ambulation: ~100 ft with CGA-Min A using RW; pt with ataxic gait, balance deficits, and  "difficulty navigating RW requiring cues and redirection    Activities of Daily Living:  UE Dressing Level of Assistance: Moderate assistance and increased time to don gown around back and LVAD vest  LE Dressing Level of Assistance: Minimum assistance to don shoes EOB; Total A to tie    Balance:   Static Sit: GOOD+: Takes MAXIMAL challenges from all directions.    Dynamic Sit: GOOD+: Maintains balance through MAXIMAL excursions of active trunk motion  Static Stand: FAIR: Maintains without assist but unable to take challenges  Dynamic stand: POOR: N/A    Therapeutic Activities and Exercises:  OT/PT eval; educated on OT role and POC, pt familiar from recent admission and D/C to rehab; pt required Max A and increased time to switch LVAD to battery power due to difficulty sequencing steps and identifying equipment correctly; required frequent redirection and cues to remain on task; Mod A to don LVAD vest; performed functional mobility in hallway with increased time and cues for safety during gait and with use of RW    AM-PAC 6 CLICK ADL  How much help from another person does this patient currently need?  1 = Unable, Total/Dependent Assistance  2 = A lot, Maximum/Moderate Assistance  3 = A little, Minimum/Contact Guard/Supervision  4 = None, Modified [place]/Independent    Putting on and taking off regular lower body clothing? : 3  Bathing (including washing, rinsing, drying)?: 3  Toileting, which includes using toilet, bedpan, or urinal? : 3  Putting on and taking off regular upper body clothing?: 3  Taking care of personal grooming such as brushing teeth?: 3  Eating meals?: 4  Total Score: 19    AM-PAC Raw Score CMS "G-Code Modifier Level of Impairment Assistance   6 % Total / Unable   7 - 9 CM 80 - 100% Maximal Assist   10-14 CL 60 - 80% Moderate Assist   15 - 19 CK 40 - 60% Moderate Assist   20 - 22 CJ 20 - 40% Minimal Assist   23 CI 1-20% SBA / CGA   24 CH 0% Independent/ Mod I       Patient left up in " chair with all lines intact, call button in reach and RN notified    Assessment:  Kev Vasquez is a 76 y.o. male with a medical diagnosis of Subtherapeutic international normalized ratio (INR), s/p LVAD 10/2016 and presents with deficits listed below. Pt requires increased time and redirection during ADL tasks and LVAD management, as well as safety and assist for gait and balance deficits. Pt would continue to benefit from OT to increase independence and safety. Recommend HH and 24 hr assist/supervision upon D/C.    Pt evaluation falls under moderate complexity for evaluation coding due to identification of 3-5 performance deficits noted as stated above. Eval required Min/Mod assistance to complete on this date and detailed assessment(s) were utilized. Moreover, an expanded review of history and occupational profile obtained with additional review of cognitive, physical and psychosocial hx.     Rehab identified problem list/impairments: Rehab identified problem list/impairments: weakness, impaired endurance, impaired self care skills, impaired functional mobilty, gait instability, impaired balance, decreased safety awareness, impaired cardiopulmonary response to activity    Rehab potential is good.    Activity tolerance: Good    Discharge recommendations: Discharge Facility/Level Of Care Needs: home with home health     Barriers to discharge: Barriers to Discharge: None    Equipment recommendations: bedside commode     GOALS:    Occupational Therapy Goals        Problem: Occupational Therapy Goal    Goal Priority Disciplines Outcome Interventions   Occupational Therapy Goal     OT, PT/OT Ongoing (interventions implemented as appropriate)    Description:  Goals to be met by: 7 days (9/6/17)     Patient will increase functional independence with ADLs by performing:    UE Dressing with Stand-by Assistance.  LE Dressing with Contact Guard Assistance.  Grooming while standing at sink with Contact Guard  Assistance.  Toileting from toilet with Contact Guard Assistance for hygiene and clothing management.   Supine to sit with Supervision.  Toilet transfer to toilet with Contact Guard Assistance.  Pt will perform functional mobility household distance with CGA and AD as needed.                    PLAN:  Patient to be seen 4 x/week to address the above listed problems via self-care/home management, therapeutic activities, therapeutic exercises  Plan of Care expires: 09/30/17  Plan of Care reviewed with: patient    OT G-codes  Functional Assessment Tool Used: [facility]  Score: 19  Functional Limitation: Self care  Self Care Current Status (): CK  Self Care Goal Status (): SHAUNA Fisher  08/30/2017

## 2017-08-30 NOTE — PT/OT/SLP EVAL
Physical Therapy  Evaluation    Kev Vasquez   MRN: [id number]   Admitting Diagnosis: Subtherapeutic international normalized ratio (INR)    PT Received On: 08/30/17  PT Start Time: 0858     PT Stop Time: 0937    PT Total Time (min): 39 min       Billable Minutes:  Evaluation 30 and Therapeutic Activity 9 (co-tx with OT)    Diagnosis: Subtherapeutic international normalized ratio (INR)  LVAD inserted 10/16/16    Past Medical History:   Diagnosis Date    Acute on chronic systolic heart failure 7/27/2015    AICD (automatic cardioverter/defibrillator) present 2014    [employer]    CAD (coronary artery disease) 7/27/2015    CHF (congestive heart failure)     Gait instability     HTN (hypertension) 7/27/2015    ICD (implantable cardioverter-defibrillator), biventricular, in situ 7/27/2015    Kidney stones     HILLARY treated with BiPAP 7/27/2015    Peripheral neuropathy     Pulmonary hypertension due to left ventricular systolic dysfunction 7/29/2015    Restless leg syndrome 1992    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    Skin cancer     excision 2013    Sleep apnea 1992      Past Surgical History:   Procedure Laterality Date    CARDIAC PACEMAKER PLACEMENT      pacemaker previously, then AICD in 2006. AICD [facility] [employer] serial #[id number]    CORONARY ARTERY BYPASS GRAFT  1993    KNEE SURGERY Left 2001    complicated by infection, hardware had to be taken out and replaced    LEFT VENTRICULAR ASSIST DEVICE  10/19/2016       Referring physician: Clotilde Early MD   Date referred to PT: 8/29/17    General Precautions: Standard, LVAD, fall  Orthopedic Precautions: N/A   Braces: N/A       Do you have any cultural, spiritual, Hindu conflicts, given your current situation?: none noted     Patient History:  Lives With: spouse, child([place]), adult  Living Arrangements: house  Home Accessibility: stairs within home  Transportation Available: family or friend will provide  Living Environment Comment: Pt  lives with his wife, daughter, and son in law in a 2SH with TH to enter; pt and wife stay on 1st floor of home. Pt was ambulating short distances with RW and assist PTA and using w/c for longer, community distances. Pt required assist to transfer T/F tub and with some ADLs. Pt has an aid 24/7 and was receiving HH therapy PTA.   Equipment Currently Used at Home: walker, rolling, wheelchair, grab bar, cane, quad, raised toilet    Previous Level of Function:  Ambulation Skills: needs device and assist  Transfer Skills: needs assist  ADL Skills: needs assist    Subjective:  Communicated with RN prior to session.  Pt agreeable to therapy.   Chief Complaint: none noted   Patient goals: return home     Pain/Comfort  Pain Rating 1: 0/10      Objective:   Patient found with: telemetry, peripheral IV (LVAD; condom catheter)     Cognitive Exam:  Oriented to: Person and Place    Follows Commands/attention: Follows two-step commands  Communication: clear/fluent  Safety awareness/insight to disability: impaired    Physical Exam:  Postural examination/scapula alignment: Rounded shoulder and Head forward    Skin integrity: Visible skin intact  Edema: None noted     Sensation:   Intact    Lower Extremity Range of Motion:  Right Lower Extremity: WFL  Left Lower Extremity: WFL    Lower Extremity Strength:  Right Lower Extremity: WFL  Left Lower Extremity: WFL    Functional Mobility:  Bed Mobility:  Supine to Sit: Stand by Assistance, With side rail (with increased time and HOB elevated)    Transfers:  Sit <> Stand Assistance: Contact Guard Assistance, Minimum Assistance (x1 rep with CGA from bed with no AD; x1 rep with Lydia and RW from bedside chair)  Sit <> Stand Assistive Device: Rolling Walker, No Assistive Device  Bed <> Chair Technique: Stand Pivot  Bed <> Chair Assistance: Minimum Assistance  Bed <> Chair Assistive Device:  (HHA)    Gait:   Gait Distance: ~100 ft.   Assistance 1: Contact Guard Assistance, Minimum assistance (CGA  with occasional Lydia for balance assist)  Gait Assistive Device: Rolling walker  Gait Pattern: swing-to gait  Gait Deviation(s): decreased william, increased time in double stance, decreased toe-to-floor clearance, decreased weight-shifting ability, decreased step length, decreased velocity of limb motion, forward lean, toe drag (short steps with decreased toe clearance and occasional toe drag)   Emergency bag present throughout    Ataxic gait noted with decreased coordination and difficulty advancing BLE    Attempted to cue for increased step length, RW management, and balance assist with no response/corrections noted     Balance:   Static Sit: supervision   Dynamic Sit: SBA  Static Stand: CGA  Dynamic stand: CGA-Lydia    Therapeutic Activities and Exercises:  PT/OT eval complete. Pt required increased time throughout session to complete tasks.   Pt educated on role of PT and PT POC. Pt verbalized understanding.   Pt found on battery power. Switched from wall>battery power with set-up assist. Donned hunting vest as consolidation bag with supervision.   Pt oriented to call bell and instructed on importance of having staff member assist for all standing tasks. Pt verbalized understanding.     AM-PAC 6 CLICK MOBILITY  How much help from another person does this patient currently need?   1 = Unable, Total/Dependent Assistance  2 = A lot, Maximum/Moderate Assistance  3 = A little, Minimum/Contact Guard/Supervision  4 = None, Modified [place]/Independent    Turning over in bed (including adjusting bedclothes, sheets and blankets)?: 3  Sitting down on and standing up from a chair with arms (e.g., wheelchair, bedside commode, etc.): 3  Moving from lying on back to sitting on the side of the bed?: 3  Moving to and from a bed to a chair (including a wheelchair)?: 3  Need to walk in hospital room?: 3  Climbing 3-5 steps with a railing?: 1  Total Score: 16     AM-PAC Raw Score CMS G-Code Modifier Level of Impairment  Assistance   6 % Total / Unable   7 - 9 CM 80 - 100% Maximal Assist   10 - 14 CL 60 - 80% Moderate Assist   15 - 19 CK 40 - 60% Moderate Assist   20 - 22 CJ 20 - 40% Minimal Assist   23 CI 1-20% SBA / CGA   24 CH 0% Independent/ Mod I     Patient left up in chair with all lines intact and call button in reach.    Assessment:   Kev Vasquez is a 76 y.o. male with a medical diagnosis of Subtherapeutic international normalized ratio (INR) and presents with LVAD in place. Pt required increased time to complete therapy tasks this session with cues to attend to task provided throughout. Required Lydia to complete transfers and for balance assist during gait. Ambulated with slight ataxic pattern noted with decreased coordination, impaired balance, and difficulty advancing BLE. Pt would benefit from skilled acute PT in order to address current deficits and progress functional mobility. Anticipate that pt will be able to return home with HH, as pt as aid to provide 24-hour assist.     Rehab identified problem list/impairments: Rehab identified problem list/impairments: weakness, impaired functional mobilty, decreased coordination, impaired balance, impaired self care skills, decreased safety awareness, impaired cardiopulmonary response to activity, impaired coordination, impaired endurance, gait instability    Rehab potential is good.    Activity tolerance: Good    Discharge recommendations: Discharge Facility/Level Of Care Needs: home health PT, home health OT     Barriers to discharge: Barriers to Discharge: None    Equipment recommendations: Equipment Needed After Discharge: bedside commode     GOALS:    Physical Therapy Goals        Problem: Physical Therapy Goal    Goal Priority Disciplines Outcome Goal Variances Interventions   Physical Therapy Goal     PT/OT, PT Ongoing (interventions implemented as appropriate)     Description:  Goals to be met by: 9/9/17     Patient will increase functional independence with  mobility by performin. Supine to sit with Modified [place]  2. Sit to stand transfer with Supervision  3. Bed to chair transfer with SBA using Rolling Walker as needed.  4. Gait  x 200 feet with SBA using Rolling Walker.   5. Lower extremity exercise program x15 reps, with assistance as needed, in order to increase LE strength and (I) with functional mobility.                       PLAN:    Patient to be seen 4 x/week to address the above listed problems via gait training, therapeutic activities, therapeutic exercises  Plan of Care expires: 17  Plan of Care reviewed with: patient        Wendy Hinojosaard, PT, DPT   2017  [phone]

## 2017-08-30 NOTE — ASSESSMENT & PLAN NOTE
- S/P LVAD Heartmate III Implanted 10/16/2016  - Speed 5800  - Continue Coumadin, Goal INR 2.0-3.0. INR 1.5 today.   - home dose of coumadin 6 MF, 4 all other days   - will boost with 8 mg tonight  - , stable.   - Antiplatelets ASA 81 (VAD from outside hospital, no hx of GI bleeds)  - MAP goal 60-80's (ok to be a bit higher given hx of orthostasis). All BP's need to be checked while patient sitting on edge of bed or OOB in chair.  - hx of VT, on tykosin

## 2017-08-30 NOTE — SUBJECTIVE & OBJECTIVE
Past Medical History:   Diagnosis Date    Acute on chronic systolic heart failure 7/27/2015    AICD (automatic cardioverter/defibrillator) present 2014    [employer]    CAD (coronary artery disease) 7/27/2015    CHF (congestive heart failure)     Gait instability     HTN (hypertension) 7/27/2015    ICD (implantable cardioverter-defibrillator), biventricular, in situ 7/27/2015    Kidney stones     HILLARY treated with BiPAP 7/27/2015    Peripheral neuropathy     Pulmonary hypertension due to left ventricular systolic dysfunction 7/29/2015    Restless leg syndrome 1992    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    Skin cancer     excision 2013    Sleep apnea 1992       Past Surgical History:   Procedure Laterality Date    CARDIAC PACEMAKER PLACEMENT      pacemaker previously, then AICD in 2006. AICD [facility] [employer] serial #[id number]    CORONARY ARTERY BYPASS GRAFT  1993    KNEE SURGERY Left 2001    complicated by infection, hardware had to be taken out and replaced    LEFT VENTRICULAR ASSIST DEVICE  10/19/2016       Review of patient's allergies indicates:   Allergen Reactions    Sulfa (sulfonamide antibiotics)        Medications:  Prescriptions Prior to Admission   Medication Sig    allopurinol (ZYLOPRIM) 300 MG tablet Take 300 mg by mouth once daily.    amlodipine (NORVASC) 10 MG tablet Take 0.5 tablets (5 mg total) by mouth once daily.    aspirin 81 MG Chew Take 81 mg by mouth once daily.    atorvastatin (LIPITOR) 10 MG tablet Take 10 mg by mouth once daily.    buPROPion (WELLBUTRIN XL) 300 MG 24 hr tablet Take 300 mg by mouth once daily.    calcium-vitamin D tablet 600 mg-200 units Take 1 tablet by mouth once daily.    ceFEPIme in dextrose 5% (MAXIPIME) 2 gram/50 mL PgBk Inject 50 mLs (2 g total) into the vein every 12 (twelve) hours.    folic acid (FOLVITE) 1 MG tablet Take 1 mg by mouth once daily.    lisinopril 10 MG tablet Take 1 tablet (10 mg total) by mouth 2 (two) times daily.     magnesium oxide (MAG-OX) 400 mg tablet Take 1 tablet (400 mg total) by mouth 2 (two) times daily.    multivitamin capsule Take 1 capsule by mouth once daily.    ropinirole (REQUIP) 0.5 MG tablet Take 0.5 mg by mouth 3 (three) times daily.     spironolactone (ALDACTONE) 25 MG tablet Take 1 tablet (25 mg total) by mouth once daily.    tamsulosin (FLOMAX) 0.4 mg Cp24 Take 0.4 mg by mouth once daily.    TIKOSYN 250 mcg Cap Take 1 capsule (250 mcg total) by mouth every 12 (twelve) hours.    warfarin (COUMADIN) 4 MG tablet 6 mg orally daily on Mon, Wed, Fri; and 4 mg orally daily on all other days    hydrALAZINE (APRESOLINE) 25 MG tablet Take 3 tablets (75 mg total) by mouth every 8 (eight) hours.     Antibiotics     Start     Stop Route Frequency Ordered    08/30/17 1644  ceFEPIme in dextrose 5% 2 gram/50 mL IVPB 2 g      -- IV Every 8 hours (non-standard times) 08/30/17 0948        Antifungals     None        Antivirals     None             There is no immunization history on file for this patient.    Family History     Problem Relation (Age of Onset)    Cancer Daughter (50)    Diabetes Daughter    Heart disease Daughter        Social History     Social History    Marital status:      Spouse name: N/A    Number of children: N/A    Years of education: N/A     Social History Main Topics    Smoking status: Never Smoker    Smokeless tobacco: Never Used    Alcohol use No    Drug use: No    Sexual activity: Not Asked      Comment:      Other Topics Concern    None     Social History Narrative    , lives in [place]. Retired .      Review of Systems   Constitutional: Negative for chills, diaphoresis, fatigue and fever.   HENT: Negative for congestion.    Respiratory: Negative for cough and shortness of breath.    Cardiovascular: Negative for chest pain.   Gastrointestinal: Negative for abdominal pain, diarrhea, nausea and vomiting.   Genitourinary: Negative  for dysuria.   Musculoskeletal: Negative for back pain.   Skin: Positive for wound.   Neurological: Negative for dizziness.     Objective:     Vital Signs (Most Recent):  Temp: 99.1 °F (37.3 °C) (08/30/17 0730)  Pulse: 65 (08/30/17 0730)  Resp: 18 (08/30/17 0730)  BP: (!) 72/0 (08/30/17 0735)  SpO2: (!) 92 % (08/30/17 0730) Vital Signs (24h Range):  Temp:  [97.5 °F (36.4 °C)-99.1 °F (37.3 °C)] 99.1 °F (37.3 °C)  Pulse:  [65-89] 65  Resp:  [16-18] 18  SpO2:  [92 %-98 %] 92 %  BP: ()/(0-79) 72/0     Weight: 81 kg (178 lb 9.2 oz)  Body mass index is 24.22 kg/m².    Estimated Creatinine Clearance: 59.3 mL/min (based on Cr of 1.2).    Physical Exam   Constitutional: He is oriented to person, place, and time. He appears well-developed and well-nourished. No distress.   HENT:   Head: Normocephalic.   Cardiovascular: Normal rate, regular rhythm and normal heart sounds.    LVAD hum present    Pulmonary/Chest: Effort normal. No respiratory distress. He has no wheezes. He has no rales.   Abdominal: Soft. He exhibits no distension. There is no tenderness. There is no guarding.   Musculoskeletal: Normal range of motion. He exhibits no edema or deformity.   Neurological: He is alert and oriented to person, place, and time.   Skin: Skin is dry. He is not diaphoretic.   driveline site with minimal brown discharge. No smell.    Psychiatric: He has a normal mood and affect.   Nursing note and vitals reviewed.      Significant Labs:   Blood Culture:   Recent Labs  Lab 07/28/17 2042 07/28/17 2058   LABBLOO No growth after 5 days. No growth after 5 days.     CBC:   Recent Labs  Lab 08/28/17  1855 08/29/17  2222 08/30/17  0345   WBC 5.57 7.86 7.01   HGB 12.1* 10.1* 9.9*   HCT 37.0* 29.5* 29.5*   * 130* 137*     CMP:   Recent Labs  Lab 08/28/17  1855 08/29/17  2222 08/30/17  0345    139 139   K 4.1 4.6 4.4    109 108   CO2 24 25 25    87 85   BUN 34* 25* 25*   CREATININE 1.4 1.2 1.2   CALCIUM 9.6 9.4 9.6    PROT 6.6 6.3 6.0   ALBUMIN 2.9* 2.7* 2.7*   BILITOT 0.3 0.3 0.5   ALKPHOS 75 66 66   AST 22 20 20   ALT 18 16 16   ANIONGAP 10 5* 6*   EGFRNONAA 49.1* 59.2* 59.2*     Wound Culture:   Recent Labs  Lab 07/20/17  1604 07/29/17  1056   LABAERO PSEUDOMONAS AERUGINOSAMany PSEUDOMONAS AERUGINOSAFew     All pertinent labs within the past 24 hours have been reviewed.    Significant Imaging: I have reviewed all pertinent imaging results/findings within the past 24 hours.

## 2017-08-30 NOTE — HPI
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  Unclear why INR is so low-pt denies dietary change (increased green leafy vegetables, etc..).  Noted in home meds to be on a "multivitamin", but unsure if this contains vitamin K.  Pt was recent admitted here and discharged to rehab at [facility] after being treated for worsening abdominal pain related to driveline infection site.      It is noted that patient is on 81 mg daily ASA and not the usual dose of 325 mg daily.  Looked through clinic notes and was not able to find a hx of GI so unclear why he is on reduced dose. Pt reports never having had a GIB.        Pt reports his speed to be 7094-3174 currently.  Last interrogation 8/8/2016:  Pulsatile: Yes, intermittent   VAD Sounds: HM3  Smooth  Problems / Issues / Alarms with VAD if any: None noted  HCT: 38.4   Modular Cable Connection Intact(no yellow exposed):yes  VAD Interrogation:  TXP LVAD INTERROGATIONS 8/7/2017 8/7/2017 8/7/2017 8/7/2017 8/7/2017 8/6/2017 8/6/2017   Type HeartMate3 HeartMate3 HeartMate3 HeartMate3 HeartMate3   HeartMate3 HeartMate3   Flow 5.3 5.1 4.8 4.7 4.7 4.8 5.0   Speed 5800 5800 5800 5800 5800 5850 5800   PI 3.2 3.0 3.1 3.6 3.5 3.2 2.9   Power (Mendez) 4.6 4.6 4.5 4.2 4.6 4.6 -   LSL - - - - - 5600 4.5   Pulsatility Intermittent pulse Intermittent pulse - Intermittent   pulse Pulse Pulse No Pulse     Last echo 7/25/2017:     1 - Severely depressed left ventricular systolic function (EF 15-20%).     2 - Impaired LV relaxation, normal LAP (grade 1 diastolic dysfunction).     3 - The estimated PA systolic pressure is greater than 30 mmHg.     4 - " Mild to moderate tricuspid regurgitation.     5 - Moderate left ventricular enlargement.     6 - There is a Heartmate III LVAD in place. Baseline speed is 5800 rpms. The aortic valve opens with every beat. Septum is midline.

## 2017-08-30 NOTE — ASSESSMENT & PLAN NOTE
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

## 2017-08-30 NOTE — ASSESSMENT & PLAN NOTE
S/P LVAD Heartxx Implanted 10/16/2016  - Speed 8965-1046  - Last echo:    Results for orders placed or performed during the hospital encounter of 07/25/17   2D echo with color flow doppler   Result Value Ref Range    EF 15 (A) 55 - 65    Diastolic Dysfunction Yes (A)     Est. PA Systolic Pressure 29.59     Tricuspid Valve Regurgitation MILD TO MODERATE      - Last interrogation as above    Assessment of anticoagulation (LVAD assoc w/ pump thrombosis)  - Continue Coumadin, Goal INR 2.0-3.0. INR xx today.  - LDH is pending today. Will continue to monitor daily.   - Antiplatelets ASA 81 mg/day per clinic note    Assessment of BP control (LVAD assoc w risk of stroke)  - pulsitile; MAP is 90 but has not had his nightly BP meds; will reassess after giving these to him    Assessment of rhythm issues ( can be assoc w pump placement)  - hx of VT, on tykosin  -continue home dose

## 2017-08-31 PROBLEM — B37.2 SKIN YEAST INFECTION: Status: ACTIVE | Noted: 2017-08-31

## 2017-08-31 PROBLEM — R79.1 SUBTHERAPEUTIC INTERNATIONAL NORMALIZED RATIO (INR): Status: ACTIVE | Noted: 2017-08-31

## 2017-08-31 LAB
ANION GAP SERPL CALC-SCNC: 3 MMOL/L
APTT BLDCRRT: 24.9 SEC
BASOPHILS # BLD AUTO: 0.03 K/UL
BASOPHILS NFR BLD: 0.4 %
BUN SERPL-MCNC: 24 MG/DL
CALCIUM SERPL-MCNC: 9.8 MG/DL
CHLORIDE SERPL-SCNC: 104 MMOL/L
CO2 SERPL-SCNC: 28 MMOL/L
CREAT SERPL-MCNC: 1.4 MG/DL
DIFFERENTIAL METHOD: ABNORMAL
EOSINOPHIL # BLD AUTO: 0.2 K/UL
EOSINOPHIL NFR BLD: 2.7 %
ERYTHROCYTE [DISTWIDTH] IN BLOOD BY AUTOMATED COUNT: 16.1 %
EST. GFR  (AFRICAN AMERICAN): 56.8 ML/MIN/1.73 M^2
EST. GFR  (NON AFRICAN AMERICAN): 49.1 ML/MIN/1.73 M^2
FACT X PPP CHRO-ACNC: 0.38 IU/ML
FACT X PPP CHRO-ACNC: <0.1 IU/ML
GLUCOSE SERPL-MCNC: 88 MG/DL
GRAM STN SPEC: NORMAL
GRAM STN SPEC: NORMAL
HCT VFR BLD AUTO: 28.7 %
HGB BLD-MCNC: 9.9 G/DL
INR PPP: 1.6
LDH SERPL L TO P-CCNC: 172 U/L
LYMPHOCYTES # BLD AUTO: 1.3 K/UL
LYMPHOCYTES NFR BLD: 17.1 %
MAGNESIUM SERPL-MCNC: 2.1 MG/DL
MCH RBC QN AUTO: 30.7 PG
MCHC RBC AUTO-ENTMCNC: 34.5 G/DL
MCV RBC AUTO: 89 FL
MONOCYTES # BLD AUTO: 0.9 K/UL
MONOCYTES NFR BLD: 12 %
NEUTROPHILS # BLD AUTO: 4.9 K/UL
NEUTROPHILS NFR BLD: 67.4 %
PHOSPHATE SERPL-MCNC: 2.5 MG/DL
PLATELET # BLD AUTO: 139 K/UL
PMV BLD AUTO: 11.5 FL
POTASSIUM SERPL-SCNC: 4.3 MMOL/L
PROTHROMBIN TIME: 16.5 SEC
RBC # BLD AUTO: 3.23 M/UL
SODIUM SERPL-SCNC: 135 MMOL/L
WBC # BLD AUTO: 7.31 K/UL

## 2017-08-31 PROCEDURE — 84100 ASSAY OF PHOSPHORUS: CPT

## 2017-08-31 PROCEDURE — 93750 INTERROGATION VAD IN PERSON: CPT | Performed by: INTERNAL MEDICINE

## 2017-08-31 PROCEDURE — 80048 BASIC METABOLIC PNL TOTAL CA: CPT

## 2017-08-31 PROCEDURE — 85520 HEPARIN ASSAY: CPT | Mod: 91

## 2017-08-31 PROCEDURE — 85610 PROTHROMBIN TIME: CPT

## 2017-08-31 PROCEDURE — 85025 COMPLETE CBC W/AUTO DIFF WBC: CPT

## 2017-08-31 PROCEDURE — 20600001 HC STEP DOWN PRIVATE ROOM

## 2017-08-31 PROCEDURE — 63600175 PHARM REV CODE 636 W HCPCS: Performed by: INTERNAL MEDICINE

## 2017-08-31 PROCEDURE — 25000003 PHARM REV CODE 250: Performed by: INTERNAL MEDICINE

## 2017-08-31 PROCEDURE — 85730 THROMBOPLASTIN TIME PARTIAL: CPT

## 2017-08-31 PROCEDURE — 83615 LACTATE (LD) (LDH) ENZYME: CPT

## 2017-08-31 PROCEDURE — 85520 HEPARIN ASSAY: CPT

## 2017-08-31 PROCEDURE — 63600175 PHARM REV CODE 636 W HCPCS: Performed by: PHYSICIAN ASSISTANT

## 2017-08-31 PROCEDURE — 27000248 HC VAD-ADDITIONAL DAY

## 2017-08-31 PROCEDURE — 99232 SBSQ HOSP IP/OBS MODERATE 35: CPT | Mod: ,,, | Performed by: INTERNAL MEDICINE

## 2017-08-31 PROCEDURE — 93750 INTERROGATION VAD IN PERSON: CPT | Mod: ,,, | Performed by: INTERNAL MEDICINE

## 2017-08-31 PROCEDURE — 83735 ASSAY OF MAGNESIUM: CPT

## 2017-08-31 RX ORDER — HYDRALAZINE HYDROCHLORIDE 25 MG/1
25 TABLET, FILM COATED ORAL ONCE
Status: COMPLETED | OUTPATIENT
Start: 2017-08-31 | End: 2017-08-31

## 2017-08-31 RX ADMIN — LISINOPRIL 10 MG: 10 TABLET ORAL at 09:08

## 2017-08-31 RX ADMIN — HYPROMELLOSE 2910 1 DROP: 5 SOLUTION OPHTHALMIC at 11:08

## 2017-08-31 RX ADMIN — ROPINIROLE 0.5 MG: 0.5 TABLET, FILM COATED ORAL at 01:08

## 2017-08-31 RX ADMIN — AMLODIPINE BESYLATE 10 MG: 10 TABLET ORAL at 09:08

## 2017-08-31 RX ADMIN — ROPINIROLE 0.5 MG: 0.5 TABLET, FILM COATED ORAL at 08:08

## 2017-08-31 RX ADMIN — MICONAZOLE NITRATE: 20 OINTMENT TOPICAL at 09:08

## 2017-08-31 RX ADMIN — HYDRALAZINE HYDROCHLORIDE 75 MG: 50 TABLET ORAL at 08:08

## 2017-08-31 RX ADMIN — ATORVASTATIN CALCIUM 10 MG: 10 TABLET, FILM COATED ORAL at 09:08

## 2017-08-31 RX ADMIN — HYPROMELLOSE 2910 1 DROP: 5 SOLUTION OPHTHALMIC at 08:08

## 2017-08-31 RX ADMIN — DOFETILIDE 250 MCG: 0.25 CAPSULE ORAL at 08:08

## 2017-08-31 RX ADMIN — ASPIRIN 81 MG CHEWABLE TABLET 81 MG: 81 TABLET CHEWABLE at 09:08

## 2017-08-31 RX ADMIN — MAGNESIUM OXIDE TAB 400 MG (241.3 MG ELEMENTAL MG) 400 MG: 400 (241.3 MG) TAB at 09:08

## 2017-08-31 RX ADMIN — ALLOPURINOL 300 MG: 300 TABLET ORAL at 09:08

## 2017-08-31 RX ADMIN — LISINOPRIL 10 MG: 10 TABLET ORAL at 08:08

## 2017-08-31 RX ADMIN — DOFETILIDE 250 MCG: 0.25 CAPSULE ORAL at 09:08

## 2017-08-31 RX ADMIN — HYDRALAZINE HYDROCHLORIDE 25 MG: 25 TABLET, FILM COATED ORAL at 06:08

## 2017-08-31 RX ADMIN — CEFEPIME HYDROCHLORIDE 2 G: 2 INJECTION, SOLUTION INTRAVENOUS at 06:08

## 2017-08-31 RX ADMIN — SPIRONOLACTONE 25 MG: 25 TABLET, FILM COATED ORAL at 09:08

## 2017-08-31 RX ADMIN — WARFARIN SODIUM 8 MG: 4 TABLET ORAL at 04:08

## 2017-08-31 RX ADMIN — OYSTER SHELL CALCIUM WITH VITAMIN D 1 TABLET: 500; 200 TABLET, FILM COATED ORAL at 09:08

## 2017-08-31 RX ADMIN — MAGNESIUM OXIDE TAB 400 MG (241.3 MG ELEMENTAL MG) 400 MG: 400 (241.3 MG) TAB at 08:08

## 2017-08-31 RX ADMIN — HYDRALAZINE HYDROCHLORIDE 75 MG: 50 TABLET ORAL at 06:08

## 2017-08-31 RX ADMIN — BUPROPION HYDROCHLORIDE 300 MG: 150 TABLET, FILM COATED, EXTENDED RELEASE ORAL at 09:08

## 2017-08-31 RX ADMIN — CEFEPIME HYDROCHLORIDE 2 G: 2 INJECTION, SOLUTION INTRAVENOUS at 03:08

## 2017-08-31 RX ADMIN — HEPARIN SODIUM 17 UNITS/KG/HR: 10000 INJECTION, SOLUTION INTRAVENOUS at 01:08

## 2017-08-31 RX ADMIN — TAMSULOSIN HYDROCHLORIDE 0.4 MG: 0.4 CAPSULE ORAL at 09:08

## 2017-08-31 RX ADMIN — FOLIC ACID 1 MG: 1 TABLET ORAL at 09:08

## 2017-08-31 RX ADMIN — OYSTER SHELL CALCIUM WITH VITAMIN D 1 TABLET: 500; 200 TABLET, FILM COATED ORAL at 08:08

## 2017-08-31 RX ADMIN — HYDRALAZINE HYDROCHLORIDE 75 MG: 50 TABLET ORAL at 01:08

## 2017-08-31 RX ADMIN — ROPINIROLE 0.5 MG: 0.5 TABLET, FILM COATED ORAL at 06:08

## 2017-08-31 RX ADMIN — CEFEPIME HYDROCHLORIDE 2 G: 2 INJECTION, SOLUTION INTRAVENOUS at 11:08

## 2017-08-31 NOTE — DISCHARGE SUMMARY
"[facility]  Physical Medicine & Rehab  Discharge Summary    Patient Name: Kev Vasquez  MRN: [id number]  Admission Date: 8/8/2017  Hospital Length of Stay: 16 days  Discharge Date and Time: 8/24/2017 12:00 PM  Attending Physician: No att. providers found  Discharging Provider: Rashad Garcia MD  Primary Care Physician: Mega Collins MD    HPI: Kev Vasquez is a 76-year-old male with PMHx of s/p CAGB X 5, CHF, AICD,  s/p LVAD (10/2016 at [facility]), HTN, CKD II, sleep apnea, skin cancer, RLS, peripheral neuropathy, gait instability and chronic debility since LVAD.  He was recently diagnosed with a Pseudomonas infection to his LVAD driveline site and was started on antibiotics on 7/18.  Some improvement was noted and was switched to Cipro on 7/24. Patient presented to [facility] on 7/28 with increased drainage, tenderness to site and worsening abdominal pain.  Blood cx and urine cx were negative. Driveline site gram stain resulted as GNRs.  He was started on Vancomycin and later changed to Cefepime (end date 9/23/17).  CT abd/pelvis was not remarkable for abscess collection. Neurology was consulted for tremors and recommended to continue home Ropinirole.  The pt demonstrated significant mobility and ADL impairments including sit-->stand CGA, T/F CGA, ability to walk 125' CGA, UB dressing MDA, LB dressing MXA.  He was admitted to [facility] to address the mobility and ADL impairments.  His CC was "walking".      * No surgery found *     Indwelling Lines/Drains at time of discharge:   Lines/Drains/Airways     Peripherally Inserted Central Catheter Line                 PICC Double Lumen 08/07/17 1653 right basilic 23 days          Drain            Male External Urinary Catheter 08/03/17 1539 Medium 27 days          Line                 VAD Left ventricular assist device HeartMate 3 90648 days                Hospital Course: The pt was started in the rehab program with initially slow but " "eventually good results.  At d/c he requires:  Sit-->stand SBA/CGA (improved), T/F CGA (improved), gait 178' CGA/MNA RW (improved).  UBD SUP (improved), LBD MNA (improved), toileting MNA (improved).  He presented with mild cognitive deficits which improved some during the stay.   I n the most recent ST session:  "Pt completed orientation task to time with 80% acc given min verbal cues pt with increase to 100% acc. Verbal conversation task completed regarding plan of care and recommendations of supervision with pt requiring min verbal cues for insight into current physical and cognitive limitations. Pt verbalized home safety solutions with 50% acc, given moderate verbal cues for reasoning skills and attention skills, pt with increase to 100% acc. Pt verbalized memory strategies given supervision-min verbal cues with 90% acc."  He was d/c'd into the care of his wife.    Consults         Status Ordering Provider     IP consult to dietary  Once     Provider:  (Not yet assigned)    Completed CHATO MENJIVAR            Final Active Diagnoses:    Diagnosis Date Noted POA    PRINCIPAL PROBLEM:  Gait instability [R26.81] 08/08/2017 Yes    Chronic systolic heart failure [I50.22] 07/27/2015 Yes    Stage 2 chronic kidney disease [N18.2] 07/20/2017 Yes     Chronic    Complication involving left ventricular assist device (LVAD) [T82.9XXA] 07/29/2017 Yes      Problems Resolved During this Admission:    Diagnosis Date Noted Date Resolved POA       Discharged Condition: good    Disposition: [facility] [place] [facility]    Follow Up:    Patient Instructions:     Referral to Home health   Referral Priority: Routine Referral Type: Home Health Care   Referral Reason: Specialty Services Required    Requested Specialty: Home Health Services    Number of Visits Requested: 1      Diet general   Order Specific Question Answer Comments   Na restriction, if any: 2gNa      Activity as tolerated       Medications:  Reconciled Home Medications: "   Discharge Medication List as of 8/24/2017 10:46 AM      CONTINUE these medications which have CHANGED    Details   calcium-vitamin D tablet 600 mg-200 units Take 1 tablet by mouth once daily., Starting Thu 8/24/2017, OTC      ceFEPIme in dextrose 5% (MAXIPIME) 2 gram/50 mL PgBk Inject 50 mLs (2 g total) into the vein every 12 (twelve) hours., Starting Thu 8/24/2017, Until Sat 9/23/2017, No Print         CONTINUE these medications which have NOT CHANGED    Details   allopurinol (ZYLOPRIM) 300 MG tablet Take 300 mg by mouth once daily., Until Discontinued, Historical Med      amlodipine (NORVASC) 10 MG tablet Take 0.5 tablets (5 mg total) by mouth once daily., Starting Tue 8/8/2017, Normal      aspirin 81 MG Chew Take 81 mg by mouth once daily., Until Discontinued, Historical Med      atorvastatin (LIPITOR) 10 MG tablet Take 10 mg by mouth once daily., Historical Med      buPROPion (WELLBUTRIN XL) 300 MG 24 hr tablet Take 300 mg by mouth once daily., Until Discontinued, Historical Med      folic acid (FOLVITE) 1 MG tablet Take 1 mg by mouth once daily., Historical Med      hydrALAZINE (APRESOLINE) 25 MG tablet Take 3 tablets (75 mg total) by mouth every 8 (eight) hours., Starting Tue 8/8/2017, Until Wed 8/8/2018, Normal      lisinopril 10 MG tablet Take 1 tablet (10 mg total) by mouth 2 (two) times daily., Starting Tue 8/8/2017, Until Wed 8/8/2018, Normal      magnesium oxide (MAG-OX) 400 mg tablet Take 1 tablet (400 mg total) by mouth 2 (two) times daily., Starting Tue 8/8/2017, Normal      multivitamin capsule Take 1 capsule by mouth once daily., Historical Med      ropinirole (REQUIP) 0.5 MG tablet Take 0.5 mg by mouth 3 (three) times daily. , Starting Sun 1/31/2016, Historical Med      spironolactone (ALDACTONE) 25 MG tablet Take 1 tablet (25 mg total) by mouth once daily., Starting Sat 8/1/2015, Until Wed 8/9/2017, Print      tamsulosin (FLOMAX) 0.4 mg Cp24 Take 0.4 mg by mouth once daily., Until Discontinued,  Historical Med      TIKOSYN 250 mcg Cap Take 1 capsule (250 mcg total) by mouth every 12 (twelve) hours., Starting Tue 7/25/2017, Normal      warfarin (COUMADIN) 4 MG tablet 6 mg orally daily on Mon, Wed, Fri; and 4 mg orally daily on all other days, No Print             Time spent on the discharge of patient: 30 minutes    Rashad Garcia MD  Department of Physical Medicine & Rehab  [facility]

## 2017-08-31 NOTE — SUBJECTIVE & OBJECTIVE
Interval History:     Review of Systems   Constitution: Negative.   HENT: Negative.    Cardiovascular: Negative.    Respiratory: Negative.    Musculoskeletal: Negative.    Gastrointestinal: Negative.    Genitourinary: Negative.    Neurological: Negative.    Psychiatric/Behavioral: Negative.      Objective:     Vital Signs (Most Recent):  Temp: 98.6 °F (37 °C) (08/31/17 1715)  Pulse: 67 (08/31/17 1715)  Resp: 18 (08/31/17 1715)  BP: (!) 100/0 (08/31/17 1716)  SpO2: (!) 94 % (08/31/17 1140) Vital Signs (24h Range):  Temp:  [96.7 °F (35.9 °C)-99.1 °F (37.3 °C)] 98.6 °F (37 °C)  Pulse:  [63-86] 67  Resp:  [16-18] 18  SpO2:  [94 %-98 %] 94 %  BP: ()/(0-85) 100/0     Weight: 79 kg (174 lb 2.6 oz)  Body mass index is 23.62 kg/m².     SpO2: (!) 94 %  O2 Device (Oxygen Therapy): room air      Intake/Output Summary (Last 24 hours) at 08/31/17 1801  Last data filed at 08/31/17 1500   Gross per 24 hour   Intake           996.49 ml   Output              450 ml   Net           546.49 ml       Lines/Drains/Airways     Peripherally Inserted Central Catheter Line                 PICC Double Lumen 08/07/17 1653 right basilic 24 days          Drain            Male External Urinary Catheter 08/03/17 1539 Medium 28 days          Line                 VAD Left ventricular assist device HeartMate 3 27179 days                Physical Exam   Constitutional: He is oriented to person, place, and time. He appears well-developed and well-nourished. No distress.   HENT:   Head: Normocephalic and atraumatic.   Mouth/Throat: Oropharynx is clear and moist. No oropharyngeal exudate.   Eyes: EOM are normal. Pupils are equal, round, and reactive to light. Right eye exhibits no discharge. Left eye exhibits no discharge. No scleral icterus.   Neck: Normal range of motion. Neck supple. No JVD present. No tracheal deviation present. No thyromegaly present.   Cardiovascular: Normal rate, regular rhythm and intact distal pulses.  Exam reveals no gallop  and no friction rub.    No murmur (Positive soft LVAD hum ) heard.  Pulmonary/Chest: Effort normal and breath sounds normal. No stridor.   Abdominal: Soft. Bowel sounds are normal.   Musculoskeletal: Normal range of motion. He exhibits no edema, tenderness or deformity.   Lymphadenopathy:     He has no cervical adenopathy.   Neurological: He is alert and oriented to person, place, and time. He has normal reflexes. No cranial nerve deficit. Coordination normal.   Skin: Skin is warm and dry. He is not diaphoretic.   Psychiatric: He has a normal mood and affect. His behavior is normal.   Nursing note and vitals reviewed.      Significant Labs:   CMP   Recent Labs  Lab 08/29/17 2222 08/30/17 0345 08/31/17  0310    139 135*   K 4.6 4.4 4.3    108 104   CO2 25 25 28   GLU 87 85 88   BUN 25* 25* 24*   CREATININE 1.2 1.2 1.4   CALCIUM 9.4 9.6 9.8   PROT 6.3 6.0  --    ALBUMIN 2.7* 2.7*  --    BILITOT 0.3 0.5  --    ALKPHOS 66 66  --    AST 20 20  --    ALT 16 16  --    ANIONGAP 5* 6* 3*   ESTGFRAFRICA >60.0 >60.0 56.8*   EGFRNONAA 59.2* 59.2* 49.1*   , CBC   Recent Labs  Lab 08/29/17 2222 08/30/17 0345 08/31/17  0310   WBC 7.86 7.01 7.31   HGB 10.1* 9.9* 9.9*   HCT 29.5* 29.5* 28.7*   * 137* 139*    and INR   Recent Labs  Lab 08/29/17 2222 08/30/17 0345 08/31/17  0310   INR 1.5* 1.5* 1.6*       Significant Imaging: X-Ray: CXR: X-Ray Chest 1 View (CXR):   Results for orders placed or performed during the hospital encounter of 08/29/17   X-Ray Chest 1 View    Narrative    Chest AP portable    Indication:VAD.    Comparison:August 7, 2017.    Findings:     AICD and LVAD appear unchanged. RIGHT PICC line tip remains overlying the SVC. Postop changes, including sternotomy wires and mediastinal clips, appear similar to prior.    Heart and lungs unchanged when allowing for differences in technique and positioning.    Impression    No acute findings in the chest. No significant change from  prior.        Electronically signed by: GEORGIANA MUNGUIA MD  Date:     08/29/17  Time:    22:43

## 2017-08-31 NOTE — PROGRESS NOTES
Orders from Dr collazo to restart heparin gtt at same rate of 17u. Pt able to move eye in all directions without pain. Will continue to monitor.

## 2017-08-31 NOTE — CONSULTS
[facility]  Ophthalmology  Consult Note    Patient Name: Kev Vasquez  MRN: [id number]  Admission Date: 8/29/2017  Hospital Length of Stay: 2 days  Attending Provider: Miguel A Marcelo Jr.,*   Primary Care Physician: Mega Collins MD  Principal Problem:Subtherapeutic international normalized ratio (INR)    Inpatient consult to Ophthalmology  Consult performed by: ROSALBA CEJA  Consult ordered by: SHANDRA HINES        Subjective:     Chief Complaint: Subconjunctival hemorrhage    HPI: 76 year old man with ischemic cardiomyopathy status post Heartmate III 10/16/16 LVAD, CKD II, CAD, VT on Tikosyn, HTN, HLD, Gout, SSS, and depression, 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  Pt on Coumadin and ASA 81 mg.  Started on heparin gtt while inpatient as bridge.    Around 11 pm last night, pt felt he has something in his R eye, like an eyelash, and endorses rubbing his eye for a while.  RN was called to bedside and noticed that his eye became more swollen and red appearing.  Ophtho called last night and consulted to evaluate.  It was not recommended to stop the heparin gtt and to continue monitoring.  The pt denied any pain or restriction in eye movement.  He also denies any changes in his vision.      Base Eye Exam     Visual Acuity (Near card)       Right Left    Dist cc 20/25 20/25    Correction:  Glasses          Tonometry (pen, 2:35 PM)       Right Left    Pressure 12 13          Pupils       Pupils    Right PERRL    Left PERRL          Visual Fields       Right Left     Full Full          Extraocular Movement       Right Left     Full Full          Neuro/Psych     Oriented x3:  Yes            Slit Lamp and Fundus Exam     External Exam       Right Left     External Ecchymosis, dried blood inferior periorbital region Ecchymosis          Slit Lamp Exam       Right Left    Lids/Lashes Normal, no lagopthalmos Normal    Conjunctiva/Sclera 3+ Chemosis, 3+ Subconjunctival hemorrhage - 360 Conjunctivochalasis    Cornea Clear Clear    Anterior Chamber Deep and quiet Deep and quiet    Iris Round and reactive Round and reactive    Lens Clear Clear    Using Indirect @ bedside            **See photo from today in media section    Assessment and Plan:     75 yo M with extensive PMHx on heparin gtt bridge to warfarin for LVAD now with subconjunctival hemorrhage and chemosis of the right eye for one day.  Pt denies trauma to the eye but does endorse hand rubbing to the affected eye which is the likely cause of the hemorrhage.      The patients vision and extraocular motility are unaffected and the patient denies any pain at this time.  Although the appearance is quite striking, the condition is relatively benign and there is not indication for intervention at this time.  Would not recommend stopping any anticoagulation at this time as his cardiac function outweighs any risk of further hemorrhage.  Expect the hemorrhage to stabilize and gradually resolve over time.      Avoid any more contact to the eye (eye rubbing, scratching, etc) to prevent re-aggravating subconj vasculature and further bleeding and recommend lubricating ointment (lubifresh qhs/prn) to the eye for comfort and to prevent exposure from inability of full lid closure (although pt does not have lagopthalmos at this time).    Will continue to monitor.  Call if condition worsens( ie if vision becomes affected, pupil changes, the eye becomes painful, or restriction in any eye movement)        Thank you for your consult. I will follow-up with patient. Please contact us if you have any additional questions.    Ricardo Manzanares MD  Ophthalmology  [facility]

## 2017-08-31 NOTE — PROGRESS NOTES
[facility]  Cardiology  Progress Note    Patient Name: Kev Vasquez  MRN: [id number]  Admission Date: 8/29/2017  Hospital Length of Stay: 2 days  Code Status: Full Code   Attending Physician: Miguel A Marcelo Jr.,*   Primary Care Physician: Mega Collins MD  Expected Discharge Date: 9/2/2017  Principal Problem:Subtherapeutic international normalized ratio (INR)    Subjective:     Hospital Course:   - Overnight developed right sided black eye with intact vision and extraocular movements.   - Denied any complaints.     Interval History:     Review of Systems   Constitution: Negative.   HENT: Negative.    Cardiovascular: Negative.    Respiratory: Negative.    Musculoskeletal: Negative.    Gastrointestinal: Negative.    Genitourinary: Negative.    Neurological: Negative.    Psychiatric/Behavioral: Negative.      Objective:     Vital Signs (Most Recent):  Temp: 98.6 °F (37 °C) (08/31/17 1715)  Pulse: 67 (08/31/17 1715)  Resp: 18 (08/31/17 1715)  BP: (!) 100/0 (08/31/17 1716)  SpO2: (!) 94 % (08/31/17 1140) Vital Signs (24h Range):  Temp:  [96.7 °F (35.9 °C)-99.1 °F (37.3 °C)] 98.6 °F (37 °C)  Pulse:  [63-86] 67  Resp:  [16-18] 18  SpO2:  [94 %-98 %] 94 %  BP: ()/(0-85) 100/0     Weight: 79 kg (174 lb 2.6 oz)  Body mass index is 23.62 kg/m².     SpO2: (!) 94 %  O2 Device (Oxygen Therapy): room air      Intake/Output Summary (Last 24 hours) at 08/31/17 1801  Last data filed at 08/31/17 1500   Gross per 24 hour   Intake           996.49 ml   Output              450 ml   Net           546.49 ml       Lines/Drains/Airways     Peripherally Inserted Central Catheter Line                 PICC Double Lumen 08/07/17 1653 right basilic 24 days          Drain            Male External Urinary Catheter 08/03/17 1539 Medium 28 days          Line                 VAD Left ventricular assist device HeartMate 3 12588 days                Physical Exam   Constitutional: He is oriented to person, place, and  time. He appears well-developed and well-nourished. No distress.   HENT:   Head: Normocephalic and atraumatic.   Mouth/Throat: Oropharynx is clear and moist. No oropharyngeal exudate.   Eyes: EOM are normal. Pupils are equal, round, and reactive to light. Right eye exhibits no discharge. Left eye exhibits no discharge. No scleral icterus.   Neck: Normal range of motion. Neck supple. No JVD present. No tracheal deviation present. No thyromegaly present.   Cardiovascular: Normal rate, regular rhythm and intact distal pulses.  Exam reveals no gallop and no friction rub.    No murmur (Positive soft LVAD hum ) heard.  Pulmonary/Chest: Effort normal and breath sounds normal. No stridor.   Abdominal: Soft. Bowel sounds are normal.   Musculoskeletal: Normal range of motion. He exhibits no edema, tenderness or deformity.   Lymphadenopathy:     He has no cervical adenopathy.   Neurological: He is alert and oriented to person, place, and time. He has normal reflexes. No cranial nerve deficit. Coordination normal.   Skin: Skin is warm and dry. He is not diaphoretic.   Psychiatric: He has a normal mood and affect. His behavior is normal.   Nursing note and vitals reviewed.      Significant Labs:   CMP   Recent Labs  Lab 08/29/17 2222 08/30/17 0345 08/31/17 0310    139 135*   K 4.6 4.4 4.3    108 104   CO2 25 25 28   GLU 87 85 88   BUN 25* 25* 24*   CREATININE 1.2 1.2 1.4   CALCIUM 9.4 9.6 9.8   PROT 6.3 6.0  --    ALBUMIN 2.7* 2.7*  --    BILITOT 0.3 0.5  --    ALKPHOS 66 66  --    AST 20 20  --    ALT 16 16  --    ANIONGAP 5* 6* 3*   ESTGFRAFRICA >60.0 >60.0 56.8*   EGFRNONAA 59.2* 59.2* 49.1*   , CBC   Recent Labs  Lab 08/29/17 2222 08/30/17 0345 08/31/17  0310   WBC 7.86 7.01 7.31   HGB 10.1* 9.9* 9.9*   HCT 29.5* 29.5* 28.7*   * 137* 139*    and INR   Recent Labs  Lab 08/29/17  2222 08/30/17  0345 08/31/17  0310   INR 1.5* 1.5* 1.6*       Significant Imaging: X-Ray: CXR: X-Ray Chest 1 View (CXR):    Results for orders placed or performed during the hospital encounter of 08/29/17   X-Ray Chest 1 View    Narrative    Chest AP portable    Indication:VAD.    Comparison:August 7, 2017.    Findings:     AICD and LVAD appear unchanged. RIGHT PICC line tip remains overlying the SVC. Postop changes, including sternotomy wires and mediastinal clips, appear similar to prior.    Heart and lungs unchanged when allowing for differences in technique and positioning.    Impression    No acute findings in the chest. No significant change from prior.        Electronically signed by: GEORGIANA MUNGUIA MD  Date:     08/29/17  Time:    22:43      Assessment and Plan:     Brief HPI:     * Subtherapeutic international normalized ratio (INR)    - INR at 1.6 ; Continue warfarin at 8 mg tonight  - Meanwhile bridge with heparin follow AntiXa level         Restless leg syndrome    - Continue Primapexole.         Complication involving left ventricular assist device (LVAD)     - DLES infection  - Continued on long term IV antibiotic with Cefepime at 2 g Q12 H at home and Q 8 hrs here in hospital.   - Follow up on ID recommendation.         LVAD (left ventricular assist device) present     - S/P LVAD Heartmate III Implanted 10/16/2016  - Speed 5800  - Continue Coumadin, Goal INR 2.0-3.0. INR 1.5 today.                        - home dose of coumadin 6 MF, 4 all other days                        - will boost with 8 mg tonight  - , stable.   - Antiplatelets ASA 81 (VAD from outside hospital, no hx of GI bleeds)  - MAP goal 60-80's (ok to be a bit higher given hx of orthostasis). All BP's need to be checked while patient sitting on edge of bed or OOB in chair.  - hx of VT, on tykosin             Stage 2 chronic kidney disease    - Cr 1.4 around baseline of 1.3  - continue to monitor               VTE Risk Mitigation         Ordered     warfarin (COUMADIN) tablet 8 mg  Daily     Route:  Oral        08/30/17 8196          Yanet Smith  MD  Cardiology  [facility]

## 2017-08-31 NOTE — PROGRESS NOTES
Notified LVAD coordinator Zita about clot on eye and that patient heparin gtt was paused. LVAD coordinator stated she would notify dr kimbrough. Will continue to monitor.

## 2017-08-31 NOTE — PROCEDURES
Patient in bed aaox3 with wife and sitter family at bedside. VAD interrogation completed this AM in the event changes needed to be made. Will continue to monitor for further issues.     Pulsatile: Yes   VAD Sounds: HM3  Smooth  Problems / Issues / Alarms with VAD if any: None noted  HCT: 28.7   Modular Cable Connection Intact(no yellow exposed): YES      VAD Interrogation:  TXP LVAD INTERROGATIONS 8/31/2017 8/31/2017 8/31/2017 8/31/2017 8/31/2017 8/30/2017 8/30/2017   Type HeartMate3 HeartMate3 (No Data) HeartMate3 HeartMate3 HeartMate3 HeartMate3   Flow 5.0 5.3 - 5.0 5.0 5.0 5.1   Speed 5800 5800 - 5800 5800 5800 5800   PI 3.2 2.1 - 3.2 3.0 3.0 3.5   Power (Mendez) 4.4 4.4 - 4.1 4.1 4.3 4.5   LSL - 8600 - - - 5400 -   Pulsatility - Intermittent pulse - - - - Intermittent pulse   }

## 2017-08-31 NOTE — PLAN OF CARE
Problem: Patient Care Overview  Goal: Plan of Care Review  Outcome: Revised  Pt free of falls/trauma/injuries.  Denies c/o SOB, CP, or discomfort.  Generalized skin remains CDI; no edema noted.  Heparin gtt continues to infuse; plan to continue monitoring and managing INR.  Opthamology following for R eye hemorrhage.  Pt being treated with Cefepime IVPB TID.  LVAD working properly this shift without any complications.  LVAD dressing to be changed daily with soap/water per nurse.   Pt tolerating plan of care.

## 2017-08-31 NOTE — PLAN OF CARE
Problem: Patient Care Overview  Goal: Plan of Care Review  Outcome: Ongoing (interventions implemented as appropriate)  Pt remained stable throughout the night. No acute distress noted. Pt remained free from injury. VSS. Pt denies any chest pain or SOB. Heparin gtt. JAYLAN PICC. Cefepime q8, DL infection. S&w dressing daily.  Pt understands plan of care. Will continue to monitor.

## 2017-08-31 NOTE — PROGRESS NOTES
"LVAD dressing changed with soap/sterile NS per orders.  DLES "4", reddened, edematous, and tender.  Small amount of purulent/serosanguinous drainage noted to DLES and drain sponge.  Plains replaced.  Pt tolerated procedure well.  Will continue to monitor.   "

## 2017-08-31 NOTE — PROGRESS NOTES
PICC dressing changed per sterile technique per orders. Statlock and caps replaced.  Site CDI without redness, swelling, or drainage.  Pt tolerated well. Will continue to monitor.

## 2017-08-31 NOTE — ASSESSMENT & PLAN NOTE
- DLES infection  - Continued on long term IV antibiotic with Cefepime at 2 g Q12 H at home and Q 8 hrs here in hospital.   - Follow up on ID recommendation.

## 2017-08-31 NOTE — ASSESSMENT & PLAN NOTE
- INR at 1.6 ; Continue warfarin at 8 mg tonight  - Meanwhile bridge with heparin follow AntiXa level

## 2017-08-31 NOTE — PROGRESS NOTES
Pt complaining that his R eye is bothering him. Upon assessment blood clot noted covering the outside of patients eye. Pt able to move eye in all directions without any pain. Notified dr collazo. Orders to pause heparin gtt and place warm compress on eye. Will continue to monitor.

## 2017-08-31 NOTE — HOSPITAL COURSE
- Overnight developed right sided black eye with intact vision and extraocular movements.   - Denied any complaints.

## 2017-08-31 NOTE — PHYSICIAN QUERY
"PT Name: Kev Vasquez  MR #: [id number]    Physician Query Form - Heart  Condition Clarification     CDS/: Zacarias Ahn Jr, RN               Contact information:[phone]  This form is a permanent document in the medical record.     Query Date: August 31, 2017    By submitting this query, we are merely seeking further clarification of documentation. Please utilize your independent clinical judgment when addressing the question(s) below.    The medical record contains the following   Indicators     Supporting Clinical Findings Location in Medical Record   x  8/30 Lab    EF     x Radiology findings No acute findings in the chest. 8/29 Chest X Ray   x Echo Results Severely depressed left ventricular systolic function (EF 15-20%).   Impaired LV relaxation, normal LAP (grade 1 diastolic dysfunction).  8/29 H&P    "Ascites" documented     x "SOB" or "WHELAN" documented Respiratory: Negative for apnea, cough, choking, chest tightness and shortness of breath.   8/29 H&P    "Hypoxia" documented     x Heart Failure documented CHF (congestive heart failure) 8/29 H&P   x "Edema" documented Extremities:   no edema b/l, pulses +2 b/l   8/29 H&P    Diuretics/Meds      Treatment:      Other:          Provider, please specify diagnosis or diagnoses associated with above clinical findings.                               [x  ] Chronic Systolic Heart Failure (EF < 40)*  [  ] Chronic Combined Systolic and Diastolic Heart Failure  [  ] Other Type of Heart Failure (please specify type): _________________________  [  ] Other (please specify): ___________________________________  [  ] Clinically Undetermined            *American Heart Association                                                                                                          Please document in your progress notes daily for the duration of treatment until resolved and include in your discharge summary.    "

## 2017-08-31 NOTE — HOSPITAL COURSE
"The pt was started in the rehab program with initially slow but eventually good results.  At d/c he requires:  Sit-->stand SBA/CGA (improved), T/F CGA (improved), gait 178' CGA/MNA RW (improved).  UBD SUP (improved), LBD MNA (improved), toileting MNA (improved).  He presented with mild cognitive deficits which improved some during the stay.   In the most recent ST session:  "Pt completed orientation task to time with 80% acc given min verbal cues pt with increase to 100% acc. Verbal conversation task completed regarding plan of care and recommendations of supervision with pt requiring min verbal cues for insight into current physical and cognitive limitations. Pt verbalized home safety solutions with 50% acc, given moderate verbal cues for reasoning skills and attention skills, pt with increase to 100% acc. Pt verbalized memory strategies given supervision-min verbal cues with 90% acc."  He was d/c'd into the care of his wife.  "

## 2017-08-31 NOTE — PROGRESS NOTES
Was called by the nurse because pt was found to have a red right eye.  Examined eye and looks bleeding into the sub-conjunctiva.  Pt able to move his eye without pain in all directions.  I called ophthalmology on call and he said that it is ok to keep heparin going.  He said to call back and stop heparin only if vision worsens or there is pain with movement.  Ophthalmology will see patient in the AM.  I also discussed the plan with the patient and asked him to be sure to tell the nurse if he has eye pain or change in vision.

## 2017-09-01 LAB
ALBUMIN SERPL BCP-MCNC: 2.7 G/DL
ALP SERPL-CCNC: 71 U/L
ALT SERPL W/O P-5'-P-CCNC: 15 U/L
ANION GAP SERPL CALC-SCNC: 5 MMOL/L
APTT BLDCRRT: 80.8 SEC
AST SERPL-CCNC: 18 U/L
BACTERIA SPEC AEROBE CULT: NORMAL
BASOPHILS # BLD AUTO: 0.03 K/UL
BASOPHILS NFR BLD: 0.4 %
BILIRUB DIRECT SERPL-MCNC: 0.2 MG/DL
BILIRUB SERPL-MCNC: 0.4 MG/DL
BNP SERPL-MCNC: 181 PG/ML
BUN SERPL-MCNC: 23 MG/DL
CALCIUM SERPL-MCNC: 10.2 MG/DL
CHLORIDE SERPL-SCNC: 102 MMOL/L
CO2 SERPL-SCNC: 27 MMOL/L
CREAT SERPL-MCNC: 1.3 MG/DL
CRP SERPL-MCNC: 1.2 MG/L
DIFFERENTIAL METHOD: ABNORMAL
EOSINOPHIL # BLD AUTO: 0.2 K/UL
EOSINOPHIL NFR BLD: 2.8 %
ERYTHROCYTE [DISTWIDTH] IN BLOOD BY AUTOMATED COUNT: 16.1 %
EST. GFR  (AFRICAN AMERICAN): >60 ML/MIN/1.73 M^2
EST. GFR  (NON AFRICAN AMERICAN): 53.8 ML/MIN/1.73 M^2
FACT X PPP CHRO-ACNC: 0.54 IU/ML
GLUCOSE SERPL-MCNC: 86 MG/DL
HCT VFR BLD AUTO: 30.1 %
HGB BLD-MCNC: 10.4 G/DL
INR PPP: 2.3
LDH SERPL L TO P-CCNC: 175 U/L
LYMPHOCYTES # BLD AUTO: 1.4 K/UL
LYMPHOCYTES NFR BLD: 18.4 %
MAGNESIUM SERPL-MCNC: 2 MG/DL
MCH RBC QN AUTO: 30.7 PG
MCHC RBC AUTO-ENTMCNC: 34.6 G/DL
MCV RBC AUTO: 89 FL
MONOCYTES # BLD AUTO: 0.7 K/UL
MONOCYTES NFR BLD: 9.2 %
NEUTROPHILS # BLD AUTO: 5.1 K/UL
NEUTROPHILS NFR BLD: 69.1 %
PHOSPHATE SERPL-MCNC: 2.8 MG/DL
PLATELET # BLD AUTO: 145 K/UL
PMV BLD AUTO: 10.8 FL
POTASSIUM SERPL-SCNC: 4.3 MMOL/L
PREALB SERPL-MCNC: 26 MG/DL
PROT SERPL-MCNC: 6.2 G/DL
PROTHROMBIN TIME: 23.2 SEC
RBC # BLD AUTO: 3.39 M/UL
SODIUM SERPL-SCNC: 134 MMOL/L
WBC # BLD AUTO: 7.38 K/UL

## 2017-09-01 PROCEDURE — 84134 ASSAY OF PREALBUMIN: CPT

## 2017-09-01 PROCEDURE — 86140 C-REACTIVE PROTEIN: CPT

## 2017-09-01 PROCEDURE — 85025 COMPLETE CBC W/AUTO DIFF WBC: CPT

## 2017-09-01 PROCEDURE — 25000003 PHARM REV CODE 250: Performed by: INTERNAL MEDICINE

## 2017-09-01 PROCEDURE — 83880 ASSAY OF NATRIURETIC PEPTIDE: CPT

## 2017-09-01 PROCEDURE — 83615 LACTATE (LD) (LDH) ENZYME: CPT

## 2017-09-01 PROCEDURE — G8988 SELF CARE GOAL STATUS: HCPCS | Mod: CJ

## 2017-09-01 PROCEDURE — 93750 INTERROGATION VAD IN PERSON: CPT | Performed by: INTERNAL MEDICINE

## 2017-09-01 PROCEDURE — 97116 GAIT TRAINING THERAPY: CPT

## 2017-09-01 PROCEDURE — 97530 THERAPEUTIC ACTIVITIES: CPT

## 2017-09-01 PROCEDURE — 80048 BASIC METABOLIC PNL TOTAL CA: CPT

## 2017-09-01 PROCEDURE — 99232 SBSQ HOSP IP/OBS MODERATE 35: CPT | Mod: ,,, | Performed by: INTERNAL MEDICINE

## 2017-09-01 PROCEDURE — 85520 HEPARIN ASSAY: CPT

## 2017-09-01 PROCEDURE — 83735 ASSAY OF MAGNESIUM: CPT

## 2017-09-01 PROCEDURE — 85610 PROTHROMBIN TIME: CPT

## 2017-09-01 PROCEDURE — G8989 SELF CARE D/C STATUS: HCPCS | Mod: CK

## 2017-09-01 PROCEDURE — 27000248 HC VAD-ADDITIONAL DAY

## 2017-09-01 PROCEDURE — 85730 THROMBOPLASTIN TIME PARTIAL: CPT

## 2017-09-01 PROCEDURE — 80076 HEPATIC FUNCTION PANEL: CPT

## 2017-09-01 PROCEDURE — 63600175 PHARM REV CODE 636 W HCPCS: Performed by: PHYSICIAN ASSISTANT

## 2017-09-01 PROCEDURE — 93750 INTERROGATION VAD IN PERSON: CPT | Mod: ,,, | Performed by: INTERNAL MEDICINE

## 2017-09-01 PROCEDURE — 97535 SELF CARE MNGMENT TRAINING: CPT

## 2017-09-01 PROCEDURE — 84100 ASSAY OF PHOSPHORUS: CPT

## 2017-09-01 PROCEDURE — 20600001 HC STEP DOWN PRIVATE ROOM

## 2017-09-01 RX ORDER — WARFARIN 4 MG/1
4 TABLET ORAL DAILY
Status: DISCONTINUED | OUTPATIENT
Start: 2017-09-01 | End: 2017-09-02 | Stop reason: HOSPADM

## 2017-09-01 RX ORDER — HYDRALAZINE HYDROCHLORIDE 25 MG/1
75 TABLET, FILM COATED ORAL ONCE
Status: COMPLETED | OUTPATIENT
Start: 2017-09-01 | End: 2017-09-01

## 2017-09-01 RX ADMIN — AMLODIPINE BESYLATE 10 MG: 10 TABLET ORAL at 08:09

## 2017-09-01 RX ADMIN — FOLIC ACID 1 MG: 1 TABLET ORAL at 08:09

## 2017-09-01 RX ADMIN — ACETAMINOPHEN 650 MG: 325 TABLET ORAL at 08:09

## 2017-09-01 RX ADMIN — ROPINIROLE 0.5 MG: 0.5 TABLET, FILM COATED ORAL at 02:09

## 2017-09-01 RX ADMIN — CEFEPIME HYDROCHLORIDE 2 G: 2 INJECTION, SOLUTION INTRAVENOUS at 12:09

## 2017-09-01 RX ADMIN — ROPINIROLE 0.5 MG: 0.5 TABLET, FILM COATED ORAL at 10:09

## 2017-09-01 RX ADMIN — DOFETILIDE 250 MCG: 0.25 CAPSULE ORAL at 10:09

## 2017-09-01 RX ADMIN — HYDRALAZINE HYDROCHLORIDE 75 MG: 50 TABLET ORAL at 10:09

## 2017-09-01 RX ADMIN — LISINOPRIL 10 MG: 10 TABLET ORAL at 10:09

## 2017-09-01 RX ADMIN — WARFARIN SODIUM 4 MG: 4 TABLET ORAL at 05:09

## 2017-09-01 RX ADMIN — OYSTER SHELL CALCIUM WITH VITAMIN D 1 TABLET: 500; 200 TABLET, FILM COATED ORAL at 08:09

## 2017-09-01 RX ADMIN — SPIRONOLACTONE 25 MG: 25 TABLET, FILM COATED ORAL at 08:09

## 2017-09-01 RX ADMIN — MAGNESIUM OXIDE TAB 400 MG (241.3 MG ELEMENTAL MG) 400 MG: 400 (241.3 MG) TAB at 10:09

## 2017-09-01 RX ADMIN — HYDRALAZINE HYDROCHLORIDE 75 MG: 25 TABLET, FILM COATED ORAL at 06:09

## 2017-09-01 RX ADMIN — HYPROMELLOSE 2910 1 DROP: 5 SOLUTION OPHTHALMIC at 02:09

## 2017-09-01 RX ADMIN — LISINOPRIL 10 MG: 10 TABLET ORAL at 08:09

## 2017-09-01 RX ADMIN — BUPROPION HYDROCHLORIDE 300 MG: 150 TABLET, FILM COATED, EXTENDED RELEASE ORAL at 08:09

## 2017-09-01 RX ADMIN — ALLOPURINOL 300 MG: 300 TABLET ORAL at 08:09

## 2017-09-01 RX ADMIN — ROPINIROLE 0.5 MG: 0.5 TABLET, FILM COATED ORAL at 05:09

## 2017-09-01 RX ADMIN — ATORVASTATIN CALCIUM 10 MG: 10 TABLET, FILM COATED ORAL at 08:09

## 2017-09-01 RX ADMIN — MAGNESIUM OXIDE TAB 400 MG (241.3 MG ELEMENTAL MG) 400 MG: 400 (241.3 MG) TAB at 08:09

## 2017-09-01 RX ADMIN — HYDRALAZINE HYDROCHLORIDE 75 MG: 50 TABLET ORAL at 02:09

## 2017-09-01 RX ADMIN — ASPIRIN 81 MG CHEWABLE TABLET 81 MG: 81 TABLET CHEWABLE at 08:09

## 2017-09-01 RX ADMIN — OYSTER SHELL CALCIUM WITH VITAMIN D 1 TABLET: 500; 200 TABLET, FILM COATED ORAL at 10:09

## 2017-09-01 RX ADMIN — HYDRALAZINE HYDROCHLORIDE 75 MG: 50 TABLET ORAL at 05:09

## 2017-09-01 RX ADMIN — TAMSULOSIN HYDROCHLORIDE 0.4 MG: 0.4 CAPSULE ORAL at 08:09

## 2017-09-01 RX ADMIN — MICONAZOLE NITRATE: 20 OINTMENT TOPICAL at 09:09

## 2017-09-01 RX ADMIN — CEFEPIME HYDROCHLORIDE 2 G: 2 INJECTION, SOLUTION INTRAVENOUS at 05:09

## 2017-09-01 RX ADMIN — DOFETILIDE 250 MCG: 0.25 CAPSULE ORAL at 08:09

## 2017-09-01 RX ADMIN — CEFEPIME HYDROCHLORIDE 2 G: 2 INJECTION, SOLUTION INTRAVENOUS at 10:09

## 2017-09-01 RX ADMIN — MICONAZOLE NITRATE: 20 OINTMENT TOPICAL at 08:09

## 2017-09-01 NOTE — ASSESSMENT & PLAN NOTE
- S/P LVAD Heartmate III Implanted 10/16/2016  - Speed 5800  - Continue Coumadin, Goal INR 2.0-3.0. INR 2.3 today   - home dose of coumadin 6 MF, 4 all other days   - will give 4 mg tonight  - , stable.   - Antiplatelets ASA 81 (VAD from outside hospital, no hx of GI bleeds)  - MAP goal 60-80's (ok to be a bit higher given hx of orthostasis). All BP's need to be checked while patient sitting on edge of bed or OOB in chair.  - hx of VT, on tykosin

## 2017-09-01 NOTE — ASSESSMENT & PLAN NOTE
- Changes in diet (eating much more per patient) may have attributed to drop   - INR 2.3 today, will drop dose tonight   - home dose of coumadin 6 MF, 4 all others  - discontinue heparin ggt

## 2017-09-01 NOTE — PT/OT/SLP PROGRESS
Physical Therapy  Treatment    Kev Vasquez   MRN: [id number]   Admitting Diagnosis: Subtherapeutic international normalized ratio (INR)    PT Received On: 09/01/17  PT Start Time: 1016     PT Stop Time: 1050    PT Total Time (min): 34 min       Billable Minutes:  Gait [id number] and Therapeutic Activity 12    Treatment Type: Treatment  PT/PTA: PT     PTA Visit Number: 0       General Precautions: Standard, fall, LVAD  Orthopedic Precautions: N/A   Braces: N/A    Do you have any cultural, spiritual, Druze conflicts, given your current situation?: none noted     Subjective:  Communicated with RN prior to session.  Pt agreeable to therapy.     Pain/Comfort  Pain Rating 1: 0/10    Objective:   Patient found with: telemetry (LVAD to battery power)    Functional Mobility:  Bed Mobility:   Supine to Sit:  (not performed 2* pt found seated EOB and left UIC )    Transfers:  Sit <> Stand Assistance: Minimum Assistance (x7 reps)  Sit <> Stand Assistive Device: Rolling Walker   Cues for safe transfer technique with RW    Gait:   Gait Distance: ~50 ft. + ~100 ft. + ~130 ft. + ~80 ft. (seated rest breaks between ambulation trials)  Assistance 1: Minimum assistance  Gait Assistive Device: Rolling walker  Gait Pattern: swing-through gait  Gait Deviation(s): decreased william, increased time in double stance, decreased velocity of limb motion, decreased weight-shifting ability, decreased step length, toe drag, decreased toe-to-floor clearance, decreased heel strike BLE   Emergency bag present throughout   Chair follow throughout    Lydia throughout for balance and appropriate weight-shifts   Max v/c and t/c throughout for upright posture, forward gaze, increased step length, decreased toe drag, and increased heel strike    Balance:   Static Sit: supervision   Dynamic Sit: SBA  Static Stand: CGA  Dynamic stand: Lydia     Therapeutic Activities and Exercises:  Pt found on battery power with hunting vest donned as consolidation bag.  Pt reported that his brief was soiled. Transferred sit>stand from EOB with RW and Lydia. Doffed soiled brief and shorts. Returned to sit to begin thread brief and shorts. Transferred sit>stand from EOB with RW and Lydia. Static standing with RW and CGA while pt performed ced-care. Completed LB dressing. Returned to sit EOB to don clean shirt and tennis shoes. Transferred sit>stand from EOB with RW and Lydia. Performed ambulation in hallway as described above. Pt with increased fatigue at the end of gait training and returned to room in chair.     AM-PAC 6 CLICK MOBILITY  How much help from another person does this patient currently need?   1 = Unable, Total/Dependent Assistance  2 = A lot, Maximum/Moderate Assistance  3 = A little, Minimum/Contact Guard/Supervision  4 = None, Modified [place]/Independent    Turning over in bed (including adjusting bedclothes, sheets and blankets)?: 3  Sitting down on and standing up from a chair with arms (e.g., wheelchair, bedside commode, etc.): 3  Moving from lying on back to sitting on the side of the bed?: 3  Moving to and from a bed to a chair (including a wheelchair)?: 3  Need to walk in hospital room?: 3  Climbing 3-5 steps with a railing?: 1  Total Score: 16    AM-PAC Raw Score CMS G-Code Modifier Level of Impairment Assistance   6 % Total / Unable   7 - 9 CM 80 - 100% Maximal Assist   10 - 14 CL 60 - 80% Moderate Assist   15 - 19 CK 40 - 60% Moderate Assist   20 - 22 CJ 20 - 40% Minimal Assist   23 CI 1-20% SBA / CGA   24 CH 0% Independent/ Mod I     Patient left up in chair with all lines intact and call button in reach.    Assessment:  Kev Vasquez is a 76 y.o. male with a medical diagnosis of Subtherapeutic international normalized ratio (INR) and presents with LVAD in place, inserted in 2016. Pt progressed functional mobility compared to previous session, as he was able to increase distance ambulated. However, pt continues to demo altered gait mechanics and  mild ataxic gait requiring cues throughout to correct. Pt also required assist to complete sit>stand transfers 2* decreased balance and functional weakness. Pt would continue to benefit from skilled acute PT in order to address current deficits and progress functional mobility.     Rehab identified problem list/impairments: Rehab identified problem list/impairments: weakness, impaired functional mobilty, decreased safety awareness, impaired cardiopulmonary response to activity, decreased coordination, gait instability, impaired endurance, impaired coordination, impaired balance, impaired self care skills    Rehab potential is good.    Activity tolerance: Good    Discharge recommendations: Discharge Facility/Level Of Care Needs: home health PT, home health OT     Barriers to discharge: Barriers to Discharge: None    Equipment recommendations: Equipment Needed After Discharge: bedside commode     GOALS:    Physical Therapy Goals        Problem: Physical Therapy Goal    Goal Priority Disciplines Outcome Goal Variances Interventions   Physical Therapy Goal     PT/OT, PT Ongoing (interventions implemented as appropriate)     Description:  Goals to be met by: 17     Patient will increase functional independence with mobility by performin. Supine to sit with Modified [place]  2. Sit to stand transfer with Supervision  3. Bed to chair transfer with SBA using Rolling Walker as needed.  4. Gait  x 200 feet with SBA using Rolling Walker.   5. Lower extremity exercise program x15 reps, with assistance as needed, in order to increase LE strength and (I) with functional mobility.                       PLAN:    Patient to be seen 4 x/week  to address the above listed problems via gait training, therapeutic activities, therapeutic exercises  Plan of Care expires: 17  Plan of Care reviewed with: patient        Wendy Ackerman, PT, DPT   2017  [phone]

## 2017-09-01 NOTE — PROGRESS NOTES
DISCHARGE    SW to pt's room for weekend discharge.  Pt presents as sitting up in bedside chair with wife and PCA at bedside.  Pt, wife, and PCA all present as aao x4, calm, cooperative, and asking and answering questions appropriately.  Pt and wife verbalize understanding and agreement with plan for likely d/c to home with IV abx tomorrow.  Pt requesting to resume services with [facility] (ph: [phone], f: [phone]) and [facility]  at d/c.  SW granted Epic access to [facility] and notified them of d/c date.  Per Lisandro with [facility], pt received shipment of 14 days of abx doses at home on 8/29 and does not need to have any supplies delivered to hospital room prior to d/c.  SW notified Ximena with [facility] of d/c date.  Pt will have transportation home from family at time of d/c.  Pt and wife both report coping adequately at this time, and deny any needs or concerns to SW.  SW providing ongoing psychosocial and counseling support, education, resources, assistance, and discharge planning as indicated.  On-call transplant SW may be paged over weekend if needed.  SW remains available.

## 2017-09-01 NOTE — PLAN OF CARE
Problem: Occupational Therapy Goal  Goal: Occupational Therapy Goal  Goals to be met by: 7 days (9/6/17)     Patient will increase functional independence with ADLs by performing:    UE Dressing with Stand-by Assistance.  LE Dressing with Contact Guard Assistance.  Grooming while standing at sink with Contact Guard Assistance.  Toileting from toilet with Contact Guard Assistance for hygiene and clothing management.   Supine to sit with Supervision.  Toilet transfer to toilet with Contact Guard Assistance.  Pt will perform functional mobility household distance with CGA and AD as needed.   Outcome: Ongoing (interventions implemented as appropriate)  Continue OT plan of care.

## 2017-09-01 NOTE — PROGRESS NOTES
09/01/17 0604   Vital Signs   BP (!) 122/91   MAP (mmHg) 102   Assessments (Pre/Post)   Level of Consciousness (AVPU) alert   doppler 100. PO morning hydralazine 75mg given. Patient vomited medication, full pills noted in emesis. Notified dr collazo, orders to give another dose of hydralazine 75mg po.

## 2017-09-01 NOTE — SUBJECTIVE & OBJECTIVE
Interval History: No complaints overnight other than some issues with nursing aids overnight. States he feels fine. Denies eye pain, SOB, NVD.    Continuous Infusions:     Scheduled Meds:   allopurinol  300 mg Oral Daily    amlodipine  10 mg Oral Daily    aspirin  81 mg Oral Daily    atorvastatin  10 mg Oral Daily    buPROPion  300 mg Oral Daily    calcium-vitamin D3  1 tablet Oral BID    ceFEPime (MAXIPIME) IVPB  2 g Intravenous Q8H    docusate sodium  100 mg Oral BID    dofetilide  250 mcg Oral Q12H    folic acid  1 mg Oral Daily    hydrALAZINE  75 mg Oral Q8H    lisinopril  10 mg Oral BID    magnesium oxide  400 mg Oral BID    miconazole nitrate 2%   Topical (Top) BID    ropinirole  0.5 mg Oral TID    spironolactone  25 mg Oral Daily    tamsulosin  0.4 mg Oral Daily    warfarin  4 mg Oral Daily     PRN Meds:acetaminophen, artificial tears, trazodone    Review of patient's allergies indicates:   Allergen Reactions    Sulfa (sulfonamide antibiotics)      Objective:     Vital Signs (Most Recent):  Temp: 98.4 °F (36.9 °C) (09/01/17 1200)  Pulse: 60 (09/01/17 1400)  Resp: 18 (09/01/17 1240)  BP: (!) 88/68 (09/01/17 1241)  SpO2: (!) 94 % (09/01/17 1200) Vital Signs (24h Range):  Temp:  [98.3 °F (36.8 °C)-99.1 °F (37.3 °C)] 98.4 °F (36.9 °C)  Pulse:  [60-78] 60  Resp:  [16-18] 18  SpO2:  [92 %-97 %] 94 %  BP: ()/(0-91) 88/68     Weight: 79 kg (174 lb 2.6 oz)  Body mass index is 23.62 kg/m².      Intake/Output Summary (Last 24 hours) at 09/01/17 1501  Last data filed at 09/01/17 0900   Gross per 24 hour   Intake           865.18 ml   Output              100 ml   Net           765.18 ml     Hemodynamic Parameters:    Telemetry: no events overnight    Physical Exam   Constitutional: He is oriented to person, place, and time. He appears well-developed and well-nourished. No distress.   HENT:   Head: Normocephalic and atraumatic.   Neck: Normal range of motion. Neck supple. No JVD present.  "  Cardiovascular: Normal rate and regular rhythm.    VAD hum smooth   Pulmonary/Chest: Effort normal and breath sounds normal. No respiratory distress. He has no wheezes.   Abdominal: Soft. Bowel sounds are normal.   Musculoskeletal: Normal range of motion. He exhibits no edema.   Neurological: He is alert and oriented to person, place, and time.   Skin: Skin is warm and dry. Capillary refill takes less than 2 seconds. He is not diaphoretic.   DLES "4" per nursing documentation. Dressing in place, clean and dry   Psychiatric: He has a normal mood and affect. His behavior is normal.   Nursing note and vitals reviewed.         Significant Labs:  CBC:    Recent Labs  Lab 09/01/17 0449   WBC 7.38   RBC 3.39*   HGB 10.4*   HCT 30.1*   *   MCV 89   MCH 30.7   MCHC 34.6     BNP:    Recent Labs  Lab 09/01/17 0449   *     CMP:    Recent Labs  Lab 09/01/17 0449   GLU 86   CALCIUM 10.2   ALBUMIN 2.7*   PROT 6.2   *   K 4.3   CO2 27      BUN 23   CREATININE 1.3   ALKPHOS 71   ALT 15   AST 18   BILITOT 0.4      Coagulation:     Recent Labs  Lab 09/01/17 0449   INR 2.3*   APTT 80.8*     LDH:    Recent Labs  Lab 08/29/17  2222 08/30/17  0345 08/31/17  0310 09/01/17  0449    172 172 175     Microbiology:  Microbiology Results (last 7 days)     Procedure Component Value Units Date/Time    Culture, Anaerobe [[id number]] Collected:  08/30/17 1519    Order Status:  Completed Specimen:  Incision site from Abdomen Updated:  09/01/17 1401     Anaerobic Culture Culture in progress    Aerobic culture [[id number]]  (Susceptibility) Collected:  08/30/17 1519    Order Status:  Completed Specimen:  Incision site from Abdomen Updated:  09/01/17 1116     Aerobic Bacterial Culture --     PSEUDOMONAS AERUGINOSA  Few      Blood culture [[id number]] Collected:  08/30/17 0048    Order Status:  Completed Specimen:  Blood Updated:  09/01/17 0612     Blood Culture, Routine No Growth to date     Blood Culture, Routine No " Growth to date     Blood Culture, Routine No Growth to date    Gram stain [[id number]] Collected:  08/30/17 1519    Order Status:  Completed Specimen:  Incision site from Abdomen Updated:  08/31/17 0325     Gram Stain Result Rare WBC's      No organisms seen    Gram stain [[id number]]     Order Status:  Completed Specimen:  Wound from Abdomen     Culture, Anaerobe [[id number]] Collected:  08/29/17 2223    Order Status:  Sent Specimen:  Incision site from Abdomen Updated:  08/29/17 2223    Aerobic culture [[id number]] Collected:  08/29/17 2223    Order Status:  Sent Specimen:  Incision site from Abdomen Updated:  08/29/17 2223    Blood culture [[id number]]     Order Status:  Canceled Specimen:  Blood           I have reviewed all pertinent labs within the past 24 hours.    Estimated Creatinine Clearance: 54.7 mL/min (based on SCr of 1.3 mg/dL).    Diagnostic Results:  I have reviewed all pertinent imaging results/findings within the past 24 hours.

## 2017-09-01 NOTE — PROGRESS NOTES
[facility]  Heart Transplant  Progress Note    Patient Name: Kev Vasquez  MRN: [id number]  Admission Date: 8/29/2017  Hospital Length of Stay: 3 days  Attending Physician: Miguel A Marcelo Jr.,*  Primary Care Provider: Mega Collins MD  Principal Problem:Subtherapeutic international normalized ratio (INR)    Subjective:     Interval History: No complaints overnight other than some issues with nursing aids overnight. States he feels fine. Denies eye pain, SOB, NVD.    Continuous Infusions:     Scheduled Meds:   allopurinol  300 mg Oral Daily    amlodipine  10 mg Oral Daily    aspirin  81 mg Oral Daily    atorvastatin  10 mg Oral Daily    buPROPion  300 mg Oral Daily    calcium-vitamin D3  1 tablet Oral BID    ceFEPime (MAXIPIME) IVPB  2 g Intravenous Q8H    docusate sodium  100 mg Oral BID    dofetilide  250 mcg Oral Q12H    folic acid  1 mg Oral Daily    hydrALAZINE  75 mg Oral Q8H    lisinopril  10 mg Oral BID    magnesium oxide  400 mg Oral BID    miconazole nitrate 2%   Topical (Top) BID    ropinirole  0.5 mg Oral TID    spironolactone  25 mg Oral Daily    tamsulosin  0.4 mg Oral Daily    warfarin  4 mg Oral Daily     PRN Meds:acetaminophen, artificial tears, trazodone    Review of patient's allergies indicates:   Allergen Reactions    Sulfa (sulfonamide antibiotics)      Objective:     Vital Signs (Most Recent):  Temp: 98.4 °F (36.9 °C) (09/01/17 1200)  Pulse: 60 (09/01/17 1400)  Resp: 18 (09/01/17 1240)  BP: (!) 88/68 (09/01/17 1241)  SpO2: (!) 94 % (09/01/17 1200) Vital Signs (24h Range):  Temp:  [98.3 °F (36.8 °C)-99.1 °F (37.3 °C)] 98.4 °F (36.9 °C)  Pulse:  [60-78] 60  Resp:  [16-18] 18  SpO2:  [92 %-97 %] 94 %  BP: ()/(0-91) 88/68     Weight: 79 kg (174 lb 2.6 oz)  Body mass index is 23.62 kg/m².      Intake/Output Summary (Last 24 hours) at 09/01/17 1501  Last data filed at 09/01/17 0900   Gross per 24 hour   Intake           865.18 ml   Output             "  100 ml   Net           765.18 ml     Hemodynamic Parameters:    Telemetry: no events overnight    Physical Exam   Constitutional: He is oriented to person, place, and time. He appears well-developed and well-nourished. No distress.   HENT:   Head: Normocephalic and atraumatic.   Neck: Normal range of motion. Neck supple. No JVD present.   Cardiovascular: Normal rate and regular rhythm.    VAD hum smooth   Pulmonary/Chest: Effort normal and breath sounds normal. No respiratory distress. He has no wheezes.   Abdominal: Soft. Bowel sounds are normal.   Musculoskeletal: Normal range of motion. He exhibits no edema.   Neurological: He is alert and oriented to person, place, and time.   Skin: Skin is warm and dry. Capillary refill takes less than 2 seconds. He is not diaphoretic.   DLES "4" per nursing documentation. Dressing in place, clean and dry   Psychiatric: He has a normal mood and affect. His behavior is normal.   Nursing note and vitals reviewed.         Significant Labs:  CBC:    Recent Labs  Lab 09/01/17 0449   WBC 7.38   RBC 3.39*   HGB 10.4*   HCT 30.1*   *   MCV 89   MCH 30.7   MCHC 34.6     BNP:    Recent Labs  Lab 09/01/17 0449   *     CMP:    Recent Labs  Lab 09/01/17 0449   GLU 86   CALCIUM 10.2   ALBUMIN 2.7*   PROT 6.2   *   K 4.3   CO2 27      BUN 23   CREATININE 1.3   ALKPHOS 71   ALT 15   AST 18   BILITOT 0.4      Coagulation:     Recent Labs  Lab 09/01/17 0449   INR 2.3*   APTT 80.8*     LDH:    Recent Labs  Lab 08/29/17  2222 08/30/17  0345 08/31/17  0310 09/01/17  0449    172 172 175     Microbiology:  Microbiology Results (last 7 days)     Procedure Component Value Units Date/Time    Culture, Anaerobe [[id number]] Collected:  08/30/17 1519    Order Status:  Completed Specimen:  Incision site from Abdomen Updated:  09/01/17 1401     Anaerobic Culture Culture in progress    Aerobic culture [[id number]]  (Susceptibility) Collected:  08/30/17 1519    Order " Status:  Completed Specimen:  Incision site from Abdomen Updated:  09/01/17 1116     Aerobic Bacterial Culture --     PSEUDOMONAS AERUGINOSA  Few      Blood culture [[id number]] Collected:  08/30/17 0048    Order Status:  Completed Specimen:  Blood Updated:  09/01/17 0612     Blood Culture, Routine No Growth to date     Blood Culture, Routine No Growth to date     Blood Culture, Routine No Growth to date    Gram stain [[id number]] Collected:  08/30/17 1519    Order Status:  Completed Specimen:  Incision site from Abdomen Updated:  08/31/17 0325     Gram Stain Result Rare WBC's      No organisms seen    Gram stain [[id number]]     Order Status:  Completed Specimen:  Wound from Abdomen     Culture, Anaerobe [[id number]] Collected:  08/29/17 2223    Order Status:  Sent Specimen:  Incision site from Abdomen Updated:  08/29/17 2223    Aerobic culture [[id number]] Collected:  08/29/17 2223    Order Status:  Sent Specimen:  Incision site from Abdomen Updated:  08/29/17 2223    Blood culture [[id number]]     Order Status:  Canceled Specimen:  Blood           I have reviewed all pertinent labs within the past 24 hours.    Estimated Creatinine Clearance: 54.7 mL/min (based on SCr of 1.3 mg/dL).    Diagnostic Results:  I have reviewed all pertinent imaging results/findings within the past 24 hours.    Assessment and Plan:     LVAD (left ventricular assist device) present    - S/P LVAD Heartmate III Implanted 10/16/2016  - Speed 5800  - Continue Coumadin, Goal INR 2.0-3.0. INR 2.3 today   - home dose of coumadin 6 MF, 4 all other days   - will give 4 mg tonight  - , stable.   - Antiplatelets ASA 81 (VAD from outside hospital, no hx of GI bleeds)  - MAP goal 60-80's (ok to be a bit higher given hx of orthostasis). All BP's need to be checked while patient sitting on edge of bed or OOB in chair.  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 Signed off, no new bacteria growing. Blood cultures NGTD. Will continue to see ID as an outpatient and d/c Abx on 9/23.        Stage 2 chronic kidney disease    - Cr 1.2 around baseline of 1.3  - continue to monitor            Elena Raman PA-C  Heart Transplant  [facility]  "

## 2017-09-01 NOTE — PROGRESS NOTES
MAP and DP elevated this AM.  Pt denies any c/o dizziness, lightheadedness, or H/A.  Generalized abdominal pain managed with PO Tylenol.  Other VSS.  S. Renardel notified; no new orders at this time.  Scheduled AM meds administered as ordered. Will continue to monitor.        09/01/17 0800 09/01/17 0801   Vital Signs   Temp 98.3 °F (36.8 °C) --    Temp src Oral --    Pulse 68 --    Heart Rate Source Monitor --    Resp 18 --    SpO2 97 % --    O2 Device (Oxygen Therapy) room air --    /85 (!) 92/0   MAP (mmHg) 92 --    BP Location Left arm Left arm   BP Method Automatic Doppler   Patient Position Lying Lying

## 2017-09-01 NOTE — PLAN OF CARE
Problem: Patient Care Overview  Goal: Plan of Care Review  Outcome: Revised  Pt free of falls/trauma/injuries.  Denies c/o SOB or CP.  Pain to LUQ/ribcage managed with PO analgesics.  Generalized skin remains CDI; no edema noted.  INR stable; Heparin gtt discontinued.  Opthamology following for R eye hemorrhage.  Pt being treated with Cefepime IVPB TID.  LVAD working properly this shift without any complications.  LVAD dressing to be changed daily with soap/water per nurse.   Pt tolerating plan of care.

## 2017-09-01 NOTE — PROGRESS NOTES
"LVAD dressing changed with soap/sterile NS per orders.  DLES "4", reddened, edematous, and tender.  Small amount of purulent/serosanguinous drainage noted to DLES and drain sponge.  Pt tolerated procedure well.  Will continue to monitor.   "

## 2017-09-01 NOTE — PT/OT/SLP PROGRESS
"Occupational Therapy  Treatment    Kev Vasquez   MRN: [id number]   Admitting Diagnosis: Subtherapeutic international normalized ratio (INR)    OT Date of Treatment: 09/01/17   OT Start Time: 1251  OT Stop Time: 1317  OT Total Time (min): 26 min    Billable Minutes:  Self Care/Home Management 16 and Therapeutic Activity 10    General Precautions: Standard, fall, LVAD  Orthopedic Precautions: N/A  Braces: N/A    Do you have any cultural, spiritual, Jehovah's witness conflicts, given your current situation?: None    Subjective:  Communicated with RN prior to session. Pt agreeable to OT.  "I can still see out of my right eye."    Pain/Comfort  Pain Rating 1: 0/10  Pain Rating Post-Intervention 1: 0/10    Objective:  Patient found with: telemetry, peripheral IV (LVAD to battery power)     Functional Mobility:  Bed Mobility: NT as pt up in chair and left up in chair     Transfers:  Sit <> Stand Assistance: Minimum Assistance from bedside chair  Sit <> Stand Assistive Device: Rolling Walker    Functional Ambulation: To and from bathroom with Min A using RW; cues and assist to navigate RW and stay within frame, ataxic gait    Activities of Daily Living:  Grooming Position: Standing at sink  Grooming Level of Assistance: Minimum assistance for balance and cues for upright posture while pt performed oral hygiene and washed face and hands    Balance:   Static Sit: GOOD+: Takes MAXIMAL challenges from all directions.    Dynamic Sit: GOOD+: Maintains balance through MAXIMAL excursions of active trunk motion  Static Stand: FAIR: Maintains without assist but unable to take challenges  Dynamic stand: POOR: N/A    Therapeutic Activities and Exercises:  Pt up in chair on battery power, wife and caregiver present; required Min A for sit to stand from bedside chair; functional mobility to bathroom to perform standing ADLs; pt required assist to maintain balance and for navigation of RW as well as cues to remain on task as pt easily " "distracted    AM-PAC 6 CLICK ADL   How much help from another person does this patient currently need?   1 = Unable, Total/Dependent Assistance  2 = A lot, Maximum/Moderate Assistance  3 = A little, Minimum/Contact Guard/Supervision  4 = None, Modified [place]/Independent    Putting on and taking off regular lower body clothing? : 3  Bathing (including washing, rinsing, drying)?: 3  Toileting, which includes using toilet, bedpan, or urinal? : 3  Putting on and taking off regular upper body clothing?: 3  Taking care of personal grooming such as brushing teeth?: 3  Eating meals?: 4  Total Score: 19     AM-PAC Raw Score CMS "G-Code Modifier Level of Impairment Assistance   6 % Total / Unable   7 - 8 CM 80 - 100% Maximal Assist   9-13 CL 60 - 80% Moderate Assist   14 - 19 CK 40 - 60% Moderate Assist   20 - 22 CJ 20 - 40% Minimal Assist   23 CI 1-20% SBA / CGA   24 CH 0% Independent/ Mod I       Patient left up in chair with all lines intact, call button in reach and wife and caregiver present    ASSESSMENT:  Kev Vasquez is a 76 y.o. male with a medical diagnosis of Subtherapeutic international normalized ratio (INR), s/p LVAD 10/2016 and presents with deficits listed below. Pt requires increased time and redirection during ADL tasks and LVAD management, as well as safety and assist for gait and balance deficits. Pt would continue to benefit from OT to increase independence and safety. Recommend HH and 24 hr assist/supervision upon D/C.    Rehab identified problem list/impairments: Rehab identified problem list/impairments: weakness, impaired endurance, impaired self care skills, impaired functional mobilty, gait instability, impaired balance, decreased safety awareness, impaired cardiopulmonary response to activity    Rehab potential is good.    Activity tolerance: Good    Discharge recommendations: Discharge Facility/Level Of Care Needs: home with home health     Barriers to discharge: Barriers to " Discharge: None    Equipment recommendations: bedside commode     GOALS:    Occupational Therapy Goals        Problem: Occupational Therapy Goal    Goal Priority Disciplines Outcome Interventions   Occupational Therapy Goal     OT, PT/OT Ongoing (interventions implemented as appropriate)    Description:  Goals to be met by: 7 days (9/6/17)     Patient will increase functional independence with ADLs by performing:    UE Dressing with Stand-by Assistance.  LE Dressing with Contact Guard Assistance.  Grooming while standing at sink with Contact Guard Assistance.  Toileting from toilet with Contact Guard Assistance for hygiene and clothing management.   Supine to sit with Supervision.  Toilet transfer to toilet with Contact Guard Assistance.  Pt will perform functional mobility household distance with CGA and AD as needed.                    Plan:  Patient to be seen 4 x/week to address the above listed problems via self-care/home management, therapeutic activities, therapeutic exercises  Plan of Care expires: 09/30/17  Plan of Care reviewed with: patient, spouse, caregiver    SHAUNA Hanks  09/01/2017

## 2017-09-01 NOTE — ASSESSMENT & PLAN NOTE
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 Signed off, no new bacteria growing. Blood cultures NGTD. Will continue to see ID as an outpatient and d/c Abx on 9/23.  "

## 2017-09-01 NOTE — PROCEDURES
Patient aaox3 with wife and sitter at bedside. VAD interrogation completed this AM in the event changes needed to be made. Will continue to monitor for further issues.     Pulsatile: Yes, intermittent   VAD Sounds: HM3  Smooth  Problems / Issues / Alarms with VAD if any: None noted      VAD Interrogation:  TXP LVAD INTERROGATIONS 9/1/2017 9/1/2017 9/1/2017 9/1/2017 9/1/2017 8/31/2017 8/31/2017   Type HeartMate3 HeartMate3 HeartMate3 HeartMate3 HeartMate3 HeartMate3 HeartMate3   Flow 4.9 5.2 4.7 4.5 5.0 5.0 4.8   Speed 5800 5850 5800 5800 5800 5800 5800   PI 3.0 3.2 3.5 4.0 3.2 3.0 3.3   Power (Mendez) 4.6 4.4 4.5 4.4 4.4 4.5 4.5   LSL - - 5400 - - 5400 -   Pulsatility - - Intermittent pulse - - - -   }

## 2017-09-01 NOTE — PLAN OF CARE
Problem: Physical Therapy Goal  Goal: Physical Therapy Goal  Goals to be met by: 17     Patient will increase functional independence with mobility by performin. Supine to sit with Modified [place]  2. Sit to stand transfer with Supervision  3. Bed to chair transfer with SBA using Rolling Walker as needed.  4. Gait  x 200 feet with SBA using Rolling Walker.   5. Lower extremity exercise program x15 reps, with assistance as needed, in order to increase LE strength and (I) with functional mobility.      Outcome: Ongoing (interventions implemented as appropriate)  Goals reviewed and remain appropriate. Pt progressing towards goals.    Wendy Ackerman, PT, DPT   2017  [phone]

## 2017-09-01 NOTE — PLAN OF CARE
[facility]   Heart Transplant/VAD Clinic   [address] [place] [address]   [phone] after hours          [phone] fax       VAD HOME  HEALTH ORDERS      Admit to Home Health    Diagnosis:   Patient Active Problem List   Diagnosis    SOB (shortness of breath)    Chronic systolic congestive heart failure    Hypertensive urgency    Coronary artery disease involving native coronary artery of native heart without angina pectoris    S/P CABG (coronary artery bypass graft)    ICD (implantable cardioverter-defibrillator), biventricular, in situ    HILLARY on CPAP    Pulmonary hypertension due to left ventricular systolic dysfunction    Ischemic cardiomyopathy    Stage 2 chronic kidney disease    Syncope    Debility    LVAD (left ventricular assist device) present    Chronic anticoagulation    Ventricular tachycardia    Abdominal pain    Complication involving left ventricular assist device (LVAD)    Delirium    Involuntary movements    Restless leg syndrome    Impaired functional mobility and endurance    TANESHA (acute kidney injury)    Gait instability    Subtherapeutic international normalized ratio (INR)    Skin yeast infection    Essential hypertension     Patient is homebound due to:  IV antibiotics    Diet: Low Fat, Low cholesterol, 2Gm Na, Coumadin restrictions.    Nursing:   SN to complete comprehensive assessment including routine vital signs. Instruct on disease process and s/s of complications to report to MD. Review/verify medication list sent home with the patient at time of discharge  and instruct patient/caregiver as needed. Frequency may be adjusted depending on start of care date.    **LVAD driveline exit site dressing change is to be completed per LVAD patient/caregiver only**.    Notify MD if SBP > 160 or < 90; DBP > 90 or < 50; HR > 120 or < 50; Temp > 101; Weight gain >3lbs in 1 day or 5lbs in 1 week.  Other:       LABS:   SN to perform labs: PT/INR per Coumadin clinic [phone].   Follow up INR date: Tuesday 9/4/17  No Finger Sticks    HOME INFUSION THERAPY:   SN to perform Infusion Therapy/Central Line Care.  Review Central Line Care & Central Line Flush with patient.    Administer (drug and dose): cefepime 2g IV q 8 hours, end date 9/23/17    Central line care:  Scrub the Hub: Prior to accessing the line, always perform a 30 second alcohol scrub  Each lumen of the central line is to be flushed at least daily with 10 mL Normal Saline and 3 mL Heparin flush (100 units/mL)  Skilled Nurse (SN) may draw blood from IV access  Blood Draw Procedure:   - Aspirate at least 5 mL of blood   - Discard   - Obtain specimen   - Change posiflow cap   - Flush with 20 mL Normal Saline followed by a                 3-5 mL Heparin flush (100 units/mL)  Central :   - Sterile dressing changes are done weekly and as needed.   - Use chlor-hexadine scrub to cleanse site, apply Biopatch to insertion site,       apply securement device dressing   - Posi-flow caps are changed weekly and after EVERY lab draw.   - If sterile gauze is under dressing to control oozing,                 dressing change must be performed every 24 hours until gauze is not needed.    CONSULTS:    Physical Therapy to evaluate and treat. Evaluate for home safety and equipment needs; Establish/upgrade home exercise program. Perform / instruct on therapeutic exercises, gait training, transfer training, and Range of Motion.    Occupational Therapy to evaluate and treat. Evaluate home environment for safety and equipment needs. Perform/Instruct on transfers, ADL training, ROM, and therapeutic exercises.    Send initial Home Health orders to [facility] attending physician on call.  Send follow up questions to VAD clinic MD [phone] or [phone].

## 2017-09-02 VITALS
BODY MASS INDEX: 23.59 KG/M2 | HEIGHT: 72 IN | TEMPERATURE: 99 F | HEART RATE: 96 BPM | OXYGEN SATURATION: 94 % | WEIGHT: 174.19 LBS | RESPIRATION RATE: 18 BRPM | SYSTOLIC BLOOD PRESSURE: 82 MMHG

## 2017-09-02 LAB
ANION GAP SERPL CALC-SCNC: 6 MMOL/L
APTT BLDCRRT: 28.9 SEC
BASOPHILS # BLD AUTO: 0.02 K/UL
BASOPHILS NFR BLD: 0.3 %
BUN SERPL-MCNC: 25 MG/DL
CALCIUM SERPL-MCNC: 11.1 MG/DL
CHLORIDE SERPL-SCNC: 103 MMOL/L
CO2 SERPL-SCNC: 26 MMOL/L
CREAT SERPL-MCNC: 1.7 MG/DL
DIFFERENTIAL METHOD: ABNORMAL
EOSINOPHIL # BLD AUTO: 0.2 K/UL
EOSINOPHIL NFR BLD: 2.3 %
ERYTHROCYTE [DISTWIDTH] IN BLOOD BY AUTOMATED COUNT: 16.1 %
EST. GFR  (AFRICAN AMERICAN): 44.9 ML/MIN/1.73 M^2
EST. GFR  (NON AFRICAN AMERICAN): 38.9 ML/MIN/1.73 M^2
GLUCOSE SERPL-MCNC: 90 MG/DL
HCT VFR BLD AUTO: 32.1 %
HGB BLD-MCNC: 10.9 G/DL
INR PPP: 2.9
LDH SERPL L TO P-CCNC: 171 U/L
LYMPHOCYTES # BLD AUTO: 0.8 K/UL
LYMPHOCYTES NFR BLD: 11.9 %
MAGNESIUM SERPL-MCNC: 2.5 MG/DL
MCH RBC QN AUTO: 30.9 PG
MCHC RBC AUTO-ENTMCNC: 34 G/DL
MCV RBC AUTO: 91 FL
MONOCYTES # BLD AUTO: 0.9 K/UL
MONOCYTES NFR BLD: 12.6 %
NEUTROPHILS # BLD AUTO: 5 K/UL
NEUTROPHILS NFR BLD: 72.5 %
PHOSPHATE SERPL-MCNC: 3.6 MG/DL
PLATELET # BLD AUTO: 151 K/UL
PMV BLD AUTO: 10.4 FL
POTASSIUM SERPL-SCNC: 4.4 MMOL/L
PROTHROMBIN TIME: 29.2 SEC
RBC # BLD AUTO: 3.53 M/UL
SODIUM SERPL-SCNC: 135 MMOL/L
WBC # BLD AUTO: 6.9 K/UL

## 2017-09-02 PROCEDURE — 83615 LACTATE (LD) (LDH) ENZYME: CPT

## 2017-09-02 PROCEDURE — 85025 COMPLETE CBC W/AUTO DIFF WBC: CPT

## 2017-09-02 PROCEDURE — 80048 BASIC METABOLIC PNL TOTAL CA: CPT

## 2017-09-02 PROCEDURE — 27000248 HC VAD-ADDITIONAL DAY

## 2017-09-02 PROCEDURE — 85730 THROMBOPLASTIN TIME PARTIAL: CPT

## 2017-09-02 PROCEDURE — 85610 PROTHROMBIN TIME: CPT

## 2017-09-02 PROCEDURE — 63600175 PHARM REV CODE 636 W HCPCS: Performed by: INTERNAL MEDICINE

## 2017-09-02 PROCEDURE — 84100 ASSAY OF PHOSPHORUS: CPT

## 2017-09-02 PROCEDURE — 63600175 PHARM REV CODE 636 W HCPCS: Performed by: PHYSICIAN ASSISTANT

## 2017-09-02 PROCEDURE — 25000003 PHARM REV CODE 250: Performed by: INTERNAL MEDICINE

## 2017-09-02 PROCEDURE — 83735 ASSAY OF MAGNESIUM: CPT

## 2017-09-02 PROCEDURE — 99238 HOSP IP/OBS DSCHRG MGMT 30/<: CPT | Mod: ,,, | Performed by: INTERNAL MEDICINE

## 2017-09-02 RX ORDER — WARFARIN 4 MG/1
4 TABLET ORAL DAILY
Qty: 30 TABLET | Refills: 11 | Status: SHIPPED | OUTPATIENT
Start: 2017-09-02 | End: 2017-09-02 | Stop reason: HOSPADM

## 2017-09-02 RX ORDER — DOCUSATE SODIUM 100 MG/1
100 CAPSULE, LIQUID FILLED ORAL 2 TIMES DAILY
Refills: 0 | Status: ON HOLD | COMMUNITY
Start: 2017-09-02 | End: 2017-12-04 | Stop reason: HOSPADM

## 2017-09-02 RX ORDER — ONDANSETRON 2 MG/ML
4 INJECTION INTRAMUSCULAR; INTRAVENOUS ONCE
Status: COMPLETED | OUTPATIENT
Start: 2017-09-02 | End: 2017-09-02

## 2017-09-02 RX ADMIN — DOCUSATE SODIUM 100 MG: 100 CAPSULE, LIQUID FILLED ORAL at 08:09

## 2017-09-02 RX ADMIN — ASPIRIN 81 MG CHEWABLE TABLET 81 MG: 81 TABLET CHEWABLE at 09:09

## 2017-09-02 RX ADMIN — MAGNESIUM OXIDE TAB 400 MG (241.3 MG ELEMENTAL MG) 400 MG: 400 (241.3 MG) TAB at 08:09

## 2017-09-02 RX ADMIN — FOLIC ACID 1 MG: 1 TABLET ORAL at 08:09

## 2017-09-02 RX ADMIN — AMLODIPINE BESYLATE 10 MG: 10 TABLET ORAL at 08:09

## 2017-09-02 RX ADMIN — LISINOPRIL 10 MG: 10 TABLET ORAL at 08:09

## 2017-09-02 RX ADMIN — BUPROPION HYDROCHLORIDE 300 MG: 150 TABLET, FILM COATED, EXTENDED RELEASE ORAL at 08:09

## 2017-09-02 RX ADMIN — ATORVASTATIN CALCIUM 10 MG: 10 TABLET, FILM COATED ORAL at 08:09

## 2017-09-02 RX ADMIN — HYDRALAZINE HYDROCHLORIDE 75 MG: 50 TABLET ORAL at 05:09

## 2017-09-02 RX ADMIN — ROPINIROLE 0.5 MG: 0.5 TABLET, FILM COATED ORAL at 01:09

## 2017-09-02 RX ADMIN — ONDANSETRON 4 MG: 2 INJECTION INTRAMUSCULAR; INTRAVENOUS at 10:09

## 2017-09-02 RX ADMIN — HYDRALAZINE HYDROCHLORIDE 75 MG: 50 TABLET ORAL at 01:09

## 2017-09-02 RX ADMIN — ROPINIROLE 0.5 MG: 0.5 TABLET, FILM COATED ORAL at 05:09

## 2017-09-02 RX ADMIN — OYSTER SHELL CALCIUM WITH VITAMIN D 1 TABLET: 500; 200 TABLET, FILM COATED ORAL at 08:09

## 2017-09-02 RX ADMIN — SPIRONOLACTONE 25 MG: 25 TABLET, FILM COATED ORAL at 08:09

## 2017-09-02 RX ADMIN — MICONAZOLE NITRATE: 20 OINTMENT TOPICAL at 08:09

## 2017-09-02 RX ADMIN — CEFEPIME HYDROCHLORIDE 2 G: 2 INJECTION, SOLUTION INTRAVENOUS at 01:09

## 2017-09-02 RX ADMIN — ALLOPURINOL 300 MG: 300 TABLET ORAL at 08:09

## 2017-09-02 RX ADMIN — CEFEPIME HYDROCHLORIDE 2 G: 2 INJECTION, SOLUTION INTRAVENOUS at 05:09

## 2017-09-02 RX ADMIN — TAMSULOSIN HYDROCHLORIDE 0.4 MG: 0.4 CAPSULE ORAL at 08:09

## 2017-09-02 RX ADMIN — DOFETILIDE 250 MCG: 0.25 CAPSULE ORAL at 08:09

## 2017-09-02 NOTE — ASSESSMENT & PLAN NOTE
-Currently OK to be slightly on the higher side; in later 80s, due to the fact that patient gets orthostasis.

## 2017-09-02 NOTE — PROGRESS NOTES
[facility]  Cardiology  Progress Note    Patient Name: Kev Vasquez  MRN: [id number]  Admission Date: 8/29/2017  Hospital Length of Stay: 4 days  Code Status: Full Code   Attending Physician: Miguel A Marcelo Jr.,*   Primary Care Physician: Mega Collins MD  Expected Discharge Date: 9/2/2017  Principal Problem:Subtherapeutic international normalized ratio (INR)    Subjective:     Hospital Course:   - Overnight developed right sided black eye with intact vision and extraocular movements.   - Denied any complaints.     Interval History: Pt remained stable overnight. This AM his INR is 2.9 after received 4 mg of warfarin yesterday. Two days before yeterday he received 8 mg each.   Later in the morning when he ate his calcium pill, he vomited once, but he never had this problem before during this hospitalization and felt good afterward. The vomitus did not contain blood or bile and was food particles from the breakfast.   No fever, no abdominal pain, had a bowel movement yesterday.     Review of Systems   Constitution: Negative.   HENT: Negative.    Eyes: Negative.    Cardiovascular: Negative.    Respiratory: Negative.    Endocrine: Negative.    Musculoskeletal: Negative.    Gastrointestinal: Positive for vomiting. Negative for bloating, abdominal pain, anorexia, change in bowel habit, bowel incontinence, constipation, diarrhea, dysphagia, excessive appetite, flatus, heartburn, hematemesis, hematochezia, hemorrhoids, jaundice, melena and nausea.   Genitourinary: Negative.    Neurological: Negative.    Psychiatric/Behavioral: Negative.    Allergic/Immunologic: Negative.      Objective:     Vital Signs (Most Recent):  Temp: 98.5 °F (36.9 °C) (09/02/17 0800)  Pulse: 85 (09/02/17 0800)  Resp: 18 (09/02/17 0800)  BP: (!) 84/0 (09/02/17 0800)  SpO2: 96 % (09/02/17 0800) Vital Signs (24h Range):  Temp:  [97.6 °F (36.4 °C)-98.8 °F (37.1 °C)] 98.5 °F (36.9 °C)  Pulse:  [] 85  Resp:  [16-18]  18  SpO2:  [94 %-99 %] 96 %  BP: (80-92)/(0-68) 84/0     Weight: 79 kg (174 lb 2.6 oz)  Body mass index is 23.62 kg/m².     SpO2: 96 %  O2 Device (Oxygen Therapy): room air      Intake/Output Summary (Last 24 hours) at 09/02/17 1106  Last data filed at 09/02/17 0600   Gross per 24 hour   Intake              610 ml   Output                0 ml   Net              610 ml       Lines/Drains/Airways     Peripherally Inserted Central Catheter Line                 PICC Double Lumen 08/07/17 1653 right basilic 25 days          Drain            Male External Urinary Catheter 08/03/17 1539 Medium 29 days          Line                 VAD Left ventricular assist device HeartMate 3 -- days                Physical Exam   Constitutional: He is oriented to person, place, and time. He appears well-developed and well-nourished. No distress.   HENT:   Head: Normocephalic and atraumatic.   Nose: Nose normal.   Mouth/Throat: Oropharynx is clear and moist. No oropharyngeal exudate.   Eyes: Conjunctivae and EOM are normal. Pupils are equal, round, and reactive to light. Right eye exhibits no discharge. Left eye exhibits no discharge.   Neck: Normal range of motion. Neck supple. No JVD present. No tracheal deviation present. No thyromegaly present.   Cardiovascular:   LVAD Hum positive.    Pulmonary/Chest: Effort normal and breath sounds normal. No stridor.   Abdominal: Soft. Bowel sounds are normal.   Musculoskeletal: Normal range of motion. He exhibits no edema, tenderness or deformity.   Neurological: He is alert and oriented to person, place, and time. He has normal reflexes. He displays normal reflexes. No cranial nerve deficit. He exhibits normal muscle tone. Coordination normal.   Skin: Skin is warm and dry. He is not diaphoretic.   Psychiatric: He has a normal mood and affect.   Nursing note and vitals reviewed.      Significant Labs:   CMP   Recent Labs  Lab 09/01/17  0449 09/02/17  0600   * 135*   K 4.3 4.4    103    CO2 27 26   GLU 86 90   BUN 23 25*   CREATININE 1.3 1.7*   CALCIUM 10.2 11.1*   PROT 6.2  --    ALBUMIN 2.7*  --    BILITOT 0.4  --    ALKPHOS 71  --    AST 18  --    ALT 15  --    ANIONGAP 5* 6*   ESTGFRAFRICA >60.0 44.9*   EGFRNONAA 53.8* 38.9*   , CBC   Recent Labs  Lab 09/01/17  0449 09/02/17  0600   WBC 7.38 6.90   HGB 10.4* 10.9*   HCT 30.1* 32.1*   * 151    and INR   Recent Labs  Lab 09/01/17  0449 09/02/17  0600   INR 2.3* 2.9*       Significant Imaging: X-Ray: CXR: X-Ray Chest 1 View (CXR):   Results for orders placed or performed during the hospital encounter of 08/29/17   X-Ray Chest 1 View    Narrative    Chest AP portable    Indication:VAD.    Comparison:August 7, 2017.    Findings:     AICD and LVAD appear unchanged. RIGHT PICC line tip remains overlying the SVC. Postop changes, including sternotomy wires and mediastinal clips, appear similar to prior.    Heart and lungs unchanged when allowing for differences in technique and positioning.    Impression    No acute findings in the chest. No significant change from prior.        Electronically signed by: GEORGIANA MUNGUIA MD  Date:     08/29/17  Time:    22:43      Assessment and Plan:     Brief HPI: 76 YOCM with PMH of ICMP s/p HM3, now admitted for subtherapeutic INR and continuation of DL exit site infection.     * Subtherapeutic international normalized ratio (INR)    - INR at 2.9 today  - Warfarin dose was decreased to 4 mg yesterday from 8 mg that he was on for two days before yesterday.   - Pt educated about dosing of warfarin at home.   -        Essential hypertension    -Currently OK to be slightly on the higher side; in later 80s, due to the fact that patient gets orthostasis.         Restless leg syndrome    - Continue Primapexole.         Complication involving left ventricular assist device (LVAD)     - DLES infection  - Continued on long term IV antibiotic with Cefepime at 2 g Q12 H at home and Q 8 hrs here in hospital through  09/23/2017; TO BE continued at home with home health agency as advised.           LVAD (left ventricular assist device) present     - S/P LVAD Heartmate III Implanted 10/16/2016  - Speed 5800  - Continue Coumadin, Goal INR 2.0-3.0. INR 1.5 today.                        - home dose of coumadin 6 MF, 4 all other days                         - Will be continued on 4 today and from tomorrow to start his home dose.     - Antiplatelets ASA 81 (VAD from outside hospital, no hx of GI bleeds)  - MAP goal 60-80's (ok to be a bit higher given hx of orthostasis).             Stage 2 chronic kidney disease    - Cr 1.4 around baseline of 1.3  - continue to monitor               VTE Risk Mitigation         Ordered     warfarin (COUMADIN) tablet 4 mg  Daily     Route:  Oral        09/01/17 9222          Yanet Smith MD  Cardiology  [facility]

## 2017-09-02 NOTE — ASSESSMENT & PLAN NOTE
- S/P LVAD Heartmate III Implanted 10/16/2016  - Speed 5800  - Continue Coumadin, Goal INR 2.0-3.0. INR 1.5 today.                        - home dose of coumadin 6 MF, 4 all other days                        - Will be continued on 4 today and from tomorrow to start his home dose.     - Antiplatelets ASA 81 (VAD from outside hospital, no hx of GI bleeds)  - MAP goal 60-80's (ok to be a bit higher given hx of orthostasis).

## 2017-09-02 NOTE — DISCHARGE SUMMARY
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  Unclear why INR is so low-pt denies dietary change (increased green leafy vegetables, etc..).  Noted in home meds to be on a "multivitamin", but unsure if this contains vitamin K.  Pt was recent admitted here and discharged to rehab at [facility] after being treated for worsening abdominal pain related to driveline infection site.       It was noted that patient is on 81 mg daily ASA and not the usual dose of 325 mg daily.  Looked through clinic notes and was not able to find a hx of GI so unclear why he is on reduced dose. Pt reports never having had a GIB.      * No surgery found *     Indwelling Lines/Drains at time of discharge:  Lines/Drains/Airways     Peripherally Inserted Central Catheter Line                 PICC Double Lumen 08/07/17 1653 right basilic 25 days          Drain            Male External Urinary Catheter 08/03/17 1539 Medium 29 days          Line                 VAD Left ventricular assist device HeartMate 3 -- days                Hospital Course (synopsis of major diagnoses, care, treatment, and services provided during the course of the hospital stay): Pt remained in hospital mainly for bridging of Coumadin with " Hpearin to target INR, which was achieved after giving two doses of Warfarin 8 mg (His home dose is 6 mg) and one dose of 4 mg after INR went up to 2.2 from 1.6 with the second dose of 8 mg. His INR today is 2.9.     We asked the patient to take 4 mg today and from tomorrow on to restart his home dose and check his INR on Monday.     To be followed up in clinic in one week.     Consults:   Consults         Status Ordering Provider     Inpatient consult to Dietary  Once     Provider:  (Not yet assigned)    Completed SHANDRA HINES     Inpatient consult to Infectious Diseases  Once     Provider:  (Not yet assigned)    Completed SHANDRA HINES     Inpatient consult to Ophthalmology  Once     Provider:  (Not yet assigned)    Completed SHANDRA HINES          Significant Diagnostic Studies: Labs:   CMP   Recent Labs  Lab 09/01/17 0449 09/02/17  0600   * 135*   K 4.3 4.4    103   CO2 27 26   GLU 86 90   BUN 23 25*   CREATININE 1.3 1.7*   CALCIUM 10.2 11.1*   PROT 6.2  --    ALBUMIN 2.7*  --    BILITOT 0.4  --    ALKPHOS 71  --    AST 18  --    ALT 15  --    ANIONGAP 5* 6*   ESTGFRAFRICA >60.0 44.9*   EGFRNONAA 53.8* 38.9*   , CBC   Recent Labs  Lab 09/01/17 0449 09/02/17  0600   WBC 7.38 6.90   HGB 10.4* 10.9*   HCT 30.1* 32.1*   * 151    and INR   Lab Results   Component Value Date    INR 2.9 (H) 09/02/2017    INR 2.3 (H) 09/01/2017    INR 1.6 (H) 08/31/2017       Pending Diagnostic Studies:     None          Final Active Diagnoses:    Diagnosis Date Noted POA    PRINCIPAL PROBLEM:  Subtherapeutic international normalized ratio (INR) [R79.1] 08/31/2017 Yes    Skin yeast infection [B37.2] 08/31/2017 Yes    Essential hypertension [I10]  Unknown    Restless leg syndrome [G25.81] 07/30/2017 Yes    Complication involving left ventricular assist device (LVAD) [T82.9XXA] 07/29/2017 Yes    LVAD (left ventricular assist device) present [Z95.811] 07/20/2017 Not Applicable    Stage 2  chronic kidney disease [N18.2] 07/20/2017 Yes     Chronic      Problems Resolved During this Admission:    Diagnosis Date Noted Date Resolved POA       Discharged Condition: stable    Follow Up:  Follow-up Information     Follow up In 1 week.           [facility].    Specialty:  Emergency Medicine  Contact information:  [address]  [facility] [address] [phone]           Carlito Santana MD In 1 week.    Specialties:  Transplant, Cardiology  Contact information:  [address]  [facility] [address]  [phone]                 Patient Instructions:     Diet general     Activity as tolerated       Medications:  Reconciled Home Medications:   Current Discharge Medication List      START taking these medications    Details   docusate sodium (COLACE) 100 MG capsule Take 1 capsule (100 mg total) by mouth 2 (two) times daily.  Refills: 0      miconazole nitrate 2% (MICOTIN) 2 % Oint Apply topically 2 (two) times daily.  Refills: 0      !! warfarin (COUMADIN) 4 MG tablet Take 1 tablet (4 mg total) by mouth Daily.  Qty: 30 tablet, Refills: 11       !! - Potential duplicate medications found. Please discuss with provider.      CONTINUE these medications which have NOT CHANGED    Details   allopurinol (ZYLOPRIM) 300 MG tablet Take 300 mg by mouth once daily.      amlodipine (NORVASC) 10 MG tablet Take 0.5 tablets (5 mg total) by mouth once daily.  Qty: 30 tablet, Refills: 11      aspirin 81 MG Chew Take 81 mg by mouth once daily.      atorvastatin (LIPITOR) 10 MG tablet Take 10 mg by mouth once daily.      buPROPion (WELLBUTRIN XL) 300 MG 24 hr tablet Take 300 mg by mouth once daily.      calcium-vitamin D tablet 600 mg-200 units Take 1 tablet by mouth once daily.      ceFEPIme in dextrose 5% (MAXIPIME) 2 gram/50 mL PgBk Inject 50 mLs (2 g total) into the vein every 12 (twelve) hours.      folic acid (FOLVITE) 1 MG tablet Take 1 mg by mouth once daily.      lisinopril 10 MG  tablet Take 1 tablet (10 mg total) by mouth 2 (two) times daily.  Qty: 60 tablet, Refills: 11      magnesium oxide (MAG-OX) 400 mg tablet Take 1 tablet (400 mg total) by mouth 2 (two) times daily.  Qty: 60 tablet, Refills: 11      multivitamin capsule Take 1 capsule by mouth once daily.      ropinirole (REQUIP) 0.5 MG tablet Take 0.5 mg by mouth 3 (three) times daily.       spironolactone (ALDACTONE) 25 MG tablet Take 1 tablet (25 mg total) by mouth once daily.  Qty: 30 tablet, Refills: 3      tamsulosin (FLOMAX) 0.4 mg Cp24 Take 0.4 mg by mouth once daily.      TIKOSYN 250 mcg Cap Take 1 capsule (250 mcg total) by mouth every 12 (twelve) hours.  Qty: 60 capsule, Refills: 11    Associated Diagnoses: Ventricular tachycardia      !! warfarin (COUMADIN) 4 MG tablet 6 mg orally daily on Mon, Wed, Fri; and 4 mg orally daily on all other days      hydrALAZINE (APRESOLINE) 25 MG tablet Take 3 tablets (75 mg total) by mouth every 8 (eight) hours.  Qty: 90 tablet, Refills: 11       !! - Potential duplicate medications found. Please discuss with provider.          Time spent on the discharge of patient: 30 minutes    Yanet Smith MD  Cardiology  [facility]

## 2017-09-02 NOTE — SUBJECTIVE & OBJECTIVE
Interval History: Pt remained stable overnight. This AM his INR is 2.9 after received 4 mg of warfarin yesterday. Two days before yeterday he received 8 mg each.   Later in the morning when he ate his calcium pill, he vomited once, but he never had this problem before during this hospitalization and felt good afterward. The vomitus did not contain blood or bile and was food particles from the breakfast.   No fever, no abdominal pain, had a bowel movement yesterday.     Review of Systems   Constitution: Negative.   HENT: Negative.    Eyes: Negative.    Cardiovascular: Negative.    Respiratory: Negative.    Endocrine: Negative.    Musculoskeletal: Negative.    Gastrointestinal: Positive for vomiting. Negative for bloating, abdominal pain, anorexia, change in bowel habit, bowel incontinence, constipation, diarrhea, dysphagia, excessive appetite, flatus, heartburn, hematemesis, hematochezia, hemorrhoids, jaundice, melena and nausea.   Genitourinary: Negative.    Neurological: Negative.    Psychiatric/Behavioral: Negative.    Allergic/Immunologic: Negative.      Objective:     Vital Signs (Most Recent):  Temp: 98.5 °F (36.9 °C) (09/02/17 0800)  Pulse: 85 (09/02/17 0800)  Resp: 18 (09/02/17 0800)  BP: (!) 84/0 (09/02/17 0800)  SpO2: 96 % (09/02/17 0800) Vital Signs (24h Range):  Temp:  [97.6 °F (36.4 °C)-98.8 °F (37.1 °C)] 98.5 °F (36.9 °C)  Pulse:  [] 85  Resp:  [16-18] 18  SpO2:  [94 %-99 %] 96 %  BP: (80-92)/(0-68) 84/0     Weight: 79 kg (174 lb 2.6 oz)  Body mass index is 23.62 kg/m².     SpO2: 96 %  O2 Device (Oxygen Therapy): room air      Intake/Output Summary (Last 24 hours) at 09/02/17 1106  Last data filed at 09/02/17 0600   Gross per 24 hour   Intake              610 ml   Output                0 ml   Net              610 ml       Lines/Drains/Airways     Peripherally Inserted Central Catheter Line                 PICC Double Lumen 08/07/17 1653 right basilic 25 days          Drain            Male External  Urinary Catheter 08/03/17 1539 Medium 29 days          Line                 VAD Left ventricular assist device HeartMate 3 -- days                Physical Exam   Constitutional: He is oriented to person, place, and time. He appears well-developed and well-nourished. No distress.   HENT:   Head: Normocephalic and atraumatic.   Nose: Nose normal.   Mouth/Throat: Oropharynx is clear and moist. No oropharyngeal exudate.   Eyes: Conjunctivae and EOM are normal. Pupils are equal, round, and reactive to light. Right eye exhibits no discharge. Left eye exhibits no discharge.   Neck: Normal range of motion. Neck supple. No JVD present. No tracheal deviation present. No thyromegaly present.   Cardiovascular:   LVAD Hum positive.    Pulmonary/Chest: Effort normal and breath sounds normal. No stridor.   Abdominal: Soft. Bowel sounds are normal.   Musculoskeletal: Normal range of motion. He exhibits no edema, tenderness or deformity.   Neurological: He is alert and oriented to person, place, and time. He has normal reflexes. He displays normal reflexes. No cranial nerve deficit. He exhibits normal muscle tone. Coordination normal.   Skin: Skin is warm and dry. He is not diaphoretic.   Psychiatric: He has a normal mood and affect.   Nursing note and vitals reviewed.      Significant Labs:   CMP   Recent Labs  Lab 09/01/17 0449 09/02/17  0600   * 135*   K 4.3 4.4    103   CO2 27 26   GLU 86 90   BUN 23 25*   CREATININE 1.3 1.7*   CALCIUM 10.2 11.1*   PROT 6.2  --    ALBUMIN 2.7*  --    BILITOT 0.4  --    ALKPHOS 71  --    AST 18  --    ALT 15  --    ANIONGAP 5* 6*   ESTGFRAFRICA >60.0 44.9*   EGFRNONAA 53.8* 38.9*   , CBC   Recent Labs  Lab 09/01/17 0449 09/02/17  0600   WBC 7.38 6.90   HGB 10.4* 10.9*   HCT 30.1* 32.1*   * 151    and INR   Recent Labs  Lab 09/01/17 0449 09/02/17  0600   INR 2.3* 2.9*       Significant Imaging: X-Ray: CXR: X-Ray Chest 1 View (CXR):   Results for orders placed or performed  during the hospital encounter of 08/29/17   X-Ray Chest 1 View    Narrative    Chest AP portable    Indication:VAD.    Comparison:August 7, 2017.    Findings:     AICD and LVAD appear unchanged. RIGHT PICC line tip remains overlying the SVC. Postop changes, including sternotomy wires and mediastinal clips, appear similar to prior.    Heart and lungs unchanged when allowing for differences in technique and positioning.    Impression    No acute findings in the chest. No significant change from prior.        Electronically signed by: GEORGIANA MUNGUIA MD  Date:     08/29/17  Time:    22:43

## 2017-09-02 NOTE — ASSESSMENT & PLAN NOTE
- DLES infection  - Continued on long term IV antibiotic with Cefepime at 2 g Q12 H at home and Q 8 hrs here in hospital through 09/23/2017; TO BE continued at home with home health agency as advised.

## 2017-09-02 NOTE — NURSING
Pt D/C home per MD orders. Tele removed, PIC .  VSS, NAD, and no complaints at this time. Pt given and explained med list and prescriptions. Pt verbalizes complete understanding of all  D/C instructions and follow up care. Pt given printed AVS, signed and copy placed in Pt chart. Pt awaiting escort. Will continue to monitor.

## 2017-09-02 NOTE — ASSESSMENT & PLAN NOTE
- INR at 2.9 today  - Warfarin dose was decreased to 4 mg yesterday from 8 mg that he was on for two days before yesterday.   - Pt educated about dosing of warfarin at home.   -

## 2017-09-02 NOTE — PROGRESS NOTES
Pt vomited fair amount, formed food no blood. Notified Dr. Sarah PISANO ordered zofran 4 mg once IV. Order implemented will continue to monitor.

## 2017-09-02 NOTE — PLAN OF CARE
Problem: Patient Care Overview  Goal: Plan of Care Review  Outcome: Ongoing (interventions implemented as appropriate)  Pt remained stable throughout the night. No acute distress noted. Pt remained free from injury. VSS. Pt denies any chest pain or SOB. INR 2.3. JAYLAN PICC. Cefepime q8, DL infection. S&w dressing daily. D/c to home health.  Pt understands plan of care. Will continue to monitor.

## 2017-09-04 ENCOUNTER — TELEPHONE (OUTPATIENT)
Dept: ADMINISTRATIVE | Facility: CLINIC | Age: 77
End: 2017-09-04

## 2017-09-04 LAB — BACTERIA BLD CULT: NORMAL

## 2017-09-05 ENCOUNTER — LAB VISIT (OUTPATIENT)
Dept: LAB | Facility: HOSPITAL | Age: 77
End: 2017-09-05
Attending: INTERNAL MEDICINE
Payer: MEDICARE

## 2017-09-05 DIAGNOSIS — I13.10 MALIGNANT HYPERTENSIVE HEART AND RENAL DISEASE, WITH RENAL FAILURE: Primary | ICD-10-CM

## 2017-09-05 DIAGNOSIS — Z95.811 HEART REPLACED BY HEART ASSIST DEVICE: ICD-10-CM

## 2017-09-05 LAB
ALBUMIN SERPL BCP-MCNC: 3.7 G/DL
ALP SERPL-CCNC: 58 U/L
ALT SERPL W/O P-5'-P-CCNC: 25 U/L
ANION GAP SERPL CALC-SCNC: 11 MMOL/L
AST SERPL-CCNC: 61 U/L
BACTERIA SPEC ANAEROBE CULT: NORMAL
BASOPHILS # BLD AUTO: 0.03 K/UL
BASOPHILS NFR BLD: 0.4 %
BILIRUB SERPL-MCNC: 0.5 MG/DL
BUN SERPL-MCNC: 43 MG/DL
CALCIUM SERPL-MCNC: 10.3 MG/DL
CHLORIDE SERPL-SCNC: 106 MMOL/L
CO2 SERPL-SCNC: 23 MMOL/L
CREAT SERPL-MCNC: 1.88 MG/DL
CRP SERPL-MCNC: <0.5 MG/DL
DIFFERENTIAL METHOD: ABNORMAL
EOSINOPHIL # BLD AUTO: 0.1 K/UL
EOSINOPHIL NFR BLD: 1.8 %
ERYTHROCYTE [DISTWIDTH] IN BLOOD BY AUTOMATED COUNT: 16.7 %
ERYTHROCYTE [SEDIMENTATION RATE] IN BLOOD BY WESTERGREN METHOD: 13 MM/HR
EST. GFR  (AFRICAN AMERICAN): 39.8 ML/MIN/1.73 M^2
EST. GFR  (NON AFRICAN AMERICAN): 34.4 ML/MIN/1.73 M^2
GLUCOSE SERPL-MCNC: 132 MG/DL
HCT VFR BLD AUTO: 31.6 %
HGB BLD-MCNC: 10.1 G/DL
INR PPP: 3.6
LYMPHOCYTES # BLD AUTO: 0.5 K/UL
LYMPHOCYTES NFR BLD: 6.3 %
MCH RBC QN AUTO: 30.7 PG
MCHC RBC AUTO-ENTMCNC: 32 G/DL
MCV RBC AUTO: 96 FL
MONOCYTES # BLD AUTO: 0.9 K/UL
MONOCYTES NFR BLD: 10.9 %
NEUTROPHILS # BLD AUTO: 6.4 K/UL
NEUTROPHILS NFR BLD: 80.1 %
PLATELET # BLD AUTO: 127 K/UL
PMV BLD AUTO: 12.3 FL
POTASSIUM SERPL-SCNC: 4.6 MMOL/L
PROT SERPL-MCNC: 6.8 G/DL
PROTHROMBIN TIME: 38.6 SEC
RBC # BLD AUTO: 3.29 M/UL
SODIUM SERPL-SCNC: 140 MMOL/L
WBC # BLD AUTO: 7.97 K/UL

## 2017-09-05 PROCEDURE — 85652 RBC SED RATE AUTOMATED: CPT

## 2017-09-05 PROCEDURE — 86140 C-REACTIVE PROTEIN: CPT | Mod: PO

## 2017-09-05 PROCEDURE — 85025 COMPLETE CBC W/AUTO DIFF WBC: CPT | Mod: PO

## 2017-09-05 PROCEDURE — 36415 COLL VENOUS BLD VENIPUNCTURE: CPT | Mod: PO

## 2017-09-05 PROCEDURE — 80053 COMPREHEN METABOLIC PANEL: CPT | Mod: PO

## 2017-09-05 PROCEDURE — 85610 PROTHROMBIN TIME: CPT | Mod: PO

## 2017-09-05 NOTE — PROGRESS NOTES
Per daughter maura reports patient discharges from [facility] 9/2 .  Reports patient taking coumadin 4 mg 9/2 and 6 mg 9/3 and 9/4 .  Reports patient being prescribed eye drops could not specify medication name .  Maura stated she was told home Daily Secret is suppose to come out today 9/5.  No other medication or changes reported

## 2017-09-05 NOTE — PT/OT/SLP DISCHARGE
Physical Therapy Discharge Summary    Kev Vasquez  MRN: [id number]   Subtherapeutic international normalized ratio (INR)   Patient Discharged from acute Physical Therapy on 17.  Please refer to prior PT noted date on 17 for functional status.     Assessment:   Patient was discharge unexpectedly.  Information required to complete and accurate discharge summary is unknown.  Refer to therapy initial evaluation and last progress note for initial and most recent functional status and goal achievement.  Recommendations made may be found in medical record.  GOALS:    Physical Therapy Goals        Problem: Physical Therapy Goal    Goal Priority Disciplines Outcome Goal Variances Interventions   Physical Therapy Goal     PT/OT, PT Ongoing (interventions implemented as appropriate)     Description:  Goals to be met by: 17     Patient will increase functional independence with mobility by performin. Supine to sit with Modified [place]ke  2. Sit to stand transfer with Supervision  3. Bed to chair transfer with SBA using Rolling Walker as needed.  4. Gait  x 200 feet with SBA using Rolling Walker.   5. Lower extremity exercise program x15 reps, with assistance as needed, in order to increase LE strength and (I) with functional mobility.                     Reasons for Discontinuation of Therapy Services  Transfer to alternate level of care.      Plan:  Patient Discharged to: Home with Home Health Service.    Wendy Ackerman, PT, DPT   2017  [phone]

## 2017-09-05 NOTE — PT/OT/SLP DISCHARGE
Occupational Therapy Discharge Summary    Kev Vasquez  MRN: [id number]   Subtherapeutic international normalized ratio (INR)   Patient Discharged from acute Occupational Therapy on 9/2/17.  Please refer to prior OT note dated on 9/1/17 for functional status.     Assessment:   Patient appropriate for care in another setting.  GOALS:    Occupational Therapy Goals        Problem: Occupational Therapy Goal    Goal Priority Disciplines Outcome Interventions   Occupational Therapy Goal     OT, PT/OT Ongoing (interventions implemented as appropriate)    Description:  Goals to be met by: 7 days (9/6/17)     Patient will increase functional independence with ADLs by performing:    UE Dressing with Stand-by Assistance.  LE Dressing with Contact Guard Assistance.  Grooming while standing at sink with Contact Guard Assistance.  Toileting from toilet with Contact Guard Assistance for hygiene and clothing management.   Supine to sit with Supervision.  Toilet transfer to toilet with Contact Guard Assistance.  Pt will perform functional mobility household distance with CGA and AD as needed.                  Reasons for Discontinuation of Therapy Services  Transfer to alternate level of care.      Plan:  Patient Discharged to: Home with Home Health Service     SHAUNA Hanks  9/5/2017

## 2017-09-06 ENCOUNTER — TELEPHONE (OUTPATIENT)
Dept: INFECTIOUS DISEASES | Facility: CLINIC | Age: 77
End: 2017-09-06

## 2017-09-06 ENCOUNTER — ANTI-COAG VISIT (OUTPATIENT)
Dept: CARDIOLOGY | Facility: CLINIC | Age: 77
End: 2017-09-06

## 2017-09-06 DIAGNOSIS — R79.1 SUBTHERAPEUTIC INTERNATIONAL NORMALIZED RATIO (INR): ICD-10-CM

## 2017-09-06 DIAGNOSIS — Z79.01 CHRONIC ANTICOAGULATION: ICD-10-CM

## 2017-09-06 DIAGNOSIS — Z95.811 LVAD (LEFT VENTRICULAR ASSIST DEVICE) PRESENT: ICD-10-CM

## 2017-09-06 NOTE — TELEPHONE ENCOUNTER
Pt is currently on cefepime 2g Q 8 hrs. His clearance shows 37.8. Pts creatine 1.88. Bill with [facility] recommends 2g every 12 hrs. Please advise. AYESHA

## 2017-09-07 ENCOUNTER — LAB VISIT (OUTPATIENT)
Dept: LAB | Facility: HOSPITAL | Age: 77
End: 2017-09-07
Attending: INTERNAL MEDICINE
Payer: MEDICARE

## 2017-09-07 ENCOUNTER — OFFICE VISIT (OUTPATIENT)
Dept: TRANSPLANT | Facility: CLINIC | Age: 77
End: 2017-09-07
Payer: MEDICARE

## 2017-09-07 ENCOUNTER — PATIENT OUTREACH (OUTPATIENT)
Dept: ADMINISTRATIVE | Facility: HOSPITAL | Age: 77
End: 2017-09-07

## 2017-09-07 ENCOUNTER — TELEPHONE (OUTPATIENT)
Dept: TRANSPLANT | Facility: CLINIC | Age: 77
End: 2017-09-07

## 2017-09-07 ENCOUNTER — CLINICAL SUPPORT (OUTPATIENT)
Dept: TRANSPLANT | Facility: CLINIC | Age: 77
End: 2017-09-07
Payer: MEDICARE

## 2017-09-07 VITALS — WEIGHT: 175 LBS | TEMPERATURE: 98 F | HEIGHT: 72 IN | SYSTOLIC BLOOD PRESSURE: 71 MMHG | BODY MASS INDEX: 23.7 KG/M2

## 2017-09-07 DIAGNOSIS — Z79.01 CHRONIC ANTICOAGULATION: ICD-10-CM

## 2017-09-07 DIAGNOSIS — G47.33 OSA ON CPAP: ICD-10-CM

## 2017-09-07 DIAGNOSIS — I25.5 ISCHEMIC CARDIOMYOPATHY: ICD-10-CM

## 2017-09-07 DIAGNOSIS — Z95.1 S/P CABG (CORONARY ARTERY BYPASS GRAFT): ICD-10-CM

## 2017-09-07 DIAGNOSIS — I50.22 CHRONIC SYSTOLIC CONGESTIVE HEART FAILURE: Primary | ICD-10-CM

## 2017-09-07 DIAGNOSIS — I25.10 CORONARY ARTERY DISEASE INVOLVING NATIVE CORONARY ARTERY OF NATIVE HEART WITHOUT ANGINA PECTORIS: ICD-10-CM

## 2017-09-07 DIAGNOSIS — Z95.811 LVAD (LEFT VENTRICULAR ASSIST DEVICE) PRESENT: ICD-10-CM

## 2017-09-07 DIAGNOSIS — I27.22 PULMONARY HYPERTENSION DUE TO LEFT VENTRICULAR SYSTOLIC DYSFUNCTION: ICD-10-CM

## 2017-09-07 DIAGNOSIS — Z95.811 HEART REPLACED BY HEART ASSIST DEVICE: ICD-10-CM

## 2017-09-07 DIAGNOSIS — I10 ESSENTIAL HYPERTENSION: ICD-10-CM

## 2017-09-07 PROBLEM — R79.1 SUBTHERAPEUTIC INTERNATIONAL NORMALIZED RATIO (INR): Status: RESOLVED | Noted: 2017-08-31 | Resolved: 2017-09-07

## 2017-09-07 PROBLEM — R41.0 DELIRIUM: Status: RESOLVED | Noted: 2017-07-29 | Resolved: 2017-09-07

## 2017-09-07 LAB
ALBUMIN SERPL BCP-MCNC: 2.9 G/DL
ALP SERPL-CCNC: 70 U/L
ALT SERPL W/O P-5'-P-CCNC: 19 U/L
ANION GAP SERPL CALC-SCNC: 9 MMOL/L
AST SERPL-CCNC: 20 U/L
BASOPHILS # BLD AUTO: 0.04 K/UL
BASOPHILS NFR BLD: 0.6 %
BILIRUB DIRECT SERPL-MCNC: 0.2 MG/DL
BILIRUB SERPL-MCNC: 0.5 MG/DL
BNP SERPL-MCNC: 207 PG/ML
BUN SERPL-MCNC: 39 MG/DL
CALCIUM SERPL-MCNC: 10.3 MG/DL
CHLORIDE SERPL-SCNC: 106 MMOL/L
CO2 SERPL-SCNC: 23 MMOL/L
CREAT SERPL-MCNC: 2.2 MG/DL
CRP SERPL-MCNC: 0.4 MG/L
DIFFERENTIAL METHOD: ABNORMAL
EOSINOPHIL # BLD AUTO: 0.1 K/UL
EOSINOPHIL NFR BLD: 2.2 %
ERYTHROCYTE [DISTWIDTH] IN BLOOD BY AUTOMATED COUNT: 16.6 %
EST. GFR  (AFRICAN AMERICAN): 32.9 ML/MIN/1.73 M^2
EST. GFR  (NON AFRICAN AMERICAN): 28.5 ML/MIN/1.73 M^2
EXT ALT: 25
EXT AST: 61
EXT BUN: 43
EXT CALCIUM: 10.3
EXT CHLORIDE: 106
EXT CREATININE: 1.88 MG/DL
EXT GLUCOSE: 132
EXT HEMATOCRIT: 31.6
EXT HEMOGLOBIN: 10.1
EXT PLATELETS: 127
EXT POTASSIUM: 4.6
EXT SODIUM: 140 MMOL/L
EXT WBC: 7.97
GLUCOSE SERPL-MCNC: 130 MG/DL
HCT VFR BLD AUTO: 33.4 %
HGB BLD-MCNC: 11 G/DL
INR PPP: 2.8
LDH SERPL L TO P-CCNC: 172 U/L
LYMPHOCYTES # BLD AUTO: 0.9 K/UL
LYMPHOCYTES NFR BLD: 14.5 %
MAGNESIUM SERPL-MCNC: 2.5 MG/DL
MCH RBC QN AUTO: 30.8 PG
MCHC RBC AUTO-ENTMCNC: 32.9 G/DL
MCV RBC AUTO: 94 FL
MONOCYTES # BLD AUTO: 0.7 K/UL
MONOCYTES NFR BLD: 10.2 %
NEUTROPHILS # BLD AUTO: 4.6 K/UL
NEUTROPHILS NFR BLD: 71.6 %
PHOSPHATE SERPL-MCNC: 3.4 MG/DL
PLATELET # BLD AUTO: 146 K/UL
PMV BLD AUTO: 10.8 FL
POTASSIUM SERPL-SCNC: 4.7 MMOL/L
PREALB SERPL-MCNC: 33 MG/DL
PROT SERPL-MCNC: 7.2 G/DL
PROTHROMBIN TIME: 28 SEC
RBC # BLD AUTO: 3.57 M/UL
SODIUM SERPL-SCNC: 138 MMOL/L
WBC # BLD AUTO: 6.48 K/UL

## 2017-09-07 PROCEDURE — 83735 ASSAY OF MAGNESIUM: CPT

## 2017-09-07 PROCEDURE — 99214 OFFICE O/P EST MOD 30 MIN: CPT | Mod: S$PBB,,, | Performed by: INTERNAL MEDICINE

## 2017-09-07 PROCEDURE — 3078F DIAST BP <80 MM HG: CPT | Mod: ,,, | Performed by: INTERNAL MEDICINE

## 2017-09-07 PROCEDURE — 82248 BILIRUBIN DIRECT: CPT

## 2017-09-07 PROCEDURE — 83880 ASSAY OF NATRIURETIC PEPTIDE: CPT

## 2017-09-07 PROCEDURE — 36415 COLL VENOUS BLD VENIPUNCTURE: CPT

## 2017-09-07 PROCEDURE — 80053 COMPREHEN METABOLIC PANEL: CPT

## 2017-09-07 PROCEDURE — 1159F MED LIST DOCD IN RCRD: CPT | Mod: ,,, | Performed by: INTERNAL MEDICINE

## 2017-09-07 PROCEDURE — 99999 PR PBB SHADOW E&M-EST. PATIENT-LVL III: CPT | Mod: PBBFAC,,, | Performed by: INTERNAL MEDICINE

## 2017-09-07 PROCEDURE — 84100 ASSAY OF PHOSPHORUS: CPT

## 2017-09-07 PROCEDURE — 3074F SYST BP LT 130 MM HG: CPT | Mod: ,,, | Performed by: INTERNAL MEDICINE

## 2017-09-07 PROCEDURE — 86140 C-REACTIVE PROTEIN: CPT

## 2017-09-07 PROCEDURE — 84134 ASSAY OF PREALBUMIN: CPT

## 2017-09-07 PROCEDURE — 85610 PROTHROMBIN TIME: CPT

## 2017-09-07 PROCEDURE — 85025 COMPLETE CBC W/AUTO DIFF WBC: CPT

## 2017-09-07 PROCEDURE — 83615 LACTATE (LD) (LDH) ENZYME: CPT

## 2017-09-07 PROCEDURE — 93750 INTERROGATION VAD IN PERSON: CPT | Mod: PBBFAC | Performed by: INTERNAL MEDICINE

## 2017-09-07 PROCEDURE — 99213 OFFICE O/P EST LOW 20 MIN: CPT | Mod: PBBFAC,25 | Performed by: INTERNAL MEDICINE

## 2017-09-07 PROCEDURE — 1126F AMNT PAIN NOTED NONE PRSNT: CPT | Mod: ,,, | Performed by: INTERNAL MEDICINE

## 2017-09-07 RX ORDER — ONDANSETRON 4 MG/1
4 TABLET, FILM COATED ORAL EVERY 6 HOURS PRN
Qty: 30 TABLET | Refills: 11 | Status: SHIPPED | OUTPATIENT
Start: 2017-09-07 | End: 2017-09-12

## 2017-09-07 RX ORDER — WARFARIN 6 MG/1
TABLET ORAL
Qty: 30 TABLET | Refills: 11 | Status: ON HOLD | OUTPATIENT
Start: 2017-09-07 | End: 2017-11-20

## 2017-09-07 NOTE — Clinical Note
O-  DL looks aweful and I think abx are making him sick- he comes to see you tues- ? Wound care should see him  too?  S

## 2017-09-07 NOTE — TELEPHONE ENCOUNTER
Patient's daughter called regarding patient needing a new back up controller. Asked for clarification. Daughter explains that the nurse aid attempted to change controller when she saw a yellow jennifer on the controller. Explained that this alarm is for low battery not controller change. They said when they changed the controller, they new controller alarmed with a red heart therefore they immediatly went back to his original controller. Self test passed and able to charge backup controller. Explained that they are to always page the VAD coordinator prior to change the controller. Verbalized understanding.

## 2017-09-07 NOTE — PROCEDURES
TXP SANTOSH INTERROGATIONS 9/7/2017 9/2/2017 9/2/2017 9/1/2017 8/31/2017 8/30/2017 8/30/2017   Type HeartMate3 - - - - - -   Flow 5.3 - - - - - -   Speed 5800 - - - - - -   PI 2.6 - - - - - -   Power (Mendez) 4.5 - - - - - -   LSL 5400 - - - - - -   Pulsatility Intermittent pulse Intermittent pulse Intermittent pulse Intermittent pulse Intermittent pulse Intermittent pulse Intermittent pulse   }

## 2017-09-07 NOTE — PROGRESS NOTES
Subjective:   Mr. Vasquez is a 76 y.o. year old White male presents to Advanced Heart Failure and LVAD clinic for follow up.     Chief Complaint: Fatigue    HPI   Mr. Vasquez is a pleasant 76 y.o. WM with ischemic cardiomyopathy status post Heartmate III 10/16/16 LVAD, CKD II, CAD, VT on Tikosyn, HTN, HLD, Gout, Obesity, SSS, and depression who presents for routine VAD f/u  followup.  Mr Vasquez was a former resident of the [facility] area who had moved to [place] many years ago where he lived with his wife. He had a Heartmate III implanted 10/16/16 and had a subsequently prolonged clinical course. He had prolonged intubation lasting about 3 weeks. From this hospitalization he had a prolonged course in a skilled nursing facility/rehab center where he proceeded to lose around 50lbs. Earlier this month (July 5,2017), he returned home to [place] with his wife to live with their older daughter. He remains debilitated, unable to walk, and is essentially maximally assisted with transfers. He now has 24 hour care at home (caregiver is present with wife and daughter in clinic) as well as he is getting PT twice weekly at home and OT once weekly. They are very pleased so far (only 2 visits thus far) with physical therapy. He sleeps upstairs in a bedroom with his wife. He has issues with adhering to a sleep schedule, staying up late and sleeping late per his daughter. She notes there are no windows and he is at times not oriented to time. His daughter states that he has a good appetite but that he eats mostly sugary foods (donuts). He has not lost anymore weight since the spring but remains thin and weak.     At last visit, his driveline appeared infected and was cultured. The cultures grew back pan-sensitive pseudomonas. He has since seen ID (this am by Dr. Muhammad) who changed him from Doxy to Cipro. He has also been seen by EP for device check today as well as had an echo (speed echo). Speed echo shows LVAD  appears to unload the LV normally and RV looks enlarged (see report below).     Since last visit, pt has cont to participate in home PT twice a week but not doing much physically when they are not there. Doesn't have a lot of zip, appetite is poor, thinks depression is part of it. Has been throwing up a little more frequently- has tried taking the abx before and after he eats and doesn't seem to matter.    DLES is grade 3 dried black and tan drainage with hypergranulation tissue- has not been doing dressing changes after shower. cx  8/29/17 was + pansens pseudomonas    Interrogation of device data reveals no alarms, normal flows and power +PI events (see VAD interrogation report for full details.)          Review of Systems   Constitution: Positive for weakness and malaise/fatigue. Negative for chills, decreased appetite and fever.   HENT: Negative for congestion and nosebleeds.    Eyes: Negative for blurred vision and visual disturbance.   Cardiovascular: Negative for chest pain, dyspnea on exertion, irregular heartbeat, leg swelling, orthopnea, palpitations, paroxysmal nocturnal dyspnea and syncope.   Respiratory: Negative for cough, shortness of breath, sputum production and wheezing.    Endocrine: Positive for cold intolerance. Negative for polydipsia.   Hematologic/Lymphatic: Negative for bleeding problem. Bruises/bleeds easily.   Skin: Negative for itching and poor wound healing.   Musculoskeletal: Positive for falls and muscle weakness.   Gastrointestinal: Negative for bloating, abdominal pain, constipation, diarrhea, nausea and vomiting.   Genitourinary: Negative for dysuria and hematuria.   Neurological: Negative for numbness and seizures.   Psychiatric/Behavioral: Negative for altered mental status. The patient is nervous/anxious.        Objective:   Blood pressure (!) 71/0, temperature 98.1 °F (36.7 °C), temperature source Oral, height 6' (1.829 m), weight 79.4 kg (175 lb).body mass index is 23.73  kg/m².  Doppler 71    Physical Exam   Constitutional: He is oriented to person, place, and time. He appears well-developed and well-nourished.   HENT:   Head: Normocephalic and atraumatic.   Eyes: Pupils are equal, round, and reactive to light.   Neck: Neck supple. No JVD present. No tracheal deviation present.   Cardiovascular: Normal rate and regular rhythm.  Exam reveals no gallop and no friction rub.    No murmur heard.  Heartmate III sounds normal    Pulmonary/Chest: Effort normal and breath sounds normal. No respiratory distress. He has no wheezes. He exhibits no tenderness.   Abdominal: Soft. Bowel sounds are normal. He exhibits no distension. There is no tenderness. There is no rebound and no guarding.   Musculoskeletal:   Loss of lower extremity muscle mass   Neurological: He is alert and oriented to person, place, and time.   Skin: Skin is warm and dry. No erythema.   Psychiatric: He has a normal mood and affect.   Driveline Site :           Lab Results   Component Value Date    WBC 6.48 09/07/2017    HGB 11.0 (L) 09/07/2017    HCT 33.4 (L) 09/07/2017    MCV 94 09/07/2017     (L) 09/07/2017    CO2 23 09/07/2017    CREATININE 2.2 (H) 09/07/2017    CALCIUM 10.3 09/07/2017    ALBUMIN 2.9 (L) 09/07/2017    AST 20 09/07/2017     (H) 09/07/2017    ALT 19 09/07/2017       Lab Results   Component Value Date    INR 2.8 (H) 09/07/2017    INR 3.6 (H) 09/05/2017    INR 2.9 (H) 09/02/2017       BNP   Date Value Ref Range Status   09/07/2017 207 (H) 0 - 99 pg/mL Final     Comment:     Values of less than 100 pg/ml are consistent with non-CHF populations.   09/01/2017 181 (H) 0 - 99 pg/mL Final     Comment:     Values of less than 100 pg/ml are consistent with non-CHF populations.   08/30/2017 331 (H) 0 - 99 pg/mL Final     Comment:     Values of less than 100 pg/ml are consistent with non-CHF populations.       LD   Date Value Ref Range Status   09/07/2017 172 110 - 260 U/L Final     Comment:     Results  are increased in hemolyzed samples.   09/02/2017 171 110 - 260 U/L Final     Comment:     Results are increased in hemolyzed samples.   09/01/2017 175 110 - 260 U/L Final     Comment:     Results are increased in hemolyzed samples.       No results found for: TACROLIMUS  No results found for: SIROLIMUS  No results found for: CYCLOSPORINE    Cardiac Studies    1 - Severely depressed left ventricular systolic function (EF 15-20%).     2 - Impaired LV relaxation, normal LAP (grade 1 diastolic dysfunction).     3 - The estimated PA systolic pressure is greater than 30 mmHg.     4 - Mild to moderate tricuspid regurgitation.     5 - Moderate left ventricular enlargement.     6 - There is a Heartmate III LVAD in place. Baseline speed is 5800 rpms. The aortic valve opens with every beat. Septum is midline.     Labs were reviewed with the patient.    Assessment:     1. Chronic systolic congestive heart failure    2. Heart replaced by heart assist device    3. Coronary artery disease involving native coronary artery of native heart without angina pectoris    4. Essential hypertension    5. Ischemic cardiomyopathy    6. S/P CABG (coronary artery bypass graft)    7. Pulmonary hypertension due to left ventricular systolic dysfunction    8. HILLARY on CPAP    9. LVAD (left ventricular assist device) present    10. Chronic anticoagulation        Plan:   ICM s/p Heartmate III   - Debility precludes assessment of functional capacity; no shortness of breath at rest and appears euvolemic on exam- discussed diet to work on improving nutritional status and has a friend in fitness world, suggested he might work with him on rebuilding his strength (above and beyond what little he gets from PT)  - No alarms/issues on VAD   - supplies ordered and given  - doppler at goal, INR therapeutic- euvolemic on exam despite increased BNP-  - cont ASA 81mg and Warfarin    DLES infection  - pan-sensitive pseudomonas  - on cipro; ID following- has apt [place]  next week- ? If he might benefit from removal of hypergranulation tissue- will d/w Dr Muhammad  -instructed to change his dressing after he showers or sweats    Nausea-  Prescribed zofran prn    Debility   - Lower extremity strength markedly decreased/ decreased lower extremity mass  -  cont Home PT / OT     CKD- cr a little higher today without clear cause- will need to watch closely- asked him to hydrate    Flu shot this fall    UNOS Patient Status  Functional Status: 40% - Disabled: requires special care and assistance  Physical Capacity: Wheelchair bound or more limited  Working for Income: Unknown    Anni Henderson MD   Transplant Cardiology    F/u 2 wk with labs and interogation- will ask pharm D to make him a purple card to give him next time

## 2017-09-07 NOTE — PROGRESS NOTES
"Date of Implant with Heartmate 3 LVAD: 10/19/16    PATIENT ARRIVED IN CLINIC:  wheelchair  Accompanied by:wife, daughter    Vitals  Doppler:71  Pulsatile:yes  Temperature, oral: 98.1  PAIN:denies currently on 0-10 pain scale,   location of pain:   n/a,   description of pain:  n/a  Is patient currently on medications for pain?no   What kind? n/a  Does this help relieve the pain? n/a    VAD Interrogation:  TXP SANTOSH INTERROGATIONS 2017   Type HeartMate3   Flow 5.3   Speed 5800   PI 2.6   Power (Mendez) 4.5   LSL 5400   Pulsatility Intermittent pulse       Flow in history: 3.8-5.5  History Lo.c3e  HCT:33.4    Problems / Issues / Alarms with VAD if any:multiple Pi events noted. Refer to Zita Reyes note from  Today regarding backup controller. Self test performed on back up with no issues noted.  Any Equipment Issues? (Refer to equipment coordinators note for complete details):see above  Emergency Equipment With Patient:yes   VAD Binder With Patient: yes   Reviewed VAD Numbers In Binder:no  VAD Sounds: HM3 sound smooth  LVAD Dressing/DLES:  Appearance Of Driveline:"3" with thick tan/dark red (almost black) drainage, hypergranulation at site  Antibiotics:YES IV cefepime  Velour:no  Frequency of Dressing Changes:daily with soap and water   Stabilization Device In Use: NO    Manual & Visual Inspection Of Driveline:  NO KINKS OR TEARS NOTED   Modular Cable Connection Intact(no yellow exposed): YES no yellow noted   Attempted to unscrew modular cable to ensure it will be able to come lose in the event we ever need to change the modular cable while pt held the driveline in place so it would not move. Modular cable connection able to be unscrewed and re-tightened.  Instructed pt to perform this weekly.     Pt In Need Of Management Kits?:yes 1 box soap and water kits ordered  It is medically necessary to have VAD management kits in order to prevent infection or to assist in the healing of an infected " DLES.    Assessment:   Complaints/reason for visit today: Routine follow up  Complaints Of Nausea / Vomiting:sometime in AM after eating   Appearance and Frequency Of Stools:normal and formed without blood every other day  Patient reports urine is clear and yellow:  YES     Coping/Depression/Anxiety:depression  Sleep Habits:2-6 hrs /night  Sleep Aids:none  Showering :YES patient states he has not been changing dressing immediately after showering, that he was unaware he was supposed to. Instructed patient to change dressing immediately after shower and drying off.   Activity/Exercise:PT two times weekly, instructed patient that to help with conditioning, he also needs to exercise at least some on the days that PT is not available.   Driving:no, Reminded to pull over should there be an alarm before looking down at controller.     Labs:    Chemistry        Component Value Date/Time     09/07/2017 1405    K 4.7 09/07/2017 1405     09/07/2017 1405    CO2 23 09/07/2017 1405    BUN 39 (H) 09/07/2017 1405    CREATININE 2.2 (H) 09/07/2017 1405     (H) 09/07/2017 1405        Component Value Date/Time    CALCIUM 10.3 09/07/2017 1405    ALKPHOS 70 09/07/2017 1405    AST 20 09/07/2017 1405    ALT 19 09/07/2017 1405    BILITOT 0.5 09/07/2017 1405    ESTGFRAFRICA 32.9 (A) 09/07/2017 1405    EGFRNONAA 28.5 (A) 09/07/2017 1405            Magnesium   Date Value Ref Range Status   09/07/2017 2.5 1.6 - 2.6 mg/dL Final       Lab Results   Component Value Date    WBC 6.48 09/07/2017    HGB 11.0 (L) 09/07/2017    HCT 33.4 (L) 09/07/2017    MCV 94 09/07/2017     (L) 09/07/2017       Lab Results   Component Value Date    INR 2.8 (H) 09/07/2017    INR 3.6 (H) 09/05/2017    INR 2.9 (H) 09/02/2017       BNP   Date Value Ref Range Status   09/07/2017 207 (H) 0 - 99 pg/mL Final     Comment:     Values of less than 100 pg/ml are consistent with non-CHF populations.   09/01/2017 181 (H) 0 - 99 pg/mL Final     Comment:      Values of less than 100 pg/ml are consistent with non-CHF populations.   08/30/2017 331 (H) 0 - 99 pg/mL Final     Comment:     Values of less than 100 pg/ml are consistent with non-CHF populations.       LD   Date Value Ref Range Status   09/07/2017 172 110 - 260 U/L Final     Comment:     Results are increased in hemolyzed samples.   09/02/2017 171 110 - 260 U/L Final     Comment:     Results are increased in hemolyzed samples.   09/01/2017 175 110 - 260 U/L Final     Comment:     Results are increased in hemolyzed samples.       Labs reviewed with patient: YES      Patient Satisfaction Survey completed per patient: no  (explained about signature and box to check)  Medication reconciliation: per MA.  Purple card updated today:NO, patient states he was never given a purple card. Will try to prepare one for him for next visit.  Coumadin Managed by: [facility], INR 2.8    Education: Reviewed driveline care, emergency procedures, how to change the controller, alarms with patient.  Also reviewed how to get in touch with a VAD coordinator in the event of an emergency.         Plans/Needs: Pt c/o pain at times at DLES. Site infected with bloody drainage and hypergranulation. Patient has appointment with Dr Muhammad, ID on Tuesday. Pt instructed that pain is probably d/t infection. Pt encouraged to exercise more and not only depend on PT to come and exercise him. Pt also instructed on proper shower procedures and dressing change following each shower. Patient to RTC in 2 weeks, refer to MD note.

## 2017-09-07 NOTE — Clinical Note
Would one of you be kind enough to make this poor soul a purple card to help keep them straight? He never got one...  Thanks! S

## 2017-09-07 NOTE — PATIENT INSTRUCTIONS
No changes to your medicines today     Try zofran when you feel nauseated    Keep salt intake to under 2000 mg sodium, fluids to under 2 L (64 oz)    Call us if you find yourself getting more short of breath, have more swelling or unexpected weight changes, fever, chills, alarms, bloody or black bowel movements, or drainage from your driveline    Flu shot this fall

## 2017-09-07 NOTE — TELEPHONE ENCOUNTER
Spoke with delores at [facility] and informed him that Dr. Muhammad is fine with the patients medication being decreased. AYESHA

## 2017-09-07 NOTE — PROGRESS NOTES
LPN, CCC attempted to contact patient to assist with scheduling an annual PCP OV. A female answered, and identified herself as Mitra, the patient's daughter. LPN, CCC informed her of the purpose of the call. Ms. Manriquez reported is aware of the need for her father to see Dr. Ayoub, and stated once her father's condition is stable, she will contact the office to schedule an appointment. IRINEO ALTAMIRANO thanked Ms. Manriquez for receiving my call, and to  contact the office as needed.

## 2017-09-08 ENCOUNTER — LAB VISIT (OUTPATIENT)
Dept: LAB | Facility: HOSPITAL | Age: 77
End: 2017-09-08
Attending: INTERNAL MEDICINE
Payer: MEDICARE

## 2017-09-08 DIAGNOSIS — R79.0 CALCIUM BLOOD DECREASED: Primary | ICD-10-CM

## 2017-09-08 DIAGNOSIS — Z79.01 LONG TERM (CURRENT) USE OF ANTICOAGULANTS: ICD-10-CM

## 2017-09-08 LAB
INR PPP: 2.5
PROTHROMBIN TIME: 24.7 SEC

## 2017-09-08 PROCEDURE — 85610 PROTHROMBIN TIME: CPT

## 2017-09-09 ENCOUNTER — ANTI-COAG VISIT (OUTPATIENT)
Dept: CARDIOLOGY | Facility: CLINIC | Age: 77
End: 2017-09-09

## 2017-09-09 DIAGNOSIS — Z95.811 LVAD (LEFT VENTRICULAR ASSIST DEVICE) PRESENT: ICD-10-CM

## 2017-09-09 DIAGNOSIS — Z79.01 CHRONIC ANTICOAGULATION: ICD-10-CM

## 2017-09-11 ENCOUNTER — LAB VISIT (OUTPATIENT)
Dept: LAB | Facility: HOSPITAL | Age: 77
End: 2017-09-11
Attending: INTERNAL MEDICINE
Payer: MEDICARE

## 2017-09-11 DIAGNOSIS — Z12.11 COLON CANCER SCREENING: ICD-10-CM

## 2017-09-11 DIAGNOSIS — R79.1 ABNORMAL COAGULATION PROFILE: Primary | ICD-10-CM

## 2017-09-11 LAB
ALBUMIN SERPL BCP-MCNC: 2.8 G/DL
ALP SERPL-CCNC: 58 U/L
ALT SERPL W/O P-5'-P-CCNC: 18 U/L
ANION GAP SERPL CALC-SCNC: 6 MMOL/L
AST SERPL-CCNC: 22 U/L
BASOPHILS # BLD AUTO: 0.03 K/UL
BASOPHILS NFR BLD: 0.4 %
BILIRUB SERPL-MCNC: 0.4 MG/DL
BUN SERPL-MCNC: 42 MG/DL
CALCIUM SERPL-MCNC: 9.8 MG/DL
CHLORIDE SERPL-SCNC: 106 MMOL/L
CO2 SERPL-SCNC: 26 MMOL/L
CREAT SERPL-MCNC: 1.9 MG/DL
CRP SERPL-MCNC: 0.4 MG/L
DIFFERENTIAL METHOD: ABNORMAL
EOSINOPHIL # BLD AUTO: 0.1 K/UL
EOSINOPHIL NFR BLD: 1.7 %
ERYTHROCYTE [DISTWIDTH] IN BLOOD BY AUTOMATED COUNT: 17.2 %
ERYTHROCYTE [SEDIMENTATION RATE] IN BLOOD BY WESTERGREN METHOD: 12 MM/HR
EST. GFR  (AFRICAN AMERICAN): 39.3 ML/MIN/1.73 M^2
EST. GFR  (NON AFRICAN AMERICAN): 34 ML/MIN/1.73 M^2
GLUCOSE SERPL-MCNC: 100 MG/DL
HCT VFR BLD AUTO: 28.9 %
HGB BLD-MCNC: 9.4 G/DL
INR PPP: 2.7
LYMPHOCYTES # BLD AUTO: 1 K/UL
LYMPHOCYTES NFR BLD: 14.9 %
MCH RBC QN AUTO: 30.9 PG
MCHC RBC AUTO-ENTMCNC: 32.5 G/DL
MCV RBC AUTO: 95 FL
MONOCYTES # BLD AUTO: 0.7 K/UL
MONOCYTES NFR BLD: 10.1 %
NEUTROPHILS # BLD AUTO: 5 K/UL
NEUTROPHILS NFR BLD: 72.3 %
PLATELET # BLD AUTO: 135 K/UL
PMV BLD AUTO: 12 FL
POTASSIUM SERPL-SCNC: 5 MMOL/L
PROT SERPL-MCNC: 6.6 G/DL
PROTHROMBIN TIME: 27.5 SEC
RBC # BLD AUTO: 3.04 M/UL
SODIUM SERPL-SCNC: 138 MMOL/L
WBC # BLD AUTO: 6.93 K/UL

## 2017-09-11 PROCEDURE — 85610 PROTHROMBIN TIME: CPT

## 2017-09-11 PROCEDURE — 86140 C-REACTIVE PROTEIN: CPT

## 2017-09-11 PROCEDURE — 80053 COMPREHEN METABOLIC PANEL: CPT

## 2017-09-11 PROCEDURE — 85651 RBC SED RATE NONAUTOMATED: CPT

## 2017-09-11 PROCEDURE — 85025 COMPLETE CBC W/AUTO DIFF WBC: CPT

## 2017-09-11 RX ORDER — TAMSULOSIN HYDROCHLORIDE 0.4 MG/1
0.4 CAPSULE ORAL DAILY
Qty: 30 CAPSULE | Refills: 6 | Status: ON HOLD | OUTPATIENT
Start: 2017-09-11 | End: 2017-12-04 | Stop reason: HOSPADM

## 2017-09-11 RX ORDER — AMLODIPINE BESYLATE 10 MG/1
5 TABLET ORAL DAILY
Qty: 30 TABLET | Refills: 11 | Status: ON HOLD | OUTPATIENT
Start: 2017-09-11 | End: 2017-11-20

## 2017-09-12 ENCOUNTER — ANTI-COAG VISIT (OUTPATIENT)
Dept: CARDIOLOGY | Facility: CLINIC | Age: 77
End: 2017-09-12

## 2017-09-12 ENCOUNTER — OFFICE VISIT (OUTPATIENT)
Dept: WOUND CARE | Facility: CLINIC | Age: 77
End: 2017-09-12
Payer: MEDICARE

## 2017-09-12 ENCOUNTER — TELEPHONE (OUTPATIENT)
Dept: TRANSPLANT | Facility: CLINIC | Age: 77
End: 2017-09-12

## 2017-09-12 ENCOUNTER — OFFICE VISIT (OUTPATIENT)
Dept: INFECTIOUS DISEASES | Facility: CLINIC | Age: 77
End: 2017-09-12
Payer: MEDICARE

## 2017-09-12 VITALS — WEIGHT: 180.75 LBS | HEIGHT: 72 IN | BODY MASS INDEX: 24.48 KG/M2 | TEMPERATURE: 98 F

## 2017-09-12 DIAGNOSIS — T82.9XXD LEFT VENTRICULAR ASSIST DEVICE (LVAD) COMPLICATION, SUBSEQUENT ENCOUNTER: ICD-10-CM

## 2017-09-12 DIAGNOSIS — T82.9XXA COMPLICATION INVOLVING LEFT VENTRICULAR ASSIST DEVICE (LVAD), INITIAL ENCOUNTER: Primary | ICD-10-CM

## 2017-09-12 DIAGNOSIS — Z95.811 LVAD (LEFT VENTRICULAR ASSIST DEVICE) PRESENT: ICD-10-CM

## 2017-09-12 DIAGNOSIS — T84.7XXD HARDWARE COMPLICATING WOUND INFECTION, SUBSEQUENT ENCOUNTER: Primary | ICD-10-CM

## 2017-09-12 DIAGNOSIS — L92.9 HYPERGRANULATION: ICD-10-CM

## 2017-09-12 DIAGNOSIS — Z79.01 CHRONIC ANTICOAGULATION: ICD-10-CM

## 2017-09-12 PROCEDURE — 99214 OFFICE O/P EST MOD 30 MIN: CPT | Mod: S$PBB,,, | Performed by: INTERNAL MEDICINE

## 2017-09-12 PROCEDURE — 1126F AMNT PAIN NOTED NONE PRSNT: CPT | Mod: ,,, | Performed by: INTERNAL MEDICINE

## 2017-09-12 PROCEDURE — 87186 SC STD MICRODIL/AGAR DIL: CPT

## 2017-09-12 PROCEDURE — 87205 SMEAR GRAM STAIN: CPT

## 2017-09-12 PROCEDURE — 1159F MED LIST DOCD IN RCRD: CPT | Mod: ,,, | Performed by: INTERNAL MEDICINE

## 2017-09-12 PROCEDURE — 99213 OFFICE O/P EST LOW 20 MIN: CPT | Mod: PBBFAC,25,27 | Performed by: INTERNAL MEDICINE

## 2017-09-12 PROCEDURE — 99999 PR PBB SHADOW E&M-EST. PATIENT-LVL III: CPT | Mod: PBBFAC,,, | Performed by: INTERNAL MEDICINE

## 2017-09-12 PROCEDURE — 87077 CULTURE AEROBIC IDENTIFY: CPT

## 2017-09-12 PROCEDURE — 87075 CULTR BACTERIA EXCEPT BLOOD: CPT

## 2017-09-12 PROCEDURE — 17250 CHEM CAUT OF GRANLTJ TISSUE: CPT | Mod: PBBFAC | Performed by: CLINICAL NURSE SPECIALIST

## 2017-09-12 PROCEDURE — 99999 PR PBB SHADOW E&M-EST. PATIENT-LVL I: CPT | Mod: PBBFAC,,, | Performed by: CLINICAL NURSE SPECIALIST

## 2017-09-12 PROCEDURE — 1159F MED LIST DOCD IN RCRD: CPT | Mod: ,,, | Performed by: CLINICAL NURSE SPECIALIST

## 2017-09-12 PROCEDURE — 99212 OFFICE O/P EST SF 10 MIN: CPT | Mod: S$PBB,25,, | Performed by: CLINICAL NURSE SPECIALIST

## 2017-09-12 PROCEDURE — 87070 CULTURE OTHR SPECIMN AEROBIC: CPT

## 2017-09-12 PROCEDURE — 17250 CHEM CAUT OF GRANLTJ TISSUE: CPT | Mod: S$PBB,,, | Performed by: CLINICAL NURSE SPECIALIST

## 2017-09-12 PROCEDURE — 99211 OFF/OP EST MAY X REQ PHY/QHP: CPT | Mod: PBBFAC,25 | Performed by: CLINICAL NURSE SPECIALIST

## 2017-09-12 RX ORDER — PROMETHAZINE HYDROCHLORIDE 12.5 MG/1
12.5 TABLET ORAL EVERY 6 HOURS PRN
Qty: 30 TABLET | Refills: 3 | Status: ON HOLD | OUTPATIENT
Start: 2017-09-12 | End: 2018-02-07

## 2017-09-12 NOTE — PROGRESS NOTES
Meghan with [facility] called to say INR is due 9/13 but the Patient will be seen on 9/14 for IV care, as per CHARLEY Kam, PharmD Meghan was advised to please draw the INR 9/14 STAT, order was faxed to [facility]

## 2017-09-12 NOTE — LETTER
September 14, 2017      Payam Muhammad MD  [address]  [facility] [address]           [facility]  [address]  [facility] [address]  Phone: [phone]          Patient: Kev Vasquez   MR Number: [id number]   YOB: 1940   Date of Visit: 9/12/2017       Dear Dr. Payam Muhammad:    Thank you for referring Kev Vasquez to me for evaluation. Attached you will find relevant portions of my assessment and plan of care.    If you have questions, please do not hesitate to call me. I look forward to following Kev Vasquez along with you.    Sincerely,    Lucie Parrish, CNS    Enclosure  CC:  No Recipients    If you would like to receive this communication electronically, please contact [email or web address] or [phone] to request more information on [facility] access.    For providers and/or their staff who would like to refer a patient to [facility], please contact us through our one-stop-shop provider referral line, [facility], at [phone].    If you feel you have received this communication in error or would no longer like to receive these types of communications, please e-mail [email or web address]

## 2017-09-12 NOTE — TELEPHONE ENCOUNTER
Phone call.  The zofran required a prior authorization.  I was completed and sent to the insurance compay.  They refrused it because the nausea was not related to the following:   Nausea with tylenol overdosing   acetylcyctine overdose   opioid induced or theophylline overdose  Dr house ordered the patient phenergan.  It was sent to  His pharmacy electronically.    I called the family and they were okay with that change.  The patient has not had much nausea since being home.

## 2017-09-12 NOTE — PROGRESS NOTES
Subjective:      Patient ID: Kev Vasquez is a 76 y.o. male.    Chief Complaint:Follow-up      History of Present Illness    Mr. Vasquez is a 75 y/o male with a history of LVAD placement for management of CHF.  His post LVAD course has been complicated by infection due to pseudomonas.  He was admitted to the hospital on 8/29/17 as his site did not look good.  He was discharged on IV cefepime.  He remains on this today. He is here today for follow up.  He feels that things are basically.      Review of Systems   Constitution: Positive for weakness. Negative for chills, decreased appetite, fever, malaise/fatigue, night sweats, weight gain and weight loss.   HENT: Negative for congestion, ear pain, hearing loss, hoarse voice, sore throat and tinnitus.    Eyes: Negative for blurred vision, redness and visual disturbance.   Cardiovascular: Negative for chest pain, leg swelling and palpitations.   Respiratory: Negative for cough, hemoptysis, shortness of breath and sputum production.    Hematologic/Lymphatic: Negative for adenopathy. Does not bruise/bleed easily.   Skin: Negative for dry skin, itching, rash and suspicious lesions.   Musculoskeletal: Negative for back pain, joint pain, myalgias and neck pain.   Gastrointestinal: Negative for abdominal pain, constipation, diarrhea, heartburn, nausea and vomiting.   Genitourinary: Negative for dysuria, flank pain, frequency, hematuria, hesitancy and urgency.   Neurological: Negative for dizziness, headaches, numbness and paresthesias.   Psychiatric/Behavioral: Negative for depression and memory loss. The patient does not have insomnia and is not nervous/anxious.      Objective:   Physical Exam   Constitutional: He is oriented to person, place, and time. He appears well-developed and well-nourished. No distress.   HENT:   Head: Normocephalic and atraumatic.   Right Ear: External ear normal.   Left Ear: External ear normal.   Nose: Nose normal.   Mouth/Throat: Oropharynx is  clear and moist. No oropharyngeal exudate.   Eyes: Conjunctivae and EOM are normal. Pupils are equal, round, and reactive to light. Right eye exhibits no discharge. Left eye exhibits no discharge. No scleral icterus.   Neck: Normal range of motion. Neck supple. No JVD present. No tracheal deviation present. No thyromegaly present.   Cardiovascular: Normal rate, regular rhythm and intact distal pulses.  Exam reveals no gallop and no friction rub.    No murmur heard.  Pulmonary/Chest: Effort normal and breath sounds normal. No stridor. No respiratory distress. He has no wheezes. He has no rales. He exhibits no tenderness.   Abdominal: Soft. Bowel sounds are normal. He exhibits no distension and no mass. There is no tenderness. There is no rebound and no guarding.   #1. LVAD DLES:  There is very minimal drainage from the site.  There is an area of hypergranulation tissue.   Musculoskeletal: Normal range of motion. He exhibits no edema or tenderness.   Lymphadenopathy:     He has no cervical adenopathy.   Neurological: He is alert and oriented to person, place, and time. He has normal reflexes. He displays normal reflexes. No cranial nerve deficit. He exhibits normal muscle tone. Coordination normal.   Skin: Skin is warm. No rash noted. He is not diaphoretic. No erythema. No pallor.   Psychiatric: He has a normal mood and affect. His behavior is normal. Judgment and thought content normal.   Nursing note and vitals reviewed.               Assessment:       1. Hardware complicating wound infection, subsequent encounter    2. Left ventricular assist device (LVAD) complication, subsequent encounter        75 y/o with pseudomonas infection of his driveline.  He is on IV cefepime.  Repeat cultures were obtained.  Will continue him on cefepime for now.  MS. Parrish with wound care was consulted will see the patient as well.  I will check a new set of cultures.  For now he is to continue on cefepime.  Plan:       Hardware  complicating wound infection, subsequent encounter  -     Gram stain; Future; Expected date: 09/12/2017  -     Aerobic culture; Future; Expected date: 09/12/2017  -     Culture, Anaerobic; Future; Expected date: 09/12/2017    Left ventricular assist device (LVAD) complication, subsequent encounter  -     Gram stain; Future; Expected date: 09/12/2017  -     Aerobic culture; Future; Expected date: 09/12/2017  -     Culture, Anaerobic; Future; Expected date: 09/12/2017

## 2017-09-13 LAB
GRAM STN SPEC: NORMAL
GRAM STN SPEC: NORMAL

## 2017-09-14 ENCOUNTER — TELEPHONE (OUTPATIENT)
Dept: INTERNAL MEDICINE | Facility: CLINIC | Age: 77
End: 2017-09-14

## 2017-09-14 ENCOUNTER — LAB VISIT (OUTPATIENT)
Dept: LAB | Facility: HOSPITAL | Age: 77
End: 2017-09-14
Attending: INTERNAL MEDICINE
Payer: MEDICARE

## 2017-09-14 DIAGNOSIS — R79.1 ABNORMAL COAGULATION PROFILE: Primary | ICD-10-CM

## 2017-09-14 DIAGNOSIS — R26.81 GAIT INSTABILITY: Primary | ICD-10-CM

## 2017-09-14 LAB
INR PPP: 3
PROTHROMBIN TIME: 30.4 SEC

## 2017-09-14 PROCEDURE — 85610 PROTHROMBIN TIME: CPT

## 2017-09-14 NOTE — TELEPHONE ENCOUNTER
----- Message from Anamaria Coles sent at 9/14/2017  9:43 AM CDT -----  Contact: [place] [phone]  Requesting Orders    Orders being requested: Bedside [facility]    Does patient have future appt with PCP: No    Please advise. Marcin Acosta- [facility] FAX#[phone]    Thank You

## 2017-09-14 NOTE — PROGRESS NOTES
Subjective:       Patient ID: Kev Vasquez is a 76 y.o. male.    Chief Complaint: Wound Check (LVAD site)    This is new pt in consult for LVAD site and hypergranulation tissue as well as infection . He comes in w/c today and I have been asked by ID to see him and evaluate the site for additional care  PMH;   Mr. Vasquez is a 75 y/o male with a history of LVAD placement for management of CHF.  His post LVAD course has been complicated by infection due to pseudomonas.  He was admitted to the hospital on 8/29/17 as his site did not look good.  He was discharged on IV cefepime.  He remains on this today.      Wound Check       Review of Systems   Constitutional: Negative for chills and fever.   HENT: Negative for congestion.    Respiratory: Negative for cough and shortness of breath.    Cardiovascular: Negative for chest pain and leg swelling.   Gastrointestinal: Negative.    Genitourinary: Negative.    Musculoskeletal: Negative for arthralgias and back pain.   Skin: Positive for wound. Negative for color change and rash.        LVAD SITE   Neurological: Positive for weakness. Negative for headaches.       Objective:      Physical Exam   Constitutional: He appears well-developed.   Cardiovascular: Normal rate.    Pulmonary/Chest: Effort normal.   Abdominal: Soft. He exhibits no distension.   Skin: Skin is warm and dry.   Psychiatric: He has a normal mood and affect.       Site has hypergranulation that is exudating onto dressing, no odor noted, cautery indicated  PROCEDURE:  CHEMICAL CAUTERY: Performed for hypergranulation tissue/granuloma(s) of LVAD SITE. The tissue was not anesthetized topically as the area involved had no feeling/sensation. The area noted was cauterized with AGNO3. The patient had pain of 0 on scale of 1-10 with this procedure.    His dressing change is daily and I have instructed caregiver to apply piece of silver alginate to the insertion site with each dressing change,  Assessment:       1.  Complication involving left ventricular assist device (LVAD), initial encounter    2. Hypergranulation        Plan:       Chemical cautery to site to eliminate the overgrowth of tissue   Added alginate ag to cover site until this heals  Fu as needed  I have reviewed the plan of care with the patient and/ or caregiver and they express understanding. I spent over 50% of this 10 minute visit in face to face counseling.

## 2017-09-15 ENCOUNTER — ANTI-COAG VISIT (OUTPATIENT)
Dept: CARDIOLOGY | Facility: CLINIC | Age: 77
End: 2017-09-15

## 2017-09-15 DIAGNOSIS — Z79.01 CHRONIC ANTICOAGULATION: ICD-10-CM

## 2017-09-15 DIAGNOSIS — Z95.811 LVAD (LEFT VENTRICULAR ASSIST DEVICE) PRESENT: ICD-10-CM

## 2017-09-15 LAB — BACTERIA SPEC AEROBE CULT: NORMAL

## 2017-09-18 ENCOUNTER — LAB VISIT (OUTPATIENT)
Dept: LAB | Facility: HOSPITAL | Age: 77
End: 2017-09-18
Attending: INTERNAL MEDICINE
Payer: MEDICARE

## 2017-09-18 DIAGNOSIS — R79.1 ABNORMAL COAGULATION PROFILE: Primary | ICD-10-CM

## 2017-09-18 LAB
ALBUMIN SERPL BCP-MCNC: 2.8 G/DL
ALP SERPL-CCNC: 63 U/L
ALT SERPL W/O P-5'-P-CCNC: 14 U/L
ANION GAP SERPL CALC-SCNC: 8 MMOL/L
AST SERPL-CCNC: 33 U/L
BACTERIA SPEC ANAEROBE CULT: NORMAL
BASOPHILS # BLD AUTO: 0.04 K/UL
BASOPHILS NFR BLD: 0.6 %
BILIRUB SERPL-MCNC: 0.3 MG/DL
BUN SERPL-MCNC: 36 MG/DL
CALCIUM SERPL-MCNC: 9.7 MG/DL
CHLORIDE SERPL-SCNC: 107 MMOL/L
CO2 SERPL-SCNC: 22 MMOL/L
CREAT SERPL-MCNC: 1.6 MG/DL
CRP SERPL-MCNC: 0.4 MG/L
DIFFERENTIAL METHOD: ABNORMAL
EOSINOPHIL # BLD AUTO: 0.2 K/UL
EOSINOPHIL NFR BLD: 2.9 %
ERYTHROCYTE [DISTWIDTH] IN BLOOD BY AUTOMATED COUNT: 17.4 %
ERYTHROCYTE [SEDIMENTATION RATE] IN BLOOD BY WESTERGREN METHOD: 6 MM/HR
EST. GFR  (AFRICAN AMERICAN): 48 ML/MIN/1.73 M^2
EST. GFR  (NON AFRICAN AMERICAN): 41.5 ML/MIN/1.73 M^2
GLUCOSE SERPL-MCNC: 149 MG/DL
HCT VFR BLD AUTO: 30.4 %
HGB BLD-MCNC: 10 G/DL
INR PPP: 2.6
LYMPHOCYTES # BLD AUTO: 1 K/UL
LYMPHOCYTES NFR BLD: 16.3 %
MCH RBC QN AUTO: 31.3 PG
MCHC RBC AUTO-ENTMCNC: 32.9 G/DL
MCV RBC AUTO: 95 FL
MONOCYTES # BLD AUTO: 0.4 K/UL
MONOCYTES NFR BLD: 6.7 %
NEUTROPHILS # BLD AUTO: 4.6 K/UL
NEUTROPHILS NFR BLD: 72.7 %
PLATELET # BLD AUTO: 160 K/UL
PMV BLD AUTO: 11.4 FL
POTASSIUM SERPL-SCNC: 5.6 MMOL/L
PROT SERPL-MCNC: 6.6 G/DL
PROTHROMBIN TIME: 26.1 SEC
RBC # BLD AUTO: 3.19 M/UL
SODIUM SERPL-SCNC: 137 MMOL/L
WBC # BLD AUTO: 6.31 K/UL

## 2017-09-18 PROCEDURE — 85025 COMPLETE CBC W/AUTO DIFF WBC: CPT

## 2017-09-18 PROCEDURE — 85651 RBC SED RATE NONAUTOMATED: CPT

## 2017-09-18 PROCEDURE — 85610 PROTHROMBIN TIME: CPT

## 2017-09-18 PROCEDURE — 80053 COMPREHEN METABOLIC PANEL: CPT

## 2017-09-18 PROCEDURE — 86140 C-REACTIVE PROTEIN: CPT

## 2017-09-19 ENCOUNTER — ANTI-COAG VISIT (OUTPATIENT)
Dept: CARDIOLOGY | Facility: CLINIC | Age: 77
End: 2017-09-19

## 2017-09-19 DIAGNOSIS — Z95.811 LVAD (LEFT VENTRICULAR ASSIST DEVICE) PRESENT: ICD-10-CM

## 2017-09-19 DIAGNOSIS — Z79.01 CHRONIC ANTICOAGULATION: ICD-10-CM

## 2017-09-21 ENCOUNTER — OFFICE VISIT (OUTPATIENT)
Dept: TRANSPLANT | Facility: CLINIC | Age: 77
End: 2017-09-21
Attending: INTERNAL MEDICINE
Payer: MEDICARE

## 2017-09-21 ENCOUNTER — ANTI-COAG VISIT (OUTPATIENT)
Dept: CARDIOLOGY | Facility: CLINIC | Age: 77
End: 2017-09-21

## 2017-09-21 ENCOUNTER — LAB VISIT (OUTPATIENT)
Dept: LAB | Facility: HOSPITAL | Age: 77
End: 2017-09-21
Attending: INTERNAL MEDICINE
Payer: MEDICARE

## 2017-09-21 ENCOUNTER — CLINICAL SUPPORT (OUTPATIENT)
Dept: TRANSPLANT | Facility: CLINIC | Age: 77
End: 2017-09-21
Payer: MEDICARE

## 2017-09-21 VITALS — SYSTOLIC BLOOD PRESSURE: 78 MMHG | WEIGHT: 175 LBS | BODY MASS INDEX: 23.7 KG/M2 | HEIGHT: 72 IN | TEMPERATURE: 99 F

## 2017-09-21 DIAGNOSIS — Z74.09 IMPAIRED FUNCTIONAL MOBILITY AND ENDURANCE: ICD-10-CM

## 2017-09-21 DIAGNOSIS — I10 ESSENTIAL HYPERTENSION: ICD-10-CM

## 2017-09-21 DIAGNOSIS — T82.9XXD COMPLICATION INVOLVING LEFT VENTRICULAR ASSIST DEVICE (LVAD), SUBSEQUENT ENCOUNTER: ICD-10-CM

## 2017-09-21 DIAGNOSIS — Z95.810 ICD (IMPLANTABLE CARDIOVERTER-DEFIBRILLATOR), BIVENTRICULAR, IN SITU: ICD-10-CM

## 2017-09-21 DIAGNOSIS — Z95.811 LVAD (LEFT VENTRICULAR ASSIST DEVICE) PRESENT: ICD-10-CM

## 2017-09-21 DIAGNOSIS — I25.10 CORONARY ARTERY DISEASE INVOLVING NATIVE CORONARY ARTERY OF NATIVE HEART WITHOUT ANGINA PECTORIS: ICD-10-CM

## 2017-09-21 DIAGNOSIS — I27.22 PULMONARY HYPERTENSION DUE TO LEFT VENTRICULAR SYSTOLIC DYSFUNCTION: ICD-10-CM

## 2017-09-21 DIAGNOSIS — I50.22 CHRONIC SYSTOLIC CONGESTIVE HEART FAILURE: ICD-10-CM

## 2017-09-21 DIAGNOSIS — Z79.01 CHRONIC ANTICOAGULATION: ICD-10-CM

## 2017-09-21 DIAGNOSIS — Z95.1 S/P CABG (CORONARY ARTERY BYPASS GRAFT): ICD-10-CM

## 2017-09-21 DIAGNOSIS — Z95.811 HEART REPLACED BY HEART ASSIST DEVICE: ICD-10-CM

## 2017-09-21 DIAGNOSIS — I25.5 ISCHEMIC CARDIOMYOPATHY: ICD-10-CM

## 2017-09-21 DIAGNOSIS — Z95.811 HEART REPLACED BY HEART ASSIST DEVICE: Primary | ICD-10-CM

## 2017-09-21 DIAGNOSIS — E87.5 HYPERKALEMIA: ICD-10-CM

## 2017-09-21 LAB
ALBUMIN SERPL BCP-MCNC: 3 G/DL
ALP SERPL-CCNC: 65 U/L
ALT SERPL W/O P-5'-P-CCNC: 22 U/L
ANION GAP SERPL CALC-SCNC: 6 MMOL/L
AST SERPL-CCNC: 24 U/L
BASOPHILS # BLD AUTO: 0.02 K/UL
BASOPHILS NFR BLD: 0.3 %
BILIRUB DIRECT SERPL-MCNC: 0.2 MG/DL
BILIRUB SERPL-MCNC: 0.4 MG/DL
BNP SERPL-MCNC: 140 PG/ML
BUN SERPL-MCNC: 31 MG/DL
CALCIUM SERPL-MCNC: 10.3 MG/DL
CHLORIDE SERPL-SCNC: 105 MMOL/L
CO2 SERPL-SCNC: 26 MMOL/L
CREAT SERPL-MCNC: 1.7 MG/DL
CRP SERPL-MCNC: 0.3 MG/L
DIFFERENTIAL METHOD: ABNORMAL
EOSINOPHIL # BLD AUTO: 0.1 K/UL
EOSINOPHIL NFR BLD: 2 %
ERYTHROCYTE [DISTWIDTH] IN BLOOD BY AUTOMATED COUNT: 17.6 %
EST. GFR  (AFRICAN AMERICAN): 44.6 ML/MIN/1.73 M^2
EST. GFR  (NON AFRICAN AMERICAN): 38.6 ML/MIN/1.73 M^2
GLUCOSE SERPL-MCNC: 125 MG/DL
HCT VFR BLD AUTO: 33.1 %
HGB BLD-MCNC: 11.1 G/DL
INR PPP: 2.5
LDH SERPL L TO P-CCNC: 154 U/L
LYMPHOCYTES # BLD AUTO: 1 K/UL
LYMPHOCYTES NFR BLD: 14.5 %
MAGNESIUM SERPL-MCNC: 2.3 MG/DL
MCH RBC QN AUTO: 31.4 PG
MCHC RBC AUTO-ENTMCNC: 33.5 G/DL
MCV RBC AUTO: 94 FL
MONOCYTES # BLD AUTO: 0.4 K/UL
MONOCYTES NFR BLD: 6 %
NEUTROPHILS # BLD AUTO: 5.1 K/UL
NEUTROPHILS NFR BLD: 76.4 %
PHOSPHATE SERPL-MCNC: 3.8 MG/DL
PLATELET # BLD AUTO: 164 K/UL
PMV BLD AUTO: 10.5 FL
POTASSIUM SERPL-SCNC: 5.4 MMOL/L
PREALB SERPL-MCNC: 35 MG/DL
PROT SERPL-MCNC: 7.2 G/DL
PROTHROMBIN TIME: 24.7 SEC
RBC # BLD AUTO: 3.54 M/UL
SODIUM SERPL-SCNC: 137 MMOL/L
WBC # BLD AUTO: 6.62 K/UL

## 2017-09-21 PROCEDURE — 1126F AMNT PAIN NOTED NONE PRSNT: CPT | Mod: ,,, | Performed by: INTERNAL MEDICINE

## 2017-09-21 PROCEDURE — 83735 ASSAY OF MAGNESIUM: CPT

## 2017-09-21 PROCEDURE — 3078F DIAST BP <80 MM HG: CPT | Mod: ,,, | Performed by: INTERNAL MEDICINE

## 2017-09-21 PROCEDURE — 86140 C-REACTIVE PROTEIN: CPT

## 2017-09-21 PROCEDURE — 84134 ASSAY OF PREALBUMIN: CPT

## 2017-09-21 PROCEDURE — 80053 COMPREHEN METABOLIC PANEL: CPT

## 2017-09-21 PROCEDURE — 82248 BILIRUBIN DIRECT: CPT

## 2017-09-21 PROCEDURE — 83615 LACTATE (LD) (LDH) ENZYME: CPT

## 2017-09-21 PROCEDURE — 99213 OFFICE O/P EST LOW 20 MIN: CPT | Mod: PBBFAC | Performed by: INTERNAL MEDICINE

## 2017-09-21 PROCEDURE — 99214 OFFICE O/P EST MOD 30 MIN: CPT | Mod: S$PBB,,, | Performed by: INTERNAL MEDICINE

## 2017-09-21 PROCEDURE — 85025 COMPLETE CBC W/AUTO DIFF WBC: CPT

## 2017-09-21 PROCEDURE — 36415 COLL VENOUS BLD VENIPUNCTURE: CPT

## 2017-09-21 PROCEDURE — 93750 INTERROGATION VAD IN PERSON: CPT | Mod: PBBFAC | Performed by: INTERNAL MEDICINE

## 2017-09-21 PROCEDURE — 84100 ASSAY OF PHOSPHORUS: CPT

## 2017-09-21 PROCEDURE — 99999 PR PBB SHADOW E&M-EST. PATIENT-LVL III: CPT | Mod: PBBFAC,,, | Performed by: INTERNAL MEDICINE

## 2017-09-21 PROCEDURE — 3074F SYST BP LT 130 MM HG: CPT | Mod: ,,, | Performed by: INTERNAL MEDICINE

## 2017-09-21 PROCEDURE — 83880 ASSAY OF NATRIURETIC PEPTIDE: CPT

## 2017-09-21 PROCEDURE — 85610 PROTHROMBIN TIME: CPT

## 2017-09-21 PROCEDURE — 1159F MED LIST DOCD IN RCRD: CPT | Mod: ,,, | Performed by: INTERNAL MEDICINE

## 2017-09-21 NOTE — PROGRESS NOTES
Subjective:   Patient ID:  Kev Vasquez is a 77 y.o. male who presents for LVAD follow up visit.    Implant Date:10-19-16 @ [facility] 3 RPM 5800     INR goal: 2-3   Bridge with Heparin   Antiplatelets: ASA 81 mg    TXP SANTOSH INTERROGATIONS 9/21/2017   Type HeartMate3   Flow 5.3   Speed 5800   PI 2.8   Power (Mendez) 4.3   LSL 5400   Pulsatility Pulse       HPI  76 yo WM with stage D CHF due to ischemic cardiomyopathy status underwent Heartmate III 10/16/16 at [facility] in [place].  He subsequently relocated to [place] to live with daughter and has been followed here.  Medical problems include CKD, CAD, VT on Tikosyn, HTN, HLD, Gout, depression and debility. He had a driveline infection treated by ID for pseudomonas with IV cefepime.  Increase K last 2labs but learned today that he eats 2 bananas/day and drinks 2 large glasses of OJ/day.  I have decided to stop aldactone so that he can continue to eat the foods that he enjoys as with LVAD no clear benefit to aldactone.  Celebrated his birthday earlier this week.  Seems clearer, stronger and in better spirits.  Today even knew that he was in [place].  Interrogation with PI events.    Review of Systems   Constitution: Positive for weakness, malaise/fatigue and weight loss. Negative for chills, decreased appetite and fever.   Cardiovascular: Positive for dyspnea on exertion. Negative for chest pain, irregular heartbeat, leg swelling, orthopnea, palpitations, paroxysmal nocturnal dyspnea and syncope.   Respiratory: Negative for cough, shortness of breath, sputum production and wheezing.    Endocrine: Positive for cold intolerance.   Skin: Negative for poor wound healing.   Musculoskeletal: Positive for arthritis, back pain, muscle weakness, neck pain and stiffness. Negative for falls (none lately).   Gastrointestinal: Negative for bloating, abdominal pain, constipation, diarrhea, nausea and vomiting.   Neurological: Negative for dizziness (much  improved since using standing position to monitor BP) and light-headedness (much improved since using standing position to monitor BP).   Psychiatric/Behavioral: Negative for altered mental status.     Objective:   Blood pressure (!) 78/0, temperature 99 °F (37.2 °C), height 6' (1.829 m), weight 79.4 kg (175 lb).body mass index is 23.73 kg/m².  Doppler: 78 mm Hg  Physical Exam   Constitutional: No distress.   Elderly appearing WM looks older than stated age   HENT:   Head: Normocephalic and atraumatic.   Eyes: Conjunctivae are normal. Right eye exhibits no discharge. Left eye exhibits no discharge. No scleral icterus.   Right subconjunctival hemorrhage healing   Neck: No JVD present. No thyromegaly present.   Pulmonary/Chest: Effort normal and breath sounds normal. No respiratory distress. He has no wheezes. He has no rales.   Abdominal: Soft. Bowel sounds are normal. He exhibits no distension and no mass. There is no tenderness. There is no rebound and no guarding.   Musculoskeletal: He exhibits no edema or tenderness.   Neurological: He is alert.   Skin: Skin is warm and dry. He is not diaphoretic.   Psychiatric: He has a normal mood and affect. His behavior is normal.   Much brighter today     Lab Results   Component Value Date     (H) 09/21/2017     09/21/2017    K 5.4 (H) 09/21/2017    MG 2.3 09/21/2017     09/21/2017    CO2 26 09/21/2017    BUN 31 (H) 09/21/2017    CREATININE 1.7 (H) 09/21/2017     (H) 09/21/2017    HGBA1C 5.3 07/28/2017    AST 24 09/21/2017    ALT 22 09/21/2017    ALBUMIN 3.0 (L) 09/21/2017    PROT 7.2 09/21/2017    BILITOT 0.4 09/21/2017    WBC 6.62 09/21/2017    HGB 11.1 (L) 09/21/2017    HCT 33.1 (L) 09/21/2017    HCT 40 07/28/2017     09/21/2017    INR 2.5 (H) 09/21/2017     09/21/2017    TSH 1.810 07/28/2017    FREET4 0.74 07/20/2017           Assessment:      1. Heart replaced by heart assist device    2. Hyperkalemia    3. Chronic systolic  congestive heart failure    4. Complication involving left ventricular assist device (LVAD), subsequent encounter    5. Coronary artery disease involving native coronary artery of native heart without angina pectoris    6. Chronic anticoagulation    7. Essential hypertension    8. ICD (implantable cardioverter-defibrillator), biventricular, in situ    9. Impaired functional mobility and endurance    10. Ischemic cardiomyopathy    11. Pulmonary hypertension due to left ventricular systolic dysfunction    12. S/P CABG (coronary artery bypass graft)        Plan:   Diuresis, wt loss, low sodium diet and fluid restriction reviewed along with daily weights and how to respond to 3# weight gain with prn diuretics.  If this fails to restore dry weight call us within 2-3 days.     Stop aldactone due to persistent hyperkalemia this month and not mandatory in his case at this time    Practical low salt diet reviewed:  Do not use canned foods  Do not use any processed meats--any in a vacuum pack or canned  Fresh vegetables over frozen and if frozen rinse off  You may cook with salt lightly, do not add salt at the table    He need to eat and get stronger.  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

## 2017-09-21 NOTE — PROGRESS NOTES
"Date of Implant with Heartmate 3 LVAD: 10/19/16    PATIENT ARRIVED IN CLINIC:  Ambulatory  Accompanied by:Wife, Daughter, Caregiver    Vitals  Doppler:78  Pulsatile:yes  Temperature, oral: 99.0  PAIN:0 on 0-10 pain scale,   location of pain:   na,   description of pain:  na  Is patient currently on medications for pain?yes   What kind? Tylenol as needed  Does this help relieve the pain? na    VAD Interrogation:  TXP SANTOSH INTERROGATIONS 2017   Type HeartMate3   Flow 5.3   Speed 5800   PI 2.8   Power (Mendez) 4.3   LSL 5400   Pulsatility Pulse       Flow in history: 4.5-5.6  History Lo.c3e  HCT:33.1    Problems / Issues / Alarms with VAD if any:none   Any Equipment Issues? (Refer to equipment coordinators note for complete details):none  Emergency Equipment With Patient:yes   VAD Binder With Patient: no   Reviewed VAD Numbers In Binder:no  VAD Sounds: HM3 sound   LVAD Dressing/DLES:  Appearance Of Driveline:"3" with redness and yellow drainage with silver nitrate patch per Dr. Muhammad. Patient being followed by ID and is on IV cefepime for DLES infection.   Antibiotics:YES IV cefepime  Velour:yes  Frequency of Dressing Changes:Daily with soap and water   Stabilization Device In Use: YES Escobedo Calvin    Manual & Visual Inspection Of Driveline:  NO KINKS OR TEARS NOTED   Modular Cable Connection Intact(no yellow exposed): YES    Attempted to unscrew modular cable to ensure it will be able to come lose in the event we ever need to change the modular cable while pt held the driveline in place so it would not move. Modular cable connection able to be unscrewed and re-tightened.  Instructed pt to perform this weekly.     Pt In Need Of Management Kits?:no   It is medically necessary to have VAD management kits in order to prevent infection or to assist in the healing of an infected DLES.    Assessment:   Complaints/reason for visit today: Routine follow up  Complaints Of Nausea / Vomiting:none   Appearance and " Frequency Of Stools:Normal and formed without blood every other day  Patient reports urine is clear and yellow:  YES     Coping/Depression/Anxiety:patient states he is experiencing depression  Sleep Habits:8 hrs /night  Sleep Aids:none  Showering :YES , Reminded patient to change dressing immediately after shower and drying off.   Activity/Exercise:therapist daily   Driving:no, Reminded to pull over should there be an alarm before looking down at controller.     Labs:    Chemistry        Component Value Date/Time     09/21/2017 1300    K 5.4 (H) 09/21/2017 1300     09/21/2017 1300    CO2 26 09/21/2017 1300    BUN 31 (H) 09/21/2017 1300    CREATININE 1.7 (H) 09/21/2017 1300     (H) 09/21/2017 1300        Component Value Date/Time    CALCIUM 10.3 09/21/2017 1300    ALKPHOS 65 09/21/2017 1300    AST 24 09/21/2017 1300    ALT 22 09/21/2017 1300    BILITOT 0.4 09/21/2017 1300    ESTGFRAFRICA 44.6 (A) 09/21/2017 1300    EGFRNONAA 38.6 (A) 09/21/2017 1300            Magnesium   Date Value Ref Range Status   09/21/2017 2.3 1.6 - 2.6 mg/dL Final       Lab Results   Component Value Date    WBC 6.62 09/21/2017    HGB 11.1 (L) 09/21/2017    HCT 33.1 (L) 09/21/2017    MCV 94 09/21/2017     09/21/2017       Lab Results   Component Value Date    INR 2.5 (H) 09/21/2017    INR 2.6 (H) 09/18/2017    INR 3.0 (H) 09/14/2017       BNP   Date Value Ref Range Status   09/21/2017 140 (H) 0 - 99 pg/mL Final     Comment:     Values of less than 100 pg/ml are consistent with non-CHF populations.   09/07/2017 207 (H) 0 - 99 pg/mL Final     Comment:     Values of less than 100 pg/ml are consistent with non-CHF populations.   09/01/2017 181 (H) 0 - 99 pg/mL Final     Comment:     Values of less than 100 pg/ml are consistent with non-CHF populations.       LD   Date Value Ref Range Status   09/21/2017 154 110 - 260 U/L Final     Comment:     Results are increased in hemolyzed samples.   09/07/2017 172 110 - 260 U/L Final      Comment:     Results are increased in hemolyzed samples.   09/02/2017 171 110 - 260 U/L Final     Comment:     Results are increased in hemolyzed samples.       Labs reviewed with patient: YES      Patient Satisfaction Survey completed per patient: no  (explained about signature and box to check)  Medication reconciliation: per MA.  Purple card updated today:YES   Coumadin Managed by: [facility],     Education: Reviewed driveline care, emergency procedures, how to change the controller, alarms with patient.  Also reviewed how to get in touch with a VAD coordinator in the event of an emergency.         Plans/Needs:Patient presents today for routine followup. Patients states he is doing well and not having any issues. Potassium today is 5.4. Patient states he is drinking two large glasses of orange juice with breakfast and eating one banana on his cereal and another banana on his ice cream. Creatinine 1. 7. Per Dr. Marcelo, patient to stop aldactone at this time. Patient aware of instructions and verbalized understanding. Patient also given prescription for outpatient physical therapy. Patient to have BMP drawn next week. Patient to RTC 1 month. Refer to MD note.

## 2017-09-24 PROBLEM — N18.4 STAGE 4 CHRONIC KIDNEY DISEASE: Status: ACTIVE | Noted: 2017-07-20

## 2017-09-24 NOTE — PATIENT INSTRUCTIONS
Do not use canned foods  Do not use any processed meats--any in a vacuum pack or canned  Fresh vegetables over frozen and if frozen rinse off  You may cook with salt lightly, do not add salt at the table

## 2017-09-24 NOTE — PROCEDURES
TXP SANTOSH INTERROGATIONS 9/21/2017 9/7/2017 9/2/2017 9/2/2017 9/1/2017 8/31/2017 8/30/2017   Type HeartMate3 HeartMate3 - - - - -   Flow 5.3 5.3 - - - - -   Speed 5800 5800 - - - - -   PI 2.8 2.6 - - - - -   Power (Mendez) 4.3 4.5 - - - - -   LSL 5400 5400 - - - - -   Pulsatility Pulse Intermittent pulse Intermittent pulse Intermittent pulse Intermittent pulse Intermittent pulse Intermittent pulse   }

## 2017-09-26 ENCOUNTER — LAB VISIT (OUTPATIENT)
Dept: LAB | Facility: HOSPITAL | Age: 77
End: 2017-09-26
Attending: INTERNAL MEDICINE
Payer: MEDICARE

## 2017-09-26 DIAGNOSIS — Z95.811 LVAD (LEFT VENTRICULAR ASSIST DEVICE) PRESENT: Primary | ICD-10-CM

## 2017-09-26 LAB
ALBUMIN SERPL BCP-MCNC: 3.6 G/DL
ALP SERPL-CCNC: 56 U/L
ALT SERPL W/O P-5'-P-CCNC: 33 U/L
ANION GAP SERPL CALC-SCNC: 8 MMOL/L
AST SERPL-CCNC: 27 U/L
BASOPHILS # BLD AUTO: 0.04 K/UL
BASOPHILS NFR BLD: 0.7 %
BILIRUB SERPL-MCNC: 0.3 MG/DL
BUN SERPL-MCNC: 35 MG/DL
CALCIUM SERPL-MCNC: 9.7 MG/DL
CHLORIDE SERPL-SCNC: 106 MMOL/L
CO2 SERPL-SCNC: 25 MMOL/L
CREAT SERPL-MCNC: 1.43 MG/DL
CRP SERPL-MCNC: <0.5 MG/DL
DIFFERENTIAL METHOD: ABNORMAL
EOSINOPHIL # BLD AUTO: 0.2 K/UL
EOSINOPHIL NFR BLD: 3.6 %
ERYTHROCYTE [DISTWIDTH] IN BLOOD BY AUTOMATED COUNT: 17.1 %
ERYTHROCYTE [SEDIMENTATION RATE] IN BLOOD BY WESTERGREN METHOD: 11 MM/HR
EST. GFR  (AFRICAN AMERICAN): 55 ML/MIN/1.73 M^2
EST. GFR  (NON AFRICAN AMERICAN): 47.6 ML/MIN/1.73 M^2
GLUCOSE SERPL-MCNC: 107 MG/DL
HCT VFR BLD AUTO: 30.4 %
HGB BLD-MCNC: 9.6 G/DL
INR PPP: 2.5
LYMPHOCYTES # BLD AUTO: 0.8 K/UL
LYMPHOCYTES NFR BLD: 13.6 %
MCH RBC QN AUTO: 31.4 PG
MCHC RBC AUTO-ENTMCNC: 31.6 G/DL
MCV RBC AUTO: 99 FL
MONOCYTES # BLD AUTO: 0.7 K/UL
MONOCYTES NFR BLD: 12.6 %
NEUTROPHILS # BLD AUTO: 4 K/UL
NEUTROPHILS NFR BLD: 68.8 %
PLATELET # BLD AUTO: 127 K/UL
PMV BLD AUTO: 11.4 FL
POTASSIUM SERPL-SCNC: 4.6 MMOL/L
PROT SERPL-MCNC: 6.5 G/DL
PROTHROMBIN TIME: 26.7 SEC
RBC # BLD AUTO: 3.06 M/UL
SODIUM SERPL-SCNC: 139 MMOL/L
WBC # BLD AUTO: 5.79 K/UL

## 2017-09-26 PROCEDURE — 80053 COMPREHEN METABOLIC PANEL: CPT | Mod: PO

## 2017-09-26 PROCEDURE — 36415 COLL VENOUS BLD VENIPUNCTURE: CPT | Mod: PO

## 2017-09-26 PROCEDURE — 85652 RBC SED RATE AUTOMATED: CPT

## 2017-09-26 PROCEDURE — 85610 PROTHROMBIN TIME: CPT | Mod: PO

## 2017-09-26 PROCEDURE — 86140 C-REACTIVE PROTEIN: CPT | Mod: PO

## 2017-09-26 PROCEDURE — 85025 COMPLETE CBC W/AUTO DIFF WBC: CPT | Mod: PO

## 2017-09-27 ENCOUNTER — ANTI-COAG VISIT (OUTPATIENT)
Dept: CARDIOLOGY | Facility: CLINIC | Age: 77
End: 2017-09-27

## 2017-09-27 DIAGNOSIS — Z79.01 CHRONIC ANTICOAGULATION: ICD-10-CM

## 2017-09-27 DIAGNOSIS — Z95.811 LVAD (LEFT VENTRICULAR ASSIST DEVICE) PRESENT: ICD-10-CM

## 2017-09-28 ENCOUNTER — TELEPHONE (OUTPATIENT)
Dept: TRANSPLANT | Facility: CLINIC | Age: 77
End: 2017-09-28

## 2017-09-28 NOTE — TELEPHONE ENCOUNTER
Lab results.  The aldactone was stopped and the potassium is reduced to normal now.  His bun went up slightly but the creatinine  is lower.  Will call the patient and give him the results.      9/21 9/26  K  5.4  4.6  Bun/r  31/1.7  35/1.43

## 2017-10-02 ENCOUNTER — LAB VISIT (OUTPATIENT)
Dept: LAB | Facility: HOSPITAL | Age: 77
End: 2017-10-02
Attending: INTERNAL MEDICINE
Payer: MEDICARE

## 2017-10-02 DIAGNOSIS — I50.23 ACUTE ON CHRONIC SYSTOLIC HEART FAILURE: Primary | ICD-10-CM

## 2017-10-02 LAB
ALBUMIN SERPL BCP-MCNC: 2.7 G/DL
ALP SERPL-CCNC: 61 U/L
ALT SERPL W/O P-5'-P-CCNC: 16 U/L
ANION GAP SERPL CALC-SCNC: 8 MMOL/L
AST SERPL-CCNC: 20 U/L
BASOPHILS # BLD AUTO: 0.02 K/UL
BASOPHILS NFR BLD: 0.3 %
BILIRUB SERPL-MCNC: 0.3 MG/DL
BUN SERPL-MCNC: 34 MG/DL
CALCIUM SERPL-MCNC: 9.6 MG/DL
CHLORIDE SERPL-SCNC: 108 MMOL/L
CO2 SERPL-SCNC: 24 MMOL/L
CREAT SERPL-MCNC: 1.5 MG/DL
CRP SERPL-MCNC: 0.6 MG/L
DIFFERENTIAL METHOD: ABNORMAL
EOSINOPHIL # BLD AUTO: 0.2 K/UL
EOSINOPHIL NFR BLD: 3 %
ERYTHROCYTE [DISTWIDTH] IN BLOOD BY AUTOMATED COUNT: 17.7 %
ERYTHROCYTE [SEDIMENTATION RATE] IN BLOOD BY WESTERGREN METHOD: 4 MM/HR
EST. GFR  (AFRICAN AMERICAN): 51.9 ML/MIN/1.73 M^2
EST. GFR  (NON AFRICAN AMERICAN): 44.9 ML/MIN/1.73 M^2
GLUCOSE SERPL-MCNC: 105 MG/DL
HCT VFR BLD AUTO: 30.5 %
HGB BLD-MCNC: 9.8 G/DL
INR PPP: 2
LYMPHOCYTES # BLD AUTO: 1.1 K/UL
LYMPHOCYTES NFR BLD: 16.2 %
MCH RBC QN AUTO: 31.6 PG
MCHC RBC AUTO-ENTMCNC: 32.1 G/DL
MCV RBC AUTO: 98 FL
MONOCYTES # BLD AUTO: 0.6 K/UL
MONOCYTES NFR BLD: 8.8 %
NEUTROPHILS # BLD AUTO: 4.8 K/UL
NEUTROPHILS NFR BLD: 71.3 %
PLATELET # BLD AUTO: 137 K/UL
PMV BLD AUTO: 11.4 FL
POTASSIUM SERPL-SCNC: 4.5 MMOL/L
PROT SERPL-MCNC: 6.3 G/DL
PROTHROMBIN TIME: 20.3 SEC
RBC # BLD AUTO: 3.1 M/UL
SODIUM SERPL-SCNC: 140 MMOL/L
WBC # BLD AUTO: 6.71 K/UL

## 2017-10-02 PROCEDURE — 85610 PROTHROMBIN TIME: CPT

## 2017-10-02 PROCEDURE — 80053 COMPREHEN METABOLIC PANEL: CPT

## 2017-10-02 PROCEDURE — 85025 COMPLETE CBC W/AUTO DIFF WBC: CPT

## 2017-10-02 PROCEDURE — 85651 RBC SED RATE NONAUTOMATED: CPT

## 2017-10-02 PROCEDURE — 86140 C-REACTIVE PROTEIN: CPT

## 2017-10-03 ENCOUNTER — ANTI-COAG VISIT (OUTPATIENT)
Dept: CARDIOLOGY | Facility: CLINIC | Age: 77
End: 2017-10-03

## 2017-10-03 DIAGNOSIS — Z95.811 LVAD (LEFT VENTRICULAR ASSIST DEVICE) PRESENT: ICD-10-CM

## 2017-10-03 DIAGNOSIS — Z79.01 CHRONIC ANTICOAGULATION: ICD-10-CM

## 2017-10-05 ENCOUNTER — LAB VISIT (OUTPATIENT)
Dept: LAB | Facility: HOSPITAL | Age: 77
End: 2017-10-05
Attending: INTERNAL MEDICINE
Payer: MEDICARE

## 2017-10-05 ENCOUNTER — ANTI-COAG VISIT (OUTPATIENT)
Dept: CARDIOLOGY | Facility: CLINIC | Age: 77
End: 2017-10-05

## 2017-10-05 DIAGNOSIS — Z95.811 LVAD (LEFT VENTRICULAR ASSIST DEVICE) PRESENT: ICD-10-CM

## 2017-10-05 DIAGNOSIS — Z79.01 LONG-TERM (CURRENT) USE OF ANTICOAGULANTS: ICD-10-CM

## 2017-10-05 DIAGNOSIS — Z79.01 CHRONIC ANTICOAGULATION: ICD-10-CM

## 2017-10-05 DIAGNOSIS — Z95.811 HEART REPLACED BY HEART ASSIST DEVICE: Primary | ICD-10-CM

## 2017-10-05 LAB
INR PPP: 2.2
PROTHROMBIN TIME: 23.8 SEC

## 2017-10-05 PROCEDURE — 85610 PROTHROMBIN TIME: CPT | Mod: PO

## 2017-10-05 PROCEDURE — 36415 COLL VENOUS BLD VENIPUNCTURE: CPT | Mod: PO

## 2017-10-09 ENCOUNTER — LAB VISIT (OUTPATIENT)
Dept: LAB | Facility: HOSPITAL | Age: 77
End: 2017-10-09
Attending: INTERNAL MEDICINE
Payer: MEDICARE

## 2017-10-09 ENCOUNTER — TELEPHONE (OUTPATIENT)
Dept: TRANSPLANT | Facility: CLINIC | Age: 77
End: 2017-10-09

## 2017-10-09 DIAGNOSIS — Z95.811 LVAD (LEFT VENTRICULAR ASSIST DEVICE) PRESENT: Primary | ICD-10-CM

## 2017-10-09 DIAGNOSIS — T82.7XXD INFECTION OF BIVENTRICULAR PACEMAKER, SUBSEQUENT ENCOUNTER: Primary | ICD-10-CM

## 2017-10-09 DIAGNOSIS — I50.23 ACUTE ON CHRONIC SYSTOLIC HEART FAILURE: ICD-10-CM

## 2017-10-09 DIAGNOSIS — Z00.6 EXAMINATION OF PARTICIPANT IN CLINICAL TRIAL: ICD-10-CM

## 2017-10-09 DIAGNOSIS — I50.9 HEART FAILURE, UNSPECIFIED HEART FAILURE CHRONICITY, UNSPECIFIED HEART FAILURE TYPE: ICD-10-CM

## 2017-10-09 LAB
ALBUMIN SERPL BCP-MCNC: 2.8 G/DL
ALP SERPL-CCNC: 68 U/L
ALT SERPL W/O P-5'-P-CCNC: 19 U/L
ANION GAP SERPL CALC-SCNC: 7 MMOL/L
AST SERPL-CCNC: 23 U/L
BASOPHILS # BLD AUTO: 0.03 K/UL
BASOPHILS NFR BLD: 0.4 %
BILIRUB SERPL-MCNC: 0.4 MG/DL
BUN SERPL-MCNC: 27 MG/DL
CALCIUM SERPL-MCNC: 9.3 MG/DL
CHLORIDE SERPL-SCNC: 107 MMOL/L
CO2 SERPL-SCNC: 26 MMOL/L
CREAT SERPL-MCNC: 1.3 MG/DL
CRP SERPL-MCNC: 0.5 MG/L
DIFFERENTIAL METHOD: ABNORMAL
EOSINOPHIL # BLD AUTO: 0.2 K/UL
EOSINOPHIL NFR BLD: 3 %
ERYTHROCYTE [DISTWIDTH] IN BLOOD BY AUTOMATED COUNT: 16.2 %
ERYTHROCYTE [SEDIMENTATION RATE] IN BLOOD BY WESTERGREN METHOD: 5 MM/HR
EST. GFR  (AFRICAN AMERICAN): >60 ML/MIN/1.73 M^2
EST. GFR  (NON AFRICAN AMERICAN): 53.4 ML/MIN/1.73 M^2
GLUCOSE SERPL-MCNC: 111 MG/DL
HCT VFR BLD AUTO: 31.7 %
HGB BLD-MCNC: 10.2 G/DL
INR PPP: 2
LYMPHOCYTES # BLD AUTO: 0.8 K/UL
LYMPHOCYTES NFR BLD: 11.3 %
MCH RBC QN AUTO: 31.7 PG
MCHC RBC AUTO-ENTMCNC: 32.2 G/DL
MCV RBC AUTO: 98 FL
MONOCYTES # BLD AUTO: 0.9 K/UL
MONOCYTES NFR BLD: 12 %
NEUTROPHILS # BLD AUTO: 5.3 K/UL
NEUTROPHILS NFR BLD: 72.7 %
PLATELET # BLD AUTO: 157 K/UL
PMV BLD AUTO: 10.9 FL
POTASSIUM SERPL-SCNC: 4.9 MMOL/L
PROT SERPL-MCNC: 6.4 G/DL
PROTHROMBIN TIME: 19.7 SEC
RBC # BLD AUTO: 3.22 M/UL
SODIUM SERPL-SCNC: 140 MMOL/L
WBC # BLD AUTO: 7.23 K/UL

## 2017-10-09 PROCEDURE — 86140 C-REACTIVE PROTEIN: CPT

## 2017-10-09 PROCEDURE — 85025 COMPLETE CBC W/AUTO DIFF WBC: CPT

## 2017-10-09 PROCEDURE — 85651 RBC SED RATE NONAUTOMATED: CPT

## 2017-10-09 PROCEDURE — 85610 PROTHROMBIN TIME: CPT

## 2017-10-09 PROCEDURE — 80053 COMPREHEN METABOLIC PANEL: CPT

## 2017-10-09 NOTE — PROGRESS NOTES
Charis with [facility]  called to report that no order has been received to draw next PT/INR, states Patient's having SN visit today, inquiring if INR can be drawn today--as per Angie-ShantanuD--ok to draw today date changed and order faxed to [facility]

## 2017-10-10 ENCOUNTER — ANTI-COAG VISIT (OUTPATIENT)
Dept: CARDIOLOGY | Facility: CLINIC | Age: 77
End: 2017-10-10

## 2017-10-10 ENCOUNTER — INFUSION (OUTPATIENT)
Dept: INFECTIOUS DISEASES | Facility: HOSPITAL | Age: 77
End: 2017-10-10
Attending: INTERNAL MEDICINE
Payer: MEDICARE

## 2017-10-10 ENCOUNTER — OFFICE VISIT (OUTPATIENT)
Dept: INFECTIOUS DISEASES | Facility: CLINIC | Age: 77
End: 2017-10-10
Payer: MEDICARE

## 2017-10-10 ENCOUNTER — TELEPHONE (OUTPATIENT)
Dept: INFECTIOUS DISEASES | Facility: CLINIC | Age: 77
End: 2017-10-10

## 2017-10-10 VITALS — TEMPERATURE: 98 F | WEIGHT: 188.25 LBS | BODY MASS INDEX: 25.5 KG/M2 | HEIGHT: 72 IN

## 2017-10-10 DIAGNOSIS — Z95.811 LVAD (LEFT VENTRICULAR ASSIST DEVICE) PRESENT: ICD-10-CM

## 2017-10-10 DIAGNOSIS — T82.9XXD LEFT VENTRICULAR ASSIST DEVICE (LVAD) COMPLICATION, SUBSEQUENT ENCOUNTER: ICD-10-CM

## 2017-10-10 DIAGNOSIS — T84.7XXD HARDWARE COMPLICATING WOUND INFECTION, SUBSEQUENT ENCOUNTER: Primary | ICD-10-CM

## 2017-10-10 DIAGNOSIS — Z79.01 CHRONIC ANTICOAGULATION: ICD-10-CM

## 2017-10-10 DIAGNOSIS — A49.8 PSEUDOMONAS AERUGINOSA INFECTION: ICD-10-CM

## 2017-10-10 PROCEDURE — 99214 OFFICE O/P EST MOD 30 MIN: CPT | Mod: S$PBB,,, | Performed by: INTERNAL MEDICINE

## 2017-10-10 PROCEDURE — 99213 OFFICE O/P EST LOW 20 MIN: CPT | Mod: PBBFAC,25 | Performed by: INTERNAL MEDICINE

## 2017-10-10 PROCEDURE — 99999 PR PBB SHADOW E&M-EST. PATIENT-LVL III: CPT | Mod: PBBFAC,,, | Performed by: INTERNAL MEDICINE

## 2017-10-10 PROCEDURE — G0008 ADMIN INFLUENZA VIRUS VAC: HCPCS | Mod: PBBFAC

## 2017-10-10 RX ORDER — CIPROFLOXACIN 750 MG/1
750 TABLET, FILM COATED ORAL 2 TIMES DAILY
Qty: 60 TABLET | Refills: 11 | Status: SHIPPED | OUTPATIENT
Start: 2017-10-10 | End: 2018-01-24 | Stop reason: ALTCHOICE

## 2017-10-10 RX ORDER — AMLODIPINE BESYLATE 2.5 MG/1
TABLET ORAL
Refills: 11 | COMMUNITY
Start: 2017-10-07 | End: 2017-10-18 | Stop reason: SDUPTHER

## 2017-10-10 RX ORDER — WARFARIN 4 MG/1
TABLET ORAL
Refills: 11 | Status: ON HOLD | COMMUNITY
Start: 2017-09-25 | End: 2017-11-20

## 2017-10-10 NOTE — TELEPHONE ENCOUNTER
Spoke with the [facility] and informed them that Dr. Muhammad will start Mr. Vasquez on 750 mg of Cipro twice daily. I was told that notation would be made and that the pharmacist would be notified. The pt will also be called and given directions.

## 2017-10-10 NOTE — PROGRESS NOTES
Margoth from infectious disease, Dr Muhammad's office called to report that the patient will be starting on cipro 750 mg BID on Thursday, not sure for how long but was given 60 pills with 11 refills, next INR due 10/12

## 2017-10-10 NOTE — PROGRESS NOTES
Subjective:      Patient ID: Kev Vasquez is a 77 y.o. male.    Chief Complaint:Follow-up      History of Present Illness    Mr. Vasquez is a 76 y/o male with a history of CHF managed with an LVAD.  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  He is currently on IV cefepime.  He is here today for follow up.    Review of Systems   Constitution: Negative for chills, decreased appetite, fever, weakness, malaise/fatigue, night sweats, weight gain and weight loss.   HENT: Negative for congestion, ear pain, hearing loss, hoarse voice, sore throat and tinnitus.    Eyes: Negative for blurred vision, redness and visual disturbance.   Cardiovascular: Negative for chest pain, leg swelling and palpitations.   Respiratory: Negative for cough, hemoptysis, shortness of breath and sputum production.    Hematologic/Lymphatic: Negative for adenopathy. Does not bruise/bleed easily.   Skin: Positive for dry skin. Negative for itching, rash and suspicious lesions.   Musculoskeletal: Negative for back pain, joint pain, myalgias and neck pain.   Gastrointestinal: Positive for vomiting. Negative for abdominal pain, constipation, diarrhea, heartburn and nausea.   Genitourinary: Negative for dysuria, flank pain, frequency, hematuria, hesitancy and urgency.   Neurological: Negative for dizziness, headaches, numbness and paresthesias.   Psychiatric/Behavioral: Negative for depression and memory loss. The patient does not have insomnia and is not nervous/anxious.      Objective:   Physical Exam   Constitutional: He is oriented to person, place, and time. He appears well-developed and well-nourished. No distress.   HENT:   Head: Normocephalic and atraumatic.   Right Ear: External ear normal.   Left Ear: External ear normal.   Nose: Nose normal.   Mouth/Throat: Oropharynx is clear and moist. No oropharyngeal exudate.   Eyes: Conjunctivae and EOM are normal. Pupils are equal, round, and reactive to light. Right eye exhibits no  discharge. Left eye exhibits no discharge. No scleral icterus.   Neck: Normal range of motion. Neck supple. No JVD present. No tracheal deviation present. No thyromegaly present.   Cardiovascular: Normal rate, regular rhythm and intact distal pulses.  Exam reveals no gallop and no friction rub.    No murmur heard.  Pulmonary/Chest: Effort normal and breath sounds normal. No stridor. No respiratory distress. He has no wheezes. He has no rales. He exhibits no tenderness.   Abdominal: Soft. Bowel sounds are normal. He exhibits no distension and no mass. There is no tenderness. There is no rebound and no guarding.   #1. LVAD DLES:  There is very minimal drainage from the site.  There is an area of hypergranulation tissue.   Musculoskeletal: Normal range of motion. He exhibits no edema or tenderness.   Lymphadenopathy:     He has no cervical adenopathy.   Neurological: He is alert and oriented to person, place, and time. He has normal reflexes. He displays normal reflexes. No cranial nerve deficit. He exhibits normal muscle tone. Coordination normal.   Skin: Skin is warm. No rash noted. He is not diaphoretic. No erythema. No pallor.   Psychiatric: He has a normal mood and affect. His behavior is normal. Judgment and thought content normal.   Nursing note and vitals reviewed.               Assessment:       1. Hardware complicating wound infection, subsequent encounter    2. Left ventricular assist device (LVAD) complication, subsequent encounter    3. Pseudomonas aeruginosa infection        76 y/o male with a history of pseudomonas infection of his LVAD driveline.  He has been on IV cefepime and his drivelilne looks better today.  I will stop the IV antibiotics and switch him back to ciprofloxacin.  He is to follow up in 1 month.  Plan:       Hardware complicating wound infection, subsequent encounter  -     ciprofloxacin HCl (CIPRO) 750 MG tablet; Take 1 tablet (750 mg total) by mouth 2 (two) times daily.  Dispense: 60  tablet; Refill: 11  -     Nursing communication; Future; Expected date: 10/10/2017  -     Influenza - High Dose (65+) (PF) (IM); Future; Expected date: 10/10/2017    Left ventricular assist device (LVAD) complication, subsequent encounter  -     ciprofloxacin HCl (CIPRO) 750 MG tablet; Take 1 tablet (750 mg total) by mouth 2 (two) times daily.  Dispense: 60 tablet; Refill: 11  -     Nursing communication; Future; Expected date: 10/10/2017  -     Influenza - High Dose (65+) (PF) (IM); Future; Expected date: 10/10/2017    Pseudomonas aeruginosa infection  -     ciprofloxacin HCl (CIPRO) 750 MG tablet; Take 1 tablet (750 mg total) by mouth 2 (two) times daily.  Dispense: 60 tablet; Refill: 11  -     Nursing communication; Future; Expected date: 10/10/2017  -     Influenza - High Dose (65+) (PF) (IM); Future; Expected date: 10/10/2017

## 2017-10-12 ENCOUNTER — LAB VISIT (OUTPATIENT)
Dept: LAB | Facility: HOSPITAL | Age: 77
End: 2017-10-12
Attending: INTERNAL MEDICINE
Payer: MEDICARE

## 2017-10-12 ENCOUNTER — ANTI-COAG VISIT (OUTPATIENT)
Dept: CARDIOLOGY | Facility: CLINIC | Age: 77
End: 2017-10-12

## 2017-10-12 DIAGNOSIS — Z79.01 CHRONIC ANTICOAGULATION: ICD-10-CM

## 2017-10-12 DIAGNOSIS — Z95.811 LVAD (LEFT VENTRICULAR ASSIST DEVICE) PRESENT: ICD-10-CM

## 2017-10-12 DIAGNOSIS — I50.23 ACUTE ON CHRONIC SYSTOLIC HEART FAILURE: Primary | ICD-10-CM

## 2017-10-12 LAB
INR PPP: 2
PROTHROMBIN TIME: 20.5 SEC

## 2017-10-12 PROCEDURE — 85610 PROTHROMBIN TIME: CPT

## 2017-10-13 ENCOUNTER — TELEPHONE (OUTPATIENT)
Dept: INFECTIOUS DISEASES | Facility: CLINIC | Age: 77
End: 2017-10-13

## 2017-10-13 NOTE — TELEPHONE ENCOUNTER
"----- Message from Payam Muhammad MD sent at 10/12/2017  1:09 PM CDT -----  Contact: Alan / [facility] / [phone]  Yes.  Discontinue lab orders.    ----- Message -----  From: Julieta Sheth MA  Sent: 10/12/2017   9:25 AM  To: Payam Muhammad MD    Please advise.   ----- Message -----  From: Marlene Babcock  Sent: 10/12/2017   9:18 AM  To: [facility] Staff    Pt"s picc line was pulled on yesterday. Alan wants to know if orders for labs can be discontinued.       "

## 2017-10-16 ENCOUNTER — LAB VISIT (OUTPATIENT)
Dept: LAB | Facility: HOSPITAL | Age: 77
End: 2017-10-16
Attending: INTERNAL MEDICINE
Payer: MEDICARE

## 2017-10-16 DIAGNOSIS — Z95.811 HEART REPLACED BY HEART ASSIST DEVICE: Primary | ICD-10-CM

## 2017-10-16 LAB
INR PPP: 2.1
PROTHROMBIN TIME: 21.4 SEC

## 2017-10-16 PROCEDURE — 85610 PROTHROMBIN TIME: CPT

## 2017-10-17 ENCOUNTER — ANTI-COAG VISIT (OUTPATIENT)
Dept: CARDIOLOGY | Facility: CLINIC | Age: 77
End: 2017-10-17

## 2017-10-17 DIAGNOSIS — Z95.811 LVAD (LEFT VENTRICULAR ASSIST DEVICE) PRESENT: ICD-10-CM

## 2017-10-17 DIAGNOSIS — Z79.01 CHRONIC ANTICOAGULATION: ICD-10-CM

## 2017-10-18 ENCOUNTER — OFFICE VISIT (OUTPATIENT)
Dept: TRANSPLANT | Facility: CLINIC | Age: 77
End: 2017-10-18
Payer: MEDICARE

## 2017-10-18 ENCOUNTER — CLINICAL SUPPORT (OUTPATIENT)
Dept: TRANSPLANT | Facility: CLINIC | Age: 77
End: 2017-10-18
Payer: MEDICARE

## 2017-10-18 ENCOUNTER — HOSPITAL ENCOUNTER (OUTPATIENT)
Dept: RADIOLOGY | Facility: HOSPITAL | Age: 77
Discharge: HOME | End: 2017-10-18
Attending: THORACIC SURGERY (CARDIOTHORACIC VASCULAR SURGERY)
Payer: MEDICARE

## 2017-10-18 ENCOUNTER — HOSPITAL ENCOUNTER (OUTPATIENT)
Dept: CARDIOLOGY | Facility: CLINIC | Age: 77
Discharge: HOME | End: 2017-10-18
Payer: MEDICARE

## 2017-10-18 ENCOUNTER — ANTI-COAG VISIT (OUTPATIENT)
Dept: CARDIOLOGY | Facility: CLINIC | Age: 77
End: 2017-10-18

## 2017-10-18 VITALS
SYSTOLIC BLOOD PRESSURE: 74 MMHG | WEIGHT: 177.31 LBS | TEMPERATURE: 99 F | BODY MASS INDEX: 24.02 KG/M2 | HEART RATE: 126 BPM | HEIGHT: 72 IN

## 2017-10-18 DIAGNOSIS — Z00.6 EXAMINATION OF PARTICIPANT IN CLINICAL TRIAL: ICD-10-CM

## 2017-10-18 DIAGNOSIS — T82.9XXD COMPLICATION INVOLVING LEFT VENTRICULAR ASSIST DEVICE (LVAD), SUBSEQUENT ENCOUNTER: ICD-10-CM

## 2017-10-18 DIAGNOSIS — N18.4 STAGE 4 CHRONIC KIDNEY DISEASE: ICD-10-CM

## 2017-10-18 DIAGNOSIS — I10 ESSENTIAL HYPERTENSION: ICD-10-CM

## 2017-10-18 DIAGNOSIS — Z95.811 LVAD (LEFT VENTRICULAR ASSIST DEVICE) PRESENT: ICD-10-CM

## 2017-10-18 DIAGNOSIS — I50.22 CHRONIC SYSTOLIC CONGESTIVE HEART FAILURE: Primary | ICD-10-CM

## 2017-10-18 DIAGNOSIS — Z79.01 CHRONIC ANTICOAGULATION: ICD-10-CM

## 2017-10-18 DIAGNOSIS — I47.20 VENTRICULAR TACHYCARDIA: ICD-10-CM

## 2017-10-18 DIAGNOSIS — Z95.811 HEART REPLACED BY HEART ASSIST DEVICE: ICD-10-CM

## 2017-10-18 LAB
DIASTOLIC DYSFUNCTION: YES
ESTIMATED PA SYSTOLIC PRESSURE: 36.09
RETIRED EF AND QEF - SEE NOTES: 35 (ref 55–65)
TRICUSPID VALVE REGURGITATION: ABNORMAL

## 2017-10-18 PROCEDURE — 99214 OFFICE O/P EST MOD 30 MIN: CPT | Mod: S$PBB,,, | Performed by: INTERNAL MEDICINE

## 2017-10-18 PROCEDURE — 93306 TTE W/DOPPLER COMPLETE: CPT | Mod: PBBFAC | Performed by: INTERNAL MEDICINE

## 2017-10-18 PROCEDURE — 99999 PR PBB SHADOW E&M-EST. PATIENT-LVL IV: CPT | Mod: PBBFAC,,, | Performed by: INTERNAL MEDICINE

## 2017-10-18 PROCEDURE — 71020 XR CHEST PA AND LATERAL: CPT | Mod: TC

## 2017-10-18 PROCEDURE — 99214 OFFICE O/P EST MOD 30 MIN: CPT | Mod: PBBFAC,25 | Performed by: INTERNAL MEDICINE

## 2017-10-18 PROCEDURE — 93750 INTERROGATION VAD IN PERSON: CPT | Mod: ,,, | Performed by: INTERNAL MEDICINE

## 2017-10-18 PROCEDURE — 71020 XR CHEST PA AND LATERAL: CPT | Mod: 26,,, | Performed by: RADIOLOGY

## 2017-10-18 RX ORDER — BUPROPION HYDROCHLORIDE 450 MG/1
450 TABLET, FILM COATED, EXTENDED RELEASE ORAL DAILY
Qty: 30 TABLET | Refills: 11 | Status: SHIPPED | OUTPATIENT
Start: 2017-10-18 | End: 2017-10-20 | Stop reason: SDUPTHER

## 2017-10-18 RX ORDER — SPIRONOLACTONE 25 MG/1
TABLET ORAL
Refills: 2 | COMMUNITY
Start: 2017-10-08 | End: 2017-10-18

## 2017-10-18 NOTE — PROCEDURES
TXP SANTOSH INTERROGATIONS 10/18/2017 9/21/2017 9/7/2017 9/2/2017 9/2/2017 9/1/2017 8/31/2017   Type HeartMate3;HeartMate II HeartMate3 HeartMate3 - - - -   Flow 5.2 5.3 5.3 - - - -   Speed 5800 5800 5800 - - - -   PI 3.0 2.8 2.6 - - - -   Power (Mendez) 4.6 4.3 4.5 - - - -   LSL 5400 5400 5400 - - - -   Pulsatility Pulse Pulse Intermittent pulse Intermittent pulse Intermittent pulse Intermittent pulse Intermittent pulse   }    NEYDA To MD  Transplant Cardiology
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
## 2017-10-18 NOTE — PROGRESS NOTES
Date of Implant with Heartmate 3 LVAD: ***    PATIENT ARRIVED IN CLINIC:  Ambulatory ***  Accompanied by: ***    Vitals  Temperature, oral: ***  PAIN: *** on 0-10 pain scale, location of pain: ***, description of pain: ***  Is patient currently on medications for pain? {YES/NO:} What kind? ***  Does this help relieve the pain? ***    VAD Interrogation:  TXP SANTOSH INTERROGATIONS 10/18/2017   Type HeartMate3;HeartMate II   Flow 5.2   Speed 5800   PI 3.0   Power (Mendez) 4.6   LSL 5400   Pulsatility Pulse       Flow in history: ***  History Log: ***  HCT:   Lab Results   Component Value Date    HCT 36.2 (L) 10/18/2017    HCT 40 2017         Problems / Issues / Alarms with VAD if any: {noted:75568}  Any Equipment Issues: {noted:88966} (Refer to  note for complete details)   Emergency Equipment With Patient: {YES/NO:}   VAD Binder With Patient: {YES/NO:}   Reviewed VAD Numbers In Binder: {YES/NO:}  VAD Sounds: HM3 sound {sounds:42990}  Manual & Visual Inspection Of Driveline: {driveline inspection:47065}    LVAD Dressing/DLES:  Appearance Of Driveline: ***  Antibiotics: {YES/NO:01816}  Velour: {YES/NO:}  Frequency of Dressing Changes: {freq of dressin} & {dressing types:93582}  Stabilization Device In Use: {stab device in use:62194}    Modular Cable Connection Intact: {cable connection:36080}  Pt In Need Of Management Kits?:{YES **/NO:01381}  It is medically necessary to have VAD management kits in order to prevent infection or to assist in the healing of an infected DLES.    Assessment:   Complaints/reason for visit today: {routine/symp:01949}  Complaints Of Nausea / Vomiting: {noted:14261}    Appearance and Frequency Of Stools: {appearance of stools:27708} & {freq of stools:74921}  Color Of Urine: {urine:30608}  Coping/Depression/Anxiety: {copin}  Sleep Habits: *** hrs /night  Sleep Aids: {noted:27423}  Showering: {shower:47500}  Activity/Exercise: ***    Driving: {drivin}    Labs:    Chemistry        Component Value Date/Time     10/18/2017 0835    K 4.3 10/18/2017 0835     10/18/2017 0835    CO2 26 10/18/2017 0835    BUN 27 (H) 10/18/2017 0835    CREATININE 1.7 (H) 10/18/2017 0835    GLU 92 10/18/2017 0835        Component Value Date/Time    CALCIUM 10.2 10/18/2017 0835    ALKPHOS 94 10/18/2017 0835    AST 24 10/18/2017 0835    ALT 16 10/18/2017 0835    BILITOT 0.4 10/18/2017 0835    ESTGFRAFRICA 44.6 (A) 10/18/2017 0835    EGFRNONAA 38.6 (A) 10/18/2017 0835            Magnesium   Date Value Ref Range Status   10/18/2017 2.5 1.6 - 2.6 mg/dL Final       Lab Results   Component Value Date    WBC 7.80 10/18/2017    HGB 11.6 (L) 10/18/2017    HCT 36.2 (L) 10/18/2017    MCV 97 10/18/2017     10/18/2017       Lab Results   Component Value Date    INR 1.8 (H) 10/18/2017    INR 2.1 (H) 10/16/2017    INR 2.0 (H) 10/12/2017       BNP   Date Value Ref Range Status   10/18/2017 265 (H) 0 - 99 pg/mL Final     Comment:     Values of less than 100 pg/ml are consistent with non-CHF populations.   2017 140 (H) 0 - 99 pg/mL Final     Comment:     Values of less than 100 pg/ml are consistent with non-CHF populations.   2017 207 (H) 0 - 99 pg/mL Final     Comment:     Values of less than 100 pg/ml are consistent with non-CHF populations.       LD   Date Value Ref Range Status   10/18/2017 184 110 - 260 U/L Final     Comment:     Results are increased in hemolyzed samples.   2017 154 110 - 260 U/L Final     Comment:     Results are increased in hemolyzed samples.   2017 172 110 - 260 U/L Final     Comment:     Results are increased in hemolyzed samples.       Labs reviewed with patient: {YES/NO:88998}     Patient Satisfaction Survey completed per patient: {YES/NO:}  (explained about signature and box to check)  Medication reconciliation: per MA.  Purple card updated today: {purple card:72461}  Coumadin Managed by: [facility], ***    Education: Reviewed driveline care, emergency procedures, how to change the controller, alarms with patient, as well as discussed how to page the VAD coordinator in case of an emergency.      Plans/Needs: ***     Hurricane Season: {hurricane:89896}

## 2017-10-18 NOTE — PROGRESS NOTES
Ena questioned and confirmed correct dose.  Reports increase in buPROPion 450 mg .  No other changes reported.

## 2017-10-18 NOTE — PROGRESS NOTES
"Subjective:   Patient ID:  Kev Vasquez is a 77 y.o. male who presents for LVAD followup visit.      Implant Date:  10-19-16 @ [facility]  VAD Type : HMIII   VAD speed is at  5800 rpm.   Implanted for : DT    No VAD alarms noted on interrogation occasional PI events .   Doppler 74 (pulsatile)   Map is 76  Cuff : 85/66  DLES is a "1".      INR is sub-therapeutic at 1.8  LDh is at baseline.     TXP SANTOSH INTERROGATIONS 9/21/2017   Type HeartMate3   Flow 5.3   Speed 5800   PI 2.8   Power (Mendez) 4.3   LSL 5400   Pulsatility Pulse       HPI Mr. Vasquez is a pleasant 77 y.o. y.o. WM with ischemic cardiomyopathy status post Heartmate III 10/16/16 LVAD, CKD II, CAD, VT on Tikosyn, HTN, HLD, Gout, Obesity, SSS, and depression who presents for routine VAD f/u  followup.  Mr Vasquez was a former resident of the [facility] area who had moved to [place] many years ago where he lived with his wife. He had a Heartmate III implanted 10/16/16 and had a subsequently prolonged clinical course. He had prolonged intubation lasting about 3 weeks. From this hospitalization he had a prolonged course in a skilled nursing facility/rehab center where he proceeded to lose around 50lbs. Earlier this month (July 5,2017), he returned home to [place] with his wife to live with their older daughter. He remains debilitated, unable to walk, and is essentially maximally assisted with transfers. He now has 24 hour care at home (caregiver is present with wife and daughter in clinic) as well as he is getting PT twice weekly at home and OT once weekly. They are very pleased so far (only 2 visits thus far) with physical therapy. He sleeps upstairs in a bedroom with his wife. He has issues with adhering to a sleep schedule, staying up late and sleeping late per his daughter. She notes there are no windows and he is at times not oriented to time. His daughter states that he has a good appetite but that he eats mostly sugary foods (donuts). " He has not lost anymore weight since the spring but remains thin and weak.     He was seen by EP for device check in the past and had a speed echo with unloaded, normal appearing LV and with some RV enlargement.        His recent course was complicated by a DLI by pan sensitive pseudomonas, being treated with IV Cefepime. He has been following with Dr. Muhammad , and was last seen 10/16/17. It was noted that driveline looked improved and he was transitioned from IV Cefepime to Oral Ciprofloxacin. His Aldactone had been previously stopped due to persistent hyperkalemia.     Wife and daughter state that he is somewhat more restless at night. State that he is improving with home PT/OT but wants to work with outpatient PT/OT. Refferal provided. His daughter states taht               Review of Systems   Constitution: Positive for weight gain. Negative for decreased appetite.   Cardiovascular: Negative for chest pain, dyspnea on exertion, near-syncope, orthopnea and syncope.   Respiratory: Negative for cough and shortness of breath.    Gastrointestinal: Negative for bloating and abdominal pain.       Objective:   There were no vitals taken for this visit.body mass index is unknown because there is no height or weight on file.        Physical Exam   Constitutional: He is oriented to person, place, and time. He appears well-developed and well-nourished.   HENT:   Head: Normocephalic.   Neck: No JVD present.   Cardiovascular: Normal rate.    Normal HMIII VAD hum    Pulmonary/Chest: Effort normal and breath sounds normal.   Abdominal: Soft. Bowel sounds are normal.   Musculoskeletal: He exhibits no edema.   Neurological: He is alert and oriented to person, place, and time.   Skin: Skin is warm and dry.   Nursing note and vitals reviewed.  He is oriented to person, place ( [facility] , president , but took three attempts to name correct state     Lab Results   Component Value Date    WBC 7.80 10/18/2017    HGB 11.6 (L) 10/18/2017     HCT 36.2 (L) 10/18/2017    MCV 97 10/18/2017     10/18/2017    CO2 26 10/18/2017    CREATININE 1.7 (H) 10/18/2017    CALCIUM 10.2 10/18/2017    ALBUMIN 3.5 10/18/2017    AST 24 10/18/2017     (H) 10/18/2017    ALT 16 10/18/2017     10/18/2017       Lab Results   Component Value Date    INR 1.8 (H) 10/18/2017    INR 2.1 (H) 10/16/2017    INR 2.0 (H) 10/12/2017       BNP   Date Value Ref Range Status   10/18/2017 265 (H) 0 - 99 pg/mL Final     Comment:     Values of less than 100 pg/ml are consistent with non-CHF populations.   09/21/2017 140 (H) 0 - 99 pg/mL Final     Comment:     Values of less than 100 pg/ml are consistent with non-CHF populations.   09/07/2017 207 (H) 0 - 99 pg/mL Final     Comment:     Values of less than 100 pg/ml are consistent with non-CHF populations.       LD   Date Value Ref Range Status   10/18/2017 184 110 - 260 U/L Final     Comment:     Results are increased in hemolyzed samples.   09/21/2017 154 110 - 260 U/L Final     Comment:     Results are increased in hemolyzed samples.   09/07/2017 172 110 - 260 U/L Final     Comment:     Results are increased in hemolyzed samples.       Labs were reviewed with the patient.    No results found for this or any previous visit.  No results found for this or any previous visit.    Assessment:      1. Heart replaced by heart assist device      HPI Mr. Vasquez is a pleasant 77 y.o. y.o. WM with ischemic cardiomyopathy status post Heartmate III 10/16/16 LVAD, CKD II, CAD, VT on Tikosyn, HTN, HLD, Gout, Obesity, SSS, and depression who presents for routine VAD f/u  followup.  Plan:     1. LVAD   HMIII , DT   No alarms   DLI & INR issues below       2. DLI     IV Cefepime --> PO Cipro , PICC has since been DC'ed    2.Subtherapeutic INR   Continue coumadin     3. H pylori exposure   Check H pylori     4. Deconditioning   referal for outpatient PT/OT     5. ? Cognitive decline   Needs to follow with primary care , assessment for  Cognitive impairment/Dementia   Has apt in November 6. H Pylori Exposure  Will check at family insistence           Patient is now NYHA III  Recommend 2 gram sodium restriction and 1500cc fluid restriction.  Encourage physical activity with graded exercise program.  Requested patient to weigh themselves daily, and to notify us if their weight increases by more than 3 lbs in 1 day or 5 lbs in 1 week.     Listed for transplant: No    UNOS Patient Status  Functional Status: 50% - Requires considerable assistance and frequent medical care  Physical Capacity: Limited Mobility  Working for Income: No  If no, reason not working: Disability

## 2017-10-19 RX ORDER — HYDRALAZINE HYDROCHLORIDE 25 MG/1
TABLET, FILM COATED ORAL
Qty: 810 TABLET | Refills: 10 | Status: ON HOLD | OUTPATIENT
Start: 2017-10-19 | End: 2017-11-20

## 2017-10-20 ENCOUNTER — LAB VISIT (OUTPATIENT)
Dept: LAB | Facility: HOSPITAL | Age: 77
End: 2017-10-20
Attending: INTERNAL MEDICINE
Payer: MEDICARE

## 2017-10-20 ENCOUNTER — ANTI-COAG VISIT (OUTPATIENT)
Dept: CARDIOLOGY | Facility: CLINIC | Age: 77
End: 2017-10-20

## 2017-10-20 ENCOUNTER — TELEPHONE (OUTPATIENT)
Dept: TRANSPLANT | Facility: CLINIC | Age: 77
End: 2017-10-20

## 2017-10-20 DIAGNOSIS — Z95.811 LVAD (LEFT VENTRICULAR ASSIST DEVICE) PRESENT: ICD-10-CM

## 2017-10-20 DIAGNOSIS — Z79.01 CHRONIC ANTICOAGULATION: ICD-10-CM

## 2017-10-20 DIAGNOSIS — I50.23 ACUTE ON CHRONIC SYSTOLIC HEART FAILURE: Primary | ICD-10-CM

## 2017-10-20 LAB
INR PPP: 2.3
PROTHROMBIN TIME: 23.1 SEC

## 2017-10-20 PROCEDURE — 85610 PROTHROMBIN TIME: CPT

## 2017-10-20 RX ORDER — BUPROPION HYDROCHLORIDE 450 MG/1
450 TABLET, FILM COATED, EXTENDED RELEASE ORAL DAILY
Qty: 30 TABLET | Refills: 11 | Status: SHIPPED | OUTPATIENT
Start: 2017-10-20 | End: 2017-10-30

## 2017-10-23 ENCOUNTER — LAB VISIT (OUTPATIENT)
Dept: LAB | Facility: HOSPITAL | Age: 77
End: 2017-10-23
Attending: INTERNAL MEDICINE
Payer: MEDICARE

## 2017-10-23 ENCOUNTER — ANTI-COAG VISIT (OUTPATIENT)
Dept: CARDIOLOGY | Facility: CLINIC | Age: 77
End: 2017-10-23

## 2017-10-23 DIAGNOSIS — Z79.01 LONG-TERM (CURRENT) USE OF ANTICOAGULANTS: Primary | ICD-10-CM

## 2017-10-23 DIAGNOSIS — Z95.811 LVAD (LEFT VENTRICULAR ASSIST DEVICE) PRESENT: ICD-10-CM

## 2017-10-23 DIAGNOSIS — Z79.01 CHRONIC ANTICOAGULATION: ICD-10-CM

## 2017-10-23 LAB
INR PPP: 2
PROTHROMBIN TIME: 22.1 SEC

## 2017-10-23 PROCEDURE — 85610 PROTHROMBIN TIME: CPT | Mod: PO

## 2017-10-23 PROCEDURE — 36415 COLL VENOUS BLD VENIPUNCTURE: CPT | Mod: PO

## 2017-10-26 ENCOUNTER — ANTI-COAG VISIT (OUTPATIENT)
Dept: CARDIOLOGY | Facility: CLINIC | Age: 77
End: 2017-10-26

## 2017-10-26 ENCOUNTER — TELEPHONE (OUTPATIENT)
Dept: INTERNAL MEDICINE | Facility: CLINIC | Age: 77
End: 2017-10-26

## 2017-10-26 ENCOUNTER — LAB VISIT (OUTPATIENT)
Dept: LAB | Facility: HOSPITAL | Age: 77
End: 2017-10-26
Attending: INTERNAL MEDICINE
Payer: MEDICARE

## 2017-10-26 DIAGNOSIS — Z95.811 LVAD (LEFT VENTRICULAR ASSIST DEVICE) PRESENT: ICD-10-CM

## 2017-10-26 DIAGNOSIS — Z79.01 LONG-TERM (CURRENT) USE OF ANTICOAGULANTS: ICD-10-CM

## 2017-10-26 DIAGNOSIS — Z79.01 CHRONIC ANTICOAGULATION: ICD-10-CM

## 2017-10-26 LAB
INR PPP: 2.2
PROTHROMBIN TIME: 23.5 SEC

## 2017-10-26 PROCEDURE — 36415 COLL VENOUS BLD VENIPUNCTURE: CPT | Mod: PO

## 2017-10-26 PROCEDURE — 85610 PROTHROMBIN TIME: CPT | Mod: PO

## 2017-10-26 NOTE — TELEPHONE ENCOUNTER
----- Message from Anamaria Coles sent at 10/26/2017 11:27 AM CDT -----  Contact: Kristijennifer/[facility] [phone]  Patient has gained 5 lbs in one week.    Please call and advise.    Thank You

## 2017-10-26 NOTE — TELEPHONE ENCOUNTER
Spoke with Rahul DOUGLASS she states that the cardiology dept will handle pt's weight gain because he's a pt in the LVAD clinic.

## 2017-10-27 ENCOUNTER — TELEPHONE (OUTPATIENT)
Dept: TRANSPLANT | Facility: CLINIC | Age: 77
End: 2017-10-27

## 2017-10-27 ENCOUNTER — APPOINTMENT (OUTPATIENT)
Dept: ELECTROPHYSIOLOGY | Facility: CLINIC | Age: 77
End: 2017-10-27
Payer: MEDICARE

## 2017-10-27 ENCOUNTER — TELEPHONE (OUTPATIENT)
Dept: ELECTROPHYSIOLOGY | Facility: CLINIC | Age: 77
End: 2017-10-27

## 2017-10-27 PROCEDURE — 93296 REM INTERROG EVL PM/IDS: CPT | Mod: PBBFAC | Performed by: INTERNAL MEDICINE

## 2017-10-27 PROCEDURE — 93295 DEV INTERROG REMOTE 1/2/MLT: CPT | Mod: ,,, | Performed by: INTERNAL MEDICINE

## 2017-10-27 NOTE — TELEPHONE ENCOUNTER
Spoke to patient's daughter on this morning in relation to the patient's Remote ICD home monitor.  Informed Rubi that his prior clinic has not released the patient from their office.  Rubi stated she has spoke to their office who assured her they would go ahead and release the patient.  Rubi then stated she will contact their office again today.  Informed Rubi that the patient is due for his routine remote transmission.  She verbalized understanding to all of the above and stated she will contact her dad's former clinic today.       Spoke to Alesia with [facility] who said she did release the patient for transfer.  I was finally able to enroll the patient to our clinic.  Informed patient's daughter patient's monitor is not communicating to his device and he would need to send a manual transmission to get him back on line.  Rubi stated she is at work but will have the patient's aide contact the office to assist with sending a manual transmission.      Spoke with Evy, patient's aide and she will assist with sending a transmission.

## 2017-10-27 NOTE — TELEPHONE ENCOUNTER
Phone call.  [facility]  Pt had gained 5 pounds and was 195.  He had no edema or sob.  I spoke with the wife also and she had the same comments.  She also stated that his appetite is very good.  He eats all of his meal and dessert.  After speaking with dr aldridge, no intervention at this time.  The home health was called and notified.  The wife was also be called stating that he will continued to be monitored but no changes for now.

## 2017-10-30 ENCOUNTER — TELEPHONE (OUTPATIENT)
Dept: PHARMACY | Facility: CLINIC | Age: 77
End: 2017-10-30

## 2017-10-30 RX ORDER — BUPROPION HYDROCHLORIDE 150 MG/1
150 TABLET ORAL DAILY
Qty: 30 TABLET | Refills: 11 | Status: ON HOLD | OUTPATIENT
Start: 2017-10-30 | End: 2017-12-04 | Stop reason: HOSPADM

## 2017-10-30 RX ORDER — BUPROPION HYDROCHLORIDE 300 MG/1
300 TABLET ORAL DAILY
Qty: 30 TABLET | Refills: 11 | Status: ON HOLD | OUTPATIENT
Start: 2017-10-30 | End: 2018-02-07 | Stop reason: DRUGHIGH

## 2017-10-31 ENCOUNTER — LAB VISIT (OUTPATIENT)
Dept: LAB | Facility: HOSPITAL | Age: 77
End: 2017-10-31
Attending: INTERNAL MEDICINE
Payer: MEDICARE

## 2017-10-31 ENCOUNTER — ANTI-COAG VISIT (OUTPATIENT)
Dept: CARDIOLOGY | Facility: CLINIC | Age: 77
End: 2017-10-31

## 2017-10-31 DIAGNOSIS — Z95.811 LVAD (LEFT VENTRICULAR ASSIST DEVICE) PRESENT: ICD-10-CM

## 2017-10-31 DIAGNOSIS — Z79.01 CHRONIC ANTICOAGULATION: ICD-10-CM

## 2017-10-31 DIAGNOSIS — Z95.811 HEART REPLACED BY HEART ASSIST DEVICE: Primary | ICD-10-CM

## 2017-10-31 DIAGNOSIS — Z79.01 LONG-TERM (CURRENT) USE OF ANTICOAGULANTS: ICD-10-CM

## 2017-10-31 LAB
INR PPP: 2.6
PROTHROMBIN TIME: 28.2 SEC

## 2017-10-31 PROCEDURE — 36415 COLL VENOUS BLD VENIPUNCTURE: CPT | Mod: PO

## 2017-10-31 PROCEDURE — 85610 PROTHROMBIN TIME: CPT | Mod: PO

## 2017-10-31 NOTE — PROGRESS NOTES
The week of the 13th of Nov pt will be discharged from home health. He want to go to [facility].

## 2017-11-02 ENCOUNTER — ANTI-COAG VISIT (OUTPATIENT)
Dept: CARDIOLOGY | Facility: CLINIC | Age: 77
End: 2017-11-02

## 2017-11-02 ENCOUNTER — LAB VISIT (OUTPATIENT)
Dept: LAB | Facility: HOSPITAL | Age: 77
End: 2017-11-02
Attending: INTERNAL MEDICINE
Payer: MEDICARE

## 2017-11-02 ENCOUNTER — TELEPHONE (OUTPATIENT)
Dept: SPINE | Facility: CLINIC | Age: 77
End: 2017-11-02

## 2017-11-02 DIAGNOSIS — Z95.811 HEART REPLACED BY HEART ASSIST DEVICE: Primary | ICD-10-CM

## 2017-11-02 DIAGNOSIS — Z79.01 CHRONIC ANTICOAGULATION: ICD-10-CM

## 2017-11-02 DIAGNOSIS — M54.50 LUMBAR SPINE PAIN: Primary | ICD-10-CM

## 2017-11-02 DIAGNOSIS — Z95.811 LVAD (LEFT VENTRICULAR ASSIST DEVICE) PRESENT: ICD-10-CM

## 2017-11-02 LAB
INR PPP: 2.3
PROTHROMBIN TIME: 25.2 SEC

## 2017-11-02 PROCEDURE — 36415 COLL VENOUS BLD VENIPUNCTURE: CPT | Mod: PO

## 2017-11-02 PROCEDURE — 85610 PROTHROMBIN TIME: CPT | Mod: PO

## 2017-11-07 NOTE — PROGRESS NOTES
Home health is unable to obtain INR today. The patient has company and asked home health to come tomorrow 11/8. He will have an INR then.

## 2017-11-08 ENCOUNTER — LAB VISIT (OUTPATIENT)
Dept: LAB | Facility: HOSPITAL | Age: 77
End: 2017-11-08
Attending: INTERNAL MEDICINE
Payer: MEDICARE

## 2017-11-08 ENCOUNTER — ANTI-COAG VISIT (OUTPATIENT)
Dept: CARDIOLOGY | Facility: CLINIC | Age: 77
End: 2017-11-08

## 2017-11-08 DIAGNOSIS — Z79.01 CHRONIC ANTICOAGULATION: ICD-10-CM

## 2017-11-08 DIAGNOSIS — Z95.811 LVAD (LEFT VENTRICULAR ASSIST DEVICE) PRESENT: ICD-10-CM

## 2017-11-08 DIAGNOSIS — I50.9 ACUTE CHF: Primary | ICD-10-CM

## 2017-11-08 LAB
INR PPP: 2.1
PROTHROMBIN TIME: 22.6 SEC

## 2017-11-08 PROCEDURE — 36415 COLL VENOUS BLD VENIPUNCTURE: CPT | Mod: PO

## 2017-11-08 PROCEDURE — 85610 PROTHROMBIN TIME: CPT | Mod: PO

## 2017-11-09 ENCOUNTER — TELEPHONE (OUTPATIENT)
Dept: ADMINISTRATIVE | Facility: CLINIC | Age: 77
End: 2017-11-09

## 2017-11-10 ENCOUNTER — HOSPITAL ENCOUNTER (OUTPATIENT)
Dept: RADIOLOGY | Facility: HOSPITAL | Age: 77
Discharge: HOME | End: 2017-11-10
Attending: ORTHOPAEDIC SURGERY
Payer: MEDICARE

## 2017-11-10 ENCOUNTER — OFFICE VISIT (OUTPATIENT)
Dept: ORTHOPEDICS | Facility: CLINIC | Age: 77
End: 2017-11-10
Payer: MEDICARE

## 2017-11-10 ENCOUNTER — TELEPHONE (OUTPATIENT)
Dept: TRANSPLANT | Facility: CLINIC | Age: 77
End: 2017-11-10

## 2017-11-10 VITALS — BODY MASS INDEX: 24.01 KG/M2 | HEIGHT: 72 IN | WEIGHT: 177.25 LBS

## 2017-11-10 DIAGNOSIS — M54.50 LUMBAR SPINE PAIN: ICD-10-CM

## 2017-11-10 DIAGNOSIS — M43.10 SPONDYLOLISTHESIS, ACQUIRED: ICD-10-CM

## 2017-11-10 DIAGNOSIS — M51.36 DDD (DEGENERATIVE DISC DISEASE), LUMBAR: Primary | ICD-10-CM

## 2017-11-10 PROCEDURE — 99202 OFFICE O/P NEW SF 15 MIN: CPT | Mod: S$PBB,,, | Performed by: ORTHOPAEDIC SURGERY

## 2017-11-10 PROCEDURE — 72100 X-RAY EXAM L-S SPINE 2/3 VWS: CPT | Mod: 26,,, | Performed by: RADIOLOGY

## 2017-11-10 PROCEDURE — 99213 OFFICE O/P EST LOW 20 MIN: CPT | Mod: PBBFAC,25 | Performed by: ORTHOPAEDIC SURGERY

## 2017-11-10 PROCEDURE — 99999 PR PBB SHADOW E&M-EST. PATIENT-LVL III: CPT | Mod: PBBFAC,,, | Performed by: ORTHOPAEDIC SURGERY

## 2017-11-10 PROCEDURE — 72120 X-RAY BEND ONLY L-S SPINE: CPT | Mod: 26,,, | Performed by: RADIOLOGY

## 2017-11-10 PROCEDURE — 72120 X-RAY BEND ONLY L-S SPINE: CPT | Mod: TC

## 2017-11-10 NOTE — TELEPHONE ENCOUNTER
Phone call.  Received a note from the home health nurse.  Mr grijalva gained 10 pounds in the last month.  His lungs are clear, no edema.  His appetite is good.  I called the phone and spoke with the wife.  She verified the information.  He is having no problems at present.She will call if he has any symptoms.  She is aware of his appt next week in lvad clinic.

## 2017-11-12 NOTE — PROGRESS NOTES
The patient presents for evaluation.    He has a known L4/5 degenerative spondylolisthesis. He also has an LVAD.    He was previously treated in [facility] and an injection was planned. He moved to [facility] and did not have the injection.    The patient reports multiple myelopathic symptoms such as difficulty with balance, handwriting changes, and hand paresthesias.    He also takes chronic coumadin.    On exam the patient is seated in a wheelchair.  There is notable intrinsic wasting of the hand.  He is able to ambulate short distances with a walker.  He has diffuse BUE/LE hyperreflexia including a bilateral inverted radial reflex, but negative Soliz's sign.    Radiographs today demonstrate an L4/5 degenerative spondylolisthesis grade I.    Today we discussed options, I am concerned that he may additionally have cervical myelopathy, however, he is unable to have an MRI because of his LVAD and cannot have a myelogram because of coumdin.    Today I recommended he try PT.    RTC PRN.

## 2017-11-13 ENCOUNTER — CLINICAL SUPPORT (OUTPATIENT)
Dept: ELECTROPHYSIOLOGY | Facility: CLINIC | Age: 77
DRG: 305 | End: 2017-11-13
Payer: MEDICARE

## 2017-11-13 ENCOUNTER — TELEPHONE (OUTPATIENT)
Dept: ELECTROPHYSIOLOGY | Facility: CLINIC | Age: 77
End: 2017-11-13

## 2017-11-13 DIAGNOSIS — Z95.810 IMPLANTABLE CARDIOVERTER-DEFIBRILLATOR (ICD) IN SITU: ICD-10-CM

## 2017-11-13 PROCEDURE — 93296 REM INTERROG EVL PM/IDS: CPT | Mod: PBBFAC | Performed by: INTERNAL MEDICINE

## 2017-11-13 PROCEDURE — 93295 DEV INTERROG REMOTE 1/2/MLT: CPT | Mod: ,,, | Performed by: INTERNAL MEDICINE

## 2017-11-13 NOTE — TELEPHONE ENCOUNTER
Contacted patient in relation to patient's remote ICD home monitor as to it is not connecting to [facility], daughter will call [facility] for help sending a manual transmission tonight.

## 2017-11-14 ENCOUNTER — ANTI-COAG VISIT (OUTPATIENT)
Dept: CARDIOLOGY | Facility: CLINIC | Age: 77
End: 2017-11-14

## 2017-11-14 ENCOUNTER — OFFICE VISIT (OUTPATIENT)
Dept: INFECTIOUS DISEASES | Facility: CLINIC | Age: 77
DRG: 305 | End: 2017-11-14
Payer: MEDICARE

## 2017-11-14 VITALS
SYSTOLIC BLOOD PRESSURE: 123 MMHG | BODY MASS INDEX: 27.33 KG/M2 | HEART RATE: 63 BPM | TEMPERATURE: 99 F | WEIGHT: 201.75 LBS | HEIGHT: 72 IN | DIASTOLIC BLOOD PRESSURE: 96 MMHG

## 2017-11-14 DIAGNOSIS — T82.9XXD LEFT VENTRICULAR ASSIST DEVICE (LVAD) COMPLICATION, SUBSEQUENT ENCOUNTER: ICD-10-CM

## 2017-11-14 DIAGNOSIS — Z95.811 LVAD (LEFT VENTRICULAR ASSIST DEVICE) PRESENT: ICD-10-CM

## 2017-11-14 DIAGNOSIS — T84.7XXD HARDWARE COMPLICATING WOUND INFECTION, SUBSEQUENT ENCOUNTER: Primary | ICD-10-CM

## 2017-11-14 DIAGNOSIS — Z79.01 CHRONIC ANTICOAGULATION: ICD-10-CM

## 2017-11-14 DIAGNOSIS — A49.8 PSEUDOMONAS AERUGINOSA INFECTION: ICD-10-CM

## 2017-11-14 PROCEDURE — 99214 OFFICE O/P EST MOD 30 MIN: CPT | Mod: S$PBB,,, | Performed by: INTERNAL MEDICINE

## 2017-11-14 PROCEDURE — 99213 OFFICE O/P EST LOW 20 MIN: CPT | Mod: PBBFAC | Performed by: INTERNAL MEDICINE

## 2017-11-14 PROCEDURE — 99999 PR PBB SHADOW E&M-EST. PATIENT-LVL III: CPT | Mod: PBBFAC,,, | Performed by: INTERNAL MEDICINE

## 2017-11-14 NOTE — PROGRESS NOTES
Subjective:      Patient ID: Kev Vasquez is a 77 y.o. male.    Chief Complaint:Follow-up      History of Present Illness    Mr. Vasquez is a 78 y/o male with a history of CHF managed with an LVAD.  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  He was on IV cefepime.  At his last visit, he was transitioned to oral ciprofloxacin 750 twice a day.  He has been tolerating this okay.  He is here today for follow up.    Review of Systems   Constitution: Positive for weight gain. Negative for chills, decreased appetite, fever, weakness, malaise/fatigue, night sweats and weight loss.   HENT: Negative for congestion, ear pain, hearing loss, hoarse voice, sore throat and tinnitus.    Eyes: Negative for blurred vision, redness and visual disturbance.   Cardiovascular: Negative for chest pain, leg swelling and palpitations.   Respiratory: Negative for cough, hemoptysis, shortness of breath and sputum production.    Hematologic/Lymphatic: Negative for adenopathy. Does not bruise/bleed easily.   Skin: Negative for dry skin, itching, rash and suspicious lesions.   Musculoskeletal: Negative for back pain, joint pain, myalgias and neck pain.   Gastrointestinal: Positive for constipation. Negative for abdominal pain, diarrhea, heartburn, nausea and vomiting.   Genitourinary: Negative for dysuria, flank pain, frequency, hematuria, hesitancy and urgency.   Neurological: Negative for dizziness, headaches, numbness and paresthesias.   Psychiatric/Behavioral: Negative for depression and memory loss. The patient does not have insomnia and is not nervous/anxious.      Objective:   Physical Exam   Constitutional: He is oriented to person, place, and time. He appears well-developed and well-nourished. No distress.   HENT:   Head: Normocephalic and atraumatic.   Right Ear: External ear normal.   Left Ear: External ear normal.   Nose: Nose normal.   Mouth/Throat: Oropharynx is clear and moist. No oropharyngeal exudate.   Eyes:  Conjunctivae and EOM are normal. Pupils are equal, round, and reactive to light. Right eye exhibits no discharge. Left eye exhibits no discharge. No scleral icterus.   Neck: Normal range of motion. Neck supple. No JVD present. No tracheal deviation present. No thyromegaly present.   Cardiovascular: Normal rate, regular rhythm and intact distal pulses.  Exam reveals no gallop and no friction rub.    No murmur heard.  Pulmonary/Chest: Effort normal and breath sounds normal. No stridor. No respiratory distress. He has no wheezes. He has no rales. He exhibits no tenderness.   Abdominal: Soft. Bowel sounds are normal. He exhibits no distension and no mass. There is no tenderness. There is no rebound and no guarding.   #1. LVAD DLES:  The site is dry today.   Musculoskeletal: Normal range of motion. He exhibits no edema or tenderness.   Lymphadenopathy:     He has no cervical adenopathy.   Neurological: He is alert and oriented to person, place, and time. He has normal reflexes. He displays normal reflexes. No cranial nerve deficit. He exhibits normal muscle tone. Coordination normal.   Skin: Skin is warm. No rash noted. He is not diaphoretic. No erythema. No pallor.   Psychiatric: He has a normal mood and affect. His behavior is normal. Judgment and thought content normal.   Nursing note and vitals reviewed.            Assessment:       1. Hardware complicating wound infection, subsequent encounter    2. Pseudomonas aeruginosa infection    3. Left ventricular assist device (LVAD) complication, subsequent encounter        76 y/o male with a history of pseudomonas infection of his driveline.  The site was examined today and is dry with no significant drainage.  There is minimal surrounding erythema.  I will continue him ciprofloxacin 750 mg po bid for chronic suppression.  Plan:       Hardware complicating wound infection, subsequent encounter   -continue ciprofloxacin    Pseudomonas aeruginosa infection   -continue  ciprofloxacin    Left ventricular assist device (LVAD) complication, subsequent encounter   -continue ciprofloxacin

## 2017-11-15 ENCOUNTER — CLINICAL SUPPORT (OUTPATIENT)
Dept: TRANSPLANT | Facility: CLINIC | Age: 77
DRG: 305 | End: 2017-11-15
Payer: MEDICARE

## 2017-11-15 ENCOUNTER — OFFICE VISIT (OUTPATIENT)
Dept: TRANSPLANT | Facility: CLINIC | Age: 77
DRG: 305 | End: 2017-11-15
Payer: MEDICARE

## 2017-11-15 ENCOUNTER — RESEARCH ENCOUNTER (OUTPATIENT)
Dept: RESEARCH | Facility: HOSPITAL | Age: 77
End: 2017-11-15

## 2017-11-15 ENCOUNTER — HOSPITAL ENCOUNTER (INPATIENT)
Facility: HOSPITAL | Age: 77
LOS: 5 days | Discharge: HOME | DRG: 305 | End: 2017-11-20
Attending: INTERNAL MEDICINE | Admitting: INTERNAL MEDICINE
Payer: MEDICARE

## 2017-11-15 VITALS
HEIGHT: 72 IN | BODY MASS INDEX: 26.68 KG/M2 | WEIGHT: 197 LBS | TEMPERATURE: 98 F | SYSTOLIC BLOOD PRESSURE: 120 MMHG | HEART RATE: 63 BPM

## 2017-11-15 DIAGNOSIS — N18.4 STAGE 4 CHRONIC KIDNEY DISEASE: ICD-10-CM

## 2017-11-15 DIAGNOSIS — Z95.1 S/P CABG (CORONARY ARTERY BYPASS GRAFT): ICD-10-CM

## 2017-11-15 DIAGNOSIS — I42.9 CARDIOMYOPATHY: ICD-10-CM

## 2017-11-15 DIAGNOSIS — Z95.811 HEART REPLACED BY HEART ASSIST DEVICE: Primary | ICD-10-CM

## 2017-11-15 DIAGNOSIS — Z95.811 LVAD (LEFT VENTRICULAR ASSIST DEVICE) PRESENT: ICD-10-CM

## 2017-11-15 DIAGNOSIS — I10 ESSENTIAL HYPERTENSION: ICD-10-CM

## 2017-11-15 DIAGNOSIS — I25.5 ISCHEMIC CARDIOMYOPATHY: ICD-10-CM

## 2017-11-15 DIAGNOSIS — I16.0 HYPERTENSIVE URGENCY, MALIGNANT: ICD-10-CM

## 2017-11-15 LAB
ANION GAP SERPL CALC-SCNC: 9 MMOL/L
APTT BLDCRRT: 27.4 SEC
BASOPHILS # BLD AUTO: 0.03 K/UL
BASOPHILS NFR BLD: 0.5 %
BUN SERPL-MCNC: 22 MG/DL
CALCIUM SERPL-MCNC: 9.6 MG/DL
CHLORIDE SERPL-SCNC: 111 MMOL/L
CO2 SERPL-SCNC: 22 MMOL/L
CREAT SERPL-MCNC: 1.4 MG/DL
DIFFERENTIAL METHOD: ABNORMAL
EOSINOPHIL # BLD AUTO: 0.2 K/UL
EOSINOPHIL NFR BLD: 3.9 %
ERYTHROCYTE [DISTWIDTH] IN BLOOD BY AUTOMATED COUNT: 14.7 %
EST. GFR  (AFRICAN AMERICAN): 56.4 ML/MIN/1.73 M^2
EST. GFR  (NON AFRICAN AMERICAN): 48.8 ML/MIN/1.73 M^2
GLUCOSE SERPL-MCNC: 91 MG/DL
HCT VFR BLD AUTO: 31 %
HGB BLD-MCNC: 10 G/DL
IMM GRANULOCYTES # BLD AUTO: 0.02 K/UL
IMM GRANULOCYTES NFR BLD AUTO: 0.4 %
INR PPP: 2
LYMPHOCYTES # BLD AUTO: 1 K/UL
LYMPHOCYTES NFR BLD: 17.9 %
MAGNESIUM SERPL-MCNC: 2.2 MG/DL
MCH RBC QN AUTO: 29.7 PG
MCHC RBC AUTO-ENTMCNC: 32.3 G/DL
MCV RBC AUTO: 92 FL
MONOCYTES # BLD AUTO: 0.7 K/UL
MONOCYTES NFR BLD: 13.3 %
NEUTROPHILS # BLD AUTO: 3.6 K/UL
NEUTROPHILS NFR BLD: 64 %
NRBC BLD-RTO: 0 /100 WBC
PHOSPHATE SERPL-MCNC: 3.1 MG/DL
PLATELET # BLD AUTO: 170 K/UL
PMV BLD AUTO: 10.9 FL
POTASSIUM SERPL-SCNC: 3.9 MMOL/L
PROTHROMBIN TIME: 20.5 SEC
RBC # BLD AUTO: 3.37 M/UL
SODIUM SERPL-SCNC: 142 MMOL/L
WBC # BLD AUTO: 5.58 K/UL

## 2017-11-15 PROCEDURE — 36415 COLL VENOUS BLD VENIPUNCTURE: CPT

## 2017-11-15 PROCEDURE — 93750 INTERROGATION VAD IN PERSON: CPT | Performed by: PHYSICIAN ASSISTANT

## 2017-11-15 PROCEDURE — 84100 ASSAY OF PHOSPHORUS: CPT

## 2017-11-15 PROCEDURE — 93750 INTERROGATION VAD IN PERSON: CPT | Performed by: STUDENT IN AN ORGANIZED HEALTH CARE EDUCATION/TRAINING PROGRAM

## 2017-11-15 PROCEDURE — 99214 OFFICE O/P EST MOD 30 MIN: CPT | Mod: S$PBB,,, | Performed by: INTERNAL MEDICINE

## 2017-11-15 PROCEDURE — 99213 OFFICE O/P EST LOW 20 MIN: CPT | Mod: PBBFAC,25 | Performed by: INTERNAL MEDICINE

## 2017-11-15 PROCEDURE — 83735 ASSAY OF MAGNESIUM: CPT

## 2017-11-15 PROCEDURE — 85730 THROMBOPLASTIN TIME PARTIAL: CPT

## 2017-11-15 PROCEDURE — 20000000 HC ICU ROOM

## 2017-11-15 PROCEDURE — 80048 BASIC METABOLIC PNL TOTAL CA: CPT

## 2017-11-15 PROCEDURE — 93005 ELECTROCARDIOGRAM TRACING: CPT

## 2017-11-15 PROCEDURE — 25000003 PHARM REV CODE 250: Performed by: INTERNAL MEDICINE

## 2017-11-15 PROCEDURE — 85025 COMPLETE CBC W/AUTO DIFF WBC: CPT

## 2017-11-15 PROCEDURE — 93750 INTERROGATION VAD IN PERSON: CPT | Mod: ,,, | Performed by: INTERNAL MEDICINE

## 2017-11-15 PROCEDURE — 63600175 PHARM REV CODE 636 W HCPCS: Performed by: PHYSICIAN ASSISTANT

## 2017-11-15 PROCEDURE — 85610 PROTHROMBIN TIME: CPT

## 2017-11-15 PROCEDURE — 93010 ELECTROCARDIOGRAM REPORT: CPT | Mod: ,,, | Performed by: INTERNAL MEDICINE

## 2017-11-15 PROCEDURE — 25000003 PHARM REV CODE 250: Performed by: PHYSICIAN ASSISTANT

## 2017-11-15 PROCEDURE — 99999 PR PBB SHADOW E&M-EST. PATIENT-LVL III: CPT | Mod: PBBFAC,,, | Performed by: INTERNAL MEDICINE

## 2017-11-15 PROCEDURE — 36620 INSERTION CATHETER ARTERY: CPT | Mod: ,,, | Performed by: INTERNAL MEDICINE

## 2017-11-15 RX ORDER — LISINOPRIL 10 MG/1
10 TABLET ORAL 2 TIMES DAILY
Status: DISCONTINUED | OUTPATIENT
Start: 2017-11-15 | End: 2017-11-16

## 2017-11-15 RX ORDER — DOFETILIDE 0.25 MG/1
250 CAPSULE ORAL EVERY 12 HOURS
Status: DISCONTINUED | OUTPATIENT
Start: 2017-11-15 | End: 2017-11-20 | Stop reason: HOSPADM

## 2017-11-15 RX ORDER — TAMSULOSIN HYDROCHLORIDE 0.4 MG/1
0.4 CAPSULE ORAL DAILY
Status: DISCONTINUED | OUTPATIENT
Start: 2017-11-16 | End: 2017-11-20 | Stop reason: HOSPADM

## 2017-11-15 RX ORDER — BUPROPION HYDROCHLORIDE 150 MG/1
300 TABLET ORAL DAILY
Status: DISCONTINUED | OUTPATIENT
Start: 2017-11-16 | End: 2017-11-20 | Stop reason: HOSPADM

## 2017-11-15 RX ORDER — HYDRALAZINE HYDROCHLORIDE 50 MG/1
50 TABLET, FILM COATED ORAL EVERY 8 HOURS
Status: DISCONTINUED | OUTPATIENT
Start: 2017-11-15 | End: 2017-11-17

## 2017-11-15 RX ORDER — AMLODIPINE BESYLATE 5 MG/1
5 TABLET ORAL DAILY
Status: DISCONTINUED | OUTPATIENT
Start: 2017-11-16 | End: 2017-11-16

## 2017-11-15 RX ORDER — NICARDIPINE HYDROCHLORIDE 0.2 MG/ML
5 INJECTION INTRAVENOUS CONTINUOUS
Status: DISCONTINUED | OUTPATIENT
Start: 2017-11-15 | End: 2017-11-16

## 2017-11-15 RX ORDER — ACETAMINOPHEN 325 MG/1
650 TABLET ORAL ONCE
Status: COMPLETED | OUTPATIENT
Start: 2017-11-15 | End: 2017-11-15

## 2017-11-15 RX ORDER — HYDRALAZINE HYDROCHLORIDE 25 MG/1
25 TABLET, FILM COATED ORAL EVERY 8 HOURS
Status: DISCONTINUED | OUTPATIENT
Start: 2017-11-15 | End: 2017-11-15

## 2017-11-15 RX ORDER — WARFARIN 1 MG/1
4 TABLET ORAL DAILY
Status: DISCONTINUED | OUTPATIENT
Start: 2017-11-15 | End: 2017-11-16

## 2017-11-15 RX ORDER — HYDRALAZINE HYDROCHLORIDE 20 MG/ML
10 INJECTION INTRAMUSCULAR; INTRAVENOUS ONCE
Status: COMPLETED | OUTPATIENT
Start: 2017-11-15 | End: 2017-11-15

## 2017-11-15 RX ORDER — NAPROXEN SODIUM 220 MG/1
81 TABLET, FILM COATED ORAL DAILY
Status: DISCONTINUED | OUTPATIENT
Start: 2017-11-16 | End: 2017-11-20 | Stop reason: HOSPADM

## 2017-11-15 RX ORDER — ROPINIROLE 0.25 MG/1
0.5 TABLET, FILM COATED ORAL 3 TIMES DAILY
Status: DISCONTINUED | OUTPATIENT
Start: 2017-11-15 | End: 2017-11-20 | Stop reason: HOSPADM

## 2017-11-15 RX ORDER — ALLOPURINOL 300 MG/1
300 TABLET ORAL DAILY
Status: DISCONTINUED | OUTPATIENT
Start: 2017-11-16 | End: 2017-11-20 | Stop reason: HOSPADM

## 2017-11-15 RX ORDER — CIPROFLOXACIN 750 MG/1
750 TABLET, FILM COATED ORAL 2 TIMES DAILY
Status: DISCONTINUED | OUTPATIENT
Start: 2017-11-15 | End: 2017-11-20 | Stop reason: HOSPADM

## 2017-11-15 RX ORDER — LANOLIN ALCOHOL/MO/W.PET/CERES
400 CREAM (GRAM) TOPICAL 2 TIMES DAILY
Status: DISCONTINUED | OUTPATIENT
Start: 2017-11-15 | End: 2017-11-19

## 2017-11-15 RX ORDER — BUPROPION HYDROCHLORIDE 150 MG/1
150 TABLET ORAL DAILY
Status: DISCONTINUED | OUTPATIENT
Start: 2017-11-16 | End: 2017-11-20 | Stop reason: HOSPADM

## 2017-11-15 RX ORDER — DOCUSATE SODIUM 100 MG/1
100 CAPSULE, LIQUID FILLED ORAL 2 TIMES DAILY
Status: DISCONTINUED | OUTPATIENT
Start: 2017-11-15 | End: 2017-11-20 | Stop reason: HOSPADM

## 2017-11-15 RX ORDER — ATORVASTATIN CALCIUM 10 MG/1
10 TABLET, FILM COATED ORAL DAILY
Status: DISCONTINUED | OUTPATIENT
Start: 2017-11-16 | End: 2017-11-20 | Stop reason: HOSPADM

## 2017-11-15 RX ADMIN — CIPROFLOXACIN HYDROCHLORIDE 750 MG: 750 TABLET, FILM COATED ORAL at 10:11

## 2017-11-15 RX ADMIN — NICARDIPINE HYDROCHLORIDE 5 MG/HR: 0.2 INJECTION, SOLUTION INTRAVENOUS at 10:11

## 2017-11-15 RX ADMIN — WARFARIN SODIUM 4 MG: 4 TABLET ORAL at 06:11

## 2017-11-15 RX ADMIN — HYDRALAZINE HYDROCHLORIDE 10 MG: 20 INJECTION INTRAMUSCULAR; INTRAVENOUS at 05:11

## 2017-11-15 RX ADMIN — LISINOPRIL 10 MG: 10 TABLET ORAL at 10:11

## 2017-11-15 RX ADMIN — ROPINIROLE HYDROCHLORIDE 0.5 MG: 0.25 TABLET, FILM COATED ORAL at 10:11

## 2017-11-15 RX ADMIN — ACETAMINOPHEN 650 MG: 325 TABLET ORAL at 07:11

## 2017-11-15 RX ADMIN — HYDRALAZINE HYDROCHLORIDE 50 MG: 50 TABLET ORAL at 10:11

## 2017-11-15 RX ADMIN — DOFETILIDE 250 MCG: 0.12 CAPSULE ORAL at 10:11

## 2017-11-15 RX ADMIN — MAGNESIUM OXIDE TAB 400 MG (241.3 MG ELEMENTAL MG) 400 MG: 400 (241.3 MG) TAB at 10:11

## 2017-11-15 RX ADMIN — DOCUSATE SODIUM 100 MG: 100 CAPSULE, LIQUID FILLED ORAL at 10:11

## 2017-11-15 NOTE — PROCEDURES
TXP SANTOSH INTERROGATIONS 11/15/2017 10/18/2017 9/21/2017 9/7/2017 9/2/2017 9/2/2017 9/1/2017   Type HeartMate3 HeartMate3;HeartMate II HeartMate3 HeartMate3 - - -   Flow 4.0 5.2 5.3 5.3 - - -   Speed 5800 5800 5800 5800 - - -   PI 5.1 3.0 2.8 2.6 - - -   Power (Mendez) 4.6 4.6 4.3 4.5 - - -   LSL 5400 5400 5400 5400 - - -   Pulsatility No Pulse Pulse Pulse Intermittent pulse Intermittent pulse Intermittent pulse Intermittent pulse   }

## 2017-11-15 NOTE — PROGRESS NOTES
Study: Moment[employer] 3 CAP  Sponsor: [employer]  Follow-up Visit: 1 year  Date of Visit: 18Oct2017    Patient wishes to continue in study: Yes  All study protocol required CRFs completed: Yes    Mr. Vasquez is here for the 1 year follow up visit. Current list of medications obtained and verified; he reports that he has been admitted since his transfer from [facility] in [place]. This is his first study visit at [facility]. All questions answered to his satisfaction and he will contact research staff if he has any questions or concerns. Next follow up visit is in 6 months.     The following tests and procedures are related to research study:  Labs, Echo, chest x ray, VAD clinic visit

## 2017-11-15 NOTE — PROGRESS NOTES
Pt Admit to room  325. Tele box applied; pt In Sinus Rhythm. Nonskid socks on. ID band and Fall risk Bands on. VSS; BP high 138 84 (map 107) and dop 120.  All VAD #'S WDL; history checked. PIV inserted. Pt oriented to room, family at bedside.     1630 Dr Pérez Phone called'  Cintia lopez answered said would inform PA of pt arrival     1715 Dr Pérez called again to inform of high BP/ DOP. Pt states that he took all AM meds. Orders to transfer patient to unit and a 1x dose of 10mg IVP hydralazine. Charge nurse notified.     Pt and family updated on plan. Pt has no questions at this time. Pt stable will continue to monitor

## 2017-11-15 NOTE — PROGRESS NOTES
Subjective:   Patient ID:  Kev Vasquez is a 77 y.o. male who presents for LVAD followup visit.    Implant Date:10-19-16 @ [facility] 3 RPM 5800     INR goal: 2-3   Bridge with Heparin   Antiplatelets: ASA 81 mg       TXP SANTOSH INTERROGATIONS 11/15/2017   Type HeartMate3   Flow 4.0   Speed 5800   PI 5.1   Power (Mendez) 4.6   LSL 5400   Pulsatility No Pulse       HPI   Mr. Vasquez is a pleasant 77 y.o. y.o. WM with ischemic cardiomyopathy status post Heartmate III 10/16/16 LVAD, CKD II, CAD, VT on Tikosyn, HTN, HLD, Gout, Obesity, SSS, and depression who presents for routine VAD f/u  followup.  Mr Vasquez was a former resident of the [facility] area who had moved to [place] many years ago where he lived with his wife. He had a Heartmate III implanted 10/16/16 and had a subsequently prolonged clinical course. He had prolonged intubation lasting about 3 weeks. From this hospitalization he had a prolonged course in a skilled nursing facility/rehab center where he proceeded to lose around 50lbs. Earlier this month (July 5,2017), he returned home to [place] with his wife to live with their older daughter. He remains debilitated, unable to walk, and is essentially maximally assisted with transfers. He now has 24 hour care at home (caregiver is present with wife and daughter in clinic) as well as he is getting PT twice weekly at home and OT once weekly.  He has not lost anymore weight since the spring but remains thin and weak. His recent course was complicated by a DLI by pan sensitive pseudomonas, being treated with IV Cefepime. He has been following with Dr. Muhammad , and was last seen 10/16/17. It was noted that driveline looked improved and he was transitioned from IV Cefepime to Oral Ciprofloxacin. His Aldactone had been previously stopped due to persistent hyperkalemia. Comes today for his regular VAD visit. VAD speed is at 5400 rpm. No VAD alarms noted on interrogation occasional PI events. BP  "is elevated 120 mm of Hg. DLES is a "1". INR is therapeutic at 2.0. LDH is 169 at baseline.       Review of Systems   Constitution: Negative. Negative for chills, decreased appetite, diaphoresis, fever, weakness, malaise/fatigue, night sweats, weight gain and weight loss.   Eyes: Negative.    Cardiovascular: Negative for chest pain, claudication, cyanosis, dyspnea on exertion, irregular heartbeat, leg swelling, near-syncope, orthopnea, palpitations, paroxysmal nocturnal dyspnea and syncope.   Respiratory: Negative for cough, hemoptysis and shortness of breath.    Endocrine: Negative.    Hematologic/Lymphatic: Negative.    Skin: Negative for color change, dry skin and nail changes.   Musculoskeletal: Negative.    Gastrointestinal: Negative.    Genitourinary: Negative.        Objective:   Blood pressure (!) 120/0, pulse 63, temperature 98.4 °F (36.9 °C), height 6' (1.829 m), weight 89.4 kg (197 lb).body mass index is 26.72 kg/m².    Doppler: 120    Physical Exam   Constitutional: He appears well-developed.   BP (!) 120/0 (BP Location: Right arm, Patient Position: Sitting, BP Method: Medium (Manual))   Pulse 63   Temp 98.4 °F (36.9 °C)   Ht 6' (1.829 m)   Wt 89.4 kg (197 lb)   BMI 26.72 kg/m²      HENT:   Head: Normocephalic.   Neck: No JVD present. Carotid bruit is not present.   Cardiovascular: Regular rhythm and normal heart sounds.    No murmur heard.  Smooth VAD hum. DLES is "1"   Pulmonary/Chest: Effort normal and breath sounds normal. No respiratory distress. He has no wheezes. He has no rales.   Abdominal: Soft. Bowel sounds are normal. He exhibits no distension. There is no tenderness. There is no rebound.   Musculoskeletal: He exhibits no edema.   Neurological: He is alert.   Skin: Skin is warm.   Vitals reviewed.      Lab Results   Component Value Date    WBC 6.42 11/14/2017    HGB 10.4 (L) 11/14/2017    HCT 32.2 (L) 11/14/2017    MCV 94 11/14/2017     11/14/2017    CO2 27 11/14/2017    " CREATININE 1.6 (H) 11/14/2017    CALCIUM 9.9 11/14/2017    ALBUMIN 3.2 (L) 11/14/2017    AST 22 11/14/2017     (H) 11/14/2017    ALT 28 11/14/2017     11/14/2017       Lab Results   Component Value Date    INR 2.0 (H) 11/14/2017    INR 2.1 (H) 11/08/2017    INR 2.3 (H) 11/02/2017       BNP   Date Value Ref Range Status   11/14/2017 715 (H) 0 - 99 pg/mL Final     Comment:     Values of less than 100 pg/ml are consistent with non-CHF populations.   10/18/2017 265 (H) 0 - 99 pg/mL Final     Comment:     Values of less than 100 pg/ml are consistent with non-CHF populations.   09/21/2017 140 (H) 0 - 99 pg/mL Final     Comment:     Values of less than 100 pg/ml are consistent with non-CHF populations.       LD   Date Value Ref Range Status   11/14/2017 169 110 - 260 U/L Final     Comment:     Results are increased in hemolyzed samples.   10/18/2017 184 110 - 260 U/L Final     Comment:     Results are increased in hemolyzed samples.   09/21/2017 154 110 - 260 U/L Final     Comment:     Results are increased in hemolyzed samples.       Labs were reviewed with the patient.    No results found for this or any previous visit.  No results found for this or any previous visit.    Assessment:      1. Heart replaced by heart assist device    2. Ischemic cardiomyopathy    3. Essential hypertension    4. S/P CABG (coronary artery bypass graft)    5. Stage 4 chronic kidney disease        Plan:   Patient is now NYHA class II. BP is significantly elevated. Needs to be admitted for BP control (rechecked twice). INR is therapeutic.  VAD interrogation was performed in clinic  Provided with VAD supplies.   Recommend 2 gram sodium restriction and 1500cc fluid restriction.  Encourage physical activity with graded exercise program.  Requested patient to weigh themselves daily, and to notify us if their weight increases by more than 3 lbs in 1 day or 5 lbs in 1 week.     Listed for transplant: DT    UNOS Patient  Status  Functional Status: 100% - Normal, no complaints, no evidence of disease  Physical Capacity: No Limitations  Working for Income: Unknown    Carlito Santana MD

## 2017-11-16 LAB
ALLENS TEST: ABNORMAL
ANION GAP SERPL CALC-SCNC: 7 MMOL/L
APTT BLDCRRT: 27.2 SEC
BASOPHILS # BLD AUTO: 0.03 K/UL
BASOPHILS NFR BLD: 0.5 %
BUN SERPL-MCNC: 23 MG/DL
CALCIUM SERPL-MCNC: 9.1 MG/DL
CHLORIDE SERPL-SCNC: 108 MMOL/L
CO2 SERPL-SCNC: 23 MMOL/L
CREAT SERPL-MCNC: 1.4 MG/DL
DELSYS: ABNORMAL
DIASTOLIC DYSFUNCTION: YES
DIFFERENTIAL METHOD: ABNORMAL
EOSINOPHIL # BLD AUTO: 0.2 K/UL
EOSINOPHIL NFR BLD: 3.4 %
ERYTHROCYTE [DISTWIDTH] IN BLOOD BY AUTOMATED COUNT: 14.6 %
ERYTHROCYTE [SEDIMENTATION RATE] IN BLOOD BY WESTERGREN METHOD: 20 MM/H
EST. GFR  (AFRICAN AMERICAN): 56.4 ML/MIN/1.73 M^2
EST. GFR  (NON AFRICAN AMERICAN): 48.8 ML/MIN/1.73 M^2
ESTIMATED PA SYSTOLIC PRESSURE: 40
FLOW: 0
GLUCOSE SERPL-MCNC: 96 MG/DL
HCO3 UR-SCNC: 24.1 MMOL/L (ref 24–28)
HCT VFR BLD AUTO: 28.6 %
HGB BLD-MCNC: 9.5 G/DL
IMM GRANULOCYTES # BLD AUTO: 0.03 K/UL
IMM GRANULOCYTES NFR BLD AUTO: 0.5 %
INR PPP: 2
LDH SERPL L TO P-CCNC: 171 U/L
LYMPHOCYTES # BLD AUTO: 0.9 K/UL
LYMPHOCYTES NFR BLD: 15.8 %
MAGNESIUM SERPL-MCNC: 2.2 MG/DL
MCH RBC QN AUTO: 30.3 PG
MCHC RBC AUTO-ENTMCNC: 33.2 G/DL
MCV RBC AUTO: 91 FL
MODE: ABNORMAL
MONOCYTES # BLD AUTO: 0.8 K/UL
MONOCYTES NFR BLD: 13 %
NEUTROPHILS # BLD AUTO: 4 K/UL
NEUTROPHILS NFR BLD: 66.8 %
NRBC BLD-RTO: 0 /100 WBC
PCO2 BLDA: 38.8 MMHG (ref 35–45)
PH SMN: 7.4 [PH] (ref 7.35–7.45)
PHOSPHATE SERPL-MCNC: 3.1 MG/DL
PLATELET # BLD AUTO: 166 K/UL
PMV BLD AUTO: 11 FL
PO2 BLDA: 50 MMHG (ref 80–100)
POC BE: -1 MMOL/L
POC SATURATED O2: 85 % (ref 95–100)
POC TCO2: 25 MMOL/L (ref 23–27)
POTASSIUM SERPL-SCNC: 3.7 MMOL/L
PROTHROMBIN TIME: 19.8 SEC
RBC # BLD AUTO: 3.14 M/UL
RETIRED EF AND QEF - SEE NOTES: 15 (ref 55–65)
SAMPLE: ABNORMAL
SITE: ABNORMAL
SODIUM SERPL-SCNC: 138 MMOL/L
SP02: 84
TRICUSPID VALVE REGURGITATION: ABNORMAL
WBC # BLD AUTO: 5.94 K/UL

## 2017-11-16 PROCEDURE — 63600175 PHARM REV CODE 636 W HCPCS: Performed by: INTERNAL MEDICINE

## 2017-11-16 PROCEDURE — 93306 TTE W/DOPPLER COMPLETE: CPT | Mod: 26,,, | Performed by: INTERNAL MEDICINE

## 2017-11-16 PROCEDURE — 37799 UNLISTED PX VASCULAR SURGERY: CPT

## 2017-11-16 PROCEDURE — 83615 LACTATE (LD) (LDH) ENZYME: CPT

## 2017-11-16 PROCEDURE — 85610 PROTHROMBIN TIME: CPT

## 2017-11-16 PROCEDURE — 25000003 PHARM REV CODE 250: Performed by: PHYSICIAN ASSISTANT

## 2017-11-16 PROCEDURE — 25000003 PHARM REV CODE 250: Performed by: STUDENT IN AN ORGANIZED HEALTH CARE EDUCATION/TRAINING PROGRAM

## 2017-11-16 PROCEDURE — 97116 GAIT TRAINING THERAPY: CPT

## 2017-11-16 PROCEDURE — 99900035 HC TECH TIME PER 15 MIN (STAT)

## 2017-11-16 PROCEDURE — 84100 ASSAY OF PHOSPHORUS: CPT

## 2017-11-16 PROCEDURE — 27000221 HC OXYGEN, UP TO 24 HOURS

## 2017-11-16 PROCEDURE — 85025 COMPLETE CBC W/AUTO DIFF WBC: CPT

## 2017-11-16 PROCEDURE — 27000248 HC VAD-ADDITIONAL DAY

## 2017-11-16 PROCEDURE — 85730 THROMBOPLASTIN TIME PARTIAL: CPT

## 2017-11-16 PROCEDURE — G8979 MOBILITY GOAL STATUS: HCPCS | Mod: CK

## 2017-11-16 PROCEDURE — 25000003 PHARM REV CODE 250: Performed by: INTERNAL MEDICINE

## 2017-11-16 PROCEDURE — 93306 TTE W/DOPPLER COMPLETE: CPT

## 2017-11-16 PROCEDURE — 82803 BLOOD GASES ANY COMBINATION: CPT

## 2017-11-16 PROCEDURE — G8978 MOBILITY CURRENT STATUS: HCPCS | Mod: CN

## 2017-11-16 PROCEDURE — 99222 1ST HOSP IP/OBS MODERATE 55: CPT | Mod: ,,, | Performed by: INTERNAL MEDICINE

## 2017-11-16 PROCEDURE — 97161 PT EVAL LOW COMPLEX 20 MIN: CPT

## 2017-11-16 PROCEDURE — 94761 N-INVAS EAR/PLS OXIMETRY MLT: CPT

## 2017-11-16 PROCEDURE — 80048 BASIC METABOLIC PNL TOTAL CA: CPT

## 2017-11-16 PROCEDURE — 83735 ASSAY OF MAGNESIUM: CPT

## 2017-11-16 PROCEDURE — 20600001 HC STEP DOWN PRIVATE ROOM

## 2017-11-16 RX ORDER — HYDRALAZINE HYDROCHLORIDE 20 MG/ML
10 INJECTION INTRAMUSCULAR; INTRAVENOUS ONCE
Status: COMPLETED | OUTPATIENT
Start: 2017-11-17 | End: 2017-11-16

## 2017-11-16 RX ORDER — WARFARIN 4 MG/1
4 TABLET ORAL
Status: DISCONTINUED | OUTPATIENT
Start: 2017-11-19 | End: 2017-11-20 | Stop reason: HOSPADM

## 2017-11-16 RX ORDER — WARFARIN 4 MG/1
4 TABLET ORAL
Status: DISCONTINUED | OUTPATIENT
Start: 2017-11-16 | End: 2017-11-20 | Stop reason: HOSPADM

## 2017-11-16 RX ORDER — POTASSIUM CHLORIDE 20 MEQ/1
40 TABLET, EXTENDED RELEASE ORAL ONCE
Status: COMPLETED | OUTPATIENT
Start: 2017-11-16 | End: 2017-11-16

## 2017-11-16 RX ORDER — AMLODIPINE BESYLATE 10 MG/1
10 TABLET ORAL DAILY
Status: DISCONTINUED | OUTPATIENT
Start: 2017-11-16 | End: 2017-11-17

## 2017-11-16 RX ORDER — HYDRALAZINE HYDROCHLORIDE 20 MG/ML
10 INJECTION INTRAMUSCULAR; INTRAVENOUS ONCE
Status: COMPLETED | OUTPATIENT
Start: 2017-11-16 | End: 2017-11-16

## 2017-11-16 RX ORDER — NICARDIPINE HYDROCHLORIDE 0.2 MG/ML
5 INJECTION INTRAVENOUS CONTINUOUS
Status: DISCONTINUED | OUTPATIENT
Start: 2017-11-16 | End: 2017-11-16

## 2017-11-16 RX ORDER — WARFARIN 1 MG/1
4 TABLET ORAL
Status: DISCONTINUED | OUTPATIENT
Start: 2017-11-21 | End: 2017-11-16

## 2017-11-16 RX ADMIN — ROPINIROLE HYDROCHLORIDE 0.5 MG: 0.25 TABLET, FILM COATED ORAL at 09:11

## 2017-11-16 RX ADMIN — ALLOPURINOL 300 MG: 300 TABLET ORAL at 09:11

## 2017-11-16 RX ADMIN — AMLODIPINE BESYLATE 10 MG: 10 TABLET ORAL at 09:11

## 2017-11-16 RX ADMIN — HYDRALAZINE HYDROCHLORIDE 50 MG: 50 TABLET ORAL at 09:11

## 2017-11-16 RX ADMIN — DOFETILIDE 250 MCG: 0.12 CAPSULE ORAL at 09:11

## 2017-11-16 RX ADMIN — TAMSULOSIN HYDROCHLORIDE 0.4 MG: 0.4 CAPSULE ORAL at 09:11

## 2017-11-16 RX ADMIN — MAGNESIUM OXIDE TAB 400 MG (241.3 MG ELEMENTAL MG) 400 MG: 400 (241.3 MG) TAB at 09:11

## 2017-11-16 RX ADMIN — CIPROFLOXACIN HYDROCHLORIDE 750 MG: 750 TABLET, FILM COATED ORAL at 09:11

## 2017-11-16 RX ADMIN — HYDRALAZINE HYDROCHLORIDE 10 MG: 20 INJECTION INTRAMUSCULAR; INTRAVENOUS at 06:11

## 2017-11-16 RX ADMIN — LISINOPRIL 15 MG: 5 TABLET ORAL at 09:11

## 2017-11-16 RX ADMIN — BUPROPION HYDROCHLORIDE 150 MG: 150 TABLET, EXTENDED RELEASE ORAL at 09:11

## 2017-11-16 RX ADMIN — MAGNESIUM OXIDE TAB 400 MG (241.3 MG ELEMENTAL MG) 400 MG: 400 (241.3 MG) TAB at 11:11

## 2017-11-16 RX ADMIN — BUPROPION HYDROCHLORIDE 300 MG: 150 TABLET, EXTENDED RELEASE ORAL at 09:11

## 2017-11-16 RX ADMIN — HYDRALAZINE HYDROCHLORIDE 50 MG: 50 TABLET ORAL at 05:11

## 2017-11-16 RX ADMIN — NICARDIPINE HYDROCHLORIDE 7.5 MG/HR: 0.2 INJECTION, SOLUTION INTRAVENOUS at 09:11

## 2017-11-16 RX ADMIN — ATORVASTATIN CALCIUM 10 MG: 10 TABLET, FILM COATED ORAL at 09:11

## 2017-11-16 RX ADMIN — POTASSIUM CHLORIDE 40 MEQ: 1500 TABLET, EXTENDED RELEASE ORAL at 05:11

## 2017-11-16 RX ADMIN — HYDRALAZINE HYDROCHLORIDE 50 MG: 50 TABLET ORAL at 01:11

## 2017-11-16 RX ADMIN — ROPINIROLE HYDROCHLORIDE 0.5 MG: 0.25 TABLET, FILM COATED ORAL at 01:11

## 2017-11-16 RX ADMIN — HYDRALAZINE HYDROCHLORIDE 10 MG: 20 INJECTION INTRAMUSCULAR; INTRAVENOUS at 11:11

## 2017-11-16 RX ADMIN — DOCUSATE SODIUM 100 MG: 100 CAPSULE, LIQUID FILLED ORAL at 09:11

## 2017-11-16 RX ADMIN — ASPIRIN 81 MG CHEWABLE TABLET 81 MG: 81 TABLET CHEWABLE at 09:11

## 2017-11-16 RX ADMIN — NICARDIPINE HYDROCHLORIDE 7.5 MG/HR: 0.2 INJECTION, SOLUTION INTRAVENOUS at 05:11

## 2017-11-16 RX ADMIN — WARFARIN SODIUM 4 MG: 1 TABLET ORAL at 05:11

## 2017-11-16 RX ADMIN — ROPINIROLE HYDROCHLORIDE 0.5 MG: 0.25 TABLET, FILM COATED ORAL at 05:11

## 2017-11-16 NOTE — PROCEDURES
"Kev Vasquez is a 77 y.o. male patient.    Temp: 98.4 °F (36.9 °C) (11/15/17 2300)  Pulse: 68 (11/15/17 2301)  Resp: (!) 22 (11/15/17 2301)  BP: (!) 89/0 (11/15/17 2301)  SpO2: 96 % (11/15/17 2301)  Weight: 91 kg (200 lb 9.9 oz) (11/15/17 2329)  Height: 6' 0.05" (183 cm) (11/15/17 1803)    Arterial Line  Date/Time: 11/15/2017 11:34 PM  Performed by: ARISTEO DOMINGUEZ  Authorized by: ARISTEO DOMINGUEZ   Pre-op Diagnosis: hypertension  Post-operative diagnosis: hypertension  Consent Done: Yes  Consent: Written consent obtained.  Risks and benefits: risks, benefits and alternatives were discussed  Consent given by: patient  Patient understanding: patient states understanding of the procedure being performed  Patient consent: the patient's understanding of the procedure matches consent given  Procedure consent: procedure consent matches procedure scheduled  Patient identity confirmed: name, MRN,  and verbally with patient  Time out: Immediately prior to procedure a "time out" was called to verify the correct patient, procedure, equipment, support staff and site/side marked as required.  Preparation: Patient was prepped and draped in the usual sterile fashion.  Indications: hemodynamic monitoring  Location: right radial  Anesthesia: local infiltration    Anesthesia:  Local Anesthetic: lidocaine 1% without epinephrine  Uday's test normal: yes  Number of attempts: 1  Complications: No  Post-procedure: line sutured and dressing applied  Patient tolerance: Patient tolerated the procedure well with no immediate complications          TXP LVAD INTERROGATIONS 11/15/2017 11/15/2017 11/15/2017 11/15/2017 10/18/2017 2017 2017   Type HeartMate II HeartMate3 HeartMate3 - - - -   Flow 4.5 4.8 3.8 - - - -   Speed 5800 5800 5800 - - - -   PI 4.3 3.2 5.9 - - - -   Power (Mendez) 4.4 4.5 4.4 - - - -   LSL 5400 5400 5400 - - - -   Pulsatility Pulse Pulse Intermittent pulse No Pulse Pulse Pulse Intermittent pulse "       Julián Ortiz  11/15/2017

## 2017-11-16 NOTE — SUBJECTIVE & OBJECTIVE
Interval History:  Made changes listed above. Patient is asymptomatic currently. Will plan to transition to the floor later today.    Continuous Infusions:   nicardipine Stopped (11/16/17 1040)     Scheduled Meds:   allopurinol  300 mg Oral Daily    amLODIPine  10 mg Oral Daily    aspirin  81 mg Oral Daily    atorvastatin  10 mg Oral Daily    buPROPion  150 mg Oral Daily    buPROPion  300 mg Oral Daily    ciprofloxacin HCl  750 mg Oral BID    docusate sodium  100 mg Oral BID    dofetilide  250 mcg Oral Q12H    hydrALAZINE  50 mg Oral Q8H    lisinopril  15 mg Oral BID    magnesium oxide  400 mg Oral BID    rOPINIRole  0.5 mg Oral TID    tamsulosin  0.4 mg Oral Daily    [START ON 11/19/2017] warfarin  4 mg Oral Every Sun    warfarin  4 mg Oral Every Tues, Thurs    [START ON 11/17/2017] warfarin  6 mg Oral Every Mon, Wed, Fri    [START ON 11/18/2017] warfarin  6 mg Oral Every Sat     PRN Meds:    Review of patient's allergies indicates:   Allergen Reactions    Sulfa (sulfonamide antibiotics)      Objective:     Vital Signs (Most Recent):  Temp: 98.1 °F (36.7 °C) (11/16/17 1100)  Pulse: 64 (11/16/17 1500)  Resp: (!) 29 (11/16/17 1500)  BP: (!) 106/0 (11/16/17 1500)  SpO2: 99 % (11/16/17 1500) Vital Signs (24h Range):  Temp:  [98.1 °F (36.7 °C)-98.7 °F (37.1 °C)] 98.1 °F (36.7 °C)  Pulse:  [62-96] 64  Resp:  [17-29] 29  SpO2:  [74 %-100 %] 99 %  BP: ()/(0-108) 106/0  Arterial Line BP: ()/(56-81) 116/67     Patient Vitals for the past 72 hrs (Last 3 readings):   Weight   11/16/17 0800 91.4 kg (201 lb 8 oz)   11/16/17 0500 91.4 kg (201 lb 8 oz)   11/15/17 2329 91 kg (200 lb 9.9 oz)     Body mass index is 27.29 kg/m².      Intake/Output Summary (Last 24 hours) at 11/16/17 1538  Last data filed at 11/16/17 1040   Gross per 24 hour   Intake           365.63 ml   Output              970 ml   Net          -604.37 ml       Hemodynamic Parameters:       Physical Exam   Constitutional: He is  oriented to person, place, and time. He appears well-developed and well-nourished.   HENT:   Head: Normocephalic and atraumatic.   Eyes: EOM are normal. Pupils are equal, round, and reactive to light.   Neck: Normal range of motion. Neck supple. No JVD present.   Cardiovascular: Normal rate, regular rhythm, normal heart sounds and intact distal pulses.    Pulmonary/Chest: Effort normal and breath sounds normal.   Abdominal: Soft. Bowel sounds are normal. There is no tenderness.   Musculoskeletal: Normal range of motion. He exhibits no edema.   Neurological: He is alert and oriented to person, place, and time.   Vitals reviewed.      Significant Labs:  CBC:    Recent Labs  Lab 11/14/17  1401 11/15/17  1648 11/16/17 0257   WBC 6.42 5.58 5.94   RBC 3.43* 3.37* 3.14*   HGB 10.4* 10.0* 9.5*   HCT 32.2* 31.0* 28.6*    170 166   MCV 94 92 91   MCH 30.3 29.7 30.3   MCHC 32.3 32.3 33.2     BNP:    Recent Labs  Lab 11/14/17  1401   *     CMP:    Recent Labs  Lab 11/14/17  1401 11/15/17  1648 11/16/17  0257    91 96   CALCIUM 9.9 9.6 9.1   ALBUMIN 3.2*  --   --    PROT 6.9  --   --     142 138   K 4.3 3.9 3.7   CO2 27 22* 23    111* 108   BUN 24* 22 23   CREATININE 1.6* 1.4 1.4   ALKPHOS 75  --   --    ALT 28  --   --    AST 22  --   --    BILITOT 0.4  --   --       Coagulation:     Recent Labs  Lab 11/14/17  1401 11/15/17  1648 11/16/17  0257   INR 2.0* 2.0* 2.0*   APTT  --  27.4 27.2     LDH:    Recent Labs  Lab 11/14/17  1401 11/16/17  0257    171     Microbiology:  Microbiology Results (last 7 days)     ** No results found for the last 168 hours. **          I have reviewed all pertinent labs within the past 24 hours.    Estimated Creatinine Clearance: 50.1 mL/min (based on SCr of 1.4 mg/dL).    Diagnostic Results:  Echo: I have reviewed all pertinent results/findings  2D echo with 2d color flow:    1 - Moderate left ventricular enlargement.     2 - Severely depressed left  ventricular systolic function (EF 15-20%).     3 - Impaired LV relaxation, normal LAP (grade 1 diastolic dysfunction).     4 - Biatrial enlargement.     5 - Right ventricle is upper limit of normal in size with not well seen systolic function.     6 - Severe tricuspid regurgitation.     7 - Increased central venous pressure.     8 - The estimated PA systolic pressure is 40 mmHg.     9 - Heartmate III LVAD; speed 5800.

## 2017-11-16 NOTE — PROGRESS NOTES
Date of Implant with Heartmate 3 LVAD: 10/19/16    PATIENT ARRIVED IN CLINIC:  wheelchair  Accompanied by: wife and other family member    Vitals  Doppler = 120,   PaiIN: denies on 0-10 pain scale  Is patient currently on medications for pain? no What kind? n/a      VAD Interrogation:  TXP SANTOSH INTERROGATIONS 11/15/2017   Type heartmate 3   Flow -4   Speed -5800   PI -5.1   Power (Mendez) -4.6   LSL -5400   Pulsatility Pulse       Flow in history: 4-6  History [id number]  HCT:   Lab Results   Component Value Date    HCT 28.6 (L) 2017    HCT 40 2017         Problems / Issues / Alarms with VAD if any: None noted  Any Equipment Issues: None noted (Refer to  note for complete details)   Emergency Equipment With Patient: yes   VAD Binder With Patient: yes   Reviewed VAD Numbers In Binder: yes  VAD Sounds: HM3 sound Smooth  Manual & Visual Inspection Of Driveline: No kinks or tears noted    LVAD Dressing/DLES:  Appearance Of Driveline: 2  Antibiotics: NO  Velour: no  Frequency of Dressing Changes: daily & soap and water dressing  Stabilization Device In Use: yes, silverio securement device    Modular Cable Connection Intact: No yellow exposed  Pt In Need Of Management Kits?:Yes - 3 boxes  It is medically necessary to have VAD management kits in order to prevent infection or to assist in the healing of an infected DLES.    Assessment:   Complaints/reason for visit today: routine  Complaints Of Nausea / Vomiting: None noted    Appearance and Frequency Of Stools: normal and formed without blood & daily, some intermittent constipation  Color Of Urine: clear/yellow  Coping/Depression/Anxiety: coping okay, but reports intermittent depression  Sleep Habits: unsure hrs /night  Sleep Aids: None noted  Showering: Yes, reminded to change dressing immediately after drying off  Activity/Exercise: pt reports about 30  Minutes per day   Driving: No.    Labs:    Chemistry        Component Value  Date/Time     11/16/2017 0257    K 3.7 11/16/2017 0257     11/16/2017 0257    CO2 23 11/16/2017 0257    BUN 23 11/16/2017 0257    CREATININE 1.4 11/16/2017 0257    GLU 96 11/16/2017 0257        Component Value Date/Time    CALCIUM 9.1 11/16/2017 0257    ALKPHOS 75 11/14/2017 1401    AST 22 11/14/2017 1401    ALT 28 11/14/2017 1401    BILITOT 0.4 11/14/2017 1401    ESTGFRAFRICA 56.4 (A) 11/16/2017 0257    EGFRNONAA 48.8 (A) 11/16/2017 0257            Magnesium   Date Value Ref Range Status   11/16/2017 2.2 1.6 - 2.6 mg/dL Final       Lab Results   Component Value Date    WBC 5.94 11/16/2017    HGB 9.5 (L) 11/16/2017    HCT 28.6 (L) 11/16/2017    MCV 91 11/16/2017     11/16/2017       Lab Results   Component Value Date    INR 2.0 (H) 11/16/2017    INR 2.0 (H) 11/15/2017    INR 2.0 (H) 11/14/2017       BNP   Date Value Ref Range Status   11/14/2017 715 (H) 0 - 99 pg/mL Final     Comment:     Values of less than 100 pg/ml are consistent with non-CHF populations.   10/18/2017 265 (H) 0 - 99 pg/mL Final     Comment:     Values of less than 100 pg/ml are consistent with non-CHF populations.   09/21/2017 140 (H) 0 - 99 pg/mL Final     Comment:     Values of less than 100 pg/ml are consistent with non-CHF populations.       LD   Date Value Ref Range Status   11/16/2017 171 110 - 260 U/L Final     Comment:     Results are increased in hemolyzed samples.   11/14/2017 169 110 - 260 U/L Final     Comment:     Results are increased in hemolyzed samples.   10/18/2017 184 110 - 260 U/L Final     Comment:     Results are increased in hemolyzed samples.       Labs reviewed with patient: YES      Patient Satisfaction Survey completed per patient: yes  (explained about signature and box to check)  Medication reconciliation: per MA.  Purple card updated today: yes  Coumadin Managed by: [facility],     Education: Reviewed driveline care, emergency procedures, how to change the controller, alarms with  patient, as well as discussed how to page the VAD coordinator in case of an emergency.      Plans/Needs: PT doppler and MAP elevated.  PT to be admitted to CMICU.  Please refer to MD note.      Hurricane Season: No

## 2017-11-16 NOTE — INTERVAL H&P NOTE
The patient has been examined and the H&P has been reviewed:    I concur with the findings and no changes have occurred since H&P was written.    Admitted to unit  A line placed  Home meds restarted  Cardene drip initiated    Active Hospital Problems    Diagnosis  POA    Hypertensive urgency, malignant [I16.0]  Yes    Chronic anticoagulation [Z79.01]  Not Applicable    Ischemic cardiomyopathy [I25.5]  Yes     S/p HMIII       LVAD (left ventricular assist device) present [Z95.811]  Not Applicable    HILLARY on CPAP [G47.33, Z99.89]  Not Applicable    S/P CABG (coronary artery bypass graft) [Z95.1]  Not Applicable      Resolved Hospital Problems    Diagnosis Date Resolved POA   No resolved problems to display.

## 2017-11-16 NOTE — HPI
Mr. Vasquez is a pleasant 77 y.o. y.o. WM with ischemic cardiomyopathy status post Heartmate III 10/16/16 LVAD, CKD II, CAD, VT on Tikosyn, HTN, HLD, Gout, Obesity, SSS, and depression who presents here for HTN control after being seen in clinic with Dr. Santana. Patient was asymptomatic in terms of high BP on time of admission to inpatient services.

## 2017-11-16 NOTE — NURSING TRANSFER
Nursing Transfer Note      11/15/2017     Transfer To: [facility] 3082    Transfer via bed    Transfer with cardiac monitoring, all LVAD equipment     Transported by RN x2    Medicines sent: NA    Chart send with patient: Yes    Notified: spouse per pt     Patient reassessed at: Prior to departure from floor

## 2017-11-16 NOTE — NURSING
Right radial Loren d'cd. Tip intact, pressure applied. No signs of bleeding or hematoma formation. Gauze and transparent dressing placed. Will continue to monitor.

## 2017-11-16 NOTE — PLAN OF CARE
Problem: Patient Care Overview  Goal: Plan of Care Review  Outcome: Ongoing (interventions implemented as appropriate)  No acute events overnight. VAD free from alarms during shift. Cardene gtt infusing as ordered. VS otherwise stable. POC for continued monitoring and management of hypertension in ICU. POC reviewed and discussed with pt. All questions and concerns were addressed. Pt. Verbalized understanding.

## 2017-11-16 NOTE — PLAN OF CARE
Problem: Physical Therapy Goal  Goal: Physical Therapy Goal  Goals to be met by: 17    Patient will increase functional independence with mobility by performin. Supine to sit with Stand-by Assistance - not met  2. Sit to stand transfer with Supervision - not met  3. Gait  x 220 feet with Contact Guard Assistance using Rolling Walker. - not met  4. Lower extremity exercise program x20 reps , with supervision - not met    eval completed and goals appropriate. Sofía Cooper, PT 2017

## 2017-11-16 NOTE — PROGRESS NOTES
Wife called 11/15/17 pt was admitted in ([facility] on 11/15/17. C/S

## 2017-11-16 NOTE — PROGRESS NOTES
[facility]  Heart Transplant  Progress Note    Patient Name: Kev Vasquez  MRN: [id number]  Admission Date: 11/15/2017  Hospital Length of Stay: 1 days  Attending Physician: Karen Valladares MD  Primary Care Provider: Mega Collins MD  Principal Problem:<principal problem not specified>    Subjective:     Interval History:  Made changes listed above. Patient is asymptomatic currently. Will plan to transition to the floor later today.    Continuous Infusions:   nicardipine Stopped (11/16/17 1040)     Scheduled Meds:   allopurinol  300 mg Oral Daily    amLODIPine  10 mg Oral Daily    aspirin  81 mg Oral Daily    atorvastatin  10 mg Oral Daily    buPROPion  150 mg Oral Daily    buPROPion  300 mg Oral Daily    ciprofloxacin HCl  750 mg Oral BID    docusate sodium  100 mg Oral BID    dofetilide  250 mcg Oral Q12H    hydrALAZINE  50 mg Oral Q8H    lisinopril  15 mg Oral BID    magnesium oxide  400 mg Oral BID    rOPINIRole  0.5 mg Oral TID    tamsulosin  0.4 mg Oral Daily    [START ON 11/19/2017] warfarin  4 mg Oral Every Sun    warfarin  4 mg Oral Every Tues, Thurs    [START ON 11/17/2017] warfarin  6 mg Oral Every Mon, Wed, Fri    [START ON 11/18/2017] warfarin  6 mg Oral Every Sat     PRN Meds:    Review of patient's allergies indicates:   Allergen Reactions    Sulfa (sulfonamide antibiotics)      Objective:     Vital Signs (Most Recent):  Temp: 98.1 °F (36.7 °C) (11/16/17 1100)  Pulse: 64 (11/16/17 1500)  Resp: (!) 29 (11/16/17 1500)  BP: (!) 106/0 (11/16/17 1500)  SpO2: 99 % (11/16/17 1500) Vital Signs (24h Range):  Temp:  [98.1 °F (36.7 °C)-98.7 °F (37.1 °C)] 98.1 °F (36.7 °C)  Pulse:  [62-96] 64  Resp:  [17-29] 29  SpO2:  [74 %-100 %] 99 %  BP: ()/(0-108) 106/0  Arterial Line BP: ()/(56-81) 116/67     Patient Vitals for the past 72 hrs (Last 3 readings):   Weight   11/16/17 0800 91.4 kg (201 lb 8 oz)   11/16/17 0500 91.4 kg (201 lb 8 oz)   11/15/17 2329 91 kg  (200 lb 9.9 oz)     Body mass index is 27.29 kg/m².      Intake/Output Summary (Last 24 hours) at 11/16/17 1538  Last data filed at 11/16/17 1040   Gross per 24 hour   Intake           365.63 ml   Output              970 ml   Net          -604.37 ml       Hemodynamic Parameters:       Physical Exam   Constitutional: He is oriented to person, place, and time. He appears well-developed and well-nourished.   HENT:   Head: Normocephalic and atraumatic.   Eyes: EOM are normal. Pupils are equal, round, and reactive to light.   Neck: Normal range of motion. Neck supple. No JVD present.   Cardiovascular: Normal rate, regular rhythm, normal heart sounds and intact distal pulses.    Pulmonary/Chest: Effort normal and breath sounds normal.   Abdominal: Soft. Bowel sounds are normal. There is no tenderness.   Musculoskeletal: Normal range of motion. He exhibits no edema.   Neurological: He is alert and oriented to person, place, and time.   Vitals reviewed.      Significant Labs:  CBC:    Recent Labs  Lab 11/14/17  1401 11/15/17  1648 11/16/17  0257   WBC 6.42 5.58 5.94   RBC 3.43* 3.37* 3.14*   HGB 10.4* 10.0* 9.5*   HCT 32.2* 31.0* 28.6*    170 166   MCV 94 92 91   MCH 30.3 29.7 30.3   MCHC 32.3 32.3 33.2     BNP:    Recent Labs  Lab 11/14/17  1401   *     CMP:    Recent Labs  Lab 11/14/17  1401 11/15/17  1648 11/16/17  0257    91 96   CALCIUM 9.9 9.6 9.1   ALBUMIN 3.2*  --   --    PROT 6.9  --   --     142 138   K 4.3 3.9 3.7   CO2 27 22* 23    111* 108   BUN 24* 22 23   CREATININE 1.6* 1.4 1.4   ALKPHOS 75  --   --    ALT 28  --   --    AST 22  --   --    BILITOT 0.4  --   --       Coagulation:     Recent Labs  Lab 11/14/17  1401 11/15/17  1648 11/16/17  0257   INR 2.0* 2.0* 2.0*   APTT  --  27.4 27.2     LDH:    Recent Labs  Lab 11/14/17  1401 11/16/17  0257    171     Microbiology:  Microbiology Results (last 7 days)     ** No results found for the last 168 hours. **          I have  reviewed all pertinent labs within the past 24 hours.    Estimated Creatinine Clearance: 50.1 mL/min (based on SCr of 1.4 mg/dL).    Diagnostic Results:  Echo: I have reviewed all pertinent results/findings  2D echo with 2d color flow:    1 - Moderate left ventricular enlargement.     2 - Severely depressed left ventricular systolic function (EF 15-20%).     3 - Impaired LV relaxation, normal LAP (grade 1 diastolic dysfunction).     4 - Biatrial enlargement.     5 - Right ventricle is upper limit of normal in size with not well seen systolic function.     6 - Severe tricuspid regurgitation.     7 - Increased central venous pressure.     8 - The estimated PA systolic pressure is 40 mmHg.     9 - Heartmate III LVAD; speed 5800.     Assessment and Plan:     Mr. Vasquez is a pleasant 77 y.o. y.o. WM with ischemic cardiomyopathy status post Heartmate III 10/16/16 LVAD, CKD II, CAD, VT on Tikosyn, HTN, HLD, Gout, Obesity, SSS, and depression who presents here for HTN control after being seen in clinic with Dr. Santana. Patient was asymptomatic in terms of high BP on time of admission to inpatient services.     Hypertensive urgency, malignant    Increased his norvasc and lisinopril to doses listed above  Still on hydralazine   MAP's much better since then and his levels are less than 90        Chronic anticoagulation    Continue warfarin        LVAD (left ventricular assist device) present    2d echo ordered and shows that the AV value is opening every beat            Melvi Casiano MD  Heart Transplant  [facility]

## 2017-11-16 NOTE — PROGRESS NOTES
"  [facility]  Adult Nutrition  Consult Note    SUMMARY     Recommendations    Recommendation/Intervention:   1. Continue Regular diet.   2. Encourage intake.     RD to follow  Goals: >85% of EEN, EPN  Nutrition Goal Status: new     Reason for Assessment    Reason for Assessment: physician consult  Diagnosis: cardiac disease  Relevent Medical History: CAD, CHF, HTN, CKD, gout   Interdisciplinary Rounds: did not attend  General Information Comments: Pt states good appetite, eating 75%of meals. No issues chewing or swallowing. No issues with N/V. Pt follows a regular diet at home.     Nutrition Prescription Ordered    Current Diet Order: Regular     Evaluation of Received Nutrients/Fluid Intake    Energy Calories Required: meeting needs  Protein Required: meeting needs  Fluid Required: meeting needs  % Intake of Estimated Energy Needs: %  % Meal Intake: 75%     Nutrition/Diet History    Patient Reported Diet/Restrictions/Preferences: general     Labs/Tests/Procedures/Meds    Pertinent Labs Reviewed: reviewed  Pertinent Medications Reviewed: reviewed  Pertinent Medications Comments: Statin, aspirirn, lisinopril, tamsulosin, warfarin    Physical Findings    Oral/Mouth Cavity: WDL  Skin: intact    Anthropometrics    Height: 6' 0.05" (183 cm)  Weight Method: Bed Scale  Weight: 91.4 kg (201 lb 8 oz)  Ideal Body Weight (IBW), Male: 178.3 lb  % Ideal Body Weight, Male (lb): 113.01 lb  BMI (Calculated): 27.3  BMI Grade: 25 - 29.9 - overweight     Estimated/Assessed Needs    Weight Used For Calorie Calculations: 91.4 kg (201 lb 8 oz)   Height (cm): 183 cm  Energy Calorie Requirements (kcal): 2108kcal/day  Energy Need Method: Lamb-St Jeor (x1.25(PAL))  RMR (Lamb-St. Jeor Equation): 1687.75  Weight Used For Protein Calculations: 91.4 kg (201 lb 8 oz)  Protein Requirements: 73-91g/day (0.8-1.0 g/kg)  Fluid Need Method: RDA Method (or per MD)  RDA Method (mL): 2108     Assessment and Plan    No " nutrition diagnosis at this time.     Monitor and Evaluation    Food and Nutrient Intake: food and beverage intake  Physical Activity and Function: nutrition-related ADLs and IADLs  Anthropometric Measurements: weight, weight change  Biochemical Data, Medical Tests and Procedures: electrolyte and renal panel, gastrointestinal profile, glucose/endocrine profile, inflammatory profile, lipid profile  Nutrition-Focused Physical Findings: overall appearance    Nutrition Risk    Level of Risk:  (1x/weekly)    Nutrition Follow-Up    RD Follow-up?: Yes

## 2017-11-16 NOTE — ASSESSMENT & PLAN NOTE
Increased his norvasc and lisinopril to doses listed above  Still on hydralazine   MAP's much better since then and his levels are less than 90

## 2017-11-16 NOTE — PT/OT/SLP EVAL
Physical Therapy  Evaluation/treatment    Kev Vasquez   MRN: [id number]   Admitting Diagnosis: hypertensive emergency    PT Received On: 11/16/17  PT Start Time: 1014     PT Stop Time: 1044    PT Total Time (min): 30 min       Billable Minutes:  Evaluation 10 min  and Gait [id number] min    Diagnosis: hypertensive emergency      Past Medical History:   Diagnosis Date    Acute on chronic systolic heart failure 7/27/2015    AICD (automatic cardioverter/defibrillator) present 2014    [employer]    CAD (coronary artery disease) 7/27/2015    CHF (congestive heart failure)     Gait instability     HTN (hypertension) 7/27/2015    ICD (implantable cardioverter-defibrillator), biventricular, in situ 7/27/2015    Kidney stones     HILLARY treated with BiPAP 7/27/2015    Peripheral neuropathy     Pulmonary hypertension due to left ventricular systolic dysfunction 7/29/2015    Restless leg syndrome 1992    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    Skin cancer     excision 2013    Sleep apnea 1992      Past Surgical History:   Procedure Laterality Date    CARDIAC PACEMAKER PLACEMENT      pacemaker previously, then AICD in 2006. AICD [employer] serial #[id number]    CORONARY ARTERY BYPASS GRAFT  1993    KNEE SURGERY Left 2001    complicated by infection, hardware had to be taken out and replaced    LEFT VENTRICULAR ASSIST DEVICE  10/19/2016       Referring physician: Mirta Pérez  Date referred to PT: 11/16/17    General Precautions: Standard, fall, LVAD  Orthopedic Precautions:     Braces:         Do you have any cultural, spiritual, Buddhist conflicts, given your current situation?: none    Patient History:  Lives With: spouse (pt lives with his wife and their daughter and son in law. pt has 24 hour assistance. )  Living Arrangements: house (2 story, B&B downstairs, slab entrance)  Equipment Currently Used at Home:  (RW, w/c, BSC, tub bench. )  DME owned (not currently used): none    Previous Level of  Function:  Ambulation Skills: needs device (pt used RW prior to admit.)  Transfer Skills: needs device (pt used RW prior to admit. )    Subjective:  Communicated with nurse prior to session.    Chief Complaint: pt had no complaints during treatment.   Patient goals:  To get better and go home.     Pain/Comfort  Pain Rating 1: 0/10  Pain Rating Post-Intervention 1: 0/10      Objective:   Patient found with: telemetry, arterial line, pulse ox (continuous), blood pressure cuff, silverio catheter, peripheral IV     Cognitive Exam:  Oriented to: Person, Place, Time and Situation    Follows Commands/attention: Follows multistep  commands  Communication: clear/fluent  Safety awareness/insight to disability: intact    Physical Exam:    Lower Extremity Range of Motion:  Right Lower Extremity: WFL  Left Lower Extremity: WFL    Lower Extremity Strength:  Right Lower Extremity: WFL  Left Lower Extremity: WFL     Functional Mobility:  Bed Mobility:  Rolling/Turning Right: Contact guard assistance  Supine to Sit: Contact Guard Assistance    Transfers:  Sit <> Stand Assistance: Contact Guard Assistance  Sit <> Stand Assistive Device: No Assistive Device    Gait:   Gait Distance: 40 ft  Pt did not have LVAD vest to switch to battery to walk farther.   Assistance 1: Contact Guard Assistance  Gait Assistive Device: Rolling walker  Gait Pattern: 3-point gait      AM-PAC 6 CLICK MOBILITY  How much help from another person does this patient currently need?   1 = Unable, Total/Dependent Assistance  2 = A lot, Maximum/Moderate Assistance  3 = A little, Minimum/Contact Guard/Supervision  4 = None, Modified [place]/Independent    Turning over in bed (including adjusting bedclothes, sheets and blankets)?: 4  Sitting down on and standing up from a chair with arms (e.g., wheelchair, bedside commode, etc.): 3  Moving from lying on back to sitting on the side of the bed?: 4  Moving to and from a bed to a chair (including a wheelchair)?:  3  Need to walk in hospital room?: 3  Climbing 3-5 steps with a railing?: 2  Total Score: 19     AM-PAC Raw Score CMS G-Code Modifier Level of Impairment Assistance   6 % Total / Unable   7 - 9 CM 80 - 100% Maximal Assist   10 - 14 CL 60 - 80% Moderate Assist   15 - 19 CK 40 - 60% Moderate Assist   20 - 22 CJ 20 - 40% Minimal Assist   23 CI 1-20% SBA / CGA   24 CH 0% Independent/ Mod I     Patient left up in chair with all lines intact and call button in reach.    Assessment:   Kev Vasquez is a 77 y.o. male with a medical diagnosis of hypertensive emergency  and presents with decreased mobility, transfers and decreased distance ambulated. Pt would benefit from cont PT to address deficits and will be able to discharge home with no therapy or DME needs. Pt will benefit from skilled PT 5x/wk to progress physically.     Rehab identified problem list/impairments: Rehab identified problem list/impairments: gait instability, impaired functional mobilty, impaired balance    Rehab potential is excellent.    Activity tolerance: Excellent    Discharge recommendations: Discharge Facility/Level Of Care Needs:  (no further PT should be needed.)     Barriers to discharge: Barriers to Discharge: None    Equipment recommendations: Equipment Needed After Discharge: none     GOALS:    Physical Therapy Goals        Problem: Physical Therapy Goal    Goal Priority Disciplines Outcome Goal Variances Interventions   Physical Therapy Goal     PT/OT, PT      Description:  Goals to be met by: 17    Patient will increase functional independence with mobility by performin. Supine to sit with Stand-by Assistance - not met  2. Sit to stand transfer with Supervision - not met  3. Gait  x 220 feet with Contact Guard Assistance using Rolling Walker. - not met  4. Lower extremity exercise program x20 reps , with supervision - not met                      PLAN:    Patient to be seen 5 x/week to address the above listed problems  via gait training, therapeutic activities, therapeutic exercises  Plan of Care expires: 12/15/17  Plan of Care reviewed with: patient    Functional Assessment Tool Used: FIM  Score: 1  Functional Limitation: Mobility: Walking and moving around  Mobility: Walking and Moving Around Current Status (): CN  Mobility: Walking and Moving Around Goal Status (): PAXTON Cooper, PT  11/16/2017

## 2017-11-16 NOTE — PROGRESS NOTES
Pt transferred from [facility] 3082 to [facility] 342 via bed by Primary RN, Chelsie Díaz RN, Georgette, PEYTON and JOHNNY Crum. All LVAD equipment transferred with pt. Upon arrival to room, bed placed in lowest position and call light within reach. Receiving RN notified that pt presently on battery power. Additional batteries and clips placed on  plugged into red outlet. Family updated on plan of care.

## 2017-11-16 NOTE — PROGRESS NOTES
Admit Note     Met with patient, spouse and personal aid to assess needs. Patient is a 77 y.o.  male, admitted for LVAD. Per chart pt has a history of cardiomyopathy, hypertension and stage 4 kidney disease.     Patient admitted from home on 11/15/2017 .  At this time, patient presents as alert and oriented x 4, pleasant and good eye contact.  At this time, patients caregiver presents as alert and oriented x 4, pleasant and good eye contact.    Household/Family Systems     Patient resides with patient's spouse, daughter and and son in law, at     [address]  [place] [address].        Support system includes spouse, daughter, son in law and aid.     Patient does not have dependents that are need of being cared for.     Patients primary caregiver is bill Manriquez daughter, phone number [phone].    Pt's spouse cell:  [phone]. Pt reports spouse is hard of hearing  Home phone:  [phone]  Additional emergency contact :  Yadiel (son in law) [phone]  Alannah Vasquez (daughter and HCPA) [phone]    During admission, patient's caregiver plans to visit.  Confirmed patient and patients caregivers do have access to reliable transportation.    Cognitive Status/Learning     Patient reports reading ability as college and states patient does not have difficulty with reading, writing, seeing, hearing and learning. Pt reports he has difficulty with comprehension and memory in the past, however pt reports this appears to be improving.   Patient reports patient learns best by reading and verbal instruction.   Needed: No.   Highest education level: Post-College Graduate Degree (pt reports three degrees)     Vocation/Disability   .  Working for Income: No  If no, reason not working: Patient Choice - Retired    Patient is a retired School Super Intendant.     Adherence     Patient reports a medium level of adherence to patients health care regimen. Pt reports he is on a regular diet.   Adherence counseling and education provided. Patient verbalizes understanding.    Substance Use    Patient reports the following substance usage.    Tobacco: none, patient denies any use.  Alcohol: Pt reports he will have a Albert and Coke once in a while.  Illicit Drugs/Non-prescribed Medications: none, patient denies any use.  Patient states clear understanding of the potential impact of substance use.  Substance abstinence/cessation counseling, education and resources provided and reviewed.     Services Utilizing/ADLS    Infusion Service: Prior to admission, patient utilizing? No. Pt has used [facility] in the past. Pt wants to use same co if IV is needed at discharge.   Home Health: Prior to admission, patient utilizing? no Pt has used HH in the past with [facility].  Pt reports HH services ended last week.  If HH is needed at d/c pt would like to use same co. Pt and spouse have two caregivers/aids.   (one aid each 24 hour shift)   DME: Prior to admission, yes wheelchair, walker with wheels, rollator, shower chair and BSC.  Pulmonary/Cardiac Rehab: Prior to admission, no  Dialysis:  Prior to admission, no  Transplant Specialty Pharmacy:  Prior to admission, no.    Prior to admission, patient reports patient was not independent with ADLS and was not driving. Pt's spouse reports she has trouble waling,however she can drive.   Patient reports patient is not able to care for self at this time due to compromised medical condition (as documented in medical record) and physical weakness..  Patient indicates a willingness to care for self once medically cleared to do so.    Insurance/Medications    Insured by   Payor/Plan Subscr  Sex Relation Sub. Ins. ID Effective Group Num   1. MEDICARE - [place]* LC JEAN-BAPTISTE 1940 Male  [id number]                                    [address]   2. BLUE CROSS * LC JEAN-BAPTISTE 1940 Male  [id number]                                   [address] [address]      Primary  Insurance (for UNOS reporting): Public Insurance - Medicare FFS (Fee For Service)  Secondary Insurance (for UNOS reporting): Private Insurance    Patient reports patient is able to obtain and afford medications at this time and at time of discharge.    Living Will/Healthcare Power of     Patient states patient has a LW and/or HCPA. Is the pt's daughter, Alannah Vasquez [phone]  provided education regarding LW and HCPA and the completion of forms.    Coping/Mental Health    Patient is coping adequately with the aid of  family members. .  Patient denies mental health difficulties. Worker provided general support.     Discharge Planning    At time of discharge, patient plans to return to patient's home under the care of daughter, spouse and aid.  Patients spouse or daughter will transport patient.  Per rounds today, expected discharge date has not been medically determined at this time. Patient and patients caregiver  verbalize understanding and are involved in treatment planning and discharge process.    Additional Concerns    Patient's caretaker denies additional needs and/or concerns at this time. Patient is being followed for needs, education, resources, information, emotional support, supportive counseling, and for supportive and skilled discharge plan of care.  providing ongoing psychosocial support, education, resources and d/c planning as needed.  SW remains available. Patient's caregiver verbalizes understanding and agreement with information reviewed,  availability and how to access available resources as needed. Patient denies additional needs and/or concerns at this time. Patient verbalizes understanding and agreement with information reviewed, social work availability, and how to access available resources as needed.

## 2017-11-16 NOTE — HOSPITAL COURSE
11/16/17 - Admitted and has been transitioned off of the nicardipine drip since AM. Increased his Norvasc to 10 mg daily and his lisinopril to 15 mg BID. Will transition to the floor this afternoon.   11/17/17- Stepped up to the unit after being on the floor briefly. The nicardipine gtt has been resumed. Increased lisinopril to 20 mg BID. May need to get receive 10 mg Isordil TID.   11/18/17 - Added isosorbide 10 mg TID today in hopes to get off of the nicardipine and transistion to the floor.    11/19/17 - Off of nicardipine, as his Blood pressures have been borderline elevated, increased his isosorbide to 30 mg TID. Stepped down to the floor today. Will discuss discharge later today or early tomorrow.  11/20/17 - Will discharge today. Increased isosorbide dinitrate to 40 mg TID. Okay to go home today.

## 2017-11-16 NOTE — H&P (VIEW-ONLY)
Subjective:   Patient ID:  Kev Vasquez is a 77 y.o. male who presents for LVAD followup visit.    Implant Date:10-19-16 @ [facility] 3 RPM 5800     INR goal: 2-3   Bridge with Heparin   Antiplatelets: ASA 81 mg       TXP SANTOSH INTERROGATIONS 11/15/2017   Type HeartMate3   Flow 4.0   Speed 5800   PI 5.1   Power (Mendez) 4.6   LSL 5400   Pulsatility No Pulse       HPI   Mr. Vasquez is a pleasant 77 y.o. y.o. WM with ischemic cardiomyopathy status post Heartmate III 10/16/16 LVAD, CKD II, CAD, VT on Tikosyn, HTN, HLD, Gout, Obesity, SSS, and depression who presents for routine VAD f/u  followup.  Mr Vasquez was a former resident of the [facility] area who had moved to [place] many years ago where he lived with his wife. He had a Heartmate III implanted 10/16/16 and had a subsequently prolonged clinical course. He had prolonged intubation lasting about 3 weeks. From this hospitalization he had a prolonged course in a skilled nursing facility/rehab center where he proceeded to lose around 50lbs. Earlier this month (July 5,2017), he returned home to [place] with his wife to live with their older daughter. He remains debilitated, unable to walk, and is essentially maximally assisted with transfers. He now has 24 hour care at home (caregiver is present with wife and daughter in clinic) as well as he is getting PT twice weekly at home and OT once weekly.  He has not lost anymore weight since the spring but remains thin and weak. His recent course was complicated by a DLI by pan sensitive pseudomonas, being treated with IV Cefepime. He has been following with Dr. Muhammad , and was last seen 10/16/17. It was noted that driveline looked improved and he was transitioned from IV Cefepime to Oral Ciprofloxacin. His Aldactone had been previously stopped due to persistent hyperkalemia. Comes today for his regular VAD visit. VAD speed is at 5400 rpm. No VAD alarms noted on interrogation occasional PI events. BP  "is elevated 120 mm of Hg. DLES is a "1". INR is therapeutic at 2.0. LDH is 169 at baseline.       Review of Systems   Constitution: Negative. Negative for chills, decreased appetite, diaphoresis, fever, weakness, malaise/fatigue, night sweats, weight gain and weight loss.   Eyes: Negative.    Cardiovascular: Negative for chest pain, claudication, cyanosis, dyspnea on exertion, irregular heartbeat, leg swelling, near-syncope, orthopnea, palpitations, paroxysmal nocturnal dyspnea and syncope.   Respiratory: Negative for cough, hemoptysis and shortness of breath.    Endocrine: Negative.    Hematologic/Lymphatic: Negative.    Skin: Negative for color change, dry skin and nail changes.   Musculoskeletal: Negative.    Gastrointestinal: Negative.    Genitourinary: Negative.        Objective:   Blood pressure (!) 120/0, pulse 63, temperature 98.4 °F (36.9 °C), height 6' (1.829 m), weight 89.4 kg (197 lb).body mass index is 26.72 kg/m².    Doppler: 120    Physical Exam   Constitutional: He appears well-developed.   BP (!) 120/0 (BP Location: Right arm, Patient Position: Sitting, BP Method: Medium (Manual))   Pulse 63   Temp 98.4 °F (36.9 °C)   Ht 6' (1.829 m)   Wt 89.4 kg (197 lb)   BMI 26.72 kg/m²      HENT:   Head: Normocephalic.   Neck: No JVD present. Carotid bruit is not present.   Cardiovascular: Regular rhythm and normal heart sounds.    No murmur heard.  Smooth VAD hum. DLES is "1"   Pulmonary/Chest: Effort normal and breath sounds normal. No respiratory distress. He has no wheezes. He has no rales.   Abdominal: Soft. Bowel sounds are normal. He exhibits no distension. There is no tenderness. There is no rebound.   Musculoskeletal: He exhibits no edema.   Neurological: He is alert.   Skin: Skin is warm.   Vitals reviewed.      Lab Results   Component Value Date    WBC 6.42 11/14/2017    HGB 10.4 (L) 11/14/2017    HCT 32.2 (L) 11/14/2017    MCV 94 11/14/2017     11/14/2017    CO2 27 11/14/2017    " CREATININE 1.6 (H) 11/14/2017    CALCIUM 9.9 11/14/2017    ALBUMIN 3.2 (L) 11/14/2017    AST 22 11/14/2017     (H) 11/14/2017    ALT 28 11/14/2017     11/14/2017       Lab Results   Component Value Date    INR 2.0 (H) 11/14/2017    INR 2.1 (H) 11/08/2017    INR 2.3 (H) 11/02/2017       BNP   Date Value Ref Range Status   11/14/2017 715 (H) 0 - 99 pg/mL Final     Comment:     Values of less than 100 pg/ml are consistent with non-CHF populations.   10/18/2017 265 (H) 0 - 99 pg/mL Final     Comment:     Values of less than 100 pg/ml are consistent with non-CHF populations.   09/21/2017 140 (H) 0 - 99 pg/mL Final     Comment:     Values of less than 100 pg/ml are consistent with non-CHF populations.       LD   Date Value Ref Range Status   11/14/2017 169 110 - 260 U/L Final     Comment:     Results are increased in hemolyzed samples.   10/18/2017 184 110 - 260 U/L Final     Comment:     Results are increased in hemolyzed samples.   09/21/2017 154 110 - 260 U/L Final     Comment:     Results are increased in hemolyzed samples.       Labs were reviewed with the patient.    No results found for this or any previous visit.  No results found for this or any previous visit.    Assessment:      1. Heart replaced by heart assist device    2. Ischemic cardiomyopathy    3. Essential hypertension    4. S/P CABG (coronary artery bypass graft)    5. Stage 4 chronic kidney disease        Plan:   Patient is now NYHA class II. BP is significantly elevated. Needs to be admitted for BP control (rechecked twice). INR is therapeutic.  VAD interrogation was performed in clinic  Provided with VAD supplies.   Recommend 2 gram sodium restriction and 1500cc fluid restriction.  Encourage physical activity with graded exercise program.  Requested patient to weigh themselves daily, and to notify us if their weight increases by more than 3 lbs in 1 day or 5 lbs in 1 week.     Listed for transplant: DT    UNOS Patient  Status  Functional Status: 100% - Normal, no complaints, no evidence of disease  Physical Capacity: No Limitations  Working for Income: Unknown    Carlito Santana MD

## 2017-11-17 LAB
ALBUMIN SERPL BCP-MCNC: 3 G/DL
ALLENS TEST: ABNORMAL
ALP SERPL-CCNC: 71 U/L
ALT SERPL W/O P-5'-P-CCNC: 21 U/L
ANION GAP SERPL CALC-SCNC: 8 MMOL/L
APTT BLDCRRT: 26.7 SEC
AST SERPL-CCNC: 19 U/L
BASOPHILS # BLD AUTO: 0.04 K/UL
BASOPHILS NFR BLD: 0.6 %
BILIRUB DIRECT SERPL-MCNC: 0.2 MG/DL
BILIRUB SERPL-MCNC: 0.4 MG/DL
BNP SERPL-MCNC: 207 PG/ML
BUN SERPL-MCNC: 18 MG/DL
CALCIUM SERPL-MCNC: 9.2 MG/DL
CHLORIDE SERPL-SCNC: 111 MMOL/L
CO2 SERPL-SCNC: 21 MMOL/L
CREAT SERPL-MCNC: 1.4 MG/DL
CRP SERPL-MCNC: 1.64 MG/L
DELSYS: ABNORMAL
DIFFERENTIAL METHOD: ABNORMAL
EOSINOPHIL # BLD AUTO: 0.2 K/UL
EOSINOPHIL NFR BLD: 3.2 %
ERYTHROCYTE [DISTWIDTH] IN BLOOD BY AUTOMATED COUNT: 14.6 %
EST. GFR  (AFRICAN AMERICAN): 56.4 ML/MIN/1.73 M^2
EST. GFR  (NON AFRICAN AMERICAN): 48.8 ML/MIN/1.73 M^2
GLUCOSE SERPL-MCNC: 96 MG/DL
HCO3 UR-SCNC: 24.3 MMOL/L (ref 24–28)
HCT VFR BLD AUTO: 29.8 %
HGB BLD-MCNC: 9.8 G/DL
IMM GRANULOCYTES # BLD AUTO: 0.02 K/UL
IMM GRANULOCYTES NFR BLD AUTO: 0.3 %
INR PPP: 1.9
LDH SERPL L TO P-CCNC: 180 U/L
LYMPHOCYTES # BLD AUTO: 1 K/UL
LYMPHOCYTES NFR BLD: 14.1 %
MAGNESIUM SERPL-MCNC: 2.1 MG/DL
MCH RBC QN AUTO: 29.5 PG
MCHC RBC AUTO-ENTMCNC: 32.9 G/DL
MCV RBC AUTO: 90 FL
MODE: ABNORMAL
MONOCYTES # BLD AUTO: 0.9 K/UL
MONOCYTES NFR BLD: 13.2 %
NEUTROPHILS # BLD AUTO: 4.9 K/UL
NEUTROPHILS NFR BLD: 68.6 %
NRBC BLD-RTO: 0 /100 WBC
PCO2 BLDA: 34.2 MMHG (ref 35–45)
PH SMN: 7.46 [PH] (ref 7.35–7.45)
PHOSPHATE SERPL-MCNC: 2.9 MG/DL
PLATELET # BLD AUTO: 186 K/UL
PMV BLD AUTO: 11.1 FL
PO2 BLDA: 90 MMHG (ref 80–100)
POC BE: 0 MMOL/L
POC SATURATED O2: 97 % (ref 95–100)
POC TCO2: 25 MMOL/L (ref 23–27)
POTASSIUM SERPL-SCNC: 4 MMOL/L
PREALB SERPL-MCNC: 22 MG/DL
PROT SERPL-MCNC: 6.2 G/DL
PROTHROMBIN TIME: 18.7 SEC
RBC # BLD AUTO: 3.32 M/UL
SAMPLE: ABNORMAL
SITE: ABNORMAL
SODIUM SERPL-SCNC: 140 MMOL/L
SP02: 96
WBC # BLD AUTO: 7.11 K/UL

## 2017-11-17 PROCEDURE — 80076 HEPATIC FUNCTION PANEL: CPT

## 2017-11-17 PROCEDURE — 97530 THERAPEUTIC ACTIVITIES: CPT

## 2017-11-17 PROCEDURE — 25000003 PHARM REV CODE 250: Performed by: STUDENT IN AN ORGANIZED HEALTH CARE EDUCATION/TRAINING PROGRAM

## 2017-11-17 PROCEDURE — 83735 ASSAY OF MAGNESIUM: CPT

## 2017-11-17 PROCEDURE — 83615 LACTATE (LD) (LDH) ENZYME: CPT

## 2017-11-17 PROCEDURE — 20000000 HC ICU ROOM

## 2017-11-17 PROCEDURE — 99232 SBSQ HOSP IP/OBS MODERATE 35: CPT | Mod: ,,, | Performed by: INTERNAL MEDICINE

## 2017-11-17 PROCEDURE — 25000003 PHARM REV CODE 250: Performed by: PHYSICIAN ASSISTANT

## 2017-11-17 PROCEDURE — 25000003 PHARM REV CODE 250: Performed by: INTERNAL MEDICINE

## 2017-11-17 PROCEDURE — 93308 TTE F-UP OR LMTD: CPT

## 2017-11-17 PROCEDURE — 93750 INTERROGATION VAD IN PERSON: CPT | Mod: ,,, | Performed by: INTERNAL MEDICINE

## 2017-11-17 PROCEDURE — 97535 SELF CARE MNGMENT TRAINING: CPT

## 2017-11-17 PROCEDURE — 85025 COMPLETE CBC W/AUTO DIFF WBC: CPT

## 2017-11-17 PROCEDURE — 94761 N-INVAS EAR/PLS OXIMETRY MLT: CPT

## 2017-11-17 PROCEDURE — 97116 GAIT TRAINING THERAPY: CPT

## 2017-11-17 PROCEDURE — 83880 ASSAY OF NATRIURETIC PEPTIDE: CPT

## 2017-11-17 PROCEDURE — 84134 ASSAY OF PREALBUMIN: CPT

## 2017-11-17 PROCEDURE — 63600175 PHARM REV CODE 636 W HCPCS: Performed by: INTERNAL MEDICINE

## 2017-11-17 PROCEDURE — 97166 OT EVAL MOD COMPLEX 45 MIN: CPT

## 2017-11-17 PROCEDURE — 27000248 HC VAD-ADDITIONAL DAY

## 2017-11-17 PROCEDURE — 85730 THROMBOPLASTIN TIME PARTIAL: CPT

## 2017-11-17 PROCEDURE — 85610 PROTHROMBIN TIME: CPT

## 2017-11-17 PROCEDURE — 84100 ASSAY OF PHOSPHORUS: CPT

## 2017-11-17 PROCEDURE — 93005 ELECTROCARDIOGRAM TRACING: CPT

## 2017-11-17 PROCEDURE — 80048 BASIC METABOLIC PNL TOTAL CA: CPT

## 2017-11-17 PROCEDURE — 86141 C-REACTIVE PROTEIN HS: CPT

## 2017-11-17 RX ORDER — WARFARIN 7.5 MG/1
7.5 TABLET ORAL ONCE
Status: COMPLETED | OUTPATIENT
Start: 2017-11-17 | End: 2017-11-17

## 2017-11-17 RX ORDER — FOLIC ACID 1 MG/1
1 TABLET ORAL DAILY
Status: CANCELLED | OUTPATIENT
Start: 2017-11-17

## 2017-11-17 RX ORDER — NICARDIPINE HYDROCHLORIDE 0.2 MG/ML
5 INJECTION INTRAVENOUS CONTINUOUS
Status: DISCONTINUED | OUTPATIENT
Start: 2017-11-17 | End: 2017-11-20 | Stop reason: HOSPADM

## 2017-11-17 RX ORDER — AMLODIPINE BESYLATE 10 MG/1
10 TABLET ORAL ONCE
Status: COMPLETED | OUTPATIENT
Start: 2017-11-17 | End: 2017-11-17

## 2017-11-17 RX ORDER — LISINOPRIL 20 MG/1
20 TABLET ORAL 2 TIMES DAILY
Status: DISCONTINUED | OUTPATIENT
Start: 2017-11-17 | End: 2017-11-20 | Stop reason: HOSPADM

## 2017-11-17 RX ORDER — HYDRALAZINE HYDROCHLORIDE 50 MG/1
100 TABLET, FILM COATED ORAL EVERY 8 HOURS
Status: DISCONTINUED | OUTPATIENT
Start: 2017-11-17 | End: 2017-11-17

## 2017-11-17 RX ORDER — HYDRALAZINE HYDROCHLORIDE 50 MG/1
100 TABLET, FILM COATED ORAL EVERY 8 HOURS
Status: DISCONTINUED | OUTPATIENT
Start: 2017-11-17 | End: 2017-11-20 | Stop reason: HOSPADM

## 2017-11-17 RX ORDER — HYDRALAZINE HYDROCHLORIDE 20 MG/ML
10 INJECTION INTRAMUSCULAR; INTRAVENOUS ONCE
Status: COMPLETED | OUTPATIENT
Start: 2017-11-17 | End: 2017-11-17

## 2017-11-17 RX ORDER — AMLODIPINE BESYLATE 10 MG/1
10 TABLET ORAL DAILY
Status: DISCONTINUED | OUTPATIENT
Start: 2017-11-18 | End: 2017-11-20 | Stop reason: HOSPADM

## 2017-11-17 RX ADMIN — ALLOPURINOL 300 MG: 300 TABLET ORAL at 09:11

## 2017-11-17 RX ADMIN — NICARDIPINE HYDROCHLORIDE 5 MG/HR: 0.2 INJECTION, SOLUTION INTRAVENOUS at 07:11

## 2017-11-17 RX ADMIN — HYDRALAZINE HYDROCHLORIDE 100 MG: 50 TABLET ORAL at 06:11

## 2017-11-17 RX ADMIN — ROPINIROLE HYDROCHLORIDE 0.5 MG: 0.25 TABLET, FILM COATED ORAL at 02:11

## 2017-11-17 RX ADMIN — HYDRALAZINE HYDROCHLORIDE 10 MG: 20 INJECTION INTRAMUSCULAR; INTRAVENOUS at 07:11

## 2017-11-17 RX ADMIN — HYDRALAZINE HYDROCHLORIDE 100 MG: 50 TABLET ORAL at 09:11

## 2017-11-17 RX ADMIN — TAMSULOSIN HYDROCHLORIDE 0.4 MG: 0.4 CAPSULE ORAL at 09:11

## 2017-11-17 RX ADMIN — DOCUSATE SODIUM 100 MG: 100 CAPSULE, LIQUID FILLED ORAL at 09:11

## 2017-11-17 RX ADMIN — AMLODIPINE BESYLATE 10 MG: 10 TABLET ORAL at 01:11

## 2017-11-17 RX ADMIN — NICARDIPINE HYDROCHLORIDE 7.5 MG/HR: 0.2 INJECTION, SOLUTION INTRAVENOUS at 08:11

## 2017-11-17 RX ADMIN — ROPINIROLE HYDROCHLORIDE 0.5 MG: 0.25 TABLET, FILM COATED ORAL at 06:11

## 2017-11-17 RX ADMIN — HYDRALAZINE HYDROCHLORIDE 100 MG: 50 TABLET ORAL at 02:11

## 2017-11-17 RX ADMIN — MAGNESIUM OXIDE TAB 400 MG (241.3 MG ELEMENTAL MG) 400 MG: 400 (241.3 MG) TAB at 09:11

## 2017-11-17 RX ADMIN — ASPIRIN 81 MG CHEWABLE TABLET 81 MG: 81 TABLET CHEWABLE at 09:11

## 2017-11-17 RX ADMIN — ATORVASTATIN CALCIUM 10 MG: 10 TABLET, FILM COATED ORAL at 10:11

## 2017-11-17 RX ADMIN — BUPROPION HYDROCHLORIDE 150 MG: 150 TABLET, EXTENDED RELEASE ORAL at 09:11

## 2017-11-17 RX ADMIN — NICARDIPINE HYDROCHLORIDE 5 MG/HR: 0.2 INJECTION, SOLUTION INTRAVENOUS at 03:11

## 2017-11-17 RX ADMIN — LISINOPRIL 20 MG: 20 TABLET ORAL at 09:11

## 2017-11-17 RX ADMIN — DOFETILIDE 250 MCG: 0.12 CAPSULE ORAL at 09:11

## 2017-11-17 RX ADMIN — BUPROPION HYDROCHLORIDE 300 MG: 150 TABLET, EXTENDED RELEASE ORAL at 09:11

## 2017-11-17 RX ADMIN — WARFARIN SODIUM 7.5 MG: 7.5 TABLET ORAL at 05:11

## 2017-11-17 RX ADMIN — CIPROFLOXACIN HYDROCHLORIDE 750 MG: 750 TABLET, FILM COATED ORAL at 10:11

## 2017-11-17 RX ADMIN — CIPROFLOXACIN HYDROCHLORIDE 750 MG: 750 TABLET, FILM COATED ORAL at 09:11

## 2017-11-17 RX ADMIN — ROPINIROLE HYDROCHLORIDE 0.5 MG: 0.25 TABLET, FILM COATED ORAL at 09:11

## 2017-11-17 NOTE — PLAN OF CARE
Problem: Patient Care Overview  Goal: Plan of Care Review  Outcome: Ongoing (interventions implemented as appropriate)  Patient verbalizes no complaints overnight. MAPs and dopplers elevated tonight, PO and IV BP meds given to help decrease MAP. Patient denies chest pain, SOB, or other pain discomfort. LVAD hum smooth and numbers within normal limits. LVAD dressing change completed. Patient remains free of falls or injury. No significant events. Patient verbalizes complete understanding of plan of care. Will continue to monitor.

## 2017-11-17 NOTE — PLAN OF CARE
Problem: Patient Care Overview  Goal: Plan of Care Review  Outcome: Ongoing (interventions implemented as appropriate)   11/17/17 1642   Coping/Psychosocial   Plan Of Care Reviewed With patient;family     No acute events throughout day. See vital signs and assessments in flowsheets. See below for updates on today's progress.     Pulmonary: patient remains on room air throughout shift. spo2 wdl.    Cardiovascular: patient with vad, unchanged this shift. Parameters as documented. cardene gtt titrated to off within parameters.     Neurological: no change this shift. Patient remains awake alert and oriented.    Gastrointestinal: no s/s distress/complaints.    Genitourinary: no change this shift. Patient with some difficulty initiating stream, frequency, urgency, pt states chronic problem for him.    Integumentary/Other: no change this shift.     Infusions: cardene gtt titrated to off per parameters. No additional iv infusions.    Patient progressing towards goals as tolerated, plan of care communicated and reviewed with eKv Vasquez and family. VS as documented. Assessments as documented. All concerns addressed. Will continue to monitor. Will give report to oncoming nurse at shift change.

## 2017-11-17 NOTE — PROGRESS NOTES
[facility]  Heart Transplant  Progress Note    Patient Name: Kev Vasquez  MRN: [id number]  Admission Date: 11/15/2017  Hospital Length of Stay: 2 days  Attending Physician: Karen Valladares MD  Primary Care Provider: Mega Collins MD  Principal Problem:<principal problem not specified>    Subjective:     Interval History:  Made changes listed above. Will try to wean off of nicardipine today. Patient has no complaints.     Continuous Infusions:   nicardipine Stopped (11/17/17 1239)     Scheduled Meds:   allopurinol  300 mg Oral Daily    [START ON 11/18/2017] amLODIPine  10 mg Oral Daily    aspirin  81 mg Oral Daily    atorvastatin  10 mg Oral Daily    buPROPion  150 mg Oral Daily    buPROPion  300 mg Oral Daily    ciprofloxacin HCl  750 mg Oral BID    docusate sodium  100 mg Oral BID    dofetilide  250 mcg Oral Q12H    hydrALAZINE  100 mg Oral Q8H    lisinopril  20 mg Oral BID    magnesium oxide  400 mg Oral BID    rOPINIRole  0.5 mg Oral TID    tamsulosin  0.4 mg Oral Daily    [START ON 11/19/2017] warfarin  4 mg Oral Every Sun    warfarin  4 mg Oral Every Tues, Thurs    warfarin  7.5 mg Oral Once    [START ON 11/18/2017] warfarin  6 mg Oral Every Sat    [START ON 11/18/2017] warfarin  6 mg Oral Every Mon, Wed, Fri     PRN Meds:    Review of patient's allergies indicates:   Allergen Reactions    Sulfa (sulfonamide antibiotics)      Objective:     Vital Signs (Most Recent):  Temp: 98.2 °F (36.8 °C) (11/17/17 1100)  Pulse: 66 (11/17/17 1400)  Resp: (!) 24 (11/17/17 1400)  BP: 92/72 (11/17/17 1400)  SpO2: 97 % (11/17/17 1400) Vital Signs (24h Range):  Temp:  [97.9 °F (36.6 °C)-99.3 °F (37.4 °C)] 98.2 °F (36.8 °C)  Pulse:  [63-76] 66  Resp:  [18-30] 24  SpO2:  [94 %-99 %] 97 %  BP: ()/(0-99) 92/72  Arterial Line BP: ()/(63-67) 96/63     Patient Vitals for the past 72 hrs (Last 3 readings):   Weight   11/16/17 0800 91.4 kg (201 lb 8 oz)   11/16/17 0500 91.4 kg (201  lb 8 oz)   11/15/17 2329 91 kg (200 lb 9.9 oz)     Body mass index is 27.29 kg/m².      Intake/Output Summary (Last 24 hours) at 11/17/17 1459  Last data filed at 11/17/17 1200   Gross per 24 hour   Intake          1182.92 ml   Output             1860 ml   Net          -677.08 ml       Hemodynamic Parameters:       Physical Exam   Constitutional: He is oriented to person, place, and time. He appears well-developed and well-nourished.   HENT:   Head: Normocephalic and atraumatic.   Eyes: EOM are normal. Pupils are equal, round, and reactive to light.   Neck: Normal range of motion. Neck supple. No JVD present.   Cardiovascular: Normal rate, regular rhythm, normal heart sounds and intact distal pulses.    Pulmonary/Chest: Effort normal and breath sounds normal.   Abdominal: Soft. Bowel sounds are normal. There is no tenderness.   Musculoskeletal: Normal range of motion. He exhibits no edema.   Neurological: He is alert and oriented to person, place, and time.   Vitals reviewed.      Significant Labs:  CBC:    Recent Labs  Lab 11/15/17  1648 11/16/17  0257 11/17/17  0405   WBC 5.58 5.94 7.11   RBC 3.37* 3.14* 3.32*   HGB 10.0* 9.5* 9.8*   HCT 31.0* 28.6* 29.8*    166 186   MCV 92 91 90   MCH 29.7 30.3 29.5   MCHC 32.3 33.2 32.9     BNP:    Recent Labs  Lab 11/14/17  1401 11/17/17  0405   * 207*     CMP:    Recent Labs  Lab 11/14/17  1401 11/15/17  1648 11/16/17  0257 11/17/17  0405    91 96 96   CALCIUM 9.9 9.6 9.1 9.2   ALBUMIN 3.2*  --   --  3.0*   PROT 6.9  --   --  6.2    142 138 140   K 4.3 3.9 3.7 4.0   CO2 27 22* 23 21*    111* 108 111*   BUN 24* 22 23 18   CREATININE 1.6* 1.4 1.4 1.4   ALKPHOS 75  --   --  71   ALT 28  --   --  21   AST 22  --   --  19   BILITOT 0.4  --   --  0.4      Coagulation:     Recent Labs  Lab 11/15/17  1648 11/16/17  0257 11/17/17  0405   INR 2.0* 2.0* 1.9*   APTT 27.4 27.2 26.7     LDH:    Recent Labs  Lab 11/16/17  0257 11/17/17  0405    180      Microbiology:  Microbiology Results (last 7 days)     ** No results found for the last 168 hours. **          I have reviewed all pertinent labs within the past 24 hours.    Estimated Creatinine Clearance: 50.1 mL/min (based on SCr of 1.4 mg/dL).    Diagnostic Results:  Echo: I have reviewed all pertinent results/findings  2D echo with 2d color flow:    1 - Moderate left ventricular enlargement.     2 - Severely depressed left ventricular systolic function (EF 15-20%).     3 - Impaired LV relaxation, normal LAP (grade 1 diastolic dysfunction).     4 - Biatrial enlargement.     5 - Right ventricle is upper limit of normal in size with not well seen systolic function.     6 - Severe tricuspid regurgitation.     7 - Increased central venous pressure.     8 - The estimated PA systolic pressure is 40 mmHg.     9 - Heartmate III LVAD; speed 5800.     Assessment and Plan:     Mr. Vasquez is a pleasant 77 y.o. y.o. WM with ischemic cardiomyopathy status post Heartmate III 10/16/16 LVAD, CKD II, CAD, VT on Tikosyn, HTN, HLD, Gout, Obesity, SSS, and depression who presents here for HTN control after being seen in clinic with Dr. Santana. Patient was asymptomatic in terms of high BP on time of admission to inpatient services.     Hypertensive urgency, malignant    Increased his norvasc and lisinopril to doses listed above  Now on max doses of hydralazine, lisinopril and norvasc. Would add isordil 10 mg TID if needed.  MAP's much better since then and his levels are less than 90. However still on nicardipine 2.5 mg intermittently.        Chronic anticoagulation    Continue warfarin, subtherapeutic today. Increased warfarin today.        LVAD (left ventricular assist device) present    2d echo ordered and shows that the AV value is opening every beat            Melvi Casiano MD  Heart Transplant  [facility]

## 2017-11-17 NOTE — NURSING TRANSFER
Nursing Transfer Note      11/17/2017     Transfer To: 3098 from 342    Transfer via bed    Transfer with cardiac monitoring, LVAD HM3 on battery x2    Transported by RN x2    Medicines sent: none    Chart send with patient: Yes    Notified:     Patient reassessed upon arrival    Upon arrival to floor: cardiac monitor applied, patient oriented to room, call bell in reach and bed in lowest position. LVAD equipment sent with patient. No acute distress noted, will continue to monitor.

## 2017-11-17 NOTE — PROGRESS NOTES
DISCHARGE    Pt presents in room alone, aaox4 with pleasant affect. Pt states in agreement with plan to discharge to home on Saturday pending INR being therapeutic. Pt states wife will transport. Pt reports coping well and denies further needs, questions, concerns at this time and none indicated. Providing psychosocial and counseling support, education, resources, assistance and discharge planning as indicated. SW remains available.

## 2017-11-17 NOTE — SUBJECTIVE & OBJECTIVE
Interval History:  Made changes listed above. Will try to wean off of nicardipine today. Patient has no complaints.     Continuous Infusions:   nicardipine Stopped (11/17/17 1239)     Scheduled Meds:   allopurinol  300 mg Oral Daily    [START ON 11/18/2017] amLODIPine  10 mg Oral Daily    aspirin  81 mg Oral Daily    atorvastatin  10 mg Oral Daily    buPROPion  150 mg Oral Daily    buPROPion  300 mg Oral Daily    ciprofloxacin HCl  750 mg Oral BID    docusate sodium  100 mg Oral BID    dofetilide  250 mcg Oral Q12H    hydrALAZINE  100 mg Oral Q8H    lisinopril  20 mg Oral BID    magnesium oxide  400 mg Oral BID    rOPINIRole  0.5 mg Oral TID    tamsulosin  0.4 mg Oral Daily    [START ON 11/19/2017] warfarin  4 mg Oral Every Sun    warfarin  4 mg Oral Every Tues, Thurs    warfarin  7.5 mg Oral Once    [START ON 11/18/2017] warfarin  6 mg Oral Every Sat    [START ON 11/18/2017] warfarin  6 mg Oral Every Mon, Wed, Fri     PRN Meds:    Review of patient's allergies indicates:   Allergen Reactions    Sulfa (sulfonamide antibiotics)      Objective:     Vital Signs (Most Recent):  Temp: 98.2 °F (36.8 °C) (11/17/17 1100)  Pulse: 66 (11/17/17 1400)  Resp: (!) 24 (11/17/17 1400)  BP: 92/72 (11/17/17 1400)  SpO2: 97 % (11/17/17 1400) Vital Signs (24h Range):  Temp:  [97.9 °F (36.6 °C)-99.3 °F (37.4 °C)] 98.2 °F (36.8 °C)  Pulse:  [63-76] 66  Resp:  [18-30] 24  SpO2:  [94 %-99 %] 97 %  BP: ()/(0-99) 92/72  Arterial Line BP: ()/(63-67) 96/63     Patient Vitals for the past 72 hrs (Last 3 readings):   Weight   11/16/17 0800 91.4 kg (201 lb 8 oz)   11/16/17 0500 91.4 kg (201 lb 8 oz)   11/15/17 2329 91 kg (200 lb 9.9 oz)     Body mass index is 27.29 kg/m².      Intake/Output Summary (Last 24 hours) at 11/17/17 1459  Last data filed at 11/17/17 1200   Gross per 24 hour   Intake          1182.92 ml   Output             1860 ml   Net          -677.08 ml       Hemodynamic Parameters:       Physical  Exam   Constitutional: He is oriented to person, place, and time. He appears well-developed and well-nourished.   HENT:   Head: Normocephalic and atraumatic.   Eyes: EOM are normal. Pupils are equal, round, and reactive to light.   Neck: Normal range of motion. Neck supple. No JVD present.   Cardiovascular: Normal rate, regular rhythm, normal heart sounds and intact distal pulses.    Pulmonary/Chest: Effort normal and breath sounds normal.   Abdominal: Soft. Bowel sounds are normal. There is no tenderness.   Musculoskeletal: Normal range of motion. He exhibits no edema.   Neurological: He is alert and oriented to person, place, and time.   Vitals reviewed.      Significant Labs:  CBC:    Recent Labs  Lab 11/15/17  1648 11/16/17  0257 11/17/17  0405   WBC 5.58 5.94 7.11   RBC 3.37* 3.14* 3.32*   HGB 10.0* 9.5* 9.8*   HCT 31.0* 28.6* 29.8*    166 186   MCV 92 91 90   MCH 29.7 30.3 29.5   MCHC 32.3 33.2 32.9     BNP:    Recent Labs  Lab 11/14/17  1401 11/17/17  0405   * 207*     CMP:    Recent Labs  Lab 11/14/17  1401 11/15/17  1648 11/16/17  0257 11/17/17  0405    91 96 96   CALCIUM 9.9 9.6 9.1 9.2   ALBUMIN 3.2*  --   --  3.0*   PROT 6.9  --   --  6.2    142 138 140   K 4.3 3.9 3.7 4.0   CO2 27 22* 23 21*    111* 108 111*   BUN 24* 22 23 18   CREATININE 1.6* 1.4 1.4 1.4   ALKPHOS 75  --   --  71   ALT 28  --   --  21   AST 22  --   --  19   BILITOT 0.4  --   --  0.4      Coagulation:     Recent Labs  Lab 11/15/17  1648 11/16/17  0257 11/17/17  0405   INR 2.0* 2.0* 1.9*   APTT 27.4 27.2 26.7     LDH:    Recent Labs  Lab 11/16/17 0257 11/17/17  0405    180     Microbiology:  Microbiology Results (last 7 days)     ** No results found for the last 168 hours. **          I have reviewed all pertinent labs within the past 24 hours.    Estimated Creatinine Clearance: 50.1 mL/min (based on SCr of 1.4 mg/dL).    Diagnostic Results:  Echo: I have reviewed all pertinent  results/findings  2D echo with 2d color flow:    1 - Moderate left ventricular enlargement.     2 - Severely depressed left ventricular systolic function (EF 15-20%).     3 - Impaired LV relaxation, normal LAP (grade 1 diastolic dysfunction).     4 - Biatrial enlargement.     5 - Right ventricle is upper limit of normal in size with not well seen systolic function.     6 - Severe tricuspid regurgitation.     7 - Increased central venous pressure.     8 - The estimated PA systolic pressure is 40 mmHg.     9 - Heartmate III LVAD; speed 5800.

## 2017-11-17 NOTE — PROGRESS NOTES
11/16/17 2324 11/16/17 2327   Vital Signs   /79 (!) 94/0   MAP (mmHg) 95 --    BP Location Left arm Left arm   BP Method Automatic Doppler   Patient Position Lying Lying     Notified Dr. Bennett of patient's MAP and doppler following scheduled PO BP medications. Patient is asymptomatic. MD states he will look at patient's chart and place orders. Will continue to monitor.

## 2017-11-17 NOTE — PLAN OF CARE
Problem: Patient Care Overview  Goal: Plan of Care Review  Outcome: Ongoing (interventions implemented as appropriate)  Pt arrived to [facility] overnight. Cardene gtt infusing as ordered. MAP < 80 on cardene. No acute events since arrival to [facility]. POC for weaning of cardene as tolerated following adjustment of oral meds. POC reviewed and discussed with pt. All questions and concerns were addressed. Pt. Verbalized understanding.

## 2017-11-17 NOTE — PROGRESS NOTES
11/17/17 0237 11/17/17 0238 11/17/17 0240   Vital Signs   /85 116/87 (!) 118/0   MAP (mmHg) 97 97 --    BP Location Right arm Left arm Left arm   BP Method Automatic Automatic Doppler   Patient Position Lying Lying Lying     Notified Dr. Bennett of pt's MAP and BP. Patient asymptomatic. MD states he will place orders to transfer patient to ICU to initiate Cardene drip. Will continue to monitor.

## 2017-11-17 NOTE — NURSING
Pt doppler is 92 and map 89. Asymptomatic w/o any complaints. Notified Dr. Yeh. Further orders given and carried out. Will continue to monitor.

## 2017-11-17 NOTE — PROGRESS NOTES
11/16/17 1950 11/16/17 1953   Vital Signs   /77 (!) 84/0   MAP (mmHg) 89 --    BP Location Left arm Left arm   BP Method Automatic Doppler   Patient Position Lying Lying     Notified Dr. Bennett of pt MAP and Doppler. Patient has PO hydralazine scheduled at 2200. Orders to give early per Dr. Bennett. Will carry out orders and continue to monitor.

## 2017-11-17 NOTE — NURSING TRANSFER
Nursing Transfer Note      11/16/2017     Transfer To: room 342    Transfer via bed    Transfer with cardiac monitoring    Transported by nurse and pct     Medicines sent: no    Chart send with patient: Yes    Notified: spouse    Patient reassessed at: 11/16/17 @1800     Upon arrival to floor: cardiac monitor applied, patient oriented to room, call bell in reach and bed in lowest position

## 2017-11-17 NOTE — PROGRESS NOTES
"LVAD sterile dressing change done with soap and water. DLES "2" with redness noted around driveline. No drainage noted. [place] anchor in place. Next dressing change due 11/17/17. Will continue to monitor.  "

## 2017-11-17 NOTE — ASSESSMENT & PLAN NOTE
Increased his norvasc and lisinopril to doses listed above  Now on max doses of hydralazine, lisinopril and norvasc. Would add isordil 10 mg TID if needed.  MAP's much better since then and his levels are less than 90. However still on nicardipine 2.5 mg intermittently.

## 2017-11-17 NOTE — PROGRESS NOTES
Patient's BP and MAP persistently high tonight. Tried Hydralazine x2 doses and an extra dose of norvasc without reaching LVAD goal. No acute symptoms of hypertensive urgency during bedside visit.           11/17/17 0237 11/17/17 0238 11/17/17 0240   Vital Signs   /85 116/87 (!) 118/0   MAP (mmHg) 97 97 --    BP Location Right arm Left arm Left arm   BP Method Automatic Automatic Doppler   Patient Position Lying Lying Lying       A/P  1. Transfer patient back to ICU  2. Start Nicardipine drip at 5mg/hr with titration of 2mg every 15-20 minutes. Max of 15.

## 2017-11-17 NOTE — PLAN OF CARE
Problem: Occupational Therapy Goal  Goal: Occupational Therapy Goal  Goals to be met by: 12/08/2017    Patient will increase functional independence with ADLs by performing:    UE Dressing with Supervision.  LE Dressing with Supervision.  Grooming while standing with Supervision.  Toileting from toilet with Supervision for hygiene and clothing management.   Supine to sit with Modified [place].  Stand pivot transfers with Supervision.  Toilet transfer to toilet with Supervision.    Outcome: Ongoing (interventions implemented as appropriate)  OT evaluation and Plan of care established 11/17/2017

## 2017-11-17 NOTE — PROCEDURES
Patient in bed aaox3 with no family at bedside. VAD interrogation completed this AM in the event changes needed to be made. Will continue to monitor for further issues.     Pulsatile: Yes, intermittent   VAD Sounds: HM3  Smooth  Problems / Issues / Alarms with VAD if any: None noted just some PI's  HCT: 29.8   Modular Cable Connection Intact(no yellow exposed): YES      VAD Interrogation:  TXP LVAD INTERROGATIONS 11/17/2017 11/17/2017 11/17/2017 11/17/2017 11/17/2017 11/17/2017 11/17/2017   Type HeartMate3 HeartMate3 HeartMate3 HeartMate3 HeartMate3 HeartMate3 HeartMate3   Flow 5.3 5.3 5.4 5.4 5.3 5.3 5.3   Speed 5800 5800 5800 5800 5800 5800 5800   PI 3.3 3.3 3.3 3.3 3.3 3.3 3.3   Power (Mendez) 4.3 4.4 4.3 4.4 4.4 4.3 4.4   LSL 5400 5400 5400 5400 5400 5400 5400   Pulsatility Pulse Pulse Pulse Pulse Pulse Pulse Pulse   }

## 2017-11-17 NOTE — PT/OT/SLP EVAL
Occupational Therapy  Evaluation    Kev Vasquez   MRN: [id number]   Admitting Diagnosis: <principal problem not specified>    OT Date of Treatment: 11/17/17   OT Start Time: 1025  OT Stop Time: 1054  OT Total Time (min): 29 min    Billable Minutes:  Evaluation 15  Self Care/Home Management 14    Diagnosis: <principal problem not specified>     Past Medical History:   Diagnosis Date    Acute on chronic systolic heart failure 7/27/2015    AICD (automatic cardioverter/defibrillator) present 2014    [employer]    CAD (coronary artery disease) 7/27/2015    CHF (congestive heart failure)     Gait instability     HTN (hypertension) 7/27/2015    ICD (implantable cardioverter-defibrillator), biventricular, in situ 7/27/2015    Kidney stones     HILLARY treated with BiPAP 7/27/2015    Peripheral neuropathy     Pulmonary hypertension due to left ventricular systolic dysfunction 7/29/2015    Restless leg syndrome 1992    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    Skin cancer     excision 2013    Sleep apnea 1992      Past Surgical History:   Procedure Laterality Date    CARDIAC PACEMAKER PLACEMENT      pacemaker previously, then AICD in 2006. AICD [employer] serial #[id number]    CORONARY ARTERY BYPASS GRAFT  1993    KNEE SURGERY Left 2001    complicated by infection, hardware had to be taken out and replaced    LEFT VENTRICULAR ASSIST DEVICE  10/19/2016       Referring physician: Stephenie SCHOFIELD)   Date referred to OT: 11/17/2017    General Precautions: Standard, LVAD, fall    Patient History:  Living Environment  Lives With: spouse (Pt lives with wife, has 24 hour assist at home )  Living Arrangements: house (2 story home , Bed and bath downstairs )  Home Accessibility: stairs within home  Home Layout: Able to live on 1st floor  Stair Railings at Home: none  Transportation Available: car, family or friend will provide  Living Environment Comment: Pt lives with his wife, daughter and son in law in a 2 Story  home with 1 HERRERA. Pt stays on the 1st floor of house. Pt using RW for short distances and w/c for long distances. Pt reports being indep with ADL PTA. Pt has 24 hour aid at home   Equipment Currently Used at Home:  (RW, W/c, B/s commode, tub bench )    Prior level of function:   Bed Mobility/Transfers: independent  Grooming: independent  Bathing: independent  Upper Body Dressing: independent  Lower Body Dressing: independent  Toileting: independent        Dominant hand: right    Subjective:  Communicated with RN prior to session.  Chief Complaint: weakness   Patient/Family stated goals: Pt agreeable to OT Eval     Pain/Comfort  Pain Rating 1: 0/10    Objective:  Patient found reclined in bed with: blood pressure cuff, telemetry, pulse ox (continuous), peripheral IV, central line, oxygen (LVAD)    Cognitive Exam:  Oriented to: Person, Place and Time  Follows Commands/attention: Follows two-step commands  Communication: clear/fluent  Memory:  No Deficits noted  Safety awareness/insight to disability: intact  Coping skills/emotional control: Appropriate to situation    Visual/perceptual:  Intact    Physical Exam:  Postural examination/scapula alignment: Rounded shoulder, Head forward and Kyphosis  Skin integrity: Visible skin intact  Edema: None noted     Sensation:   Intact    Upper Extremity Range of Motion:  Right Upper Extremity: WFL  Left Upper Extremity: WFL    Upper Extremity Strength:  Right Upper Extremity: WFL  Left Upper Extremity: WFL   Strength: bilateral  4+/5    Fine motor coordination:   Intact    Gross motor coordination: WFL    Functional Mobility:  Bed Mobility:  Supine to Sit: Contact Guard Assistance    Transfers:  Sit <> Stand Assistance: Minimum Assistance  Sit <> Stand Assistive Device: No Assistive Device  Bed <> Chair Technique: Stand Pivot  Bed <> Chair Transfer Assistance: Minimum Assistance  Bed <> Chair Assistive Device: No Assistive Device    Functional Ambulation: pt walked in the  "hallway with Min assist. Pt noted to veer towards the right during ambulation     Activities of Daily Living:  UE Dressing Level of Assistance: Minimum assistance  LE Dressing Level of Assistance: Maximum assistance (donning shoes )     Balance:   Static Sit: GOOD-: Takes MODERATE challenges from all directions but inconsistently  Dynamic Sit: GOOD-: Maintains balance through MODERATE excursions of active trunk movement,     Static Stand: FAIR+: Takes MINIMAL challenges from all directions  Dynamic stand: FAIR: Needs CONTACT GUARD during gait    Therapeutic Activities and Exercises:  Bed mobility, sit to josie , bed to chair, functional mobility retraining  Lvad management: switching to battery power requiring Mod assist  ADL/Self care retraining  Pt educated on role of OT, plan of care, safety awareness, DME, importance of out of bed activity, energy conservation techniques, LVAD management        AM-PAC 6 CLICK ADL  How much help from another person does this patient currently need?  1 = Unable, Total/Dependent Assistance  2 = A lot, Maximum/Moderate Assistance  3 = A little, Minimum/Contact Guard/Supervision  4 = None, Modified [place]/Independent    Putting on and taking off regular lower body clothing? : 2  Bathing (including washing, rinsing, drying)?: 2  Toileting, which includes using toilet, bedpan, or urinal? : 3  Putting on and taking off regular upper body clothing?: 3  Taking care of personal grooming such as brushing teeth?: 3  Eating meals?: 4  Total Score: 17    AM-PAC Raw Score CMS "G-Code Modifier Level of Impairment Assistance   6 % Total / Unable   7 - 9 CM 80 - 100% Maximal Assist   10-14 CL 60 - 80% Moderate Assist   15 - 19 CK 40 - 60% Moderate Assist   20 - 22 CJ 20 - 40% Minimal Assist   23 CI 1-20% SBA / CGA   24 CH 0% Independent/ Mod I       Patient left up in chair with all lines intact, call button in reach and RN notified    Assessment:  Kev Vasquez is a 77 y.o. male with a " medical diagnosis of <principal problem not specified> and presents with good activity tolerance this visit. Pt needs moderate cues to switch LVAD to battery power and back to A/C power this visit. Pt will benefit from skilled OT services to maximize independence and safety with ADL/Self care and functional mobility     Rehab identified problem list/impairments: Rehab identified problem list/impairments: weakness, impaired endurance, impaired self care skills, impaired functional mobilty, gait instability, impaired balance, impaired cardiopulmonary response to activity    Rehab potential is good.    Activity tolerance: Good    Discharge recommendations: Discharge Facility/Level Of Care Needs: home with home health, home health PT     Barriers to discharge: Barriers to Discharge: None    Equipment recommendations: none     GOALS:    Occupational Therapy Goals        Problem: Occupational Therapy Goal    Goal Priority Disciplines Outcome Interventions   Occupational Therapy Goal     OT, PT/OT Ongoing (interventions implemented as appropriate)    Description:  Goals to be met by: 12/08/2017    Patient will increase functional independence with ADLs by performing:    UE Dressing with Supervision.  LE Dressing with Supervision.  Grooming while standing with Supervision.  Toileting from toilet with Supervision for hygiene and clothing management.   Supine to sit with Modified [place].  Stand pivot transfers with Supervision.  Toilet transfer to toilet with Supervision.                      PLAN:  Patient to be seen 4 x/week to address the above listed problems via self-care/home management, therapeutic activities, therapeutic exercises  Plan of Care expires: 12/08/17  Plan of Care reviewed with: patient         Stanley S Ervin OT  11/17/2017

## 2017-11-18 LAB
ALLENS TEST: ABNORMAL
ANION GAP SERPL CALC-SCNC: 8 MMOL/L
APTT BLDCRRT: 28.3 SEC
BASOPHILS # BLD AUTO: 0.04 K/UL
BASOPHILS NFR BLD: 0.6 %
BUN SERPL-MCNC: 20 MG/DL
CALCIUM SERPL-MCNC: 9.2 MG/DL
CHLORIDE SERPL-SCNC: 112 MMOL/L
CO2 SERPL-SCNC: 20 MMOL/L
CREAT SERPL-MCNC: 1.6 MG/DL
DELSYS: ABNORMAL
DIFFERENTIAL METHOD: ABNORMAL
EOSINOPHIL # BLD AUTO: 0.2 K/UL
EOSINOPHIL NFR BLD: 2.7 %
ERYTHROCYTE [DISTWIDTH] IN BLOOD BY AUTOMATED COUNT: 14.7 %
EST. GFR  (AFRICAN AMERICAN): 48 ML/MIN/1.73 M^2
EST. GFR  (NON AFRICAN AMERICAN): 41.5 ML/MIN/1.73 M^2
GLUCOSE SERPL-MCNC: 104 MG/DL
HCO3 UR-SCNC: 23.2 MMOL/L (ref 24–28)
HCT VFR BLD AUTO: 29.7 %
HGB BLD-MCNC: 9.7 G/DL
IMM GRANULOCYTES # BLD AUTO: 0.02 K/UL
IMM GRANULOCYTES NFR BLD AUTO: 0.3 %
INR PPP: 2
LDH SERPL L TO P-CCNC: 156 U/L
LYMPHOCYTES # BLD AUTO: 1 K/UL
LYMPHOCYTES NFR BLD: 14.3 %
MAGNESIUM SERPL-MCNC: 2.1 MG/DL
MCH RBC QN AUTO: 30 PG
MCHC RBC AUTO-ENTMCNC: 32.7 G/DL
MCV RBC AUTO: 92 FL
MODE: ABNORMAL
MONOCYTES # BLD AUTO: 0.8 K/UL
MONOCYTES NFR BLD: 12 %
NEUTROPHILS # BLD AUTO: 4.7 K/UL
NEUTROPHILS NFR BLD: 70.1 %
NRBC BLD-RTO: 0 /100 WBC
PCO2 BLDA: 32.6 MMHG (ref 35–45)
PH SMN: 7.46 [PH] (ref 7.35–7.45)
PHOSPHATE SERPL-MCNC: 2.9 MG/DL
PLATELET # BLD AUTO: 181 K/UL
PMV BLD AUTO: 10.9 FL
PO2 BLDA: 75 MMHG (ref 80–100)
POC BE: -1 MMOL/L
POC SATURATED O2: 96 % (ref 95–100)
POC TCO2: 24 MMOL/L (ref 23–27)
POTASSIUM SERPL-SCNC: 4.1 MMOL/L
PROTHROMBIN TIME: 20.3 SEC
RBC # BLD AUTO: 3.23 M/UL
SAMPLE: ABNORMAL
SITE: ABNORMAL
SODIUM SERPL-SCNC: 140 MMOL/L
SP02: 96
WBC # BLD AUTO: 6.65 K/UL

## 2017-11-18 PROCEDURE — 27000190 HC CPAP FULL FACE MASK W/VALVE

## 2017-11-18 PROCEDURE — 97116 GAIT TRAINING THERAPY: CPT

## 2017-11-18 PROCEDURE — 83735 ASSAY OF MAGNESIUM: CPT

## 2017-11-18 PROCEDURE — 85610 PROTHROMBIN TIME: CPT

## 2017-11-18 PROCEDURE — 36600 WITHDRAWAL OF ARTERIAL BLOOD: CPT

## 2017-11-18 PROCEDURE — 83615 LACTATE (LD) (LDH) ENZYME: CPT

## 2017-11-18 PROCEDURE — 25000003 PHARM REV CODE 250: Performed by: PHYSICIAN ASSISTANT

## 2017-11-18 PROCEDURE — 97530 THERAPEUTIC ACTIVITIES: CPT

## 2017-11-18 PROCEDURE — 85730 THROMBOPLASTIN TIME PARTIAL: CPT

## 2017-11-18 PROCEDURE — 80048 BASIC METABOLIC PNL TOTAL CA: CPT

## 2017-11-18 PROCEDURE — 25000003 PHARM REV CODE 250: Performed by: STUDENT IN AN ORGANIZED HEALTH CARE EDUCATION/TRAINING PROGRAM

## 2017-11-18 PROCEDURE — 25000003 PHARM REV CODE 250: Performed by: INTERNAL MEDICINE

## 2017-11-18 PROCEDURE — 99900035 HC TECH TIME PER 15 MIN (STAT)

## 2017-11-18 PROCEDURE — 27000248 HC VAD-ADDITIONAL DAY

## 2017-11-18 PROCEDURE — 20000000 HC ICU ROOM

## 2017-11-18 PROCEDURE — 85025 COMPLETE CBC W/AUTO DIFF WBC: CPT

## 2017-11-18 PROCEDURE — 99232 SBSQ HOSP IP/OBS MODERATE 35: CPT | Mod: ,,, | Performed by: INTERNAL MEDICINE

## 2017-11-18 PROCEDURE — 94660 CPAP INITIATION&MGMT: CPT

## 2017-11-18 PROCEDURE — 82803 BLOOD GASES ANY COMBINATION: CPT

## 2017-11-18 PROCEDURE — 84100 ASSAY OF PHOSPHORUS: CPT

## 2017-11-18 RX ORDER — ISOSORBIDE DINITRATE 10 MG/1
10 TABLET ORAL 3 TIMES DAILY
Status: DISCONTINUED | OUTPATIENT
Start: 2017-11-18 | End: 2017-11-18

## 2017-11-18 RX ORDER — ISOSORBIDE DINITRATE 10 MG/1
20 TABLET ORAL 3 TIMES DAILY
Status: DISCONTINUED | OUTPATIENT
Start: 2017-11-18 | End: 2017-11-19

## 2017-11-18 RX ADMIN — AMLODIPINE BESYLATE 10 MG: 10 TABLET ORAL at 09:11

## 2017-11-18 RX ADMIN — HYDRALAZINE HYDROCHLORIDE 100 MG: 50 TABLET ORAL at 09:11

## 2017-11-18 RX ADMIN — MAGNESIUM OXIDE TAB 400 MG (241.3 MG ELEMENTAL MG) 400 MG: 400 (241.3 MG) TAB at 09:11

## 2017-11-18 RX ADMIN — ISOSORBIDE DINITRATE 20 MG: 10 TABLET ORAL at 02:11

## 2017-11-18 RX ADMIN — CIPROFLOXACIN HYDROCHLORIDE 750 MG: 750 TABLET, FILM COATED ORAL at 09:11

## 2017-11-18 RX ADMIN — DOCUSATE SODIUM 100 MG: 100 CAPSULE, LIQUID FILLED ORAL at 09:11

## 2017-11-18 RX ADMIN — HYDRALAZINE HYDROCHLORIDE 100 MG: 50 TABLET ORAL at 02:11

## 2017-11-18 RX ADMIN — LISINOPRIL 20 MG: 20 TABLET ORAL at 09:11

## 2017-11-18 RX ADMIN — NICARDIPINE HYDROCHLORIDE 5 MG/HR: 0.2 INJECTION, SOLUTION INTRAVENOUS at 06:11

## 2017-11-18 RX ADMIN — ROPINIROLE HYDROCHLORIDE 0.5 MG: 0.25 TABLET, FILM COATED ORAL at 02:11

## 2017-11-18 RX ADMIN — DOFETILIDE 250 MCG: 0.12 CAPSULE ORAL at 09:11

## 2017-11-18 RX ADMIN — BUPROPION HYDROCHLORIDE 150 MG: 150 TABLET, EXTENDED RELEASE ORAL at 09:11

## 2017-11-18 RX ADMIN — ASPIRIN 81 MG CHEWABLE TABLET 81 MG: 81 TABLET CHEWABLE at 09:11

## 2017-11-18 RX ADMIN — TAMSULOSIN HYDROCHLORIDE 0.4 MG: 0.4 CAPSULE ORAL at 09:11

## 2017-11-18 RX ADMIN — ROPINIROLE HYDROCHLORIDE 0.5 MG: 0.25 TABLET, FILM COATED ORAL at 05:11

## 2017-11-18 RX ADMIN — WARFARIN SODIUM 6 MG: 5 TABLET ORAL at 05:11

## 2017-11-18 RX ADMIN — BUPROPION HYDROCHLORIDE 300 MG: 150 TABLET, EXTENDED RELEASE ORAL at 09:11

## 2017-11-18 RX ADMIN — ISOSORBIDE DINITRATE 10 MG: 10 TABLET ORAL at 07:11

## 2017-11-18 RX ADMIN — ATORVASTATIN CALCIUM 10 MG: 10 TABLET, FILM COATED ORAL at 09:11

## 2017-11-18 RX ADMIN — HYDRALAZINE HYDROCHLORIDE 100 MG: 50 TABLET ORAL at 05:11

## 2017-11-18 RX ADMIN — ALLOPURINOL 300 MG: 300 TABLET ORAL at 09:11

## 2017-11-18 RX ADMIN — ROPINIROLE HYDROCHLORIDE 0.5 MG: 0.25 TABLET, FILM COATED ORAL at 09:11

## 2017-11-18 RX ADMIN — ISOSORBIDE DINITRATE 20 MG: 10 TABLET ORAL at 09:11

## 2017-11-18 NOTE — PLAN OF CARE
Problem: Physical Therapy Goal  Goal: Physical Therapy Goal  Goals to be met by: 17    Patient will increase functional independence with mobility by performin. Supine to sit with Stand-by Assistance - not met  2. Sit to stand transfer with Supervision - not met  3. Gait  x 220 feet with Contact Guard Assistance using Rolling Walker. - not met  4. Lower extremity exercise program x20 reps , with supervision - not met     Outcome: Ongoing (interventions implemented as appropriate)  Goals remain appropriate

## 2017-11-18 NOTE — PT/OT/SLP PROGRESS
Physical Therapy  Treatment    Kev Vasquez   MRN: [id number]   Admitting Diagnosis: <principal problem not specified>    PT Received On: 11/17/17  PT Start Time: 1025     PT Stop Time: 1054    PT Total Time (min): 29 min       Billable Minutes:  Gait [id number] mins and Therapeutic Activity 14 mins    Treatment Type: Treatment  PT/PTA: PT     PTA Visit Number: 0       General Precautions: Standard, LVAD, fall  Orthopedic Precautions: N/A     Do you have any cultural, spiritual, Sabianism conflicts, given your current situation?: none    Subjective:  Communicated with RN prior to session.  Pt agreeable to session    Pain/Comfort  Pain Rating 1: 0/10    Objective:   Patient found reclined in bed with: blood pressure cuff, telemetry, pulse ox (continuous), peripheral IV, central line, oxygen (LVAD)    Functional Mobility:  Bed Mobility:  Supine to Sit: Contact Guard Assistance     Transfers:  Sit <> Stand Assistance: Minimum Assistance  Sit <> Stand Assistive Device: No Assistive Device    Gait:   ~80 feet with Min A with HHA. Pt demos 2-point gait with decreased william; increased time in double stance; decreased velocity of limb motion; decreased step length, increased R deviation    Balance:   Static Sit: GOOD-: Takes MODERATE challenges from all directions but inconsistently  Dynamic Sit: GOOD-: Maintains balance through MODERATE excursions of active trunk movement,     Static Stand: FAIR+: Takes MINIMAL challenges from all directions  Dynamic stand: FAIR: Needs CONTACT GUARD during gait     Therapeutic Activities and Exercises:  Pt is given verbal and tactile cues for upright posture. Pt also requires cues for step length and to maintain increased midline gait pattern.     AM-PAC 6 CLICK MOBILITY  How much help from another person does this patient currently need?   1 = Unable, Total/Dependent Assistance  2 = A lot, Maximum/Moderate Assistance  3 = A little, Minimum/Contact Guard/Supervision  4 = None, Modified  [place]/Independent    Turning over in bed (including adjusting bedclothes, sheets and blankets)?: 4  Sitting down on and standing up from a chair with arms (e.g., wheelchair, bedside commode, etc.): 3  Moving from lying on back to sitting on the side of the bed?: 4  Moving to and from a bed to a chair (including a wheelchair)?: 3  Need to walk in hospital room?: 3  Climbing 3-5 steps with a railing?: 2  Total Score: 19     AM-PAC Raw Score CMS G-Code Modifier Level of Impairment Assistance   6 % Total / Unable   7 - 9 CM 80 - 100% Maximal Assist   10 - 14 CL 60 - 80% Moderate Assist   15 - 19 CK 40 - 60% Moderate Assist   20 - 22 CJ 20 - 40% Minimal Assist   23 CI 1-20% SBA / CGA   24 CH 0% Independent/ Mod I     Patient left up in chair with all lines intact, call button in reach and RN notified.    Assessment:  Kev Vasquez is a 77 y.o. male with a medical diagnosis of <principal problem not specified> and presents with deficits listed below. Pt will need skilled PT to address deficits and increase functional mobility as able.    Rehab identified problem list/impairments: Rehab identified problem list/impairments: weakness, impaired endurance, impaired functional mobilty, gait instability, impaired balance, decreased lower extremity function, decreased coordination, impaired cardiopulmonary response to activity    Rehab potential is good.    Activity tolerance: Good    Discharge recommendations: Discharge Facility/Level Of Care Needs: home with home health     Barriers to discharge: Barriers to Discharge: None    Equipment recommendations: Equipment Needed After Discharge: none     GOALS:    Physical Therapy Goals        Problem: Physical Therapy Goal    Goal Priority Disciplines Outcome Goal Variances Interventions   Physical Therapy Goal     PT/OT, PT Ongoing (interventions implemented as appropriate)     Description:  Goals to be met by: 11/30/17    Patient will increase functional independence  with mobility by performin. Supine to sit with Stand-by Assistance - not met  2. Sit to stand transfer with Supervision - not met  3. Gait  x 220 feet with Contact Guard Assistance using Rolling Walker. - not met  4. Lower extremity exercise program x20 reps , with supervision - not met                      PLAN:    Patient to be seen 5 x/week  to address the above listed problems via gait training, therapeutic activities, therapeutic exercises, neuromuscular re-education  Plan of Care expires: 12/15/17  Plan of Care reviewed with: patient         Cindy Green, PT  2017

## 2017-11-18 NOTE — ASSESSMENT & PLAN NOTE
Increased his norvasc and lisinopril to doses listed above  Now on max doses of hydralazine, lisinopril and norvasc. Added isordil 10 mg TID if needed.  Will try to transition to the floor today.

## 2017-11-18 NOTE — PROGRESS NOTES
[facility]  Heart Transplant  Progress Note    Patient Name: Kev Vasquez  MRN: [id number]  Admission Date: 11/15/2017  Hospital Length of Stay: 3 days  Attending Physician: Karen Valladares MD  Primary Care Provider: Mega Collins MD  Principal Problem:<principal problem not specified>    Subjective:     Interval History:  Made changes listed below. Will try to wean off of nicardipine today again. Patient has no complaints. Added patient's CPAP.    Continuous Infusions:   nicardipine 2.5 mg/hr (11/18/17 0800)     Scheduled Meds:   allopurinol  300 mg Oral Daily    amLODIPine  10 mg Oral Daily    aspirin  81 mg Oral Daily    atorvastatin  10 mg Oral Daily    buPROPion  150 mg Oral Daily    buPROPion  300 mg Oral Daily    ciprofloxacin HCl  750 mg Oral BID    docusate sodium  100 mg Oral BID    dofetilide  250 mcg Oral Q12H    hydrALAZINE  100 mg Oral Q8H    isosorbide dinitrate  10 mg Oral TID    lisinopril  20 mg Oral BID    magnesium oxide  400 mg Oral BID    rOPINIRole  0.5 mg Oral TID    tamsulosin  0.4 mg Oral Daily    [START ON 11/19/2017] warfarin  4 mg Oral Every Sun    warfarin  4 mg Oral Every Tues, Thurs    warfarin  6 mg Oral Every Sat    warfarin  6 mg Oral Every Mon, Wed, Fri     PRN Meds:    Review of patient's allergies indicates:   Allergen Reactions    Sulfa (sulfonamide antibiotics)      Objective:     Vital Signs (Most Recent):  Temp: 97.9 °F (36.6 °C) (11/18/17 0701)  Pulse: 67 (11/18/17 0800)  Resp: 20 (11/18/17 0800)  BP: (!) 82/0 (11/18/17 0702)  SpO2: 97 % (11/18/17 0800) Vital Signs (24h Range):  Temp:  [97.9 °F (36.6 °C)-98.8 °F (37.1 °C)] 97.9 °F (36.6 °C)  Pulse:  [] 67  Resp:  [18-26] 20  SpO2:  [95 %-100 %] 97 %  BP: ()/(0-82) 82/0     Patient Vitals for the past 72 hrs (Last 3 readings):   Weight   11/18/17 0620 91 kg (200 lb 9.9 oz)   11/16/17 0800 91.4 kg (201 lb 8 oz)   11/16/17 0500 91.4 kg (201 lb 8 oz)     Body mass index is  27.17 kg/m².      Intake/Output Summary (Last 24 hours) at 11/18/17 0905  Last data filed at 11/18/17 0800   Gross per 24 hour   Intake           365.21 ml   Output              680 ml   Net          -314.79 ml       Hemodynamic Parameters:       Physical Exam   Constitutional: He is oriented to person, place, and time. He appears well-developed and well-nourished.   HENT:   Head: Normocephalic and atraumatic.   Eyes: EOM are normal. Pupils are equal, round, and reactive to light.   Neck: Normal range of motion. Neck supple. No JVD present.   Cardiovascular: Normal rate, regular rhythm, normal heart sounds and intact distal pulses.    Pulmonary/Chest: Effort normal and breath sounds normal.   Abdominal: Soft. Bowel sounds are normal. There is no tenderness.   Musculoskeletal: Normal range of motion. He exhibits no edema.   Neurological: He is alert and oriented to person, place, and time.   Vitals reviewed.      Significant Labs:  CBC:    Recent Labs  Lab 11/16/17  0257 11/17/17  0405 11/18/17  0255   WBC 5.94 7.11 6.65   RBC 3.14* 3.32* 3.23*   HGB 9.5* 9.8* 9.7*   HCT 28.6* 29.8* 29.7*    186 181   MCV 91 90 92   MCH 30.3 29.5 30.0   MCHC 33.2 32.9 32.7     BNP:    Recent Labs  Lab 11/14/17  1401 11/17/17  0405   * 207*     CMP:    Recent Labs  Lab 11/14/17  1401  11/16/17  0257 11/17/17  0405 11/18/17  0255     < > 96 96 104   CALCIUM 9.9  < > 9.1 9.2 9.2   ALBUMIN 3.2*  --   --  3.0*  --    PROT 6.9  --   --  6.2  --      < > 138 140 140   K 4.3  < > 3.7 4.0 4.1   CO2 27  < > 23 21* 20*     < > 108 111* 112*   BUN 24*  < > 23 18 20   CREATININE 1.6*  < > 1.4 1.4 1.6*   ALKPHOS 75  --   --  71  --    ALT 28  --   --  21  --    AST 22  --   --  19  --    BILITOT 0.4  --   --  0.4  --    < > = values in this interval not displayed.   Coagulation:     Recent Labs  Lab 11/16/17  0257 11/17/17  0405 11/18/17  0255   INR 2.0* 1.9* 2.0*   APTT 27.2 26.7 28.3     LDH:    Recent Labs  Lab  11/16/17  0257 11/17/17  0405 11/18/17  0255    180 156     Microbiology:  Microbiology Results (last 7 days)     ** No results found for the last 168 hours. **          I have reviewed all pertinent labs within the past 24 hours.    Estimated Creatinine Clearance: 43.8 mL/min (based on SCr of 1.6 mg/dL (H)).    Diagnostic Results:  Echo: I have reviewed all pertinent results/findings  2D echo with 2d color flow:    1 - Moderate left ventricular enlargement.     2 - Severely depressed left ventricular systolic function (EF 15-20%).     3 - Impaired LV relaxation, normal LAP (grade 1 diastolic dysfunction).     4 - Biatrial enlargement.     5 - Right ventricle is upper limit of normal in size with not well seen systolic function.     6 - Severe tricuspid regurgitation.     7 - Increased central venous pressure.     8 - The estimated PA systolic pressure is 40 mmHg.     9 - Heartmate III LVAD; speed 5800.     Assessment and Plan:     Mr. Vasquez is a pleasant 77 y.o. y.o. WM with ischemic cardiomyopathy status post Heartmate III 10/16/16 LVAD, CKD II, CAD, VT on Tikosyn, HTN, HLD, Gout, Obesity, SSS, and depression who presents here for HTN control after being seen in clinic with Dr. Santana. Patient was asymptomatic in terms of high BP on time of admission to inpatient services.     Hypertensive urgency, malignant    Increased his norvasc and lisinopril to doses listed above  Now on max doses of hydralazine, lisinopril and norvasc. Added isordil 20 mg TID today.  Will try to transition to the floor today.        Chronic anticoagulation    Continue warfarin, subtherapeutic today. Increased warfarin today.        LVAD (left ventricular assist device) present    2d echo ordered and shows that the AV value is opening every beat            Melvi Casiano MD  Heart Transplant  [facility]

## 2017-11-18 NOTE — SUBJECTIVE & OBJECTIVE
Interval History:  Made changes listed above. Will try to wean off of nicardipine today again. Patient has no complaints. Added patient's CPAP.    Continuous Infusions:   nicardipine 2.5 mg/hr (11/18/17 0800)     Scheduled Meds:   allopurinol  300 mg Oral Daily    amLODIPine  10 mg Oral Daily    aspirin  81 mg Oral Daily    atorvastatin  10 mg Oral Daily    buPROPion  150 mg Oral Daily    buPROPion  300 mg Oral Daily    ciprofloxacin HCl  750 mg Oral BID    docusate sodium  100 mg Oral BID    dofetilide  250 mcg Oral Q12H    hydrALAZINE  100 mg Oral Q8H    isosorbide dinitrate  10 mg Oral TID    lisinopril  20 mg Oral BID    magnesium oxide  400 mg Oral BID    rOPINIRole  0.5 mg Oral TID    tamsulosin  0.4 mg Oral Daily    [START ON 11/19/2017] warfarin  4 mg Oral Every Sun    warfarin  4 mg Oral Every Tues, Thurs    warfarin  6 mg Oral Every Sat    warfarin  6 mg Oral Every Mon, Wed, Fri     PRN Meds:    Review of patient's allergies indicates:   Allergen Reactions    Sulfa (sulfonamide antibiotics)      Objective:     Vital Signs (Most Recent):  Temp: 97.9 °F (36.6 °C) (11/18/17 0701)  Pulse: 67 (11/18/17 0800)  Resp: 20 (11/18/17 0800)  BP: (!) 82/0 (11/18/17 0702)  SpO2: 97 % (11/18/17 0800) Vital Signs (24h Range):  Temp:  [97.9 °F (36.6 °C)-98.8 °F (37.1 °C)] 97.9 °F (36.6 °C)  Pulse:  [] 67  Resp:  [18-26] 20  SpO2:  [95 %-100 %] 97 %  BP: ()/(0-82) 82/0     Patient Vitals for the past 72 hrs (Last 3 readings):   Weight   11/18/17 0620 91 kg (200 lb 9.9 oz)   11/16/17 0800 91.4 kg (201 lb 8 oz)   11/16/17 0500 91.4 kg (201 lb 8 oz)     Body mass index is 27.17 kg/m².      Intake/Output Summary (Last 24 hours) at 11/18/17 0905  Last data filed at 11/18/17 0800   Gross per 24 hour   Intake           365.21 ml   Output              680 ml   Net          -314.79 ml       Hemodynamic Parameters:       Physical Exam   Constitutional: He is oriented to person, place, and time. He  appears well-developed and well-nourished.   HENT:   Head: Normocephalic and atraumatic.   Eyes: EOM are normal. Pupils are equal, round, and reactive to light.   Neck: Normal range of motion. Neck supple. No JVD present.   Cardiovascular: Normal rate, regular rhythm, normal heart sounds and intact distal pulses.    Pulmonary/Chest: Effort normal and breath sounds normal.   Abdominal: Soft. Bowel sounds are normal. There is no tenderness.   Musculoskeletal: Normal range of motion. He exhibits no edema.   Neurological: He is alert and oriented to person, place, and time.   Vitals reviewed.      Significant Labs:  CBC:    Recent Labs  Lab 11/16/17 0257 11/17/17 0405 11/18/17 0255   WBC 5.94 7.11 6.65   RBC 3.14* 3.32* 3.23*   HGB 9.5* 9.8* 9.7*   HCT 28.6* 29.8* 29.7*    186 181   MCV 91 90 92   MCH 30.3 29.5 30.0   MCHC 33.2 32.9 32.7     BNP:    Recent Labs  Lab 11/14/17  1401 11/17/17 0405   * 207*     CMP:    Recent Labs  Lab 11/14/17  1401  11/16/17 0257 11/17/17 0405 11/18/17 0255     < > 96 96 104   CALCIUM 9.9  < > 9.1 9.2 9.2   ALBUMIN 3.2*  --   --  3.0*  --    PROT 6.9  --   --  6.2  --      < > 138 140 140   K 4.3  < > 3.7 4.0 4.1   CO2 27  < > 23 21* 20*     < > 108 111* 112*   BUN 24*  < > 23 18 20   CREATININE 1.6*  < > 1.4 1.4 1.6*   ALKPHOS 75  --   --  71  --    ALT 28  --   --  21  --    AST 22  --   --  19  --    BILITOT 0.4  --   --  0.4  --    < > = values in this interval not displayed.   Coagulation:     Recent Labs  Lab 11/16/17  0257 11/17/17 0405 11/18/17 0255   INR 2.0* 1.9* 2.0*   APTT 27.2 26.7 28.3     LDH:    Recent Labs  Lab 11/16/17  0257 11/17/17  0405 11/18/17  0255    180 156     Microbiology:  Microbiology Results (last 7 days)     ** No results found for the last 168 hours. **          I have reviewed all pertinent labs within the past 24 hours.    Estimated Creatinine Clearance: 43.8 mL/min (based on SCr of 1.6 mg/dL  (H)).    Diagnostic Results:  Echo: I have reviewed all pertinent results/findings  2D echo with 2d color flow:    1 - Moderate left ventricular enlargement.     2 - Severely depressed left ventricular systolic function (EF 15-20%).     3 - Impaired LV relaxation, normal LAP (grade 1 diastolic dysfunction).     4 - Biatrial enlargement.     5 - Right ventricle is upper limit of normal in size with not well seen systolic function.     6 - Severe tricuspid regurgitation.     7 - Increased central venous pressure.     8 - The estimated PA systolic pressure is 40 mmHg.     9 - Heartmate III LVAD; speed 5800.

## 2017-11-18 NOTE — PLAN OF CARE
Problem: Patient Care Overview  Goal: Plan of Care Review  Outcome: Ongoing (interventions implemented as appropriate)  Pt. Placed back on Cardene overnight d/t MAPs in high 80s to low 90s. MD Ortiz aware. VS otherwise stable. LVAD free from alarms during shift. POC for continued management of BP in ICU. POC reviewed and discussed with pt. All questions and concerns were addressed. Pt. Verbalized understanding.

## 2017-11-19 LAB
ALLENS TEST: ABNORMAL
ANION GAP SERPL CALC-SCNC: 7 MMOL/L
APTT BLDCRRT: 28 SEC
BASOPHILS # BLD AUTO: 0.03 K/UL
BASOPHILS NFR BLD: 0.5 %
BUN SERPL-MCNC: 20 MG/DL
CALCIUM SERPL-MCNC: 9 MG/DL
CHLORIDE SERPL-SCNC: 112 MMOL/L
CO2 SERPL-SCNC: 21 MMOL/L
CREAT SERPL-MCNC: 1.5 MG/DL
DELSYS: ABNORMAL
DIFFERENTIAL METHOD: ABNORMAL
EOSINOPHIL # BLD AUTO: 0.2 K/UL
EOSINOPHIL NFR BLD: 2.7 %
ERYTHROCYTE [DISTWIDTH] IN BLOOD BY AUTOMATED COUNT: 14.6 %
ERYTHROCYTE [SEDIMENTATION RATE] IN BLOOD BY WESTERGREN METHOD: 16 MM/H
EST. GFR  (AFRICAN AMERICAN): 51.9 ML/MIN/1.73 M^2
EST. GFR  (NON AFRICAN AMERICAN): 44.9 ML/MIN/1.73 M^2
GLUCOSE SERPL-MCNC: 93 MG/DL
HCO3 UR-SCNC: 23.4 MMOL/L (ref 24–28)
HCT VFR BLD AUTO: 27.5 %
HGB BLD-MCNC: 9 G/DL
IMM GRANULOCYTES # BLD AUTO: 0.02 K/UL
IMM GRANULOCYTES NFR BLD AUTO: 0.3 %
INR PPP: 2.3
LDH SERPL L TO P-CCNC: 149 U/L
LYMPHOCYTES # BLD AUTO: 1 K/UL
LYMPHOCYTES NFR BLD: 16.4 %
MAGNESIUM SERPL-MCNC: 2.3 MG/DL
MCH RBC QN AUTO: 29.6 PG
MCHC RBC AUTO-ENTMCNC: 32.7 G/DL
MCV RBC AUTO: 91 FL
MODE: ABNORMAL
MONOCYTES # BLD AUTO: 0.9 K/UL
MONOCYTES NFR BLD: 13.8 %
NEUTROPHILS # BLD AUTO: 4.1 K/UL
NEUTROPHILS NFR BLD: 66.3 %
NRBC BLD-RTO: 0 /100 WBC
PCO2 BLDA: 34.4 MMHG (ref 35–45)
PH SMN: 7.44 [PH] (ref 7.35–7.45)
PHOSPHATE SERPL-MCNC: 3.1 MG/DL
PLATELET # BLD AUTO: 172 K/UL
PMV BLD AUTO: 10.9 FL
PO2 BLDA: 78 MMHG (ref 80–100)
POC BE: -1 MMOL/L
POC SATURATED O2: 96 % (ref 95–100)
POC TCO2: 24 MMOL/L (ref 23–27)
POTASSIUM SERPL-SCNC: 4 MMOL/L
PROTHROMBIN TIME: 22.6 SEC
RBC # BLD AUTO: 3.04 M/UL
SAMPLE: ABNORMAL
SITE: ABNORMAL
SODIUM SERPL-SCNC: 140 MMOL/L
SP02: 96
WBC # BLD AUTO: 6.23 K/UL

## 2017-11-19 PROCEDURE — 84100 ASSAY OF PHOSPHORUS: CPT

## 2017-11-19 PROCEDURE — 25000003 PHARM REV CODE 250: Performed by: PHYSICIAN ASSISTANT

## 2017-11-19 PROCEDURE — 80048 BASIC METABOLIC PNL TOTAL CA: CPT

## 2017-11-19 PROCEDURE — 20600001 HC STEP DOWN PRIVATE ROOM

## 2017-11-19 PROCEDURE — 99900035 HC TECH TIME PER 15 MIN (STAT)

## 2017-11-19 PROCEDURE — 25000003 PHARM REV CODE 250: Performed by: INTERNAL MEDICINE

## 2017-11-19 PROCEDURE — 25000003 PHARM REV CODE 250: Performed by: STUDENT IN AN ORGANIZED HEALTH CARE EDUCATION/TRAINING PROGRAM

## 2017-11-19 PROCEDURE — 27000248 HC VAD-ADDITIONAL DAY

## 2017-11-19 PROCEDURE — 27000190 HC CPAP FULL FACE MASK W/VALVE

## 2017-11-19 PROCEDURE — 83735 ASSAY OF MAGNESIUM: CPT

## 2017-11-19 PROCEDURE — 85610 PROTHROMBIN TIME: CPT

## 2017-11-19 PROCEDURE — 85025 COMPLETE CBC W/AUTO DIFF WBC: CPT

## 2017-11-19 PROCEDURE — 85730 THROMBOPLASTIN TIME PARTIAL: CPT

## 2017-11-19 PROCEDURE — 99232 SBSQ HOSP IP/OBS MODERATE 35: CPT | Mod: ,,, | Performed by: INTERNAL MEDICINE

## 2017-11-19 PROCEDURE — 83615 LACTATE (LD) (LDH) ENZYME: CPT

## 2017-11-19 RX ORDER — ISOSORBIDE DINITRATE 10 MG/1
30 TABLET ORAL 3 TIMES DAILY
Status: DISCONTINUED | OUTPATIENT
Start: 2017-11-19 | End: 2017-11-20

## 2017-11-19 RX ORDER — LANOLIN ALCOHOL/MO/W.PET/CERES
400 CREAM (GRAM) TOPICAL 2 TIMES DAILY
Status: DISCONTINUED | OUTPATIENT
Start: 2017-11-19 | End: 2017-11-20 | Stop reason: HOSPADM

## 2017-11-19 RX ORDER — ISOSORBIDE DINITRATE 10 MG/1
10 TABLET ORAL ONCE
Status: COMPLETED | OUTPATIENT
Start: 2017-11-19 | End: 2017-11-19

## 2017-11-19 RX ADMIN — BUPROPION HYDROCHLORIDE 150 MG: 150 TABLET, EXTENDED RELEASE ORAL at 08:11

## 2017-11-19 RX ADMIN — DOCUSATE SODIUM 100 MG: 100 CAPSULE, LIQUID FILLED ORAL at 09:11

## 2017-11-19 RX ADMIN — LISINOPRIL 20 MG: 20 TABLET ORAL at 09:11

## 2017-11-19 RX ADMIN — CIPROFLOXACIN HYDROCHLORIDE 750 MG: 750 TABLET, FILM COATED ORAL at 08:11

## 2017-11-19 RX ADMIN — ASPIRIN 81 MG CHEWABLE TABLET 81 MG: 81 TABLET CHEWABLE at 08:11

## 2017-11-19 RX ADMIN — DOFETILIDE 250 MCG: 0.12 CAPSULE ORAL at 09:11

## 2017-11-19 RX ADMIN — HYDRALAZINE HYDROCHLORIDE 100 MG: 50 TABLET ORAL at 09:11

## 2017-11-19 RX ADMIN — BUPROPION HYDROCHLORIDE 300 MG: 150 TABLET, EXTENDED RELEASE ORAL at 09:11

## 2017-11-19 RX ADMIN — ISOSORBIDE DINITRATE 20 MG: 10 TABLET ORAL at 05:11

## 2017-11-19 RX ADMIN — HYDRALAZINE HYDROCHLORIDE 100 MG: 50 TABLET ORAL at 05:11

## 2017-11-19 RX ADMIN — ISOSORBIDE DINITRATE 10 MG: 10 TABLET ORAL at 07:11

## 2017-11-19 RX ADMIN — MAGNESIUM OXIDE TAB 400 MG (241.3 MG ELEMENTAL MG) 400 MG: 400 (241.3 MG) TAB at 11:11

## 2017-11-19 RX ADMIN — DOFETILIDE 250 MCG: 0.12 CAPSULE ORAL at 08:11

## 2017-11-19 RX ADMIN — MAGNESIUM OXIDE TAB 400 MG (241.3 MG ELEMENTAL MG) 400 MG: 400 (241.3 MG) TAB at 08:11

## 2017-11-19 RX ADMIN — AMLODIPINE BESYLATE 10 MG: 10 TABLET ORAL at 09:11

## 2017-11-19 RX ADMIN — HYDRALAZINE HYDROCHLORIDE 100 MG: 50 TABLET ORAL at 01:11

## 2017-11-19 RX ADMIN — ALLOPURINOL 300 MG: 300 TABLET ORAL at 09:11

## 2017-11-19 RX ADMIN — ROPINIROLE HYDROCHLORIDE 0.5 MG: 0.25 TABLET, FILM COATED ORAL at 05:11

## 2017-11-19 RX ADMIN — ISOSORBIDE DINITRATE 30 MG: 10 TABLET ORAL at 09:11

## 2017-11-19 RX ADMIN — ROPINIROLE HYDROCHLORIDE 0.5 MG: 0.25 TABLET, FILM COATED ORAL at 09:11

## 2017-11-19 RX ADMIN — TAMSULOSIN HYDROCHLORIDE 0.4 MG: 0.4 CAPSULE ORAL at 09:11

## 2017-11-19 RX ADMIN — ROPINIROLE HYDROCHLORIDE 0.5 MG: 0.25 TABLET, FILM COATED ORAL at 01:11

## 2017-11-19 RX ADMIN — ISOSORBIDE DINITRATE 30 MG: 10 TABLET ORAL at 01:11

## 2017-11-19 RX ADMIN — CIPROFLOXACIN HYDROCHLORIDE 750 MG: 750 TABLET, FILM COATED ORAL at 09:11

## 2017-11-19 RX ADMIN — WARFARIN SODIUM 4 MG: 4 TABLET ORAL at 05:11

## 2017-11-19 RX ADMIN — ATORVASTATIN CALCIUM 10 MG: 10 TABLET, FILM COATED ORAL at 09:11

## 2017-11-19 NOTE — PLAN OF CARE
[facility]   Heart Transplant/VAD Clinic   [address] [place] [address]   [phone] after hours          [phone] fax     VAD HOME  HEALTH ORDERS      Admit to Home Health    Diagnosis:   Patient Active Problem List   Diagnosis    SOB (shortness of breath)    Chronic systolic congestive heart failure    Coronary artery disease involving native coronary artery of native heart without angina pectoris    S/P CABG (coronary artery bypass graft)    ICD (implantable cardioverter-defibrillator), biventricular, in situ    HILLARY on CPAP    Pulmonary hypertension due to left ventricular systolic dysfunction    Ischemic cardiomyopathy    Stage 4 chronic kidney disease    Syncope    Debility    LVAD (left ventricular assist device) present    Chronic anticoagulation    Ventricular tachycardia    Abdominal pain    Complication involving left ventricular assist device (LVAD)    Involuntary movements    Restless leg syndrome    Impaired functional mobility and endurance    TANESHA (acute kidney injury)    Gait instability    Skin yeast infection    Essential hypertension    Hypertensive urgency, malignant       Patient is homebound due to:      Diet: Low Fat, Low cholesterol, 2Gm Na, Coumadin restrictions.    Acitivities: No Swimming, bathing, vacuuming, contact sports.    Fresh implants= Sternal Precautions    Nursing:   SN to complete comprehensive assessment including routine vital signs. Instruct on disease process and s/s of complications to report to MD. Review/verify medication list sent home with the patient at time of discharge  and instruct patient/caregiver as needed. Frequency may be adjusted depending on start of care date.    **LVAD driveline exit site dressing change is to be completed per LVAD patient/caregiver only**.    Notify MD if SBP > 160 or < 90; DBP > 90 or < 50; HR > 120 or < 50; Temp > 101; Weight gain >3lbs in 1 day or 5lbs  in 1 week.      LABS:  SN to perform labs: PT/INR per Coumadin clinic [phone].   No Finger Sticks      CONSULTS:      Physical Therapy to evaluate and treat. Evaluate for home safety and equipment needs; Establish/upgrade home exercise program. Perform / instruct on therapeutic exercises, gait training, transfer training, and Range of Motion.    Occupational Therapy to evaluate and treat. Evaluate home environment for safety and equipment needs. Perform/Instruct on transfers, ADL training, ROM, and therapeutic exercises.      Aide to provide assistance with personal care, ADLs, and vital signs    Send initial Home Health orders to HTS attending physician on call.  Send follow up questions to VAD clinic MD [phone] or [phone].

## 2017-11-19 NOTE — PLAN OF CARE
Problem: Patient Care Overview  Goal: Plan of Care Review  Outcome: Ongoing (interventions implemented as appropriate)  Pt remains free from falls and injury. Plan of care reviewed with pt. Pt understands plan. Pt denies pain, VSS.Family at bedside. Doppler 88. Transferred from ICU this afternoon. Will continue to monitor.

## 2017-11-19 NOTE — NURSING TRANSFER
Nursing Transfer Note      11/19/2017     Transfer To: 322     Transfer via wheelchair    Transfer with cardiac monitoring    Transported by Don RN and Yumiko RN    Medicines sent: None    Chart send with patient: Yes    Notified: daughter    Upon arrival to floor: patient oriented to room, call bell in reach and bed in lowest position

## 2017-11-19 NOTE — PLAN OF CARE
Problem: Patient Care Overview  Goal: Plan of Care Review  Outcome: Ongoing (interventions implemented as appropriate)  Pt remains afebrile and VS as documented. Cardene gtt off at beginning of shift, remains off throughout shift. See flow sheet for full assessment. Pt remains on continuous tele and pulse ox monitor. No falls. No complaints of pain or nausea. VAD as documented. Patient assisted to chair and back to bed with assistance, no complaints, no s/s distress.  POC reviewed w/Pt and patient's family who verbalized understanding. Report given to oncoming nurse.

## 2017-11-19 NOTE — PT/OT/SLP PROGRESS
Physical Therapy  Treatment    Kev Vasquez   MRN: [id number]   Admitting Diagnosis: <principal problem not specified>    PT Received On: 11/18/17  PT Start Time: 0900     PT Stop Time: 0945    PT Total Time (min): 45 min       Billable Minutes:  Gait [id number] mins and Therapeutic Activity 25 mins    Treatment Type: Treatment  PT/PTA: PT     PTA Visit Number: 0       General Precautions: Standard, LVAD, fall  Orthopedic Precautions: N/A   Braces: N/A    Do you have any cultural, spiritual, Sabianism conflicts, given your current situation?: none    Subjective:  Communicated with RN prior to session.  Pt agreeable to session    Pain/Comfort  Pain Rating 1: 0/10    Objective:   Patient found with: blood pressure cuff, pulse ox (continuous), telemetry (LVAD)    Functional Mobility:  Bed Mobility:   Scooting/Bridging: Contact Guard Assistance  Supine to Sit: Minimum Assistance    Transfers:  Sit <> Stand Assistance: Contact Guard Assistance  Sit <> Stand Assistive Device: No Assistive Device    Gait:   Gait Distance: ~200 feet  Assistance 1: Contact Guard Assistance  Gait Assistive Device: Rolling walker, Hand held assist  Gait Pattern: 2-point gait  Gait Deviation(s): decreased william, decreased velocity of limb motion, decreased step length    Balance:   Static Sit: GOOD-: Takes MODERATE challenges from all directions but inconsistently  Dynamic Sit: GOOD-: Maintains balance through MODERATE excursions of active trunk movement,     Static Stand: FAIR+: Takes MINIMAL challenges from all directions  Dynamic stand: FAIR: Needs CONTACT GUARD during gait     Therapeutic Activities and Exercises:  Pt requires increased time to transition to battery and abril vest. Pt also requires assist to return to power cable following ambulation. Pt also educated on upright posture during gait and cues for safety with RW use.     AM-PAC 6 CLICK MOBILITY  How much help from another person does this patient currently need?   1 = Unable,  Total/Dependent Assistance  2 = A lot, Maximum/Moderate Assistance  3 = A little, Minimum/Contact Guard/Supervision  4 = None, Modified Isabela/Independent    Turning over in bed (including adjusting bedclothes, sheets and blankets)?: 4  Sitting down on and standing up from a chair with arms (e.g., wheelchair, bedside commode, etc.): 3  Moving from lying on back to sitting on the side of the bed?: 3  Moving to and from a bed to a chair (including a wheelchair)?: 3  Need to walk in hospital room?: 3  Climbing 3-5 steps with a railing?: 2  Total Score: 18    AM-PAC Raw Score CMS G-Code Modifier Level of Impairment Assistance   6 % Total / Unable   7 - 9 CM 80 - 100% Maximal Assist   10 - 14 CL 60 - 80% Moderate Assist   15 - 19 CK 40 - 60% Moderate Assist   20 - 22 CJ 20 - 40% Minimal Assist   23 CI 1-20% SBA / CGA   24 CH 0% Independent/ Mod I     Patient left up in chair with all lines intact, call button in reach and RN notified.    Assessment:  Kev Vasquez is a 77 y.o. male with a medical diagnosis of <principal problem not specified> and presents with deficits listed below. Pt will need skilled PT to address deficits and increase functional mobility as able.    Rehab identified problem list/impairments: Rehab identified problem list/impairments: weakness, impaired endurance, gait instability, impaired balance, impaired functional mobilty, impaired cardiopulmonary response to activity, decreased coordination, impaired fine motor    Rehab potential is good.    Activity tolerance: Good    Discharge recommendations: Discharge Facility/Level Of Care Needs: home with home health     Barriers to discharge: Barriers to Discharge: None    Equipment recommendations: Equipment Needed After Discharge: none     GOALS:    Physical Therapy Goals        Problem: Physical Therapy Goal    Goal Priority Disciplines Outcome Goal Variances Interventions   Physical Therapy Goal     PT/OT, PT Ongoing (interventions  implemented as appropriate)     Description:  Goals to be met by: 17    Patient will increase functional independence with mobility by performin. Supine to sit with Stand-by Assistance - not met  2. Sit to stand transfer with Supervision - not met  3. Gait  x 220 feet with Contact Guard Assistance using Rolling Walker. - not met  4. Lower extremity exercise program x20 reps , with supervision - not met                      PLAN:    Patient to be seen 5 x/week  to address the above listed problems via gait training, therapeutic activities, therapeutic exercises, neuromuscular re-education  Plan of Care expires: 12/15/17  Plan of Care reviewed with: patient         Cindy DICKERSON Norma, PT  2017

## 2017-11-19 NOTE — PROGRESS NOTES
[facility]  Heart Transplant  Progress Note    Patient Name: Kev Vasquez  MRN: [id number]  Admission Date: 11/15/2017  Hospital Length of Stay: 4 days  Attending Physician: Karen Valladares MD  Primary Care Provider: Mega Collins MD  Principal Problem:<principal problem not specified>    Subjective:     Interval History:  Made changes listed above. Will try to wean off of nicardipine today again. Patient has no complaints. Added patient's CPAP.    Continuous Infusions:   nicardipine Stopped (11/18/17 0815)     Scheduled Meds:   allopurinol  300 mg Oral Daily    amLODIPine  10 mg Oral Daily    aspirin  81 mg Oral Daily    atorvastatin  10 mg Oral Daily    buPROPion  150 mg Oral Daily    buPROPion  300 mg Oral Daily    ciprofloxacin HCl  750 mg Oral BID    docusate sodium  100 mg Oral BID    dofetilide  250 mcg Oral Q12H    hydrALAZINE  100 mg Oral Q8H    isosorbide dinitrate  10 mg Oral Once    isosorbide dinitrate  30 mg Oral TID    lisinopril  20 mg Oral BID    magnesium oxide  400 mg Oral BID    rOPINIRole  0.5 mg Oral TID    tamsulosin  0.4 mg Oral Daily    warfarin  4 mg Oral Every Sun    warfarin  4 mg Oral Every Tues, Thurs    warfarin  6 mg Oral Every Sat    warfarin  6 mg Oral Every Mon, Wed, Fri     PRN Meds:    Review of patient's allergies indicates:   Allergen Reactions    Sulfa (sulfonamide antibiotics)      Objective:     Vital Signs (Most Recent):  Temp: 98.6 °F (37 °C) (11/18/17 2300)  Pulse: 74 (11/19/17 0600)  Resp: (!) 29 (11/19/17 0600)  BP: 112/82 (11/19/17 0600)  SpO2: 97 % (11/19/17 0600) Vital Signs (24h Range):  Temp:  [98 °F (36.7 °C)-98.6 °F (37 °C)] 98.6 °F (37 °C)  Pulse:  [67-91] 74  Resp:  [18-31] 29  SpO2:  [95 %-100 %] 97 %  BP: ()/(0-82) 112/82     Patient Vitals for the past 72 hrs (Last 3 readings):   Weight   11/18/17 0620 91 kg (200 lb 9.9 oz)   11/16/17 0800 91.4 kg (201 lb 8 oz)     Body mass index is 27.17  kg/m².      Intake/Output Summary (Last 24 hours) at 11/19/17 0742  Last data filed at 11/19/17 0500   Gross per 24 hour   Intake          1624.58 ml   Output             1460 ml   Net           164.58 ml       Hemodynamic Parameters:       Physical Exam   Constitutional: He is oriented to person, place, and time. He appears well-developed and well-nourished.   HENT:   Head: Normocephalic and atraumatic.   Eyes: EOM are normal. Pupils are equal, round, and reactive to light.   Neck: Normal range of motion. Neck supple. No JVD present.   Cardiovascular: Normal rate, regular rhythm, normal heart sounds and intact distal pulses.    Pulmonary/Chest: Effort normal and breath sounds normal.   Abdominal: Soft. Bowel sounds are normal. There is no tenderness.   Musculoskeletal: Normal range of motion. He exhibits no edema.   Neurological: He is alert and oriented to person, place, and time.   Vitals reviewed.      Significant Labs:  CBC:    Recent Labs  Lab 11/17/17  0405 11/18/17  0255 11/19/17  0333   WBC 7.11 6.65 6.23   RBC 3.32* 3.23* 3.04*   HGB 9.8* 9.7* 9.0*   HCT 29.8* 29.7* 27.5*    181 172   MCV 90 92 91   MCH 29.5 30.0 29.6   MCHC 32.9 32.7 32.7     BNP:    Recent Labs  Lab 11/14/17  1401 11/17/17  0405   * 207*     CMP:    Recent Labs  Lab 11/14/17  1401  11/17/17  0405 11/18/17  0255 11/19/17  0333     < > 96 104 93   CALCIUM 9.9  < > 9.2 9.2 9.0   ALBUMIN 3.2*  --  3.0*  --   --    PROT 6.9  --  6.2  --   --      < > 140 140 140   K 4.3  < > 4.0 4.1 4.0   CO2 27  < > 21* 20* 21*     < > 111* 112* 112*   BUN 24*  < > 18 20 20   CREATININE 1.6*  < > 1.4 1.6* 1.5*   ALKPHOS 75  --  71  --   --    ALT 28  --  21  --   --    AST 22  --  19  --   --    BILITOT 0.4  --  0.4  --   --    < > = values in this interval not displayed.   Coagulation:     Recent Labs  Lab 11/17/17  0405 11/18/17  0255 11/19/17  0333   INR 1.9* 2.0* 2.3*   APTT 26.7 28.3 28.0     LDH:    Recent Labs  Lab  11/17/17  0405 11/18/17  0255 11/19/17  0333    156 149     Microbiology:  Microbiology Results (last 7 days)     ** No results found for the last 168 hours. **          I have reviewed all pertinent labs within the past 24 hours.    Estimated Creatinine Clearance: 46.8 mL/min (based on SCr of 1.5 mg/dL (H)).    Diagnostic Results:  Echo: I have reviewed all pertinent results/findings  2D echo with 2d color flow:    1 - Moderate left ventricular enlargement.     2 - Severely depressed left ventricular systolic function (EF 15-20%).     3 - Impaired LV relaxation, normal LAP (grade 1 diastolic dysfunction).     4 - Biatrial enlargement.     5 - Right ventricle is upper limit of normal in size with not well seen systolic function.     6 - Severe tricuspid regurgitation.     7 - Increased central venous pressure.     8 - The estimated PA systolic pressure is 40 mmHg.     9 - Heartmate III LVAD; speed 5800.     Assessment and Plan:     Mr. Vasquez is a pleasant 77 y.o. y.o. WM with ischemic cardiomyopathy status post Heartmate III 10/16/16 LVAD, CKD II, CAD, VT on Tikosyn, HTN, HLD, Gout, Obesity, SSS, and depression who presents here for HTN control after being seen in clinic with Dr. Santana. Patient was asymptomatic in terms of high BP on time of admission to inpatient services.     Hypertensive urgency, malignant    Increased his norvasc and lisinopril to doses listed above  Now on max doses of hydralazine, lisinopril and norvasc. Now on isordil 30 mg TID   Will try to transition to the floor today and maybe discharge tomorrow.        Chronic anticoagulation    Continue warfarin  INR is 2.3 today        LVAD (left ventricular assist device) present    2d echo ordered and shows that the AV value is opening every beat            Melvi Casiano MD  Heart Transplant  [facility]

## 2017-11-19 NOTE — SUBJECTIVE & OBJECTIVE
Interval History:  Made changes listed above. Will try to wean off of nicardipine today again. Patient has no complaints. Added patient's CPAP.    Continuous Infusions:   nicardipine Stopped (11/18/17 0815)     Scheduled Meds:   allopurinol  300 mg Oral Daily    amLODIPine  10 mg Oral Daily    aspirin  81 mg Oral Daily    atorvastatin  10 mg Oral Daily    buPROPion  150 mg Oral Daily    buPROPion  300 mg Oral Daily    ciprofloxacin HCl  750 mg Oral BID    docusate sodium  100 mg Oral BID    dofetilide  250 mcg Oral Q12H    hydrALAZINE  100 mg Oral Q8H    isosorbide dinitrate  10 mg Oral Once    isosorbide dinitrate  30 mg Oral TID    lisinopril  20 mg Oral BID    magnesium oxide  400 mg Oral BID    rOPINIRole  0.5 mg Oral TID    tamsulosin  0.4 mg Oral Daily    warfarin  4 mg Oral Every Sun    warfarin  4 mg Oral Every Tues, Thurs    warfarin  6 mg Oral Every Sat    warfarin  6 mg Oral Every Mon, Wed, Fri     PRN Meds:    Review of patient's allergies indicates:   Allergen Reactions    Sulfa (sulfonamide antibiotics)      Objective:     Vital Signs (Most Recent):  Temp: 98.6 °F (37 °C) (11/18/17 2300)  Pulse: 74 (11/19/17 0600)  Resp: (!) 29 (11/19/17 0600)  BP: 112/82 (11/19/17 0600)  SpO2: 97 % (11/19/17 0600) Vital Signs (24h Range):  Temp:  [98 °F (36.7 °C)-98.6 °F (37 °C)] 98.6 °F (37 °C)  Pulse:  [67-91] 74  Resp:  [18-31] 29  SpO2:  [95 %-100 %] 97 %  BP: ()/(0-82) 112/82     Patient Vitals for the past 72 hrs (Last 3 readings):   Weight   11/18/17 0620 91 kg (200 lb 9.9 oz)   11/16/17 0800 91.4 kg (201 lb 8 oz)     Body mass index is 27.17 kg/m².      Intake/Output Summary (Last 24 hours) at 11/19/17 0725  Last data filed at 11/19/17 0500   Gross per 24 hour   Intake          1624.58 ml   Output             1460 ml   Net           164.58 ml       Hemodynamic Parameters:       Physical Exam   Constitutional: He is oriented to person, place, and time. He appears well-developed and  well-nourished.   HENT:   Head: Normocephalic and atraumatic.   Eyes: EOM are normal. Pupils are equal, round, and reactive to light.   Neck: Normal range of motion. Neck supple. No JVD present.   Cardiovascular: Normal rate, regular rhythm, normal heart sounds and intact distal pulses.    Pulmonary/Chest: Effort normal and breath sounds normal.   Abdominal: Soft. Bowel sounds are normal. There is no tenderness.   Musculoskeletal: Normal range of motion. He exhibits no edema.   Neurological: He is alert and oriented to person, place, and time.   Vitals reviewed.      Significant Labs:  CBC:    Recent Labs  Lab 11/17/17  0405 11/18/17 0255 11/19/17 0333   WBC 7.11 6.65 6.23   RBC 3.32* 3.23* 3.04*   HGB 9.8* 9.7* 9.0*   HCT 29.8* 29.7* 27.5*    181 172   MCV 90 92 91   MCH 29.5 30.0 29.6   MCHC 32.9 32.7 32.7     BNP:    Recent Labs  Lab 11/14/17  1401 11/17/17 0405   * 207*     CMP:    Recent Labs  Lab 11/14/17  1401  11/17/17 0405 11/18/17 0255 11/19/17  0333     < > 96 104 93   CALCIUM 9.9  < > 9.2 9.2 9.0   ALBUMIN 3.2*  --  3.0*  --   --    PROT 6.9  --  6.2  --   --      < > 140 140 140   K 4.3  < > 4.0 4.1 4.0   CO2 27  < > 21* 20* 21*     < > 111* 112* 112*   BUN 24*  < > 18 20 20   CREATININE 1.6*  < > 1.4 1.6* 1.5*   ALKPHOS 75  --  71  --   --    ALT 28  --  21  --   --    AST 22  --  19  --   --    BILITOT 0.4  --  0.4  --   --    < > = values in this interval not displayed.   Coagulation:     Recent Labs  Lab 11/17/17  0405 11/18/17 0255 11/19/17  0333   INR 1.9* 2.0* 2.3*   APTT 26.7 28.3 28.0     LDH:    Recent Labs  Lab 11/17/17  0405 11/18/17  0255 11/19/17  0333    156 149     Microbiology:  Microbiology Results (last 7 days)     ** No results found for the last 168 hours. **          I have reviewed all pertinent labs within the past 24 hours.    Estimated Creatinine Clearance: 46.8 mL/min (based on SCr of 1.5 mg/dL (H)).    Diagnostic Results:  Echo: I  have reviewed all pertinent results/findings  2D echo with 2d color flow:    1 - Moderate left ventricular enlargement.     2 - Severely depressed left ventricular systolic function (EF 15-20%).     3 - Impaired LV relaxation, normal LAP (grade 1 diastolic dysfunction).     4 - Biatrial enlargement.     5 - Right ventricle is upper limit of normal in size with not well seen systolic function.     6 - Severe tricuspid regurgitation.     7 - Increased central venous pressure.     8 - The estimated PA systolic pressure is 40 mmHg.     9 - Heartmate III LVAD; speed 5800.

## 2017-11-19 NOTE — ASSESSMENT & PLAN NOTE
Increased his norvasc and lisinopril to doses listed above  Now on max doses of hydralazine, lisinopril and norvasc. Now on isordil 30 mg TID   Will try to transition to the floor today and maybe discharge later today.

## 2017-11-20 VITALS
TEMPERATURE: 99 F | BODY MASS INDEX: 26.61 KG/M2 | WEIGHT: 196.5 LBS | RESPIRATION RATE: 18 BRPM | OXYGEN SATURATION: 94 % | HEIGHT: 72 IN | SYSTOLIC BLOOD PRESSURE: 90 MMHG | HEART RATE: 69 BPM

## 2017-11-20 LAB
ALBUMIN SERPL BCP-MCNC: 3.2 G/DL
ALP SERPL-CCNC: 76 U/L
ALT SERPL W/O P-5'-P-CCNC: 16 U/L
ANION GAP SERPL CALC-SCNC: 7 MMOL/L
APTT BLDCRRT: 27.5 SEC
AST SERPL-CCNC: 21 U/L
BASOPHILS # BLD AUTO: 0.04 K/UL
BASOPHILS NFR BLD: 0.7 %
BILIRUB DIRECT SERPL-MCNC: 0.3 MG/DL
BILIRUB SERPL-MCNC: 0.5 MG/DL
BNP SERPL-MCNC: 236 PG/ML
BUN SERPL-MCNC: 17 MG/DL
CALCIUM SERPL-MCNC: 9.8 MG/DL
CHLORIDE SERPL-SCNC: 109 MMOL/L
CO2 SERPL-SCNC: 25 MMOL/L
CREAT SERPL-MCNC: 1.7 MG/DL
CRP SERPL-MCNC: 2.6 MG/L
DIFFERENTIAL METHOD: ABNORMAL
EOSINOPHIL # BLD AUTO: 0.2 K/UL
EOSINOPHIL NFR BLD: 3.8 %
ERYTHROCYTE [DISTWIDTH] IN BLOOD BY AUTOMATED COUNT: 14.5 %
EST. GFR  (AFRICAN AMERICAN): 44.6 ML/MIN/1.73 M^2
EST. GFR  (NON AFRICAN AMERICAN): 38.6 ML/MIN/1.73 M^2
GLUCOSE SERPL-MCNC: 83 MG/DL
HCT VFR BLD AUTO: 30.1 %
HGB BLD-MCNC: 9.7 G/DL
IMM GRANULOCYTES # BLD AUTO: 0.02 K/UL
IMM GRANULOCYTES NFR BLD AUTO: 0.3 %
INR PPP: 1.9
LDH SERPL L TO P-CCNC: 165 U/L
LYMPHOCYTES # BLD AUTO: 1 K/UL
LYMPHOCYTES NFR BLD: 16.2 %
MAGNESIUM SERPL-MCNC: 2.3 MG/DL
MCH RBC QN AUTO: 29.8 PG
MCHC RBC AUTO-ENTMCNC: 32.2 G/DL
MCV RBC AUTO: 92 FL
MONOCYTES # BLD AUTO: 0.8 K/UL
MONOCYTES NFR BLD: 13.4 %
NEUTROPHILS # BLD AUTO: 4 K/UL
NEUTROPHILS NFR BLD: 65.6 %
NRBC BLD-RTO: 0 /100 WBC
PHOSPHATE SERPL-MCNC: 3.1 MG/DL
PLATELET # BLD AUTO: 177 K/UL
PMV BLD AUTO: 11.1 FL
POTASSIUM SERPL-SCNC: 3.9 MMOL/L
PREALB SERPL-MCNC: 22 MG/DL
PROT SERPL-MCNC: 6.5 G/DL
PROTHROMBIN TIME: 19.6 SEC
RBC # BLD AUTO: 3.26 M/UL
SODIUM SERPL-SCNC: 141 MMOL/L
WBC # BLD AUTO: 6.1 K/UL

## 2017-11-20 PROCEDURE — 84134 ASSAY OF PREALBUMIN: CPT

## 2017-11-20 PROCEDURE — 84100 ASSAY OF PHOSPHORUS: CPT

## 2017-11-20 PROCEDURE — 25000003 PHARM REV CODE 250: Performed by: STUDENT IN AN ORGANIZED HEALTH CARE EDUCATION/TRAINING PROGRAM

## 2017-11-20 PROCEDURE — 83880 ASSAY OF NATRIURETIC PEPTIDE: CPT

## 2017-11-20 PROCEDURE — 25000003 PHARM REV CODE 250: Performed by: INTERNAL MEDICINE

## 2017-11-20 PROCEDURE — 80076 HEPATIC FUNCTION PANEL: CPT

## 2017-11-20 PROCEDURE — 80048 BASIC METABOLIC PNL TOTAL CA: CPT

## 2017-11-20 PROCEDURE — 85730 THROMBOPLASTIN TIME PARTIAL: CPT

## 2017-11-20 PROCEDURE — 83615 LACTATE (LD) (LDH) ENZYME: CPT

## 2017-11-20 PROCEDURE — 85610 PROTHROMBIN TIME: CPT

## 2017-11-20 PROCEDURE — 25000003 PHARM REV CODE 250: Performed by: PHYSICIAN ASSISTANT

## 2017-11-20 PROCEDURE — 36415 COLL VENOUS BLD VENIPUNCTURE: CPT

## 2017-11-20 PROCEDURE — 83735 ASSAY OF MAGNESIUM: CPT

## 2017-11-20 PROCEDURE — 27000248 HC VAD-ADDITIONAL DAY

## 2017-11-20 PROCEDURE — 86140 C-REACTIVE PROTEIN: CPT

## 2017-11-20 PROCEDURE — 85025 COMPLETE CBC W/AUTO DIFF WBC: CPT

## 2017-11-20 PROCEDURE — 93750 INTERROGATION VAD IN PERSON: CPT | Mod: ,,, | Performed by: INTERNAL MEDICINE

## 2017-11-20 PROCEDURE — 99238 HOSP IP/OBS DSCHRG MGMT 30/<: CPT | Mod: GC,,, | Performed by: INTERNAL MEDICINE

## 2017-11-20 RX ORDER — AMLODIPINE BESYLATE 10 MG/1
10 TABLET ORAL DAILY
Qty: 30 TABLET | Refills: 11 | Status: SHIPPED | OUTPATIENT
Start: 2017-11-21 | End: 2018-02-08

## 2017-11-20 RX ORDER — LISINOPRIL 20 MG/1
20 TABLET ORAL 2 TIMES DAILY
Qty: 180 TABLET | Refills: 3 | Status: ON HOLD | OUTPATIENT
Start: 2017-11-20 | End: 2018-02-14 | Stop reason: HOSPADM

## 2017-11-20 RX ORDER — ISOSORBIDE DINITRATE 10 MG/1
10 TABLET ORAL ONCE
Status: COMPLETED | OUTPATIENT
Start: 2017-11-20 | End: 2017-11-20

## 2017-11-20 RX ORDER — ISOSORBIDE DINITRATE 40 MG/1
40 TABLET ORAL 3 TIMES DAILY
Qty: 90 TABLET | Refills: 11 | Status: SHIPPED | OUTPATIENT
Start: 2017-11-20 | End: 2017-11-21 | Stop reason: SDUPTHER

## 2017-11-20 RX ORDER — HYDRALAZINE HYDROCHLORIDE 100 MG/1
100 TABLET, FILM COATED ORAL EVERY 8 HOURS
Qty: 90 TABLET | Refills: 11 | Status: ON HOLD | OUTPATIENT
Start: 2017-11-20 | End: 2018-02-23 | Stop reason: HOSPADM

## 2017-11-20 RX ORDER — WARFARIN 6 MG/1
TABLET ORAL
Qty: 30 TABLET | Refills: 11 | Status: ON HOLD | OUTPATIENT
Start: 2017-11-20 | End: 2017-12-04 | Stop reason: HOSPADM

## 2017-11-20 RX ORDER — FAMOTIDINE 20 MG/1
40 TABLET, FILM COATED ORAL NIGHTLY
Qty: 60 TABLET | Refills: 11 | Status: ON HOLD | OUTPATIENT
Start: 2017-11-20 | End: 2018-02-07 | Stop reason: ALTCHOICE

## 2017-11-20 RX ORDER — FERROUS SULFATE 324(65)MG
325 TABLET, DELAYED RELEASE (ENTERIC COATED) ORAL DAILY
Qty: 30 TABLET | Refills: 11 | Status: SHIPPED | OUTPATIENT
Start: 2017-11-20

## 2017-11-20 RX ORDER — ACETAMINOPHEN 325 MG/1
650 TABLET ORAL ONCE
Status: COMPLETED | OUTPATIENT
Start: 2017-11-20 | End: 2017-11-20

## 2017-11-20 RX ORDER — ISOSORBIDE DINITRATE 40 MG/1
40 TABLET ORAL 3 TIMES DAILY
Status: DISCONTINUED | OUTPATIENT
Start: 2017-11-20 | End: 2017-11-20 | Stop reason: HOSPADM

## 2017-11-20 RX ORDER — WARFARIN 4 MG/1
TABLET ORAL
Refills: 11 | Status: ON HOLD
Start: 2017-11-20 | End: 2017-12-04

## 2017-11-20 RX ADMIN — CIPROFLOXACIN HYDROCHLORIDE 750 MG: 750 TABLET, FILM COATED ORAL at 06:11

## 2017-11-20 RX ADMIN — BUPROPION HYDROCHLORIDE 300 MG: 150 TABLET, EXTENDED RELEASE ORAL at 08:11

## 2017-11-20 RX ADMIN — HYDRALAZINE HYDROCHLORIDE 100 MG: 50 TABLET ORAL at 03:11

## 2017-11-20 RX ADMIN — AMLODIPINE BESYLATE 10 MG: 10 TABLET ORAL at 08:11

## 2017-11-20 RX ADMIN — LISINOPRIL 20 MG: 20 TABLET ORAL at 08:11

## 2017-11-20 RX ADMIN — ISOSORBIDE DINITRATE 30 MG: 10 TABLET ORAL at 06:11

## 2017-11-20 RX ADMIN — ATORVASTATIN CALCIUM 10 MG: 10 TABLET, FILM COATED ORAL at 08:11

## 2017-11-20 RX ADMIN — DOFETILIDE 250 MCG: 0.12 CAPSULE ORAL at 08:11

## 2017-11-20 RX ADMIN — ASPIRIN 81 MG CHEWABLE TABLET 81 MG: 81 TABLET CHEWABLE at 08:11

## 2017-11-20 RX ADMIN — HYDRALAZINE HYDROCHLORIDE 100 MG: 50 TABLET ORAL at 06:11

## 2017-11-20 RX ADMIN — TAMSULOSIN HYDROCHLORIDE 0.4 MG: 0.4 CAPSULE ORAL at 08:11

## 2017-11-20 RX ADMIN — ROPINIROLE HYDROCHLORIDE 0.5 MG: 0.25 TABLET, FILM COATED ORAL at 06:11

## 2017-11-20 RX ADMIN — ROPINIROLE HYDROCHLORIDE 0.5 MG: 0.25 TABLET, FILM COATED ORAL at 03:11

## 2017-11-20 RX ADMIN — WARFARIN SODIUM 6 MG: 2 TABLET ORAL at 04:11

## 2017-11-20 RX ADMIN — ISOSORBIDE DINITRATE 40 MG: 40 TABLET ORAL at 03:11

## 2017-11-20 RX ADMIN — MAGNESIUM OXIDE TAB 400 MG (241.3 MG ELEMENTAL MG) 400 MG: 400 (241.3 MG) TAB at 08:11

## 2017-11-20 RX ADMIN — ISOSORBIDE DINITRATE 10 MG: 10 TABLET ORAL at 08:11

## 2017-11-20 RX ADMIN — ALLOPURINOL 300 MG: 300 TABLET ORAL at 08:11

## 2017-11-20 RX ADMIN — BUPROPION HYDROCHLORIDE 150 MG: 150 TABLET, EXTENDED RELEASE ORAL at 08:11

## 2017-11-20 RX ADMIN — ACETAMINOPHEN 650 MG: 325 TABLET ORAL at 10:11

## 2017-11-20 RX ADMIN — DOCUSATE SODIUM 100 MG: 100 CAPSULE, LIQUID FILLED ORAL at 08:11

## 2017-11-20 NOTE — PROCEDURES
Patient up in chair aaox3 with no family at bedside. VAD interrogation completed this AM in the event changes needed to be made. Will continue to monitor for further issues.     Pulsatile: Yes   VAD Sounds: HM3  Smooth  Problems / Issues / Alarms with VAD if any: PI's with sp drops  HCT: 30.1   Modular Cable Connection Intact(no yellow exposed): YES      VAD Interrogation:  TXP LVAD INTERROGATIONS 11/20/2017 11/20/2017 11/19/2017 11/19/2017 11/19/2017 11/19/2017 11/19/2017   Type HeartMate3 HeartMate3 HeartMate3 HeartMate3 HeartMate3 HeartMate3 HeartMate3   Flow 5.1 4.8 5.0 5.0 5.2 5.1 5.1   Speed 5800 5800 5800 5800 5800 5800 5800   PI 3.2 3.7 3.5 3.3 3.4 3.2 3.3   Power (Mendez) 4.4 4..4 4.4 4.4 4.4 4.4 4.4   LSL 5400 - 5400 5400 5400 5400 5400   Pulsatility Pulse - - - Pulse Pulse Pulse   }

## 2017-11-20 NOTE — PROGRESS NOTES
Mr. Kev Vasquez is being discharged today following admission for hypertension control after being seen in clinic for routine visit. Past Medical history significant for Heartmate III 10/16/16, CKD II, CAD, VT, HTN, HLD, depression and gout.     During admission patient required a nicardipine drip, and hydralazine was increased to 100mg po q8h, lisinopril increased to 20mg BID, isosorbide dinitrate 40mg q8h added, and amlodipine 10mg daily continued. 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      Patient came in with an INR of 2.0 and was restarted on coumadin 4mg Sun,Tues,Thurs and 6mg the other days of the week as per coumadin clinic notes but required a dose of 7.5mg on 11/17 for INR of 1.9. Today INR is 1.9, patient was instructed to take 6mg Mon,Tues,Wed,Fri and 4mg the other days of the week. Recommend INR at coumadin clinic on Tuesday.

## 2017-11-20 NOTE — PROGRESS NOTES
Patient BP still high despite changes in HTN regimen this morning. Morning BP medications given. Fu on trend at daytime.

## 2017-11-20 NOTE — PLAN OF CARE
Problem: Patient Care Overview  Goal: Plan of Care Review  Outcome: Ongoing (interventions implemented as appropriate)  Pt had no significant events.  Pt was encourage to turn periodically to prevent pressure ulcers form forming. Pt encouraged to call when needing to ambulate. Pt possible being D/C'ed today. POC reviewed with pt, pt acknowledged understanding.

## 2017-11-20 NOTE — ASSESSMENT & PLAN NOTE
Now on max doses of hydralazine, lisinopril and norvasc and isosorbide dinitrate.   Discharge home today.

## 2017-11-20 NOTE — PROGRESS NOTES
DISCHARGE    Pt presents alone in room, aaox4 with pleasant afftect. Pt states in agreement with plan to discharge to home today with [facility]. Pt states wife will transport. Pt reports coping well and denies further needs, questions, concerns at this time and none indicated. Providing psychosocial and counseling support, education, resources, assistance and discharge planning as indicated. SW remains available.  -

## 2017-11-20 NOTE — DISCHARGE SUMMARY
[facility]  Heart Transplant  Discharge Summary      Patient Name: Kev Vasquez  MRN: [id number]  Admission Date: 11/15/2017  Hospital Length of Stay: 5 days  Discharge Date and Time: 11/20/2017 3:31 PM  Attending Physician: Karen Valladares MD   Discharging Provider: Melvi Casiano MD  Primary Care Provider: Mega Collins MD     HPI: Mr. Vasquez is a pleasant 77 y.o. y.o. WM with ischemic cardiomyopathy status post Heartmate III 10/16/16 LVAD, CKD II, CAD, VT on Tikosyn, HTN, HLD, Gout, Obesity, SSS, and depression who presents here for HTN control after being seen in clinic with Dr. Santana. Patient was asymptomatic in terms of high BP on time of admission to inpatient services.     * No surgery found *     Hospital Course: 11/16/17 - Admitted and has been transitioned off of the nicardipine drip since AM. Increased his Norvasc to 10 mg daily and his lisinopril to 15 mg BID. Will transition to the floor this afternoon.   11/17/17- Stepped up to the unit after being on the floor briefly. The nicardipine gtt has been resumed. Increased lisinopril to 20 mg BID. May need to get receive 10 mg Isordil TID.   11/18/17 - Added isosorbide 10 mg TID today in hopes to get off of the nicardipine and transistion to the floor.    11/19/17 - Off of nicardipine, as his Blood pressures have been borderline elevated, increased his isosorbide to 30 mg TID. Stepped down to the floor today. Will discuss discharge later today or early tomorrow.  11/20/17 - Will discharge today. Increased isosorbide dinitrate to 40 mg TID. Okay to go home today.      Consults         Status Ordering Provider     Inpatient consult to Dietary  Once     Provider:  (Not yet assigned)    Completed CHRIS CONLEY          Significant Diagnostic Studies: Labs:   CMP   Recent Labs  Lab 11/19/17  0333 11/20/17  0529    141   K 4.0 3.9   * 109   CO2 21* 25   GLU 93 83   BUN 20 17   CREATININE 1.5* 1.7*   CALCIUM 9.0 9.8   PROT   --  6.5   ALBUMIN  --  3.2*   BILITOT  --  0.5   ALKPHOS  --  76   AST  --  21   ALT  --  16   ANIONGAP 7* 7*   ESTGFRAFRICA 51.9* 44.6*   EGFRNONAA 44.9* 38.6*   , CBC   Recent Labs  Lab 11/19/17  0333 11/20/17  0529   WBC 6.23 6.10   HGB 9.0* 9.7*   HCT 27.5* 30.1*    177   , INR   Lab Results   Component Value Date    INR 1.9 (H) 11/20/2017    INR 2.3 (H) 11/19/2017    INR 2.0 (H) 11/18/2017   , Lipid Panel   Lab Results   Component Value Date    CHOL 124 10/18/2017    HDL 59 10/18/2017    LDLCALC 55.8 (L) 10/18/2017    TRIG 46 10/18/2017    CHOLHDL 47.6 10/18/2017    and All labs within the past 24 hours have been reviewed    Pending Diagnostic Studies:     None        Final Active Diagnoses:    Diagnosis Date Noted POA    PRINCIPAL PROBLEM:  Hypertensive urgency, malignant [I16.0] 11/15/2017 Yes    Chronic anticoagulation [Z79.01] 07/21/2017 Not Applicable    Ischemic cardiomyopathy [I25.5] 07/20/2017 Yes    LVAD (left ventricular assist device) present [Z95.811] 07/20/2017 Not Applicable    HILLARY on CPAP [G47.33, Z99.89] 07/27/2015 Not Applicable    S/P CABG (coronary artery bypass graft) [Z95.1] 07/27/2015 Not Applicable      Problems Resolved During this Admission:    Diagnosis Date Noted Date Resolved POA      Discharged Condition: good     Follow Up: HTS in less than 2 weeks    Patient Instructions:   No discharge procedures on file.  Medications:  Reconciled Home Medications:   Current Discharge Medication List      START taking these medications    Details   famotidine (PEPCID) 20 MG tablet Take 2 tablets (40 mg total) by mouth every evening.  Qty: 60 tablet, Refills: 11      ferrous sulfate 324 mg (65 mg iron) TbEC Take 1 tablet (324 mg total) by mouth once daily.  Qty: 30 tablet, Refills: 11      isosorbide dinitrate (ISORDIL) 40 MG Tab Take 1 tablet (40 mg total) by mouth 3 (three) times daily.  Qty: 90 tablet, Refills: 11         CONTINUE these medications which have CHANGED    Details    amLODIPine (NORVASC) 10 MG tablet Take 1 tablet (10 mg total) by mouth once daily.  Qty: 30 tablet, Refills: 11      hydrALAZINE (APRESOLINE) 100 MG tablet Take 1 tablet (100 mg total) by mouth every 8 (eight) hours.  Qty: 90 tablet, Refills: 11      lisinopril (PRINIVIL,ZESTRIL) 20 MG tablet Take 1 tablet (20 mg total) by mouth 2 (two) times daily.  Qty: 180 tablet, Refills: 3      !! warfarin (COUMADIN) 4 MG tablet 6 mg orally daily on Mon, Wed, Fri; and 4 mg orally daily on all other days  Refills: 11      !! warfarin (COUMADIN) 6 MG tablet 6 mg orally daily on Mon, Tues, Wed, Fri; and 4 mg orally daily on all other days  Qty: 30 tablet, Refills: 11       !! - Potential duplicate medications found. Please discuss with provider.      CONTINUE these medications which have NOT CHANGED    Details   allopurinol (ZYLOPRIM) 300 MG tablet Take 300 mg by mouth once daily.      aspirin 81 MG Chew Take 81 mg by mouth once daily.      atorvastatin (LIPITOR) 10 MG tablet Take 10 mg by mouth once daily.      !! buPROPion (WELLBUTRIN XL) 150 MG TB24 tablet Take 1 tablet (150 mg total) by mouth once daily.  Qty: 30 tablet, Refills: 11      !! buPROPion (WELLBUTRIN XL) 300 MG 24 hr tablet Take 1 tablet (300 mg total) by mouth once daily.  Qty: 30 tablet, Refills: 11      calcium-vitamin D tablet 600 mg-200 units Take 1 tablet by mouth once daily.      ciprofloxacin HCl (CIPRO) 750 MG tablet Take 1 tablet (750 mg total) by mouth 2 (two) times daily.  Qty: 60 tablet, Refills: 11    Associated Diagnoses: Hardware complicating wound infection, subsequent encounter; Left ventricular assist device (LVAD) complication, subsequent encounter; Pseudomonas aeruginosa infection      docusate sodium (COLACE) 100 MG capsule Take 1 capsule (100 mg total) by mouth 2 (two) times daily.  Refills: 0      folic acid (FOLVITE) 1 MG tablet Take 1 mg by mouth once daily.      magnesium oxide (MAG-OX) 400 mg tablet Take 1 tablet (400 mg total) by  mouth 2 (two) times daily.  Qty: 60 tablet, Refills: 11      multivitamin capsule Take 1 capsule by mouth once daily.      promethazine (PHENERGAN) 12.5 MG Tab Take 1 tablet (12.5 mg total) by mouth every 6 (six) hours as needed.  Qty: 30 tablet, Refills: 3      ropinirole (REQUIP) 0.5 MG tablet Take 0.5 mg by mouth 3 (three) times daily.       tamsulosin (FLOMAX) 0.4 mg Cp24 Take 1 capsule (0.4 mg total) by mouth once daily.  Qty: 30 capsule, Refills: 6      TIKOSYN 250 mcg Cap Take 1 capsule (250 mcg total) by mouth every 12 (twelve) hours.  Qty: 60 capsule, Refills: 11    Associated Diagnoses: Ventricular tachycardia       !! - Potential duplicate medications found. Please discuss with provider.          Melvi Casiano MD  Heart Transplant  [facility]

## 2017-11-20 NOTE — SUBJECTIVE & OBJECTIVE
Interval History:  Made changes listed above. To go home today with the associated medications listed below. F/u with HTS in less than 2 weeks.    Continuous Infusions:   nicardipine Stopped (11/18/17 0815)     Scheduled Meds:   allopurinol  300 mg Oral Daily    amLODIPine  10 mg Oral Daily    aspirin  81 mg Oral Daily    atorvastatin  10 mg Oral Daily    buPROPion  150 mg Oral Daily    buPROPion  300 mg Oral Daily    ciprofloxacin HCl  750 mg Oral BID    docusate sodium  100 mg Oral BID    dofetilide  250 mcg Oral Q12H    hydrALAZINE  100 mg Oral Q8H    isosorbide dinitrate  40 mg Oral TID    lisinopril  20 mg Oral BID    magnesium oxide  400 mg Oral BID    rOPINIRole  0.5 mg Oral TID    tamsulosin  0.4 mg Oral Daily    warfarin  4 mg Oral Every Sun    warfarin  4 mg Oral Every Tues, Thurs    warfarin  6 mg Oral Every Sat    warfarin  6 mg Oral Every Mon, Wed, Fri     PRN Meds:    Review of patient's allergies indicates:   Allergen Reactions    Sulfa (sulfonamide antibiotics)      Objective:     Vital Signs (Most Recent):  Temp: 98.7 °F (37.1 °C) (11/20/17 1147)  Pulse: 69 (11/20/17 1500)  Resp: 18 (11/20/17 1147)  BP: (!) 90/0 (11/20/17 1330)  SpO2: (!) 94 % (11/20/17 1147) Vital Signs (24h Range):  Temp:  [97.6 °F (36.4 °C)-98.8 °F (37.1 °C)] 98.7 °F (37.1 °C)  Pulse:  [66-94] 69  Resp:  [17-18] 18  SpO2:  [91 %-97 %] 94 %  BP: ()/(0-85) 90/0     Patient Vitals for the past 72 hrs (Last 3 readings):   Weight   11/20/17 0700 89.1 kg (196 lb 8 oz)   11/18/17 0620 91 kg (200 lb 9.9 oz)     Body mass index is 26.62 kg/m².      Intake/Output Summary (Last 24 hours) at 11/20/17 1527  Last data filed at 11/20/17 1400   Gross per 24 hour   Intake              340 ml   Output              700 ml   Net             -360 ml       Hemodynamic Parameters:       Physical Exam   Constitutional: He is oriented to person, place, and time. He appears well-developed and well-nourished.   HENT:   Head:  Normocephalic and atraumatic.   Eyes: EOM are normal. Pupils are equal, round, and reactive to light.   Neck: Normal range of motion. Neck supple. No JVD present.   Cardiovascular: Normal rate, regular rhythm, normal heart sounds and intact distal pulses.    Pulmonary/Chest: Effort normal and breath sounds normal.   Abdominal: Soft. Bowel sounds are normal. There is no tenderness.   Musculoskeletal: Normal range of motion. He exhibits no edema.   Neurological: He is alert and oriented to person, place, and time.   Vitals reviewed.      Significant Labs:  CBC:    Recent Labs  Lab 11/18/17  0255 11/19/17  0333 11/20/17  0529   WBC 6.65 6.23 6.10   RBC 3.23* 3.04* 3.26*   HGB 9.7* 9.0* 9.7*   HCT 29.7* 27.5* 30.1*    172 177   MCV 92 91 92   MCH 30.0 29.6 29.8   MCHC 32.7 32.7 32.2     BNP:    Recent Labs  Lab 11/14/17  1401 11/17/17  0405 11/20/17  0529   * 207* 236*     CMP:    Recent Labs  Lab 11/14/17  1401  11/17/17  0405 11/18/17  0255 11/19/17  0333 11/20/17  0529     < > 96 104 93 83   CALCIUM 9.9  < > 9.2 9.2 9.0 9.8   ALBUMIN 3.2*  --  3.0*  --   --  3.2*   PROT 6.9  --  6.2  --   --  6.5     < > 140 140 140 141   K 4.3  < > 4.0 4.1 4.0 3.9   CO2 27  < > 21* 20* 21* 25     < > 111* 112* 112* 109   BUN 24*  < > 18 20 20 17   CREATININE 1.6*  < > 1.4 1.6* 1.5* 1.7*   ALKPHOS 75  --  71  --   --  76   ALT 28  --  21  --   --  16   AST 22  --  19  --   --  21   BILITOT 0.4  --  0.4  --   --  0.5   < > = values in this interval not displayed.   Coagulation:     Recent Labs  Lab 11/18/17  0255 11/19/17 0333 11/20/17  0529   INR 2.0* 2.3* 1.9*   APTT 28.3 28.0 27.5     LDH:    Recent Labs  Lab 11/18/17 0255 11/19/17 0333 11/20/17  0529    149 165     Microbiology:  Microbiology Results (last 7 days)     ** No results found for the last 168 hours. **          I have reviewed all pertinent labs within the past 24 hours.    Estimated Creatinine Clearance: 41.3 mL/min (based on  SCr of 1.7 mg/dL (H)).    Diagnostic Results:  Echo: I have reviewed all pertinent results/findings  2D echo with 2d color flow:    1 - Moderate left ventricular enlargement.     2 - Severely depressed left ventricular systolic function (EF 15-20%).     3 - Impaired LV relaxation, normal LAP (grade 1 diastolic dysfunction).     4 - Biatrial enlargement.     5 - Right ventricle is upper limit of normal in size with not well seen systolic function.     6 - Severe tricuspid regurgitation.     7 - Increased central venous pressure.     8 - The estimated PA systolic pressure is 40 mmHg.     9 - Heartmate III LVAD; speed 5800.

## 2017-11-20 NOTE — PROGRESS NOTES
11/20/17 0554 11/20/17 0603   Vital Signs   /85 (!) 110/0   MAP (mmHg) 98 --    BP Location Right arm Right arm   BP Method Automatic Doppler   Patient Position Lying Lying   Spoke with Dr. Vallejo about above BP. MD stated to give morning meds and call if no change. Will continue to monitor.

## 2017-11-20 NOTE — PROGRESS NOTES
[facility]  Heart Transplant  Progress Note    Patient Name: Kev Vasquez  MRN: [id number]  Admission Date: 11/15/2017  Hospital Length of Stay: 5 days  Attending Physician: Karen Valladares MD  Primary Care Provider: Mega Collins MD  Principal Problem:Hypertensive urgency, malignant    Subjective:     Interval History:  Made changes listed above. To go home today with the associated medications listed below. F/u with HTS in less than 2 weeks.    Continuous Infusions:   nicardipine Stopped (11/18/17 0815)     Scheduled Meds:   allopurinol  300 mg Oral Daily    amLODIPine  10 mg Oral Daily    aspirin  81 mg Oral Daily    atorvastatin  10 mg Oral Daily    buPROPion  150 mg Oral Daily    buPROPion  300 mg Oral Daily    ciprofloxacin HCl  750 mg Oral BID    docusate sodium  100 mg Oral BID    dofetilide  250 mcg Oral Q12H    hydrALAZINE  100 mg Oral Q8H    isosorbide dinitrate  40 mg Oral TID    lisinopril  20 mg Oral BID    magnesium oxide  400 mg Oral BID    rOPINIRole  0.5 mg Oral TID    tamsulosin  0.4 mg Oral Daily    warfarin  4 mg Oral Every Sun    warfarin  4 mg Oral Every Tues, Thurs    warfarin  6 mg Oral Every Sat    warfarin  6 mg Oral Every Mon, Wed, Fri     PRN Meds:    Review of patient's allergies indicates:   Allergen Reactions    Sulfa (sulfonamide antibiotics)      Objective:     Vital Signs (Most Recent):  Temp: 98.7 °F (37.1 °C) (11/20/17 1147)  Pulse: 69 (11/20/17 1500)  Resp: 18 (11/20/17 1147)  BP: (!) 90/0 (11/20/17 1330)  SpO2: (!) 94 % (11/20/17 1147) Vital Signs (24h Range):  Temp:  [97.6 °F (36.4 °C)-98.8 °F (37.1 °C)] 98.7 °F (37.1 °C)  Pulse:  [66-94] 69  Resp:  [17-18] 18  SpO2:  [91 %-97 %] 94 %  BP: ()/(0-85) 90/0     Patient Vitals for the past 72 hrs (Last 3 readings):   Weight   11/20/17 0700 89.1 kg (196 lb 8 oz)   11/18/17 0620 91 kg (200 lb 9.9 oz)     Body mass index is 26.62 kg/m².      Intake/Output Summary (Last 24 hours) at  11/20/17 1527  Last data filed at 11/20/17 1400   Gross per 24 hour   Intake              340 ml   Output              700 ml   Net             -360 ml       Hemodynamic Parameters:       Physical Exam   Constitutional: He is oriented to person, place, and time. He appears well-developed and well-nourished.   HENT:   Head: Normocephalic and atraumatic.   Eyes: EOM are normal. Pupils are equal, round, and reactive to light.   Neck: Normal range of motion. Neck supple. No JVD present.   Cardiovascular: Normal rate, regular rhythm, normal heart sounds and intact distal pulses.    Pulmonary/Chest: Effort normal and breath sounds normal.   Abdominal: Soft. Bowel sounds are normal. There is no tenderness.   Musculoskeletal: Normal range of motion. He exhibits no edema.   Neurological: He is alert and oriented to person, place, and time.   Vitals reviewed.      Significant Labs:  CBC:    Recent Labs  Lab 11/18/17  0255 11/19/17  0333 11/20/17  0529   WBC 6.65 6.23 6.10   RBC 3.23* 3.04* 3.26*   HGB 9.7* 9.0* 9.7*   HCT 29.7* 27.5* 30.1*    172 177   MCV 92 91 92   MCH 30.0 29.6 29.8   MCHC 32.7 32.7 32.2     BNP:    Recent Labs  Lab 11/14/17  1401 11/17/17  0405 11/20/17  0529   * 207* 236*     CMP:    Recent Labs  Lab 11/14/17  1401  11/17/17  0405 11/18/17  0255 11/19/17  0333 11/20/17  0529     < > 96 104 93 83   CALCIUM 9.9  < > 9.2 9.2 9.0 9.8   ALBUMIN 3.2*  --  3.0*  --   --  3.2*   PROT 6.9  --  6.2  --   --  6.5     < > 140 140 140 141   K 4.3  < > 4.0 4.1 4.0 3.9   CO2 27  < > 21* 20* 21* 25     < > 111* 112* 112* 109   BUN 24*  < > 18 20 20 17   CREATININE 1.6*  < > 1.4 1.6* 1.5* 1.7*   ALKPHOS 75  --  71  --   --  76   ALT 28  --  21  --   --  16   AST 22  --  19  --   --  21   BILITOT 0.4  --  0.4  --   --  0.5   < > = values in this interval not displayed.   Coagulation:     Recent Labs  Lab 11/18/17  0255 11/19/17  0333 11/20/17  0529   INR 2.0* 2.3* 1.9*   APTT 28.3 28.0  27.5     LDH:    Recent Labs  Lab 11/18/17  0255 11/19/17  0333 11/20/17  0529    149 165     Microbiology:  Microbiology Results (last 7 days)     ** No results found for the last 168 hours. **          I have reviewed all pertinent labs within the past 24 hours.    Estimated Creatinine Clearance: 41.3 mL/min (based on SCr of 1.7 mg/dL (H)).    Diagnostic Results:  Echo: I have reviewed all pertinent results/findings  2D echo with 2d color flow:    1 - Moderate left ventricular enlargement.     2 - Severely depressed left ventricular systolic function (EF 15-20%).     3 - Impaired LV relaxation, normal LAP (grade 1 diastolic dysfunction).     4 - Biatrial enlargement.     5 - Right ventricle is upper limit of normal in size with not well seen systolic function.     6 - Severe tricuspid regurgitation.     7 - Increased central venous pressure.     8 - The estimated PA systolic pressure is 40 mmHg.     9 - Heartmate III LVAD; speed 5800.     Assessment and Plan:     Mr. Vasquez is a pleasant 77 y.o. y.o. WM with ischemic cardiomyopathy status post Heartmate III 10/16/16 LVAD, CKD II, CAD, VT on Tikosyn, HTN, HLD, Gout, Obesity, SSS, and depression who presents here for HTN control after being seen in clinic with Dr. Santana. Patient was asymptomatic in terms of high BP on time of admission to inpatient services.     * Hypertensive urgency, malignant    Now on max doses of hydralazine, lisinopril and norvasc and isosorbide dinitrate.   Discharge home today.        Chronic anticoagulation    Continue'd warfarin on discharge.        LVAD (left ventricular assist device) present    2d echo ordered and shows that the AV value is opening every beat            Melvi Casiano MD  Heart Transplant  [facility]

## 2017-11-20 NOTE — ASSESSMENT & PLAN NOTE
Now on max doses of hydralazine, lisinopril and norvasc and isosorbide dinitrate.   BP was acceptable in terms of discharge. Note that the patient was pulsatile for BP checks  Discharge home today.

## 2017-11-20 NOTE — PT/OT/SLP PROGRESS
Occupational Therapy      Kev Vasquez  MRN: [id number]    Patient not seen today secondary to Patient unwilling to participate due to feeling weak and nauseated and awaiting D/C home. Will follow-up if pt does not discharge.    SHAUNA Hanks  11/20/2017

## 2017-11-21 ENCOUNTER — TELEPHONE (OUTPATIENT)
Dept: INTERNAL MEDICINE | Facility: CLINIC | Age: 77
End: 2017-11-21

## 2017-11-21 RX ORDER — ISOSORBIDE DINITRATE 40 MG/1
40 TABLET ORAL 3 TIMES DAILY
Qty: 90 TABLET | Refills: 11 | Status: SHIPPED | OUTPATIENT
Start: 2017-11-21 | End: 2018-01-24

## 2017-11-21 NOTE — PHYSICIAN QUERY
"PT Name: Kev Vasquez  MR #: [id number]    Physician Query Form - Heart  Condition Clarification     CDS/: Julieta Morejon RN, CCDS             Contact information: [email or web address]  This form is a permanent document in the medical record.     Query Date: November 21, 2017    By submitting this query, we are merely seeking further clarification of documentation. Please utilize your independent clinical judgment when addressing the question(s) below.    The medical record contains the following   Indicators     Supporting Clinical Findings Location in Medical Record   X , 236 11/17, 11/20 lab   X EF (EF 15-20%).  11/16 echo    Radiology findings     X Echo Results - Severely depressed left ventricular systolic function (EF 15-20%).    - Impaired LV relaxation, normal LAP (grade 1 diastolic dysfunction). 11/16 echo    "Ascites" documented      "SOB" or "WHELAN" documented      "Hypoxia" documented     X Heart Failure documented  is now NYHA class II. 11/15 h/p    "Edema" documented      Diuretics/Meds      Treatment:     X Other:  ischemic cardiomyopathy status post Heartmate III 10/16/16 LVAD 11/15 h/p       Provider, please specify diagnosis or diagnoses associated with above clinical findings.    [  ] Chronic Systolic Heart Failure (EF < 40)*  [  x] Chronic Combined Systolic and Diastolic Heart Failure  [  ] Other Type of Heart Failure (please specify type): _________________________  [  ] Heart Failure Ruled Out  [  ] Other (please specify): ___________________________________  [  ] Clinically Undetermined            *American Heart Association                                                                                                          Please document in your progress notes daily for the duration of treatment until resolved and include in your discharge summary.    "

## 2017-11-21 NOTE — TELEPHONE ENCOUNTER
Patient recently discharged, complex medical situation, would benefit from close follow up with [facility] to help with coordination of care. Please schedule if possible; do not cancel his existing primary care appt

## 2017-11-21 NOTE — PROGRESS NOTES
Per note: Mr. Kev Vasquez is being discharged today following admission for hypertension control after being seen in clinic for routine visit. Past Medical history significant for Heartmate III 10/16/16, CKD II, CAD, VT, HTN, HLD, depression and gout.      During admission patient required a nicardipine drip, and hydralazine was increased to 100mg po q8h, lisinopril increased to 20mg BID, isosorbide dinitrate 40mg q8h added, and amlodipine 10mg daily continued. 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       Patient came in with an INR of 2.0 and was restarted on coumadin 4mg Sun,Tues,Thurs and 6mg the other days of the week as per coumadin clinic notes but required a dose of 7.5mg on 11/17 for INR of 1.9. Today INR is 1.9, patient was instructed to take 6mg Mon,Tues,Wed,Fri and 4mg the other days of the week. Recommend INR at coumadin clinic on Tuesday. Per Kathie, INR 11/21.

## 2017-11-22 ENCOUNTER — ANTI-COAG VISIT (OUTPATIENT)
Dept: CARDIOLOGY | Facility: CLINIC | Age: 77
End: 2017-11-22

## 2017-11-22 ENCOUNTER — PATIENT OUTREACH (OUTPATIENT)
Dept: ADMINISTRATIVE | Facility: CLINIC | Age: 77
End: 2017-11-22

## 2017-11-22 DIAGNOSIS — Z95.811 LVAD (LEFT VENTRICULAR ASSIST DEVICE) PRESENT: ICD-10-CM

## 2017-11-22 DIAGNOSIS — Z79.01 CHRONIC ANTICOAGULATION: ICD-10-CM

## 2017-11-22 NOTE — PROGRESS NOTES
Daughter confirms dose as advised on discharge. Will resume previous stable dose prior to admit. Daughter aware.

## 2017-11-22 NOTE — PATIENT INSTRUCTIONS
"Reinforced need to contact VAD Coordinator first should he develop any "Red Flag " s&s. Patient's dgt., Ena is very knowledgeable with r/t cardiac (LVAD) self-care & survival skills.   "

## 2017-11-23 NOTE — PT/OT/SLP DISCHARGE
Occupational Therapy Discharge Summary    Kev Vasquez  MRN: [id number]   Hypertensive urgency, malignant   Patient Discharged from acute Occupational Therapy on 11/20.  Please refer to prior OT note dated on 11/17 for functional stat/us.     Assessment:   Patient was discharge unexpectedly.  Information required to complete and accurate discharge summary is unknown.  Refer to therapy initial evaluation and last progress note for initial and most recent functional status and goal achievement.  Recommendations made may be found in medical record.  GOALS:    Occupational Therapy Goals     Not on file          Multidisciplinary Problems (Resolved)        Problem: Occupational Therapy Goal    Goal Priority Disciplines Outcome Interventions   Occupational Therapy Goal   (Resolved)     OT, PT/OT Outcome(s) achieved    Description:  Goals to be met by: 12/08/2017    Patient will increase functional independence with ADLs by performing:    UE Dressing with Supervision.  LE Dressing with Supervision.  Grooming while standing with Supervision.  Toileting from toilet with Supervision for hygiene and clothing management.   Supine to sit with Modified [place].  Stand pivot transfers with Supervision.  Toilet transfer to toilet with Supervision.                    Reasons for Discontinuation of Therapy Services  Transfer to alternate level of care.      Plan:  Patient Discharged to: Home with Home Health Service.

## 2017-11-27 ENCOUNTER — OFFICE VISIT (OUTPATIENT)
Dept: PRIMARY CARE CLINIC | Facility: CLINIC | Age: 77
End: 2017-11-27
Payer: MEDICARE

## 2017-11-27 ENCOUNTER — ANTI-COAG VISIT (OUTPATIENT)
Dept: CARDIOLOGY | Facility: CLINIC | Age: 77
End: 2017-11-27

## 2017-11-27 ENCOUNTER — LAB VISIT (OUTPATIENT)
Dept: LAB | Facility: HOSPITAL | Age: 77
End: 2017-11-27
Attending: INTERNAL MEDICINE
Payer: MEDICARE

## 2017-11-27 ENCOUNTER — TELEPHONE (OUTPATIENT)
Dept: ELECTROPHYSIOLOGY | Facility: CLINIC | Age: 77
End: 2017-11-27

## 2017-11-27 VITALS
HEART RATE: 72 BPM | OXYGEN SATURATION: 98 % | WEIGHT: 212.06 LBS | SYSTOLIC BLOOD PRESSURE: 106 MMHG | HEIGHT: 72 IN | BODY MASS INDEX: 28.72 KG/M2 | DIASTOLIC BLOOD PRESSURE: 70 MMHG

## 2017-11-27 DIAGNOSIS — G47.33 OSA ON CPAP: ICD-10-CM

## 2017-11-27 DIAGNOSIS — Z95.810 ICD (IMPLANTABLE CARDIOVERTER-DEFIBRILLATOR), BIVENTRICULAR, IN SITU: ICD-10-CM

## 2017-11-27 DIAGNOSIS — I50.42 CHRONIC COMBINED SYSTOLIC AND DIASTOLIC CONGESTIVE HEART FAILURE: Primary | ICD-10-CM

## 2017-11-27 DIAGNOSIS — N18.30 STAGE 3 CHRONIC KIDNEY DISEASE: ICD-10-CM

## 2017-11-27 DIAGNOSIS — Z79.01 CHRONIC ANTICOAGULATION: ICD-10-CM

## 2017-11-27 DIAGNOSIS — I16.0 HYPERTENSIVE URGENCY, MALIGNANT: ICD-10-CM

## 2017-11-27 DIAGNOSIS — I25.5 ISCHEMIC CARDIOMYOPATHY: ICD-10-CM

## 2017-11-27 DIAGNOSIS — I27.22 PULMONARY HYPERTENSION DUE TO LEFT VENTRICULAR SYSTOLIC DYSFUNCTION: ICD-10-CM

## 2017-11-27 DIAGNOSIS — Z95.811 LVAD (LEFT VENTRICULAR ASSIST DEVICE) PRESENT: ICD-10-CM

## 2017-11-27 DIAGNOSIS — Z95.1 S/P CABG (CORONARY ARTERY BYPASS GRAFT): ICD-10-CM

## 2017-11-27 DIAGNOSIS — Z95.811 HEART REPLACED BY HEART ASSIST DEVICE: ICD-10-CM

## 2017-11-27 DIAGNOSIS — I25.10 CORONARY ARTERY DISEASE INVOLVING NATIVE CORONARY ARTERY OF NATIVE HEART WITHOUT ANGINA PECTORIS: ICD-10-CM

## 2017-11-27 PROBLEM — R55 SYNCOPE: Status: RESOLVED | Noted: 2017-07-20 | Resolved: 2017-11-27

## 2017-11-27 PROBLEM — R10.9 ABDOMINAL PAIN: Status: RESOLVED | Noted: 2017-07-28 | Resolved: 2017-11-27

## 2017-11-27 PROBLEM — I47.20 VENTRICULAR TACHYCARDIA: Status: RESOLVED | Noted: 2017-07-25 | Resolved: 2017-11-27

## 2017-11-27 PROBLEM — B37.2 SKIN YEAST INFECTION: Status: RESOLVED | Noted: 2017-08-31 | Resolved: 2017-11-27

## 2017-11-27 PROBLEM — T82.9XXA COMPLICATION INVOLVING LEFT VENTRICULAR ASSIST DEVICE (LVAD): Status: RESOLVED | Noted: 2017-07-29 | Resolved: 2017-11-27

## 2017-11-27 LAB
INR PPP: 2.3
PROTHROMBIN TIME: 24.6 SEC

## 2017-11-27 PROCEDURE — 99496 TRANSJ CARE MGMT HIGH F2F 7D: CPT | Mod: PBBFAC | Performed by: INTERNAL MEDICINE

## 2017-11-27 PROCEDURE — 85610 PROTHROMBIN TIME: CPT

## 2017-11-27 PROCEDURE — 99215 OFFICE O/P EST HI 40 MIN: CPT | Mod: PBBFAC | Performed by: INTERNAL MEDICINE

## 2017-11-27 PROCEDURE — 99496 TRANSJ CARE MGMT HIGH F2F 7D: CPT | Mod: S$PBB,,, | Performed by: INTERNAL MEDICINE

## 2017-11-27 PROCEDURE — 36415 COLL VENOUS BLD VENIPUNCTURE: CPT

## 2017-11-27 PROCEDURE — 99999 PR PBB SHADOW E&M-EST. PATIENT-LVL V: CPT | Mod: PBBFAC,,, | Performed by: INTERNAL MEDICINE

## 2017-11-27 NOTE — TELEPHONE ENCOUNTER
Contacted patient in relation to patient's remote ICD home monitor as to it is not connecting to [employer], daughter will send a transmission when the pt gets home from a dr appt.

## 2017-11-27 NOTE — PATIENT INSTRUCTIONS
Try to find out your previous vaccine record (pneumonia vaccine) and bring to your PCP next visit.

## 2017-11-27 NOTE — PROGRESS NOTES
[facility]  New Visit Progress Note   Recent Hospital Discharge     PRESENTING HISTORY     Chief Complaint/Reason for Visit:  Follow up Hospital Discharge   Chief Complaint   Patient presents with    Hospital Follow Up     PCP: Mega Collins MD    History of Present Illness: Mr. Kev Vasquez is a 77 y.o. male who was recently admitted to the hospital.    Admission Date: 11/15/2017  Hospital Length of Stay: 5 days  Discharge Date and Time: 11/20/2017 3:31 PM  Attending Physician: Karen Valladares MD   Discharging Provider: Melvi Casiano MD  Primary Care Provider: Mega Collins MD      HPI: Mr. Vasquez is a pleasant 77 y.o. y.o. WM with ischemic cardiomyopathy status post Heartmate III 10/16/16 LVAD, CKD II, CAD, VT on Tikosyn, HTN, HLD, Gout, Obesity, SSS, and depression who presents here for HTN control after being seen in clinic with Dr. Santana. Patient was asymptomatic in terms of high BP on time of admission to inpatient services.      Hospital Course: 11/16/17 - Admitted and has been transitioned off of the nicardipine drip since AM. Increased his Norvasc to 10 mg daily and his lisinopril to 15 mg BID. Will transition to the floor this afternoon.   11/17/17- Stepped up to the unit after being on the floor briefly. The nicardipine gtt has been resumed. Increased lisinopril to 20 mg BID. May need to get receive 10 mg Isordil TID.   11/18/17 - Added isosorbide 10 mg TID today in hopes to get off of the nicardipine and transistion to the floor.    11/19/17 - Off of nicardipine, as his Blood pressures have been borderline elevated, increased his isosorbide to 30 mg TID. Stepped down to the floor today. Will discuss discharge later today or early tomorrow.  11/20/17 - Will discharge today. Increased isosorbide dinitrate to 40 mg TID. Okay to go home today.              Consults          Status Ordering Provider       Inpatient consult to Dietary  Once     Provider:  (Not yet assigned)     Completed  CHRIS CONLEY                   Final Active Diagnoses:     Diagnosis Date Noted POA    PRINCIPAL PROBLEM:  Hypertensive urgency, malignant [I16.0] 11/15/2017 Yes    Chronic anticoagulation [Z79.01] 07/21/2017 Not Applicable    Ischemic cardiomyopathy [I25.5] 07/20/2017 Yes    LVAD (left ventricular assist device) present [Z95.811] 07/20/2017 Not Applicable    HILLARY on CPAP [G47.33, Z99.89] 07/27/2015 Not Applicable    S/P CABG (coronary artery bypass graft) [Z95.1] 07/27/2015 Not Applicable     ___________________________________________________________________    Today:  Doing well since discharge.  He walks with a walker. SOB with exertion.  Gained 202# last week -206# this week  Mild dry cough. No fever.  Mild leg swelling.  Highest /84    PAST HISTORY:     Past Medical History:   Diagnosis Date    AICD (automatic cardioverter/defibrillator) present 2014    [employer]    Chronic anticoagulation 7/21/2017    Chronic combined systolic and diastolic congestive heart failure 7/27/2015 11-16-17   1 - Moderate left ventricular enlargement.    2 - Severely depressed left ventricular systolic function (EF 15-20%).    3 - Impaired LV relaxation, normal LAP (grade 1 diastolic dysfunction).    4 - Biatrial enlargement.    5 - Right ventricle is upper limit of normal in size with not well seen systolic function.    6 - Severe tricuspid regurgitation.    7 - Increased central venous pressure.    8 - The estimated PA systolic pressure is 40 mmHg.    9 - Heartmate III LVAD; speed 5800.     Complication involving left ventricular assist device (LVAD) 7/29/2017    Coronary artery disease involving native coronary artery of native heart without angina pectoris 7/27/2015    Gait instability     Hypertensive urgency, malignant 11/15/2017    ICD (implantable cardioverter-defibrillator), biventricular, in situ 7/27/2015    Ischemic cardiomyopathy 7/20/2017    S/p HMIII     Kidney stones     LVAD (left  ventricular assist device) present 7/20/2017    status post Heartmate III 10/16/16 LVAD    HILLARY on CPAP 7/27/2015    Peripheral neuropathy     Pulmonary hypertension due to left ventricular systolic dysfunction 7/29/2015    Restless leg syndrome 1992    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    Skin cancer     excision 2013    Skin yeast infection 8/31/2017    Stage 3 chronic kidney disease 7/20/2017    Syncope 7/20/2017    Ventricular tachycardia 7/25/2017       Past Surgical History:   Procedure Laterality Date    CARDIAC PACEMAKER PLACEMENT      pacemaker previously, then AICD in 2006. AICD [employer] serial #[id number]    CORONARY ARTERY BYPASS GRAFT  1993    KNEE SURGERY Left 2001    complicated by infection, hardware had to be taken out and replaced    LEFT VENTRICULAR ASSIST DEVICE  10/19/2016       Family History   Problem Relation Age of Onset    Cancer Daughter 50     breast    Heart disease Daughter      MI x 3, CABG    Diabetes Daughter        Social History     Social History    Marital status:      Spouse name: N/A    Number of children: N/A    Years of education: N/A     Social History Main Topics    Smoking status: Never Smoker    Smokeless tobacco: Never Used    Alcohol use No    Drug use: No    Sexual activity: Not Asked      Comment:      Other Topics Concern    None     Social History Narrative    , lives in [place]. Retired .        MEDICATIONS & ALLERGIES:     Current Outpatient Prescriptions on File Prior to Visit   Medication Sig Dispense Refill    allopurinol (ZYLOPRIM) 300 MG tablet Take 300 mg by mouth once daily.      amLODIPine (NORVASC) 10 MG tablet Take 1 tablet (10 mg total) by mouth once daily. 30 tablet 11    aspirin 81 MG Chew Take 81 mg by mouth once daily.      atorvastatin (LIPITOR) 10 MG tablet Take 10 mg by mouth once daily.      buPROPion (WELLBUTRIN XL) 150 MG TB24 tablet Take 1  tablet (150 mg total) by mouth once daily. 30 tablet 11    buPROPion (WELLBUTRIN XL) 300 MG 24 hr tablet Take 1 tablet (300 mg total) by mouth once daily. 30 tablet 11    calcium-vitamin D tablet 600 mg-200 units Take 1 tablet by mouth once daily.      ciprofloxacin HCl (CIPRO) 750 MG tablet Take 1 tablet (750 mg total) by mouth 2 (two) times daily. 60 tablet 11    docusate sodium (COLACE) 100 MG capsule Take 1 capsule (100 mg total) by mouth 2 (two) times daily.  0    famotidine (PEPCID) 20 MG tablet Take 2 tablets (40 mg total) by mouth every evening. 60 tablet 11    ferrous sulfate 324 mg (65 mg iron) TbEC Take 1 tablet (324 mg total) by mouth once daily. 30 tablet 11    folic acid (FOLVITE) 1 MG tablet Take 1 mg by mouth once daily.      hydrALAZINE (APRESOLINE) 100 MG tablet Take 1 tablet (100 mg total) by mouth every 8 (eight) hours. 90 tablet 11    isosorbide dinitrate (ISORDIL) 40 MG Tab Take 1 tablet (40 mg total) by mouth 3 (three) times daily. 90 tablet 11    lisinopril (PRINIVIL,ZESTRIL) 20 MG tablet Take 1 tablet (20 mg total) by mouth 2 (two) times daily. 180 tablet 3    magnesium oxide (MAG-OX) 400 mg tablet Take 1 tablet (400 mg total) by mouth 2 (two) times daily. 60 tablet 11    multivitamin capsule Take 1 capsule by mouth once daily.      promethazine (PHENERGAN) 12.5 MG Tab Take 1 tablet (12.5 mg total) by mouth every 6 (six) hours as needed. 30 tablet 3    ropinirole (REQUIP) 0.5 MG tablet Take 0.5 mg by mouth 3 (three) times daily.       tamsulosin (FLOMAX) 0.4 mg Cp24 Take 1 capsule (0.4 mg total) by mouth once daily. 30 capsule 6    TIKOSYN 250 mcg Cap Take 1 capsule (250 mcg total) by mouth every 12 (twelve) hours. 60 capsule 11    warfarin (COUMADIN) 4 MG tablet 6 mg orally daily on Mon, Wed, Fri; and 4 mg orally daily on all other days  11    warfarin (COUMADIN) 6 MG tablet 6 mg orally daily on Mon, Tues, Wed, Fri; and 4 mg orally daily on all other days 30 tablet 11        Review of patient's allergies indicates:   Allergen Reactions    Sulfa (sulfonamide antibiotics)        OBJECTIVE:     Vital Signs:  Vitals:    11/27/17 0952   BP: 106/70   Pulse: 72     Wt Readings from Last 1 Encounters:   11/27/17 0952 96.2 kg (212 lb 1.3 oz)     Body mass index is 28.76 kg/m².     Physical Exam:  General: Well developed, well nourished. No distress.  HEENT: Head is normocephalic, atraumatic; ears are normal.    Eyes: Clear conjunctiva.  Neck: Supple, symmetrical neck; trachea midline.  Lungs: Clear to auscultation bilaterally and normal respiratory effort.  Cardiovascular: Heart with regular rate and rhythm.  LVAD in place.   Extremities: Trace LE edema.   Abdomen: Abdomen is soft, non-tender non-distended with normal bowel sounds.  Skin: Skin color, texture, turgor normal. No rashes.  Neurologic:  No focal numbness or weakness.   Psychiatric: Normal affect. Alert.    Laboratory  Lab Results   Component Value Date    WBC 6.10 11/20/2017    HGB 9.7 (L) 11/20/2017    HCT 30.1 (L) 11/20/2017    MCV 92 11/20/2017     11/20/2017     BMP  Lab Results   Component Value Date     11/20/2017    K 3.9 11/20/2017     11/20/2017    CO2 25 11/20/2017    BUN 17 11/20/2017    CREATININE 1.7 (H) 11/20/2017    CALCIUM 9.8 11/20/2017    ANIONGAP 7 (L) 11/20/2017    ESTGFRAFRICA 44.6 (A) 11/20/2017    EGFRNONAA 38.6 (A) 11/20/2017     Lab Results   Component Value Date    ALT 16 11/20/2017    AST 21 11/20/2017    ALKPHOS 76 11/20/2017    BILITOT 0.5 11/20/2017     Lab Results   Component Value Date    INR 2.4 (H) 11/21/2017    INR 1.9 (H) 11/20/2017    INR 2.3 (H) 11/19/2017     Lab Results   Component Value Date    HGBA1C 5.3 07/28/2017       TRANSITION OF CARE:     [facility] On Call Contact Note:11-22-17    Family and/or Caretaker present at visit? Yes   Diagnostic tests reviewed/disposition: No diagnosic tests pending after this hospitalization.  Disease/illness education: CHF, LVAD,  HTN  Home health/community services discussion/referrals: Patient has home health established at Home Health..   Establishment or re-establishment of referral orders for community resources: No other necessary community resources.   Discussion with other health care providers: No discussion with other health care providers necessary.     Medications Reconciliation:   I have reconciled the patient's home medications and discharge medications with the patient/family. I have updated all changes.  Refer to After-Visit Medication List.    ASSESSMENT & PLAN:     Chronic combined systolic and diastolic congestive heart failure  Hypertensive urgency, malignant  Ischemic cardiomyopathy  Chronic anticoagulation  LVAD (left ventricular assist device) present  ICD (implantable cardioverter-defibrillator), biventricular, in situ  Pulmonary hypertension due to left ventricular systolic dysfunction  S/P CABG (coronary artery bypass graft)  Coronary artery disease involving native coronary artery of native heart without angina pectoris  - Was admitted for reduce BP further.  Isordil added to regimen    BP controlled currently. Slight weight gain due to salty ham from Thanksgiving.  - Discuss need to cut salt again in diet and monitor weight.    - Also discuss Entresto, but will defer this decision to Heart Transplant (messaged).    Stage 3 chronic kidney disease  - Creatinine stable. BP controlled.    HILLARY on CPAP  - On CPAP at night.     Chronic Antibiotic with Cipro due to infection suppression  Will Rx probiotic to help prevent C diff and other infection:  -     Lactobacillus rhamnosus GG (CULTURELLE) 10 billion cell capsule; Take 1 capsule by mouth once daily.      Instructions for the patient:  Try to find out your previous vaccine record (pneumonia vaccine) and bring to your PCP next visit.    Scheduled Follow-up :  Future Appointments  Date Time Provider Department Center   11/29/2017 1:00 PM Mary Jane Louie NP [facility] SLEEP  [facility]   12/1/2017 9:30 AM LAB, APPOINTMENT [facility] LAB VNP [facility]   12/1/2017 10:15 AM ECHO, [facility] ECHOLAB [facility]   12/1/2017 11:30 AM HEARTTRANSPLANT, LVADN [facility] HEARTTX [facility]   12/14/2017 1:40 PM LAB, APPOINTMENT [facility] LAB VNP [facility]   12/14/2017 2:00 PM Payam Muhammad MD [facility] ID [facility]   12/14/2017 4:00 PM Carlito Santana MD [facility] HEARTTX [facility]   12/15/2017 9:00 AM Mega Collins MD [facility] IM [facility] PCW   2/14/2018 8:00 AM HOME MONITOR DEVICE CHECK, [facility] ARRHYTH [facility]       After Visit Medication List :     Medication List          Accurate as of 11/27/17 10:23 AM. If you have any questions, ask your nurse or doctor.               START taking these medications    Lactobacillus rhamnosus GG 10 billion cell capsule  Commonly known as:  CULTURELLE  Take 1 capsule by mouth once daily.  Started by:  Ismael Bowling MD        CONTINUE taking these medications    allopurinol 300 MG tablet  Commonly known as:  ZYLOPRIM     amLODIPine 10 MG tablet  Commonly known as:  NORVASC  Take 1 tablet (10 mg total) by mouth once daily.     aspirin 81 MG Chew     atorvastatin 10 MG tablet  Commonly known as:  LIPITOR     * buPROPion 150 MG TB24 tablet  Commonly known as:  WELLBUTRIN XL  Take 1 tablet (150 mg total) by mouth once daily.     * buPROPion 300 MG 24 hr tablet  Commonly known as:  WELLBUTRIN XL  Take 1 tablet (300 mg total) by mouth once daily.     calcium-vitamin D3 600 mg(1,500mg) -200 unit Tab  Commonly known as:  CALCIUM 600 + D(3)  Take 1 tablet by mouth once daily.     ciprofloxacin HCl 750 MG tablet  Commonly known as:  CIPRO  Take 1 tablet (750 mg total) by mouth 2 (two) times daily.     docusate sodium 100 MG capsule  Commonly known as:  COLACE  Take 1 capsule (100 mg total) by mouth 2 (two) times daily.     famotidine 20 MG tablet  Commonly known as:  PEPCID  Take 2 tablets (40 mg total) by mouth every evening.     ferrous sulfate 324 mg (65  mg iron) Tbec  Take 1 tablet (324 mg total) by mouth once daily.     folic acid 1 MG tablet  Commonly known as:  FOLVITE     hydrALAZINE 100 MG tablet  Commonly known as:  APRESOLINE  Take 1 tablet (100 mg total) by mouth every 8 (eight) hours.     isosorbide dinitrate 40 MG Tab  Commonly known as:  ISORDIL  Take 1 tablet (40 mg total) by mouth 3 (three) times daily.     lisinopril 20 MG tablet  Commonly known as:  PRINIVIL,ZESTRIL  Take 1 tablet (20 mg total) by mouth 2 (two) times daily.     magnesium oxide 400 mg tablet  Commonly known as:  MAG-OX  Take 1 tablet (400 mg total) by mouth 2 (two) times daily.     multivitamin capsule     promethazine 12.5 MG Tab  Commonly known as:  PHENERGAN  Take 1 tablet (12.5 mg total) by mouth every 6 (six) hours as needed.     rOPINIRole 0.5 MG tablet  Commonly known as:  REQUIP     tamsulosin 0.4 mg Cp24  Commonly known as:  FLOMAX  Take 1 capsule (0.4 mg total) by mouth once daily.     TIKOSYN 250 MCG Cap  Generic drug:  dofetilide  Take 1 capsule (250 mcg total) by mouth every 12 (twelve) hours.     * warfarin 4 MG tablet  Commonly known as:  COUMADIN  6 mg orally daily on Mon, Wed, Fri; and 4 mg orally daily on all other days     * warfarin 6 MG tablet  Commonly known as:  COUMADIN  6 mg orally daily on Mon, Tues, Wed, Fri; and 4 mg orally daily on all other days        * This list has 4 medication(s) that are the same as other medications prescribed for you. Read the directions carefully, and ask your doctor or other care provider to review them with you.               Where to Get Your Medications      You can get these medications from any pharmacy    You don't need a prescription for these medications  · Lactobacillus rhamnosus GG 10 billion cell capsule           Signing Physician:  Ismael Bowling MD

## 2017-11-27 NOTE — Clinical Note
[facility] Visit (Post Discharge Follow-up) Today:  - Our clinic physicians and nurses plan to follow the patient up for any medical issues in the [facility] for 30 days post discharge.  - Is there a reason that the patient is not on Entresto? (He is on ACE-I)     We spoke about it but will defer decision to Heart Transplant team next visit.  Future Appointments: 11/29/2017 1:00 PM    Mary Jane Louie NP     [facility] SLEEP     [facility]  12/1/2017  9:30 AM    LAB, APPOINTMENT [place] VNP   [facility]  12/1/2017  10:15 AM   ECHO, [facility] ECHOLAB   [facility] 12/1/2017  11:30 AM   HEARTTRANSPLANT, LVADN     [facility] HEARTTX   [facility] 12/14/2017 1:40 PM    LAB, APPOINTMENT [place] VNP   [facility]  12/14/2017 2:00 PM    Payam Muhammad MD        [facility] ID        [facility] 12/14/2017 4:00 PM    Carlito Santana MD          [facility] HEARTTX   [facility] 12/15/2017 9:00 AM    Mega Collins MD     [facility] IM        [facility]  2/14/2018  8:00 AM    HOME MO

## 2017-11-29 ENCOUNTER — OFFICE VISIT (OUTPATIENT)
Dept: SLEEP MEDICINE | Facility: CLINIC | Age: 77
DRG: 291 | End: 2017-11-29
Payer: MEDICARE

## 2017-11-29 VITALS — HEIGHT: 72 IN | HEART RATE: 68 BPM | BODY MASS INDEX: 28.72 KG/M2 | WEIGHT: 212.06 LBS

## 2017-11-29 DIAGNOSIS — I25.5 ISCHEMIC CARDIOMYOPATHY: ICD-10-CM

## 2017-11-29 DIAGNOSIS — G47.33 OBSTRUCTIVE SLEEP APNEA: Primary | ICD-10-CM

## 2017-11-29 DIAGNOSIS — Z95.811 LVAD (LEFT VENTRICULAR ASSIST DEVICE) PRESENT: ICD-10-CM

## 2017-11-29 PROCEDURE — 99215 OFFICE O/P EST HI 40 MIN: CPT | Mod: S$PBB,,, | Performed by: NURSE PRACTITIONER

## 2017-11-29 PROCEDURE — 99999 PR PBB SHADOW E&M-EST. PATIENT-LVL IV: CPT | Mod: PBBFAC,,, | Performed by: NURSE PRACTITIONER

## 2017-11-29 PROCEDURE — 99214 OFFICE O/P EST MOD 30 MIN: CPT | Mod: PBBFAC | Performed by: NURSE PRACTITIONER

## 2017-11-30 NOTE — PROGRESS NOTES
Kev Vasquze was seen as a new patient, self-referred , for the management of obstructive sleep apnea.     CHIEF COMPLAINT: BIPAP monitoring    HISTORY OF PRESENT ILLNESS:Kev Vasquez a 77 y.o. male presents for the management of obstructive sleep apnea. He was diagnosed with sleep apnea by study done 2000. He has been adherent with bipap for several years. Initially cpap. Has had several admission for resp failure/heart disease. Has LVAD.  Snoring breakthrough with current mask w/o use of chin strap. Mouth leak? Needs new DME since moving [place] Had caretaker, living with daughter. Using bipap has helped his daytime sleepiness. Some nasal drying.     Interrogation- bipap 16/12cm ahi 11.3, AIMEE 0.1, leak 61L/min with gel nasal mask.No chin strap. avg use 6h/night.     +moving legs during daytime or finger or something, wife reports worse at bedtime, now taking Requip 0.5mg tid.     On todays [place] Sleepiness Scale the patient scores a 18/24.   Echo 11/6/17 EF 15-20%    BT:9p  SL quickly  WT:9a   Sleep quality: 1/5 quality,  unrefreshing     FAMILY HISTORY: No known sleep disorders.     SOCIAL HISTORY: .     REVIEW OF SYSTEMS:  Sleep related symptoms as per HPI; denies sinus congestion; + dyspnea; Denies palpitations; + acid reflux; +imbalance.  Denies polyuria; Denies am headaches;Denies mood disturbance.  Otherwise, a balance of 10 systems reviewed is negative        PHYSICAL EXAM:   Pulse 68   Ht 6' (1.829 m)   Wt 96.2 kg (212 lb 1.3 oz)   BMI 28.76 kg/m²   GENERAL: W/D, W/N body habitus, well groomed   HEENT: Conjunctivae are non-erythematous; Pupils equal, round, and reactive to light; Nose is symmetrical  SKIN: On face and neck: No abrasions, no rashes, no lesions  RESPIRATORY: Normal chest expansion and non-labored breathing at rest.   CARDIOVASCULAR: Normal S1, S2. LVAD intact  EXTREMITIES: No edema. No clubbing. No cyanosis. Sitting in WC  NEURO/PSYCH: Oriented to time, place and person. Normal  attention span and concentration. Affect is full. Mood is normal.       PSG 2000 AHI 93/low sat 86%, bipap 10/4  Titration 2004 bipap 12/6      ASSESSMENT:     HILLARY, severe. Remains adherent with bipap. Benefiting from therapy.  Got new machine spring '17 already met medicare compliance. Records are in media EPIC. NEeds new DME since moving to [place]. Consider alternative mask when eligible in 2 mos.   He has  medical comorbidities of ischemic cardiomyopathy, LVAD use, HTN, gait instability.   RLS, no Requip, concern for augmentation    PLAN:   1. New DME [facility] for ongoing supplies when needed. Consider [place] mask when eligible 1-2 mos, just got new mask (nasal) from [facility] [place]. Continue bipap, adjust to 16/13cm. RTC 2mos adherence. Control mask leak and mouth leak. Increased humidity from 4-->6.    2. Discussed etiology of HILLARY and potential ramifications of untreated HILLARY, including stroke, heart disease, HTN.   3. He does not drive, has caretaker  4. Continue to see cards mgt heart disease/LVAD  5. Reassess mgt RLS, consider 24h med control  This was a 40-min visit with over 50% of time spent in review of 150+ page epic records/progress notes about sleep apnea with dr. Garcia, interrogation of equipment, discussion of treatment plan medical counseling.

## 2017-12-01 ENCOUNTER — TELEPHONE (OUTPATIENT)
Dept: ELECTROPHYSIOLOGY | Facility: CLINIC | Age: 77
End: 2017-12-01

## 2017-12-01 ENCOUNTER — HOSPITAL ENCOUNTER (INPATIENT)
Facility: HOSPITAL | Age: 77
LOS: 3 days | Discharge: HOME HEALTH | DRG: 291 | End: 2017-12-04
Attending: INTERNAL MEDICINE | Admitting: INTERNAL MEDICINE
Payer: MEDICARE

## 2017-12-01 ENCOUNTER — ANTI-COAG VISIT (OUTPATIENT)
Dept: CARDIOLOGY | Facility: CLINIC | Age: 77
End: 2017-12-01

## 2017-12-01 ENCOUNTER — CLINICAL SUPPORT (OUTPATIENT)
Dept: TRANSPLANT | Facility: CLINIC | Age: 77
DRG: 291 | End: 2017-12-01
Payer: MEDICARE

## 2017-12-01 ENCOUNTER — HOSPITAL ENCOUNTER (OUTPATIENT)
Dept: CARDIOLOGY | Facility: CLINIC | Age: 77
Discharge: HOME | DRG: 291 | End: 2017-12-01
Attending: INTERNAL MEDICINE
Payer: MEDICARE

## 2017-12-01 DIAGNOSIS — R26.81 GAIT INSTABILITY: ICD-10-CM

## 2017-12-01 DIAGNOSIS — I10 ESSENTIAL HYPERTENSION: Chronic | ICD-10-CM

## 2017-12-01 DIAGNOSIS — Z79.01 CHRONIC ANTICOAGULATION: ICD-10-CM

## 2017-12-01 DIAGNOSIS — I25.5 ISCHEMIC CARDIOMYOPATHY: ICD-10-CM

## 2017-12-01 DIAGNOSIS — Z95.1 S/P CABG (CORONARY ARTERY BYPASS GRAFT): ICD-10-CM

## 2017-12-01 DIAGNOSIS — I50.43 ACUTE ON CHRONIC SYSTOLIC AND DIASTOLIC HEART FAILURE, NYHA CLASS 4: ICD-10-CM

## 2017-12-01 DIAGNOSIS — Z95.810 ICD (IMPLANTABLE CARDIOVERTER-DEFIBRILLATOR), BIVENTRICULAR, IN SITU: ICD-10-CM

## 2017-12-01 DIAGNOSIS — Z95.811 LVAD (LEFT VENTRICULAR ASSIST DEVICE) PRESENT: ICD-10-CM

## 2017-12-01 DIAGNOSIS — I25.10 CORONARY ARTERY DISEASE INVOLVING NATIVE CORONARY ARTERY OF NATIVE HEART WITHOUT ANGINA PECTORIS: Primary | ICD-10-CM

## 2017-12-01 DIAGNOSIS — Z95.811 HEART REPLACED BY HEART ASSIST DEVICE: ICD-10-CM

## 2017-12-01 DIAGNOSIS — I47.20 VT (VENTRICULAR TACHYCARDIA): ICD-10-CM

## 2017-12-01 DIAGNOSIS — I27.22 PULMONARY HYPERTENSION DUE TO LEFT VENTRICULAR SYSTOLIC DYSFUNCTION: ICD-10-CM

## 2017-12-01 DIAGNOSIS — N18.30 STAGE 3 CHRONIC KIDNEY DISEASE: ICD-10-CM

## 2017-12-01 PROBLEM — I16.0 HYPERTENSIVE URGENCY, MALIGNANT: Status: RESOLVED | Noted: 2017-11-15 | Resolved: 2017-12-01

## 2017-12-01 LAB
DIASTOLIC DYSFUNCTION: YES
ESTIMATED PA SYSTOLIC PRESSURE: 29.59
RETIRED EF AND QEF - SEE NOTES: 25 (ref 55–65)
TRICUSPID VALVE REGURGITATION: ABNORMAL

## 2017-12-01 PROCEDURE — 93750 INTERROGATION VAD IN PERSON: CPT | Performed by: NURSE PRACTITIONER

## 2017-12-01 PROCEDURE — 63600175 PHARM REV CODE 636 W HCPCS: Performed by: NURSE PRACTITIONER

## 2017-12-01 PROCEDURE — 25000003 PHARM REV CODE 250: Performed by: NURSE PRACTITIONER

## 2017-12-01 PROCEDURE — 99222 1ST HOSP IP/OBS MODERATE 55: CPT | Mod: AI,,, | Performed by: INTERNAL MEDICINE

## 2017-12-01 PROCEDURE — 93306 TTE W/DOPPLER COMPLETE: CPT | Mod: PBBFAC | Performed by: INTERNAL MEDICINE

## 2017-12-01 PROCEDURE — 27000248 HC VAD-ADDITIONAL DAY

## 2017-12-01 PROCEDURE — 20600001 HC STEP DOWN PRIVATE ROOM

## 2017-12-01 PROCEDURE — 25000003 PHARM REV CODE 250: Performed by: INTERNAL MEDICINE

## 2017-12-01 RX ORDER — LANOLIN ALCOHOL/MO/W.PET/CERES
400 CREAM (GRAM) TOPICAL 2 TIMES DAILY
Status: DISCONTINUED | OUTPATIENT
Start: 2017-12-01 | End: 2017-12-04 | Stop reason: HOSPADM

## 2017-12-01 RX ORDER — FERROUS SULFATE 325(65) MG
325 TABLET, DELAYED RELEASE (ENTERIC COATED) ORAL DAILY
Status: DISCONTINUED | OUTPATIENT
Start: 2017-12-01 | End: 2017-12-01

## 2017-12-01 RX ORDER — LISINOPRIL 20 MG/1
20 TABLET ORAL 2 TIMES DAILY
Status: DISCONTINUED | OUTPATIENT
Start: 2017-12-01 | End: 2017-12-04 | Stop reason: HOSPADM

## 2017-12-01 RX ORDER — DOXAZOSIN 2 MG/1
2 TABLET ORAL EVERY 12 HOURS
Status: DISCONTINUED | OUTPATIENT
Start: 2017-12-02 | End: 2017-12-01

## 2017-12-01 RX ORDER — FOLIC ACID 1 MG/1
1 TABLET ORAL DAILY
Status: DISCONTINUED | OUTPATIENT
Start: 2017-12-01 | End: 2017-12-04 | Stop reason: HOSPADM

## 2017-12-01 RX ORDER — CIPROFLOXACIN 750 MG/1
750 TABLET, FILM COATED ORAL 2 TIMES DAILY
Status: DISCONTINUED | OUTPATIENT
Start: 2017-12-01 | End: 2017-12-04 | Stop reason: HOSPADM

## 2017-12-01 RX ORDER — AMLODIPINE BESYLATE 10 MG/1
10 TABLET ORAL DAILY
Status: DISCONTINUED | OUTPATIENT
Start: 2017-12-02 | End: 2017-12-04 | Stop reason: HOSPADM

## 2017-12-01 RX ORDER — TAMSULOSIN HYDROCHLORIDE 0.4 MG/1
0.4 CAPSULE ORAL DAILY
Status: DISCONTINUED | OUTPATIENT
Start: 2017-12-02 | End: 2017-12-01

## 2017-12-01 RX ORDER — DOXAZOSIN 2 MG/1
2 TABLET ORAL NIGHTLY
Status: DISCONTINUED | OUTPATIENT
Start: 2017-12-02 | End: 2017-12-03

## 2017-12-01 RX ORDER — ROPINIROLE 0.5 MG/1
0.5 TABLET, FILM COATED ORAL 3 TIMES DAILY
Status: DISCONTINUED | OUTPATIENT
Start: 2017-12-01 | End: 2017-12-01

## 2017-12-01 RX ORDER — BUPROPION HYDROCHLORIDE 150 MG/1
300 TABLET ORAL DAILY
Status: DISCONTINUED | OUTPATIENT
Start: 2017-12-02 | End: 2017-12-04 | Stop reason: HOSPADM

## 2017-12-01 RX ORDER — FAMOTIDINE 20 MG/1
40 TABLET, FILM COATED ORAL NIGHTLY
Status: DISCONTINUED | OUTPATIENT
Start: 2017-12-01 | End: 2017-12-04 | Stop reason: HOSPADM

## 2017-12-01 RX ORDER — FERROUS SULFATE, DRIED 160(50) MG
1 TABLET, EXTENDED RELEASE ORAL 2 TIMES DAILY
Status: DISCONTINUED | OUTPATIENT
Start: 2017-12-01 | End: 2017-12-04 | Stop reason: HOSPADM

## 2017-12-01 RX ORDER — POLYETHYLENE GLYCOL 3350 17 G/17G
17 POWDER, FOR SOLUTION ORAL DAILY
Status: DISCONTINUED | OUTPATIENT
Start: 2017-12-01 | End: 2017-12-04 | Stop reason: HOSPADM

## 2017-12-01 RX ORDER — AMOXICILLIN 250 MG
2 CAPSULE ORAL 2 TIMES DAILY
Status: DISCONTINUED | OUTPATIENT
Start: 2017-12-01 | End: 2017-12-04 | Stop reason: HOSPADM

## 2017-12-01 RX ORDER — WARFARIN 4 MG/1
4 TABLET ORAL DAILY
Status: DISCONTINUED | OUTPATIENT
Start: 2017-12-02 | End: 2017-12-04 | Stop reason: HOSPADM

## 2017-12-01 RX ORDER — DOCUSATE SODIUM 100 MG/1
100 CAPSULE, LIQUID FILLED ORAL 2 TIMES DAILY
Status: DISCONTINUED | OUTPATIENT
Start: 2017-12-01 | End: 2017-12-01

## 2017-12-01 RX ORDER — TAMSULOSIN HYDROCHLORIDE 0.4 MG/1
0.4 CAPSULE ORAL NIGHTLY
Status: DISCONTINUED | OUTPATIENT
Start: 2017-12-02 | End: 2017-12-01

## 2017-12-01 RX ORDER — NAPROXEN SODIUM 220 MG/1
81 TABLET, FILM COATED ORAL DAILY
Status: DISCONTINUED | OUTPATIENT
Start: 2017-12-02 | End: 2017-12-04 | Stop reason: HOSPADM

## 2017-12-01 RX ORDER — ATORVASTATIN CALCIUM 10 MG/1
10 TABLET, FILM COATED ORAL DAILY
Status: DISCONTINUED | OUTPATIENT
Start: 2017-12-02 | End: 2017-12-04 | Stop reason: HOSPADM

## 2017-12-01 RX ORDER — HYDRALAZINE HYDROCHLORIDE 50 MG/1
100 TABLET, FILM COATED ORAL EVERY 8 HOURS
Status: DISCONTINUED | OUTPATIENT
Start: 2017-12-01 | End: 2017-12-04 | Stop reason: HOSPADM

## 2017-12-01 RX ORDER — FERROUS SULFATE 325(65) MG
325 TABLET, DELAYED RELEASE (ENTERIC COATED) ORAL DAILY
Status: DISCONTINUED | OUTPATIENT
Start: 2017-12-01 | End: 2017-12-04 | Stop reason: HOSPADM

## 2017-12-01 RX ORDER — ISOSORBIDE DINITRATE 20 MG/1
40 TABLET ORAL 3 TIMES DAILY
Status: DISCONTINUED | OUTPATIENT
Start: 2017-12-01 | End: 2017-12-04 | Stop reason: HOSPADM

## 2017-12-01 RX ORDER — ALLOPURINOL 300 MG/1
300 TABLET ORAL DAILY
Status: DISCONTINUED | OUTPATIENT
Start: 2017-12-02 | End: 2017-12-04 | Stop reason: HOSPADM

## 2017-12-01 RX ORDER — WARFARIN 4 MG/1
4 TABLET ORAL DAILY
Status: DISCONTINUED | OUTPATIENT
Start: 2017-12-01 | End: 2017-12-01

## 2017-12-01 RX ORDER — FUROSEMIDE 10 MG/ML
80 INJECTION INTRAMUSCULAR; INTRAVENOUS 2 TIMES DAILY
Status: DISCONTINUED | OUTPATIENT
Start: 2017-12-01 | End: 2017-12-03

## 2017-12-01 RX ORDER — DOXAZOSIN 1 MG/1
1 TABLET ORAL ONCE
Status: COMPLETED | OUTPATIENT
Start: 2017-12-01 | End: 2017-12-01

## 2017-12-01 RX ORDER — DOXAZOSIN 2 MG/1
2 TABLET ORAL EVERY 12 HOURS
Status: DISCONTINUED | OUTPATIENT
Start: 2017-12-01 | End: 2017-12-01

## 2017-12-01 RX ORDER — DOFETILIDE 0.25 MG/1
250 CAPSULE ORAL EVERY 12 HOURS
Status: DISCONTINUED | OUTPATIENT
Start: 2017-12-01 | End: 2017-12-04 | Stop reason: HOSPADM

## 2017-12-01 RX ORDER — CLONIDINE HYDROCHLORIDE 0.1 MG/1
0.2 TABLET ORAL ONCE
Status: COMPLETED | OUTPATIENT
Start: 2017-12-01 | End: 2017-12-01

## 2017-12-01 RX ADMIN — STANDARDIZED SENNA CONCENTRATE AND DOCUSATE SODIUM 2 TABLET: 8.6; 5 TABLET, FILM COATED ORAL at 09:12

## 2017-12-01 RX ADMIN — ISOSORBIDE DINITRATE 40 MG: 20 TABLET ORAL at 07:12

## 2017-12-01 RX ADMIN — FUROSEMIDE 80 MG: 10 INJECTION, SOLUTION INTRAVENOUS at 03:12

## 2017-12-01 RX ADMIN — HYDRALAZINE HYDROCHLORIDE 100 MG: 50 TABLET ORAL at 07:12

## 2017-12-01 RX ADMIN — DOFETILIDE 250 MCG: 0.25 CAPSULE ORAL at 09:12

## 2017-12-01 RX ADMIN — DOXAZOSIN 1 MG: 1 TABLET ORAL at 07:12

## 2017-12-01 RX ADMIN — CIPROFLOXACIN HYDROCHLORIDE 750 MG: 750 TABLET, FILM COATED ORAL at 09:12

## 2017-12-01 RX ADMIN — FAMOTIDINE 40 MG: 20 TABLET, FILM COATED ORAL at 09:12

## 2017-12-01 RX ADMIN — LISINOPRIL 20 MG: 20 TABLET ORAL at 07:12

## 2017-12-01 RX ADMIN — OYSTER SHELL CALCIUM WITH VITAMIN D 1 TABLET: 500; 200 TABLET, FILM COATED ORAL at 09:12

## 2017-12-01 RX ADMIN — POLYETHYLENE GLYCOL 3350 17 G: 17 POWDER, FOR SOLUTION ORAL at 03:12

## 2017-12-01 RX ADMIN — CLONIDINE HYDROCHLORIDE 0.2 MG: 0.1 TABLET ORAL at 05:12

## 2017-12-01 RX ADMIN — HYDRALAZINE HYDROCHLORIDE 100 MG: 50 TABLET ORAL at 03:12

## 2017-12-01 RX ADMIN — MAGNESIUM OXIDE TAB 400 MG (241.3 MG ELEMENTAL MG) 400 MG: 400 (241.3 MG) TAB at 09:12

## 2017-12-01 NOTE — ASSESSMENT & PLAN NOTE
-S/P HMIII 10/16 in [place].  -Speed 5800.  -Continue ASA 81mg daily.  -Continue warfarin goal INR 2-3. Wife states that he took coumadin today.  -LDH at baseline.

## 2017-12-01 NOTE — H&P
[facility]  Heart Transplant  H&P    Patient Name: Kev Vasquez  MRN: [id number]  Admission Date: 12/1/2017  Attending Physician: Miguel A Marcelo Jr.,*  Primary Care Provider: Mega Collins MD  Principal Problem:Acute on chronic systolic and diastolic heart failure, NYHA class 4    Subjective:     History of Present Illness:  77 y.o. y.o. WM with ischemic cardiomyopathy status post Heartmate III 10/16/16 LVAD(in [place]), CKD II, CAD, VT on Tikosyn(Intolerant to Amio per outside notes), HTN, HLD, Gout, Obesity, SSS, and depression who presents for direct admit due to HTN and volume overload.  VAD speed is at 5800 rpm. Was recently admitted for elevated BP. Lisinopril was increased and he was started on hydralazine/isordil.   Today he states that he has no new complaints. He states that he is still dealing with gait instability. He denies dizziness but states that he just loses his balance. Denies presyncope/syncope. He also seems dyspneic while talking to me but states that he feels fine. Echo yesterday with reduced RV function.     Past Medical History:   Diagnosis Date    AICD (automatic cardioverter/defibrillator) present 2014    [employer]    Chronic anticoagulation 7/21/2017    Chronic combined systolic and diastolic congestive heart failure 7/27/2015 11-16-17   1 - Moderate left ventricular enlargement.    2 - Severely depressed left ventricular systolic function (EF 15-20%).    3 - Impaired LV relaxation, normal LAP (grade 1 diastolic dysfunction).    4 - Biatrial enlargement.    5 - Right ventricle is upper limit of normal in size with not well seen systolic function.    6 - Severe tricuspid regurgitation.    7 - Increased central venous pressure.    8 - The estimated PA systolic pressure is 40 mmHg.    9 - Heartmate III LVAD; speed 5800.     Complication involving left ventricular assist device (LVAD) 7/29/2017    Coronary artery disease involving native coronary artery  of native heart without angina pectoris 7/27/2015    Gait instability     Hypertensive urgency, malignant 11/15/2017    ICD (implantable cardioverter-defibrillator), biventricular, in situ 7/27/2015    Ischemic cardiomyopathy 7/20/2017    S/p HMIII     Kidney stones     LVAD (left ventricular assist device) present 7/20/2017    status post Heartmate III 10/16/16 LVAD    HILLARY on CPAP 7/27/2015    Peripheral neuropathy     Pulmonary hypertension due to left ventricular systolic dysfunction 7/29/2015    Restless leg syndrome 1992    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    Skin cancer     excision 2013    Skin yeast infection 8/31/2017    Stage 3 chronic kidney disease 7/20/2017    Syncope 7/20/2017    Ventricular tachycardia 7/25/2017       Past Surgical History:   Procedure Laterality Date    CARDIAC PACEMAKER PLACEMENT      pacemaker previously, then AICD in 2006. AICD [employer] serial #[id number]    CORONARY ARTERY BYPASS GRAFT  1993    KNEE SURGERY Left 2001    complicated by infection, hardware had to be taken out and replaced    LEFT VENTRICULAR ASSIST DEVICE  10/19/2016       Review of patient's allergies indicates:   Allergen Reactions    Aldactone [spironolactone]       persistent hyperkalemia.    Sulfa (sulfonamide antibiotics)        Current Facility-Administered Medications   Medication    [START ON 12/2/2017] allopurinol tablet 300 mg    [START ON 12/2/2017] amLODIPine tablet 10 mg    [START ON 12/2/2017] aspirin chewable tablet 81 mg    [START ON 12/2/2017] atorvastatin tablet 10 mg    [START ON 12/2/2017] buPROPion TB24 tablet 300 mg    calcium-vitamin D3 500 mg(1,250mg) -200 unit per tablet 1 tablet    ciprofloxacin HCl tablet 750 mg    dofetilide capsule 250 mcg    doxazosin tablet 2 mg    famotidine tablet 40 mg    ferrous sulfate EC tablet 325 mg    folic acid tablet 1 mg    furosemide injection 80 mg    hydrALAZINE tablet 100 mg    isosorbide  dinitrate tablet 40 mg    [START ON 12/2/2017] Lactobacillus rhamnosus GG capsule 1 capsule    lisinopril tablet 20 mg    magnesium oxide tablet 400 mg    [START ON 12/2/2017] multivitamin tablet 1 tablet    polyethylene glycol packet 17 g    senna-docusate 8.6-50 mg per tablet 2 tablet    [START ON 12/2/2017] warfarin (COUMADIN) tablet 4 mg     Family History     Problem Relation (Age of Onset)    Cancer Daughter (50)    Diabetes Daughter    Heart disease Daughter        Social History Main Topics    Smoking status: Never Smoker    Smokeless tobacco: Never Used    Alcohol use No    Drug use: No    Sexual activity: Not on file      Comment:      Review of Systems   Constitutional: Positive for fever.   HENT: Negative.    Eyes: Negative.    Respiratory: Negative.    Cardiovascular: Positive for leg swelling.   Gastrointestinal: Positive for constipation.   Genitourinary: Negative.    Neurological: Positive for weakness and numbness (occasional numbness in fingers/feet.).   Psychiatric/Behavioral: Negative.      Objective:     Vital Signs (Most Recent):  Temp: 97.8 °F (36.6 °C) (12/01/17 1350)  Pulse: 77 (12/01/17 1350)  Resp: 18 (12/01/17 1350)  BP: (!) 92/0 (12/01/17 1351)  SpO2: 96 % (12/01/17 1350) Vital Signs (24h Range):  Temp:  [97.8 °F (36.6 °C)] 97.8 °F (36.6 °C)  Pulse:  [77] 77  Resp:  [18] 18  SpO2:  [96 %] 96 %  BP: (92)/(0) 92/0     Patient Vitals for the past 72 hrs (Last 3 readings):   Weight   12/01/17 1350 93.4 kg (205 lb 14.6 oz)     Body mass index is 27.93 kg/m².    No intake or output data in the 24 hours ending 12/01/17 1435    Physical Exam   Constitutional: He is oriented to person, place, and time. He appears well-developed and well-nourished.   HENT:   Head: Normocephalic.   Eyes: Pupils are equal, round, and reactive to light.   Neck: Normal range of motion. Neck supple. JVD present.   Cardiovascular: Normal rate and regular rhythm.    VAD hum.   Pulmonary/Chest: Effort  normal and breath sounds normal.   Abdominal: Soft. Bowel sounds are normal.   Musculoskeletal: Normal range of motion. He exhibits edema.   Neurological: He is alert and oriented to person, place, and time.   Skin: Skin is warm and dry.   Psychiatric: He has a normal mood and affect. His behavior is normal.   Nursing note and vitals reviewed.      Significant Labs:  CBC:    Recent Labs  Lab 12/01/17  0911   WBC 5.78   RBC 3.24*   HGB 9.7*   HCT 30.2*      MCV 93   MCH 29.9   MCHC 32.1     BNP:    Recent Labs  Lab 12/01/17  0911   *     CMP:    Recent Labs  Lab 12/01/17  0911   *   CALCIUM 9.5   ALBUMIN 3.3*   PROT 6.6      K 4.2   CO2 24      BUN 21   CREATININE 1.7*   ALKPHOS 79   ALT 22   AST 21   BILITOT 0.4      Coagulation:     Recent Labs  Lab 11/27/17  1035 12/01/17  0911   INR 2.3* 2.5*     LDH:    Recent Labs  Lab 12/01/17  0911        Microbiology:  Microbiology Results (last 7 days)     ** No results found for the last 168 hours. **          I have reviewed all pertinent labs within the past 24 hours.    Diagnostic Results:  I have reviewed and interpreted all pertinent imaging results/findings within the past 24 hours.    Assessment/Plan:     * Acute on chronic systolic and diastolic heart failure, NYHA class 4    -Volume overloaded on exam. Will start IVP Lasix.  -Echo 12/1 reviewed.        LVAD (left ventricular assist device) present    -S/P HMIII 10/16 in [place].  -Speed 5800.  -Continue ASA 81mg daily.  -Continue warfarin goal INR 2-3. Wife states that he took coumadin today.  -LDH at baseline.          Essential hypertension    -Pt pulsatile on exam. Follow MAPs only. Nursing only to check BP sitting or standing.  -MAPs upper 80s on admit.  -Continue amlodipine 10mg daily, hydralazine 100mg TID, isosorbide 40mg TID, Lisinopril 20mg BID. Will stop Tamulosin and start doxazosin.        Gait instability    -Hold Ropinirol.  -Also, switched tamulosin to  doxazosin.  -PT/OT Cx.         VT (ventricular tachycardia)    -Continue Tikosyn(intolerant to Amio per outside records).          Stage 3 chronic kidney disease    -Will monitor with diuresis.              Jim Morejon NP  Heart Transplant  [facility]

## 2017-12-01 NOTE — PROGRESS NOTES
Seen pt today in clinic with family present for Echo, b/p check, and labs. He is in a wheel chair and looks generally well with only c/o imbalance with gait which is chronic issue. He denies SOB. He does present with some swelling to calves and ankles.     B/p is elevated 118/88 map 96, doppler 115 but pt does have strong pulse. Family reports he hasn't taken noon hydralazine 100mg yet. (100mg q 8hrs).     Per dr Santana, echo shows enlarged rt side.  Orders received to admit pt to hospital for IV diuretics. Pt and family state full understanding and will go down to admit office.

## 2017-12-01 NOTE — TELEPHONE ENCOUNTER
Contacted patient in relation to patient's remote ICD home monitor as to it is not connecting to [employer], no answer, left voicemail.

## 2017-12-01 NOTE — PROGRESS NOTES
12/01/17 1536   Vital Signs   Temp 98.5 °F (36.9 °C)   Temp src Oral   Pulse 71   Resp 18   SpO2 (!) 94 %   BP (!) 119/92   MAP (mmHg) 102   BP Location Right arm   BP Method Automatic   Patient Position Sitting     Doppler 92/0  Dr. Bennett notified.

## 2017-12-01 NOTE — SUBJECTIVE & OBJECTIVE
Past Medical History:   Diagnosis Date    AICD (automatic cardioverter/defibrillator) present 2014    [employer]    Chronic anticoagulation 7/21/2017    Chronic combined systolic and diastolic congestive heart failure 7/27/2015 11-16-17   1 - Moderate left ventricular enlargement.    2 - Severely depressed left ventricular systolic function (EF 15-20%).    3 - Impaired LV relaxation, normal LAP (grade 1 diastolic dysfunction).    4 - Biatrial enlargement.    5 - Right ventricle is upper limit of normal in size with not well seen systolic function.    6 - Severe tricuspid regurgitation.    7 - Increased central venous pressure.    8 - The estimated PA systolic pressure is 40 mmHg.    9 - Heartmate III LVAD; speed 5800.     Complication involving left ventricular assist device (LVAD) 7/29/2017    Coronary artery disease involving native coronary artery of native heart without angina pectoris 7/27/2015    Gait instability     Hypertensive urgency, malignant 11/15/2017    ICD (implantable cardioverter-defibrillator), biventricular, in situ 7/27/2015    Ischemic cardiomyopathy 7/20/2017    S/p HMIII     Kidney stones     LVAD (left ventricular assist device) present 7/20/2017    status post Heartmate III 10/16/16 LVAD    HILLARY on CPAP 7/27/2015    Peripheral neuropathy     Pulmonary hypertension due to left ventricular systolic dysfunction 7/29/2015    Restless leg syndrome 1992    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    Skin cancer     excision 2013    Skin yeast infection 8/31/2017    Stage 3 chronic kidney disease 7/20/2017    Syncope 7/20/2017    Ventricular tachycardia 7/25/2017       Past Surgical History:   Procedure Laterality Date    CARDIAC PACEMAKER PLACEMENT      pacemaker previously, then AICD in 2006. AICD [employer] serial #[id number]    CORONARY ARTERY BYPASS GRAFT  1993    KNEE SURGERY Left 2001    complicated by infection, hardware had to be taken out and replaced     LEFT VENTRICULAR ASSIST DEVICE  10/19/2016       Review of patient's allergies indicates:   Allergen Reactions    Aldactone [spironolactone]       persistent hyperkalemia.    Sulfa (sulfonamide antibiotics)        Current Facility-Administered Medications   Medication    [START ON 12/2/2017] allopurinol tablet 300 mg    [START ON 12/2/2017] amLODIPine tablet 10 mg    [START ON 12/2/2017] aspirin chewable tablet 81 mg    [START ON 12/2/2017] atorvastatin tablet 10 mg    [START ON 12/2/2017] buPROPion TB24 tablet 300 mg    calcium-vitamin D3 500 mg(1,250mg) -200 unit per tablet 1 tablet    ciprofloxacin HCl tablet 750 mg    dofetilide capsule 250 mcg    doxazosin tablet 2 mg    famotidine tablet 40 mg    ferrous sulfate EC tablet 325 mg    folic acid tablet 1 mg    furosemide injection 80 mg    hydrALAZINE tablet 100 mg    isosorbide dinitrate tablet 40 mg    [START ON 12/2/2017] Lactobacillus rhamnosus GG capsule 1 capsule    lisinopril tablet 20 mg    magnesium oxide tablet 400 mg    [START ON 12/2/2017] multivitamin tablet 1 tablet    polyethylene glycol packet 17 g    senna-docusate 8.6-50 mg per tablet 2 tablet    [START ON 12/2/2017] warfarin (COUMADIN) tablet 4 mg     Family History     Problem Relation (Age of Onset)    Cancer Daughter (50)    Diabetes Daughter    Heart disease Daughter        Social History Main Topics    Smoking status: Never Smoker    Smokeless tobacco: Never Used    Alcohol use No    Drug use: No    Sexual activity: Not on file      Comment:      Review of Systems   Constitutional: Positive for fever.   HENT: Negative.    Eyes: Negative.    Respiratory: Negative.    Cardiovascular: Positive for leg swelling.   Gastrointestinal: Positive for constipation.   Genitourinary: Negative.    Neurological: Positive for weakness and numbness (occasional numbness in fingers/feet.).   Psychiatric/Behavioral: Negative.      Objective:     Vital Signs (Most  Recent):  Temp: 97.8 °F (36.6 °C) (12/01/17 1350)  Pulse: 77 (12/01/17 1350)  Resp: 18 (12/01/17 1350)  BP: (!) 92/0 (12/01/17 1351)  SpO2: 96 % (12/01/17 1350) Vital Signs (24h Range):  Temp:  [97.8 °F (36.6 °C)] 97.8 °F (36.6 °C)  Pulse:  [77] 77  Resp:  [18] 18  SpO2:  [96 %] 96 %  BP: (92)/(0) 92/0     Patient Vitals for the past 72 hrs (Last 3 readings):   Weight   12/01/17 1350 93.4 kg (205 lb 14.6 oz)     Body mass index is 27.93 kg/m².    No intake or output data in the 24 hours ending 12/01/17 1435    Physical Exam   Constitutional: He is oriented to person, place, and time. He appears well-developed and well-nourished.   HENT:   Head: Normocephalic.   Eyes: Pupils are equal, round, and reactive to light.   Neck: Normal range of motion. Neck supple. JVD present.   Cardiovascular: Normal rate and regular rhythm.    VAD hum.   Pulmonary/Chest: Effort normal and breath sounds normal.   Abdominal: Soft. Bowel sounds are normal.   Musculoskeletal: Normal range of motion. He exhibits edema.   Neurological: He is alert and oriented to person, place, and time.   Skin: Skin is warm and dry.   Psychiatric: He has a normal mood and affect. His behavior is normal.   Nursing note and vitals reviewed.      Significant Labs:  CBC:    Recent Labs  Lab 12/01/17  0911   WBC 5.78   RBC 3.24*   HGB 9.7*   HCT 30.2*      MCV 93   MCH 29.9   MCHC 32.1     BNP:    Recent Labs  Lab 12/01/17  0911   *     CMP:    Recent Labs  Lab 12/01/17  0911   *   CALCIUM 9.5   ALBUMIN 3.3*   PROT 6.6      K 4.2   CO2 24      BUN 21   CREATININE 1.7*   ALKPHOS 79   ALT 22   AST 21   BILITOT 0.4      Coagulation:     Recent Labs  Lab 11/27/17  1035 12/01/17  0911   INR 2.3* 2.5*     LDH:    Recent Labs  Lab 12/01/17  0911        Microbiology:  Microbiology Results (last 7 days)     ** No results found for the last 168 hours. **          I have reviewed all pertinent labs within the past 24  hours.    Diagnostic Results:  I have reviewed and interpreted all pertinent imaging results/findings within the past 24 hours.

## 2017-12-01 NOTE — PLAN OF CARE
Problem: Patient Care Overview  Goal: Plan of Care Review  Outcome: Ongoing (interventions implemented as appropriate)  Plan of care discussed with patient. Patient is free of fall/trauma/injury. Denies CP, SOB, or pain/discomfort. Lasix 80mg IVP BID added for diuresis. All questions addressed. Will continue to monitor.

## 2017-12-01 NOTE — HPI
77 y.o. y.o. WM with ischemic cardiomyopathy status post Heartmate III 10/16/16 LVAD(in [place]), CKD II, CAD, VT on Tikosyn(Intolerant to Amio per outside notes), HTN, HLD, Gout, Obesity, SSS, and depression who presents for direct admit due to HTN and volume overload.  VAD speed is at 5800 rpm. Was recently admitted for elevated BP. Lisinopril was increased and he was started on hydralazine/isordil.   Today he states that he has no new complaints. He states that he is still dealing with gait instability. He denies dizziness but states that he just loses his balance. Denies presyncope/syncope. He also seems dyspneic while talking to me but states that he feels fine. Echo yesterday with reduced RV function.

## 2017-12-01 NOTE — ASSESSMENT & PLAN NOTE
-Pt pulsatile on exam. Follow MAPs only. Nursing only to check BP sitting or standing.  -MAPs upper 80s on admit.  -Continue amlodipine 10mg daily, hydralazine 100mg TID, isosorbide 40mg TID, Lisinopril 20mg BID. Will stop Tamulosin and start doxazosin.

## 2017-12-02 LAB
ANION GAP SERPL CALC-SCNC: 9 MMOL/L
APTT BLDCRRT: 32.7 SEC
BACTERIA #/AREA URNS AUTO: ABNORMAL /HPF
BASOPHILS # BLD AUTO: 0.03 K/UL
BASOPHILS NFR BLD: 0.5 %
BILIRUB UR QL STRIP: NEGATIVE
BUN SERPL-MCNC: 21 MG/DL
CALCIUM SERPL-MCNC: 9.2 MG/DL
CHLORIDE SERPL-SCNC: 107 MMOL/L
CLARITY UR REFRACT.AUTO: ABNORMAL
CO2 SERPL-SCNC: 24 MMOL/L
COLOR UR AUTO: ABNORMAL
CREAT SERPL-MCNC: 1.7 MG/DL
DIFFERENTIAL METHOD: ABNORMAL
EOSINOPHIL # BLD AUTO: 0.2 K/UL
EOSINOPHIL NFR BLD: 2.9 %
ERYTHROCYTE [DISTWIDTH] IN BLOOD BY AUTOMATED COUNT: 15.2 %
EST. GFR  (AFRICAN AMERICAN): 44.6 ML/MIN/1.73 M^2
EST. GFR  (NON AFRICAN AMERICAN): 38.6 ML/MIN/1.73 M^2
GLUCOSE SERPL-MCNC: 85 MG/DL
GLUCOSE UR QL STRIP: NEGATIVE
HCT VFR BLD AUTO: 27 %
HGB BLD-MCNC: 8.8 G/DL
HGB UR QL STRIP: ABNORMAL
IMM GRANULOCYTES # BLD AUTO: 0.02 K/UL
IMM GRANULOCYTES NFR BLD AUTO: 0.4 %
INR PPP: 2.6
KETONES UR QL STRIP: NEGATIVE
LDH SERPL L TO P-CCNC: 133 U/L
LEUKOCYTE ESTERASE UR QL STRIP: NEGATIVE
LYMPHOCYTES # BLD AUTO: 0.9 K/UL
LYMPHOCYTES NFR BLD: 15.5 %
MAGNESIUM SERPL-MCNC: 2.3 MG/DL
MCH RBC QN AUTO: 29.1 PG
MCHC RBC AUTO-ENTMCNC: 32.6 G/DL
MCV RBC AUTO: 89 FL
MICROSCOPIC COMMENT: ABNORMAL
MONOCYTES # BLD AUTO: 0.9 K/UL
MONOCYTES NFR BLD: 15.5 %
NEUTROPHILS # BLD AUTO: 3.6 K/UL
NEUTROPHILS NFR BLD: 65.2 %
NITRITE UR QL STRIP: NEGATIVE
NRBC BLD-RTO: 0 /100 WBC
PH UR STRIP: 8 [PH] (ref 5–8)
PHOSPHATE SERPL-MCNC: 3.3 MG/DL
PLATELET # BLD AUTO: 154 K/UL
PMV BLD AUTO: 11.2 FL
POTASSIUM SERPL-SCNC: 3.7 MMOL/L
PROT UR QL STRIP: NEGATIVE
PROTHROMBIN TIME: 26.5 SEC
RBC # BLD AUTO: 3.02 M/UL
RBC #/AREA URNS AUTO: >100 /HPF (ref 0–4)
SODIUM SERPL-SCNC: 140 MMOL/L
SP GR UR STRIP: 1 (ref 1–1.03)
SQUAMOUS #/AREA URNS AUTO: 0 /HPF
URN SPEC COLLECT METH UR: ABNORMAL
UROBILINOGEN UR STRIP-ACNC: NEGATIVE EU/DL
WBC # BLD AUTO: 5.56 K/UL
WBC #/AREA URNS AUTO: 1 /HPF (ref 0–5)

## 2017-12-02 PROCEDURE — 81001 URINALYSIS AUTO W/SCOPE: CPT

## 2017-12-02 PROCEDURE — 83615 LACTATE (LD) (LDH) ENZYME: CPT

## 2017-12-02 PROCEDURE — 97165 OT EVAL LOW COMPLEX 30 MIN: CPT

## 2017-12-02 PROCEDURE — 80048 BASIC METABOLIC PNL TOTAL CA: CPT

## 2017-12-02 PROCEDURE — 84100 ASSAY OF PHOSPHORUS: CPT

## 2017-12-02 PROCEDURE — 63600175 PHARM REV CODE 636 W HCPCS: Performed by: NURSE PRACTITIONER

## 2017-12-02 PROCEDURE — 27000248 HC VAD-ADDITIONAL DAY

## 2017-12-02 PROCEDURE — 85610 PROTHROMBIN TIME: CPT

## 2017-12-02 PROCEDURE — 97162 PT EVAL MOD COMPLEX 30 MIN: CPT

## 2017-12-02 PROCEDURE — 99232 SBSQ HOSP IP/OBS MODERATE 35: CPT | Mod: ,,, | Performed by: INTERNAL MEDICINE

## 2017-12-02 PROCEDURE — 63600175 PHARM REV CODE 636 W HCPCS: Performed by: INTERNAL MEDICINE

## 2017-12-02 PROCEDURE — 25000003 PHARM REV CODE 250: Performed by: INTERNAL MEDICINE

## 2017-12-02 PROCEDURE — 36415 COLL VENOUS BLD VENIPUNCTURE: CPT

## 2017-12-02 PROCEDURE — 85025 COMPLETE CBC W/AUTO DIFF WBC: CPT

## 2017-12-02 PROCEDURE — 85730 THROMBOPLASTIN TIME PARTIAL: CPT

## 2017-12-02 PROCEDURE — 83735 ASSAY OF MAGNESIUM: CPT

## 2017-12-02 PROCEDURE — 20600001 HC STEP DOWN PRIVATE ROOM

## 2017-12-02 PROCEDURE — 25000003 PHARM REV CODE 250: Performed by: NURSE PRACTITIONER

## 2017-12-02 RX ORDER — HYDRALAZINE HYDROCHLORIDE 20 MG/ML
10 INJECTION INTRAMUSCULAR; INTRAVENOUS ONCE
Status: COMPLETED | OUTPATIENT
Start: 2017-12-02 | End: 2017-12-02

## 2017-12-02 RX ADMIN — DOFETILIDE 250 MCG: 0.25 CAPSULE ORAL at 08:12

## 2017-12-02 RX ADMIN — FUROSEMIDE 80 MG: 10 INJECTION, SOLUTION INTRAVENOUS at 08:12

## 2017-12-02 RX ADMIN — ALLOPURINOL 300 MG: 300 TABLET ORAL at 08:12

## 2017-12-02 RX ADMIN — MAGNESIUM OXIDE TAB 400 MG (241.3 MG ELEMENTAL MG) 400 MG: 400 (241.3 MG) TAB at 08:12

## 2017-12-02 RX ADMIN — CIPROFLOXACIN HYDROCHLORIDE 750 MG: 750 TABLET, FILM COATED ORAL at 09:12

## 2017-12-02 RX ADMIN — FUROSEMIDE 80 MG: 10 INJECTION, SOLUTION INTRAVENOUS at 05:12

## 2017-12-02 RX ADMIN — LISINOPRIL 20 MG: 20 TABLET ORAL at 08:12

## 2017-12-02 RX ADMIN — STANDARDIZED SENNA CONCENTRATE AND DOCUSATE SODIUM 2 TABLET: 8.6; 5 TABLET, FILM COATED ORAL at 08:12

## 2017-12-02 RX ADMIN — DOFETILIDE 250 MCG: 0.25 CAPSULE ORAL at 09:12

## 2017-12-02 RX ADMIN — FERROUS SULFATE TAB EC 325 MG (65 MG FE EQUIVALENT) 325 MG: 325 (65 FE) TABLET DELAYED RESPONSE at 08:12

## 2017-12-02 RX ADMIN — Medication 1 CAPSULE: at 08:12

## 2017-12-02 RX ADMIN — HYDRALAZINE HYDROCHLORIDE 100 MG: 50 TABLET ORAL at 04:12

## 2017-12-02 RX ADMIN — HYDRALAZINE HYDROCHLORIDE 10 MG: 20 INJECTION INTRAMUSCULAR; INTRAVENOUS at 06:12

## 2017-12-02 RX ADMIN — ISOSORBIDE DINITRATE 40 MG: 20 TABLET ORAL at 01:12

## 2017-12-02 RX ADMIN — LISINOPRIL 20 MG: 20 TABLET ORAL at 09:12

## 2017-12-02 RX ADMIN — ASPIRIN 81 MG CHEWABLE TABLET 81 MG: 81 TABLET CHEWABLE at 08:12

## 2017-12-02 RX ADMIN — THERA TABS 1 TABLET: TAB at 08:12

## 2017-12-02 RX ADMIN — FOLIC ACID 1 MG: 1 TABLET ORAL at 08:12

## 2017-12-02 RX ADMIN — AMLODIPINE BESYLATE 10 MG: 10 TABLET ORAL at 08:12

## 2017-12-02 RX ADMIN — ISOSORBIDE DINITRATE 40 MG: 20 TABLET ORAL at 09:12

## 2017-12-02 RX ADMIN — OYSTER SHELL CALCIUM WITH VITAMIN D 1 TABLET: 500; 200 TABLET, FILM COATED ORAL at 08:12

## 2017-12-02 RX ADMIN — MAGNESIUM OXIDE TAB 400 MG (241.3 MG ELEMENTAL MG) 400 MG: 400 (241.3 MG) TAB at 09:12

## 2017-12-02 RX ADMIN — OYSTER SHELL CALCIUM WITH VITAMIN D 1 TABLET: 500; 200 TABLET, FILM COATED ORAL at 09:12

## 2017-12-02 RX ADMIN — CIPROFLOXACIN HYDROCHLORIDE 750 MG: 750 TABLET, FILM COATED ORAL at 08:12

## 2017-12-02 RX ADMIN — STANDARDIZED SENNA CONCENTRATE AND DOCUSATE SODIUM 2 TABLET: 8.6; 5 TABLET, FILM COATED ORAL at 09:12

## 2017-12-02 RX ADMIN — BUPROPION HYDROCHLORIDE 300 MG: 150 TABLET, FILM COATED, EXTENDED RELEASE ORAL at 08:12

## 2017-12-02 RX ADMIN — ATORVASTATIN CALCIUM 10 MG: 10 TABLET, FILM COATED ORAL at 08:12

## 2017-12-02 RX ADMIN — DOXAZOSIN MESYLATE 2 MG: 2 TABLET ORAL at 09:12

## 2017-12-02 RX ADMIN — ISOSORBIDE DINITRATE 40 MG: 20 TABLET ORAL at 04:12

## 2017-12-02 RX ADMIN — HYDRALAZINE HYDROCHLORIDE 100 MG: 50 TABLET ORAL at 09:12

## 2017-12-02 RX ADMIN — HYDRALAZINE HYDROCHLORIDE 100 MG: 50 TABLET ORAL at 01:12

## 2017-12-02 RX ADMIN — FAMOTIDINE 40 MG: 20 TABLET, FILM COATED ORAL at 09:12

## 2017-12-02 RX ADMIN — WARFARIN SODIUM 4 MG: 4 TABLET ORAL at 05:12

## 2017-12-02 NOTE — PROGRESS NOTES
12/02/17 0359 12/02/17 0408   Vital Signs   /88 (!) 94/0   MAP (mmHg) 98 --    BP Location Right arm --    BP Method Automatic Doppler   Patient Position Lying --      Notified Dr. Yeh of pt status. Pt has HM3 LVAD, pulsatile, BP reading and MAP (as seen above). Md acknowledged findings. Orders given: Give morning BP meds now (Hydralazine, isosorbide) and recheck BP in 1.5 hours, if MAP still greater than 80, Give 10mg of hydralazine IVP. Will implement and continue to monitor.

## 2017-12-02 NOTE — PT/OT/SLP EVAL
Physical Therapy  Evaluation    Kev Vasquez   MRN: [id number]   Admitting Diagnosis: Acute on chronic systolic and diastolic heart failure, NYHA class 4    PT Received On: 12/02/17  PT Start Time: 1355     PT Stop Time: 1430    PT Total Time (min): 35 min       Billable Minutes:  Evaluation 35 (co-eval with OT)    Diagnosis: Acute on chronic systolic and diastolic heart failure, NYHA class 4  LVAD in place, inserted 10/19/16    Past Medical History:   Diagnosis Date    AICD (automatic cardioverter/defibrillator) present 2014    [employer]    Chronic anticoagulation 7/21/2017    Chronic combined systolic and diastolic congestive heart failure 7/27/2015 11-16-17   1 - Moderate left ventricular enlargement.    2 - Severely depressed left ventricular systolic function (EF 15-20%).    3 - Impaired LV relaxation, normal LAP (grade 1 diastolic dysfunction).    4 - Biatrial enlargement.    5 - Right ventricle is upper limit of normal in size with not well seen systolic function.    6 - Severe tricuspid regurgitation.    7 - Increased central venous pressure.    8 - The estimated PA systolic pressure is 40 mmHg.    9 - Heartmate III LVAD; speed 5800.     Complication involving left ventricular assist device (LVAD) 7/29/2017    Coronary artery disease involving native coronary artery of native heart without angina pectoris 7/27/2015    Gait instability     Hypertensive urgency, malignant 11/15/2017    ICD (implantable cardioverter-defibrillator), biventricular, in situ 7/27/2015    Ischemic cardiomyopathy 7/20/2017    S/p HMIII     Kidney stones     LVAD (left ventricular assist device) present 7/20/2017    status post Heartmate III 10/16/16 LVAD    HILLARY on CPAP 7/27/2015    Peripheral neuropathy     Pulmonary hypertension due to left ventricular systolic dysfunction 7/29/2015    Restless leg syndrome 1992    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    Skin cancer     excision 2013    Skin  yeast infection 8/31/2017    Stage 3 chronic kidney disease 7/20/2017    Syncope 7/20/2017    Ventricular tachycardia 7/25/2017      Past Surgical History:   Procedure Laterality Date    CARDIAC PACEMAKER PLACEMENT      pacemaker previously, then AICD in 2006. AICD [facility] [employer] serial #[id number]    CORONARY ARTERY BYPASS GRAFT  1993    KNEE SURGERY Left 2001    complicated by infection, hardware had to be taken out and replaced    LEFT VENTRICULAR ASSIST DEVICE  10/19/2016       Referring physician: Jim Morejon NP  Date referred to PT: 12/1/17    General Precautions: Standard, LVAD, fall  Orthopedic Precautions: N/A   Braces: N/A       Do you have any cultural, spiritual, Christianity conflicts, given your current situation?: none noted     Patient History:  Lives With: spouse, child([place]), adult  Living Arrangements: house  Home Layout: Able to live on 1st floor  Transportation Available: family or friend will provide  Living Environment Comment: Pt lives with his wife, daughter, and son-in-law in a 2SH with 0 HERRERA, able to live on 1st floor. PTA pt used RW for household ambulation and w/c for community ambulation. Pt required assist with ADLs PTA. Pt has 24-hour caregivers at home and had HH PT and OT PTA.   Equipment Currently Used at Home: wheelchair, walker, rolling, bedside commode, grab bar  DME owned (not currently used): none    Previous Level of Function:  Ambulation Skills: needs device and assist  Transfer Skills: needs device and assist  ADL Skills: needs assist    Subjective:  Communicated with RN prior to session.  Pt eating lunch at first attempt. Agreeable to therapy at second attempt.   Chief Complaint: none noted   Patient goals: return home     Pain/Comfort  Pain Rating 1: 0/10      Objective:   Patient found with: telemetry, silverio catheter (LVAD)     Cognitive Exam:  Oriented to: Person and Place    Follows Commands/attention: Follows two-step commands  Communication: clear/fluent  Safety  awareness/insight to disability: impaired    Physical Exam:  Postural examination/scapula alignment: Rounded shoulder and Head forward    Skin integrity: Visible skin intact  Edema: Mild BLE    Sensation:   Intact     Lower Extremity Range of Motion:  Right Lower Extremity: WFL  Left Lower Extremity: WFL    Lower Extremity Strength:  Right Lower Extremity: WFL  Left Lower Extremity: WFL    Functional Mobility:  Bed Mobility:  Scooting/Bridging: Stand by Assistance, With side rail  Supine to Sit: Stand by Assistance, With side rail  Sit to Supine: Stand by Assistance    Transfers:  Sit <> Stand Assistance: Minimum Assistance  Sit <> Stand Assistive Device: Rolling Walker    V/c for safe transfer technique with RW    Gait:   Gait Distance: ~150 ft.   Assistance 1: Contact Guard Assistance  Gait Assistive Device: Rolling walker  Gait Pattern: swing-through gait  Gait Deviation(s): decreased william, decreased step length, decreased weight-shifting ability, decreased stride length, decreased toe-to-floor clearance, foot flat   Emergency bag present throughout    Pt alarming v-tach on monitor. Pt denied symptoms; RN confirmed that it was artifact.     Balance:   Static Sit: supervision-SBA  Dynamic Sit: SBA-CGA  Static Stand: SBA-CGA  Dynamic stand: CGA    Therapeutic Activities and Exercises:  Pt educated on role of PT and PT POC. Pt verbalized understanding.   Pt found supine on wall power. Switched from wall<>battery power with set-up assist and cues for correctly connecting cables, as pt attempting to connect wall cable to battery. Pt able to make corrections after cueing. Organized and donned dago pack as consolidation bag with Lydia. Switched back to wall following gait with set-up assist and again required cues for correctly connecting cables.   Donned shoes with maxA prior to ambulation. Doffed with maxA following ambulation.   Pt educated on recs for resuming HH upon d/c. Pt verbalized agreement.      AM-PAC 6  CLICK MOBILITY  How much help from another person does this patient currently need?   1 = Unable, Total/Dependent Assistance  2 = A lot, Maximum/Moderate Assistance  3 = A little, Minimum/Contact Guard/Supervision  4 = None, Modified [place]/Independent    Turning over in bed (including adjusting bedclothes, sheets and blankets)?: 3  Sitting down on and standing up from a chair with arms (e.g., wheelchair, bedside commode, etc.): 3  Moving from lying on back to sitting on the side of the bed?: 3  Moving to and from a bed to a chair (including a wheelchair)?: 3  Need to walk in hospital room?: 3  Climbing 3-5 steps with a railing?: 2  Total Score: 17     AM-PAC Raw Score CMS G-Code Modifier Level of Impairment Assistance   6 % Total / Unable   7 - 9 CM 80 - 100% Maximal Assist   10 - 14 CL 60 - 80% Moderate Assist   15 - 19 CK 40 - 60% Moderate Assist   20 - 22 CJ 20 - 40% Minimal Assist   23 CI 1-20% SBA / CGA   24 CH 0% Independent/ Mod I     Patient left supine with all lines intact, call button in reach, RN notified and pt's wife and caregiver present.    Assessment:   Kev Vasquez is a 77 y.o. male with a medical diagnosis of Acute on chronic systolic and diastolic heart failure, NYHA class 4 and presents with LVAD in place. Pt able to perform functional mobility with Lydia-SBA. Ambulated approx household distance with use of RW and CGA. Further ambulation distance and (I) with mobility limited by impaired endurance, generalized weakness, and decreased balance. Pt would benefit from skilled acute PT in order to address current deficits and progress functional mobility.     Rehab identified problem list/impairments: Rehab identified problem list/impairments: weakness, impaired functional mobilty, gait instability, impaired endurance, impaired balance, impaired self care skills, impaired cardiopulmonary response to activity, decreased safety awareness    Rehab potential is good.    Activity tolerance:  Good    Discharge recommendations: Discharge Facility/Level Of Care Needs: home health PT, home health OT (resume)     Barriers to discharge: Barriers to Discharge: None    Equipment recommendations: Equipment Needed After Discharge: none     GOALS:    Physical Therapy Goals        Problem: Physical Therapy Goal    Goal Priority Disciplines Outcome Goal Variances Interventions   Physical Therapy Goal     PT/OT, PT Ongoing (interventions implemented as appropriate)     Description:  Goals to be met by: 17     Patient will increase functional independence with mobility by performin. Sit to stand transfer with Stand-by Assistance  2. Gait  x 300 feet with Stand-by Assistance using Rolling Walker.   4. Stand for 2 minutes with Stand-by Assistance without LOB while performing dynamic task with AD as needed  5. Lower extremity exercise program x15 reps, with supervision, in order to increase LE strength and (I) with functional mobility.                       PLAN:    Patient to be seen 3 x/week to address the above listed problems via gait training, therapeutic activities, therapeutic exercises, neuromuscular re-education  Plan of Care expires:    Plan of Care reviewed with: patient, spouse, caregiver        Wendy Ackerman, PT, DPT   2017  [phone]

## 2017-12-02 NOTE — ASSESSMENT & PLAN NOTE
-Pt pulsatile on exam. Follow MAPs only. Nursing only to check BP sitting or standing.  -MAPs upper 80s on admit.  -Continue amlodipine 10mg daily, hydralazine 100mg TID, isosorbide 40mg TID, Lisinopril 20mg BID.   - doxazosin at 2mg daily. PRN hydralzine until titration is ok  -

## 2017-12-02 NOTE — ASSESSMENT & PLAN NOTE
-S/P III 10/16 in [place].  -Speed 5800.  -Continue ASA 81mg daily.  -Continue warfarin goal INR 2-3. Today 2.6  -LDH at baseline.  - interrogation if needed

## 2017-12-02 NOTE — PLAN OF CARE
Problem: Physical Therapy Goal  Goal: Physical Therapy Goal  Goals to be met by: 17     Patient will increase functional independence with mobility by performin. Sit to stand transfer with Stand-by Assistance  2. Gait  x 300 feet with Stand-by Assistance using Rolling Walker.   4. Stand for 2 minutes with Stand-by Assistance without LOB while performing dynamic task with AD as needed  5. Lower extremity exercise program x15 reps, with supervision, in order to increase LE strength and (I) with functional mobility.     Outcome: Ongoing (interventions implemented as appropriate)  PT evaluation complete and appropriate goals established.    Wendy Ackerman, PT, DPT   2017  [phone]

## 2017-12-02 NOTE — PT/OT/SLP EVAL
Occupational Therapy  Evaluation    Kev Vasquez   MRN: [id number]   Admitting Diagnosis: Acute on chronic systolic and diastolic heart failure, NYHA class 4    OT Date of Treatment: 12/02/17   OT Start Time: 1400  OT Stop Time: 1430  OT Total Time (min): 30 min    Billable Minutes:  Evaluation 30    Diagnosis: Acute on chronic systolic and diastolic heart failure, NYHA class 4   Pt is s/p LVAD HM III 10/16    Past Medical History:   Diagnosis Date    AICD (automatic cardioverter/defibrillator) present 2014    [employer]    Chronic anticoagulation 7/21/2017    Chronic combined systolic and diastolic congestive heart failure 7/27/2015 11-16-17   1 - Moderate left ventricular enlargement.    2 - Severely depressed left ventricular systolic function (EF 15-20%).    3 - Impaired LV relaxation, normal LAP (grade 1 diastolic dysfunction).    4 - Biatrial enlargement.    5 - Right ventricle is upper limit of normal in size with not well seen systolic function.    6 - Severe tricuspid regurgitation.    7 - Increased central venous pressure.    8 - The estimated PA systolic pressure is 40 mmHg.    9 - Heartmate III LVAD; speed 5800.     Complication involving left ventricular assist device (LVAD) 7/29/2017    Coronary artery disease involving native coronary artery of native heart without angina pectoris 7/27/2015    Gait instability     Hypertensive urgency, malignant 11/15/2017    ICD (implantable cardioverter-defibrillator), biventricular, in situ 7/27/2015    Ischemic cardiomyopathy 7/20/2017    S/p HMIII     Kidney stones     LVAD (left ventricular assist device) present 7/20/2017    status post Heartmate III 10/16/16 LVAD    HILLARY on CPAP 7/27/2015    Peripheral neuropathy     Pulmonary hypertension due to left ventricular systolic dysfunction 7/29/2015    Restless leg syndrome 1992    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    Skin cancer     excision 2013    Skin yeast infection  "8/31/2017    Stage 3 chronic kidney disease 7/20/2017    Syncope 7/20/2017    Ventricular tachycardia 7/25/2017      Past Surgical History:   Procedure Laterality Date    CARDIAC PACEMAKER PLACEMENT      pacemaker previously, then AICD in 2006. AICD [employer] serial #[id number]    CORONARY ARTERY BYPASS GRAFT  1993    KNEE SURGERY Left 2001    complicated by infection, hardware had to be taken out and replaced    LEFT VENTRICULAR ASSIST DEVICE  10/19/2016         General Precautions: Standard, fall, LVAD        Patient History:  Living Environment  Lives With: spouse  Living Arrangements: house  Home Accessibility: stairs to enter home, stairs within home  Number of Stairs to Enter Home: 5  Stair Railings at Home: outside, present at both sides  Living Environment Comment: Pt lives with spouse, daughter and son -n- law with 2 story home for which he can stay  on first floor. Pt has 24 hour caregiver. Pt was supervision for ambulation with RW and required MIN A for dressing, toileting and bathing. Pt was independent with feeding and g/h skills.   Equipment Currently Used at Home: bedside commode, rollator, wheelchair      Subjective:  Communicated with nsg prior to session.  "This takes forever" pt states re: t/f LVAD to/froom battery    Pain/Comfort  Pain Rating 1: 0/10    Objective:     Pt found supine in bed eating lunch first attempt and ready for therapy on second attempt.  Pt's spouse and caregiver present in room.   LVAD to wall power.     Cognitive Exam:  Pt awake, alert and oriented. Pt follows commands and demo pleasant affect.       Physical Exam:  Pt is right hand dominant and demo WFL UE strength, ROM, sensation and coordination.     Functional Mobility:  Bed Mobility:  Supine to Sit: Stand by Assistance  Sit to Supine: Stand by Assistance    Transfers:  Sit <> Stand Assistance: Minimum Assistance  Sit <> Stand Assistive Device: No Assistive Device    Activities of Daily Living:  Feeding Level of " "Assistance: Independent  UE Dressing Level of Assistance: Moderate assistance  LE Dressing Level of Assistance: Maximum assistance    Pt t/f LVAD to/from wall power with MIN A. Pt needing cues for sequencing and problem solving. Pt used personal shoulder bag for mobility with emergency bag in tow.  Pt returned to wall power following activity.     AM-PAC 6 CLICK ADL  How much help from another person does this patient currently need?  1 = Unable, Total/Dependent Assistance  2 = A lot, Maximum/Moderate Assistance  3 = A little, Minimum/Contact Guard/Supervision  4 = None, Modified [place]/Independent    Putting on and taking off regular lower body clothing? : 1  Bathing (including washing, rinsing, drying)?: 2  Toileting, which includes using toilet, bedpan, or urinal? : 2  Putting on and taking off regular upper body clothing?: 3  Taking care of personal grooming such as brushing teeth?: 3  Eating meals?: 4  Total Score: 15    AM-PAC Raw Score CMS "G-Code Modifier Level of Impairment Assistance   6 % Total / Unable   7 - 9 CM 80 - 100% Maximal Assist   10-14 CL 60 - 80% Moderate Assist   15 - 19 CK 40 - 60% Moderate Assist   20 - 22 CJ 20 - 40% Minimal Assist   23 CI 1-20% SBA / CGA   24 CH 0% Independent/ Mod I       Patient left supine with all lines intact, call button in reach, nsg notified and fly present    Assessment:  Kev Vasquez is a 77 y.o. male with a medical diagnosis of Acute on chronic systolic and diastolic heart failure, NYHA class 4 and tolerated session fairly well. Pt is nearing his baseline functional level and to benefit from OT 3 x week to ensure stated goals. Pt with decreased mobility and ADL skills and to benefit from OT to address goals.     Rehab identified problem list/impairments: Rehab identified problem list/impairments: weakness, gait instability, impaired balance, impaired endurance, impaired self care skills, impaired functional mobilty    Rehab potential is " good.    Activity tolerance: Good    Discharge recommendations: Discharge Facility/Level Of Care Needs: home with home health       GOALS:    Occupational Therapy Goals        Problem: Occupational Therapy Goal    Goal Priority Disciplines Outcome Interventions   Occupational Therapy Goal     OT, PT/OT     Description:  Goals to be met by: 7  Days 12/9/17     Patient will increase functional independence with ADLs by performing:    UE Dressing with Supervision.  LE Dressing with Minimal Assistance.  Grooming while standing with Stand-by Assistance.  Toileting from bedside commode with Minimal Assistance for hygiene and clothing management.   Stand pivot transfers with Stand-by Assistance.  Toilet transfer to bedside commode with Stand-by Assistance.                      PLAN:  Patient to be seen 3 x/week to address the above listed problems via self-care/home management, therapeutic activities, therapeutic exercises  Plan of Care expires:    Plan of Care reviewed with: patient, spouse, caregiver         SHAUNA Roberts  12/02/2017

## 2017-12-02 NOTE — PLAN OF CARE
Problem: Patient Care Overview  Goal: Plan of Care Review  Outcome: Ongoing (interventions implemented as appropriate)  Plan of care discussed with patient, no new questions at this time. VSS, denies SOB, no complaint of pain. No VAD alarms overnight. VAD numbers WDL. Pt being diuresed with lasix for fluid overload. I's and O's being measured. Labs being monitored. Safety precautions taken, no falls/trauma during the shift. Will continue to monitor.

## 2017-12-02 NOTE — PLAN OF CARE
Problem: Patient Care Overview  Goal: Plan of Care Review  Outcome: Revised  Plan of care discussed with patient. Patient is free of fall/trauma/injury. Denies CP, SOB, or pain/discomfort. Pt diuresing well.  All questions addressed. Will continue to monitor

## 2017-12-02 NOTE — PROGRESS NOTES
[facility]  Heart Transplant  Progress Note    Patient Name: Kev Vasquez  MRN: [id number]  Admission Date: 12/1/2017  Hospital Length of Stay: 1 days  Attending Physician: Miguel A Marcelo Jr.,*  Primary Care Provider: Mega Collins MD  Principal Problem:Acute on chronic systolic and diastolic heart failure, NYHA class 4    Subjective:     Interval History:     Has no overnight complain but nurse report persistent elevated MAP and Bp. Reviewed records to note that reading have been obtained wrongly. To continue current regimen.     Continuous Infusions:   Scheduled Meds:   allopurinol  300 mg Oral Daily    amLODIPine  10 mg Oral Daily    aspirin  81 mg Oral Daily    atorvastatin  10 mg Oral Daily    buPROPion  300 mg Oral Daily    calcium-vitamin D3  1 tablet Oral BID    ciprofloxacin HCl  750 mg Oral BID    dofetilide  250 mcg Oral Q12H    doxazosin  2 mg Oral QHS    famotidine  40 mg Oral QHS    ferrous sulfate  325 mg Oral Daily    folic acid  1 mg Oral Daily    furosemide  80 mg Intravenous BID    hydrALAZINE  100 mg Oral Q8H    isosorbide dinitrate  40 mg Oral TID    Lactobacillus rhamnosus GG  1 capsule Oral Daily    lisinopril  20 mg Oral BID    magnesium oxide  400 mg Oral BID    multivitamin  1 tablet Oral Daily    polyethylene glycol  17 g Oral Daily    senna-docusate 8.6-50 mg  2 tablet Oral BID    warfarin  4 mg Oral Daily     PRN Meds:    Review of patient's allergies indicates:   Allergen Reactions    Aldactone [spironolactone]       persistent hyperkalemia.    Sulfa (sulfonamide antibiotics)      Objective:     Vital Signs (Most Recent):  Temp: 97.9 °F (36.6 °C) (12/02/17 1115)  Pulse: 68 (12/02/17 1213)  Resp: 16 (12/02/17 1115)  BP: 103/77 (12/02/17 1216)  SpO2: (!) 93 % (12/02/17 1115) Vital Signs (24h Range):  Temp:  [97.5 °F (36.4 °C)-98.5 °F (36.9 °C)] 97.9 °F (36.6 °C)  Pulse:  [] 68  Resp:  [16-18] 16  SpO2:  [92 %-99 %] 93 %  BP:  ()/(0-92) 103/77     Patient Vitals for the past 72 hrs (Last 3 readings):   Weight   12/02/17 0717 94.2 kg (207 lb 10.8 oz)   12/01/17 1350 93.4 kg (205 lb 14.6 oz)     Body mass index is 28.17 kg/m².      Intake/Output Summary (Last 24 hours) at 12/02/17 1347  Last data filed at 12/02/17 1313   Gross per 24 hour   Intake              540 ml   Output             2900 ml   Net            -2360 ml       Hemodynamic Parameters:       Telemetry: reviewed. No event    Physical Exam   Constitutional: He is oriented to person, place, and time. He appears well-developed.   HENT:   Head: Normocephalic and atraumatic.   Eyes: EOM are normal.   Neck: Normal range of motion. Neck supple. JVD (12cm) present.   Cardiovascular:   LVAD hum- smooth   Pulmonary/Chest: Effort normal and breath sounds normal.   Abdominal: Soft. Bowel sounds are normal.   Musculoskeletal: Normal range of motion. He exhibits edema (+1).   Neurological: He is alert and oriented to person, place, and time.   Skin: Skin is warm and dry. Capillary refill takes 2 to 3 seconds.   Psychiatric: He has a normal mood and affect.   Nursing note and vitals reviewed.      Significant Labs:  CBC:    Recent Labs  Lab 12/01/17  0911 12/02/17  0433   WBC 5.78 5.56   RBC 3.24* 3.02*   HGB 9.7* 8.8*   HCT 30.2* 27.0*    154   MCV 93 89   MCH 29.9 29.1   MCHC 32.1 32.6     BNP:    Recent Labs  Lab 12/01/17  0911   *     CMP:    Recent Labs  Lab 12/01/17  0911 12/02/17  0433   * 85   CALCIUM 9.5 9.2   ALBUMIN 3.3*  --    PROT 6.6  --     140   K 4.2 3.7   CO2 24 24    107   BUN 21 21   CREATININE 1.7* 1.7*   ALKPHOS 79  --    ALT 22  --    AST 21  --    BILITOT 0.4  --       Coagulation:     Recent Labs  Lab 11/27/17  1035 12/01/17  0911 12/02/17  0433   INR 2.3* 2.5* 2.6*   APTT  --   --  32.7*     LDH:    Recent Labs  Lab 12/01/17  0911 12/02/17  0433    133     Microbiology:  Microbiology Results (last 7 days)     ** No  results found for the last 168 hours. **          I have reviewed all pertinent labs within the past 24 hours.    Estimated Creatinine Clearance: 44.7 mL/min (based on SCr of 1.7 mg/dL (H)).    Diagnostic Results:      Assessment and Plan:     77 y.o. y.o. WM with ischemic cardiomyopathy status post Heartmate III 10/16/16 LVAD(in [place]), CKD II, CAD, VT on Tikosyn(Intolerant to Amio per outside notes), HTN, HLD, Gout, Obesity, SSS, and depression who presents for direct admit due to HTN and volume overload.  VAD speed is at 5800 rpm. Was recently admitted for elevated BP. Lisinopril was increased and he was started on hydralazine/isordil.   Today he states that he has no new complaints. He states that he is still dealing with gait instability. He denies dizziness but states that he just loses his balance. Denies presyncope/syncope. He also seems dyspneic while talking to me but states that he feels fine. Echo yesterday with reduced RV function.     * Acute on chronic systolic and diastolic heart failure, NYHA class 4    -Volume overloaded on exam. Will start IVP Lasix.  -Echo 12/1 reviewed.        Essential hypertension    -Pt pulsatile on exam. Follow MAPs only. Nursing only to check BP sitting or standing.  -MAPs upper 80s on admit.  -Continue amlodipine 10mg daily, hydralazine 100mg TID, isosorbide 40mg TID, Lisinopril 20mg BID.   - doxazosin at 2mg daily. PRN hydralzine until titration is ok  -         Stage 3 chronic kidney disease    -Will monitor with diuresis.  At baseline          LVAD (left ventricular assist device) present    -S/P HMIII 10/16 in [place].  -Speed 5800.  -Continue ASA 81mg daily.  -Continue warfarin goal INR 2-3. Today 2.6  -LDH at baseline.  - interrogation if needed          Gait instability    -Hold Ropinirol.  -Also, switched tamulosin to doxazosin.  -PT/OT Cx.         VT (ventricular tachycardia)    -Continue Tikosyn(intolerant to Amio per outside records).             Discussed plan of care with dR. Marcelo the attending on service    Ismael Bennett MD  Heart Transplant  [facility]

## 2017-12-02 NOTE — PROGRESS NOTES
Pt MAP: 82-86 in sitting position.  Doppler 86.  Notified Dr. Bolaños.  Received orders to give 1400 hydralazine and isosorbide early.  Will execute order.    Pt also has pink urine today Dr. Bolaños also aware and gave orders to obtain urinalysis.   Sample collected and sent to lab.     Will continue to monitor.

## 2017-12-02 NOTE — PLAN OF CARE
Problem: Occupational Therapy Goal  Goal: Occupational Therapy Goal  Goals to be met by: 7  Days 12/9/17     Patient will increase functional independence with ADLs by performing:    UE Dressing with Supervision.  LE Dressing with Minimal Assistance.  Grooming while standing with Stand-by Assistance.  Toileting from bedside commode with Minimal Assistance for hygiene and clothing management.   Stand pivot transfers with Stand-by Assistance.  Toilet transfer to bedside commode with Stand-by Assistance.    Goals and POC established today.

## 2017-12-02 NOTE — SUBJECTIVE & OBJECTIVE
Interval History:     Has no overnight complain but nurse report persistent elevated MAP and Bp. Reviewed records to note that reading have been obtained wrongly. To continue current regimen.     Continuous Infusions:   Scheduled Meds:   allopurinol  300 mg Oral Daily    amLODIPine  10 mg Oral Daily    aspirin  81 mg Oral Daily    atorvastatin  10 mg Oral Daily    buPROPion  300 mg Oral Daily    calcium-vitamin D3  1 tablet Oral BID    ciprofloxacin HCl  750 mg Oral BID    dofetilide  250 mcg Oral Q12H    doxazosin  2 mg Oral QHS    famotidine  40 mg Oral QHS    ferrous sulfate  325 mg Oral Daily    folic acid  1 mg Oral Daily    furosemide  80 mg Intravenous BID    hydrALAZINE  100 mg Oral Q8H    isosorbide dinitrate  40 mg Oral TID    Lactobacillus rhamnosus GG  1 capsule Oral Daily    lisinopril  20 mg Oral BID    magnesium oxide  400 mg Oral BID    multivitamin  1 tablet Oral Daily    polyethylene glycol  17 g Oral Daily    senna-docusate 8.6-50 mg  2 tablet Oral BID    warfarin  4 mg Oral Daily     PRN Meds:    Review of patient's allergies indicates:   Allergen Reactions    Aldactone [spironolactone]       persistent hyperkalemia.    Sulfa (sulfonamide antibiotics)      Objective:     Vital Signs (Most Recent):  Temp: 97.9 °F (36.6 °C) (12/02/17 1115)  Pulse: 68 (12/02/17 1213)  Resp: 16 (12/02/17 1115)  BP: 103/77 (12/02/17 1216)  SpO2: (!) 93 % (12/02/17 1115) Vital Signs (24h Range):  Temp:  [97.5 °F (36.4 °C)-98.5 °F (36.9 °C)] 97.9 °F (36.6 °C)  Pulse:  [] 68  Resp:  [16-18] 16  SpO2:  [92 %-99 %] 93 %  BP: ()/(0-92) 103/77     Patient Vitals for the past 72 hrs (Last 3 readings):   Weight   12/02/17 0717 94.2 kg (207 lb 10.8 oz)   12/01/17 1350 93.4 kg (205 lb 14.6 oz)     Body mass index is 28.17 kg/m².      Intake/Output Summary (Last 24 hours) at 12/02/17 1347  Last data filed at 12/02/17 1313   Gross per 24 hour   Intake              540 ml   Output              2900 ml   Net            -2360 ml       Hemodynamic Parameters:       Telemetry: reviewed. No event    Physical Exam   Constitutional: He is oriented to person, place, and time. He appears well-developed.   HENT:   Head: Normocephalic and atraumatic.   Eyes: EOM are normal.   Neck: Normal range of motion. Neck supple. JVD (12cm) present.   Cardiovascular:   LVAD hum- smooth   Pulmonary/Chest: Effort normal and breath sounds normal.   Abdominal: Soft. Bowel sounds are normal.   Musculoskeletal: Normal range of motion. He exhibits edema (+1).   Neurological: He is alert and oriented to person, place, and time.   Skin: Skin is warm and dry. Capillary refill takes 2 to 3 seconds.   Psychiatric: He has a normal mood and affect.   Nursing note and vitals reviewed.      Significant Labs:  CBC:    Recent Labs  Lab 12/01/17  0911 12/02/17 0433   WBC 5.78 5.56   RBC 3.24* 3.02*   HGB 9.7* 8.8*   HCT 30.2* 27.0*    154   MCV 93 89   MCH 29.9 29.1   MCHC 32.1 32.6     BNP:    Recent Labs  Lab 12/01/17  0911   *     CMP:    Recent Labs  Lab 12/01/17  0911 12/02/17  0433   * 85   CALCIUM 9.5 9.2   ALBUMIN 3.3*  --    PROT 6.6  --     140   K 4.2 3.7   CO2 24 24    107   BUN 21 21   CREATININE 1.7* 1.7*   ALKPHOS 79  --    ALT 22  --    AST 21  --    BILITOT 0.4  --       Coagulation:     Recent Labs  Lab 11/27/17  1035 12/01/17  0911 12/02/17  0433   INR 2.3* 2.5* 2.6*   APTT  --   --  32.7*     LDH:    Recent Labs  Lab 12/01/17  0911 12/02/17  0433    133     Microbiology:  Microbiology Results (last 7 days)     ** No results found for the last 168 hours. **          I have reviewed all pertinent labs within the past 24 hours.    Estimated Creatinine Clearance: 44.7 mL/min (based on SCr of 1.7 mg/dL (H)).    Diagnostic Results:

## 2017-12-03 LAB
ANION GAP SERPL CALC-SCNC: 9 MMOL/L
APTT BLDCRRT: 31.7 SEC
BASOPHILS # BLD AUTO: 0.03 K/UL
BASOPHILS NFR BLD: 0.5 %
BUN SERPL-MCNC: 22 MG/DL
CALCIUM SERPL-MCNC: 9.2 MG/DL
CHLORIDE SERPL-SCNC: 105 MMOL/L
CO2 SERPL-SCNC: 26 MMOL/L
CREAT SERPL-MCNC: 1.8 MG/DL
DIFFERENTIAL METHOD: ABNORMAL
EOSINOPHIL # BLD AUTO: 0.2 K/UL
EOSINOPHIL NFR BLD: 2.3 %
ERYTHROCYTE [DISTWIDTH] IN BLOOD BY AUTOMATED COUNT: 15.1 %
EST. GFR  (AFRICAN AMERICAN): 41.6 ML/MIN/1.73 M^2
EST. GFR  (NON AFRICAN AMERICAN): 36 ML/MIN/1.73 M^2
GLUCOSE SERPL-MCNC: 94 MG/DL
HCT VFR BLD AUTO: 29.1 %
HGB BLD-MCNC: 9.5 G/DL
IMM GRANULOCYTES # BLD AUTO: 0.02 K/UL
IMM GRANULOCYTES NFR BLD AUTO: 0.3 %
INR PPP: 2.6
LDH SERPL L TO P-CCNC: 174 U/L
LYMPHOCYTES # BLD AUTO: 0.8 K/UL
LYMPHOCYTES NFR BLD: 13 %
MAGNESIUM SERPL-MCNC: 2.3 MG/DL
MCH RBC QN AUTO: 29.5 PG
MCHC RBC AUTO-ENTMCNC: 32.6 G/DL
MCV RBC AUTO: 90 FL
MONOCYTES # BLD AUTO: 1 K/UL
MONOCYTES NFR BLD: 15.5 %
NEUTROPHILS # BLD AUTO: 4.4 K/UL
NEUTROPHILS NFR BLD: 68.4 %
NRBC BLD-RTO: 0 /100 WBC
PHOSPHATE SERPL-MCNC: 3.6 MG/DL
PLATELET # BLD AUTO: 159 K/UL
PMV BLD AUTO: 11.3 FL
POTASSIUM SERPL-SCNC: 3.6 MMOL/L
PROTHROMBIN TIME: 26.3 SEC
RBC # BLD AUTO: 3.22 M/UL
SODIUM SERPL-SCNC: 140 MMOL/L
WBC # BLD AUTO: 6.45 K/UL

## 2017-12-03 PROCEDURE — 85730 THROMBOPLASTIN TIME PARTIAL: CPT

## 2017-12-03 PROCEDURE — 85610 PROTHROMBIN TIME: CPT

## 2017-12-03 PROCEDURE — 63600175 PHARM REV CODE 636 W HCPCS: Performed by: INTERNAL MEDICINE

## 2017-12-03 PROCEDURE — 25000003 PHARM REV CODE 250: Performed by: INTERNAL MEDICINE

## 2017-12-03 PROCEDURE — 99232 SBSQ HOSP IP/OBS MODERATE 35: CPT | Mod: ,,, | Performed by: INTERNAL MEDICINE

## 2017-12-03 PROCEDURE — 94761 N-INVAS EAR/PLS OXIMETRY MLT: CPT

## 2017-12-03 PROCEDURE — 27000248 HC VAD-ADDITIONAL DAY

## 2017-12-03 PROCEDURE — 36415 COLL VENOUS BLD VENIPUNCTURE: CPT

## 2017-12-03 PROCEDURE — 94660 CPAP INITIATION&MGMT: CPT

## 2017-12-03 PROCEDURE — 20600001 HC STEP DOWN PRIVATE ROOM

## 2017-12-03 PROCEDURE — 27000190 HC CPAP FULL FACE MASK W/VALVE

## 2017-12-03 PROCEDURE — 25000003 PHARM REV CODE 250: Performed by: NURSE PRACTITIONER

## 2017-12-03 PROCEDURE — 83615 LACTATE (LD) (LDH) ENZYME: CPT

## 2017-12-03 PROCEDURE — 84100 ASSAY OF PHOSPHORUS: CPT

## 2017-12-03 PROCEDURE — 80048 BASIC METABOLIC PNL TOTAL CA: CPT

## 2017-12-03 PROCEDURE — 63600175 PHARM REV CODE 636 W HCPCS: Performed by: NURSE PRACTITIONER

## 2017-12-03 PROCEDURE — 99900035 HC TECH TIME PER 15 MIN (STAT)

## 2017-12-03 PROCEDURE — 83735 ASSAY OF MAGNESIUM: CPT

## 2017-12-03 PROCEDURE — 85025 COMPLETE CBC W/AUTO DIFF WBC: CPT

## 2017-12-03 RX ORDER — ACETAMINOPHEN 325 MG/1
650 TABLET ORAL EVERY 6 HOURS PRN
Status: DISCONTINUED | OUTPATIENT
Start: 2017-12-03 | End: 2017-12-03

## 2017-12-03 RX ORDER — ACETAMINOPHEN 325 MG/1
650 TABLET ORAL EVERY 6 HOURS PRN
Status: DISCONTINUED | OUTPATIENT
Start: 2017-12-03 | End: 2017-12-04 | Stop reason: HOSPADM

## 2017-12-03 RX ORDER — POTASSIUM CHLORIDE 20 MEQ/1
40 TABLET, EXTENDED RELEASE ORAL ONCE
Status: COMPLETED | OUTPATIENT
Start: 2017-12-03 | End: 2017-12-03

## 2017-12-03 RX ORDER — DOXAZOSIN 4 MG/1
4 TABLET ORAL DAILY
Status: DISCONTINUED | OUTPATIENT
Start: 2017-12-04 | End: 2017-12-04 | Stop reason: HOSPADM

## 2017-12-03 RX ORDER — DOXAZOSIN 2 MG/1
2 TABLET ORAL ONCE
Status: COMPLETED | OUTPATIENT
Start: 2017-12-03 | End: 2017-12-03

## 2017-12-03 RX ORDER — HYDRALAZINE HYDROCHLORIDE 20 MG/ML
10 INJECTION INTRAMUSCULAR; INTRAVENOUS ONCE
Status: COMPLETED | OUTPATIENT
Start: 2017-12-03 | End: 2017-12-03

## 2017-12-03 RX ADMIN — MAGNESIUM OXIDE TAB 400 MG (241.3 MG ELEMENTAL MG) 400 MG: 400 (241.3 MG) TAB at 09:12

## 2017-12-03 RX ADMIN — POTASSIUM CHLORIDE 40 MEQ: 1500 TABLET, EXTENDED RELEASE ORAL at 08:12

## 2017-12-03 RX ADMIN — OYSTER SHELL CALCIUM WITH VITAMIN D 1 TABLET: 500; 200 TABLET, FILM COATED ORAL at 08:12

## 2017-12-03 RX ADMIN — ISOSORBIDE DINITRATE 40 MG: 20 TABLET ORAL at 09:12

## 2017-12-03 RX ADMIN — FAMOTIDINE 40 MG: 20 TABLET, FILM COATED ORAL at 09:12

## 2017-12-03 RX ADMIN — STANDARDIZED SENNA CONCENTRATE AND DOCUSATE SODIUM 2 TABLET: 8.6; 5 TABLET, FILM COATED ORAL at 08:12

## 2017-12-03 RX ADMIN — BUPROPION HYDROCHLORIDE 300 MG: 150 TABLET, FILM COATED, EXTENDED RELEASE ORAL at 08:12

## 2017-12-03 RX ADMIN — AMLODIPINE BESYLATE 10 MG: 10 TABLET ORAL at 08:12

## 2017-12-03 RX ADMIN — WARFARIN SODIUM 4 MG: 4 TABLET ORAL at 05:12

## 2017-12-03 RX ADMIN — DOFETILIDE 250 MCG: 0.25 CAPSULE ORAL at 09:12

## 2017-12-03 RX ADMIN — OYSTER SHELL CALCIUM WITH VITAMIN D 1 TABLET: 500; 200 TABLET, FILM COATED ORAL at 09:12

## 2017-12-03 RX ADMIN — ALLOPURINOL 300 MG: 300 TABLET ORAL at 08:12

## 2017-12-03 RX ADMIN — HYDRALAZINE HYDROCHLORIDE 100 MG: 50 TABLET ORAL at 09:12

## 2017-12-03 RX ADMIN — CIPROFLOXACIN HYDROCHLORIDE 750 MG: 750 TABLET, FILM COATED ORAL at 09:12

## 2017-12-03 RX ADMIN — CIPROFLOXACIN HYDROCHLORIDE 750 MG: 750 TABLET, FILM COATED ORAL at 08:12

## 2017-12-03 RX ADMIN — ISOSORBIDE DINITRATE 40 MG: 20 TABLET ORAL at 06:12

## 2017-12-03 RX ADMIN — FOLIC ACID 1 MG: 1 TABLET ORAL at 08:12

## 2017-12-03 RX ADMIN — FERROUS SULFATE TAB EC 325 MG (65 MG FE EQUIVALENT) 325 MG: 325 (65 FE) TABLET DELAYED RESPONSE at 08:12

## 2017-12-03 RX ADMIN — ACETAMINOPHEN 650 MG: 325 TABLET ORAL at 06:12

## 2017-12-03 RX ADMIN — MAGNESIUM OXIDE TAB 400 MG (241.3 MG ELEMENTAL MG) 400 MG: 400 (241.3 MG) TAB at 08:12

## 2017-12-03 RX ADMIN — ASPIRIN 81 MG CHEWABLE TABLET 81 MG: 81 TABLET CHEWABLE at 08:12

## 2017-12-03 RX ADMIN — HYDRALAZINE HYDROCHLORIDE 100 MG: 50 TABLET ORAL at 06:12

## 2017-12-03 RX ADMIN — HYDRALAZINE HYDROCHLORIDE 10 MG: 20 INJECTION INTRAMUSCULAR; INTRAVENOUS at 02:12

## 2017-12-03 RX ADMIN — DOFETILIDE 250 MCG: 0.25 CAPSULE ORAL at 08:12

## 2017-12-03 RX ADMIN — LISINOPRIL 20 MG: 20 TABLET ORAL at 08:12

## 2017-12-03 RX ADMIN — Medication 1 CAPSULE: at 08:12

## 2017-12-03 RX ADMIN — ACETAMINOPHEN 650 MG: 325 TABLET ORAL at 12:12

## 2017-12-03 RX ADMIN — POLYETHYLENE GLYCOL 3350 17 G: 17 POWDER, FOR SOLUTION ORAL at 08:12

## 2017-12-03 RX ADMIN — DOXAZOSIN MESYLATE 2 MG: 2 TABLET ORAL at 01:12

## 2017-12-03 RX ADMIN — FUROSEMIDE 80 MG: 10 INJECTION, SOLUTION INTRAVENOUS at 08:12

## 2017-12-03 RX ADMIN — LISINOPRIL 20 MG: 20 TABLET ORAL at 09:12

## 2017-12-03 RX ADMIN — HYDRALAZINE HYDROCHLORIDE 100 MG: 50 TABLET ORAL at 01:12

## 2017-12-03 RX ADMIN — THERA TABS 1 TABLET: TAB at 08:12

## 2017-12-03 RX ADMIN — ATORVASTATIN CALCIUM 10 MG: 10 TABLET, FILM COATED ORAL at 08:12

## 2017-12-03 RX ADMIN — ISOSORBIDE DINITRATE 40 MG: 20 TABLET ORAL at 01:12

## 2017-12-03 NOTE — ASSESSMENT & PLAN NOTE
-Pt pulsatile on exam. Follow MAPs only. Nursing only to check BP sitting or standing.  -MAPs still elevated, upper 80 this morning  -Continue amlodipine 10mg daily, hydralazine 100mg TID, isosorbide 40mg TID, Lisinopril 20mg BID.   - doxazosin increased to 4mg daily. Can give an extra dose of 4mg tonight and start 4mg tomorrow if not well controlled  - caution for possible syncope experienced before

## 2017-12-03 NOTE — PROGRESS NOTES
"LVAD dressing change completed using sterile technique with soap and water. DLES is a "3" with a small amount of purulent drainage noted on the drain sponge. Tolerated without any complication. Some periwound redness, no tenderness noted.     "

## 2017-12-03 NOTE — PLAN OF CARE
Problem: Patient Care Overview  Goal: Plan of Care Review  Outcome: Ongoing (interventions implemented as appropriate)  Pt still having blood in urine.  Has good urine output.  Condom cath in place.  Will cont to monitor.

## 2017-12-03 NOTE — PROGRESS NOTES
[facility]  Heart Transplant  Progress Note    Patient Name: Kev Vasquez  MRN: [id number]  Admission Date: 12/1/2017  Hospital Length of Stay: 2 days  Attending Physician: Miguel A Marcelo Jr.,*  Primary Care Provider: Mega Collins MD  Principal Problem:Acute on chronic systolic and diastolic heart failure, NYHA class 4    Subjective:     Interval History:   Seen at bedside eating breakfast. Has not overnight complain and LVAD functioning well. Wife concerned about tight BP control due to prior episodes of syncope. Assured that titration is being done with close clinical monitoring.  Denies dizziness or confusion.    Continuous Infusions:   Scheduled Meds:   allopurinol  300 mg Oral Daily    amLODIPine  10 mg Oral Daily    aspirin  81 mg Oral Daily    atorvastatin  10 mg Oral Daily    buPROPion  300 mg Oral Daily    calcium-vitamin D3  1 tablet Oral BID    ciprofloxacin HCl  750 mg Oral BID    dofetilide  250 mcg Oral Q12H    [START ON 12/4/2017] doxazosin  4 mg Oral Daily    famotidine  40 mg Oral QHS    ferrous sulfate  325 mg Oral Daily    folic acid  1 mg Oral Daily    furosemide  80 mg Intravenous BID    hydrALAZINE  100 mg Oral Q8H    isosorbide dinitrate  40 mg Oral TID    Lactobacillus rhamnosus GG  1 capsule Oral Daily    lisinopril  20 mg Oral BID    magnesium oxide  400 mg Oral BID    multivitamin  1 tablet Oral Daily    polyethylene glycol  17 g Oral Daily    senna-docusate 8.6-50 mg  2 tablet Oral BID    warfarin  4 mg Oral Daily     PRN Meds:acetaminophen    Review of patient's allergies indicates:   Allergen Reactions    Aldactone [spironolactone]       persistent hyperkalemia.    Sulfa (sulfonamide antibiotics)      Objective:     Vital Signs (Most Recent):  Temp: 98.1 °F (36.7 °C) (12/03/17 1108)  Pulse: 72 (12/03/17 1400)  Resp: 16 (12/03/17 1108)  BP: (!) 96/0 (12/03/17 1109)  SpO2: 97 % (12/03/17 1108) Vital Signs (24h Range):  Temp:  [98.1 °F  (36.7 °C)-99.1 °F (37.3 °C)] 98.1 °F (36.7 °C)  Pulse:  [59-82] 72  Resp:  [16-18] 16  SpO2:  [95 %-100 %] 97 %  BP: ()/(0-88) 96/0     Patient Vitals for the past 72 hrs (Last 3 readings):   Weight   12/03/17 0717 93.6 kg (206 lb 5.6 oz)   12/02/17 0717 94.2 kg (207 lb 10.8 oz)   12/01/17 1350 93.4 kg (205 lb 14.6 oz)     Body mass index is 27.99 kg/m².      Intake/Output Summary (Last 24 hours) at 12/03/17 1423  Last data filed at 12/03/17 1400   Gross per 24 hour   Intake              820 ml   Output             2201 ml   Net            -1381 ml       Hemodynamic Parameters:       Telemetry:    Physical Exam   Constitutional: He is oriented to person, place, and time.   Neck: No JVD present.   Cardiovascular:   LVAD hum +   Pulmonary/Chest: Effort normal and breath sounds normal.   Musculoskeletal: He exhibits no edema (+ 1 edema bilaterally).   Neurological: He is alert and oriented to person, place, and time.   Skin: Skin is warm and dry.   Nursing note and vitals reviewed.      Significant Labs:  CBC:    Recent Labs  Lab 12/01/17 0911 12/02/17 0433 12/03/17  0329   WBC 5.78 5.56 6.45   RBC 3.24* 3.02* 3.22*   HGB 9.7* 8.8* 9.5*   HCT 30.2* 27.0* 29.1*    154 159   MCV 93 89 90   MCH 29.9 29.1 29.5   MCHC 32.1 32.6 32.6     BNP:    Recent Labs  Lab 12/01/17  0911   *     CMP:    Recent Labs  Lab 12/01/17  0911 12/02/17  0433 12/03/17  0328   * 85 94   CALCIUM 9.5 9.2 9.2   ALBUMIN 3.3*  --   --    PROT 6.6  --   --     140 140   K 4.2 3.7 3.6   CO2 24 24 26    107 105   BUN 21 21 22   CREATININE 1.7* 1.7* 1.8*   ALKPHOS 79  --   --    ALT 22  --   --    AST 21  --   --    BILITOT 0.4  --   --       Coagulation:     Recent Labs  Lab 12/01/17 0911 12/02/17  0433 12/03/17  0329   INR 2.5* 2.6* 2.6*   APTT  --  32.7* 31.7     LDH:    Recent Labs  Lab 12/01/17 0911 12/02/17 0433 12/03/17  0328    133 174     Microbiology:  Microbiology Results (last 7 days)     **  No results found for the last 168 hours. **          I have reviewed all pertinent labs within the past 24 hours.      Estimated Creatinine Clearance: 42.1 mL/min (based on SCr of 1.8 mg/dL (H)).    Diagnostic Results:      Assessment and Plan:     77 y.o. y.o. WM with ischemic cardiomyopathy status post Heartmate III 10/16/16 LVAD(in [place]), CKD II, CAD, VT on Tikosyn(Intolerant to Amio per outside notes), HTN, HLD, Gout, Obesity, SSS, and depression who presents for direct admit due to HTN and volume overload.  VAD speed is at 5800 rpm. Was recently admitted for elevated BP. Lisinopril was increased and he was started on hydralazine/isordil.   Today he states that he has no new complaints. He states that he is still dealing with gait instability. He denies dizziness but states that he just loses his balance. Denies presyncope/syncope. He also seems dyspneic while talking to me but states that he feels fine. Echo yesterday with reduced RV function.     * Acute on chronic systolic and diastolic heart failure, NYHA class 4    - Volume overloaded on admission  - continue lasix push 80 TID  - electrolytes replacement as needed  - BNP check tomorrow before changing diuretics  - Echo 12/1 reviewed.        Essential hypertension    -Pt pulsatile on exam. Follow MAPs only. Nursing only to check BP sitting or standing.  -MAPs still elevated, upper 80 this morning  -Continue amlodipine 10mg daily, hydralazine 100mg TID, isosorbide 40mg TID, Lisinopril 20mg BID.   - doxazosin increased to 4mg daily. Can give an extra dose of 4mg tonight and start 4mg tomorrow if not well controlled  - PRN hydralazine for MAP above 85 if necessary during oral agents titration  - caution for possible syncope experienced before          Stage 3 chronic kidney disease    -Will monitor with diuresis.  -At baseline          LVAD (left ventricular assist device) present    -S/P HMIII 10/16 in [place].  -Speed 5800.  -Continue ASA 81mg  daily.  -Continue warfarin goal INR 2-3. Today 2.6  -LDH at baseline.  - interrogation if needed          Gait instability    -Hold Ropinirol.  -Also, switched tamulosin to doxazosin.  -PT/OT Cx.         VT (ventricular tachycardia)    -Continue Tikosyn(intolerant to Amio per outside records).            Discussed plan of care with Dr. Marcelo the attending on service    Ismael Bennett MD  Heart Transplant  [facility]

## 2017-12-03 NOTE — PLAN OF CARE
Problem: Patient Care Overview  Goal: Plan of Care Review  Outcome: Revised  Plan of care discussed with patient. Patient is free of fall/trauma/injury. Denies CP, SOB, or pain/discomfort. BP meds adjusted for better BP management.  All questions addressed. Will continue to monitor

## 2017-12-03 NOTE — ASSESSMENT & PLAN NOTE
- Volume overloaded on exam.   - continue lasix push 80 TID  - BNP check tomorrow before changing diuretics  - Echo 12/1 reviewed.

## 2017-12-03 NOTE — ASSESSMENT & PLAN NOTE
-S/P III 10/16 in [place].  -Speed 5800.  -Continue ASA 81mg daily.  -Continue warfarin goal INR 2-3. Today 2.6  -LDH at baseline.  - interrogation if needed

## 2017-12-03 NOTE — SUBJECTIVE & OBJECTIVE
Interval History:   Seen at bedside eating breakfast. Has not overnight complain and LVAD functioning well. Wife concerned about tight BP control due to prior episodes of syncope. Assured that titration is being done with close clinical monitoring.  Denies dizziness or confusion.    Continuous Infusions:   Scheduled Meds:   allopurinol  300 mg Oral Daily    amLODIPine  10 mg Oral Daily    aspirin  81 mg Oral Daily    atorvastatin  10 mg Oral Daily    buPROPion  300 mg Oral Daily    calcium-vitamin D3  1 tablet Oral BID    ciprofloxacin HCl  750 mg Oral BID    dofetilide  250 mcg Oral Q12H    [START ON 12/4/2017] doxazosin  4 mg Oral Daily    famotidine  40 mg Oral QHS    ferrous sulfate  325 mg Oral Daily    folic acid  1 mg Oral Daily    furosemide  80 mg Intravenous BID    hydrALAZINE  100 mg Oral Q8H    isosorbide dinitrate  40 mg Oral TID    Lactobacillus rhamnosus GG  1 capsule Oral Daily    lisinopril  20 mg Oral BID    magnesium oxide  400 mg Oral BID    multivitamin  1 tablet Oral Daily    polyethylene glycol  17 g Oral Daily    senna-docusate 8.6-50 mg  2 tablet Oral BID    warfarin  4 mg Oral Daily     PRN Meds:acetaminophen    Review of patient's allergies indicates:   Allergen Reactions    Aldactone [spironolactone]       persistent hyperkalemia.    Sulfa (sulfonamide antibiotics)      Objective:     Vital Signs (Most Recent):  Temp: 98.1 °F (36.7 °C) (12/03/17 1108)  Pulse: 72 (12/03/17 1400)  Resp: 16 (12/03/17 1108)  BP: (!) 96/0 (12/03/17 1109)  SpO2: 97 % (12/03/17 1108) Vital Signs (24h Range):  Temp:  [98.1 °F (36.7 °C)-99.1 °F (37.3 °C)] 98.1 °F (36.7 °C)  Pulse:  [59-82] 72  Resp:  [16-18] 16  SpO2:  [95 %-100 %] 97 %  BP: ()/(0-88) 96/0     Patient Vitals for the past 72 hrs (Last 3 readings):   Weight   12/03/17 0717 93.6 kg (206 lb 5.6 oz)   12/02/17 0717 94.2 kg (207 lb 10.8 oz)   12/01/17 1350 93.4 kg (205 lb 14.6 oz)     Body mass index is 27.99  kg/m².      Intake/Output Summary (Last 24 hours) at 12/03/17 1423  Last data filed at 12/03/17 1400   Gross per 24 hour   Intake              820 ml   Output             2201 ml   Net            -1381 ml       Hemodynamic Parameters:       Telemetry:    Physical Exam   Constitutional: He is oriented to person, place, and time.   Neck: No JVD present.   Cardiovascular:   LVAD hum +   Pulmonary/Chest: Effort normal and breath sounds normal.   Musculoskeletal: He exhibits no edema (+ 1 edema bilaterally).   Neurological: He is alert and oriented to person, place, and time.   Skin: Skin is warm and dry.   Nursing note and vitals reviewed.      Significant Labs:  CBC:    Recent Labs  Lab 12/01/17  0911 12/02/17 0433 12/03/17 0329   WBC 5.78 5.56 6.45   RBC 3.24* 3.02* 3.22*   HGB 9.7* 8.8* 9.5*   HCT 30.2* 27.0* 29.1*    154 159   MCV 93 89 90   MCH 29.9 29.1 29.5   MCHC 32.1 32.6 32.6     BNP:    Recent Labs  Lab 12/01/17  0911   *     CMP:    Recent Labs  Lab 12/01/17  0911 12/02/17  0433 12/03/17  0328   * 85 94   CALCIUM 9.5 9.2 9.2   ALBUMIN 3.3*  --   --    PROT 6.6  --   --     140 140   K 4.2 3.7 3.6   CO2 24 24 26    107 105   BUN 21 21 22   CREATININE 1.7* 1.7* 1.8*   ALKPHOS 79  --   --    ALT 22  --   --    AST 21  --   --    BILITOT 0.4  --   --       Coagulation:     Recent Labs  Lab 12/01/17  0911 12/02/17  0433 12/03/17  0329   INR 2.5* 2.6* 2.6*   APTT  --  32.7* 31.7     LDH:    Recent Labs  Lab 12/01/17  0911 12/02/17  0433 12/03/17  0328    133 174     Microbiology:  Microbiology Results (last 7 days)     ** No results found for the last 168 hours. **          I have reviewed all pertinent labs within the past 24 hours.      Estimated Creatinine Clearance: 42.1 mL/min (based on SCr of 1.8 mg/dL (H)).    Diagnostic Results:

## 2017-12-03 NOTE — PROGRESS NOTES
12/03/17 1108   Vital Signs   Temp 98.1 °F (36.7 °C)   Temp src Oral   Pulse 73   Heart Rate Source Monitor   Resp 16   SpO2 97 %   O2 Device (Oxygen Therapy) room air   /81   MAP (mmHg) 91   BP Location Left arm   Patient Position Sitting     Patient MAP = 91, Doppler = 96.  Numbers were obtained with patient sitting at bedside.  Notified Dr. Bennett.  No orders received at this time.  Will continue to monitor.

## 2017-12-04 VITALS
HEIGHT: 72 IN | TEMPERATURE: 98 F | SYSTOLIC BLOOD PRESSURE: 102 MMHG | OXYGEN SATURATION: 98 % | BODY MASS INDEX: 25.65 KG/M2 | WEIGHT: 189.38 LBS | RESPIRATION RATE: 18 BRPM | HEART RATE: 71 BPM

## 2017-12-04 LAB
ALBUMIN SERPL BCP-MCNC: 3.4 G/DL
ALP SERPL-CCNC: 77 U/L
ALT SERPL W/O P-5'-P-CCNC: 16 U/L
ANION GAP SERPL CALC-SCNC: 9 MMOL/L
APTT BLDCRRT: 30.9 SEC
AST SERPL-CCNC: 22 U/L
BASOPHILS # BLD AUTO: 0.04 K/UL
BASOPHILS NFR BLD: 0.7 %
BILIRUB DIRECT SERPL-MCNC: 0.3 MG/DL
BILIRUB SERPL-MCNC: 0.6 MG/DL
BNP SERPL-MCNC: 268 PG/ML
BNP SERPL-MCNC: 278 PG/ML
BUN SERPL-MCNC: 22 MG/DL
CALCIUM SERPL-MCNC: 9.9 MG/DL
CHLORIDE SERPL-SCNC: 106 MMOL/L
CO2 SERPL-SCNC: 24 MMOL/L
CREAT SERPL-MCNC: 2 MG/DL
CRP SERPL-MCNC: 4.6 MG/L
DIFFERENTIAL METHOD: ABNORMAL
EOSINOPHIL # BLD AUTO: 0.2 K/UL
EOSINOPHIL NFR BLD: 2.8 %
ERYTHROCYTE [DISTWIDTH] IN BLOOD BY AUTOMATED COUNT: 15.1 %
EST. GFR  (AFRICAN AMERICAN): 36.7 ML/MIN/1.73 M^2
EST. GFR  (NON AFRICAN AMERICAN): 31.7 ML/MIN/1.73 M^2
GLUCOSE SERPL-MCNC: 86 MG/DL
HCT VFR BLD AUTO: 28.9 %
HGB BLD-MCNC: 9.5 G/DL
IMM GRANULOCYTES # BLD AUTO: 0.01 K/UL
IMM GRANULOCYTES NFR BLD AUTO: 0.2 %
INR PPP: 2.3
LDH SERPL L TO P-CCNC: 163 U/L
LYMPHOCYTES # BLD AUTO: 0.9 K/UL
LYMPHOCYTES NFR BLD: 14.9 %
MAGNESIUM SERPL-MCNC: 2.3 MG/DL
MCH RBC QN AUTO: 28.9 PG
MCHC RBC AUTO-ENTMCNC: 32.9 G/DL
MCV RBC AUTO: 88 FL
MONOCYTES # BLD AUTO: 1 K/UL
MONOCYTES NFR BLD: 16 %
NEUTROPHILS # BLD AUTO: 4 K/UL
NEUTROPHILS NFR BLD: 65.4 %
NRBC BLD-RTO: 0 /100 WBC
PHOSPHATE SERPL-MCNC: 2.8 MG/DL
PLATELET # BLD AUTO: 171 K/UL
PMV BLD AUTO: 11.1 FL
POTASSIUM SERPL-SCNC: 3.7 MMOL/L
PREALB SERPL-MCNC: 21 MG/DL
PROT SERPL-MCNC: 6.5 G/DL
PROTHROMBIN TIME: 22.5 SEC
RBC # BLD AUTO: 3.29 M/UL
SODIUM SERPL-SCNC: 139 MMOL/L
WBC # BLD AUTO: 6.11 K/UL

## 2017-12-04 PROCEDURE — 86140 C-REACTIVE PROTEIN: CPT

## 2017-12-04 PROCEDURE — 85025 COMPLETE CBC W/AUTO DIFF WBC: CPT

## 2017-12-04 PROCEDURE — 99900035 HC TECH TIME PER 15 MIN (STAT)

## 2017-12-04 PROCEDURE — 25000003 PHARM REV CODE 250: Performed by: INTERNAL MEDICINE

## 2017-12-04 PROCEDURE — 85730 THROMBOPLASTIN TIME PARTIAL: CPT

## 2017-12-04 PROCEDURE — 25000003 PHARM REV CODE 250: Performed by: NURSE PRACTITIONER

## 2017-12-04 PROCEDURE — 80048 BASIC METABOLIC PNL TOTAL CA: CPT

## 2017-12-04 PROCEDURE — 99239 HOSP IP/OBS DSCHRG MGMT >30: CPT | Mod: ,,, | Performed by: INTERNAL MEDICINE

## 2017-12-04 PROCEDURE — 97535 SELF CARE MNGMENT TRAINING: CPT

## 2017-12-04 PROCEDURE — 83880 ASSAY OF NATRIURETIC PEPTIDE: CPT | Mod: 91

## 2017-12-04 PROCEDURE — 84134 ASSAY OF PREALBUMIN: CPT

## 2017-12-04 PROCEDURE — 85610 PROTHROMBIN TIME: CPT

## 2017-12-04 PROCEDURE — 83615 LACTATE (LD) (LDH) ENZYME: CPT

## 2017-12-04 PROCEDURE — 97530 THERAPEUTIC ACTIVITIES: CPT

## 2017-12-04 PROCEDURE — 80076 HEPATIC FUNCTION PANEL: CPT

## 2017-12-04 PROCEDURE — 93750 INTERROGATION VAD IN PERSON: CPT | Mod: ,,, | Performed by: INTERNAL MEDICINE

## 2017-12-04 PROCEDURE — 83735 ASSAY OF MAGNESIUM: CPT

## 2017-12-04 PROCEDURE — 36415 COLL VENOUS BLD VENIPUNCTURE: CPT

## 2017-12-04 PROCEDURE — 27000248 HC VAD-ADDITIONAL DAY

## 2017-12-04 PROCEDURE — 83880 ASSAY OF NATRIURETIC PEPTIDE: CPT

## 2017-12-04 PROCEDURE — 84100 ASSAY OF PHOSPHORUS: CPT

## 2017-12-04 RX ORDER — DOXAZOSIN 4 MG/1
4 TABLET ORAL DAILY
Qty: 30 TABLET | Refills: 11 | Status: SHIPPED | OUTPATIENT
Start: 2017-12-05 | End: 2018-01-24 | Stop reason: ALTCHOICE

## 2017-12-04 RX ORDER — WARFARIN 4 MG/1
TABLET ORAL
Refills: 11 | Status: ON HOLD
Start: 2017-12-04 | End: 2018-02-14

## 2017-12-04 RX ORDER — FUROSEMIDE 80 MG/1
80 TABLET ORAL DAILY
Qty: 30 TABLET | Refills: 11 | Status: SHIPPED | OUTPATIENT
Start: 2017-12-04 | End: 2017-12-04

## 2017-12-04 RX ORDER — FUROSEMIDE 80 MG/1
TABLET ORAL
Qty: 30 TABLET | Refills: 11 | Status: ON HOLD | OUTPATIENT
Start: 2017-12-04 | End: 2018-02-07 | Stop reason: ALTCHOICE

## 2017-12-04 RX ADMIN — ASPIRIN 81 MG CHEWABLE TABLET 81 MG: 81 TABLET CHEWABLE at 08:12

## 2017-12-04 RX ADMIN — HYDRALAZINE HYDROCHLORIDE 100 MG: 50 TABLET ORAL at 05:12

## 2017-12-04 RX ADMIN — CIPROFLOXACIN HYDROCHLORIDE 750 MG: 750 TABLET, FILM COATED ORAL at 08:12

## 2017-12-04 RX ADMIN — FOLIC ACID 1 MG: 1 TABLET ORAL at 08:12

## 2017-12-04 RX ADMIN — DOXAZOSIN 4 MG: 4 TABLET ORAL at 08:12

## 2017-12-04 RX ADMIN — ALLOPURINOL 300 MG: 300 TABLET ORAL at 08:12

## 2017-12-04 RX ADMIN — THERA TABS 1 TABLET: TAB at 08:12

## 2017-12-04 RX ADMIN — ATORVASTATIN CALCIUM 10 MG: 10 TABLET, FILM COATED ORAL at 08:12

## 2017-12-04 RX ADMIN — STANDARDIZED SENNA CONCENTRATE AND DOCUSATE SODIUM 2 TABLET: 8.6; 5 TABLET, FILM COATED ORAL at 08:12

## 2017-12-04 RX ADMIN — ISOSORBIDE DINITRATE 40 MG: 20 TABLET ORAL at 05:12

## 2017-12-04 RX ADMIN — AMLODIPINE BESYLATE 10 MG: 10 TABLET ORAL at 08:12

## 2017-12-04 RX ADMIN — LISINOPRIL 20 MG: 20 TABLET ORAL at 08:12

## 2017-12-04 RX ADMIN — BUPROPION HYDROCHLORIDE 300 MG: 150 TABLET, FILM COATED, EXTENDED RELEASE ORAL at 08:12

## 2017-12-04 RX ADMIN — ISOSORBIDE DINITRATE 40 MG: 20 TABLET ORAL at 01:12

## 2017-12-04 RX ADMIN — Medication 1 CAPSULE: at 08:12

## 2017-12-04 RX ADMIN — HYDRALAZINE HYDROCHLORIDE 100 MG: 50 TABLET ORAL at 01:12

## 2017-12-04 RX ADMIN — OYSTER SHELL CALCIUM WITH VITAMIN D 1 TABLET: 500; 200 TABLET, FILM COATED ORAL at 08:12

## 2017-12-04 RX ADMIN — FERROUS SULFATE TAB EC 325 MG (65 MG FE EQUIVALENT) 325 MG: 325 (65 FE) TABLET DELAYED RESPONSE at 08:12

## 2017-12-04 RX ADMIN — MAGNESIUM OXIDE TAB 400 MG (241.3 MG ELEMENTAL MG) 400 MG: 400 (241.3 MG) TAB at 08:12

## 2017-12-04 RX ADMIN — DOFETILIDE 250 MCG: 0.25 CAPSULE ORAL at 08:12

## 2017-12-04 NOTE — PROGRESS NOTES
Mr. Kev Vasquez is being discharged today following admission for evaluation of elevated blood pressure. His pMH is significant for H M3 lvad from 10/16, CDK, CAD, VT, HTN, HL, and gout.      His inr goal remains 2-3, with heparin bridging as required. He continues on aspirin 81mg orally daily. He was administered coumadin as 4mg orally daily. His inr today is 2.3. He was instructed to resume his last home coumadin dose of 4 mg on Sun, Tue, Thu, and 6mg all other days. Follow up inr requested for Thursday with home health.     Thank you.

## 2017-12-04 NOTE — PROGRESS NOTES
[facility]  Heart Transplant  Progress Note    Patient Name: Kev Vasquez  MRN: [id number]  Admission Date: 12/1/2017  Hospital Length of Stay: 3 days  Attending Physician: Miguel A Marcelo Jr.,*  Primary Care Provider: Mega Collins MD  Principal Problem:Acute on chronic systolic and diastolic heart failure, NYHA class 4    Subjective:     Interval History: No complaints or overnight events. Lasix D/C'd yesterday - incontinent of urine and condom cath not collecting accurate output.    Continuous Infusions:   Scheduled Meds:   allopurinol  300 mg Oral Daily    amLODIPine  10 mg Oral Daily    aspirin  81 mg Oral Daily    atorvastatin  10 mg Oral Daily    buPROPion  300 mg Oral Daily    calcium-vitamin D3  1 tablet Oral BID    ciprofloxacin HCl  750 mg Oral BID    dofetilide  250 mcg Oral Q12H    doxazosin  4 mg Oral Daily    famotidine  40 mg Oral QHS    ferrous sulfate  325 mg Oral Daily    folic acid  1 mg Oral Daily    hydrALAZINE  100 mg Oral Q8H    isosorbide dinitrate  40 mg Oral TID    Lactobacillus rhamnosus GG  1 capsule Oral Daily    lisinopril  20 mg Oral BID    magnesium oxide  400 mg Oral BID    multivitamin  1 tablet Oral Daily    polyethylene glycol  17 g Oral Daily    senna-docusate 8.6-50 mg  2 tablet Oral BID    warfarin  4 mg Oral Daily     PRN Meds:acetaminophen    Review of patient's allergies indicates:   Allergen Reactions    Aldactone [spironolactone]       persistent hyperkalemia.    Sulfa (sulfonamide antibiotics)      Objective:     Vital Signs (Most Recent):  Temp: 98.3 °F (36.8 °C) (12/04/17 0746)  Pulse: 76 (12/04/17 1000)  Resp: 16 (12/04/17 0746)  BP: (!) 100/0 (12/04/17 0747)  SpO2: (!) 94 % (12/04/17 0746) Vital Signs (24h Range):  Temp:  [97 °F (36.1 °C)-98.3 °F (36.8 °C)] 98.3 °F (36.8 °C)  Pulse:  [63-96] 76  Resp:  [16-18] 16  SpO2:  [94 %-99 %] 94 %  BP: ()/(0-88) 100/0     Patient Vitals for the past 72 hrs (Last 3  readings):   Weight   12/04/17 0746 85.9 kg (189 lb 6 oz)   12/03/17 0717 93.6 kg (206 lb 5.6 oz)   12/02/17 0717 94.2 kg (207 lb 10.8 oz)     Body mass index is 25.68 kg/m².      Intake/Output Summary (Last 24 hours) at 12/04/17 1129  Last data filed at 12/04/17 0600   Gross per 24 hour   Intake              680 ml   Output              801 ml   Net             -121 ml       Hemodynamic Parameters:           Physical Exam   Constitutional: He is oriented to person, place, and time. He appears well-developed and well-nourished.   HENT:   Head: Normocephalic and atraumatic.   Eyes: Conjunctivae and EOM are normal. Pupils are equal, round, and reactive to light.   Neck: Normal range of motion. Neck supple. No thyromegaly present.   Bounding carotid pulse making JVP difficult to determine   Cardiovascular: Intact distal pulses.    Smooth VAD hum   Pulmonary/Chest: Effort normal and breath sounds normal.   Abdominal: Soft. Bowel sounds are normal.   Musculoskeletal: He exhibits no edema.   Neurological: He is alert and oriented to person, place, and time.   Forgetful   Skin: Skin is warm and dry. Capillary refill takes 2 to 3 seconds.   Psychiatric: He has a normal mood and affect. His behavior is normal. Judgment and thought content normal.   Forgetful       Significant Labs:  CBC:    Recent Labs  Lab 12/02/17 0433 12/03/17  0329 12/04/17  0548   WBC 5.56 6.45 6.11   RBC 3.02* 3.22* 3.29*   HGB 8.8* 9.5* 9.5*   HCT 27.0* 29.1* 28.9*    159 171   MCV 89 90 88   MCH 29.1 29.5 28.9   MCHC 32.6 32.6 32.9     BNP:    Recent Labs  Lab 12/01/17  0911 12/04/17  0030 12/04/17  0548   * 268* 278*     CMP:    Recent Labs  Lab 12/01/17  0911 12/02/17  0433 12/03/17  0328 12/04/17  0548   * 85 94 86   CALCIUM 9.5 9.2 9.2 9.9   ALBUMIN 3.3*  --   --  3.4*   PROT 6.6  --   --  6.5    140 140 139   K 4.2 3.7 3.6 3.7   CO2 24 24 26 24    107 105 106   BUN 21 21 22 22   CREATININE 1.7* 1.7* 1.8* 2.0*    ALKPHOS 79  --   --  77   ALT 22  --   --  16   AST 21  --   --  22   BILITOT 0.4  --   --  0.6      Coagulation:     Recent Labs  Lab 12/02/17  0433 12/03/17  0329 12/04/17  0548   INR 2.6* 2.6* 2.3*   APTT 32.7* 31.7 30.9     LDH:    Recent Labs  Lab 12/02/17  0433 12/03/17  0328 12/04/17  0548    174 163     Microbiology:  Microbiology Results (last 7 days)     ** No results found for the last 168 hours. **          I have reviewed all pertinent labs within the past 24 hours.    Estimated Creatinine Clearance: 35 mL/min (based on SCr of 2 mg/dL (H)).          Assessment and Plan:     77 y.o. y.o. WM with ischemic cardiomyopathy status post Heartmate III 10/16/16 LVAD(in [place]), CKD II, CAD, VT on Tikosyn(Intolerant to Amio per outside notes), HTN, HLD, Gout, Obesity, SSS, and depression who presents for direct admit due to HTN and volume overload.  VAD speed is at 5800 rpm. Was recently admitted for elevated BP. Lisinopril was increased and he was started on hydralazine/isordil.   Today he states that he has no new complaints. He states that he is still dealing with gait instability. He denies dizziness but states that he just loses his balance. Denies presyncope/syncope. He also seems dyspneic while talking to me but states that he feels fine. Echo yesterday with reduced RV function.     * Acute on chronic systolic and diastolic heart failure, NYHA class 4    - Weight down 17# since admit. I&O's not accurate 2/2 incontinence despite condom cath  - Lasix D/C'd yesterday. BNP down to 278 from 551 on admit. Creatinine up a little at 2.0. Plan to D/C home on Lasix 80 mg po q M-W-F        Essential hypertension    -Pt pulsatile on exam. Follow MAPs only. Nursing only to check BP sitting or standing.  -MAPs still elevated (as high as 98 overnight). Doxazosin was increased to 4 mg qd this morning - may need 4 mg bid WIll have to allow for some permissive hypertension with goal MAP 90 or less given h/o  orthostatic hypotension  -Continue amlodipine 10mg daily, hydralazine 100mg TID, isosorbide 40mg TID, Lisinopril 20mg BID.   - Caution for possible syncope experienced before          LVAD (left ventricular assist device) present    -S/P HMIII 10/16 in [place] for ICM as DT  -Speed 5800.  -Continue ASA 81mg daily.  -Continue warfarin goal INR 2-3. Today 2.3  -LDH at baseline.  -Interrogation shows no events          Stage 3 chronic kidney disease    - Creatinine has bumped a bit to 2.0 (was 1.7 on admit, baseline ~ 1.4-1.7)            Gait instability    -Hold Ropinirol.  -Also, switched tamulosin to doxazosin.  -PT/OT Cx.         VT (ventricular tachycardia)    -Continue Tikosyn(intolerant to Amio per outside records[place] [id number]  Heart Transplant  [facility]

## 2017-12-04 NOTE — PLAN OF CARE
Problem: Patient Care Overview  Goal: Plan of Care Review  Outcome: Ongoing (interventions implemented as appropriate)  Lasix IVP d/c'd today.  Plan to start on po tomorrow.  Condom cath in place.  Will cont to monitor.

## 2017-12-04 NOTE — ASSESSMENT & PLAN NOTE
-S/P HMIII 10/16 in [place] for ICM as DT  -Speed 5800.  -Continue ASA 81mg daily.  -Continue warfarin goal INR 2-3. Today 2.3  -LDH at baseline.  -Interrogation shows no events

## 2017-12-04 NOTE — ASSESSMENT & PLAN NOTE
- Weight down 17# since admit. I&O's not accurate 2/2 incontinence despite condom cath  - Lasix D/C'd yesterday. BNP down to 278 from 551 on admit. Creatinine up a little at 2.0. Plan to D/C home on Lasix 80 mg po q M-W-F

## 2017-12-04 NOTE — PT/OT/SLP PROGRESS
Occupational Therapy   Treatment/Discharge    Name: Kev Vasquez  MRN: [id number]  Admitting Diagnosis:  Acute on chronic systolic and diastolic heart failure, NYHA class 4     History of LVAD    Recommendations:     Discharge Recommendations: home with home health  Discharge Equipment Recommendations:  none  Barriers to discharge:  None    Subjective     Communicated with: RN prior to session.  Pain/Comfort:  · Pain Rating 1:  (Did not rate)  · Location - Side 1: Right  · Location 1: hip  · Pain Addressed 1: Reposition, Distraction    Objective:     Patient found with: telemetry (LVAD)    General Precautions: Standard, fall, LVAD   Orthopedic Precautions:N/A   Braces: N/A     Occupational Performance:    Bed Mobility:    · Patient completed Supine to Sit with modified independence  · Patient completed Sit to Supine with modified independence     Functional Mobility/Transfers:  · Patient completed Sit <> Stand Transfer with stand by assistance  with  rolling walker     Activities of Daily Living:  · UB Dressing: stand by assistance to doff gown, don pullover shirt and LVAD carrying bag  · LB Dressing: stand by assistance to don tennis shoes EOB and shorts in supine    Patient left supine with all lines intact, call button in reach, RN notified and wife and caregiver present    [facility] 6 Click:  [facility] Total Score: 16    Treatment & Education:  Pt supine in bed; Mod I for bed mobility; once EOB, caregiver switched LVAD to battery power (routine at home, OT marked cables for visual cue); performed UB/LB dressing; functional mobility in hallway household distance with CGA using RW; returned to room, discussed D/C recs with pt and caregiver and both in agreement that pt near baseline and to D/C home with HH  Education:    Assessment:     Kev Vasquez is a 77 y.o. male with a medical diagnosis of Acute on chronic systolic and diastolic heart failure, NYHA class 4.  He presents with performance deficits including weakness,  impaired endurance, impaired self care skills, impaired functional mobilty, gait instability, impaired balance, decreased safety awareness, impaired cognition, impaired cardiopulmonary response to activity. Pt D/C from C following OT session; recommend HH therapy.     Rehab Prognosis:  Good; patient would benefit from acute skilled OT services to address these deficits and reach maximum level of function.       Plan:     ·   (D/C OT) Plan of Care Reviewed with: patient, caregiver    This Plan of care has been discussed with the patient who was involved in its development and understands and is in agreement with the identified goals and treatment plan    GOALS:    Occupational Therapy Goals     Not on file          Multidisciplinary Problems (Resolved)        Problem: Occupational Therapy Goal    Goal Priority Disciplines Outcome Interventions   Occupational Therapy Goal   (Resolved)     OT, PT/OT Outcome(s) achieved    Description:  Goals to be met by: 7  Days 12/9/17     Patient will increase functional independence with ADLs by performing:    UE Dressing with Supervision.  LE Dressing with Minimal Assistance.  Grooming while standing with Stand-by Assistance.  Toileting from bedside commode with Minimal Assistance for hygiene and clothing management.   Stand pivot transfers with Stand-by Assistance.  Toilet transfer to bedside commode with Stand-by Assistance.                      Time Tracking:     OT Date of Treatment: 12/04/17  OT Start Time: 1439  OT Stop Time: 1504  OT Total Time (min): 25 min    Billable Minutes:Self Care/Home Management 15  Therapeutic Activity 10    SHAUNA Hanks  12/4/2017

## 2017-12-04 NOTE — PROCEDURES
Patient awake with family at bedside. VAD interrogation completed this AM in the event changes needed to be made. Will continue to monitor for further issues.     Pulsatile: Yes   VAD Sounds: HM3  Smooth  Problems / Issues / Alarms with VAD if any: None noted  HCT: 9.5       VAD Interrogation:  TXP LVAD INTERROGATIONS 12/4/2017 12/4/2017 12/4/2017 12/4/2017 12/4/2017 12/3/2017 12/3/2017   Type HeartMate3 HeartMate3 (No Data) HeartMate3 HeartMate3 HeartMate3 HeartMate3   Flow 5.1 4.8 - 4.9 5.0 4.9 5.3   Speed 5800 5800 - 5800 5800 5800 5800   PI 3.3 3.4 - 3.4 3.3 3.4 3.2   Power ([place]) 4.4 4.4 - 4.4 4.5 4.4 4.5   LSL - - - - - - -   Pulsatility - Pulse - Pulse Pulse Pulse -   }

## 2017-12-04 NOTE — PLAN OF CARE
[facility]   Heart Transplant/VAD Clinic   [address] [place] [address]   [phone] after hours          [phone] fax     VAD HOME  HEALTH ORDERS      Admit to Home Health    Diagnosis:   Patient Active Problem List   Diagnosis    Chronic combined systolic and diastolic congestive heart failure    Coronary artery disease involving native coronary artery of native heart without angina pectoris    S/P CABG (coronary artery bypass graft)    ICD (implantable cardioverter-defibrillator), biventricular, in situ    HILLARY on CPAP    Pulmonary hypertension due to left ventricular systolic dysfunction    Ischemic cardiomyopathy    Stage 3 chronic kidney disease    LVAD (left ventricular assist device) present    Chronic anticoagulation    VT (ventricular tachycardia)    Restless leg syndrome    Gait instability    Acute on chronic systolic and diastolic heart failure, NYHA class 4    Essential hypertension       Patient is homebound due to:  Debility    Diet: Low Fat, Low cholesterol, 2Gm Na, Coumadin restrictions.    Acitivities: No Swimming, bathing, vacuuming, contact sports.    Fresh implants= Sternal Precautions    Nursing:   SN to complete comprehensive assessment including routine vital signs. Instruct on disease process and s/s of complications to report to MD. Review/verify medication list sent home with the patient at time of discharge  and instruct patient/caregiver as needed. Frequency may be adjusted depending on start of care date.    **LVAD driveline exit site dressing change is to be completed per LVAD patient/caregiver only**.    Notify MD if SBP > 160 or < 90; DBP > 90 or < 50; HR > 120 or < 50; Temp > 101; Weight gain >3lbs in 1 day or 5lbs in 1 week.        LABS:  SN to perform labs: PT/INR per Coumadin clinic [phone].   Follow up INR date: Thursday, 12/7/17  No Finger Sticks    CONSULTS:     Physical Therapy to evaluate and  treat. Evaluate for home safety and equipment needs; Establish/upgrade home exercise program. Perform / instruct on therapeutic exercises, gait training, transfer training, and Range of Motion.    Occupational Therapy to evaluate and treat. Evaluate home environment for safety and equipment needs. Perform/Instruct on transfers, ADL training, ROM, and therapeutic exercises.        Send initial Home Health orders to HTS attending physician on call.  Send follow up questions to VAD clinic MD [phone] or [phone].

## 2017-12-04 NOTE — SUBJECTIVE & OBJECTIVE
Interval History: No complaints or overnight events. Lasix D/C'd yesterday - incontinent of urine and condom cath not collecting accurate output.    Continuous Infusions:   Scheduled Meds:   allopurinol  300 mg Oral Daily    amLODIPine  10 mg Oral Daily    aspirin  81 mg Oral Daily    atorvastatin  10 mg Oral Daily    buPROPion  300 mg Oral Daily    calcium-vitamin D3  1 tablet Oral BID    ciprofloxacin HCl  750 mg Oral BID    dofetilide  250 mcg Oral Q12H    doxazosin  4 mg Oral Daily    famotidine  40 mg Oral QHS    ferrous sulfate  325 mg Oral Daily    folic acid  1 mg Oral Daily    hydrALAZINE  100 mg Oral Q8H    isosorbide dinitrate  40 mg Oral TID    Lactobacillus rhamnosus GG  1 capsule Oral Daily    lisinopril  20 mg Oral BID    magnesium oxide  400 mg Oral BID    multivitamin  1 tablet Oral Daily    polyethylene glycol  17 g Oral Daily    senna-docusate 8.6-50 mg  2 tablet Oral BID    warfarin  4 mg Oral Daily     PRN Meds:acetaminophen    Review of patient's allergies indicates:   Allergen Reactions    Aldactone [spironolactone]       persistent hyperkalemia.    Sulfa (sulfonamide antibiotics)      Objective:     Vital Signs (Most Recent):  Temp: 98.3 °F (36.8 °C) (12/04/17 0746)  Pulse: 76 (12/04/17 1000)  Resp: 16 (12/04/17 0746)  BP: (!) 100/0 (12/04/17 0747)  SpO2: (!) 94 % (12/04/17 0746) Vital Signs (24h Range):  Temp:  [97 °F (36.1 °C)-98.3 °F (36.8 °C)] 98.3 °F (36.8 °C)  Pulse:  [63-96] 76  Resp:  [16-18] 16  SpO2:  [94 %-99 %] 94 %  BP: ()/(0-88) 100/0     Patient Vitals for the past 72 hrs (Last 3 readings):   Weight   12/04/17 0746 85.9 kg (189 lb 6 oz)   12/03/17 0717 93.6 kg (206 lb 5.6 oz)   12/02/17 0717 94.2 kg (207 lb 10.8 oz)     Body mass index is 25.68 kg/m².      Intake/Output Summary (Last 24 hours) at 12/04/17 1129  Last data filed at 12/04/17 0600   Gross per 24 hour   Intake              680 ml   Output              801 ml   Net             -121 ml        Hemodynamic Parameters:           Physical Exam   Constitutional: He is oriented to person, place, and time. He appears well-developed and well-nourished.   HENT:   Head: Normocephalic and atraumatic.   Eyes: Conjunctivae and EOM are normal. Pupils are equal, round, and reactive to light.   Neck: Normal range of motion. Neck supple. No thyromegaly present.   Bounding carotid pulse making JVP difficult to determine   Cardiovascular: Intact distal pulses.    Smooth VAD hum   Pulmonary/Chest: Effort normal and breath sounds normal.   Abdominal: Soft. Bowel sounds are normal.   Musculoskeletal: He exhibits no edema.   Neurological: He is alert and oriented to person, place, and time.   Forgetful   Skin: Skin is warm and dry. Capillary refill takes 2 to 3 seconds.   Psychiatric: He has a normal mood and affect. His behavior is normal. Judgment and thought content normal.   Forgetful       Significant Labs:  CBC:    Recent Labs  Lab 12/02/17 0433 12/03/17 0329 12/04/17  0548   WBC 5.56 6.45 6.11   RBC 3.02* 3.22* 3.29*   HGB 8.8* 9.5* 9.5*   HCT 27.0* 29.1* 28.9*    159 171   MCV 89 90 88   MCH 29.1 29.5 28.9   MCHC 32.6 32.6 32.9     BNP:    Recent Labs  Lab 12/01/17  0911 12/04/17  0030 12/04/17  0548   * 268* 278*     CMP:    Recent Labs  Lab 12/01/17  0911 12/02/17 0433 12/03/17  0328 12/04/17  0548   * 85 94 86   CALCIUM 9.5 9.2 9.2 9.9   ALBUMIN 3.3*  --   --  3.4*   PROT 6.6  --   --  6.5    140 140 139   K 4.2 3.7 3.6 3.7   CO2 24 24 26 24    107 105 106   BUN 21 21 22 22   CREATININE 1.7* 1.7* 1.8* 2.0*   ALKPHOS 79  --   --  77   ALT 22  --   --  16   AST 21  --   --  22   BILITOT 0.4  --   --  0.6      Coagulation:     Recent Labs  Lab 12/02/17  0433 12/03/17  0329 12/04/17  0548   INR 2.6* 2.6* 2.3*   APTT 32.7* 31.7 30.9     LDH:    Recent Labs  Lab 12/02/17  0433 12/03/17  0328 12/04/17  0548    174 163     Microbiology:  Microbiology Results (last 7 days)      ** No results found for the last 168 hours. **          I have reviewed all pertinent labs within the past 24 hours.    Estimated Creatinine Clearance: 35 mL/min (based on SCr of 2 mg/dL (H)).

## 2017-12-04 NOTE — PROGRESS NOTES
Patient is ready for discharge. Patient stable alert and oriented. IVs removed. No complaints of pain. Discussed discharge plan. Reviewed medications and side effects, appointments, and answered questions with patient and family.

## 2017-12-04 NOTE — PLAN OF CARE
Problem: Occupational Therapy Goal  Goal: Occupational Therapy Goal  Goals to be met by: 7  Days 12/9/17     Patient will increase functional independence with ADLs by performing:    UE Dressing with Supervision.  LE Dressing with Minimal Assistance.  Grooming while standing with Stand-by Assistance.  Toileting from bedside commode with Minimal Assistance for hygiene and clothing management.   Stand pivot transfers with Stand-by Assistance.  Toilet transfer to bedside commode with Stand-by Assistance.     Outcome: Outcome(s) achieved Date Met: 12/04/17  Pt D[place] [facility] [place]

## 2017-12-04 NOTE — PROGRESS NOTES
Admit Note/Discharge Note     Met with patient to assess needs. Patient is a 77 y.o.  male, admitted for heart failure/volume overload.  Pt has an LVAD. .      Patient admitted from home on 12/1/2017 .  At this time, patient presents as alert and oriented x 4, pleasant and good eye contact.  At this time, patients caregiver is not currently in attendance.     Household/Family Systems     Patient resides with patient's spouse, daughter and son in law, at     [address]  [place] [address].      Support system includes spouse, daughter and son in law.    Patient does not have dependents that are need of being cared for.     Patients primary caregiver is bill Manriquez daughter, phone number [phone].    Home phone:  [phone]  Spouse cell:  [phone] (per pt spouse is hard of hearing)     Additional emergency contacts  Yadiel (son in law) [phone]  Alannah Vasquez( Daughter and HCPA) [phone]    During admission, patient's caregiver plans to visit.  Confirmed patient and patients caregivers do have access to reliable transportation.    Cognitive Status/Learning     Patient reports reading ability as college and states patient does not have difficulty with reading, writing, seeing, hearing and learning. Pt reports he has had difficulty with comprehension and memory, however this appears to be improving.      Patient reports patient learns best by reading and verbal instruction.      Needed: No.   Highest education level: Attended College/Technical School    Vocation/Disability   .  Working for Income: No  If no, reason not working: Patient Choice - Retired    Patient is a retired school super intendant.     Adherence     Patient reports a high level of adherence to patients health care regimen.  Adherence counseling and education provided. Patient verbalizes understanding.    Substance Use    Patient reports the following substance usage.    Tobacco: none, patient denies any  use.  Alcohol: Pt reports he will have a Albert and Coke once in a while.  Illicit Drugs/Non-prescribed Medications: none, patient denies any use.  Patient states clear understanding of the potential impact of substance use.  Substance abstinence/cessation counseling, education and resources provided and reviewed.     Services Utilizing/ADLS    Infusion Service: Prior to admission, patient utilizing? no Pt has used [facility] in the past.  Home Health: Prior to admission, patient utilizing? NO.  Pt will be discharged to home today.  Pt has used [facility] in the past and would like to continue with same.  [facility] will be contacted with HH orders.  The pt and spouse also have a PCA 24 hours a day (one aid every 24 hour shift--self pay)   DME: Prior to admission, yes walker with wheels, wheelchair, rollator, shower chair and BSC.  Pulmonary/Cardiac Rehab: Prior to admission, no  Dialysis:  Prior to admission, no  Transplant Specialty Pharmacy:  Prior to admission, no.    Prior to admission, patient reports patient was not independent with ADLS (pt can dress himself, but not walk)  and was not driving. The pt's spouse and extended family drive.  Patient reports patient is able to care for self at this time..  Patient indicates a willingness to care for self once medically cleared to do so.    Insurance/Medications    Insured by   Payor/Plan Subscr  Sex Relation Sub. Ins. ID Effective Group Num   1. MEDICARE - MELC MARTINEZ 1940 Male  [id number]                                    [address]   2[facility] 1940 [facility]  [id number]                                   [address] [address]      Primary Insurance (for UNOS reporting): Public Insurance - Medicare FFS (Fee For Service)  Secondary Insurance (for UNOS reporting): Private Insurance    Patient reports patient is able to obtain and afford medications at this time and at time of discharge.    Living Will/Healthcare Power of      Patient states patient has a LW and/or HCPA. The pt's HCPA is Alannah Vasquez (pt's daughter) [phone].    Coping/Mental Health    Patient is coping adequately with the aid of  family members. .  Patient indicates mental health difficulties.   The pt reports some issues with depression due to his physical changes and loss of independence.  Pt reports he may be taking a medication for depression, but reports he takes so many medications he is not sure.  The pt reports he is going to a MD next week for his back and hopes this doctor will be able to provide some relief.  Worker provided general support.      Discharge Planning    At time of discharge, patient plans to return to patient's home under the care of spouse, daughter and aid.  Patients spouse will transport patient.  Per rounds today, expected discharge date is today. . Patient and patients caregiver  verbalize understanding and are involved in treatment planning and discharge process.  The pt reports he is in agreement with discharge to home today with  services.      Additional Concerns    Patient is being followed for needs, education, resources, information, emotional support, supportive counseling, and for supportive and skilled discharge plan of care.  providing ongoing psychosocial support, education, resources and d/c planning as needed.  SW remains available. Patient denies additional needs and/or concerns at this time. Patient verbalizes understanding and agreement with information reviewed, social work availability, and how to access available resources as needed.

## 2017-12-04 NOTE — DISCHARGE SUMMARY
[facility]  Heart Transplant  Discharge Summary      Patient Name: Kev Vasquez  MRN: [id number]  Admission Date: 12/1/2017  Hospital Length of Stay: 3 days  Discharge Date and Time: 12/04/2017 3:28 PM  Attending Physician: Miguel A Marcelo Jr.,*   Discharging Provider: Charlette Jasmine NP  Primary Care Provider: Mega Collins MD     HPI: 78 yo male s/p HM III 10/16/16 as DT for ICM in [place] (5800), chronic Pseudomonas DL infection, on Cipro and followed by Dr. Muhammad, [place], on Tikosyn (intolerant to Amiodarone per outside records), h/o SSS, S/P [facility] BiV ICD, HILLARY/BiPAP, HTN, CKD, HLP, gout and depression presented as a direct admit for HTN and volume overload.    * No surgery found *     Hospital Course: TTE on day of admit showed LVEDD 6.7, LVEF 25-30%, diastolic dysfunction, severely enlarged and severely depressed RV, severe TR, PAS 30, IVC 15, ARTHUR q beat and septum midline at 5800. He was diuresed with Lasix 80 mg IVP with a total weight loss of 17# (weight on discharge was 85.9 kg - 188#). BNP came down to 278 from 551 on admit.  Given h/o orthostic hypotension and syncope, BP's were checked only while sitting or standing. He is pulsatile. Doxazosin was added and Tamulosin D/C'd. Goal MAP was determined to be 90 or less. Ropinirole was D/C'd on admit 2/2 gait imbalance, and he was followed by PT/OT as he remains bery deconditioned. He is also incontinent on urine. Creatinine on admit was 1.7 and has started to climb, so the IVP Lasix was D/C'd 12/3/17. Creatinine on day of D/C was 2.0. It was decided that he will go home on Lasix 80 mg every M-W-F (was not on any diuretic therapy prior to admit). Home Health will be resumed for INR monitoring and PT/OT. He will f/u with Dr. Santana on 12/14/17.        Significant Diagnostic Studies: See above    Pending Diagnostic Studies:     None        Final Active Diagnoses:    Diagnosis Date Noted POA    PRINCIPAL PROBLEM:  Acute on chronic  systolic and diastolic heart failure, NYHA class 4 [I50.43] 12/01/2017 Yes    Essential hypertension [I10] 12/01/2017 Yes     Chronic    LVAD (left ventricular assist device) present [Z95.811] 07/20/2017 Not Applicable    Stage 3 chronic kidney disease [N18.3] 07/20/2017 Yes    Gait instability [R26.81] 08/08/2017 Yes    VT (ventricular tachycardia) [I47.2] 07/25/2017 Yes    Chronic anticoagulation [Z79.01] 07/21/2017 Not Applicable    Ischemic cardiomyopathy [I25.5] 07/20/2017 Yes    Pulmonary hypertension due to left ventricular systolic dysfunction [I27.22] 07/29/2015 Yes    Coronary artery disease involving native coronary artery of native heart without angina pectoris [I25.10] 07/27/2015 Yes    ICD (implantable cardioverter-defibrillator), biventricular, in situ [Z95.810] 07/27/2015 Yes    S/P CABG (coronary artery bypass graft) [Z95.1] 07/27/2015 Not Applicable      Problems Resolved During this Admission:    Diagnosis Date Noted Date Resolved POA      Discharged Condition: stable    Disposition: Home-Health Care c    Follow Up:  Follow-up Information     Carlito Santana MD On 12/14/2017.    Specialties:  Transplant, Cardiology  Contact information:  [address] [place] [address] [id number] [phone]                 Patient Instructions:     Diet general   Order Specific Question Answer Comments   Na restriction, if any: 2gNa    Fluid restriction: Fluid - 1500mL      Activity as tolerated     Call MD for:  temperature >100.4     Call MD for:  persistent nausea and vomiting or diarrhea     Call MD for:  severe uncontrolled pain     Call MD for:  redness, tenderness, or signs of infection (pain, swelling, redness, odor or green/yellow discharge around incision site)     Call MD for:  difficulty breathing or increased cough     Call MD for:  severe persistent headache     Call MD for:  worsening rash     Call MD for:  persistent dizziness, light-headedness, or visual disturbances     Call MD for:   increased confusion or weakness       Medications:  Reconciled Home Medications:   Current Discharge Medication List      START taking these medications    Details   doxazosin (CARDURA) 4 MG tablet Take 1 tablet (4 mg total) by mouth once daily.  Qty: 30 tablet, Refills: 11      furosemide (LASIX) 80 MG tablet Take orally daily on Monday, Wednesday and Friday as directed  Qty: 30 tablet, Refills: 11         CONTINUE these medications which have CHANGED    Details   warfarin (COUMADIN) 4 MG tablet Take 4 mg orally on Sun, Tue, Thurs and 6 mg orally on other days as directed by coumadin clinic  Refills: 11         CONTINUE these medications which have NOT CHANGED    Details   allopurinol (ZYLOPRIM) 300 MG tablet Take 300 mg by mouth once daily.      amLODIPine (NORVASC) 10 MG tablet Take 1 tablet (10 mg total) by mouth once daily.  Qty: 30 tablet, Refills: 11      aspirin 81 MG Chew Take 81 mg by mouth once daily.      atorvastatin (LIPITOR) 10 MG tablet Take 10 mg by mouth once daily.      buPROPion (WELLBUTRIN XL) 300 MG 24 hr tablet Take 1 tablet (300 mg total) by mouth once daily.  Qty: 30 tablet, Refills: 11      calcium-vitamin D tablet 600 mg-200 units Take 1 tablet by mouth once daily.      ciprofloxacin HCl (CIPRO) 750 MG tablet Take 1 tablet (750 mg total) by mouth 2 (two) times daily.  Qty: 60 tablet, Refills: 11    Associated Diagnoses: Hardware complicating wound infection, subsequent encounter; Left ventricular assist device (LVAD) complication, subsequent encounter; Pseudomonas aeruginosa infection      famotidine (PEPCID) 20 MG tablet Take 2 tablets (40 mg total) by mouth every evening.  Qty: 60 tablet, Refills: 11      ferrous sulfate 324 mg (65 mg iron) TbEC Take 1 tablet (324 mg total) by mouth once daily.  Qty: 30 tablet, Refills: 11      folic acid (FOLVITE) 1 MG tablet Take 1 mg by mouth once daily.      hydrALAZINE (APRESOLINE) 100 MG tablet Take 1 tablet (100 mg total) by mouth every 8 (eight)  hours.  Qty: 90 tablet, Refills: 11      isosorbide dinitrate (ISORDIL) 40 MG Tab Take 1 tablet (40 mg total) by mouth 3 (three) times daily.  Qty: 90 tablet, Refills: 11      Lactobacillus rhamnosus GG (CULTURELLE) 10 billion cell capsule Take 1 capsule by mouth once daily.      lisinopril (PRINIVIL,ZESTRIL) 20 MG tablet Take 1 tablet (20 mg total) by mouth 2 (two) times daily.  Qty: 180 tablet, Refills: 3      magnesium oxide (MAG-OX) 400 mg tablet Take 1 tablet (400 mg total) by mouth 2 (two) times daily.  Qty: 60 tablet, Refills: 11      multivitamin capsule Take 1 capsule by mouth once daily.      promethazine (PHENERGAN) 12.5 MG Tab Take 1 tablet (12.5 mg total) by mouth every 6 (six) hours as needed.  Qty: 30 tablet, Refills: 3      TIKOSYN 250 mcg Cap Take 1 capsule (250 mcg total) by mouth every 12 (twelve) hours.  Qty: 60 capsule, Refills: 11    Associated Diagnoses: [place]ricular [place] [id number]  Heart Transplant  [facility]

## 2017-12-05 ENCOUNTER — PATIENT OUTREACH (OUTPATIENT)
Dept: ADMINISTRATIVE | Facility: CLINIC | Age: 77
End: 2017-12-05

## 2017-12-05 ENCOUNTER — TELEPHONE (OUTPATIENT)
Dept: ADMINISTRATIVE | Facility: CLINIC | Age: 77
End: 2017-12-05

## 2017-12-05 RX ORDER — FUROSEMIDE 80 MG/1
TABLET ORAL
Qty: 45 TABLET | Refills: 3 | Status: SHIPPED | OUTPATIENT
Start: 2017-12-05 | End: 2018-01-24 | Stop reason: ALTCHOICE

## 2017-12-05 RX ORDER — DOCUSATE SODIUM 100 MG/1
100 CAPSULE, LIQUID FILLED ORAL DAILY PRN
COMMUNITY

## 2017-12-05 NOTE — PATIENT INSTRUCTIONS

## 2017-12-05 NOTE — PROGRESS NOTES
Per note: Mr. Kev Vasquez is being discharged today following admission for evaluation of elevated blood pressure. His pMH is significant for H M3 lvad from 10/16, CDK, CAD, VT, HTN, HL, and gout.       His inr goal remains 2-3, with heparin bridging as required. He continues on aspirin 81mg orally daily. He was administered coumadin as 4mg orally daily. His inr today is 2.3. He was instructed to resume his last home coumadin dose of 4 mg on Sun, Tue, Thu, and 6mg all other days. Follow up inr requested for Thursday with home health.

## 2017-12-05 NOTE — PT/OT/SLP DISCHARGE
Physical Therapy Discharge Summary    Name: Kev Vasquez  MRN: [id number]   Principal Problem: Acute on chronic systolic and diastolic heart failure, NYHA class 4     Patient Discharged from acute Physical Therapy on 17.  Please refer to prior PT noted date on 17 for functional status.     Assessment:     Patient was discharged unexpectedly.  Information required to complete an accurate discharge summary is unknown.  Refer to therapy initial evaluation and last progress note for initial and most recent functional status and goal achievement.  Recommendations made may be found in medical record.    Objective:     GOALS:    Physical Therapy Goals        Problem: Physical Therapy Goal    Goal Priority Disciplines Outcome Goal Variances Interventions   Physical Therapy Goal     PT/OT, PT Ongoing (interventions implemented as appropriate)     Description:  Goals to be met by: 17     Patient will increase functional independence with mobility by performin. Sit to stand transfer with Stand-by Assistance  2. Gait  x 300 feet with Stand-by Assistance using Rolling Walker.   4. Stand for 2 minutes with Stand-by Assistance without LOB while performing dynamic task with AD as needed  5. Lower extremity exercise program x15 reps, with supervision, in order to increase LE strength and (I) with functional mobility.                       Reasons for Discontinuation of Therapy Services  Transfer to alternate level of care.      Plan:     Patient Discharged to: Home with Home Health Service.    Wendy Ackerman, PT, DPT   2017  [phone]

## 2017-12-07 ENCOUNTER — ANTI-COAG VISIT (OUTPATIENT)
Dept: CARDIOLOGY | Facility: CLINIC | Age: 77
End: 2017-12-07

## 2017-12-07 ENCOUNTER — LAB VISIT (OUTPATIENT)
Dept: LAB | Facility: HOSPITAL | Age: 77
End: 2017-12-07
Attending: INTERNAL MEDICINE
Payer: MEDICARE

## 2017-12-07 DIAGNOSIS — Z79.01 CHRONIC ANTICOAGULATION: ICD-10-CM

## 2017-12-07 DIAGNOSIS — Z95.811 LVAD (LEFT VENTRICULAR ASSIST DEVICE) PRESENT: ICD-10-CM

## 2017-12-07 DIAGNOSIS — Z95.811 LVAD (LEFT VENTRICULAR ASSIST DEVICE) PRESENT: Primary | ICD-10-CM

## 2017-12-07 DIAGNOSIS — Z79.01 LONG-TERM (CURRENT) USE OF ANTICOAGULANTS: ICD-10-CM

## 2017-12-07 LAB
INR PPP: 1.9
PROTHROMBIN TIME: 20.7 SEC

## 2017-12-07 PROCEDURE — 36415 COLL VENOUS BLD VENIPUNCTURE: CPT | Mod: PO

## 2017-12-07 PROCEDURE — 85610 PROTHROMBIN TIME: CPT | Mod: PO

## 2017-12-07 NOTE — PROGRESS NOTES
Rubi questioned and confirmed correct dose .  Reports patient is now taking doxazosin (CARDURA) 4 MG .  No other changes.

## 2017-12-11 ENCOUNTER — ANTI-COAG VISIT (OUTPATIENT)
Dept: CARDIOLOGY | Facility: CLINIC | Age: 77
End: 2017-12-11

## 2017-12-11 DIAGNOSIS — Z79.01 CHRONIC ANTICOAGULATION: ICD-10-CM

## 2017-12-11 DIAGNOSIS — Z95.811 LVAD (LEFT VENTRICULAR ASSIST DEVICE) PRESENT: ICD-10-CM

## 2017-12-11 LAB — INR PPP: 2.57

## 2017-12-14 ENCOUNTER — CLINICAL SUPPORT (OUTPATIENT)
Dept: TRANSPLANT | Facility: CLINIC | Age: 77
End: 2017-12-14
Payer: MEDICARE

## 2017-12-14 ENCOUNTER — OFFICE VISIT (OUTPATIENT)
Dept: INFECTIOUS DISEASES | Facility: CLINIC | Age: 77
End: 2017-12-14
Payer: MEDICARE

## 2017-12-14 ENCOUNTER — LAB VISIT (OUTPATIENT)
Dept: LAB | Facility: HOSPITAL | Age: 77
End: 2017-12-14
Attending: INTERNAL MEDICINE
Payer: MEDICARE

## 2017-12-14 ENCOUNTER — ANTI-COAG VISIT (OUTPATIENT)
Dept: CARDIOLOGY | Facility: CLINIC | Age: 77
End: 2017-12-14

## 2017-12-14 ENCOUNTER — OFFICE VISIT (OUTPATIENT)
Dept: TRANSPLANT | Facility: CLINIC | Age: 77
End: 2017-12-14
Payer: MEDICARE

## 2017-12-14 VITALS
SYSTOLIC BLOOD PRESSURE: 78 MMHG | HEIGHT: 72 IN | HEART RATE: 153 BPM | BODY MASS INDEX: 25.73 KG/M2 | TEMPERATURE: 98 F | WEIGHT: 190 LBS

## 2017-12-14 VITALS — BODY MASS INDEX: 26.61 KG/M2 | HEIGHT: 72 IN | TEMPERATURE: 99 F | WEIGHT: 196.44 LBS

## 2017-12-14 DIAGNOSIS — Z95.811 HEART REPLACED BY HEART ASSIST DEVICE: ICD-10-CM

## 2017-12-14 DIAGNOSIS — T82.9XXD COMPLICATION INVOLVING LEFT VENTRICULAR ASSIST DEVICE (LVAD), SUBSEQUENT ENCOUNTER: ICD-10-CM

## 2017-12-14 DIAGNOSIS — T82.9XXD LEFT VENTRICULAR ASSIST DEVICE (LVAD) COMPLICATION, SUBSEQUENT ENCOUNTER: ICD-10-CM

## 2017-12-14 DIAGNOSIS — T84.7XXD HARDWARE COMPLICATING WOUND INFECTION, SUBSEQUENT ENCOUNTER: Primary | ICD-10-CM

## 2017-12-14 DIAGNOSIS — Z95.810 ICD (IMPLANTABLE CARDIOVERTER-DEFIBRILLATOR), BIVENTRICULAR, IN SITU: ICD-10-CM

## 2017-12-14 DIAGNOSIS — Z95.811 LVAD (LEFT VENTRICULAR ASSIST DEVICE) PRESENT: ICD-10-CM

## 2017-12-14 DIAGNOSIS — Z95.811 HEART REPLACED BY HEART ASSIST DEVICE: Primary | ICD-10-CM

## 2017-12-14 DIAGNOSIS — A49.8 PSEUDOMONAS AERUGINOSA INFECTION: ICD-10-CM

## 2017-12-14 DIAGNOSIS — I25.5 ISCHEMIC CARDIOMYOPATHY: ICD-10-CM

## 2017-12-14 DIAGNOSIS — I50.42 CHRONIC COMBINED SYSTOLIC AND DIASTOLIC CONGESTIVE HEART FAILURE: ICD-10-CM

## 2017-12-14 DIAGNOSIS — Z79.01 CHRONIC ANTICOAGULATION: ICD-10-CM

## 2017-12-14 LAB
ALBUMIN SERPL BCP-MCNC: 3.7 G/DL
ALP SERPL-CCNC: 81 U/L
ALT SERPL W/O P-5'-P-CCNC: 19 U/L
ANION GAP SERPL CALC-SCNC: 8 MMOL/L
AST SERPL-CCNC: 19 U/L
BASOPHILS # BLD AUTO: 0.04 K/UL
BASOPHILS NFR BLD: 0.7 %
BILIRUB DIRECT SERPL-MCNC: 0.3 MG/DL
BILIRUB SERPL-MCNC: 0.7 MG/DL
BNP SERPL-MCNC: 621 PG/ML
BUN SERPL-MCNC: 21 MG/DL
CALCIUM SERPL-MCNC: 10.2 MG/DL
CHLORIDE SERPL-SCNC: 105 MMOL/L
CO2 SERPL-SCNC: 27 MMOL/L
CREAT SERPL-MCNC: 1.6 MG/DL
CRP SERPL-MCNC: 5.4 MG/L
DIFFERENTIAL METHOD: ABNORMAL
EOSINOPHIL # BLD AUTO: 0.1 K/UL
EOSINOPHIL NFR BLD: 2 %
ERYTHROCYTE [DISTWIDTH] IN BLOOD BY AUTOMATED COUNT: 15.7 %
EST. GFR  (AFRICAN AMERICAN): 48 ML/MIN/1.73 M^2
EST. GFR  (NON AFRICAN AMERICAN): 41.5 ML/MIN/1.73 M^2
GLUCOSE SERPL-MCNC: 118 MG/DL
GRAM STN SPEC: NORMAL
GRAM STN SPEC: NORMAL
HCT VFR BLD AUTO: 34.1 %
HGB BLD-MCNC: 11.2 G/DL
IMM GRANULOCYTES # BLD AUTO: 0.03 K/UL
IMM GRANULOCYTES NFR BLD AUTO: 0.5 %
INR PPP: 2.7
LDH SERPL L TO P-CCNC: 191 U/L
LYMPHOCYTES # BLD AUTO: 0.9 K/UL
LYMPHOCYTES NFR BLD: 15.4 %
MAGNESIUM SERPL-MCNC: 2.3 MG/DL
MCH RBC QN AUTO: 29.4 PG
MCHC RBC AUTO-ENTMCNC: 32.8 G/DL
MCV RBC AUTO: 90 FL
MONOCYTES # BLD AUTO: 0.8 K/UL
MONOCYTES NFR BLD: 13.5 %
NEUTROPHILS # BLD AUTO: 4 K/UL
NEUTROPHILS NFR BLD: 67.9 %
NRBC BLD-RTO: 0 /100 WBC
PHOSPHATE SERPL-MCNC: 2.7 MG/DL
PLATELET # BLD AUTO: 186 K/UL
PMV BLD AUTO: 11.4 FL
POTASSIUM SERPL-SCNC: 3.6 MMOL/L
PREALB SERPL-MCNC: 23 MG/DL
PROT SERPL-MCNC: 7.3 G/DL
PROTHROMBIN TIME: 27.2 SEC
RBC # BLD AUTO: 3.81 M/UL
SODIUM SERPL-SCNC: 140 MMOL/L
WBC # BLD AUTO: 5.92 K/UL

## 2017-12-14 PROCEDURE — 87070 CULTURE OTHR SPECIMN AEROBIC: CPT

## 2017-12-14 PROCEDURE — 87205 SMEAR GRAM STAIN: CPT

## 2017-12-14 PROCEDURE — 99999 PR PBB SHADOW E&M-EST. PATIENT-LVL III: CPT | Mod: PBBFAC,,, | Performed by: INTERNAL MEDICINE

## 2017-12-14 PROCEDURE — 99214 OFFICE O/P EST MOD 30 MIN: CPT | Mod: S$PBB,,, | Performed by: INTERNAL MEDICINE

## 2017-12-14 PROCEDURE — 99213 OFFICE O/P EST LOW 20 MIN: CPT | Mod: PBBFAC,27 | Performed by: INTERNAL MEDICINE

## 2017-12-14 PROCEDURE — 83615 LACTATE (LD) (LDH) ENZYME: CPT

## 2017-12-14 PROCEDURE — 82248 BILIRUBIN DIRECT: CPT

## 2017-12-14 PROCEDURE — 84134 ASSAY OF PREALBUMIN: CPT

## 2017-12-14 PROCEDURE — 85610 PROTHROMBIN TIME: CPT

## 2017-12-14 PROCEDURE — 83880 ASSAY OF NATRIURETIC PEPTIDE: CPT

## 2017-12-14 PROCEDURE — 83735 ASSAY OF MAGNESIUM: CPT

## 2017-12-14 PROCEDURE — 87077 CULTURE AEROBIC IDENTIFY: CPT

## 2017-12-14 PROCEDURE — 80053 COMPREHEN METABOLIC PANEL: CPT

## 2017-12-14 PROCEDURE — 87186 SC STD MICRODIL/AGAR DIL: CPT

## 2017-12-14 PROCEDURE — 86140 C-REACTIVE PROTEIN: CPT

## 2017-12-14 PROCEDURE — 93750 INTERROGATION VAD IN PERSON: CPT | Mod: ,,, | Performed by: INTERNAL MEDICINE

## 2017-12-14 PROCEDURE — 85025 COMPLETE CBC W/AUTO DIFF WBC: CPT

## 2017-12-14 PROCEDURE — 36415 COLL VENOUS BLD VENIPUNCTURE: CPT

## 2017-12-14 PROCEDURE — 84100 ASSAY OF PHOSPHORUS: CPT

## 2017-12-14 PROCEDURE — 99213 OFFICE O/P EST LOW 20 MIN: CPT | Mod: PBBFAC | Performed by: INTERNAL MEDICINE

## 2017-12-14 NOTE — PROGRESS NOTES
Subjective:      Patient ID: Kev Vasquez is a 77 y.o. male.    Chief Complaint:No chief complaint on file.    History of Present Illness    Mr. Vasquez is a 78 y/o male with a history of CHF managed with an LVAD.  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  He was on IV cefepime but has now been transitioned to ciprofloxacin.  He reports compliance with the ciprofloxacin.  He is here today for follow up.    Review of Systems   Constitution: Negative for chills, decreased appetite, fever, weakness, malaise/fatigue, night sweats, weight gain and weight loss.   HENT: Negative for congestion, ear pain, hearing loss, hoarse voice, sore throat and tinnitus.    Eyes: Negative for blurred vision, redness and visual disturbance.   Cardiovascular: Negative for chest pain, leg swelling and palpitations.   Respiratory: Negative for cough, hemoptysis, shortness of breath and sputum production.    Hematologic/Lymphatic: Negative for adenopathy. Does not bruise/bleed easily.   Skin: Positive for dry skin. Negative for itching, rash and suspicious lesions.   Musculoskeletal: Negative for back pain, joint pain, myalgias and neck pain.   Gastrointestinal: Positive for constipation. Negative for abdominal pain, diarrhea, heartburn, nausea and vomiting.   Genitourinary: Negative for dysuria, flank pain, frequency, hematuria, hesitancy and urgency.   Neurological: Positive for numbness. Negative for dizziness, headaches and paresthesias.   Psychiatric/Behavioral: Positive for depression. Negative for memory loss. The patient does not have insomnia and is not nervous/anxious.      Objective:   Physical Exam   Constitutional: He is oriented to person, place, and time. He appears well-developed and well-nourished. No distress.   HENT:   Head: Normocephalic and atraumatic.   Right Ear: External ear normal.   Left Ear: External ear normal.   Nose: Nose normal.   Mouth/Throat: Oropharynx is clear and moist. No  oropharyngeal exudate.   Eyes: Conjunctivae and EOM are normal. Pupils are equal, round, and reactive to light. Right eye exhibits no discharge. Left eye exhibits no discharge. No scleral icterus.   Neck: Normal range of motion. Neck supple. No JVD present. No tracheal deviation present. No thyromegaly present.   Cardiovascular: Normal rate, regular rhythm and intact distal pulses.  Exam reveals no gallop and no friction rub.    No murmur heard.  Pulmonary/Chest: Effort normal and breath sounds normal. No stridor. No respiratory distress. He has no wheezes. He has no rales. He exhibits no tenderness.   Abdominal: Soft. Bowel sounds are normal. He exhibits no distension and no mass. There is no tenderness. There is no rebound and no guarding.   #1. LVAD DLES:  There is yellow drainage and some surrounding erythema.   Musculoskeletal: Normal range of motion. He exhibits no edema or tenderness.   Lymphadenopathy:     He has no cervical adenopathy.   Neurological: He is alert and oriented to person, place, and time. He has normal reflexes. He displays normal reflexes. No cranial nerve deficit. He exhibits normal muscle tone. Coordination normal.   Skin: Skin is warm. No rash noted. He is not diaphoretic. No erythema. No pallor.   Psychiatric: He has a normal mood and affect. His behavior is normal. Judgment and thought content normal.   Nursing note and vitals reviewed.               Assessment:       1. Hardware complicating wound infection, subsequent encounter    2. Pseudomonas aeruginosa infection    3. Left ventricular assist device (LVAD) complication, subsequent encounter        76 y/o with pseudomonas infection of LVAD.  Labs and studies reviewed.  Site examined.  There is increased drainage from the site.  Will obtain repeat cultures.  He is to continue taking the ciprofloxacin.  I will modify his antibiotics based on the results of the cultures.  Plan:       Hardware complicating wound infection, subsequent  encounter  -     Gram stain; Future; Expected date: 12/14/2017  -     Aerobic culture; Future; Expected date: 12/14/2017    Pseudomonas aeruginosa infection  -     Gram stain; Future; Expected date: 12/14/2017  -     Aerobic culture; Future; Expected date: 12/14/2017    Left ventricular assist device (LVAD) complication, subsequent encounter  -     Gram stain; Future; Expected date: 12/14/2017  -     Aerobic culture; Future; Expected date: 12/14/2017

## 2017-12-14 NOTE — PROCEDURES
TXP SANTOSH INTERROGATIONS 12/14/2017 12/4/2017 12/4/2017 12/4/2017 12/3/2017 12/3/2017 12/3/2017   Type HeartMate3 - - - - - -   Flow 5.0 - - - - - -   Speed 5800 - - - - - -   PI 3.0 - - - - - -   Power (Mendez) 4.5 - - - - - -   LSL 5400 - - - - - -   Pulsatility Intermittent pulse Pulse Pulse Pulse Pulse Pulse Pulse   }

## 2017-12-14 NOTE — PROGRESS NOTES
"Date of Implant with Heartmate 3 LVAD: 10/19/2016 at [facility]    PATIENT ARRIVED IN CLINIC:  Via wheelchair  Accompanied by: wife and caregiver    Vitals  Temperature, oral: 98.4  PAIN: denies on 0-10 pain scale, location of pain: n/a, description of pain: n/a  Is patient currently on medications for pain? no What kind? n/a  Does this help relieve the pain? n/a    VAD Interrogation:  TXP SANTOSH INTERROGATIONS 2017   Type HeartMate3   Flow 5.0   Speed 5800   PI 3.0   Power (Mendez) 4.5   LSL 5400   Pulsatility Intermittent pulse       Flow in history: 4s-5s  History [id number]  HCT:   Lab Results   Component Value Date    HCT 34.1 (L) 2017    HCT 40 2017         Problems / Issues / Alarms with VAD if any: None noted  Any Equipment Issues: None noted (Refer to  note for complete details)   Emergency Equipment With Patient: yes   VAD Binder With Patient: no   Reviewed VAD Numbers In Binder: no  VAD Sounds: HM3 sound Smooth  Manual & Visual Inspection Of Driveline: No kinks or tears noted    LVAD Dressing/DLES:  Appearance Of Driveline: "3" red with moderate amount brown drainage  Antibiotics: YES cipro  Velour: yes  Frequency of Dressing Changes: daily & soap and water dressing  Stabilization Device In Use: yes, silverio securement device    Modular Cable Connection Intact: No yellow exposed and Attempted to unscrew modular cable to ensure it will be able to come lose in the event we ever need to change the modular cable while patient held the driveline in place so it would not move. Modular cable connection able to be unscrewed and re-tightened. Instructed pt to perform this weekly.  Pt In Need Of Management Kits?:Yes - 1 box soap and water kits ordered  It is medically necessary to have VAD management kits in order to prevent infection or to assist in the healing of an infected DLES.    Assessment:   Complaints/reason for visit today: routine  Complaints Of Nausea / " Vomiting: None noted    Appearance and Frequency Of Stools: normal and formed without blood & every other day  Color Of Urine: straw  Coping/Depression/Anxiety: depressed  Sleep Habits: 6 hrs /night  Sleep Aids: None noted  Showering: No  Activity/Exercise: 15 minutes daily   Driving: No.    Labs:    Chemistry        Component Value Date/Time     12/14/2017 1335    K 3.6 12/14/2017 1335     12/14/2017 1335    CO2 27 12/14/2017 1335    BUN 21 12/14/2017 1335    CREATININE 1.6 (H) 12/14/2017 1335     (H) 12/14/2017 1335        Component Value Date/Time    CALCIUM 10.2 12/14/2017 1335    ALKPHOS 81 12/14/2017 1335    AST 19 12/14/2017 1335    ALT 19 12/14/2017 1335    BILITOT 0.7 12/14/2017 1335    ESTGFRAFRICA 48.0 (A) 12/14/2017 1335    EGFRNONAA 41.5 (A) 12/14/2017 1335            Magnesium   Date Value Ref Range Status   12/14/2017 2.3 1.6 - 2.6 mg/dL Final       Lab Results   Component Value Date    WBC 5.92 12/14/2017    HGB 11.2 (L) 12/14/2017    HCT 34.1 (L) 12/14/2017    MCV 90 12/14/2017     12/14/2017       Lab Results   Component Value Date    INR 2.7 (H) 12/14/2017    INR 2.57 12/11/2017    INR 1.9 (H) 12/07/2017       BNP   Date Value Ref Range Status   12/14/2017 621 (H) 0 - 99 pg/mL Final     Comment:     Values of less than 100 pg/ml are consistent with non-CHF populations.   12/04/2017 278 (H) 0 - 99 pg/mL Final     Comment:     Values of less than 100 pg/ml are consistent with non-CHF populations.   12/04/2017 268 (H) 0 - 99 pg/mL Final     Comment:     Values of less than 100 pg/ml are consistent with non-CHF populations.       LD   Date Value Ref Range Status   12/14/2017 191 110 - 260 U/L Final     Comment:     Results are increased in hemolyzed samples.   12/04/2017 163 110 - 260 U/L Final     Comment:     Results are increased in hemolyzed samples.   12/03/2017 174 110 - 260 U/L Final     Comment:     Results are increased in hemolyzed samples.       Labs reviewed with  patient: YES      Patient Satisfaction Survey completed per patient: no  (explained about signature and box to check)  Medication reconciliation: per MA.  Purple card updated today: no  Coumadin Managed by: [facility], INR 2.7    Education: Reviewed driveline care, emergency procedures, how to change the controller, alarms with patient, as well as discussed how to page the VAD coordinator in case of an emergency.      Plans/Needs: Patient in for routine follow up. DLES looks worse (picture in Dr Muhammad's note). Patient saw Dr Muhammad in clinic this afternoon, where site was re-cultured. Per patient family, Dr Muhammad to wait for results of culture before making any changes to medication. May restart IV antibiotics. Refer to Dr Muhammad's note. Otherwise, patient states he is feeling better since discharge. Denies SOB or swelling. . Cr good at 1.6 today. Will have to monitor BNP and S/S of overload. No changes made today. Patient to RTC in 1 month, refer to MD note.     Hurricane Season: No

## 2017-12-14 NOTE — PROGRESS NOTES
"Subjective:   Patient ID:  Kev Vasquez is a 77 y.o. male who presents for LVAD followup visit.    Implant Date:10-19-16 @ [facility] 3 RPM 5800     INR goal: 2-3   Bridge with Heparin   Antiplatelets: ASA 81 mg    TXP SANTOSH INTERROGATIONS 12/14/2017   Type HeartMate3   Flow 5.0   Speed 5800   PI 3.0   Power (Mendez) 4.5   LSL 5400   Pulsatility Intermittent pulse       HPI   76 yo male s/p HM III 10/16/16 as DT for ICM in [place] (5800), chronic Pseudomonas DL infection, on Cipro and followed by Dr. Muhammad, [place], on Tikosyn (intolerant to Amiodarone per outside records), h/o SSS, S/P [facility] BiV ICD, HILLARY/BiPAP, HTN, CKD, HLP, gout and depression who coems for a follow-up visit. He was recently discharged from the hospital after being treated for ADHF. VAD speed is at 5800 rpm. No VAD alarms noted on interrogation occasional PI events. BP is 78. DLES is a "3" with moderate drainage. INR is therapeutic at 2.7. LDH is 191 at baseline.     Review of Systems   Constitution: Negative. Negative for chills, decreased appetite, diaphoresis, fever, weakness, malaise/fatigue, night sweats, weight gain and weight loss.   Eyes: Negative.    Cardiovascular: Negative for chest pain, claudication, cyanosis, dyspnea on exertion, irregular heartbeat, leg swelling, near-syncope, orthopnea, palpitations, paroxysmal nocturnal dyspnea and syncope.   Respiratory: Negative for cough, hemoptysis and shortness of breath.    Endocrine: Negative.    Hematologic/Lymphatic: Negative.    Skin: Negative for color change, dry skin and nail changes.   Musculoskeletal: Negative.    Gastrointestinal: Negative.    Genitourinary: Negative.        Objective:   Blood pressure (!) 78/0, pulse (!) 153, temperature 98.4 °F (36.9 °C), temperature source Oral, height 6' (1.829 m), weight 86.2 kg (190 lb).body mass index is 25.77 kg/m².    Doppler: 78    Physical Exam   Constitutional: He appears well-developed.   BP (!) 78/0 (BP Location: Left arm, " "Patient Position: Sitting) Comment: doppler  Pulse (!) 153   Temp 98.4 °F (36.9 °C) (Oral)   Ht 6' (1.829 m)   Wt 86.2 kg (190 lb)   BMI 25.77 kg/m²      HENT:   Head: Normocephalic.   Neck: No JVD present. Carotid bruit is not present.   Cardiovascular: Regular rhythm and normal heart sounds.    No murmur heard.  Smooth VAD hum. DLES is "3" with moderate drainage   Pulmonary/Chest: Effort normal and breath sounds normal. No respiratory distress. He has no wheezes. He has no rales.   Abdominal: Soft. Bowel sounds are normal. He exhibits no distension. There is no tenderness. There is no rebound.   Musculoskeletal: He exhibits no edema.   Neurological: He is alert.   Skin: Skin is warm.   Vitals reviewed.      Lab Results   Component Value Date    WBC 5.92 12/14/2017    HGB 11.2 (L) 12/14/2017    HCT 34.1 (L) 12/14/2017    MCV 90 12/14/2017     12/14/2017    CO2 27 12/14/2017    CREATININE 1.6 (H) 12/14/2017    CALCIUM 10.2 12/14/2017    ALBUMIN 3.7 12/14/2017    AST 19 12/14/2017     (H) 12/14/2017    ALT 19 12/14/2017     12/14/2017       Lab Results   Component Value Date    INR 2.7 (H) 12/14/2017    INR 2.57 12/11/2017    INR 1.9 (H) 12/07/2017       BNP   Date Value Ref Range Status   12/14/2017 621 (H) 0 - 99 pg/mL Final     Comment:     Values of less than 100 pg/ml are consistent with non-CHF populations.   12/04/2017 278 (H) 0 - 99 pg/mL Final     Comment:     Values of less than 100 pg/ml are consistent with non-CHF populations.   12/04/2017 268 (H) 0 - 99 pg/mL Final     Comment:     Values of less than 100 pg/ml are consistent with non-CHF populations.       LD   Date Value Ref Range Status   12/14/2017 191 110 - 260 U/L Final     Comment:     Results are increased in hemolyzed samples.   12/04/2017 163 110 - 260 U/L Final     Comment:     Results are increased in hemolyzed samples.   12/03/2017 174 110 - 260 U/L Final     Comment:     Results are increased in hemolyzed samples. "       Assessment:      1. Heart replaced by heart assist device    2. Chronic combined systolic and diastolic congestive heart failure    3. Ischemic cardiomyopathy    4. ICD (implantable cardioverter-defibrillator), biventricular, in situ    5. Complication involving left ventricular assist device (LVAD), subsequent encounter        Plan:   Patient is now NYHA class II. BP is controlled.  INR is therapeutic.  VAD interrogation was performed in clinic  Provided with  VAD supplies.   Recommend 2 gram sodium restriction and 1500cc fluid restriction.  Encourage physical activity with graded exercise program.  Requested patient to weigh themselves daily, and to notify us if their weight increases by more than 3 lbs in 1 day or 5 lbs in 1 week.     Listed for transplant: DT    UNOS Patient Status  Functional Status: 80% - Normal activity with effort: some symptoms of disease  Physical Capacity: No Limitations  Working for Income: Unknown    Carlito Santana MD

## 2017-12-15 ENCOUNTER — TELEPHONE (OUTPATIENT)
Dept: SLEEP MEDICINE | Facility: CLINIC | Age: 77
End: 2017-12-15

## 2017-12-15 ENCOUNTER — OFFICE VISIT (OUTPATIENT)
Dept: INTERNAL MEDICINE | Facility: CLINIC | Age: 77
End: 2017-12-15
Payer: MEDICARE

## 2017-12-15 VITALS
TEMPERATURE: 99 F | BODY MASS INDEX: 26.94 KG/M2 | DIASTOLIC BLOOD PRESSURE: 80 MMHG | WEIGHT: 198.88 LBS | OXYGEN SATURATION: 99 % | SYSTOLIC BLOOD PRESSURE: 116 MMHG | HEIGHT: 72 IN | HEART RATE: 83 BPM

## 2017-12-15 DIAGNOSIS — R26.81 GAIT INSTABILITY: Primary | ICD-10-CM

## 2017-12-15 DIAGNOSIS — I50.42 CHRONIC COMBINED SYSTOLIC AND DIASTOLIC CONGESTIVE HEART FAILURE: ICD-10-CM

## 2017-12-15 DIAGNOSIS — Z95.811 LVAD (LEFT VENTRICULAR ASSIST DEVICE) PRESENT: ICD-10-CM

## 2017-12-15 DIAGNOSIS — N18.30 STAGE 3 CHRONIC KIDNEY DISEASE: ICD-10-CM

## 2017-12-15 DIAGNOSIS — I25.10 CORONARY ARTERY DISEASE INVOLVING NATIVE CORONARY ARTERY OF NATIVE HEART WITHOUT ANGINA PECTORIS: ICD-10-CM

## 2017-12-15 DIAGNOSIS — I10 ESSENTIAL HYPERTENSION: Chronic | ICD-10-CM

## 2017-12-15 DIAGNOSIS — G47.33 OSA ON CPAP: ICD-10-CM

## 2017-12-15 DIAGNOSIS — Z00.00 ANNUAL PHYSICAL EXAM: ICD-10-CM

## 2017-12-15 PROCEDURE — 99397 PER PM REEVAL EST PAT 65+ YR: CPT | Mod: S$PBB,,, | Performed by: INTERNAL MEDICINE

## 2017-12-15 PROCEDURE — 99213 OFFICE O/P EST LOW 20 MIN: CPT | Mod: PBBFAC | Performed by: INTERNAL MEDICINE

## 2017-12-15 PROCEDURE — 99999 PR PBB SHADOW E&M-EST. PATIENT-LVL III: CPT | Mod: PBBFAC,,, | Performed by: INTERNAL MEDICINE

## 2017-12-15 NOTE — PATIENT INSTRUCTIONS
Remember to bring in advance directive / living will paperwork so that we can scan this and have it on file.

## 2017-12-15 NOTE — PROGRESS NOTES
Subjective:       Patient ID: Kev Vasquez is a 77 y.o. male.    Chief Complaint: Annual Exam    HPI  76 y/o man with h/o CAD, CHF now on LVAD, HTN, HILLARY, depression here for follow up / annual exam.    Following very closely with Cardiology / Transplant clinic.   Says he has difficulty walking since a fall about a year ago. Saw Dr Victoria for this 11/10/17 for myelopathic symptoms (upper and lower extremities). Not able to have MRI due to LVAD, not able to have myelogram due to coumadin. Recommended to do PT - has had home PT and OT for a time. Reports keeping up with exercises recommended, planning to do outpatient PT (+OT?) once his home health services have concluded.     LVAD placement complicated by hardware infection, following with ID for this.    On BiPAP for HILLARY, currently headgear is broken (straps and mask) so not able to use this regularly.     Thinks he has had both his pneumonia vaccines, last in the past 2-3 years.    Review of Systems   Constitutional: Positive for activity change (exercise tolerance very limited). Negative for appetite change, fatigue and fever.   HENT: Negative.  Negative for congestion.    Eyes: Negative for visual disturbance.   Respiratory: Positive for shortness of breath (dyspnea with exertion >2 blocks). Negative for cough.    Cardiovascular: Positive for leg swelling (baseline very slight leg swelling more on L than R (since knee replacement)). Negative for chest pain and palpitations.   Gastrointestinal: Negative for abdominal pain, blood in stool, diarrhea and nausea.   Endocrine: Negative for cold intolerance, heat intolerance and polydipsia.   Genitourinary: Positive for frequency (with lasix). Negative for difficulty urinating and dysuria.   Musculoskeletal: Positive for gait problem. Negative for arthralgias and back pain.   Skin: Negative for rash.   Allergic/Immunologic: Negative for environmental allergies.   Neurological: Positive for weakness (generalized) and  numbness (both legs). Negative for dizziness, speech difficulty and headaches.   Hematological: Negative for adenopathy.   Psychiatric/Behavioral: Positive for sleep disturbance. Negative for dysphoric mood. The patient is not nervous/anxious.          Past medical history, surgical history, and family medical history reviewed and updated as appropriate.    Medications and allergies reviewed.     Objective:          Vitals:    12/15/17 0908   BP: 116/80   BP Location: Left arm   Patient Position: Sitting   Pulse: 83   Temp: 99.1 °F (37.3 °C)   TempSrc: Oral   SpO2: 99%   Weight: 90.2 kg (198 lb 13.7 oz)   Height: 6' (1.829 m)     Body mass index is 26.97 kg/m².  Physical Exam   Constitutional: He is oriented to person, place, and time. He appears well-developed and well-nourished. No distress.   Pleasant older man, moderately obese, comfortable and conversant.   HENT:   Head: Normocephalic and atraumatic.   Nose: Nose normal.   Mouth/Throat: Oropharynx is clear and moist.   Eyes: Conjunctivae and EOM are normal. Pupils are equal, round, and reactive to light. No scleral icterus.   Neck: Normal range of motion. Neck supple. No JVD present. No thyromegaly present.   Cardiovascular: Normal rate.    LVAD hum present   Pulmonary/Chest: Effort normal and breath sounds normal. No respiratory distress. He has no wheezes. He has no rales.   Abdominal: Soft. Bowel sounds are normal. He exhibits no distension. There is no tenderness.   Musculoskeletal: Normal range of motion. He exhibits no edema (very slight / trace edema at ankles, otherwise no edema) or tenderness.   Lymphadenopathy:     He has no cervical adenopathy.   Neurological: He is alert and oriented to person, place, and time. He has normal strength and normal reflexes. A sensory deficit (sensation intact to LT in lower extremities; decreased sensation to pinprick in LE b/l ) is present. No cranial nerve deficit. Gait (rises normally from chair; walks slowly with  careful foot placement) abnormal.   Skin: Skin is warm and dry.   Psychiatric: He has a normal mood and affect. His behavior is normal.   Vitals reviewed.      Lab Results   Component Value Date    WBC 5.92 12/14/2017    HGB 11.2 (L) 12/14/2017    HCT 34.1 (L) 12/14/2017     12/14/2017    CHOL 124 10/18/2017    TRIG 46 10/18/2017    HDL 59 10/18/2017    ALT 19 12/14/2017    AST 19 12/14/2017     12/14/2017    K 3.6 12/14/2017     12/14/2017    CREATININE 1.6 (H) 12/14/2017    BUN 21 12/14/2017    CO2 27 12/14/2017    TSH 1.810 07/28/2017    INR 2.7 (H) 12/14/2017    HGBA1C 5.3 07/28/2017       Assessment:       1. Gait instability    2. HILLARY on CPAP    3. Stage 3 chronic kidney disease    4. Coronary artery disease involving native coronary artery of native heart without angina pectoris    5. Chronic combined systolic and diastolic congestive heart failure    6. Essential hypertension    7. LVAD (left ventricular assist device) present    8. Annual physical exam        Plan:   Kev was seen today for annual exam.    Diagnoses and all orders for this visit:    Gait instability - agree with plan for PT once sufficiently stable to go.    HILLARY on CPAP - now established with [facility] clinic here. Message sent to clinic to see if they can help him get mask refitted/fixed    Stage 3 chronic kidney disease - related to cardiac issues; follow with transplant team    Coronary artery disease involving native coronary artery of native heart without angina pectoris  Chronic combined systolic and diastolic congestive heart failure  Essential hypertension  LVAD (left ventricular assist device) present - on LVAD, following with Cardiology / Transplant, coumadin clinic, ID. Deferring all related management to these specialists.    Annual exam - chronic and preventive health issues reviewed as noted above.    Recommended they bring in advance directive / involvement in care forms so that these will be on file.      Health maintenance reviewed with patient.   He will look for his vaccine records; have [place] these previously from out of state but haven't rec'd this information yet.    Follow-up in about 6 months (around 6/15/2018) for coordination of care; earlier if needed.    Mega Collins MD  Internal Medicine  [facility]  12/15/2017

## 2017-12-15 NOTE — TELEPHONE ENCOUNTER
----- Message from Mary Jane Louie NP sent at 12/15/2017 12:15 PM CST -----  Encourage him to contact his DME about replacement mask, see how they can help him. If not eligible sometimes they have sample masks    ----- Message -----  From: Mega Collins MD  Sent: 12/15/2017  10:13 AM  To: RON Hickman - I'm seeing Mr Vasquez back for follow up today, and he says his BiPAP mask is broken and he hasn't been able to use this regularly for at least 1-2 weeks.   Given his cardiac situation, I'm concerned about him being off BiPAP for any length of time - can you help coordinate getting him a functional mask / supplies?    Thanks!  Mega Collins MD

## 2017-12-18 ENCOUNTER — ANTI-COAG VISIT (OUTPATIENT)
Dept: CARDIOLOGY | Facility: CLINIC | Age: 77
End: 2017-12-18

## 2017-12-18 DIAGNOSIS — Z79.01 CHRONIC ANTICOAGULATION: ICD-10-CM

## 2017-12-18 DIAGNOSIS — Z95.811 LVAD (LEFT VENTRICULAR ASSIST DEVICE) PRESENT: ICD-10-CM

## 2017-12-18 LAB
BACTERIA SPEC AEROBE CULT: NORMAL
INR PPP: 2.12

## 2017-12-20 ENCOUNTER — TELEPHONE (OUTPATIENT)
Dept: INFECTIOUS DISEASES | Facility: CLINIC | Age: 77
End: 2017-12-20

## 2017-12-20 DIAGNOSIS — T84.7XXD HARDWARE COMPLICATING WOUND INFECTION, SUBSEQUENT ENCOUNTER: Primary | ICD-10-CM

## 2017-12-20 NOTE — TELEPHONE ENCOUNTER
Please notify patient that wound cultures show a bacteria called pseudomonas that is resistant to the oral antibiotic that he is taking.  As a result, we will have to place a picc line in him again.  Plan is to start him on IV cefepime 2 grams every 12 hours for 6 weeks.  Check cbc, cmp once a week while on therapy.  Fax results to me.  Please schedule picc line placement.

## 2017-12-21 ENCOUNTER — LAB VISIT (OUTPATIENT)
Dept: LAB | Facility: HOSPITAL | Age: 77
End: 2017-12-21
Attending: INTERNAL MEDICINE
Payer: MEDICARE

## 2017-12-21 ENCOUNTER — ANTI-COAG VISIT (OUTPATIENT)
Dept: CARDIOLOGY | Facility: CLINIC | Age: 77
End: 2017-12-21

## 2017-12-21 DIAGNOSIS — Z79.01 LONG-TERM (CURRENT) USE OF ANTICOAGULANTS: Primary | ICD-10-CM

## 2017-12-21 DIAGNOSIS — Z79.01 CHRONIC ANTICOAGULATION: ICD-10-CM

## 2017-12-21 DIAGNOSIS — Z95.811 LVAD (LEFT VENTRICULAR ASSIST DEVICE) PRESENT: ICD-10-CM

## 2017-12-21 LAB
INR PPP: 2.4
PROTHROMBIN TIME: 26.2 SEC

## 2017-12-21 PROCEDURE — 85610 PROTHROMBIN TIME: CPT | Mod: PO

## 2017-12-21 PROCEDURE — 36415 COLL VENOUS BLD VENIPUNCTURE: CPT | Mod: PO

## 2017-12-27 ENCOUNTER — ANTI-COAG VISIT (OUTPATIENT)
Dept: CARDIOLOGY | Facility: CLINIC | Age: 77
End: 2017-12-27

## 2017-12-27 ENCOUNTER — TELEPHONE (OUTPATIENT)
Dept: INTERVENTIONAL RADIOLOGY/VASCULAR | Facility: HOSPITAL | Age: 77
End: 2017-12-27

## 2017-12-27 DIAGNOSIS — Z79.01 CHRONIC ANTICOAGULATION: ICD-10-CM

## 2017-12-27 DIAGNOSIS — Z95.811 LVAD (LEFT VENTRICULAR ASSIST DEVICE) PRESENT: ICD-10-CM

## 2017-12-27 LAB — INR PPP: 2.94

## 2017-12-28 ENCOUNTER — HOSPITAL ENCOUNTER (OUTPATIENT)
Dept: INTERVENTIONAL RADIOLOGY/VASCULAR | Facility: HOSPITAL | Age: 77
Discharge: HOME | End: 2017-12-28
Attending: INTERNAL MEDICINE
Payer: MEDICARE

## 2017-12-28 ENCOUNTER — HOSPITAL ENCOUNTER (EMERGENCY)
Facility: HOSPITAL | Age: 77
Discharge: HOME | End: 2017-12-28
Attending: EMERGENCY MEDICINE
Payer: MEDICARE

## 2017-12-28 VITALS
BODY MASS INDEX: 26.95 KG/M2 | HEART RATE: 76 BPM | RESPIRATION RATE: 18 BRPM | OXYGEN SATURATION: 99 % | WEIGHT: 199 LBS | TEMPERATURE: 98 F | HEIGHT: 72 IN

## 2017-12-28 VITALS — HEART RATE: 76 BPM | OXYGEN SATURATION: 100 %

## 2017-12-28 DIAGNOSIS — T82.838A BLEEDING FROM PICC LINE, INITIAL ENCOUNTER: Primary | ICD-10-CM

## 2017-12-28 DIAGNOSIS — T84.7XXD HARDWARE COMPLICATING WOUND INFECTION, SUBSEQUENT ENCOUNTER: ICD-10-CM

## 2017-12-28 LAB
BASOPHILS # BLD AUTO: 0.03 K/UL
BASOPHILS NFR BLD: 0.4 %
DIFFERENTIAL METHOD: ABNORMAL
EOSINOPHIL # BLD AUTO: 0.2 K/UL
EOSINOPHIL NFR BLD: 2.1 %
ERYTHROCYTE [DISTWIDTH] IN BLOOD BY AUTOMATED COUNT: 16.7 %
HCT VFR BLD AUTO: 34.7 %
HGB BLD-MCNC: 11.5 G/DL
IMM GRANULOCYTES # BLD AUTO: 0.03 K/UL
IMM GRANULOCYTES NFR BLD AUTO: 0.4 %
INR PPP: 2.6
LYMPHOCYTES # BLD AUTO: 1.2 K/UL
LYMPHOCYTES NFR BLD: 16.3 %
MCH RBC QN AUTO: 28.8 PG
MCHC RBC AUTO-ENTMCNC: 33.1 G/DL
MCV RBC AUTO: 87 FL
MONOCYTES # BLD AUTO: 0.9 K/UL
MONOCYTES NFR BLD: 13.3 %
NEUTROPHILS # BLD AUTO: 4.8 K/UL
NEUTROPHILS NFR BLD: 67.5 %
NRBC BLD-RTO: 0 /100 WBC
PLATELET # BLD AUTO: 162 K/UL
PMV BLD AUTO: 11.7 FL
PROTHROMBIN TIME: 25.7 SEC
RBC # BLD AUTO: 4 M/UL
WBC # BLD AUTO: 7.07 K/UL

## 2017-12-28 PROCEDURE — 76937 US GUIDE VASCULAR ACCESS: CPT | Mod: TC

## 2017-12-28 PROCEDURE — 85025 COMPLETE CBC W/AUTO DIFF WBC: CPT

## 2017-12-28 PROCEDURE — 99284 EMERGENCY DEPT VISIT MOD MDM: CPT | Mod: GC,,, | Performed by: EMERGENCY MEDICINE

## 2017-12-28 PROCEDURE — 85610 PROTHROMBIN TIME: CPT

## 2017-12-28 PROCEDURE — 76937 US GUIDE VASCULAR ACCESS: CPT | Mod: 26,,, | Performed by: FAMILY MEDICINE

## 2017-12-28 PROCEDURE — 99283 EMERGENCY DEPT VISIT LOW MDM: CPT

## 2017-12-28 PROCEDURE — 77001 FLUOROGUIDE FOR VEIN DEVICE: CPT | Mod: TC

## 2017-12-28 PROCEDURE — 36569 INSJ PICC 5 YR+ W/O IMAGING: CPT | Mod: RT,,, | Performed by: FAMILY MEDICINE

## 2017-12-28 PROCEDURE — 77001 FLUOROGUIDE FOR VEIN DEVICE: CPT | Mod: 26,,, | Performed by: FAMILY MEDICINE

## 2017-12-28 NOTE — H&P
Radiology History & Physical      SUBJECTIVE:     Chief Complaint: Hardware complicating wound infection, subsequent encounter     History of Present Illness:  Kev Vasquez is a 77 y.o. male who presents for PICC placement  Past Medical History:   Diagnosis Date    AICD (automatic cardioverter/defibrillator) present 2014    [employer]    Chronic anticoagulation 7/21/2017    Chronic combined systolic and diastolic congestive heart failure 7/27/2015 11-16-17   1 - Moderate left ventricular enlargement.    2 - Severely depressed left ventricular systolic function (EF 15-20%).    3 - Impaired LV relaxation, normal LAP (grade 1 diastolic dysfunction).    4 - Biatrial enlargement.    5 - Right ventricle is upper limit of normal in size with not well seen systolic function.    6 - Severe tricuspid regurgitation.    7 - Increased central venous pressure.    8 - The estimated PA systolic pressure is 40 mmHg.    9 - Heartmate III LVAD; speed 5800.     Complication involving left ventricular assist device (LVAD) 7/29/2017    Coronary artery disease involving native coronary artery of native heart without angina pectoris 7/27/2015    Gait instability     Hypertensive urgency, malignant 11/15/2017    ICD (implantable cardioverter-defibrillator), biventricular, in situ 7/27/2015    Ischemic cardiomyopathy 7/20/2017    S/p HMIII     Kidney stones     LVAD (left ventricular assist device) present 7/20/2017    status post Heartmate III 10/16/16 LVAD    HILLARY on CPAP 7/27/2015    Peripheral neuropathy     Pulmonary hypertension due to left ventricular systolic dysfunction 7/29/2015    Restless leg syndrome 1992    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    Skin cancer     excision 2013    Skin yeast infection 8/31/2017    Stage 3 chronic kidney disease 7/20/2017    Syncope 7/20/2017    Ventricular tachycardia 7/25/2017     Past Surgical History:   Procedure Laterality Date    CARDIAC PACEMAKER  PLACEMENT      pacemaker previously, then AICD in 2006. AICD [employer] serial #[id number]    CORONARY ARTERY BYPASS GRAFT  1993    KNEE SURGERY Left 2001    complicated by infection, hardware had to be taken out and replaced    LEFT VENTRICULAR ASSIST DEVICE  10/19/2016       Home Meds:   Prior to Admission medications    Medication Sig Start Date End Date Taking? Authorizing Provider   allopurinol (ZYLOPRIM) 300 MG tablet Take 300 mg by mouth once daily.    Historical Provider, MD   amLODIPine (NORVASC) 10 MG tablet Take 1 tablet (10 mg total) by mouth once daily. 11/21/17 11/21/18  Karen Valladares MD   aspirin 81 MG Chew Take 81 mg by mouth once daily.    Historical Provider, MD   atorvastatin (LIPITOR) 10 MG tablet Take 10 mg by mouth once daily.    Historical Provider, MD   buPROPion (WELLBUTRIN XL) 300 MG 24 hr tablet Take 1 tablet (300 mg total) by mouth once daily. 10/30/17 10/30/18  Jesus To MD   calcium-vitamin D tablet 600 mg-200 units Take 1 tablet by mouth once daily. 8/24/17   Rashad Garcia MD   ciprofloxacin HCl (CIPRO) 750 MG tablet Take 1 tablet (750 mg total) by mouth 2 (two) times daily. 10/10/17 10/10/18  Payam Muhammad MD   docusate sodium (COLACE) 100 MG capsule Take 100 mg by mouth daily as needed for Constipation.    Historical Provider, MD   doxazosin (CARDURA) 4 MG tablet Take 1 tablet (4 mg total) by mouth once daily. 12/5/17 12/5/18  Miguel A Marcelo Jr., MD   famotidine (PEPCID) 20 MG tablet Take 2 tablets (40 mg total) by mouth every evening. 11/20/17 11/20/18  Karen Valladares MD   ferrous sulfate 324 mg (65 mg iron) TbEC Take 1 tablet (324 mg total) by mouth once daily. 11/20/17   Karen Valladares MD   folic acid (FOLVITE) 1 MG tablet Take 1 mg by mouth once daily.    Historical Provider, MD   furosemide (LASIX) 80 MG tablet Take orally daily on Monday, Wednesday and Friday as directed 12/4/17   Miguel A Marcelo Jr., MD   furosemide (LASIX) 80 MG tablet TAKE 1  TABLET BY MOUTH ON MONDAY, WEDNESDAY, AND FRIDAY AS DIRECTED 12/5/17   Miguel A Marcelo Jr., MD   hydrALAZINE (APRESOLINE) 100 MG tablet Take 1 tablet (100 mg total) by mouth every 8 (eight) hours. 11/20/17 11/20/18  Karen Valladares MD   isosorbide dinitrate (ISORDIL) 40 MG Tab Take 1 tablet (40 mg total) by mouth 3 (three) times daily. 11/21/17 11/21/18  Jesus To MD   Lactobacillus rhamnosus GG (CULTURELLE) 10 billion cell capsule Take 1 capsule by mouth once daily. 11/27/17   Ismael Bowling MD   lisinopril (PRINIVIL,ZESTRIL) 20 MG tablet Take 1 tablet (20 mg total) by mouth 2 (two) times daily. 11/20/17 11/20/18  Karen Valladares MD   magnesium oxide (MAG-OX) 400 mg tablet Take 1 tablet (400 mg total) by mouth 2 (two) times daily. 8/8/17   Anni Henderson MD   multivitamin capsule Take 1 capsule by mouth once daily.    Historical Provider, MD   promethazine (PHENERGAN) 12.5 MG Tab Take 1 tablet (12.5 mg total) by mouth every 6 (six) hours as needed. 9/12/17   Anni Henderson MD   TIKOSYN 250 mcg Cap Take 1 capsule (250 mcg total) by mouth every 12 (twelve) hours. 7/25/17   Bharat Baker MD   warfarin (COUMADIN) 4 MG tablet Take 4 mg orally on Sun, Tue, Thurs and 6 mg orally on other days as directed by coumadin clinic 12/4/17   Miguel A Marcelo Jr., MD     Anticoagulants/Antiplatelets: aspirin and Coumadin    Allergies:   Review of patient's allergies indicates:   Allergen Reactions    Aldactone [spironolactone]       persistent hyperkalemia.    Sulfa (sulfonamide antibiotics)      Sedation History:  no adverse reactions    Review of Systems:   Hematological: no known coagulopathies  Respiratory: no shortness of breath  Cardiovascular: no chest pain  Gastrointestinal: no abdominal pain  Genito-Urinary: no dysuria  Musculoskeletal: negative  Neurological: no TIA or stroke symptoms         OBJECTIVE:     Vital Signs (Most Recent)       Physical Exam:  ASA: 2  Mallampati: n/a    General: no acute  distress  Mental Status: alert and oriented to person, place and time  HEENT: normocephalic, atraumatic  Chest: unlabored breathing  Heart: regular heart rate  Abdomen: nondistended  Extremity: moves all extremities    Laboratory  Lab Results   Component Value Date    INR 2.4 (H) 12/21/2017       Lab Results   Component Value Date    WBC 5.92 12/14/2017    HGB 11.2 (L) 12/14/2017    HCT 34.1 (L) 12/14/2017    MCV 90 12/14/2017     12/14/2017      Lab Results   Component Value Date     (H) 12/14/2017     12/14/2017    K 3.6 12/14/2017     12/14/2017    CO2 27 12/14/2017    BUN 21 12/14/2017    CREATININE 1.6 (H) 12/14/2017    CALCIUM 10.2 12/14/2017    MG 2.3 12/14/2017    ALT 19 12/14/2017    AST 19 12/14/2017    ALBUMIN 3.7 12/14/2017    BILITOT 0.7 12/14/2017    BILIDIR 0.3 12/14/2017       ASSESSMENT/PLAN:     Sedation Plan: local  Patient will undergo PICC placement.    SHIRA Mcknight, FNP  Interventional Radiology  [phone] [employer]\

## 2017-12-28 NOTE — PROCEDURES
"Kev Vasquez is a 77 y.o. male patient.    Pulse: 76 (12/28/17 0900)  SpO2: 100 % (12/28/17 0900)       Central Line  Date/Time: 12/28/2017 10:25 AM  Performed by: NICHOLE RICE  Supervising provider: Bharat Jerry MD  Consent Done: Yes  Time out: Immediately prior to procedure a "time out" was called to verify the correct patient, procedure, equipment, support staff and site/side marked as required.  Indications: med administration  Anesthesia: local infiltration    Anesthesia:  Local Anesthetic: lidocaine 1% without epinephrine  Preparation: skin prepped with ChloraPrep  Location details: right basilic  Ultrasound guidance: yes  Vessel Caliber: medium, patent, compressibility normal  Number of attempts: 1  Assessment: verified by fluoroscopy  Complications: none  Post-procedure: chlorhexidine patch,  sterile dressing applied and blood return through all ports          Nichole Rice  12/28/2017    "

## 2017-12-29 ENCOUNTER — HOSPITAL ENCOUNTER (EMERGENCY)
Facility: HOSPITAL | Age: 77
Discharge: HOME | End: 2017-12-29
Attending: EMERGENCY MEDICINE
Payer: MEDICARE

## 2017-12-29 VITALS
DIASTOLIC BLOOD PRESSURE: 74 MMHG | BODY MASS INDEX: 26.95 KG/M2 | OXYGEN SATURATION: 97 % | HEIGHT: 72 IN | SYSTOLIC BLOOD PRESSURE: 115 MMHG | TEMPERATURE: 98 F | RESPIRATION RATE: 18 BRPM | HEART RATE: 72 BPM | WEIGHT: 199 LBS

## 2017-12-29 DIAGNOSIS — T82.838D: Primary | ICD-10-CM

## 2017-12-29 PROCEDURE — 99283 EMERGENCY DEPT VISIT LOW MDM: CPT

## 2017-12-29 PROCEDURE — 99282 EMERGENCY DEPT VISIT SF MDM: CPT | Mod: ,,, | Performed by: EMERGENCY MEDICINE

## 2017-12-29 RX ORDER — LIDOCAINE HYDROCHLORIDE 10 MG/ML
1 INJECTION, SOLUTION EPIDURAL; INFILTRATION; INTRACAUDAL; PERINEURAL ONCE
Status: DISCONTINUED | OUTPATIENT
Start: 2017-12-29 | End: 2017-12-29 | Stop reason: HOSPADM

## 2017-12-29 RX ORDER — CALC/MAG/B COMPLEX/D3/HERB 61
15 TABLET ORAL DAILY
COMMUNITY
End: 2018-01-24 | Stop reason: ALTCHOICE

## 2017-12-29 RX ORDER — CLOPIDOGREL BISULFATE 75 MG/1
75 TABLET ORAL DAILY
COMMUNITY
End: 2018-01-24 | Stop reason: ALTCHOICE

## 2017-12-29 RX ORDER — LOSARTAN POTASSIUM 25 MG/1
25 TABLET ORAL DAILY
COMMUNITY
End: 2018-01-24 | Stop reason: ALTCHOICE

## 2017-12-29 RX ORDER — ROPINIROLE 0.5 MG/1
0.5 TABLET, FILM COATED ORAL 3 TIMES DAILY
Status: ON HOLD | COMMUNITY
End: 2018-02-07 | Stop reason: ALTCHOICE

## 2017-12-29 RX ORDER — CARVEDILOL 25 MG/1
25 TABLET ORAL 2 TIMES DAILY WITH MEALS
COMMUNITY
End: 2018-01-24 | Stop reason: ALTCHOICE

## 2017-12-29 NOTE — ED NOTES
Patient identifiers verified and correct for Kev Vasquez.    LOC: The patient is awake, alert and aware of environment with an appropriate affect, the patient is oriented x 3 and speaking appropriately.  APPEARANCE: Patient resting comfortably and in no acute distress, patient is clean and well groomed, patient's clothing is properly fastened.  SKIN: The skin is warm and dry, color consistent with ethnicity, patient has normal skin turgor and moist mucus membranes, LVAD driveline noted to abdomen with gauze around site, ecchymoses noted to bilateral UE, PICC line noted to RUE.  MUSCULOSKELETAL: Patient moving all extremities spontaneously, no obvious swelling or deformities noted.  RESPIRATORY: Airway is open and patent, respirations are spontaneous, patient has a normal effort and rate, no accessory muscle use noted.  CARDIAC: Patient has a normal rate and regular rhythm, no peripheral edema noted, capillary refill < 3 seconds.  ABDOMEN: Soft and non tender to palpation, no distention noted.  NEUROLOGIC: Eyes open spontaneously, behavior appropriate to situation, follows commands, facial expression symmetrical, bilateral hand grasp equal and even, purposeful motor response noted, normal sensation in all extremities when touched with a finger.

## 2017-12-29 NOTE — ED TRIAGE NOTES
Pt presents to the ED c/o PICC line problem. Pt states his PICC line was placed this AM and began leaking blood this AM. Pt on coumadin. Pt went to IR while waiting for the ED. Bleeding controlled. IR MD stated to still be seen by ED MD for evaluation. Pt offers no complaints at this time.

## 2017-12-29 NOTE — ED PROVIDER NOTES
Encounter Date: 12/28/2017    SCRIBE #1 NOTE: I, Hernando Quinonez, am scribing for, and in the presence of,  Dr. Cortez. I have scribed the following portions of the note - the Resident attestation.       History     Chief Complaint   Patient presents with    Vascular Access Problem     had pic line place this morning and steady leaking, on coumadin     Kev Vasquez is a 77 y.o. Male on warfarin who presents to the ED for prolonged bleeding after PICC line placement today. After the procedure patient awoke from a nap and noticed excessive bleeding from the line. Contacted IR who referred him to the ED if bleeding persisted. While waiting in the ED patient went to IR and bleeding stopped with direct pressure. Unable to quantify amount of blood loss. Denies dizziness, weakness, syncopal event, numbness and tingling to OPAL LAGUNAS CP.   Home health monitors patient warfarin dose which was WNL last time it was checked.             Review of patient's allergies indicates:   Allergen Reactions    Aldactone [spironolactone]       persistent hyperkalemia.    Sulfa (sulfonamide antibiotics)      Past Medical History:   Diagnosis Date    AICD (automatic cardioverter/defibrillator) present 2014    [employer]    Chronic anticoagulation 7/21/2017    Chronic combined systolic and diastolic congestive heart failure 7/27/2015 11-16-17   1 - Moderate left ventricular enlargement.    2 - Severely depressed left ventricular systolic function (EF 15-20%).    3 - Impaired LV relaxation, normal LAP (grade 1 diastolic dysfunction).    4 - Biatrial enlargement.    5 - Right ventricle is upper limit of normal in size with not well seen systolic function.    6 - Severe tricuspid regurgitation.    7 - Increased central venous pressure.    8 - The estimated PA systolic pressure is 40 mmHg.    9 - Heartmate III LVAD; speed 5800.     Complication involving left ventricular assist device (LVAD) 7/29/2017    Coronary artery disease involving  native coronary artery of native heart without angina pectoris 7/27/2015    Gait instability     Hypertensive urgency, malignant 11/15/2017    ICD (implantable cardioverter-defibrillator), biventricular, in situ 7/27/2015    Ischemic cardiomyopathy 7/20/2017    S/p HMIII     Kidney stones     LVAD (left ventricular assist device) present 7/20/2017    status post Heartmate III 10/16/16 LVAD    HILLARY on CPAP 7/27/2015    Peripheral neuropathy     Pulmonary hypertension due to left ventricular systolic dysfunction 7/29/2015    Restless leg syndrome 1992    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    Skin cancer     excision 2013    Skin yeast infection 8/31/2017    Stage 3 chronic kidney disease 7/20/2017    Syncope 7/20/2017    Ventricular tachycardia 7/25/2017     Past Surgical History:   Procedure Laterality Date    CARDIAC PACEMAKER PLACEMENT      pacemaker previously, then AICD in 2006. AICD [employer] serial #[id number]    CORONARY ARTERY BYPASS GRAFT  1993    KNEE SURGERY Left 2001    complicated by infection, hardware had to be taken out and replaced    LEFT VENTRICULAR ASSIST DEVICE  10/19/2016     Family History   Problem Relation Age of Onset    Cancer Daughter 50     breast    Heart disease Daughter      MI x 3, CABG    Diabetes Daughter      Social History   Substance Use Topics    Smoking status: Never Smoker    Smokeless tobacco: Never Used    Alcohol use No     Review of Systems   Constitutional: Negative for chills, diaphoresis and fever.   HENT: Negative.    Eyes: Negative.    Respiratory: Negative for chest tightness and shortness of breath.    Cardiovascular: Negative for chest pain and palpitations.   Gastrointestinal: Negative.    Endocrine: Negative.    Genitourinary: Negative.    Musculoskeletal: Negative.    Neurological: Negative for dizziness, syncope, weakness, light-headedness and headaches.   Hematological: Bruises/bleeds easily.    Psychiatric/Behavioral: Negative.        Physical Exam     Initial Vitals [12/28/17 1648]   BP Pulse Resp Temp SpO2   -- 76 18 97.9 °F (36.6 °C) 99 %      MAP       --         Physical Exam    Nursing note and vitals reviewed.  Constitutional: He appears well-developed. He is not diaphoretic. No distress.   HENT:   Head: Normocephalic and atraumatic.   Eyes: EOM are normal.   Cardiovascular:   LVAD in place     Pulmonary/Chest: No respiratory distress.   Musculoskeletal:   RUE:  PICC line placed in RUE in place, no active bleeding. No surrounding hematoma  SILT throughout   Motor intact  2+ RP   Neurological: He is alert and oriented to person, place, and time.   Skin: Skin is warm. Capillary refill takes less than 2 seconds.   Bruising and hematomas throughout RUE    Psychiatric: He has a normal mood and affect.         ED Course   Procedures  Labs Reviewed   CBC W/ AUTO DIFFERENTIAL - Abnormal; Notable for the following:        Result Value    RBC 4.00 (*)     Hemoglobin 11.5 (*)     Hematocrit 34.7 (*)     RDW 16.7 (*)     Lymph% 16.3 (*)     All other components within normal limits   PROTIME-INR - Abnormal; Notable for the following:     Prothrombin Time 25.7 (*)     INR 2.6 (*)     All other components within normal limits        Imaging Results          X-Ray Chest 1 View (Final result)  Result time 12/28/17 19:12:04   Procedure changed from X-Ray Chest PA And Lateral     Final result by Rebeka Schaeffer MD (12/28/17 19:12:04)                 Impression:      Overall, grossly stable chronic findings, slight accentuation of the interstitium may reflect shallow inspiratory effort or minimal interstitial edema, no large focal consolidation.      Electronically signed by: REBEKA SCHAEFFER MD  Date:     12/28/17  Time:    19:12              Narrative:    Chest one view    Indication:Bleeding from PICC line    Comparison:11/15/2017    Findings: Right PICC catheter tip projects over the proximal to mid SVC.  Left  chest wall pacer and LVAD stable. The cardiomediastinal silhouette is enlarged, noting postsurgical changes.  There is no pleural effusion.  The trachea is midline.  The lungs are symmetrically expanded bilaterally with mildly coarse interstitial attenuation, likely related to shallow inspiratory effort versus chronic change, interstitial edema is a consideration. No large focal consolidation seen.  There is no pneumothorax.  The osseous structures are remarkable for degenerative changes..  Skin fold projects over the right upper lung zone.                                 Medical Decision Making:   History:   Old Medical Records: I decided to obtain old medical records.  Initial Assessment:   Emergent eval of 76 y/o M with bleeding from PICC line  Differential Diagnosis:   Bleeding from PICC line  Iatrogenic coagulopathy  anemia  Clinical Tests:   Lab Tests: Ordered and Reviewed  Radiological Study: Ordered and Reviewed  ED Management:  Pt seen and examined at the bedside. PICC in place with no active bleeding. Pt asymptomatic but CXR, CBC, INR done to make sure correct PICC placement and that patient is not supratherapeutic. All labs WNL and PICC placement confirmed. Pt to be discharged to home.             Scribe Attestation:   Scribe #1: I performed the above scribed service and the documentation accurately describes the services I performed. I attest to the accuracy of the note.    Attending Attestation:   Physician Attestation Statement for Resident:  As the supervising MD   Physician Attestation Statement: I have personally seen and examined this patient.   I agree with the above history. -: 76 y/o man presented for evaluation of leaking around his recently placed PICC line   As the supervising MD I agree with the above PE.    As the supervising MD I agree with the above treatment, course, plan, and disposition.  I have reviewed and agree with the residents interpretation of the following: lab data and x-rays.                     ED Course      Clinical Impression:   The encounter diagnosis was Bleeding from PICC line, initial encounter.    Disposition:   Disposition: Discharged  Condition: Stable                        Don Cortez MD  12/28/17 2008

## 2017-12-29 NOTE — CONSULTS
IR consult note    Consult received for bleeding following IR PICC ling placement yesterday. Pt returened to ED after line placement yesterday for continued bleeding. Line placement was confirmed with CXR. Pt is on coumadin and INR at that time was 2.6. Bleeding was controlled with direct pressure and pressure dressing and pt returned home. Overnight, he continued to have oozing from the site and returned to the ED this morning. On evaluation, the dressing surrounding the PICC line was soaked with blood, but no active bleeding was observed. Will place a suture around PICC insertion and dress with gelfoam and pressure dressing.     Deacon Toney MD  Radiology PGY-2  [phone]

## 2017-12-29 NOTE — ED PROVIDER NOTES
Encounter Date: 12/29/2017       History     Chief Complaint   Patient presents with    Vascular Access Problem     Family reports bleeding at the site. Was seen here last night for the same. X ray confirmed proper placement. Bleeding controlled last night. Hx of LVAD, takes coumadin.      Kev Vasquez is a 77 y.o. Male with LVAD on warfarin who presents to the ED for bleeding from his PICC line. Pt had uncomplicated PICC line placement yesterday by IR. He got home and took a nap, woke up and his bed linens weresaturated with blood from RUE line. He went to IR and bleeding stopped with direct pressure and pressure dressing. In ED H/H and INR WNL and CXR confirmed PICC placement so patient was discharged home. Patient presents again today to the ED for bleeding/oozing around the PICC line site. When he woke up this morning his bed pads were soaked with blood. He denies dizziness, weakness, syncopal event.       The history is provided by the patient and medical records.     Review of patient's allergies indicates:   Allergen Reactions    Aldactone [spironolactone]       persistent hyperkalemia.    Sulfa (sulfonamide antibiotics)      Past Medical History:   Diagnosis Date    AICD (automatic cardioverter/defibrillator) present 2014    [employer]    Chronic anticoagulation 7/21/2017    Chronic combined systolic and diastolic congestive heart failure 7/27/2015 11-16-17   1 - Moderate left ventricular enlargement.    2 - Severely depressed left ventricular systolic function (EF 15-20%).    3 - Impaired LV relaxation, normal LAP (grade 1 diastolic dysfunction).    4 - Biatrial enlargement.    5 - Right ventricle is upper limit of normal in size with not well seen systolic function.    6 - Severe tricuspid regurgitation.    7 - Increased central venous pressure.    8 - The estimated PA systolic pressure is 40 mmHg.    9 - Heartmate III LVAD; speed 5800.     Complication involving left ventricular assist device  (LVAD) 7/29/2017    Coronary artery disease involving native coronary artery of native heart without angina pectoris 7/27/2015    Gait instability     Hypertensive urgency, malignant 11/15/2017    ICD (implantable cardioverter-defibrillator), biventricular, in situ 7/27/2015    Ischemic cardiomyopathy 7/20/2017    S/p HMIII     Kidney stones     LVAD (left ventricular assist device) present 7/20/2017    status post Heartmate III 10/16/16 LVAD    HILLARY on CPAP 7/27/2015    Peripheral neuropathy     Pulmonary hypertension due to left ventricular systolic dysfunction 7/29/2015    Restless leg syndrome 1992    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    Skin cancer     excision 2013    Skin yeast infection 8/31/2017    Stage 3 chronic kidney disease 7/20/2017    Syncope 7/20/2017    Ventricular tachycardia 7/25/2017     Past Surgical History:   Procedure Laterality Date    CARDIAC PACEMAKER PLACEMENT      pacemaker previously, then AICD in 2006. AICD [employer] serial #[id number]    CORONARY ARTERY BYPASS GRAFT  1993    KNEE SURGERY Left 2001    complicated by infection, hardware had to be taken out and replaced    LEFT VENTRICULAR ASSIST DEVICE  10/19/2016     Family History   Problem Relation Age of Onset    Cancer Daughter 50     breast    Heart disease Daughter      MI x 3, CABG    Diabetes Daughter      Social History   Substance Use Topics    Smoking status: Never Smoker    Smokeless tobacco: Never Used    Alcohol use No     Review of Systems   Constitutional: Negative for activity change, chills, diaphoresis, fatigue and fever.   HENT: Negative.    Eyes: Negative.    Respiratory: Negative for shortness of breath.    Cardiovascular: Negative for chest pain.   Gastrointestinal: Negative.  Negative for nausea.   Endocrine: Negative.    Genitourinary: Negative.    Skin: Negative for pallor.   Allergic/Immunologic: Negative.    Neurological: Negative for dizziness, tremors,  syncope, weakness, light-headedness and headaches.   Hematological: Negative.    Psychiatric/Behavioral: Negative.        Physical Exam     Initial Vitals [12/29/17 1005]   BP Pulse Resp Temp SpO2   (!) 80/44 68 16 98.6 °F (37 °C) 96 %      MAP       56         Physical Exam    Nursing note and vitals reviewed.  Constitutional: He appears well-developed.   HENT:   Head: Normocephalic and atraumatic.   Eyes: EOM are normal.   Cardiovascular:   LVAD in place     Pulmonary/Chest: No stridor. No respiratory distress.   Neurological: He is alert and oriented to person, place, and time.   Skin: Skin is warm. Capillary refill takes less than 2 seconds.   PICC line RUE in place with pressure dressing and sleeve saturated with blood, no active bleeding    Psychiatric: He has a normal mood and affect.         ED Course   Procedures  Labs Reviewed - No data to display          Medical Decision Making:   History:   Old Medical Records: I decided to obtain old medical records.  Initial Assessment:   Emergent eval of 76 y/o M bleeding from PICC line   Differential Diagnosis:   PICC line complication  Iatrogenic coagulopathy    ED Management:  Pt was seen and examined at the bedside. Dressing saturated with blood but no active bleeding from the PICC line. Given that patients H/H and INR were WNL <24 hours ago, patient remains asymptomatic, and patient denies large volume blood loss or active bleed repeat labs were not ordered. Case discussed with cardiology and IR. IR consulted and reinforced line with a suture. Pt discharged to home.   Other:   I have discussed this case with another health care provider.              Attending Attestation:   Physician Attestation Statement for Resident:  As the supervising MD  I agree with the above history. -:   As the supervising MD I agree with the above PE.    As the supervising MD I agree with the above treatment, course, plan, and disposition.                    ED Course      Clinical  Impression:   The encounter diagnosis was Bleeding from PICC line, subsequent encounter.    Disposition:   Disposition: Discharged  Condition: Stable                        Carson Dumont MD  01/07/18 2083

## 2017-12-29 NOTE — ED NOTES
Pt to er due to bleeding to PICC line in RUE--seen for same yesterday--pt woke this am to find dressing soaked and on the bed--pt denies pain, Cp, and SOB--speaking clearly--neuro intact--NAD

## 2017-12-29 NOTE — ED NOTES
Spoke with dr Sahni in IR, states bleeding stopped , wanting eval by er md, he said maybe cxr etc.

## 2018-01-02 ENCOUNTER — LAB VISIT (OUTPATIENT)
Dept: LAB | Facility: HOSPITAL | Age: 78
End: 2018-01-02
Attending: INTERNAL MEDICINE
Payer: MEDICARE

## 2018-01-02 ENCOUNTER — ANTI-COAG VISIT (OUTPATIENT)
Dept: CARDIOLOGY | Facility: CLINIC | Age: 78
End: 2018-01-02

## 2018-01-02 DIAGNOSIS — I15.0 SECONDARY RENOVASCULAR HYPERTENSION, MALIGNANT: Primary | ICD-10-CM

## 2018-01-02 DIAGNOSIS — Z79.01 CHRONIC ANTICOAGULATION: ICD-10-CM

## 2018-01-02 DIAGNOSIS — Z95.811 LVAD (LEFT VENTRICULAR ASSIST DEVICE) PRESENT: ICD-10-CM

## 2018-01-02 DIAGNOSIS — I47.20 VENTRICULAR TACHYCARDIA: ICD-10-CM

## 2018-01-02 DIAGNOSIS — R79.1 ABNORMAL COAGULATION PROFILE: ICD-10-CM

## 2018-01-02 LAB
ALBUMIN SERPL BCP-MCNC: 3.3 G/DL
ALP SERPL-CCNC: 69 U/L
ALT SERPL W/O P-5'-P-CCNC: 18 U/L
ANION GAP SERPL CALC-SCNC: 7 MMOL/L
AST SERPL-CCNC: 23 U/L
BASOPHILS # BLD AUTO: 0.05 K/UL
BASOPHILS NFR BLD: 0.6 %
BILIRUB SERPL-MCNC: 0.5 MG/DL
BUN SERPL-MCNC: 23 MG/DL
CALCIUM SERPL-MCNC: 9.7 MG/DL
CHLORIDE SERPL-SCNC: 107 MMOL/L
CO2 SERPL-SCNC: 27 MMOL/L
CREAT SERPL-MCNC: 1.7 MG/DL
CRP SERPL-MCNC: 2.3 MG/L
DIFFERENTIAL METHOD: ABNORMAL
EOSINOPHIL # BLD AUTO: 0.2 K/UL
EOSINOPHIL NFR BLD: 2.7 %
ERYTHROCYTE [DISTWIDTH] IN BLOOD BY AUTOMATED COUNT: 17.1 %
ERYTHROCYTE [SEDIMENTATION RATE] IN BLOOD BY WESTERGREN METHOD: 1 MM/HR
EST. GFR  (AFRICAN AMERICAN): 44.6 ML/MIN/1.73 M^2
EST. GFR  (NON AFRICAN AMERICAN): 38.6 ML/MIN/1.73 M^2
GLUCOSE SERPL-MCNC: 128 MG/DL
HCT VFR BLD AUTO: 35.1 %
HGB BLD-MCNC: 11.4 G/DL
IMM GRANULOCYTES # BLD AUTO: 0.04 K/UL
IMM GRANULOCYTES NFR BLD AUTO: 0.5 %
INR PPP: 2.3
LYMPHOCYTES # BLD AUTO: 0.9 K/UL
LYMPHOCYTES NFR BLD: 11.5 %
MCH RBC QN AUTO: 28.6 PG
MCHC RBC AUTO-ENTMCNC: 32.5 G/DL
MCV RBC AUTO: 88 FL
MONOCYTES # BLD AUTO: 0.9 K/UL
MONOCYTES NFR BLD: 11.2 %
NEUTROPHILS # BLD AUTO: 5.7 K/UL
NEUTROPHILS NFR BLD: 73.5 %
NRBC BLD-RTO: 0 /100 WBC
PLATELET # BLD AUTO: 177 K/UL
PMV BLD AUTO: 12.2 FL
POTASSIUM SERPL-SCNC: 4.1 MMOL/L
PROT SERPL-MCNC: 6.7 G/DL
PROTHROMBIN TIME: 23.5 SEC
RBC # BLD AUTO: 3.98 M/UL
SODIUM SERPL-SCNC: 141 MMOL/L
WBC # BLD AUTO: 7.77 K/UL

## 2018-01-02 PROCEDURE — 80053 COMPREHEN METABOLIC PANEL: CPT

## 2018-01-02 PROCEDURE — 85610 PROTHROMBIN TIME: CPT

## 2018-01-02 PROCEDURE — 85025 COMPLETE CBC W/AUTO DIFF WBC: CPT

## 2018-01-02 PROCEDURE — 86140 C-REACTIVE PROTEIN: CPT

## 2018-01-02 PROCEDURE — 85651 RBC SED RATE NONAUTOMATED: CPT

## 2018-01-02 RX ORDER — DOFETILIDE 0.25 MG/1
250 CAPSULE ORAL EVERY 12 HOURS
Qty: 60 CAPSULE | Refills: 11 | Status: ON HOLD | OUTPATIENT
Start: 2018-01-02 | End: 2018-02-07 | Stop reason: DRUGHIGH

## 2018-01-02 RX ORDER — DOFETILIDE 0.25 MG/1
250 CAPSULE ORAL EVERY 12 HOURS
Qty: 60 CAPSULE | Refills: 11 | Status: SHIPPED | OUTPATIENT
Start: 2018-01-02 | End: 2018-01-02 | Stop reason: ALTCHOICE

## 2018-01-04 LAB — INR PPP: 1.91

## 2018-01-05 ENCOUNTER — OFFICE VISIT (OUTPATIENT)
Dept: INFECTIOUS DISEASES | Facility: CLINIC | Age: 78
End: 2018-01-05
Payer: MEDICARE

## 2018-01-05 ENCOUNTER — ANTI-COAG VISIT (OUTPATIENT)
Dept: CARDIOLOGY | Facility: CLINIC | Age: 78
End: 2018-01-05

## 2018-01-05 VITALS — HEIGHT: 72 IN | WEIGHT: 199 LBS | BODY MASS INDEX: 26.95 KG/M2 | TEMPERATURE: 98 F

## 2018-01-05 DIAGNOSIS — T84.7XXD HARDWARE COMPLICATING WOUND INFECTION, SUBSEQUENT ENCOUNTER: Primary | ICD-10-CM

## 2018-01-05 DIAGNOSIS — Z79.01 CHRONIC ANTICOAGULATION: ICD-10-CM

## 2018-01-05 DIAGNOSIS — A49.8 PSEUDOMONAS AERUGINOSA INFECTION: ICD-10-CM

## 2018-01-05 DIAGNOSIS — Z95.811 LVAD (LEFT VENTRICULAR ASSIST DEVICE) PRESENT: ICD-10-CM

## 2018-01-05 PROCEDURE — 99999 PR PBB SHADOW E&M-EST. PATIENT-LVL III: CPT | Mod: PBBFAC,,, | Performed by: INTERNAL MEDICINE

## 2018-01-05 PROCEDURE — 99213 OFFICE O/P EST LOW 20 MIN: CPT | Mod: PBBFAC | Performed by: INTERNAL MEDICINE

## 2018-01-05 PROCEDURE — 99214 OFFICE O/P EST MOD 30 MIN: CPT | Mod: S$PBB,,, | Performed by: INTERNAL MEDICINE

## 2018-01-05 RX ORDER — FERROUS SULFATE 325(65) MG
TABLET ORAL
Refills: 11 | COMMUNITY
Start: 2017-12-23 | End: 2018-01-24

## 2018-01-05 RX ORDER — WARFARIN 6 MG/1
TABLET ORAL
Refills: 11 | Status: ON HOLD | COMMUNITY
Start: 2017-12-30 | End: 2018-02-07 | Stop reason: SDUPTHER

## 2018-01-05 RX ORDER — AMLODIPINE BESYLATE 2.5 MG/1
TABLET ORAL
Refills: 11 | COMMUNITY
Start: 2017-12-30 | End: 2018-01-24

## 2018-01-05 RX ORDER — ISOSORBIDE DINITRATE 20 MG/1
40 TABLET ORAL 3 TIMES DAILY
Refills: 11 | Status: ON HOLD | COMMUNITY
Start: 2017-12-19 | End: 2018-02-07

## 2018-01-05 NOTE — PROGRESS NOTES
Subjective:      Patient ID: Kev Vasquez is a 77 y.o. male.    Chief Complaint:Follow-up    History of Present Illness    Mr. Vasquez is a 78 y/o male with a history of CHF managed with an LVAD.  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  Due to increased drainage, he had a PICC line place on December 28.  He had complications with bleeding from the picc line and went to the ER twice.  He has now been on IV cefepime for a few days.  He is here today for follow up.    Review of Systems   Constitution: Negative for chills, decreased appetite, fever, weakness, malaise/fatigue, night sweats, weight gain and weight loss.   HENT: Negative for congestion, ear pain, hearing loss, hoarse voice, sore throat and tinnitus.    Eyes: Negative for blurred vision, redness and visual disturbance.   Cardiovascular: Negative for chest pain, leg swelling and palpitations.   Respiratory: Positive for cough. Negative for hemoptysis, shortness of breath and sputum production.    Hematologic/Lymphatic: Negative for adenopathy. Does not bruise/bleed easily.   Skin: Positive for suspicious lesions. Negative for dry skin, itching and rash.   Musculoskeletal: Negative for back pain, joint pain, myalgias and neck pain.   Gastrointestinal: Positive for vomiting. Negative for abdominal pain, constipation, diarrhea, heartburn and nausea.   Genitourinary: Negative for dysuria, flank pain, frequency, hematuria, hesitancy and urgency.   Neurological: Negative for dizziness, headaches, numbness and paresthesias.   Psychiatric/Behavioral: Negative for depression and memory loss. The patient has insomnia. The patient is not nervous/anxious.      Objective:   Physical Exam   Constitutional: He is oriented to person, place, and time. He appears well-developed and well-nourished. No distress.   HENT:   Head: Normocephalic and atraumatic.   Right Ear: External ear normal.   Left Ear: External ear normal.   Nose: Nose normal.    Mouth/Throat: Oropharynx is clear and moist. No oropharyngeal exudate.   Eyes: Conjunctivae and EOM are normal. Pupils are equal, round, and reactive to light. Right eye exhibits no discharge. Left eye exhibits no discharge. No scleral icterus.   Neck: Normal range of motion. Neck supple. No JVD present. No tracheal deviation present. No thyromegaly present.   Cardiovascular: Normal rate, regular rhythm and intact distal pulses.  Exam reveals no gallop and no friction rub.    No murmur heard.  Pulmonary/Chest: Effort normal and breath sounds normal. No stridor. No respiratory distress. He has no wheezes. He has no rales. He exhibits no tenderness.   Abdominal: Soft. Bowel sounds are normal. He exhibits no distension and no mass. There is no tenderness. There is no rebound and no guarding.   #1. LVAD DLES:  There is yellow drainage and some surrounding erythema.   Musculoskeletal: Normal range of motion. He exhibits no edema or tenderness.   Lymphadenopathy:     He has no cervical adenopathy.   Neurological: He is alert and oriented to person, place, and time. He has normal reflexes. He displays normal reflexes. No cranial nerve deficit. He exhibits normal muscle tone. Coordination normal.   Skin: Skin is warm. No rash noted. He is not diaphoretic. No erythema. No pallor.   Psychiatric: He has a normal mood and affect. His behavior is normal. Judgment and thought content normal.   Nursing note and vitals reviewed.               Assessment:       1. Hardware complicating wound infection, subsequent encounter    2. Pseudomonas aeruginosa infection        76 y/o with cipro resistant pseudomonas infection of his driveline exit site.  Will extend cefepime IV for a full 6 week course.  Afterwards will likely discontinue.  Patient is aware that the pseudomonas infection will likely recur.    Plan:       Hardware complicating wound infection, subsequent encounter   -IV cefepime    Pseudomonas aeruginosa infection   -IV  cefepime

## 2018-01-08 ENCOUNTER — LAB VISIT (OUTPATIENT)
Dept: LAB | Facility: HOSPITAL | Age: 78
End: 2018-01-08
Attending: INTERNAL MEDICINE
Payer: MEDICARE

## 2018-01-08 DIAGNOSIS — J15.1 PNEUMONIA DUE TO PSEUDOMONAS: Primary | ICD-10-CM

## 2018-01-08 DIAGNOSIS — R79.1 ABNORMAL BLEEDING TIME: ICD-10-CM

## 2018-01-08 LAB
ALBUMIN SERPL BCP-MCNC: 3.3 G/DL
ALP SERPL-CCNC: 64 U/L
ALT SERPL W/O P-5'-P-CCNC: 16 U/L
ANION GAP SERPL CALC-SCNC: 9 MMOL/L
AST SERPL-CCNC: 30 U/L
BASOPHILS # BLD AUTO: 0.05 K/UL
BASOPHILS NFR BLD: 0.7 %
BILIRUB SERPL-MCNC: 0.5 MG/DL
BUN SERPL-MCNC: 32 MG/DL
CALCIUM SERPL-MCNC: 9.8 MG/DL
CHLORIDE SERPL-SCNC: 106 MMOL/L
CO2 SERPL-SCNC: 23 MMOL/L
CREAT SERPL-MCNC: 1.8 MG/DL
CRP SERPL-MCNC: 0.9 MG/L
DIFFERENTIAL METHOD: ABNORMAL
EOSINOPHIL # BLD AUTO: 0.1 K/UL
EOSINOPHIL NFR BLD: 1.9 %
ERYTHROCYTE [DISTWIDTH] IN BLOOD BY AUTOMATED COUNT: 17.5 %
ERYTHROCYTE [SEDIMENTATION RATE] IN BLOOD BY WESTERGREN METHOD: 0 MM/HR
EST. GFR  (AFRICAN AMERICAN): 41.6 ML/MIN/1.73 M^2
EST. GFR  (NON AFRICAN AMERICAN): 36 ML/MIN/1.73 M^2
GLUCOSE SERPL-MCNC: 115 MG/DL
HCT VFR BLD AUTO: 35.5 %
HGB BLD-MCNC: 11.3 G/DL
IMM GRANULOCYTES # BLD AUTO: 0.03 K/UL
IMM GRANULOCYTES NFR BLD AUTO: 0.4 %
LYMPHOCYTES # BLD AUTO: 0.8 K/UL
LYMPHOCYTES NFR BLD: 11 %
MCH RBC QN AUTO: 29.2 PG
MCHC RBC AUTO-ENTMCNC: 31.8 G/DL
MCV RBC AUTO: 92 FL
MONOCYTES # BLD AUTO: 0.8 K/UL
MONOCYTES NFR BLD: 11.4 %
NEUTROPHILS # BLD AUTO: 5.4 K/UL
NEUTROPHILS NFR BLD: 74.6 %
NRBC BLD-RTO: 0 /100 WBC
PLATELET # BLD AUTO: 153 K/UL
PMV BLD AUTO: 12.7 FL
POTASSIUM SERPL-SCNC: 4.5 MMOL/L
PROT SERPL-MCNC: 7.1 G/DL
RBC # BLD AUTO: 3.87 M/UL
SODIUM SERPL-SCNC: 138 MMOL/L
WBC # BLD AUTO: 7.27 K/UL

## 2018-01-08 PROCEDURE — 80053 COMPREHEN METABOLIC PANEL: CPT

## 2018-01-08 PROCEDURE — 85025 COMPLETE CBC W/AUTO DIFF WBC: CPT

## 2018-01-08 PROCEDURE — 86140 C-REACTIVE PROTEIN: CPT

## 2018-01-08 PROCEDURE — 85651 RBC SED RATE NONAUTOMATED: CPT

## 2018-01-09 ENCOUNTER — TELEPHONE (OUTPATIENT)
Dept: INFECTIOUS DISEASES | Facility: CLINIC | Age: 78
End: 2018-01-09

## 2018-01-09 NOTE — TELEPHONE ENCOUNTER
Kip from the lab states that the patients Protime-INR was not done on 1/8/18 because there was not a sufficient amount of blood available. AYESHA

## 2018-01-09 NOTE — TELEPHONE ENCOUNTER
----- Message from Kip Zamarripa sent at 1/9/2018 10:59 AM CST -----  Regarding: Lab Client Services  Contact: [phone]  Hi my name is Kip we had a problem with a lab on your patient when you get a chance please give us a call at [phone] or ext [phone]. It has to do with labs from 9/25. ANYONE in my department can help. Thank You.

## 2018-01-15 ENCOUNTER — LAB VISIT (OUTPATIENT)
Dept: LAB | Facility: HOSPITAL | Age: 78
End: 2018-01-15
Attending: INTERNAL MEDICINE
Payer: MEDICARE

## 2018-01-15 DIAGNOSIS — Z95.811 HEART REPLACED BY HEART ASSIST DEVICE: ICD-10-CM

## 2018-01-15 DIAGNOSIS — I13.10 MALIGNANT HYPERTENSIVE HEART AND RENAL DISEASE, WITH RENAL FAILURE: Primary | ICD-10-CM

## 2018-01-15 DIAGNOSIS — I13.10 MALIGNANT HYPERTENSIVE HEART AND RENAL DISEASE, WITH RENAL FAILURE: ICD-10-CM

## 2018-01-15 LAB
ALBUMIN SERPL BCP-MCNC: 3.6 G/DL
ALP SERPL-CCNC: 74 U/L
ALT SERPL W/O P-5'-P-CCNC: 19 U/L
ANION GAP SERPL CALC-SCNC: 8 MMOL/L
AST SERPL-CCNC: 27 U/L
BASOPHILS # BLD AUTO: 0.05 K/UL
BASOPHILS NFR BLD: 0.8 %
BILIRUB SERPL-MCNC: 0.5 MG/DL
BUN SERPL-MCNC: 31 MG/DL
CALCIUM SERPL-MCNC: 10.9 MG/DL
CHLORIDE SERPL-SCNC: 104 MMOL/L
CO2 SERPL-SCNC: 29 MMOL/L
CREAT SERPL-MCNC: 1.8 MG/DL
CRP SERPL-MCNC: 1 MG/L
DIFFERENTIAL METHOD: ABNORMAL
EOSINOPHIL # BLD AUTO: 0.2 K/UL
EOSINOPHIL NFR BLD: 2.5 %
ERYTHROCYTE [DISTWIDTH] IN BLOOD BY AUTOMATED COUNT: 18.2 %
EST. GFR  (AFRICAN AMERICAN): 41.6 ML/MIN/1.73 M^2
EST. GFR  (NON AFRICAN AMERICAN): 36 ML/MIN/1.73 M^2
GLUCOSE SERPL-MCNC: 83 MG/DL
HCT VFR BLD AUTO: 39.7 %
HGB BLD-MCNC: 12.6 G/DL
INR PPP: 1.5
INR PPP: 1.5
LYMPHOCYTES # BLD AUTO: 0.9 K/UL
LYMPHOCYTES NFR BLD: 14.1 %
MCH RBC QN AUTO: 28.9 PG
MCHC RBC AUTO-ENTMCNC: 31.7 G/DL
MCV RBC AUTO: 91 FL
MONOCYTES # BLD AUTO: 0.9 K/UL
MONOCYTES NFR BLD: 14 %
NEUTROPHILS # BLD AUTO: 4.3 K/UL
NEUTROPHILS NFR BLD: 67.8 %
NRBC BLD-RTO: 0 /100 WBC
PLATELET # BLD AUTO: 148 K/UL
PMV BLD AUTO: 12.3 FL
POTASSIUM SERPL-SCNC: 4.4 MMOL/L
PROT SERPL-MCNC: 7.9 G/DL
PROTHROMBIN TIME: 15.7 SEC
RBC # BLD AUTO: 4.36 M/UL
SODIUM SERPL-SCNC: 141 MMOL/L
WBC # BLD AUTO: 6.3 K/UL

## 2018-01-15 PROCEDURE — 85025 COMPLETE CBC W/AUTO DIFF WBC: CPT

## 2018-01-15 PROCEDURE — 36415 COLL VENOUS BLD VENIPUNCTURE: CPT

## 2018-01-15 PROCEDURE — 85651 RBC SED RATE NONAUTOMATED: CPT

## 2018-01-15 PROCEDURE — 85610 PROTHROMBIN TIME: CPT

## 2018-01-15 PROCEDURE — 86140 C-REACTIVE PROTEIN: CPT

## 2018-01-15 PROCEDURE — 80053 COMPREHEN METABOLIC PANEL: CPT

## 2018-01-16 LAB — ERYTHROCYTE [SEDIMENTATION RATE] IN BLOOD BY WESTERGREN METHOD: 8 MM/HR

## 2018-01-17 ENCOUNTER — ANTI-COAG VISIT (OUTPATIENT)
Dept: CARDIOLOGY | Facility: CLINIC | Age: 78
End: 2018-01-17

## 2018-01-17 DIAGNOSIS — Z79.01 CHRONIC ANTICOAGULATION: ICD-10-CM

## 2018-01-17 DIAGNOSIS — Z95.811 LVAD (LEFT VENTRICULAR ASSIST DEVICE) PRESENT: ICD-10-CM

## 2018-01-17 NOTE — PROGRESS NOTES
Rubi questioned and confirmed correct dose .  Reports patient being on new IV antibiotic cephalexin for the past two weeks due to infection ion drive line

## 2018-01-18 ENCOUNTER — ANTI-COAG VISIT (OUTPATIENT)
Dept: CARDIOLOGY | Facility: CLINIC | Age: 78
End: 2018-01-18

## 2018-01-18 ENCOUNTER — LAB VISIT (OUTPATIENT)
Dept: LAB | Facility: HOSPITAL | Age: 78
End: 2018-01-18
Attending: INTERNAL MEDICINE
Payer: MEDICARE

## 2018-01-18 ENCOUNTER — CLINICAL SUPPORT (OUTPATIENT)
Dept: TRANSPLANT | Facility: CLINIC | Age: 78
End: 2018-01-18
Payer: MEDICARE

## 2018-01-18 ENCOUNTER — OFFICE VISIT (OUTPATIENT)
Dept: TRANSPLANT | Facility: CLINIC | Age: 78
End: 2018-01-18
Payer: MEDICARE

## 2018-01-18 VITALS — WEIGHT: 181 LBS | SYSTOLIC BLOOD PRESSURE: 82 MMHG | BODY MASS INDEX: 24.55 KG/M2

## 2018-01-18 DIAGNOSIS — Z79.01 CHRONIC ANTICOAGULATION: ICD-10-CM

## 2018-01-18 DIAGNOSIS — Z95.811 LVAD (LEFT VENTRICULAR ASSIST DEVICE) PRESENT: ICD-10-CM

## 2018-01-18 DIAGNOSIS — N18.30 STAGE 3 CHRONIC KIDNEY DISEASE: ICD-10-CM

## 2018-01-18 DIAGNOSIS — Z95.811 HEART REPLACED BY HEART ASSIST DEVICE: ICD-10-CM

## 2018-01-18 DIAGNOSIS — I25.5 ISCHEMIC CARDIOMYOPATHY: ICD-10-CM

## 2018-01-18 DIAGNOSIS — Z95.811 HEART REPLACED BY HEART ASSIST DEVICE: Primary | ICD-10-CM

## 2018-01-18 DIAGNOSIS — I10 ESSENTIAL HYPERTENSION: Chronic | ICD-10-CM

## 2018-01-18 DIAGNOSIS — I50.42 CHRONIC COMBINED SYSTOLIC AND DIASTOLIC CONGESTIVE HEART FAILURE: ICD-10-CM

## 2018-01-18 LAB
BASOPHILS # BLD AUTO: 0.05 K/UL
BASOPHILS NFR BLD: 0.7 %
BNP SERPL-MCNC: 359 PG/ML
DIFFERENTIAL METHOD: ABNORMAL
EOSINOPHIL # BLD AUTO: 0.1 K/UL
EOSINOPHIL NFR BLD: 1.5 %
ERYTHROCYTE [DISTWIDTH] IN BLOOD BY AUTOMATED COUNT: 18.3 %
HCT VFR BLD AUTO: 36.9 %
HGB BLD-MCNC: 11.9 G/DL
IMM GRANULOCYTES # BLD AUTO: 0.04 K/UL
IMM GRANULOCYTES NFR BLD AUTO: 0.6 %
INR PPP: 1.9
LDH SERPL L TO P-CCNC: 183 U/L
LYMPHOCYTES # BLD AUTO: 0.9 K/UL
LYMPHOCYTES NFR BLD: 12.4 %
MCH RBC QN AUTO: 28.5 PG
MCHC RBC AUTO-ENTMCNC: 32.2 G/DL
MCV RBC AUTO: 88 FL
MONOCYTES # BLD AUTO: 1 K/UL
MONOCYTES NFR BLD: 13.4 %
NEUTROPHILS # BLD AUTO: 5.1 K/UL
NEUTROPHILS NFR BLD: 71.4 %
NRBC BLD-RTO: 0 /100 WBC
PLATELET # BLD AUTO: 147 K/UL
PMV BLD AUTO: 11.6 FL
PREALB SERPL-MCNC: 34 MG/DL
PROTHROMBIN TIME: 18.4 SEC
RBC # BLD AUTO: 4.18 M/UL
WBC # BLD AUTO: 7.16 K/UL

## 2018-01-18 PROCEDURE — 85610 PROTHROMBIN TIME: CPT

## 2018-01-18 PROCEDURE — 93750 INTERROGATION VAD IN PERSON: CPT | Mod: ,,, | Performed by: INTERNAL MEDICINE

## 2018-01-18 PROCEDURE — 80053 COMPREHEN METABOLIC PANEL: CPT

## 2018-01-18 PROCEDURE — 99211 OFF/OP EST MAY X REQ PHY/QHP: CPT | Mod: PBBFAC,27

## 2018-01-18 PROCEDURE — 85025 COMPLETE CBC W/AUTO DIFF WBC: CPT

## 2018-01-18 PROCEDURE — 99999 PR PBB SHADOW E&M-EST. PATIENT-LVL II: CPT | Mod: PBBFAC,,, | Performed by: INTERNAL MEDICINE

## 2018-01-18 PROCEDURE — 99999 PR PBB SHADOW E&M-EST. PATIENT-LVL I: CPT | Mod: PBBFAC,,,

## 2018-01-18 PROCEDURE — 99214 OFFICE O/P EST MOD 30 MIN: CPT | Mod: S$PBB,,, | Performed by: INTERNAL MEDICINE

## 2018-01-18 PROCEDURE — 84134 ASSAY OF PREALBUMIN: CPT

## 2018-01-18 PROCEDURE — 86140 C-REACTIVE PROTEIN: CPT

## 2018-01-18 PROCEDURE — 99212 OFFICE O/P EST SF 10 MIN: CPT | Mod: PBBFAC | Performed by: INTERNAL MEDICINE

## 2018-01-18 PROCEDURE — 83615 LACTATE (LD) (LDH) ENZYME: CPT

## 2018-01-18 PROCEDURE — 36415 COLL VENOUS BLD VENIPUNCTURE: CPT

## 2018-01-18 PROCEDURE — 83735 ASSAY OF MAGNESIUM: CPT

## 2018-01-18 PROCEDURE — 84100 ASSAY OF PHOSPHORUS: CPT

## 2018-01-18 PROCEDURE — 82248 BILIRUBIN DIRECT: CPT

## 2018-01-18 PROCEDURE — 83880 ASSAY OF NATRIURETIC PEPTIDE: CPT

## 2018-01-18 NOTE — PROCEDURES
TXP [facility] INTERROGATIONS 1/18/2018 12/14/2017 12/4/2017 12/4/2017 12/4/2017 12/3/2017 12/3/2017   Type HeartMate3 HeartMate3 - - - - -   Flow 5.6 5.0 - - - - -   Speed 5800 5800 - - - - -   PI 2.0 3.0 - - - - -   Power (Mendez) 4.6 4.5 - - - - -   LSL 5400 5400 - - - - -   Pulsatility Pulse Intermittent pulse Pulse Pulse Pulse Pulse Pulse   }  Interrogation of Ventricular assist device was performed with physician analysis of device parameters and review of device function. I have personally reviewed the interrogation findings and agree with findings as stated.     NEYDA To MD  Transplant Cardiology

## 2018-01-18 NOTE — PROGRESS NOTES
Date of Implant with Heartmate 3 LVAD: 10/19/16    PATIENT ARRIVED IN CLINIC:  Ambulatory   Accompanied by: 3 family members with him    Vitals  PAIN: denies on 0-10 pain scale  Is patient currently on medications for pain? no What kind? n/a    VAD Interrogation:  TXP [facility] INTERROGATIONS 2018   Type HeartMate3   Flow 5.6   Speed 5800   PI 2.0   Power (Mendez) 4.6   LSL 5400   Pulsatility Pulse       Flow in history: 5-7  History Lo.c3e  HCT:   Lab Results   Component Value Date    HCT 36.9 (L) 2018    HCT 40 2017         Problems / Issues / Alarms with VAD if any: None noted  Any Equipment Issues: None noted (Refer to  note for complete details)   Emergency Equipment With Patient: yes   VAD Binder With Patient: no   Reviewed VAD Numbers In Binder: no  VAD Sounds: HM3 sound Smooth  Manual & Visual Inspection Of Driveline: No kinks or tears noted    LVAD Dressing/DLES:  Appearance Of Driveline: 2-3          PT follow up with ID in a few weeks.   Antibiotics: YES IV cefepime  Velour: yes  Frequency of Dressing Changes: daily & soap and water dressing  Stabilization Device In Use: yes, silverio securement device    Modular Cable Connection Intact: No yellow exposed  Pt In Need Of Management Kits?:Yes - 3 boxes soap and water  It is medically necessary to have VAD management kits in order to prevent infection or to assist in the healing of an infected DLES.    Assessment:   Complaints/reason for visit today: routine  Complaints Of Nausea / Vomiting: None noted    Appearance and Frequency Of Stools: normal and formed without blood & daily  Color Of Urine: clear/yellow  Coping/Depression/Anxiety: coping okay  Sleep Habits: unsure hrs /night  Sleep Aids: None noted  Showering: No  Activity/Exercise: pt reports 45 minutes with h/h   Driving: No.    Labs:    Chemistry        Component Value Date/Time     01/15/2018 2008    K 4.4 01/15/2018 2008     01/15/2018 2008    CO2  29 01/15/2018 2008    BUN 31 (H) 01/15/2018 2008    CREATININE 1.8 (H) 01/15/2018 2008    GLU 83 01/15/2018 2008        Component Value Date/Time    CALCIUM 10.9 (H) 01/15/2018 2008    ALKPHOS 74 01/15/2018 2008    AST 27 01/15/2018 2008    ALT 19 01/15/2018 2008    BILITOT 0.5 01/15/2018 2008    ESTGFRAFRICA 41.6 (A) 01/15/2018 2008    EGFRNONAA 36.0 (A) 01/15/2018 2008            Magnesium   Date Value Ref Range Status   12/14/2017 2.3 1.6 - 2.6 mg/dL Final       Lab Results   Component Value Date    WBC 7.16 01/18/2018    HGB 11.9 (L) 01/18/2018    HCT 36.9 (L) 01/18/2018    MCV 88 01/18/2018     (L) 01/18/2018       Lab Results   Component Value Date    INR 1.9 (H) 01/18/2018    INR 1.5 (H) 01/15/2018    INR 1.5 01/15/2018       BNP   Date Value Ref Range Status   01/18/2018 359 (H) 0 - 99 pg/mL Final     Comment:     Values of less than 100 pg/ml are consistent with non-CHF populations.   12/14/2017 621 (H) 0 - 99 pg/mL Final     Comment:     Values of less than 100 pg/ml are consistent with non-CHF populations.   12/04/2017 278 (H) 0 - 99 pg/mL Final     Comment:     Values of less than 100 pg/ml are consistent with non-CHF populations.       LD   Date Value Ref Range Status   12/14/2017 191 110 - 260 U/L Final     Comment:     Results are increased in hemolyzed samples.   12/04/2017 163 110 - 260 U/L Final     Comment:     Results are increased in hemolyzed samples.   12/03/2017 174 110 - 260 U/L Final     Comment:     Results are increased in hemolyzed samples.       Labs reviewed with patient: YES , CMP unable to be completed at this time due to machines being down.  Will await results and notfiy pt of resutls.      Patient Satisfaction Survey completed per patient: no  (explained about signature and box to check)  Medication reconciliation: per MA.  Purple card updated today: patient did not bring the card  Coumadin Managed by: [facility],     Education: Reviewed driveline care,  emergency procedures, how to change the controller, alarms with patient, as well as discussed how to page the VAD coordinator in case of an emergency.      Plans/Needs:  Pt doing pretty, at his baseline .  Pt DLES still not healed, will follow up with ID in a few weeks.  Pt to RTC in 2 months.      Hurricane Season: No

## 2018-01-18 NOTE — PROGRESS NOTES
"Subjective:   Patient ID:  Kev Vasquez is a 77 y.o. male who presents for LVAD followup visit.    Implant Date:10-19-16 @ [facility] 3 RPM 5800     INR goal: 2-3   Bridge with Heparin   Antiplatelets: ASA 81 mg    TXP [facility] INTERROGATIONS 1/18/2018   Type HeartMate3   Flow 5.6   Speed 5800   PI 2.0   Power (Mendez) 4.6   LSL 5400   Pulsatility Pulse       HPI   77 y.o. male s/p HM III 10/16/16 as DT for ICM in [place] (5800), chronic Pseudomonas DL infection, on IV Cefepime and followed by Dr. Muhammad, [place], on Tikosyn (intolerant to Amiodarone per outside records), h/o SSS, S/P [employer] Balwinder BiV ICD, HILLARY/BiPAP, HTN, CKD, HLP, gout and depression who comes for a follow-up visit. He was recently discharged from the hospital after being treated for ADHF 12/1-12/4. Went to ED 1/7/18 for oozing around his PICC. The site was treated and sent home. He is still on Cefepime for ~3 weeks at least per ID. Last seen 1 month ago.     Today, he and his family report doing well. He still is on HH for abx and unable to get adequate PT/rehab for his RLE. Only other complaint is sneezing/coughing fits that will result in post-tussive emesis. VAD speed is at 5800 rpm. No VAD alarms noted on interrogation occasional PI events. BP is 82. DLES is a "2-3" with moderate drainage. INR is sub-therapeutic at 1.9 (up from 1.5). LDH is pending and  at baseline.     Review of Systems   Constitution: Negative. Negative for chills, decreased appetite, diaphoresis, fever, weakness, malaise/fatigue, night sweats, weight gain and weight loss.   Eyes: Negative.    Cardiovascular: Negative for chest pain, claudication, cyanosis, dyspnea on exertion, irregular heartbeat, leg swelling, near-syncope, orthopnea, palpitations, paroxysmal nocturnal dyspnea and syncope.   Respiratory: Negative for cough, hemoptysis and shortness of breath.    Endocrine: Negative.    Hematologic/Lymphatic: Negative.    Skin: Negative for color change, dry skin and " "nail changes.   Musculoskeletal: Negative.    Gastrointestinal: Negative.    Genitourinary: Negative.        Objective:   Blood pressure (!) 82/0, weight 82.1 kg (181 lb).body mass index is 24.55 kg/m².    Doppler: 82    Physical Exam   Constitutional: He appears well-developed.   HENT:   Head: Normocephalic.   Neck: No JVD present. Carotid bruit is not present.   Cardiovascular: Regular rhythm and normal heart sounds.    No murmur heard.  Smooth VAD hum. DLES is "2-3" with moderate drainage   Pulmonary/Chest: Effort normal and breath sounds normal. No respiratory distress. He has no wheezes. He has no rales.   Abdominal: Soft. Bowel sounds are normal. He exhibits no distension. There is no tenderness. There is no rebound.   Musculoskeletal: He exhibits no edema.   Neurological: He is alert.   Skin: Skin is warm.   Vitals reviewed.          Lab Results   Component Value Date    WBC 7.16 01/18/2018    HGB 11.9 (L) 01/18/2018    HCT 36.9 (L) 01/18/2018    MCV 88 01/18/2018     (L) 01/18/2018    CO2 29 01/15/2018    CREATININE 1.8 (H) 01/15/2018    CALCIUM 10.9 (H) 01/15/2018    ALBUMIN 3.6 01/15/2018    AST 27 01/15/2018     (H) 01/18/2018    ALT 19 01/15/2018     12/14/2017       Lab Results   Component Value Date    INR 1.9 (H) 01/18/2018    INR 1.5 (H) 01/15/2018    INR 1.5 01/15/2018       BNP   Date Value Ref Range Status   01/18/2018 359 (H) 0 - 99 pg/mL Final     Comment:     Values of less than 100 pg/ml are consistent with non-CHF populations.   12/14/2017 621 (H) 0 - 99 pg/mL Final     Comment:     Values of less than 100 pg/ml are consistent with non-CHF populations.   12/04/2017 278 (H) 0 - 99 pg/mL Final     Comment:     Values of less than 100 pg/ml are consistent with non-CHF populations.       LD   Date Value Ref Range Status   12/14/2017 191 110 - 260 U/L Final     Comment:     Results are increased in hemolyzed samples.   12/04/2017 163 110 - 260 U/L Final     Comment:     " Results are increased in hemolyzed samples.   12/03/2017 174 110 - 260 U/L Final     Comment:     Results are increased in hemolyzed samples.       Assessment:      1. Heart replaced by heart assist device    2. Chronic combined systolic and diastolic congestive heart failure    3. Essential hypertension    4. Ischemic cardiomyopathy    5. LVAD (left ventricular assist device) present    6. Stage 3 chronic kidney disease        Plan:   LVAD  -Patient is now NYHA class II. BP is controlled.  INR is borderline therapeutic (1.9).  -VAD interrogation was performed in clinic  -Provided with VAD supplies.     Post-tussive/sneezing emesis  - trial of antihistamine (Loratadine vs cetirizine)    Echo and lipids next visit    Recommend 2 gram sodium restriction and 1500cc fluid restriction.  Encourage physical activity with graded exercise program.  Requested patient to weigh themselves daily, and to notify us if their weight increases by more than 3 lbs in 1 day or 5 lbs in 1 week.     Listed for transplant: DT    UNOS Patient Status  Functional Status: 80% - Normal activity with effort: some symptoms of disease  Physical Capacity: No Limitations  Working for Income: Unknown    NEYDA To MD  Transplant Cardiology

## 2018-01-19 LAB
ALBUMIN SERPL BCP-MCNC: 3.8 G/DL
ALP SERPL-CCNC: 72 U/L
ALT SERPL W/O P-5'-P-CCNC: 16 U/L
ANION GAP SERPL CALC-SCNC: 10 MMOL/L
AST SERPL-CCNC: 24 U/L
BILIRUB DIRECT SERPL-MCNC: 0.3 MG/DL
BILIRUB SERPL-MCNC: 0.4 MG/DL
BUN SERPL-MCNC: 38 MG/DL
CALCIUM SERPL-MCNC: 10.7 MG/DL
CHLORIDE SERPL-SCNC: 104 MMOL/L
CO2 SERPL-SCNC: 26 MMOL/L
CREAT SERPL-MCNC: 2.3 MG/DL
CRP SERPL-MCNC: 2.9 MG/L
EST. GFR  (AFRICAN AMERICAN): 31 ML/MIN/1.73 M^2
EST. GFR  (NON AFRICAN AMERICAN): 27 ML/MIN/1.73 M^2
GLUCOSE SERPL-MCNC: 116 MG/DL
MAGNESIUM SERPL-MCNC: 3 MG/DL
PHOSPHATE SERPL-MCNC: 3.3 MG/DL
POTASSIUM SERPL-SCNC: 4.7 MMOL/L
PROT SERPL-MCNC: 7.7 G/DL
SODIUM SERPL-SCNC: 140 MMOL/L

## 2018-01-19 NOTE — PROGRESS NOTES
PT labs received and reviewed with Dr. To. Cre noted to be elevated.   Plan is to hold lasix today and Monday with labs next week.  Called and spoke to pt wife.  Pt wife reports pt may have taken lasix already today but if not she will hold today and Monday.  Pt wife also instructed to have pt take more fluids this weekend and we will recheck labs with H/H next week.  Pt wife verbalizes read back of plan.  Pt encouraged to call VAD coordinator with any questions problems or concerns. Pt reminded to page VAD coordinator on call for emergencies.  Pt verbalized understanding and in agreement of plan.

## 2018-01-22 ENCOUNTER — ANTI-COAG VISIT (OUTPATIENT)
Dept: CARDIOLOGY | Facility: CLINIC | Age: 78
End: 2018-01-22

## 2018-01-22 DIAGNOSIS — Z79.01 CHRONIC ANTICOAGULATION: ICD-10-CM

## 2018-01-22 DIAGNOSIS — Z95.811 LVAD (LEFT VENTRICULAR ASSIST DEVICE) PRESENT: ICD-10-CM

## 2018-01-24 ENCOUNTER — HOSPITAL ENCOUNTER (INPATIENT)
Facility: HOSPITAL | Age: 78
LOS: 14 days | Discharge: HOME | DRG: 057 | End: 2018-02-07
Attending: EMERGENCY MEDICINE | Admitting: INTERNAL MEDICINE
Payer: MEDICARE

## 2018-01-24 ENCOUNTER — TELEPHONE (OUTPATIENT)
Dept: INTERNAL MEDICINE | Facility: CLINIC | Age: 78
End: 2018-01-24

## 2018-01-24 ENCOUNTER — LAB VISIT (OUTPATIENT)
Dept: LAB | Facility: HOSPITAL | Age: 78
End: 2018-01-24
Attending: INTERNAL MEDICINE
Payer: MEDICARE

## 2018-01-24 ENCOUNTER — TELEPHONE (OUTPATIENT)
Dept: TRANSPLANT | Facility: CLINIC | Age: 78
End: 2018-01-24

## 2018-01-24 DIAGNOSIS — R26.81 GAIT INSTABILITY: ICD-10-CM

## 2018-01-24 DIAGNOSIS — K21.9 GASTROESOPHAGEAL REFLUX DISEASE, ESOPHAGITIS PRESENCE NOT SPECIFIED: ICD-10-CM

## 2018-01-24 DIAGNOSIS — Z95.811 LVAD (LEFT VENTRICULAR ASSIST DEVICE) PRESENT: ICD-10-CM

## 2018-01-24 DIAGNOSIS — Z79.899 VISIT FOR MONITORING TIKOSYN THERAPY: ICD-10-CM

## 2018-01-24 DIAGNOSIS — R41.0 CONFUSION: ICD-10-CM

## 2018-01-24 DIAGNOSIS — Z95.811 PRESENCE OF LEFT VENTRICULAR ASSIST DEVICE (LVAD): ICD-10-CM

## 2018-01-24 DIAGNOSIS — R41.0 DELIRIUM: Primary | ICD-10-CM

## 2018-01-24 DIAGNOSIS — R11.10 VOMITING: ICD-10-CM

## 2018-01-24 DIAGNOSIS — I42.9 CARDIOMYOPATHY, UNSPECIFIED TYPE: ICD-10-CM

## 2018-01-24 DIAGNOSIS — I42.9 CARDIOMYOPATHY: ICD-10-CM

## 2018-01-24 DIAGNOSIS — R11.10 VOMITING, INTRACTABILITY OF VOMITING NOT SPECIFIED, PRESENCE OF NAUSEA NOT SPECIFIED, UNSPECIFIED VOMITING TYPE: ICD-10-CM

## 2018-01-24 DIAGNOSIS — Z79.01 LONG TERM (CURRENT) USE OF ANTICOAGULANTS: Primary | ICD-10-CM

## 2018-01-24 DIAGNOSIS — T14.8XXA WOUND INFECTION: ICD-10-CM

## 2018-01-24 DIAGNOSIS — Z51.81 VISIT FOR MONITORING TIKOSYN THERAPY: ICD-10-CM

## 2018-01-24 DIAGNOSIS — L08.9 WOUND INFECTION: ICD-10-CM

## 2018-01-24 PROBLEM — R44.3 HALLUCINATIONS: Status: ACTIVE | Noted: 2018-01-24

## 2018-01-24 LAB
ALBUMIN SERPL BCP-MCNC: 3.3 G/DL
ALP SERPL-CCNC: 70 U/L
ALT SERPL W/O P-5'-P-CCNC: 14 U/L
ANION GAP SERPL CALC-SCNC: 5 MMOL/L
AST SERPL-CCNC: 22 U/L
BACTERIA #/AREA URNS AUTO: ABNORMAL /HPF
BASOPHILS # BLD AUTO: 0.04 K/UL
BASOPHILS NFR BLD: 0.6 %
BILIRUB SERPL-MCNC: 0.3 MG/DL
BILIRUB UR QL STRIP: NEGATIVE
BNP SERPL-MCNC: 354 PG/ML
BUN SERPL-MCNC: 36 MG/DL
CALCIUM SERPL-MCNC: 10.6 MG/DL
CHLORIDE SERPL-SCNC: 106 MMOL/L
CLARITY UR REFRACT.AUTO: CLEAR
CO2 SERPL-SCNC: 28 MMOL/L
COLOR UR AUTO: YELLOW
CREAT SERPL-MCNC: 2.3 MG/DL
DIFFERENTIAL METHOD: ABNORMAL
EOSINOPHIL # BLD AUTO: 0.2 K/UL
EOSINOPHIL NFR BLD: 2.6 %
ERYTHROCYTE [DISTWIDTH] IN BLOOD BY AUTOMATED COUNT: 18.1 %
EST. GFR  (AFRICAN AMERICAN): 31 ML/MIN/1.73 M^2
EST. GFR  (NON AFRICAN AMERICAN): 26.8 ML/MIN/1.73 M^2
FLUAV AG SPEC QL IA: NEGATIVE
FLUBV AG SPEC QL IA: NEGATIVE
GLUCOSE SERPL-MCNC: 111 MG/DL
GLUCOSE UR QL STRIP: NEGATIVE
HCT VFR BLD AUTO: 34.4 %
HGB BLD-MCNC: 11.2 G/DL
HGB UR QL STRIP: ABNORMAL
HYALINE CASTS UR QL AUTO: 0 /LPF
IMM GRANULOCYTES # BLD AUTO: 0.04 K/UL
IMM GRANULOCYTES NFR BLD AUTO: 0.6 %
INR PPP: 2.6
INR PPP: 2.7
KETONES UR QL STRIP: NEGATIVE
LEUKOCYTE ESTERASE UR QL STRIP: NEGATIVE
LIPASE SERPL-CCNC: 61 U/L
LYMPHOCYTES # BLD AUTO: 0.9 K/UL
LYMPHOCYTES NFR BLD: 13.4 %
MCH RBC QN AUTO: 29.3 PG
MCHC RBC AUTO-ENTMCNC: 32.6 G/DL
MCV RBC AUTO: 90 FL
MICROSCOPIC COMMENT: ABNORMAL
MONOCYTES # BLD AUTO: 1 K/UL
MONOCYTES NFR BLD: 15.2 %
NEUTROPHILS # BLD AUTO: 4.4 K/UL
NEUTROPHILS NFR BLD: 67.6 %
NITRITE UR QL STRIP: NEGATIVE
NRBC BLD-RTO: 0 /100 WBC
PH UR STRIP: 7 [PH] (ref 5–8)
PLATELET # BLD AUTO: 136 K/UL
PMV BLD AUTO: 11 FL
POTASSIUM SERPL-SCNC: 4.3 MMOL/L
PROT SERPL-MCNC: 7.2 G/DL
PROT UR QL STRIP: ABNORMAL
PROTHROMBIN TIME: 26.3 SEC
PROTHROMBIN TIME: 28.5 SEC
RBC # BLD AUTO: 3.82 M/UL
RBC #/AREA URNS AUTO: 17 /HPF (ref 0–4)
SODIUM SERPL-SCNC: 139 MMOL/L
SP GR UR STRIP: 1.01 (ref 1–1.03)
SPECIMEN SOURCE: NORMAL
TROPONIN I SERPL DL<=0.01 NG/ML-MCNC: 0.11 NG/ML
URN SPEC COLLECT METH UR: ABNORMAL
UROBILINOGEN UR STRIP-ACNC: NEGATIVE EU/DL
WBC # BLD AUTO: 6.5 K/UL
WBC #/AREA URNS AUTO: 1 /HPF (ref 0–5)

## 2018-01-24 PROCEDURE — 85610 PROTHROMBIN TIME: CPT

## 2018-01-24 PROCEDURE — 87400 INFLUENZA A/B EACH AG IA: CPT | Mod: 59

## 2018-01-24 PROCEDURE — 20600001 HC STEP DOWN PRIVATE ROOM

## 2018-01-24 PROCEDURE — 63600175 PHARM REV CODE 636 W HCPCS: Performed by: INTERNAL MEDICINE

## 2018-01-24 PROCEDURE — 99285 EMERGENCY DEPT VISIT HI MDM: CPT | Mod: ,,, | Performed by: INTERNAL MEDICINE

## 2018-01-24 PROCEDURE — 83880 ASSAY OF NATRIURETIC PEPTIDE: CPT

## 2018-01-24 PROCEDURE — 93750 INTERROGATION VAD IN PERSON: CPT | Performed by: INTERNAL MEDICINE

## 2018-01-24 PROCEDURE — 83690 ASSAY OF LIPASE: CPT

## 2018-01-24 PROCEDURE — 27000248 HC VAD-ADDITIONAL DAY

## 2018-01-24 PROCEDURE — 87040 BLOOD CULTURE FOR BACTERIA: CPT

## 2018-01-24 PROCEDURE — 85025 COMPLETE CBC W/AUTO DIFF WBC: CPT

## 2018-01-24 PROCEDURE — 84484 ASSAY OF TROPONIN QUANT: CPT

## 2018-01-24 PROCEDURE — 94761 N-INVAS EAR/PLS OXIMETRY MLT: CPT

## 2018-01-24 PROCEDURE — 36415 COLL VENOUS BLD VENIPUNCTURE: CPT | Mod: PO

## 2018-01-24 PROCEDURE — 85610 PROTHROMBIN TIME: CPT | Mod: PO

## 2018-01-24 PROCEDURE — 80053 COMPREHEN METABOLIC PANEL: CPT

## 2018-01-24 PROCEDURE — 81001 URINALYSIS AUTO W/SCOPE: CPT

## 2018-01-24 PROCEDURE — 99285 EMERGENCY DEPT VISIT HI MDM: CPT | Mod: 25

## 2018-01-24 PROCEDURE — 25000003 PHARM REV CODE 250: Performed by: INTERNAL MEDICINE

## 2018-01-24 RX ORDER — NAPROXEN SODIUM 220 MG/1
81 TABLET, FILM COATED ORAL DAILY
Status: DISCONTINUED | OUTPATIENT
Start: 2018-01-25 | End: 2018-02-07 | Stop reason: HOSPADM

## 2018-01-24 RX ORDER — DOFETILIDE 0.25 MG/1
250 CAPSULE ORAL EVERY 12 HOURS
Status: DISCONTINUED | OUTPATIENT
Start: 2018-01-24 | End: 2018-02-07

## 2018-01-24 RX ORDER — LANOLIN ALCOHOL/MO/W.PET/CERES
400 CREAM (GRAM) TOPICAL 2 TIMES DAILY
Status: DISCONTINUED | OUTPATIENT
Start: 2018-01-24 | End: 2018-02-07 | Stop reason: HOSPADM

## 2018-01-24 RX ORDER — ATORVASTATIN CALCIUM 10 MG/1
10 TABLET, FILM COATED ORAL DAILY
Status: DISCONTINUED | OUTPATIENT
Start: 2018-01-25 | End: 2018-02-07 | Stop reason: HOSPADM

## 2018-01-24 RX ORDER — ONDANSETRON 2 MG/ML
4 INJECTION INTRAMUSCULAR; INTRAVENOUS
Status: DISCONTINUED | OUTPATIENT
Start: 2018-01-24 | End: 2018-01-24

## 2018-01-24 RX ORDER — ROPINIROLE 0.25 MG/1
0.5 TABLET, FILM COATED ORAL 3 TIMES DAILY
Status: DISCONTINUED | OUTPATIENT
Start: 2018-01-24 | End: 2018-01-25

## 2018-01-24 RX ORDER — ISOSORBIDE DINITRATE 40 MG/1
40 TABLET ORAL 3 TIMES DAILY
Status: DISCONTINUED | OUTPATIENT
Start: 2018-01-24 | End: 2018-02-07 | Stop reason: HOSPADM

## 2018-01-24 RX ORDER — CEFEPIME HYDROCHLORIDE 2 G/1
2 INJECTION, POWDER, FOR SOLUTION INTRAVENOUS
Status: DISCONTINUED | OUTPATIENT
Start: 2018-01-24 | End: 2018-02-07 | Stop reason: HOSPADM

## 2018-01-24 RX ORDER — LISINOPRIL 20 MG/1
20 TABLET ORAL 2 TIMES DAILY
Status: DISCONTINUED | OUTPATIENT
Start: 2018-01-24 | End: 2018-02-07 | Stop reason: HOSPADM

## 2018-01-24 RX ORDER — BUPROPION HYDROCHLORIDE 150 MG/1
450 TABLET ORAL DAILY
Status: DISCONTINUED | OUTPATIENT
Start: 2018-01-25 | End: 2018-02-07 | Stop reason: HOSPADM

## 2018-01-24 RX ORDER — PROMETHAZINE HYDROCHLORIDE 12.5 MG/1
12.5 TABLET ORAL EVERY 6 HOURS PRN
Status: DISCONTINUED | OUTPATIENT
Start: 2018-01-24 | End: 2018-02-07 | Stop reason: HOSPADM

## 2018-01-24 RX ORDER — ALLOPURINOL 300 MG/1
300 TABLET ORAL DAILY
Status: DISCONTINUED | OUTPATIENT
Start: 2018-01-25 | End: 2018-02-07 | Stop reason: HOSPADM

## 2018-01-24 RX ORDER — DOCUSATE SODIUM 100 MG/1
100 CAPSULE, LIQUID FILLED ORAL DAILY PRN
Status: DISCONTINUED | OUTPATIENT
Start: 2018-01-24 | End: 2018-02-07 | Stop reason: HOSPADM

## 2018-01-24 RX ORDER — FAMOTIDINE 20 MG/1
40 TABLET, FILM COATED ORAL NIGHTLY
Status: DISCONTINUED | OUTPATIENT
Start: 2018-01-24 | End: 2018-01-26

## 2018-01-24 RX ORDER — ONDANSETRON 2 MG/ML
4 INJECTION INTRAMUSCULAR; INTRAVENOUS EVERY 6 HOURS PRN
Status: DISCONTINUED | OUTPATIENT
Start: 2018-01-24 | End: 2018-02-07 | Stop reason: HOSPADM

## 2018-01-24 RX ORDER — HYDRALAZINE HYDROCHLORIDE 50 MG/1
100 TABLET, FILM COATED ORAL EVERY 8 HOURS
Status: DISCONTINUED | OUTPATIENT
Start: 2018-01-24 | End: 2018-02-07 | Stop reason: HOSPADM

## 2018-01-24 RX ORDER — FOLIC ACID 1 MG/1
1 TABLET ORAL DAILY
Status: DISCONTINUED | OUTPATIENT
Start: 2018-01-25 | End: 2018-02-07 | Stop reason: HOSPADM

## 2018-01-24 RX ORDER — AMLODIPINE BESYLATE 10 MG/1
10 TABLET ORAL DAILY
Status: DISCONTINUED | OUTPATIENT
Start: 2018-01-25 | End: 2018-01-30

## 2018-01-24 RX ADMIN — LISINOPRIL 20 MG: 20 TABLET ORAL at 09:01

## 2018-01-24 RX ADMIN — HYDRALAZINE HYDROCHLORIDE 100 MG: 50 TABLET ORAL at 09:01

## 2018-01-24 RX ADMIN — CEFEPIME 2 G: 2 INJECTION, POWDER, FOR SOLUTION INTRAVENOUS at 09:01

## 2018-01-24 RX ADMIN — ROPINIROLE HYDROCHLORIDE 0.5 MG: 0.25 TABLET, FILM COATED ORAL at 09:01

## 2018-01-24 RX ADMIN — ISOSORBIDE DINITRATE 40 MG: 40 TABLET ORAL at 09:01

## 2018-01-24 RX ADMIN — FAMOTIDINE 40 MG: 20 TABLET, FILM COATED ORAL at 09:01

## 2018-01-24 RX ADMIN — DOFETILIDE 250 MCG: 0.25 CAPSULE ORAL at 09:01

## 2018-01-24 RX ADMIN — WARFARIN SODIUM 6 MG: 2 TABLET ORAL at 09:01

## 2018-01-24 RX ADMIN — MAGNESIUM OXIDE TAB 400 MG (241.3 MG ELEMENTAL MG) 400 MG: 400 (241.3 MG) TAB at 09:01

## 2018-01-24 NOTE — SUBJECTIVE & OBJECTIVE
Past Medical History:   Diagnosis Date    AICD (automatic cardioverter/defibrillator) present 2014    [employer]    Chronic anticoagulation 7/21/2017    Chronic combined systolic and diastolic congestive heart failure 7/27/2015 11-16-17   1 - Moderate left ventricular enlargement.    2 - Severely depressed left ventricular systolic function (EF 15-20%).    3 - Impaired LV relaxation, normal LAP (grade 1 diastolic dysfunction).    4 - Biatrial enlargement.    5 - Right ventricle is upper limit of normal in size with not well seen systolic function.    6 - Severe tricuspid regurgitation.    7 - Increased central venous pressure.    8 - The estimated PA systolic pressure is 40 mmHg.    9 - Heartmate III LVAD; speed 5800.     Complication involving left ventricular assist device (LVAD) 7/29/2017    Coronary artery disease involving native coronary artery of native heart without angina pectoris 7/27/2015    Gait instability     Hypertensive urgency, malignant 11/15/2017    ICD (implantable cardioverter-defibrillator), biventricular, in situ 7/27/2015    Ischemic cardiomyopathy 7/20/2017    S/p HMIII     Kidney stones     LVAD (left ventricular assist device) present 7/20/2017    status post Heartmate III 10/16/16 LVAD    HILLARY on CPAP 7/27/2015    Peripheral neuropathy     Pulmonary hypertension due to left ventricular systolic dysfunction 7/29/2015    Restless leg syndrome 1992    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    Skin cancer     excision 2013    Skin yeast infection 8/31/2017    Stage 3 chronic kidney disease 7/20/2017    Syncope 7/20/2017    Ventricular tachycardia 7/25/2017       Past Surgical History:   Procedure Laterality Date    CARDIAC PACEMAKER PLACEMENT      pacemaker previously, then AICD in 2006. AICD [facility] [employer] serial #[id number]    CORONARY ARTERY BYPASS GRAFT  1993    KNEE SURGERY Left 2001    complicated by infection, hardware had to be taken out and replaced     LEFT VENTRICULAR ASSIST DEVICE  10/19/2016       Review of patient's allergies indicates:   Allergen Reactions    Aldactone [spironolactone]       persistent hyperkalemia.    Sulfa (sulfonamide antibiotics)        No current facility-administered medications on file prior to encounter.      Current Outpatient Prescriptions on File Prior to Encounter   Medication Sig    allopurinol (ZYLOPRIM) 300 MG tablet Take 300 mg by mouth once daily.    amLODIPine (NORVASC) 10 MG tablet Take 1 tablet (10 mg total) by mouth once daily.    amLODIPine (NORVASC) 2.5 MG tablet TK 1 T PO ONCE D    aspirin 81 MG Chew Take 81 mg by mouth once daily.    atorvastatin (LIPITOR) 10 MG tablet Take 10 mg by mouth once daily.    buPROPion (WELLBUTRIN XL) 300 MG 24 hr tablet Take 1 tablet (300 mg total) by mouth once daily.    calcium-vitamin D tablet 600 mg-200 units Take 1 tablet by mouth once daily.    carvedilol (COREG) 25 MG tablet Take 25 mg by mouth 2 (two) times daily with meals.    ciprofloxacin HCl (CIPRO) 750 MG tablet Take 1 tablet (750 mg total) by mouth 2 (two) times daily.    clopidogrel (PLAVIX) 75 mg tablet Take 75 mg by mouth once daily.    docusate sodium (COLACE) 100 MG capsule Take 100 mg by mouth daily as needed for Constipation.    dofetilide (TIKOSYN) 250 MCG Cap Take 1 capsule (250 mcg total) by mouth every 12 (twelve) hours.    doxazosin (CARDURA) 4 MG tablet Take 1 tablet (4 mg total) by mouth once daily.    famotidine (PEPCID) 20 MG tablet Take 2 tablets (40 mg total) by mouth every evening.    ferrous sulfate 324 mg (65 mg iron) TbEC Take 1 tablet (324 mg total) by mouth once daily.    ferrous sulfate 325 mg (65 mg iron) Tab tablet     folic acid (FOLVITE) 1 MG tablet Take 1 mg by mouth once daily.    furosemide (LASIX) 80 MG tablet Take orally daily on Monday, Wednesday and Friday as directed    furosemide (LASIX) 80 MG tablet TAKE 1 TABLET BY MOUTH ON MONDAY, WEDNESDAY, AND FRIDAY AS  DIRECTED    hydrALAZINE (APRESOLINE) 100 MG tablet Take 1 tablet (100 mg total) by mouth every 8 (eight) hours.    isosorbide dinitrate (ISORDIL) 20 MG tablet     isosorbide dinitrate (ISORDIL) 40 MG Tab Take 1 tablet (40 mg total) by mouth 3 (three) times daily.    Lactobacillus rhamnosus GG (CULTURELLE) 10 billion cell capsule Take 1 capsule by mouth once daily.    lansoprazole (PREVACID) 15 MG capsule Take 15 mg by mouth once daily.    lisinopril (PRINIVIL,ZESTRIL) 20 MG tablet Take 1 tablet (20 mg total) by mouth 2 (two) times daily.    losartan (COZAAR) 25 MG tablet Take 25 mg by mouth once daily.    magnesium oxide (MAG-OX) 400 mg tablet Take 1 tablet (400 mg total) by mouth 2 (two) times daily.    multivitamin capsule Take 1 capsule by mouth once daily.    promethazine (PHENERGAN) 12.5 MG Tab Take 1 tablet (12.5 mg total) by mouth every 6 (six) hours as needed.    rOPINIRole (REQUIP) 0.5 MG tablet Take 0.5 mg by mouth 3 (three) times daily.    warfarin (COUMADIN) 4 MG tablet Take 4 mg orally on Sun, Tue, Thurs and 6 mg orally on other days as directed by coumadin clinic    warfarin (COUMADIN) 6 MG tablet      Family History     Problem Relation (Age of Onset)    Cancer Daughter (50)    Diabetes Daughter    Heart disease Daughter        Social History Main Topics    Smoking status: Never Smoker    Smokeless tobacco: Never Used    Alcohol use No    Drug use: No    Sexual activity: Not on file      Comment:      Review of Systems   Constitution: Negative for chills, fever and malaise/fatigue.   HENT: Negative.    Cardiovascular: Negative for chest pain, dyspnea on exertion, irregular heartbeat, leg swelling, orthopnea, palpitations and paroxysmal nocturnal dyspnea.   Respiratory: Negative for shortness of breath and wheezing.    Skin: Negative for color change and rash.   Musculoskeletal: Negative for arthritis, back pain and myalgias.   Gastrointestinal: Positive for vomiting. Negative  for abdominal pain, constipation, diarrhea and nausea.   Neurological: Negative for dizziness, numbness and paresthesias.   Psychiatric/Behavioral: Positive for altered mental status and hallucinations. The patient has insomnia.      Objective:     Vital Signs (Most Recent):  Temp: 98.5 °F (36.9 °C) (01/24/18 1216)  Pulse: 103 (01/24/18 1216)  Resp: 20 (01/24/18 1216)  BP:  (unable to obtain in triage) (01/24/18 1216)  SpO2: 98 % (01/24/18 1216) Vital Signs (24h Range):  Temp:  [98.5 °F (36.9 °C)] 98.5 °F (36.9 °C)  Pulse:  [103] 103  Resp:  [20] 20  SpO2:  [98 %] 98 %     Weight: 84.4 kg (186 lb)  Body mass index is 25.23 kg/m².    SpO2: 98 %  O2 Device (Oxygen Therapy): room air    No intake or output data in the 24 hours ending 01/24/18 1756    Lines/Drains/Airways     Peripherally Inserted Central Catheter Line                 PICC Double Lumen 12/28/17 1029 other (see comments) 27 days          Line                 VAD Left ventricular assist device HeartMate 3 -- days                Physical Exam   Constitutional: Vital signs are normal. He appears well-developed and well-nourished. He is cooperative.  Non-toxic appearance. No distress.   HENT:   Head: Normocephalic and atraumatic.   Neck: JVD (up to mid-neck) present. No hepatojugular reflux present. Carotid bruit is not present.   Cardiovascular: Normal rate and regular rhythm.    Normal VAD hum  No lower extremity edema   Pulmonary/Chest: Effort normal and breath sounds normal. No respiratory distress. He has no decreased breath sounds. He has no wheezes. He has no rhonchi. He has no rales.   Abdominal: Soft. Normal appearance and bowel sounds are normal. He exhibits no distension and no mass. There is no tenderness.   Neurological: He is alert.   Skin: Skin is warm, dry and intact.       Significant Labs:   CMP   Recent Labs  Lab 01/24/18  1536      K 4.3      CO2 28   *   BUN 36*   CREATININE 2.3*   CALCIUM 10.6*   PROT 7.2   ALBUMIN  3.3*   BILITOT 0.3   ALKPHOS 70   AST 22   ALT 14   ANIONGAP 5*   ESTGFRAFRICA 31.0*   EGFRNONAA 26.8*   , CBC   Recent Labs  Lab 01/24/18  1536   WBC 6.50   HGB 11.2*   HCT 34.4*   *    and INR   Recent Labs  Lab 01/24/18  1100 01/24/18  1536   INR 2.6* 2.7*       Significant Imaging: Echocardiogram:   2D echo with color flow doppler:   Results for orders placed or performed during the hospital encounter of 12/01/17   2D echo with color flow doppler   Result Value Ref Range    EF 25 (A) 55 - 65    Diastolic Dysfunction Yes (A)     Est. PA Systolic Pressure 29.59     Tricuspid Valve Regurgitation SEVERE (A)

## 2018-01-24 NOTE — TELEPHONE ENCOUNTER
"PT labs received and reviewed with Dr. To.  Cre still elevated.  Called and spoke to pt daughter, she reports they are still holding lasix, but have not increased PO fluids.  Pt daughter instructed to increase pt water intake and will recheck labs in 1 week.  Pt daughter at this point verbalizes she has concerns for pt mental status.  Since Sunday, pt has had prolonged episode of delirium.  She reports he has "gotten dressed to go hunting and does not hunt" and "was combing his cheek".  Spoke with Dr. To, pt daughter Ena instructed to bring pt to ER for evaluation.  Dr. Henderson and Dr. Andrews updated on pt status.    "

## 2018-01-24 NOTE — TELEPHONE ENCOUNTER
Per chart, patient went to ED and is now inpatient.  Would recommend that his inpatient team evaluate his home health needs prior to discharge. In general his Cardiology / VAD team has been managing his home health orders & notifications - will forward this note to them as well.

## 2018-01-24 NOTE — HPI
Mr. Vasquez is a 77 year old gentleman with a past medical history of ICM s/p HM III 10/16/16 as DT in [place] (RPM 5800), chronic Pseudomonas DL infection on IV Cefepime, VT, on Tikosyn (intolerant to Amiodarone per outside records), h/o SSS, S/P [facility] BiV ICD, HLILARY/BiPAP, HTN, CKD, HLP, gout and depression who presents with confusion. On further questioning of his wife and his caregiver who were both at bedside, they noticed some odd behaviours such as combing his cheek/nose, getting dressed to go hunting when that is not his hobby and admitted to seeing visual hallucinations of his dead brother. They stated that he is not sleeping well and naps throughout the day. They wonder if his BiPAP is not in the proper settings anymore and he now has made a game out of looking at his watch to see when he will get to sleep. He stated it was 2017, January, thought he was in [place] (moved from there in June), and stated [place] as the president. Altogether, he denies SOB, CP, LE edema, orthopnea and PND. He has no fevers or chills, cough, or diarrhea. They state that he does have some worsening vomiting. They stated that a few months ago, he would vomit after he drank some water: it would start with a sneeze, then a cough, then the vomit. It would happen every now and then, but over the past few weeks, it has worsened to 3-4x/day. They stated that now it occurs without drinking, sometimes before dinner and he would throw up almost undigested food. He admitted to throwing up pills too at times. There have been no VAD alarms noted.

## 2018-01-24 NOTE — ED PROVIDER NOTES
"Encounter Date: 1/24/2018    SCRIBE #1 NOTE: I, Hernando Quinonez, am scribing for, and in the presence of,  Dr. Cortez. I have scribed the following portions of the note - Other sections scribed: HPI, ROS, PE.       History     Chief Complaint   Patient presents with    Emesis     pt has been "sneezing, coughing, and vomiting" x one month and it's getting worse. has not seen at doctor for it. VAD     Time patient was seen by the provider: 3:02 PM      The patient is a 77 y.o. male with PM Hx of peripheral neuropathy and CHF, with co-morbidities including: stage 3 CKD, pulmonary HTN and CAD who presents to the ED with a complaint of emesis for a month. His family members report that he has intermittent sneezing, which follows with simultaneous coughing and emesis for a month. It has been worsening with time. They noted 3 episodes today. They also report of confusion and "wobbly legs". The pt thinks he was asked to go squirrel hunting by his brother and he was getting ready for it all day yesterday. The family members notes that the brother was not present at house. The pt denies fever, present SOB, and bowel or urinary symptoms. The patient is on LVAD and is on IV abx for a week for drive line infefction.  He is mentally at baseline at present. No other complaints.        The history is provided by the patient, medical records and a relative.     Review of patient's allergies indicates:   Allergen Reactions    Aldactone [spironolactone]       persistent hyperkalemia.    Sulfa (sulfonamide antibiotics)      Past Medical History:   Diagnosis Date    AICD (automatic cardioverter/defibrillator) present 2014    [facility] [employer]    Chronic anticoagulation 7/21/2017    Chronic combined systolic and diastolic congestive heart failure 7/27/2015 11-16-17   1 - Moderate left ventricular enlargement.    2 - Severely depressed left ventricular systolic function (EF 15-20%).    3 - Impaired LV relaxation, normal LAP (grade 1 diastolic " dysfunction).    4 - Biatrial enlargement.    5 - Right ventricle is upper limit of normal in size with not well seen systolic function.    6 - Severe tricuspid regurgitation.    7 - Increased central venous pressure.    8 - The estimated PA systolic pressure is 40 mmHg.    9 - Heartmate III LVAD; speed 5800.     Complication involving left ventricular assist device (LVAD) 7/29/2017    Coronary artery disease involving native coronary artery of native heart without angina pectoris 7/27/2015    Gait instability     Hypertensive urgency, malignant 11/15/2017    ICD (implantable cardioverter-defibrillator), biventricular, in situ 7/27/2015    Ischemic cardiomyopathy 7/20/2017    S/p HMIII     Kidney stones     LVAD (left ventricular assist device) present 7/20/2017    status post Heartmate III 10/16/16 LVAD    HILLARY on CPAP 7/27/2015    Peripheral neuropathy     Pulmonary hypertension due to left ventricular systolic dysfunction 7/29/2015    Restless leg syndrome 1992    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    Skin cancer     excision 2013    Skin yeast infection 8/31/2017    Stage 3 chronic kidney disease 7/20/2017    Syncope 7/20/2017    Ventricular tachycardia 7/25/2017     Past Surgical History:   Procedure Laterality Date    CARDIAC PACEMAKER PLACEMENT      pacemaker previously, then AICD in 2006. AICD [facility] [employer] serial #[id number]    CORONARY ARTERY BYPASS GRAFT  1993    KNEE SURGERY Left 2001    complicated by infection, hardware had to be taken out and replaced    LEFT VENTRICULAR ASSIST DEVICE  10/19/2016     Family History   Problem Relation Age of Onset    Cancer Daughter 50     breast    Heart disease Daughter      MI x 3, CABG    Diabetes Daughter      Social History   Substance Use Topics    Smoking status: Never Smoker    Smokeless tobacco: Never Used    Alcohol use No     Review of Systems   Constitutional: Negative for fever.   HENT: Positive for sneezing.  Negative for sore throat.    Respiratory: Positive for cough. Negative for shortness of breath.    Cardiovascular: Negative for chest pain.   Gastrointestinal: Positive for vomiting. Negative for abdominal pain and nausea.   Genitourinary: Negative for difficulty urinating and dysuria.   Musculoskeletal: Negative for back pain.   Skin: Negative for rash.   Neurological: Positive for weakness (generalized weakness).        Positive for gait disturbance.   Hematological: Does not bruise/bleed easily.   Psychiatric/Behavioral: Positive for confusion.       Physical Exam     Initial Vitals [01/24/18 1216]   BP Pulse Resp Temp SpO2   -- 103 20 98.5 °F (36.9 °C) 98 %      MAP       --         Physical Exam    Vitals reviewed.  Constitutional: He is cooperative. No distress.   HENT:   Head: Normocephalic and atraumatic.   Mouth/Throat: No oral lesions.   Eyes: EOM are normal. Pupils are equal, round, and reactive to light.   Neck: Normal range of motion. No tracheal deviation present.   Cardiovascular:   Mechanical heart sound noted with faint distal pulses.   Pulmonary/Chest: Breath sounds normal. No stridor. No tachypnea.   Abdominal: He exhibits no distension. There is no tenderness.   LVAD to mid abdomen noted, with no surrounding tenderness or erythema.   Musculoskeletal: Normal range of motion. He exhibits no edema.   Neurological: He is alert.   Oriented to person only, believes its 2013 and unsure of surroundings/ location.   Skin: Skin is warm and dry.   Psychiatric:   Confusion noted when referencing to recent activities. He is cooperative.         ED Course   Procedures  Labs Reviewed   CBC W/ AUTO DIFFERENTIAL - Abnormal; Notable for the following:        Result Value    RBC 3.82 (*)     Hemoglobin 11.2 (*)     Hematocrit 34.4 (*)     RDW 18.1 (*)     Platelets 136 (*)     Immature Granulocytes 0.6 (*)     Lymph # 0.9 (*)     Lymph% 13.4 (*)     Mono% 15.2 (*)     All other components within normal limits    COMPREHENSIVE METABOLIC PANEL - Abnormal; Notable for the following:     Glucose 111 (*)     BUN, Bld 36 (*)     Creatinine 2.3 (*)     Calcium 10.6 (*)     Albumin 3.3 (*)     Anion Gap 5 (*)     eGFR if  31.0 (*)     eGFR if non  26.8 (*)     All other components within normal limits   PROTIME-INR - Abnormal; Notable for the following:     Prothrombin Time 26.3 (*)     INR 2.7 (*)     All other components within normal limits   TROPONIN I - Abnormal; Notable for the following:     Troponin I 0.114 (*)     All other components within normal limits   B-TYPE NATRIURETIC PEPTIDE - Abnormal; Notable for the following:      (*)     All other components within normal limits   LIPASE - Abnormal; Notable for the following:     Lipase 61 (*)     All other components within normal limits   URINALYSIS, REFLEX TO URINE CULTURE - Abnormal; Notable for the following:     Protein, UA 1+ (*)     Occult Blood UA 1+ (*)     All other components within normal limits    Narrative:     Preferred Collection Type->Urine, Clean Catch   URINALYSIS MICROSCOPIC - Abnormal; Notable for the following:     RBC, UA 17 (*)     All other components within normal limits    Narrative:     Preferred Collection Type->Urine, Clean Catch   CULTURE, BLOOD   CULTURE, BLOOD   INFLUENZA A AND B ANTIGEN          X-Rays:   Independently Interpreted Readings:   Chest X-Ray: Cardiomegaly present. LVAD/ Pacemaker and wiring intact without evidence of cherelle fluid overload or consolidation     Imaging Results          CT Head Without Contrast (Final result)  Result time 01/24/18 16:32:59    Final result by Latoya Garcia MD (01/24/18 16:32:59)                 Impression:         Stable exam noting mild chronic ischemic changes and generalized cerebral volume loss. No evidence of acute intracranial pathology.      Electronically signed by: LATOYA GARCIA MD  Date:     01/24/18  Time:    16:32              Narrative:    CT  head without contrast    01/24/18 16:08:57    Accession# [id number]    CLINICAL INDICATION: 77 year old M with transient confusion     TECHNIQUE: Axial CT images obtained throughout the region of the head without the use of intravenous contrast. Axial, sagittal and coronal reconstructions were performed.    COMPARISON: CT head 07/23/2017    FINDINGS:    Generalized cerebral volume loss with compensatory widening of the sulci and ventricular system which is stable in comparison to prior examination. Findings but with mild generalized cerebral volume loss.    Patchy periventricular white matter hypoattenuation consistent with chronic microvascular ischemic changes. Small remote right cerebellar infarct. No new parenchymal mass, hemorrhage, edema or major vascular distribution infarct.    No extra-axial blood or fluid collections.    The cranium appears intact. Mastoid air cells and paranasal sinuses are essentially clear.                             X-Ray Chest PA And Lateral (Final result)  Result time 01/24/18 15:25:28    Final result by Larry Marin Jr., MD (01/24/18 15:25:28)                 Impression:     See above      Electronically signed by: LARRY MARIN MD  Date:     01/24/18  Time:    15:25              Narrative:    Chest 2 views compared to December 2017.  LVAD and AICD in place.  Heart size and pulmonary vascularity are similar.  Right PICC catheter overlies the right subclavian vein.  No confluent consolidation.                              Medical Decision Making:   History:   Old Medical Records: I decided to obtain old medical records.  Clinical Tests:   Lab Tests: Ordered and Reviewed  Radiological Study: Ordered and Reviewed  Other:   I have discussed this case with another health care provider.            Scribe Attestation:   Scribe #1: I performed the above scribed service and the documentation accurately describes the services I performed. I attest to the accuracy of the note.    Attending  Attestation:             Attending ED Notes:   Emergency department evaluation today does not reveal evidence of significant hematologic or a metabolic derangement altered from baseline.  Notably, CT scan of the brain does not reveal any evidence of acute injury, and chest x-ray does not reveal evidence of acute infiltrate.  Findings and concerns have been discussed with cardiology who has evaluated the patient in the emergency department, and will placed in observation in fair condition for further therapy and management.          ED Course      Clinical Impression:   The primary encounter diagnosis was Delirium. A diagnosis of LVAD (left ventricular assist device) present was also pertinent to this visit.    Disposition:   Disposition: Placed in Observation  Condition: Fair  [facility] [place]                          Don Cortez MD  01/24/18 3625

## 2018-01-24 NOTE — ASSESSMENT & PLAN NOTE
--may be secondary to sleep deprivation  --may require neuro eval, but will work up vomiting first.

## 2018-01-25 ENCOUNTER — ANESTHESIA EVENT (OUTPATIENT)
Dept: ENDOSCOPY | Facility: HOSPITAL | Age: 78
DRG: 057 | End: 2018-01-25
Payer: MEDICARE

## 2018-01-25 LAB
ALBUMIN SERPL BCP-MCNC: 3.2 G/DL
ALP SERPL-CCNC: 71 U/L
ALT SERPL W/O P-5'-P-CCNC: 15 U/L
ANION GAP SERPL CALC-SCNC: 6 MMOL/L
ANION GAP SERPL CALC-SCNC: 6 MMOL/L
AST SERPL-CCNC: 22 U/L
BASOPHILS # BLD AUTO: 0.05 K/UL
BASOPHILS # BLD AUTO: 0.05 K/UL
BASOPHILS NFR BLD: 0.7 %
BASOPHILS NFR BLD: 0.7 %
BILIRUB DIRECT SERPL-MCNC: 0.2 MG/DL
BILIRUB SERPL-MCNC: 0.4 MG/DL
BNP SERPL-MCNC: 410 PG/ML
BUN SERPL-MCNC: 31 MG/DL
BUN SERPL-MCNC: 31 MG/DL
CALCIUM SERPL-MCNC: 10.5 MG/DL
CALCIUM SERPL-MCNC: 10.5 MG/DL
CHLORIDE SERPL-SCNC: 107 MMOL/L
CHLORIDE SERPL-SCNC: 107 MMOL/L
CO2 SERPL-SCNC: 27 MMOL/L
CO2 SERPL-SCNC: 27 MMOL/L
CREAT SERPL-MCNC: 2 MG/DL
CREAT SERPL-MCNC: 2 MG/DL
CRP SERPL-MCNC: 0.5 MG/L
DIASTOLIC DYSFUNCTION: YES
DIFFERENTIAL METHOD: ABNORMAL
DIFFERENTIAL METHOD: ABNORMAL
EOSINOPHIL # BLD AUTO: 0.2 K/UL
EOSINOPHIL # BLD AUTO: 0.2 K/UL
EOSINOPHIL NFR BLD: 3 %
EOSINOPHIL NFR BLD: 3 %
ERYTHROCYTE [DISTWIDTH] IN BLOOD BY AUTOMATED COUNT: 17.7 %
ERYTHROCYTE [DISTWIDTH] IN BLOOD BY AUTOMATED COUNT: 17.7 %
EST. GFR  (AFRICAN AMERICAN): 36.7 ML/MIN/1.73 M^2
EST. GFR  (AFRICAN AMERICAN): 36.7 ML/MIN/1.73 M^2
EST. GFR  (NON AFRICAN AMERICAN): 31.7 ML/MIN/1.73 M^2
EST. GFR  (NON AFRICAN AMERICAN): 31.7 ML/MIN/1.73 M^2
ESTIMATED PA SYSTOLIC PRESSURE: 20.07
GLUCOSE SERPL-MCNC: 89 MG/DL
GLUCOSE SERPL-MCNC: 89 MG/DL
HCT VFR BLD AUTO: 35.5 %
HCT VFR BLD AUTO: 35.5 %
HGB BLD-MCNC: 11.6 G/DL
HGB BLD-MCNC: 11.6 G/DL
IMM GRANULOCYTES # BLD AUTO: 0.05 K/UL
IMM GRANULOCYTES # BLD AUTO: 0.05 K/UL
IMM GRANULOCYTES NFR BLD AUTO: 0.7 %
IMM GRANULOCYTES NFR BLD AUTO: 0.7 %
INR PPP: 2.3
INR PPP: 2.3
LDH SERPL L TO P-CCNC: 175 U/L
LDH SERPL L TO P-CCNC: 175 U/L
LYMPHOCYTES # BLD AUTO: 1 K/UL
LYMPHOCYTES # BLD AUTO: 1 K/UL
LYMPHOCYTES NFR BLD: 14.9 %
LYMPHOCYTES NFR BLD: 14.9 %
MAGNESIUM SERPL-MCNC: 2.7 MG/DL
MAGNESIUM SERPL-MCNC: 2.7 MG/DL
MCH RBC QN AUTO: 29.1 PG
MCH RBC QN AUTO: 29.1 PG
MCHC RBC AUTO-ENTMCNC: 32.7 G/DL
MCHC RBC AUTO-ENTMCNC: 32.7 G/DL
MCV RBC AUTO: 89 FL
MCV RBC AUTO: 89 FL
MONOCYTES # BLD AUTO: 1 K/UL
MONOCYTES # BLD AUTO: 1 K/UL
MONOCYTES NFR BLD: 14.2 %
MONOCYTES NFR BLD: 14.2 %
NEUTROPHILS # BLD AUTO: 4.6 K/UL
NEUTROPHILS # BLD AUTO: 4.6 K/UL
NEUTROPHILS NFR BLD: 66.5 %
NEUTROPHILS NFR BLD: 66.5 %
NRBC BLD-RTO: 0 /100 WBC
NRBC BLD-RTO: 0 /100 WBC
PLATELET # BLD AUTO: 128 K/UL
PLATELET # BLD AUTO: 128 K/UL
PMV BLD AUTO: 11.3 FL
PMV BLD AUTO: 11.3 FL
POTASSIUM SERPL-SCNC: 4.3 MMOL/L
POTASSIUM SERPL-SCNC: 4.3 MMOL/L
PREALB SERPL-MCNC: 31 MG/DL
PROT SERPL-MCNC: 6.9 G/DL
PROTHROMBIN TIME: 22.5 SEC
PROTHROMBIN TIME: 22.5 SEC
RBC # BLD AUTO: 3.99 M/UL
RBC # BLD AUTO: 3.99 M/UL
RETIRED EF AND QEF - SEE NOTES: 10 (ref 55–65)
SODIUM SERPL-SCNC: 140 MMOL/L
SODIUM SERPL-SCNC: 140 MMOL/L
TRICUSPID VALVE REGURGITATION: ABNORMAL
WBC # BLD AUTO: 6.89 K/UL
WBC # BLD AUTO: 6.89 K/UL

## 2018-01-25 PROCEDURE — 85025 COMPLETE CBC W/AUTO DIFF WBC: CPT

## 2018-01-25 PROCEDURE — 20600001 HC STEP DOWN PRIVATE ROOM

## 2018-01-25 PROCEDURE — 85610 PROTHROMBIN TIME: CPT

## 2018-01-25 PROCEDURE — 25000003 PHARM REV CODE 250: Performed by: NURSE PRACTITIONER

## 2018-01-25 PROCEDURE — 80048 BASIC METABOLIC PNL TOTAL CA: CPT

## 2018-01-25 PROCEDURE — 25000003 PHARM REV CODE 250: Performed by: INTERNAL MEDICINE

## 2018-01-25 PROCEDURE — 86140 C-REACTIVE PROTEIN: CPT

## 2018-01-25 PROCEDURE — 93306 TTE W/DOPPLER COMPLETE: CPT | Mod: 26,,, | Performed by: INTERNAL MEDICINE

## 2018-01-25 PROCEDURE — 99223 1ST HOSP IP/OBS HIGH 75: CPT | Mod: ,,, | Performed by: PSYCHIATRY & NEUROLOGY

## 2018-01-25 PROCEDURE — 63600175 PHARM REV CODE 636 W HCPCS: Performed by: INTERNAL MEDICINE

## 2018-01-25 PROCEDURE — 27000248 HC VAD-ADDITIONAL DAY

## 2018-01-25 PROCEDURE — 93306 TTE W/DOPPLER COMPLETE: CPT

## 2018-01-25 PROCEDURE — 84134 ASSAY OF PREALBUMIN: CPT

## 2018-01-25 PROCEDURE — 99223 1ST HOSP IP/OBS HIGH 75: CPT | Mod: ,,, | Performed by: INTERNAL MEDICINE

## 2018-01-25 PROCEDURE — 83615 LACTATE (LD) (LDH) ENZYME: CPT

## 2018-01-25 PROCEDURE — 99223 1ST HOSP IP/OBS HIGH 75: CPT | Mod: GC,,, | Performed by: INTERNAL MEDICINE

## 2018-01-25 PROCEDURE — 83735 ASSAY OF MAGNESIUM: CPT

## 2018-01-25 PROCEDURE — 63600175 PHARM REV CODE 636 W HCPCS

## 2018-01-25 PROCEDURE — 83880 ASSAY OF NATRIURETIC PEPTIDE: CPT

## 2018-01-25 PROCEDURE — 80076 HEPATIC FUNCTION PANEL: CPT

## 2018-01-25 RX ORDER — HYDRALAZINE HYDROCHLORIDE 20 MG/ML
10 INJECTION INTRAMUSCULAR; INTRAVENOUS EVERY 6 HOURS PRN
Status: DISCONTINUED | OUTPATIENT
Start: 2018-01-25 | End: 2018-02-05

## 2018-01-25 RX ORDER — ACETAMINOPHEN 325 MG/1
650 TABLET ORAL EVERY 6 HOURS PRN
Status: DISCONTINUED | OUTPATIENT
Start: 2018-01-25 | End: 2018-02-07 | Stop reason: HOSPADM

## 2018-01-25 RX ORDER — HYDRALAZINE HYDROCHLORIDE 20 MG/ML
INJECTION INTRAMUSCULAR; INTRAVENOUS
Status: COMPLETED
Start: 2018-01-25 | End: 2018-01-25

## 2018-01-25 RX ADMIN — BUPROPION HYDROCHLORIDE 450 MG: 150 TABLET, EXTENDED RELEASE ORAL at 09:01

## 2018-01-25 RX ADMIN — ROPINIROLE HYDROCHLORIDE 0.5 MG: 0.25 TABLET, FILM COATED ORAL at 04:01

## 2018-01-25 RX ADMIN — FOLIC ACID 1 MG: 1 TABLET ORAL at 09:01

## 2018-01-25 RX ADMIN — HYDRALAZINE HYDROCHLORIDE 10 MG: 20 INJECTION INTRAMUSCULAR; INTRAVENOUS at 02:01

## 2018-01-25 RX ADMIN — ACETAMINOPHEN 650 MG: 325 TABLET, FILM COATED ORAL at 04:01

## 2018-01-25 RX ADMIN — WARFARIN SODIUM 6 MG: 2 TABLET ORAL at 04:01

## 2018-01-25 RX ADMIN — MAGNESIUM OXIDE TAB 400 MG (241.3 MG ELEMENTAL MG) 400 MG: 400 (241.3 MG) TAB at 09:01

## 2018-01-25 RX ADMIN — DOFETILIDE 250 MCG: 0.25 CAPSULE ORAL at 09:01

## 2018-01-25 RX ADMIN — Medication 1 CAPSULE: at 09:01

## 2018-01-25 RX ADMIN — LISINOPRIL 20 MG: 20 TABLET ORAL at 09:01

## 2018-01-25 RX ADMIN — AMLODIPINE BESYLATE 10 MG: 10 TABLET ORAL at 09:01

## 2018-01-25 RX ADMIN — FAMOTIDINE 40 MG: 20 TABLET, FILM COATED ORAL at 09:01

## 2018-01-25 RX ADMIN — ISOSORBIDE DINITRATE 40 MG: 40 TABLET ORAL at 02:01

## 2018-01-25 RX ADMIN — HYDRALAZINE HYDROCHLORIDE 100 MG: 50 TABLET ORAL at 02:01

## 2018-01-25 RX ADMIN — ISOSORBIDE DINITRATE 40 MG: 40 TABLET ORAL at 09:01

## 2018-01-25 RX ADMIN — HYDRALAZINE HYDROCHLORIDE 100 MG: 50 TABLET ORAL at 09:01

## 2018-01-25 RX ADMIN — ATORVASTATIN CALCIUM 10 MG: 10 TABLET, FILM COATED ORAL at 09:01

## 2018-01-25 RX ADMIN — CEFEPIME 2 G: 2 INJECTION, POWDER, FOR SOLUTION INTRAVENOUS at 09:01

## 2018-01-25 RX ADMIN — HYDRALAZINE HYDROCHLORIDE 10 MG: 20 INJECTION INTRAMUSCULAR; INTRAVENOUS at 10:01

## 2018-01-25 RX ADMIN — ASPIRIN 81 MG CHEWABLE TABLET 81 MG: 81 TABLET CHEWABLE at 09:01

## 2018-01-25 RX ADMIN — ALLOPURINOL 300 MG: 300 TABLET ORAL at 09:01

## 2018-01-25 RX ADMIN — ISOSORBIDE DINITRATE 40 MG: 40 TABLET ORAL at 04:01

## 2018-01-25 RX ADMIN — HYDRALAZINE HYDROCHLORIDE 10 MG: 20 INJECTION INTRAMUSCULAR; INTRAVENOUS at 12:01

## 2018-01-25 RX ADMIN — HYDRALAZINE HYDROCHLORIDE 100 MG: 50 TABLET ORAL at 04:01

## 2018-01-25 RX ADMIN — CEFEPIME 2 G: 2 INJECTION, POWDER, FOR SOLUTION INTRAVENOUS at 10:01

## 2018-01-25 NOTE — ANESTHESIA PREPROCEDURE EVALUATION
[facility]  Anesthesia Pre-Operative Evaluation         Patient Name: Kev Vasquez  YOB: 1940  MRN: [id number]    SUBJECTIVE:     Pre-operative evaluation for Procedure(s) (LRB):  ESOPHAGOGASTRODUODENOSCOPY (EGD) (N/A)     01/25/2018    Kev Vasquez is a 77 y.o. 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 Balwinder BiV ICD, HILLARY/BiPAP, HTN, CKD, HLP, gout  who presents for the above procedure.      LDA:   PICC   LVAD    Prev airway: none documented       Drips:   none       Patient Active Problem List   Diagnosis    Chronic combined systolic and diastolic congestive heart failure    Coronary artery disease involving native coronary artery of native heart without angina pectoris    S/P CABG (coronary artery bypass graft)    ICD (implantable cardioverter-defibrillator), biventricular, in situ    HILLARY on CPAP    Pulmonary hypertension due to left ventricular systolic dysfunction    Ischemic cardiomyopathy    Stage 3 chronic kidney disease    LVAD (left ventricular assist device) present    Chronic anticoagulation    VT (ventricular tachycardia)    Restless leg syndrome    Gait instability    Acute on chronic systolic and diastolic heart failure, NYHA class 4    Essential hypertension    Vomiting    Hallucinations    Delirium       Review of patient's allergies indicates:   Allergen Reactions    Aldactone [spironolactone]       persistent hyperkalemia.    Sulfa (sulfonamide antibiotics)        Current Inpatient Medications:   allopurinol  300 mg Oral Daily    amLODIPine  10 mg Oral Daily    aspirin  81 mg Oral Daily    atorvastatin  10 mg Oral Daily    buPROPion  450 mg Oral Daily    ceFEPime (MAXIPIME) IVPB  2 g Intravenous Q12H    dofetilide  250 mcg Oral Q12H    famotidine  40 mg Oral QHS     folic acid  1 mg Oral Daily    hydrALAZINE  100 mg Oral Q8H    isosorbide dinitrate  40 mg Oral TID    Lactobacillus rhamnosus GG  1 capsule Oral Daily    lisinopril  20 mg Oral BID    magnesium oxide  400 mg Oral BID    warfarin  6 mg Oral Daily       No current facility-administered medications on file prior to encounter.      Current Outpatient Prescriptions on File Prior to Encounter   Medication Sig Dispense Refill    allopurinol (ZYLOPRIM) 300 MG tablet Take 300 mg by mouth once daily.      amLODIPine (NORVASC) 10 MG tablet Take 1 tablet (10 mg total) by mouth once daily. 30 tablet 11    aspirin 81 MG Chew Take 81 mg by mouth once daily.      atorvastatin (LIPITOR) 10 MG tablet Take 10 mg by mouth once daily.      buPROPion (WELLBUTRIN XL) 300 MG 24 hr tablet Take 1 tablet (300 mg total) by mouth once daily. (Patient taking differently: Take 450 mg by mouth once daily. ) 30 tablet 11    calcium-vitamin D tablet 600 mg-200 units Take 1 tablet by mouth once daily.      docusate sodium (COLACE) 100 MG capsule Take 100 mg by mouth daily as needed for Constipation.      dofetilide (TIKOSYN) 250 MCG Cap Take 1 capsule (250 mcg total) by mouth every 12 (twelve) hours. 60 capsule 11    famotidine (PEPCID) 20 MG tablet Take 2 tablets (40 mg total) by mouth every evening. 60 tablet 11    ferrous sulfate 324 mg (65 mg iron) TbEC Take 1 tablet (324 mg total) by mouth once daily. 30 tablet 11    folic acid (FOLVITE) 1 MG tablet Take 1 mg by mouth once daily.      furosemide (LASIX) 80 MG tablet Take orally daily on Monday, Wednesday and Friday as directed 30 tablet 11    hydrALAZINE (APRESOLINE) 100 MG tablet Take 1 tablet (100 mg total) by mouth every 8 (eight) hours. 90 tablet 11    isosorbide dinitrate (ISORDIL) 20 MG tablet Take 40 mg by mouth 3 (three) times daily.   11    Lactobacillus rhamnosus GG (CULTURELLE) 10 billion cell capsule Take 1 capsule by mouth once daily.      lisinopril  (PRINIVIL,ZESTRIL) 20 MG tablet Take 1 tablet (20 mg total) by mouth 2 (two) times daily. 180 tablet 3    magnesium oxide (MAG-OX) 400 mg tablet Take 1 tablet (400 mg total) by mouth 2 (two) times daily. 60 tablet 11    multivitamin capsule Take 1 capsule by mouth once daily.      promethazine (PHENERGAN) 12.5 MG Tab Take 1 tablet (12.5 mg total) by mouth every 6 (six) hours as needed. 30 tablet 3    rOPINIRole (REQUIP) 0.5 MG tablet Take 0.5 mg by mouth 3 (three) times daily.      warfarin (COUMADIN) 4 MG tablet Take 4 mg orally on Sun, Tue, Thurs and 6 mg orally on other days as directed by coumadin clinic  11    warfarin (COUMADIN) 6 MG tablet   11       Past Surgical History:   Procedure Laterality Date    CARDIAC PACEMAKER PLACEMENT      pacemaker previously, then AICD in 2006. AICD [employer] serial #[id number]    CORONARY ARTERY BYPASS GRAFT  1993    KNEE SURGERY Left 2001    complicated by infection, hardware had to be taken out and replaced    LEFT VENTRICULAR ASSIST DEVICE  10/19/2016       Social History     Social History    Marital status:      Spouse name: N/A    Number of children: N/A    Years of education: N/A     Occupational History    Not on file.     Social History Main Topics    Smoking status: Never Smoker    Smokeless tobacco: Never Used    Alcohol use No    Drug use: No    Sexual activity: Not on file      Comment:      Other Topics Concern    Not on file     Social History Narrative    , lives in [place]. Retired .        OBJECTIVE:     Vital Signs Range (Last 24H):  Temp:  [36.4 °C (97.5 °F)-36.9 °C (98.5 °F)]   Pulse:  []   Resp:  [16-20]   BP: ()/(0-102)   SpO2:  [92 %-99 %]       CBC:   Recent Labs      01/24/18   1536  01/25/18   0421   WBC  6.50  6.89  6.89   RBC  3.82*  3.99*  3.99*   HGB  11.2*  11.6*  11.6*   HCT  34.4*  35.5*  35.5*   PLT  136*  128*  128*   MCV  90  89  89   MCH  29.3  29.1  29.1    MCHC  32.6  32.7  32.7       CMP:   Recent Labs      01/24/18   1536  01/25/18   0421   NA  139  140  140   K  4.3  4.3  4.3   CL  106  107  107   CO2  28  27  27   BUN  36*  31*  31*   CREATININE  2.3*  2.0*  2.0*   GLU  111*  89  89   MG   --   2.7*  2.7*   CALCIUM  10.6*  10.5  10.5   ALBUMIN  3.3*  3.2*   PROT  7.2  6.9   ALKPHOS  70  71   ALT  14  15   AST  22  22   BILITOT  0.3  0.4       INR:  Recent Labs      01/24/18   1100  01/24/18   1536  01/25/18   0421   INR  2.6*  2.7*  2.3*  2.3*       Diagnostic Studies: No relevant studies.    EKG: No recent studies available.    2D ECHO:  Results for orders placed or performed during the hospital encounter of 01/24/18   2D echo with color flow doppler   Result Value Ref Range    EF 10 (A) 55 - 65    Diastolic Dysfunction Yes (A)     Est. PA Systolic Pressure 20.07     Tricuspid Valve Regurgitation TRIVIAL          ASSESSMENT/PLAN:         Anesthesia Evaluation    I have reviewed the Patient Summary Reports.    I have reviewed the Nursing Notes.   I have reviewed the Medications.     Review of Systems  Anesthesia Hx:  History of prior surgery of interest to airway management or planning: Denies Family Hx of Anesthesia complications.   Denies Personal Hx of Anesthesia complications.   Hematology/Oncology:         -- Anemia:   Cardiovascular:   Hypertension CAD   CHF (lvad, HMIII) EF 10%   Pulmonary:   Sleep Apnea, CPAP    Renal/:   Chronic Renal Disease, CRI    Neurological:   Neuromuscular Disease,    Endocrine:  Endocrine Normal    Psych:   anxiety          Physical Exam  General:  Well nourished    Airway/Jaw/Neck:  Airway Findings: Mouth Opening: Normal Tongue: Large  Mallampati: III     Eyes/Ears/Nose:  EYES/EARS/NOSE FINDINGS: Normal   Dental:  Dental Findings: Periodontal disease, Mild    Chest/Lungs:  Chest/Lungs Clear        Mental Status:  Mental Status Findings: (confusion)         Anesthesia Plan  Type of Anesthesia, risks & benefits  discussed:  Anesthesia Type:  general, MAC  Patient's Preference:   Intra-op Monitoring Plan: standard ASA monitors  Intra-op Monitoring Plan Comments:   Post Op Pain Control Plan: multimodal analgesia, IV/PO Opioids PRN and per primary service following discharge from PACU  Post Op Pain Control Plan Comments:   Induction:   IV  Beta Blocker:  Patient is not currently on a Beta-Blocker (No further documentation required).       Informed Consent: Patient representative understands risks and agrees with Anesthesia plan.  Questions answered. Anesthesia consent signed with patient representative.  ASA Score: 4     Day of Surgery Review of History & Physical:  There are no significant changes.          Ready For Surgery From Anesthesia Perspective.

## 2018-01-25 NOTE — CONSULTS
[facility]  Adult Nutrition  Consult Note    SUMMARY     Recommendations    Recommendation/Intervention:   1. When medically appropriate, advance diet to cardiac with texture per SLP.   2. If meal intake consistently <50%, order Boost Plus.   3. RD following.    Goals: Intake >/=85% EEN/EPN with good tolerance  Nutrition Goal Status: new  Communication of RD Recs: reviewed with RN    Reason for Assessment    Reason for Assessment: physician consult  Diagnosis:  (vomiting, delirium)  Relevant Medical History: CHF s/p LVAD (10/2016), s/p AICD, ICM, CAD, CKD3   Interdisciplinary Rounds: attended     General Information Comments: Pt admitted with AMS/hallucinations and vomiting. Family member present reports that vomiting is more associated with coughing/sneezing rather than nausea. Pt with good appetite, family members believes wt has been stable. Pt for EGD tomorrow.    Nutrition Discharge Planning: Adequate PO intake on cardiac diet    Nutrition Prescription Ordered    Current Diet Order: NPO                    Evaluation of Received Nutrients/Fluid Intake                                                                                                     % Intake of Estimated Energy Needs: 0 - 25 %  % Meal Intake: NPO     Nutrition Risk Screen     Nutrition Risk Screen: no indicators present    Nutrition/Diet History    Patient Reported Diet/Restrictions/Preferences: low salt     Food Preferences: No cultural/Zoroastrian preferences noted.        Factors Affecting Nutritional Intake: nausea/vomiting                Labs/Tests/Procedures/Meds       Pertinent Labs Reviewed: reviewed, pertinent  Pertinent Labs Comments: BUN 31, Crt 2.0, GFR 31.7, Mg 2.7, Alb 3.2, lipase 61  Pertinent Medications Reviewed: reviewed, pertinent  Pertinent Medications Comments: statin, famotidine, folic acid, lactobacillus, Mg, warfarin    Physical Findings    Overall Physical Appearance: nourished     Oral/Mouth Cavity:  WDL  Skin: intact    Anthropometrics    Temp: 97.6 °F (36.4 °C)     Height: 6' (182.9 cm)  Weight Method: Standard Scale  Weight: 85.7 kg (188 lb 15 oz)  Ideal Body Weight (IBW), Male: 178 lb     % Ideal Body Weight, Male (lb): 106.15 lb     BMI (Calculated): 25.7  BMI Grade: 25 - 29.9 - overweight                            Estimated/Assessed Needs    Weight Used For Calorie Calculations: 85.7 kg (188 lb 15 oz)      Energy Calorie Requirements (kcal): 1956  Energy Need Method: Canby-St Jeor (x 1.2 (PAL))      RMR (Canby-St. Jeor Equation): 1630        Weight Used For Protein Calculations: 85.7 kg (188 lb 15 oz)  Protein Requirements: 86 gm (1.0 gm/kg)  Fluid Requirements (mL): per MD           RDA Method (mL): 1956               Assessment and Plan    Nutrition Problem  Altered GI Function    Related to (etiology):   Chronic vomiting    Signs and Symptoms (as evidenced by):   Vomiting increasing to 3-4x daily over past several months     Interventions/Recommendations (treatment strategy):  See recs.    Nutrition Diagnosis Status:   New       Monitor and Evaluation    Food and Nutrient Intake: energy intake, food and beverage intake  Food and Nutrient Adminstration: diet order     Physical Activity and Function: nutrition-related ADLs and IADLs  Anthropometric Measurements: weight, weight change, body mass index  Biochemical Data, Medical Tests and Procedures: electrolyte and renal panel, gastrointestinal profile, glucose/endocrine profile, inflammatory profile  Nutrition-Focused Physical Findings: overall appearance    Nutrition Risk    Level of Risk:  (F/u 2x weekly)    Nutrition Follow-Up    RD Follow-up?: Yes

## 2018-01-25 NOTE — INTERVAL H&P NOTE
The patient has been examined and the H&P has been reviewed:    I concur with the findings and changes have been noted since the H&P was written: Presented to ED with persistent vomiting and some hallucinations (see Fellow consult note date 1/24/18). Abdominal exam benign. Labs mostly stable. Planning to have inpatient GI evaluation to help guide workup as vomiting symptoms are persistent and frequent. Extensive amount of time taken by be to update his medication reconcilliation as there were multiple duplicate meds and lots of meds he is no longer taking.     Active Hospital Problems    Diagnosis  POA    Vomiting [R11.10]  Unknown    Hallucinations [R44.3]  Unknown    Delirium [R41.0]  Yes    LVAD (left ventricular assist device) present [Z95.811]  Not Applicable     status post Heartmate III 10/16/16 LVAD        Resolved Hospital Problems    Diagnosis Date Resolved POA   No resolved problems to display.

## 2018-01-25 NOTE — ASSESSMENT & PLAN NOTE
-Concern for NPH vs underlying dementia with delirium or progression  -Would like to plan for large volume LP with PT eval before and after procedure   -Difficult 2/2 anticoagulation with warfarin   -May be able to coordinate tap with NM Cisternogram, will discuss with Neurosurgery   -Explained that warfarin may need to be held, heparin gtt started which could be stopped right before LP  -Recommend continued PT/OT for ADLs and gait training

## 2018-01-25 NOTE — PROGRESS NOTES
Up in chair with max assist using walker. Daughter at bedside. Confused to place and time. Pleasant and cooperative

## 2018-01-25 NOTE — PROGRESS NOTES
Admit Note/Discharge Note     Met with patient and daughternto assess needs. Patient is a 77 y.o.  male, admitted for vomiting and mental status change. Pt has an LVAD. Pt alert/oriented x2.    Patient admitted from ED on 1/24/2018 .  At this time, patient presents as alert/oriented x2 and somewhat confused, but able to communicate.  At this time, patients caregiver is present, alert/oriented x4 and pleasant.     Pt has a personal care attendant at home 24/7.    Household/Family Systems     Patient resides with patient's spouse, daughter and son in law, at:     [address]  [place] [address].      Support system includes spouse, daughter and son in law.    Patient does not have dependents that are need of being cared for.     Patients primary caregiver is bill Manriquez daughter, phone number [phone].    Spouse cell:  [phone] (per pt spouse is hard of hearing)     Additional emergency contacts  Alannah Vasquez (Daughter and HCPA) [phone]    During admission, patient's caregiver plans to visit.  Confirmed patient and patients caregivers do have access to reliable transportation.    Cognitive Status/Learning     Patient reports reading ability as college and due to current mental status patient having issues with memory, cognition and comprehension. Pt is able to see and hear. Pt generally prefers to learn by reading and verbal instruction.     Needed: No.   Highest education level: Attended College/Technical School    Vocation/Disability     Working for Income: No  If no, reason not working: Patient Choice - Retired    Patient is a retired school super intendant.     Adherence     Patient reports a high level of adherence to patients health care regimen.  Adherence counseling and education provided. Patient verbalizes understanding.    Substance Use    Patient reports the following substance usage.    Tobacco: none, patient denies any use.  Alcohol: Pt reports he will have a  Albert and Anatoly once in a while.  Illicit Drugs/Non-prescribed Medications: none, patient denies any use.  Patient states clear understanding of the potential impact of substance use.  Substance abstinence/cessation counseling, education and resources provided and reviewed.     Services Utilizing/ADLS    Infusion Service: Prior to admission, patient utilizing? [facility]  Home Health: Prior to admission, patient utilizing?  [facility]   DME: Prior to admission, yes walker with wheels, wheelchair, rollator, shower chair and BSC.  Pulmonary/Cardiac Rehab: Prior to admission, no  Dialysis:  Prior to admission, no  Transplant Specialty Pharmacy:  Prior to admission, no.    Prior to admission, patient reports patient was not independent with ADLS (pt can dress himself, but not walk)  and was not driving. The pt's spouse and extended family drive.  Patient reports patient is not able to care for self at this time due to compromised medical condition (as documented in medical record) and physical weakness.  Patient indicates a willingness to care for self once medically cleared to do so.    Insurance/Medications    Insured by   Payor/Plan Subscr  Sex Relation Sub. Ins. ID Effective Group Num   1. [place] - [place]* LC VASQUEZ 1940 Male  [id number]                                    [address]   2. [employer] CROSS * LC VASQUEZ 1940 Male  [id number]                                   [address] [address]      Primary Insurance (for UNOS reporting): Public Insurance - Medicare FFS (Fee For Service)  Secondary Insurance (for UNOS reporting): Private Insurance    Patient reports patient is able to obtain and afford medications at this time and at time of discharge.    Living Will/Healthcare Power of     Patient states patient has a LW and/or HCPA. The pt's HCPA is Alannah Vasquez (pt's daughter) [phone].      Coping/Mental Health    Patient is coping adequately with the aid of  family members.   Patient has a history of mental health difficulties. Pt takes Wellbutrin.    Discharge Planning    At time of discharge, patient plans to return to patient's home under the care of spouse, daughter and aid.  Patients aid or family will transport patient.  Per rounds today, expected discharge date has not been determined. Patient and patients caregiver  verbalize understanding and are involved in treatment planning and discharge process.      Additional Concerns    Patient is being followed for needs, education, resources, information, emotional support, supportive counseling, and for supportive and skilled discharge plan of care.  providing ongoing psychosocial support, education, resources and d/c planning as needed.  SW remains available. Patient denies additional needs and/or concerns at this time. Patient verbalizes understanding and agreement with information reviewed, social work availability, and how to access available resources as needed.

## 2018-01-25 NOTE — PROGRESS NOTES
Pt admitted to CSU. Pt arrived to floor from ED via stretcher. VSS and NADN. No complaints at this time. Pt oriented to room and unit. Tele initiated; monitor room notified. Plan of care initiated.  Pt family at bedside. Bed in lowest locked position, side rails up x2, call bell in reach, bed alarm on. Will continue to monitor patient

## 2018-01-25 NOTE — HPI
"77 year old male with a past medical history of ICM s/p LVAD10/16/16 in [place], SSS s/p [employer] 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 GI consulted for persistent emesis and concern for gastroparesis.     Per HPI its reported that for a couple of months patient has been having emesis when drinking water. When drinking water he would sneeze-->cough-->emesis. Then within the last couple of weeks he has been complaining of "vomiting undigested foods" either before or after meals. Now per patient this morning she states that he has no trouble moving food bolus from mouth to esophageal. However he feels that food becomes "stuck in stomach" not esophagus which causes him to bring it up. Does report reflux and weight in the last couple of months but no odynophagia. Drinks occasional alcohol but not a prior smoker.     Prior GI procedures:  ?EGD 3 years ago  "

## 2018-01-25 NOTE — PROGRESS NOTES
01/24/18 2130 01/24/18 2131   Vital Signs   BP (!) 110/0 (!) 126/92   MAP (mmHg) --  104   BP Location Left arm Left arm   BP Method Doppler Automatic   Patient Position Lying Lying     Wife reports that the patient has not taken meds since this morning. Scheduled BP meds given. Dr. LIVIA Yeh notified. VAD #s WNL. Will continue to monitor patient.

## 2018-01-25 NOTE — PLAN OF CARE
Problem: Patient Care Overview  Goal: Plan of Care Review  Outcome: Ongoing (interventions implemented as appropriate)  Plan of care discussed with patient. Echo done at bedside. EGD scheduled for tomorrow. Consulted Neuro for confusion. CVP-4. Patient is free of fall/trauma/injury but very off banlance-2 person assist and Avsys in use.  Denies CP, SOB, or pain/discomfort. All questions addressed with family.  Will continue to monitor

## 2018-01-25 NOTE — CONSULTS
Patient seen and examined.  Full consult note to follow.  Fluctuating delirium stretching back to late 2016 in the setting of the LVAD and subsequent rehab stay, with ineffective gait for nearly a year and worsening incontinence.  Exam with somnolence and a retropulsive gait today.  I suspect underlying dementia but cannot rule out new frontal changes associated with his ventriculomegaly.    LP and cisternogram saravanan be informative, but we need to discuss holding anticoagulation...          Ismael Ferrer MD, MPH  Division of Movement and Memory Disorders  [facility]

## 2018-01-25 NOTE — SUBJECTIVE & OBJECTIVE
Past Medical History:   Diagnosis Date    AICD (automatic cardioverter/defibrillator) present 2014    [employer]    Chronic anticoagulation 7/21/2017    Chronic combined systolic and diastolic congestive heart failure 7/27/2015 11-16-17   1 - Moderate left ventricular enlargement.    2 - Severely depressed left ventricular systolic function (EF 15-20%).    3 - Impaired LV relaxation, normal LAP (grade 1 diastolic dysfunction).    4 - Biatrial enlargement.    5 - Right ventricle is upper limit of normal in size with not well seen systolic function.    6 - Severe tricuspid regurgitation.    7 - Increased central venous pressure.    8 - The estimated PA systolic pressure is 40 mmHg.    9 - Heartmate III LVAD; speed 5800.     Complication involving left ventricular assist device (LVAD) 7/29/2017    Coronary artery disease involving native coronary artery of native heart without angina pectoris 7/27/2015    Gait instability     Hypertensive urgency, malignant 11/15/2017    ICD (implantable cardioverter-defibrillator), biventricular, in situ 7/27/2015    Ischemic cardiomyopathy 7/20/2017    S/p HMIII     Kidney stones     LVAD (left ventricular assist device) present 7/20/2017    status post Heartmate III 10/16/16 LVAD    HILLARY on CPAP 7/27/2015    Peripheral neuropathy     Pulmonary hypertension due to left ventricular systolic dysfunction 7/29/2015    Restless leg syndrome 1992    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    Skin cancer     excision 2013    Skin yeast infection 8/31/2017    Stage 3 chronic kidney disease 7/20/2017    Syncope 7/20/2017    Ventricular tachycardia 7/25/2017     Past Surgical History:   Procedure Laterality Date    CARDIAC PACEMAKER PLACEMENT      pacemaker previously, then AICD in 2006. AICD [employer] serial #[id number]    CORONARY ARTERY BYPASS GRAFT  1993    KNEE SURGERY Left 2001    complicated by infection, hardware had to be taken out and replaced     LEFT VENTRICULAR ASSIST DEVICE  10/19/2016     Review of patient's allergies indicates:   Allergen Reactions    Aldactone [spironolactone]       persistent hyperkalemia.    Sulfa (sulfonamide antibiotics)      Current Neurological Medications: Bupropion 450 mg po qd, ropinirole 0.5 mg po tid    No current facility-administered medications on file prior to encounter.      Current Outpatient Prescriptions on File Prior to Encounter   Medication Sig    allopurinol (ZYLOPRIM) 300 MG tablet Take 300 mg by mouth once daily.    amLODIPine (NORVASC) 10 MG tablet Take 1 tablet (10 mg total) by mouth once daily.    aspirin 81 MG Chew Take 81 mg by mouth once daily.    atorvastatin (LIPITOR) 10 MG tablet Take 10 mg by mouth once daily.    buPROPion (WELLBUTRIN XL) 300 MG 24 hr tablet Take 1 tablet (300 mg total) by mouth once daily. (Patient taking differently: Take 450 mg by mouth once daily. )    calcium-vitamin D tablet 600 mg-200 units Take 1 tablet by mouth once daily.    docusate sodium (COLACE) 100 MG capsule Take 100 mg by mouth daily as needed for Constipation.    dofetilide (TIKOSYN) 250 MCG Cap Take 1 capsule (250 mcg total) by mouth every 12 (twelve) hours.    famotidine (PEPCID) 20 MG tablet Take 2 tablets (40 mg total) by mouth every evening.    ferrous sulfate 324 mg (65 mg iron) TbEC Take 1 tablet (324 mg total) by mouth once daily.    folic acid (FOLVITE) 1 MG tablet Take 1 mg by mouth once daily.    furosemide (LASIX) 80 MG tablet Take orally daily on Monday, Wednesday and Friday as directed    hydrALAZINE (APRESOLINE) 100 MG tablet Take 1 tablet (100 mg total) by mouth every 8 (eight) hours.    isosorbide dinitrate (ISORDIL) 20 MG tablet Take 40 mg by mouth 3 (three) times daily.     Lactobacillus rhamnosus GG (CULTURELLE) 10 billion cell capsule Take 1 capsule by mouth once daily.    lisinopril (PRINIVIL,ZESTRIL) 20 MG tablet Take 1 tablet (20 mg total) by mouth 2 (two) times daily.     magnesium oxide (MAG-OX) 400 mg tablet Take 1 tablet (400 mg total) by mouth 2 (two) times daily.    multivitamin capsule Take 1 capsule by mouth once daily.    promethazine (PHENERGAN) 12.5 MG Tab Take 1 tablet (12.5 mg total) by mouth every 6 (six) hours as needed.    rOPINIRole (REQUIP) 0.5 MG tablet Take 0.5 mg by mouth 3 (three) times daily.    warfarin (COUMADIN) 4 MG tablet Take 4 mg orally on Sun, Tue, Thurs and 6 mg orally on other days as directed by coumadin clinic    warfarin (COUMADIN) 6 MG tablet      Family History     Problem Relation (Age of Onset)    Cancer Daughter (50)    Diabetes Daughter    Heart disease Daughter        Social History Main Topics    Smoking status: Never Smoker    Smokeless tobacco: Never Used    Alcohol use No    Drug use: No    Sexual activity: Not on file      Comment:      Review of Systems   Constitutional: Positive for fatigue. Negative for appetite change, chills and fever.   Eyes: Negative for photophobia and visual disturbance.   Respiratory: Negative for cough and shortness of breath.    Gastrointestinal: Negative for constipation.   Genitourinary:        +urinary incontinence x ~ 1 year   Musculoskeletal: Positive for back pain (chronic LBP) and gait problem.   Skin: Negative for rash and wound.   Neurological: Positive for numbness (stocking-glove distribution, chronic). Negative for weakness.   Psychiatric/Behavioral: Positive for confusion and hallucinations (visual). Negative for agitation.     Objective:     Vital Signs (Most Recent):  Temp: 97.6 °F (36.4 °C) (01/25/18 1156)  Pulse: 65 (01/25/18 1500)  Resp: 18 (01/25/18 1156)  BP: (!) 86/0 (01/25/18 1600)  SpO2: 95 % (01/25/18 1156) Vital Signs (24h Range):  Temp:  [97.5 °F (36.4 °C)-98.3 °F (36.8 °C)] 97.6 °F (36.4 °C)  Pulse:  [] 65  Resp:  [16-20] 18  SpO2:  [92 %-99 %] 95 %  BP: ()/(0-102) 86/0     Weight: 85.7 kg (188 lb 15 oz)  Body mass index is 25.62 kg/m².    Physical  Exam  General:  Well-developed, well-nourished, nad  HEENT:  NCAT, PERRLA, EOMI, oropharyngeal membranes non-erythematous/without exudate  Neck:  Supple, normal ROM without nuchal rigidity  Resp:  Symmetric expansion, no increased wob  CVS:  No LE edema, peripheral pulses 2+ (radial, dorsalis pedis)  GI:  Abd soft, non-distended, non-tender to palpation  Neurologic Exam:  Mental Status:  Awake, alert, oriented to self, location, but not to date.  Speech, thought content appropriate.  Able to spell 'world' forward, refuses backward.  Recent recall 2/3, remote recall 0/3.  Cranial Nerves:  VFs intact on moving fingers in all quadrants bilaterally.  PERRLA, EOMI.  Facial movement, sensation intact and symmetric.  Palate raises symmetrically, tongue protrudes midline.  Trapezius strength 5/5 bilaterally.  Motor:  Normal bulk and tone.  BUE shoulder abduction, biceps/triceps, wrist flexion/extension,  strength 5/5.  BLE hip flexors, knee flexion/extension, plantarflexion/dorsiflexion 5/5.  No pronator drift.  Sensory:  Intact to light touch at all extremities without inattention.  Vibratory sensation intact at BUE digits, BLE lower shin.  Reflexes:  Biceps, brachioradialis, patellar, Achilles 2+ and symmetric.  No ankle clonus.  Downgoing toe bilaterally.  Coordination:  FNF, RICO, HTS intact with no dysmetria/ataxia/dysdiadochokinesia.  Mild resting tremor in RUE noted on pronator drift testing.  Gait:  Patient very unsteady on standing with tendency toward retropulsion.  Worsens when trying to take a step, patient slides foot forward awkwardly with leaning to the right.     Significant Labs:     Recent Labs  Lab 01/25/18  0421   WBC 6.89  6.89   RBC 3.99*  3.99*   HGB 11.6*  11.6*   HCT 35.5*  35.5*   *  128*   MCV 89  89   MCH 29.1  29.1   MCHC 32.7  32.7       Recent Labs  Lab 01/25/18  0421   CALCIUM 10.5  10.5   PROT 6.9     140   K 4.3  4.3   CO2 27  27     107   BUN 31*  31*    CREATININE 2.0*  2.0*   ALKPHOS 71   ALT 15   AST 22   BILITOT 0.4     Significant Imagin18 CT head w/o contrast:  Possible CSF effect in periventricular area vs microvascular ischemic change.  Unable to obtain MRI 2/2 LVAD.  Stable exam noting mild chronic ischemic changes and generalized cerebral volume loss. No evidence of acute intracranial pathology.

## 2018-01-25 NOTE — ASSESSMENT & PLAN NOTE
77 year old male with a past medical history of ICM s/p LVAD10/16/16 in [place], SSS, S/P 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 GI consulted for persistent emesis and concern for gastroparesis. Upon speaking to patient we are able to obtain a limited history therefore our differential remains wide from possible gastroparesis with nausea/emesis vs regurgitation/remunination in the setting of GERD. Regardless of limited history we should start with an EGD and we will provide further recs after procedure.    Recommendations:  -CLD today and NPO after MN for EGD tomorrow  -Obtain KUB to rule out obstruction  -Start Protonix 40 mg PO BID

## 2018-01-25 NOTE — CONSULTS
[facility]  Cardiology  Consult Note    Patient Name: Kev Vasquez  MRN: [id number]  Admission Date: 1/24/2018  Hospital Length of Stay: 0 days  Code Status: Prior   Attending Provider: Don Nguyen MD   Consulting Provider: Liyah Barr MD  Primary Care Physician: Mega Collins MD  Principal Problem:<principal problem not specified>    Patient information was obtained from patient, past medical records and ER records.     Inpatient consult to Cardiology  Consult performed by: LIYAH BARR  Consult ordered by: DON NGUYEN  Reason for consult: confusion in VAD        Subjective:     Chief Complaint:  confusion     HPI:   Mr. 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 BiV ICD, HILLARY/BiPAP, HTN, CKD, HLP, gout and depression who presents with confusion. On further questioning of his wife and his caregiver who were both at bedside, they noticed some odd behaviours such as combing his cheek/nose, getting dressed to go hunting when that is not his hobby and admitted to seeing visual hallucinations of his dead brother. They stated that he is not sleeping well and naps throughout the day. They wonder if his BiPAP is not in the proper settings anymore and he now has made a game out of looking at his watch to see when he will get to sleep. He stated it was 2017, January, thought he was in [place] (moved from there in June), and [facility]. Altogether, he denies SOB, CP, LE edema, orthopnea and PND. He has no fevers or chills, cough, or diarrhea. They state that he does have some worsening vomiting. They stated that a few months ago, he would vomit after he drank some water: it would start with a sneeze, then a cough, then the vomit. It would happen every now  and then, but over the past few weeks, it has worsened to 3-4x/day. They stated that now it occurs without drinking, sometimes before dinner and he would throw up almost undigested food. He admitted to throwing up pills too at times. There have been no VAD alarms noted.    Past Medical History:   Diagnosis Date    AICD (automatic cardioverter/defibrillator) present 2014    [employer]    Chronic anticoagulation 7/21/2017    Chronic combined systolic and diastolic congestive heart failure 7/27/2015 11-16-17   1 - Moderate left ventricular enlargement.    2 - Severely depressed left ventricular systolic function (EF 15-20%).    3 - Impaired LV relaxation, normal LAP (grade 1 diastolic dysfunction).    4 - Biatrial enlargement.    5 - Right ventricle is upper limit of normal in size with not well seen systolic function.    6 - Severe tricuspid regurgitation.    7 - Increased central venous pressure.    8 - The estimated PA systolic pressure is 40 mmHg.    9 - Heartmate III LVAD; speed 5800.     Complication involving left ventricular assist device (LVAD) 7/29/2017    Coronary artery disease involving native coronary artery of native heart without angina pectoris 7/27/2015    Gait instability     Hypertensive urgency, malignant 11/15/2017    ICD (implantable cardioverter-defibrillator), biventricular, in situ 7/27/2015    Ischemic cardiomyopathy 7/20/2017    S/p HMIII     Kidney stones     LVAD (left ventricular assist device) present 7/20/2017    status post Heartmate III 10/16/16 LVAD    HILLARY on CPAP 7/27/2015    Peripheral neuropathy     Pulmonary hypertension due to left ventricular systolic dysfunction 7/29/2015    Restless leg syndrome 1992    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    Skin cancer     excision 2013    Skin yeast infection 8/31/2017    Stage 3 chronic kidney disease 7/20/2017    Syncope 7/20/2017    Ventricular tachycardia 7/25/2017       Past Surgical History:    Procedure Laterality Date    CARDIAC PACEMAKER PLACEMENT      pacemaker previously, then AICD in 2006. AICD [employer] serial #[id number]    CORONARY ARTERY BYPASS GRAFT  1993    KNEE SURGERY Left 2001    complicated by infection, hardware had to be taken out and replaced    LEFT VENTRICULAR ASSIST DEVICE  10/19/2016       Review of patient's allergies indicates:   Allergen Reactions    Aldactone [spironolactone]       persistent hyperkalemia.    Sulfa (sulfonamide antibiotics)        No current facility-administered medications on file prior to encounter.      Current Outpatient Prescriptions on File Prior to Encounter   Medication Sig    allopurinol (ZYLOPRIM) 300 MG tablet Take 300 mg by mouth once daily.    amLODIPine (NORVASC) 10 MG tablet Take 1 tablet (10 mg total) by mouth once daily.    amLODIPine (NORVASC) 2.5 MG tablet TK 1 T PO ONCE D    aspirin 81 MG Chew Take 81 mg by mouth once daily.    atorvastatin (LIPITOR) 10 MG tablet Take 10 mg by mouth once daily.    buPROPion (WELLBUTRIN XL) 300 MG 24 hr tablet Take 1 tablet (300 mg total) by mouth once daily.    calcium-vitamin D tablet 600 mg-200 units Take 1 tablet by mouth once daily.    carvedilol (COREG) 25 MG tablet Take 25 mg by mouth 2 (two) times daily with meals.    ciprofloxacin HCl (CIPRO) 750 MG tablet Take 1 tablet (750 mg total) by mouth 2 (two) times daily.    clopidogrel (PLAVIX) 75 mg tablet Take 75 mg by mouth once daily.    docusate sodium (COLACE) 100 MG capsule Take 100 mg by mouth daily as needed for Constipation.    dofetilide (TIKOSYN) 250 MCG Cap Take 1 capsule (250 mcg total) by mouth every 12 (twelve) hours.    doxazosin (CARDURA) 4 MG tablet Take 1 tablet (4 mg total) by mouth once daily.    famotidine (PEPCID) 20 MG tablet Take 2 tablets (40 mg total) by mouth every evening.    ferrous sulfate 324 mg (65 mg iron) TbEC Take 1 tablet (324 mg total) by mouth once daily.    ferrous sulfate 325 mg (65 mg iron) Tab  tablet     folic acid (FOLVITE) 1 MG tablet Take 1 mg by mouth once daily.    furosemide (LASIX) 80 MG tablet Take orally daily on Monday, Wednesday and Friday as directed    furosemide (LASIX) 80 MG tablet TAKE 1 TABLET BY MOUTH ON MONDAY, WEDNESDAY, AND FRIDAY AS DIRECTED    hydrALAZINE (APRESOLINE) 100 MG tablet Take 1 tablet (100 mg total) by mouth every 8 (eight) hours.    isosorbide dinitrate (ISORDIL) 20 MG tablet     isosorbide dinitrate (ISORDIL) 40 MG Tab Take 1 tablet (40 mg total) by mouth 3 (three) times daily.    Lactobacillus rhamnosus GG (CULTURELLE) 10 billion cell capsule Take 1 capsule by mouth once daily.    lansoprazole (PREVACID) 15 MG capsule Take 15 mg by mouth once daily.    lisinopril (PRINIVIL,ZESTRIL) 20 MG tablet Take 1 tablet (20 mg total) by mouth 2 (two) times daily.    losartan (COZAAR) 25 MG tablet Take 25 mg by mouth once daily.    magnesium oxide (MAG-OX) 400 mg tablet Take 1 tablet (400 mg total) by mouth 2 (two) times daily.    multivitamin capsule Take 1 capsule by mouth once daily.    promethazine (PHENERGAN) 12.5 MG Tab Take 1 tablet (12.5 mg total) by mouth every 6 (six) hours as needed.    rOPINIRole (REQUIP) 0.5 MG tablet Take 0.5 mg by mouth 3 (three) times daily.    warfarin (COUMADIN) 4 MG tablet Take 4 mg orally on Sun, Tue, Thurs and 6 mg orally on other days as directed by coumadin clinic    warfarin (COUMADIN) 6 MG tablet      Family History     Problem Relation (Age of Onset)    Cancer Daughter (50)    Diabetes Daughter    Heart disease Daughter        Social History Main Topics    Smoking status: Never Smoker    Smokeless tobacco: Never Used    Alcohol use No    Drug use: No    Sexual activity: Not on file      Comment:      Review of Systems   Constitution: Negative for chills, fever and malaise/fatigue.   HENT: Negative.    Cardiovascular: Negative for chest pain, dyspnea on exertion, irregular heartbeat, leg swelling, orthopnea,  palpitations and paroxysmal nocturnal dyspnea.   Respiratory: Negative for shortness of breath and wheezing.    Skin: Negative for color change and rash.   Musculoskeletal: Negative for arthritis, back pain and myalgias.   Gastrointestinal: Positive for vomiting. Negative for abdominal pain, constipation, diarrhea and nausea.   Neurological: Negative for dizziness, numbness and paresthesias.   Psychiatric/Behavioral: Positive for altered mental status and hallucinations. The patient has insomnia.      Objective:     Vital Signs (Most Recent):  Temp: 98.5 °F (36.9 °C) (01/24/18 1216)  Pulse: 103 (01/24/18 1216)  Resp: 20 (01/24/18 1216)  BP:  (unable to obtain in triage) (01/24/18 1216)  SpO2: 98 % (01/24/18 1216) Vital Signs (24h Range):  Temp:  [98.5 °F (36.9 °C)] 98.5 °F (36.9 °C)  Pulse:  [103] 103  Resp:  [20] 20  SpO2:  [98 %] 98 %     Weight: 84.4 kg (186 lb)  Body mass index is 25.23 kg/m².    SpO2: 98 %  O2 Device (Oxygen Therapy): room air    No intake or output data in the 24 hours ending 01/24/18 1756    Lines/Drains/Airways     Peripherally Inserted Central Catheter Line                 PICC Double Lumen 12/28/17 1029 other (see comments) 27 days          Line                 VAD Left ventricular assist device HeartMate 3 -- days                Physical Exam   Constitutional: Vital signs are normal. He appears well-developed and well-nourished. He is cooperative.  Non-toxic appearance. No distress.   HENT:   Head: Normocephalic and atraumatic.   Neck: JVD (up to mid-neck) present. No hepatojugular reflux present. Carotid bruit is not present.   Cardiovascular: Normal rate and regular rhythm.    Normal VAD hum  No lower extremity edema   Pulmonary/Chest: Effort normal and breath sounds normal. No respiratory distress. He has no decreased breath sounds. He has no wheezes. He has no rhonchi. He has no rales.   Abdominal: Soft. Normal appearance and bowel sounds are normal. He exhibits no distension and no  mass. There is no tenderness.   Neurological: He is alert.   Skin: Skin is warm, dry and intact.       Significant Labs:   CMP   Recent Labs  Lab 01/24/18  1536      K 4.3      CO2 28   *   BUN 36*   CREATININE 2.3*   CALCIUM 10.6*   PROT 7.2   ALBUMIN 3.3*   BILITOT 0.3   ALKPHOS 70   AST 22   ALT 14   ANIONGAP 5*   ESTGFRAFRICA 31.0*   EGFRNONAA 26.8*   , CBC   Recent Labs  Lab 01/24/18  1536   WBC 6.50   HGB 11.2*   HCT 34.4*   *    and INR   Recent Labs  Lab 01/24/18  1100 01/24/18  1536   INR 2.6* 2.7*       Significant Imaging: Echocardiogram:   2D echo with color flow doppler:   Results for orders placed or performed during the hospital encounter of 12/01/17   2D echo with color flow doppler   Result Value Ref Range    EF 25 (A) 55 - 65    Diastolic Dysfunction Yes (A)     Est. PA Systolic Pressure 29.59     Tricuspid Valve Regurgitation SEVERE (A)      Assessment and Plan:     Hallucinations    --may be secondary to sleep deprivation  --may require neuro eval, but will work up vomiting first.        Vomiting    --admit to HTS  --consult GI for further workup        LVAD (left ventricular assist device) present    --JVD elevated to mid-neck  --could use some gentle diuresis            VTE Risk Mitigation     None          Thank you for your consult. I will follow-up with patient. Please contact us if you have any additional questions.    Iwona Macario MD  Cardiology   [facility]

## 2018-01-25 NOTE — HPI
Mr. 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 BiV ICD, HILLARY/BiPAP, HTN, CKD, HLP, gout and depression who presents with confusion. On further questioning of his wife and his caregiver who were both at bedside, they noticed some odd behaviours such as combing his cheek/nose, getting dressed to go hunting when that is not his hobby and admitted to seeing visual hallucinations of his dead brother. They stated that he is not sleeping well and naps throughout the day. They wonder if his BiPAP is not in the proper settings anymore and he now has made a game out of looking at his watch to see when he will get to sleep. He stated it was 2017, January, thought he was in [place] (moved from there in June), and stated [employer] as the president. Altogether, he denies SOB, CP, LE edema, orthopnea and PND. He has no fevers or chills, cough, or diarrhea. They state that he does have some worsening vomiting. They stated that a few months ago, he would vomit after he drank some water: it would start with a sneeze, then a cough, then the vomit. It would happen every now and then, but over the past few weeks, it has worsened to 3-4x/day. They stated that now it occurs without drinking, sometimes before dinner and he would throw up almost undigested food. He admitted to throwing up pills too at times. There have been no VAD alarms noted.

## 2018-01-25 NOTE — PROGRESS NOTES
"LVAD dressing changed by RN using sterile technique with soap and water. DLES "3"; site red, tender when cleaning around site. Moderate amount of drainage on gauze noted to be purulent and brown. Pt tolerated well. Dressing change due 1/26/18. Will continue to monitor.  "

## 2018-01-25 NOTE — ASSESSMENT & PLAN NOTE
- He has a long h/o orthostatic hypotension, so BP's should only be checked while sitting or standing  - Will allow for some permissive hypertension with gaol MAP ~ 90 or less  - Continue Norvasc, Hydralazine, Isordil, Lisinopril

## 2018-01-25 NOTE — PROGRESS NOTES
[facility]  Heart Transplant  Progress Note    Patient Name: Kev Vasquez  MRN: [id number]  Admission Date: 1/24/2018  Hospital Length of Stay: 1 days  Attending Physician: Anni Henderson MD  Primary Care Provider: Mega Collins MD  Principal Problem:Vomiting    Subjective:     Interval History: Oriented to self only this morning. Per daughter, he is lucid about 95% of the time, but has been confused for the past few days. Vomited a small amount after his daughter gave him a sip of Coke this morning, otherwise no episodes. Unstable gait and urinary incontinence persists.    Continuous Infusions:  Scheduled Meds:   allopurinol  300 mg Oral Daily    amLODIPine  10 mg Oral Daily    aspirin  81 mg Oral Daily    atorvastatin  10 mg Oral Daily    buPROPion  450 mg Oral Daily    ceFEPime (MAXIPIME) IVPB  2 g Intravenous Q12H    dofetilide  250 mcg Oral Q12H    famotidine  40 mg Oral QHS    folic acid  1 mg Oral Daily    hydrALAZINE  100 mg Oral Q8H    isosorbide dinitrate  40 mg Oral TID    Lactobacillus rhamnosus GG  1 capsule Oral Daily    lisinopril  20 mg Oral BID    magnesium oxide  400 mg Oral BID    warfarin  6 mg Oral Daily     PRN Meds:docusate sodium, hydrALAZINE, ondansetron, promethazine    Review of patient's allergies indicates:   Allergen Reactions    Aldactone [spironolactone]       persistent hyperkalemia.    Sulfa (sulfonamide antibiotics)      Objective:     Vital Signs (Most Recent):  Temp: 97.6 °F (36.4 °C) (01/25/18 1156)  Pulse: 61 (01/25/18 1156)  Resp: 18 (01/25/18 1156)  BP: (!) 112/90 (01/25/18 1156)  SpO2: 95 % (01/25/18 1156) Vital Signs (24h Range):  Temp:  [97.5 °F (36.4 °C)-98.3 °F (36.8 °C)] 97.6 °F (36.4 °C)  Pulse:  [] 61  Resp:  [16-20] 18  SpO2:  [92 %-99 %] 95 %  BP: ()/(0-102) 112/90     Patient Vitals for the past 72 hrs (Last 3 readings):   Weight   01/24/18 2300 85.7 kg (188 lb 15 oz)   01/24/18 1216 84.4 kg (186 lb)     Body  mass index is 25.62 kg/m².      Intake/Output Summary (Last 24 hours) at 01/25/18 1250  Last data filed at 01/25/18 0500   Gross per 24 hour   Intake              360 ml   Output              200 ml   Net              160 ml       Hemodynamic Parameters:           Physical Exam   Constitutional: He appears well-developed and well-nourished.   HENT:   Head: Normocephalic and atraumatic.   Eyes: Conjunctivae are normal. Pupils are equal, round, and reactive to light.   Neck: Normal range of motion. Neck supple.   Difficult to assess neck veins as he won't hold his neck still   Cardiovascular: Normal rate and regular rhythm.    Smooth VAD hum. Pulsatile   Pulmonary/Chest: Effort normal and breath sounds normal.   Abdominal: Soft. Bowel sounds are normal.   Genitourinary:   Genitourinary Comments: Incontinent   Musculoskeletal: He exhibits no edema.   Neurological: He is alert.   Oriented to self only   Skin: Skin is warm and dry. Capillary refill takes 2 to 3 seconds.       Significant Labs:  CBC:    Recent Labs  Lab 01/18/18  1300 01/24/18  1536 01/25/18  0421   WBC 7.16 6.50 6.89  6.89   RBC 4.18* 3.82* 3.99*  3.99*   HGB 11.9* 11.2* 11.6*  11.6*   HCT 36.9* 34.4* 35.5*  35.5*   * 136* 128*  128*   MCV 88 90 89  89   MCH 28.5 29.3 29.1  29.1   MCHC 32.2 32.6 32.7  32.7     BNP:    Recent Labs  Lab 01/22/18  1142 01/24/18  1536 01/25/18  0421   * 354* 410*     CMP:    Recent Labs  Lab 01/22/18  1142 01/24/18  1536 01/25/18  0421   GLU 72 111* 89  89   CALCIUM 10.1 10.6* 10.5  10.5   ALBUMIN 3.1* 3.3* 3.2*   PROT 6.8 7.2 6.9    139 140  140   K 4.0 4.3 4.3  4.3   CO2 25 28 27  27    106 107  107   BUN 39* 36* 31*  31*   CREATININE 2.3* 2.3* 2.0*  2.0*   ALKPHOS 68 70 71   ALT 13 14 15   AST 20 22 22   BILITOT 0.4 0.3 0.4      Coagulation:     Recent Labs  Lab 01/24/18  1100 01/24/18  1536 01/25/18  0421   INR 2.6* 2.7* 2.3*  2.3*     LDH:    Recent Labs  Lab 01/25/18  0421      175     Microbiology:  Microbiology Results (last 7 days)     Procedure Component Value Units Date/Time    Blood culture #1 [[id number]] Collected:  01/24/18 1521    Order Status:  Completed Specimen:  Blood from Line, PICC Right Basilic Updated:  01/25/18 0115     Blood Culture, Routine No Growth to date    Narrative:       Blood Culture #1    Blood culture #2 [[id number]] Collected:  01/24/18 1537    Order Status:  Completed Specimen:  Blood from Peripheral, Hand, Right Updated:  01/25/18 0115     Blood Culture, Routine No Growth to date    Narrative:       Blood Culture #2          I have reviewed all pertinent labs within the past 24 hours.    Estimated Creatinine Clearance: 35 mL/min (based on SCr of 2 mg/dL (H)).          Assessment and Plan:     Mr. 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 Balwinder BiV ICD, HILLARY/BiPAP, HTN, CKD, HLP, gout and depression who presents with confusion. On further questioning of his wife and his caregiver who were both at bedside, they noticed some odd behaviours such as combing his cheek/nose, getting dressed to go hunting when that is not his hobby and admitted to seeing visual hallucinations of his dead brother. They stated that he is not sleeping well and naps throughout the day. They wonder if his BiPAP is not in the proper settings anymore and he now has made a game out of looking at his watch to see when he will get to sleep. He stated it was 2017, January, thought he was in [place] (moved from there in June), and stated [place] as the president. Altogether, he denies SOB, CP, LE edema, orthopnea and PND. He has no fevers or chills, cough, or diarrhea. They state that he does have some worsening vomiting. They stated that a few months ago, he would vomit after he  drank some water: it would start with a sneeze, then a cough, then the vomit. It would happen every now and then, but over the past few weeks, it has worsened to 3-4x/day. They stated that now it occurs without drinking, sometimes before dinner and he would throw up almost undigested food. He admitted to throwing up pills too at times. There have been no VAD alarms noted.       * Vomiting    - Appreciate GI's help. Patient on clear liquids with plan for EGD tomorrow        Confusion    - Given constellation of confusion, gait disturbance and urinary incontinence, consulting Neuro (NPH?). CT of head on admit unchanged.  - D/C'd Ropinirole again 2/2 gait disturbance  - Delirium protocol          LVAD (left ventricular assist device) present    - S/P HM III 10/16/16 as DT in [place]  - Current speed 5800  - TTE 1/25/18 shows LVEDD 6.5, LVEF 10-15%, diastolic dysfunction, RVE, severely depressed RV systolic function, PAS 20, ARTHUR intermittently and septum midline. No comment on IVC - I will contact Dr. Lanza to see if he can give me this read  - In the meantime, patient has a PICC - will check CVP as BNP up a bit at 410. Unable to assess neck veins as he won't hold his neck still (was taking Lasix 80 mg every M-W-F at home)  - INR is therapeutic at 2.3 - continue Coumadin  - LDH is stable at 175  - Continue IV Cefepime for chronic Pseudomonas DL infection        Essential hypertension    - He has a long h/o orthostatic hypotension, so BP's should only be checked while sitting or standing  - Will allow for some permissive hypertension with gaol MAP ~ 90 or less  - Continue Norvasc, Hydralazine, Isordil, Lisinopril         Stage 3 chronic kidney disease    - Admit creatinine 2.3, down to 2.o today (baseline looks ~ 1.6-1.8)        Gait instability    - PT/OT        VT (ventricular tachycardia)    - Continue Tikosyn  - Has [employer] Balwinder BiV ICD  - Keep K+ > 4.0 and Mg+ > 2.0              Charlette Jasmine, RON [id number]  Heart  Transplant  [facility]

## 2018-01-25 NOTE — PLAN OF CARE
"Problem: Patient Care Overview  Goal: Plan of Care Review  Pt awake and alert. Disoriented to place and situation. Asked where he is, patient responds, "i was told I am in [place]." asked if he is in the hospital, patient responded, "Yes, I am in [facility]." doppler and MAP elevated on admit, patient did not take BP meds before coming in. Dr. Yeh aware. Night meds given. Patient was able to swallow all pills. No episodes of nausea/vomiting. LVAD history reviewed, no alarms noted. delrium and fall precautions in place. Bed alarm on and camera at bedside. VSS and NADN. Will continue to monitor patient.        "

## 2018-01-25 NOTE — H&P (VIEW-ONLY)
"Subjective:   Patient ID:  Kev Vasquez is a 77 y.o. male who presents for LVAD followup visit.    Implant Date:10-19-16 @ [facility] 3 RPM 5800     INR goal: 2-3   Bridge with Heparin   Antiplatelets: ASA 81 mg    TXP SANTOSH INTERROGATIONS 1/18/2018   Type HeartMate3   Flow 5.6   Speed 5800   PI 2.0   Power (Mendez) 4.6   LSL 5400   Pulsatility Pulse       HPI   77 y.o. male s/p HM III 10/16/16 as DT for ICM in [place] (5800), chronic Pseudomonas DL infection, on IV Cefepime and followed by Dr. Muhammad, [place], on Tikosyn (intolerant to Amiodarone per outside records), h/o SSS, S/P [employer] BiV ICD, HILLARY/BiPAP, HTN, CKD, HLP, gout and depression who comes for a follow-up visit. He was recently discharged from the hospital after being treated for ADHF 12/1-12/4. Went to ED 1/7/18 for oozing around his PICC. The site was treated and sent home. He is still on Cefepime for ~3 weeks at least per ID. Last seen 1 month ago.     Today, he and his family report doing well. He still is on HH for abx and unable to get adequate PT/rehab for his RLE. Only other complaint is sneezing/coughing fits that will result in post-tussive emesis. VAD speed is at 5800 rpm. No VAD alarms noted on interrogation occasional PI events. BP is 82. DLES is a "2-3" with moderate drainage. INR is sub-therapeutic at 1.9 (up from 1.5). LDH is pending and  at baseline.     Review of Systems   Constitution: Negative. Negative for chills, decreased appetite, diaphoresis, fever, weakness, malaise/fatigue, night sweats, weight gain and weight loss.   Eyes: Negative.    Cardiovascular: Negative for chest pain, claudication, cyanosis, dyspnea on exertion, irregular heartbeat, leg swelling, near-syncope, orthopnea, palpitations, paroxysmal nocturnal dyspnea and syncope.   Respiratory: Negative for cough, hemoptysis and shortness of breath.    Endocrine: Negative.    Hematologic/Lymphatic: Negative.    Skin: Negative for color change, dry skin and " "nail changes.   Musculoskeletal: Negative.    Gastrointestinal: Negative.    Genitourinary: Negative.        Objective:   Blood pressure (!) 82/0, weight 82.1 kg (181 lb).body mass index is 24.55 kg/m².    Doppler: 82    Physical Exam   Constitutional: He appears well-developed.   HENT:   Head: Normocephalic.   Neck: No JVD present. Carotid bruit is not present.   Cardiovascular: Regular rhythm and normal heart sounds.    No murmur heard.  Smooth VAD hum. DLES is "2-3" with moderate drainage   Pulmonary/Chest: Effort normal and breath sounds normal. No respiratory distress. He has no wheezes. He has no rales.   Abdominal: Soft. Bowel sounds are normal. He exhibits no distension. There is no tenderness. There is no rebound.   Musculoskeletal: He exhibits no edema.   Neurological: He is alert.   Skin: Skin is warm.   Vitals reviewed.          Lab Results   Component Value Date    WBC 7.16 01/18/2018    HGB 11.9 (L) 01/18/2018    HCT 36.9 (L) 01/18/2018    MCV 88 01/18/2018     (L) 01/18/2018    CO2 29 01/15/2018    CREATININE 1.8 (H) 01/15/2018    CALCIUM 10.9 (H) 01/15/2018    ALBUMIN 3.6 01/15/2018    AST 27 01/15/2018     (H) 01/18/2018    ALT 19 01/15/2018     12/14/2017       Lab Results   Component Value Date    INR 1.9 (H) 01/18/2018    INR 1.5 (H) 01/15/2018    INR 1.5 01/15/2018       BNP   Date Value Ref Range Status   01/18/2018 359 (H) 0 - 99 pg/mL Final     Comment:     Values of less than 100 pg/ml are consistent with non-CHF populations.   12/14/2017 621 (H) 0 - 99 pg/mL Final     Comment:     Values of less than 100 pg/ml are consistent with non-CHF populations.   12/04/2017 278 (H) 0 - 99 pg/mL Final     Comment:     Values of less than 100 pg/ml are consistent with non-CHF populations.       LD   Date Value Ref Range Status   12/14/2017 191 110 - 260 U/L Final     Comment:     Results are increased in hemolyzed samples.   12/04/2017 163 110 - 260 U/L Final     Comment:     " Results are increased in hemolyzed samples.   12/03/2017 174 110 - 260 U/L Final     Comment:     Results are increased in hemolyzed samples.       Assessment:      1. Heart replaced by heart assist device    2. Chronic combined systolic and diastolic congestive heart failure    3. Essential hypertension    4. Ischemic cardiomyopathy    5. LVAD (left ventricular assist device) present    6. Stage 3 chronic kidney disease        Plan:   LVAD  -Patient is now NYHA class II. BP is controlled.  INR is borderline therapeutic (1.9).  -VAD interrogation was performed in clinic  -Provided with VAD supplies.     Post-tussive/sneezing emesis  - trial of antihistamine (Loratadine vs cetirizine)    Echo and lipids next visit    Recommend 2 gram sodium restriction and 1500cc fluid restriction.  Encourage physical activity with graded exercise program.  Requested patient to weigh themselves daily, and to notify us if their weight increases by more than 3 lbs in 1 day or 5 lbs in 1 week.     Listed for transplant: DT    UNOS Patient Status  Functional Status: 80% - Normal activity with effort: some symptoms of disease  Physical Capacity: No Limitations  Working for Income: Unknown    NEYDA To MD  Transplant Cardiology

## 2018-01-25 NOTE — SUBJECTIVE & OBJECTIVE
Past Medical History:   Diagnosis Date    AICD (automatic cardioverter/defibrillator) present 2014    [employer]winder    Chronic anticoagulation 7/21/2017    Chronic combined systolic and diastolic congestive heart failure 7/27/2015 11-16-17   1 - Moderate left ventricular enlargement.    2 - Severely depressed left ventricular systolic function (EF 15-20%).    3 - Impaired LV relaxation, normal LAP (grade 1 diastolic dysfunction).    4 - Biatrial enlargement.    5 - Right ventricle is upper limit of normal in size with not well seen systolic function.    6 - Severe tricuspid regurgitation.    7 - Increased central venous pressure.    8 - The estimated PA systolic pressure is 40 mmHg.    9 - Heartmate III LVAD; speed 5800.     Complication involving left ventricular assist device (LVAD) 7/29/2017    Coronary artery disease involving native coronary artery of native heart without angina pectoris 7/27/2015    Gait instability     Hypertensive urgency, malignant 11/15/2017    ICD (implantable cardioverter-defibrillator), biventricular, in situ 7/27/2015    Ischemic cardiomyopathy 7/20/2017    S/p HMIII     Kidney stones     LVAD (left ventricular assist device) present 7/20/2017    status post Heartmate III 10/16/16 LVAD    HILLARY on CPAP 7/27/2015    Peripheral neuropathy     Pulmonary hypertension due to left ventricular systolic dysfunction 7/29/2015    Restless leg syndrome 1992    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    Skin cancer     excision 2013    Skin yeast infection 8/31/2017    Stage 3 chronic kidney disease 7/20/2017    Syncope 7/20/2017    Ventricular tachycardia 7/25/2017       Past Surgical History:   Procedure Laterality Date    CARDIAC PACEMAKER PLACEMENT      pacemaker previously, then AICD in 2006. AICD [employer]winder serial #[id number]    CORONARY ARTERY BYPASS GRAFT  1993    KNEE SURGERY Left 2001    complicated by infection, hardware had to be taken out and replaced     LEFT VENTRICULAR ASSIST DEVICE  10/19/2016       Review of patient's allergies indicates:   Allergen Reactions    Aldactone [spironolactone]       persistent hyperkalemia.    Sulfa (sulfonamide antibiotics)      Family History     Problem Relation (Age of Onset)    Cancer Daughter (50)    Diabetes Daughter    Heart disease Daughter        Social History Main Topics    Smoking status: Never Smoker    Smokeless tobacco: Never Used    Alcohol use No    Drug use: No    Sexual activity: Not on file      Comment:      Review of Systems   Unable to perform ROS: Mental status change     Objective:     Vital Signs (Most Recent):  Temp: 98.1 °F (36.7 °C) (01/25/18 0512)  Pulse: 62 (01/25/18 0700)  Resp: 18 (01/25/18 0512)  BP: 117/89 (01/25/18 0420)  SpO2: (!) 92 % (01/25/18 0512) Vital Signs (24h Range):  Temp:  [97.5 °F (36.4 °C)-98.5 °F (36.9 °C)] 98.1 °F (36.7 °C)  Pulse:  [] 62  Resp:  [16-20] 18  SpO2:  [92 %-99 %] 92 %  BP: (104-147)/(0-102) 117/89     Weight: 85.7 kg (188 lb 15 oz) (01/24/18 2300)  Body mass index is 25.62 kg/m².      Intake/Output Summary (Last 24 hours) at 01/25/18 0805  Last data filed at 01/25/18 0500   Gross per 24 hour   Intake              360 ml   Output              200 ml   Net              160 ml       Lines/Drains/Airways     Peripherally Inserted Central Catheter Line                 PICC Double Lumen 12/28/17 1029 other (see comments) 27 days          Line                 VAD Left ventricular assist device HeartMate 3 -- days                Physical Exam   Constitutional: No distress.   HENT:   Head: Normocephalic and atraumatic.   Eyes: No scleral icterus.   Neck: Normal range of motion.   Cardiovascular: Normal rate and regular rhythm.    Pulmonary/Chest: Effort normal and breath sounds normal.   Abdominal: Soft. Bowel sounds are normal.   Neurological:   Alert and only oriented to person   Skin: He is not diaphoretic.       Significant Labs:  CBC:   Recent  Labs  Lab 01/24/18  1536 01/25/18 0421   WBC 6.50 6.89  6.89   HGB 11.2* 11.6*  11.6*   HCT 34.4* 35.5*  35.5*   * 128*  128*     CMP:   Recent Labs  Lab 01/25/18 0421   GLU 89  89   CALCIUM 10.5  10.5   ALBUMIN 3.2*   PROT 6.9     140   K 4.3  4.3   CO2 27  27     107   BUN 31*  31*   CREATININE 2.0*  2.0*   ALKPHOS 71   ALT 15   AST 22   BILITOT 0.4     Coagulation:   Recent Labs  Lab 01/25/18 0421   INR 2.3*  2.3*       Significant Imaging:  Imaging results within the past 24 hours have been reviewed.

## 2018-01-25 NOTE — HPI
"76 yo M with PMHs of [facility] s/p LVAD with chronic Pseudomonas infection of the driveline, HTN, HLD, and depression who originally presented 18 with confusion.  Neurology is consulted 18 for concern for NPH.  Patient, wife at bedside, and caregiver provide history.  Patient has had previous hx of delirium noted after having LVAD placed, most recently around 2017 for several months.  Wife notes that she was told he had dementia, but stated that he was just delirious and patient improved in respect to mental status by the summer of 2017.  He has a limp at baseline due to "slipped vertebrae" per wife associated with low back pain.  Up until ~ 2 weeks prior to admission, patient had been getting around well at home with a walker.  Caregiver now notes that patient is very "wobbly" and has trouble starting movement often sliding his feet along with floor and leaning backward or to the side with few falls over the past couple weeks.  This has occurred in a setting of urinary incontinence requiring adult diaper use for ~ 1 year.  He has also had confusion over the past few days which prompted ED visit.  Confusion has included thinking he was going hunting, patient stating that he was back home in [place] ([place] moved from [place] ~ 2017).  He has also had well-formed visual hallucinations including a dog and his  brother.  Patient has some stocking-glove numbness/tingling that he states is at baseline and hasn't changed over past several weeks.  Some RUE tremor noted, patient and family deny noticing tremors in the past or recently.  Patient does have family hx of dementia--dementia NOS diagnosed in his father.  Discussed possible evaluation with LP but risks related to anticoagulation and difficulties in orchestrating procedure with a patient on anticoagulation.    "

## 2018-01-25 NOTE — ASSESSMENT & PLAN NOTE
- Given constellation of confusion, gait disturbance and urinary incontinence, consulting Neuro (NPH?). CT of head on admit unchanged.  - D/C'd Ropinirole again 2/2 gait disturbance  - Delirium protocol

## 2018-01-25 NOTE — ASSESSMENT & PLAN NOTE
- S/P HM III 10/16/16 as DT in [place]  - Current speed 5800  - TTE 1/25/18 shows LVEDD 6.5, LVEF 10-15%, diastolic dysfunction, RVE, severely depressed RV systolic function, PAS 20, ARTHUR intermittently and septum midline. No comment on IVC - I will contact Dr. Lanza to see if he can give me this read  - In the meantime, patient has a PICC - will check CVP as BNP up a bit at 410. Unable to assess neck veins as he won't hold his neck still (was taking Lasix 80 mg every M-W-F at home)  - INR is therapeutic at 2.3 - continue Coumadin  - LDH is stable at 175  - Continue IV Cefepime for chronic Pseudomonas DL infection

## 2018-01-25 NOTE — PROGRESS NOTES
01/25/18 0200 01/25/18 0201   Vital Signs   BP (!) 110/0 (!) 124/92   MAP (mmHg) --  105   BP Method Doppler --      Dr. AJAY Yeh notified of elevated DP and MAP. Verbal order received for IV Hydralazine 10mg q6h PRN for MAP >80. Order carried out. Will continue to monitor patient.

## 2018-01-25 NOTE — PLAN OF CARE
Problem: Patient Care Overview  Goal: Plan of Care Review    Recommendations     Recommendation/Intervention:   1. When medically appropriate, advance diet to cardiac with texture per SLP.   2. If meal intake consistently <50%, order Boost Plus.   3. RD following.     Goals: Intake >/=85% EEN/EPN with good tolerance  Nutrition Goal Status: new

## 2018-01-25 NOTE — CONSULTS
"[facility]  Neurology  Consult Note    Patient Name: Kev Vasquez  MRN: [id number]  Admission Date: 2018  Hospital Length of Stay: 1 days  Code Status: Full Code   Attending Provider: Anni Henderson MD   Consulting Provider: Alesia Montero MD  Primary Care Physician: Mega Collins MD  Principal Problem:Vomiting    Consults   Subjective:     Chief Complaint:  Concern for NPH     HPI:   78 yo M with PMHs of ICM s/p LVAD with chronic Pseudomonas infection of the driveline, HTN, HLD, and depression who originally presented 18 with confusion.  Neurology is consulted 18 for concern for NPH.  Patient, wife at bedside, and caregiver provide history.  Patient has had previous hx of delirium noted after having LVAD placed, most recently around 2017 for several months.  Wife notes that she was told he had dementia, but stated that he was just delirious and patient improved in respect to mental status by the summer of 2017.  He has a limp at baseline due to "slipped vertebrae" per wife associated with low back pain.  Up until ~ 2 weeks prior to admission, patient had been getting around well at home with a walker.  Caregiver now notes that patient is very "wobbly" and has trouble starting movement often sliding his feet along with floor and leaning backward or to the side with few falls over the past couple weeks.  This has occurred in a setting of urinary incontinence requiring adult diaper use for ~ 1 year.  He has also had confusion over the past few days which prompted ED visit.  Confusion has included thinking he was going hunting, patient stating that he was back home in [place] ([place] from [place] ~ 2017).  He has also had well-formed visual hallucinations including a dog and his  brother.  Patient has some stocking-glove numbness/tingling that he states is at baseline and hasn't changed over past several weeks.  Some RUE tremor noted, patient and family deny " noticing tremors in the past or recently.  Patient does have family hx of dementia--dementia NOS diagnosed in his father.  Discussed possible evaluation with LP but risks related to anticoagulation and difficulties in orchestrating procedure with a patient on anticoagulation.    Past Medical History:   Diagnosis Date    AICD (automatic cardioverter/defibrillator) present 2014    [employer]    Chronic anticoagulation 7/21/2017    Chronic combined systolic and diastolic congestive heart failure 7/27/2015 11-16-17   1 - Moderate left ventricular enlargement.    2 - Severely depressed left ventricular systolic function (EF 15-20%).    3 - Impaired LV relaxation, normal LAP (grade 1 diastolic dysfunction).    4 - Biatrial enlargement.    5 - Right ventricle is upper limit of normal in size with not well seen systolic function.    6 - Severe tricuspid regurgitation.    7 - Increased central venous pressure.    8 - The estimated PA systolic pressure is 40 mmHg.    9 - Heartmate III LVAD; speed 5800.     Complication involving left ventricular assist device (LVAD) 7/29/2017    Coronary artery disease involving native coronary artery of native heart without angina pectoris 7/27/2015    Gait instability     Hypertensive urgency, malignant 11/15/2017    ICD (implantable cardioverter-defibrillator), biventricular, in situ 7/27/2015    Ischemic cardiomyopathy 7/20/2017    S/p HMIII     Kidney stones     LVAD (left ventricular assist device) present 7/20/2017    status post Heartmate III 10/16/16 LVAD    HILLARY on CPAP 7/27/2015    Peripheral neuropathy     Pulmonary hypertension due to left ventricular systolic dysfunction 7/29/2015    Restless leg syndrome 1992    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    Skin cancer     excision 2013    Skin yeast infection 8/31/2017    Stage 3 chronic kidney disease 7/20/2017    Syncope 7/20/2017    Ventricular tachycardia 7/25/2017     Past Surgical  History:   Procedure Laterality Date    CARDIAC PACEMAKER PLACEMENT      pacemaker previously, then AICD in 2006. AICD [employer] serial #[id number]    CORONARY ARTERY BYPASS GRAFT  1993    KNEE SURGERY Left 2001    complicated by infection, hardware had to be taken out and replaced    LEFT VENTRICULAR ASSIST DEVICE  10/19/2016     Review of patient's allergies indicates:   Allergen Reactions    Aldactone [spironolactone]       persistent hyperkalemia.    Sulfa (sulfonamide antibiotics)      Current Neurological Medications: Bupropion 450 mg po qd, ropinirole 0.5 mg po tid    No current facility-administered medications on file prior to encounter.      Current Outpatient Prescriptions on File Prior to Encounter   Medication Sig    allopurinol (ZYLOPRIM) 300 MG tablet Take 300 mg by mouth once daily.    amLODIPine (NORVASC) 10 MG tablet Take 1 tablet (10 mg total) by mouth once daily.    aspirin 81 MG Chew Take 81 mg by mouth once daily.    atorvastatin (LIPITOR) 10 MG tablet Take 10 mg by mouth once daily.    buPROPion (WELLBUTRIN XL) 300 MG 24 hr tablet Take 1 tablet (300 mg total) by mouth once daily. (Patient taking differently: Take 450 mg by mouth once daily. )    calcium-vitamin D tablet 600 mg-200 units Take 1 tablet by mouth once daily.    docusate sodium (COLACE) 100 MG capsule Take 100 mg by mouth daily as needed for Constipation.    dofetilide (TIKOSYN) 250 MCG Cap Take 1 capsule (250 mcg total) by mouth every 12 (twelve) hours.    famotidine (PEPCID) 20 MG tablet Take 2 tablets (40 mg total) by mouth every evening.    ferrous sulfate 324 mg (65 mg iron) TbEC Take 1 tablet (324 mg total) by mouth once daily.    folic acid (FOLVITE) 1 MG tablet Take 1 mg by mouth once daily.    furosemide (LASIX) 80 MG tablet Take orally daily on Monday, Wednesday and Friday as directed    hydrALAZINE (APRESOLINE) 100 MG tablet Take 1 tablet (100 mg total) by mouth every 8 (eight) hours.    isosorbide  dinitrate (ISORDIL) 20 MG tablet Take 40 mg by mouth 3 (three) times daily.     Lactobacillus rhamnosus GG (CULTURELLE) 10 billion cell capsule Take 1 capsule by mouth once daily.    lisinopril (PRINIVIL,ZESTRIL) 20 MG tablet Take 1 tablet (20 mg total) by mouth 2 (two) times daily.    magnesium oxide (MAG-OX) 400 mg tablet Take 1 tablet (400 mg total) by mouth 2 (two) times daily.    multivitamin capsule Take 1 capsule by mouth once daily.    promethazine (PHENERGAN) 12.5 MG Tab Take 1 tablet (12.5 mg total) by mouth every 6 (six) hours as needed.    rOPINIRole (REQUIP) 0.5 MG tablet Take 0.5 mg by mouth 3 (three) times daily.    warfarin (COUMADIN) 4 MG tablet Take 4 mg orally on Sun, Tue, Thurs and 6 mg orally on other days as directed by coumadin clinic    warfarin (COUMADIN) 6 MG tablet      Family History     Problem Relation (Age of Onset)    Cancer Daughter (50)    Diabetes Daughter    Heart disease Daughter        Social History Main Topics    Smoking status: Never Smoker    Smokeless tobacco: Never Used    Alcohol use No    Drug use: No    Sexual activity: Not on file      Comment:      Review of Systems   Constitutional: Positive for fatigue. Negative for appetite change, chills and fever.   Eyes: Negative for photophobia and visual disturbance.   Respiratory: Negative for cough and shortness of breath.    Gastrointestinal: Negative for constipation.   Genitourinary:        +urinary incontinence x ~ 1 year   Musculoskeletal: Positive for back pain (chronic LBP) and gait problem.   Skin: Negative for rash and wound.   Neurological: Positive for numbness (stocking-glove distribution, chronic). Negative for weakness.   Psychiatric/Behavioral: Positive for confusion and hallucinations (visual). Negative for agitation.     Objective:     Vital Signs (Most Recent):  Temp: 97.6 °F (36.4 °C) (01/25/18 1156)  Pulse: 65 (01/25/18 1500)  Resp: 18 (01/25/18 1156)  BP: (!) 86/0 (01/25/18  1600)  SpO2: 95 % (01/25/18 1156) Vital Signs (24h Range):  Temp:  [97.5 °F (36.4 °C)-98.3 °F (36.8 °C)] 97.6 °F (36.4 °C)  Pulse:  [] 65  Resp:  [16-20] 18  SpO2:  [92 %-99 %] 95 %  BP: ()/(0-102) 86/0     Weight: 85.7 kg (188 lb 15 oz)  Body mass index is 25.62 kg/m².    Physical Exam  General:  Well-developed, well-nourished, nad  HEENT:  NCAT, PERRLA, EOMI, oropharyngeal membranes non-erythematous/without exudate  Neck:  Supple, normal ROM without nuchal rigidity  Resp:  Symmetric expansion, no increased wob  CVS:  No LE edema, peripheral pulses 2+ (radial, dorsalis pedis)  GI:  Abd soft, non-distended, non-tender to palpation  Neurologic Exam:  Mental Status:  Awake, alert, oriented to self, location, but not to date.  Speech, thought content appropriate.  Able to spell 'world' forward, refuses backward.  Recent recall 2/3, remote recall 0/3.  Cranial Nerves:  VFs intact on moving fingers in all quadrants bilaterally.  PERRLA, EOMI.  Facial movement, sensation intact and symmetric.  Palate raises symmetrically, tongue protrudes midline.  Trapezius strength 5/5 bilaterally.  Motor:  Normal bulk and tone.  BUE shoulder abduction, biceps/triceps, wrist flexion/extension,  strength 5/5.  BLE hip flexors, knee flexion/extension, plantarflexion/dorsiflexion 5/5.  No pronator drift.  Sensory:  Intact to light touch at all extremities without inattention.  Vibratory sensation intact at BUE digits, BLE lower shin.  Reflexes:  Biceps, brachioradialis, patellar, Achilles 2+ and symmetric.  No ankle clonus.  Downgoing toe bilaterally.  Coordination:  FNF, RICO, HTS intact with no dysmetria/ataxia/dysdiadochokinesia.  Mild resting tremor in RUE noted on pronator drift testing.  Gait:  Patient very unsteady on standing with tendency toward retropulsion.  Worsens when trying to take a step, patient slides foot forward awkwardly with leaning to the right.     Significant Labs:     Recent Labs  Lab 01/25/18  8349    WBC 6.89  6.89   RBC 3.99*  3.99*   HGB 11.6*  11.6*   HCT 35.5*  35.5*   *  128*   MCV 89  89   MCH 29.1  29.1   MCHC 32.7  32.7       Recent Labs  Lab 18  0421   CALCIUM 10.5  10.5   PROT 6.9     140   K 4.3  4.3   CO2 27  27     107   BUN 31*  31*   CREATININE 2.0*  2.0*   ALKPHOS 71   ALT 15   AST 22   BILITOT 0.4     Significant Imagin18 CT head w/o contrast:  Possible CSF effect in periventricular area vs microvascular ischemic change.  Unable to obtain MRI 2/2 LVAD.  Stable exam noting mild chronic ischemic changes and generalized cerebral volume loss. No evidence of acute intracranial pathology.       Assessment and Plan:     Confusion    -Concern for NPH vs underlying dementia with delirium or progression  -Would like to plan for large volume LP with PT eval before and after procedure   -Difficult 2/2 anticoagulation with warfarin   -May be able to coordinate tap with NM Cisternogram, will discuss with Neurosurgery   -Explained that warfarin may need to be held, heparin gtt started which could be stopped right before LP  -Recommend continued PT/OT for ADLs and gait training     VTE Risk Mitigation         Ordered     warfarin tablet 6 mg  Daily     Route:  Oral        18        Thank you for your consult. I will follow-up with patient. Please contact us if you have any additional questions.    Alesia Montero MD  Neurology  [facility]

## 2018-01-25 NOTE — SUBJECTIVE & OBJECTIVE
Interval History: Oriented to self only this morning. Per daughter, he is lucid about 95% of the time, but has been confused for the past few days. Vomited a small amount after his daughter gave him a sip of Coke this morning, otherwise no episodes. Unstable gait and urinary incontinence persists.    Continuous Infusions:  Scheduled Meds:   allopurinol  300 mg Oral Daily    amLODIPine  10 mg Oral Daily    aspirin  81 mg Oral Daily    atorvastatin  10 mg Oral Daily    buPROPion  450 mg Oral Daily    ceFEPime (MAXIPIME) IVPB  2 g Intravenous Q12H    dofetilide  250 mcg Oral Q12H    famotidine  40 mg Oral QHS    folic acid  1 mg Oral Daily    hydrALAZINE  100 mg Oral Q8H    isosorbide dinitrate  40 mg Oral TID    Lactobacillus rhamnosus GG  1 capsule Oral Daily    lisinopril  20 mg Oral BID    magnesium oxide  400 mg Oral BID    warfarin  6 mg Oral Daily     PRN Meds:docusate sodium, hydrALAZINE, ondansetron, promethazine    Review of patient's allergies indicates:   Allergen Reactions    Aldactone [spironolactone]       persistent hyperkalemia.    Sulfa (sulfonamide antibiotics)      Objective:     Vital Signs (Most Recent):  Temp: 97.6 °F (36.4 °C) (01/25/18 1156)  Pulse: 61 (01/25/18 1156)  Resp: 18 (01/25/18 1156)  BP: (!) 112/90 (01/25/18 1156)  SpO2: 95 % (01/25/18 1156) Vital Signs (24h Range):  Temp:  [97.5 °F (36.4 °C)-98.3 °F (36.8 °C)] 97.6 °F (36.4 °C)  Pulse:  [] 61  Resp:  [16-20] 18  SpO2:  [92 %-99 %] 95 %  BP: ()/(0-102) 112/90     Patient Vitals for the past 72 hrs (Last 3 readings):   Weight   01/24/18 2300 85.7 kg (188 lb 15 oz)   01/24/18 1216 84.4 kg (186 lb)     Body mass index is 25.62 kg/m².      Intake/Output Summary (Last 24 hours) at 01/25/18 1250  Last data filed at 01/25/18 0500   Gross per 24 hour   Intake              360 ml   Output              200 ml   Net              160 ml       Hemodynamic Parameters:           Physical Exam   Constitutional: He  appears well-developed and well-nourished.   HENT:   Head: Normocephalic and atraumatic.   Eyes: Conjunctivae are normal. Pupils are equal, round, and reactive to light.   Neck: Normal range of motion. Neck supple.   Difficult to assess neck veins as he won't hold his neck still   Cardiovascular: Normal rate and regular rhythm.    Smooth VAD hum. Pulsatile   Pulmonary/Chest: Effort normal and breath sounds normal.   Abdominal: Soft. Bowel sounds are normal.   Genitourinary:   Genitourinary Comments: Incontinent   Musculoskeletal: He exhibits no edema.   Neurological: He is alert.   Oriented to self only   Skin: Skin is warm and dry. Capillary refill takes 2 to 3 seconds.       Significant Labs:  CBC:    Recent Labs  Lab 01/18/18  1300 01/24/18  1536 01/25/18  0421   WBC 7.16 6.50 6.89  6.89   RBC 4.18* 3.82* 3.99*  3.99*   HGB 11.9* 11.2* 11.6*  11.6*   HCT 36.9* 34.4* 35.5*  35.5*   * 136* 128*  128*   MCV 88 90 89  89   MCH 28.5 29.3 29.1  29.1   MCHC 32.2 32.6 32.7  32.7     BNP:    Recent Labs  Lab 01/22/18  1142 01/24/18  1536 01/25/18  0421   * 354* 410*     CMP:    Recent Labs  Lab 01/22/18  1142 01/24/18  1536 01/25/18  0421   GLU 72 111* 89  89   CALCIUM 10.1 10.6* 10.5  10.5   ALBUMIN 3.1* 3.3* 3.2*   PROT 6.8 7.2 6.9    139 140  140   K 4.0 4.3 4.3  4.3   CO2 25 28 27  27    106 107  107   BUN 39* 36* 31*  31*   CREATININE 2.3* 2.3* 2.0*  2.0*   ALKPHOS 68 70 71   ALT 13 14 15   AST 20 22 22   BILITOT 0.4 0.3 0.4      Coagulation:     Recent Labs  Lab 01/24/18  1100 01/24/18  1536 01/25/18  0421   INR 2.6* 2.7* 2.3*  2.3*     LDH:    Recent Labs  Lab 01/25/18  0421     175     Microbiology:  Microbiology Results (last 7 days)     Procedure Component Value Units Date/Time    Blood culture #1 [[id number]] Collected:  01/24/18 1521    Order Status:  Completed Specimen:  Blood from Line, PICC Right Basilic Updated:  01/25/18 0115     Blood Culture, Routine No  Growth to date    Narrative:       Blood Culture #1    Blood culture #2 [[id number]] Collected:  01/24/18 1537    Order Status:  Completed Specimen:  Blood from Peripheral, Hand, Right Updated:  01/25/18 0115     Blood Culture, Routine No Growth to date    Narrative:       Blood Culture #2          I have reviewed all pertinent labs within the past 24 hours.    Estimated Creatinine Clearance: 35 mL/min (based on SCr of 2 mg/dL (H)).

## 2018-01-25 NOTE — ED NOTES
Patient transported to floor with transport team, via stretcher, with personal belongings, with family, on monitor, with chart

## 2018-01-25 NOTE — PROCEDURES
Patient confused with family at bedside. VAD interrogation completed this AM in the event changes needed to be made. Will continue to monitor for further issues.     Pulsatile: Yes   VAD Sounds: HM3  Smooth  Problems / Issues / Alarms with VAD if any: None noted  HCT: 35.5       VAD Interrogation:  TXP LVAD INTERROGATIONS 1/25/2018 1/25/2018 1/25/2018 1/25/2018 1/25/2018 1/24/2018 1/18/2018   Type HeartMate3 HeartMate3 (No Data) HeartMate3 HeartMate3 HeartMate3 -   Flow 5.2 4.1 - 4.1 4.8 4.6 -   Speed 5800 5800 - 5800 5800 5800 -   PI 2.8 4.9 - 5.0 6.1 3.7 -   Power (Mendez) 4.5 4.5 - 4.5 4.3 4.5 -   LSL 5400 5400 - - 5400 5400 -   Pulsatility Pulse - - - Pulse Pulse Pulse   }

## 2018-01-26 ENCOUNTER — ANESTHESIA (OUTPATIENT)
Dept: ENDOSCOPY | Facility: HOSPITAL | Age: 78
DRG: 057 | End: 2018-01-26
Payer: MEDICARE

## 2018-01-26 ENCOUNTER — SURGERY (OUTPATIENT)
Age: 78
End: 2018-01-26

## 2018-01-26 LAB
ALBUMIN SERPL BCP-MCNC: 3.3 G/DL
ALP SERPL-CCNC: 72 U/L
ALT SERPL W/O P-5'-P-CCNC: 14 U/L
ANION GAP SERPL CALC-SCNC: 6 MMOL/L
AST SERPL-CCNC: 23 U/L
BASOPHILS # BLD AUTO: 0.04 K/UL
BASOPHILS NFR BLD: 0.5 %
BILIRUB DIRECT SERPL-MCNC: 0.3 MG/DL
BILIRUB SERPL-MCNC: 0.5 MG/DL
BNP SERPL-MCNC: 347 PG/ML
BUN SERPL-MCNC: 27 MG/DL
CALCIUM SERPL-MCNC: 10.5 MG/DL
CHLORIDE SERPL-SCNC: 107 MMOL/L
CO2 SERPL-SCNC: 26 MMOL/L
CREAT SERPL-MCNC: 2.1 MG/DL
CRP SERPL-MCNC: 0.5 MG/L
DIFFERENTIAL METHOD: ABNORMAL
EOSINOPHIL # BLD AUTO: 0.2 K/UL
EOSINOPHIL NFR BLD: 1.9 %
ERYTHROCYTE [DISTWIDTH] IN BLOOD BY AUTOMATED COUNT: 17.5 %
EST. GFR  (AFRICAN AMERICAN): 34.6 ML/MIN/1.73 M^2
EST. GFR  (NON AFRICAN AMERICAN): 29.9 ML/MIN/1.73 M^2
GLUCOSE SERPL-MCNC: 79 MG/DL
HCT VFR BLD AUTO: 34.3 %
HGB BLD-MCNC: 11.3 G/DL
IMM GRANULOCYTES # BLD AUTO: 0.03 K/UL
IMM GRANULOCYTES NFR BLD AUTO: 0.4 %
INR PPP: 2.2
LDH SERPL L TO P-CCNC: 168 U/L
LYMPHOCYTES # BLD AUTO: 1 K/UL
LYMPHOCYTES NFR BLD: 12.3 %
MAGNESIUM SERPL-MCNC: 2.6 MG/DL
MCH RBC QN AUTO: 28.6 PG
MCHC RBC AUTO-ENTMCNC: 32.9 G/DL
MCV RBC AUTO: 87 FL
MONOCYTES # BLD AUTO: 1.1 K/UL
MONOCYTES NFR BLD: 14.8 %
NEUTROPHILS # BLD AUTO: 5.4 K/UL
NEUTROPHILS NFR BLD: 70.1 %
NRBC BLD-RTO: 0 /100 WBC
PLATELET # BLD AUTO: 144 K/UL
PMV BLD AUTO: 11.3 FL
POTASSIUM SERPL-SCNC: 4.1 MMOL/L
PREALB SERPL-MCNC: 32 MG/DL
PROT SERPL-MCNC: 7 G/DL
PROTHROMBIN TIME: 21.5 SEC
RBC # BLD AUTO: 3.95 M/UL
SODIUM SERPL-SCNC: 139 MMOL/L
WBC # BLD AUTO: 7.71 K/UL

## 2018-01-26 PROCEDURE — 63600175 PHARM REV CODE 636 W HCPCS: Performed by: NURSE ANESTHETIST, CERTIFIED REGISTERED

## 2018-01-26 PROCEDURE — 43235 EGD DIAGNOSTIC BRUSH WASH: CPT | Mod: ,,, | Performed by: INTERNAL MEDICINE

## 2018-01-26 PROCEDURE — 93750 INTERROGATION VAD IN PERSON: CPT | Mod: ,,, | Performed by: INTERNAL MEDICINE

## 2018-01-26 PROCEDURE — 25000003 PHARM REV CODE 250: Performed by: NURSE PRACTITIONER

## 2018-01-26 PROCEDURE — 80076 HEPATIC FUNCTION PANEL: CPT

## 2018-01-26 PROCEDURE — 20600001 HC STEP DOWN PRIVATE ROOM

## 2018-01-26 PROCEDURE — 63600175 PHARM REV CODE 636 W HCPCS: Performed by: INTERNAL MEDICINE

## 2018-01-26 PROCEDURE — 37000009 HC ANESTHESIA EA ADD 15 MINS: Performed by: INTERNAL MEDICINE

## 2018-01-26 PROCEDURE — 25000003 PHARM REV CODE 250: Performed by: INTERNAL MEDICINE

## 2018-01-26 PROCEDURE — 80048 BASIC METABOLIC PNL TOTAL CA: CPT

## 2018-01-26 PROCEDURE — 25000003 PHARM REV CODE 250: Performed by: NURSE ANESTHETIST, CERTIFIED REGISTERED

## 2018-01-26 PROCEDURE — 37000008 HC ANESTHESIA 1ST 15 MINUTES: Performed by: INTERNAL MEDICINE

## 2018-01-26 PROCEDURE — 27000248 HC VAD-ADDITIONAL DAY

## 2018-01-26 PROCEDURE — 84134 ASSAY OF PREALBUMIN: CPT

## 2018-01-26 PROCEDURE — 97165 OT EVAL LOW COMPLEX 30 MIN: CPT

## 2018-01-26 PROCEDURE — 83615 LACTATE (LD) (LDH) ENZYME: CPT

## 2018-01-26 PROCEDURE — 83735 ASSAY OF MAGNESIUM: CPT

## 2018-01-26 PROCEDURE — D9220A PRA ANESTHESIA: Mod: ANES,,, | Performed by: ANESTHESIOLOGY

## 2018-01-26 PROCEDURE — 85610 PROTHROMBIN TIME: CPT

## 2018-01-26 PROCEDURE — 83880 ASSAY OF NATRIURETIC PEPTIDE: CPT

## 2018-01-26 PROCEDURE — 94761 N-INVAS EAR/PLS OXIMETRY MLT: CPT

## 2018-01-26 PROCEDURE — 99232 SBSQ HOSP IP/OBS MODERATE 35: CPT | Mod: ,,, | Performed by: PSYCHIATRY & NEUROLOGY

## 2018-01-26 PROCEDURE — 97162 PT EVAL MOD COMPLEX 30 MIN: CPT

## 2018-01-26 PROCEDURE — 86140 C-REACTIVE PROTEIN: CPT

## 2018-01-26 PROCEDURE — 99232 SBSQ HOSP IP/OBS MODERATE 35: CPT | Mod: ,,, | Performed by: INTERNAL MEDICINE

## 2018-01-26 PROCEDURE — 0DJ08ZZ INSPECTION OF UPPER INTESTINAL TRACT, VIA NATURAL OR ARTIFICIAL OPENING ENDOSCOPIC: ICD-10-PCS | Performed by: INTERNAL MEDICINE

## 2018-01-26 PROCEDURE — D9220A PRA ANESTHESIA: Mod: CRNA,,, | Performed by: NURSE ANESTHETIST, CERTIFIED REGISTERED

## 2018-01-26 PROCEDURE — 85025 COMPLETE CBC W/AUTO DIFF WBC: CPT

## 2018-01-26 PROCEDURE — 43235 EGD DIAGNOSTIC BRUSH WASH: CPT | Performed by: INTERNAL MEDICINE

## 2018-01-26 RX ORDER — SODIUM CHLORIDE 9 MG/ML
INJECTION, SOLUTION INTRAVENOUS CONTINUOUS PRN
Status: DISCONTINUED | OUTPATIENT
Start: 2018-01-26 | End: 2018-01-26

## 2018-01-26 RX ORDER — PROPOFOL 10 MG/ML
VIAL (ML) INTRAVENOUS CONTINUOUS PRN
Status: DISCONTINUED | OUTPATIENT
Start: 2018-01-26 | End: 2018-01-26

## 2018-01-26 RX ORDER — KETAMINE HCL IN 0.9 % NACL 50 MG/5 ML
SYRINGE (ML) INTRAVENOUS
Status: DISCONTINUED | OUTPATIENT
Start: 2018-01-26 | End: 2018-01-26

## 2018-01-26 RX ORDER — PANTOPRAZOLE SODIUM 40 MG/1
40 TABLET, DELAYED RELEASE ORAL
Status: DISCONTINUED | OUTPATIENT
Start: 2018-01-26 | End: 2018-02-07 | Stop reason: HOSPADM

## 2018-01-26 RX ORDER — LIDOCAINE HYDROCHLORIDE 40 MG/ML
SOLUTION TOPICAL
Status: DISCONTINUED | OUTPATIENT
Start: 2018-01-26 | End: 2018-01-26

## 2018-01-26 RX ADMIN — ISOSORBIDE DINITRATE 40 MG: 40 TABLET ORAL at 01:01

## 2018-01-26 RX ADMIN — HYDRALAZINE HYDROCHLORIDE 100 MG: 50 TABLET ORAL at 09:01

## 2018-01-26 RX ADMIN — PANTOPRAZOLE SODIUM 40 MG: 40 TABLET, DELAYED RELEASE ORAL at 12:01

## 2018-01-26 RX ADMIN — MAGNESIUM OXIDE TAB 400 MG (241.3 MG ELEMENTAL MG) 400 MG: 400 (241.3 MG) TAB at 08:01

## 2018-01-26 RX ADMIN — ACETAMINOPHEN 650 MG: 325 TABLET, FILM COATED ORAL at 01:01

## 2018-01-26 RX ADMIN — AMLODIPINE BESYLATE 10 MG: 10 TABLET ORAL at 12:01

## 2018-01-26 RX ADMIN — HYDRALAZINE HYDROCHLORIDE 100 MG: 50 TABLET ORAL at 04:01

## 2018-01-26 RX ADMIN — ISOSORBIDE DINITRATE 40 MG: 40 TABLET ORAL at 09:01

## 2018-01-26 RX ADMIN — SODIUM CHLORIDE: 0.9 INJECTION, SOLUTION INTRAVENOUS at 10:01

## 2018-01-26 RX ADMIN — ASPIRIN 81 MG CHEWABLE TABLET 81 MG: 81 TABLET CHEWABLE at 01:01

## 2018-01-26 RX ADMIN — HYDRALAZINE HYDROCHLORIDE 100 MG: 50 TABLET ORAL at 01:01

## 2018-01-26 RX ADMIN — DOFETILIDE 250 MCG: 0.25 CAPSULE ORAL at 01:01

## 2018-01-26 RX ADMIN — ISOSORBIDE DINITRATE 40 MG: 40 TABLET ORAL at 04:01

## 2018-01-26 RX ADMIN — Medication 20 MG: at 10:01

## 2018-01-26 RX ADMIN — LISINOPRIL 20 MG: 20 TABLET ORAL at 12:01

## 2018-01-26 RX ADMIN — ALLOPURINOL 300 MG: 300 TABLET ORAL at 12:01

## 2018-01-26 RX ADMIN — HYDRALAZINE HYDROCHLORIDE 10 MG: 20 INJECTION INTRAMUSCULAR; INTRAVENOUS at 04:01

## 2018-01-26 RX ADMIN — DOFETILIDE 250 MCG: 0.25 CAPSULE ORAL at 09:01

## 2018-01-26 RX ADMIN — MAGNESIUM OXIDE TAB 400 MG (241.3 MG ELEMENTAL MG) 400 MG: 400 (241.3 MG) TAB at 12:01

## 2018-01-26 RX ADMIN — LIDOCAINE HYDROCHLORIDE 120 MG: 40 SOLUTION TOPICAL at 10:01

## 2018-01-26 RX ADMIN — FOLIC ACID 1 MG: 1 TABLET ORAL at 12:01

## 2018-01-26 RX ADMIN — PROPOFOL 50 MCG/KG/MIN: 10 INJECTION, EMULSION INTRAVENOUS at 10:01

## 2018-01-26 RX ADMIN — BUPROPION HYDROCHLORIDE 450 MG: 150 TABLET, EXTENDED RELEASE ORAL at 01:01

## 2018-01-26 RX ADMIN — Medication 1 CAPSULE: at 01:01

## 2018-01-26 RX ADMIN — LISINOPRIL 20 MG: 20 TABLET ORAL at 08:01

## 2018-01-26 RX ADMIN — CEFEPIME 2 G: 2 INJECTION, POWDER, FOR SOLUTION INTRAVENOUS at 01:01

## 2018-01-26 RX ADMIN — ATORVASTATIN CALCIUM 10 MG: 10 TABLET, FILM COATED ORAL at 12:01

## 2018-01-26 NOTE — PLAN OF CARE
Problem: Physical Therapy Goal  Goal: Physical Therapy Goal  Goals to be met by: 18    Patient will increase functional independence with mobility by performin. Supine to sit with supervision   2. Sit to supine with supervision   3. Sit to stand transfer with Supervision using RW   4. Gait  x 100 feet with Contact Guard Assistance using Rolling Walker.   5. Lower extremity exercise program x20 reps per handout, with assistance as needed    Outcome: Ongoing (interventions implemented as appropriate)  Pt chart reviewed and PT evaluation completed- see note for details. POC initiated.     Catina Matthews PT, DPT   2018  Pager: [phone]

## 2018-01-26 NOTE — PLAN OF CARE
PT lying in bed, denies pain. VSS, hypertensive, 9am PO medications administered. Dr. Stoddard notified, ok for transfer back to room.  Pt has LVAD inplace, dressing to LUQ clean/dry/intact. Pt transported on battery x2, pt has black bag with extra controller and battery x2.  Report given to PEYTON Cortes. Aware of BP and morning medications given.  Pt transferred to room 325 on telemetry.

## 2018-01-26 NOTE — SUBJECTIVE & OBJECTIVE
Interval History: Remains confused and incontinent with unsteady gait. No vomiting since yesterday morning     Continuous Infusions:  Scheduled Meds:   allopurinol  300 mg Oral Daily    amLODIPine  10 mg Oral Daily    aspirin  81 mg Oral Daily    atorvastatin  10 mg Oral Daily    buPROPion  450 mg Oral Daily    ceFEPime (MAXIPIME) IVPB  2 g Intravenous Q12H    dofetilide  250 mcg Oral Q12H    folic acid  1 mg Oral Daily    hydrALAZINE  100 mg Oral Q8H    isosorbide dinitrate  40 mg Oral TID    Lactobacillus rhamnosus GG  1 capsule Oral Daily    lisinopril  20 mg Oral BID    magnesium oxide  400 mg Oral BID    pantoprazole  40 mg Oral BID AC     PRN Meds:acetaminophen, docusate sodium, hydrALAZINE, ondansetron, promethazine    Review of patient's allergies indicates:   Allergen Reactions    Aldactone [spironolactone]       persistent hyperkalemia.    Sulfa (sulfonamide antibiotics)      Objective:     Vital Signs (Most Recent):  Temp: 98.1 °F (36.7 °C) (01/26/18 1026)  Pulse: 62 (01/26/18 1026)  Resp: 18 (01/26/18 1026)  BP: (!) 138/100 (01/26/18 1026)  SpO2: 96 % (01/26/18 1026) Vital Signs (24h Range):  Temp:  [97.6 °F (36.4 °C)-98.3 °F (36.8 °C)] 98.1 °F (36.7 °C)  Pulse:  [58-78] 62  Resp:  [16-18] 18  SpO2:  [93 %-98 %] 96 %  BP: ()/(0-100) 138/100     Patient Vitals for the past 72 hrs (Last 3 readings):   Weight   01/26/18 0805 81.4 kg (179 lb 7.3 oz)   01/26/18 0800 81.4 kg (179 lb 7.3 oz)   01/24/18 2300 85.7 kg (188 lb 15 oz)     Body mass index is 25.75 kg/m².      Intake/Output Summary (Last 24 hours) at 01/26/18 1048  Last data filed at 01/26/18 0500   Gross per 24 hour   Intake             1245 ml   Output              100 ml   Net             1145 ml       Hemodynamic Parameters:  CVP:  [2 mmHg-4 mmHg] 2 mmHg    Telemetry: BiV paced    Physical Exam   Constitutional: He appears well-developed and well-nourished.   HENT:   Head: Normocephalic and atraumatic.   Eyes: Conjunctivae  are normal. Pupils are equal, round, and reactive to light.   Neck: Normal range of motion. Neck supple.   Difficult to assess neck veins as he won't hold his neck still   Cardiovascular: Normal rate and regular rhythm.    Smooth VAD hum. Pulsatile   Pulmonary/Chest: Effort normal and breath sounds normal.   Abdominal: Soft. Bowel sounds are normal.   Genitourinary:   Genitourinary Comments: Incontinent   Musculoskeletal: He exhibits no edema.   Neurological: He is alert.   Oriented to self only   Skin: Skin is warm and dry. Capillary refill takes 2 to 3 seconds.       Significant Labs:  CBC:    Recent Labs  Lab 01/24/18 1536 01/25/18 0421 01/26/18  0334   WBC 6.50 6.89  6.89 7.71   RBC 3.82* 3.99*  3.99* 3.95*   HGB 11.2* 11.6*  11.6* 11.3*   HCT 34.4* 35.5*  35.5* 34.3*   * 128*  128* 144*   MCV 90 89  89 87   MCH 29.3 29.1  29.1 28.6   MCHC 32.6 32.7  32.7 32.9     BNP:    Recent Labs  Lab 01/24/18 1536 01/25/18 0421 01/26/18  0334   * 410* 347*     CMP:    Recent Labs  Lab 01/24/18 1536 01/25/18 0421 01/26/18  0334   * 89  89 79   CALCIUM 10.6* 10.5  10.5 10.5   ALBUMIN 3.3* 3.2* 3.3*   PROT 7.2 6.9 7.0    140  140 139   K 4.3 4.3  4.3 4.1   CO2 28 27  27 26    107  107 107   BUN 36* 31*  31* 27*   CREATININE 2.3* 2.0*  2.0* 2.1*   ALKPHOS 70 71 72   ALT 14 15 14   AST 22 22 23   BILITOT 0.3 0.4 0.5      Coagulation:     Recent Labs  Lab 01/24/18  1536 01/25/18 0421 01/26/18  0334   INR 2.7* 2.3*  2.3* 2.2*     LDH:    Recent Labs  Lab 01/25/18 0421 01/26/18  0334     175 168     Microbiology:  Microbiology Results (last 7 days)     Procedure Component Value Units Date/Time    Blood culture #1 [[id number]] Collected:  01/24/18 1521    Order Status:  Completed Specimen:  Blood from Line, PICC Right Basilic Updated:  01/25/18 1812     Blood Culture, Routine No Growth to date     Blood Culture, Routine No Growth to date    Narrative:       Blood  Culture #1    Blood culture #2 [[id number]] Collected:  01/24/18 1537    Order Status:  Completed Specimen:  Blood from Peripheral, Hand, Right Updated:  01/25/18 1812     Blood Culture, Routine No Growth to date     Blood Culture, Routine No Growth to date    Narrative:       Blood Culture #2          I have reviewed all pertinent labs within the past 24 hours.    Estimated Creatinine Clearance: 31.4 mL/min (based on SCr of 2.1 mg/dL (H)).

## 2018-01-26 NOTE — ASSESSMENT & PLAN NOTE
- He has a long h/o orthostatic hypotension, so BP's should only be checked while sitting or standing  - Will allow for some permissive hypertension with goal MAP ~ 90 or less  - Continue Norvasc, Hydralazine, Isordil, Lisinopril

## 2018-01-26 NOTE — CONSULTS
"[facility]  Gastroenterology  Consult Note    Patient Name: Kev Vasquez  MRN: [id number]  Admission Date: 1/24/2018  Hospital Length of Stay: 1 days  Code Status: Full Code   Attending Provider: Anni Henderson MD   Consulting Provider: Brian Michaels MD  Primary Care Physician: Mega Collins MD  Principal Problem:Vomiting    Inpatient consult to Gastroenterology  Consult performed by: BRIAN MICHAELS  Consult ordered by: LALITA ROSALES        Subjective:     HPI:  77 year old male with a past medical history of ICM s/p LVAD10/16/16 in [place], SSS s/p 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 GI consulted for persistent emesis and concern for gastroparesis.     Per HPI its reported that for a couple of months patient has been having emesis when drinking water. When drinking water he would sneeze-->cough-->emesis. Then within the last couple of weeks he has been complaining of "vomiting undigested foods" either before or after meals. Now per patient this morning she states that he has no trouble moving food bolus from mouth to esophageal. However he feels that food becomes "stuck in stomach" not esophagus which causes him to bring it up. Does report reflux and weight in the last couple of months but no odynophagia. Drinks occasional alcohol but not a prior smoker.     Prior GI procedures:  ?EGD 3 years ago    Past Medical History:   Diagnosis Date    AICD (automatic cardioverter/defibrillator) present 2014    [employer]    Chronic anticoagulation 7/21/2017    Chronic combined systolic and diastolic congestive heart failure 7/27/2015 11-16-17   1 - Moderate left ventricular enlargement.    2 - Severely depressed left ventricular systolic function (EF 15-20%).    3 - Impaired LV relaxation, normal LAP (grade 1 diastolic dysfunction).    4 - Biatrial enlargement.    5 - Right ventricle is upper limit of " normal in size with not well seen systolic function.    6 - Severe tricuspid regurgitation.    7 - Increased central venous pressure.    8 - The estimated PA systolic pressure is 40 mmHg.    9 - Heartmate III LVAD; speed 5800.     Complication involving left ventricular assist device (LVAD) 7/29/2017    Coronary artery disease involving native coronary artery of native heart without angina pectoris 7/27/2015    Gait instability     Hypertensive urgency, malignant 11/15/2017    ICD (implantable cardioverter-defibrillator), biventricular, in situ 7/27/2015    Ischemic cardiomyopathy 7/20/2017    S/p HMIII     Kidney stones     LVAD (left ventricular assist device) present 7/20/2017    status post Heartmate III 10/16/16 LVAD    HILLARY on CPAP 7/27/2015    Peripheral neuropathy     Pulmonary hypertension due to left ventricular systolic dysfunction 7/29/2015    Restless leg syndrome 1992    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    Skin cancer     excision 2013    Skin yeast infection 8/31/2017    Stage 3 chronic kidney disease 7/20/2017    Syncope 7/20/2017    Ventricular tachycardia 7/25/2017       Past Surgical History:   Procedure Laterality Date    CARDIAC PACEMAKER PLACEMENT      pacemaker previously, then AICD in 2006. AICD [employer] serial #[id number]    CORONARY ARTERY BYPASS GRAFT  1993    KNEE SURGERY Left 2001    complicated by infection, hardware had to be taken out and replaced    LEFT VENTRICULAR ASSIST DEVICE  10/19/2016       Review of patient's allergies indicates:   Allergen Reactions    Aldactone [spironolactone]       persistent hyperkalemia.    Sulfa (sulfonamide antibiotics)      Family History     Problem Relation (Age of Onset)    Cancer Daughter (50)    Diabetes Daughter    Heart disease Daughter        Social History Main Topics    Smoking status: Never Smoker    Smokeless tobacco: Never Used    Alcohol use No    Drug use: No    Sexual activity: Not on  file      Comment:      Review of Systems   Unable to perform ROS: Mental status change     Objective:     Vital Signs (Most Recent):  Temp: 98.1 °F (36.7 °C) (01/25/18 0512)  Pulse: 62 (01/25/18 0700)  Resp: 18 (01/25/18 0512)  BP: 117/89 (01/25/18 0420)  SpO2: (!) 92 % (01/25/18 0512) Vital Signs (24h Range):  Temp:  [97.5 °F (36.4 °C)-98.5 °F (36.9 °C)] 98.1 °F (36.7 °C)  Pulse:  [] 62  Resp:  [16-20] 18  SpO2:  [92 %-99 %] 92 %  BP: (104-147)/(0-102) 117/89     Weight: 85.7 kg (188 lb 15 oz) (01/24/18 2300)  Body mass index is 25.62 kg/m².      Intake/Output Summary (Last 24 hours) at 01/25/18 0805  Last data filed at 01/25/18 0500   Gross per 24 hour   Intake              360 ml   Output              200 ml   Net              160 ml       Lines/Drains/Airways     Peripherally Inserted Central Catheter Line                 PICC Double Lumen 12/28/17 1029 other (see comments) 27 days          Line                 VAD Left ventricular assist device HeartMate 3 -- days                Physical Exam   Constitutional: No distress.   HENT:   Head: Normocephalic and atraumatic.   Eyes: No scleral icterus.   Neck: Normal range of motion.   Cardiovascular: Normal rate and regular rhythm.    Pulmonary/Chest: Effort normal and breath sounds normal.   Abdominal: Soft. Bowel sounds are normal.   Neurological:   Alert and only oriented to person   Skin: He is not diaphoretic.       Significant Labs:  CBC:   Recent Labs  Lab 01/24/18  1536 01/25/18  0421   WBC 6.50 6.89  6.89   HGB 11.2* 11.6*  11.6*   HCT 34.4* 35.5*  35.5*   * 128*  128*     CMP:   Recent Labs  Lab 01/25/18  0421   GLU 89  89   CALCIUM 10.5  10.5   ALBUMIN 3.2*   PROT 6.9     140   K 4.3  4.3   CO2 27  27     107   BUN 31*  31*   CREATININE 2.0*  2.0*   ALKPHOS 71   ALT 15   AST 22   BILITOT 0.4     Coagulation:   Recent Labs  Lab 01/25/18  0421   INR 2.3*  2.3*       Significant Imaging:  Imaging results within  the past 24 hours have been reviewed.    Assessment/Plan:     * Vomiting    77 year old male with a past medical history of ICM s/p LVAD10/16/16 in [place], [facility], S/P [employer] 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 GI consulted for persistent emesis and concern for gastroparesis. Upon speaking to patient we are able to obtain a limited history therefore our differential remains wide from possible gastroparesis with nausea/emesis vs regurgitation/remunination in the setting of GERD. Regardless of limited history we should start with an EGD and we will provide further recs after procedure.    Recommendations:  -CLD today and NPO after MN for EGD tomorrow  -Obtain KUB to rule out obstruction  -Start Protonix 40 mg PO BID            Thank you for your consult. I will follow-up with patient. Please contact us if you have any additional questions.    Eileen Bermudez M.D.  Gastroenterology Fellow, PGY-IV  Pager: [phone]  [facility]

## 2018-01-26 NOTE — NURSING TRANSFER
Nursing Transfer Note      1/26/2018     Transfer To: 325, from PACU 5    Transfer via stretcher    Transfer with cardiac monitoring, LVAD w/ controller and batteries x2, pts black bag with 2 extra batteries and extra controller, portable screen    Transported by PEYTON Cota and PEYTON Paula    Medicines sent: none    Chart send with patient: Yes    Notified:pt states family is in room    Patient reassessed at: 1215 1/26/18

## 2018-01-26 NOTE — ANESTHESIA RELEASE NOTE
"Anesthesia Release from PACU Note    Patient: Kev Vasquez    Procedure(s) Performed: Procedure(s) (LRB):  ESOPHAGOGASTRODUODENOSCOPY (EGD) (N/A)    Anesthesia type: GEN    Post pain: Adequate analgesia reported    Post assessment: no apparent anesthetic complications, tolerated procedure well and no evidence of recall    Post vital signs: BP (!) 123/93 (BP Location: Left arm, Patient Position: Lying)   Pulse 64   Temp 36.8 °C (98.2 °F) (Temporal)   Resp 18   Ht 5' 10" (1.778 m)   Wt 81.4 kg (179 lb 7.3 oz)   SpO2 98%   BMI 25.75 kg/m²     Level of consciousness: awake, alert and oriented    Nausea/Vomiting: no nausea/no vomiting    Complications: none    Airway Patency: patent    Respiratory: unassisted, spontaneous ventilation, room air    Cardiovascular: stable and blood pressure at baseline    Hydration: euvolemic    "

## 2018-01-26 NOTE — PROGRESS NOTES
Back from EGD and assisted to chair with walker. Steph fairly well but very off balance and constantly leans backwards. Wife and caregiver at bedside.

## 2018-01-26 NOTE — ASSESSMENT & PLAN NOTE
- CT of head 1/24/18 with no acute changes  - Given constellation of confusion, gait disturbance and urinary incontinence, consulted Neuro (NPH ?). Appreciate their help. Suspect underlying dementia but recommending large volume LP and cisternogram. Will hold Coumadin for now - Neuro to discuss with NSGY what INR goal should be  - D/C'd Ropinirole again 2/2 gait disturbance  - Delirium protocol

## 2018-01-26 NOTE — TRANSFER OF CARE
"Anesthesia Transfer of Care Note    Patient: Kev Vasquez    Procedure(s) Performed: Procedure(s) (LRB):  ESOPHAGOGASTRODUODENOSCOPY (EGD) (N/A)    Patient location: PACU    Anesthesia Type: MAC    Transport from OR: Transported from OR on room air with adequate spontaneous ventilation. Transported from OR on 6-10 L/min O2 by face mask with adequate spontaneous ventilation    Post pain: adequate analgesia    Post assessment: no apparent anesthetic complications and tolerated procedure well    Post vital signs: stable    Level of consciousness: alert and awake    Nausea/Vomiting: no nausea/vomiting    Complications: none    Transfer of care protocol was followed      Last vitals:   Visit Vitals  BP (!) 138/100 (BP Location: Left arm, Patient Position: Lying)   Pulse 62   Temp 36.7 °C (98.1 °F) (Temporal)   Resp 18   Ht 5' 10" (1.778 m)   Wt 81.4 kg (179 lb 7.3 oz)   SpO2 96%   BMI 25.75 kg/m²     "

## 2018-01-26 NOTE — PT/OT/SLP EVAL
Occupational Therapy   Evaluation    Name: Kev Vasquez  MRN: [id number]  Admitting Diagnosis:  Vomiting Day of Surgery  Pt admitted with vomitting and hallucinations   [place]   Recommendations:     Discharge Recommendations:  Return home with caregiver and family   History:     Occupational Profile:  Living Environment: Pt lives with daughter/son n law, spouse and 24 hour caregiver  Previous level of function: Pt requiring SBA for mobiltiy with RW and MIN A for ADL's. Pt has assistance for LVAD king't per daughter  Equipment Owned:   RW, rollator and w/c   Assistance upon Discharge: Family and caregiver     Past Medical History:   Diagnosis Date    AICD (automatic cardioverter/defibrillator) present 2014    [employer]    Chronic anticoagulation 7/21/2017    Chronic combined systolic and diastolic congestive heart failure 7/27/2015 11-16-17   1 - Moderate left ventricular enlargement.    2 - Severely depressed left ventricular systolic function (EF 15-20%).    3 - Impaired LV relaxation, normal LAP (grade 1 diastolic dysfunction).    4 - Biatrial enlargement.    5 - Right ventricle is upper limit of normal in size with not well seen systolic function.    6 - Severe tricuspid regurgitation.    7 - Increased central venous pressure.    8 - The estimated PA systolic pressure is 40 mmHg.    9 - Heartmate III LVAD; speed 5800.     Complication involving left ventricular assist device (LVAD) 7/29/2017    Coronary artery disease involving native coronary artery of native heart without angina pectoris 7/27/2015    Gait instability     Hypertensive urgency, malignant 11/15/2017    ICD (implantable cardioverter-defibrillator), biventricular, in situ 7/27/2015    Ischemic cardiomyopathy 7/20/2017    S/p HMIII     Kidney stones     LVAD (left ventricular assist device) present 7/20/2017    status post Heartmate III 10/16/16 LVAD    HILLARY on CPAP 7/27/2015    Peripheral  neuropathy     Pulmonary hypertension due to left ventricular systolic dysfunction 7/29/2015    Restless leg syndrome 1992    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    Skin cancer     excision 2013    Skin yeast infection 8/31/2017    Stage 3 chronic kidney disease 7/20/2017    Syncope 7/20/2017    Ventricular tachycardia 7/25/2017       Past Surgical History:   Procedure Laterality Date    CARDIAC PACEMAKER PLACEMENT      pacemaker previously, then AICD in 2006. AICD [employer] serial #[id number]    CORONARY ARTERY BYPASS GRAFT  1993    KNEE SURGERY Left 2001    complicated by infection, hardware had to be taken out and replaced    LEFT VENTRICULAR ASSIST DEVICE  10/19/2016       Subjective       Communicated with: nsg  prior to session.  Pain/Comfort:  · Pain Rating 1: 0/10    Patients cultural, spiritual, Buddhism conflicts given the current situation: none noted       Objective:     Patient found supine in bed with LVAD to battery power. Daughter present in room       General Precautions: Standard, fall, LVAD       Occupational Performance:    Bed Mobility:    · Patient completed Rolling/Turning to Left with  minimum assistance  · Patient completed Rolling/Turning to Right with minimum assistance  · Patient completed Supine to Sit with minimum assistance    Functional Mobility/Transfers:  · Patient completed Sit <> Stand Transfer with minimum assistance  with  rolling walker    ·   Activities of Daily Living:  · Feeding:  NPO    · UB Dressing: maximal assistance    · LB Dressing: stand by assistance to manage footwear    · Toileting: total assistance; Pt with saturated brief for which he appear unaware.    Cognitive/Visual Perceptual:  Pt alert and able to follow once step simple commands. Pt confused at times with poor awareness of LVAD controller/batteries.      Physical Exam:  Pt demo 3/5 UE strength and good .     Patient left supine on stretcher with Emergency bag on  "stretcher. Nsg and tranport in hallway also present.    [facility] 6 Click:  [facility] Total Score: 12    Treatment & Education:  Following TOTAL A for change of diaper supine, pt transition supine>sit. Pt demo Fair+ sitting balance. Pt required TOTAL A to place LVAD items into a personal shoulder bag the family brought from home. Pt pulling inappropriately at LVAD lines and attempting to place items in the bag while the bag with upside down. Pt did ambulate with RW to stretcher which was in the hallway. Pt needing cues for safety, RW king't and technique to approach the t/f surface.  OT provided education to pt re: safety with functional mobility/ADL skills.   Education:    Assessment:     Kev Vasquez is a 77 y.o. male with a medical diagnosis of Vomiting.Performance deficits affecting function are weakness, impaired functional mobilty, gait instability, impaired self care skills, impaired endurance.  Pt tolerated session fairly well. Pt cooperative, but pleasantly confused. Pt with decreased balance and limitations with functional mobility and ADL performance. Pt has 24 hour supervision in the home and OT anticipates pt will be ready to return to prior living situation when medically stable.  Pt is also not safe to manage LVAD independently at this time.     Rehab Prognosis:  Good ; patient would benefit from acute skilled OT services to address these deficits and reach maximum level of function.         Clinical Decision Makin.  OT Mod:  "Pt evaluation falls under moderate complexity for evaluation coding due to identification of 3-5 performance deficits noted as stated above. Eval required Min/Mod assistance to complete on this date and detailed assessment(s) were utilized. Moreover, an expanded review of history and occupational profile obtained with additional review of cognitive, physical and psychosocial hx."     Plan:     Patient to be seen 4 x/week to address the above listed problems via self-care/home " management, therapeutic exercises, therapeutic activities  · Plan of Care Expires:    · Plan of Care Reviewed with: patient, daughter    This Plan of care has been discussed with the patient who was involved in its development and understands and is in agreement with the identified goals and treatment plan    GOALS:    Occupational Therapy Goals        Problem: Occupational Therapy Goal    Goal Priority Disciplines Outcome Interventions   Occupational Therapy Goal     OT, PT/OT     Description:  Goals to be met by: 7 days 2/2/18     Patient will increase functional independence with ADLs by performing:    UE Dressing with Supervision.  LE Dressing with SBA  Grooming while standing with Supervision.  Toileting from bedside commode with Minimal Assistance for hygiene and clothing management.   Stand pivot transfers with Stand-by Assistance.  Toilet transfer to bedside commode with Stand-by Assistance.                      Time Tracking:     OT Date of Treatment: 01/26/18  OT Start Time: 0930  OT Stop Time: 0955  OT Total Time (min): 25 min    Billable Minutes:Evaluation 25    SHAUNA Roberts  1/26/2018

## 2018-01-26 NOTE — ASSESSMENT & PLAN NOTE
- S/P HM III 10/16/16 as DT in [place]  - Current speed 5800  - TTE 1/25/18 shows LVEDD 6.5, LVEF 10-15%, diastolic dysfunction, RVE, severely depressed RV systolic function, PAS 20, ARTHUR intermittently and septum midline. No comment on IVC - per Dr. Lanza, costal views were too poor to assess IVC.   - Unable to assess neck veins as he won't hold his neck still, but CVP via PICC line is 2  (was taking Lasix 80 mg every M-W-F at home) - holding Lasix  - INR is therapeutic at 2.2 - Holding Coumadin as noted above. He will need to be bridged with Heparin if INR allowed to drift down below 2.0  - LDH is stable at 168  - Continue IV Cefepime for chronic Pseudomonas DL infection

## 2018-01-26 NOTE — PROGRESS NOTES
01/25/18 2237   Vital Signs   /83   MAP (mmHg) 93   BP Location Left arm   BP Method Automatic   Patient Position Sitting        PRN Hydralazine 10mg IV given per orders for MAP >80

## 2018-01-26 NOTE — ANESTHESIA POSTPROCEDURE EVALUATION
"Anesthesia Post Evaluation    Patient: Kev Vasquez    Procedure(s) Performed: Procedure(s) (LRB):  ESOPHAGOGASTRODUODENOSCOPY (EGD) (N/A)    Final Anesthesia Type: general  Patient location during evaluation: PACU  Patient participation: Yes- Able to Participate  Level of consciousness: awake and alert and oriented  Post-procedure vital signs: reviewed and stable  Pain management: adequate  Airway patency: patent  PONV status at discharge: No PONV  Anesthetic complications: no      Cardiovascular status: stable  Respiratory status: unassisted, spontaneous ventilation and room air  Hydration status: euvolemic  Follow-up not needed.        Visit Vitals  BP (!) 123/93 (BP Location: Left arm, Patient Position: Lying)   Pulse 64   Temp 36.8 °C (98.2 °F) (Temporal)   Resp 18   Ht 5' 10" (1.778 m)   Wt 81.4 kg (179 lb 7.3 oz)   SpO2 98%   BMI 25.75 kg/m²       Pain/Aydee Score: Pain Assessment Performed: Yes (1/26/2018 11:11 AM)  Presence of Pain: denies (1/26/2018 11:11 AM)  Pain Rating Prior to Med Admin: 0 (1/26/2018 11:11 AM)  Pain Rating Post Med Admin: 0 (1/26/2018 11:11 AM)  Aydee Score: 9 (1/26/2018 11:11 AM)      "

## 2018-01-26 NOTE — PLAN OF CARE
Problem: Patient Care Overview  Goal: Plan of Care Review  Outcome: Ongoing (interventions implemented as appropriate)  Plan of care discussed with patient and family. EGD done today-Negative. SLP consulted to see if pt is silently aspirating. Patient is free of fall/trauma/injury. Denies CP, SOB, or pain/discomfort. Remains confused and B/P elevated. Hydralazine po and iv given. Ok'ed with present LVAD dopplers of 88. All questions addressed. Will continue to monitor

## 2018-01-26 NOTE — PROGRESS NOTES
To endo via stretcher after working with PT. New silverio anchor applied and secured LVAD driveline. Pt used walker and 1-2 assist.

## 2018-01-26 NOTE — INTERVAL H&P NOTE
The patient has been examined and the H&P has been reviewed:    I concur with the findings and no changes have occurred since H&P was written.    Anesthesia/Surgery risks, benefits and alternative options discussed and understood by patient/family.    Pre-Procedure H and P Addendum    Patient seen and examined.  History and exam unchanged from prior history and physical.      Procedure: EGD  Indication: nausea, vomiting and GERD  ASA Class: per anesthesiology  Airway: per anesthesia  Neck Mobility: full range of motion  Mallampatti score: per anesthesia  History of anesthesia problems: no  Family history of anesthesia problems: no  Anesthesia Plan: per anesthesia          Active Hospital Problems    Diagnosis  POA    *Vomiting [R11.10]  Unknown    Gastroesophageal reflux disease [K21.9]  Unknown    Confusion [R41.0]  Yes    Essential hypertension [I10]  Yes     Chronic    Gait instability [R26.81]  Yes    VT (ventricular tachycardia) [I47.2]  Yes    LVAD (left ventricular assist device) present [Z95.811]  Not Applicable     status post Heartmate III 10/16/16 LVAD      Stage 3 chronic kidney disease [N18.3]  Yes      Resolved Hospital Problems    Diagnosis Date Resolved POA   No resolved problems to display.

## 2018-01-26 NOTE — PROVATION PATIENT INSTRUCTIONS
Discharge Summary/Instructions after an Endoscopic Procedure  Patient Name: Kev Vasquez  Patient MRN: [id number]  Patient YOB: 1940 Friday, January 26, 2018  Cherry Hendricks MD  RESTRICTIONS:  During your procedure today, you received medications for sedation.  These   medications may affect your judgment, balance and coordination.  Therefore,   for 24 hours, you have the following restrictions:   - DO NOT drive a car, operate machinery, make legal/financial decisions,   sign important papers or drink alcohol.    ACTIVITY:  The following day: return to full activity including work, except no heavy   lifting, straining or running for 3 days if polyps were removed.  DIET:  Eat and drink normally unless instructed otherwise.     TREATMENT FOR COMMON SIDE EFFECTS:  - Mild abdominal pain, belching, bloating or excessive gas: rest, eat   lightly and use a heating pad.  - Sore Throat: treat with throat lozenges and/or gargle with warm salt   water.  SYMPTOMS TO WATCH FOR AND REPORT TO YOUR PHYSICIAN:  1. Abdominal pain or bloating, other than gas cramps.  2. Chest pain.  3. Back pain.  4. Chills or fever occurring within 24 hours after the procedure.  5. Rectal bleeding, which would show as bright red, maroon, or black stools.   (A tablespoon of blood from the rectum is not serious, especially if   hemorrhoids are present.)  6. Vomiting.  7. Weakness or dizziness.  8. Because air was used during the procedure, expelling large amounts of air   from your rectum or belching is normal.  9. If a bowel prep was taken, you may not have a bowel movement for 1-3   days.  This is normal.  GO DIRECTLY TO THE NEAREST EMERGENCY ROOM IF YOU HAVE ANY OF THE FOLLOWING:      Difficulty breathing  Chills and/or fever over 101 F   Persistent vomiting and/or vomiting blood   Severe abdominal pain   Severe chest pain   Black, tarry stools   Bleeding- more than one tablespoon   Any other symptom or condition that you may feel needs  urgent attention  Your doctor recommends these additional instructions:  If any biopsies were taken, your doctor s clinic will contact you in 1 to 2   weeks with any results.  Resume your previous diet.   Continue your present medications.   The findings and recommendations were discussed with your family.   You have a contact number available for emergencies.  The signs and symptoms   of potential delayed complications were discussed with you.  You may return   to normal activities tomorrow.  Written discharge instructions were   provided to you.  For questions, problems or results please call your physician - Cherry Hendricks MD at Work:  [phone].  [facility], EMERGENCY ROOM PHONE NUMBER: [phone]  IF A COMPLICATION OR EMERGENCY SITUATION ARISES AND YOU ARE UNABLE TO REACH   YOUR PHYSICIAN - GO DIRECTLY TO THE EMERGENCY ROOM.  Cherry Hendricks MD  1/26/2018 10:51:25 AM  This report has been verified and signed electronically.

## 2018-01-26 NOTE — PLAN OF CARE
Problem: Occupational Therapy Goal  Goal: Occupational Therapy Goal  Goals to be met by: 7 days 2/2/18     Patient will increase functional independence with ADLs by performing:    UE Dressing with Supervision.  LE Dressing with SBA  Grooming while standing with Supervision.  Toileting from bedside commode with Minimal Assistance for hygiene and clothing management.   Stand pivot transfers with Stand-by Assistance.  Toilet transfer to bedside commode with Stand-by Assistance.    OT POC and goals established this date.

## 2018-01-26 NOTE — ASSESSMENT & PLAN NOTE
-Concern for NPH vs underlying dementia with delirium or progression  -Would like to plan for large volume LP with PT eval before and after procedure   -Difficult 2/2 anticoagulation with warfarin.  Will advise primary team when LP/Cisternogram is scheduled, possibly 01/29/18.   -May be able to coordinate tap with NM Cisternogram, will discuss with Neurosurgery  -Recommend continued PT/OT for ADLs and gait training

## 2018-01-26 NOTE — PLAN OF CARE
Problem: Patient Care Overview  Goal: Plan of Care Review  Outcome: Ongoing (interventions implemented as appropriate)  Plan of care discussed with pt. Pt oriented to person,time,place during shift. NPO for EGD tomorrow. No vomiting during shift. Cefepime IV for chronic DL infection.  Hydralazine IVP given for MAP 93 and 110. Skin remains intact. Neurology following. Tylenol given for restless leg syndrome. JAYLAN PICC draws back with no issues.  LVAD working properly and sounds smooth. MAP/Dopplers WNL. LVAD dressing remains CDI.  VSS & NADN. Pt denies CP, SOB, or pain/discomfort at this time.Pt free of injuries this shift.  All questions addressed.  Will continue to monitor.

## 2018-01-26 NOTE — PROGRESS NOTES
[facility]  Neurology  Progress Note    Patient Name: Kev Vasquez  MRN: [id number]  Admission Date: 1/24/2018  Hospital Length of Stay: 2 days  Code Status: Full Code   Attending Provider: Anni Henderson MD  Primary Care Physician: Mega Collins MD   Principal Problem:Vomiting    Subjective:     Interval History: No acute changes, denies hallucinations or behavioral changes overnight.  LP/Cisternogram combination study discussion is ongoing, scheduling for cisternogram wouldn't be until 01/29/18 at the earliest anyway so will continue to discuss and plan over the weekend prior to giving primary team recs on changing anticoagulation tomorrow am.    Current Neurological Medications: Mg oxide,     Current Facility-Administered Medications   Medication Dose Route Frequency Provider Last Rate Last Dose    acetaminophen tablet 650 mg  650 mg Oral Q6H PRN Charlette Jasmine, RON   650 mg at 01/25/18 1652    allopurinol tablet 300 mg  300 mg Oral Daily José Luis Yeh, DO   300 mg at 01/26/18 1218    amLODIPine tablet 10 mg  10 mg Oral Daily José Luis Yeh, DO   10 mg at 01/26/18 1216    aspirin chewable tablet 81 mg  81 mg Oral Daily José Luis Yeh, DO   81 mg at 01/26/18 1336    atorvastatin tablet 10 mg  10 mg Oral Daily José Luis Yeh, DO   10 mg at 01/26/18 1217    buPROPion TB24 tablet 450 mg  450 mg Oral Daily José Luis Yeh, DO   450 mg at 01/26/18 1336    ceFEPIme injection 2 g  2 g Intravenous Q12H José Luis Yeh, DO   2 g at 01/26/18 1336    docusate sodium capsule 100 mg  100 mg Oral Daily PRN José Luis Yeh, DO        dofetilide capsule 250 mcg  250 mcg Oral Q12H José Luis Yeh, DO   250 mcg at 01/26/18 1336    folic acid tablet 1 mg  1 mg Oral Daily José Luis Yeh, DO   1 mg at 01/26/18 1212    hydrALAZINE injection 10 mg  10 mg Intravenous Q6H PRN José Luis Yeh, DO   10 mg at 01/26/18 0440    hydrALAZINE tablet 100 mg  100 mg Oral Q8H José Luis Yeh, DO   100 mg at 01/26/18  1348    isosorbide dinitrate tablet 40 mg  40 mg Oral TID José Luis Yeh, DO   40 mg at 01/26/18 1347    Lactobacillus rhamnosus GG capsule 1 capsule  1 capsule Oral Daily José Luisshameka Yeh, DO   1 capsule at 01/26/18 1335    lisinopril tablet 20 mg  20 mg Oral BID José Luisshameka Yeh, DO   20 mg at 01/26/18 1217    magnesium oxide tablet 400 mg  400 mg Oral BID José Luisshameka Yeh, DO   400 mg at 01/26/18 1212    ondansetron injection 4 mg  4 mg Intravenous Q6H PRN José Luis Yeh, DO        pantoprazole EC tablet 40 mg  40 mg Oral BID AC Charlette Kenroy, NP   40 mg at 01/26/18 1212    promethazine tablet 12.5 mg  12.5 mg Oral Q6H PRN José Luis Yeh,          Review of Systems   Constitutional: Positive for fatigue. Negative for appetite change, chills and fever.   Eyes: Negative for photophobia and visual disturbance.   Respiratory: Negative for cough and shortness of breath.    Gastrointestinal: Negative for constipation.   Genitourinary:        +urinary incontinence x ~ 1 year   Musculoskeletal: Positive for back pain (chronic LBP) and gait problem.   Skin: Negative for rash and wound.   Neurological: Positive for numbness (stocking-glove distribution, chronic). Negative for weakness.   Psychiatric/Behavioral: Positive for confusion and hallucinations (visual). Negative for agitation.    Objective:     Vital Signs (Most Recent):  Temp: 98 °F (36.7 °C) (01/26/18 1322)  Pulse: 77 (01/26/18 1500)  Resp: 20 (01/26/18 1215)  BP: (!) 134/92 (01/26/18 1333)  SpO2: 96 % (01/26/18 1322) Vital Signs (24h Range):  Temp:  [97.8 °F (36.6 °C)-98.3 °F (36.8 °C)] 98 °F (36.7 °C)  Pulse:  [58-78] 77  Resp:  [12-20] 20  SpO2:  [93 %-100 %] 96 %  BP: ()/(0-100) 134/92     Weight: 81.4 kg (179 lb 7.3 oz)  Body mass index is 25.75 kg/m².    Physical Exam  General:  Well-developed, well-nourished, nad  HEENT:  NCAT, PERRLA, EOMI, oropharyngeal membranes non-erythematous/without exudate  Neck:  Supple, normal ROM without nuchal  rigidity  Resp:  Symmetric expansion, no increased wob  CVS:  No LE edema, peripheral pulses 2+ (radial, dorsalis pedis)  GI:  Abd soft, non-distended, non-tender to palpation  Neurologic Exam:  Mental Status:  Awake, alert, oriented to self, location, but not to date.  Speech, thought content appropriate. Recent recall 2/3, remote recall 0/3.  Cranial Nerves:  VFs intact on moving fingers in all quadrants bilaterally.  PERRLA, EOMI.  Facial movement, sensation intact and symmetric.  Palate raises symmetrically, tongue protrudes midline.  Trapezius strength 5/5 bilaterally.  Motor:  Normal bulk and tone.  BUE shoulder abduction, biceps/triceps, wrist flexion/extension,  strength 5/5.  BLE hip flexors, knee flexion/extension, plantarflexion/dorsiflexion 5/5.  No pronator drift.  Sensory:  Intact to light touch at all extremities without inattention.  Vibratory sensation intact at BUE digits, BLE lower shin.  Reflexes:  Biceps, brachioradialis, patellar, Achilles 2+ and symmetric.  No ankle clonus.  Downgoing toe bilaterally.  Coordination:  FNF, RICO, HTS intact with no dysmetria/ataxia/dysdiadochokinesia.  Mild resting tremor in RUE noted on pronator drift testing.  Gait:  Pt remains unsteady on standing with apraxic gait on few steps forward.       Significant Labs:    Recent Labs  Lab 18  0334   WBC 7.71   RBC 3.95*   HGB 11.3*   HCT 34.3*   *   MCV 87   MCH 28.6   MCHC 32.9       Recent Labs  Lab 18  0334   CALCIUM 10.5   PROT 7.0      K 4.1   CO2 26      BUN 27*   CREATININE 2.1*   ALKPHOS 72   ALT 14   AST 23   BILITOT 0.5     Significant Imagin18 CT head w/o contrast:  Possible CSF effect in periventricular area vs microvascular ischemic change.  Unable to obtain MRI 2/2 LVAD.  Stable exam noting mild chronic ischemic changes and generalized cerebral volume loss. No evidence of acute intracranial pathology.       Assessment and Plan:     Confusion    -Concern for  NPH vs underlying dementia with delirium or progression  -Would like to plan for large volume LP with PT eval before and after procedure   -Difficult 2/2 anticoagulation with warfarin.  Will advise primary team when LP/Cisternogram is scheduled, possibly 01/29/18.   -May be able to coordinate tap with NM Cisternogram, discussion with Neurosurgery for recs  -Recommend continued PT/OT for ADLs and gait training     VTE Risk Mitigation     None        Alesia Montero MD  Neurology  [facility]

## 2018-01-26 NOTE — PROGRESS NOTES
[facility]  Heart Transplant  Progress Note    Patient Name: Kev Vasquez  MRN: [id number]  Admission Date: 1/24/2018  Hospital Length of Stay: 2 days  Attending Physician: Anni Henderson MD  Primary Care Provider: Mega Collins MD  Principal Problem:Vomiting    Subjective:     Interval History: Remains confused and incontinent with unsteady gait. No vomiting since yesterday morning     Continuous Infusions:  Scheduled Meds:   allopurinol  300 mg Oral Daily    amLODIPine  10 mg Oral Daily    aspirin  81 mg Oral Daily    atorvastatin  10 mg Oral Daily    buPROPion  450 mg Oral Daily    ceFEPime (MAXIPIME) IVPB  2 g Intravenous Q12H    dofetilide  250 mcg Oral Q12H    folic acid  1 mg Oral Daily    hydrALAZINE  100 mg Oral Q8H    isosorbide dinitrate  40 mg Oral TID    Lactobacillus rhamnosus GG  1 capsule Oral Daily    lisinopril  20 mg Oral BID    magnesium oxide  400 mg Oral BID    pantoprazole  40 mg Oral BID AC     PRN Meds:acetaminophen, docusate sodium, hydrALAZINE, ondansetron, promethazine    Review of patient's allergies indicates:   Allergen Reactions    Aldactone [spironolactone]       persistent hyperkalemia.    Sulfa (sulfonamide antibiotics)      Objective:     Vital Signs (Most Recent):  Temp: 98.1 °F (36.7 °C) (01/26/18 1026)  Pulse: 62 (01/26/18 1026)  Resp: 18 (01/26/18 1026)  BP: (!) 138/100 (01/26/18 1026)  SpO2: 96 % (01/26/18 1026) Vital Signs (24h Range):  Temp:  [97.6 °F (36.4 °C)-98.3 °F (36.8 °C)] 98.1 °F (36.7 °C)  Pulse:  [58-78] 62  Resp:  [16-18] 18  SpO2:  [93 %-98 %] 96 %  BP: ()/(0-100) 138/100     Patient Vitals for the past 72 hrs (Last 3 readings):   Weight   01/26/18 0805 81.4 kg (179 lb 7.3 oz)   01/26/18 0800 81.4 kg (179 lb 7.3 oz)   01/24/18 2300 85.7 kg (188 lb 15 oz)     Body mass index is 25.75 kg/m².      Intake/Output Summary (Last 24 hours) at 01/26/18 1048  Last data filed at 01/26/18 0500   Gross per 24 hour   Intake              1245 ml   Output              100 ml   Net             1145 ml       Hemodynamic Parameters:  CVP:  [2 mmHg-4 mmHg] 2 mmHg    Telemetry: BiV paced    Physical Exam   Constitutional: He appears well-developed and well-nourished.   HENT:   Head: Normocephalic and atraumatic.   Eyes: Conjunctivae are normal. Pupils are equal, round, and reactive to light.   Neck: Normal range of motion. Neck supple.   Difficult to assess neck veins as he won't hold his neck still   Cardiovascular: Normal rate and regular rhythm.    Smooth VAD hum. Pulsatile   Pulmonary/Chest: Effort normal and breath sounds normal.   Abdominal: Soft. Bowel sounds are normal.   Genitourinary:   Genitourinary Comments: Incontinent   Musculoskeletal: He exhibits no edema.   Neurological: He is alert.   Oriented to self only   Skin: Skin is warm and dry. Capillary refill takes 2 to 3 seconds.       Significant Labs:  CBC:    Recent Labs  Lab 01/24/18  1536 01/25/18  0421 01/26/18  0334   WBC 6.50 6.89  6.89 7.71   RBC 3.82* 3.99*  3.99* 3.95*   HGB 11.2* 11.6*  11.6* 11.3*   HCT 34.4* 35.5*  35.5* 34.3*   * 128*  128* 144*   MCV 90 89  89 87   MCH 29.3 29.1  29.1 28.6   MCHC 32.6 32.7  32.7 32.9     BNP:    Recent Labs  Lab 01/24/18  1536 01/25/18  0421 01/26/18  0334   * 410* 347*     CMP:    Recent Labs  Lab 01/24/18  1536 01/25/18  0421 01/26/18  0334   * 89  89 79   CALCIUM 10.6* 10.5  10.5 10.5   ALBUMIN 3.3* 3.2* 3.3*   PROT 7.2 6.9 7.0    140  140 139   K 4.3 4.3  4.3 4.1   CO2 28 27  27 26    107  107 107   BUN 36* 31*  31* 27*   CREATININE 2.3* 2.0*  2.0* 2.1*   ALKPHOS 70 71 72   ALT 14 15 14   AST 22 22 23   BILITOT 0.3 0.4 0.5      Coagulation:     Recent Labs  Lab 01/24/18  1536 01/25/18  0421 01/26/18  0334   INR 2.7* 2.3*  2.3* 2.2*     LDH:    Recent Labs  Lab 01/25/18  0421 01/26/18  0334     175 168     Microbiology:  Microbiology Results (last 7 days)     Procedure Component  Value Units Date/Time    Blood culture #1 [[id number]] Collected:  01/24/18 1521    Order Status:  Completed Specimen:  Blood from Line, PICC Right Basilic Updated:  01/25/18 1812     Blood Culture, Routine No Growth to date     Blood Culture, Routine No Growth to date    Narrative:       Blood Culture #1    Blood culture #2 [[id number]] Collected:  01/24/18 1537    Order Status:  Completed Specimen:  Blood from Peripheral, Hand, Right Updated:  01/25/18 1812     Blood Culture, Routine No Growth to date     Blood Culture, Routine No Growth to date    Narrative:       Blood Culture #2          I have reviewed all pertinent labs within the past 24 hours.    Estimated Creatinine Clearance: 31.4 mL/min (based on SCr of 2.1 mg/dL (H)).          Assessment and Plan:     Mr. 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 BiV ICD, HILLARY/BiPAP, HTN, CKD, HLP, gout and depression who presents with confusion. On further questioning of his wife and his caregiver who were both at bedside, they noticed some odd behaviours such as combing his cheek/nose, getting dressed to go hunting when that is not his hobby and admitted to seeing visual hallucinations of his dead brother. They stated that he is not sleeping well and naps throughout the day. They wonder if his BiPAP is not in the proper settings anymore and he now has made a game out of looking at his watch to see when he will get to sleep. He stated it was 2017, January, thought he was in [place] (moved from there in June), and stated [place] as the president. Altogether, he denies SOB, CP, LE edema, orthopnea and PND. He has no fevers or chills, cough, or diarrhea. They state that he does have some worsening vomiting. They stated that a few months ago, he would vomit after he  drank some water: it would start with a sneeze, then a cough, then the vomit. It would happen every now and then, but over the past few weeks, it has worsened to 3-4x/day. They stated that now it occurs without drinking, sometimes before dinner and he would throw up almost undigested food. He admitted to throwing up pills too at times. There have been no VAD alarms noted.       * Vomiting    - Appreciate GI's help. KUB unremarkable. Getting EGD  - PPI bid        Confusion    - CT of head 1/24/18 with no acute changes  - Given constellation of confusion, gait disturbance and urinary incontinence, consulted Neuro (NPH ?). Appreciate their help. Suspect underlying dementia but recommending large volume LP and cisternogram. Will hold Coumadin for now - Neuro to discuss with NSGY what INR goal should be  - D/C'd Ropinirole again 2/2 gait disturbance  - Delirium protocol          LVAD (left ventricular assist device) present    - S/P HM III 10/16/16 as DT in [place]  - Current speed 5800  - TTE 1/25/18 shows LVEDD 6.5, LVEF 10-15%, diastolic dysfunction, RVE, severely depressed RV systolic function, PAS 20, ARTHUR intermittently and septum midline. No comment on IVC - per Dr. Lanza, costal views were too poor to assess IVC.   - Unable to assess neck veins as he won't hold his neck still, but CVP via PICC line is 2  (was taking Lasix 80 mg every M-W-F at home) - holding Lasix  - INR is therapeutic at 2.2 - Holding Coumadin as noted above. He will need to be bridged with Heparin if INR allowed to drift down below 2.0  - LDH is stable at 168  - Continue IV Cefepime for chronic Pseudomonas DL infection        Essential hypertension    - He has a long h/o orthostatic hypotension, so BP's should only be checked while sitting or standing  - Will allow for some permissive hypertension with goal MAP ~ 90 or less  - Continue Norvasc, Hydralazine, Isordil, Lisinopril         Stage 3 chronic kidney disease    - Admit creatinine 2.3,  down to 2.1 today (baseline looks ~ 1.6-1.8)        Gait instability    - PT/OT  - See confusion above        VT (ventricular tachycardia)    - Continue Tikosyn  - Has [employer] BiV ICD  - Keep K+ > 4.0 and Mg+ > 2.0              Charlette Jasmine NP [id number]  Heart Transplant  [facility]

## 2018-01-27 ENCOUNTER — TELEPHONE (OUTPATIENT)
Dept: INTERNAL MEDICINE | Facility: CLINIC | Age: 78
End: 2018-01-27

## 2018-01-27 LAB
ANION GAP SERPL CALC-SCNC: 7 MMOL/L
BASOPHILS # BLD AUTO: 0.04 K/UL
BASOPHILS NFR BLD: 0.5 %
BUN SERPL-MCNC: 31 MG/DL
CALCIUM SERPL-MCNC: 10.4 MG/DL
CHLORIDE SERPL-SCNC: 109 MMOL/L
CO2 SERPL-SCNC: 24 MMOL/L
CREAT SERPL-MCNC: 2.1 MG/DL
DIFFERENTIAL METHOD: ABNORMAL
EOSINOPHIL # BLD AUTO: 0.1 K/UL
EOSINOPHIL NFR BLD: 1.6 %
ERYTHROCYTE [DISTWIDTH] IN BLOOD BY AUTOMATED COUNT: 17.3 %
EST. GFR  (AFRICAN AMERICAN): 34.6 ML/MIN/1.73 M^2
EST. GFR  (NON AFRICAN AMERICAN): 29.9 ML/MIN/1.73 M^2
GLUCOSE SERPL-MCNC: 92 MG/DL
HCT VFR BLD AUTO: 34.3 %
HGB BLD-MCNC: 11.4 G/DL
IMM GRANULOCYTES # BLD AUTO: 0.03 K/UL
IMM GRANULOCYTES NFR BLD AUTO: 0.4 %
INR PPP: 2.5
LDH SERPL L TO P-CCNC: 186 U/L
LYMPHOCYTES # BLD AUTO: 0.9 K/UL
LYMPHOCYTES NFR BLD: 10.6 %
MAGNESIUM SERPL-MCNC: 2.3 MG/DL
MCH RBC QN AUTO: 29 PG
MCHC RBC AUTO-ENTMCNC: 33.2 G/DL
MCV RBC AUTO: 87 FL
MONOCYTES # BLD AUTO: 1.1 K/UL
MONOCYTES NFR BLD: 13.1 %
NEUTROPHILS # BLD AUTO: 6 K/UL
NEUTROPHILS NFR BLD: 73.8 %
NRBC BLD-RTO: 0 /100 WBC
PLATELET # BLD AUTO: 135 K/UL
PMV BLD AUTO: 11.8 FL
POTASSIUM SERPL-SCNC: 4 MMOL/L
PROTHROMBIN TIME: 24.2 SEC
RBC # BLD AUTO: 3.93 M/UL
SODIUM SERPL-SCNC: 140 MMOL/L
WBC # BLD AUTO: 8.09 K/UL

## 2018-01-27 PROCEDURE — 63600175 PHARM REV CODE 636 W HCPCS: Performed by: INTERNAL MEDICINE

## 2018-01-27 PROCEDURE — 27000248 HC VAD-ADDITIONAL DAY

## 2018-01-27 PROCEDURE — 25000003 PHARM REV CODE 250: Performed by: NURSE PRACTITIONER

## 2018-01-27 PROCEDURE — 83615 LACTATE (LD) (LDH) ENZYME: CPT

## 2018-01-27 PROCEDURE — 80048 BASIC METABOLIC PNL TOTAL CA: CPT

## 2018-01-27 PROCEDURE — 85025 COMPLETE CBC W/AUTO DIFF WBC: CPT

## 2018-01-27 PROCEDURE — 25000003 PHARM REV CODE 250: Performed by: INTERNAL MEDICINE

## 2018-01-27 PROCEDURE — 20600001 HC STEP DOWN PRIVATE ROOM

## 2018-01-27 PROCEDURE — 99232 SBSQ HOSP IP/OBS MODERATE 35: CPT | Mod: ,,, | Performed by: INTERNAL MEDICINE

## 2018-01-27 PROCEDURE — 83735 ASSAY OF MAGNESIUM: CPT

## 2018-01-27 PROCEDURE — 85610 PROTHROMBIN TIME: CPT

## 2018-01-27 RX ADMIN — PANTOPRAZOLE SODIUM 40 MG: 40 TABLET, DELAYED RELEASE ORAL at 06:01

## 2018-01-27 RX ADMIN — ISOSORBIDE DINITRATE 40 MG: 40 TABLET ORAL at 01:01

## 2018-01-27 RX ADMIN — CEFEPIME 2 G: 2 INJECTION, POWDER, FOR SOLUTION INTRAVENOUS at 01:01

## 2018-01-27 RX ADMIN — Medication 1 CAPSULE: at 09:01

## 2018-01-27 RX ADMIN — HYDRALAZINE HYDROCHLORIDE 10 MG: 20 INJECTION INTRAMUSCULAR; INTRAVENOUS at 12:01

## 2018-01-27 RX ADMIN — LISINOPRIL 20 MG: 20 TABLET ORAL at 08:01

## 2018-01-27 RX ADMIN — HYDRALAZINE HYDROCHLORIDE 10 MG: 20 INJECTION INTRAMUSCULAR; INTRAVENOUS at 05:01

## 2018-01-27 RX ADMIN — HYDRALAZINE HYDROCHLORIDE 100 MG: 50 TABLET ORAL at 08:01

## 2018-01-27 RX ADMIN — MAGNESIUM OXIDE TAB 400 MG (241.3 MG ELEMENTAL MG) 400 MG: 400 (241.3 MG) TAB at 09:01

## 2018-01-27 RX ADMIN — ACETAMINOPHEN 650 MG: 325 TABLET, FILM COATED ORAL at 06:01

## 2018-01-27 RX ADMIN — AMLODIPINE BESYLATE 10 MG: 10 TABLET ORAL at 09:01

## 2018-01-27 RX ADMIN — DOFETILIDE 250 MCG: 0.25 CAPSULE ORAL at 09:01

## 2018-01-27 RX ADMIN — DOFETILIDE 250 MCG: 0.25 CAPSULE ORAL at 08:01

## 2018-01-27 RX ADMIN — HYDRALAZINE HYDROCHLORIDE 10 MG: 20 INJECTION INTRAMUSCULAR; INTRAVENOUS at 06:01

## 2018-01-27 RX ADMIN — ISOSORBIDE DINITRATE 40 MG: 40 TABLET ORAL at 05:01

## 2018-01-27 RX ADMIN — HYDRALAZINE HYDROCHLORIDE 100 MG: 50 TABLET ORAL at 01:01

## 2018-01-27 RX ADMIN — LISINOPRIL 20 MG: 20 TABLET ORAL at 09:01

## 2018-01-27 RX ADMIN — ALLOPURINOL 300 MG: 300 TABLET ORAL at 09:01

## 2018-01-27 RX ADMIN — ASPIRIN 81 MG CHEWABLE TABLET 81 MG: 81 TABLET CHEWABLE at 09:01

## 2018-01-27 RX ADMIN — ATORVASTATIN CALCIUM 10 MG: 10 TABLET, FILM COATED ORAL at 09:01

## 2018-01-27 RX ADMIN — PANTOPRAZOLE SODIUM 40 MG: 40 TABLET, DELAYED RELEASE ORAL at 04:01

## 2018-01-27 RX ADMIN — CEFEPIME 2 G: 2 INJECTION, POWDER, FOR SOLUTION INTRAVENOUS at 12:01

## 2018-01-27 RX ADMIN — HYDRALAZINE HYDROCHLORIDE 100 MG: 50 TABLET ORAL at 05:01

## 2018-01-27 RX ADMIN — MAGNESIUM OXIDE TAB 400 MG (241.3 MG ELEMENTAL MG) 400 MG: 400 (241.3 MG) TAB at 08:01

## 2018-01-27 RX ADMIN — ISOSORBIDE DINITRATE 40 MG: 40 TABLET ORAL at 08:01

## 2018-01-27 RX ADMIN — BUPROPION HYDROCHLORIDE 450 MG: 150 TABLET, EXTENDED RELEASE ORAL at 09:01

## 2018-01-27 RX ADMIN — FOLIC ACID 1 MG: 1 TABLET ORAL at 09:01

## 2018-01-27 NOTE — PROGRESS NOTES
01/27/18 0600   Unmeasured Output   Emesis Occurrence 1   Emesis Assessment   Emesis Appearance Yellow;Clear     Pt had one emesis episode this morning after morning meds swallowed. Pt drinking cup of water through straw  HOB 90 degrees. Monitored patient after keeping HOB remaining 90 degree.  Plan for SLP eval to r/o aspiration.

## 2018-01-27 NOTE — NURSING
Nurse tech unable to get patient's BP. Nurse tried to go get BP, however patient's wife insisted not to wake him up but allow him sleep for a little while. Will try to get BP in about 30 mins.

## 2018-01-27 NOTE — PLAN OF CARE
"Problem: Patient Care Overview  Goal: Plan of Care Review  Outcome: Ongoing (interventions implemented as appropriate)  POC discussed with pt.  Pt disoriented to place,time, situation tonight. Neuro following-plan for LP/cisternogram possibly 1/29. EGD negative. Pt had one emesis episode this morning.  MAP goal <90 per Dr. Jasmine note.  BPs obtained while sitting during shift. LVAD working properly and sounds smooth. Dressing change completed per RN, STEPHAN "3"(see previous note). IV Cefepime for chronic DL infection. Pt denies CP, SOB, or pain/discomfort at this time.Pt free of injuries this shift.  All questions addressed.  Will continue to monitor.        "

## 2018-01-27 NOTE — PLAN OF CARE
Problem: Patient Care Overview  Goal: Plan of Care Review  Outcome: Revised  Plan of care discussed with patient. Patient is free of fall/trauma/injury. Denies CP, SOB, or pain/discomfort. LVAD DP elevated, smooth LVAD hum.Patient has no questions at this time. Will continue to monitor.

## 2018-01-27 NOTE — NURSING
GS=482/92, ZYL=435; Doppler= 100/0. Last PRN Hydralazine given at 6am ( scheduled 6hrly). Scheduled 0900 amlodipine 10mg, and 20 mg lisinopril given. Paged team to notify them. Awaiting call back.

## 2018-01-27 NOTE — TREATMENT PLAN
Treatment Plan  01/26/2018  9:47 PM    EGD performed today with impression below:  - Normal esophagus.  - Z-line regular, 43 cm from the incisors.  - 3 cm hiatal hernia.  - Normal stomach.  - Normal examined duodenum.    At this point will not order further studies but would like to see if how patient's mentation improves and if he is able to tolerate a diet. Would recommend nursing staff to accurately document episodes of emesis. Based on mentation, ability to tolerate food, and number of emesis will determine if further inpatient vs outpatient study is needed (?UGI, gastric emptying, etc).    Eileen Bermudez M.D.  Gastroenterology Fellow, PGY-IV  Pager: [phone]  [facility]

## 2018-01-27 NOTE — TELEPHONE ENCOUNTER
----- Message from Miguel Ángel Reardon sent at 1/27/2018  7:27 AM CST -----  [facility]: [facility]  pointment Request From: Kev Vasquez    With Provider: Mega Collins MD [-Primary Care Physician-]    Would Accept With:Request appointment time not available    Preferred Date Range: From 1/23/2018 To 1/25/2018    Preferred Times: Any    Reason for visit: Request an Appt    Comments:  I would like to have Dad checked for a urinary infection.  He's lethargic and has been slightly confused in the past 2 days and is getting worse.  I'd like for someone to see him this week.  His routine blood work has been stable, and he's on an iv antibiotic for the infection around his driveline.  There have been no changes to his medication in the past month or more.

## 2018-01-27 NOTE — ASSESSMENT & PLAN NOTE
- S/P HM III 10/16/16 as DT in [place]  - Current speed 5800  - TTE 1/25/18 shows LVEDD 6.5, LVEF 10-15%, diastolic dysfunction, RVE, severely depressed RV systolic function, PAS 20, ARTHUR intermittently and septum midline. No comment on IVC - per Dr. Lanza, costal views were too poor to assess IVC.   - Unable to assess neck veins as he won't hold his neck still, but CVP via PICC line is 2  (was taking Lasix 80 mg every M-W-F at home) - holding Lasix  - INR is still therapeutic at 2.5 from 2.2 yesterday - Holding Coumadin as noted above. He will need to be bridged with Heparin if INR allowed to drift down below 2.0  - LDH is stable at 186  - Continue IV Cefepime for chronic Pseudomonas DL infection

## 2018-01-27 NOTE — PROGRESS NOTES
"LVAD dressing changed by RN using sterile technique with soap and water. DLES "3"; site red, swollen, tender when cleaning around site. Moderate amount of drainage on gauze noted to be purulent and brown. Pt tolerated well. Dressing change due 1/27/18. Will continue to monitor.      "

## 2018-01-27 NOTE — PROGRESS NOTES
[facility]  Heart Transplant  Progress Note    Patient Name: Kev Vasquez  MRN: [id number]  Admission Date: 1/24/2018  Hospital Length of Stay: 3 days  Attending Physician: Anni Henderson MD  Primary Care Provider: Mega Collins MD  Principal Problem:Vomiting    Subjective:     Interval History: Remains confused and incontinent with unsteady gait. No vomiting overnight, feeling well this morning.    Continuous Infusions:  Scheduled Meds:   allopurinol  300 mg Oral Daily    amLODIPine  10 mg Oral Daily    aspirin  81 mg Oral Daily    atorvastatin  10 mg Oral Daily    buPROPion  450 mg Oral Daily    ceFEPime (MAXIPIME) IVPB  2 g Intravenous Q12H    dofetilide  250 mcg Oral Q12H    folic acid  1 mg Oral Daily    hydrALAZINE  100 mg Oral Q8H    isosorbide dinitrate  40 mg Oral TID    Lactobacillus rhamnosus GG  1 capsule Oral Daily    lisinopril  20 mg Oral BID    magnesium oxide  400 mg Oral BID    pantoprazole  40 mg Oral BID AC     PRN Meds:acetaminophen, docusate sodium, hydrALAZINE, ondansetron, promethazine    Review of patient's allergies indicates:   Allergen Reactions    Aldactone [spironolactone]       persistent hyperkalemia.    Sulfa (sulfonamide antibiotics)      Objective:     Vital Signs (Most Recent):  Temp: 98.2 °F (36.8 °C) (01/27/18 0916)  Pulse: 64 (01/27/18 0916)  Resp: 18 (01/27/18 0916)  BP: (!) 100/0 (01/27/18 0929)  SpO2: 98 % (01/27/18 0916) Vital Signs (24h Range):  Temp:  [98 °F (36.7 °C)-99 °F (37.2 °C)] 98.2 °F (36.8 °C)  Pulse:  [62-86] 64  Resp:  [12-20] 18  SpO2:  [95 %-100 %] 98 %  BP: ()/(0-100) 100/0     Patient Vitals for the past 72 hrs (Last 3 readings):   Weight   01/27/18 0700 82.8 kg (182 lb 8.7 oz)   01/26/18 0805 81.4 kg (179 lb 7.3 oz)   01/26/18 0800 81.4 kg (179 lb 7.3 oz)     Body mass index is 26.19 kg/m².      Intake/Output Summary (Last 24 hours) at 01/27/18 1011  Last data filed at 01/27/18 0500   Gross per 24 hour    Intake             1320 ml   Output                0 ml   Net             1320 ml     Telemetry: BiV paced    Physical Exam   Constitutional: He appears well-developed and well-nourished.   HENT:   Head: Normocephalic and atraumatic.   Eyes: Conjunctivae are normal. Pupils are equal, round, and reactive to light.   Neck: Normal range of motion. Neck supple.   Difficult to assess neck veins as he won't hold his neck still   Cardiovascular: Normal rate and regular rhythm.    Smooth VAD hum. Pulsatile   Pulmonary/Chest: Effort normal and breath sounds normal.   Abdominal: Soft. Bowel sounds are normal.   Genitourinary:   Genitourinary Comments: Incontinent   Musculoskeletal: He exhibits no edema.   Neurological: He is alert.   Oriented to self only   Skin: Skin is warm and dry. Capillary refill takes 2 to 3 seconds.       Significant Labs:  CBC:    Recent Labs  Lab 01/25/18  0421 01/26/18  0334 01/27/18  0530   WBC 6.89  6.89 7.71 8.09   RBC 3.99*  3.99* 3.95* 3.93*   HGB 11.6*  11.6* 11.3* 11.4*   HCT 35.5*  35.5* 34.3* 34.3*   *  128* 144* 135*   MCV 89  89 87 87   MCH 29.1  29.1 28.6 29.0   MCHC 32.7  32.7 32.9 33.2     BNP:    Recent Labs  Lab 01/24/18  1536 01/25/18  0421 01/26/18  0334   * 410* 347*     CMP:    Recent Labs  Lab 01/24/18  1536 01/25/18  0421 01/26/18  0334 01/27/18  0530   * 89  89 79 92   CALCIUM 10.6* 10.5  10.5 10.5 10.4   ALBUMIN 3.3* 3.2* 3.3*  --    PROT 7.2 6.9 7.0  --     140  140 139 140   K 4.3 4.3  4.3 4.1 4.0   CO2 28 27  27 26 24    107  107 107 109   BUN 36* 31*  31* 27* 31*   CREATININE 2.3* 2.0*  2.0* 2.1* 2.1*   ALKPHOS 70 71 72  --    ALT 14 15 14  --    AST 22 22 23  --    BILITOT 0.3 0.4 0.5  --       Coagulation:     Recent Labs  Lab 01/25/18  0421 01/26/18  0334 01/27/18  0530   INR 2.3*  2.3* 2.2* 2.5*     LDH:    Recent Labs  Lab 01/25/18 0421 01/26/18  0334 01/27/18  0530     175 168 186      Microbiology:  Microbiology Results (last 7 days)     Procedure Component Value Units Date/Time    Blood culture #1 [[id number]] Collected:  01/24/18 1521    Order Status:  Completed Specimen:  Blood from Line, PICC Right Basilic Updated:  01/26/18 1812     Blood Culture, Routine No Growth to date     Blood Culture, Routine No Growth to date     Blood Culture, Routine No Growth to date    Narrative:       Blood Culture #1    Blood culture #2 [[id number]] Collected:  01/24/18 1537    Order Status:  Completed Specimen:  Blood from Peripheral, Hand, Right Updated:  01/26/18 1812     Blood Culture, Routine No Growth to date     Blood Culture, Routine No Growth to date     Blood Culture, Routine No Growth to date    Narrative:       Blood Culture #2        I have reviewed all pertinent labs within the past 24 hours.    Estimated Creatinine Clearance: 31.4 mL/min (based on SCr of 2.1 mg/dL (H)).    Assessment and Plan:     * Vomiting    - Appreciate GI's help. KUB unremarkable. EGD also negative.  - PPI bid        Confusion    - CT of head 1/24/18 with no acute changes  - Given constellation of confusion, gait disturbance and urinary incontinence, consulted Neuro (NPH ?). Appreciate their help. Suspect underlying dementia but recommending large volume LP and cisternogram, possibly to be done Monday or Tuesday. Will hold Coumadin for now - Neuro to discuss with NSGY what INR goal should be  - D/C'd Ropinirole again 2/2 gait disturbance  - Delirium protocol        Essential hypertension    - He has a long h/o orthostatic hypotension, so BP's should only be checked while sitting or standing  - Will allow for some permissive hypertension with goal MAP ~ 90 or less  - Continue Norvasc, Hydralazine, Isordil, Lisinopril         Gait instability    - PT/OT  - See confusion above        VT (ventricular tachycardia)    - Continue Tikosyn  - Has [employer] BiV ICD  - Keep K+ > 4.0 and Mg+ > 2.0        LVAD (left ventricular assist  device) present    - S/P HM III 10/16/16 as DT in [place]  - Current speed 5800  - TTE 1/25/18 shows LVEDD 6.5, LVEF 10-15%, diastolic dysfunction, RVE, severely depressed RV systolic function, PAS 20, ARTHUR intermittently and septum midline. No comment on IVC - per Dr. Lanza, costal views were too poor to assess IVC.   - Unable to assess neck veins as he won't hold his neck still, but CVP via PICC line is 2  (was taking Lasix 80 mg every M-W-F at home) - holding Lasix  - INR is still therapeutic at 2.5 from 2.2 yesterday - Holding Coumadin as noted above. 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 Discussed with Dr. Henderson - staff attestation to follow.    Chip Hill MD  Heart Transplant  [facility]

## 2018-01-28 LAB
ANION GAP SERPL CALC-SCNC: 9 MMOL/L
BASOPHILS # BLD AUTO: 0.04 K/UL
BASOPHILS NFR BLD: 0.5 %
BUN SERPL-MCNC: 25 MG/DL
CALCIUM SERPL-MCNC: 9.9 MG/DL
CHLORIDE SERPL-SCNC: 109 MMOL/L
CO2 SERPL-SCNC: 23 MMOL/L
CREAT SERPL-MCNC: 1.8 MG/DL
DIFFERENTIAL METHOD: ABNORMAL
EOSINOPHIL # BLD AUTO: 0.2 K/UL
EOSINOPHIL NFR BLD: 2.2 %
ERYTHROCYTE [DISTWIDTH] IN BLOOD BY AUTOMATED COUNT: 17.5 %
EST. GFR  (AFRICAN AMERICAN): 41.6 ML/MIN/1.73 M^2
EST. GFR  (NON AFRICAN AMERICAN): 36 ML/MIN/1.73 M^2
FACT X PPP CHRO-ACNC: 0.37 IU/ML
FACT X PPP CHRO-ACNC: <0.1 IU/ML
GLUCOSE SERPL-MCNC: 92 MG/DL
HCT VFR BLD AUTO: 35 %
HGB BLD-MCNC: 11.5 G/DL
IMM GRANULOCYTES # BLD AUTO: 0.03 K/UL
IMM GRANULOCYTES NFR BLD AUTO: 0.4 %
INR PPP: 1.8
LDH SERPL L TO P-CCNC: 181 U/L
LYMPHOCYTES # BLD AUTO: 1 K/UL
LYMPHOCYTES NFR BLD: 12.7 %
MAGNESIUM SERPL-MCNC: 2.4 MG/DL
MCH RBC QN AUTO: 28.7 PG
MCHC RBC AUTO-ENTMCNC: 32.9 G/DL
MCV RBC AUTO: 87 FL
MONOCYTES # BLD AUTO: 1.1 K/UL
MONOCYTES NFR BLD: 13.8 %
NEUTROPHILS # BLD AUTO: 5.7 K/UL
NEUTROPHILS NFR BLD: 70.4 %
NRBC BLD-RTO: 0 /100 WBC
PLATELET # BLD AUTO: 134 K/UL
PMV BLD AUTO: 10.9 FL
POTASSIUM SERPL-SCNC: 3.8 MMOL/L
PROTHROMBIN TIME: 17.9 SEC
RBC # BLD AUTO: 4.01 M/UL
SODIUM SERPL-SCNC: 141 MMOL/L
WBC # BLD AUTO: 8.12 K/UL

## 2018-01-28 PROCEDURE — 83615 LACTATE (LD) (LDH) ENZYME: CPT

## 2018-01-28 PROCEDURE — 25000003 PHARM REV CODE 250: Performed by: NURSE PRACTITIONER

## 2018-01-28 PROCEDURE — 80048 BASIC METABOLIC PNL TOTAL CA: CPT

## 2018-01-28 PROCEDURE — 63600175 PHARM REV CODE 636 W HCPCS: Performed by: INTERNAL MEDICINE

## 2018-01-28 PROCEDURE — 85025 COMPLETE CBC W/AUTO DIFF WBC: CPT

## 2018-01-28 PROCEDURE — 85520 HEPARIN ASSAY: CPT

## 2018-01-28 PROCEDURE — 27000248 HC VAD-ADDITIONAL DAY

## 2018-01-28 PROCEDURE — G8996 SWALLOW CURRENT STATUS: HCPCS | Mod: CJ

## 2018-01-28 PROCEDURE — 92610 EVALUATE SWALLOWING FUNCTION: CPT

## 2018-01-28 PROCEDURE — 99232 SBSQ HOSP IP/OBS MODERATE 35: CPT | Mod: ,,, | Performed by: INTERNAL MEDICINE

## 2018-01-28 PROCEDURE — G8997 SWALLOW GOAL STATUS: HCPCS | Mod: CI

## 2018-01-28 PROCEDURE — 85610 PROTHROMBIN TIME: CPT

## 2018-01-28 PROCEDURE — 20600001 HC STEP DOWN PRIVATE ROOM

## 2018-01-28 PROCEDURE — 85520 HEPARIN ASSAY: CPT | Mod: 91

## 2018-01-28 PROCEDURE — 25000003 PHARM REV CODE 250: Performed by: INTERNAL MEDICINE

## 2018-01-28 PROCEDURE — 83735 ASSAY OF MAGNESIUM: CPT

## 2018-01-28 RX ORDER — HYDRALAZINE HYDROCHLORIDE 50 MG/1
100 TABLET, FILM COATED ORAL ONCE
Status: COMPLETED | OUTPATIENT
Start: 2018-01-28 | End: 2018-01-28

## 2018-01-28 RX ORDER — HEPARIN SODIUM,PORCINE/D5W 25000/250
17 INTRAVENOUS SOLUTION INTRAVENOUS CONTINUOUS
Status: DISCONTINUED | OUTPATIENT
Start: 2018-01-28 | End: 2018-02-05

## 2018-01-28 RX ORDER — POLYETHYLENE GLYCOL 3350 17 G/17G
17 POWDER, FOR SOLUTION ORAL ONCE
Status: COMPLETED | OUTPATIENT
Start: 2018-01-28 | End: 2018-01-28

## 2018-01-28 RX ADMIN — HYDRALAZINE HYDROCHLORIDE 10 MG: 20 INJECTION INTRAMUSCULAR; INTRAVENOUS at 11:01

## 2018-01-28 RX ADMIN — HYDRALAZINE HYDROCHLORIDE 10 MG: 20 INJECTION INTRAMUSCULAR; INTRAVENOUS at 09:01

## 2018-01-28 RX ADMIN — MAGNESIUM OXIDE TAB 400 MG (241.3 MG ELEMENTAL MG) 400 MG: 400 (241.3 MG) TAB at 08:01

## 2018-01-28 RX ADMIN — HEPARIN SODIUM 17 UNITS/KG/HR: 10000 INJECTION, SOLUTION INTRAVENOUS at 09:01

## 2018-01-28 RX ADMIN — ACETAMINOPHEN 650 MG: 325 TABLET, FILM COATED ORAL at 04:01

## 2018-01-28 RX ADMIN — BUPROPION HYDROCHLORIDE 450 MG: 150 TABLET, EXTENDED RELEASE ORAL at 08:01

## 2018-01-28 RX ADMIN — HYDRALAZINE HYDROCHLORIDE 100 MG: 50 TABLET ORAL at 01:01

## 2018-01-28 RX ADMIN — LISINOPRIL 20 MG: 20 TABLET ORAL at 09:01

## 2018-01-28 RX ADMIN — POLYETHYLENE GLYCOL 3350 17 G: 17 POWDER, FOR SOLUTION ORAL at 09:01

## 2018-01-28 RX ADMIN — HYDRALAZINE HYDROCHLORIDE 100 MG: 50 TABLET ORAL at 09:01

## 2018-01-28 RX ADMIN — ATORVASTATIN CALCIUM 10 MG: 10 TABLET, FILM COATED ORAL at 08:01

## 2018-01-28 RX ADMIN — ALLOPURINOL 300 MG: 300 TABLET ORAL at 09:01

## 2018-01-28 RX ADMIN — HYDRALAZINE HYDROCHLORIDE 10 MG: 20 INJECTION INTRAMUSCULAR; INTRAVENOUS at 12:01

## 2018-01-28 RX ADMIN — FOLIC ACID 1 MG: 1 TABLET ORAL at 08:01

## 2018-01-28 RX ADMIN — DOFETILIDE 250 MCG: 0.25 CAPSULE ORAL at 09:01

## 2018-01-28 RX ADMIN — AMLODIPINE BESYLATE 10 MG: 10 TABLET ORAL at 08:01

## 2018-01-28 RX ADMIN — HEPARIN SODIUM 17 UNITS/KG/HR: 10000 INJECTION, SOLUTION INTRAVENOUS at 11:01

## 2018-01-28 RX ADMIN — CEFEPIME 2 G: 2 INJECTION, POWDER, FOR SOLUTION INTRAVENOUS at 12:01

## 2018-01-28 RX ADMIN — MAGNESIUM OXIDE TAB 400 MG (241.3 MG ELEMENTAL MG) 400 MG: 400 (241.3 MG) TAB at 09:01

## 2018-01-28 RX ADMIN — PANTOPRAZOLE SODIUM 40 MG: 40 TABLET, DELAYED RELEASE ORAL at 04:01

## 2018-01-28 RX ADMIN — ISOSORBIDE DINITRATE 40 MG: 40 TABLET ORAL at 09:01

## 2018-01-28 RX ADMIN — PANTOPRAZOLE SODIUM 40 MG: 40 TABLET, DELAYED RELEASE ORAL at 05:01

## 2018-01-28 RX ADMIN — ISOSORBIDE DINITRATE 40 MG: 40 TABLET ORAL at 05:01

## 2018-01-28 RX ADMIN — Medication 1 CAPSULE: at 08:01

## 2018-01-28 RX ADMIN — ISOSORBIDE DINITRATE 40 MG: 40 TABLET ORAL at 01:01

## 2018-01-28 RX ADMIN — LISINOPRIL 20 MG: 20 TABLET ORAL at 08:01

## 2018-01-28 RX ADMIN — HYDRALAZINE HYDROCHLORIDE 100 MG: 50 TABLET ORAL at 12:01

## 2018-01-28 RX ADMIN — HYDRALAZINE HYDROCHLORIDE 100 MG: 50 TABLET ORAL at 05:01

## 2018-01-28 RX ADMIN — CEFEPIME 2 G: 2 INJECTION, POWDER, FOR SOLUTION INTRAVENOUS at 01:01

## 2018-01-28 RX ADMIN — ASPIRIN 81 MG CHEWABLE TABLET 81 MG: 81 TABLET CHEWABLE at 08:01

## 2018-01-28 NOTE — SUBJECTIVE & OBJECTIVE
Interval History: Remains confused and incontinent with unsteady gait. No events overnight, however this AM had emesis. MAP elevated after, covered with IV hydralazine, morning meds given. INR now 1.8, heparin gtt started.    Continuous Infusions:   heparin (porcine) in D5W 17 Units/kg/hr (01/28/18 0910)     Scheduled Meds:   allopurinol  300 mg Oral Daily    amLODIPine  10 mg Oral Daily    aspirin  81 mg Oral Daily    atorvastatin  10 mg Oral Daily    buPROPion  450 mg Oral Daily    ceFEPime (MAXIPIME) IVPB  2 g Intravenous Q12H    dofetilide  250 mcg Oral Q12H    folic acid  1 mg Oral Daily    hydrALAZINE  100 mg Oral Q8H    isosorbide dinitrate  40 mg Oral TID    Lactobacillus rhamnosus GG  1 capsule Oral Daily    lisinopril  20 mg Oral BID    magnesium oxide  400 mg Oral BID    pantoprazole  40 mg Oral BID AC     PRN Meds:acetaminophen, docusate sodium, hydrALAZINE, ondansetron, promethazine    Review of patient's allergies indicates:   Allergen Reactions    Aldactone [spironolactone]       persistent hyperkalemia.    Sulfa (sulfonamide antibiotics)      Objective:     Vital Signs (Most Recent):  Temp: 97.7 °F (36.5 °C) (01/28/18 0847)  Pulse: 69 (01/28/18 0847)  Resp: 18 (01/28/18 0847)  BP: (!) 100/0 (01/28/18 0847)  SpO2: 97 % (01/28/18 0847) Vital Signs (24h Range):  Temp:  [97.7 °F (36.5 °C)-98.4 °F (36.9 °C)] 97.7 °F (36.5 °C)  Pulse:  [60-81] 69  Resp:  [17-20] 18  SpO2:  [93 %-97 %] 97 %  BP: ()/(0-104) 100/0     Patient Vitals for the past 72 hrs (Last 3 readings):   Weight   01/28/18 0700 82.7 kg (182 lb 5.1 oz)   01/27/18 0700 82.8 kg (182 lb 8.7 oz)   01/26/18 0805 81.4 kg (179 lb 7.3 oz)     Body mass index is 26.16 kg/m².      Intake/Output Summary (Last 24 hours) at 01/28/18 1000  Last data filed at 01/28/18 0533   Gross per 24 hour   Intake             1410 ml   Output                0 ml   Net             1410 ml     Telemetry: BiV paced    Physical Exam   Constitutional:  He appears well-developed and well-nourished.   HENT:   Head: Normocephalic and atraumatic.   Eyes: Conjunctivae are normal. Pupils are equal, round, and reactive to light.   Neck: Normal range of motion. Neck supple.   Difficult to assess neck veins as he won't hold his neck still   Cardiovascular: Normal rate and regular rhythm.    Smooth VAD hum. Pulsatile   Pulmonary/Chest: Effort normal and breath sounds normal.   Abdominal: Soft. Bowel sounds are normal.   Genitourinary:   Genitourinary Comments: Incontinent   Musculoskeletal: He exhibits no edema.   Neurological: He is alert.   Oriented to self only   Skin: Skin is warm and dry. Capillary refill takes 2 to 3 seconds.     Significant Labs:  CBC:    Recent Labs  Lab 01/26/18  0334 01/27/18  0530 01/28/18  0429   WBC 7.71 8.09 8.12   RBC 3.95* 3.93* 4.01*   HGB 11.3* 11.4* 11.5*   HCT 34.3* 34.3* 35.0*   * 135* 134*   MCV 87 87 87   MCH 28.6 29.0 28.7   MCHC 32.9 33.2 32.9     BNP:    Recent Labs  Lab 01/24/18  1536 01/25/18  0421 01/26/18  0334   * 410* 347*     CMP:    Recent Labs  Lab 01/24/18  1536 01/25/18  0421 01/26/18  0334 01/27/18  0530 01/28/18  0429   * 89  89 79 92 92   CALCIUM 10.6* 10.5  10.5 10.5 10.4 9.9   ALBUMIN 3.3* 3.2* 3.3*  --   --    PROT 7.2 6.9 7.0  --   --     140  140 139 140 141   K 4.3 4.3  4.3 4.1 4.0 3.8   CO2 28 27  27 26 24 23    107  107 107 109 109   BUN 36* 31*  31* 27* 31* 25*   CREATININE 2.3* 2.0*  2.0* 2.1* 2.1* 1.8*   ALKPHOS 70 71 72  --   --    ALT 14 15 14  --   --    AST 22 22 23  --   --    BILITOT 0.3 0.4 0.5  --   --       Coagulation:     Recent Labs  Lab 01/26/18  0334 01/27/18  0530 01/28/18  0429   INR 2.2* 2.5* 1.8*     LDH:    Recent Labs  Lab 01/26/18  0334 01/27/18  0530 01/28/18  0429    186 181     Microbiology:  Microbiology Results (last 7 days)     Procedure Component Value Units Date/Time    Blood culture #1 [[id number]] Collected:  01/24/18 1521     Order Status:  Completed Specimen:  Blood from Line, PICC Right Basilic Updated:  01/27/18 1812     Blood Culture, Routine No Growth to date     Blood Culture, Routine No Growth to date     Blood Culture, Routine No Growth to date     Blood Culture, Routine No Growth to date    Narrative:       Blood Culture #1    Blood culture #2 [[id number]] Collected:  01/24/18 1537    Order Status:  Completed Specimen:  Blood from Peripheral, Hand, Right Updated:  01/27/18 1812     Blood Culture, Routine No Growth to date     Blood Culture, Routine No Growth to date     Blood Culture, Routine No Growth to date     Blood Culture, Routine No Growth to date    Narrative:       Blood Culture #2        I have reviewed all pertinent labs within the past 24 hours.    Estimated Creatinine Clearance: 36.6 mL/min (based on SCr of 1.8 mg/dL (H)).

## 2018-01-28 NOTE — PROGRESS NOTES
Pt vomit after receiving scheduled morning PO medications (hyralazine,isosorbide,protonix).Rn assessed emesis bag for meds which contained no medications. Unable to get BP/MAP reading . Doppler 88/0. MD notified. No orders given. WCTM

## 2018-01-28 NOTE — NURSING
Called HTS team on call to notify about Kileirh=922/0 and thay 10mg IV hydralazine was given.. MD was busy and in an emergency, lady picked up the call on relayed the information to him as I spoke. No new orders at this time.

## 2018-01-28 NOTE — SIGNIFICANT EVENT
Spoke to Tucker with Neurosurgery who stated that they usually would not combine high volume LP with cisternogram in setting of possible NPH.  Recommended to start with large volume tap, normal pre and post tap evaluations (PT gait evaluation, mental status assessment by our team).  If significant change in patient after large volume tap, then plan for referral to Neurosurgery as an outpatient to discuss  shunting.  Cisternogram generally reserved for patients with questionable results on imaging as well as the pre and post evaluations with tap.  NSGY notes rare need for cisternograms generally otherwise.      Dr. Hill managing anticoagulation.  Pt's INR down to 1.8 today, on heparin gtt.  Plts appropriate.  Typically would like INR < 1.5 prior to LP but IR may have different guidelines.  Due to bleeding risk it would be preferable to have IR do the procedure to minimize complications.  Otherwise, Neurology may be able to try for LP tomorrow if IR refuses.      General work up for NPH is a PT gait evaluation prior to large volume tap.  Tap requires 40 mL up to 50 mL CSF taken off and opening pressure measurement.  Tap should be followed fairly quickly by PT gait reassessment--within 2-4 h. Ideally MMSE performed before and after lumbar puncture as well.  I will see this patient early am 01/29/18 and perform MMSE in anticipation of a lumbar puncture and follow up after lumbar puncture.    Alesia Montero MD  PGY2, Neurology  Pager:  [phone]  [facility] [id number]

## 2018-01-28 NOTE — ASSESSMENT & PLAN NOTE
- CT of head 1/24/18 with no acute changes  - Given constellation of confusion, gait disturbance and urinary incontinence, consulted Neuro (NPH ?). Appreciate their help. Suspect underlying dementia but recommending large volume LP and cisternogram, possibly to be done Monday or Tuesday. Will hold Coumadin for now - Neuro to discuss with NSGY what INR goal should be (believe it will be 1.5)  - D/C'd Ropinirole again 2/2 gait disturbance  - Delirium protocol

## 2018-01-28 NOTE — PROGRESS NOTES
[facility]  Heart Transplant  Progress Note    Patient Name: Kev Vasquez  MRN: [id number]  Admission Date: 1/24/2018  Hospital Length of Stay: 4 days  Attending Physician: Anni Henderson MD  Primary Care Provider: Mega Collins MD  Principal Problem:Vomiting    Subjective:     Interval History: Remains confused and incontinent with unsteady gait. No events overnight, however this AM had emesis. MAP elevated after, covered with IV hydralazine, morning meds given. INR now 1.8, heparin gtt started.    Continuous Infusions:   heparin (porcine) in D5W 17 Units/kg/hr (01/28/18 0910)     Scheduled Meds:   allopurinol  300 mg Oral Daily    amLODIPine  10 mg Oral Daily    aspirin  81 mg Oral Daily    atorvastatin  10 mg Oral Daily    buPROPion  450 mg Oral Daily    ceFEPime (MAXIPIME) IVPB  2 g Intravenous Q12H    dofetilide  250 mcg Oral Q12H    folic acid  1 mg Oral Daily    hydrALAZINE  100 mg Oral Q8H    isosorbide dinitrate  40 mg Oral TID    Lactobacillus rhamnosus GG  1 capsule Oral Daily    lisinopril  20 mg Oral BID    magnesium oxide  400 mg Oral BID    pantoprazole  40 mg Oral BID AC     PRN Meds:acetaminophen, docusate sodium, hydrALAZINE, ondansetron, promethazine    Review of patient's allergies indicates:   Allergen Reactions    Aldactone [spironolactone]       persistent hyperkalemia.    Sulfa (sulfonamide antibiotics)      Objective:     Vital Signs (Most Recent):  Temp: 97.7 °F (36.5 °C) (01/28/18 0847)  Pulse: 69 (01/28/18 0847)  Resp: 18 (01/28/18 0847)  BP: (!) 100/0 (01/28/18 0847)  SpO2: 97 % (01/28/18 0847) Vital Signs (24h Range):  Temp:  [97.7 °F (36.5 °C)-98.4 °F (36.9 °C)] 97.7 °F (36.5 °C)  Pulse:  [60-81] 69  Resp:  [17-20] 18  SpO2:  [93 %-97 %] 97 %  BP: ()/(0-104) 100/0     Patient Vitals for the past 72 hrs (Last 3 readings):   Weight   01/28/18 0700 82.7 kg (182 lb 5.1 oz)   01/27/18 0700 82.8 kg (182 lb 8.7 oz)   01/26/18 0805 81.4 kg (179  lb 7.3 oz)     Body mass index is 26.16 kg/m².      Intake/Output Summary (Last 24 hours) at 01/28/18 1000  Last data filed at 01/28/18 0533   Gross per 24 hour   Intake             1410 ml   Output                0 ml   Net             1410 ml     Telemetry: BiV paced    Physical Exam   Constitutional: He appears well-developed and well-nourished.   HENT:   Head: Normocephalic and atraumatic.   Eyes: Conjunctivae are normal. Pupils are equal, round, and reactive to light.   Neck: Normal range of motion. Neck supple.   Difficult to assess neck veins as he won't hold his neck still   Cardiovascular: Normal rate and regular rhythm.    Smooth VAD hum. Pulsatile   Pulmonary/Chest: Effort normal and breath sounds normal.   Abdominal: Soft. Bowel sounds are normal.   Genitourinary:   Genitourinary Comments: Incontinent   Musculoskeletal: He exhibits no edema.   Neurological: He is alert.   Oriented to self only   Skin: Skin is warm and dry. Capillary refill takes 2 to 3 seconds.     Significant Labs:  CBC:    Recent Labs  Lab 01/26/18  0334 01/27/18  0530 01/28/18 0429   WBC 7.71 8.09 8.12   RBC 3.95* 3.93* 4.01*   HGB 11.3* 11.4* 11.5*   HCT 34.3* 34.3* 35.0*   * 135* 134*   MCV 87 87 87   MCH 28.6 29.0 28.7   MCHC 32.9 33.2 32.9     BNP:    Recent Labs  Lab 01/24/18  1536 01/25/18  0421 01/26/18  0334   * 410* 347*     CMP:    Recent Labs  Lab 01/24/18  1536 01/25/18  0421 01/26/18  0334 01/27/18  0530 01/28/18  0429   * 89  89 79 92 92   CALCIUM 10.6* 10.5  10.5 10.5 10.4 9.9   ALBUMIN 3.3* 3.2* 3.3*  --   --    PROT 7.2 6.9 7.0  --   --     140  140 139 140 141   K 4.3 4.3  4.3 4.1 4.0 3.8   CO2 28 27  27 26 24 23    107  107 107 109 109   BUN 36* 31*  31* 27* 31* 25*   CREATININE 2.3* 2.0*  2.0* 2.1* 2.1* 1.8*   ALKPHOS 70 71 72  --   --    ALT 14 15 14  --   --    AST 22 22 23  --   --    BILITOT 0.3 0.4 0.5  --   --       Coagulation:     Recent Labs  Lab 01/26/18  0334  01/27/18  0530 01/28/18  0429   INR 2.2* 2.5* 1.8*     LDH:    Recent Labs  Lab 01/26/18  0334 01/27/18  0530 01/28/18  0429    186 181     Microbiology:  Microbiology Results (last 7 days)     Procedure Component Value Units Date/Time    Blood culture #1 [[id number]] Collected:  01/24/18 1521    Order Status:  Completed Specimen:  Blood from Line, PICC Right Basilic Updated:  01/27/18 1812     Blood Culture, Routine No Growth to date     Blood Culture, Routine No Growth to date     Blood Culture, Routine No Growth to date     Blood Culture, Routine No Growth to date    Narrative:       Blood Culture #1    Blood culture #2 [[id number]] Collected:  01/24/18 1537    Order Status:  Completed Specimen:  Blood from Peripheral, Hand, Right Updated:  01/27/18 1812     Blood Culture, Routine No Growth to date     Blood Culture, Routine No Growth to date     Blood Culture, Routine No Growth to date     Blood Culture, Routine No Growth to date    Narrative:       Blood Culture #2        I have reviewed all pertinent labs within the past 24 hours.    Estimated Creatinine Clearance: 36.6 mL/min (based on SCr of 1.8 mg/dL (H)).    Assessment and Plan:     * Vomiting    - Appreciate GI's help. KUB unremarkable. EGD also negative.  - PPI bid        Confusion    - CT of head 1/24/18 with no acute changes  - Given constellation of confusion, gait disturbance and urinary incontinence, consulted Neuro (NPH ?). Appreciate their help. Suspect underlying dementia but recommending large volume LP and cisternogram, possibly to be done Monday or Tuesday. Will hold Coumadin for now - Neuro to discuss with NSGY what INR goal should be (believe it will be 1.5)  - D/C'd Ropinirole again 2/2 gait disturbance  - Delirium protocol        Essential hypertension    - He has a long h/o orthostatic hypotension, so BP's should only be checked while sitting or standing  - Will allow for some permissive hypertension with goal MAP ~ 90 or less  -  Continue Norvasc, Hydralazine, Isordil, Lisinopril         Gait instability    - PT/OT  - See confusion above        VT (ventricular tachycardia)    - Continue Tikosyn  - Has [employer] BiV ICD  - Keep K+ > 4.0 and Mg+ > 2.0        LVAD (left ventricular assist device) present    - S/P HM III 10/16/16 as DT in [place]  - Current speed 5800  - TTE 1/25/18 shows LVEDD 6.5, LVEF 10-15%, diastolic dysfunction, RVE, severely depressed RV systolic function, PAS 20, ARTHUR intermittently and septum midline. No comment on IVC - per Dr. Lanza, costal views were too poor to assess IVC.   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 Discussed with Dr. Henderson - staff attestation to follow.    Chip Hill MD  Heart Transplant  [facility]

## 2018-01-28 NOTE — PLAN OF CARE
Problem: Patient Care Overview  Goal: Plan of Care Review  Outcome: Revised  Plan of care discussed with patient. LVAD DP continue to be elevated, smooth LVAD hum Heparin gtts started ( INR=1.8). Patient is free of fall/trauma/injury. Denies CP, SOB, or pain/discomfort. Speech therapy recommending nectar thickened liquids. All questions addressed. Will continue to monitor

## 2018-01-28 NOTE — ASSESSMENT & PLAN NOTE
- S/P HM III 10/16/16 as DT in [place]  - Current speed 5800  - TTE 1/25/18 shows LVEDD 6.5, LVEF 10-15%, diastolic dysfunction, RVE, severely depressed RV systolic function, PAS 20, ARTHUR intermittently and septum midline. No comment on IVC - per Dr. Lanza, costal views were too poor to assess IVC.   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

## 2018-01-28 NOTE — PLAN OF CARE
Problem: Patient Care Overview  Goal: Plan of Care Review  Outcome: Ongoing (interventions implemented as appropriate)  Plan of care discussed with pt. IV abx for chronic DL infection. Plan for LP/cisternogram Monday or Tuesday. LVAD working properly and sounds smooth. Attempting to control MAP with scheduled meds and PRN Hydralazine.  LVAD dressing remains CDI.  VSS & NADN. Pt denies CP, SOB, or pain/discomfort at this time.Pt free of injuries this shift.  All questions addressed.  Will continue to monitor.

## 2018-01-28 NOTE — NURSING
VH=326/96,  sitting. MD On-call notified. Order is to give 100mg Hydralazine stat. Then give the scheduled 100mg Hydralazine for 1400.

## 2018-01-28 NOTE — PT/OT/SLP EVAL
Speech Language Pathology Evaluation  Bedside Swallow    Patient Name:  Kev Vasquez   MRN:  [id number]   325/325 A    Admitting Diagnosis: Vomiting    Recommendations:                 General Recommendations:  Dysphagia therapy  Diet recommendations:  Regular, Nectar Thick - one packet of thickener for every 6 ounces of liquid  Aspiration Precautions:   · 1 bite/sip at a time,   · Assistance with thickening liquids,   · Feed only when awake/alert,   · HOB to 90 degrees,   · Meds whole 1 at a time,   · Monitor for s/s of aspiration,   · Remain upright 30 minutes post meal   · Small bites/sips   General Precautions: Standard, fall, LVAD, nectar thick  Communication strategies:  none    History:     Past Medical History:   Diagnosis Date    AICD (automatic cardioverter/defibrillator) present 2014    [employer]    Chronic anticoagulation 7/21/2017    Chronic combined systolic and diastolic congestive heart failure 7/27/2015 11-16-17   1 - Moderate left ventricular enlargement.    2 - Severely depressed left ventricular systolic function (EF 15-20%).    3 - Impaired LV relaxation, normal LAP (grade 1 diastolic dysfunction).    4 - Biatrial enlargement.    5 - Right ventricle is upper limit of normal in size with not well seen systolic function.    6 - Severe tricuspid regurgitation.    7 - Increased central venous pressure.    8 - The estimated PA systolic pressure is 40 mmHg.    9 - Heartmate III LVAD; speed 5800.     Complication involving left ventricular assist device (LVAD) 7/29/2017    Coronary artery disease involving native coronary artery of native heart without angina pectoris 7/27/2015    Gait instability     Hypertensive urgency, malignant 11/15/2017    ICD (implantable cardioverter-defibrillator), biventricular, in situ 7/27/2015    Ischemic cardiomyopathy 7/20/2017    S/p HMIII     Kidney stones     LVAD (left ventricular assist device) present 7/20/2017    status post Heartmate III 10/16/16 LVAD  "   HILLARY on CPAP 7/27/2015    Peripheral neuropathy     Pulmonary hypertension due to left ventricular systolic dysfunction 7/29/2015    Restless leg syndrome 1992    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    Skin cancer     excision 2013    Skin yeast infection 8/31/2017    Stage 3 chronic kidney disease 7/20/2017    Syncope 7/20/2017    Ventricular tachycardia 7/25/2017       Past Surgical History:   Procedure Laterality Date    CARDIAC PACEMAKER PLACEMENT      pacemaker previously, then AICD in 2006. AICD [employer] serial #[id number]    CORONARY ARTERY BYPASS GRAFT  1993    KNEE SURGERY Left 2001    complicated by infection, hardware had to be taken out and replaced    LEFT VENTRICULAR ASSIST DEVICE  10/19/2016     H&P: 77 year old male with a past medical history of ICM s/p LVAD10/16/16 in [place], SSS s/p 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 GI consulted for persistent emesis and concern for gastroparesis.   Per HPI its reported that for a couple of months patient has been having emesis when drinking water. When drinking water he would sneeze-->cough-->emesis. Then within the last couple of weeks he has been complaining of "vomiting undigested foods" either before or after meals. Now per patient this morning she states that he has no trouble moving food bolus from mouth to esophageal. However he feels that food becomes "stuck in stomach" not esophagus which causes him to bring it up. Does report reflux and weight in the last couple of months but no odynophagia. Drinks occasional alcohol but not a prior smoker.   Prior GI procedures:  ?EGD 3 years ago     Modified Barium Swallow: none in Epic    Chest X-Rays: Chest 2 views compared to December 2017.  LVAD and AICD in place.  Heart size and pulmonary vascularity are similar.  Right PICC catheter overlies the right subclavian vein.  No confluent " "consolidation.    Prior diet: evaluated by ST 7/29/2017 for swallowing with regular/thin recs. Pt followed by ST in rehab for cog/ling tx last seen on 8/23/2017.      Subjective     Pt awake and seated in chair. Caregiver present in room. Caregiver reports noted 'different' swallowing with liquids at home appearing to gulp.   Patient states, "I threw up at about 5 AM this morning."      Objective:     Oral Musculature Evaluation  · Oral Musculature: WFL  · Dentition: present and adequate  · Mucosal Quality: adequate  · Mandibular Strength and Mobility: WFL  · Oral Labial Strength and Mobility: WFL  · Lingual Strength and Mobility: WFL  · Velar Elevation: WFL  · Buccal Strength and Mobility: WFL  · Volitional Cough: elicited  · Volitional Swallow: timely  · Voice Prior to PO Intake: clear    Bedside Swallow Eval:   Consistencies Assessed:  · Thin liquids ice chip x1, tsp sip x1, cup sip x7  · Nectar thick liquids cup sip x5  · Puree tsp bite x2  · Solids bite of berhane cracker x2     Oral Phase:   · WFL   · Delayed a-p transit with thin liquids only    Pharyngeal Phase:   Inconsistently with thin liquids:  · delayed swallow initation  · multiple spontaneous swallows  · throat clearing    Compensatory Strategies  · None    Treatment: SLP provided education on SLP role, s/s and risks of aspiraiton, thickener use, NECTAR thick recs, safe swallow precautions, and POC. Patient and caregiver verbalized understanding of all discussed. SLP left extra thickener packets in room.       Assessment:     Kev Vasquez is a 77 y.o. male with an SLP diagnosis of Dysphagia.  ST will continue to follow.   Goals:    SLP Goals        Problem: SLP Goal    Goal Priority Disciplines Outcome   SLP Goal     SLP Ongoing (interventions implemented as appropriate)   Description:  Speech Therapy Short Term Goals  Goal expected to be met by 2/4  1. Pt will tolerate a regular diet and NECTAR THICK liquids with no overt s/s of aspiration.   2. Pt " will tolerate cup sips of thin liquids x10 with no overt s/s of aspiration.   3. Pt will participate in a modified barium swallow study as warranted per MD and SLP.                         Plan:     · Patient to be seen:  4 x/week   · Plan of Care expires:  02/26/18  · Plan of Care reviewed with:  patient, caregiver   · SLP Follow-Up:  Yes       Discharge recommendations:  home health OT, home health PT (HH ST vs. no needs pending progress)       Time Tracking:     SLP Treatment Date:   01/28/18  Speech Start Time:  0825  Speech Stop Time:  0846     Speech Total Time (min):  21 min    Billable Minutes: Eval Swallow and Oral Function 21    SANTOSH Henderson, CCC-SLP   Pager: [phone]  01/28/2018

## 2018-01-28 NOTE — PLAN OF CARE
Problem: SLP Goal  Goal: SLP Goal  Speech Therapy Short Term Goals  Goal expected to be met by 2/4  1. Pt will tolerate a regular diet and NECTAR THICK liquids with no overt s/s of aspiration.   2. Pt will tolerate cup sips of thin liquids x10 with no overt s/s of aspiration.   3. Pt will participate in a modified barium swallow study as warranted per MD and SLP.       Outcome: Ongoing (interventions implemented as appropriate)  Bedside swallow study completed. Recommend: Regular diet, NECTAR thick liquids, aspiration precautions. ST will continue to follow.   JEWEL Khan., CCC-SLP  Pager: [phone]  01/28/2018

## 2018-01-29 ENCOUNTER — PATIENT MESSAGE (OUTPATIENT)
Dept: SLEEP MEDICINE | Facility: CLINIC | Age: 78
End: 2018-01-29

## 2018-01-29 LAB
ALBUMIN SERPL BCP-MCNC: 3.3 G/DL
ALP SERPL-CCNC: 77 U/L
ALT SERPL W/O P-5'-P-CCNC: 16 U/L
ANION GAP SERPL CALC-SCNC: 9 MMOL/L
AST SERPL-CCNC: 26 U/L
BACTERIA BLD CULT: NORMAL
BACTERIA BLD CULT: NORMAL
BASOPHILS # BLD AUTO: 0.06 K/UL
BASOPHILS NFR BLD: 0.8 %
BILIRUB DIRECT SERPL-MCNC: 0.4 MG/DL
BILIRUB SERPL-MCNC: 0.6 MG/DL
BNP SERPL-MCNC: 383 PG/ML
BUN SERPL-MCNC: 22 MG/DL
CALCIUM SERPL-MCNC: 10.1 MG/DL
CHLORIDE SERPL-SCNC: 107 MMOL/L
CO2 SERPL-SCNC: 24 MMOL/L
CREAT SERPL-MCNC: 1.7 MG/DL
CRP SERPL-MCNC: 1.6 MG/L
DIFFERENTIAL METHOD: ABNORMAL
EOSINOPHIL # BLD AUTO: 0.2 K/UL
EOSINOPHIL NFR BLD: 2.8 %
ERYTHROCYTE [DISTWIDTH] IN BLOOD BY AUTOMATED COUNT: 17.6 %
EST. GFR  (AFRICAN AMERICAN): 44.6 ML/MIN/1.73 M^2
EST. GFR  (NON AFRICAN AMERICAN): 38.6 ML/MIN/1.73 M^2
FACT X PPP CHRO-ACNC: 0.41 IU/ML
FACT X PPP CHRO-ACNC: 0.54 IU/ML
GLUCOSE SERPL-MCNC: 84 MG/DL
HCT VFR BLD AUTO: 35.7 %
HGB BLD-MCNC: 11.8 G/DL
IMM GRANULOCYTES # BLD AUTO: 0.05 K/UL
IMM GRANULOCYTES NFR BLD AUTO: 0.6 %
INR PPP: 1.5
LDH SERPL L TO P-CCNC: 219 U/L
LYMPHOCYTES # BLD AUTO: 1.5 K/UL
LYMPHOCYTES NFR BLD: 19.5 %
MAGNESIUM SERPL-MCNC: 2.2 MG/DL
MCH RBC QN AUTO: 28.7 PG
MCHC RBC AUTO-ENTMCNC: 33.1 G/DL
MCV RBC AUTO: 87 FL
MONOCYTES # BLD AUTO: 1.1 K/UL
MONOCYTES NFR BLD: 13.6 %
NEUTROPHILS # BLD AUTO: 4.9 K/UL
NEUTROPHILS NFR BLD: 62.7 %
NRBC BLD-RTO: 0 /100 WBC
PLATELET # BLD AUTO: 150 K/UL
PMV BLD AUTO: 12.2 FL
POTASSIUM SERPL-SCNC: 3.8 MMOL/L
PREALB SERPL-MCNC: 29 MG/DL
PROT SERPL-MCNC: 7.3 G/DL
PROTHROMBIN TIME: 14.5 SEC
RBC # BLD AUTO: 4.11 M/UL
SODIUM SERPL-SCNC: 140 MMOL/L
WBC # BLD AUTO: 7.74 K/UL

## 2018-01-29 PROCEDURE — 00JU3ZZ INSPECTION OF SPINAL CANAL, PERCUTANEOUS APPROACH: ICD-10-PCS | Performed by: PSYCHIATRY & NEUROLOGY

## 2018-01-29 PROCEDURE — 36415 COLL VENOUS BLD VENIPUNCTURE: CPT

## 2018-01-29 PROCEDURE — 80076 HEPATIC FUNCTION PANEL: CPT

## 2018-01-29 PROCEDURE — 99232 SBSQ HOSP IP/OBS MODERATE 35: CPT | Mod: ,,, | Performed by: INTERNAL MEDICINE

## 2018-01-29 PROCEDURE — 62270 DX LMBR SPI PNXR: CPT | Mod: GC,,, | Performed by: PSYCHIATRY & NEUROLOGY

## 2018-01-29 PROCEDURE — 85520 HEPARIN ASSAY: CPT | Mod: 91

## 2018-01-29 PROCEDURE — 25000003 PHARM REV CODE 250: Performed by: INTERNAL MEDICINE

## 2018-01-29 PROCEDURE — 20600001 HC STEP DOWN PRIVATE ROOM

## 2018-01-29 PROCEDURE — 97535 SELF CARE MNGMENT TRAINING: CPT

## 2018-01-29 PROCEDURE — 85520 HEPARIN ASSAY: CPT

## 2018-01-29 PROCEDURE — 97112 NEUROMUSCULAR REEDUCATION: CPT

## 2018-01-29 PROCEDURE — 86140 C-REACTIVE PROTEIN: CPT

## 2018-01-29 PROCEDURE — 25000003 PHARM REV CODE 250: Performed by: NURSE PRACTITIONER

## 2018-01-29 PROCEDURE — 85610 PROTHROMBIN TIME: CPT

## 2018-01-29 PROCEDURE — 83615 LACTATE (LD) (LDH) ENZYME: CPT

## 2018-01-29 PROCEDURE — 99232 SBSQ HOSP IP/OBS MODERATE 35: CPT | Mod: 25,,, | Performed by: PSYCHIATRY & NEUROLOGY

## 2018-01-29 PROCEDURE — 27000248 HC VAD-ADDITIONAL DAY

## 2018-01-29 PROCEDURE — 63600175 PHARM REV CODE 636 W HCPCS: Performed by: INTERNAL MEDICINE

## 2018-01-29 PROCEDURE — 83880 ASSAY OF NATRIURETIC PEPTIDE: CPT

## 2018-01-29 PROCEDURE — 92526 ORAL FUNCTION THERAPY: CPT

## 2018-01-29 PROCEDURE — 83735 ASSAY OF MAGNESIUM: CPT

## 2018-01-29 PROCEDURE — 85025 COMPLETE CBC W/AUTO DIFF WBC: CPT

## 2018-01-29 PROCEDURE — 80048 BASIC METABOLIC PNL TOTAL CA: CPT

## 2018-01-29 PROCEDURE — 84134 ASSAY OF PREALBUMIN: CPT

## 2018-01-29 PROCEDURE — 97530 THERAPEUTIC ACTIVITIES: CPT

## 2018-01-29 RX ADMIN — BUPROPION HYDROCHLORIDE 450 MG: 150 TABLET, EXTENDED RELEASE ORAL at 08:01

## 2018-01-29 RX ADMIN — AMLODIPINE BESYLATE 10 MG: 10 TABLET ORAL at 08:01

## 2018-01-29 RX ADMIN — DOFETILIDE 250 MCG: 0.25 CAPSULE ORAL at 09:01

## 2018-01-29 RX ADMIN — MAGNESIUM OXIDE TAB 400 MG (241.3 MG ELEMENTAL MG) 400 MG: 400 (241.3 MG) TAB at 08:01

## 2018-01-29 RX ADMIN — PANTOPRAZOLE SODIUM 40 MG: 40 TABLET, DELAYED RELEASE ORAL at 05:01

## 2018-01-29 RX ADMIN — ACETAMINOPHEN 650 MG: 325 TABLET, FILM COATED ORAL at 06:01

## 2018-01-29 RX ADMIN — CEFEPIME 2 G: 2 INJECTION, POWDER, FOR SOLUTION INTRAVENOUS at 12:01

## 2018-01-29 RX ADMIN — MAGNESIUM OXIDE TAB 400 MG (241.3 MG ELEMENTAL MG) 400 MG: 400 (241.3 MG) TAB at 09:01

## 2018-01-29 RX ADMIN — DOFETILIDE 250 MCG: 0.25 CAPSULE ORAL at 08:01

## 2018-01-29 RX ADMIN — ISOSORBIDE DINITRATE 40 MG: 40 TABLET ORAL at 01:01

## 2018-01-29 RX ADMIN — LISINOPRIL 20 MG: 20 TABLET ORAL at 08:01

## 2018-01-29 RX ADMIN — HYDRALAZINE HYDROCHLORIDE 100 MG: 50 TABLET ORAL at 01:01

## 2018-01-29 RX ADMIN — HYDRALAZINE HYDROCHLORIDE 100 MG: 50 TABLET ORAL at 09:01

## 2018-01-29 RX ADMIN — HYDRALAZINE HYDROCHLORIDE 10 MG: 20 INJECTION INTRAMUSCULAR; INTRAVENOUS at 12:01

## 2018-01-29 RX ADMIN — FOLIC ACID 1 MG: 1 TABLET ORAL at 08:01

## 2018-01-29 RX ADMIN — LISINOPRIL 20 MG: 20 TABLET ORAL at 09:01

## 2018-01-29 RX ADMIN — HYDRALAZINE HYDROCHLORIDE 100 MG: 50 TABLET ORAL at 05:01

## 2018-01-29 RX ADMIN — Medication 1 CAPSULE: at 08:01

## 2018-01-29 RX ADMIN — ISOSORBIDE DINITRATE 40 MG: 40 TABLET ORAL at 05:01

## 2018-01-29 RX ADMIN — ATORVASTATIN CALCIUM 10 MG: 10 TABLET, FILM COATED ORAL at 08:01

## 2018-01-29 RX ADMIN — DOCUSATE SODIUM 100 MG: 100 CAPSULE, LIQUID FILLED ORAL at 06:01

## 2018-01-29 RX ADMIN — ASPIRIN 81 MG CHEWABLE TABLET 81 MG: 81 TABLET CHEWABLE at 08:01

## 2018-01-29 RX ADMIN — CEFEPIME 2 G: 2 INJECTION, POWDER, FOR SOLUTION INTRAVENOUS at 01:01

## 2018-01-29 RX ADMIN — ALLOPURINOL 300 MG: 300 TABLET ORAL at 08:01

## 2018-01-29 RX ADMIN — ISOSORBIDE DINITRATE 40 MG: 40 TABLET ORAL at 09:01

## 2018-01-29 NOTE — PLAN OF CARE
Problem: Physical Therapy Goal  Goal: Physical Therapy Goal  Goals to be met by: 18    Patient will increase functional independence with mobility by performin. Supine to sit with supervision   2. Sit to supine with supervision   3. Sit to stand transfer with Supervision using RW   4. Gait  x 100 feet with Contact Guard Assistance using Rolling Walker.   5. Lower extremity exercise program x20 reps per handout, with assistance as needed     Outcome: Ongoing (interventions implemented as appropriate)    Goals updated and appropriate to reflect pt's current mobility needs.    Carmencita Doherty, PT, DPT  [phone]  2018

## 2018-01-29 NOTE — PROGRESS NOTES
[facility]  Neurology  Progress Note    Patient Name: Kev Vasquez  MRN: [id number]  Admission Date: 1/24/2018  Hospital Length of Stay: 5 days  Code Status: Full Code   Attending Provider: Anni Henderson MD  Primary Care Physician: Mega Collins MD   Principal Problem:Vomiting    Subjective:     Interval History: Patient with no major changes today.  Obtained MMSE this am and touched base with PT this afternoon in anticipation of large volume tap.  Attempt at LP at bedside, 2 attempts with access to Liseth's plexus but no CSF flow after advancing further.  Discussed case with primary team, will have IR attempt.    Current Neurological Medications: Bupropion    Current Facility-Administered Medications   Medication Dose Route Frequency Provider Last Rate Last Dose    acetaminophen tablet 650 mg  650 mg Oral Q6H PRN Charlette Jasmine NP   650 mg at 01/28/18 1611    allopurinol tablet 300 mg  300 mg Oral Daily José Luis Yeh, DO   300 mg at 01/29/18 0823    amLODIPine tablet 10 mg  10 mg Oral Daily José Luis Yeh, DO   10 mg at 01/29/18 0823    aspirin chewable tablet 81 mg  81 mg Oral Daily José Luis Yeh, DO   81 mg at 01/29/18 0823    atorvastatin tablet 10 mg  10 mg Oral Daily José Luis Yeh, DO   10 mg at 01/29/18 0823    buPROPion TB24 tablet 450 mg  450 mg Oral Daily José Luis Yeh, DO   450 mg at 01/29/18 0823    ceFEPIme injection 2 g  2 g Intravenous Q12H José Luis Yeh, DO   2 g at 01/29/18 1322    docusate sodium capsule 100 mg  100 mg Oral Daily PRN José Luis Yeh, DO        dofetilide capsule 250 mcg  250 mcg Oral Q12H José Luis Yeh, DO   250 mcg at 01/29/18 0823    folic acid tablet 1 mg  1 mg Oral Daily José Luis Yeh, DO   1 mg at 01/29/18 0824    heparin 25,000 units in dextrose 5% 250 mL (100 units/mL) infusion  17 Units/kg/hr Intravenous Continuous Chip Hill MD 14.1 mL/hr at 01/29/18 1632 17 Units/kg/hr at 01/29/18 1632    hydrALAZINE injection  10 mg  10 mg Intravenous Q6H PRN José Luisshameka Yeh, DO   10 mg at 01/29/18 0052    hydrALAZINE tablet 100 mg  100 mg Oral Q8H José Luisshameka Yeh, DO   100 mg at 01/29/18 1322    isosorbide dinitrate tablet 40 mg  40 mg Oral TID José Luisshameka Yeh, DO   40 mg at 01/29/18 1322    Lactobacillus rhamnosus GG capsule 1 capsule  1 capsule Oral Daily José Luisshameka Yeh, DO   1 capsule at 01/29/18 0823    lisinopril tablet 20 mg  20 mg Oral BID José Luis DGuanako Yeh, DO   20 mg at 01/29/18 0824    magnesium oxide tablet 400 mg  400 mg Oral BID José Luis BARRY Yeh, DO   400 mg at 01/29/18 0823    ondansetron injection 4 mg  4 mg Intravenous Q6H PRN José Luisshameka Yeh, DO        pantoprazole EC tablet 40 mg  40 mg Oral BID AC Charlette Kenroy, NP   40 mg at 01/29/18 0523    promethazine tablet 12.5 mg  12.5 mg Oral Q6H PRN José Luis Yeh,          Review of Systems   Constitutional: Positive for fatigue. Negative for appetite change, chills and fever.   Eyes: Negative for photophobia and visual disturbance.   Respiratory: Negative for cough and shortness of breath.    Gastrointestinal: Negative for constipation.   Genitourinary:        +urinary incontinence x ~ 1 year   Musculoskeletal: Positive for back pain (chronic LBP) and gait problem.   Skin: Negative for rash and wound.   Neurological: Positive for numbness (stocking-glove distribution, chronic). Negative for weakness.   Psychiatric/Behavioral: Positive for confusion and hallucinations (visual). Negative for agitation.     Objective:     Vital Signs (Most Recent):  Temp: 97.7 °F (36.5 °C) (01/29/18 0800)  Pulse: 69 (01/29/18 1653)  Resp: 18 (01/29/18 1653)  BP: 121/78 (01/29/18 1653)  SpO2: (!) 93 % (01/29/18 1653) Vital Signs (24h Range):  Temp:  [97.7 °F (36.5 °C)-98.8 °F (37.1 °C)] 97.7 °F (36.5 °C)  Pulse:  [63-92] 69  Resp:  [16-20] 18  SpO2:  [93 %-98 %] 93 %  BP: ()/(0-91) 121/78     Weight: 81.7 kg (180 lb 1.9 oz)  Body mass index is 25.84 kg/m².    Physical Exam  General:   Well-developed, well-nourished, nad  HEENT:  NCAT, PERRLA, EOMI, oropharyngeal membranes non-erythematous/without exudate  Neck:  Supple, normal ROM without nuchal rigidity  Resp:  Symmetric expansion, no increased wob  CVS:  No LE edema, peripheral pulses 2+ (radial, dorsalis pedis)  GI:  Abd soft, non-distended, non-tender to palpation  Neurologic Exam:  Mental Status:  Awake, alert, oriented to self, location, but not to date.  Speech, thought content appropriate.  Able to spell 'world' forward, refuses backward.  Recent recall 2/3, remote recall 2/3.  See MMSE below.  Cranial Nerves:  VFs intact on moving fingers in all quadrants bilaterally.  PERRLA, EOMI.  Facial movement, sensation intact and symmetric.  Palate raises symmetrically, tongue protrudes midline.  Trapezius strength 5/5 bilaterally.  Motor:  Normal bulk and tone.  BUE shoulder abduction, biceps/triceps, wrist flexion/extension,  strength 5/5.  BLE hip flexors, knee flexion/extension, plantarflexion/dorsiflexion 5/5.  No pronator drift.  Sensory:  Intact to light touch at all extremities without inattention.  Vibratory sensation intact at BUE digits, BLE lower shin.  Reflexes:  Biceps, brachioradialis, patellar, Achilles 2+ and symmetric.  No ankle clonus.  Downgoing toe bilaterally.  Coordination:  FNF, RICO, HTS intact with no dysmetria/ataxia/dysdiadochokinesia.  Mild resting tremor in RUE noted on pronator drift testing.  Gait:  Patient very unsteady on standing with tendency toward retropulsion.  Worsens when trying to take a step, patient slides foot forward awkwardly with leaning to the right.         Significant Labs:     Recent Labs  Lab 01/29/18  0446   WBC 7.74   RBC 4.11*   HGB 11.8*   HCT 35.7*      MCV 87   MCH 28.7   MCHC 33.1       Recent Labs  Lab 01/29/18  0446   CALCIUM 10.1   PROT 7.3      K 3.8   CO2 24      BUN 22   CREATININE 1.7*   ALKPHOS 77   ALT 16   AST 26   BILITOT 0.6     Significant Imaging:    01/24/18 CT head w/o contrast:  Possible CSF effect in periventricular area vs microvascular ischemic change.  Unable to obtain MRI 2/2 LVAD.  Stable exam noting mild chronic ischemic changes and generalized cerebral volume loss. No evidence of acute intracranial pathology.       Assessment and Plan:     Confusion    -Concern for NPH vs underlying dementia with delirium or progression  -Would like to plan for large volume LP with PT eval before and after procedure   -Failed attempt at bedside today, pt had pre-eval MMSE and PT gait evaluation   -Plan for LP with fluoroscopy tomorrow am  -Recommend continued PT/OT for ADLs and gait training     VTE Risk Mitigation         Ordered     heparin 25,000 units in dextrose 5% 250 mL (100 units/mL) infusion  Continuous     Route:  Intravenous        01/28/18 0660        Alesia Montero MD  Neurology  [facility]

## 2018-01-29 NOTE — SUBJECTIVE & OBJECTIVE
Subjective:     Interval History: Patient with no major changes today.  Obtained MMSE this am and touched base with PT this afternoon in anticipation of large volume tap.  Attempt at LP at bedside, 2 attempts with access to Liseth's plexus but no CSF flow after advancing further.  Discussed case with primary team, will have IR attempt.    Current Neurological Medications: Bupropion    Current Facility-Administered Medications   Medication Dose Route Frequency Provider Last Rate Last Dose    acetaminophen tablet 650 mg  650 mg Oral Q6H PRN Charlette Jasmine NP   650 mg at 01/28/18 1611    allopurinol tablet 300 mg  300 mg Oral Daily José Luis Yeh, DO   300 mg at 01/29/18 0823    amLODIPine tablet 10 mg  10 mg Oral Daily José Luis Yeh, DO   10 mg at 01/29/18 0823    aspirin chewable tablet 81 mg  81 mg Oral Daily José Luisshameka Yeh, DO   81 mg at 01/29/18 0823    atorvastatin tablet 10 mg  10 mg Oral Daily José Luis Yeh, DO   10 mg at 01/29/18 0823    buPROPion TB24 tablet 450 mg  450 mg Oral Daily José Luis Yeh, DO   450 mg at 01/29/18 0823    ceFEPIme injection 2 g  2 g Intravenous Q12H José Luis Yeh, DO   2 g at 01/29/18 1322    docusate sodium capsule 100 mg  100 mg Oral Daily PRN José Luis Yeh,         dofetilide capsule 250 mcg  250 mcg Oral Q12H José Luis Yeh, DO   250 mcg at 01/29/18 0823    folic acid tablet 1 mg  1 mg Oral Daily José Luis Yeh, DO   1 mg at 01/29/18 0824    heparin 25,000 units in dextrose 5% 250 mL (100 units/mL) infusion  17 Units/kg/hr Intravenous Continuous Chip Hill MD 14.1 mL/hr at 01/29/18 1632 17 Units/kg/hr at 01/29/18 1632    hydrALAZINE injection 10 mg  10 mg Intravenous Q6H PRN José Luis Yeh, DO   10 mg at 01/29/18 0052    hydrALAZINE tablet 100 mg  100 mg Oral Q8H José Luis Yeh, DO   100 mg at 01/29/18 1322    isosorbide dinitrate tablet 40 mg  40 mg Oral TID José Luis Yeh DO   40 mg at 01/29/18 3472    Lactobacillus rhamnosus GG capsule  1 capsule  1 capsule Oral Daily José Luis Yeh DO   1 capsule at 01/29/18 0823    lisinopril tablet 20 mg  20 mg Oral BID José Luis Yeh DO   20 mg at 01/29/18 0824    magnesium oxide tablet 400 mg  400 mg Oral BID José Luis Yeh DO   400 mg at 01/29/18 0823    ondansetron injection 4 mg  4 mg Intravenous Q6H PRN José Luis Yeh DO        pantoprazole EC tablet 40 mg  40 mg Oral BID AC Charlette Kenroy, NP   40 mg at 01/29/18 0523    promethazine tablet 12.5 mg  12.5 mg Oral Q6H PRN José Luis Yeh DO         Review of Systems   Constitutional: Positive for fatigue. Negative for appetite change, chills and fever.   Eyes: Negative for photophobia and visual disturbance.   Respiratory: Negative for cough and shortness of breath.    Gastrointestinal: Negative for constipation.   Genitourinary:        +urinary incontinence x ~ 1 year   Musculoskeletal: Positive for back pain (chronic LBP) and gait problem.   Skin: Negative for rash and wound.   Neurological: Positive for numbness (stocking-glove distribution, chronic). Negative for weakness.   Psychiatric/Behavioral: Positive for confusion and hallucinations (visual). Negative for agitation.     Objective:     Vital Signs (Most Recent):  Temp: 97.7 °F (36.5 °C) (01/29/18 0800)  Pulse: 69 (01/29/18 1653)  Resp: 18 (01/29/18 1653)  BP: 121/78 (01/29/18 1653)  SpO2: (!) 93 % (01/29/18 1653) Vital Signs (24h Range):  Temp:  [97.7 °F (36.5 °C)-98.8 °F (37.1 °C)] 97.7 °F (36.5 °C)  Pulse:  [63-92] 69  Resp:  [16-20] 18  SpO2:  [93 %-98 %] 93 %  BP: ()/(0-91) 121/78     Weight: 81.7 kg (180 lb 1.9 oz)  Body mass index is 25.84 kg/m².    Physical Exam  General:  Well-developed, well-nourished, nad  HEENT:  NCAT, PERRLA, EOMI, oropharyngeal membranes non-erythematous/without exudate  Neck:  Supple, normal ROM without nuchal rigidity  Resp:  Symmetric expansion, no increased wob  CVS:  No LE edema, peripheral pulses 2+ (radial, dorsalis pedis)  GI:  Abd soft,  non-distended, non-tender to palpation  Neurologic Exam:  Mental Status:  Awake, alert, oriented to self, location, but not to date.  Speech, thought content appropriate.  Able to spell 'world' forward, refuses backward.  Recent recall 2/3, remote recall 2/3.  See MMSE below.  Cranial Nerves:  VFs intact on moving fingers in all quadrants bilaterally.  PERRLA, EOMI.  Facial movement, sensation intact and symmetric.  Palate raises symmetrically, tongue protrudes midline.  Trapezius strength 5/5 bilaterally.  Motor:  Normal bulk and tone.  BUE shoulder abduction, biceps/triceps, wrist flexion/extension,  strength 5/5.  BLE hip flexors, knee flexion/extension, plantarflexion/dorsiflexion 5/5.  No pronator drift.  Sensory:  Intact to light touch at all extremities without inattention.  Vibratory sensation intact at BUE digits, BLE lower shin.  Reflexes:  Biceps, brachioradialis, patellar, Achilles 2+ and symmetric.  No ankle clonus.  Downgoing toe bilaterally.  Coordination:  FNF, RICO, HTS intact with no dysmetria/ataxia/dysdiadochokinesia.  Mild resting tremor in RUE noted on pronator drift testing.  Gait:  Patient very unsteady on standing with tendency toward retropulsion.  Worsens when trying to take a step, patient slides foot forward awkwardly with leaning to the right.         Significant Labs:     Recent Labs  Lab 18  0446   WBC 7.74   RBC 4.11*   HGB 11.8*   HCT 35.7*      MCV 87   MCH 28.7   MCHC 33.1       Recent Labs  Lab 18  0446   CALCIUM 10.1   PROT 7.3      K 3.8   CO2 24      BUN 22   CREATININE 1.7*   ALKPHOS 77   ALT 16   AST 26   BILITOT 0.6     Significant Imagin18 CT head w/o contrast:  Possible CSF effect in periventricular area vs microvascular ischemic change.  Unable to obtain MRI 2/2 LVAD.  Stable exam noting mild chronic ischemic changes and generalized cerebral volume loss. No evidence of acute intracranial pathology.

## 2018-01-29 NOTE — PT/OT/SLP PROGRESS
"Physical Therapy Treatment    Patient Name:  Kev Vasquez   MRN:  [id number]    Recommendations:     Discharge Recommendations:  home with home health   Discharge Equipment Recommendations: none   Barriers to discharge: Inaccessible home and Decreased caregiver support    Assessment:     Kev Vasquez is a 77 y.o. male admitted with a medical diagnosis of Vomiting.  He presents with the following impairments/functional limitations:  weakness, impaired endurance, impaired functional mobilty, impaired self care skills, impaired cognition, impaired balance, gait instability, abnormal tone, decreased safety awareness, decreased lower extremity function, decreased upper extremity function, impaired cardiopulmonary response to activity. Pt presents with instability of gait, impaired standing balance, and decreased activity tolerance. Pt currently req min A for majority of mobility; x3-4 instances of LOB req mod A for balance recovery. Pt participated in PT session this date for pre-assessment for Lumbar Puncture procedure.    Rehab Prognosis:  Good; patient would benefit from acute skilled PT services to address these deficits and reach maximum level of function.      Recent Surgery: Procedure(s) (LRB):  ESOPHAGOGASTRODUODENOSCOPY (EGD) (N/A) 3 Days Post-Op    Plan:     During this hospitalization, patient to be seen 4 x/week to address the above listed problems via gait training, therapeutic activities, therapeutic exercises, neuromuscular re-education  · Plan of Care Expires:  02/25/18   Plan of Care Reviewed with: patient, spouse, caregiver    Subjective     Communicated with RN (Elena) prior to session.  Patient found resting in supine upon PT entry to room, agreeable to treatment.      Chief Complaint: "I do this everyday."  Patient comments/goals: "I will walk. Get out of the way."  Pain/Comfort:  · Pain Rating 1: 0/10  · Pain Rating Post-Intervention 1: 0/10    Patients cultural, spiritual, Congregational conflicts " given the current situation: none noted    Objective:     Patient found with: telemetry, LVAD     General Precautions: Standard, fall, LVAD, nectar thick   Orthopedic Precautions:N/A   Braces: N/A     Functional Mobility Assessment:  Bed Mobility:   Rolling to the L: Contact Guard Assistance   Supine to Sit: From L sidelying. Contact Guard Assistance   Sit to Supine: To L sidelying. Stand-by Assistance    Sitting Balance at Edge of Bed:   Assistance Level Required: Stand-by Assistance and Contact Guard Assistance - x2-3 minor LOB req CGA   Time: x10 min total   Postural deviations noted:    -       Rounded shoulders  -       Forward head  -       Posterior pelvic tilt   Encouraged: upright posture; feet on floor; neutral pelvis    Transfers:   Sit to Stand: Minimal Assistance and Moderate Assistance with RW; Min A from EOB, Mod A from bedside chair; propping on surface noted with poor body mechanics and posterior lean; with toe flare bilaterally. Upon attempt to stand from chair, pt placed both hands on walker and req stabilization to come to full standing.   Stand to Sit: Minimal Assistance with RW; for controlled descent to surface   Bed to Chair: Stand Pivot with Minimal Assistance with Rolling Walker; pt demo strong posterior lean with weight through heels and toe flare noted; RW stabilized to assist with anterior weight shift    Gait:   Distance Ambulated: Pt ambulated x100 feet in hallway setting. Pt demo stiff, dis-coordinated gait pattern. CGA/min A provided for postural stability and directional guidance especially during turning.    Assistance level: Contact Guard Assistance and Minimal Assistance   Assistive Device used:  Rolling Walker   Gait Pattern: reciprocal gait   Gait Deviation(s): decreased william, decreased step length, decreased weight-shifting ability and NBOS   Impairments due to: instability of gait    Therapeutic Activities and Exercises:  PT arrived to pt's room to find pt resting quietly;  agreeable to PT session. Pt assisted with switch from wall to battery power and managing VAD back 2/2 impaired fine motor this PM. Pt performed mobility as above. Pt not appropriate for COOLEY balance assessment 2/2 severely impaired standing balance and impulsivity.    Upon return to room, pt returned to supine for LP procedure in room. Upon PT return in later PM, pt's spouse informed PT that medical staff unable to perform procedure; rescheduled for tomorrow.    AM-PAC 6 CLICK MOBILITY  Turning over in bed (including adjusting bedclothes, sheets and blankets)?: 4  Sitting down on and standing up from a chair with arms (e.g., wheelchair, bedside commode, etc.): 3  Moving from lying on back to sitting on the side of the bed?: 3  Moving to and from a bed to a chair (including a wheelchair)?: 3  Need to walk in hospital room?: 3  Climbing 3-5 steps with a railing?: 2  Total Score: 18     Patient left HOB elevated with all lines intact, call button in reach, RN notified and medical team present present..    GOALS:    Physical Therapy Goals        Problem: Physical Therapy Goal    Goal Priority Disciplines Outcome Goal Variances Interventions   Physical Therapy Goal     PT/OT, PT Ongoing (interventions implemented as appropriate)     Description:  Goals to be met by: 18    Patient will increase functional independence with mobility by performin. Supine to sit with supervision   2. Sit to supine with supervision   3. Sit to stand transfer with Supervision using RW   4. Gait  x 100 feet with Contact Guard Assistance using Rolling Walker.   5. Lower extremity exercise program x20 reps per handout, with assistance as needed                      Time Tracking:     PT Received On: 18  PT Start Time: 1401     PT Stop Time: 1430  PT Total Time (min): 29 min     Billable Minutes: Therapeutic Activity 15 and Neuromuscular Re-education 10    Treatment Type: Treatment  PT/PTA: PT         Carmencita Doherty, PT,  DPT  [phone]  1/29/2018

## 2018-01-29 NOTE — ASSESSMENT & PLAN NOTE
- CT of head 1/24/18 with no acute changes  - Given constellation of confusion, gait disturbance and urinary incontinence, consulted Neuro (NPH ?). Appreciate their help. Suspect underlying dementia but recommending large volume LP which they plan to do today ~ 2:30 PM (Coumadin on hold and INR has trended down to 1.5 - Heparin bridge turned off ~ 8:00 AM this morning). Plan is for PT to assess his gait pre and post procedure today  - D/C'd Ropinirole again 2/2 gait disturbance  - Delirium protocol

## 2018-01-29 NOTE — PROGRESS NOTES
[facility]  Heart Transplant  Progress Note    Patient Name: Kev Vasquez  MRN: [id number]  Admission Date: 1/24/2018  Hospital Length of Stay: 5 days  Attending Physician: Anni Henderson MD  Primary Care Provider: Mega Collins MD  Principal Problem:Vomiting    Subjective:     Interval History: More oriented this morning. No emesis since yesterday AM    Continuous Infusions:   heparin (porcine) in D5W Stopped (01/29/18 0857)     Scheduled Meds:   allopurinol  300 mg Oral Daily    amLODIPine  10 mg Oral Daily    aspirin  81 mg Oral Daily    atorvastatin  10 mg Oral Daily    buPROPion  450 mg Oral Daily    ceFEPime (MAXIPIME) IVPB  2 g Intravenous Q12H    dofetilide  250 mcg Oral Q12H    folic acid  1 mg Oral Daily    hydrALAZINE  100 mg Oral Q8H    isosorbide dinitrate  40 mg Oral TID    Lactobacillus rhamnosus GG  1 capsule Oral Daily    lisinopril  20 mg Oral BID    magnesium oxide  400 mg Oral BID    pantoprazole  40 mg Oral BID AC     PRN Meds:acetaminophen, docusate sodium, hydrALAZINE, ondansetron, promethazine    Review of patient's allergies indicates:   Allergen Reactions    Aldactone [spironolactone]       persistent hyperkalemia.    Sulfa (sulfonamide antibiotics)      Objective:     Vital Signs (Most Recent):  Temp: 97.7 °F (36.5 °C) (01/29/18 0800)  Pulse: 65 (01/29/18 1000)  Resp: 18 (01/29/18 0800)  BP: (!) 82/0 (01/29/18 0800)  SpO2: 97 % (01/29/18 0800) Vital Signs (24h Range):  Temp:  [97.4 °F (36.3 °C)-98.8 °F (37.1 °C)] 97.7 °F (36.5 °C)  Pulse:  [63-92] 65  Resp:  [18-20] 18  SpO2:  [93 %-98 %] 97 %  BP: ()/(0-96) 82/0     Patient Vitals for the past 72 hrs (Last 3 readings):   Weight   01/29/18 0700 81.7 kg (180 lb 1.9 oz)   01/28/18 0700 82.7 kg (182 lb 5.1 oz)   01/27/18 0700 82.8 kg (182 lb 8.7 oz)     Body mass index is 25.84 kg/m².      Intake/Output Summary (Last 24 hours) at 01/29/18 1012  Last data filed at 01/29/18 0600   Gross per 24  hour   Intake             1191 ml   Output                0 ml   Net             1191 ml       Hemodynamic Parameters:  CVP:  [2 mmHg] 2 mmHg    Telemetry: BiV paced    Physical Exam   Constitutional: He appears well-developed and well-nourished.   HENT:   Head: Normocephalic and atraumatic.   Eyes: Conjunctivae are normal. Pupils are equal, round, and reactive to light.   Neck: Normal range of motion. Neck supple. No JVD present.   Cardiovascular: Normal rate and regular rhythm.    Smooth VAD hum. Intermittently pulsatile   Pulmonary/Chest: Effort normal and breath sounds normal.   Abdominal: Soft. Bowel sounds are normal.   Genitourinary:   Genitourinary Comments: Incontinent   Musculoskeletal: He exhibits no edema.   Neurological: He is alert.   Oriented X 3 this morning   Skin: Skin is warm and dry. Capillary refill takes 2 to 3 seconds.       Significant Labs:  CBC:    Recent Labs  Lab 01/27/18  0530 01/28/18  0429 01/29/18  0446   WBC 8.09 8.12 7.74   RBC 3.93* 4.01* 4.11*   HGB 11.4* 11.5* 11.8*   HCT 34.3* 35.0* 35.7*   * 134* 150   MCV 87 87 87   MCH 29.0 28.7 28.7   MCHC 33.2 32.9 33.1     BNP:    Recent Labs  Lab 01/25/18  0421 01/26/18  0334 01/29/18  0446   * 347* 383*     CMP:    Recent Labs  Lab 01/25/18  0421 01/26/18  0334 01/27/18  0530 01/28/18  0429 01/29/18  0446   GLU 89  89 79 92 92 84   CALCIUM 10.5  10.5 10.5 10.4 9.9 10.1   ALBUMIN 3.2* 3.3*  --   --  3.3*   PROT 6.9 7.0  --   --  7.3     140 139 140 141 140   K 4.3  4.3 4.1 4.0 3.8 3.8   CO2 27  27 26 24 23 24     107 107 109 109 107   BUN 31*  31* 27* 31* 25* 22   CREATININE 2.0*  2.0* 2.1* 2.1* 1.8* 1.7*   ALKPHOS 71 72  --   --  77   ALT 15 14  --   --  16   AST 22 23  --   --  26   BILITOT 0.4 0.5  --   --  0.6      Coagulation:     Recent Labs  Lab 01/27/18  0530 01/28/18  0429 01/29/18  0446   INR 2.5* 1.8* 1.5*     LDH:    Recent Labs  Lab 01/27/18  0530 01/28/18  0429 01/29/18  0446    181  219     Microbiology:  Microbiology Results (last 7 days)     Procedure Component Value Units Date/Time    Blood culture #1 [[id number]] Collected:  01/24/18 1521    Order Status:  Completed Specimen:  Blood from Line, PICC Right Basilic Updated:  01/28/18 1812     Blood Culture, Routine No Growth to date     Blood Culture, Routine No Growth to date     Blood Culture, Routine No Growth to date     Blood Culture, Routine No Growth to date     Blood Culture, Routine No Growth to date    Narrative:       Blood Culture #1    Blood culture #2 [[id number]] Collected:  01/24/18 1537    Order Status:  Completed Specimen:  Blood from Peripheral, Hand, Right Updated:  01/28/18 1812     Blood Culture, Routine No Growth to date     Blood Culture, Routine No Growth to date     Blood Culture, Routine No Growth to date     Blood Culture, Routine No Growth to date     Blood Culture, Routine No Growth to date    Narrative:       Blood Culture #2          I have reviewed all pertinent labs within the past 24 hours.    Estimated Creatinine Clearance: 38.8 mL/min (based on SCr of 1.7 mg/dL (H)).          Assessment and Plan:     Mr. 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 BiV ICD, HILLARY/BiPAP, HTN, CKD, HLP, gout and depression who presents with confusion. On further questioning of his wife and his caregiver who were both at bedside, they noticed some odd behaviours such as combing his cheek/nose, getting dressed to go hunting when that is not his hobby and admitted to seeing visual hallucinations of his dead brother. They stated that he is not sleeping well and naps throughout the day. They wonder if his BiPAP is not in the proper settings anymore and he now has made a game out of looking at his watch to see when he will get  to sleep. He stated it was 2017, January, thought he was in [place] (moved from there in June), and stated [place] as the president. Altogether, he denies SOB, CP, LE edema, orthopnea and PND. He has no fevers or chills, cough, or diarrhea. They state that he does have some worsening vomiting. They stated that a few months ago, he would vomit after he drank some water: it would start with a sneeze, then a cough, then the vomit. It would happen every now and then, but over the past few weeks, it has worsened to 3-4x/day. They stated that now it occurs without drinking, sometimes before dinner and he would throw up almost undigested food. He admitted to throwing up pills too at times. There have been no VAD alarms noted.       * Vomiting    - Appreciate GI's help. KUB unremarkable. EGD also negative.  - PPI bid  - Appreciate ST's help given coughing and sneezing that can precede emesis. Plan MBSS tomorrow        Confusion    - CT of head 1/24/18 with no acute changes  - Given constellation of confusion, gait disturbance and urinary incontinence, consulted Neuro (NPH ?). Appreciate their help. Suspect underlying dementia but recommending large volume LP which they plan to do today ~ 2:30 PM (Coumadin on hold and INR has trended down to 1.5 - Heparin bridge turned off ~ 8:00 AM this morning). Plan is for PT to assess his gait pre and post procedure today  - D/C'd Ropinirole again 2/2 gait disturbance  - Delirium protocol        LVAD (left ventricular assist device) present    - S/P HM III 10/16/16 as DT in [place]  - Current speed 5800  - TTE 1/25/18 shows LVEDD 6.5, LVEF 10-15%, diastolic dysfunction, RVE, severely depressed RV systolic function, PAS 20, ARTHUR intermittently and septum midline. No comment on IVC - per Dr. Lanza, costal views were too poor to assess IVC.   - CVP via PICC line remains 2  (was taking Lasix 80 mg every M-W-F at home) - holding Lasix  - INR now 1.5 today, holding Coumadin as noted above;  now bridging with heparin gtt. Will resume Coumadin this evening  - LDH has bumped a little at 219 - will follow  - Continue IV Cefepime for chronic Pseudomonas DL infection        Essential hypertension    - He has a long h/o orthostatic hypotension, so BP's should only be checked while sitting or standing  - Will allow for some permissive hypertension with goal MAP ~ 90 or less  - Continue Norvasc, Hydralazine, Isordil, Lisinopril         Stage 3 chronic kidney disease    - Admit creatinine 2.3, down to 1.7 today, (baseline looks ~ 1.6-1.8)        Gait instability    - PT/OT  - See confusion above        VT (ventricular tachycardia)    - Continue Tikosyn  - Has [employer] BiV ICD  - Keep K+ > 4.0 and Mg+ > 2.0            [place] [id number]  Heart Transplant  [facility]

## 2018-01-29 NOTE — PROGRESS NOTES
01/29/18 0052   Vital Signs   /89  (MAP 97)   MAP (mmHg) 97   BP Location Left arm   BP Method Automatic   Patient Position Sitting     MAP 97 post PRN hydralazine. MD notified. Dr. Yeh stating to give extra dose of PRN hydralazine now and to recheck VS  in a.m. Orders initiated. WTCM

## 2018-01-29 NOTE — PT/OT/SLP PROGRESS
"Speech Language Pathology Treatment    Patient Name:  Kev Vasquez   MRN:  [id number]   325/325 A    Admitting Diagnosis: Vomiting    Recommendations:                 General Recommendations:  Dysphagia therapy and Modified barium swallow study  Diet recommendations:  Regular, Nectar Thick - one packet of thickener for every 6 ounces of liquid  Aspiration Precautions:   · 1 bite/sip at a time,   · Assistance with thickening liquids,   · Feed only when awake/alert,   · HOB to 90 degrees,   · Meds whole 1 at a time,   · Monitor for s/s of aspiration,   · Remain upright 30 minutes post meal   Small bites/sips Communication strategies:  none    Subjective     Patient awake and cooperative sitting upright in chair. Pt's wife and caregiver present in room.       Pain/Comfort:  · Pain Rating 1: 0/10    Objective:     Has the patient been evaluated by SLP for swallowing?   Yes  Keep patient NPO? No   Current Respiratory Status: room air      Per patient's caregiver, pt with improved tolerance with nectar thick liquids. Caregiver reported only 1 instance of pt sneezing and coughing and it was s/p nectar thick liquid sip. SLP assessed pt with sips of nectar thick Coke via straw x10 with no overt s/s of aspiration noeud. Swallowing observed to be more timely with thickener. Pt reported, "Honestly, it doesn't really feel any different to me." SLP provided patient and family education on SLP role, s/s and risks of aspiraiton, safe swallow precautions, and POC. Patient, wife, and caregiver verbalized understanding of all discussed. Whiteboard updated. Family requesting MBSS to objectively r/o aspiration, to determine the least restrictive and safest po diet, and need for dysphagia therapy. Communicated recommendations with NP.     Assessment:     Kev Vasquez is a 77 y.o. male with an SLP diagnosis of Dysphagia.  He presents with tolerance of nectar thick liquids. MBSS to be completed 1/30.     Goals:    SLP Goals        Problem: " SLP Goal    Goal Priority Disciplines Outcome   SLP Goal     SLP Ongoing (interventions implemented as appropriate)   Description:  Speech Therapy Short Term Goals  Goal expected to be met by 2/4  1. Pt will tolerate a regular diet and NECTAR THICK liquids with no overt s/s of aspiration.   2. Pt will tolerate cup sips of thin liquids x10 with no overt s/s of aspiration.   3. Pt will participate in a modified barium swallow study as warranted per MD and SLP.                         Plan:     · Patient to be seen:  4 x/week   · Plan of Care expires:  02/26/18  · Plan of Care reviewed with:  patient, spouse, caregiver   · SLP Follow-Up:  Yes       Discharge recommendations:  home health PT, home health OT, home health speech therapy (HH ST pending MBSS)       Time Tracking:     SLP Treatment Date:   01/29/18  Speech Start Time:  1106  Speech Stop Time:  1122     Speech Total Time (min):  16 min    Billable Minutes: Treatment Swallowing Dysfunction 8 and Seld Care/Home Management Training 8    SANTOSH Henderson, CCC-SLP   Pager: [phone]  01/29/2018

## 2018-01-29 NOTE — PT/OT/SLP PROGRESS
Occupational Therapy      Patient Name:  Kev Vasquez   MRN:  [id number]    Attempted to see patient in AM for OT tx session, however nursing relaying that MD team reporting patient had to engage in functional mobility prior to and post LP procedure and to wait until PM. OT returned at scheduled time with MD exiting patient room. MD reporting patient requiring a formal PT walking assessment prior to and post LP. Physical therapist able to pick patient up, however OT unable to see patient d/t procedure and need for PT assessment at this time.     Radha Quesada, OT  1/29/2018

## 2018-01-29 NOTE — PLAN OF CARE
Problem: SLP Goal  Goal: SLP Goal  Speech Therapy Short Term Goals  Goal expected to be met by 2/4  1. Pt will tolerate a regular diet and NECTAR THICK liquids with no overt s/s of aspiration.   2. Pt will tolerate cup sips of thin liquids x10 with no overt s/s of aspiration.   3. Pt will participate in a modified barium swallow study as warranted per MD and SLP.        Outcome: Ongoing (interventions implemented as appropriate)  MBSS to be completed 1/30. Goals remain appropriate. ST will continue to follow.   JEWEL Khan., CCC-SLP  Pager: [phone]  01/29/2018

## 2018-01-29 NOTE — PLAN OF CARE
Problem: Patient Care Overview  Goal: Plan of Care Review  Outcome: Ongoing (interventions implemented as appropriate)  POC discussed with pt. IV cefepime for DL infection. MAP/Dopplers poorly managed with scheduled meds and PRN Hydralazine throughout night.  MAP >90s at night. Pt sitting up during VS only. Unable to stand due to mobility.  IV hydralazine given twice per MD order. Neuro following for possible LP/cisternogram this week.  LVAD working properly and sounds smooth. VAD #WNL. LVAD dressing remains CDI. Pt denies CP, SOB, or pain/discomfort at this time.Pt free of injuries this shift.  All questions addressed.  Will continue to monitor.

## 2018-01-29 NOTE — PROGRESS NOTES
01/28/18 2335   Vital Signs   O2 Device (Oxygen Therapy) room air   /86   MAP (mmHg) 92   BP Location Left arm   BP Method Automatic   Patient Position Sitting    PRN IV hydralazine given for MAP

## 2018-01-29 NOTE — ASSESSMENT & PLAN NOTE
- S/P HM III 10/16/16 as DT in [place]  - Current speed 5800  - TTE 1/25/18 shows LVEDD 6.5, LVEF 10-15%, diastolic dysfunction, RVE, severely depressed RV systolic function, PAS 20, ARTHUR intermittently and septum midline. No comment on IVC - per Dr. Lanza, costal views were too poor to assess IVC.   - CVP via PICC line remains 2  (was taking Lasix 80 mg every M-W-F at home) - holding Lasix  - INR now 1.5 today, holding Coumadin as noted above; now bridging with heparin gtt. Will resume Coumadin this evening  - LDH has bumped a little at 219 - will follow  - Continue IV Cefepime for chronic Pseudomonas DL infection

## 2018-01-29 NOTE — PROGRESS NOTES
Neuro unable to do LP. ANNAMARIA/Val ([id number]) will do it tomorrow at 2:00 PM. I have discussed with Carmenctia in PT ([phone]). She has already obtained pre-LP gait assessment today, and she will repeat the gait assessment tomorrow ~ 3:30 PM. Heparin gtt resumed, and will hold tomorrow starting at 8 AM

## 2018-01-29 NOTE — ASSESSMENT & PLAN NOTE
- Appreciate GI's help. KUB unremarkable. EGD also negative.  - PPI bid  - Appreciate ST's help given coughing and sneezing that can precede emesis. Plan MBSS tomorrow

## 2018-01-29 NOTE — PROCEDURES
"Kev Vasquez is a 77 y.o. male patient.    Temp: 97.7 °F (36.5 °C) (01/29/18 0800)  Pulse: 66 (01/29/18 1458)  Resp: 16 (01/29/18 1301)  BP: (!) 130/91 (01/29/18 1301)  SpO2: 98 % (01/29/18 1301)  Weight: 81.7 kg (180 lb 1.9 oz) (01/29/18 0700)  Height: 5' 10" (177.8 cm) (01/26/18 0800)       Lumbar Puncture  Date/Time: 1/29/2018 3:26 PM  Location procedure was performed: [facility] NEUROLOGY  Performed by: BRANDON MONTERO  Authorized by: BRANDON MONTERO   Assisting provider: PATRICIA GERONIMO  Pre-operative diagnosis:  Concern for NPH  Post-operative diagnosis: Unchanged  Consent Done: Yes  Indications: NPH evaluation.  Anesthesia: local infiltration    Anesthesia:  Local Anesthetic: lidocaine 2% without epinephrine  Anesthetic total: 6 mL  Patient sedated: no  Description of findings: Venous blood with reaching Liseth's plexus, no CSF after advancing past Liseth's plexus   Preparation: Patient was prepped and draped in the usual sterile fashion.  Lumbar space: L3-L4 interspace  Patient's position: left lateral decubitus  Needle gauge: 18  Needle type: spinal needle - Quincke tip  Needle length: 3.5 in  Number of attempts: 2  Total volume: 0 ml  Post-procedure: site cleaned, pressure dressing applied and adhesive bandage applied  Complications: No  Estimated blood loss (mL): 5  Specimens: No  Implants: No  Patient tolerance: Patient tolerated the procedure well with no immediate complications        Brandon Montero  1/29/2018  "

## 2018-01-29 NOTE — PROGRESS NOTES
Pt usually vomits after morning meds(prior last 2 mornings). RN gave small sips of nectar thick water prior to meds this mornings. Pt vomit right after sips of water. RN crushed meds in pudding. Pt tolerated well w/o vomiting. BP stable this a.m MAP  81

## 2018-01-29 NOTE — PROGRESS NOTES
Spoke with RON Ovalles with [facility] regarding MAP of 92. Charlette graves with MAP of 92. No new orders given. ZULY PLATA

## 2018-01-29 NOTE — PROGRESS NOTES
Update:    SW to pt's room for update. Pt awake, alert, and oriented to person and place. Pt's wife present, aaox4, calm, and cooperative. Pt and wife report coping adequately at this time. Pt and wife report no needs from SW at this time. SW providing ongoing psychosocial and counseling support, education, assistance, resources, and discharge planning as indicated. SW continuing to follow and remains available.

## 2018-01-29 NOTE — SUBJECTIVE & OBJECTIVE
Interval History: More oriented this morning. No emesis since yesterday AM    Continuous Infusions:   heparin (porcine) in D5W Stopped (01/29/18 0857)     Scheduled Meds:   allopurinol  300 mg Oral Daily    amLODIPine  10 mg Oral Daily    aspirin  81 mg Oral Daily    atorvastatin  10 mg Oral Daily    buPROPion  450 mg Oral Daily    ceFEPime (MAXIPIME) IVPB  2 g Intravenous Q12H    dofetilide  250 mcg Oral Q12H    folic acid  1 mg Oral Daily    hydrALAZINE  100 mg Oral Q8H    isosorbide dinitrate  40 mg Oral TID    Lactobacillus rhamnosus GG  1 capsule Oral Daily    lisinopril  20 mg Oral BID    magnesium oxide  400 mg Oral BID    pantoprazole  40 mg Oral BID AC     PRN Meds:acetaminophen, docusate sodium, hydrALAZINE, ondansetron, promethazine    Review of patient's allergies indicates:   Allergen Reactions    Aldactone [spironolactone]       persistent hyperkalemia.    Sulfa (sulfonamide antibiotics)      Objective:     Vital Signs (Most Recent):  Temp: 97.7 °F (36.5 °C) (01/29/18 0800)  Pulse: 65 (01/29/18 1000)  Resp: 18 (01/29/18 0800)  BP: (!) 82/0 (01/29/18 0800)  SpO2: 97 % (01/29/18 0800) Vital Signs (24h Range):  Temp:  [97.4 °F (36.3 °C)-98.8 °F (37.1 °C)] 97.7 °F (36.5 °C)  Pulse:  [63-92] 65  Resp:  [18-20] 18  SpO2:  [93 %-98 %] 97 %  BP: ()/(0-96) 82/0     Patient Vitals for the past 72 hrs (Last 3 readings):   Weight   01/29/18 0700 81.7 kg (180 lb 1.9 oz)   01/28/18 0700 82.7 kg (182 lb 5.1 oz)   01/27/18 0700 82.8 kg (182 lb 8.7 oz)     Body mass index is 25.84 kg/m².      Intake/Output Summary (Last 24 hours) at 01/29/18 1012  Last data filed at 01/29/18 0600   Gross per 24 hour   Intake             1191 ml   Output                0 ml   Net             1191 ml       Hemodynamic Parameters:  CVP:  [2 mmHg] 2 mmHg    Telemetry: BiV paced    Physical Exam   Constitutional: He appears well-developed and well-nourished.   HENT:   Head: Normocephalic and atraumatic.   Eyes:  Conjunctivae are normal. Pupils are equal, round, and reactive to light.   Neck: Normal range of motion. Neck supple. No JVD present.   Cardiovascular: Normal rate and regular rhythm.    Smooth VAD hum. Intermittently pulsatile   Pulmonary/Chest: Effort normal and breath sounds normal.   Abdominal: Soft. Bowel sounds are normal.   Genitourinary:   Genitourinary Comments: Incontinent   Musculoskeletal: He exhibits no edema.   Neurological: He is alert.   Oriented X 3 this morning   Skin: Skin is warm and dry. Capillary refill takes 2 to 3 seconds.       Significant Labs:  CBC:    Recent Labs  Lab 01/27/18  0530 01/28/18 0429 01/29/18 0446   WBC 8.09 8.12 7.74   RBC 3.93* 4.01* 4.11*   HGB 11.4* 11.5* 11.8*   HCT 34.3* 35.0* 35.7*   * 134* 150   MCV 87 87 87   MCH 29.0 28.7 28.7   MCHC 33.2 32.9 33.1     BNP:    Recent Labs  Lab 01/25/18  0421 01/26/18  0334 01/29/18  0446   * 347* 383*     CMP:    Recent Labs  Lab 01/25/18  0421 01/26/18  0334 01/27/18  0530 01/28/18 0429 01/29/18 0446   GLU 89  89 79 92 92 84   CALCIUM 10.5  10.5 10.5 10.4 9.9 10.1   ALBUMIN 3.2* 3.3*  --   --  3.3*   PROT 6.9 7.0  --   --  7.3     140 139 140 141 140   K 4.3  4.3 4.1 4.0 3.8 3.8   CO2 27  27 26 24 23 24     107 107 109 109 107   BUN 31*  31* 27* 31* 25* 22   CREATININE 2.0*  2.0* 2.1* 2.1* 1.8* 1.7*   ALKPHOS 71 72  --   --  77   ALT 15 14  --   --  16   AST 22 23  --   --  26   BILITOT 0.4 0.5  --   --  0.6      Coagulation:     Recent Labs  Lab 01/27/18  0530 01/28/18  0429 01/29/18 0446   INR 2.5* 1.8* 1.5*     LDH:    Recent Labs  Lab 01/27/18  0530 01/28/18  0429 01/29/18  0446    181 219     Microbiology:  Microbiology Results (last 7 days)     Procedure Component Value Units Date/Time    Blood culture #1 [[id number]] Collected:  01/24/18 1521    Order Status:  Completed Specimen:  Blood from Line, PICC Right Basilic Updated:  01/28/18 1812     Blood Culture, Routine No Growth to  date     Blood Culture, Routine No Growth to date     Blood Culture, Routine No Growth to date     Blood Culture, Routine No Growth to date     Blood Culture, Routine No Growth to date    Narrative:       Blood Culture #1    Blood culture #2 [[id number]] Collected:  01/24/18 1537    Order Status:  Completed Specimen:  Blood from Peripheral, Hand, Right Updated:  01/28/18 1812     Blood Culture, Routine No Growth to date     Blood Culture, Routine No Growth to date     Blood Culture, Routine No Growth to date     Blood Culture, Routine No Growth to date     Blood Culture, Routine No Growth to date    Narrative:       Blood Culture #2          I have reviewed all pertinent labs within the past 24 hours.    Estimated Creatinine Clearance: 38.8 mL/min (based on SCr of 1.7 mg/dL (H)).

## 2018-01-30 ENCOUNTER — PATIENT MESSAGE (OUTPATIENT)
Dept: INFECTIOUS DISEASES | Facility: CLINIC | Age: 78
End: 2018-01-30

## 2018-01-30 PROBLEM — R11.10 VOMITING: Status: ACTIVE | Noted: 2018-01-30

## 2018-01-30 LAB
ANION GAP SERPL CALC-SCNC: 6 MMOL/L
BASOPHILS # BLD AUTO: 0.03 K/UL
BASOPHILS NFR BLD: 0.4 %
BUN SERPL-MCNC: 19 MG/DL
CALCIUM SERPL-MCNC: 9.7 MG/DL
CHLORIDE SERPL-SCNC: 108 MMOL/L
CO2 SERPL-SCNC: 25 MMOL/L
CREAT SERPL-MCNC: 1.6 MG/DL
DIFFERENTIAL METHOD: ABNORMAL
EOSINOPHIL # BLD AUTO: 0.2 K/UL
EOSINOPHIL NFR BLD: 2.3 %
ERYTHROCYTE [DISTWIDTH] IN BLOOD BY AUTOMATED COUNT: 17.5 %
EST. GFR  (AFRICAN AMERICAN): 48 ML/MIN/1.73 M^2
EST. GFR  (NON AFRICAN AMERICAN): 41.5 ML/MIN/1.73 M^2
FACT X PPP CHRO-ACNC: 0.51 IU/ML
GLUCOSE SERPL-MCNC: 85 MG/DL
HCT VFR BLD AUTO: 33.1 %
HGB BLD-MCNC: 10.9 G/DL
IMM GRANULOCYTES # BLD AUTO: 0.03 K/UL
IMM GRANULOCYTES NFR BLD AUTO: 0.4 %
INR PPP: 1.3
LDH SERPL L TO P-CCNC: 220 U/L
LYMPHOCYTES # BLD AUTO: 1.2 K/UL
LYMPHOCYTES NFR BLD: 15.4 %
MAGNESIUM SERPL-MCNC: 2 MG/DL
MCH RBC QN AUTO: 28.8 PG
MCHC RBC AUTO-ENTMCNC: 32.9 G/DL
MCV RBC AUTO: 88 FL
MONOCYTES # BLD AUTO: 1 K/UL
MONOCYTES NFR BLD: 13 %
NEUTROPHILS # BLD AUTO: 5.2 K/UL
NEUTROPHILS NFR BLD: 68.5 %
NRBC BLD-RTO: 0 /100 WBC
PLATELET # BLD AUTO: 127 K/UL
PMV BLD AUTO: 11.5 FL
POTASSIUM SERPL-SCNC: 3.7 MMOL/L
PROTHROMBIN TIME: 12.9 SEC
RBC # BLD AUTO: 3.78 M/UL
SODIUM SERPL-SCNC: 139 MMOL/L
WBC # BLD AUTO: 7.53 K/UL

## 2018-01-30 PROCEDURE — 83735 ASSAY OF MAGNESIUM: CPT

## 2018-01-30 PROCEDURE — 009U3ZZ DRAINAGE OF SPINAL CANAL, PERCUTANEOUS APPROACH: ICD-10-PCS | Performed by: RADIOLOGY

## 2018-01-30 PROCEDURE — 85610 PROTHROMBIN TIME: CPT

## 2018-01-30 PROCEDURE — 85520 HEPARIN ASSAY: CPT

## 2018-01-30 PROCEDURE — 25000003 PHARM REV CODE 250: Performed by: PHYSICIAN ASSISTANT

## 2018-01-30 PROCEDURE — 99232 SBSQ HOSP IP/OBS MODERATE 35: CPT | Mod: ,,, | Performed by: INTERNAL MEDICINE

## 2018-01-30 PROCEDURE — 63600175 PHARM REV CODE 636 W HCPCS: Performed by: INTERNAL MEDICINE

## 2018-01-30 PROCEDURE — 97803 MED NUTRITION INDIV SUBSEQ: CPT

## 2018-01-30 PROCEDURE — 97530 THERAPEUTIC ACTIVITIES: CPT

## 2018-01-30 PROCEDURE — 25000003 PHARM REV CODE 250: Performed by: INTERNAL MEDICINE

## 2018-01-30 PROCEDURE — 27000248 HC VAD-ADDITIONAL DAY

## 2018-01-30 PROCEDURE — 97535 SELF CARE MNGMENT TRAINING: CPT

## 2018-01-30 PROCEDURE — 20600001 HC STEP DOWN PRIVATE ROOM

## 2018-01-30 PROCEDURE — 25000003 PHARM REV CODE 250: Performed by: NURSE PRACTITIONER

## 2018-01-30 PROCEDURE — 92526 ORAL FUNCTION THERAPY: CPT

## 2018-01-30 PROCEDURE — 99233 SBSQ HOSP IP/OBS HIGH 50: CPT | Mod: ,,, | Performed by: PSYCHIATRY & NEUROLOGY

## 2018-01-30 PROCEDURE — 80048 BASIC METABOLIC PNL TOTAL CA: CPT

## 2018-01-30 PROCEDURE — 85025 COMPLETE CBC W/AUTO DIFF WBC: CPT

## 2018-01-30 PROCEDURE — 83615 LACTATE (LD) (LDH) ENZYME: CPT

## 2018-01-30 RX ORDER — AMLODIPINE BESYLATE 10 MG/1
10 TABLET ORAL DAILY
Status: DISCONTINUED | OUTPATIENT
Start: 2018-01-30 | End: 2018-02-07 | Stop reason: HOSPADM

## 2018-01-30 RX ORDER — HYDRALAZINE HYDROCHLORIDE 20 MG/ML
5 INJECTION INTRAMUSCULAR; INTRAVENOUS ONCE
Status: COMPLETED | OUTPATIENT
Start: 2018-01-30 | End: 2018-01-30

## 2018-01-30 RX ADMIN — LISINOPRIL 20 MG: 20 TABLET ORAL at 09:01

## 2018-01-30 RX ADMIN — ISOSORBIDE DINITRATE 40 MG: 40 TABLET ORAL at 05:01

## 2018-01-30 RX ADMIN — AMLODIPINE BESYLATE 10 MG: 10 TABLET ORAL at 01:01

## 2018-01-30 RX ADMIN — HEPARIN SODIUM 17 UNITS/KG/HR: 10000 INJECTION, SOLUTION INTRAVENOUS at 02:01

## 2018-01-30 RX ADMIN — HYDRALAZINE HYDROCHLORIDE 10 MG: 20 INJECTION INTRAMUSCULAR; INTRAVENOUS at 12:01

## 2018-01-30 RX ADMIN — PANTOPRAZOLE SODIUM 40 MG: 40 TABLET, DELAYED RELEASE ORAL at 04:01

## 2018-01-30 RX ADMIN — ISOSORBIDE DINITRATE 40 MG: 40 TABLET ORAL at 04:01

## 2018-01-30 RX ADMIN — CEFEPIME 2 G: 2 INJECTION, POWDER, FOR SOLUTION INTRAVENOUS at 12:01

## 2018-01-30 RX ADMIN — ISOSORBIDE DINITRATE 40 MG: 40 TABLET ORAL at 09:01

## 2018-01-30 RX ADMIN — DOFETILIDE 250 MCG: 0.25 CAPSULE ORAL at 09:01

## 2018-01-30 RX ADMIN — HYDRALAZINE HYDROCHLORIDE 5 MG: 20 INJECTION INTRAMUSCULAR; INTRAVENOUS at 01:01

## 2018-01-30 RX ADMIN — ATORVASTATIN CALCIUM 10 MG: 10 TABLET, FILM COATED ORAL at 09:01

## 2018-01-30 RX ADMIN — ALLOPURINOL 300 MG: 300 TABLET ORAL at 09:01

## 2018-01-30 RX ADMIN — Medication 1 CAPSULE: at 09:01

## 2018-01-30 RX ADMIN — MAGNESIUM OXIDE TAB 400 MG (241.3 MG ELEMENTAL MG) 400 MG: 400 (241.3 MG) TAB at 09:01

## 2018-01-30 RX ADMIN — FOLIC ACID 1 MG: 1 TABLET ORAL at 09:01

## 2018-01-30 RX ADMIN — CEFEPIME 2 G: 2 INJECTION, POWDER, FOR SOLUTION INTRAVENOUS at 01:01

## 2018-01-30 RX ADMIN — HYDRALAZINE HYDROCHLORIDE 100 MG: 50 TABLET ORAL at 09:01

## 2018-01-30 RX ADMIN — HYDRALAZINE HYDROCHLORIDE 100 MG: 50 TABLET ORAL at 05:01

## 2018-01-30 RX ADMIN — ASPIRIN 81 MG CHEWABLE TABLET 81 MG: 81 TABLET CHEWABLE at 09:01

## 2018-01-30 RX ADMIN — HYDRALAZINE HYDROCHLORIDE 100 MG: 50 TABLET ORAL at 04:01

## 2018-01-30 RX ADMIN — BUPROPION HYDROCHLORIDE 450 MG: 150 TABLET, EXTENDED RELEASE ORAL at 09:01

## 2018-01-30 RX ADMIN — HYDRALAZINE HYDROCHLORIDE 5 MG: 20 INJECTION INTRAMUSCULAR; INTRAVENOUS at 02:01

## 2018-01-30 RX ADMIN — PANTOPRAZOLE SODIUM 40 MG: 40 TABLET, DELAYED RELEASE ORAL at 05:01

## 2018-01-30 NOTE — PROGRESS NOTES
- Pt to continue to have MAP and Dopplers above 90 and 100 and pulsatile,   - Gave 10 and 10 of IV Hydralazine with marginal response.   - To give 9 AM dose of Hydralazine now and follow up  - If no response will need to be transferred to the Unit for Cardene drip.   - Primary team to adjust dose of scheduled medications.

## 2018-01-30 NOTE — ASSESSMENT & PLAN NOTE
- CT of head 1/24/18 with no acute changes  - Given constellation of confusion, gait disturbance and urinary incontinence, consulted Neuro (NPH ?). Appreciate their help. Suspect underlying dementia but recommending large volume LP which they attempted yesterday but were unsuccessful fo LP this afternoon in IR.  Plan is for PT to assess his gaitpost procedure today (they assessed him preprocedure yesterday)  - D/C'd Ropinirole again 2/2 gait disturbance  - Delirium protocol

## 2018-01-30 NOTE — PLAN OF CARE
Problem: Occupational Therapy Goal  Goal: Occupational Therapy Goal  Goals to be met by: 7 days 2/2/18     Patient will increase functional independence with ADLs by performing:    UE Dressing with Supervision.  LE Dressing with SBA  Grooming while standing with Supervision.  Toileting from bedside commode with Minimal Assistance for hygiene and clothing management.   Stand pivot transfers with Stand-by Assistance.  Toilet transfer to bedside commode with Stand-by Assistance.     Outcome: Ongoing (interventions implemented as appropriate)  Goals reviewed and continue to remain appropriate at this time.

## 2018-01-30 NOTE — ASSESSMENT & PLAN NOTE
-Per above, concern for gait instability related to NPH  -Patient does have micrographia and some tremor on exam  -If work up for NPH is negative, would like to plan for follow up in Neurology clinic for possible PD

## 2018-01-30 NOTE — PROGRESS NOTES
01/30/18 0435 01/30/18 0437   Vital Signs   BP (!) 94/0 (!) 129/97   MAP (mmHg) --  105   BP Method Doppler Automatic     1 hour reassessment after second dose of 5mg IVP hydralazine administered. Notified Dr. Smith and Mignon, VAD coordinator. Dr. Smith telephone ordered for pt to receive scheduled 0600 dose of hydralazine and stated he would notify attending for possible transfer orders. Pt has no complaints at this time. Will continue to monitor.

## 2018-01-30 NOTE — PROGRESS NOTES
01/30/18 0132 01/30/18 0135 01/30/18 0149   Vital Signs   BP (!) 126/97 (!) 104/0 (!) 119/91   MAP (mmHg) 108 --  102   BP Method --  --  --        01/30/18 0209 01/30/18 0211   Vital Signs   /89 (!) 98/0   MAP (mmHg) 98 --    BP Method --  Doppler    Pt reassessed; no complaints. Pressures elevated as noted. Dr. Smith notified; telephone order to administer 5mg hydralazine IVP x1. Pt BP reassessed after 5mg given; doppler and MAP at 98. Notified Mignon VAD coordinator and Dr. Smith of 0210 results. Dr. Smith ordered another 5mg hydralazine IVP x1. Will administer and continue to monitor.

## 2018-01-30 NOTE — PROGRESS NOTES
[facility]  Neurology  Progress Note    Patient Name: Kev Vasquez  MRN: [id number]  Admission Date: 1/24/2018  Hospital Length of Stay: 6 days  Code Status: Full Code   Attending Provider: Anni Henderson MD  Primary Care Physician: Mega Collins MD   Principal Problem:Vomiting    Subjective:     Interval History: Patient feels well this am.  No changes overnight.  No deficits related to LP done yesterday, pt denies recent headache or back pain.  Discussed plan for LP under fluoroscopy today and follow up PT gait as well as MMSE after procedure.  Currently procedure planned for 2 pm.    Current Neurological Medications: Bupropion 450 mg po qd    Current Facility-Administered Medications   Medication Dose Route Frequency Provider Last Rate Last Dose    acetaminophen tablet 650 mg  650 mg Oral Q6H PRN Charlette Jasmine NP   650 mg at 01/29/18 1801    allopurinol tablet 300 mg  300 mg Oral Daily José Luis Yeh, DO   300 mg at 01/30/18 0939    amLODIPine tablet 10 mg  10 mg Oral Daily Cathy Farmer PA-C        aspirin chewable tablet 81 mg  81 mg Oral Daily José Luis Yeh, DO   81 mg at 01/30/18 0938    atorvastatin tablet 10 mg  10 mg Oral Daily José Luis Yeh, DO   10 mg at 01/30/18 0939    buPROPion TB24 tablet 450 mg  450 mg Oral Daily José Luisshameka Yeh, DO   450 mg at 01/30/18 0938    ceFEPIme injection 2 g  2 g Intravenous Q12H José Luis Yeh, DO   2 g at 01/30/18 0048    docusate sodium capsule 100 mg  100 mg Oral Daily PRN José Luis Yeh, DO   100 mg at 01/29/18 1801    dofetilide capsule 250 mcg  250 mcg Oral Q12H José Luis Yeh, DO   250 mcg at 01/30/18 0939    folic acid tablet 1 mg  1 mg Oral Daily José Luisshameka Yeh, DO   1 mg at 01/30/18 0938    heparin 25,000 units in dextrose 5% 250 mL (100 units/mL) infusion  17 Units/kg/hr Intravenous Continuous Chip Hill MD 14.1 mL/hr at 01/30/18 0233 17 Units/kg/hr at 01/30/18 0233    hydrALAZINE injection 10 mg   10 mg Intravenous Q6H PRN José Luisshameka Yeh, DO   10 mg at 01/30/18 0042    hydrALAZINE tablet 100 mg  100 mg Oral Q8H José Luis BARRY Yeh, DO   100 mg at 01/30/18 0510    isosorbide dinitrate tablet 40 mg  40 mg Oral TID José Luisshameka Yeh, DO   40 mg at 01/30/18 0511    Lactobacillus rhamnosus GG capsule 1 capsule  1 capsule Oral Daily José Luisshameka Yeh, DO   1 capsule at 01/30/18 0946    lisinopril tablet 20 mg  20 mg Oral BID José Luis DGuanako Yeh, DO   20 mg at 01/30/18 0937    magnesium oxide tablet 400 mg  400 mg Oral BID José Luis BARRY Yeh, DO   400 mg at 01/30/18 0938    ondansetron injection 4 mg  4 mg Intravenous Q6H PRN José Luis BARRY Yeh, DO        pantoprazole EC tablet 40 mg  40 mg Oral BID AC Charlette Kenroy, NP   40 mg at 01/30/18 0522    promethazine tablet 12.5 mg  12.5 mg Oral Q6H PRN José Luisshameka Yeh, DO         Review of Systems   Constitutional: Positive for fatigue. Negative for appetite change, chills and fever.   Eyes: Negative for photophobia and visual disturbance.   Respiratory: Negative for cough and shortness of breath.    Gastrointestinal: Negative for constipation.   Genitourinary: +urinary incontinence x ~ 1 year   Musculoskeletal: Positive for back pain (chronic LBP) and gait problem.   Skin: Negative for rash and wound.   Neurological: Positive for numbness (stocking-glove distribution, chronic). Negative for weakness.   Psychiatric/Behavioral: Positive for confusion and hallucinations (visual). Negative for agitation.     Objective:     Vital Signs (Most Recent):  Temp: 97.6 °F (36.4 °C) (01/30/18 0742)  Pulse: 64 (01/30/18 1000)  Resp: 16 (01/30/18 0312)  BP: (!) 86/0 (01/30/18 1155)  SpO2: 96 % (01/30/18 0742) Vital Signs (24h Range):  Temp:  [97.6 °F (36.4 °C)-98.6 °F (37 °C)] 97.6 °F (36.4 °C)  Pulse:  [63-77] 64  Resp:  [16-18] 16  SpO2:  [93 %-98 %] 96 %  BP: ()/(0-97) 86/0     Weight: 81.8 kg (180 lb 5.4 oz)  Body mass index is 25.88 kg/m².    Physical Exam  General:  Well-developed,  well-nourished, nad  HEENT:  NCAT, PERRLA, EOMI, oropharyngeal membranes non-erythematous/without exudate  Neck:  Supple, normal ROM without nuchal rigidity  Resp:  Symmetric expansion, no increased wob  CVS:  No LE edema, peripheral pulses 2+ (radial, dorsalis pedis)  GI:  Abd soft, non-distended, non-tender to palpation  Neurologic Exam:  Mental Status:  Awake, alert, oriented to self, location, but not to date.  Speech, thought content appropriate.  Able to spell 'world' forward, refuses backward.  Recent recall 2/3, remote recall 2/3.  See MMSE below.  Cranial Nerves:  VFs intact on moving fingers in all quadrants bilaterally.  PERRLA, EOMI.  Facial movement, sensation intact and symmetric.  Palate raises symmetrically, tongue protrudes midline.  Trapezius strength 5/5 bilaterally.  Motor:  Normal bulk and tone.  BUE shoulder abduction, biceps/triceps, wrist flexion/extension,  strength 5/5.  BLE hip flexors, knee flexion/extension, plantarflexion/dorsiflexion 5/5.  No pronator drift.  Sensory:  Intact to light touch at all extremities without inattention.  Vibratory sensation intact at BUE digits, BLE lower shin.  Reflexes:  Biceps, brachioradialis, patellar, Achilles 2+ and symmetric.  No ankle clonus.  Downgoing toe bilaterally.  Coordination:  FNF, RICO, HTS intact with no dysmetria/ataxia/dysdiadochokinesia.  Mild resting tremor in RUE on pronator drift testing.  Gait:  Deferred gait exam this am.  Will examine s/p LP with fluoroscopy.       Significant Labs:    Recent Labs  Lab 18  0445   WBC 7.53   RBC 3.78*   HGB 10.9*   HCT 33.1*   *   MCV 88   MCH 28.8   MCHC 32.9       Recent Labs  Lab 18  0445   CALCIUM 9.7      K 3.7   CO2 25      BUN 19   CREATININE 1.6*     Significant Imagin18 CT head w/o contrast:  Possible CSF effect in periventricular area vs microvascular ischemic change.  Unable to obtain MRI 2/2 LVAD.  Stable exam noting mild chronic ischemic  changes and generalized cerebral volume loss. No evidence of acute intracranial pathology.       Assessment and Plan:     Confusion    -Concern for NPH vs underlying dementia with delirium or progression  -Large volume tap planned under fluoroscopy this am   -Will follow up with post-LP MMSE and PT to evaluate gait   -Pending results of above, possible Neurosurgery follow up  -Recommend continued PT/OT for ADLs and gait training      Gait instability    -Per above, concern for gait instability related to NPH  -Patient does have micrographia and some tremor on exam  -If work up for NPH is negative, would like to plan for follow up in Neurology clinic for possible PD     VTE Risk Mitigation         Ordered     heparin 25,000 units in dextrose 5% 250 mL (100 units/mL) infusion  Continuous     Route:  Intravenous        01/28/18 0617        Alesia Montero MD  Neurology  [facility]

## 2018-01-30 NOTE — PROGRESS NOTES
01/30/18 0312 01/30/18 0316   Vital Signs   Temp 98.5 °F (36.9 °C) --    Temp src Oral --    Pulse 71 --    Heart Rate Source Monitor --    Resp 16 --    SpO2 97 % --    Pulse Oximetry Type Intermittent --    O2 Device (Oxygen Therapy) room air --    BP (!) 96/0 119/83   MAP (mmHg) --  95   BP Location Left arm --    BP Method Doppler Automatic   Patient Position Sitting --      Notified Dr. Smith of results post-2nd dose of IVP hydralazine 5mg. Stated to recheck in one hour and notify of results. Will continue to monitor.

## 2018-01-30 NOTE — SUBJECTIVE & OBJECTIVE
Subjective:     Interval History: Patient feels well this am.  No changes overnight.  No deficits related to LP done yesterday, pt denies recent headache or back pain.  Discussed plan for LP under fluoroscopy today and follow up PT gait as well as MMSE after procedure.  Currently procedure planned for 2 pm.    Current Neurological Medications: Bupropion 450 mg po qd    Current Facility-Administered Medications   Medication Dose Route Frequency Provider Last Rate Last Dose    acetaminophen tablet 650 mg  650 mg Oral Q6H PRN Charlette Jasmine NP   650 mg at 01/29/18 1801    allopurinol tablet 300 mg  300 mg Oral Daily José Luis Yeh, DO   300 mg at 01/30/18 0939    amLODIPine tablet 10 mg  10 mg Oral Daily Cathy Farmer PA-C        aspirin chewable tablet 81 mg  81 mg Oral Daily José Luis Yeh, DO   81 mg at 01/30/18 0938    atorvastatin tablet 10 mg  10 mg Oral Daily José Luis Yeh, DO   10 mg at 01/30/18 0939    buPROPion TB24 tablet 450 mg  450 mg Oral Daily José Luis Yeh, DO   450 mg at 01/30/18 0938    ceFEPIme injection 2 g  2 g Intravenous Q12H José Luis Yeh, DO   2 g at 01/30/18 0048    docusate sodium capsule 100 mg  100 mg Oral Daily PRN José Luis Yeh, DO   100 mg at 01/29/18 1801    dofetilide capsule 250 mcg  250 mcg Oral Q12H José Luis Yeh, DO   250 mcg at 01/30/18 0939    folic acid tablet 1 mg  1 mg Oral Daily José Luis Yeh, DO   1 mg at 01/30/18 0938    heparin 25,000 units in dextrose 5% 250 mL (100 units/mL) infusion  17 Units/kg/hr Intravenous Continuous Chip Hill MD 14.1 mL/hr at 01/30/18 0233 17 Units/kg/hr at 01/30/18 0233    hydrALAZINE injection 10 mg  10 mg Intravenous Q6H PRN José Luis Yeh, DO   10 mg at 01/30/18 0042    hydrALAZINE tablet 100 mg  100 mg Oral Q8H José Luis Yeh, DO   100 mg at 01/30/18 0510    isosorbide dinitrate tablet 40 mg  40 mg Oral TID José Luis D. Brown, DO   40 mg at 01/30/18 0511    Lactobacillus rhamnosus GG capsule 1  capsule  1 capsule Oral Daily José Luis Yeh DO   1 capsule at 01/30/18 0946    lisinopril tablet 20 mg  20 mg Oral BID José Luis Yeh DO   20 mg at 01/30/18 0937    magnesium oxide tablet 400 mg  400 mg Oral BID José Luis Yeh DO   400 mg at 01/30/18 0938    ondansetron injection 4 mg  4 mg Intravenous Q6H PRN José Luis Yeh DO        pantoprazole EC tablet 40 mg  40 mg Oral BID AC Charlette Kenroy, NP   40 mg at 01/30/18 0522    promethazine tablet 12.5 mg  12.5 mg Oral Q6H PRN José Luis Yeh DO         Review of Systems   Constitutional: Positive for fatigue. Negative for appetite change, chills and fever.   Eyes: Negative for photophobia and visual disturbance.   Respiratory: Negative for cough and shortness of breath.    Gastrointestinal: Negative for constipation.   Genitourinary: +urinary incontinence x ~ 1 year   Musculoskeletal: Positive for back pain (chronic LBP) and gait problem.   Skin: Negative for rash and wound.   Neurological: Positive for numbness (stocking-glove distribution, chronic). Negative for weakness.   Psychiatric/Behavioral: Positive for confusion and hallucinations (visual). Negative for agitation.     Objective:     Vital Signs (Most Recent):  Temp: 97.6 °F (36.4 °C) (01/30/18 0742)  Pulse: 64 (01/30/18 1000)  Resp: 16 (01/30/18 0312)  BP: (!) 86/0 (01/30/18 1155)  SpO2: 96 % (01/30/18 0742) Vital Signs (24h Range):  Temp:  [97.6 °F (36.4 °C)-98.6 °F (37 °C)] 97.6 °F (36.4 °C)  Pulse:  [63-77] 64  Resp:  [16-18] 16  SpO2:  [93 %-98 %] 96 %  BP: ()/(0-97) 86/0     Weight: 81.8 kg (180 lb 5.4 oz)  Body mass index is 25.88 kg/m².    Physical Exam  General:  Well-developed, well-nourished, nad  HEENT:  NCAT, PERRLA, EOMI, oropharyngeal membranes non-erythematous/without exudate  Neck:  Supple, normal ROM without nuchal rigidity  Resp:  Symmetric expansion, no increased wob  CVS:  No LE edema, peripheral pulses 2+ (radial, dorsalis pedis)  GI:  Abd soft, non-distended,  non-tender to palpation  Neurologic Exam:  Mental Status:  Awake, alert, oriented to self, location, but not to date.  Speech, thought content appropriate.  Able to spell 'world' forward, refuses backward.  Recent recall 2/3, remote recall 2/3.  See MMSE below.  Cranial Nerves:  VFs intact on moving fingers in all quadrants bilaterally.  PERRLA, EOMI.  Facial movement, sensation intact and symmetric.  Palate raises symmetrically, tongue protrudes midline.  Trapezius strength 5/5 bilaterally.  Motor:  Normal bulk and tone.  BUE shoulder abduction, biceps/triceps, wrist flexion/extension,  strength 5/5.  BLE hip flexors, knee flexion/extension, plantarflexion/dorsiflexion 5/5.  No pronator drift.  Sensory:  Intact to light touch at all extremities without inattention.  Vibratory sensation intact at BUE digits, BLE lower shin.  Reflexes:  Biceps, brachioradialis, patellar, Achilles 2+ and symmetric.  No ankle clonus.  Downgoing toe bilaterally.  Coordination:  FNF, RICO, HTS intact with no dysmetria/ataxia/dysdiadochokinesia.  Mild resting tremor in RUE on pronator drift testing.  Gait:  Deferred gait exam this am.  Will examine s/p LP with fluoroscopy.       Significant Labs:    Recent Labs  Lab 18  0445   WBC 7.53   RBC 3.78*   HGB 10.9*   HCT 33.1*   *   MCV 88   MCH 28.8   MCHC 32.9       Recent Labs  Lab 18  0445   CALCIUM 9.7      K 3.7   CO2 25      BUN 19   CREATININE 1.6*     Significant Imagin18 CT head w/o contrast:  Possible CSF effect in periventricular area vs microvascular ischemic change.  Unable to obtain MRI 2/2 LVAD.  Stable exam noting mild chronic ischemic changes and generalized cerebral volume loss. No evidence of acute intracranial pathology.

## 2018-01-30 NOTE — ASSESSMENT & PLAN NOTE
- He has a long h/o orthostatic hypotension, so BP's should only be checked while sitting or standing  - Goal MAP ~ 90 or less  - Continue Norvasc, Hydralazine, Isordil, Lisinopril

## 2018-01-30 NOTE — PROGRESS NOTES
"VAD DLES dressing change completed. Pt tolerated well. DLES rated a 3 on assessment tool. RN noticed site was pink in color and produced scant yellow and red drainage from exit site. Pt stated "It looks better this time". ARGELIA. WCTM.   "

## 2018-01-30 NOTE — PLAN OF CARE
Pt free of falls/trauma/injuries this shift.  LVAD working properly and sounds smooth.  MAPs have been elevated, per NP's orders, ok for MAP to be tolerated up to 90.  LVAD dressing remains clean, dry, and intact. Dressing change done. INR is 1.5 today. Pt scheduled to have lumbar puncture repeated tomorrow. CVP's ordered daily. Patient tolerating plan of care well. Plan of care reviewed with patient and his wife at bedside, all questions answered.

## 2018-01-30 NOTE — PROGRESS NOTES
01/30/18 0043 01/30/18 0045 01/30/18 0048   Vital Signs   BP (!) 134/97 (!) 130/97 (!) 96/0   MAP (mmHg) 111 107 --    BP Method --  --  Doppler     Notified Dr. Smith; administered hydralazine 10mg IVP  As ordered. Will recheck BP as ordered. Pt has no complaints at this time.

## 2018-01-30 NOTE — PROGRESS NOTES
[facility]  Heart Transplant  Progress Note    Patient Name: Kev Vasquez  MRN: [id number]  Admission Date: 1/24/2018  Hospital Length of Stay: 6 days  Attending Physician: Anni Henderson MD  Primary Care Provider: Mega Collins MD  Principal Problem:Vomiting    Subjective:     Interval History: C/o not sleeping much last night. Daughter at bedside this am    Continuous Infusions:   heparin (porcine) in D5W 17 Units/kg/hr (01/30/18 0233)     Scheduled Meds:   allopurinol  300 mg Oral Daily    amLODIPine  10 mg Oral Daily    aspirin  81 mg Oral Daily    atorvastatin  10 mg Oral Daily    buPROPion  450 mg Oral Daily    ceFEPime (MAXIPIME) IVPB  2 g Intravenous Q12H    dofetilide  250 mcg Oral Q12H    folic acid  1 mg Oral Daily    hydrALAZINE  100 mg Oral Q8H    isosorbide dinitrate  40 mg Oral TID    Lactobacillus rhamnosus GG  1 capsule Oral Daily    lisinopril  20 mg Oral BID    magnesium oxide  400 mg Oral BID    pantoprazole  40 mg Oral BID AC     PRN Meds:acetaminophen, docusate sodium, hydrALAZINE, ondansetron, promethazine    Review of patient's allergies indicates:   Allergen Reactions    Aldactone [spironolactone]       persistent hyperkalemia.    Sulfa (sulfonamide antibiotics)      Objective:     Vital Signs (Most Recent):  Temp: 97.6 °F (36.4 °C) (01/30/18 0742)  Pulse: 64 (01/30/18 1000)  Resp: 16 (01/30/18 0312)  BP: (!) 94/0 (01/30/18 0745)  SpO2: 96 % (01/30/18 0742) Vital Signs (24h Range):  Temp:  [97.6 °F (36.4 °C)-98.6 °F (37 °C)] 97.6 °F (36.4 °C)  Pulse:  [63-77] 64  Resp:  [16-18] 16  SpO2:  [93 %-98 %] 96 %  BP: ()/(0-97) 94/0     Patient Vitals for the past 72 hrs (Last 3 readings):   Weight   01/30/18 0900 81.8 kg (180 lb 5.4 oz)   01/30/18 0656 81.8 kg (180 lb 5.4 oz)   01/29/18 0700 81.7 kg (180 lb 1.9 oz)     Body mass index is 25.88 kg/m².      Intake/Output Summary (Last 24 hours) at 01/30/18 1108  Last data filed at 01/30/18 0500   Gross  per 24 hour   Intake              960 ml   Output                0 ml   Net              960 ml       Hemodynamic Parameters:  CVP:  [2 mmHg] 2 mmHg    Telemetry: BiV paced    Physical Exam   Constitutional: He appears well-developed and well-nourished.   HENT:   Head: Normocephalic and atraumatic.   Eyes: Conjunctivae are normal. Pupils are equal, round, and reactive to light.   Neck: Normal range of motion. Neck supple. No JVD present.   Cardiovascular: Normal rate and regular rhythm.    Smooth VAD hum. Intermittently pulsatile   Pulmonary/Chest: Effort normal and breath sounds normal.   Abdominal: Soft. Bowel sounds are normal.   Musculoskeletal: He exhibits no edema.   Neurological: He is alert.   Skin: Skin is warm and dry. Capillary refill takes 2 to 3 seconds.       Significant Labs:  CBC:    Recent Labs  Lab 01/28/18  0429 01/29/18 0446 01/30/18 0445   WBC 8.12 7.74 7.53   RBC 4.01* 4.11* 3.78*   HGB 11.5* 11.8* 10.9*   HCT 35.0* 35.7* 33.1*   * 150 127*   MCV 87 87 88   MCH 28.7 28.7 28.8   MCHC 32.9 33.1 32.9     BNP:    Recent Labs  Lab 01/25/18  0421 01/26/18  0334 01/29/18  0446   * 347* 383*     CMP:    Recent Labs  Lab 01/25/18  0421 01/26/18  0334  01/28/18  0429 01/29/18 0446 01/30/18  0445   GLU 89  89 79  < > 92 84 85   CALCIUM 10.5  10.5 10.5  < > 9.9 10.1 9.7   ALBUMIN 3.2* 3.3*  --   --  3.3*  --    PROT 6.9 7.0  --   --  7.3  --      140 139  < > 141 140 139   K 4.3  4.3 4.1  < > 3.8 3.8 3.7   CO2 27  27 26  < > 23 24 25     107 107  < > 109 107 108   BUN 31*  31* 27*  < > 25* 22 19   CREATININE 2.0*  2.0* 2.1*  < > 1.8* 1.7* 1.6*   ALKPHOS 71 72  --   --  77  --    ALT 15 14  --   --  16  --    AST 22 23  --   --  26  --    BILITOT 0.4 0.5  --   --  0.6  --    < > = values in this interval not displayed.   Coagulation:     Recent Labs  Lab 01/28/18  0429 01/29/18  0446 01/30/18 0445   INR 1.8* 1.5* 1.3*     LDH:    Recent Labs  Lab 01/28/18  0429  01/29/18  0446 01/30/18  0445    219 220     Microbiology:  Microbiology Results (last 7 days)     Procedure Component Value Units Date/Time    Blood culture #1 [[id number]] Collected:  01/24/18 1521    Order Status:  Completed Specimen:  Blood from Line, PICC Right Basilic Updated:  01/29/18 1812     Blood Culture, Routine No growth after 5 days.    Narrative:       Blood Culture #1    Blood culture #2 [[id number]] Collected:  01/24/18 1537    Order Status:  Completed Specimen:  Blood from Peripheral, Hand, Right Updated:  01/29/18 1812     Blood Culture, Routine No growth after 5 days.    Narrative:       Blood Culture #2          I have reviewed all pertinent labs within the past 24 hours.    Estimated Creatinine Clearance: 41.2 mL/min (based on SCr of 1.6 mg/dL (H)).          Assessment and Plan:     Mr. 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 Balwinder BiV ICD, HILLARY/BiPAP, HTN, CKD, HLP, gout and depression who presents with confusion. On further questioning of his wife and his caregiver who were both at bedside, they noticed some odd behaviours such as combing his cheek/nose, getting dressed to go hunting when that is not his hobby and admitted to seeing visual hallucinations of his dead brother. They stated that he is not sleeping well and naps throughout the day. They wonder if his BiPAP is not in the proper settings anymore and he now has made a game out of looking at his watch to see when he will get to sleep. He stated it was 2017, January, thought he was in [place] (moved from there in June), and stated [place] as the president. Altogether, he denies SOB, CP, LE edema, orthopnea and PND. He has no fevers or chills, cough, or diarrhea. They state that he does have some worsening vomiting. They stated  that a few months ago, he would vomit after he drank some water: it would start with a sneeze, then a cough, then the vomit. It would happen every now and then, but over the past few weeks, it has worsened to 3-4x/day. They stated that now it occurs without drinking, sometimes before dinner and he would throw up almost undigested food. He admitted to throwing up pills too at times. There have been no VAD alarms noted.       * Vomiting    - Appreciate GI's help. KUB unremarkable. EGD also negative.  - PPI bid  - Appreciate ST's help given coughing and sneezing that can precede emesis. Plan MBSS tomorrow (not today since going for LP)        Confusion    - CT of head 1/24/18 with no acute changes  - Given constellation of confusion, gait disturbance and urinary incontinence, consulted Neuro (NPH ?). Appreciate their help. Suspect underlying dementia but recommending large volume LP which they attempted yesterday but were unsuccessful fo LP this afternoon in IR.  Plan is for PT to assess his gaitpost procedure today (they assessed him preprocedure yesterday)  - D/C'd Ropinirole again 2/2 gait disturbance  - Delirium protocol        LVAD (left ventricular assist device) present    - S/P HM III 10/16/16 as DT in [place]  - Current speed 5800  - TTE 1/25/18 shows LVEDD 6.5, LVEF 10-15%, diastolic dysfunction, RVE, severely depressed RV systolic function, PAS 20, ARTHUR intermittently and septum midline. No comment on IVC - per Dr. Lanza, costal views were too poor to assess IVC.   - CVP via PICC line remains 2  (was taking Lasix 80 mg every M-W-F at home) - holding Lasix  - INR now 1.3 today, holding Coumadin for LP; now bridging with heparin gtt. Will resume Coumadin this evening  - LDH up some yesterday at 219 and is 220 today - will follow  - Continue IV Cefepime for chronic Pseudomonas DL infection        Essential hypertension    - He has a long h/o orthostatic hypotension, so BP's should only be checked while  sitting or standing  - Goal MAP ~ 90 or less  - Continue Norvasc, Hydralazine, Isordil, Lisinopril         Gait instability    - PT/OT  - See confusion above        VT (ventricular tachycardia)    - Continue Tikosyn  - Has [employer] Balwinder BiV ICD  - Keep K+ > 4.0 and Mg+ > 2.0        Stage 3 chronic kidney disease    - Admit creatinine 2.3, down to 1.6 today, (baseline looks ~ 1.6-1.8)            Cathy Farmer PA-C  Heart Transplant  [facility]

## 2018-01-30 NOTE — ASSESSMENT & PLAN NOTE
- S/P HM III 10/16/16 as DT in [place]  - Current speed 5800  - TTE 1/25/18 shows LVEDD 6.5, LVEF 10-15%, diastolic dysfunction, RVE, severely depressed RV systolic function, PAS 20, ARTHUR intermittently and septum midline. No comment on IVC - per Dr. Lanza, costal views were too poor to assess IVC.   - CVP via PICC line remains 2  (was taking Lasix 80 mg every M-W-F at home) - holding Lasix  - INR now 1.3 today, holding Coumadin for LP; now bridging with heparin gtt. Will resume Coumadin this evening  - LDH up some yesterday at 219 and is 220 today - will follow  - Continue IV Cefepime for chronic Pseudomonas DL infection

## 2018-01-30 NOTE — PT/OT/SLP PROGRESS
"Speech Language Pathology Treatment    Patient Name:  Kev Vasquez   MRN:  [id number]  Admitting Diagnosis: Vomiting    Recommendations:                 General Recommendations:  Dysphagia therapy and Modified barium swallow study  Diet recommendations:  Regular, Liquid Diet Level: Nectar Thick   Aspiration Precautions: 1 bite/sip at a time, Assistance with thickening liquids, Eliminate distractions, Feed only when awake/alert, HOB to 90 degrees, Meds whole 1 at a time, No straws, Remain upright 30 minutes post meal, Small bites/sips and Strict aspiration precautions   General Precautions: Standard, aspiration, fall, nectar thick, LVAD  Communication strategies:  none    Subjective     SLP reviewed Pt with nurse. SLP confirms MBSS rescheduled for Wednesday 2/2 LP procedure scheduled this service day.  Pt presents calm, lethargic  He says "sounds good"  Daughter explains, "I've been thickening the liquids, the nurse brings us some packets but they don't come on the tray,"     Pain/Comfort:  · Pain Rating 1: 0/10  · Pain Rating Post-Intervention 1: 0/10    Objective:     Has the patient been evaluated by SLP for swallowing?   Yes  Keep patient NPO? No   Current Respiratory Status: room air      Pt found asleep in bed, slow to wake provided moderate verbal and tactile cueing from SLP. Pt's daughter at bedside. Pt and daughter educated on SLP role, ongoing thickener guidelines and MBSS procedure. SLP educates pt and family that procedure rescheduled to 1/31/18. Pt verbalizes understanding. Patient's daughter verbalizes understanding. Dietary in room to deliver meal trays . SLP notes thickener supplements not on meal tray. Dietary aid and Nurse notified of need for thickener supplement order to be placed for thickener packets to be delivered on tray.  SLP attempts to wake pt for breakfast meal tray, patient declines and easily falls back to sleep. Daughter asks for Patient to be able to rest. SLP discontinues session and " explains she will reattempt later service day pending schedule and pt availability. No Further questions noted. Whiteboard current.     Assessment:     Kev Vasquez is a 77 y.o. male with an SLP diagnosis of Dysphagia.  He presents with increased lethargy today and declines PO trials with ST.  ST to continue to follow. MBSS tentatively scheduled for Wednesday 1/31/18.     Goals:    SLP Goals        Problem: SLP Goal    Goal Priority Disciplines Outcome   SLP Goal     SLP Ongoing (interventions implemented as appropriate)   Description:  Speech Therapy Short Term Goals  Goal expected to be met by 2/4  1. Pt will tolerate a regular diet and NECTAR THICK liquids with no overt s/s of aspiration.   2. Pt will tolerate cup sips of thin liquids x10 with no overt s/s of aspiration.   3. Pt will participate in a modified barium swallow study as warranted per MD and SLP.                         Plan:     · Patient to be seen:  4 x/week   · Plan of Care expires:  02/26/18  · Plan of Care reviewed with:  patient, daughter   · SLP Follow-Up:  Yes       Discharge recommendations:  home health speech therapy   Barriers to Discharge:  None    Time Tracking:     SLP Treatment Date:   01/30/18  Speech Start Time:  0820  Speech Stop Time:  0828     Speech Total Time (min):  8 min    Billable Minutes: Treatment Swallowing Dysfunction 8    JEWEL Woo., [facility]-SLP  Speech-Language Pathology  Pager: [phone]      01/30/2018

## 2018-01-30 NOTE — ASSESSMENT & PLAN NOTE
- Appreciate GI's help. KUB unremarkable. EGD also negative.  - PPI bid  - Appreciate ST's help given coughing and sneezing that can precede emesis. Plan MBSS tomorrow (not today since going for LP)

## 2018-01-30 NOTE — PLAN OF CARE
Problem: Patient Care Overview  Goal: Plan of Care Review  Outcome: Ongoing (interventions implemented as appropriate)  POC reviewed with pt. VAD numbers stable. Doppler and MAPs elevated throughout shift; pt received scheduled medications in addition to PRN hydralazine 10mg IVP x1 and hydralazine IVP 5mg x2. CVP daily. Heparin gtt infusing at ordered rate. Pt received ABx as ordered. Pt oriented x4 on shift. Pt remains free from falls, trauma, injury; pt tolerating POC well. Will continue to monitor.

## 2018-01-30 NOTE — PROGRESS NOTES
"[facility]  Adult Nutrition  Progress Note    SUMMARY     Recommendations    Recommendation/Intervention:   1. Once medically able, resume Cardiac diet.   2. Entered food preferences into  system.   3. Encouraged good PO intake of meals and encouraged pt to order foods that he likes. Will monitor.   Goals: Intake >/=85% EEN/EPN with good tolerance  Nutrition Goal Status: progressing towards goal  Communication of RD Recs: reviewed with RN    Reason for Assessment    Reason for Assessment: RD follow-up  Diagnosis:  (vomiting, delirium)  Relevent Medical History: CHF s/p LVAD (10/2016), s/p AICD, ICM, CAD, CKD3     Nutrition Discharge Planning: Adequate PO intake on cardiac diet    Nutrition Prescription Ordered    Current Diet Order: NPO  Nutrition Order Comments: Was on Cardiac, Nectar thick                 Evaluation of Received Nutrients/Fluid Intake                                                                                                     % Intake of Estimated Energy Needs: 50 - 75 %  % Meal Intake: 50%    Nutrition/Diet History    Patient Reported Diet/Restrictions/Preferences: low salt  Typical Food/Fluid Intake: Pt reports fair PO intake. Not eating turkey sausage or eggs for breakfast because he doesn't like them but he is eating pancakes. Currently NPO for MBSS. On thickened liquids.   Food Preferences: No cultural/Amish preferences noted.        Factors Affecting Nutritional Intake: other (see comments) (food preferences)                Labs/Tests/Procedures/Meds       Pertinent Labs Reviewed: reviewed, pertinent  Pertinent Labs Comments: PAB 29  Pertinent Medications Reviewed: reviewed, pertinent  Pertinent Medications Comments: folic acid, lactobacillus    Physical Findings    Overall Physical Appearance: nourished, weak     Oral/Mouth Cavity: WDL  Skin: intact    Anthropometrics    Temp: 97.6 °F (36.4 °C)     Height: 5' 10" (177.8 cm)  Weight Method: Standard " Scale  Weight: 81.8 kg (180 lb 5.4 oz)  Ideal Body Weight (IBW), Male: 166 lb     % Ideal Body Weight, Male (lb): 108.64 lb     BMI (Calculated): 25.9  BMI Grade: 25 - 29.9 - overweight                            Estimated/Assessed Needs    Weight Used For Calorie Calculations: 85.7 kg (188 lb 15 oz)      Energy Calorie Requirements (kcal): 1956  Energy Need Method: [place]-St Jeor (x 1.2 (PAL))    RMR ([place]-St. Jeor Equation): 1630        Weight Used For Protein Calculations: 85.7 kg (188 lb 15 oz)  Protein Requirements: 86 gm (1.0 gm/kg)    Fluid Requirements (mL): per MD           RDA Method (mL): 1956               Assessment and Plan    Nutrition Problem  Altered GI Function     Related to (etiology):   Chronic vomiting     Signs and Symptoms (as evidenced by):   Vomiting increasing to 3-4x daily over past several months      Interventions/Recommendations (treatment strategy):  See recs.     Nutrition Diagnosis Status:   Improving    Monitor and Evaluation    Food and Nutrient Intake: energy intake, food and beverage intake  Food and Nutrient Adminstration: diet order     Physical Activity and Function: nutrition-related ADLs and IADLs  Anthropometric Measurements: weight, weight change, body mass index  Biochemical Data, Medical Tests and Procedures: electrolyte and renal panel, gastrointestinal profile, glucose/endocrine profile, inflammatory profile  Nutrition-Focused Physical Findings: overall appearance    Nutrition Risk    Level of Risk: other (see comments) (1X/week)    Nutrition Follow-Up    RD Follow-up?: Yes

## 2018-01-30 NOTE — PT/OT/SLP PROGRESS
Occupational Therapy   Treatment    Name: Kev Vasquez  MRN: [id number]  Admitting Diagnosis:  Vomiting  4 Days Post-Op    Recommendations:     Discharge Recommendations: home with home health (with continued 24/7 sitter)  Discharge Equipment Recommendations:  none  Barriers to discharge:  None    Subjective     Communicated with: RN (claudette) prior to session.  Pain/Comfort:  · Pain Rating 1: 0/10  · Pain Rating Post-Intervention 1: 0/10    Patients cultural, spiritual, Gnosticist conflicts given the current situation: none reported     Objective:     Patient found with: telemetry, LVAD ((to wall power)), telesitter     General Precautions: Standard, fall, nectar thick, LVAD   Orthopedic Precautions:N/A   Braces: N/A     Occupational Performance:    Bed Mobility:    · Patient completed Scooting/Bridging with contact guard assistance  · Patient completed Supine to Sit with contact guard assistance     Functional Mobility/Transfers:  · Patient completed Sit <> Stand Transfer with contact guard assistance  with  rolling walker   · Once in standing position, pt requiring mod A to maintain d/c increased instability and difficulty maintaining static standing   · Patient completed Bed <> Chair Transfer using functional mobility (approx 4-5 steps) technique with minimum assistance with rolling walker  · With max vc's for sequencing and additional time for completion    Activities of Daily Living:  · UB Dressing: maximal assistance (to change from wall to battery power and to don LVAD carrying case/pouch & for donning of shoulder/waist strap)  · LB Dressing: moderate assistance (pt seated at EOB and able to don L sock in tailor sitting position, however unable to obtain tailor sitting position with RLE and noted with L lateral lean with elbow on bed requiring therapist assistance to don R sock)  · Feeding: setup assistance     Patient left up in chair with all lines intact, call button in reach, nursing notified, daughter  present and LVAD to battery power with carryng bag donned and breakfast tray setup in front of patient    [facility] 6 Click:  [facility] Total Score: 14    Treatment & Education:  Pt found supine in bed at start of session with LVAD to wall power and daughter in the room; Pt requiring max encouragement to engage in OT therapy session at this time; CGA for bed mobility to attain EOB seated position 2/2 decreased safety awareness; pt seated at EOB for approx 10 minute with slight posterior lean noted requiring SBA and without any noted LOB; while seated at EOB pt requiring max A to switch to battery power for safety as pt noted attempting to tug at driveline connection to controller; pt able to problem solve placing batteries and clips together through trial and error and verbalize that the white power cable should be disconnected first; pt requiring assist for connecting white/black cables to batteries and tactile cues on location to push pieces together and twist; increased time required for completion of power transfer for LVAD; pt able to place batteries correctly into bag, however requiring assistance with where to place controller for optimal safety and protection; therapist zippered bag close and donned shoulder/waist strap with max A; sit>stand from EOB with bed in lowest position with CGA and max vc's for proper hand placement for safety utilizing RW once in standing, pt noted with increased instability requiring mod A for maintaining static standing position; pt engaging in functional mobility from bed>chair with min A and max vc's required for sequencing of movement and use of Rw; pt requiring setup assistance for breakfast once sitting in bedside chair and pt noted withability to stir thickener into cup and eating pancakes with fingers    Additional:  Communicated role of OT/POC  Updated communication board  RN notified post-OT session  Education:    Assessment:     Kev Vasquez is a 77 y.o. male with a medical  diagnosis of Vomiting.  He presents with fair motivation to engage in therapy session and with poor insight into condition.  Performance deficits affecting function are weakness, impaired endurance, impaired self care skills, impaired functional mobilty, gait instability, impaired balance, decreased coordination, decreased safety awareness, impaired cognition.  Pt p/w possible cognitive deficits as evidenced by impulsivity while managing LVAD, decreased safety awareness, and max vc's required for motor sequencing and engagement in functional tasks. Pt with instability with t/'fs and functional mobility at this time putting him at an increased risk of falls.     Rehab Prognosis:  Fair; patient would benefit from acute skilled OT services to address these deficits and reach maximum level of function.       Plan:     Patient to be seen 4 x/week to address the above listed problems via self-care/home management, therapeutic activities, therapeutic exercises  · Plan of Care Expires: 02/25/18  · Plan of Care Reviewed with: patient, daughter    This Plan of care has been discussed with the patient who was involved in its development and understands and is in agreement with the identified goals and treatment plan    GOALS:    Occupational Therapy Goals        Problem: Occupational Therapy Goal    Goal Priority Disciplines Outcome Interventions   Occupational Therapy Goal     OT, PT/OT Ongoing (interventions implemented as appropriate)    Description:  Goals to be met by: 7 days 2/2/18     Patient will increase functional independence with ADLs by performing:    UE Dressing with Supervision.  LE Dressing with SBA  Grooming while standing with Supervision.  Toileting from bedside commode with Minimal Assistance for hygiene and clothing management.   Stand pivot transfers with Stand-by Assistance.  Toilet transfer to bedside commode with Stand-by Assistance.                      Time Tracking:     OT Date of Treatment:  01/30/18  OT Start Time: 0840  OT Stop Time: 0903  OT Total Time (min): 23 min    Billable Minutes:Self Care/Home Management 13  Therapeutic Activity 10    Radha Quesada OT  1/30/2018

## 2018-01-30 NOTE — ASSESSMENT & PLAN NOTE
-Concern for NPH vs underlying dementia with delirium or progression  -Large volume tap planned under fluoroscopy this am   -Will follow up with post-LP MMSE and PT to evaluate gait   -Pending results of above, possible Neurosurgery follow up  -Recommend continued PT/OT for ADLs and gait training

## 2018-01-30 NOTE — PROCEDURES
Radiology Post-Procedure Note    Pre Op Diagnosis: confusion, gait disturbance    Post Op Diagnosis: Same    Procedure: lumbar puncture    Procedure performed by: Deacon Toney MD    Written Informed Consent Obtained: Yes    Specimen Removed: 13 mL CSF    Estimated Blood Loss: Minimal    Findings: Following written informed consent and sterile prep and drape, a 22 gauge spinal needle was inserted at L2 - L3 intralaminar space under fluoroscopic surveillance. Opening pressure was 21 cm H2O. 13 mL clear CSF removed and sent to the lab for further analysis.  Closing pressure was 15 cm H2O. There were no complications. Full report to follow. Pt. Instructed on post procedure care including but not limited to signs of infection, bleeding, headaches, and follow up with ordering physician.        Patient tolerated procedure well.    Deacon Toney MD  Radiology PGY-2  [phone]

## 2018-01-30 NOTE — SUBJECTIVE & OBJECTIVE
Interval History: C/o not sleeping much last night. Daughter at bedside this am    Continuous Infusions:   heparin (porcine) in D5W 17 Units/kg/hr (01/30/18 0233)     Scheduled Meds:   allopurinol  300 mg Oral Daily    amLODIPine  10 mg Oral Daily    aspirin  81 mg Oral Daily    atorvastatin  10 mg Oral Daily    buPROPion  450 mg Oral Daily    ceFEPime (MAXIPIME) IVPB  2 g Intravenous Q12H    dofetilide  250 mcg Oral Q12H    folic acid  1 mg Oral Daily    hydrALAZINE  100 mg Oral Q8H    isosorbide dinitrate  40 mg Oral TID    Lactobacillus rhamnosus GG  1 capsule Oral Daily    lisinopril  20 mg Oral BID    magnesium oxide  400 mg Oral BID    pantoprazole  40 mg Oral BID AC     PRN Meds:acetaminophen, docusate sodium, hydrALAZINE, ondansetron, promethazine    Review of patient's allergies indicates:   Allergen Reactions    Aldactone [spironolactone]       persistent hyperkalemia.    Sulfa (sulfonamide antibiotics)      Objective:     Vital Signs (Most Recent):  Temp: 97.6 °F (36.4 °C) (01/30/18 0742)  Pulse: 64 (01/30/18 1000)  Resp: 16 (01/30/18 0312)  BP: (!) 94/0 (01/30/18 0745)  SpO2: 96 % (01/30/18 0742) Vital Signs (24h Range):  Temp:  [97.6 °F (36.4 °C)-98.6 °F (37 °C)] 97.6 °F (36.4 °C)  Pulse:  [63-77] 64  Resp:  [16-18] 16  SpO2:  [93 %-98 %] 96 %  BP: ()/(0-97) 94/0     Patient Vitals for the past 72 hrs (Last 3 readings):   Weight   01/30/18 0900 81.8 kg (180 lb 5.4 oz)   01/30/18 0656 81.8 kg (180 lb 5.4 oz)   01/29/18 0700 81.7 kg (180 lb 1.9 oz)     Body mass index is 25.88 kg/m².      Intake/Output Summary (Last 24 hours) at 01/30/18 1108  Last data filed at 01/30/18 0500   Gross per 24 hour   Intake              960 ml   Output                0 ml   Net              960 ml       Hemodynamic Parameters:  CVP:  [2 mmHg] 2 mmHg    Telemetry: BiV paced    Physical Exam   Constitutional: He appears well-developed and well-nourished.   HENT:   Head: Normocephalic and atraumatic.    Eyes: Conjunctivae are normal. Pupils are equal, round, and reactive to light.   Neck: Normal range of motion. Neck supple. No JVD present.   Cardiovascular: Normal rate and regular rhythm.    Smooth VAD hum. Intermittently pulsatile   Pulmonary/Chest: Effort normal and breath sounds normal.   Abdominal: Soft. Bowel sounds are normal.   Musculoskeletal: He exhibits no edema.   Neurological: He is alert.   Skin: Skin is warm and dry. Capillary refill takes 2 to 3 seconds.       Significant Labs:  CBC:    Recent Labs  Lab 01/28/18 0429 01/29/18 0446 01/30/18 0445   WBC 8.12 7.74 7.53   RBC 4.01* 4.11* 3.78*   HGB 11.5* 11.8* 10.9*   HCT 35.0* 35.7* 33.1*   * 150 127*   MCV 87 87 88   MCH 28.7 28.7 28.8   MCHC 32.9 33.1 32.9     BNP:    Recent Labs  Lab 01/25/18 0421 01/26/18  0334 01/29/18 0446   * 347* 383*     CMP:    Recent Labs  Lab 01/25/18 0421 01/26/18  0334  01/28/18 0429 01/29/18 0446 01/30/18 0445   GLU 89  89 79  < > 92 84 85   CALCIUM 10.5  10.5 10.5  < > 9.9 10.1 9.7   ALBUMIN 3.2* 3.3*  --   --  3.3*  --    PROT 6.9 7.0  --   --  7.3  --      140 139  < > 141 140 139   K 4.3  4.3 4.1  < > 3.8 3.8 3.7   CO2 27  27 26  < > 23 24 25     107 107  < > 109 107 108   BUN 31*  31* 27*  < > 25* 22 19   CREATININE 2.0*  2.0* 2.1*  < > 1.8* 1.7* 1.6*   ALKPHOS 71 72  --   --  77  --    ALT 15 14  --   --  16  --    AST 22 23  --   --  26  --    BILITOT 0.4 0.5  --   --  0.6  --    < > = values in this interval not displayed.   Coagulation:     Recent Labs  Lab 01/28/18 0429 01/29/18  0446 01/30/18  0445   INR 1.8* 1.5* 1.3*     LDH:    Recent Labs  Lab 01/28/18  0429 01/29/18  0446 01/30/18  0445    219 220     Microbiology:  Microbiology Results (last 7 days)     Procedure Component Value Units Date/Time    Blood culture #1 [[id number]] Collected:  01/24/18 1521    Order Status:  Completed Specimen:  Blood from Line, PICC Right Basilic Updated:  01/29/18 1812      Blood Culture, Routine No growth after 5 days.    Narrative:       Blood Culture #1    Blood culture #2 [[id number]] Collected:  01/24/18 1537    Order Status:  Completed Specimen:  Blood from Peripheral, Hand, Right Updated:  01/29/18 1812     Blood Culture, Routine No growth after 5 days.    Narrative:       Blood Culture #2          I have reviewed all pertinent labs within the past 24 hours.    Estimated Creatinine Clearance: 41.2 mL/min (based on SCr of 1.6 mg/dL (H)).

## 2018-01-30 NOTE — PT/OT/SLP PROGRESS
"Physical Therapy Treatment    Patient Name:  Kev Vasquez   MRN:  [id number]    Recommendations:     Discharge Recommendations:  home with home health   Discharge Equipment Recommendations: none   Barriers to discharge: Inaccessible home and Decreased caregiver support    Assessment:     Kev Vasquez is a 77 y.o. male admitted with a medical diagnosis of Vomiting.  He presents with the following impairments/functional limitations:  impaired endurance, impaired self care skills, impaired sensation, gait instability, impaired functional mobilty, impaired balance, impaired cognition, decreased lower extremity function, decreased upper extremity function, decreased safety awareness, abnormal tone, impaired fine motor, impaired coordination, impaired cardiopulmonary response to activity. Pt presents s/p LP this date for reassessment of mobility. Pt continues to req CGA-mod A for majority of mobility; however, presents with increased need for assist with transfers from chair and for gait in hallway setting. Pt demo further decompensation of gait pattern with festinating pattern and need for increased cueing. Please see below for comparison to yesterdays session (1/29).    Rehab Prognosis:  Good; patient would benefit from acute skilled PT services to address these deficits and reach maximum level of function.      Recent Surgery: Procedure(s) (LRB):  ESOPHAGOGASTRODUODENOSCOPY (EGD) (N/A) 4 Days Post-Op    Plan:     During this hospitalization, patient to be seen 5 x/week to address the above listed problems via gait training, therapeutic activities, therapeutic exercises, neuromuscular re-education  · Plan of Care Expires:  02/25/18   Plan of Care Reviewed with: patient, family    Subjective     Communicated with RN (Claudette) prior to session. RN paged PT upon pt return to floor from LP (1600). Patient found sitting EOB upon PT entry to room, agreeable to treatment.      Chief Complaint: "What? You want me to sit up on " "the edge of the bed?"  Patient comments/goals: None noted; pt agreeable to session  Pain/Comfort:  · Pain Rating 1: 0/10  · Pain Rating Post-Intervention 1: 0/10    Patients cultural, spiritual, Anglican conflicts given the current situation: none noted    Objective:     Patient found with: telemetry, LVAD     General Precautions: Standard, aspiration, fall, nectar thick, LVAD   Orthopedic Precautions:N/A   Braces: N/A     Functional Mobility Assessment 1/29 - PRE LP:  Bed Mobility:              Rolling to the L: Contact Guard Assistance              Supine to Sit: From L sidelying. Contact Guard Assistance              Sit to Supine: To L sidelying. Stand-by Assistance     Sitting Balance at Edge of Bed:              Assistance Level Required: Stand-by Assistance and Contact Guard Assistance - x2-3 minor LOB req CGA              Time: x10 min total              Postural deviations noted:    -       Rounded shoulders  -       Forward head  -       Posterior pelvic tilt              Encouraged: upright posture; feet on floor; neutral pelvis     Transfers:              Sit to Stand: Minimal Assistance and Moderate Assistance with RW; Min A from EOB, Mod A from bedside chair; propping on surface noted with poor body mechanics and posterior lean; with toe flare bilaterally. Upon attempt to stand from chair, pt placed both hands on walker and req stabilization to come to full standing.              Stand to Sit: Minimal Assistance with RW; for controlled descent to surface              Bed to Chair: Stand Pivot with Minimal Assistance with Rolling Walker; pt demo strong posterior lean with weight through heels and toe flare noted; RW stabilized to assist with anterior weight shift     Gait:              Distance Ambulated: Pt ambulated x100 feet in hallway setting. Pt demo stiff, dis-coordinated gait pattern. CGA/min A provided for postural stability and directional guidance especially during turning.               " Assistance level: Contact Guard Assistance and Minimal Assistance              Assistive Device used:  Rolling Walker              Gait Pattern: reciprocal gait              Gait Deviation(s): decreased william, decreased step length, decreased weight-shifting ability and NBOS              Impairments due to: instability of gait    Functional Mobility Assessment 1/30 - POST LP:  Bed Mobility:   Rolling to the L: Contact Guard Assistance   Supine to Sit: From L sidelying. Contact Guard Assistance   Sit to Supine: To . Stand-by Assistance    Sitting Balance at Edge of Bed:   Assistance Level Required: Stand-by Assistance   Time: x5 min total for donning of LVAD shoulder bag   Postural deviations noted:    -       Rounded shoulders  -       Forward head  -       Posterior pelvic tilt   Encouraged: neutral pelvis, feet flat on floor, appropriate initial conditions to come to standing    Transfers:   Sit to Stand: Contact Guard Assistance with RW from EOB   Sit to Stand: Minimum Assistance with RW from Bedside chair, pt unable to complete transfer to standing 2/2 marching B LE in place and with strong posterior lean during attempts to stand; req return to sitting prior to re-attempting transfer without RW for safety   Sit to Stand: Moderate Assistance without RW from Bedside chair; facilitation for anterior WS and maintaining B feet flat on floor provided   Stand to Sit: Minimal Assistance with No Assistive Device for controlled descent to surface/chair   Bed to Chair: Stand Pivot with Minimal Assistance with Rolling Walker pt continues to demo strong posterior lean with WB through heels and toe flaring    Gait:   Distance Ambulated: Pt ambulated x100 feet in hallway setting. Pt demo forward flexed posture, festinating gait pattern and req VC/TCs for increased speed of task and for attention to direction. Pt with termination of gait task x2 instances req cues to continue.     Assistance level: Contact Guard Assistance  and Minimal Assistance during turning   Assistive Device used:  Rolling Walker   Gait Pattern: reciprocal gait   Gait Deviation(s): decreased william, decreased step length, decreased weight-shifting ability and Narrow base of support; no evidence of scissoring   Impairments due to: instability of gait    Therapeutic Activities and Exercises:   PT arrived to pt's room to find pt resting quietly; agreeable to PT session. Pt performed mobility as above. Upon return to room, pt agreeable to return to supine this date. Pt assisted to neutral position; agreeable to remain on battery power for potential transfer to chair to eat dinner. Questions/concerns addressed within PT scope of practice; pt and family with no further questions. RN aware of pt's status and mobility needs.    AM-PAC 6 CLICK MOBILITY  Turning over in bed (including adjusting bedclothes, sheets and blankets)?: 3  Sitting down on and standing up from a chair with arms (e.g., wheelchair, bedside commode, etc.): 2  Moving from lying on back to sitting on the side of the bed?: 3  Moving to and from a bed to a chair (including a wheelchair)?: 3  Need to walk in hospital room?: 3  Climbing 3-5 steps with a railing?: 2  Total Score: 16 (); 18 ()     Patient left HOB elevated with all lines intact, call button in reach, RN notified and family present..    GOALS:    Physical Therapy Goals        Problem: Physical Therapy Goal    Goal Priority Disciplines Outcome Goal Variances Interventions   Physical Therapy Goal     PT/OT, PT Ongoing (interventions implemented as appropriate)     Description:  Goals to be met by: 18    Patient will increase functional independence with mobility by performin. Supine to sit with SBA  2. Sit to supine with SBA  3. Sit to stand transfer with SBA using RW   4. Gait x105 feet with Contact Guard Assistance using Rolling Walker  5. Standing balance x10 min with SBA using AD or no AD while performing functional tasks    6. Lower extremity exercise program x20 reps per handout, with assistance as needed                       Time Tracking:     PT Received On: 01/30/18  PT Start Time: 1600     PT Stop Time: 1624  PT Total Time (min): 24 min     Billable Minutes: Therapeutic Activity 24    Treatment Type: Treatment  PT/PTA: PT           Carmencita Doherty, PT  01/30/2018

## 2018-01-30 NOTE — PLAN OF CARE
Problem: Patient Care Overview  Goal: Plan of Care Review  Outcome: Ongoing (interventions implemented as appropriate)  Updated care plan w/ pt and spouse.  Bed changed out today- cover was missing. . Avasys activated. No issues w/ lvad- address all issues throughout shift.  Speech, physical, and occupational therapy following pt.  Pt remained free from injury.  Heparin drip stop at 0800 for LP puncture due at 1400.  Pt continue on cefepime IV.  Awaiting results for LP.

## 2018-01-30 NOTE — PLAN OF CARE
Problem: Physical Therapy Goal  Goal: Physical Therapy Goal  Goals to be met by: 18    Patient will increase functional independence with mobility by performin. Supine to sit with SBA  2. Sit to supine with SBA  3. Sit to stand transfer with SBA using RW   4. Gait x105 feet with Contact Guard Assistance using Rolling Walker  5. Standing balance x10 min with SBA using AD or no AD while performing functional tasks   6. Lower extremity exercise program x20 reps per handout, with assistance as needed     Outcome: Ongoing (interventions implemented as appropriate)    Goals updated and appropriate to reflect pt's current mobility needs.    Carmencita Doherty, PT, DPT  [phone]  2018

## 2018-01-31 LAB
ALBUMIN SERPL BCP-MCNC: 3.2 G/DL
ALP SERPL-CCNC: 73 U/L
ALT SERPL W/O P-5'-P-CCNC: 13 U/L
ANION GAP SERPL CALC-SCNC: 8 MMOL/L
AST SERPL-CCNC: 19 U/L
BASOPHILS # BLD AUTO: 0.04 K/UL
BASOPHILS NFR BLD: 0.6 %
BILIRUB DIRECT SERPL-MCNC: 0.3 MG/DL
BILIRUB SERPL-MCNC: 0.4 MG/DL
BNP SERPL-MCNC: 251 PG/ML
BUN SERPL-MCNC: 20 MG/DL
CALCIUM SERPL-MCNC: 10 MG/DL
CHLORIDE SERPL-SCNC: 110 MMOL/L
CO2 SERPL-SCNC: 24 MMOL/L
CREAT SERPL-MCNC: 1.7 MG/DL
CRP SERPL-MCNC: 1.5 MG/L
DIFFERENTIAL METHOD: ABNORMAL
EOSINOPHIL # BLD AUTO: 0.2 K/UL
EOSINOPHIL NFR BLD: 2.2 %
ERYTHROCYTE [DISTWIDTH] IN BLOOD BY AUTOMATED COUNT: 18 %
EST. GFR  (AFRICAN AMERICAN): 44.6 ML/MIN/1.73 M^2
EST. GFR  (NON AFRICAN AMERICAN): 38.6 ML/MIN/1.73 M^2
FACT X PPP CHRO-ACNC: 0.38 IU/ML
FACT X PPP CHRO-ACNC: 0.44 IU/ML
FERRITIN SERPL-MCNC: 70 NG/ML
GLUCOSE SERPL-MCNC: 81 MG/DL
HCT VFR BLD AUTO: 31.6 %
HGB BLD-MCNC: 10.7 G/DL
IMM GRANULOCYTES # BLD AUTO: 0.03 K/UL
IMM GRANULOCYTES NFR BLD AUTO: 0.4 %
INR PPP: 1.2
IRON SERPL-MCNC: 83 UG/DL
LDH SERPL L TO P-CCNC: 238 U/L
LYMPHOCYTES # BLD AUTO: 1.1 K/UL
LYMPHOCYTES NFR BLD: 15.2 %
MAGNESIUM SERPL-MCNC: 2.1 MG/DL
MCH RBC QN AUTO: 29.6 PG
MCHC RBC AUTO-ENTMCNC: 33.9 G/DL
MCV RBC AUTO: 87 FL
MONOCYTES # BLD AUTO: 1 K/UL
MONOCYTES NFR BLD: 13.2 %
NEUTROPHILS # BLD AUTO: 4.9 K/UL
NEUTROPHILS NFR BLD: 68.4 %
NRBC BLD-RTO: 0 /100 WBC
PLATELET # BLD AUTO: 122 K/UL
PMV BLD AUTO: 11.6 FL
POTASSIUM SERPL-SCNC: 3.8 MMOL/L
PREALB SERPL-MCNC: 28 MG/DL
PROT SERPL-MCNC: 6.7 G/DL
PROTHROMBIN TIME: 11.9 SEC
RBC # BLD AUTO: 3.62 M/UL
SATURATED IRON: 28 %
SODIUM SERPL-SCNC: 142 MMOL/L
TOTAL IRON BINDING CAPACITY: 300 UG/DL
TRANSFERRIN SERPL-MCNC: 203 MG/DL
WBC # BLD AUTO: 7.19 K/UL

## 2018-01-31 PROCEDURE — 20600001 HC STEP DOWN PRIVATE ROOM

## 2018-01-31 PROCEDURE — 99233 SBSQ HOSP IP/OBS HIGH 50: CPT | Mod: ,,, | Performed by: PSYCHIATRY & NEUROLOGY

## 2018-01-31 PROCEDURE — 83540 ASSAY OF IRON: CPT

## 2018-01-31 PROCEDURE — 83880 ASSAY OF NATRIURETIC PEPTIDE: CPT

## 2018-01-31 PROCEDURE — 25000003 PHARM REV CODE 250: Performed by: NURSE PRACTITIONER

## 2018-01-31 PROCEDURE — 80076 HEPATIC FUNCTION PANEL: CPT

## 2018-01-31 PROCEDURE — 83615 LACTATE (LD) (LDH) ENZYME: CPT

## 2018-01-31 PROCEDURE — 63600175 PHARM REV CODE 636 W HCPCS: Performed by: INTERNAL MEDICINE

## 2018-01-31 PROCEDURE — 25000003 PHARM REV CODE 250: Performed by: INTERNAL MEDICINE

## 2018-01-31 PROCEDURE — 97116 GAIT TRAINING THERAPY: CPT

## 2018-01-31 PROCEDURE — 83735 ASSAY OF MAGNESIUM: CPT

## 2018-01-31 PROCEDURE — 25000003 PHARM REV CODE 250: Performed by: PHYSICIAN ASSISTANT

## 2018-01-31 PROCEDURE — 84134 ASSAY OF PREALBUMIN: CPT

## 2018-01-31 PROCEDURE — G8997 SWALLOW GOAL STATUS: HCPCS | Mod: CJ

## 2018-01-31 PROCEDURE — 99232 SBSQ HOSP IP/OBS MODERATE 35: CPT | Mod: ,,, | Performed by: INTERNAL MEDICINE

## 2018-01-31 PROCEDURE — 86140 C-REACTIVE PROTEIN: CPT

## 2018-01-31 PROCEDURE — 82728 ASSAY OF FERRITIN: CPT

## 2018-01-31 PROCEDURE — 85610 PROTHROMBIN TIME: CPT

## 2018-01-31 PROCEDURE — 97530 THERAPEUTIC ACTIVITIES: CPT

## 2018-01-31 PROCEDURE — 27000248 HC VAD-ADDITIONAL DAY

## 2018-01-31 PROCEDURE — G8996 SWALLOW CURRENT STATUS: HCPCS | Mod: CK

## 2018-01-31 PROCEDURE — 85520 HEPARIN ASSAY: CPT

## 2018-01-31 PROCEDURE — 85025 COMPLETE CBC W/AUTO DIFF WBC: CPT

## 2018-01-31 PROCEDURE — 80048 BASIC METABOLIC PNL TOTAL CA: CPT

## 2018-01-31 PROCEDURE — 92526 ORAL FUNCTION THERAPY: CPT

## 2018-01-31 PROCEDURE — 92611 MOTION FLUOROSCOPY/SWALLOW: CPT

## 2018-01-31 RX ORDER — DOXAZOSIN 2 MG/1
4 TABLET ORAL DAILY
Status: DISCONTINUED | OUTPATIENT
Start: 2018-01-31 | End: 2018-02-04

## 2018-01-31 RX ORDER — DOCUSATE SODIUM 100 MG/1
100 CAPSULE, LIQUID FILLED ORAL 2 TIMES DAILY
Status: DISCONTINUED | OUTPATIENT
Start: 2018-01-31 | End: 2018-02-07 | Stop reason: HOSPADM

## 2018-01-31 RX ORDER — POLYETHYLENE GLYCOL 3350 17 G/17G
17 POWDER, FOR SOLUTION ORAL DAILY
Status: DISCONTINUED | OUTPATIENT
Start: 2018-01-31 | End: 2018-02-07 | Stop reason: HOSPADM

## 2018-01-31 RX ORDER — HYDRALAZINE HYDROCHLORIDE 20 MG/ML
5 INJECTION INTRAMUSCULAR; INTRAVENOUS ONCE
Status: COMPLETED | OUTPATIENT
Start: 2018-01-31 | End: 2018-01-31

## 2018-01-31 RX ADMIN — FOLIC ACID 1 MG: 1 TABLET ORAL at 11:01

## 2018-01-31 RX ADMIN — PANTOPRAZOLE SODIUM 40 MG: 40 TABLET, DELAYED RELEASE ORAL at 05:01

## 2018-01-31 RX ADMIN — HYDRALAZINE HYDROCHLORIDE 100 MG: 50 TABLET ORAL at 09:01

## 2018-01-31 RX ADMIN — ALLOPURINOL 300 MG: 300 TABLET ORAL at 11:01

## 2018-01-31 RX ADMIN — WARFARIN SODIUM 6 MG: 2 TABLET ORAL at 05:01

## 2018-01-31 RX ADMIN — Medication 1 CAPSULE: at 11:01

## 2018-01-31 RX ADMIN — HYDRALAZINE HYDROCHLORIDE 100 MG: 50 TABLET ORAL at 02:01

## 2018-01-31 RX ADMIN — LISINOPRIL 20 MG: 20 TABLET ORAL at 11:01

## 2018-01-31 RX ADMIN — ISOSORBIDE DINITRATE 40 MG: 40 TABLET ORAL at 02:01

## 2018-01-31 RX ADMIN — HYDRALAZINE HYDROCHLORIDE 100 MG: 50 TABLET ORAL at 05:01

## 2018-01-31 RX ADMIN — AMLODIPINE BESYLATE 10 MG: 10 TABLET ORAL at 11:01

## 2018-01-31 RX ADMIN — MAGNESIUM OXIDE TAB 400 MG (241.3 MG ELEMENTAL MG) 400 MG: 400 (241.3 MG) TAB at 11:01

## 2018-01-31 RX ADMIN — ATORVASTATIN CALCIUM 10 MG: 10 TABLET, FILM COATED ORAL at 11:01

## 2018-01-31 RX ADMIN — CEFEPIME 2 G: 2 INJECTION, POWDER, FOR SOLUTION INTRAVENOUS at 12:01

## 2018-01-31 RX ADMIN — LISINOPRIL 20 MG: 20 TABLET ORAL at 09:01

## 2018-01-31 RX ADMIN — DOFETILIDE 250 MCG: 0.25 CAPSULE ORAL at 11:01

## 2018-01-31 RX ADMIN — DOXAZOSIN MESYLATE 4 MG: 2 TABLET ORAL at 11:01

## 2018-01-31 RX ADMIN — DOFETILIDE 250 MCG: 0.25 CAPSULE ORAL at 09:01

## 2018-01-31 RX ADMIN — MAGNESIUM OXIDE TAB 400 MG (241.3 MG ELEMENTAL MG) 400 MG: 400 (241.3 MG) TAB at 09:01

## 2018-01-31 RX ADMIN — CEFEPIME 2 G: 2 INJECTION, POWDER, FOR SOLUTION INTRAVENOUS at 02:01

## 2018-01-31 RX ADMIN — ASPIRIN 81 MG CHEWABLE TABLET 81 MG: 81 TABLET CHEWABLE at 11:01

## 2018-01-31 RX ADMIN — HYDRALAZINE HYDROCHLORIDE 5 MG: 20 INJECTION INTRAMUSCULAR; INTRAVENOUS at 05:01

## 2018-01-31 RX ADMIN — DOCUSATE SODIUM 100 MG: 100 CAPSULE, LIQUID FILLED ORAL at 09:01

## 2018-01-31 RX ADMIN — BUPROPION HYDROCHLORIDE 450 MG: 150 TABLET, EXTENDED RELEASE ORAL at 11:01

## 2018-01-31 RX ADMIN — ISOSORBIDE DINITRATE 40 MG: 40 TABLET ORAL at 09:01

## 2018-01-31 RX ADMIN — ISOSORBIDE DINITRATE 40 MG: 40 TABLET ORAL at 05:01

## 2018-01-31 RX ADMIN — DOCUSATE SODIUM 100 MG: 100 CAPSULE, LIQUID FILLED ORAL at 02:01

## 2018-01-31 RX ADMIN — HYDRALAZINE HYDROCHLORIDE 10 MG: 20 INJECTION INTRAMUSCULAR; INTRAVENOUS at 12:01

## 2018-01-31 RX ADMIN — HEPARIN SODIUM 17 UNITS/KG/HR: 10000 INJECTION, SOLUTION INTRAVENOUS at 06:01

## 2018-01-31 NOTE — PROGRESS NOTES
[facility]  Neurology  Progress Note    Patient Name: Kev Vasquez  MRN: [id number]  Admission Date: 1/24/2018  Hospital Length of Stay: 7 days  Code Status: Full Code   Attending Provider: Karen Valladares MD  Primary Care Physician: Mega Collins MD   Principal Problem:Vomiting    Subjective:     Interval History: Patient notes no major change in gait.  Daughter at bedside earlier had thought patient had some slight improvement on transfers, but PT noted no improvement.  Patient seems unchanged otherwise.  Discussed case with resident, they were only able to get 13 mL CSF on the tap.  Pressure listed as slightly elevated.  Given some additional signs recently (micrographia noted on MMSE, daughter describing tremor), more concerned for Parkinson Disease.    Current Neurological Medications: Bupropion 450 mg po qd    Current Facility-Administered Medications   Medication Dose Route Frequency Provider Last Rate Last Dose    acetaminophen tablet 650 mg  650 mg Oral Q6H PRN Charlette Jasmine, NP   650 mg at 01/29/18 1801    allopurinol tablet 300 mg  300 mg Oral Daily José Luis Yeh, DO   300 mg at 01/31/18 1134    amLODIPine tablet 10 mg  10 mg Oral Daily Cathy Farmer PA-C   10 mg at 01/31/18 1133    aspirin chewable tablet 81 mg  81 mg Oral Daily José Luis Yeh, DO   81 mg at 01/31/18 1133    atorvastatin tablet 10 mg  10 mg Oral Daily José Luis Yeh, DO   10 mg at 01/31/18 1133    buPROPion TB24 tablet 450 mg  450 mg Oral Daily José Luis Yeh, DO   450 mg at 01/31/18 1133    ceFEPIme injection 2 g  2 g Intravenous Q12H José Luis Yeh, DO   2 g at 01/31/18 1431    docusate sodium capsule 100 mg  100 mg Oral Daily PRN José Luis Yeh DO   100 mg at 01/29/18 1801    docusate sodium capsule 100 mg  100 mg Oral BID Charlette Jasmine, NP   100 mg at 01/31/18 1431    dofetilide capsule 250 mcg  250 mcg Oral Q12H José Luis Yeh, DO   250 mcg at 01/31/18 1133    doxazosin tablet 4 mg  4  mg Oral Daily Charlette Jasmine NP   4 mg at 01/31/18 1134    folic acid tablet 1 mg  1 mg Oral Daily José Luis BARRY Yeh, DO   1 mg at 01/31/18 1134    heparin 25,000 units in dextrose 5% 250 mL (100 units/mL) infusion  17 Units/kg/hr Intravenous Continuous Chip Hill MD 14.1 mL/hr at 01/31/18 0656 17 Units/kg/hr at 01/31/18 0656    hydrALAZINE injection 10 mg  10 mg Intravenous Q6H PRN José Luisshameka Yeh, DO   10 mg at 01/31/18 0022    hydrALAZINE tablet 100 mg  100 mg Oral Q8H José Luis BARRY Yeh, DO   100 mg at 01/31/18 1431    isosorbide dinitrate tablet 40 mg  40 mg Oral TID José Luisbrittni Yeh, DO   40 mg at 01/31/18 1431    Lactobacillus rhamnosus GG capsule 1 capsule  1 capsule Oral Daily José Luisbrittni Yeh, DO   1 capsule at 01/31/18 1134    lisinopril tablet 20 mg  20 mg Oral BID José Luisshameka Yeh, DO   20 mg at 01/31/18 1134    magnesium oxide tablet 400 mg  400 mg Oral BID José Luisshameka Yeh, DO   400 mg at 01/31/18 1133    omnipaque 300 iohexol 10 mL  10 mL Intrathecal ONCE PRN Anni Henderson MD        ondansetron injection 4 mg  4 mg Intravenous Q6H PRN José Luis Yeh, DO        pantoprazole EC tablet 40 mg  40 mg Oral BID AC Charlette Jasmine NP   40 mg at 01/31/18 0502    polyethylene glycol packet 17 g  17 g Oral Daily Charlette Jasmine NP        promethazine tablet 12.5 mg  12.5 mg Oral Q6H PRN José Luis Yeh DO        warfarin tablet 6 mg  6 mg Oral Daily Charlette Jasmine NP         Review of Systems  Constitutional: Positive for fatigue. Negative for appetite change, chills and fever.   Eyes: Negative for photophobia and visual disturbance.   Respiratory: Negative for cough and shortness of breath.    Gastrointestinal: Negative for constipation.   Genitourinary: +urinary incontinence x ~ 1 year   Musculoskeletal: Positive for back pain (chronic LBP) and gait problem.   Skin: Negative for rash and wound.   Neurological: Positive for numbness (stocking-glove distribution, chronic). Negative for  weakness.   Psychiatric/Behavioral: Positive for confusion and hallucinations (visual). Negative for agitation.     Objective:     Vital Signs (Most Recent):  Temp: 97.8 °F (36.6 °C) (01/31/18 1546)  Pulse: 108 (01/31/18 1546)  Resp: 18 (01/31/18 1546)  BP: (!) 94/0 (01/31/18 1230)  SpO2: (!) 94 % (01/31/18 1546) Vital Signs (24h Range):  Temp:  [97.8 °F (36.6 °C)-99.4 °F (37.4 °C)] 97.8 °F (36.6 °C)  Pulse:  [] 108  Resp:  [18-20] 18  SpO2:  [94 %-96 %] 94 %  BP: ()/(0-94) 94/0     Weight: 80.5 kg (177 lb 7.5 oz)  Body mass index is 25.46 kg/m².    Physical Exam  General:  Well-developed, well-nourished, nad  HEENT:  NCAT, PERRLA, EOMI, oropharyngeal membranes non-erythematous/without exudate  Neck:  Supple, normal ROM without nuchal rigidity  Resp:  Symmetric expansion, no increased wob  CVS:  No LE edema, peripheral pulses 2+ (radial, dorsalis pedis)  GI:  Abd soft, non-distended, non-tender to palpation  Neurologic Exam:  Mental Status:  Awake, alert, oriented to self, location, but not exact date.  Speech, thought content appropriate.  Able to spell 'world' forward, refuses backward.  Recent recall 2/3, remote recall 2/3.   Cranial Nerves:  PERRLA, EOMI.  Facial movement, sensation intact and symmetric.  Palate raises symmetrically, tongue protrudes midline.  Trapezius strength 5/5 bilaterally.  Motor:  Normal bulk and tone apart from slight BUE rigidity.  BUE shoulder abduction, biceps/triceps, wrist flexion/extension,  strength 5/5.  BLE hip flexors, knee flexion/extension, plantarflexion/dorsiflexion 5/5.  No pronator drift.  Sensory:  Intact to light touch without inattention.  Vibratory sensation intact at BUE digits, BLE lower shin.  Reflexes:  Biceps, brachioradialis, patellar, Achilles 2+ and symmetric.  No ankle clonus.  Downgoing toe bilaterally.  Coordination:  FNF, RICO, HTS intact with no dysmetria/ataxia/dysdiadochokinesia.  Mild resting tremor in RUE on pronator drift  testing.  Gait:  Deferred gait exam this am. Per PT, no improvement s/p LP 18.     Significant Labs:    Recent Labs  Lab 18  0443   WBC 7.19   RBC 3.62*   HGB 10.7*   HCT 31.6*   *   MCV 87   MCH 29.6   MCHC 33.9       Recent Labs  Lab 18  0443   CALCIUM 10.0   PROT 6.7      K 3.8   CO2 24      BUN 20   CREATININE 1.7*   ALKPHOS 73   ALT 13   AST 19   BILITOT 0.4     Significant Imagin18 CT head w/o contrast:  Possible CSF effect in periventricular area vs microvascular ischemic change.  Unable to obtain MRI 2/2 LVAD.  Stable exam noting mild chronic ischemic changes and generalized cerebral volume loss. No evidence of acute intracranial pathology.       Assessment and Plan:     Confusion    -Concern for NPH vs underlying dementia w/ delirium/progression--no significant changes s/p LP  -Would like to trial carbidopa-levodopa  mg po bid scheduled at ~ 9 am, 3 pm  -Follow up in Neurology clinic on discharge for possible diagnosis of PD, questionable LBD features as well (previous mention of hallucinations)      Gait instability    -Per above, less likely NPH based on LP results.  Additional features suggestive of PD:   -Patient does have micrographia and some tremor on exam as well as rigidity  -Trial sinemet while in hospital per above, follow up in Neurology clinic on discharge  -Patient otherwise ok for discharge from Neurology standpoint after sinemet trial and per PT recs     VTE Risk Mitigation         Ordered     warfarin tablet 6 mg  Daily     Route:  Oral        18 0718     heparin 25,000 units in dextrose 5% 250 mL (100 units/mL) infusion  Continuous     Route:  Intravenous        18 0636        Alesia Montero MD  Neurology  [facility]

## 2018-01-31 NOTE — PT/OT/SLP PROGRESS
"Physical Therapy Treatment    Patient Name:  Kev Vasquez   MRN:  [id number]    Recommendations:     Discharge Recommendations:  home with home health   Discharge Equipment Recommendations: none   Barriers to discharge: Inaccessible home and Decreased caregiver support    Assessment:     Kev Vasquez is a 77 y.o. male admitted with a medical diagnosis of Vomiting.  He presents with the following impairments/functional limitations:  impaired endurance, impaired sensation, impaired self care skills, impaired balance, impaired functional mobilty, gait instability, impaired cognition, decreased lower extremity function, decreased upper extremity function, decreased safety awareness, abnormal tone, decreased ROM, impaired coordination, impaired fine motor, impaired cardiopulmonary response to activity. Pt demo significant decompensation of gait pattern this date req mod A for majority of gait trial. Pt continues to req CGA-mod A for majority of mobility with parkinsonian movement patterns. RN and NP (Nahum) aware. Will progress as tolerated.    Rehab Prognosis:  Good; patient would benefit from acute skilled PT services to address these deficits and reach maximum level of function.      Recent Surgery: Procedure(s) (LRB):  ESOPHAGOGASTRODUODENOSCOPY (EGD) (N/A) 5 Days Post-Op    Plan:     During this hospitalization, patient to be seen 5 x/week to address the above listed problems via gait training, therapeutic activities, therapeutic exercises, neuromuscular re-education  · Plan of Care Expires:  02/25/18   Plan of Care Reviewed with: patient    Subjective     Communicated with RN (Claudette) prior to session.  Patient found resting in supine upon PT entry to room, agreeable to treatment.      Chief Complaint: "Well, you are always asking me to do something."  Patient comments/goals: "I will sit up for lunch."  Pain/Comfort:  · Pain Rating 1: 0/10  · Pain Rating Post-Intervention 1: 0/10    Patients cultural, " spiritual, Voodoo conflicts given the current situation: None noted    Objective:     Patient found with: telemetry, LVAD     General Precautions: Standard, aspiration, fall, LVAD   Orthopedic Precautions:N/A   Braces: N/A     Functional Mobility:  Bed Mobility:   Supine to Sit: From L sidelying. Contact Guard Assistance   Scooting at EOB: Contact Guard Assistance    Transfers:   Sit to Stand: Minimal Assistance with RW from EOB   Stand to Sit: Contact Guard Assistance with Rolling Walker for safe descent to chair    Gait:   Distance Ambulated: Pt ambulated x110 feet in hallway setting; emergency bag in tow. Pt demo gait instability with festinating gait pattern, stopping/starting of gait, and lateral instability with generalized posterior lean. Pt demo need for increased assist this date.    Assistance level: Minimal Assistance and Moderate Assistance   Assistive Device used:  Rolling Walker   Gait Pattern: alternating   Gait Deviation(s): decreased william, increased time in double stance, decreased step length, decreased weight-shifting ability and NBOS, freezing of gait   Impairments due to: impaired cognition, instability of gait, decreased safety awareness    Therapeutic Activities and Exercises:   PT arrived to pt's room to find pt resting quietly; agreeable to PT session. Upon sitting EOB, pt assisted to second gown Pt performed mobility as above. Upon return to room, pt agreeable to remain UIC. Pt demo increased difficulty with small spaces this date req mod A for safe transfer to chair in corner. Pt's sitter present to assist. Pt reports sensation of near LOB and inability to catch balance in standing has worsened. PT reviewed goals and mobility needs with pt. Pt requesting to remain on battery power at conclusion of session. Questions/concerns addressed within PT scope of practice; pt and sitter with no further questions.    AM-PAC 6 CLICK MOBILITY  Turning over in bed (including adjusting bedclothes,  sheets and blankets)?: 3  Sitting down on and standing up from a chair with arms (e.g., wheelchair, bedside commode, etc.): 3  Moving from lying on back to sitting on the side of the bed?: 3  Moving to and from a bed to a chair (including a wheelchair)?: 2  Need to walk in hospital room?: 2  Climbing 3-5 steps with a railing?: 2  Total Score: 15     Patient left up in chair with all lines intact, call button in reach, RN notified and sitter present..    GOALS:    Physical Therapy Goals        Problem: Physical Therapy Goal    Goal Priority Disciplines Outcome Goal Variances Interventions   Physical Therapy Goal     PT/OT, PT Ongoing (interventions implemented as appropriate)     Description:  Goals to be met by: 18    Patient will increase functional independence with mobility by performin. Supine to sit with SBA  2. Sit to supine with SBA  3. Sit to stand transfer with SBA using RW   4. Gait x150 feet with Contact Guard Assistance using Rolling Walker  5. Standing balance x10 min with SBA using AD or no AD while performing functional tasks   6. Lower extremity exercise program x20 reps per handout, with assistance as needed                        Time Tracking:     PT Received On: 18  PT Start Time: 1146     PT Stop Time: 1214  PT Total Time (min): 28 min     Billable Minutes: Gait Training 15 and Therapeutic Activity 10    Treatment Type: Treatment  PT/PTA: PT         Carmencita Doherty, PT, DPT  [phone]  2018

## 2018-01-31 NOTE — ASSESSMENT & PLAN NOTE
- S/P HM III 10/16/16 as DT in [place]  - Current speed 5800  - TTE 1/25/18 shows LVEDD 6.5, LVEF 10-15%, diastolic dysfunction, RVE, severely depressed RV systolic function, PAS 20, ARTHUR intermittently and septum midline. No comment on IVC - per Dr. Lanza, costal views were too poor to assess IVC.   - CVP via PICC line remains 2  (was taking Lasix 80 mg every M-W-F at home) - holding Lasix  - INR 1.2 today, Coumadin was on hold for LP; now bridging with heparin gtt. 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

## 2018-01-31 NOTE — ASSESSMENT & PLAN NOTE
- He has a long h/o orthostatic hypotension, so BP's should only be checked while sitting or standing  - Goal MAP ~ 90 or less  - Continue Norvasc, Hydralazine, Isordil, Lisinopril. Will add Doxazosin

## 2018-01-31 NOTE — SUBJECTIVE & OBJECTIVE
Subjective:     Interval History: Patient notes no major change in gait.  Daughter at bedside earlier had thought patient had some slight improvement on transfers, but PT noted no improvement.  Patient seems unchanged otherwise.  Discussed case with resident, they were only able to get 13 mL CSF on the tap.  Pressure listed as slightly elevated.  Given some additional signs recently (micrographia noted on MMSE, daughter describing tremor), more concerned for Parkinson Disease.    Current Neurological Medications: Bupropion 450 mg po qd    Current Facility-Administered Medications   Medication Dose Route Frequency Provider Last Rate Last Dose    acetaminophen tablet 650 mg  650 mg Oral Q6H PRN Charlette Jasmine NP   650 mg at 01/29/18 1801    allopurinol tablet 300 mg  300 mg Oral Daily José Luis Yeh, DO   300 mg at 01/31/18 1134    amLODIPine tablet 10 mg  10 mg Oral Daily Cathy Farmer PA-C   10 mg at 01/31/18 1133    aspirin chewable tablet 81 mg  81 mg Oral Daily José Luis Yeh DO   81 mg at 01/31/18 1133    atorvastatin tablet 10 mg  10 mg Oral Daily José Luis Yeh, DO   10 mg at 01/31/18 1133    buPROPion TB24 tablet 450 mg  450 mg Oral Daily José Luis Yeh, DO   450 mg at 01/31/18 1133    ceFEPIme injection 2 g  2 g Intravenous Q12H José Luis Yeh, DO   2 g at 01/31/18 1431    docusate sodium capsule 100 mg  100 mg Oral Daily PRN José Luis Yeh DO   100 mg at 01/29/18 1801    docusate sodium capsule 100 mg  100 mg Oral BID Charlette Jasmine NP   100 mg at 01/31/18 1431    dofetilide capsule 250 mcg  250 mcg Oral Q12H José Luis Yeh DO   250 mcg at 01/31/18 1133    doxazosin tablet 4 mg  4 mg Oral Daily Charlette Jasmine NP   4 mg at 01/31/18 1134    folic acid tablet 1 mg  1 mg Oral Daily José Luis Yeh, DO   1 mg at 01/31/18 1134    heparin 25,000 units in dextrose 5% 250 mL (100 units/mL) infusion  17 Units/kg/hr Intravenous Continuous Chip Hill MD 14.1 mL/hr at  01/31/18 0656 17 Units/kg/hr at 01/31/18 0656    hydrALAZINE injection 10 mg  10 mg Intravenous Q6H PRN José Luisshameka Yeh, DO   10 mg at 01/31/18 0022    hydrALAZINE tablet 100 mg  100 mg Oral Q8H José Luis BARRY Yeh, DO   100 mg at 01/31/18 1431    isosorbide dinitrate tablet 40 mg  40 mg Oral TID José Luisshameka Yeh, DO   40 mg at 01/31/18 1431    Lactobacillus rhamnosus GG capsule 1 capsule  1 capsule Oral Daily José Luis BARRY Yeh, DO   1 capsule at 01/31/18 1134    lisinopril tablet 20 mg  20 mg Oral BID José Luis BARRY Yeh, DO   20 mg at 01/31/18 1134    magnesium oxide tablet 400 mg  400 mg Oral BID José Luis BARRY Yeh, DO   400 mg at 01/31/18 1133    omnipaque 300 iohexol 10 mL  10 mL Intrathecal ONCE PRN Anni Henderson MD        ondansetron injection 4 mg  4 mg Intravenous Q6H PRN José Luisshameka Yeh, DO        pantoprazole EC tablet 40 mg  40 mg Oral BID AC Charlette Jasmine, NP   40 mg at 01/31/18 0502    polyethylene glycol packet 17 g  17 g Oral Daily Charlette Jasmine NP        promethazine tablet 12.5 mg  12.5 mg Oral Q6H PRN José Luis Yeh, DO        warfarin tablet 6 mg  6 mg Oral Daily Charlette Jasmine NP         Review of Systems  Constitutional: Positive for fatigue. Negative for appetite change, chills and fever.   Eyes: Negative for photophobia and visual disturbance.   Respiratory: Negative for cough and shortness of breath.    Gastrointestinal: Negative for constipation.   Genitourinary: +urinary incontinence x ~ 1 year   Musculoskeletal: Positive for back pain (chronic LBP) and gait problem.   Skin: Negative for rash and wound.   Neurological: Positive for numbness (stocking-glove distribution, chronic). Negative for weakness.   Psychiatric/Behavioral: Positive for confusion and hallucinations (visual). Negative for agitation.     Objective:     Vital Signs (Most Recent):  Temp: 97.8 °F (36.6 °C) (01/31/18 1546)  Pulse: 108 (01/31/18 1546)  Resp: 18 (01/31/18 1546)  BP: (!) 94/0 (01/31/18 1230)  SpO2: (!) 94 %  (01/31/18 1546) Vital Signs (24h Range):  Temp:  [97.8 °F (36.6 °C)-99.4 °F (37.4 °C)] 97.8 °F (36.6 °C)  Pulse:  [] 108  Resp:  [18-20] 18  SpO2:  [94 %-96 %] 94 %  BP: ()/(0-94) 94/0     Weight: 80.5 kg (177 lb 7.5 oz)  Body mass index is 25.46 kg/m².    Physical Exam  General:  Well-developed, well-nourished, nad  HEENT:  NCAT, PERRLA, EOMI, oropharyngeal membranes non-erythematous/without exudate  Neck:  Supple, normal ROM without nuchal rigidity  Resp:  Symmetric expansion, no increased wob  CVS:  No LE edema, peripheral pulses 2+ (radial, dorsalis pedis)  GI:  Abd soft, non-distended, non-tender to palpation  Neurologic Exam:  Mental Status:  Awake, alert, oriented to self, location, but not exact date.  Speech, thought content appropriate.  Able to spell 'world' forward, refuses backward.  Recent recall 2/3, remote recall 2/3.   Cranial Nerves:  PERRLA, EOMI.  Facial movement, sensation intact and symmetric.  Palate raises symmetrically, tongue protrudes midline.  Trapezius strength 5/5 bilaterally.  Motor:  Normal bulk and tone apart from slight BUE rigidity.  BUE shoulder abduction, biceps/triceps, wrist flexion/extension,  strength 5/5.  BLE hip flexors, knee flexion/extension, plantarflexion/dorsiflexion 5/5.  No pronator drift.  Sensory:  Intact to light touch without inattention.  Vibratory sensation intact at BUE digits, BLE lower shin.  Reflexes:  Biceps, brachioradialis, patellar, Achilles 2+ and symmetric.  No ankle clonus.  Downgoing toe bilaterally.  Coordination:  FNF, RICO, HTS intact with no dysmetria/ataxia/dysdiadochokinesia.  Mild resting tremor in RUE on pronator drift testing.  Gait:  Deferred gait exam this am. Per PT, no improvement s/p LP 01/30/18.     Significant Labs:    Recent Labs  Lab 01/31/18  0443   WBC 7.19   RBC 3.62*   HGB 10.7*   HCT 31.6*   *   MCV 87   MCH 29.6   MCHC 33.9       Recent Labs  Lab 01/31/18  0443   CALCIUM 10.0   PROT 6.7      K 3.8    CO2 24      BUN 20   CREATININE 1.7*   ALKPHOS 73   ALT 13   AST 19   BILITOT 0.4     Significant Imagin18 CT head w/o contrast:  Possible CSF effect in periventricular area vs microvascular ischemic change.  Unable to obtain MRI 2/2 LVAD.  Stable exam noting mild chronic ischemic changes and generalized cerebral volume loss. No evidence of acute intracranial pathology.

## 2018-01-31 NOTE — PROGRESS NOTES
01/31/18 0127 01/31/18 0128   Vital Signs   BP (!) 92/0 114/79   MAP (mmHg) --  92   BP Method Doppler Automatic   Patient Position Sitting Sitting       Notified Dr. Smith of results 1 hour after hydralazine 10mg IVP administered.  Pt has no complaints.

## 2018-01-31 NOTE — NURSING
Lumbar puncture    Pt left for lp this pm, emergency bag with bag.. Pt voice no complaints upon leaving floor. Heparin drip was off at 0800    @1600    Pt returned back to floor. Pt voice no complaints..

## 2018-01-31 NOTE — PLAN OF CARE
Problem: SLP Goal  Goal: SLP Goal  Speech Therapy Short Term Goals  Goal expected to be met by 2/4  1. Pt will tolerate a regular diet and NECTAR THICK liquids with no overt s/s of aspiration.   2. Pt will tolerate cup sips of thin liquids x10 with no overt s/s of aspiration.   3. Pt will participate in a modified barium swallow study as warranted per MD and SLP.          MBS completed. Recommending:    Honey thick liquids  Regular diet  · Avoid mixed consistencies (soup, cereal with milk, juicy fruits).  · To achieve honey thick liquid: 4 oz of any liquid to 1 pack of thickener  · No straw  · No ice cream, jello, or ice.    See formal report for detail and additional recommendations.     Maria R Rodriguez M.A. CCC-SLP  Speech Language Pathologist  [phone]  1/31/2018

## 2018-01-31 NOTE — PLAN OF CARE
Problem: Patient Care Overview  Goal: Plan of Care Review  Outcome: Ongoing (interventions implemented as appropriate)  Updated care plan w/ pt's spouse and sitter.  Pt continue on heparin infusion.  No issues w/ lvad today.  No falls during shift.   Pt did participate in therapy today.  Speech is following pt.  Pt did have modified barium swallow today.  Neurology following pt.  Pt remained free from injury and skin breakdown.  Pending stool for occult blood .

## 2018-01-31 NOTE — PROGRESS NOTES
01/30/18 2331 01/30/18 2343   Vital Signs   /81 (!) 98/0   MAP (mmHg) 94 --    BP Location Left arm --    BP Method Automatic Doppler   Patient Position Sitting --        Notified Dr. Smith; pt received scheduled medications at 2117. Telephone order to administer hydralazine 10mg IVP PRN as in order. Pt has no complaints at this time. Mignon VAD coordinator on call for tonight notified.

## 2018-01-31 NOTE — ASSESSMENT & PLAN NOTE
- CT of head 1/24/18 with no acute changes  - Given constellation of confusion, gait disturbance and urinary incontinence, consulted Neuro (NPH ?). Appreciate their help. LP done 1/30/18 with removal of only 13cc; opening pressure 21, closing pressure 15. His post procedure gait was significantly worse. Discussed with Neuro and NSGY. Per NSGY, no immediate need for cisternogram, shunt, etc, given failed improvement in gait. Now considering PD  - D/C'd Ropinirole again 2/2 gait disturbance  - Delirium protocol

## 2018-01-31 NOTE — NURSING
lvad dressing yenny    CHANGED LVAD DRESSING TO mid abdomen.  STERILE TECHNIQUE MAINTAINED THROUGHOUT DRESSING CHANGED.  UPON REMOVAL OF OLD DRESSING, NOTED SOME SMALL AMOUNT DARK BROWN DRAINAGE ON GAUZE,  REDNESS NOTED TO DRIVE LINE EXIT SITE.  It is a 2

## 2018-01-31 NOTE — ASSESSMENT & PLAN NOTE
-Concern for NPH vs underlying dementia w/ delirium/progression--no significant changes s/p LP  -Would like to trial carbidopa-levodopa  mg po bid scheduled at ~ 9 am, 3 pm  -Follow up in Neurology clinic on discharge for possible diagnosis of PD, questionable LBD features as well (previous mention of hallucinations)

## 2018-01-31 NOTE — TREATMENT PLAN
Treatment Plan  01/31/2018  3:41 PM    Visited with patient and family today.  Awaiting for final read on MBSS to provide final recs for the primary team.    Eileen Bermudez M.D.  Gastroenterology Fellow, PGY-IV  Pager: [phone]  [facility]

## 2018-01-31 NOTE — PROGRESS NOTES
[facility]  Heart Transplant  Progress Note    Patient Name: Kev Vasquez  MRN: [id number]  Admission Date: 1/24/2018  Hospital Length of Stay: 7 days  Attending Physician: Karen Valladares MD  Primary Care Provider: Mega Collins MD  Principal Problem:Vomiting    Subjective:     Interval History: Oriented to self and place but not year. He does not think his gait improved after LP yesterday, and PT confirms this    Continuous Infusions:   heparin (porcine) in D5W 17 Units/kg/hr (01/31/18 0656)     Scheduled Meds:   allopurinol  300 mg Oral Daily    amLODIPine  10 mg Oral Daily    aspirin  81 mg Oral Daily    atorvastatin  10 mg Oral Daily    buPROPion  450 mg Oral Daily    ceFEPime (MAXIPIME) IVPB  2 g Intravenous Q12H    docusate sodium  100 mg Oral BID    dofetilide  250 mcg Oral Q12H    doxazosin  4 mg Oral Daily    folic acid  1 mg Oral Daily    hydrALAZINE  100 mg Oral Q8H    isosorbide dinitrate  40 mg Oral TID    Lactobacillus rhamnosus GG  1 capsule Oral Daily    lisinopril  20 mg Oral BID    magnesium oxide  400 mg Oral BID    pantoprazole  40 mg Oral BID AC    polyethylene glycol  17 g Oral Daily    warfarin  6 mg Oral Daily     PRN Meds:acetaminophen, docusate sodium, hydrALAZINE, omnipaque 300 iohexol, ondansetron, promethazine    Review of patient's allergies indicates:   Allergen Reactions    Aldactone [spironolactone]       persistent hyperkalemia.    Sulfa (sulfonamide antibiotics)      Objective:     Vital Signs (Most Recent):  Temp: 98.3 °F (36.8 °C) (01/31/18 0736)  Pulse: 83 (01/31/18 1048)  Resp: 20 (01/31/18 0736)  BP: (!) 96/0 (01/31/18 0736)  SpO2: 96 % (01/31/18 0736) Vital Signs (24h Range):  Temp:  [96.7 °F (35.9 °C)-98.8 °F (37.1 °C)] 98.3 °F (36.8 °C)  Pulse:  [65-83] 83  Resp:  [18-20] 20  SpO2:  [94 %-96 %] 96 %  BP: ()/(0-94) 96/0     Patient Vitals for the past 72 hrs (Last 3 readings):   Weight   01/31/18 0700 80.5 kg (177 lb 7.5 oz)    01/30/18 0900 81.8 kg (180 lb 5.4 oz)   01/30/18 0656 81.8 kg (180 lb 5.4 oz)     Body mass index is 25.46 kg/m².      Intake/Output Summary (Last 24 hours) at 01/31/18 1101  Last data filed at 01/31/18 0500   Gross per 24 hour   Intake            754.1 ml   Output                0 ml   Net            754.1 ml       Hemodynamic Parameters:  CVP:  [2 mmHg] 2 mmHg    Telemetry: BiV paced    Physical Exam   Constitutional: He appears well-developed and well-nourished.   HENT:   Head: Normocephalic and atraumatic.   Eyes: Conjunctivae are normal. Pupils are equal, round, and reactive to light.   Neck: Normal range of motion. Neck supple. No JVD present.   Cardiovascular: Normal rate and regular rhythm.    Smooth VAD hum. Intermittently pulsatile   Pulmonary/Chest: Effort normal and breath sounds normal.   Abdominal: Soft. Bowel sounds are normal.   Genitourinary:   Genitourinary Comments: Incontinent   Musculoskeletal: He exhibits no edema.   Neurological: He is alert.   Oriented to self and place but not year this morning   Skin: Skin is warm and dry. Capillary refill takes 2 to 3 seconds.       Significant Labs:  CBC:    Recent Labs  Lab 01/29/18 0446 01/30/18 0445 01/31/18 0443   WBC 7.74 7.53 7.19   RBC 4.11* 3.78* 3.62*   HGB 11.8* 10.9* 10.7*   HCT 35.7* 33.1* 31.6*    127* 122*   MCV 87 88 87   MCH 28.7 28.8 29.6   MCHC 33.1 32.9 33.9     BNP:    Recent Labs  Lab 01/26/18 0334 01/29/18 0446 01/31/18 0443   * 383* 251*     CMP:    Recent Labs  Lab 01/26/18 0334 01/29/18 0446 01/30/18 0445 01/31/18 0443   GLU 79  < > 84 85 81   CALCIUM 10.5  < > 10.1 9.7 10.0   ALBUMIN 3.3*  --  3.3*  --  3.2*   PROT 7.0  --  7.3  --  6.7     < > 140 139 142   K 4.1  < > 3.8 3.7 3.8   CO2 26  < > 24 25 24     < > 107 108 110   BUN 27*  < > 22 19 20   CREATININE 2.1*  < > 1.7* 1.6* 1.7*   ALKPHOS 72  --  77  --  73   ALT 14  --  16  --  13   AST 23  --  26  --  19   BILITOT 0.5  --  0.6  --   0.4   < > = values in this interval not displayed.   Coagulation:     Recent Labs  Lab 01/29/18  0446 01/30/18  0445 01/31/18  0443   INR 1.5* 1.3* 1.2     LDH:    Recent Labs  Lab 01/29/18  0446 01/30/18  0445 01/31/18  0443    220 238     Microbiology:  Microbiology Results (last 7 days)     Procedure Component Value Units Date/Time    Blood culture #1 [[id number]] Collected:  01/24/18 1521    Order Status:  Completed Specimen:  Blood from Line, PICC Right Basilic Updated:  01/29/18 1812     Blood Culture, Routine No growth after 5 days.    Narrative:       Blood Culture #1    Blood culture #2 [[id number]] Collected:  01/24/18 1537    Order Status:  Completed Specimen:  Blood from Peripheral, Hand, Right Updated:  01/29/18 1812     Blood Culture, Routine No growth after 5 days.    Narrative:       Blood Culture #2          I have reviewed all pertinent labs within the past 24 hours.    Estimated Creatinine Clearance: 38.8 mL/min (based on SCr of 1.7 mg/dL (H)).          Assessment and Plan:     Mr. 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 Balwinder BiV ICD, HILLARY/BiPAP, HTN, CKD, HLP, gout and depression who presents with confusion. On further questioning of his wife and his caregiver who were both at bedside, they noticed some odd behaviours such as combing his cheek/nose, getting dressed to go hunting when that is not his hobby and admitted to seeing visual hallucinations of his dead brother. They stated that he is not sleeping well and naps throughout the day. They wonder if his BiPAP is not in the proper settings anymore and he now has made a game out of looking at his watch to see when he will get to sleep. He stated it was 2017, January, thought he was in [place] (moved from there in June), and stated  [place] as the president. Altogether, he denies SOB, CP, LE edema, orthopnea and PND. He has no fevers or chills, cough, or diarrhea. They state that he does have some worsening vomiting. They stated that a few months ago, he would vomit after he drank some water: it would start with a sneeze, then a cough, then the vomit. It would happen every now and then, but over the past few weeks, it has worsened to 3-4x/day. They stated that now it occurs without drinking, sometimes before dinner and he would throw up almost undigested food. He admitted to throwing up pills too at times. There have been no VAD alarms noted.       * Vomiting    - Appreciate GI's help. KUB unremarkable. EGD also negative.  - PPI bid  - Appreciate ST's help given coughing and sneezing that can precede emesis. MBSS done and patient now requires honey thick liquids with strict aspiration precautions        Confusion    - CT of head 1/24/18 with no acute changes  - Given constellation of confusion, gait disturbance and urinary incontinence, consulted Neuro (NPH ?). Appreciate their help. LP done 1/30/18 with removal of only 13cc; opening pressure 21, closing pressure 15. His post procedure gait was significantly worse. Discussed with Neuro and NSGY. Per NSGY, no immediate need for cisternogram, shunt, etc, given failed improvement in gait. Now considering PD  - D/C'd Ropinirole again 2/2 gait disturbance  - Delirium protocol        LVAD (left ventricular assist device) present    - S/P HM III 10/16/16 as DT in [place]  - Current speed 5800  - TTE 1/25/18 shows LVEDD 6.5, LVEF 10-15%, diastolic dysfunction, RVE, severely depressed RV systolic function, PAS 20, ARTHUR intermittently and septum midline. No comment on IVC - per Dr. Lanza, costal views were too poor to assess IVC.   - CVP via PICC line remains 2  (was taking Lasix 80 mg every M-W-F at home) - holding Lasix  - INR 1.2 today, Coumadin was on hold for LP; now bridging with heparin gtt. Will  resume Coumadin this evening with goal INR 2.0-3.0  - LDH up some yesterday at 220 and is 238 today - will follow  - Continue IV Cefepime for chronic Pseudomonas DL infection        Essential hypertension    - He has a long h/o orthostatic hypotension, so BP's should only be checked while sitting or standing  - Goal MAP ~ 90 or less  - Continue Norvasc, Hydralazine, Isordil, Lisinopril. Will add Doxazosin        Stage 3 chronic kidney disease    - Admit creatinine 2.3, 1.7 today, (baseline looks ~ 1.6-1.8)        Gait instability    - PT/OT  - See confusion above        VT (ventricular tachycardia)    - Continue Tikosyn  - Has [employer]. Balwinder BiV ICD  - Keep K+ > 4.0 and Mg+ > 2.0            Charlette Jasmine NP [id number]  Heart Transplant  [facility]

## 2018-01-31 NOTE — PT/OT/SLP PROGRESS
Occupational Therapy      Patient Name:  Kev Vasquez   MRN:  [id number]    Patient not seen today secondary to off unit for MBSS this AM. OT checked pt in PM. Pt's sister had just assisted him back to bed in prep for sponge bathing. Sitter requesting OT notify nsg of small black dot on plantar surface of left foot. OT did notify nsg. OT unable to return again for session this date and will attempt next date.     Brandi Sams, LOTR  1/31/2018

## 2018-01-31 NOTE — PROGRESS NOTES
01/31/18 0416 01/31/18 0418   Vital Signs   /83 (!) 100/0   MAP (mmHg) 100 --    BP Location Left arm --    BP Method Automatic Doppler   Patient Position Sitting --      Notified Dr. Smith; pt has no complaints. Telephone order to administer scheduled 0600 meds now and add 5mg IVP hydralazine x1. Will continue to monitor.

## 2018-01-31 NOTE — PLAN OF CARE
Problem: Physical Therapy Goal  Goal: Physical Therapy Goal  Goals to be met by: 18    Patient will increase functional independence with mobility by performin. Supine to sit with SBA  2. Sit to supine with SBA  3. Sit to stand transfer with SBA using RW   4. Gait x150 feet with Contact Guard Assistance using Rolling Walker  5. Standing balance x10 min with SBA using AD or no AD while performing functional tasks   6. Lower extremity exercise program x20 reps per handout, with assistance as needed      Outcome: Ongoing (interventions implemented as appropriate)    Goals updated and appropriate to reflect pt's current mobility needs.    Carmencita Doherty, PT, DPT  [phone]  2018

## 2018-01-31 NOTE — PLAN OF CARE
Problem: Patient Care Overview  Goal: Individualization & Mutuality  Outcome: Ongoing (interventions implemented as appropriate)  POC reviewed with pt. MAPs and doppler pressures elevated; hydralazine IVP administered as ordered. Numbers in chart. all other VS and VAD numbers stable. Pt was disoriented to time and situation during shift. Lumbar puncture performed 1/30. Barium swallow study scheduled 1/31. Heparin gtt infusing as ordered. CVP daily. Pt remains free from falls, trauma, injury; pt tolerating POC well. Will continue to monitor.

## 2018-01-31 NOTE — NURSING
CHANGED LVAD DRESSING TO mid abdomen.  STERILE TECHNIQUE MAINTAINED THROUGHOUT DRESSING CHANGED.  UPON REMOVAL OF OLD DRESSING, NOTED SOME SMALL AMOUNT DARK BROWN DRAINAGE ON GAUZE,  REDNESS NOTED TO DRIVE LINE EXIT SITE.  It is a 2

## 2018-01-31 NOTE — ASSESSMENT & PLAN NOTE
- Appreciate GI's help. KUB unremarkable. EGD also negative.  - PPI bid  - Appreciate ST's help given coughing and sneezing that can precede emesis. MBSS done and patient now requires honey thick liquids with strict aspiration precautions

## 2018-01-31 NOTE — SUBJECTIVE & OBJECTIVE
Interval History: Oriented to self and place but not year. He does not think his gait improved after LP yesterday, and PT confirms this    Continuous Infusions:   heparin (porcine) in D5W 17 Units/kg/hr (01/31/18 0656)     Scheduled Meds:   allopurinol  300 mg Oral Daily    amLODIPine  10 mg Oral Daily    aspirin  81 mg Oral Daily    atorvastatin  10 mg Oral Daily    buPROPion  450 mg Oral Daily    ceFEPime (MAXIPIME) IVPB  2 g Intravenous Q12H    docusate sodium  100 mg Oral BID    dofetilide  250 mcg Oral Q12H    doxazosin  4 mg Oral Daily    folic acid  1 mg Oral Daily    hydrALAZINE  100 mg Oral Q8H    isosorbide dinitrate  40 mg Oral TID    Lactobacillus rhamnosus GG  1 capsule Oral Daily    lisinopril  20 mg Oral BID    magnesium oxide  400 mg Oral BID    pantoprazole  40 mg Oral BID AC    polyethylene glycol  17 g Oral Daily    warfarin  6 mg Oral Daily     PRN Meds:acetaminophen, docusate sodium, hydrALAZINE, omnipaque 300 iohexol, ondansetron, promethazine    Review of patient's allergies indicates:   Allergen Reactions    Aldactone [spironolactone]       persistent hyperkalemia.    Sulfa (sulfonamide antibiotics)      Objective:     Vital Signs (Most Recent):  Temp: 98.3 °F (36.8 °C) (01/31/18 0736)  Pulse: 83 (01/31/18 1048)  Resp: 20 (01/31/18 0736)  BP: (!) 96/0 (01/31/18 0736)  SpO2: 96 % (01/31/18 0736) Vital Signs (24h Range):  Temp:  [96.7 °F (35.9 °C)-98.8 °F (37.1 °C)] 98.3 °F (36.8 °C)  Pulse:  [65-83] 83  Resp:  [18-20] 20  SpO2:  [94 %-96 %] 96 %  BP: ()/(0-94) 96/0     Patient Vitals for the past 72 hrs (Last 3 readings):   Weight   01/31/18 0700 80.5 kg (177 lb 7.5 oz)   01/30/18 0900 81.8 kg (180 lb 5.4 oz)   01/30/18 0656 81.8 kg (180 lb 5.4 oz)     Body mass index is 25.46 kg/m².      Intake/Output Summary (Last 24 hours) at 01/31/18 1101  Last data filed at 01/31/18 0500   Gross per 24 hour   Intake            754.1 ml   Output                0 ml   Net             754.1 ml       Hemodynamic Parameters:  CVP:  [2 mmHg] 2 mmHg    Telemetry: BiV paced    Physical Exam   Constitutional: He appears well-developed and well-nourished.   HENT:   Head: Normocephalic and atraumatic.   Eyes: Conjunctivae are normal. Pupils are equal, round, and reactive to light.   Neck: Normal range of motion. Neck supple. No JVD present.   Cardiovascular: Normal rate and regular rhythm.    Smooth VAD hum. Intermittently pulsatile   Pulmonary/Chest: Effort normal and breath sounds normal.   Abdominal: Soft. Bowel sounds are normal.   Genitourinary:   Genitourinary Comments: Incontinent   Musculoskeletal: He exhibits no edema.   Neurological: He is alert.   Oriented to self and place but not year this morning   Skin: Skin is warm and dry. Capillary refill takes 2 to 3 seconds.       Significant Labs:  CBC:    Recent Labs  Lab 01/29/18  0446 01/30/18 0445 01/31/18 0443   WBC 7.74 7.53 7.19   RBC 4.11* 3.78* 3.62*   HGB 11.8* 10.9* 10.7*   HCT 35.7* 33.1* 31.6*    127* 122*   MCV 87 88 87   MCH 28.7 28.8 29.6   MCHC 33.1 32.9 33.9     BNP:    Recent Labs  Lab 01/26/18  0334 01/29/18 0446 01/31/18 0443   * 383* 251*     CMP:    Recent Labs  Lab 01/26/18  0334  01/29/18  0446 01/30/18 0445 01/31/18 0443   GLU 79  < > 84 85 81   CALCIUM 10.5  < > 10.1 9.7 10.0   ALBUMIN 3.3*  --  3.3*  --  3.2*   PROT 7.0  --  7.3  --  6.7     < > 140 139 142   K 4.1  < > 3.8 3.7 3.8   CO2 26  < > 24 25 24     < > 107 108 110   BUN 27*  < > 22 19 20   CREATININE 2.1*  < > 1.7* 1.6* 1.7*   ALKPHOS 72  --  77  --  73   ALT 14  --  16  --  13   AST 23  --  26  --  19   BILITOT 0.5  --  0.6  --  0.4   < > = values in this interval not displayed.   Coagulation:     Recent Labs  Lab 01/29/18  0446 01/30/18  0445 01/31/18  0443   INR 1.5* 1.3* 1.2     LDH:    Recent Labs  Lab 01/29/18  0446 01/30/18  0445 01/31/18  0443    220 238     Microbiology:  Microbiology Results (last 7 days)      Procedure Component Value Units Date/Time    Blood culture #1 [[id number]] Collected:  01/24/18 1521    Order Status:  Completed Specimen:  Blood from Line, PICC Right Basilic Updated:  01/29/18 1812     Blood Culture, Routine No growth after 5 days.    Narrative:       Blood Culture #1    Blood culture #2 [[id number]] Collected:  01/24/18 1537    Order Status:  Completed Specimen:  Blood from Peripheral, Hand, Right Updated:  01/29/18 1812     Blood Culture, Routine No growth after 5 days.    Narrative:       Blood Culture #2          I have reviewed all pertinent labs within the past 24 hours.    Estimated Creatinine Clearance: 38.8 mL/min (based on SCr of 1.7 mg/dL (H)).

## 2018-01-31 NOTE — PROCEDURES
Modified Barium Swallow  Swallow Treatment    Patient Name:  Kev Vasquez   MRN:  [id number]      Recommendations:     Recommendations:                General Recommendations:  Dysphagia therapy  Diet recommendations:  Regular, Honey Thick   Aspiration Precautions: 1 bite/sip at a time, Alternating bites/sips, Assistance with meals and Assistance with thickening liquids, Eliminate distractions, Feed only when awake/alert, HOB to 90 degrees, Meds whole buried in puree, No straws, Small bites/sips and Strict aspiration precautions   · To achieve honey thick liquid: 4 oz of any liquid to 1 pack of thickener  · Avoid mixed consistencies (soup, cereal with milk, juicy fruits).  · No ice cream, jello, or ice.  · Encourage chin tuck when drinking    General Precautions: Standard, aspiration, fall, LVAD  Communication strategies:  go to room if call light pushed    Referral     Reason for Referral  Patient was referred for a Modified Barium Swallow Study to assess the efficiency of his/her swallow function, rule out aspiration and make recommendations regarding safe dietary consistencies, effective compensatory strategies, and safe eating environment.     Diagnosis: Vomiting       History:     Past Medical History:   Diagnosis Date    AICD (automatic cardioverter/defibrillator) present 2014    [employer]    Chronic anticoagulation 7/21/2017    Chronic combined systolic and diastolic congestive heart failure 7/27/2015 11-16-17   1 - Moderate left ventricular enlargement.    2 - Severely depressed left ventricular systolic function (EF 15-20%).    3 - Impaired LV relaxation, normal LAP (grade 1 diastolic dysfunction).    4 - Biatrial enlargement.    5 - Right ventricle is upper limit of normal in size with not well seen systolic function.    6 - Severe tricuspid regurgitation.    7 - Increased central venous pressure.    8 - The estimated PA systolic pressure is 40 mmHg.    9 - Heartmate III LVAD; speed 5800.      Complication involving left ventricular assist device (LVAD) 7/29/2017    Coronary artery disease involving native coronary artery of native heart without angina pectoris 7/27/2015    Gait instability     Hypertensive urgency, malignant 11/15/2017    ICD (implantable cardioverter-defibrillator), biventricular, in situ 7/27/2015    Ischemic cardiomyopathy 7/20/2017    S/p HMIII     Kidney stones     LVAD (left ventricular assist device) present 7/20/2017    status post Heartmate III 10/16/16 LVAD    HILLARY on CPAP 7/27/2015    Peripheral neuropathy     Pulmonary hypertension due to left ventricular systolic dysfunction 7/29/2015    Restless leg syndrome 1992    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    Skin cancer     excision 2013    Skin yeast infection 8/31/2017    Stage 3 chronic kidney disease 7/20/2017    Syncope 7/20/2017    Ventricular tachycardia 7/25/2017       Objective:     Current Respiratory Status: 01/31/18    Alert: yes    Cooperative: yes    Follows Directions: yes; simple with repetition    Visualization  · Patient was seen in the lateral view    Oral Peripheral Examination  · Oral Musculature: WFL  · Dentition: present and adequate  · Mucosal Quality: adequate  · Mandibular Strength and Mobility: WFL  · Oral Labial Strength and Mobility: WFL  · Lingual Strength and Mobility: WFL  · Velar Elevation: WFL  · Buccal Strength and Mobility: WFL  · Volitional Cough: elicited  · Volitional Swallow: timely  · Voice Prior to PO Intake: clear    Consistencies Assessed  · Thin 5 mL x2 via spoonful with chin tuck  · Nectar thick 1 oz via cup, 1 oz via cup with chin tuck, 2 cup sips with chin tuck following cracker  · Thin-Honey Thick 5 mL via spoonful, 1 cup sip with chin tuck following cracker  · Puree 1 full spoonful  · Solids 1/2 saltine cracker with pudding thick barium x2    Oral Preparation/Oral Phase  · WFL   · BOT weakness     Pharyngeal Phase   Function  Moderate  impairment to BOT resulting in:  · Premature spillage to pyriform sinus that increased as viscosity decreased  · No vallecular stasis    WFL for hyolaryngeal elevation, hyolaryngeal excursion, and posterior pharyngeal wall contraction resulting in:  · Adequate epiglottic inversion  · No pyriform sinus stasis  · Adequate bolus movement through pharynx  · No posterior pharyngeal wall stasis    Velar function WFL  · No nasal regurgitation    Other contributing factors  · Question evidence of cervical spine projection immediately superior to pyriform sinus  · Mild atrophy of epiglottic tip  · Both attributes do not significantly influence safe swallow at this time    Presentation of consistencies  · No penetration or aspiration of solid, puree, thin-honey thick, nectar thick liquid via cup with chin tuck in isolation of food, and thin liquid with chin tuck  · Silent, deep, supraglottic penetration of nectar thick liquid via cup without chin tuck  · Cough encouraged, though did not eject penetrated material from laryngeal vestibule  · Silent penetration to the VF of nectar thick liquids via cup with chin tuck following 1/2 saline cracker  · Cough encouraged and material successfully ejected from laryngeal vestibule      Cervical Esophageal Phase  · UES appeared to accommodate all bolus types without stasis or retrograde movement observed    · No emesis    Assessment:     Impressions  ·   Patient demonstrates mild-moderate oropharyngeal dysphagia. Weakness to BOT results in loss of bolus control causing pt to penetrate nectar thick liquids. Pt at risk of airway compromise of thin liquids. Chin tuck deemed beneficial in eliminating airway compromise of thin liquid via spoon and nectar thick liquid via cup in isolation of food. Chin tuck not beneficial in eliminating airway compromise of nectar thick liquid following solid trial. Cough is should not be considered reliable compensatory strategy. Pt remains at high aspiration  risk and strict monitoring of diet is highly recommended. Pt's reported decreased cognitive state should be considered during meals.    Prognosis:   · Prognosis for safe swallow is good pending consistent following of aspiration precautions/diet recommendations and participation in dysphagia therapy.     Barriers:  · Cognitive status    Plan  · SLP to fu at BS 5x/week to provide skilled education and intervention, also to check diet tolerance.   · Pt to trial thin liquid via spoonful with chin tuck in isolation of food during SLP sessions.   · Pt to trial nectar thick liquid via cup with chin tuck in isolation of food during SLP sessions.  · BOT strengthening.    Education/Treatment  · Results were discussed with RON Ovalles who was in agreement with plan.  · Skilled education provided on aspiration precautions and diet recommendations to pt after session.   · No further questions.      Goals:    SLP Goals        Problem: SLP Goal    Goal Priority Disciplines Outcome   SLP Goal     SLP Ongoing (interventions implemented as appropriate)   Description:  Speech Therapy Short Term Goals  Goal expected to be met by 2/4  1. Pt will tolerate a regular diet and NECTAR THICK liquids with no overt s/s of aspiration.   2. Pt will tolerate cup sips of thin liquids x10 with no overt s/s of aspiration.   3. Pt will participate in a modified barium swallow study as warranted per MD and SLP.                         Plan:   · Patient to be seen:  Therapy Frequency: 5 x/week   · Plan of Care expires:  02/26/18  · Plan of Care reviewed with:  patient        Discharge recommendations:  home health speech therapy   Barriers to Discharge:  Safety Awareness  and pending PTOT notes    Time Tracking:   SLP Treatment Date:   01/31/18  Speech Start Time:  0922  Speech Stop Time:  0946     Speech Total Time (min):  24 min      Maria R Rodriguez M.A. CCC-SLP  Speech Language Pathologist  [phone]  1/31/2018

## 2018-01-31 NOTE — ASSESSMENT & PLAN NOTE
-Per above, less likely NPH based on LP results.  Additional features suggestive of PD:   -Patient does have micrographia and some tremor on exam as well as rigidity  -Trial sinemet while in hospital per above, follow up in Neurology clinic on discharge  -Patient otherwise ok for discharge from Neurology standpoint after sinemet trial and per PT recs

## 2018-02-01 LAB
ANION GAP SERPL CALC-SCNC: 8 MMOL/L
BASOPHILS # BLD AUTO: 0.03 K/UL
BASOPHILS NFR BLD: 0.5 %
BUN SERPL-MCNC: 20 MG/DL
CALCIUM SERPL-MCNC: 10 MG/DL
CHLORIDE SERPL-SCNC: 107 MMOL/L
CO2 SERPL-SCNC: 24 MMOL/L
CREAT SERPL-MCNC: 1.6 MG/DL
DIFFERENTIAL METHOD: ABNORMAL
EOSINOPHIL # BLD AUTO: 0.2 K/UL
EOSINOPHIL NFR BLD: 2.4 %
ERYTHROCYTE [DISTWIDTH] IN BLOOD BY AUTOMATED COUNT: 17.9 %
EST. GFR  (AFRICAN AMERICAN): 48 ML/MIN/1.73 M^2
EST. GFR  (NON AFRICAN AMERICAN): 41.5 ML/MIN/1.73 M^2
FACT X PPP CHRO-ACNC: 0.68 IU/ML
GLUCOSE SERPL-MCNC: 111 MG/DL
HCT VFR BLD AUTO: 32.1 %
HGB BLD-MCNC: 10.4 G/DL
IMM GRANULOCYTES # BLD AUTO: 0.02 K/UL
IMM GRANULOCYTES NFR BLD AUTO: 0.3 %
INR PPP: 1.1
LDH SERPL L TO P-CCNC: 223 U/L
LYMPHOCYTES # BLD AUTO: 1.1 K/UL
LYMPHOCYTES NFR BLD: 17.4 %
MAGNESIUM SERPL-MCNC: 2.2 MG/DL
MCH RBC QN AUTO: 28.7 PG
MCHC RBC AUTO-ENTMCNC: 32.4 G/DL
MCV RBC AUTO: 88 FL
MONOCYTES # BLD AUTO: 0.8 K/UL
MONOCYTES NFR BLD: 12.9 %
NEUTROPHILS # BLD AUTO: 4.2 K/UL
NEUTROPHILS NFR BLD: 66.5 %
NRBC BLD-RTO: 0 /100 WBC
PLATELET # BLD AUTO: 119 K/UL
PMV BLD AUTO: 11.4 FL
POTASSIUM SERPL-SCNC: 3.8 MMOL/L
PROTHROMBIN TIME: 11.7 SEC
RBC # BLD AUTO: 3.63 M/UL
SODIUM SERPL-SCNC: 139 MMOL/L
WBC # BLD AUTO: 6.28 K/UL

## 2018-02-01 PROCEDURE — 25000003 PHARM REV CODE 250: Performed by: INTERNAL MEDICINE

## 2018-02-01 PROCEDURE — 99232 SBSQ HOSP IP/OBS MODERATE 35: CPT | Mod: ,,, | Performed by: PSYCHIATRY & NEUROLOGY

## 2018-02-01 PROCEDURE — 85025 COMPLETE CBC W/AUTO DIFF WBC: CPT

## 2018-02-01 PROCEDURE — 92526 ORAL FUNCTION THERAPY: CPT

## 2018-02-01 PROCEDURE — 63600175 PHARM REV CODE 636 W HCPCS: Performed by: INTERNAL MEDICINE

## 2018-02-01 PROCEDURE — 25000003 PHARM REV CODE 250: Performed by: NURSE PRACTITIONER

## 2018-02-01 PROCEDURE — 99232 SBSQ HOSP IP/OBS MODERATE 35: CPT | Mod: ,,, | Performed by: INTERNAL MEDICINE

## 2018-02-01 PROCEDURE — G8997 SWALLOW GOAL STATUS: HCPCS | Mod: CJ

## 2018-02-01 PROCEDURE — 20600001 HC STEP DOWN PRIVATE ROOM

## 2018-02-01 PROCEDURE — 97116 GAIT TRAINING THERAPY: CPT

## 2018-02-01 PROCEDURE — 27000248 HC VAD-ADDITIONAL DAY

## 2018-02-01 PROCEDURE — 85610 PROTHROMBIN TIME: CPT

## 2018-02-01 PROCEDURE — G8998 SWALLOW D/C STATUS: HCPCS | Mod: CK

## 2018-02-01 PROCEDURE — 83615 LACTATE (LD) (LDH) ENZYME: CPT

## 2018-02-01 PROCEDURE — 85520 HEPARIN ASSAY: CPT

## 2018-02-01 PROCEDURE — 80048 BASIC METABOLIC PNL TOTAL CA: CPT

## 2018-02-01 PROCEDURE — 97530 THERAPEUTIC ACTIVITIES: CPT

## 2018-02-01 PROCEDURE — 25000003 PHARM REV CODE 250: Performed by: PHYSICIAN ASSISTANT

## 2018-02-01 PROCEDURE — 83735 ASSAY OF MAGNESIUM: CPT

## 2018-02-01 RX ORDER — CARBIDOPA AND LEVODOPA 25; 100 MG/1; MG/1
1 TABLET ORAL
Status: DISCONTINUED | OUTPATIENT
Start: 2018-02-01 | End: 2018-02-07 | Stop reason: HOSPADM

## 2018-02-01 RX ADMIN — ASPIRIN 81 MG CHEWABLE TABLET 81 MG: 81 TABLET CHEWABLE at 09:02

## 2018-02-01 RX ADMIN — LISINOPRIL 20 MG: 20 TABLET ORAL at 08:02

## 2018-02-01 RX ADMIN — DOFETILIDE 250 MCG: 0.25 CAPSULE ORAL at 09:02

## 2018-02-01 RX ADMIN — HEPARIN SODIUM 17 UNITS/KG/HR: 10000 INJECTION, SOLUTION INTRAVENOUS at 12:02

## 2018-02-01 RX ADMIN — ISOSORBIDE DINITRATE 40 MG: 40 TABLET ORAL at 03:02

## 2018-02-01 RX ADMIN — PANTOPRAZOLE SODIUM 40 MG: 40 TABLET, DELAYED RELEASE ORAL at 05:02

## 2018-02-01 RX ADMIN — CEFEPIME 2 G: 2 INJECTION, POWDER, FOR SOLUTION INTRAVENOUS at 12:02

## 2018-02-01 RX ADMIN — LISINOPRIL 20 MG: 20 TABLET ORAL at 09:02

## 2018-02-01 RX ADMIN — AMLODIPINE BESYLATE 10 MG: 10 TABLET ORAL at 09:02

## 2018-02-01 RX ADMIN — DOFETILIDE 250 MCG: 0.25 CAPSULE ORAL at 08:02

## 2018-02-01 RX ADMIN — HEPARIN SODIUM 17 UNITS/KG/HR: 10000 INJECTION, SOLUTION INTRAVENOUS at 06:02

## 2018-02-01 RX ADMIN — HYDRALAZINE HYDROCHLORIDE 100 MG: 50 TABLET ORAL at 09:02

## 2018-02-01 RX ADMIN — CARBIDOPA AND LEVODOPA 1 TABLET: 25; 100 TABLET ORAL at 09:02

## 2018-02-01 RX ADMIN — MAGNESIUM OXIDE TAB 400 MG (241.3 MG ELEMENTAL MG) 400 MG: 400 (241.3 MG) TAB at 09:02

## 2018-02-01 RX ADMIN — Medication 1 CAPSULE: at 09:02

## 2018-02-01 RX ADMIN — DOCUSATE SODIUM 100 MG: 100 CAPSULE, LIQUID FILLED ORAL at 08:02

## 2018-02-01 RX ADMIN — PANTOPRAZOLE SODIUM 40 MG: 40 TABLET, DELAYED RELEASE ORAL at 04:02

## 2018-02-01 RX ADMIN — CARBIDOPA AND LEVODOPA 1 TABLET: 25; 100 TABLET ORAL at 08:02

## 2018-02-01 RX ADMIN — ISOSORBIDE DINITRATE 40 MG: 40 TABLET ORAL at 05:02

## 2018-02-01 RX ADMIN — MAGNESIUM OXIDE TAB 400 MG (241.3 MG ELEMENTAL MG) 400 MG: 400 (241.3 MG) TAB at 08:02

## 2018-02-01 RX ADMIN — FOLIC ACID 1 MG: 1 TABLET ORAL at 09:02

## 2018-02-01 RX ADMIN — HYDRALAZINE HYDROCHLORIDE 100 MG: 50 TABLET ORAL at 03:02

## 2018-02-01 RX ADMIN — ALLOPURINOL 300 MG: 300 TABLET ORAL at 09:02

## 2018-02-01 RX ADMIN — POLYETHYLENE GLYCOL 3350 17 G: 17 POWDER, FOR SOLUTION ORAL at 09:02

## 2018-02-01 RX ADMIN — ISOSORBIDE DINITRATE 40 MG: 40 TABLET ORAL at 09:02

## 2018-02-01 RX ADMIN — WARFARIN SODIUM 6 MG: 2 TABLET ORAL at 04:02

## 2018-02-01 RX ADMIN — HYDRALAZINE HYDROCHLORIDE 100 MG: 50 TABLET ORAL at 05:02

## 2018-02-01 RX ADMIN — ATORVASTATIN CALCIUM 10 MG: 10 TABLET, FILM COATED ORAL at 09:02

## 2018-02-01 RX ADMIN — DOCUSATE SODIUM 100 MG: 100 CAPSULE, LIQUID FILLED ORAL at 09:02

## 2018-02-01 RX ADMIN — BUPROPION HYDROCHLORIDE 450 MG: 150 TABLET, EXTENDED RELEASE ORAL at 09:02

## 2018-02-01 RX ADMIN — CARBIDOPA AND LEVODOPA 1 TABLET: 25; 100 TABLET ORAL at 04:02

## 2018-02-01 RX ADMIN — DOXAZOSIN MESYLATE 4 MG: 2 TABLET ORAL at 09:02

## 2018-02-01 NOTE — PROGRESS NOTES
[facility]  Heart Transplant  Progress Note    Patient Name: Kev Vasquez  MRN: [id number]  Admission Date: 1/24/2018  Hospital Length of Stay: 8 days  Attending Physician: Karen Valladares MD  Primary Care Provider: Mega Collins MD  Principal Problem:Vomiting    Subjective:     Interval History: No complaints or overnight events.    Continuous Infusions:   heparin (porcine) in D5W 17 Units/kg/hr (02/01/18 0023)     Scheduled Meds:   allopurinol  300 mg Oral Daily    amLODIPine  10 mg Oral Daily    aspirin  81 mg Oral Daily    atorvastatin  10 mg Oral Daily    buPROPion  450 mg Oral Daily    carbidopa-levodopa  mg  1 tablet Oral Q4H While awake    ceFEPime (MAXIPIME) IVPB  2 g Intravenous Q12H    docusate sodium  100 mg Oral BID    dofetilide  250 mcg Oral Q12H    doxazosin  4 mg Oral Daily    folic acid  1 mg Oral Daily    hydrALAZINE  100 mg Oral Q8H    isosorbide dinitrate  40 mg Oral TID    Lactobacillus rhamnosus GG  1 capsule Oral Daily    lisinopril  20 mg Oral BID    magnesium oxide  400 mg Oral BID    pantoprazole  40 mg Oral BID AC    polyethylene glycol  17 g Oral Daily    warfarin  6 mg Oral Daily     PRN Meds:acetaminophen, docusate sodium, hydrALAZINE, omnipaque 300 iohexol, ondansetron, promethazine    Review of patient's allergies indicates:   Allergen Reactions    Aldactone [spironolactone]       persistent hyperkalemia.    Sulfa (sulfonamide antibiotics)      Objective:     Vital Signs (Most Recent):  Temp: 97.8 °F (36.6 °C) (02/01/18 0845)  Pulse: 71 (02/01/18 0845)  Resp: 18 (02/01/18 0845)  BP: (!) 90/0 (02/01/18 0845)  SpO2: 97 % (02/01/18 0845) Vital Signs (24h Range):  Temp:  [97.8 °F (36.6 °C)-99.4 °F (37.4 °C)] 97.8 °F (36.6 °C)  Pulse:  [] 71  Resp:  [18] 18  SpO2:  [94 %-99 %] 97 %  BP: (84-94)/(0) 90/0     Patient Vitals for the past 72 hrs (Last 3 readings):   Weight   02/01/18 0700 78.1 kg (172 lb 2.9 oz)   01/31/18 0700 80.5 kg  (177 lb 7.5 oz)   01/30/18 0900 81.8 kg (180 lb 5.4 oz)     Body mass index is 24.71 kg/m².      Intake/Output Summary (Last 24 hours) at 02/01/18 0911  Last data filed at 01/31/18 1400   Gross per 24 hour   Intake              720 ml   Output                0 ml   Net              720 ml       Hemodynamic Parameters:  CVP:  [2 mmHg] 2 mmHg        Physical Exam   Constitutional: He appears well-developed and well-nourished.   HENT:   Head: Normocephalic and atraumatic.   Eyes: Conjunctivae are normal. Pupils are equal, round, and reactive to light.   Neck: Normal range of motion. Neck supple. No JVD present.   Cardiovascular: Normal rate and regular rhythm.    Smooth VAD hum. Intermittently pulsatile   Pulmonary/Chest: Effort normal and breath sounds normal.   Abdominal: Soft. Bowel sounds are normal.   Genitourinary:   Genitourinary Comments: Incontinent   Musculoskeletal: He exhibits no edema.   Neurological: He is alert.   Oriented to self and place but not year this morning   Skin: Skin is warm and dry. Capillary refill takes 2 to 3 seconds.       Significant Labs:  CBC:    Recent Labs  Lab 01/30/18 0445 01/31/18 0443 02/01/18  0515   WBC 7.53 7.19 6.28   RBC 3.78* 3.62* 3.63*   HGB 10.9* 10.7* 10.4*   HCT 33.1* 31.6* 32.1*   * 122* 119*   MCV 88 87 88   MCH 28.8 29.6 28.7   MCHC 32.9 33.9 32.4     BNP:    Recent Labs  Lab 01/26/18  0334 01/29/18 0446 01/31/18 0443   * 383* 251*     CMP:    Recent Labs  Lab 01/26/18  0334  01/29/18 0446 01/30/18 0445 01/31/18 0443 02/01/18  0515   GLU 79  < > 84 85 81 111*   CALCIUM 10.5  < > 10.1 9.7 10.0 10.0   ALBUMIN 3.3*  --  3.3*  --  3.2*  --    PROT 7.0  --  7.3  --  6.7  --      < > 140 139 142 139   K 4.1  < > 3.8 3.7 3.8 3.8   CO2 26  < > 24 25 24 24     < > 107 108 110 107   BUN 27*  < > 22 19 20 20   CREATININE 2.1*  < > 1.7* 1.6* 1.7* 1.6*   ALKPHOS 72  --  77  --  73  --    ALT 14  --  16  --  13  --    AST 23  --  26  --  19  --     BILITOT 0.5  --  0.6  --  0.4  --    < > = values in this interval not displayed.   Coagulation:     Recent Labs  Lab 01/30/18  0445 01/31/18  0443 02/01/18  0515   INR 1.3* 1.2 1.1     LDH:    Recent Labs  Lab 01/30/18  0445 01/31/18  0443 02/01/18  0515    238 223     Microbiology:  Microbiology Results (last 7 days)     Procedure Component Value Units Date/Time    Blood culture #1 [[id number]] Collected:  01/24/18 1521    Order Status:  Completed Specimen:  Blood from Line, PICC Right Basilic Updated:  01/29/18 1812     Blood Culture, Routine No growth after 5 days.    Narrative:       Blood Culture #1    Blood culture #2 [[id number]] Collected:  01/24/18 1537    Order Status:  Completed Specimen:  Blood from Peripheral, Hand, Right Updated:  01/29/18 1812     Blood Culture, Routine No growth after 5 days.    Narrative:       Blood Culture #2          I have reviewed all pertinent labs within the past 24 hours.    Estimated Creatinine Clearance: 41.2 mL/min (based on SCr of 1.6 mg/dL (H)).        Assessment and Plan:     Mr. 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 BiV ICD, HILLARY/BiPAP, HTN, CKD, HLP, gout and depression who presents with confusion. On further questioning of his wife and his caregiver who were both at bedside, they noticed some odd behaviours such as combing his cheek/nose, getting dressed to go hunting when that is not his hobby and admitted to seeing visual hallucinations of his dead brother. They stated that he is not sleeping well and naps throughout the day. They wonder if his BiPAP is not in the proper settings anymore and he now has made a game out of looking at his watch to see when he will get to sleep. He stated it was 2017, January, thought he was in [place] (moved  from there in June), and stated [place] as the president. Altogether, he denies SOB, CP, LE edema, orthopnea and PND. He has no fevers or chills, cough, or diarrhea. They state that he does have some worsening vomiting. They stated that a few months ago, he would vomit after he drank some water: it would start with a sneeze, then a cough, then the vomit. It would happen every now and then, but over the past few weeks, it has worsened to 3-4x/day. They stated that now it occurs without drinking, sometimes before dinner and he would throw up almost undigested food. He admitted to throwing up pills too at times. There have been no VAD alarms noted.       * Vomiting    - Appreciate GI's help. KUB unremarkable. EGD also negative.  - PPI bid  - Appreciate ST's help given coughing and sneezing that can precede emesis. MBSS done and patient now requires honey thick liquids with strict aspiration precautions        Confusion    - CT of head 1/24/18 with no acute changes  - Given constellation of confusion, gait disturbance and urinary incontinence, consulted Neuro (NPH ?). Appreciate their help. LP done 1/30/18 with removal of only 13cc; opening pressure 21, closing pressure 15. His post procedure gait was significantly worse. Discussed with Neuro and NSGY. Per NSGY, no immediate need for cisternogram, shunt, etc, given failed improvement in gait. Now considering PD - will start Sinemet per Neuro's rec and patient will f/u in the Movement Disorders Clinic as an outpatient  - D/C'd Ropinirole again 2/2 gait disturbance  - Delirium protocol        LVAD (left ventricular assist device) present    - S/P HM III 10/16/16 as DT in [place]  - Current speed 5800  - TTE 1/25/18 shows LVEDD 6.5, LVEF 10-15%, diastolic dysfunction, RVE, severely depressed RV systolic function, PAS 20, ARTHUR intermittently and septum midline. No comment on IVC - per Dr. Lanza, costal views were too poor to assess IVC.   - CVP via PICC line remains 2   (was taking Lasix 80 mg every M-W-F at home) - holding Lasix  - INR 1.1 today, Continue Heparin bridge and  Coumadin with goal INR 2.0-3.0  - LDH stable  - Continue IV Cefepime for chronic Pseudomonas DL infection  - D/C home with his sitters/family once INR therapeutic        Essential hypertension    - He has a long h/o orthostatic hypotension, so BP's should only be checked while sitting or standing  - Goal MAP ~ 90 or less  - Continue Norvasc, Hydralazine, Isordil, Lisinopril and Doxazosin        Stage 3 chronic kidney disease    - Admit creatinine 2.3, 1.6 today, (baseline looks ~ 1.6-1.8)        Gait instability    - PT/OT  - See confusion above        VT (ventricular tachycardia)    - Continue Tikosyn  - Has [employer] BiV ICD  - Keep K+ > 4.0 and Mg+ > 2.0            [place] [id number]  Heart Transplant  [facility]

## 2018-02-01 NOTE — PROGRESS NOTES
to see pt for follow up.  Worker spoke to the pt with his aid in attendance.  Pt reports he not feeling well .  Pt's spouse will visit today.  No needs reported to worker.

## 2018-02-01 NOTE — ASSESSMENT & PLAN NOTE
- He has a long h/o orthostatic hypotension, so BP's should only be checked while sitting or standing  - Goal MAP ~ 90 or less  - Continue Norvasc, Hydralazine, Isordil, Lisinopril and Doxazosin

## 2018-02-01 NOTE — ASSESSMENT & PLAN NOTE
- CT of head 1/24/18 with no acute changes  - Given constellation of confusion, gait disturbance and urinary incontinence, consulted Neuro (NPH ?). Appreciate their help. LP done 1/30/18 with removal of only 13cc; opening pressure 21, closing pressure 15. His post procedure gait was significantly worse. Discussed with Neuro and NSGY. Per NSGY, no immediate need for cisternogram, shunt, etc, given failed improvement in gait. Now considering PD - will start Sinemet per Neuro's rec and patient will f/u in the Movement Disorders Clinic as an outpatient  - D/C'd Ropinirole again 2/2 gait disturbance  - Delirium protocol

## 2018-02-01 NOTE — PROGRESS NOTES
Charlette Jasmine notified patient's DPs 84-90 overnight, K 3.8 and still awaiting stool sample. No additional orders at this time. Will continue to monitor.

## 2018-02-01 NOTE — PLAN OF CARE
Problem: Patient Care Overview  Goal: Plan of Care Review  Outcome: Ongoing (interventions implemented as appropriate)  Discussed Plan of Care with patient. VS stable. LVAD numbers within range and no alarms. Ambulates well with walker and 1-person assist. PT/OT following. Fall precautions in place. Bleeding precautions reviewed and mainatined. Pain denied. Heparin infusing per order; AntiXas being monitored. INR 1.1. Sinemet intitiated today. Patient remained free of falls/ injury. All questions and concerns were addressed. Will continue to monitor patient.

## 2018-02-01 NOTE — SUBJECTIVE & OBJECTIVE
Subjective:     Interval History: Patient slightly more lethargic today,but  with no new findings. Trial of carbidopa/levodopa started today, will follow along and monitor for any changes/improvements.     Current Neurological Medications: Bupropion 450 mg po qd  Carbidopa-Levodopa  mg,1 tablet, Q4H while awake    Current Facility-Administered Medications   Medication Dose Route Frequency Provider Last Rate Last Dose    acetaminophen tablet 650 mg  650 mg Oral Q6H PRN Charlette Jasmine NP   650 mg at 01/29/18 1801    allopurinol tablet 300 mg  300 mg Oral Daily José Luis Yeh DO   300 mg at 02/01/18 0919    amLODIPine tablet 10 mg  10 mg Oral Daily Cathy Farmer PA-C   10 mg at 02/01/18 0919    aspirin chewable tablet 81 mg  81 mg Oral Daily José Luis Yeh, DO   81 mg at 02/01/18 0919    atorvastatin tablet 10 mg  10 mg Oral Daily José Luis Yeh, DO   10 mg at 02/01/18 0919    buPROPion TB24 tablet 450 mg  450 mg Oral Daily José Luis Yeh, DO   450 mg at 02/01/18 0919    carbidopa-levodopa  mg per tablet 1 tablet  1 tablet Oral Q4H While awake Charlette Jasmine NP   1 tablet at 02/01/18 0919    ceFEPIme injection 2 g  2 g Intravenous Q12H José Luis Yeh, DO   2 g at 02/01/18 0030    docusate sodium capsule 100 mg  100 mg Oral Daily PRN José Luis Yeh DO   100 mg at 01/29/18 1801    docusate sodium capsule 100 mg  100 mg Oral BID Charlette Jasmine NP   100 mg at 02/01/18 0919    dofetilide capsule 250 mcg  250 mcg Oral Q12H José Luis Yeh DO   250 mcg at 02/01/18 0920    doxazosin tablet 4 mg  4 mg Oral Daily Charlette Jasmine NP   4 mg at 02/01/18 0919    folic acid tablet 1 mg  1 mg Oral Daily José Luis Yeh DO   1 mg at 02/01/18 0919    heparin 25,000 units in dextrose 5% 250 mL (100 units/mL) infusion  17 Units/kg/hr Intravenous Continuous Chip Hill MD 14.1 mL/hr at 02/01/18 0023 17 Units/kg/hr at 02/01/18 0023    hydrALAZINE injection 10 mg  10 mg Intravenous  Q6H PRN José Luisshameka Yeh, DO   10 mg at 01/31/18 0022    hydrALAZINE tablet 100 mg  100 mg Oral Q8H José Luis BARRY Yeh, DO   100 mg at 02/01/18 0540    isosorbide dinitrate tablet 40 mg  40 mg Oral TID José Luisshameka Yeh, DO   40 mg at 02/01/18 0540    Lactobacillus rhamnosus GG capsule 1 capsule  1 capsule Oral Daily José Luisshameka Yeh, DO   1 capsule at 02/01/18 0919    lisinopril tablet 20 mg  20 mg Oral BID José Luis BARRY Yeh, DO   20 mg at 02/01/18 0919    magnesium oxide tablet 400 mg  400 mg Oral BID José Luis BARRY Yeh, DO   400 mg at 02/01/18 0918    omnipaque 300 iohexol 10 mL  10 mL Intrathecal ONCE PRN Anni Henderson MD        ondansetron injection 4 mg  4 mg Intravenous Q6H PRN José Luis Yeh, DO        pantoprazole EC tablet 40 mg  40 mg Oral BID AC Charlette Kenroy, NP   40 mg at 02/01/18 0545    polyethylene glycol packet 17 g  17 g Oral Daily Charlette Kenroy, NP   17 g at 02/01/18 0919    promethazine tablet 12.5 mg  12.5 mg Oral Q6H PRN José Luis Yeh, DO        warfarin tablet 6 mg  6 mg Oral Daily Charlette Kenroy, NP   6 mg at 01/31/18 1715     Review of Systems  Constitutional: Positive for fatigue. Negative for appetite change, chills and fever.   Eyes: Negative for photophobia and visual disturbance.   Respiratory: Negative for cough and shortness of breath.    Gastrointestinal: Negative for constipation.   Genitourinary: +urinary incontinence x ~ 1 year   Musculoskeletal: Positive for back pain (chronic LBP) and gait problem.   Skin: Negative for rash and wound.   Neurological: Positive for numbness (stocking-glove distribution, chronic). Negative for weakness.   Psychiatric/Behavioral: Positive for confusion and hallucinations (visual). Negative for agitation.     Objective:     Vital Signs (Most Recent):  Temp: 97.8 °F (36.6 °C) (02/01/18 0845)  Pulse: 71 (02/01/18 0845)  Resp: 18 (02/01/18 0845)  BP: (!) 90/0 (02/01/18 0845)  SpO2: 97 % (02/01/18 0845) Vital Signs (24h Range):  Temp:  [97.8 °F (36.6  °C)-99.4 °F (37.4 °C)] 97.8 °F (36.6 °C)  Pulse:  [] 71  Resp:  [18] 18  SpO2:  [94 %-99 %] 97 %  BP: (84-94)/(0) 90/0     Weight: 78.1 kg (172 lb 2.9 oz)  Body mass index is 24.71 kg/m².    Physical Exam  General:  Well-developed, well-nourished, NAD  HEENT:  NCAT, PERRLA, EOMI, oropharyngeal membranes non-erythematous/without exudate  Neck:  Supple, normal ROM without nuchal rigidity  Resp:  Symmetric expansion, no increased wob  CVS:  No LE edema, peripheral pulses 2+ (radial, dorsalis pedis)  GI:  Abd soft, non-distended, non-tender to palpation  Neurologic Exam:  Mental Status:  Awake, alert, oriented to self, location, but not exact date.  Speech, thought content appropriate.  Able to spell 'world' forward, refuses backward.  Recent recall 2/3, remote recall 2/3.   Cranial Nerves:  PERRLA, EOMI.  Facial movement, sensation intact and symmetric.  Palate raises symmetrically, tongue protrudes midline.  Trapezius strength 5/5 bilaterally.  Motor:  Normal bulk and tone apart from slight BUE rigidity.  BUE shoulder abduction, biceps/triceps, wrist flexion/extension,  strength 5/5.  BLE hip flexors, knee flexion/extension, plantarflexion/dorsiflexion 5/5.  No pronator drift.  Sensory:  Intact to light touch without inattention.  Vibratory sensation intact at BUE digits, BLE lower shin.  Reflexes:  Biceps, brachioradialis, patellar, Achilles 2+ and symmetric.  No ankle clonus.  Downgoing toe bilaterally.  Coordination:  FNF, RICO, HTS intact with no dysmetria/ataxia/dysdiadochokinesia.  Mild resting tremor in RUE on pronator drift testing.  Gait:  Deferred gait exam this am. Per PT, no improvement s/p LP 18.     Significant Labs:    Recent Labs  Lab 18  0515   WBC 6.28   RBC 3.63*   HGB 10.4*   HCT 32.1*   *   MCV 88   MCH 28.7   MCHC 32.4       Recent Labs  Lab 18  0515   CALCIUM 10.0      K 3.8   CO2 24      BUN 20   CREATININE 1.6*     Significant Imagin18  CT head w/o contrast:  Possible CSF effect in periventricular area vs microvascular ischemic change.  Unable to obtain MRI 2/2 LVAD.  Stable exam noting mild chronic ischemic changes and generalized cerebral volume loss. No evidence of acute intracranial pathology.

## 2018-02-01 NOTE — PROGRESS NOTES
Per discussion on rounds, provided handout and reviewed interaction of vit K and coumadin including foods high in vit K and emphasizing consistent intake. Pt and family members present voiced understanding. Pt follows a low Na diet at home with no questions or concerns regarding diet.

## 2018-02-01 NOTE — PLAN OF CARE
Problem: Patient Care Overview  Goal: Individualization & Mutuality  Outcome: Ongoing (interventions implemented as appropriate)  POC reviewed with pt. VS and VAD numbers stable. Pt started on doxazosin 4mg PO daily. occult blood stool specimen ordered. Pt currently oriented x3, d/o to year. CVP daily. Honey thick liquids per swallow study results from 1/31. Pt receiving IV cefepime for chronic DLES infection. Heparin gtt infusing as ordered; INR 1.2 on 1/31. Bed alarm on and camera at bedside. Pt has no complaints. Pt remains free from falls, trauma, injury; pt tolerating POC well. Will continue to monitor.

## 2018-02-01 NOTE — ASSESSMENT & PLAN NOTE
-Concern for NPH vs underlying dementia w/ delirium/progression--no significant changes s/p LP so NPH is less likely at this point, even despite a smaller volume tap that is required (per IR resident there was minimal/no flow after 13 mL)  -Trial carbidopa-levodopa  mg po Q4 ordered for today and started at 9 PM today; will evaluate for any changes/improvements  -Follow up in Neurology clinic on discharge for possible diagnosis of PD, questionable LBD features as well (previous mention of hallucinations)

## 2018-02-01 NOTE — PLAN OF CARE
Problem: SLP Goal  Goal: SLP Goal  Speech Therapy Short Term Goals  Goal expected to be met by 2/4  1. Pt will tolerate a regular diet and NECTAR THICK liquids with no overt s/s of aspiration.   2. Pt will tolerate cup sips of thin liquids x10 with no overt s/s of aspiration.   3. Pt will participate in a modified barium swallow study as warranted per MD and SLP. MET         Pt with continued progress towards goals.    Maria R Rodriguez M.A. CCC-SLP  Speech Language Pathologist  [phone]  2/1/2018

## 2018-02-01 NOTE — PT/OT/SLP PROGRESS
Speech Language Pathology Treatment    Patient Name:  Kev Vasquez   MRN:  [id number]  Admitting Diagnosis: Vomiting    Recommendations:                 General Recommendations:  Dysphagia therapy  Diet recommendations:  Regular, Liquid Diet Level: Honey Thick   Aspiration Precautions: 1 bite/sip at a time, Alternating bites/sips, Assistance with meals and Assistance with thickening liquids, Eliminate distractions, Feed only when awake/alert, HOB to 90 degrees, Meds whole buried in puree, No straws, Small bites/sips and Strict aspiration precautions   · To achieve honey thick liquid: 4 oz of any liquid to 1 pack of thickener  · Avoid mixed consistencies (soup, cereal with milk, juicy fruits).  · No ice cream, jello, or ice.  · Encourage chin tuck when drinking     General Precautions: Standard, aspiration  Communication strategies:  go to room if call light pushed    Subjective     Pt awake; eating breakfast upon entry    Pain/Comfort:  Pain Rating 1: 0/10  Pain Rating Post-Intervention 1: 0/10    Objective:     Has the patient been evaluated by SLP for swallowing?   Yes  Keep patient NPO? No     Pt eating thin/dental soft mix consistency (cold cereal) upon entry to room without symptoms of airway compromise. Skilled education provided on MBS results from 1/31 to caregiver and pt; discussed aspiration risk and diet recommendations. Visual demonstration provided on how to appropriately thicken liquids to honey consistency. Pt tolerated 3 cup sips honey thick liquid and remainder of cereal without symptoms of airway compromise. Oral phase WFL. Caregiver educated on the importance of thickening liquid prior to mixing with cereal. Handout provided on aspiration precautions, including where to purchase thickener,  and pharyngeal strengthening exercises; encouraged to perform. Whiteboard updated with diet recommendations/aspiration precautions.  No further questions.      Assessment:     Kev Vasquez is a 77 y.o. male  with an SLP diagnosis of pharyngeal Dysphagia.  He presents with continued aspiration risk.     Goals:    SLP Goals        Problem: SLP Goal    Goal Priority Disciplines Outcome   SLP Goal     SLP Ongoing (interventions implemented as appropriate)   Description:  Speech Therapy Short Term Goals  Goal expected to be met by 2/4  1. Pt will tolerate a regular diet and NECTAR THICK liquids with no overt s/s of aspiration.   2. Pt will tolerate cup sips of thin liquids x10 with no overt s/s of aspiration.   3. Pt will participate in a modified barium swallow study as warranted per MD and SLP. MET                         Plan:     · Patient to be seen:  4 x/week   · Plan of Care expires:  02/26/18  · Plan of Care reviewed with:  patient, caregiver   · SLP Follow-Up:  Yes       Discharge recommendations:  home health speech therapy   Barriers to Discharge:  None    Time Tracking:     SLP Treatment Date:   02/01/18  Speech Start Time:  0938 and 0958  Speech Stop Time:  0949   And 1000  Speech Total Time (min):  11 min and 2 minutes    Billable Minutes: Treatment Swallowing Dysfunction 13    Maria R Rodriguez M.A. CCC-SLP  Speech Language Pathologist  [phone]  2/1/2018

## 2018-02-01 NOTE — PROGRESS NOTES
[facility]  Neurology  Progress Note    Patient Name: Kev Vasquez  MRN: [id number]  Admission Date: 1/24/2018  Hospital Length of Stay: 8 days  Code Status: Full Code   Attending Provider: Karen Valladares MD  Primary Care Physician: Mega Collins MD   Principal Problem:Vomiting      Subjective:     Interval History: Patient slightly more lethargic today,but  with no new findings. Trial of carbidopa/levodopa started today, will follow along and monitor for any changes/improvements.     Current Neurological Medications: Bupropion 450 mg po qd  Carbidopa-Levodopa  mg,1 tablet, Q4H while awake    Current Facility-Administered Medications   Medication Dose Route Frequency Provider Last Rate Last Dose    acetaminophen tablet 650 mg  650 mg Oral Q6H PRN Charlette Jasmine NP   650 mg at 01/29/18 1801    allopurinol tablet 300 mg  300 mg Oral Daily José Luis Yeh DO   300 mg at 02/01/18 0919    amLODIPine tablet 10 mg  10 mg Oral Daily Cathy Farmer PA-C   10 mg at 02/01/18 0919    aspirin chewable tablet 81 mg  81 mg Oral Daily José Luis Yeh DO   81 mg at 02/01/18 0919    atorvastatin tablet 10 mg  10 mg Oral Daily José Luis Yeh DO   10 mg at 02/01/18 0919    buPROPion TB24 tablet 450 mg  450 mg Oral Daily José Luis Yeh, DO   450 mg at 02/01/18 0919    carbidopa-levodopa  mg per tablet 1 tablet  1 tablet Oral Q4H While awake Charlette Jasmine NP   1 tablet at 02/01/18 0919    ceFEPIme injection 2 g  2 g Intravenous Q12H José Luis Yeh DO   2 g at 02/01/18 0030    docusate sodium capsule 100 mg  100 mg Oral Daily PRN José Luis Yeh DO   100 mg at 01/29/18 1801    docusate sodium capsule 100 mg  100 mg Oral BID Charlette Jasmine NP   100 mg at 02/01/18 0919    dofetilide capsule 250 mcg  250 mcg Oral Q12H José Luis Yeh DO   250 mcg at 02/01/18 0920    doxazosin tablet 4 mg  4 mg Oral Daily Charlette Jasmine NP   4 mg at 02/01/18 0919    folic acid tablet 1 mg  1  mg Oral Daily José Luisshameka Yeh, DO   1 mg at 02/01/18 0919    heparin 25,000 units in dextrose 5% 250 mL (100 units/mL) infusion  17 Units/kg/hr Intravenous Continuous Chip Hill MD 14.1 mL/hr at 02/01/18 0023 17 Units/kg/hr at 02/01/18 0023    hydrALAZINE injection 10 mg  10 mg Intravenous Q6H PRN José Luisshameka Yeh, DO   10 mg at 01/31/18 0022    hydrALAZINE tablet 100 mg  100 mg Oral Q8H José Luis BARRY Yeh, DO   100 mg at 02/01/18 0540    isosorbide dinitrate tablet 40 mg  40 mg Oral TID José Luisshameka Yhe, DO   40 mg at 02/01/18 0540    Lactobacillus rhamnosus GG capsule 1 capsule  1 capsule Oral Daily José Luisshameka Yeh, DO   1 capsule at 02/01/18 0919    lisinopril tablet 20 mg  20 mg Oral BID José Luisshameka Yeh, DO   20 mg at 02/01/18 0919    magnesium oxide tablet 400 mg  400 mg Oral BID José Luis BARRY Yeh, DO   400 mg at 02/01/18 0918    omnipaque 300 iohexol 10 mL  10 mL Intrathecal ONCE PRN Anni Henderson MD        ondansetron injection 4 mg  4 mg Intravenous Q6H PRN José Luis Yeh, DO        pantoprazole EC tablet 40 mg  40 mg Oral BID AC Charlette Kenroy, NP   40 mg at 02/01/18 0545    polyethylene glycol packet 17 g  17 g Oral Daily Charlette Kenroy, NP   17 g at 02/01/18 0919    promethazine tablet 12.5 mg  12.5 mg Oral Q6H PRN José Lius Yeh, DO        warfarin tablet 6 mg  6 mg Oral Daily Charlette Kenroy, NP   6 mg at 01/31/18 1715     Review of Systems  Constitutional: Positive for fatigue. Negative for appetite change, chills and fever.   Eyes: Negative for photophobia and visual disturbance.   Respiratory: Negative for cough and shortness of breath.    Gastrointestinal: Negative for constipation.   Genitourinary: +urinary incontinence x ~ 1 year   Musculoskeletal: Positive for back pain (chronic LBP) and gait problem.   Skin: Negative for rash and wound.   Neurological: Positive for numbness (stocking-glove distribution, chronic). Negative for weakness.   Psychiatric/Behavioral: Positive for  confusion and hallucinations (visual). Negative for agitation.     Objective:     Vital Signs (Most Recent):  Temp: 97.8 °F (36.6 °C) (02/01/18 0845)  Pulse: 71 (02/01/18 0845)  Resp: 18 (02/01/18 0845)  BP: (!) 90/0 (02/01/18 0845)  SpO2: 97 % (02/01/18 0845) Vital Signs (24h Range):  Temp:  [97.8 °F (36.6 °C)-99.4 °F (37.4 °C)] 97.8 °F (36.6 °C)  Pulse:  [] 71  Resp:  [18] 18  SpO2:  [94 %-99 %] 97 %  BP: (84-94)/(0) 90/0     Weight: 78.1 kg (172 lb 2.9 oz)  Body mass index is 24.71 kg/m².    Physical Exam  General:  Well-developed, well-nourished, NAD  HEENT:  NCAT, PERRLA, EOMI, oropharyngeal membranes non-erythematous/without exudate  Neck:  Supple, normal ROM without nuchal rigidity  Resp:  Symmetric expansion, no increased wob  CVS:  No LE edema, peripheral pulses 2+ (radial, dorsalis pedis)  GI:  Abd soft, non-distended, non-tender to palpation  Neurologic Exam:  Mental Status:  Awake, alert, oriented to self, location, but not exact date.  Speech, thought content appropriate.  Able to spell 'world' forward, refuses backward.  Recent recall 2/3, remote recall 2/3.   Cranial Nerves:  PERRLA, EOMI.  Facial movement, sensation intact and symmetric.  Palate raises symmetrically, tongue protrudes midline.  Trapezius strength 5/5 bilaterally.  Motor:  Normal bulk and tone apart from slight BUE rigidity.  BUE shoulder abduction, biceps/triceps, wrist flexion/extension,  strength 5/5.  BLE hip flexors, knee flexion/extension, plantarflexion/dorsiflexion 5/5.  No pronator drift.  Sensory:  Intact to light touch without inattention.  Vibratory sensation intact at BUE digits, BLE lower shin.  Reflexes:  Biceps, brachioradialis, patellar, Achilles 2+ and symmetric.  No ankle clonus.  Downgoing toe bilaterally.  Coordination:  FNF, RICO, HTS intact with no dysmetria/ataxia/dysdiadochokinesia.  Mild resting tremor in RUE on pronator drift testing.  Gait:  Deferred gait exam this am. Per PT, no improvement s/p LP  18.     Significant Labs:    Recent Labs  Lab 18  0515   WBC 6.28   RBC 3.63*   HGB 10.4*   HCT 32.1*   *   MCV 88   MCH 28.7   MCHC 32.4       Recent Labs  Lab 18  0515   CALCIUM 10.0      K 3.8   CO2 24      BUN 20   CREATININE 1.6*     Significant Imagin18 CT head w/o contrast:  Possible CSF effect in periventricular area vs microvascular ischemic change.  Unable to obtain MRI 2/2 LVAD.  Stable exam noting mild chronic ischemic changes and generalized cerebral volume loss. No evidence of acute intracranial pathology.       Assessment and Plan:     Confusion    -Concern for NPH vs underlying dementia w/ delirium/progression--no significant changes s/p LP so NPH is less likely at this point, even despite a smaller volume tap that is required (per IR resident there was minimal/no flow after 13 mL)  -Trial carbidopa-levodopa  mg po Q4 ordered for today and started at 9 PM today; will evaluate for any changes/improvements  -Follow up in Neurology clinic on discharge for possible diagnosis of PD, questionable LBD features as well (previous mention of hallucinations)        Gait instability    -Per above, less likely NPH based on LP results.  Additional features suggestive of PD:   -Patient does have micrographia and some tremor on exam as well as rigidity  -Trial sinemet while in hospital per above, follow up in Neurology clinic on discharge  -Patient otherwise ok for discharge from Neurology standpoint after sinemet trial and per PT recs            VTE Risk Mitigation         Ordered     warfarin tablet 6 mg  Daily     Route:  Oral        18 0718     heparin 25,000 units in dextrose 5% 250 mL (100 units/mL) infusion  Continuous     Route:  Intravenous        18 0636          Kathie Castellano MD  Neurology  [facility]

## 2018-02-01 NOTE — PT/OT/SLP PROGRESS
"Physical Therapy Treatment    Patient Name:  Kev Vasquez   MRN:  [id number]    Recommendations:     Discharge Recommendations:  home with home health (with progression to OP)   Discharge Equipment Recommendations: none   Barriers to discharge: Inaccessible home and Decreased caregiver support    Assessment:     Kev Vasquez is a 77 y.o. male admitted with a medical diagnosis of Vomiting.  He presents with the following impairments/functional limitations:  impaired endurance, impaired self care skills, impaired functional mobilty, impaired cognition, visual deficits, impaired balance, gait instability, decreased upper extremity function, decreased lower extremity function, decreased safety awareness, abnormal tone, impaired coordination, decreased ROM, impaired cardiopulmonary response to activity. Pt demo improved transfer body mechanics this trial; with an initial improvement in gait pattern. Pt demo decompensation of gait pattern given increased fatigue. Pt benefits from increased VCs and decreased TCs during gait trials. Pt undergoing trial of Sinemet medication; will perform gait reassessment 2/2/2018.    Rehab Prognosis:  Good; patient would benefit from acute skilled PT services to address these deficits and reach maximum level of function.      Recent Surgery: Procedure(s) (LRB):  ESOPHAGOGASTRODUODENOSCOPY (EGD) (N/A) 6 Days Post-Op    Plan:     During this hospitalization, patient to be seen 5 x/week to address the above listed problems via gait training, therapeutic activities, therapeutic exercises, neuromuscular re-education  · Plan of Care Expires:  02/25/18   Plan of Care Reviewed with: patient, caregiver, spouse    Subjective     Communicated with RN prior to session.  Patient found resting UIC upon PT entry to room, agreeable to treatment.  PT attempted in AM; pt requesting PT return in PM.    Chief Complaint: "Do you want to drive this thing?"  Patient comments/goals: "You can come back " "anytime."  Pain/Comfort:  · Pain Rating 1: 0/10  · Pain Rating Post-Intervention 1: 0/10    Patients cultural, spiritual, Amish conflicts given the current situation: None noted    Objective:     Patient found with: telemetry, peripheral IV, LVAD     General Precautions: Standard, aspiration, fall, LVAD   Orthopedic Precautions:N/A   Braces: N/A     Functional Mobility:  Bed Mobility: Pt found and remained UIC    Transfers:   Sit to Stand: Stand-by Assistance and Contact Guard Assistance with No Assistive Device x2 trials from low bedside chair; noted significant improvements from previous two treatments; appropriate hand placement, sequencing and initial conditions with minimal VCs.   Stand to Sit: Contact Guard Assistance with No Assistive Device for controlled descent to chair x2 trials    Gait:   Distance Ambulated: Pt ambulated x200 feet in hallway setting with VC/TCS for midline within RW, increased step length, increased speed of task. Mod A provided during turning 2/2 decompensation of gait pattern and freezing of gait. Noted intermittent festination with resolution given firm VCs for increased speed of task. Pt demo distractibility in hallway setting req frequent redirection to task; intermittent stopping 2/2 distractions/inability to maintain conversation and ambulation. Pt demo improvements with VCs for increased speed of gait. Emergency bag/IV pole in tow.   Assistance level: Minimal Assistance and Moderate Assistance   Assistive Device used:  Rolling Walker   Gait Pattern: reciprocal gait; small, inconsistent step length; noted step to pattern given distraction   Gait Deviation(s): decreased william, decreased step length, decreased weight-shifting ability and fesination, freezing of gait, multiple LOB req mod A for balance recovery   Impairments due to: poor dual tasking, impaired coordination and motor planning    Therapeutic Activities and Exercises:   PT arrived to pt's room to find pt resting " quietly; agreeable to PT session. Pt found and remained on battery power. Pt performed mobility as above. Upon return to room, PT discussed d/c home with HH PT and eventually progression to OP setting to address balance and gait instability. Questions/concerns addressed within PT scope of practice; pt and family with no further questions.    AM-PAC 6 CLICK MOBILITY  Turning over in bed (including adjusting bedclothes, sheets and blankets)?: 3  Sitting down on and standing up from a chair with arms (e.g., wheelchair, bedside commode, etc.): 3  Moving from lying on back to sitting on the side of the bed?: 3  Moving to and from a bed to a chair (including a wheelchair)?: 3  Need to walk in hospital room?: 2  Climbing 3-5 steps with a railing?: 2  Total Score: 16     Patient left up in chair with all lines intact, call button in reach, RN notified and spouse and caregiver present..    GOALS:    Physical Therapy Goals        Problem: Physical Therapy Goal    Goal Priority Disciplines Outcome Goal Variances Interventions   Physical Therapy Goal     PT/OT, PT Ongoing (interventions implemented as appropriate)     Description:  Goals to be met by: 2/10/18    Patient will increase functional independence with mobility by performin. Supine to sit with SBA  2. Sit to supine with SBA  3. Sit to stand transfer with Supervision using RW (SBA met )  4. Gait x150 feet with Contact Guard Assistance using Rolling Walker   5. Standing balance x10 min with SBA using AD or no AD while performing functional tasks   6. Lower extremity exercise program x20 reps per handout, with assistance as needed                         Time Tracking:     PT Received On: 18  PT Start Time: 1323     PT Stop Time: 1352  PT Total Time (min): 29 min     Billable Minutes: Gait Training 15 and Therapeutic Activity 10    Treatment Type: Treatment  PT/PTA: PT         Carmencita Doherty, PT, DPT  [phone]  2018

## 2018-02-01 NOTE — SUBJECTIVE & OBJECTIVE
Interval History: No complaints or overnight events.    Continuous Infusions:   heparin (porcine) in D5W 17 Units/kg/hr (02/01/18 0023)     Scheduled Meds:   allopurinol  300 mg Oral Daily    amLODIPine  10 mg Oral Daily    aspirin  81 mg Oral Daily    atorvastatin  10 mg Oral Daily    buPROPion  450 mg Oral Daily    carbidopa-levodopa  mg  1 tablet Oral Q4H While awake    ceFEPime (MAXIPIME) IVPB  2 g Intravenous Q12H    docusate sodium  100 mg Oral BID    dofetilide  250 mcg Oral Q12H    doxazosin  4 mg Oral Daily    folic acid  1 mg Oral Daily    hydrALAZINE  100 mg Oral Q8H    isosorbide dinitrate  40 mg Oral TID    Lactobacillus rhamnosus GG  1 capsule Oral Daily    lisinopril  20 mg Oral BID    magnesium oxide  400 mg Oral BID    pantoprazole  40 mg Oral BID AC    polyethylene glycol  17 g Oral Daily    warfarin  6 mg Oral Daily     PRN Meds:acetaminophen, docusate sodium, hydrALAZINE, omnipaque 300 iohexol, ondansetron, promethazine    Review of patient's allergies indicates:   Allergen Reactions    Aldactone [spironolactone]       persistent hyperkalemia.    Sulfa (sulfonamide antibiotics)      Objective:     Vital Signs (Most Recent):  Temp: 97.8 °F (36.6 °C) (02/01/18 0845)  Pulse: 71 (02/01/18 0845)  Resp: 18 (02/01/18 0845)  BP: (!) 90/0 (02/01/18 0845)  SpO2: 97 % (02/01/18 0845) Vital Signs (24h Range):  Temp:  [97.8 °F (36.6 °C)-99.4 °F (37.4 °C)] 97.8 °F (36.6 °C)  Pulse:  [] 71  Resp:  [18] 18  SpO2:  [94 %-99 %] 97 %  BP: (84-94)/(0) 90/0     Patient Vitals for the past 72 hrs (Last 3 readings):   Weight   02/01/18 0700 78.1 kg (172 lb 2.9 oz)   01/31/18 0700 80.5 kg (177 lb 7.5 oz)   01/30/18 0900 81.8 kg (180 lb 5.4 oz)     Body mass index is 24.71 kg/m².      Intake/Output Summary (Last 24 hours) at 02/01/18 0911  Last data filed at 01/31/18 1400   Gross per 24 hour   Intake              720 ml   Output                0 ml   Net              720 ml        Hemodynamic Parameters:  CVP:  [2 mmHg] 2 mmHg        Physical Exam   Constitutional: He appears well-developed and well-nourished.   HENT:   Head: Normocephalic and atraumatic.   Eyes: Conjunctivae are normal. Pupils are equal, round, and reactive to light.   Neck: Normal range of motion. Neck supple. No JVD present.   Cardiovascular: Normal rate and regular rhythm.    Smooth VAD hum. Intermittently pulsatile   Pulmonary/Chest: Effort normal and breath sounds normal.   Abdominal: Soft. Bowel sounds are normal.   Genitourinary:   Genitourinary Comments: Incontinent   Musculoskeletal: He exhibits no edema.   Neurological: He is alert.   Oriented to self and place but not year this morning   Skin: Skin is warm and dry. Capillary refill takes 2 to 3 seconds.       Significant Labs:  CBC:    Recent Labs  Lab 01/30/18 0445 01/31/18 0443 02/01/18  0515   WBC 7.53 7.19 6.28   RBC 3.78* 3.62* 3.63*   HGB 10.9* 10.7* 10.4*   HCT 33.1* 31.6* 32.1*   * 122* 119*   MCV 88 87 88   MCH 28.8 29.6 28.7   MCHC 32.9 33.9 32.4     BNP:    Recent Labs  Lab 01/26/18  0334 01/29/18 0446 01/31/18  0443   * 383* 251*     CMP:    Recent Labs  Lab 01/26/18  0334  01/29/18  0446 01/30/18 0445 01/31/18 0443 02/01/18  0515   GLU 79  < > 84 85 81 111*   CALCIUM 10.5  < > 10.1 9.7 10.0 10.0   ALBUMIN 3.3*  --  3.3*  --  3.2*  --    PROT 7.0  --  7.3  --  6.7  --      < > 140 139 142 139   K 4.1  < > 3.8 3.7 3.8 3.8   CO2 26  < > 24 25 24 24     < > 107 108 110 107   BUN 27*  < > 22 19 20 20   CREATININE 2.1*  < > 1.7* 1.6* 1.7* 1.6*   ALKPHOS 72  --  77  --  73  --    ALT 14  --  16  --  13  --    AST 23  --  26  --  19  --    BILITOT 0.5  --  0.6  --  0.4  --    < > = values in this interval not displayed.   Coagulation:     Recent Labs  Lab 01/30/18 0445 01/31/18 0443 02/01/18  0515   INR 1.3* 1.2 1.1     LDH:    Recent Labs  Lab 01/30/18 0445 01/31/18 0443 02/01/18  0515    238 223      Microbiology:  Microbiology Results (last 7 days)     Procedure Component Value Units Date/Time    Blood culture #1 [[id number]] Collected:  01/24/18 1521    Order Status:  Completed Specimen:  Blood from Line, PICC Right Basilic Updated:  01/29/18 1812     Blood Culture, Routine No growth after 5 days.    Narrative:       Blood Culture #1    Blood culture #2 [[id number]] Collected:  01/24/18 1537    Order Status:  Completed Specimen:  Blood from Peripheral, Hand, Right Updated:  01/29/18 1812     Blood Culture, Routine No growth after 5 days.    Narrative:       Blood Culture #2          I have reviewed all pertinent labs within the past 24 hours.    Estimated Creatinine Clearance: 41.2 mL/min (based on SCr of 1.6 mg/dL (H)).

## 2018-02-01 NOTE — ASSESSMENT & PLAN NOTE
- S/P HM III 10/16/16 as DT in [place]  - Current speed 5800  - TTE 1/25/18 shows LVEDD 6.5, LVEF 10-15%, diastolic dysfunction, RVE, severely depressed RV systolic function, PAS 20, ARTHUR intermittently and septum midline. No comment on IVC - per Dr. Lanza, costal views were too poor to assess IVC.   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

## 2018-02-01 NOTE — PLAN OF CARE
Problem: Physical Therapy Goal  Goal: Physical Therapy Goal  Goals to be met by: 2/10/18    Patient will increase functional independence with mobility by performin. Supine to sit with SBA  2. Sit to supine with SBA  3. Sit to stand transfer with Supervision using RW (SBA met )  4. Gait x150 feet with Contact Guard Assistance using Rolling Walker   5. Standing balance x10 min with SBA using AD or no AD while performing functional tasks   6. Lower extremity exercise program x20 reps per handout, with assistance as needed       Outcome: Ongoing (interventions implemented as appropriate)    Goals updated and appropriate to reflect pt's current mobility needs. Pt progressing well with gait trials; please see full note for details.    Carmencita Doherty, PT, DPT  [phone]  2018

## 2018-02-02 LAB
ALBUMIN SERPL BCP-MCNC: 2.9 G/DL
ALP SERPL-CCNC: 68 U/L
ALT SERPL W/O P-5'-P-CCNC: <5 U/L
ANION GAP SERPL CALC-SCNC: 8 MMOL/L
AST SERPL-CCNC: 18 U/L
BASOPHILS # BLD AUTO: 0.05 K/UL
BASOPHILS NFR BLD: 0.7 %
BILIRUB DIRECT SERPL-MCNC: 0.2 MG/DL
BILIRUB SERPL-MCNC: 0.3 MG/DL
BNP SERPL-MCNC: 86 PG/ML
BUN SERPL-MCNC: 20 MG/DL
CALCIUM SERPL-MCNC: 9.9 MG/DL
CHLORIDE SERPL-SCNC: 109 MMOL/L
CO2 SERPL-SCNC: 24 MMOL/L
CREAT SERPL-MCNC: 1.7 MG/DL
CRP SERPL-MCNC: 0.9 MG/L
DIFFERENTIAL METHOD: ABNORMAL
EOSINOPHIL # BLD AUTO: 0.2 K/UL
EOSINOPHIL NFR BLD: 2.3 %
ERYTHROCYTE [DISTWIDTH] IN BLOOD BY AUTOMATED COUNT: 18.3 %
EST. GFR  (AFRICAN AMERICAN): 44.6 ML/MIN/1.73 M^2
EST. GFR  (NON AFRICAN AMERICAN): 38.6 ML/MIN/1.73 M^2
FACT X PPP CHRO-ACNC: 0.58 IU/ML
FACT X PPP CHRO-ACNC: 0.73 IU/ML
FACT X PPP CHRO-ACNC: 0.75 IU/ML
GLUCOSE SERPL-MCNC: 91 MG/DL
HCT VFR BLD AUTO: 31.5 %
HGB BLD-MCNC: 10.3 G/DL
IMM GRANULOCYTES # BLD AUTO: 0.03 K/UL
IMM GRANULOCYTES NFR BLD AUTO: 0.4 %
INR PPP: 1.2
LDH SERPL L TO P-CCNC: 164 U/L
LYMPHOCYTES # BLD AUTO: 1.1 K/UL
LYMPHOCYTES NFR BLD: 15.3 %
MAGNESIUM SERPL-MCNC: 2.1 MG/DL
MCH RBC QN AUTO: 29.3 PG
MCHC RBC AUTO-ENTMCNC: 32.7 G/DL
MCV RBC AUTO: 90 FL
MONOCYTES # BLD AUTO: 0.9 K/UL
MONOCYTES NFR BLD: 12.2 %
NEUTROPHILS # BLD AUTO: 4.9 K/UL
NEUTROPHILS NFR BLD: 69.1 %
NRBC BLD-RTO: 0 /100 WBC
PLATELET # BLD AUTO: 120 K/UL
PMV BLD AUTO: 11.5 FL
POTASSIUM SERPL-SCNC: 4 MMOL/L
PREALB SERPL-MCNC: 26 MG/DL
PROT SERPL-MCNC: 6.3 G/DL
PROTHROMBIN TIME: 11.9 SEC
RBC # BLD AUTO: 3.52 M/UL
SODIUM SERPL-SCNC: 141 MMOL/L
WBC # BLD AUTO: 7.07 K/UL

## 2018-02-02 PROCEDURE — 80048 BASIC METABOLIC PNL TOTAL CA: CPT

## 2018-02-02 PROCEDURE — 83880 ASSAY OF NATRIURETIC PEPTIDE: CPT

## 2018-02-02 PROCEDURE — 85610 PROTHROMBIN TIME: CPT

## 2018-02-02 PROCEDURE — 85025 COMPLETE CBC W/AUTO DIFF WBC: CPT

## 2018-02-02 PROCEDURE — 63600175 PHARM REV CODE 636 W HCPCS: Performed by: INTERNAL MEDICINE

## 2018-02-02 PROCEDURE — 85520 HEPARIN ASSAY: CPT | Mod: 91

## 2018-02-02 PROCEDURE — 84134 ASSAY OF PREALBUMIN: CPT

## 2018-02-02 PROCEDURE — 25000003 PHARM REV CODE 250: Performed by: PHYSICIAN ASSISTANT

## 2018-02-02 PROCEDURE — 97116 GAIT TRAINING THERAPY: CPT

## 2018-02-02 PROCEDURE — 20600001 HC STEP DOWN PRIVATE ROOM

## 2018-02-02 PROCEDURE — 80076 HEPATIC FUNCTION PANEL: CPT

## 2018-02-02 PROCEDURE — B01B1ZZ FLUOROSCOPY OF SPINAL CORD USING LOW OSMOLAR CONTRAST: ICD-10-PCS | Performed by: RADIOLOGY

## 2018-02-02 PROCEDURE — 25000003 PHARM REV CODE 250: Performed by: INTERNAL MEDICINE

## 2018-02-02 PROCEDURE — 85520 HEPARIN ASSAY: CPT

## 2018-02-02 PROCEDURE — 83735 ASSAY OF MAGNESIUM: CPT

## 2018-02-02 PROCEDURE — 99232 SBSQ HOSP IP/OBS MODERATE 35: CPT | Mod: ,,, | Performed by: INTERNAL MEDICINE

## 2018-02-02 PROCEDURE — 25000003 PHARM REV CODE 250: Performed by: NURSE PRACTITIONER

## 2018-02-02 PROCEDURE — 97530 THERAPEUTIC ACTIVITIES: CPT

## 2018-02-02 PROCEDURE — 83615 LACTATE (LD) (LDH) ENZYME: CPT

## 2018-02-02 PROCEDURE — 86140 C-REACTIVE PROTEIN: CPT

## 2018-02-02 PROCEDURE — 27000248 HC VAD-ADDITIONAL DAY

## 2018-02-02 RX ADMIN — DOCUSATE SODIUM 100 MG: 100 CAPSULE, LIQUID FILLED ORAL at 09:02

## 2018-02-02 RX ADMIN — PANTOPRAZOLE SODIUM 40 MG: 40 TABLET, DELAYED RELEASE ORAL at 05:02

## 2018-02-02 RX ADMIN — WARFARIN SODIUM 6 MG: 2 TABLET ORAL at 05:02

## 2018-02-02 RX ADMIN — CEFEPIME 2 G: 2 INJECTION, POWDER, FOR SOLUTION INTRAVENOUS at 12:02

## 2018-02-02 RX ADMIN — ACETAMINOPHEN 650 MG: 325 TABLET, FILM COATED ORAL at 02:02

## 2018-02-02 RX ADMIN — BUPROPION HYDROCHLORIDE 450 MG: 150 TABLET, EXTENDED RELEASE ORAL at 09:02

## 2018-02-02 RX ADMIN — Medication 1 CAPSULE: at 09:02

## 2018-02-02 RX ADMIN — HEPARIN SODIUM 15 UNITS/KG/HR: 10000 INJECTION, SOLUTION INTRAVENOUS at 02:02

## 2018-02-02 RX ADMIN — CARBIDOPA AND LEVODOPA 1 TABLET: 25; 100 TABLET ORAL at 09:02

## 2018-02-02 RX ADMIN — ATORVASTATIN CALCIUM 10 MG: 10 TABLET, FILM COATED ORAL at 09:02

## 2018-02-02 RX ADMIN — CARBIDOPA AND LEVODOPA 1 TABLET: 25; 100 TABLET ORAL at 10:02

## 2018-02-02 RX ADMIN — CEFEPIME 2 G: 2 INJECTION, POWDER, FOR SOLUTION INTRAVENOUS at 02:02

## 2018-02-02 RX ADMIN — DOFETILIDE 250 MCG: 0.25 CAPSULE ORAL at 09:02

## 2018-02-02 RX ADMIN — MAGNESIUM OXIDE TAB 400 MG (241.3 MG ELEMENTAL MG) 400 MG: 400 (241.3 MG) TAB at 09:02

## 2018-02-02 RX ADMIN — ALLOPURINOL 300 MG: 300 TABLET ORAL at 09:02

## 2018-02-02 RX ADMIN — HYDRALAZINE HYDROCHLORIDE 100 MG: 50 TABLET ORAL at 02:02

## 2018-02-02 RX ADMIN — CARBIDOPA AND LEVODOPA 1 TABLET: 25; 100 TABLET ORAL at 05:02

## 2018-02-02 RX ADMIN — CARBIDOPA AND LEVODOPA 1 TABLET: 25; 100 TABLET ORAL at 02:02

## 2018-02-02 RX ADMIN — ISOSORBIDE DINITRATE 40 MG: 40 TABLET ORAL at 09:02

## 2018-02-02 RX ADMIN — LISINOPRIL 20 MG: 20 TABLET ORAL at 09:02

## 2018-02-02 RX ADMIN — ISOSORBIDE DINITRATE 40 MG: 40 TABLET ORAL at 05:02

## 2018-02-02 RX ADMIN — POLYETHYLENE GLYCOL 3350 17 G: 17 POWDER, FOR SOLUTION ORAL at 09:02

## 2018-02-02 RX ADMIN — FOLIC ACID 1 MG: 1 TABLET ORAL at 09:02

## 2018-02-02 RX ADMIN — DOXAZOSIN MESYLATE 4 MG: 2 TABLET ORAL at 09:02

## 2018-02-02 RX ADMIN — ASPIRIN 81 MG CHEWABLE TABLET 81 MG: 81 TABLET CHEWABLE at 09:02

## 2018-02-02 RX ADMIN — ISOSORBIDE DINITRATE 40 MG: 40 TABLET ORAL at 02:02

## 2018-02-02 RX ADMIN — HYDRALAZINE HYDROCHLORIDE 100 MG: 50 TABLET ORAL at 05:02

## 2018-02-02 RX ADMIN — HYDRALAZINE HYDROCHLORIDE 100 MG: 50 TABLET ORAL at 09:02

## 2018-02-02 RX ADMIN — AMLODIPINE BESYLATE 10 MG: 10 TABLET ORAL at 09:02

## 2018-02-02 NOTE — SUBJECTIVE & OBJECTIVE
Interval History: Thinks RLS symptoms may have improved on Sinemet, but he's not sure his gait has.    Continuous Infusions:   heparin (porcine) in D5W 16 Units/kg/hr (02/02/18 0606)     Scheduled Meds:   allopurinol  300 mg Oral Daily    amLODIPine  10 mg Oral Daily    aspirin  81 mg Oral Daily    atorvastatin  10 mg Oral Daily    buPROPion  450 mg Oral Daily    carbidopa-levodopa  mg  1 tablet Oral Q4H While awake    ceFEPime (MAXIPIME) IVPB  2 g Intravenous Q12H    docusate sodium  100 mg Oral BID    dofetilide  250 mcg Oral Q12H    doxazosin  4 mg Oral Daily    folic acid  1 mg Oral Daily    hydrALAZINE  100 mg Oral Q8H    isosorbide dinitrate  40 mg Oral TID    Lactobacillus rhamnosus GG  1 capsule Oral Daily    lisinopril  20 mg Oral BID    magnesium oxide  400 mg Oral BID    pantoprazole  40 mg Oral BID AC    polyethylene glycol  17 g Oral Daily    warfarin  6 mg Oral Daily     PRN Meds:acetaminophen, docusate sodium, hydrALAZINE, omnipaque 300 iohexol, ondansetron, promethazine    Review of patient's allergies indicates:   Allergen Reactions    Aldactone [spironolactone]       persistent hyperkalemia.    Sulfa (sulfonamide antibiotics)      Objective:     Vital Signs (Most Recent):  Temp: 97.3 °F (36.3 °C) (02/02/18 0740)  Pulse: 69 (02/02/18 0700)  Resp: 16 (02/02/18 0740)  BP: (!) 88/0 (02/02/18 0740)  SpO2: (!) 93 % (02/02/18 0740) Vital Signs (24h Range):  Temp:  [96.2 °F (35.7 °C)-99 °F (37.2 °C)] 97.3 °F (36.3 °C)  Pulse:  [] 69  Resp:  [16-20] 16  SpO2:  [93 %-96 %] 93 %  BP: ()/(0-78) 88/0     Patient Vitals for the past 72 hrs (Last 3 readings):   Weight   02/02/18 0740 79.5 kg (175 lb 4.3 oz)   02/01/18 0700 78.1 kg (172 lb 2.9 oz)   01/31/18 0700 80.5 kg (177 lb 7.5 oz)     Body mass index is 25.15 kg/m².      Intake/Output Summary (Last 24 hours) at 02/02/18 0949  Last data filed at 02/02/18 0500   Gross per 24 hour   Intake           1245.1 ml   Output                 0 ml   Net           1245.1 ml       Hemodynamic Parameters:  CVP:  [2 mmHg] 2 mmHg        Physical Exam   Constitutional: He appears well-developed and well-nourished.   HENT:   Head: Normocephalic and atraumatic.   Eyes: Conjunctivae are normal. Pupils are equal, round, and reactive to light.   Neck: Normal range of motion. Neck supple. No JVD present.   Cardiovascular: Normal rate and regular rhythm.    Smooth VAD hum. Intermittently pulsatile   Pulmonary/Chest: Effort normal and breath sounds normal.   Abdominal: Soft. Bowel sounds are normal.   Genitourinary:   Genitourinary Comments: Incontinent   Musculoskeletal: He exhibits no edema.   Neurological: He is alert.   Oriented to self and place but not year this morning   Skin: Skin is warm and dry. Capillary refill takes 2 to 3 seconds.       Significant Labs:  CBC:    Recent Labs  Lab 01/31/18 0443 02/01/18  0515 02/02/18  0501   WBC 7.19 6.28 7.07   RBC 3.62* 3.63* 3.52*   HGB 10.7* 10.4* 10.3*   HCT 31.6* 32.1* 31.5*   * 119* 120*   MCV 87 88 90   MCH 29.6 28.7 29.3   MCHC 33.9 32.4 32.7     BNP:    Recent Labs  Lab 01/29/18 0446 01/31/18  0443 02/02/18  0501   * 251* 86     CMP:    Recent Labs  Lab 01/29/18 0446 01/31/18 0443 02/01/18  0515 02/02/18  0501   GLU 84  < > 81 111* 91   CALCIUM 10.1  < > 10.0 10.0 9.9   ALBUMIN 3.3*  --  3.2*  --  2.9*   PROT 7.3  --  6.7  --  6.3     < > 142 139 141   K 3.8  < > 3.8 3.8 4.0   CO2 24  < > 24 24 24     < > 110 107 109   BUN 22  < > 20 20 20   CREATININE 1.7*  < > 1.7* 1.6* 1.7*   ALKPHOS 77  --  73  --  68   ALT 16  --  13  --  <5*   AST 26  --  19  --  18   BILITOT 0.6  --  0.4  --  0.3   < > = values in this interval not displayed.   Coagulation:     Recent Labs  Lab 01/31/18  0443 02/01/18  0515 02/02/18  0501   INR 1.2 1.1 1.2     LDH:    Recent Labs  Lab 01/31/18  0443 02/01/18  0515 02/02/18  0501    223 164     Microbiology:  Microbiology Results (last 7  days)     Procedure Component Value Units Date/Time    Blood culture #1 [[id number]] Collected:  01/24/18 1521    Order Status:  Completed Specimen:  Blood from Line, PICC Right Basilic Updated:  01/29/18 1812     Blood Culture, Routine No growth after 5 days.    Narrative:       Blood Culture #1    Blood culture #2 [[id number]] Collected:  01/24/18 1537    Order Status:  Completed Specimen:  Blood from Peripheral, Hand, Right Updated:  01/29/18 1812     Blood Culture, Routine No growth after 5 days.    Narrative:       Blood Culture #2          I have reviewed all pertinent labs within the past 24 hours.    Estimated Creatinine Clearance: 38.8 mL/min (based on SCr of 1.7 mg/dL (H)).

## 2018-02-02 NOTE — ASSESSMENT & PLAN NOTE
- CT of head 1/24/18 with no acute changes  - Given constellation of confusion, gait disturbance and urinary incontinence, consulted Neuro (NPH ?). Appreciate their help. LP done 1/30/18 with removal of only 13cc; opening pressure 21, closing pressure 15. His post procedure gait was significantly worse. Discussed with Neuro and NSGY. Per NSGY, no immediate need for cisternogram, shunt, etc, given failed improvement in gait. Now considering PD - started Sinemet per Neuro's rec and patient will f/u in the [facility] Disorders Clinic as an outpatient  - D/C'd Ropinirole again 2/2 gait disturbance  - Delirium protocol

## 2018-02-02 NOTE — PT/OT/SLP PROGRESS
Occupational Therapy      Patient Name:  Kev Vasquez   MRN:  [id number]    Patient not seen today secondary to Other (pt declined due to fatigue). Will follow-up as scheduled.    SHAUNA Hanks  2/2/2018

## 2018-02-02 NOTE — ASSESSMENT & PLAN NOTE
- Much improved  - Appreciate GI's help. KUB unremarkable. EGD also negative.  - PPI bid  - Appreciate ST's help given coughing and sneezing that can precede emesis. MBSS done and patient now requires honey thick liquids with strict aspiration precautions

## 2018-02-02 NOTE — TREATMENT PLAN
Treatment Plan  02/02/2018  8:21 AM    MBSS as well as Speech Pathology notes reviewed.  At this point we recommend following their recs as well as continuing to work with speech therapy as outpatient (if this is possible). As far as GI follow if symptoms remain the same or worsen once home (despite follow recs and working with speech therapy) would recommend GI fellow up to evaluate further.     We thank you for this consult, we will sign off at this time.    Eileen Bermudez M.D.  Gastroenterology Fellow, PGY-IV  Pager: [phone]  [facility]

## 2018-02-02 NOTE — PLAN OF CARE
Problem: Physical Therapy Goal  Goal: Physical Therapy Goal  Goals to be met by: 2/10/18    Patient will increase functional independence with mobility by performin. Supine to sit with SBA  2. Sit to supine with SBA  3. Sit to stand transfer with Supervision using RW (SBA met )  4. Gait x150 feet with Contact Guard Assistance using Rolling Walker   5. Standing balance x10 min with SBA using AD or no AD while performing functional tasks   6. Lower extremity exercise program x20 reps per handout, with assistance as needed        Outcome: Ongoing (interventions implemented as appropriate)  LTGs remain appropriate. Pt will continue PT POC.    Ninfa Mims, PT  2018

## 2018-02-02 NOTE — PT/OT/SLP PROGRESS
"Physical Therapy Treatment    Patient Name:  Kev Vasquez   MRN:  [id number]    Recommendations:     Discharge Recommendations:  home with home health (w/ progression to OP)   Discharge Equipment Recommendations: none   Barriers to discharge: Inaccessible home and Decreased caregiver support    Assessment:     Kev Vasquez is a 77 y.o. male admitted with a medical diagnosis of Vomiting.  He presents with the following impairments/functional limitations:  weakness, impaired endurance, impaired self care skills, impaired functional mobilty, gait instability, impaired balance, decreased coordination, decreased upper extremity function, decreased lower extremity function, decreased safety awareness, impaired coordination, impaired fine motor . Pt sheryl session well w/ good participation. He continues to demo parkinson's like impairments during amb trial. He does respond well to cueing from PT in order to improve gait mechanics. Pt will continue PT POC.    Rehab Prognosis:  Fair; patient would benefit from acute skilled PT services to address these deficits and reach maximum level of function.      Recent Surgery: Procedure(s) (LRB):  ESOPHAGOGASTRODUODENOSCOPY (EGD) (N/A) 7 Days Post-Op    Plan:     During this hospitalization, patient to be seen 5 x/week to address the above listed problems via gait training, therapeutic activities, therapeutic exercises, neuromuscular re-education  · Plan of Care Expires:  02/25/18   Plan of Care Reviewed with: patient, daughter    Subjective     Communicated with nursing prior to session.  Patient found supine upon PT entry to room, agreeable to treatment.      Chief Complaint: impaired mobility  Patient comments/goals: "I'm ready to walk."  Pain/Comfort:  · Pain Rating 1: 0/10    Patients cultural, spiritual, Pentec[employer]alism conflicts given the current situation: none reported    Objective:     Patient found with: LVAD, telemetry, peripheral IV (LVAD to wall power)     General Precautions: " Standard, aspiration, fall, LVAD   Orthopedic Precautions:N/A   Braces: N/A     Functional Mobility:  · Bed Mobility:     · Supine to Sit: stand by assistance, HOB elevated and w/ side rail  · inc'd time required  · Transfers:     · Sit to Stand:  contact guard assistance with rolling walker, to/from bedside chair  · Bed to Chair: contact guard assistance with  rolling walker  using  Stand Pivot  · Cueing for hand placement 1st transfer only  · Gait:   · ~150ft w/ RW and mostly Ida for stability and RW management, ModA during turning  · Parkinson's like gait including freezing w/ distractions and turning, freezing accompanied w/ posterior/lateral lean requiring ModA for stability  · 1 instance of festinating gait when pt was distracted by hallway traffic  · Verbal and tactile cues provided for upright and midline posture along w/ increased step length/height  · Balance:   · Static stand w/ RW and Min/CGA for stability, cueing for upright posture       AM-PAC 6 CLICK MOBILITY  Turning over in bed (including adjusting bedclothes, sheets and blankets)?: 3  Sitting down on and standing up from a chair with arms (e.g., wheelchair, bedside commode, etc.): 3  Moving from lying on back to sitting on the side of the bed?: 3  Moving to and from a bed to a chair (including a wheelchair)?: 3  Need to walk in hospital room?: 2  Climbing 3-5 steps with a railing?: 2  Total Score: 16       Therapeutic Activities and Exercises:   At start of session pt's personal sitter assisted w/ UBD/LBD and ced-care. Sitter also performed LVAD management IND. Pt, sitter, and daughter all report that sitter has been assisting pt w/ these tasks when at home.     Upon initial sitting EOB pt suddenly vomiting. Emesis bag provided. Pt's nurse was notified and then present in room to attend to pt. PT left room once pt w/ nurse and then returned later for ambulation trial.     Patient left up in chair with all lines intact, call button in reach and  rina present..    GOALS:    Physical Therapy Goals        Problem: Physical Therapy Goal    Goal Priority Disciplines Outcome Goal Variances Interventions   Physical Therapy Goal     PT/OT, PT Ongoing (interventions implemented as appropriate)     Description:  Goals to be met by: 2/10/18    Patient will increase functional independence with mobility by performin. Supine to sit with SBA  2. Sit to supine with SBA  3. Sit to stand transfer with Supervision using RW (SBA met )  4. Gait x150 feet with Contact Guard Assistance using Rolling Walker   5. Standing balance x10 min with SBA using AD or no AD while performing functional tasks   6. Lower extremity exercise program x20 reps per handout, with assistance as needed                         Time Tracking:     PT Received On: 18  PT Start Time: 0855 (1103)     PT Stop Time: 0919 (1117)  PT Total Time (min): 24 min (14min)= 38min total    Billable Minutes: Gait Training 14, Therapeutic Activity 24 and Total Time 38    Treatment Type: Treatment  PT/PTA: PT     PTA Visit Number: 0     Ninfa Mims, PT  2018

## 2018-02-02 NOTE — PLAN OF CARE
Problem: Patient Care Overview  Goal: Individualization & Mutuality  Outcome: Ongoing (interventions implemented as appropriate)  POC reviewed with pt. VS and VAD numbers stable. Heparin gtt continues at ordered rate. Honey thick liquids administered with meds. Sinemet administered while awake. INR 1.1. Pt remains free from falls, trauma, injury; pt tolerating POC well. Will continue to monitor.

## 2018-02-02 NOTE — PROGRESS NOTES
[facility]  Heart Transplant  Progress Note    Patient Name: Kev Vasquez  MRN: [id number]  Admission Date: 1/24/2018  Hospital Length of Stay: 9 days  Attending Physician: Karen Valladares MD  Primary Care Provider: Mega Collins MD  Principal Problem:Vomiting    Subjective:     Interval History: Thinks RLS symptoms may have improved on Sinemet, but he's not sure his gait has.    Continuous Infusions:   heparin (porcine) in D5W 16 Units/kg/hr (02/02/18 0606)     Scheduled Meds:   allopurinol  300 mg Oral Daily    amLODIPine  10 mg Oral Daily    aspirin  81 mg Oral Daily    atorvastatin  10 mg Oral Daily    buPROPion  450 mg Oral Daily    carbidopa-levodopa  mg  1 tablet Oral Q4H While awake    ceFEPime (MAXIPIME) IVPB  2 g Intravenous Q12H    docusate sodium  100 mg Oral BID    dofetilide  250 mcg Oral Q12H    doxazosin  4 mg Oral Daily    folic acid  1 mg Oral Daily    hydrALAZINE  100 mg Oral Q8H    isosorbide dinitrate  40 mg Oral TID    Lactobacillus rhamnosus GG  1 capsule Oral Daily    lisinopril  20 mg Oral BID    magnesium oxide  400 mg Oral BID    pantoprazole  40 mg Oral BID AC    polyethylene glycol  17 g Oral Daily    warfarin  6 mg Oral Daily     PRN Meds:acetaminophen, docusate sodium, hydrALAZINE, omnipaque 300 iohexol, ondansetron, promethazine    Review of patient's allergies indicates:   Allergen Reactions    Aldactone [spironolactone]       persistent hyperkalemia.    Sulfa (sulfonamide antibiotics)      Objective:     Vital Signs (Most Recent):  Temp: 97.3 °F (36.3 °C) (02/02/18 0740)  Pulse: 69 (02/02/18 0700)  Resp: 16 (02/02/18 0740)  BP: (!) 88/0 (02/02/18 0740)  SpO2: (!) 93 % (02/02/18 0740) Vital Signs (24h Range):  Temp:  [96.2 °F (35.7 °C)-99 °F (37.2 °C)] 97.3 °F (36.3 °C)  Pulse:  [] 69  Resp:  [16-20] 16  SpO2:  [93 %-96 %] 93 %  BP: ()/(0-78) 88/0     Patient Vitals for the past 72 hrs (Last 3 readings):   Weight    02/02/18 0740 79.5 kg (175 lb 4.3 oz)   02/01/18 0700 78.1 kg (172 lb 2.9 oz)   01/31/18 0700 80.5 kg (177 lb 7.5 oz)     Body mass index is 25.15 kg/m².      Intake/Output Summary (Last 24 hours) at 02/02/18 0949  Last data filed at 02/02/18 0500   Gross per 24 hour   Intake           1245.1 ml   Output                0 ml   Net           1245.1 ml       Hemodynamic Parameters:  CVP:  [2 mmHg] 2 mmHg        Physical Exam   Constitutional: He appears well-developed and well-nourished.   HENT:   Head: Normocephalic and atraumatic.   Eyes: Conjunctivae are normal. Pupils are equal, round, and reactive to light.   Neck: Normal range of motion. Neck supple. No JVD present.   Cardiovascular: Normal rate and regular rhythm.    Smooth VAD hum. Intermittently pulsatile   Pulmonary/Chest: Effort normal and breath sounds normal.   Abdominal: Soft. Bowel sounds are normal.   Genitourinary:   Genitourinary Comments: Incontinent   Musculoskeletal: He exhibits no edema.   Neurological: He is alert.   Oriented to self and place but not year this morning   Skin: Skin is warm and dry. Capillary refill takes 2 to 3 seconds.       Significant Labs:  CBC:    Recent Labs  Lab 01/31/18 0443 02/01/18 0515 02/02/18  0501   WBC 7.19 6.28 7.07   RBC 3.62* 3.63* 3.52*   HGB 10.7* 10.4* 10.3*   HCT 31.6* 32.1* 31.5*   * 119* 120*   MCV 87 88 90   MCH 29.6 28.7 29.3   MCHC 33.9 32.4 32.7     BNP:    Recent Labs  Lab 01/29/18 0446 01/31/18 0443 02/02/18  0501   * 251* 86     CMP:    Recent Labs  Lab 01/29/18 0446 01/31/18 0443 02/01/18  0515 02/02/18  0501   GLU 84  < > 81 111* 91   CALCIUM 10.1  < > 10.0 10.0 9.9   ALBUMIN 3.3*  --  3.2*  --  2.9*   PROT 7.3  --  6.7  --  6.3     < > 142 139 141   K 3.8  < > 3.8 3.8 4.0   CO2 24  < > 24 24 24     < > 110 107 109   BUN 22  < > 20 20 20   CREATININE 1.7*  < > 1.7* 1.6* 1.7*   ALKPHOS 77  --  73  --  68   ALT 16  --  13  --  <5*   AST 26  --  19  --  18   BILITOT  0.6  --  0.4  --  0.3   < > = values in this interval not displayed.   Coagulation:     Recent Labs  Lab 01/31/18  0443 02/01/18  0515 02/02/18  0501   INR 1.2 1.1 1.2     LDH:    Recent Labs  Lab 01/31/18  0443 02/01/18  0515 02/02/18  0501    223 164     Microbiology:  Microbiology Results (last 7 days)     Procedure Component Value Units Date/Time    Blood culture #1 [[id number]] Collected:  01/24/18 1521    Order Status:  Completed Specimen:  Blood from Line, PICC Right Basilic Updated:  01/29/18 1812     Blood Culture, Routine No growth after 5 days.    Narrative:       Blood Culture #1    Blood culture #2 [[id number]] Collected:  01/24/18 1537    Order Status:  Completed Specimen:  Blood from Peripheral, Hand, Right Updated:  01/29/18 1812     Blood Culture, Routine No growth after 5 days.    Narrative:       Blood Culture #2          I have reviewed all pertinent labs within the past 24 hours.    Estimated Creatinine Clearance: 38.8 mL/min (based on SCr of 1.7 mg/dL (H)).      Assessment and Plan:     Mr. 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 BiV ICD, HILLARY/BiPAP, HTN, CKD, HLP, gout and depression who presents with confusion. On further questioning of his wife and his caregiver who were both at bedside, they noticed some odd behaviours such as combing his cheek/nose, getting dressed to go hunting when that is not his hobby and admitted to seeing visual hallucinations of his dead brother. They stated that he is not sleeping well and naps throughout the day. They wonder if his BiPAP is not in the proper settings anymore and he now has made a game out of looking at his watch to see when he will get to sleep. He stated it was 2017, January, thought he was in [place] (moved from there in June),  and stated [place] as the president. Altogether, he denies SOB, CP, LE edema, orthopnea and PND. He has no fevers or chills, cough, or diarrhea. They state that he does have some worsening vomiting. They stated that a few months ago, he would vomit after he drank some water: it would start with a sneeze, then a cough, then the vomit. It would happen every now and then, but over the past few weeks, it has worsened to 3-4x/day. They stated that now it occurs without drinking, sometimes before dinner and he would throw up almost undigested food. He admitted to throwing up pills too at times. There have been no VAD alarms noted.       * Vomiting    - Much improved  - Appreciate GI's help. KUB unremarkable. EGD also negative.  - PPI bid  - Appreciate ST's help given coughing and sneezing that can precede emesis. MBSS done and patient now requires honey thick liquids with strict aspiration precautions        Confusion    - CT of head 1/24/18 with no acute changes  - Given constellation of confusion, gait disturbance and urinary incontinence, consulted Neuro (NPH ?). Appreciate their help. LP done 1/30/18 with removal of only 13cc; opening pressure 21, closing pressure 15. His post procedure gait was significantly worse. Discussed with Neuro and NSGY. Per NSGY, no immediate need for cisternogram, shunt, etc, given failed improvement in gait. Now considering PD - started Sinemet per Neuro's rec and patient will f/u in the Movement Disorders Clinic as an outpatient  - D/C'd Ropinirole again 2/2 gait disturbance  - Delirium protocol        Gait instability    - PT/OT  - See confusion above        LVAD (left ventricular assist device) present    - S/P HM III 10/16/16 as DT in [place]  - Current speed 5800  - TTE 1/25/18 shows LVEDD 6.5, LVEF 10-15%, diastolic dysfunction, RVE, severely depressed RV systolic function, PAS 20, ARTHUR intermittently and septum midline. No comment on IVC - per Dr. Lanza, costal views were too poor  to assess IVC.   - CVP via PICC line remains 2  (was taking Lasix 80 mg every M-W-F at home) - holding Lasix  - INR 1.2 today, Continue Heparin bridge and  Coumadin with goal INR 2.0-3.0  - LDH stable  - Continue IV Cefepime for chronic Pseudomonas DL infection  - D/C home with his sitters/family once INR therapeutic        Essential hypertension    - He has a long h/o orthostatic hypotension, so BP's should only be checked while sitting or standing  - Goal MAP ~ 90 or less  - Continue Norvasc, Hydralazine, Isordil, Lisinopril and Doxazosin        Stage 3 chronic kidney disease    - Admit creatinine 2.3, 1.7 today, (baseline looks ~ 1.6-1.8)        VT (ventricular tachycardia)    - Continue Tikosyn  - Has [employer] BiV ICD  - Keep K+ > 4.0 and Mg+ > 2.0            [place] [id number]  Heart Transplant  [facility]

## 2018-02-02 NOTE — PROGRESS NOTES
LVAD dressing changed, by RN, using sterile technique. Performed with soap and water. Drain gauze had scant amount of dried brown drainage. Drive line site pink, dry, with no drainage. New, sterile dressing applied.

## 2018-02-02 NOTE — PROGRESS NOTES
Per rounds, pt will need out pt PT/OT/ST arranged at D/C. SW called [facility] in [place]. They do not offer all three disciplines. They reported [facility] in [place] would be the closest rehab facility to offer PT, OT, and ST.  SW providing ongoing psychosocial and counseling support, education, assistance, resources, and discharge planning as indicated. SW continuing to follow and remains available.

## 2018-02-02 NOTE — ASSESSMENT & PLAN NOTE
- S/P HM III 10/16/16 as DT in [place]  - Current speed 5800  - TTE 1/25/18 shows LVEDD 6.5, LVEF 10-15%, diastolic dysfunction, RVE, severely depressed RV systolic function, PAS 20, ARTHUR intermittently and septum midline. No comment on IVC - per Dr. Lanza, costal views were too poor to assess IVC.   - CVP via PICC line remains 2  (was taking Lasix 80 mg every M-W-F at home) - holding Lasix  - INR 1.2 today, Continue Heparin bridge and  Coumadin with goal INR 2.0-3.0  - LDH stable  - Continue IV Cefepime for chronic Pseudomonas DL infection  - D/C home with his sitters/family once INR therapeutic

## 2018-02-03 LAB
ANION GAP SERPL CALC-SCNC: 7 MMOL/L
BASOPHILS # BLD AUTO: 0.03 K/UL
BASOPHILS NFR BLD: 0.5 %
BUN SERPL-MCNC: 21 MG/DL
CALCIUM SERPL-MCNC: 10.4 MG/DL
CHLORIDE SERPL-SCNC: 107 MMOL/L
CO2 SERPL-SCNC: 25 MMOL/L
CREAT SERPL-MCNC: 1.6 MG/DL
DIFFERENTIAL METHOD: ABNORMAL
EOSINOPHIL # BLD AUTO: 0.2 K/UL
EOSINOPHIL NFR BLD: 2.8 %
ERYTHROCYTE [DISTWIDTH] IN BLOOD BY AUTOMATED COUNT: 18.5 %
EST. GFR  (AFRICAN AMERICAN): 48 ML/MIN/1.73 M^2
EST. GFR  (NON AFRICAN AMERICAN): 41.5 ML/MIN/1.73 M^2
FACT X PPP CHRO-ACNC: 0.55 IU/ML
GLUCOSE SERPL-MCNC: 91 MG/DL
HCT VFR BLD AUTO: 30.7 %
HGB BLD-MCNC: 10 G/DL
IMM GRANULOCYTES # BLD AUTO: 0.02 K/UL
IMM GRANULOCYTES NFR BLD AUTO: 0.4 %
INR PPP: 1.4
LDH SERPL L TO P-CCNC: 186 U/L
LYMPHOCYTES # BLD AUTO: 0.9 K/UL
LYMPHOCYTES NFR BLD: 16.3 %
MAGNESIUM SERPL-MCNC: 2.3 MG/DL
MCH RBC QN AUTO: 28.8 PG
MCHC RBC AUTO-ENTMCNC: 32.6 G/DL
MCV RBC AUTO: 89 FL
MONOCYTES # BLD AUTO: 0.7 K/UL
MONOCYTES NFR BLD: 12.1 %
NEUTROPHILS # BLD AUTO: 3.9 K/UL
NEUTROPHILS NFR BLD: 67.9 %
NRBC BLD-RTO: 0 /100 WBC
OB PNL STL: NEGATIVE
PLATELET # BLD AUTO: 124 K/UL
PMV BLD AUTO: 12.2 FL
POTASSIUM SERPL-SCNC: 4.2 MMOL/L
PROTHROMBIN TIME: 13.9 SEC
RBC # BLD AUTO: 3.47 M/UL
SODIUM SERPL-SCNC: 139 MMOL/L
WBC # BLD AUTO: 5.7 K/UL

## 2018-02-03 PROCEDURE — 83615 LACTATE (LD) (LDH) ENZYME: CPT

## 2018-02-03 PROCEDURE — 63600175 PHARM REV CODE 636 W HCPCS: Performed by: INTERNAL MEDICINE

## 2018-02-03 PROCEDURE — 85610 PROTHROMBIN TIME: CPT

## 2018-02-03 PROCEDURE — 27000248 HC VAD-ADDITIONAL DAY

## 2018-02-03 PROCEDURE — 25000003 PHARM REV CODE 250: Performed by: NURSE PRACTITIONER

## 2018-02-03 PROCEDURE — 83735 ASSAY OF MAGNESIUM: CPT

## 2018-02-03 PROCEDURE — 20600001 HC STEP DOWN PRIVATE ROOM

## 2018-02-03 PROCEDURE — 80048 BASIC METABOLIC PNL TOTAL CA: CPT

## 2018-02-03 PROCEDURE — 85025 COMPLETE CBC W/AUTO DIFF WBC: CPT

## 2018-02-03 PROCEDURE — 99232 SBSQ HOSP IP/OBS MODERATE 35: CPT | Mod: ,,, | Performed by: INTERNAL MEDICINE

## 2018-02-03 PROCEDURE — 85520 HEPARIN ASSAY: CPT

## 2018-02-03 PROCEDURE — 82272 OCCULT BLD FECES 1-3 TESTS: CPT

## 2018-02-03 PROCEDURE — 25000003 PHARM REV CODE 250: Performed by: PHYSICIAN ASSISTANT

## 2018-02-03 PROCEDURE — 25000003 PHARM REV CODE 250: Performed by: INTERNAL MEDICINE

## 2018-02-03 RX ADMIN — CARBIDOPA AND LEVODOPA 1 TABLET: 25; 100 TABLET ORAL at 01:02

## 2018-02-03 RX ADMIN — MAGNESIUM OXIDE TAB 400 MG (241.3 MG ELEMENTAL MG) 400 MG: 400 (241.3 MG) TAB at 09:02

## 2018-02-03 RX ADMIN — DOCUSATE SODIUM 100 MG: 100 CAPSULE, LIQUID FILLED ORAL at 09:02

## 2018-02-03 RX ADMIN — MAGNESIUM OXIDE TAB 400 MG (241.3 MG ELEMENTAL MG) 400 MG: 400 (241.3 MG) TAB at 08:02

## 2018-02-03 RX ADMIN — ATORVASTATIN CALCIUM 10 MG: 10 TABLET, FILM COATED ORAL at 09:02

## 2018-02-03 RX ADMIN — ISOSORBIDE DINITRATE 40 MG: 40 TABLET ORAL at 08:02

## 2018-02-03 RX ADMIN — CARBIDOPA AND LEVODOPA 1 TABLET: 25; 100 TABLET ORAL at 09:02

## 2018-02-03 RX ADMIN — BUPROPION HYDROCHLORIDE 450 MG: 150 TABLET, EXTENDED RELEASE ORAL at 09:02

## 2018-02-03 RX ADMIN — HYDRALAZINE HYDROCHLORIDE 100 MG: 50 TABLET ORAL at 08:02

## 2018-02-03 RX ADMIN — HEPARIN SODIUM 15 UNITS/KG/HR: 10000 INJECTION, SOLUTION INTRAVENOUS at 11:02

## 2018-02-03 RX ADMIN — HYDRALAZINE HYDROCHLORIDE 5 MG: 20 INJECTION INTRAMUSCULAR; INTRAVENOUS at 06:02

## 2018-02-03 RX ADMIN — HYDRALAZINE HYDROCHLORIDE 100 MG: 50 TABLET ORAL at 01:02

## 2018-02-03 RX ADMIN — LISINOPRIL 20 MG: 20 TABLET ORAL at 08:02

## 2018-02-03 RX ADMIN — WARFARIN SODIUM 6 MG: 2 TABLET ORAL at 05:02

## 2018-02-03 RX ADMIN — Medication 1 CAPSULE: at 09:02

## 2018-02-03 RX ADMIN — DOFETILIDE 250 MCG: 0.25 CAPSULE ORAL at 08:02

## 2018-02-03 RX ADMIN — ISOSORBIDE DINITRATE 40 MG: 40 TABLET ORAL at 05:02

## 2018-02-03 RX ADMIN — CARBIDOPA AND LEVODOPA 1 TABLET: 25; 100 TABLET ORAL at 05:02

## 2018-02-03 RX ADMIN — POLYETHYLENE GLYCOL 3350 17 G: 17 POWDER, FOR SOLUTION ORAL at 09:02

## 2018-02-03 RX ADMIN — ASPIRIN 81 MG CHEWABLE TABLET 81 MG: 81 TABLET CHEWABLE at 09:02

## 2018-02-03 RX ADMIN — CEFEPIME 2 G: 2 INJECTION, POWDER, FOR SOLUTION INTRAVENOUS at 01:02

## 2018-02-03 RX ADMIN — AMLODIPINE BESYLATE 10 MG: 10 TABLET ORAL at 09:02

## 2018-02-03 RX ADMIN — PANTOPRAZOLE SODIUM 40 MG: 40 TABLET, DELAYED RELEASE ORAL at 05:02

## 2018-02-03 RX ADMIN — CARBIDOPA AND LEVODOPA 1 TABLET: 25; 100 TABLET ORAL at 08:02

## 2018-02-03 RX ADMIN — FOLIC ACID 1 MG: 1 TABLET ORAL at 09:02

## 2018-02-03 RX ADMIN — DOXAZOSIN MESYLATE 4 MG: 2 TABLET ORAL at 09:02

## 2018-02-03 RX ADMIN — HYDRALAZINE HYDROCHLORIDE 10 MG: 20 INJECTION INTRAMUSCULAR; INTRAVENOUS at 10:02

## 2018-02-03 RX ADMIN — ALLOPURINOL 300 MG: 300 TABLET ORAL at 09:02

## 2018-02-03 RX ADMIN — ISOSORBIDE DINITRATE 40 MG: 40 TABLET ORAL at 01:02

## 2018-02-03 RX ADMIN — LISINOPRIL 20 MG: 20 TABLET ORAL at 09:02

## 2018-02-03 RX ADMIN — DOCUSATE SODIUM 100 MG: 100 CAPSULE, LIQUID FILLED ORAL at 08:02

## 2018-02-03 RX ADMIN — CEFEPIME 2 G: 2 INJECTION, POWDER, FOR SOLUTION INTRAVENOUS at 12:02

## 2018-02-03 RX ADMIN — HYDRALAZINE HYDROCHLORIDE 100 MG: 50 TABLET ORAL at 05:02

## 2018-02-03 RX ADMIN — DOFETILIDE 250 MCG: 0.25 CAPSULE ORAL at 11:02

## 2018-02-03 NOTE — NURSING
CHANGED LVAD DRESSING TO mid abdomen.  STERILE TECHNIQUE MAINTAINED THROUGHOUT DRESSING CHANGED.  UPON REMOVAL OF OLD DRESSING, NOTED SOME SMALL AMOUNT RED  DRAINAGE ON GAUZE,  REDNESS NOTED TO DRIVE LINE EXIT SITE.  It is a 2

## 2018-02-03 NOTE — PROGRESS NOTES
Paged by Claudette to report speed are frequently jumping up and down from [id number], fixed speed is 5800.  Pt feels good.  Claudette reports she hasn't seen this much fluctuation before.  I explained we do see it in the HM 3 pts gracie if dry.  Confirmed no diuretics.  Discussed with Dr. Valladares, will repeat echo Monday.

## 2018-02-03 NOTE — PROGRESS NOTES
Heart Failure Progress Note  Attending Physician: Karen Valladares MD  Hospital Day: 11    Subjective:   Interval History: No events overnight. No events on telemetry    Medications:   Continuous Infusions:   heparin (porcine) in D5W 15 Units/kg/hr (02/02/18 1413)       Scheduled Meds:   allopurinol  300 mg Oral Daily    amLODIPine  10 mg Oral Daily    aspirin  81 mg Oral Daily    atorvastatin  10 mg Oral Daily    buPROPion  450 mg Oral Daily    carbidopa-levodopa  mg  1 tablet Oral Q4H While awake    ceFEPime (MAXIPIME) IVPB  2 g Intravenous Q12H    docusate sodium  100 mg Oral BID    dofetilide  250 mcg Oral Q12H    doxazosin  4 mg Oral Daily    folic acid  1 mg Oral Daily    hydrALAZINE  100 mg Oral Q8H    isosorbide dinitrate  40 mg Oral TID    Lactobacillus rhamnosus GG  1 capsule Oral Daily    lisinopril  20 mg Oral BID    magnesium oxide  400 mg Oral BID    pantoprazole  40 mg Oral BID AC    polyethylene glycol  17 g Oral Daily    warfarin  6 mg Oral Daily     PRN Meds:acetaminophen, docusate sodium, hydrALAZINE, omnipaque 300 iohexol, ondansetron, promethazine    ----------------------------------------------------------------------------------------------------------------------  Home medications:   No current facility-administered medications on file prior to encounter.      Current Outpatient Prescriptions on File Prior to Encounter   Medication Sig Dispense Refill    allopurinol (ZYLOPRIM) 300 MG tablet Take 300 mg by mouth once daily.      amLODIPine (NORVASC) 10 MG tablet Take 1 tablet (10 mg total) by mouth once daily. 30 tablet 11    aspirin 81 MG Chew Take 81 mg by mouth once daily.      atorvastatin (LIPITOR) 10 MG tablet Take 10 mg by mouth once daily.      buPROPion (WELLBUTRIN XL) 300 MG 24 hr tablet Take 1 tablet (300 mg total) by mouth once daily. (Patient taking differently: Take 450 mg by mouth once daily. ) 30 tablet 11    calcium-vitamin D tablet 600  "mg-200 units Take 1 tablet by mouth once daily.      docusate sodium (COLACE) 100 MG capsule Take 100 mg by mouth daily as needed for Constipation.      dofetilide (TIKOSYN) 250 MCG Cap Take 1 capsule (250 mcg total) by mouth every 12 (twelve) hours. 60 capsule 11    famotidine (PEPCID) 20 MG tablet Take 2 tablets (40 mg total) by mouth every evening. 60 tablet 11    ferrous sulfate 324 mg (65 mg iron) TbEC Take 1 tablet (324 mg total) by mouth once daily. 30 tablet 11    folic acid (FOLVITE) 1 MG tablet Take 1 mg by mouth once daily.      furosemide (LASIX) 80 MG tablet Take orally daily on Monday, Wednesday and Friday as directed 30 tablet 11    hydrALAZINE (APRESOLINE) 100 MG tablet Take 1 tablet (100 mg total) by mouth every 8 (eight) hours. 90 tablet 11    isosorbide dinitrate (ISORDIL) 20 MG tablet Take 40 mg by mouth 3 (three) times daily.   11    Lactobacillus rhamnosus GG (CULTURELLE) 10 billion cell capsule Take 1 capsule by mouth once daily.      lisinopril (PRINIVIL,ZESTRIL) 20 MG tablet Take 1 tablet (20 mg total) by mouth 2 (two) times daily. 180 tablet 3    magnesium oxide (MAG-OX) 400 mg tablet Take 1 tablet (400 mg total) by mouth 2 (two) times daily. 60 tablet 11    multivitamin capsule Take 1 capsule by mouth once daily.      promethazine (PHENERGAN) 12.5 MG Tab Take 1 tablet (12.5 mg total) by mouth every 6 (six) hours as needed. 30 tablet 3    rOPINIRole (REQUIP) 0.5 MG tablet Take 0.5 mg by mouth 3 (three) times daily.      warfarin (COUMADIN) 4 MG tablet Take 4 mg orally on Sun, Tue, Thurs and 6 mg orally on other days as directed by coumadin clinic  11    warfarin (COUMADIN) 6 MG tablet   11         Objective:     Vitals:  Temp:  [96.6 °F (35.9 °C)-99.2 °F (37.3 °C)]   Pulse:  [62-86]   Resp:  [16-20]   BP: ()/(0-82)   SpO2:  [93 %-96 %]     BP (!) 84/0 (BP Location: Left arm, Patient Position: Lying)   Pulse 75   Temp 99 °F (37.2 °C) (Oral)   Resp 18   Ht 5' 10" " (1.778 m)   Wt 79.5 kg (175 lb 4.3 oz)   SpO2 96%   BMI 25.15 kg/m²  I/O's:    Intake/Output Summary (Last 24 hours) at 02/03/18 0732  Last data filed at 02/03/18 0600   Gross per 24 hour   Intake           791.08 ml   Output                0 ml   Net           791.08 ml       Wt Readings from Last 10 Encounters:   02/02/18 79.5 kg (175 lb 4.3 oz)   01/18/18 82.1 kg (181 lb)   01/05/18 90.3 kg (199 lb)   12/29/17 90.3 kg (199 lb)   12/28/17 90.3 kg (199 lb)   12/15/17 90.2 kg (198 lb 13.7 oz)   12/14/17 86.2 kg (190 lb)   12/14/17 89.1 kg (196 lb 6.9 oz)   12/04/17 85.9 kg (189 lb 6 oz)   11/29/17 96.2 kg (212 lb 1.3 oz)        General Appearance:    Alert, cooperative, no distress, talkin   Lungs:     Clear to auscultation bilaterally, respirations unlabored    Heart:    JVP 9 cm H2O, Regular rate and rhythm, normal hum of LVAD   Abdomen:     Soft, non-tender, bowel sounds active, no masses, no organomegaly   Extremities:   no edema b/l, pulses +1 b/l       Labs:       Recent Labs  Lab 02/01/18  0515 02/02/18  0501 02/03/18  0349    141 139   K 3.8 4.0 4.2    109 107   CO2 24 24 25   BUN 20 20 21   CREATININE 1.6* 1.7* 1.6*   * 91 91   ANIONGAP 8 8 7*       Recent Labs  Lab 01/29/18  0446  01/31/18  0443  02/02/18  0501 02/03/18  0349   AST 26  --  19  --  18  --    ALT 16  --  13  --  <5*  --    ALKPHOS 77  --  73  --  68  --    BILITOT 0.6  --  0.4  --  0.3  --    BILIDIR 0.4*  --  0.3  --  0.2  --    ALBUMIN 3.3*  --  3.2*  --  2.9*  --      < > 238  < > 164 186   < > = values in this interval not displayed.  No results for input(s): PH, PCO2, PO2, HCO3, POCSATURATED in the last 168 hours.     Recent Labs  Lab 02/01/18  0515 02/02/18  0501 02/03/18  0349   WBC 6.28 7.07 5.70   HGB 10.4* 10.3* 10.0*   HCT 32.1* 31.5* 30.7*   * 120* 124*   GRAN 66.5  4.2 69.1  4.9 67.9  3.9       Recent Labs  Lab 02/01/18  0515 02/02/18  0501 02/03/18  0349   INR 1.1 1.2 1.4*       Recent  Labs  Lab 01/29/18  0446 01/31/18  0443 02/02/18  0501   * 251* 86      Micro:   Blood Cultures  Lab Results   Component Value Date    LABBLOO No growth after 5 days. 01/24/2018    LABBLOO No growth after 5 days. 01/24/2018    LABBLOO No growth after 5 days. 08/30/2017    LABBLOO No growth after 5 days. 07/28/2017    LABBLOO No growth after 5 days. 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 BiV ICD, HILLARY/BiPAP, HTN, CKD, HLP, gout and depression who presents with confusion.        Confusion     - CT of head 1/24/18 with no acute changes  - likely related to newly dx Parkinson's Disease  - started Sinemet per Neuro's rec and patient will f/u in the [facility] as an outpatient  - D/C'd Ropinirole again 2/2 gait disturbance  - Delirium protocol initiated       Gait instability     - PT/OT  - See confusion above       LVAD (left ventricular assist device) present     - S/P HM III 10/16/16 as DT in [place]  - Current speed 5800  - TTE 1/25/18 shows LVEDD 6.5, LVEF 10-15%, diastolic dysfunction, RVE, severely depressed RV systolic function, PAS 20, ARTHUR intermittently and septum midline. No comment on IVC - per Dr. Lanza, costal views were too poor to assess IVC.   - INR 1.4 today, Continue Heparin bridge and  Coumadin with goal INR 2.0-3.0  - continue current coumadin dose at 6 mg  - LDH stable at 186  - Continue IV Cefepime for chronic Pseudomonas DL infection  - D/C home with his sitters/family once INR  therapeutic  - echo with color flow doppler ordered       Essential hypertension     - He has a long h/o orthostatic hypotension, so BP's should only be checked while sitting or standing  - Goal MAP ~ 90 or less  - Continue Norvasc, Hydralazine, Isordil, Lisinopril and Doxazosin       Stage 3 chronic kidney disease     - Admit creatinine 1.6 (baseline looks ~ 1.6-1.8)       VT (ventricular tachycardia)     - Continue Tikosyn  - Has [employer] BiV ICD  - Keep K+ > 4.0 and Mg+ > 2.0      * Vomiting     - resolved         Signed:  Clotilde Early MD  Cardiology Hospitalist  Pager: [phone]  2/3/2018 7:32 AM

## 2018-02-03 NOTE — PROGRESS NOTES
Dop: 88; MAP: 97. Notified MD Sarah. Will give 0600 BP meds and 5mg IV hydralazine per MD order. NAD, WCTM.

## 2018-02-03 NOTE — PROGRESS NOTES
02/03/18 1200   Device   Type HeartMate3   Flow 4.4   Speed 5900   PI 3.8   Power (Mendez) 4.1   LSL 5400   notified dr. HIENS W/HTS SERVICE W/ PT'S SPEED FLUCTUATING FROM 5350 TO 5900.  DR. HINES CAME TO PT'S BEDSIDE.  ALSO NOTIFIED SHEEBA THE LVAD COORDINATOR - NO ORDERS GIVEN- PT'S SPEED IS SET AT 5800 AND LOW SPEED LIMIT 5400.      @1315 PT'S SPEED IS CONSTANT AT [phone] WHILE GIVEN MEDS AND TALKING TO FAMILY

## 2018-02-03 NOTE — PLAN OF CARE
Problem: Patient Care Overview  Goal: Plan of Care Review  Outcome: Ongoing (interventions implemented as appropriate)  Updated care plan w/ pt and sitter.  Address all concerns throughout shift.  Pt continues on a heparin infusion.  No issues w/ lvad today musical sound present.  Pt/ot is following pt.  No skin breakdown. No falls during shift.  Pt continue on cefepime q12.

## 2018-02-03 NOTE — PLAN OF CARE
Problem: Patient Care Overview  Goal: Plan of Care Review  Outcome: Ongoing (interventions implemented as appropriate)  Ptfree of falls and injury throguhout the shift. Skin is CDI with no breakdown noted; VSS, NAD. LVAD working properly throughout the shift with no complications. DLES dressing is CDI and due to be changed everyday with soap and water. Heparin gtt infused throughout the shift per order. IV abx administered per order. Pt completely AAOx4 throughout the night. POC reviewed and questions answered. Pt tolerating plan of care.

## 2018-02-03 NOTE — PLAN OF CARE
Problem: Patient Care Overview  Goal: Plan of Care Review  Outcome: Ongoing (interventions implemented as appropriate)  Updated care plan w/ pt and sitter.  Address all concerns throughout shift.  Pt continues on a heparin infusion, INR 1.4. EXPERIENCE 1 EPISODE W/ LVAD- THE SPEED WAS FLUCTUATING FROM [id number]- LVAD COORDINATOR AND TEAM NOTIFIED.  musical sound present.  Pt/ot is following pt.  No skin breakdown. No falls during shift.  Pt continue on cefepime q12.

## 2018-02-04 LAB
ANION GAP SERPL CALC-SCNC: 7 MMOL/L
BASOPHILS # BLD AUTO: 0.04 K/UL
BASOPHILS NFR BLD: 0.7 %
BUN SERPL-MCNC: 19 MG/DL
CALCIUM SERPL-MCNC: 10.5 MG/DL
CHLORIDE SERPL-SCNC: 107 MMOL/L
CO2 SERPL-SCNC: 26 MMOL/L
CREAT SERPL-MCNC: 1.7 MG/DL
DIASTOLIC DYSFUNCTION: YES
DIFFERENTIAL METHOD: ABNORMAL
EOSINOPHIL # BLD AUTO: 0.2 K/UL
EOSINOPHIL NFR BLD: 2.5 %
ERYTHROCYTE [DISTWIDTH] IN BLOOD BY AUTOMATED COUNT: 18.4 %
EST. GFR  (AFRICAN AMERICAN): 44.6 ML/MIN/1.73 M^2
EST. GFR  (NON AFRICAN AMERICAN): 38.6 ML/MIN/1.73 M^2
ESTIMATED PA SYSTOLIC PRESSURE: 23.79
FACT X PPP CHRO-ACNC: 0.58 IU/ML
GLUCOSE SERPL-MCNC: 82 MG/DL
HCT VFR BLD AUTO: 30.8 %
HGB BLD-MCNC: 10.3 G/DL
IMM GRANULOCYTES # BLD AUTO: 0.02 K/UL
IMM GRANULOCYTES NFR BLD AUTO: 0.3 %
INR PPP: 1.8
LDH SERPL L TO P-CCNC: 173 U/L
LYMPHOCYTES # BLD AUTO: 0.9 K/UL
LYMPHOCYTES NFR BLD: 15.6 %
MAGNESIUM SERPL-MCNC: 2.2 MG/DL
MCH RBC QN AUTO: 29.3 PG
MCHC RBC AUTO-ENTMCNC: 33.4 G/DL
MCV RBC AUTO: 88 FL
MITRAL VALVE MOBILITY: NORMAL
MONOCYTES # BLD AUTO: 0.8 K/UL
MONOCYTES NFR BLD: 13.5 %
NEUTROPHILS # BLD AUTO: 4.1 K/UL
NEUTROPHILS NFR BLD: 67.4 %
NRBC BLD-RTO: 0 /100 WBC
PLATELET # BLD AUTO: 111 K/UL
PMV BLD AUTO: 12.6 FL
POTASSIUM SERPL-SCNC: 3.8 MMOL/L
PROTHROMBIN TIME: 17.9 SEC
RBC # BLD AUTO: 3.51 M/UL
RETIRED EF AND QEF - SEE NOTES: 25 (ref 55–65)
SODIUM SERPL-SCNC: 140 MMOL/L
TRICUSPID VALVE REGURGITATION: ABNORMAL
WBC # BLD AUTO: 6.02 K/UL

## 2018-02-04 PROCEDURE — 25000003 PHARM REV CODE 250: Performed by: PHYSICIAN ASSISTANT

## 2018-02-04 PROCEDURE — 63600175 PHARM REV CODE 636 W HCPCS: Performed by: INTERNAL MEDICINE

## 2018-02-04 PROCEDURE — 85520 HEPARIN ASSAY: CPT

## 2018-02-04 PROCEDURE — 85025 COMPLETE CBC W/AUTO DIFF WBC: CPT

## 2018-02-04 PROCEDURE — 20600001 HC STEP DOWN PRIVATE ROOM

## 2018-02-04 PROCEDURE — 80048 BASIC METABOLIC PNL TOTAL CA: CPT

## 2018-02-04 PROCEDURE — 25000003 PHARM REV CODE 250: Performed by: INTERNAL MEDICINE

## 2018-02-04 PROCEDURE — 83735 ASSAY OF MAGNESIUM: CPT

## 2018-02-04 PROCEDURE — 25000003 PHARM REV CODE 250: Performed by: NURSE PRACTITIONER

## 2018-02-04 PROCEDURE — 93306 TTE W/DOPPLER COMPLETE: CPT | Mod: 26,,, | Performed by: INTERNAL MEDICINE

## 2018-02-04 PROCEDURE — 83615 LACTATE (LD) (LDH) ENZYME: CPT

## 2018-02-04 PROCEDURE — 85610 PROTHROMBIN TIME: CPT

## 2018-02-04 PROCEDURE — 99232 SBSQ HOSP IP/OBS MODERATE 35: CPT | Mod: ,,, | Performed by: INTERNAL MEDICINE

## 2018-02-04 PROCEDURE — 27000248 HC VAD-ADDITIONAL DAY

## 2018-02-04 PROCEDURE — 93306 TTE W/DOPPLER COMPLETE: CPT

## 2018-02-04 RX ORDER — DOXAZOSIN 2 MG/1
4 TABLET ORAL ONCE
Status: COMPLETED | OUTPATIENT
Start: 2018-02-04 | End: 2018-02-04

## 2018-02-04 RX ORDER — DOXAZOSIN 2 MG/1
8 TABLET ORAL DAILY
Status: DISCONTINUED | OUTPATIENT
Start: 2018-02-05 | End: 2018-02-07 | Stop reason: HOSPADM

## 2018-02-04 RX ADMIN — MAGNESIUM OXIDE TAB 400 MG (241.3 MG ELEMENTAL MG) 400 MG: 400 (241.3 MG) TAB at 09:02

## 2018-02-04 RX ADMIN — ISOSORBIDE DINITRATE 40 MG: 40 TABLET ORAL at 05:02

## 2018-02-04 RX ADMIN — WARFARIN SODIUM 6 MG: 2 TABLET ORAL at 05:02

## 2018-02-04 RX ADMIN — HYDRALAZINE HYDROCHLORIDE 100 MG: 50 TABLET ORAL at 05:02

## 2018-02-04 RX ADMIN — Medication 1 CAPSULE: at 09:02

## 2018-02-04 RX ADMIN — ALLOPURINOL 300 MG: 300 TABLET ORAL at 09:02

## 2018-02-04 RX ADMIN — MAGNESIUM OXIDE TAB 400 MG (241.3 MG ELEMENTAL MG) 400 MG: 400 (241.3 MG) TAB at 08:02

## 2018-02-04 RX ADMIN — CARBIDOPA AND LEVODOPA 1 TABLET: 25; 100 TABLET ORAL at 01:02

## 2018-02-04 RX ADMIN — HYDRALAZINE HYDROCHLORIDE 100 MG: 50 TABLET ORAL at 08:02

## 2018-02-04 RX ADMIN — CARBIDOPA AND LEVODOPA 1 TABLET: 25; 100 TABLET ORAL at 08:02

## 2018-02-04 RX ADMIN — LISINOPRIL 20 MG: 20 TABLET ORAL at 09:02

## 2018-02-04 RX ADMIN — ATORVASTATIN CALCIUM 10 MG: 10 TABLET, FILM COATED ORAL at 09:02

## 2018-02-04 RX ADMIN — CARBIDOPA AND LEVODOPA 1 TABLET: 25; 100 TABLET ORAL at 05:02

## 2018-02-04 RX ADMIN — DOXAZOSIN MESYLATE 4 MG: 2 TABLET ORAL at 09:02

## 2018-02-04 RX ADMIN — CEFEPIME 2 G: 2 INJECTION, POWDER, FOR SOLUTION INTRAVENOUS at 01:02

## 2018-02-04 RX ADMIN — FOLIC ACID 1 MG: 1 TABLET ORAL at 09:02

## 2018-02-04 RX ADMIN — LISINOPRIL 20 MG: 20 TABLET ORAL at 08:02

## 2018-02-04 RX ADMIN — ASPIRIN 81 MG CHEWABLE TABLET 81 MG: 81 TABLET CHEWABLE at 09:02

## 2018-02-04 RX ADMIN — AMLODIPINE BESYLATE 10 MG: 10 TABLET ORAL at 09:02

## 2018-02-04 RX ADMIN — PANTOPRAZOLE SODIUM 40 MG: 40 TABLET, DELAYED RELEASE ORAL at 05:02

## 2018-02-04 RX ADMIN — HEPARIN SODIUM 15 UNITS/KG/HR: 10000 INJECTION, SOLUTION INTRAVENOUS at 09:02

## 2018-02-04 RX ADMIN — DOFETILIDE 250 MCG: 0.25 CAPSULE ORAL at 08:02

## 2018-02-04 RX ADMIN — HYDRALAZINE HYDROCHLORIDE 100 MG: 50 TABLET ORAL at 01:02

## 2018-02-04 RX ADMIN — ISOSORBIDE DINITRATE 40 MG: 40 TABLET ORAL at 01:02

## 2018-02-04 RX ADMIN — HYDRALAZINE HYDROCHLORIDE 10 MG: 20 INJECTION INTRAMUSCULAR; INTRAVENOUS at 06:02

## 2018-02-04 RX ADMIN — DOXAZOSIN MESYLATE 4 MG: 2 TABLET ORAL at 01:02

## 2018-02-04 RX ADMIN — CARBIDOPA AND LEVODOPA 1 TABLET: 25; 100 TABLET ORAL at 09:02

## 2018-02-04 RX ADMIN — ISOSORBIDE DINITRATE 40 MG: 40 TABLET ORAL at 08:02

## 2018-02-04 RX ADMIN — DOCUSATE SODIUM 100 MG: 100 CAPSULE, LIQUID FILLED ORAL at 09:02

## 2018-02-04 RX ADMIN — BUPROPION HYDROCHLORIDE 450 MG: 150 TABLET, EXTENDED RELEASE ORAL at 09:02

## 2018-02-04 RX ADMIN — DOFETILIDE 250 MCG: 0.25 CAPSULE ORAL at 09:02

## 2018-02-04 NOTE — PROGRESS NOTES
"LVAD alarmed, but numbers on PM are WNL, and when looking at history, "no active alarms" noted on screen. The LVAD did make a loud sound, but nothing clinically to show what could have caused this. Pt in NAD. Notified LVAD coordinator (Mignon). Told to monitor and if this happens again to call back.   "

## 2018-02-04 NOTE — PLAN OF CARE
Problem: Patient Care Overview  Goal: Plan of Care Review  Outcome: Ongoing (interventions implemented as appropriate)  Updated care plan w/ pt and sitter.  Address all concerns throughout shift.  Pt continues on a heparin infusion, INR 1.8. no issues w./lvad..   musical sound present.  Pt/ot is following pt.  No skin breakdown. No falls during shift.  Pt continue on cefepime q12.  also doxazosin was increase today

## 2018-02-04 NOTE — PROGRESS NOTES
and doppler 100. Gave scheduled 100mg PO hydralazine and 40mg PO isordil. Will reassess BP before giving IV PRN hydralazine. Pt in NAD, WCTM.     0616: ; will give PRN 10 mg IV hydralazine. Pt in NAD. WCTM.

## 2018-02-04 NOTE — PROGRESS NOTES
Heart Failure Progress Note  Attending Physician: Karen Valladares MD  Hospital Day: 12    Subjective:   Interval History: No complaints overnight.  No VT on telemetry.  MAPs elevated to 90, 91, 102 overnight.      Medications:   Continuous Infusions:   heparin (porcine) in D5W 15 Units/kg/hr (02/03/18 1154)       Scheduled Meds:   allopurinol  300 mg Oral Daily    amLODIPine  10 mg Oral Daily    aspirin  81 mg Oral Daily    atorvastatin  10 mg Oral Daily    buPROPion  450 mg Oral Daily    carbidopa-levodopa  mg  1 tablet Oral Q4H While awake    ceFEPime (MAXIPIME) IVPB  2 g Intravenous Q12H    docusate sodium  100 mg Oral BID    dofetilide  250 mcg Oral Q12H    doxazosin  4 mg Oral Daily    folic acid  1 mg Oral Daily    hydrALAZINE  100 mg Oral Q8H    isosorbide dinitrate  40 mg Oral TID    Lactobacillus rhamnosus GG  1 capsule Oral Daily    lisinopril  20 mg Oral BID    magnesium oxide  400 mg Oral BID    pantoprazole  40 mg Oral BID AC    polyethylene glycol  17 g Oral Daily    warfarin  6 mg Oral Daily     PRN Meds:acetaminophen, docusate sodium, hydrALAZINE, omnipaque 300 iohexol, ondansetron, promethazine    ----------------------------------------------------------------------------------------------------------------------  Home medications:   No current facility-administered medications on file prior to encounter.      Current Outpatient Prescriptions on File Prior to Encounter   Medication Sig Dispense Refill    allopurinol (ZYLOPRIM) 300 MG tablet Take 300 mg by mouth once daily.      amLODIPine (NORVASC) 10 MG tablet Take 1 tablet (10 mg total) by mouth once daily. 30 tablet 11    aspirin 81 MG Chew Take 81 mg by mouth once daily.      atorvastatin (LIPITOR) 10 MG tablet Take 10 mg by mouth once daily.      buPROPion (WELLBUTRIN XL) 300 MG 24 hr tablet Take 1 tablet (300 mg total) by mouth once daily. (Patient taking differently: Take 450 mg by mouth once daily. ) 30  "tablet 11    calcium-vitamin D tablet 600 mg-200 units Take 1 tablet by mouth once daily.      docusate sodium (COLACE) 100 MG capsule Take 100 mg by mouth daily as needed for Constipation.      dofetilide (TIKOSYN) 250 MCG Cap Take 1 capsule (250 mcg total) by mouth every 12 (twelve) hours. 60 capsule 11    famotidine (PEPCID) 20 MG tablet Take 2 tablets (40 mg total) by mouth every evening. 60 tablet 11    ferrous sulfate 324 mg (65 mg iron) TbEC Take 1 tablet (324 mg total) by mouth once daily. 30 tablet 11    folic acid (FOLVITE) 1 MG tablet Take 1 mg by mouth once daily.      furosemide (LASIX) 80 MG tablet Take orally daily on Monday, Wednesday and Friday as directed 30 tablet 11    hydrALAZINE (APRESOLINE) 100 MG tablet Take 1 tablet (100 mg total) by mouth every 8 (eight) hours. 90 tablet 11    isosorbide dinitrate (ISORDIL) 20 MG tablet Take 40 mg by mouth 3 (three) times daily.   11    Lactobacillus rhamnosus GG (CULTURELLE) 10 billion cell capsule Take 1 capsule by mouth once daily.      lisinopril (PRINIVIL,ZESTRIL) 20 MG tablet Take 1 tablet (20 mg total) by mouth 2 (two) times daily. 180 tablet 3    magnesium oxide (MAG-OX) 400 mg tablet Take 1 tablet (400 mg total) by mouth 2 (two) times daily. 60 tablet 11    multivitamin capsule Take 1 capsule by mouth once daily.      promethazine (PHENERGAN) 12.5 MG Tab Take 1 tablet (12.5 mg total) by mouth every 6 (six) hours as needed. 30 tablet 3    rOPINIRole (REQUIP) 0.5 MG tablet Take 0.5 mg by mouth 3 (three) times daily.      warfarin (COUMADIN) 4 MG tablet Take 4 mg orally on Sun, Tue, Thurs and 6 mg orally on other days as directed by coumadin clinic  11    warfarin (COUMADIN) 6 MG tablet   11         Objective:     Vitals:  Temp:  [97.2 °F (36.2 °C)-99.1 °F (37.3 °C)]   Pulse:  [64-81]   Resp:  [15-16]   BP: ()/(0-98)   SpO2:  [94 %-97 %]     BP (!) 117/93   Pulse 81   Temp 98.9 °F (37.2 °C) (Oral)   Resp 16   Ht 5' 10" " (1.778 m)   Wt 81.7 kg (180 lb 1.9 oz)   SpO2 95%   BMI 25.84 kg/m²  I/O's:    Intake/Output Summary (Last 24 hours) at 02/04/18 0850  Last data filed at 02/03/18 2300   Gross per 24 hour   Intake              810 ml   Output                0 ml   Net              810 ml       Wt Readings from Last 10 Encounters:   02/03/18 81.7 kg (180 lb 1.9 oz)   01/18/18 82.1 kg (181 lb)   01/05/18 90.3 kg (199 lb)   12/29/17 90.3 kg (199 lb)   12/28/17 90.3 kg (199 lb)   12/15/17 90.2 kg (198 lb 13.7 oz)   12/14/17 86.2 kg (190 lb)   12/14/17 89.1 kg (196 lb 6.9 oz)   12/04/17 85.9 kg (189 lb 6 oz)   11/29/17 96.2 kg (212 lb 1.3 oz)        General Appearance:    Alert, cooperative, no distress, talking   Lungs:     Clear to auscultation bilaterally, respirations unlabored    Heart:    No JVP, Regular rate and rhythm, normal hum of LVAD   Abdomen:     Soft, non-tender, bowel sounds active, no masses, no organomegaly   Extremities:   no edema b/l, pulses +1 b/l     Labs:       Recent Labs  Lab 02/02/18  0501 02/03/18  0349 02/04/18  0523    139 140   K 4.0 4.2 3.8    107 107   CO2 24 25 26   BUN 20 21 19   CREATININE 1.7* 1.6* 1.7*   GLU 91 91 82   ANIONGAP 8 7* 7*       Recent Labs  Lab 01/29/18  0446  01/31/18  0443  02/02/18  0501  02/04/18  0523   AST 26  --  19  --  18  --   --    ALT 16  --  13  --  <5*  --   --    ALKPHOS 77  --  73  --  68  --   --    BILITOT 0.6  --  0.4  --  0.3  --   --    BILIDIR 0.4*  --  0.3  --  0.2  --   --    ALBUMIN 3.3*  --  3.2*  --  2.9*  --   --      < > 238  < > 164  < > 173   < > = values in this interval not displayed.  No results for input(s): PH, PCO2, PO2, HCO3, POCSATURATED in the last 168 hours.     Recent Labs  Lab 02/02/18  0501 02/03/18  0349 02/04/18  0523   WBC 7.07 5.70 6.02   HGB 10.3* 10.0* 10.3*   HCT 31.5* 30.7* 30.8*   * 124* 111*   GRAN 69.1  4.9 67.9  3.9 67.4  4.1       Recent Labs  Lab 02/02/18  0501 02/03/18  0349 02/04/18  0523   INR  1.2 1.4* 1.8*       Recent Labs  Lab 01/29/18  0446 01/31/18  0443 02/02/18  0501   * 251* 86      Micro:   Blood Cultures  Lab Results   Component Value Date    LABBLOO No growth after 5 days. 01/24/2018    LABBLOO No growth after 5 days. 01/24/2018    LABBLOO No growth after 5 days. 08/30/2017    LABBLOO No growth after 5 days. 07/28/2017    LABBLOO No growth after 5 days. 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 BiV ICD, HILLARY/BiPAP, HTN, CKD, HLP, gout and depression who presents with confusion.             Confusion     - CT of head 1/24/18 with no acute changes  - likely related to newly dx Parkinson's Disease  - started Sinemet per Neuro's rec and patient will f/u in the [facility] as an outpatient  - D/C'd Ropinirole again 2/2 gait disturbance  - Delirium protocol initiated       Gait instability     - PT/OT  - See confusion above       LVAD (left ventricular assist device) present     - S/P HM III 10/16/16 as DT in [place]  - Current speed 5800  - TTE 1/25/18 shows LVEDD 6.5, LVEF 10-15%, diastolic dysfunction, RVE, severely depressed RV systolic function, PAS 20, ARTHUR intermittently and septum midline. No comment on IVC - per Dr. Lanza, costal views were too poor to assess IVC.   - INR 1.8 today, Continue Heparin bridge and  Coumadin with goal INR 2.0-3.0  - return to home dose of coumadin tonight, dose at 4 mg  - LDH stable at 186  - Continue IV Cefepime for chronic Pseudomonas DL infection  - D/C home  with his sitters/family once INR therapeutic  - echo with color flow doppler ordered       Essential hypertension     - He has a long h/o orthostatic hypotension, so BP's should only be checked while sitting or standing  - Goal MAP ~ 90 or less  - Continue Norvasc, Hydralazine, Isordil, Lisinopril and Doxazosin  - All BP meds are maximized except doxazosin  - Increase doxazosin to 8 mg daily       Stage 3 chronic kidney disease     - Cr 1.7  (baseline looks ~ 1.6-1.8)       VT (ventricular tachycardia)     - Continue Tikosyn  - Has [employer] BiV ICD  - Keep K+ > 4.0 and Mg+ > 2.0       * Vomiting     - resolved            Signed:  Clotilde Early MD  Cardiology Hospitalist  Pager: [phone]  2/4/2018 8:50 AM

## 2018-02-05 PROBLEM — L08.9 WOUND INFECTION: Status: ACTIVE | Noted: 2018-02-05

## 2018-02-05 PROBLEM — T14.8XXA WOUND INFECTION: Status: ACTIVE | Noted: 2018-02-05

## 2018-02-05 LAB
ALBUMIN SERPL BCP-MCNC: 3 G/DL
ALP SERPL-CCNC: 70 U/L
ALT SERPL W/O P-5'-P-CCNC: 8 U/L
ANION GAP SERPL CALC-SCNC: 6 MMOL/L
AST SERPL-CCNC: 23 U/L
BASOPHILS # BLD AUTO: 0.02 K/UL
BASOPHILS NFR BLD: 0.4 %
BILIRUB DIRECT SERPL-MCNC: 0.2 MG/DL
BILIRUB SERPL-MCNC: 0.3 MG/DL
BNP SERPL-MCNC: 157 PG/ML
BUN SERPL-MCNC: 22 MG/DL
CALCIUM SERPL-MCNC: 10.5 MG/DL
CHLORIDE SERPL-SCNC: 107 MMOL/L
CO2 SERPL-SCNC: 27 MMOL/L
CREAT SERPL-MCNC: 1.9 MG/DL
CRP SERPL-MCNC: 0.8 MG/L
DIFFERENTIAL METHOD: ABNORMAL
EOSINOPHIL # BLD AUTO: 0.2 K/UL
EOSINOPHIL NFR BLD: 2.8 %
ERYTHROCYTE [DISTWIDTH] IN BLOOD BY AUTOMATED COUNT: 18.5 %
EST. GFR  (AFRICAN AMERICAN): 39 ML/MIN/1.73 M^2
EST. GFR  (NON AFRICAN AMERICAN): 33.7 ML/MIN/1.73 M^2
FACT X PPP CHRO-ACNC: 0.54 IU/ML
GLUCOSE SERPL-MCNC: 86 MG/DL
HCT VFR BLD AUTO: 30 %
HGB BLD-MCNC: 10.1 G/DL
IMM GRANULOCYTES # BLD AUTO: 0.02 K/UL
IMM GRANULOCYTES NFR BLD AUTO: 0.4 %
INR PPP: 2.2
LDH SERPL L TO P-CCNC: 178 U/L
LYMPHOCYTES # BLD AUTO: 0.7 K/UL
LYMPHOCYTES NFR BLD: 12.9 %
MAGNESIUM SERPL-MCNC: 2.4 MG/DL
MCH RBC QN AUTO: 29.6 PG
MCHC RBC AUTO-ENTMCNC: 33.7 G/DL
MCV RBC AUTO: 88 FL
MONOCYTES # BLD AUTO: 0.7 K/UL
MONOCYTES NFR BLD: 12.9 %
NEUTROPHILS # BLD AUTO: 4 K/UL
NEUTROPHILS NFR BLD: 70.6 %
NRBC BLD-RTO: 0 /100 WBC
PLATELET # BLD AUTO: 116 K/UL
PMV BLD AUTO: 12.1 FL
POTASSIUM SERPL-SCNC: 4 MMOL/L
PREALB SERPL-MCNC: 26 MG/DL
PROT SERPL-MCNC: 6.3 G/DL
PROTHROMBIN TIME: 20.9 SEC
RBC # BLD AUTO: 3.41 M/UL
SODIUM SERPL-SCNC: 140 MMOL/L
WBC # BLD AUTO: 5.66 K/UL

## 2018-02-05 PROCEDURE — 85610 PROTHROMBIN TIME: CPT

## 2018-02-05 PROCEDURE — C1751 CATH, INF, PER/CENT/MIDLINE: HCPCS

## 2018-02-05 PROCEDURE — 99223 1ST HOSP IP/OBS HIGH 75: CPT | Mod: ,,, | Performed by: INTERNAL MEDICINE

## 2018-02-05 PROCEDURE — 87070 CULTURE OTHR SPECIMN AEROBIC: CPT

## 2018-02-05 PROCEDURE — 83880 ASSAY OF NATRIURETIC PEPTIDE: CPT

## 2018-02-05 PROCEDURE — 76937 US GUIDE VASCULAR ACCESS: CPT

## 2018-02-05 PROCEDURE — 87077 CULTURE AEROBIC IDENTIFY: CPT

## 2018-02-05 PROCEDURE — 83615 LACTATE (LD) (LDH) ENZYME: CPT

## 2018-02-05 PROCEDURE — 36569 INSJ PICC 5 YR+ W/O IMAGING: CPT

## 2018-02-05 PROCEDURE — 63600175 PHARM REV CODE 636 W HCPCS: Performed by: INTERNAL MEDICINE

## 2018-02-05 PROCEDURE — 85025 COMPLETE CBC W/AUTO DIFF WBC: CPT

## 2018-02-05 PROCEDURE — 99499 UNLISTED E&M SERVICE: CPT | Mod: ,,, | Performed by: PHYSICIAN ASSISTANT

## 2018-02-05 PROCEDURE — 87075 CULTR BACTERIA EXCEPT BLOOD: CPT

## 2018-02-05 PROCEDURE — 97535 SELF CARE MNGMENT TRAINING: CPT

## 2018-02-05 PROCEDURE — 02HV33Z INSERTION OF INFUSION DEVICE INTO SUPERIOR VENA CAVA, PERCUTANEOUS APPROACH: ICD-10-PCS | Performed by: INTERNAL MEDICINE

## 2018-02-05 PROCEDURE — 25000003 PHARM REV CODE 250: Performed by: NURSE PRACTITIONER

## 2018-02-05 PROCEDURE — 83735 ASSAY OF MAGNESIUM: CPT

## 2018-02-05 PROCEDURE — 97116 GAIT TRAINING THERAPY: CPT

## 2018-02-05 PROCEDURE — 87186 SC STD MICRODIL/AGAR DIL: CPT

## 2018-02-05 PROCEDURE — 97530 THERAPEUTIC ACTIVITIES: CPT

## 2018-02-05 PROCEDURE — 80048 BASIC METABOLIC PNL TOTAL CA: CPT

## 2018-02-05 PROCEDURE — 25000003 PHARM REV CODE 250: Performed by: PHYSICIAN ASSISTANT

## 2018-02-05 PROCEDURE — 25000003 PHARM REV CODE 250: Performed by: INTERNAL MEDICINE

## 2018-02-05 PROCEDURE — 27000248 HC VAD-ADDITIONAL DAY

## 2018-02-05 PROCEDURE — 92526 ORAL FUNCTION THERAPY: CPT

## 2018-02-05 PROCEDURE — 86140 C-REACTIVE PROTEIN: CPT

## 2018-02-05 PROCEDURE — 85520 HEPARIN ASSAY: CPT

## 2018-02-05 PROCEDURE — 20600001 HC STEP DOWN PRIVATE ROOM

## 2018-02-05 PROCEDURE — 84134 ASSAY OF PREALBUMIN: CPT

## 2018-02-05 PROCEDURE — 99232 SBSQ HOSP IP/OBS MODERATE 35: CPT | Mod: ,,, | Performed by: INTERNAL MEDICINE

## 2018-02-05 PROCEDURE — 80076 HEPATIC FUNCTION PANEL: CPT

## 2018-02-05 PROCEDURE — 87205 SMEAR GRAM STAIN: CPT

## 2018-02-05 RX ORDER — TERAZOSIN 2 MG/1
10 CAPSULE ORAL NIGHTLY
Qty: 150 CAPSULE | Refills: 11 | Status: SHIPPED | OUTPATIENT
Start: 2018-02-05 | End: 2018-02-05 | Stop reason: ALTCHOICE

## 2018-02-05 RX ORDER — DOXAZOSIN 8 MG/1
8 TABLET ORAL DAILY
Qty: 30 TABLET | Refills: 11 | Status: SHIPPED | OUTPATIENT
Start: 2018-02-06 | End: 2019-02-06

## 2018-02-05 RX ORDER — HYDRALAZINE HYDROCHLORIDE 20 MG/ML
10 INJECTION INTRAMUSCULAR; INTRAVENOUS EVERY 6 HOURS PRN
Status: DISCONTINUED | OUTPATIENT
Start: 2018-02-05 | End: 2018-02-06

## 2018-02-05 RX ADMIN — LISINOPRIL 20 MG: 20 TABLET ORAL at 09:02

## 2018-02-05 RX ADMIN — CARBIDOPA AND LEVODOPA 1 TABLET: 25; 100 TABLET ORAL at 01:02

## 2018-02-05 RX ADMIN — CEFEPIME 2 G: 2 INJECTION, POWDER, FOR SOLUTION INTRAVENOUS at 02:02

## 2018-02-05 RX ADMIN — FOLIC ACID 1 MG: 1 TABLET ORAL at 09:02

## 2018-02-05 RX ADMIN — DOXAZOSIN MESYLATE 8 MG: 2 TABLET ORAL at 09:02

## 2018-02-05 RX ADMIN — DOFETILIDE 250 MCG: 0.25 CAPSULE ORAL at 09:02

## 2018-02-05 RX ADMIN — MAGNESIUM OXIDE TAB 400 MG (241.3 MG ELEMENTAL MG) 400 MG: 400 (241.3 MG) TAB at 09:02

## 2018-02-05 RX ADMIN — ALLOPURINOL 300 MG: 300 TABLET ORAL at 09:02

## 2018-02-05 RX ADMIN — HEPARIN SODIUM 15 UNITS/KG/HR: 10000 INJECTION, SOLUTION INTRAVENOUS at 05:02

## 2018-02-05 RX ADMIN — HYDRALAZINE HYDROCHLORIDE 100 MG: 50 TABLET ORAL at 01:02

## 2018-02-05 RX ADMIN — CEFEPIME 2 G: 2 INJECTION, POWDER, FOR SOLUTION INTRAVENOUS at 01:02

## 2018-02-05 RX ADMIN — WARFARIN SODIUM 6 MG: 2 TABLET ORAL at 05:02

## 2018-02-05 RX ADMIN — ISOSORBIDE DINITRATE 40 MG: 40 TABLET ORAL at 01:02

## 2018-02-05 RX ADMIN — PANTOPRAZOLE SODIUM 40 MG: 40 TABLET, DELAYED RELEASE ORAL at 05:02

## 2018-02-05 RX ADMIN — CARBIDOPA AND LEVODOPA 1 TABLET: 25; 100 TABLET ORAL at 05:02

## 2018-02-05 RX ADMIN — ISOSORBIDE DINITRATE 40 MG: 40 TABLET ORAL at 09:02

## 2018-02-05 RX ADMIN — HYDRALAZINE HYDROCHLORIDE 10 MG: 20 INJECTION INTRAMUSCULAR; INTRAVENOUS at 03:02

## 2018-02-05 RX ADMIN — ATORVASTATIN CALCIUM 10 MG: 10 TABLET, FILM COATED ORAL at 09:02

## 2018-02-05 RX ADMIN — DOCUSATE SODIUM 100 MG: 100 CAPSULE, LIQUID FILLED ORAL at 09:02

## 2018-02-05 RX ADMIN — HYDRALAZINE HYDROCHLORIDE 100 MG: 50 TABLET ORAL at 05:02

## 2018-02-05 RX ADMIN — Medication 1 CAPSULE: at 09:02

## 2018-02-05 RX ADMIN — CARBIDOPA AND LEVODOPA 1 TABLET: 25; 100 TABLET ORAL at 09:02

## 2018-02-05 RX ADMIN — HYDRALAZINE HYDROCHLORIDE 100 MG: 50 TABLET ORAL at 09:02

## 2018-02-05 RX ADMIN — ASPIRIN 81 MG CHEWABLE TABLET 81 MG: 81 TABLET CHEWABLE at 09:02

## 2018-02-05 RX ADMIN — ISOSORBIDE DINITRATE 40 MG: 40 TABLET ORAL at 05:02

## 2018-02-05 RX ADMIN — BUPROPION HYDROCHLORIDE 450 MG: 150 TABLET, EXTENDED RELEASE ORAL at 09:02

## 2018-02-05 RX ADMIN — POLYETHYLENE GLYCOL 3350 17 G: 17 POWDER, FOR SOLUTION ORAL at 09:02

## 2018-02-05 RX ADMIN — AMLODIPINE BESYLATE 10 MG: 10 TABLET ORAL at 09:02

## 2018-02-05 NOTE — PROGRESS NOTES
LVAD dressing change completed using sterile technique with soap and water. Repeated so MD could draw cultures. DLES is a 2 with minimal amount of dark red drainage noted on the drain sponge. Tolerated without any complication. Slight redness noted around driveline, no tenderness noted.

## 2018-02-05 NOTE — PLAN OF CARE
Problem: Patient Care Overview  Goal: Plan of Care Review  Outcome: Ongoing (interventions implemented as appropriate)  Pt free of falls and injury throughout the shift. Skin is CDI with no breakdown noted; VSS, NAD. LVAD working properly throughout the shift with no complication. Dressing CDI and due to be changed everyday with soap and water. INR1.8; heparin gtt infused throughout the shift per order. Pt May discharge in am. POC reviewed and questions answered. Pt tolerating plan of care.

## 2018-02-05 NOTE — ASSESSMENT & PLAN NOTE
- He has a long h/o orthostatic hypotension, so BP's should only be checked while sitting or standing  - Goal MAP ~ 95 or less (some permissive HTN due to dizziness and orthostasis)  - Continue Norvasc, Hydralazine, Isordil, Lisinopril and Doxazosin   - Doxazosin 12$ in [facility] [place] pharmacy. Will increase today and change to PM dosing

## 2018-02-05 NOTE — PLAN OF CARE
Problem: SLP Goal  Goal: SLP Goal  Speech Therapy Short Term Goals  Goal expected to be met by 2/4  1. Pt will tolerate a regular diet and NECTAR THICK liquids with no overt s/s of aspiration.   2. Pt will tolerate cup sips of thin liquids x10 with no overt s/s of aspiration.   3. Pt will participate in a modified barium swallow study as warranted per MD and SLP. MET        Outcome: Ongoing (interventions implemented as appropriate)  Pt tolerating current diet.  Participated well w/ tx tasks.  Cont ST per POC.    Alesia Bar CCC-SLP  2/5/2018

## 2018-02-05 NOTE — PLAN OF CARE
Problem: Patient Care Overview  Goal: Plan of Care Review  Outcome: Ongoing (interventions implemented as appropriate)  Plan of care discussed with patient.  Patient ambulating with assistance, fall precautions in place. LVAD DP and numbers WNL, smooth LVAD hum. Patient has no complaints of pain. Discussed medications and care. Dressing change discussed. Bed alarm in use. ALVARADO camera in use, personal sitter at bedside. PICC replaced.Patient has no questions at this time. Will continue to monitor.

## 2018-02-05 NOTE — ASSESSMENT & PLAN NOTE
- Continue Tikosyn  - Will get EKG for QT monitoring  - Has [employer] BiV ICD  - Keep K+ > 4.0 and Mg+ > 2.0

## 2018-02-05 NOTE — PT/OT/SLP PROGRESS
Physical Therapy Treatment    Patient Name:  Kev Vasquez   MRN:  [id number]    Recommendations:     Discharge Recommendations:  home with home health   Discharge Equipment Recommendations: none   Barriers to discharge: no barriers    Assessment:     Kev Vasquez is a 77 y.o. male admitted with a medical diagnosis of Vomiting.  He presents with the following impairments/functional limitations:  weakness, impaired endurance, impaired self care skills, impaired functional mobilty, gait instability, impaired balance, decreased coordination . Pt sheryl session well w/ good participation. He demo'd improved gait mechanics on this date only requiring Ida at times for stability. He does however continue to present w/ instability and also requires cueing for safety. Pt will continue PT POC.    Rehab Prognosis:  Good; patient would benefit from acute skilled PT services to address these deficits and reach maximum level of function.      Recent Surgery: Procedure(s) (LRB):  ESOPHAGOGASTRODUODENOSCOPY (EGD) (N/A) 10 Days Post-Op    Plan:     During this hospitalization, patient to be seen 5 x/week to address the above listed problems via gait training, therapeutic activities, therapeutic exercises, neuromuscular re-education  · Plan of Care Expires:  02/25/18   Plan of Care Reviewed with: patient, caregiver    Subjective     Communicated with nursing prior to session.  Patient found supine upon PT entry to room, agreeable to treatment.      Chief Complaint: instability  Patient comments/goals: to return home  Pain/Comfort:  · Pain Rating 1: 0/10    Patients cultural, spiritual, Anglican conflicts given the current situation: none reported    Objective:     Patient found with: telemetry, LVAD (LVAD to battery power)     General Precautions: Standard, fall, LVAD, aspiration   Orthopedic Precautions:N/A   Braces: N/A     Functional Mobility:  · Bed Mobility:     · Scooting: stand by assistance, to HOB w/ cueing for BLE flexion to  scoot up  · Supine to Sit: stand by assistance  · Sit to Supine: stand by assistance  · inc'd time and cueing required for technique  · HOB elevated and w/ side rail  · Transfers:     · Sit to Stand:  stand by assistance with rolling walker  · x2 trials from EOB, cueing for hand placement and to step close to EOB prior to sitting down  · Gait:   · 150ft (x2 trials) w/ RW and mostly CGA for stability  · Ida at times due to posterior lean  · Cueing to remain inside RW and to inc step length/height      AM-PAC 6 CLICK MOBILITY  Turning over in bed (including adjusting bedclothes, sheets and blankets)?: 3  Sitting down on and standing up from a chair with arms (e.g., wheelchair, bedside commode, etc.): 3  Moving from lying on back to sitting on the side of the bed?: 3  Moving to and from a bed to a chair (including a wheelchair)?: 3  Need to walk in hospital room?: 3  Climbing 3-5 steps with a railing?: 2  Total Score: 17       Therapeutic Activities and Exercises:   Pt educated on safety w/ both transfers and ambulation in order to decrease fall risk. Pt verb understanding but will continue to require reinforcement.    Patient left supine with all lines intact, call button in reach and sitter present..    GOALS:    Physical Therapy Goals        Problem: Physical Therapy Goal    Goal Priority Disciplines Outcome Goal Variances Interventions   Physical Therapy Goal     PT/OT, PT Ongoing (interventions implemented as appropriate)     Description:  Goals to be met by: 2/10/18    Patient will increase functional independence with mobility by performin. Supine to sit with SBA  2. Sit to supine with SBA  3. Sit to stand transfer with Supervision using RW (SBA met )  4. Gait x150 feet with Contact Guard Assistance using Rolling Walker   5. Standing balance x10 min with SBA using AD or no AD while performing functional tasks   6. Lower extremity exercise program x20 reps per handout, with assistance as needed                          Time Tracking:     PT Received On: 02/05/18  PT Start Time: 1318     PT Stop Time: 1342  PT Total Time (min): 24 min     Billable Minutes: Gait Training 16, Therapeutic Activity 8 and Total Time 24    Treatment Type: Treatment  PT/PTA: PT     PTA Visit Number: 0     Ninfa Mims, PT  02/05/2018

## 2018-02-05 NOTE — PROGRESS NOTES
Update:    SW to pt's room for update. Pt appeared alert and oriented. Pt's sitter present as well. Pt reports coping adequately at this time. Pt reports in agreement with plan to d/c home tomorrow. Pt requesting SW speak with dtr, Ena [phone], about d/c arrangements. SW spoke to pt's dtr who reports agreeable to d/c tomorrow with out pt PT/OT/ST and out pt PICC care if pt d/c's with IV abx. Pt's dtr reports no other needs at this time and none indicated. SW providing ongoing psychosocial and counseling support, education, assistance, resources, and discharge planning as indicated. SW continuing to follow and remains available.

## 2018-02-05 NOTE — PT/OT/SLP PROGRESS
Occupational Therapy   Treatment    Name: Kev Vasquez  MRN: [id number]  Admitting Diagnosis:  Vomiting  10 Days Post-Op    Recommendations:     Discharge Recommendations: home with home health  Discharge Equipment Recommendations:  none  Barriers to discharge:  None    Subjective     Communicated with: RN (jocy) prior to session.  Pain/Comfort:  · Pain Rating 1: 0/10  · Pain Rating Post-Intervention 1: 0/10    Patients cultural, spiritual, Confucianism conflicts given the current situation: none reported     Objective:     Patient found with: telemetry, LVAD ((to wall power))    General Precautions: Standard, fall, LVAD, aspiration   Orthopedic Precautions:N/A   Braces: N/A     Occupational Performance:    Bed Mobility:    · Patient completed Rolling/Turning to Left with  independence  · Patient completed Scooting/Bridging with supervision anteriorly to attain EOB seated position   · Patient completed Supine to Sit with minimum assistance with HHA for attaining upright trunk positioning and with HOB slightly elevated   · Patient completed Sit to Supine with stand by assistance     Functional Mobility/Transfers:  · Patient completed Sit <> Stand Transfer with contact guard assistance  with  rolling walker with min vc's for proper hand placement for safety  · Patient completed Bed <> Chair Transfer using functional mobility technique with moderate assistance with rolling walker  · Pt taking steps backwards to chair utilizing RW and attempted to sit prior to being positioning in front of the chair safely, requiring max vc's and therapist assistance behind knees to attain full upright standing position to prevent fall  · Pt educated on importance of backing up the chair and feeling his knees touching the chair prior to sitting for fall prevention and safety  · Functional Mobility: Pt engaging in functional mobility within the room to simulate functional household distances approx 48 ft with CGA-min A utilizing RW for  support. Pt noted with increased difficulty with turns and management of RW during turns with increased time required. Pt with no freezing during mobility in this session.     Activities of Daily Living:  · UB Dressing: maximal assistance (pt's careviger switching LVAD from wall to battery power, placing items into consolidation bag, and donning bag safely)  · LB Dressing: maximal assistance (caregiver doffing patient's diaper, donning pull-up and sweatpants with pt in supine; pt able to bridge up to assist to clearing pants over hips)   · Pt seated EOB to don socks and shoes with OT; pt with ability to don b/l socks with supervision, however L lateral lean noted while trying to don shoes requiring total assistance for completion     Patient left supine with all lines intact, call button in reach, RN notified, caregiver present and LVAD on battery power with consolidation bag donned    AMPAC 6 Click:  AMPAC Total Score: 15    Treatment & Education:  Pt found supine in bed with LVAD to battery power and sitter in the room; caregiver complete transfer from wall power to battery power with supervision and completed LB dressing with pt assist; pt seated at EOB to don socks and shoes with supervision for sitting balance at this time; SBA for sit>stand from EOB with RW and min vc's for hand placement; functional mobility completed within the room with SBA-min A; t/f to bedside chair with mod A for safety and pt attempt to sit prior to reaching the chair; education provided on safety and fall prevention and was reinforced by caregiver; t/f back to bed with min A without the use of any AD and supervision for sit>supine d.t PICC line wanted to replace line.  Education:    Assessment:     Kev Vasquez is a 77 y.o. male with a medical diagnosis of Vomiting.  He presents with decreased insight into condition and with impaired safety awareness during mobility. Pt also presents with a Parkinsonian type gait pattern requiring  CGA-min A for completion with use of RW. Pt receives assistance at home for all ADLs and LVAD management at time, so POC decreased and OT to focus on standing balance during functional tasks and functional mobility to simulate household distances to increased safety. Performance deficits affecting function are gait instability, impaired functional mobilty, impaired balance, weakness, impaired endurance.      Rehab Prognosis:  good; patient would benefit from acute skilled OT services to address these deficits and reach maximum level of function.       Plan:     Patient to be seen 2 x/week to address the above listed problems via self-care/home management, therapeutic activities, neuromuscular re-education, therapeutic exercises  · Plan of Care Expires: 02/25/18  · Plan of Care Reviewed with: patient, caregiver    This Plan of care has been discussed with the patient who was involved in its development and understands and is in agreement with the identified goals and treatment plan    GOALS:    Occupational Therapy Goals        Problem: Occupational Therapy Goal    Goal Priority Disciplines Outcome Interventions   Occupational Therapy Goal     OT, PT/OT Ongoing (interventions implemented as appropriate)    Description:  Goals to be met by: 7 days 2/12/18     Patient will increase functional independence with ADLs by performing:    LE Dressing with SBA  Grooming while standing with Supervision.  Toileting from bedside commode with Minimal Assistance for hygiene and clothing management.   Stand pivot transfers with Stand-by Assistance.  Toilet transfer to bedside commode with Stand-by Assistance.    Pt will stand for approx 8 minutes to complete standing grooming task with SBA and without any noted LOB                       Time Tracking:     OT Date of Treatment: 02/05/18  OT Start Time: 1022  OT Stop Time: 1049  OT Total Time (min): 27 min    Billable Minutes:Self Care/Home Management 17  Therapeutic Activity  10    Radha Quesada, OT  2/5/2018

## 2018-02-05 NOTE — PROGRESS NOTES
Pt  and doppler pressure of 100; will admin PRN IV hydralazine 10mg and reassess bp in 30 minutes.

## 2018-02-05 NOTE — CONSULTS
Double lumen PICC place to right basilic vein.  37 cm in length, 0 cm exposed. Initial arm circumference 33 cm. Lot #[id number] .

## 2018-02-05 NOTE — PROGRESS NOTES
Map and doppler pressure both 96. Notified MD aSrah. Ordered to give 10pm BP meds at this time. NAD, WcTM.

## 2018-02-05 NOTE — PROGRESS NOTES
[facility]  Heart Transplant  Progress Note    Patient Name: Kev Vasquez  MRN: [id number]  Admission Date: 1/24/2018  Hospital Length of Stay: 12 days  Attending Physician: Karen Valladares MD  Primary Care Provider: Mega Collins MD  Principal Problem:Vomiting    Subjective:     Interval History: No complaints overnight, states he is ready to go home. Caregiver is bedside, wife will be here later today. Will touch base with ID about abx dose (per Nnedu note looks like patient has completed 6 week course of abx). Denies chest pain, SOB, NVD. Will see if doxazosin vs. Terazosin is cheaper for patient. Also considering changing amlodpine to nifedipine to better up titrate and control BP. Historically patient has been orthostatic and has history of falls from dizziness. Have allowed some passive HTN for this reason, goal maps <95.    Continuous Infusions:   heparin (porcine) in D5W Stopped (02/05/18 0908)     Scheduled Meds:   allopurinol  300 mg Oral Daily    amLODIPine  10 mg Oral Daily    aspirin  81 mg Oral Daily    atorvastatin  10 mg Oral Daily    buPROPion  450 mg Oral Daily    carbidopa-levodopa  mg  1 tablet Oral Q4H While awake    ceFEPime (MAXIPIME) IVPB  2 g Intravenous Q12H    docusate sodium  100 mg Oral BID    dofetilide  250 mcg Oral Q12H    doxazosin  8 mg Oral Daily    folic acid  1 mg Oral Daily    hydrALAZINE  100 mg Oral Q8H    isosorbide dinitrate  40 mg Oral TID    Lactobacillus rhamnosus GG  1 capsule Oral Daily    lisinopril  20 mg Oral BID    magnesium oxide  400 mg Oral BID    pantoprazole  40 mg Oral BID AC    polyethylene glycol  17 g Oral Daily    warfarin  6 mg Oral Daily     PRN Meds:acetaminophen, docusate sodium, hydrALAZINE, omnipaque 300 iohexol, ondansetron, promethazine    Review of patient's allergies indicates:   Allergen Reactions    Aldactone [spironolactone]       persistent hyperkalemia.    Sulfa (sulfonamide antibiotics)       Objective:     Vital Signs (Most Recent):  Temp: 96.3 °F (35.7 °C) (02/05/18 0749)  Pulse: 67 (02/05/18 1000)  Resp: 16 (02/05/18 0749)  BP: (!) 80/0 (02/05/18 0749)  SpO2: 97 % (02/05/18 0749) Vital Signs (24h Range):  Temp:  [96.3 °F (35.7 °C)-98.9 °F (37.2 °C)] 96.3 °F (35.7 °C)  Pulse:  [64-83] 67  Resp:  [15-18] 16  SpO2:  [94 %-97 %] 97 %  BP: ()/(0-92) 80/0     Patient Vitals for the past 72 hrs (Last 3 readings):   Weight   02/05/18 0800 80.5 kg (177 lb 7.5 oz)   02/03/18 0700 81.7 kg (180 lb 1.9 oz)     Body mass index is 25.46 kg/m².      Intake/Output Summary (Last 24 hours) at 02/05/18 1111  Last data filed at 02/04/18 2300   Gross per 24 hour   Intake            896.4 ml   Output                0 ml   Net            896.4 ml     Hemodynamic Parameters:    Physical Exam   Constitutional: He appears well-developed and well-nourished.   HENT:   Head: Normocephalic and atraumatic.   Eyes: Conjunctivae are normal. Pupils are equal, round, and reactive to light.   Neck: Normal range of motion. Neck supple. No JVD present.   Cardiovascular: Normal rate and regular rhythm.    Smooth VAD hum. Intermittently pulsatile   Pulmonary/Chest: Effort normal and breath sounds normal.   Abdominal: Soft. Bowel sounds are normal.   Genitourinary:   Genitourinary Comments: Incontinent   Musculoskeletal: He exhibits no edema.   Neurological: He is alert.   Oriented to self and place but not year this morning   Skin: Skin is warm and dry. Capillary refill takes 2 to 3 seconds.     Significant Labs:  CBC:    Recent Labs  Lab 02/03/18  0349 02/04/18  0523 02/05/18  0533   WBC 5.70 6.02 5.66   RBC 3.47* 3.51* 3.41*   HGB 10.0* 10.3* 10.1*   HCT 30.7* 30.8* 30.0*   * 111* 116*   MCV 89 88 88   MCH 28.8 29.3 29.6   MCHC 32.6 33.4 33.7     BNP:    Recent Labs  Lab 01/31/18  0443 02/02/18  0501 02/05/18  0533   * 86 157*     CMP:    Recent Labs  Lab 01/31/18  0443  02/02/18  0501 02/03/18  0349 02/04/18  0523  02/05/18  0533   GLU 81  < > 91 91 82 86   CALCIUM 10.0  < > 9.9 10.4 10.5 10.5   ALBUMIN 3.2*  --  2.9*  --   --  3.0*   PROT 6.7  --  6.3  --   --  6.3     < > 141 139 140 140   K 3.8  < > 4.0 4.2 3.8 4.0   CO2 24  < > 24 25 26 27     < > 109 107 107 107   BUN 20  < > 20 21 19 22   CREATININE 1.7*  < > 1.7* 1.6* 1.7* 1.9*   ALKPHOS 73  --  68  --   --  70   ALT 13  --  <5*  --   --  8*   AST 19  --  18  --   --  23   BILITOT 0.4  --  0.3  --   --  0.3   < > = values in this interval not displayed.   Coagulation:     Recent Labs  Lab 02/03/18  0349 02/04/18  0523 02/05/18  0533   INR 1.4* 1.8* 2.2*     LDH:    Recent Labs  Lab 02/03/18  0349 02/04/18  0523 02/05/18  0533    173 178     Microbiology:  Microbiology Results (last 7 days)     Procedure Component Value Units Date/Time    Blood culture #1 [[id number]] Collected:  01/24/18 1521    Order Status:  Completed Specimen:  Blood from Line, PICC Right Basilic Updated:  01/29/18 1812     Blood Culture, Routine No growth after 5 days.    Narrative:       Blood Culture #1    Blood culture #2 [[id number]] Collected:  01/24/18 1537    Order Status:  Completed Specimen:  Blood from Peripheral, Hand, Right Updated:  01/29/18 1812     Blood Culture, Routine No growth after 5 days.    Narrative:       Blood Culture #2          I have reviewed all pertinent labs within the past 24 hours.    Estimated Creatinine Clearance: 34.7 mL/min (based on SCr of 1.9 mg/dL (H)).      Assessment and Plan:     Mr. 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 BiV ICD, HILLARY/BiPAP, HTN, CKD, HLP, gout and depression who presents with confusion. On further questioning of his wife and his caregiver who were both at bedside, they noticed some odd behaviours such as  combing his cheek/nose, getting dressed to go hunting when that is not his hobby and admitted to seeing visual hallucinations of his dead brother. They stated that he is not sleeping well and naps throughout the day. They wonder if his BiPAP is not in the proper settings anymore and he now has made a game out of looking at his watch to see when he will get to sleep. He stated it was 2017, January, thought he was in [place] (moved from there in June), and stated [place] as the president. Altogether, he denies SOB, CP, LE edema, orthopnea and PND. He has no fevers or chills, cough, or diarrhea. They state that he does have some worsening vomiting. They stated that a few months ago, he would vomit after he drank some water: it would start with a sneeze, then a cough, then the vomit. It would happen every now and then, but over the past few weeks, it has worsened to 3-4x/day. They stated that now it occurs without drinking, sometimes before dinner and he would throw up almost undigested food. He admitted to throwing up pills too at times. There have been no VAD alarms noted.       * Vomiting    - Much improved  - Appreciate GI's help. KUB unremarkable. EGD also negative.  - PPI bid  - Appreciate ST's help given coughing and sneezing that can precede emesis. MBSS done and patient now requires honey thick liquids with strict aspiration precautions        LVAD (left ventricular assist device) present    - S/P HM III 10/16/16 as DT in [place]  - Current speed 5800  - TTE 1/25/18 shows LVEDD 6.5, LVEF 10-15%, diastolic dysfunction, RVE, severely depressed RV systolic function, PAS 20, ARTHUR intermittently and septum midline. No comment on IVC - per Dr. Lanza, costal views were too poor to assess IVC.   - CVP via PICC line remains 2  (was taking Lasix 80 mg every M-W-F at home) - holding Lasix  - INR 2.2 today, D/C heparin bridge.  Coumadin with goal INR 2.0-3.0. Home dose 4 T/Th, 6 all other days.  - LDH stable  - Continue  IV Cefepime for chronic Pseudomonas DL infection   - consult ID today as patient has completed 6 weeks but ID notes very unclear about start date and did not mention stop date that I could find.   - D/C home with his sitters/family once BP controlled and ID has given recs on chronic pseudomonas DLES infection        Confusion    - CT of head 1/24/18 with no acute changes  - Given constellation of confusion, gait disturbance and urinary incontinence, consulted Neuro (NPH ?). Appreciate their help. LP done 1/30/18 with removal of only 13cc; opening pressure 21, closing pressure 15. His post procedure gait was significantly worse. Discussed with Neuro and NSGY. Per NSGY, no immediate need for cisternogram, shunt, etc, given failed improvement in gait. Now considering PD - started Sinemet per Neuro's rec and patient will f[facility] as [facility]  - D/C'd Ropinirole again 2/2 gait disturbance  - Delirium protocol        Essential hypertension    - He has a long h/o orthostatic hypotension, so BP's should only be checked while sitting or standing  - Goal MAP ~ 95 or less (some permissive HTN due to dizziness and orthostasis)  - Continue Norvasc, Hydralazine, Isordil, Lisinopril and Doxazosin   - Doxazosin 12$ in [facility] pharmacy. Will increase today and change to PM dosing        Gait instability    - PT/OT  - See confusion above        VT (ventricular tachycardia)    - Continue Tikosyn  - Will get EKG for QT monitoring  - Has [employer] BiV ICD  - Keep K+ > 4.0 and Mg+ > 2.0        Stage 3 chronic kidney disease    - Admit creatinine 2.3, 1.9 today, (baseline looks ~ 1.6-1.8)            Elena Raman PA-C  Heart Transplant  [facility]

## 2018-02-05 NOTE — SUBJECTIVE & OBJECTIVE
Interval History: No complaints overnight, states he is ready to go home. Caregiver is bedside, wife will be here later today. Will touch base with ID about abx dose (per [facility] note looks like patient has completed 6 week course of abx). Denies chest pain, SOB, NVD. Will see if doxazosin vs. Terazosin is cheaper for patient. Also considering changing amlodpine to nifedipine to better up titrate and control BP. Historically patient has been orthostatic and has history of falls from dizziness. Have allowed some passive HTN for this reason, goal maps <95.    Continuous Infusions:   heparin (porcine) in D5W Stopped (02/05/18 0908)     Scheduled Meds:   allopurinol  300 mg Oral Daily    amLODIPine  10 mg Oral Daily    aspirin  81 mg Oral Daily    atorvastatin  10 mg Oral Daily    buPROPion  450 mg Oral Daily    carbidopa-levodopa  mg  1 tablet Oral Q4H While awake    ceFEPime (MAXIPIME) IVPB  2 g Intravenous Q12H    docusate sodium  100 mg Oral BID    dofetilide  250 mcg Oral Q12H    doxazosin  8 mg Oral Daily    folic acid  1 mg Oral Daily    hydrALAZINE  100 mg Oral Q8H    isosorbide dinitrate  40 mg Oral TID    Lactobacillus rhamnosus GG  1 capsule Oral Daily    lisinopril  20 mg Oral BID    magnesium oxide  400 mg Oral BID    pantoprazole  40 mg Oral BID AC    polyethylene glycol  17 g Oral Daily    warfarin  6 mg Oral Daily     PRN Meds:acetaminophen, docusate sodium, hydrALAZINE, omnipaque 300 iohexol, ondansetron, promethazine    Review of patient's allergies indicates:   Allergen Reactions    Aldactone [spironolactone]       persistent hyperkalemia.    Sulfa (sulfonamide antibiotics)      Objective:     Vital Signs (Most Recent):  Temp: 96.3 °F (35.7 °C) (02/05/18 0749)  Pulse: 67 (02/05/18 1000)  Resp: 16 (02/05/18 0749)  BP: (!) 80/0 (02/05/18 0749)  SpO2: 97 % (02/05/18 0749) Vital Signs (24h Range):  Temp:  [96.3 °F (35.7 °C)-98.9 °F (37.2 °C)] 96.3 °F (35.7 °C)  Pulse:  [64-83]  67  Resp:  [15-18] 16  SpO2:  [94 %-97 %] 97 %  BP: ()/(0-92) 80/0     Patient Vitals for the past 72 hrs (Last 3 readings):   Weight   02/05/18 0800 80.5 kg (177 lb 7.5 oz)   02/03/18 0700 81.7 kg (180 lb 1.9 oz)     Body mass index is 25.46 kg/m².      Intake/Output Summary (Last 24 hours) at 02/05/18 1111  Last data filed at 02/04/18 2300   Gross per 24 hour   Intake            896.4 ml   Output                0 ml   Net            896.4 ml     Hemodynamic Parameters:    Physical Exam   Constitutional: He appears well-developed and well-nourished.   HENT:   Head: Normocephalic and atraumatic.   Eyes: Conjunctivae are normal. Pupils are equal, round, and reactive to light.   Neck: Normal range of motion. Neck supple. No JVD present.   Cardiovascular: Normal rate and regular rhythm.    Smooth VAD hum. Intermittently pulsatile   Pulmonary/Chest: Effort normal and breath sounds normal.   Abdominal: Soft. Bowel sounds are normal.   Genitourinary:   Genitourinary Comments: Incontinent   Musculoskeletal: He exhibits no edema.   Neurological: He is alert.   Oriented to self and place but not year this morning   Skin: Skin is warm and dry. Capillary refill takes 2 to 3 seconds.     Significant Labs:  CBC:    Recent Labs  Lab 02/03/18 0349 02/04/18  0523 02/05/18  0533   WBC 5.70 6.02 5.66   RBC 3.47* 3.51* 3.41*   HGB 10.0* 10.3* 10.1*   HCT 30.7* 30.8* 30.0*   * 111* 116*   MCV 89 88 88   MCH 28.8 29.3 29.6   MCHC 32.6 33.4 33.7     BNP:    Recent Labs  Lab 01/31/18  0443 02/02/18  0501 02/05/18  0533   * 86 157*     CMP:    Recent Labs  Lab 01/31/18  0443  02/02/18  0501 02/03/18  0349 02/04/18  0523 02/05/18  0533   GLU 81  < > 91 91 82 86   CALCIUM 10.0  < > 9.9 10.4 10.5 10.5   ALBUMIN 3.2*  --  2.9*  --   --  3.0*   PROT 6.7  --  6.3  --   --  6.3     < > 141 139 140 140   K 3.8  < > 4.0 4.2 3.8 4.0   CO2 24  < > 24 25 26 27     < > 109 107 107 107   BUN 20  < > 20 21 19 22    CREATININE 1.7*  < > 1.7* 1.6* 1.7* 1.9*   ALKPHOS 73  --  68  --   --  70   ALT 13  --  <5*  --   --  8*   AST 19  --  18  --   --  23   BILITOT 0.4  --  0.3  --   --  0.3   < > = values in this interval not displayed.   Coagulation:     Recent Labs  Lab 02/03/18 0349 02/04/18 0523 02/05/18  0533   INR 1.4* 1.8* 2.2*     LDH:    Recent Labs  Lab 02/03/18 0349 02/04/18 0523 02/05/18  0533    173 178     Microbiology:  Microbiology Results (last 7 days)     Procedure Component Value Units Date/Time    Blood culture #1 [[id number]] Collected:  01/24/18 1521    Order Status:  Completed Specimen:  Blood from Line, PICC Right Basilic Updated:  01/29/18 1812     Blood Culture, Routine No growth after 5 days.    Narrative:       Blood Culture #1    Blood culture #2 [[id number]] Collected:  01/24/18 1537    Order Status:  Completed Specimen:  Blood from Peripheral, Hand, Right Updated:  01/29/18 1812     Blood Culture, Routine No growth after 5 days.    Narrative:       Blood Culture #2          I have reviewed all pertinent labs within the past 24 hours.    Estimated Creatinine Clearance: 34.7 mL/min (based on SCr of 1.9 mg/dL (H)).

## 2018-02-05 NOTE — PROGRESS NOTES
LVAD dressing change completed using sterile technique with soap and water. DLES is a 2 with minimal amount of dark red drainage noted on the drain sponge. Tolerated without any complication. Slight redness noted around driveline, no tenderness noted.

## 2018-02-05 NOTE — CONSULTS
[facility]  Infectious Disease  Consult Note    Patient Name: Kev Vasquez  MRN: [id number]  Admission Date: 1/24/2018  Hospital Length of Stay: 12 days  Attending Physician: Karen Valladares MD  Primary Care Provider: Mega Collins MD         Inpatient consult to Infectious Diseases  Consult performed by: CR ROMERO JR  Consult ordered by: OPAL HOBBS        Consult received.  Full consult to follow.    SHARONA Zuluaga  Infectious Disease  [facility]

## 2018-02-05 NOTE — PROCEDURES
"Kev Vasquez is a 77 y.o. male patient.    Temp: 96.3 °F (35.7 °C) (02/05/18 0749)  Pulse: 67 (02/05/18 1000)  Resp: 16 (02/05/18 0749)  BP: (!) 80/0 (02/05/18 0749)  SpO2: 97 % (02/05/18 0749)  Weight: 80.5 kg (177 lb 7.5 oz) (02/05/18 0800)  Height: 5' 10" (177.8 cm) (01/30/18 0900)    PICC  Date/Time: 2/5/2018 11:15 AM  Performed by: MASSEIL MARCIAL  Assisting provider: FIDENCIO ZENG  Consent Done: Yes  Time out: Immediately prior to procedure a time out was called to verify the correct patient, procedure, equipment, support staff and site/side marked as required  Indications: med administration and vascular access  Local anesthetic: lidocaine 1% without epinephrine  Anesthetic Total (mL): 2  Description of findings: PICC  Preparation: skin prepped with ChloraPrep  Skin prep agent dried: skin prep agent completely dried prior to procedure  Sterile barriers: all five maximum sterile barriers used - cap, mask, sterile gown, sterile gloves, and large sterile sheet  Hand hygiene: hand hygiene performed prior to central venous catheter insertion  Location details: right basilic  Catheter type: double lumen  Catheter size: 5 Fr  Catheter Length: 37cm    Ultrasound guidance: yes  Vessel Caliber: large, compressibility normal  Vascular Doppler: not done  Needle advanced into vessel with real time Ultrasound guidance.  Guidewire confirmed in vessel.  Image recorded and saved.  Sterile sheath used.  Number of attempts: 1  Post-procedure: blood return through all ports, chlorhexidine patch and sterile dressing applied  Technical procedures used: 3CG  Specimens: No    Assessment: placement verified by x-ray  Complications: none        Fidencio Zeng  2/5/2018  "

## 2018-02-05 NOTE — PLAN OF CARE
Problem: Occupational Therapy Goal  Goal: Occupational Therapy Goal  Goals to be met by: 7 days 2/12/18     Patient will increase functional independence with ADLs by performing:    LE Dressing with SBA  Grooming while standing with Supervision.  Toileting from bedside commode with Minimal Assistance for hygiene and clothing management.   Stand pivot transfers with Stand-by Assistance.  Toilet transfer to bedside commode with Stand-by Assistance.    Pt will stand for approx 8 minutes to complete standing grooming task with SBA and without any noted LOB     Outcome: Ongoing (interventions implemented as appropriate)  Goals reviewed and updates with a reduction in POC from 4x/week to 2x/week as pt is close to his functional baseline and has a 24 hour caregiver.  Pt can continue to work with OT on standing functional tasks and functional mobility to increase safety and independence upon d/c home.

## 2018-02-05 NOTE — PROGRESS NOTES
Clarified with Mono TABOR that patient's MAP goal is >90. Also informed her that patients PRN Hydralazine is for a MAP >80. Stated that the MAP is supposed to be less that 90 and that she will adjust PRN order.

## 2018-02-05 NOTE — PT/OT/SLP PROGRESS
"Speech Language Pathology Treatment    Patient Name:  Kev Vasquez   MRN:  [id number]  Admitting Diagnosis: Vomiting    Recommendations:                 General Recommendations:  Dysphagia therapy  Diet recommendations:  Regular, Liquid Diet Level: Honey Thick   Aspiration Precautions: 1 bite/sip at a time, Assistance with thickening liquids, Avoid talking while eating, Eliminate distractions, Feed only when awake/alert, HOB to 90 degrees, Meds whole 1 at a time, Monitor for s/s of aspiration, No straws, Remain upright 30 minutes post meal, Small bites/sips and Standard aspiration precautions   General Precautions: Standard, aspiration, fall, LVAD  Communication strategies:  none    Subjective     "He might be going home tomorrow."  Pt caregiver stated  Patient goals: to go home     Pain/Comfort:  · Pain Rating 1: 0/10  · Pain Rating Post-Intervention 1: 0/10    Objective:     Has the patient been evaluated by SLP for swallowing?   Yes  Keep patient NPO? No   Current Respiratory Status: room air      Pt was seated upright for optimal swallowing safety.  He was offered po trials of nectar thick liquids by tsp w/ chi tuck strategy x5 which he tolerated well.  Single tsp sip w/out chin tuck resulted in immediate throat clearing.  Pt was offered self regulated cup edge sips of honey thick liquids which he tolerated w/ no overt clinical signs of airway threat.  He completed basic dysphagia ex's for tongue base retraction x10 reps given min cues.  He was unable to complete fercho maneuver despite max cues.  He and family and caregiver were provided education re: liquid thickening, aspiration precautions, dysphagia tx and SLP POC.  They indicated good understanding.    Assessment:     Kev Vasquez is a 77 y.o. male with an SLP diagnosis of Dysphagia.  He presents with good tolerance of current diet.    Goals:    SLP Goals        Problem: SLP Goal    Goal Priority Disciplines Outcome   SLP Goal     SLP Ongoing (interventions " implemented as appropriate)   Description:  Speech Therapy Short Term Goals  Goal expected to be met by 2/4  1. Pt will tolerate a regular diet and NECTAR THICK liquids with no overt s/s of aspiration.   2. Pt will tolerate cup sips of thin liquids x10 with no overt s/s of aspiration.   3. Pt will participate in a modified barium swallow study as warranted per MD and SLP. MET                         Plan:     · Patient to be seen:  4 x/week   · Plan of Care expires:  02/26/18  · Plan of Care reviewed with:  patient, spouse, caregiver   · SLP Follow-Up:  Yes       Discharge recommendations:  home health speech therapy   Barriers to Discharge:  None    Time Tracking:     SLP Treatment Date:   02/05/18  Speech Start Time:  1423  Speech Stop Time:  1444     Speech Total Time (min):  21 min    Billable Minutes: Treatment Swallowing Dysfunction 11 and Seld Care/Home Management Training 10    Alesia Bar CCC-SLP  02/05/2018

## 2018-02-05 NOTE — ASSESSMENT & PLAN NOTE
- S/P HM III 10/16/16 as DT in [place]  - Current speed 5800  - TTE 1/25/18 shows LVEDD 6.5, LVEF 10-15%, diastolic dysfunction, RVE, severely depressed RV systolic function, PAS 20, ARTHUR intermittently and septum midline. No comment on IVC - per Dr. Lanza, costal views were too poor to assess IVC.   - CVP via PICC line remains 2  (was taking Lasix 80 mg every M-W-F at home) - holding Lasix  - INR 2.2 today, D/C heparin bridge.  Coumadin with goal INR 2.0-3.0. Home dose 4 T/Th, 6 all other days.  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   - D/C home with his sitters/family once BP controlled and ID has given recs on chronic pseudomonas DLES infection

## 2018-02-06 ENCOUNTER — ANESTHESIA EVENT (OUTPATIENT)
Dept: CARDIOLOGY | Facility: HOSPITAL | Age: 78
End: 2018-02-06

## 2018-02-06 ENCOUNTER — ANESTHESIA (OUTPATIENT)
Dept: CARDIOLOGY | Facility: HOSPITAL | Age: 78
End: 2018-02-06

## 2018-02-06 PROBLEM — T14.8XXA WOUND INFECTION: Status: ACTIVE | Noted: 2018-02-06

## 2018-02-06 PROBLEM — L08.9 WOUND INFECTION: Status: ACTIVE | Noted: 2018-02-06

## 2018-02-06 PROBLEM — I42.9 CARDIOMYOPATHY: Status: ACTIVE | Noted: 2018-02-06

## 2018-02-06 LAB
ANION GAP SERPL CALC-SCNC: 6 MMOL/L
BASOPHILS # BLD AUTO: 0.02 K/UL
BASOPHILS NFR BLD: 0.3 %
BUN SERPL-MCNC: 24 MG/DL
CALCIUM SERPL-MCNC: 10.6 MG/DL
CHLORIDE SERPL-SCNC: 109 MMOL/L
CO2 SERPL-SCNC: 27 MMOL/L
CREAT SERPL-MCNC: 1.9 MG/DL
DIFFERENTIAL METHOD: ABNORMAL
EOSINOPHIL # BLD AUTO: 0.1 K/UL
EOSINOPHIL NFR BLD: 2.3 %
ERYTHROCYTE [DISTWIDTH] IN BLOOD BY AUTOMATED COUNT: 18.5 %
EST. GFR  (AFRICAN AMERICAN): 39 ML/MIN/1.73 M^2
EST. GFR  (NON AFRICAN AMERICAN): 33.7 ML/MIN/1.73 M^2
GLUCOSE SERPL-MCNC: 80 MG/DL
GRAM STN SPEC: NORMAL
GRAM STN SPEC: NORMAL
HCT VFR BLD AUTO: 30.6 %
HGB BLD-MCNC: 10 G/DL
IMM GRANULOCYTES # BLD AUTO: 0.03 K/UL
IMM GRANULOCYTES NFR BLD AUTO: 0.5 %
INR PPP: 2.3
LDH SERPL L TO P-CCNC: 198 U/L
LYMPHOCYTES # BLD AUTO: 0.8 K/UL
LYMPHOCYTES NFR BLD: 12.6 %
MAGNESIUM SERPL-MCNC: 2.2 MG/DL
MCH RBC QN AUTO: 29.1 PG
MCHC RBC AUTO-ENTMCNC: 32.7 G/DL
MCV RBC AUTO: 89 FL
MONOCYTES # BLD AUTO: 0.8 K/UL
MONOCYTES NFR BLD: 12.9 %
NEUTROPHILS # BLD AUTO: 4.3 K/UL
NEUTROPHILS NFR BLD: 71.4 %
NRBC BLD-RTO: 0 /100 WBC
PLATELET # BLD AUTO: 104 K/UL
PMV BLD AUTO: 11.7 FL
POTASSIUM SERPL-SCNC: 4 MMOL/L
PROTHROMBIN TIME: 22.3 SEC
RBC # BLD AUTO: 3.44 M/UL
SODIUM SERPL-SCNC: 142 MMOL/L
WBC # BLD AUTO: 6.04 K/UL

## 2018-02-06 PROCEDURE — 97530 THERAPEUTIC ACTIVITIES: CPT

## 2018-02-06 PROCEDURE — 83735 ASSAY OF MAGNESIUM: CPT

## 2018-02-06 PROCEDURE — 97116 GAIT TRAINING THERAPY: CPT

## 2018-02-06 PROCEDURE — 93750 INTERROGATION VAD IN PERSON: CPT | Mod: ,,, | Performed by: INTERNAL MEDICINE

## 2018-02-06 PROCEDURE — 63600175 PHARM REV CODE 636 W HCPCS: Performed by: INTERNAL MEDICINE

## 2018-02-06 PROCEDURE — 93005 ELECTROCARDIOGRAM TRACING: CPT

## 2018-02-06 PROCEDURE — 20600001 HC STEP DOWN PRIVATE ROOM

## 2018-02-06 PROCEDURE — 85025 COMPLETE CBC W/AUTO DIFF WBC: CPT

## 2018-02-06 PROCEDURE — 97535 SELF CARE MNGMENT TRAINING: CPT

## 2018-02-06 PROCEDURE — 80048 BASIC METABOLIC PNL TOTAL CA: CPT

## 2018-02-06 PROCEDURE — 99233 SBSQ HOSP IP/OBS HIGH 50: CPT | Mod: ,,, | Performed by: PHYSICIAN ASSISTANT

## 2018-02-06 PROCEDURE — 25000003 PHARM REV CODE 250: Performed by: PHYSICIAN ASSISTANT

## 2018-02-06 PROCEDURE — 93010 ELECTROCARDIOGRAM REPORT: CPT | Mod: ,,, | Performed by: INTERNAL MEDICINE

## 2018-02-06 PROCEDURE — 25000003 PHARM REV CODE 250: Performed by: INTERNAL MEDICINE

## 2018-02-06 PROCEDURE — 25000003 PHARM REV CODE 250: Performed by: NURSE PRACTITIONER

## 2018-02-06 PROCEDURE — 83615 LACTATE (LD) (LDH) ENZYME: CPT

## 2018-02-06 PROCEDURE — 27000248 HC VAD-ADDITIONAL DAY

## 2018-02-06 PROCEDURE — 92526 ORAL FUNCTION THERAPY: CPT

## 2018-02-06 PROCEDURE — 85610 PROTHROMBIN TIME: CPT

## 2018-02-06 RX ADMIN — CARBIDOPA AND LEVODOPA 1 TABLET: 25; 100 TABLET ORAL at 05:02

## 2018-02-06 RX ADMIN — ATORVASTATIN CALCIUM 10 MG: 10 TABLET, FILM COATED ORAL at 08:02

## 2018-02-06 RX ADMIN — ASPIRIN 81 MG CHEWABLE TABLET 81 MG: 81 TABLET CHEWABLE at 09:02

## 2018-02-06 RX ADMIN — FOLIC ACID 1 MG: 1 TABLET ORAL at 09:02

## 2018-02-06 RX ADMIN — MAGNESIUM OXIDE TAB 400 MG (241.3 MG ELEMENTAL MG) 400 MG: 400 (241.3 MG) TAB at 09:02

## 2018-02-06 RX ADMIN — HYDRALAZINE HYDROCHLORIDE 100 MG: 50 TABLET ORAL at 05:02

## 2018-02-06 RX ADMIN — ISOSORBIDE DINITRATE 40 MG: 40 TABLET ORAL at 05:02

## 2018-02-06 RX ADMIN — ISOSORBIDE DINITRATE 40 MG: 40 TABLET ORAL at 01:02

## 2018-02-06 RX ADMIN — PANTOPRAZOLE SODIUM 40 MG: 40 TABLET, DELAYED RELEASE ORAL at 05:02

## 2018-02-06 RX ADMIN — Medication 1 CAPSULE: at 08:02

## 2018-02-06 RX ADMIN — DOCUSATE SODIUM 100 MG: 100 CAPSULE, LIQUID FILLED ORAL at 09:02

## 2018-02-06 RX ADMIN — ALLOPURINOL 300 MG: 300 TABLET ORAL at 09:02

## 2018-02-06 RX ADMIN — CEFEPIME 2 G: 2 INJECTION, POWDER, FOR SOLUTION INTRAVENOUS at 01:02

## 2018-02-06 RX ADMIN — ISOSORBIDE DINITRATE 40 MG: 40 TABLET ORAL at 09:02

## 2018-02-06 RX ADMIN — AMLODIPINE BESYLATE 10 MG: 10 TABLET ORAL at 08:02

## 2018-02-06 RX ADMIN — DOXAZOSIN MESYLATE 8 MG: 2 TABLET ORAL at 09:02

## 2018-02-06 RX ADMIN — DOFETILIDE 250 MCG: 0.25 CAPSULE ORAL at 09:02

## 2018-02-06 RX ADMIN — CARBIDOPA AND LEVODOPA 1 TABLET: 25; 100 TABLET ORAL at 09:02

## 2018-02-06 RX ADMIN — BUPROPION HYDROCHLORIDE 450 MG: 150 TABLET, EXTENDED RELEASE ORAL at 08:02

## 2018-02-06 RX ADMIN — LISINOPRIL 20 MG: 20 TABLET ORAL at 09:02

## 2018-02-06 RX ADMIN — WARFARIN SODIUM 6 MG: 2 TABLET ORAL at 05:02

## 2018-02-06 RX ADMIN — LISINOPRIL 20 MG: 20 TABLET ORAL at 08:02

## 2018-02-06 RX ADMIN — HYDRALAZINE HYDROCHLORIDE 100 MG: 50 TABLET ORAL at 09:02

## 2018-02-06 RX ADMIN — HYDRALAZINE HYDROCHLORIDE 100 MG: 50 TABLET ORAL at 01:02

## 2018-02-06 RX ADMIN — CARBIDOPA AND LEVODOPA 1 TABLET: 25; 100 TABLET ORAL at 01:02

## 2018-02-06 RX ADMIN — DOFETILIDE 250 MCG: 0.25 CAPSULE ORAL at 08:02

## 2018-02-06 RX ADMIN — CARBIDOPA AND LEVODOPA 1 TABLET: 25; 100 TABLET ORAL at 08:02

## 2018-02-06 NOTE — PLAN OF CARE
Problem: SLP Goal  Goal: SLP Goal  Speech Therapy Short Term Goals  Goal expected to be met by 2/4  1. Pt will tolerate a regular diet and NECTAR THICK liquids with no overt s/s of aspiration.   2. Pt will tolerate cup sips of thin liquids x10 with no overt s/s of aspiration.   3. Pt will participate in a modified barium swallow study as warranted per MD and SLP. MET        Outcome: Ongoing (interventions implemented as appropriate)  Pt is tolerating current diet.  Aspiration precautions updated on pt whiteboard to include avoidance of popsicles, jello, ice cream, italian ices, and sherbet for pt safety.  Cont ST per POC.    Alesia Bar, RIINEO-SLP  2/6/2018

## 2018-02-06 NOTE — PLAN OF CARE
Problem: Patient Care Overview  Goal: Plan of Care Review  Outcome: Ongoing (interventions implemented as appropriate)  Pt free of falls and injury throughout the shift. Skin is CDI; VSS, NAD. LVAD working properly throughout the shift with no complication. Dressing for DLES is CDI and due to be changed everyday with soap and water. IV abx given per order. Pt to dc today. POC reviewed and questions answered. Pt tolerating plan of care.

## 2018-02-06 NOTE — PROGRESS NOTES
[facility]  Adult Nutrition  Progress Note    SUMMARY     Recommendations    Recommendation/Intervention:   1. Continue current cardiac diet with honey thick liquids per SLP.   2. RD following.    Goals: Intake >/=85% EEN/EPN with good tolerance  Nutrition Goal Status: goal met  Communication of RD Recs: reviewed with RN    Reason for Assessment    Reason for Assessment: RD follow-up  Diagnosis:  (vomiting, delirium)  Relevant Medical History: CHF s/p LVAD (10/2016), s/p AICD, ICM, CAD, CKD3   Interdisciplinary Rounds: attended     General Information Comments: Pt with documented intake of 100% of meals. Awaiting antiobiotic regimen for discharge.    Nutrition Discharge Planning: Adequate PO intake on cardiac diet    Nutrition Prescription Ordered    Current Diet Order: Cardiac, 1500 ml FR  Nutrition Order Comments: Honey thick liquids                 Evaluation of Received Nutrients/Fluid Intake                                                                                               Comments: LBM 2/5 x 3     % Intake of Estimated Energy Needs: 75 - 100 %  % Meal Intake: 100%     Nutrition Risk Screen     Nutrition Risk Screen: no indicators present    Nutrition/Diet History    Patient Reported Diet/Restrictions/Preferences: low salt  Typical Food/Fluid Intake: Pt reports fair PO intake. Not eating turkey sausage or eggs for breakfast because he doesn't like them but he is eating pancakes. Currently NPO for MBSS. On thickened liquids.   Food Preferences: No cultural/Gnosticist preferences noted.        Factors Affecting Nutritional Intake: other (see comments) (food preferences)                Labs/Tests/Procedures/Meds       Pertinent Labs Reviewed: reviewed, pertinent  Pertinent Labs Comments: BUN 24, Crt 1.9, GFR 33.7  Pertinent Medications Reviewed: reviewed, pertinent  Pertinent Medications Comments: statin, docusate, folic acid, lactobacillus, Mg, pantoprazole, polyethylene glycol,  "warfarin    Physical Findings    Overall Physical Appearance: nourished, weak     Oral/Mouth Cavity: WDL  Skin: intact    Anthropometrics    Temp: 98.2 °F (36.8 °C)     Height: 5' 10" (177.8 cm)  Weight Method: Standard Scale  Weight: 81.6 kg (179 lb 14.3 oz)  Ideal Body Weight (IBW), Male: 166 lb     % Ideal Body Weight, Male (lb): 108.37 lb     BMI (Calculated): 25.9  BMI Grade: 25 - 29.9 - overweight                            Estimated/Assessed Needs    Weight Used For Calorie Calculations: 85.7 kg (188 lb 15 oz)      Energy Calorie Requirements (kcal): 1956  Energy Need Method: Neon-St Jeor (x 1.2 (PAL))      RMR (Neon-St. Jeor Equation): 1630        Weight Used For Protein Calculations: 85.7 kg (188 lb 15 oz)  Protein Requirements: 86 gm (1.0 gm/kg)  Fluid Requirements (mL): per MD           RDA Method (mL): 1956               Assessment and Plan    * Vomiting    Nutrition Problem  Altered GI Function     Related to (etiology):   Chronic vomiting     Signs and Symptoms (as evidenced by):   Vomiting increasing to 3-4x daily over past several months      Interventions/Recommendations (treatment strategy):  See recs.     Nutrition Diagnosis Status:   Improving            Monitor and Evaluation    Food and Nutrient Intake: energy intake, food and beverage intake  Food and Nutrient Adminstration: diet order     Physical Activity and Function: nutrition-related ADLs and IADLs  Anthropometric Measurements: weight, weight change, body mass index  Biochemical Data, Medical Tests and Procedures: electrolyte and renal panel, gastrointestinal profile, glucose/endocrine profile, inflammatory profile  Nutrition-Focused Physical Findings: overall appearance    Nutrition Risk    Level of Risk: other (see comments) (1X/week)    Nutrition Follow-Up    RD Follow-up?: Yes  "

## 2018-02-06 NOTE — ASSESSMENT & PLAN NOTE
Nutrition Problem  Altered GI Function     Related to (etiology):   Chronic vomiting     Signs and Symptoms (as evidenced by):   Vomiting increasing to 3-4x daily over past several months      Interventions/Recommendations (treatment strategy):  See recs.     Nutrition Diagnosis Status:   Resolved

## 2018-02-06 NOTE — PLAN OF CARE
Problem: Physical Therapy Goal  Goal: Physical Therapy Goal  Goals to be met by: 2/10/18    Patient will increase functional independence with mobility by performin. Supine to sit with SBA  2. Sit to supine with SBA  3. Sit to stand transfer with Supervision using RW (SBA met )  4. Gait x150 feet with Contact Guard Assistance using Rolling Walker   5. Standing balance x10 min with SBA using AD or no AD while performing functional tasks   6. Lower extremity exercise program x20 reps per handout, with assistance as needed        Outcome: Ongoing (interventions implemented as appropriate)  Goals updated and appropriate to reflect pt's current mobility needs. Pt limited this session 2/2 fatigue, malaise - previous episode of vomiting.    Carmencita Doherty, PT, DPT  [phone]  2018

## 2018-02-06 NOTE — HPI
76 yo male with history of LVAD and known DLES infection with pseudomonas who presented to the ED with vomiting .  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  He was seen in Dec 2017 and started on cefepime to complete 6 weeks which he has doneone.  His wife (who gives most of the history) says his DLES site drainage has not improved significantly on cefepime but denies there is redness or tenderness.  Prior to admit they deny any infectious issues, fever, chills or sweats.

## 2018-02-06 NOTE — PROGRESS NOTES
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 Denies having any fever, chills or night sweats at home. driveline drainage unchanged per wife. driveline re cultured yesterday considering cultures may be resistant to cefepime.  Currently on cefepime [id number].   -gram stain negative. Cultures no growth.   -blood cultures 1/24 NGTD.       Plan  1.Continue IV cefepime [id number]. Cultures no growth to date. Will follow until tomorrow for further recommendations.  2. If cultures remain negative, recommend continuing IV cefepime for additional 2 weeks from today (end date 2/20/18). Will need close follow up with ID once discharge.   3. Pt appears stable, nonseptic, afebrile. Will follow cultures tomorrow with further recommendations.               Thank you for your consult. I will follow-up with patient. Please contact us if you have any additional questions.    Aura Spence PA-C  Infectious Disease  [facility] Pgr [phone]    Subjective:     Principal Problem:Vomiting    HPI: 78 yo male with history of LVAD and known DLES infection with pseudomonas who presented to the ED with vomiting .  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  He was seen in Dec 2017 and started on cefepime to complete 6 weeks which he has doneone.  His wife (who gives most of the history) says his DLES site drainage has not improved significantly on cefepime but denies there is redness or tenderness.  Prior to admit they deny any infectious issues, fever, chills or sweats.    Interval History:   NAEON. Stable. Afebrile. Per daughter at bedside, had episode of vomiting but no nausea (2/2 eating meals quickly)   Denies having any fever, chills, or night sweats. Cultures negative to date.     Review of Systems   Constitutional: Negative for appetite change, chills, diaphoresis, fatigue, fever and unexpected weight change.   HENT: Negative for congestion, ear pain, sore throat and tinnitus.    Eyes: Negative for pain, redness and visual disturbance.   Respiratory: Negative for cough, shortness of breath and wheezing.    Cardiovascular: Negative for chest pain, palpitations and leg swelling.   Gastrointestinal: Positive for vomiting. Negative for abdominal pain, constipation, diarrhea and rectal pain.   Endocrine: Negative for cold intolerance and heat intolerance.   Genitourinary: Negative for dysuria, flank pain, frequency, hematuria and urgency.   Musculoskeletal: Negative for arthralgias, back pain, myalgias and neck pain.   Skin: Positive for wound. Negative for rash.   Allergic/Immunologic: Negative for immunocompromised state.   Neurological: Negative for dizziness, light-headedness, numbness and headaches.   Hematological: Negative for adenopathy. Does not bruise/bleed easily.   Psychiatric/Behavioral: Negative for confusion and sleep disturbance. The patient is not nervous/anxious.      Objective:     Vital Signs (Most Recent):  Temp: 98.2 °F (36.8 °C) (02/06/18 1226)  Pulse: 71 (02/06/18 1500)  Resp: 18 (02/06/18 1226)  BP: (!) 84/0 (02/06/18 1226)  SpO2: 95 % (02/06/18 1226) Vital Signs (24h Range):  Temp:  [97 °F (36.1 °C)-98.6 °F (37 °C)] 98.2 °F (36.8 °C)  Pulse:  [65-82] 71  Resp:  [16-18] 18  SpO2:  [93 %-95 %] 95 %  BP: ()/(0-75) 84/0     Weight: 81.6 kg (179 lb 14.3 oz)  Body mass index is 25.81 kg/m².    Estimated Creatinine Clearance: 34.7 mL/min (based on SCr of 1.9 mg/dL (H)).    Physical Exam   Constitutional: He is oriented to person, place, and time. He  appears well-developed and well-nourished. No distress.       HENT:   Head: Normocephalic.   Mouth/Throat: Uvula is midline and mucous membranes are normal. No oral lesions.   Eyes: Pupils are equal, round, and reactive to light. Right eye exhibits no discharge. No scleral icterus.   Cardiovascular: Normal rate and regular rhythm.  Exam reveals no gallop and no friction rub.    No murmur heard.  LVAD sounds   Pulmonary/Chest: Effort normal and breath sounds normal. No respiratory distress. He has no wheezes.   Abdominal: Soft. Bowel sounds are normal. He exhibits no distension. There is no hepatosplenomegaly. There is no tenderness.   Musculoskeletal: He exhibits edema.   Neurological: He is alert and oriented to person, place, and time.   Skin: Skin is warm, dry and intact.           Significant Labs:   Blood Culture:   Recent Labs  Lab 08/30/17  0048 01/24/18  1521 01/24/18  1537   LABBLOO No growth after 5 days. No growth after 5 days. No growth after 5 days.     CBC:   Recent Labs  Lab 02/05/18  0533 02/06/18  0433   WBC 5.66 6.04   HGB 10.1* 10.0*   HCT 30.0* 30.6*   * 104*     CMP:   Recent Labs  Lab 02/05/18  0533 02/06/18  0433    142   K 4.0 4.0    109   CO2 27 27   GLU 86 80   BUN 22 24*   CREATININE 1.9* 1.9*   CALCIUM 10.5 10.6*   PROT 6.3  --    ALBUMIN 3.0*  --    BILITOT 0.3  --    ALKPHOS 70  --    AST 23  --    ALT 8*  --    ANIONGAP 6* 6*   EGFRNONAA 33.7* 33.7*     Wound Culture:   Recent Labs  Lab 08/30/17  1519 09/12/17  1500 12/14/17  1433 02/05/18  1650   LABAERO PSEUDOMONAS AERUGINOSAFew PSEUDOMONAS AERUGINOSAMany PSEUDOMONAS AERUGINOSAModerate No growth     All pertinent labs within the past 24 hours have been reviewed.    Significant Imaging: I have reviewed all pertinent imaging results/findings within the past 24 hours.

## 2018-02-06 NOTE — NURSING
LVAD dressing change completed using sterile technique with soap and water by RN. DLES is a 2 with moderate creamy brownish drainage noted on the drain sponge, reddish around DLES. Tolerated without any complication. No tenderness noted.

## 2018-02-06 NOTE — ASSESSMENT & PLAN NOTE
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 Denies having any fever, chills or night sweats at home. driveline drainage unchanged per wife. driveline re cultured yesterday considering cultures may be resistant to cefepime.  Currently on cefepime [id number].   -gram stain negative. Cultures no growth.   -blood cultures 1/24 NGTD.       Plan  1.Continue IV cefepime [id number]. Cultures no growth to date. Will follow until tomorrow for further recommendations.  2. If cultures remain negative, recommend continuing IV cefepime for additional 2 weeks from today (end date 2/20/18). Will need close follow up with ID once discharge.   3. Pt appears stable, nonseptic, afebrile. Will follow cultures tomorrow with further recommendations.

## 2018-02-06 NOTE — SUBJECTIVE & OBJECTIVE
Interval History:   NAEON. Stable. Afebrile. Per daughter at bedside, had episode of vomiting but no nausea (2/2 eating meals quickly)   Denies having any fever, chills, or night sweats. Cultures negative to date.     Review of Systems   Constitutional: Negative for appetite change, chills, diaphoresis, fatigue, fever and unexpected weight change.   HENT: Negative for congestion, ear pain, sore throat and tinnitus.    Eyes: Negative for pain, redness and visual disturbance.   Respiratory: Negative for cough, shortness of breath and wheezing.    Cardiovascular: Negative for chest pain, palpitations and leg swelling.   Gastrointestinal: Positive for vomiting. Negative for abdominal pain, constipation, diarrhea and rectal pain.   Endocrine: Negative for cold intolerance and heat intolerance.   Genitourinary: Negative for dysuria, flank pain, frequency, hematuria and urgency.   Musculoskeletal: Negative for arthralgias, back pain, myalgias and neck pain.   Skin: Positive for wound. Negative for rash.   Allergic/Immunologic: Negative for immunocompromised state.   Neurological: Negative for dizziness, light-headedness, numbness and headaches.   Hematological: Negative for adenopathy. Does not bruise/bleed easily.   Psychiatric/Behavioral: Negative for confusion and sleep disturbance. The patient is not nervous/anxious.      Objective:     Vital Signs (Most Recent):  Temp: 98.2 °F (36.8 °C) (02/06/18 1226)  Pulse: 71 (02/06/18 1500)  Resp: 18 (02/06/18 1226)  BP: (!) 84/0 (02/06/18 1226)  SpO2: 95 % (02/06/18 1226) Vital Signs (24h Range):  Temp:  [97 °F (36.1 °C)-98.6 °F (37 °C)] 98.2 °F (36.8 °C)  Pulse:  [65-82] 71  Resp:  [16-18] 18  SpO2:  [93 %-95 %] 95 %  BP: ()/(0-75) 84/0     Weight: 81.6 kg (179 lb 14.3 oz)  Body mass index is 25.81 kg/m².    Estimated Creatinine Clearance: 34.7 mL/min (based on SCr of 1.9 mg/dL (H)).    Physical Exam   Constitutional: He is oriented to person, place, and time. He appears  well-developed and well-nourished. No distress.       HENT:   Head: Normocephalic.   Mouth/Throat: Uvula is midline and mucous membranes are normal. No oral lesions.   Eyes: Pupils are equal, round, and reactive to light. Right eye exhibits no discharge. No scleral icterus.   Cardiovascular: Normal rate and regular rhythm.  Exam reveals no gallop and no friction rub.    No murmur heard.  LVAD sounds   Pulmonary/Chest: Effort normal and breath sounds normal. No respiratory distress. He has no wheezes.   Abdominal: Soft. Bowel sounds are normal. He exhibits no distension. There is no hepatosplenomegaly. There is no tenderness.   Musculoskeletal: He exhibits edema.   Neurological: He is alert and oriented to person, place, and time.   Skin: Skin is warm, dry and intact.           Significant Labs:   Blood Culture:   Recent Labs  Lab 08/30/17  0048 01/24/18  1521 01/24/18  1537   LABBLOO No growth after 5 days. No growth after 5 days. No growth after 5 days.     CBC:   Recent Labs  Lab 02/05/18  0533 02/06/18  0433   WBC 5.66 6.04   HGB 10.1* 10.0*   HCT 30.0* 30.6*   * 104*     CMP:   Recent Labs  Lab 02/05/18  0533 02/06/18  0433    142   K 4.0 4.0    109   CO2 27 27   GLU 86 80   BUN 22 24*   CREATININE 1.9* 1.9*   CALCIUM 10.5 10.6*   PROT 6.3  --    ALBUMIN 3.0*  --    BILITOT 0.3  --    ALKPHOS 70  --    AST 23  --    ALT 8*  --    ANIONGAP 6* 6*   EGFRNONAA 33.7* 33.7*     Wound Culture:   Recent Labs  Lab 08/30/17  1519 09/12/17  1500 12/14/17  1433 02/05/18  1650   LABAERO PSEUDOMONAS AERUGINOSAFew PSEUDOMONAS AERUGINOSAMany PSEUDOMONAS AERUGINOSAModerate No growth     All pertinent labs within the past 24 hours have been reviewed.    Significant Imaging: I have reviewed all pertinent imaging results/findings within the past 24 hours.

## 2018-02-06 NOTE — CONSULTS
[facility]  Infectious Disease  Consult Note    Patient Name: Kev Vasquez  MRN: [id number]  Admission Date: 1/24/2018  Hospital Length of Stay: 12 days  Attending Physician: Karen Valladares MD  Primary Care Provider: Mega Collins MD     Isolation Status: No active isolations    Patient information was obtained from patient, spouse/SO and records and caregiver.      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 I will follow-up with patient. Please contact us if you have any additional questions.    SHARONA Zuluaga  Infectious Disease  [facility]    Subjective:     Principal Problem: Vomiting    HPI: 78 yo male with history of LVAD and known DLES infection with pseudomonas who presented to the ED with vomiting .  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  He was seen in Dec 2017 and started on cefepime to complete 6 weeks which he has doneone.  His wife (who gives most of the history) says his DLES site drainage has not improved significantly on cefepime but denies there is redness or tenderness.  Prior to admit they deny any infectious issues, fever, chills or sweats.     Past Medical History:   Diagnosis Date    AICD (automatic cardioverter/defibrillator) present 2014    [employer]    Chronic anticoagulation 7/21/2017    Chronic combined systolic and diastolic congestive heart failure 7/27/2015 11-16-17   1 - Moderate left ventricular enlargement.    2 - Severely depressed left ventricular systolic function (EF 15-20%).    3 - Impaired LV relaxation, normal LAP (grade 1  diastolic dysfunction).    4 - Biatrial enlargement.    5 - Right ventricle is upper limit of normal in size with not well seen systolic function.    6 - Severe tricuspid regurgitation.    7 - Increased central venous pressure.    8 - The estimated PA systolic pressure is 40 mmHg.    9 - Heartmate III LVAD; speed 5800.     Complication involving left ventricular assist device (LVAD) 7/29/2017    Coronary artery disease involving native coronary artery of native heart without angina pectoris 7/27/2015    Gait instability     Hypertensive urgency, malignant 11/15/2017    ICD (implantable cardioverter-defibrillator), biventricular, in situ 7/27/2015    Ischemic cardiomyopathy 7/20/2017    S/p HMIII     Kidney stones     LVAD (left ventricular assist device) present 7/20/2017    status post Heartmate III 10/16/16 LVAD    HILLARY on CPAP 7/27/2015    Peripheral neuropathy     Pulmonary hypertension due to left ventricular systolic dysfunction 7/29/2015    Restless leg syndrome 1992    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    Skin cancer     excision 2013    Skin yeast infection 8/31/2017    Stage 3 chronic kidney disease 7/20/2017    Syncope 7/20/2017    Ventricular tachycardia 7/25/2017       Past Surgical History:   Procedure Laterality Date    CARDIAC PACEMAKER PLACEMENT      pacemaker previously, then AICD in 2006. AICD [employer] serial #[id number]    CORONARY ARTERY BYPASS GRAFT  1993    KNEE SURGERY Left 2001    complicated by infection, hardware had to be taken out and replaced    LEFT VENTRICULAR ASSIST DEVICE  10/19/2016       Review of patient's allergies indicates:   Allergen Reactions    Aldactone [spironolactone]       persistent hyperkalemia.    Sulfa (sulfonamide antibiotics)        Medications:  Prescriptions Prior to Admission   Medication Sig    allopurinol (ZYLOPRIM) 300 MG tablet Take 300 mg by mouth once daily.    amLODIPine (NORVASC) 10 MG tablet Take 1 tablet  (10 mg total) by mouth once daily.    aspirin 81 MG Chew Take 81 mg by mouth once daily.    atorvastatin (LIPITOR) 10 MG tablet Take 10 mg by mouth once daily.    buPROPion (WELLBUTRIN XL) 300 MG 24 hr tablet Take 1 tablet (300 mg total) by mouth once daily. (Patient taking differently: Take 450 mg by mouth once daily. )    calcium-vitamin D tablet 600 mg-200 units Take 1 tablet by mouth once daily.    CEFEPIME HCL (CEFEPIME 2 G/50 ML D5W, READY TO MIX SYSTEM,) Inject 2 g into the vein 2 (two) times daily.    docusate sodium (COLACE) 100 MG capsule Take 100 mg by mouth daily as needed for Constipation.    dofetilide (TIKOSYN) 250 MCG Cap Take 1 capsule (250 mcg total) by mouth every 12 (twelve) hours.    famotidine (PEPCID) 20 MG tablet Take 2 tablets (40 mg total) by mouth every evening.    ferrous sulfate 324 mg (65 mg iron) TbEC Take 1 tablet (324 mg total) by mouth once daily.    folic acid (FOLVITE) 1 MG tablet Take 1 mg by mouth once daily.    furosemide (LASIX) 80 MG tablet Take orally daily on Monday, Wednesday and Friday as directed    hydrALAZINE (APRESOLINE) 100 MG tablet Take 1 tablet (100 mg total) by mouth every 8 (eight) hours.    isosorbide dinitrate (ISORDIL) 20 MG tablet Take 40 mg by mouth 3 (three) times daily.     Lactobacillus rhamnosus GG (CULTURELLE) 10 billion cell capsule Take 1 capsule by mouth once daily.    lisinopril (PRINIVIL,ZESTRIL) 20 MG tablet Take 1 tablet (20 mg total) by mouth 2 (two) times daily.    magnesium oxide (MAG-OX) 400 mg tablet Take 1 tablet (400 mg total) by mouth 2 (two) times daily.    multivitamin capsule Take 1 capsule by mouth once daily.    promethazine (PHENERGAN) 12.5 MG Tab Take 1 tablet (12.5 mg total) by mouth every 6 (six) hours as needed.    rOPINIRole (REQUIP) 0.5 MG tablet Take 0.5 mg by mouth 3 (three) times daily.    warfarin (COUMADIN) 4 MG tablet Take 4 mg orally on Sun, Tue, Thurs and 6 mg orally on other days as directed by  coumadin clinic    warfarin (COUMADIN) 6 MG tablet      Antibiotics     Start     Stop Route Frequency Ordered    01/24/18 2100  ceFEPIme injection 2 g      -- IV Every 12 hours (non-standard times) 01/24/18 1959        Antifungals     None        Antivirals     None           Immunization History   Administered Date(s) Administered    Influenza - High Dose 10/10/2017       Family History     Problem Relation (Age of Onset)    Cancer Daughter (50)    Diabetes Daughter    Heart disease Daughter        Social History     Social History    Marital status:      Spouse name: N/A    Number of children: N/A    Years of education: N/A     Social History Main Topics    Smoking status: Never Smoker    Smokeless tobacco: Never Used    Alcohol use No    Drug use: No    Sexual activity: Not Asked      Comment:      Other Topics Concern    None     Social History Narrative    , lives in [place]. Retired .      Review of Systems   Constitutional: Negative for appetite change, chills, diaphoresis, fatigue, fever and unexpected weight change.   HENT: Negative for congestion, ear pain, sore throat and tinnitus.    Eyes: Negative for pain, redness and visual disturbance.   Respiratory: Positive for cough. Negative for shortness of breath and wheezing.    Cardiovascular: Negative for chest pain, palpitations and leg swelling.   Gastrointestinal: Positive for vomiting. Negative for abdominal pain, constipation, diarrhea and rectal pain.   Endocrine: Negative for cold intolerance and heat intolerance.   Genitourinary: Negative for dysuria, flank pain, frequency, hematuria and urgency.   Musculoskeletal: Negative for arthralgias, back pain, myalgias and neck pain.   Skin: Positive for wound. Negative for rash.   Allergic/Immunologic: Negative for immunocompromised state.   Neurological: Negative for dizziness, light-headedness, numbness and headaches.   Hematological: Negative  for adenopathy. Does not bruise/bleed easily.   Psychiatric/Behavioral: Negative for confusion and sleep disturbance. The patient is not nervous/anxious.              Objective:     Vital Signs (Most Recent):  Temp: 98.2 °F (36.8 °C) (02/05/18 2015)  Pulse: 71 (02/05/18 2015)  Resp: 16 (02/05/18 2015)  BP: (!) 70/0 (02/05/18 1556)  SpO2: 95 % (02/05/18 2015) Vital Signs (24h Range):  Temp:  [96.3 °F (35.7 °C)-98.7 °F (37.1 °C)] 98.2 °F (36.8 °C)  Pulse:  [67-83] 71  Resp:  [15-16] 16  SpO2:  [94 %-97 %] 95 %  BP: ()/(0-92) 70/0     Weight: 80.5 kg (177 lb 7.5 oz)  Body mass index is 25.46 kg/m².    Estimated Creatinine Clearance: 34.7 mL/min (based on SCr of 1.9 mg/dL (H)).    Physical Exam   Constitutional: He is oriented to person, place, and time. He appears well-developed and well-nourished. No distress.       HENT:   Head: Normocephalic and atraumatic.   Mouth/Throat: Uvula is midline, oropharynx is clear and moist and mucous membranes are normal. No oral lesions.   Eyes: Conjunctivae and EOM are normal. Pupils are equal, round, and reactive to light. No scleral icterus.   Neck: Normal range of motion.   Cardiovascular: Normal rate and regular rhythm.  Exam reveals no gallop and no friction rub.    No murmur heard.  LVAD sounds   Pulmonary/Chest: Effort normal and breath sounds normal. No respiratory distress. He has no wheezes. He has no rales.   Abdominal: Soft. Bowel sounds are normal. He exhibits no distension and no mass. There is no hepatosplenomegaly. There is no tenderness. There is no rebound and no guarding.   Musculoskeletal: He exhibits edema.   Lymphadenopathy:        Head (right side): No submental, no submandibular, no tonsillar, no preauricular, no posterior auricular and no occipital adenopathy present.        Head (left side): No submental, no submandibular, no tonsillar, no preauricular, no posterior auricular and no occipital adenopathy present.     He has no cervical adenopathy.      He has no axillary adenopathy.        Right: No inguinal, no supraclavicular and no epitrochlear adenopathy present.        Left: No inguinal, no supraclavicular and no epitrochlear adenopathy present.   Neurological: He is alert and oriented to person, place, and time.   Skin: Skin is warm, dry and intact. No rash noted.   Psychiatric: He has a normal mood and affect.       Significant Labs:   Blood Culture:   Recent Labs  Lab 08/30/17  0048 01/24/18  1521 01/24/18  1537   LABBLOO No growth after 5 days. No growth after 5 days. No growth after 5 days.     CBC:   Recent Labs  Lab 02/04/18  0523 02/05/18  0533   WBC 6.02 5.66   HGB 10.3* 10.1*   HCT 30.8* 30.0*   * 116*     CMP:   Recent Labs  Lab 02/04/18  0523 02/05/18  0533    140   K 3.8 4.0    107   CO2 26 27   GLU 82 86   BUN 19 22   CREATININE 1.7* 1.9*   CALCIUM 10.5 10.5   PROT  --  6.3   ALBUMIN  --  3.0*   BILITOT  --  0.3   ALKPHOS  --  70   AST  --  23   ALT  --  8*   ANIONGAP 7* 6*   EGFRNONAA 38.6* 33.7*     Wound Culture:   Recent Labs  Lab 08/30/17  1519 09/12/17  1500 12/14/17  1433   LABAERO PSEUDOMONAS AERUGINOSAFew PSEUDOMONAS AERUGINOSAMany PSEUDOMONAS AERUGINOSAModerate     All pertinent labs within the past 24 hours have been reviewed.    Significant Imaging: I have reviewed all pertinent imaging results/findings within the past 24 hours.   X-Ray Chest 1 View for PICC_Central line [[id number]] Resulted: 02/05/18 1307   Order Status: Completed Updated: 02/05/18 1308   Narrative:     Right-sided PICC line in SVC.  The other support devices as before.  No significant air space consolidation.   Impression:      See above      Electronically signed by: Jseus Lind MD  Date: 02/05/18  Time: 13:07    X-Ray Chest 1 View for PICC_Central line [[id number]] Resulted: 02/05/18 0911   Order Status: Completed Updated: 02/05/18 0912   Narrative:     Single view.  Comparison 01/24/2018.    There is a right arm PICC line, with tip in the right  subclavian vein.  Pacemaker overlies left chest laterally.  There are surgical changes of sternotomy and LVAD.  Cardiac size is stable.  No focal consolidation, evaluation of left lung base more limited.   Impression:      As above      Electronically signed by: Dari Noble MD  Date: 02/05/18  Time: 09:11    FL Lumbar Puncture (xpd) [[id number]] Resulted: 02/01/18 1408   Order Status: Completed Updated: 02/01/18 1408   Narrative:     LUMBAR PUNCTURE UNDER FLUOROSCOPIC GUIDANCE    INDICATION: Rule out NPH    TECHNIQUE:  Consent was signed. The patient was placed prone, prepped and draped in sterile fashion. Using fluoroscopic guidance, the L2-3 interspace was identified and a 22 gauge spinal needle was used to access the intrathecal space using an interlaminar approach. Following removal of the stylet clear cerebral spinal fluid was identified spontaneously. Subsequently stylet was reinserted and the spinal needle was removed and full. Total fluoroscopy time was 0.3 minutes.    FINDINGS:   Opening pressure was 21 cm H20.  13 mL of clear CSF were aspirated and sent to the laboratory for analysis.  Closing pressure was 15 cm H2O.  There were no immediate complications.   Impression:       Successful fluoroscopic guided lumbar puncture as detailed above.   ______________________________________     Electronically signed by resident: HITESH ADHIKARI  Date: 02/01/18  Time: 11:33            As the supervising and teaching physician, I personally reviewed the images and resident's interpretation and I agree with the findings.            Electronically signed by: ANTWON KAM DO  Date: 02/01/18  Time: 14:08    Fl Modified Barium Swallow Speech [[id number]] Resulted: 01/31/18 1551   Order Status: Completed Updated: 01/31/18 1551   Narrative:     Indication: Vomiting, concern for aspiration.    Comparison: No priors.    Fluoroscopy time 2.3 minutes    Findings:    Video fluoroscopic swallowing examination was performed in  conjunction with the speech pathology department.  Various liquid and solid food substances were used to assess swallowing.  Silent penetration to the level the vocal cords was observed with nectar thick liquids.  No penetration seen with thin or nectar thick liquids when patient utilized chin tuck maneuver.  No aspiration observed.  Please see speech pathology report for further details.   Impression:      As above.  See speech pathology report for further details.  ______________________________________     Electronically signed by resident: HITESH ADHIKARI  Date: 01/31/18  Time: 13:53            As the supervising and teaching physician, I personally reviewed the images and resident's interpretation and I agree with the findings.            Electronically signed by: CRESCENCIO BARNES MD  Date: 01/31/18  Time: 15:51

## 2018-02-06 NOTE — PROCEDURES
Patient awake with family at bedside. VAD interrogation completed this AM in the event changes needed to be made. Will continue to monitor for further issues.     Pulsatile: Yes, intermittent   VAD Sounds: HM3  Smooth  Problems / Issues / Alarms with VAD if any: None noted  HCT: 30      VAD Interrogation:  TXP LVAD INTERROGATIONS 2/6/2018 2/6/2018 2/6/2018 2/5/2018 2/5/2018 2/5/2018 2/5/2018   Type HeartMate3 - - - HeartMate3 HeartMate3 HeartMate3   Flow 4.2 4.8 5.0 5.0 5.4 5 5.1   Speed 5800 5800 5800 5800 5800 8500 8500   PI 3.1 3.8 3.1 3.4 3.2 3.1 3.2   Power (Mendez) 4.5 4.5 3.4 4.4 4.6 4.5 4.4   LSL 5400 - - - 5400 5400 5400   Pulsatility Pulse - - - - - Pulse   }

## 2018-02-06 NOTE — PLAN OF CARE
Problem: Patient Care Overview  Goal: Plan of Care Review  Outcome: Ongoing (interventions implemented as appropriate)  Plan of care reviewed with patient. Patient remains free from fall, trauma and injury. LVAD working properly and sounds smooth. MAP within normal limits. LVAD dressing remains clean, dry and intact, no drainage noted. INR is 2.3. Awaiting ID culture results. Cefepime q12h. Patient tolerating plan of care well. Will continue to monitor.

## 2018-02-06 NOTE — ASSESSMENT & PLAN NOTE
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

## 2018-02-06 NOTE — SUBJECTIVE & OBJECTIVE
Past Medical History:   Diagnosis Date    AICD (automatic cardioverter/defibrillator) present 2014    [employer]    Chronic anticoagulation 7/21/2017    Chronic combined systolic and diastolic congestive heart failure 7/27/2015 11-16-17   1 - Moderate left ventricular enlargement.    2 - Severely depressed left ventricular systolic function (EF 15-20%).    3 - Impaired LV relaxation, normal LAP (grade 1 diastolic dysfunction).    4 - Biatrial enlargement.    5 - Right ventricle is upper limit of normal in size with not well seen systolic function.    6 - Severe tricuspid regurgitation.    7 - Increased central venous pressure.    8 - The estimated PA systolic pressure is 40 mmHg.    9 - Heartmate III LVAD; speed 5800.     Complication involving left ventricular assist device (LVAD) 7/29/2017    Coronary artery disease involving native coronary artery of native heart without angina pectoris 7/27/2015    Gait instability     Hypertensive urgency, malignant 11/15/2017    ICD (implantable cardioverter-defibrillator), biventricular, in situ 7/27/2015    Ischemic cardiomyopathy 7/20/2017    S/p HMIII     Kidney stones     LVAD (left ventricular assist device) present 7/20/2017    status post Heartmate III 10/16/16 LVAD    HILLARY on CPAP 7/27/2015    Peripheral neuropathy     Pulmonary hypertension due to left ventricular systolic dysfunction 7/29/2015    Restless leg syndrome 1992    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    Skin cancer     excision 2013    Skin yeast infection 8/31/2017    Stage 3 chronic kidney disease 7/20/2017    Syncope 7/20/2017    Ventricular tachycardia 7/25/2017       Past Surgical History:   Procedure Laterality Date    CARDIAC PACEMAKER PLACEMENT      pacemaker previously, then AICD in 2006. AICD [facility] [employer] serial #[id number]    CORONARY ARTERY BYPASS GRAFT  1993    KNEE SURGERY Left 2001    complicated by infection, hardware had to be taken out and replaced     LEFT VENTRICULAR ASSIST DEVICE  10/19/2016       Review of patient's allergies indicates:   Allergen Reactions    Aldactone [spironolactone]       persistent hyperkalemia.    Sulfa (sulfonamide antibiotics)        Medications:  Prescriptions Prior to Admission   Medication Sig    allopurinol (ZYLOPRIM) 300 MG tablet Take 300 mg by mouth once daily.    amLODIPine (NORVASC) 10 MG tablet Take 1 tablet (10 mg total) by mouth once daily.    aspirin 81 MG Chew Take 81 mg by mouth once daily.    atorvastatin (LIPITOR) 10 MG tablet Take 10 mg by mouth once daily.    buPROPion (WELLBUTRIN XL) 300 MG 24 hr tablet Take 1 tablet (300 mg total) by mouth once daily. (Patient taking differently: Take 450 mg by mouth once daily. )    calcium-vitamin D tablet 600 mg-200 units Take 1 tablet by mouth once daily.    CEFEPIME HCL (CEFEPIME 2 G/50 ML D5W, READY TO MIX SYSTEM,) Inject 2 g into the vein 2 (two) times daily.    docusate sodium (COLACE) 100 MG capsule Take 100 mg by mouth daily as needed for Constipation.    dofetilide (TIKOSYN) 250 MCG Cap Take 1 capsule (250 mcg total) by mouth every 12 (twelve) hours.    famotidine (PEPCID) 20 MG tablet Take 2 tablets (40 mg total) by mouth every evening.    ferrous sulfate 324 mg (65 mg iron) TbEC Take 1 tablet (324 mg total) by mouth once daily.    folic acid (FOLVITE) 1 MG tablet Take 1 mg by mouth once daily.    furosemide (LASIX) 80 MG tablet Take orally daily on Monday, Wednesday and Friday as directed    hydrALAZINE (APRESOLINE) 100 MG tablet Take 1 tablet (100 mg total) by mouth every 8 (eight) hours.    isosorbide dinitrate (ISORDIL) 20 MG tablet Take 40 mg by mouth 3 (three) times daily.     Lactobacillus rhamnosus GG (CULTURELLE) 10 billion cell capsule Take 1 capsule by mouth once daily.    lisinopril (PRINIVIL,ZESTRIL) 20 MG tablet Take 1 tablet (20 mg total) by mouth 2 (two) times daily.    magnesium oxide (MAG-OX) 400 mg tablet Take 1 tablet (400 mg  total) by mouth 2 (two) times daily.    multivitamin capsule Take 1 capsule by mouth once daily.    promethazine (PHENERGAN) 12.5 MG Tab Take 1 tablet (12.5 mg total) by mouth every 6 (six) hours as needed.    rOPINIRole (REQUIP) 0.5 MG tablet Take 0.5 mg by mouth 3 (three) times daily.    warfarin (COUMADIN) 4 MG tablet Take 4 mg orally on Sun, Tue, Thurs and 6 mg orally on other days as directed by coumadin clinic    warfarin (COUMADIN) 6 MG tablet      Antibiotics     Start     Stop Route Frequency Ordered    01/24/18 2100  ceFEPIme injection 2 g      -- IV Every 12 hours (non-standard times) 01/24/18 1959        Antifungals     None        Antivirals     None           Immunization History   Administered Date(s) Administered    Influenza - High Dose 10/10/2017       Family History     Problem Relation (Age of Onset)    Cancer Daughter (50)    Diabetes Daughter    Heart disease Daughter        Social History     Social History    Marital status:      Spouse name: N/A    Number of children: N/A    Years of education: N/A     Social History Main Topics    Smoking status: Never Smoker    Smokeless tobacco: Never Used    Alcohol use No    Drug use: No    Sexual activity: Not Asked      Comment:      Other Topics Concern    None     Social History Narrative    , lives in [place]. Retired .      Review of Systems   Constitutional: Negative for appetite change, chills, diaphoresis, fatigue, fever and unexpected weight change.   HENT: Negative for congestion, ear pain, sore throat and tinnitus.    Eyes: Negative for pain, redness and visual disturbance.   Respiratory: Positive for cough. Negative for shortness of breath and wheezing.    Cardiovascular: Negative for chest pain, palpitations and leg swelling.   Gastrointestinal: Positive for vomiting. Negative for abdominal pain, constipation, diarrhea and rectal pain.   Endocrine: Negative for cold  intolerance and heat intolerance.   Genitourinary: Negative for dysuria, flank pain, frequency, hematuria and urgency.   Musculoskeletal: Negative for arthralgias, back pain, myalgias and neck pain.   Skin: Positive for wound. Negative for rash.   Allergic/Immunologic: Negative for immunocompromised state.   Neurological: Negative for dizziness, light-headedness, numbness and headaches.   Hematological: Negative for adenopathy. Does not bruise/bleed easily.   Psychiatric/Behavioral: Negative for confusion and sleep disturbance. The patient is not nervous/anxious.              Objective:     Vital Signs (Most Recent):  Temp: 98.2 °F (36.8 °C) (02/05/18 2015)  Pulse: 71 (02/05/18 2015)  Resp: 16 (02/05/18 2015)  BP: (!) 70/0 (02/05/18 1556)  SpO2: 95 % (02/05/18 2015) Vital Signs (24h Range):  Temp:  [96.3 °F (35.7 °C)-98.7 °F (37.1 °C)] 98.2 °F (36.8 °C)  Pulse:  [67-83] 71  Resp:  [15-16] 16  SpO2:  [94 %-97 %] 95 %  BP: ()/(0-92) 70/0     Weight: 80.5 kg (177 lb 7.5 oz)  Body mass index is 25.46 kg/m².    Estimated Creatinine Clearance: 34.7 mL/min (based on SCr of 1.9 mg/dL (H)).    Physical Exam   Constitutional: He is oriented to person, place, and time. He appears well-developed and well-nourished. No distress.       HENT:   Head: Normocephalic and atraumatic.   Mouth/Throat: Uvula is midline, oropharynx is clear and moist and mucous membranes are normal. No oral lesions.   Eyes: Conjunctivae and EOM are normal. Pupils are equal, round, and reactive to light. No scleral icterus.   Neck: Normal range of motion.   Cardiovascular: Normal rate and regular rhythm.  Exam reveals no gallop and no friction rub.    No murmur heard.  LVAD sounds   Pulmonary/Chest: Effort normal and breath sounds normal. No respiratory distress. He has no wheezes. He has no rales.   Abdominal: Soft. Bowel sounds are normal. He exhibits no distension and no mass. There is no hepatosplenomegaly. There is no tenderness. There is no  rebound and no guarding.   Musculoskeletal: He exhibits edema.   Lymphadenopathy:        Head (right side): No submental, no submandibular, no tonsillar, no preauricular, no posterior auricular and no occipital adenopathy present.        Head (left side): No submental, no submandibular, no tonsillar, no preauricular, no posterior auricular and no occipital adenopathy present.     He has no cervical adenopathy.     He has no axillary adenopathy.        Right: No inguinal, no supraclavicular and no epitrochlear adenopathy present.        Left: No inguinal, no supraclavicular and no epitrochlear adenopathy present.   Neurological: He is alert and oriented to person, place, and time.   Skin: Skin is warm, dry and intact. No rash noted.   Psychiatric: He has a normal mood and affect.       Significant Labs:   Blood Culture:   Recent Labs  Lab 08/30/17  0048 01/24/18  1521 01/24/18  1537   LABBLOO No growth after 5 days. No growth after 5 days. No growth after 5 days.     CBC:   Recent Labs  Lab 02/04/18  0523 02/05/18  0533   WBC 6.02 5.66   HGB 10.3* 10.1*   HCT 30.8* 30.0*   * 116*     CMP:   Recent Labs  Lab 02/04/18  0523 02/05/18  0533    140   K 3.8 4.0    107   CO2 26 27   GLU 82 86   BUN 19 22   CREATININE 1.7* 1.9*   CALCIUM 10.5 10.5   PROT  --  6.3   ALBUMIN  --  3.0*   BILITOT  --  0.3   ALKPHOS  --  70   AST  --  23   ALT  --  8*   ANIONGAP 7* 6*   EGFRNONAA 38.6* 33.7*     Wound Culture:   Recent Labs  Lab 08/30/17  1519 09/12/17  1500 12/14/17  1433   LABAERO PSEUDOMONAS AERUGINOSAFew PSEUDOMONAS AERUGINOSAMany PSEUDOMONAS AERUGINOSAModerate     All pertinent labs within the past 24 hours have been reviewed.    Significant Imaging: I have reviewed all pertinent imaging results/findings within the past 24 hours.   X-Ray Chest 1 View for PICC_Central line [[id number]] Resulted: 02/05/18 1307   Order Status: Completed Updated: 02/05/18 1308   Narrative:     Right-sided PICC line in SVC.  The  other support devices as before.  No significant air space consolidation.   Impression:      See above      Electronically signed by: Jesus Lind MD  Date: 02/05/18  Time: 13:07    X-Ray Chest 1 View for PICC_Central line [[id number]] Resulted: 02/05/18 0911   Order Status: Completed Updated: 02/05/18 0912   Narrative:     Single view.  Comparison 01/24/2018.    There is a right arm PICC line, with tip in the right subclavian vein.  Pacemaker overlies left chest laterally.  There are surgical changes of sternotomy and LVAD.  Cardiac size is stable.  No focal consolidation, evaluation of left lung base more limited.   Impression:      As above      Electronically signed by: Dari Noble MD  Date: 02/05/18  Time: 09:11    FL Lumbar Puncture (xpd) [[id number]] Resulted: 02/01/18 1408   Order Status: Completed Updated: 02/01/18 1408   Narrative:     LUMBAR PUNCTURE UNDER FLUOROSCOPIC GUIDANCE    INDICATION: Rule out NPH    TECHNIQUE:  Consent was signed. The patient was placed prone, prepped and draped in sterile fashion. Using fluoroscopic guidance, the L2-3 interspace was identified and a 22 gauge spinal needle was used to access the intrathecal space using an interlaminar approach. Following removal of the stylet clear cerebral spinal fluid was identified spontaneously. Subsequently stylet was reinserted and the spinal needle was removed and full. Total fluoroscopy time was 0.3 minutes.    FINDINGS:   Opening pressure was 21 cm H20.  13 mL of clear CSF were aspirated and sent to the laboratory for analysis.  Closing pressure was 15 cm H2O.  There were no immediate complications.   Impression:       Successful fluoroscopic guided lumbar puncture as detailed above.   ______________________________________     Electronically signed by resident: HITESH ADHIKARI  Date: 02/01/18  Time: 11:33            As the supervising and teaching physician, I personally reviewed the images and resident's interpretation and I agree with  the findings.            Electronically signed by: ANTWON KAM DO  Date: 02/01/18  Time: 14:08    Fl Modified Barium Swallow Speech [[id number]] Resulted: 01/31/18 1551   Order Status: Completed Updated: 01/31/18 1551   Narrative:     Indication: Vomiting, concern for aspiration.    Comparison: No priors.    Fluoroscopy time 2.3 minutes    Findings:    Video fluoroscopic swallowing examination was performed in conjunction with the speech pathology department.  Various liquid and solid food substances were used to assess swallowing.  Silent penetration to the level the vocal cords was observed with nectar thick liquids.  No penetration seen with thin or nectar thick liquids when patient utilized chin tuck maneuver.  No aspiration observed.  Please see speech pathology report for further details.   Impression:      As above.  See speech pathology report for further details.  ______________________________________     Electronically signed by resident: HITESH ADHIKARI  Date: 01/31/18  Time: 13:53            As the supervising and teaching physician, I personally reviewed the images and resident's interpretation and I agree with the findings.            Electronically signed by: CRESCENCIO BARNES MD  Date: 01/31/18  Time: 15:51

## 2018-02-06 NOTE — ASSESSMENT & PLAN NOTE
- He has a long h/o orthostatic hypotension, so BP's should only be checked while sitting or standing  - Goal MAP ~ 95 or less (some permissive HTN due to dizziness and orthostasis)  - Continue Norvasc, Hydralazine, Isordil, Lisinopril and Doxazosin   - finalize price with pharmacy today on cardura

## 2018-02-06 NOTE — PT/OT/SLP PROGRESS
"Speech Language Pathology Treatment    Patient Name:  Kev Vasquez   MRN:  [id number]  Admitting Diagnosis: Vomiting    Recommendations:                 General Recommendations:  Dysphagia therapy  Diet recommendations:  Regular, Liquid Diet Level: Honey Thick   Aspiration Precautions: 1 bite/sip at a time, Assistance with thickening liquids, Eliminate distractions, Feed only when awake/alert, HOB to 90 degrees, Meds whole 1 at a time, Monitor for s/s of aspiration, No straws, Remain upright 30 minutes post meal, Small bites/sips and Standard aspiration precautions   General Precautions: Standard, aspiration, fall, honey thick, LVAD  Communication strategies:  none    Subjective     "I had 2 popsicles last night."  Pt stated   Patient goals: to go home     Pain/Comfort:  · Pain Rating 1: 0/10  · Pain Rating Post-Intervention 1: 0/10    Objective:     Has the patient been evaluated by SLP for swallowing?   Yes  Keep patient NPO? No   Current Respiratory Status: room air      Pt was awake and alert in NAD.  He reported vomitting episode this am but was agreeable to po trials.  He was offered and accepted po trials of honey thick liquids by tsp x5 which he tolerated well w/ no overt signs of aspiration.  He completed dysphagia ex;s x10 reps for glottal /g, k/.  He attempted fercho maneuver w/ inability to elicit swallow w/ tongue in forward carriage.  He was able to complete effortful swallow x1 given max cues.  He reported noncompliance w/ consuming popsicles and thin liquids noted at bedside.  Education was provided to pt and daughter re: need for liquid thickening, liquid thickening technique, aspiration precautions, dysphagia tx ex;s and SLP POC. Pt's whiteboard updated w/ items to avoid for pt and family education.  Measuring cups brought to pt;s bedside for liquid thickening.    Assessment:     Kev Vasquez is a 77 y.o. male with an SLP diagnosis of Dysphagia.    Goals:    SLP Goals        Problem: SLP Goal    Goal " Priority Disciplines Outcome   SLP Goal     SLP Ongoing (interventions implemented as appropriate)   Description:  Speech Therapy Short Term Goals  Goal expected to be met by 2/4  1. Pt will tolerate a regular diet and NECTAR THICK liquids with no overt s/s of aspiration.   2. Pt will tolerate cup sips of thin liquids x10 with no overt s/s of aspiration.   3. Pt will participate in a modified barium swallow study as warranted per MD and SLP. MET                         Plan:     · Patient to be seen:  4 x/week   · Plan of Care expires:  02/26/18  · Plan of Care reviewed with:  patient, daughter   · SLP Follow-Up:  Yes       Discharge recommendations:  home with home health, home health speech therapy   Barriers to Discharge:  None    Time Tracking:     SLP Treatment Date:   02/06/18  Speech Start Time:  1051  Speech Stop Time:  1120     Speech Total Time (min):  29 min    Billable Minutes: Treatment Swallowing Dysfunction 19 and Seld Care/Home Management Training 10    Alesia Bar CCC-SLP  02/06/2018

## 2018-02-06 NOTE — PT/OT/SLP PROGRESS
"Physical Therapy Treatment    Patient Name:  Kev Vasquez   MRN:  [id number]    Recommendations:     Discharge Recommendations:  home with home health   Discharge Equipment Recommendations: none   Barriers to discharge: None    Assessment:     Kev Vasquez is a 77 y.o. male admitted with a medical diagnosis of Vomiting.  He presents with the following impairments/functional limitations:  weakness, impaired endurance, impaired self care skills, impaired functional mobilty, gait instability, impaired balance, impaired cognition, decreased upper extremity function, decreased lower extremity function, decreased safety awareness, abnormal tone, impaired skin, decreased ROM, impaired coordination. Pt presents with good, but limited participation with PT this AM 2/2 recent episode of vomiting and general feelings of malaise. Pt tolerated OOB activity to chair with CGA and ambulation in hallway with CGA/min A. Noted freezing of gait and need for x2 standing rest breaks this date.     Rehab Prognosis:  Good; patient would benefit from acute skilled PT services to address these deficits and reach maximum level of function.      Recent Surgery: Procedure(s) (LRB):  ESOPHAGOGASTRODUODENOSCOPY (EGD) (N/A) 11 Days Post-Op    Plan:     During this hospitalization, patient to be seen 5 x/week to address the above listed problems via gait training, therapeutic activities, therapeutic exercises, neuromuscular re-education  · Plan of Care Expires:  02/25/18   Plan of Care Reviewed with: patient, daughter    Subjective     Communicated with RN prior to session.  Patient found resting in supine with PCT at bedside preparing to assist pt to chair upon PT entry to room, agreeable to treatment.      Chief Complaint: "I took all my meds without eating breakfast I think." pt states "Then I vomited."  Patient comments/goals: "I will try what I can."  Pain/Comfort:  · Pain Rating 1: 0/10  · Pain Rating Post-Intervention 1: 0/10    Patients " cultural, spiritual, Hindu conflicts given the current situation: None    Objective:     Patient found with: telemetry, LVAD     General Precautions: Standard, aspiration, fall, LVAD   Orthopedic Precautions:N/A   Braces: N/A     Functional Mobility:  Bed Mobility:   Rolling to the L: Contact Guard Assistance   Supine to Sit: From L sidelying. Contact Guard Assistance   Scooting at EOB: Stand-by Assistance VCs for feet on floor    Sitting Balance at Edge of Bed:   Assistance Level Required: Stand-by Assistance   Time: x3 min prior to transfer to chair   Postural deviations noted:    -       Rounded shoulders  -       Forward head  -       Posterior pelvic tilt  -       Lateral weight shift of hips   Encouraged: upright posture, appropriate initial conditions for attempts to stand    Transfers:   Sit to Stand: Contact Guard Assistance and Minimal Assistance with No Assistive Device from EOB and from bedside chair   Stand to Sit: Minimal Assistance with No Assistive Device for controlled descent to surface   Bed to Chair: Stand Pivot with Minimal Assistance with No Assistive Device     Gait:   Distance Ambulated: Pt ambulated x120 feet in hallway setting; pt reports not feeling well and inability to ambulate farther this date. Pt demo forward flexed posture with increased reliance on RW this date. Noted increased veering to the R req mod A for balance recovery.   Assistance level: Contact Guard Assistance and Moderate Assistance   Assistive Device used:  Rolling Walker   Gait Pattern: reciprocal gait   Gait Deviation(s): decreased william, decreased step length, increased stride width and decreased weight-shifting ability   Impairments due to: postural instability, impaired dual tasking, fatigue    Therapeutic Activities and Exercises:  PT arrived to pt's room to find pt resting quietly; agreeable to PT session. Pt transferred to bedside chair and req mod A for changing LVAD power source from wall to battery this  date  fatigue. Pt performed mobility as above. Upon return to room, pt returned to sitting [facility]. Pt requesting water and provided with cup in room; pt's daughter states liquid was thickened to honey thick level. Pt coughing and eventually nearly vomiting following consumption of liquid. Pt's daughter then states the ice melted and made the liquid thin. Education provided on honey thick vs nectar thick and appropriate method for thickening liquids. PT remained with pt and provided cool rag to forehead. Questions/concerns addressed within PT scope of practice; pt and daughter with no further questions.    AM-PAC 6 CLICK MOBILITY  Turning over in bed (including adjusting bedclothes, sheets and blankets)?: 4  Sitting down on and standing up from a chair with arms (e.g., wheelchair, bedside commode, etc.): 3  Moving from lying on back to sitting on the side of the bed?: 3  Moving to and from a bed to a chair (including a wheelchair)?: 3  Need to walk in hospital room?: 3  Climbing 3-5 steps with a railing?: 3  Total Score: 19     Patient left up in chair with all lines intact, call button in reach, RN notified and PCT present..    GOALS:    Physical Therapy Goals        Problem: Physical Therapy Goal    Goal Priority Disciplines Outcome Goal Variances Interventions   Physical Therapy Goal     PT/OT, PT Ongoing (interventions implemented as appropriate)     Description:  Goals to be met by: 2/10/18    Patient will increase functional independence with mobility by performin. Supine to sit with SBA  2. Sit to supine with SBA  3. Sit to stand transfer with Supervision using RW (SBA met )  4. Gait x150 feet with Contact Guard Assistance using Rolling Walker   5. Standing balance x10 min with SBA using AD or no AD while performing functional tasks   6. Lower extremity exercise program x20 reps per handout, with assistance as needed                         Time Tracking:     PT Received On: 18  PT Start Time:  0933     PT Stop Time: 1002  PT Total Time (min): 29 min     Billable Minutes: Gait Training 8 and Therapeutic Activity 20    Treatment Type: Treatment  PT/PTA: PT       Carmencita Doherty, PT, DPT  [phone]  2/6/2018

## 2018-02-06 NOTE — ASSESSMENT & PLAN NOTE
- S/P HM III 10/16/16 as DT in [place]  - Current speed 5800  - TTE 1/25/18 shows LVEDD 6.5, LVEF 10-15%, diastolic dysfunction, RVE, severely depressed RV systolic function, PAS 20, ARTHUR intermittently and septum midline. No comment on IVC - per Dr. Lanza, costal views were too poor to assess IVC.   -holding Lasix  - INR theraputic.  Coumadin with goal INR 2.0-3.0. Home dose 4 T/Th, 6 all other days.  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

## 2018-02-07 VITALS
OXYGEN SATURATION: 91 % | TEMPERATURE: 97 F | HEART RATE: 60 BPM | WEIGHT: 179.69 LBS | BODY MASS INDEX: 25.73 KG/M2 | RESPIRATION RATE: 18 BRPM | HEIGHT: 70 IN | SYSTOLIC BLOOD PRESSURE: 90 MMHG

## 2018-02-07 LAB
ALBUMIN SERPL BCP-MCNC: 3.1 G/DL
ALP SERPL-CCNC: 76 U/L
ALT SERPL W/O P-5'-P-CCNC: 9 U/L
ANION GAP SERPL CALC-SCNC: 6 MMOL/L
AST SERPL-CCNC: 22 U/L
BASOPHILS # BLD AUTO: 0.04 K/UL
BASOPHILS NFR BLD: 0.6 %
BILIRUB DIRECT SERPL-MCNC: 0.2 MG/DL
BILIRUB SERPL-MCNC: 0.4 MG/DL
BNP SERPL-MCNC: 202 PG/ML
BUN SERPL-MCNC: 25 MG/DL
CALCIUM SERPL-MCNC: 11.2 MG/DL
CHLORIDE SERPL-SCNC: 108 MMOL/L
CO2 SERPL-SCNC: 27 MMOL/L
CREAT SERPL-MCNC: 1.9 MG/DL
CRP SERPL-MCNC: 1.1 MG/L
DIFFERENTIAL METHOD: ABNORMAL
EOSINOPHIL # BLD AUTO: 0.1 K/UL
EOSINOPHIL NFR BLD: 2.1 %
ERYTHROCYTE [DISTWIDTH] IN BLOOD BY AUTOMATED COUNT: 18.4 %
EST. GFR  (AFRICAN AMERICAN): 39 ML/MIN/1.73 M^2
EST. GFR  (NON AFRICAN AMERICAN): 33.7 ML/MIN/1.73 M^2
GLUCOSE SERPL-MCNC: 89 MG/DL
HCT VFR BLD AUTO: 31.7 %
HGB BLD-MCNC: 10.5 G/DL
IMM GRANULOCYTES # BLD AUTO: 0.03 K/UL
IMM GRANULOCYTES NFR BLD AUTO: 0.5 %
INR PPP: 2.6
LDH SERPL L TO P-CCNC: 172 U/L
LYMPHOCYTES # BLD AUTO: 0.9 K/UL
LYMPHOCYTES NFR BLD: 13.2 %
MAGNESIUM SERPL-MCNC: 2.4 MG/DL
MCH RBC QN AUTO: 29.7 PG
MCHC RBC AUTO-ENTMCNC: 33.1 G/DL
MCV RBC AUTO: 90 FL
MONOCYTES # BLD AUTO: 0.9 K/UL
MONOCYTES NFR BLD: 13.2 %
NEUTROPHILS # BLD AUTO: 4.7 K/UL
NEUTROPHILS NFR BLD: 70.4 %
NRBC BLD-RTO: 0 /100 WBC
PLATELET # BLD AUTO: 112 K/UL
PMV BLD AUTO: 11.9 FL
POTASSIUM SERPL-SCNC: 4 MMOL/L
PREALB SERPL-MCNC: 28 MG/DL
PROT SERPL-MCNC: 6.8 G/DL
PROTHROMBIN TIME: 25.1 SEC
RBC # BLD AUTO: 3.53 M/UL
SODIUM SERPL-SCNC: 141 MMOL/L
WBC # BLD AUTO: 6.6 K/UL

## 2018-02-07 PROCEDURE — 85025 COMPLETE CBC W/AUTO DIFF WBC: CPT

## 2018-02-07 PROCEDURE — 85610 PROTHROMBIN TIME: CPT

## 2018-02-07 PROCEDURE — 83735 ASSAY OF MAGNESIUM: CPT

## 2018-02-07 PROCEDURE — 83615 LACTATE (LD) (LDH) ENZYME: CPT

## 2018-02-07 PROCEDURE — 25000003 PHARM REV CODE 250: Performed by: PHYSICIAN ASSISTANT

## 2018-02-07 PROCEDURE — 86140 C-REACTIVE PROTEIN: CPT

## 2018-02-07 PROCEDURE — 83880 ASSAY OF NATRIURETIC PEPTIDE: CPT

## 2018-02-07 PROCEDURE — 25000003 PHARM REV CODE 250: Performed by: INTERNAL MEDICINE

## 2018-02-07 PROCEDURE — 80076 HEPATIC FUNCTION PANEL: CPT

## 2018-02-07 PROCEDURE — 63600175 PHARM REV CODE 636 W HCPCS: Performed by: INTERNAL MEDICINE

## 2018-02-07 PROCEDURE — 25000003 PHARM REV CODE 250: Performed by: NURSE PRACTITIONER

## 2018-02-07 PROCEDURE — 27000248 HC VAD-ADDITIONAL DAY

## 2018-02-07 PROCEDURE — 84134 ASSAY OF PREALBUMIN: CPT

## 2018-02-07 PROCEDURE — 99238 HOSP IP/OBS DSCHRG MGMT 30/<: CPT | Mod: ,,, | Performed by: INTERNAL MEDICINE

## 2018-02-07 PROCEDURE — 80048 BASIC METABOLIC PNL TOTAL CA: CPT

## 2018-02-07 PROCEDURE — 93750 INTERROGATION VAD IN PERSON: CPT | Mod: ,,, | Performed by: INTERNAL MEDICINE

## 2018-02-07 PROCEDURE — 99232 SBSQ HOSP IP/OBS MODERATE 35: CPT | Mod: ,,, | Performed by: PHYSICIAN ASSISTANT

## 2018-02-07 RX ORDER — PANTOPRAZOLE SODIUM 40 MG/1
40 TABLET, DELAYED RELEASE ORAL
Qty: 60 TABLET | Refills: 11 | Status: SHIPPED | OUTPATIENT
Start: 2018-02-07

## 2018-02-07 RX ORDER — ISOSORBIDE DINITRATE 40 MG/1
40 TABLET ORAL 3 TIMES DAILY
Qty: 90 TABLET | Refills: 11 | Status: ON HOLD | OUTPATIENT
Start: 2018-02-07 | End: 2018-02-23 | Stop reason: HOSPADM

## 2018-02-07 RX ORDER — DOFETILIDE 0.12 MG/1
125 CAPSULE ORAL EVERY 12 HOURS
Qty: 60 CAPSULE | Refills: 11 | Status: SHIPPED | OUTPATIENT
Start: 2018-02-07 | End: 2019-02-07

## 2018-02-07 RX ORDER — DOFETILIDE 0.12 MG/1
125 CAPSULE ORAL EVERY 12 HOURS
Status: DISCONTINUED | OUTPATIENT
Start: 2018-02-07 | End: 2018-02-07 | Stop reason: HOSPADM

## 2018-02-07 RX ORDER — BUPROPION HYDROCHLORIDE 450 MG/1
450 TABLET, FILM COATED, EXTENDED RELEASE ORAL DAILY
Qty: 90 TABLET | Refills: 11 | Status: SHIPPED | OUTPATIENT
Start: 2018-02-08

## 2018-02-07 RX ORDER — CARBIDOPA AND LEVODOPA 25; 100 MG/1; MG/1
1 TABLET ORAL
Qty: 150 TABLET | Refills: 11 | Status: ON HOLD | OUTPATIENT
Start: 2018-02-07 | End: 2018-02-23 | Stop reason: HOSPADM

## 2018-02-07 RX ADMIN — AMLODIPINE BESYLATE 10 MG: 10 TABLET ORAL at 09:02

## 2018-02-07 RX ADMIN — LISINOPRIL 20 MG: 20 TABLET ORAL at 09:02

## 2018-02-07 RX ADMIN — FOLIC ACID 1 MG: 1 TABLET ORAL at 09:02

## 2018-02-07 RX ADMIN — ASPIRIN 81 MG CHEWABLE TABLET 81 MG: 81 TABLET CHEWABLE at 09:02

## 2018-02-07 RX ADMIN — CEFEPIME 2 G: 2 INJECTION, POWDER, FOR SOLUTION INTRAVENOUS at 01:02

## 2018-02-07 RX ADMIN — CEFEPIME 2 G: 2 INJECTION, POWDER, FOR SOLUTION INTRAVENOUS at 02:02

## 2018-02-07 RX ADMIN — HYDRALAZINE HYDROCHLORIDE 100 MG: 50 TABLET ORAL at 02:02

## 2018-02-07 RX ADMIN — CARBIDOPA AND LEVODOPA 1 TABLET: 25; 100 TABLET ORAL at 05:02

## 2018-02-07 RX ADMIN — ISOSORBIDE DINITRATE 40 MG: 40 TABLET ORAL at 05:02

## 2018-02-07 RX ADMIN — ATORVASTATIN CALCIUM 10 MG: 10 TABLET, FILM COATED ORAL at 09:02

## 2018-02-07 RX ADMIN — BUPROPION HYDROCHLORIDE 450 MG: 150 TABLET, EXTENDED RELEASE ORAL at 09:02

## 2018-02-07 RX ADMIN — MAGNESIUM OXIDE TAB 400 MG (241.3 MG ELEMENTAL MG) 400 MG: 400 (241.3 MG) TAB at 09:02

## 2018-02-07 RX ADMIN — DOXAZOSIN MESYLATE 8 MG: 2 TABLET ORAL at 09:02

## 2018-02-07 RX ADMIN — PANTOPRAZOLE SODIUM 40 MG: 40 TABLET, DELAYED RELEASE ORAL at 09:02

## 2018-02-07 RX ADMIN — ISOSORBIDE DINITRATE 40 MG: 40 TABLET ORAL at 02:02

## 2018-02-07 RX ADMIN — ALLOPURINOL 300 MG: 300 TABLET ORAL at 09:02

## 2018-02-07 RX ADMIN — DOFETILIDE 250 MCG: 0.25 CAPSULE ORAL at 09:02

## 2018-02-07 RX ADMIN — HYDRALAZINE HYDROCHLORIDE 100 MG: 50 TABLET ORAL at 05:02

## 2018-02-07 RX ADMIN — CARBIDOPA AND LEVODOPA 1 TABLET: 25; 100 TABLET ORAL at 09:02

## 2018-02-07 RX ADMIN — DOCUSATE SODIUM 100 MG: 100 CAPSULE, LIQUID FILLED ORAL at 09:02

## 2018-02-07 RX ADMIN — CARBIDOPA AND LEVODOPA 1 TABLET: 25; 100 TABLET ORAL at 02:02

## 2018-02-07 RX ADMIN — Medication 1 CAPSULE: at 09:02

## 2018-02-07 NOTE — PROGRESS NOTES
D/C note:    SW  Spoke to pt's dtr about D/C over the phone. Pt's dtr reports in agreement with plan to D/C home today with out pt PT/OT/ST and CPP for IV abx. Pt's dtr to call [facility] in [place] to arrange appointments for PT/OT/ST. Lisandro with CPP to arrange abx and PICC care at [facility]. Pt's dtr reports no other needs at this time, and no other needs identified.     SW to pt's room for D/C. Pt's wife and sitter present as well. Pt oriented to self and place. Pt and wife report agreeable to plan for D/C today. Pt reports agreeable to D/C arrangements made with dtr. Pt reports coping adequately and happy to be getting out of the hospital. Pt's sitter to provide transport. SW providing ongoing psychosocial and counseling support, education, assistance, resources, and discharge planning as indicated. SW continuing to follow and remains available.

## 2018-02-07 NOTE — PROGRESS NOTES
[facility]  Heart Transplant  Progress Note    Patient Name: Kev Vasquez  MRN: [id number]  Admission Date: 1/24/2018  Hospital Length of Stay: 14 days  Attending Physician: Caitlin Wells MD  Primary Care Provider: Mega Collins MD  Principal Problem:Vomiting    Subjective:     Interval History: *no issues overnight     Continuous Infusions:  Scheduled Meds:   allopurinol  300 mg Oral Daily    amLODIPine  10 mg Oral Daily    aspirin  81 mg Oral Daily    atorvastatin  10 mg Oral Daily    buPROPion  450 mg Oral Daily    carbidopa-levodopa  mg  1 tablet Oral Q4H While awake    ceFEPime (MAXIPIME) IVPB  2 g Intravenous Q12H    docusate sodium  100 mg Oral BID    dofetilide  125 mcg Oral Q12H    doxazosin  8 mg Oral Daily    folic acid  1 mg Oral Daily    hydrALAZINE  100 mg Oral Q8H    isosorbide dinitrate  40 mg Oral TID    Lactobacillus rhamnosus GG  1 capsule Oral Daily    lisinopril  20 mg Oral BID    magnesium oxide  400 mg Oral BID    pantoprazole  40 mg Oral BID AC    polyethylene glycol  17 g Oral Daily    warfarin  6 mg Oral Daily     PRN Meds:acetaminophen, docusate sodium, omnipaque 300 iohexol, ondansetron, promethazine    Review of patient's allergies indicates:   Allergen Reactions    Aldactone [spironolactone]       persistent hyperkalemia.    Sulfa (sulfonamide antibiotics)      Objective:     Vital Signs (Most Recent):  Temp: 97.3 °F (36.3 °C) (02/07/18 1135)  Pulse: 61 (02/07/18 1135)  Resp: 18 (02/07/18 1135)  BP: (!) 90/0 (notified SHARONA Vickers) (02/07/18 1135)  SpO2: (!) 91 % (02/07/18 1135) Vital Signs (24h Range):  Temp:  [97.3 °F (36.3 °C)-98.9 °F (37.2 °C)] 97.3 °F (36.3 °C)  Pulse:  [61-81] 61  Resp:  [17-18] 18  SpO2:  [91 %-96 %] 91 %  BP: ()/(0-85) 90/0     Patient Vitals for the past 72 hrs (Last 3 readings):   Weight   02/07/18 0700 81.5 kg (179 lb 10.8 oz)   02/06/18 0700 81.6 kg (179 lb 14.3 oz)   02/05/18 0800 80.5 kg (177 lb 7.5  oz)     Body mass index is 25.78 kg/m².      Intake/Output Summary (Last 24 hours) at 02/07/18 1355  Last data filed at 02/07/18 0500   Gross per 24 hour   Intake             1070 ml   Output                0 ml   Net             1070 ml       Hemodynamic Parameters:         Physical Exam   Constitutional: He appears well-developed and well-nourished.   HENT:   Head: Normocephalic and atraumatic.   Eyes: Conjunctivae are normal. Pupils are equal, round, and reactive to light.   Neck: Normal range of motion. Neck supple. No JVD present.   Cardiovascular: Normal rate and regular rhythm.    Smooth VAD hum. Intermittently pulsatile   Pulmonary/Chest: Effort normal and breath sounds normal.   Abdominal: Soft. Bowel sounds are normal.   Genitourinary:   Genitourinary Comments: Incontinent   Musculoskeletal: He exhibits no edema.   Neurological: He is alert.   Oriented to self and place but not year this morning   Skin: Skin is warm and dry. Capillary refill takes 2 to 3 seconds.       Significant Labs:  CBC:    Recent Labs  Lab 02/05/18  0533 02/06/18 0433 02/07/18  0537   WBC 5.66 6.04 6.60   RBC 3.41* 3.44* 3.53*   HGB 10.1* 10.0* 10.5*   HCT 30.0* 30.6* 31.7*   * 104* 112*   MCV 88 89 90   MCH 29.6 29.1 29.7   MCHC 33.7 32.7 33.1     BNP:    Recent Labs  Lab 02/02/18  0501 02/05/18  0533 02/07/18  0537   BNP 86 157* 202*     CMP:    Recent Labs  Lab 02/02/18  0501  02/05/18  0533 02/06/18  0433 02/07/18  0537   GLU 91  < > 86 80 89   CALCIUM 9.9  < > 10.5 10.6* 11.2*   ALBUMIN 2.9*  --  3.0*  --  3.1*   PROT 6.3  --  6.3  --  6.8     < > 140 142 141   K 4.0  < > 4.0 4.0 4.0   CO2 24  < > 27 27 27     < > 107 109 108   BUN 20  < > 22 24* 25*   CREATININE 1.7*  < > 1.9* 1.9* 1.9*   ALKPHOS 68  --  70  --  76   ALT <5*  --  8*  --  9*   AST 18  --  23  --  22   BILITOT 0.3  --  0.3  --  0.4   < > = values in this interval not displayed.   Coagulation:     Recent Labs  Lab 02/05/18  0533 02/06/18  0433  02/07/18  0537   INR 2.2* 2.3* 2.6*     LDH:    Recent Labs  Lab 02/05/18  0533 02/06/18  0433 02/07/18  0537    198 172     Microbiology:  Microbiology Results (last 7 days)     Procedure Component Value Units Date/Time    Culture, Anaerobe [[id number]] Collected:  02/05/18 1650    Order Status:  Completed Specimen:  Wound from Abdomen Updated:  02/07/18 1258     Anaerobic Culture Culture in progress    Narrative:       DLES    Aerobic culture [[id number]] Collected:  02/05/18 1650    Order Status:  Completed Specimen:  Wound from Abdomen Updated:  02/07/18 1116     Aerobic Bacterial Culture --     PRESUMPTIVE PSEUDOMONAS SPECIES  Moderate  Identification and susceptibility pending      Narrative:       DLES    Gram stain [[id number]] Collected:  02/05/18 1650    Order Status:  Completed Specimen:  Wound from Abdomen Updated:  02/06/18 0133     Gram Stain Result Few WBC's      No organisms seen    Narrative:       DLGUILLERMO          I have reviewed all pertinent labs within the past 24 hours.    Estimated Creatinine Clearance: 34.7 mL/min (based on SCr of 1.9 mg/dL (H)).    Diagnostic Results:  I have reviewed and interpreted all pertinent imaging results/findings within the past 24 hours.    Assessment and Plan:     Mr. 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 Balwinder BiV ICD, HILLARY/BiPAP, HTN, CKD, HLP, gout and depression who presents with confusion. On further questioning of his wife and his caregiver who were both at bedside, they noticed some odd behaviours such as combing his cheek/nose, getting dressed to go hunting when that is not his hobby and admitted to seeing visual hallucinations of his dead brother. They stated that he is not sleeping well and naps throughout the day. They wonder if his BiPAP is  not in the proper settings anymore and he now has made a game out of looking at his watch to see when he will get to sleep. He stated it was 2017, January, thought he was in [place] (moved from there in June), and stated [place] as the president. Altogether, he denies SOB, CP, LE edema, orthopnea and PND. He has no fevers or chills, cough, or diarrhea. They state that he does have some worsening vomiting. They stated that a few months ago, he would vomit after he drank some water: it would start with a sneeze, then a cough, then the vomit. It would happen every now and then, but over the past few weeks, it has worsened to 3-4x/day. They stated that now it occurs without drinking, sometimes before dinner and he would throw up almost undigested food. He admitted to throwing up pills too at times. There have been no VAD alarms noted.       * Vomiting    - Much improved  - Appreciate GI's help. KUB unremarkable. EGD also negative.  - PPI bid  - Appreciate ST's help given coughing and sneezing that can precede emesis. MBSS done and patient now requires honey thick liquids with strict aspiration precautions        Confusion    - CT of head 1/24/18 with no acute changes  - Given constellation of confusion, gait disturbance and urinary incontinence, consulted Neuro (NPH ?). Appreciate their help. LP done 1/30/18 with removal of only 13cc; opening pressure 21, closing pressure 15. His post procedure gait was significantly worse. Discussed with Neuro and NSGY. Per NSGY, no immediate need for cisternogram, shunt, etc, given failed improvement in gait. Now considering PD - started Sinemet per Neuro's rec and patient will f/u in the [facility] as an outpatient  - D/C'd Ropinirole again 2/2 gait disturbance  - Delirium protocol        Essential hypertension    - He has a long h/o orthostatic hypotension, so BP's should only be checked while sitting or standing  - Goal MAP ~ 95 or less (some permissive HTN due to  dizziness and orthostasis)  - Continue Norvasc, Hydralazine, Isordil, Lisinopril and Doxazosin   - finalize price with pharmacy today on cardura         Gait instability    - PT/OT  - See confusion above        VT (ventricular tachycardia)    - Continue Tikosyn  - Will get EKG for QT monitoring  - Has St. Balwinder BiV ICD  - Keep K+ > 4.0 and Mg+ > 2.0        LVAD (left ventricular assist device) present    - S/P HM III 10/16/16 as DT in [place]  - Current speed 5800  - TTE 1/25/18 shows LVEDD 6.5, LVEF 10-15%, diastolic dysfunction, RVE, severely depressed RV systolic function, PAS 20, ARTHUR intermittently and septum midline. No comment on IVC - per Dr. Lanza, costal views were too poor to assess IVC.   -holding Lasix  - INR theraputic.  Coumadin with goal INR 2.0-3.0. Home dose 4 T/Th, 6 all other days.  - LDH stable  - Continue IV Cefepime for chronic Pseudomonas DL infection   - consulted ID will continue cefipime and see in clinic next week    - given Rx for PT/OT/speech to patients daughter         Stage 3 chronic kidney disease    - Admit creatinine 2.3, 1.9 today, (baseline looks ~ 1.6-1.8)            SHARONA Reyes  Heart Transplant  [facility]

## 2018-02-07 NOTE — PLAN OF CARE
Problem: Patient Care Overview  Goal: Plan of Care Review  Outcome: Ongoing (interventions implemented as appropriate)  Patient remained free from falls/injury/trauma throughout shift. No complaints of chest pain, SOB, or discomfort. VSS. 2000 doppler number high at 120 and MAP of 97. Given scheduled BP medications. MAP at 0000 improved to 77. Incontinence care provided. LVAD WDL. LVAD dressing change completed and is CDI. Plan of care reviewed with patient. Patient verbalized understanding. Will continue to monitor.

## 2018-02-07 NOTE — PLAN OF CARE
Problem: Patient Care Overview  Goal: Plan of Care Review  Outcome: Ongoing (interventions implemented as appropriate)  Pt free of falls/traumas/injuries. Skin remains clean, dry, and intact. Pt continued on IV antibiotics. HUGO-system in room; bed alarm on.  Pt re-educated on importance of measuring accurate intake and out put; pt verbalized and demonstrates understanding. Reviewed plan of care with pt; and pt verbalized understanding.  Pt VSS, VAD numbers WDL; HCT adjusted; pt in no distress will continue to monitor.

## 2018-02-07 NOTE — SUBJECTIVE & OBJECTIVE
Interval History: No AEON.  Afebrile and WBC WNL.  DLES Cx with presumptive pseudomonas.  Remains on cefepime.  The patient denies any recent fever, chills, or sweats.      Review of Systems   Constitutional: Negative for chills, diaphoresis and fever.   Respiratory: Negative for shortness of breath.    Cardiovascular: Negative for chest pain.   Gastrointestinal: Negative for abdominal pain, diarrhea, nausea and vomiting.   Genitourinary: Negative for dysuria and hematuria.     Objective:     Vital Signs (Most Recent):  Temp: 97.5 °F (36.4 °C) (02/07/18 0731)  Pulse: 74 (02/07/18 0731)  Resp: 18 (02/07/18 0731)  BP: (!) 96/0 (02/07/18 0731)  SpO2: (!) 94 % (02/07/18 0731) Vital Signs (24h Range):  Temp:  [97.5 °F (36.4 °C)-98.9 °F (37.2 °C)] 97.5 °F (36.4 °C)  Pulse:  [65-81] 74  Resp:  [17-18] 18  SpO2:  [94 %-96 %] 94 %  BP: ()/(0-85) 96/0     Weight: 81.5 kg (179 lb 10.8 oz)  Body mass index is 25.78 kg/m².    Estimated Creatinine Clearance: 34.7 mL/min (based on SCr of 1.9 mg/dL (H)).    Physical Exam   Constitutional: He is oriented to person, place, and time. He appears well-developed and well-nourished. No distress.       HENT:   Head: Normocephalic.   Mouth/Throat: Uvula is midline and mucous membranes are normal. No oral lesions.   Eyes: Pupils are equal, round, and reactive to light. Right eye exhibits no discharge. No scleral icterus.   Cardiovascular: Normal rate and regular rhythm.  Exam reveals no gallop and no friction rub.    No murmur heard.  LVAD sounds   Pulmonary/Chest: Effort normal and breath sounds normal. No respiratory distress. He has no wheezes.   Abdominal: Soft. Bowel sounds are normal. He exhibits no distension. There is no hepatosplenomegaly. There is no tenderness.   Musculoskeletal: He exhibits edema.   Neurological: He is alert and oriented to person, place, and time.   Skin: Skin is warm, dry and intact.       Significant Labs:   Blood Culture:   Recent Labs  Lab  08/30/17  0048 01/24/18  1521 01/24/18  1537   LABBLOO No growth after 5 days. No growth after 5 days. No growth after 5 days.     CBC:   Recent Labs  Lab 02/06/18  0433 02/07/18  0537   WBC 6.04 6.60   HGB 10.0* 10.5*   HCT 30.6* 31.7*   * 112*     CMP:   Recent Labs  Lab 02/06/18  0433 02/07/18  0537    141   K 4.0 4.0    108   CO2 27 27   GLU 80 89   BUN 24* 25*   CREATININE 1.9* 1.9*   CALCIUM 10.6* 11.2*   PROT  --  6.8   ALBUMIN  --  3.1*   BILITOT  --  0.4   ALKPHOS  --  76   AST  --  22   ALT  --  9*   ANIONGAP 6* 6*   EGFRNONAA 33.7* 33.7*     Wound Culture:   Recent Labs  Lab 08/30/17  1519 09/12/17  1500 12/14/17  1433 02/05/18  1650   LABAERO PSEUDOMONAS AERUGINOSAFew PSEUDOMONAS AERUGINOSAMany PSEUDOMONAS AERUGINOSAModerate No growth     All pertinent labs within the past 24 hours have been reviewed.    Significant Imaging: I have reviewed all pertinent imaging results/findings within the past 24 hours.   X-Ray Chest 1 View for PICC_Central line [[id number]] Resulted: 02/05/18 1307   Order Status: Completed Updated: 02/05/18 1308   Narrative:     Right-sided PICC line in SVC.  The other support devices as before.  No significant air space consolidation.   Impression:      See above      Electronically signed by: Jesus Lind MD  Date: 02/05/18  Time: 13:07    X-Ray Chest 1 View for PICC_Central line [[id number]] Resulted: 02/05/18 0911   Order Status: Completed Updated: 02/05/18 0912   Narrative:     Single view.  Comparison 01/24/2018.    There is a right arm PICC line, with tip in the right subclavian vein.  Pacemaker overlies left chest laterally.  There are surgical changes of sternotomy and LVAD.  Cardiac size is stable.  No focal consolidation, evaluation of left lung base more limited.   Impression:      As above      Electronically signed by: Dari Noble MD  Date: 02/05/18  Time: 09:11    FL Lumbar Puncture (xpd) [[id number]] Resulted: 02/01/18 1408   Order Status: Completed  Updated: 02/01/18 1408   Narrative:     LUMBAR PUNCTURE UNDER FLUOROSCOPIC GUIDANCE    INDICATION: Rule out NPH    TECHNIQUE:  Consent was signed. The patient was placed prone, prepped and draped in sterile fashion. Using fluoroscopic guidance, the L2-3 interspace was identified and a 22 gauge spinal needle was used to access the intrathecal space using an interlaminar approach. Following removal of the stylet clear cerebral spinal fluid was identified spontaneously. Subsequently stylet was reinserted and the spinal needle was removed and full. Total fluoroscopy time was 0.3 minutes.    FINDINGS:   Opening pressure was 21 cm H20.  13 mL of clear CSF were aspirated and sent to the laboratory for analysis.  Closing pressure was 15 cm H2O.  There were no immediate complications.   Impression:       Successful fluoroscopic guided lumbar puncture as detailed above.   ______________________________________     Electronically signed by resident: HITESH ADHIKARI  Date: 02/01/18  Time: 11:33            As the supervising and teaching physician, I personally reviewed the images and resident's interpretation and I agree with the findings.            Electronically signed by: ANTWON KAM DO  Date: 02/01/18  Time: 14:08    Fl Modified Barium Swallow Speech [[id number]] Resulted: 01/31/18 1551   Order Status: Completed Updated: 01/31/18 1551   Narrative:     Indication: Vomiting, concern for aspiration.    Comparison: No priors.    Fluoroscopy time 2.3 minutes    Findings:    Video fluoroscopic swallowing examination was performed in conjunction with the speech pathology department.  Various liquid and solid food substances were used to assess swallowing.  Silent penetration to the level the vocal cords was observed with nectar thick liquids.  No penetration seen with thin or nectar thick liquids when patient utilized chin tuck maneuver.  No aspiration observed.  Please see speech pathology report for further details.   Impression:       As above.  See speech pathology report for further details.  ______________________________________     Electronically signed by resident: HITESH ADHIKARI  Date: 01/31/18  Time: 13:53            As the supervising and teaching physician, I personally reviewed the images and resident's interpretation and I agree with the findings.            Electronically signed by: CRESCENCIO BARNES MD  Date: 01/31/18  Time: 15:51

## 2018-02-07 NOTE — NURSING
Pt D/C home per MD orders. Pt  medications sent to [facility]. Tele removed, IV access removed and intact X 1.  VSS, NAD, and no complaints at this time. Pt given and explained med list and prescriptions. Pt verbalizes complete understanding of all  D/C instructions and follow up care. Pt given printed AVS, and went over all pertinent information. Pt awaiting escort. Will continue to monitor.

## 2018-02-07 NOTE — PROGRESS NOTES
Mr. Kev Vasquez is being discharged today after admission for confusion. PMH significant for ICM s/p HM III, chronic Pseudomonas DL infection (on Cefepime), VT (on Tikosyn bc intolerant to Amiodarone), h/o SSS s/p St. Balwinder BiV ICD, HILLARY/BiPAP, HTN, CKD, HLP, gout and depression.    During hospital stay, sinemet was initiated per neuro consult.     Medication changes  1) Cefepime continue 2g Q12 (end date 2/20/18)  2) Dofetilide decreased to 125 BID from 250 BID ( on 2/6/18, and this dose is appropriate for current renal function)  3) Discontinued lasix  4) Discontinued ropinirole due to gait disturbance  5) Added sinemet 25-100mg Q4 hours (while awake)  6) Buproprion increased to 450mg daily from 300mg daily  7) Famotidine changed to pantoprazole       Warfarin management: INR 2-3, bridging required, on ASA 81mg/day  Continue home dose of 4mg TTSun and 6mg all other days  INR on discharge was 2.6    Follow-up: EKG in 2 weeks  INR check Friday (2/9)       Kev Vasquez   Home Medication Instructions MADALYN:[id number]    Printed on:02/08/18 1130   Medication Information                      allopurinol (ZYLOPRIM) 300 MG tablet  Take 300 mg by mouth once daily.             amLODIPine (NORVASC) 10 MG tablet  Take 1 tablet (10 mg total) by mouth once daily.             aspirin 81 MG Chew  Take 81 mg by mouth once daily.             atorvastatin (LIPITOR) 10 MG tablet  Take 10 mg by mouth once daily.             buPROPion 450 mg Tb24  Take 450 mg by mouth once daily.             calcium-vitamin D tablet 600 mg-200 units  Take 1 tablet by mouth once daily.             carbidopa-levodopa  mg (SINEMET)  mg per tablet  Take 1 tablet by mouth every 4 (four) hours while awake.             CEFEPIME HCL (CEFEPIME 2 G/50 ML D5W, READY TO MIX SYSTEM,)  Inject 2 g into the vein 2 (two) times daily.             docusate sodium (COLACE) 100 MG capsule  Take 100 mg by mouth daily as needed for Constipation.              dofetilide (TIKOSYN) 125 MCG Cap  Take 1 capsule (125 mcg total) by mouth every 12 (twelve) hours.             doxazosin (CARDURA) 8 MG Tab  Take 1 tablet (8 mg total) by mouth once daily.             ferrous sulfate 324 mg (65 mg iron) TbEC  Take 1 tablet (324 mg total) by mouth once daily.             folic acid (FOLVITE) 1 MG tablet  Take 1 mg by mouth once daily.             hydrALAZINE (APRESOLINE) 100 MG tablet  Take 1 tablet (100 mg total) by mouth every 8 (eight) hours.             isosorbide dinitrate (ISORDIL) 40 MG Tab  Take 1 tablet (40 mg total) by mouth 3 (three) times daily.             Lactobacillus rhamnosus GG (CULTURELLE) 10 billion cell capsule  Take 1 capsule by mouth once daily.             lisinopril (PRINIVIL,ZESTRIL) 20 MG tablet  Take 1 tablet (20 mg total) by mouth 2 (two) times daily.             magnesium oxide (MAG-OX) 400 mg tablet  Take 1 tablet (400 mg total) by mouth 2 (two) times daily.             multivitamin capsule  Take 1 capsule by mouth once daily.             pantoprazole (PROTONIX) 40 MG tablet  Take 1 tablet (40 mg total) by mouth 2 (two) times daily before meals.             warfarin (COUMADIN) 4 MG tablet  Take 4 mg orally on Sun, Tue, Thurs and 6 mg orally on other days as directed by coumadin clinic

## 2018-02-07 NOTE — PLAN OF CARE
[facility]   Heart Transplant/VAD Clinic   [address] [place] [address]   [phone] after hours          [phone] fax     VAD HOME  HEALTH ORDERS      Admit to Home Health    Diagnosis:   Patient Active Problem List   Diagnosis    Chronic combined systolic and diastolic congestive heart failure    Coronary artery disease involving native coronary artery of native heart without angina pectoris    S/P CABG (coronary artery bypass graft)    ICD (implantable cardioverter-defibrillator), biventricular, in situ    HILLARY on CPAP    Pulmonary hypertension due to left ventricular systolic dysfunction    Ischemic cardiomyopathy    Stage 3 chronic kidney disease    LVAD (left ventricular assist device) present    Chronic anticoagulation    VT (ventricular tachycardia)    Delirium    Restless leg syndrome    Gait instability    Acute on chronic systolic and diastolic heart failure, NYHA class 4    Essential hypertension    Vomiting    Hallucinations    Confusion    Gastroesophageal reflux disease    Cardiomyopathy    Wound infection       Patient is homebound due to:  CHF    Diet: Low Fat, Low cholesterol, 2Gm Na, Coumadin restrictions.    Acitivities: No Swimming, bathing, vacuuming, contact sports.    Fresh implants= Sternal Precautions    Nursing:   SN to complete comprehensive assessment including routine vital signs. Instruct on disease process and s/s of complications to report to MD. Review/verify medication list sent home with the patient at time of discharge  and instruct patient/caregiver as needed. Frequency may be adjusted depending on start of care date.    **LVAD driveline exit site dressing change is to be completed per LVAD patient/caregiver only**.    Notify MD if SBP > 160 or < 90; DBP > 90 or < 50; HR > 120 or < 50; Temp > 101; Weight gain >3lbs in 1 day or 5lbs in 1 week.  Other:         LABS:  SN to perform labs: PT/INR per  Coumadin clinic [phone].   Follow up INR date: 2/15/2018  No Finger Sticks      HOME INFUSION THERAPY:    SN to perform Infusion Therapy/Central Line Care.  Review Central Line Care & Central Line Flush with patient.    Administer (drug and dose): IV cefepime 2 grams every 12 hr IV stop date 2/20/2018      Central line care:  Scrub the Hub: Prior to accessing the line, always perform a 30 second alcohol scrub  Each lumen of the central line is to be flushed at least daily with 10 mL Normal Saline and 3 mL Heparin flush (100 units/mL)  Skilled Nurse (SN) may draw blood from IV access  Blood Draw Procedure:   - Aspirate at least 5 mL of blood   - Discard   - Obtain specimen   - Change posiflow cap   - Flush with 20 mL Normal Saline followed by a                 3-5 mL Heparin flush (100 units/mL)  Central :   - Sterile dressing changes are done weekly and as needed.   - Use chlor-hexadine scrub to cleanse site, apply Biopatch to insertion site,       apply securement device dressing   - Posi-flow caps are changed weekly and after EVERY lab draw.   - If sterile gauze is under dressing to control oozing,                 dressing change must be performed every 24 hours until gauze is not needed.        Send initial Home Health orders to [facility] attending physician on call.  Send follow up questions to VAD titi PISANO [phone] or [phone].

## 2018-02-07 NOTE — DISCHARGE SUMMARY
[facility]  Heart Transplant  Discharge Summary      Patient Name: Kev Vasquez  MRN: [id number]  Admission Date: 1/24/2018  Hospital Length of Stay: 14 days  Discharge Date and Time: 02/07/2018 3:48 PM  Attending Physician: Caitlin Wells MD   Discharging Provider: SHARONA Reyes  Primary Care Provider: Mega Collins MD     HPI: Mr. 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 BiV ICD, HILLARY/BiPAP, HTN, CKD, HLP, gout and depression who presents with confusion. On further questioning of his wife and his caregiver who were both at bedside, they noticed some odd behaviours such as combing his cheek/nose, getting dressed to go hunting when that is not his hobby and admitted to seeing visual hallucinations of his dead brother. They stated that he is not sleeping well and naps throughout the day. They wonder if his BiPAP is not in the proper settings anymore and he now has made a game out of looking at his watch to see when he will get to sleep. He stated it was 2017, January, thought he was in [place] (moved from there in June), and stated pardeep as the president. Altogether, he denies SOB, CP, LE edema, orthopnea and PND. He has no fevers or chills, cough, or diarrhea. They state that he does have some worsening vomiting. They stated that a few months ago, he would vomit after he drank some water: it would start with a sneeze, then a cough, then the vomit. It would happen every now and then, but over the past few weeks, it has worsened to 3-4x/day. They stated that now it occurs without drinking, sometimes before dinner and he would throw up almost undigested food. He admitted to throwing up pills too at times. There have been no VAD alarms noted.   Doppler BP 90 at discharge (has  issues with dizziness / hypotension at home historically - Have allowed some passive HTN for this reason, goal maps <95. (See note form 2/5/17)    Procedure(s) (LRB):  ESOPHAGOGASTRODUODENOSCOPY (EGD) (N/A)     Hospital Course: Vomiting- GI was consulted. EGD was negative. PPI was started BID  - Appreciate ST's help given coughing and sneezing that can precede emesis. MBSS done and patient now requires honey thick liquids with strict aspiration precautions     Confusion   - CT of head 1/24/18 with no acute changes  - Given constellation of confusion, gait disturbance and urinary incontinence, consulted Neuro (NPH ?). Appreciate their help. LP done 1/30/18 with removal of only 13cc; opening pressure 21, closing pressure 15. His post procedure gait was significantly worse. Discussed with Neuro and NSGY. Per NSGY, no immediate need for cisternogram, shunt, etc, given failed improvement in gait. Now considering PD - started Sinemet per Neuro's rec and patient will f/u in the Movement Disorders Clinic as an outpatient  - D/C'd Ropinirole again 2/2 gait disturbance     Essential hypertension   - He has a long h/o orthostatic hypotension, so BP's should only be checked while sitting or standing  - Goal MAP ~ 95 or less (some permissive HTN due to dizziness and orthostasis)  - Continue Norvasc, Hydralazine, Isordil, Lisinopril and Doxazosin     VT   - Continue Tikosyn, will discharge at lower dose of 125 BID and will get EKG at follow up visit to see QTc     LVAD (left ventricular assist device) present   - S/P HM III 10/16/16 as DT in [place]  - Current speed 5800  - TTE 1/25/18 shows LVEDD 6.5, LVEF 10-15%, diastolic dysfunction, RVE, severely depressed RV systolic function, PAS 20, ARTHUR intermittently and septum midline. No comment on IVC - per Dr. Lanza, costal views were too poor to assess IVC.   - off Lasix  - Continue IV Cefepime for chronic Pseudomonas DL infection. Will be seen by ID next week to follow up on  final sensitivities. If resistant to cefepime this will be changed by them           - given Rx for PT/OT/speech to patients daughter                 Consults         Status Ordering Provider     Inpatient consult to Cardiology  Once     Provider:  (Not yet assigned)    Completed ÓSCAR NGUYEN     Inpatient consult to Gastroenterology  Once     Provider:  (Not yet assigned)    Completed LALITA ROSALES     Inpatient consult to Infectious Diseases  Once     Provider:  (Not yet assigned)    Completed OPAL HOBBS     Inpatient consult to Neurology  Once     Provider:  (Not yet assigned)    Completed TED ROGERS     Inpatient consult to PICC team (Hospit[facility] in [place])  Once     Provider:  (Not yet assigned)    Completed ALECIA FERNANDES     Inpatient consult to Registered Dietitian/Nutritionist  Once     Provider:  (Not yet assigned)    Completed LALITA ROSALES          Significant Diagnostic Studies: Labs:   BMP:   Recent Labs  Lab 02/06/18  0433 02/07/18  0537   GLU 80 89    141   K 4.0 4.0    108   CO2 27 27   BUN 24* 25*   CREATININE 1.9* 1.9*   CALCIUM 10.6* 11.2*   MG 2.2 2.4       Pending Diagnostic Studies:     None        Final Active Diagnoses:    Diagnosis Date Noted POA    PRINCIPAL PROBLEM:  Vomiting [R11.10] 01/30/2018 Yes    Cardiomyopathy [I42.9] 02/06/2018 Yes    Wound infection [T14.8XXA, L08.9] 02/06/2018 No    Gastroesophageal reflux disease [K21.9]  Unknown    Confusion [R41.0] 01/24/2018 Yes    Essential hypertension [I10] 12/01/2017 Yes     Chronic    Gait instability [R26.81] 08/08/2017 Yes    VT (ventricular tachycardia) [I47.2] 07/25/2017 Yes    LVAD (left ventricular assist device) present [Z95.811] 07/20/2017 Not Applicable    Stage 3 chronic kidney disease [N18.3] 07/20/2017 Yes      Problems Resolved During this Admission:    Diagnosis Date Noted Date Resolved POA      Discharged Condition: good    Disposition: Home or Self Care    Follow Up:  one week ID  3  weeks LVAD clinic   Patient Instructions:     Ambulatory consult to Physical Therapy   Referral Priority: Routine Referral Type: Physical Medicine   Referral Reason: Specialty Services Required    Requested Specialty: Physical Therapy    Number of Visits Requested: 1      Ambulatory consult to Occupational Therapy   Referral Priority: Routine Referral Type: Occupational Therapy   Referral Reason: Specialty Services Required    Requested Specialty: Occupational Therapy    Number of Visits Requested: 1      Ambulatory Referral to Speech Therapy   Referral Priority: Routine Referral Type: Speech Therapy   Referral Reason: Specialty Services Required    Requested Specialty: Speech Pathology    Number of Visits Requested: 1        Medications:  Reconciled Home Medications:   Current Discharge Medication List      START taking these medications    Details   carbidopa-levodopa  mg (SINEMET)  mg per tablet Take 1 tablet by mouth every 4 (four) hours while awake.  Qty: 150 tablet, Refills: 11      doxazosin (CARDURA) 8 MG Tab Take 1 tablet (8 mg total) by mouth once daily.  Qty: 30 tablet, Refills: 11      pantoprazole (PROTONIX) 40 MG tablet Take 1 tablet (40 mg total) by mouth 2 (two) times daily before meals.  Qty: 60 tablet, Refills: 11         CONTINUE these medications which have CHANGED    Details   buPROPion 450 mg Tb24 Take 450 mg by mouth once daily.  Qty: 30 tablet, Refills: 11      dofetilide (TIKOSYN) 125 MCG Cap Take 1 capsule (125 mcg total) by mouth every 12 (twelve) hours.  Qty: 60 capsule, Refills: 11      isosorbide dinitrate (ISORDIL) 40 MG Tab Take 1 tablet (40 mg total) by mouth 3 (three) times daily.  Qty: 90 tablet, Refills: 11         CONTINUE these medications which have NOT CHANGED    Details   allopurinol (ZYLOPRIM) 300 MG tablet Take 300 mg by mouth once daily.      amLODIPine (NORVASC) 10 MG tablet Take 1 tablet (10 mg total) by mouth once daily.  Qty: 30 tablet, Refills: 11       aspirin 81 MG Chew Take 81 mg by mouth once daily.      atorvastatin (LIPITOR) 10 MG tablet Take 10 mg by mouth once daily.      calcium-vitamin D tablet 600 mg-200 units Take 1 tablet by mouth once daily.      CEFEPIME HCL (CEFEPIME 2 G/50 ML D5W, READY TO MIX SYSTEM,) Inject 2 g into the vein 2 (two) times daily.      docusate sodium (COLACE) 100 MG capsule Take 100 mg by mouth daily as needed for Constipation.      ferrous sulfate 324 mg (65 mg iron) TbEC Take 1 tablet (324 mg total) by mouth once daily.  Qty: 30 tablet, Refills: 11      folic acid (FOLVITE) 1 MG tablet Take 1 mg by mouth once daily.      hydrALAZINE (APRESOLINE) 100 MG tablet Take 1 tablet (100 mg total) by mouth every 8 (eight) hours.  Qty: 90 tablet, Refills: 11      Lactobacillus rhamnosus GG (CULTURELLE) 10 billion cell capsule Take 1 capsule by mouth once daily.      lisinopril (PRINIVIL,ZESTRIL) 20 MG tablet Take 1 tablet (20 mg total) by mouth 2 (two) times daily.  Qty: 180 tablet, Refills: 3      magnesium oxide (MAG-OX) 400 mg tablet Take 1 tablet (400 mg total) by mouth 2 (two) times daily.  Qty: 60 tablet, Refills: 11      multivitamin capsule Take 1 capsule by mouth once daily.      warfarin (COUMADIN) 4 MG tablet Take 4 mg orally on Sun, Tue, Thurs and 6 mg orally on other days as directed by coumadin clinic  Refills: 11         STOP taking these medications       promethazine (PHENERGAN) 12.5 MG Tab Comments:   Reason for Stopping:         ferrous sulfate 325 mg (65 mg iron) Tab tablet Comments:   Reason for Stopping:               SHARONA Reyes  Heart Transplant  [facility]

## 2018-02-07 NOTE — PT/OT/SLP DISCHARGE
Physical Therapy Discharge Summary    Name: Kev Vasquez  MRN: [id number]   Principal Problem: Vomiting     Patient Discharged from acute Physical Therapy on 2018.  Please refer to prior PT noted date on 2018 for functional status.     Assessment:     Patient appropriate for care in another setting.    Objective:     GOALS:    Physical Therapy Goals     Not on file          Multidisciplinary Problems (Resolved)        Problem: Physical Therapy Goal    Goal Priority Disciplines Outcome Goal Variances Interventions   Physical Therapy Goal   (Resolved)     PT/OT, PT Outcome(s) achieved     Description:  Goals to be met by: 2/10/18    Patient will increase functional independence with mobility by performin. Supine to sit with SBA  2. Sit to supine with SBA  3. Sit to stand transfer with Supervision using RW (SBA met )  4. Gait x150 feet with Contact Guard Assistance using Rolling Walker   5. Standing balance x10 min with SBA using AD or no AD while performing functional tasks   6. Lower extremity exercise program x20 reps per handout, with assistance as needed                         Reasons for Discontinuation of Therapy Services  Transfer to alternate level of care.      Plan:     Patient Discharged to: Home with Home Health Service.    Carmencita Doherty, PT  2018

## 2018-02-07 NOTE — HOSPITAL COURSE
Vomiting- GI was consulted. EGD was negative. PPI was started BID  - Appreciate ST's help given coughing and sneezing that can precede emesis. MBSS done and patient now requires honey thick liquids with strict aspiration precautions     Confusion   - CT of head 1/24/18 with no acute changes  - Given constellation of confusion, gait disturbance and urinary incontinence, consulted Neuro (NPH ?). Appreciate their help. LP done 1/30/18 with removal of only 13cc; opening pressure 21, closing pressure 15. His post procedure gait was significantly worse. Discussed with Neuro and NSGY. Per NSGY, no immediate need for cisternogram, shunt, etc, given failed improvement in gait. Now considering PD - started Sinemet per Neuro's rec and patient will f/u in the Movement Disorders Clinic as an outpatient  - D/C'd Ropinirole again 2/2 gait disturbance     Essential hypertension   - He has a long h/o orthostatic hypotension, so BP's should only be checked while sitting or standing  - Goal MAP ~ 95 or less (some permissive HTN due to dizziness and orthostasis)  - Continue Norvasc, Hydralazine, Isordil, Lisinopril and Doxazosin     VT   - Continue Tikosyn, will discharge at lower dose of 125 BID and will get EKG at follow up visit to see QTc     LVAD (left ventricular assist device) present   - S/P HM III 10/16/16 as DT in [place]  - Current speed 5800  - TTE 1/25/18 shows LVEDD 6.5, LVEF 10-15%, diastolic dysfunction, RVE, severely depressed RV systolic function, PAS 20, ARTHUR intermittently and septum midline. No comment on IVC - per Dr. Lanza, costal views were too poor to assess IVC.   - off Lasix  - Continue IV Cefepime for chronic Pseudomonas DL infection. Will be seen by ID next week to follow up on final sensitivities. If resistant to cefepime this will be changed by them           - given Rx for PT/OT/speech to patients daughter

## 2018-02-07 NOTE — PHYSICIAN QUERY
PT Name: Kev Vasquez  MR #: [id number]     Physician Query Form - Etiology of Condition Clarification      CDS/: Julieta Morejon RN, CCDS              Contact information: [email or web address]  This form is a permanent document in the medical record.     Query Date: February 7, 2018    By submitting this query, we are merely seeking further clarification of documentation.  Please utilize your independent clinical judgment when addressing the question(s) below.     The medical record contains the following:    Findings Supporting Clinical Information Location in Medical Record   Presented to ED with persistent vomiting and some hallucinations      admitted with confusion. Reporting nausea, vomiting and GERD.     confusion and gait disturbance         confusion                   has been confused for the past few days    D/C'd Ropinirole again 2/2 gait disturbance    CT of head 1/24/18 with no acute changes     1/24 h/p        1/25 h/p          1/30 radiology h/p    1/25 prog note    1/25- 2/7 prog notes               Please document your best medical opinion regarding the etiology of __Confusion_____ for which the primary focus of treatment is/was directed.                         [   X ]  Clinically Undetermined    Please document in your progress notes daily for the duration of treatment, until resolved, and include in your discharge summary.

## 2018-02-07 NOTE — PROGRESS NOTES
02/07/18 1135   Vital Signs   BP (!) 90/0  (notified SHARONA Vickers)   BP Location Left arm   BP Method Doppler   Patient Position Sitting   Notified HSARONA Vickers pt's doppler is 90/0 per PA ok; continue to monitor.

## 2018-02-07 NOTE — SUBJECTIVE & OBJECTIVE
Interval History: *no issues overnight     Continuous Infusions:  Scheduled Meds:   allopurinol  300 mg Oral Daily    amLODIPine  10 mg Oral Daily    aspirin  81 mg Oral Daily    atorvastatin  10 mg Oral Daily    buPROPion  450 mg Oral Daily    carbidopa-levodopa  mg  1 tablet Oral Q4H While awake    ceFEPime (MAXIPIME) IVPB  2 g Intravenous Q12H    docusate sodium  100 mg Oral BID    dofetilide  125 mcg Oral Q12H    doxazosin  8 mg Oral Daily    folic acid  1 mg Oral Daily    hydrALAZINE  100 mg Oral Q8H    isosorbide dinitrate  40 mg Oral TID    Lactobacillus rhamnosus GG  1 capsule Oral Daily    lisinopril  20 mg Oral BID    magnesium oxide  400 mg Oral BID    pantoprazole  40 mg Oral BID AC    polyethylene glycol  17 g Oral Daily    warfarin  6 mg Oral Daily     PRN Meds:acetaminophen, docusate sodium, omnipaque 300 iohexol, ondansetron, promethazine    Review of patient's allergies indicates:   Allergen Reactions    Aldactone [spironolactone]       persistent hyperkalemia.    Sulfa (sulfonamide antibiotics)      Objective:     Vital Signs (Most Recent):  Temp: 97.3 °F (36.3 °C) (02/07/18 1135)  Pulse: 61 (02/07/18 1135)  Resp: 18 (02/07/18 1135)  BP: (!) 90/0 (notified SHARONA Vickers) (02/07/18 1135)  SpO2: (!) 91 % (02/07/18 1135) Vital Signs (24h Range):  Temp:  [97.3 °F (36.3 °C)-98.9 °F (37.2 °C)] 97.3 °F (36.3 °C)  Pulse:  [61-81] 61  Resp:  [17-18] 18  SpO2:  [91 %-96 %] 91 %  BP: ()/(0-85) 90/0     Patient Vitals for the past 72 hrs (Last 3 readings):   Weight   02/07/18 0700 81.5 kg (179 lb 10.8 oz)   02/06/18 0700 81.6 kg (179 lb 14.3 oz)   02/05/18 0800 80.5 kg (177 lb 7.5 oz)     Body mass index is 25.78 kg/m².      Intake/Output Summary (Last 24 hours) at 02/07/18 1355  Last data filed at 02/07/18 0500   Gross per 24 hour   Intake             1070 ml   Output                0 ml   Net             1070 ml       Hemodynamic Parameters:         Physical Exam    Constitutional: He appears well-developed and well-nourished.   HENT:   Head: Normocephalic and atraumatic.   Eyes: Conjunctivae are normal. Pupils are equal, round, and reactive to light.   Neck: Normal range of motion. Neck supple. No JVD present.   Cardiovascular: Normal rate and regular rhythm.    Smooth VAD hum. Intermittently pulsatile   Pulmonary/Chest: Effort normal and breath sounds normal.   Abdominal: Soft. Bowel sounds are normal.   Genitourinary:   Genitourinary Comments: Incontinent   Musculoskeletal: He exhibits no edema.   Neurological: He is alert.   Oriented to self and place but not year this morning   Skin: Skin is warm and dry. Capillary refill takes 2 to 3 seconds.       Significant Labs:  CBC:    Recent Labs  Lab 02/05/18  0533 02/06/18  0433 02/07/18  0537   WBC 5.66 6.04 6.60   RBC 3.41* 3.44* 3.53*   HGB 10.1* 10.0* 10.5*   HCT 30.0* 30.6* 31.7*   * 104* 112*   MCV 88 89 90   MCH 29.6 29.1 29.7   MCHC 33.7 32.7 33.1     BNP:    Recent Labs  Lab 02/02/18  0501 02/05/18  0533 02/07/18  0537   BNP 86 157* 202*     CMP:    Recent Labs  Lab 02/02/18  0501  02/05/18  0533 02/06/18  0433 02/07/18  0537   GLU 91  < > 86 80 89   CALCIUM 9.9  < > 10.5 10.6* 11.2*   ALBUMIN 2.9*  --  3.0*  --  3.1*   PROT 6.3  --  6.3  --  6.8     < > 140 142 141   K 4.0  < > 4.0 4.0 4.0   CO2 24  < > 27 27 27     < > 107 109 108   BUN 20  < > 22 24* 25*   CREATININE 1.7*  < > 1.9* 1.9* 1.9*   ALKPHOS 68  --  70  --  76   ALT <5*  --  8*  --  9*   AST 18  --  23  --  22   BILITOT 0.3  --  0.3  --  0.4   < > = values in this interval not displayed.   Coagulation:     Recent Labs  Lab 02/05/18  0533 02/06/18  0433 02/07/18  0537   INR 2.2* 2.3* 2.6*     LDH:    Recent Labs  Lab 02/05/18  0533 02/06/18  0433 02/07/18  0537    198 172     Microbiology:  Microbiology Results (last 7 days)     Procedure Component Value Units Date/Time    Culture, Anaerobe [[id number]] Collected:  02/05/18 2749     Order Status:  Completed Specimen:  Wound from Abdomen Updated:  02/07/18 1258     Anaerobic Culture Culture in progress    Narrative:       SHERRYGUILLERMO    Aerobic culture [[id number]] Collected:  02/05/18 1650    Order Status:  Completed Specimen:  Wound from Abdomen Updated:  02/07/18 1116     Aerobic Bacterial Culture --     PRESUMPTIVE PSEUDOMONAS SPECIES  Moderate  Identification and susceptibility pending      Narrative:       STEPHAN    Gram stain [[id number]] Collected:  02/05/18 1650    Order Status:  Completed Specimen:  Wound from Abdomen Updated:  02/06/18 0133     Gram Stain Result Few WBC's      No organisms seen    Narrative:       STEPHAN          I have reviewed all pertinent labs within the past 24 hours.    Estimated Creatinine Clearance: 34.7 mL/min (based on SCr of 1.9 mg/dL (H)).    Diagnostic Results:  I have reviewed and interpreted all pertinent imaging results/findings within the past 24 hours.

## 2018-02-08 ENCOUNTER — TELEPHONE (OUTPATIENT)
Dept: PHARMACY | Facility: CLINIC | Age: 78
End: 2018-02-08

## 2018-02-08 ENCOUNTER — HOSPITAL ENCOUNTER (INPATIENT)
Facility: HOSPITAL | Age: 78
LOS: 5 days | Discharge: HOME | DRG: 291 | End: 2018-02-14
Attending: EMERGENCY MEDICINE | Admitting: INTERNAL MEDICINE
Payer: MEDICARE

## 2018-02-08 DIAGNOSIS — I47.20 VT (VENTRICULAR TACHYCARDIA): ICD-10-CM

## 2018-02-08 DIAGNOSIS — N18.30 STAGE 3 CHRONIC KIDNEY DISEASE: ICD-10-CM

## 2018-02-08 DIAGNOSIS — T82.9XXD LEFT VENTRICULAR ASSIST DEVICE (LVAD) COMPLICATION, SUBSEQUENT ENCOUNTER: ICD-10-CM

## 2018-02-08 DIAGNOSIS — I10 UNCONTROLLED HYPERTENSION: ICD-10-CM

## 2018-02-08 DIAGNOSIS — Z95.811 LVAD (LEFT VENTRICULAR ASSIST DEVICE) PRESENT: ICD-10-CM

## 2018-02-08 DIAGNOSIS — R55 SYNCOPE: Primary | ICD-10-CM

## 2018-02-08 DIAGNOSIS — R55 SYNCOPE, UNSPECIFIED SYNCOPE TYPE: ICD-10-CM

## 2018-02-08 LAB
ALBUMIN SERPL BCP-MCNC: 3.1 G/DL
ALP SERPL-CCNC: 76 U/L
ALT SERPL W/O P-5'-P-CCNC: <5 U/L
ANION GAP SERPL CALC-SCNC: 6 MMOL/L
AST SERPL-CCNC: 22 U/L
BACTERIA SPEC AEROBE CULT: NORMAL
BASOPHILS # BLD AUTO: 0.03 K/UL
BASOPHILS NFR BLD: 0.5 %
BILIRUB SERPL-MCNC: 0.4 MG/DL
BNP SERPL-MCNC: 215 PG/ML
BUN SERPL-MCNC: 26 MG/DL
CALCIUM SERPL-MCNC: 11.3 MG/DL
CHLORIDE SERPL-SCNC: 106 MMOL/L
CO2 SERPL-SCNC: 27 MMOL/L
CREAT SERPL-MCNC: 2.2 MG/DL
DIFFERENTIAL METHOD: ABNORMAL
EOSINOPHIL # BLD AUTO: 0.1 K/UL
EOSINOPHIL NFR BLD: 2.2 %
ERYTHROCYTE [DISTWIDTH] IN BLOOD BY AUTOMATED COUNT: 18.4 %
EST. GFR  (AFRICAN AMERICAN): 32.7 ML/MIN/1.73 M^2
EST. GFR  (NON AFRICAN AMERICAN): 28.3 ML/MIN/1.73 M^2
GLUCOSE SERPL-MCNC: 105 MG/DL
HCT VFR BLD AUTO: 32.1 %
HGB BLD-MCNC: 10.4 G/DL
IMM GRANULOCYTES # BLD AUTO: 0.04 K/UL
IMM GRANULOCYTES NFR BLD AUTO: 0.6 %
INR PPP: 3
LYMPHOCYTES # BLD AUTO: 0.7 K/UL
LYMPHOCYTES NFR BLD: 10.8 %
MAGNESIUM SERPL-MCNC: 2.6 MG/DL
MCH RBC QN AUTO: 29.1 PG
MCHC RBC AUTO-ENTMCNC: 32.4 G/DL
MCV RBC AUTO: 90 FL
MONOCYTES # BLD AUTO: 0.8 K/UL
MONOCYTES NFR BLD: 11.8 %
NEUTROPHILS # BLD AUTO: 4.7 K/UL
NEUTROPHILS NFR BLD: 74.1 %
NRBC BLD-RTO: 0 /100 WBC
PLATELET # BLD AUTO: 109 K/UL
PMV BLD AUTO: 11.9 FL
POTASSIUM SERPL-SCNC: 4.4 MMOL/L
PROT SERPL-MCNC: 6.9 G/DL
PROTHROMBIN TIME: 29.3 SEC
RBC # BLD AUTO: 3.57 M/UL
SODIUM SERPL-SCNC: 139 MMOL/L
TROPONIN I SERPL DL<=0.01 NG/ML-MCNC: 0.14 NG/ML
WBC # BLD AUTO: 6.36 K/UL

## 2018-02-08 PROCEDURE — 93005 ELECTROCARDIOGRAM TRACING: CPT

## 2018-02-08 PROCEDURE — G0378 HOSPITAL OBSERVATION PER HR: HCPCS

## 2018-02-08 PROCEDURE — 63600175 PHARM REV CODE 636 W HCPCS: Performed by: INTERNAL MEDICINE

## 2018-02-08 PROCEDURE — 94761 N-INVAS EAR/PLS OXIMETRY MLT: CPT

## 2018-02-08 PROCEDURE — 96374 THER/PROPH/DIAG INJ IV PUSH: CPT

## 2018-02-08 PROCEDURE — 85610 PROTHROMBIN TIME: CPT

## 2018-02-08 PROCEDURE — 93750 INTERROGATION VAD IN PERSON: CPT | Performed by: INTERNAL MEDICINE

## 2018-02-08 PROCEDURE — 99285 EMERGENCY DEPT VISIT HI MDM: CPT | Mod: 25

## 2018-02-08 PROCEDURE — 93010 ELECTROCARDIOGRAM REPORT: CPT | Mod: ,,, | Performed by: INTERNAL MEDICINE

## 2018-02-08 PROCEDURE — 83880 ASSAY OF NATRIURETIC PEPTIDE: CPT

## 2018-02-08 PROCEDURE — 25000003 PHARM REV CODE 250: Performed by: INTERNAL MEDICINE

## 2018-02-08 PROCEDURE — 80053 COMPREHEN METABOLIC PANEL: CPT

## 2018-02-08 PROCEDURE — 99222 1ST HOSP IP/OBS MODERATE 55: CPT | Mod: ,,, | Performed by: INTERNAL MEDICINE

## 2018-02-08 PROCEDURE — 27000248 HC VAD-ADDITIONAL DAY

## 2018-02-08 PROCEDURE — 83735 ASSAY OF MAGNESIUM: CPT

## 2018-02-08 PROCEDURE — 84484 ASSAY OF TROPONIN QUANT: CPT

## 2018-02-08 PROCEDURE — 85025 COMPLETE CBC W/AUTO DIFF WBC: CPT

## 2018-02-08 RX ORDER — DOFETILIDE 0.12 MG/1
125 CAPSULE ORAL EVERY 12 HOURS
Status: DISCONTINUED | OUTPATIENT
Start: 2018-02-08 | End: 2018-02-14 | Stop reason: HOSPADM

## 2018-02-08 RX ORDER — BUPROPION HYDROCHLORIDE 150 MG/1
450 TABLET ORAL DAILY
Status: DISCONTINUED | OUTPATIENT
Start: 2018-02-09 | End: 2018-02-14 | Stop reason: HOSPADM

## 2018-02-08 RX ORDER — DOCUSATE SODIUM 100 MG/1
100 CAPSULE, LIQUID FILLED ORAL DAILY PRN
Status: DISCONTINUED | OUTPATIENT
Start: 2018-02-08 | End: 2018-02-14 | Stop reason: HOSPADM

## 2018-02-08 RX ORDER — DOXAZOSIN 2 MG/1
8 TABLET ORAL DAILY
Status: DISCONTINUED | OUTPATIENT
Start: 2018-02-09 | End: 2018-02-13

## 2018-02-08 RX ORDER — FOLIC ACID 1 MG/1
1 TABLET ORAL DAILY
Status: DISCONTINUED | OUTPATIENT
Start: 2018-02-09 | End: 2018-02-14 | Stop reason: HOSPADM

## 2018-02-08 RX ORDER — CARBIDOPA AND LEVODOPA 25; 100 MG/1; MG/1
1 TABLET ORAL
Status: DISCONTINUED | OUTPATIENT
Start: 2018-02-08 | End: 2018-02-13

## 2018-02-08 RX ORDER — NAPROXEN SODIUM 220 MG/1
81 TABLET, FILM COATED ORAL DAILY
Status: DISCONTINUED | OUTPATIENT
Start: 2018-02-09 | End: 2018-02-14 | Stop reason: HOSPADM

## 2018-02-08 RX ORDER — PANTOPRAZOLE SODIUM 40 MG/1
40 TABLET, DELAYED RELEASE ORAL
Status: DISCONTINUED | OUTPATIENT
Start: 2018-02-09 | End: 2018-02-14 | Stop reason: HOSPADM

## 2018-02-08 RX ORDER — ACETAMINOPHEN 325 MG/1
650 TABLET ORAL DAILY PRN
Status: DISCONTINUED | OUTPATIENT
Start: 2018-02-08 | End: 2018-02-14 | Stop reason: HOSPADM

## 2018-02-08 RX ORDER — FERROUS SULFATE 325(65) MG
325 TABLET, DELAYED RELEASE (ENTERIC COATED) ORAL DAILY
Status: DISCONTINUED | OUTPATIENT
Start: 2018-02-09 | End: 2018-02-14 | Stop reason: HOSPADM

## 2018-02-08 RX ORDER — CEFEPIME HYDROCHLORIDE 2 G/1
2 INJECTION, POWDER, FOR SOLUTION INTRAVENOUS
Status: DISCONTINUED | OUTPATIENT
Start: 2018-02-08 | End: 2018-02-09

## 2018-02-08 RX ORDER — HYDRALAZINE HYDROCHLORIDE 25 MG/1
100 TABLET, FILM COATED ORAL EVERY 8 HOURS
Status: DISCONTINUED | OUTPATIENT
Start: 2018-02-08 | End: 2018-02-08

## 2018-02-08 RX ORDER — ATORVASTATIN CALCIUM 10 MG/1
10 TABLET, FILM COATED ORAL DAILY
Status: DISCONTINUED | OUTPATIENT
Start: 2018-02-09 | End: 2018-02-14 | Stop reason: HOSPADM

## 2018-02-08 RX ORDER — HYDRALAZINE HYDROCHLORIDE 50 MG/1
150 TABLET, FILM COATED ORAL EVERY 8 HOURS
Status: DISCONTINUED | OUTPATIENT
Start: 2018-02-09 | End: 2018-02-13

## 2018-02-08 RX ORDER — HYDRALAZINE HYDROCHLORIDE 25 MG/1
150 TABLET, FILM COATED ORAL ONCE
Status: COMPLETED | OUTPATIENT
Start: 2018-02-08 | End: 2018-02-08

## 2018-02-08 RX ORDER — LANOLIN ALCOHOL/MO/W.PET/CERES
400 CREAM (GRAM) TOPICAL 2 TIMES DAILY
Status: DISCONTINUED | OUTPATIENT
Start: 2018-02-08 | End: 2018-02-14 | Stop reason: HOSPADM

## 2018-02-08 RX ORDER — MUPIROCIN 20 MG/G
1 OINTMENT TOPICAL 2 TIMES DAILY
Status: CANCELLED | OUTPATIENT
Start: 2018-02-08 | End: 2018-02-13

## 2018-02-08 RX ORDER — AMLODIPINE BESYLATE 10 MG/1
10 TABLET ORAL DAILY
Qty: 30 TABLET | Refills: 11 | Status: ON HOLD | OUTPATIENT
Start: 2018-02-08 | End: 2018-02-14 | Stop reason: HOSPADM

## 2018-02-08 RX ORDER — ALLOPURINOL 300 MG/1
300 TABLET ORAL DAILY
Qty: 90 TABLET | Refills: 4 | Status: SHIPPED | OUTPATIENT
Start: 2018-02-08

## 2018-02-08 RX ORDER — AMLODIPINE BESYLATE 10 MG/1
10 TABLET ORAL DAILY
Status: DISCONTINUED | OUTPATIENT
Start: 2018-02-09 | End: 2018-02-09

## 2018-02-08 RX ORDER — ISOSORBIDE DINITRATE 40 MG/1
40 TABLET ORAL 3 TIMES DAILY
Status: DISCONTINUED | OUTPATIENT
Start: 2018-02-08 | End: 2018-02-14 | Stop reason: HOSPADM

## 2018-02-08 RX ORDER — WARFARIN 4 MG/1
4 TABLET ORAL DAILY
Status: DISCONTINUED | OUTPATIENT
Start: 2018-02-09 | End: 2018-02-09

## 2018-02-08 RX ORDER — ALLOPURINOL 300 MG/1
300 TABLET ORAL DAILY
Status: DISCONTINUED | OUTPATIENT
Start: 2018-02-09 | End: 2018-02-14 | Stop reason: HOSPADM

## 2018-02-08 RX ADMIN — CARBIDOPA AND LEVODOPA 1 TABLET: 25; 100 TABLET ORAL at 09:02

## 2018-02-08 RX ADMIN — HYDRALAZINE HYDROCHLORIDE 150 MG: 25 TABLET, FILM COATED ORAL at 09:02

## 2018-02-08 RX ADMIN — ISOSORBIDE DINITRATE 40 MG: 40 TABLET ORAL at 11:02

## 2018-02-08 RX ADMIN — CEFEPIME 2 G: 2 INJECTION, POWDER, FOR SOLUTION INTRAVENOUS at 09:02

## 2018-02-08 RX ADMIN — ACETAMINOPHEN 650 MG: 325 TABLET, FILM COATED ORAL at 09:02

## 2018-02-08 RX ADMIN — DOFETILIDE 125 MCG: 0.12 CAPSULE ORAL at 09:02

## 2018-02-08 RX ADMIN — MAGNESIUM OXIDE TAB 400 MG (241.3 MG ELEMENTAL MG) 400 MG: 400 (241.3 MG) TAB at 09:02

## 2018-02-08 NOTE — ASSESSMENT & PLAN NOTE
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 Denies having any fever, chills or night sweats at home. driveline drainage unchanged per wife. driveline re cultured considering cultures may be resistant to cefepime.  Currently on cefepime [id number].   -Cultures with presumptive pseudomonas  -blood cultures 1/24 NGTD.       Plan  1.Continue IV cefepime [id number].   2. Continue IV cefepime for additional 2 weeks from today (end date 2/20/18). Will need close follow up with ID once discharge (later this week with Dr. Jean Baptiste arranged).   3. Pt appears stable, nonseptic, afebrile. Will follow cultures tomorrow with further recommendations.   4. Weekly ESR, CRP, CBC, CMP faxed to ID dept at [phone]

## 2018-02-08 NOTE — ED PROVIDER NOTES
"Encounter Date: 2/8/2018       History     Chief Complaint   Patient presents with    Loss of Consciousness     syncopal episode at home in chair, LVAD pt.       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 BiV ICD, HILLARY/BiPAP, HTN, CKD, HLP, gout and depression presenting with syncopal episode at home.  Patient was just discharged yesterday after an admission for confusion and nausea/vomiting, has also been monitored for low MAPs on VAD.  Patient's wife states he was fine last night and this morning but when she took him to table for lunch, he sat and refused to eat anything.  He then fell asleep at the table.  When family tried to wake him up, there were unable to arouse him and his LVAD alarm for "low flow - call hospital" went off.  He then woke up and appeared at baseline.  Patient is able to give limited history himself, and wife endorses that he doesn't speak much.  He does deny chest pain, shortness of breath, dizziness and pain.          Review of patient's allergies indicates:   Allergen Reactions    Aldactone [spironolactone]       persistent hyperkalemia.    Sulfa (sulfonamide antibiotics)      Past Medical History:   Diagnosis Date    AICD (automatic cardioverter/defibrillator) present 2014    [employer]    Chronic anticoagulation 7/21/2017    Chronic combined systolic and diastolic congestive heart failure 7/27/2015    11-16-17   1 - Moderate left ventricular enlargement.    2 - Severely depressed left ventricular systolic function (EF 15-20%).    3 - Impaired LV relaxation, normal LAP (grade 1 diastolic dysfunction).    4 - Biatrial enlargement.    5 - Right ventricle is upper limit of normal in size with not well seen systolic function.    6 - Severe tricuspid regurgitation.    7 - Increased central venous pressure.    8 - The estimated PA systolic " pressure is 40 mmHg.    9 - Heartmate III LVAD; speed 5800.     Complication involving left ventricular assist device (LVAD) 7/29/2017    Coronary artery disease involving native coronary artery of native heart without angina pectoris 7/27/2015    Gait instability     Hypertensive urgency, malignant 11/15/2017    ICD (implantable cardioverter-defibrillator), biventricular, in situ 7/27/2015    Ischemic cardiomyopathy 7/20/2017    S/p HMIII     Kidney stones     LVAD (left ventricular assist device) present 7/20/2017    status post Heartmate III 10/16/16 LVAD    HILLARY on CPAP 7/27/2015    Peripheral neuropathy     Pulmonary hypertension due to left ventricular systolic dysfunction 7/29/2015    Restless leg syndrome 1992    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    Skin cancer     excision 2013    Skin yeast infection 8/31/2017    Stage 3 chronic kidney disease 7/20/2017    Syncope 7/20/2017    Ventricular tachycardia 7/25/2017     Past Surgical History:   Procedure Laterality Date    CARDIAC PACEMAKER PLACEMENT      pacemaker previously, then AICD in 2006. AICD [employer] serial #[id number]    CORONARY ARTERY BYPASS GRAFT  1993    KNEE SURGERY Left 2001    complicated by infection, hardware had to be taken out and replaced    LEFT VENTRICULAR ASSIST DEVICE  10/19/2016     Family History   Problem Relation Age of Onset    Cancer Daughter 50     breast    Heart disease Daughter      MI x 3, CABG    Diabetes Daughter      Social History   Substance Use Topics    Smoking status: Never Smoker    Smokeless tobacco: Never Used    Alcohol use No     Review of Systems   Unable to perform ROS: Other (patient not participating in extensive questioning)       Physical Exam     Initial Vitals   BP Pulse Resp Temp SpO2   02/08/18 1524 02/08/18 1457 02/08/18 1457 -- 02/08/18 1457   (!) 82/0 78 20  99 %      MAP       02/08/18 1524       27.33         Physical Exam    Nursing note and vitals  reviewed.  Constitutional: He appears well-developed and well-nourished. He is not diaphoretic. No distress.   Comfortable appearing, NAD   HENT:   Head: Normocephalic and atraumatic.   Eyes: Conjunctivae are normal. No scleral icterus.   Neck: Normal range of motion. Neck supple.   Cardiovascular:   LVAD   Pulmonary/Chest: Breath sounds normal. No respiratory distress. He has no wheezes. He has no rales.   Abdominal: Soft. He exhibits no distension. There is no tenderness.   Musculoskeletal: He exhibits no edema.   Neurological: He is alert and oriented to person, place, and time.   Oriented to person, [facility], Feb 2018.    Skin: Skin is warm and dry.         ED Course   Procedures  Labs Reviewed   B-TYPE NATRIURETIC PEPTIDE - Abnormal; Notable for the following:        Result Value     (*)     All other components within normal limits   CBC W/ AUTO DIFFERENTIAL - Abnormal; Notable for the following:     RBC 3.57 (*)     Hemoglobin 10.4 (*)     Hematocrit 32.1 (*)     RDW 18.4 (*)     Platelets 109 (*)     Immature Granulocytes 0.6 (*)     Lymph # 0.7 (*)     Gran% 74.1 (*)     Lymph% 10.8 (*)     All other components within normal limits   COMPREHENSIVE METABOLIC PANEL - Abnormal; Notable for the following:     BUN, Bld 26 (*)     Creatinine 2.2 (*)     Calcium 11.3 (*)     Albumin 3.1 (*)     ALT <5 (*)     Anion Gap 6 (*)     eGFR if  32.7 (*)     eGFR if non  28.3 (*)     All other components within normal limits   TROPONIN I - Abnormal; Notable for the following:     Troponin I 0.137 (*)     All other components within normal limits   PROTIME-INR - Abnormal; Notable for the following:     Prothrombin Time 29.3 (*)     INR 3.0 (*)     All other components within normal limits   MAGNESIUM                   APC / Resident Notes:   HO3 MDM:  Patient comfortable appearing and in no distress.  Differential includes vasovagal episode, dehydration, metabolic-electrolyte  derangement, arrhythymia, device malfunction.  Doppler BP 82 here, with otherwise normal vitals and no further LVAD alarms.  Labs, EKG, and CXR unremarkable.  Cardiology evaluating patient now.  Possibly ok to discharge pending their eval.      AJAY Boyer MD  PGY-3, [employer] EM  2/8/2018 5:12 PM    HO3 Update:  Cardiology to admit patient.  He and family amenable to plan.     AJAY Boyer MD  PGY-3, [employer] EM  2/8/2018 6:54 PM                    ED Course      Clinical Impression:   The primary encounter diagnosis was Syncope. A diagnosis of LVAD (left ventricular assist device) present was also pertinent to this visit.                           Wendy Boyer MD  Resident  02/08/18 5158

## 2018-02-08 NOTE — PT/OT/SLP DISCHARGE
Occupational Therapy Discharge Summary    Kev Vasquez  MRN: [id number]   Principal Problem: Vomiting      Patient Discharged from acute Occupational Therapy on 2/7/2018.  Please refer to prior OT note dated 2/5/2018 for functional status.    Assessment:      Patient appropriate for care in another setting.    Objective:     GOALS:    Occupational Therapy Goals     Not on file          Multidisciplinary Problems (Resolved)        Problem: Occupational Therapy Goal    Goal Priority Disciplines Outcome Interventions   Occupational Therapy Goal   (Resolved)     OT, PT/OT Outcome(s) achieved    Description:  Goals to be met by: 7 days 2/12/18     Patient will increase functional independence with ADLs by performing:    LE Dressing with SBA  Grooming while standing with Supervision.  Toileting from bedside commode with Minimal Assistance for hygiene and clothing management.   Stand pivot transfers with Stand-by Assistance.  Toilet transfer to bedside commode with Stand-by Assistance.    Pt will stand for approx 8 minutes to complete standing grooming task with SBA and without any noted LOB                       Reasons for Discontinuation of Therapy Services  Transfer to alternate level of care.      Plan:     Patient Discharged to: Home with no OT needs and with OP PT    Radha Quesada OT  2/8/2018

## 2018-02-08 NOTE — ED TRIAGE NOTES
Patient has a syncopal episode at home. Patient is aao x 4 at this time. Patient nods his head that he feels bad. Patients wife states that he has an infection in his drive line that is being treated currently with antibiotics.

## 2018-02-08 NOTE — PROGRESS NOTES
[facility]  Infectious Disease  Progress Note    Patient Name: Kev Vasquez  MRN: [id number]  Admission Date: 1/24/2018  Length of Stay: 14 days  Attending Physician: No att. 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 Denies having any fever, chills or night sweats at home. driveline drainage unchanged per wife. driveline re cultured considering cultures may be resistant to cefepime.  Currently on cefepime 2pm48um.   -Cultures with presumptive pseudomonas  -blood cultures 1/24 NGTD.       Plan  1.Continue IV cefepime 3dy50cf.   2. Continue IV cefepime for additional 2 weeks from today (end date 2/20/18). Will need close follow up with ID once discharge (later this week with Dr. Jean Baptiste arranged).   3. Pt appears stable, nonseptic, afebrile. Will follow cultures tomorrow with further recommendations.   4. Weekly ESR, CRP, CBC, CMP faxed to ID dept at [phone]            Anticipated Disposition: tbd    Thank you for your consult. I will sign off. Please contact us if you have any additional questions.    SHARONA Zuluaga  Infectious Disease  [facility]    Subjective:     Principal Problem:Vomiting    HPI: 76 yo male with history of LVAD and known DLES infection with pseudomonas who presented to the ED with vomiting .  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  He was seen in Dec 2017 and started on cefepime to complete 6 weeks which he has doneone.  His wife (who gives most of the history) says his DLES site drainage has not improved significantly on cefepime but denies there is redness or tenderness.  Prior to admit they deny any infectious issues, fever, chills or sweats.   Interval History: No  AEON.  Afebrile and WBC WNL.  DLES Cx with presumptive pseudomonas.  Remains on cefepime.  The patient denies any recent fever, chills, or sweats.      Review of Systems   Constitutional: Negative for chills, diaphoresis and fever.   Respiratory: Negative for shortness of breath.    Cardiovascular: Negative for chest pain.   Gastrointestinal: Negative for abdominal pain, diarrhea, nausea and vomiting.   Genitourinary: Negative for dysuria and hematuria.     Objective:     Vital Signs (Most Recent):  Temp: 97.5 °F (36.4 °C) (02/07/18 0731)  Pulse: 74 (02/07/18 0731)  Resp: 18 (02/07/18 0731)  BP: (!) 96/0 (02/07/18 0731)  SpO2: (!) 94 % (02/07/18 0731) Vital Signs (24h Range):  Temp:  [97.5 °F (36.4 °C)-98.9 °F (37.2 °C)] 97.5 °F (36.4 °C)  Pulse:  [65-81] 74  Resp:  [17-18] 18  SpO2:  [94 %-96 %] 94 %  BP: ()/(0-85) 96/0     Weight: 81.5 kg (179 lb 10.8 oz)  Body mass index is 25.78 kg/m².    Estimated Creatinine Clearance: 34.7 mL/min (based on SCr of 1.9 mg/dL (H)).    Physical Exam   Constitutional: He is oriented to person, place, and time. He appears well-developed and well-nourished. No distress.       HENT:   Head: Normocephalic.   Mouth/Throat: Uvula is midline and mucous membranes are normal. No oral lesions.   Eyes: Pupils are equal, round, and reactive to light. Right eye exhibits no discharge. No scleral icterus.   Cardiovascular: Normal rate and regular rhythm.  Exam reveals no gallop and no friction rub.    No murmur heard.  LVAD sounds   Pulmonary/Chest: Effort normal and breath sounds normal. No respiratory distress. He has no wheezes.   Abdominal: Soft. Bowel sounds are normal. He exhibits no distension. There is no hepatosplenomegaly. There is no tenderness.   Musculoskeletal: He exhibits edema.   Neurological: He is alert and oriented to person, place, and time.   Skin: Skin is warm, dry and intact.       Significant Labs:   Blood Culture:   Recent Labs  Lab 08/30/17  0048 01/24/18  1521  01/24/18  1537   LABBLOO No growth after 5 days. No growth after 5 days. No growth after 5 days.     CBC:   Recent Labs  Lab 02/06/18  0433 02/07/18  0537   WBC 6.04 6.60   HGB 10.0* 10.5*   HCT 30.6* 31.7*   * 112*     CMP:   Recent Labs  Lab 02/06/18  0433 02/07/18  0537    141   K 4.0 4.0    108   CO2 27 27   GLU 80 89   BUN 24* 25*   CREATININE 1.9* 1.9*   CALCIUM 10.6* 11.2*   PROT  --  6.8   ALBUMIN  --  3.1*   BILITOT  --  0.4   ALKPHOS  --  76   AST  --  22   ALT  --  9*   ANIONGAP 6* 6*   EGFRNONAA 33.7* 33.7*     Wound Culture:   Recent Labs  Lab 08/30/17  1519 09/12/17  1500 12/14/17  1433 02/05/18  1650   LABAERO PSEUDOMONAS AERUGINOSAFew PSEUDOMONAS AERUGINOSAMany PSEUDOMONAS AERUGINOSAModerate No growth     All pertinent labs within the past 24 hours have been reviewed.    Significant Imaging: I have reviewed all pertinent imaging results/findings within the past 24 hours.   X-Ray Chest 1 View for PICC_Central line [[id number]] Resulted: 02/05/18 1307   Order Status: Completed Updated: 02/05/18 1308   Narrative:     Right-sided PICC line in SVC.  The other support devices as before.  No significant air space consolidation.   Impression:      See above      Electronically signed by: Jesus Lind MD  Date: 02/05/18  Time: 13:07    X-Ray Chest 1 View for PICC_Central line [[id number]] Resulted: 02/05/18 0911   Order Status: Completed Updated: 02/05/18 0912   Narrative:     Single view.  Comparison 01/24/2018.    There is a right arm PICC line, with tip in the right subclavian vein.  Pacemaker overlies left chest laterally.  There are surgical changes of sternotomy and LVAD.  Cardiac size is stable.  No focal consolidation, evaluation of left lung base more limited.   Impression:      As above      Electronically signed by: Dari Noble MD  Date: 02/05/18  Time: 09:11    FL Lumbar Puncture (xpd) [[id number]] Resulted: 02/01/18 1408   Order Status: Completed Updated: 02/01/18 1408    Narrative:     LUMBAR PUNCTURE UNDER FLUOROSCOPIC GUIDANCE    INDICATION: Rule out NPH    TECHNIQUE:  Consent was signed. The patient was placed prone, prepped and draped in sterile fashion. Using fluoroscopic guidance, the L2-3 interspace was identified and a 22 gauge spinal needle was used to access the intrathecal space using an interlaminar approach. Following removal of the stylet clear cerebral spinal fluid was identified spontaneously. Subsequently stylet was reinserted and the spinal needle was removed and full. Total fluoroscopy time was 0.3 minutes.    FINDINGS:   Opening pressure was 21 cm H20.  13 mL of clear CSF were aspirated and sent to the laboratory for analysis.  Closing pressure was 15 cm H2O.  There were no immediate complications.   Impression:       Successful fluoroscopic guided lumbar puncture as detailed above.   ______________________________________     Electronically signed by resident: HITESH ADHIKARI  Date: 02/01/18  Time: 11:33            As the supervising and teaching physician, I personally reviewed the images and resident's interpretation and I agree with the findings.            Electronically signed by: ANTWON KAM DO  Date: 02/01/18  Time: 14:08    Fl Modified Barium Swallow Speech [[id number]] Resulted: 01/31/18 1551   Order Status: Completed Updated: 01/31/18 1551   Narrative:     Indication: Vomiting, concern for aspiration.    Comparison: No priors.    Fluoroscopy time 2.3 minutes    Findings:    Video fluoroscopic swallowing examination was performed in conjunction with the speech pathology department.  Various liquid and solid food substances were used to assess swallowing.  Silent penetration to the level the vocal cords was observed with nectar thick liquids.  No penetration seen with thin or nectar thick liquids when patient utilized chin tuck maneuver.  No aspiration observed.  Please see speech pathology report for further details.   Impression:      As above.  See  speech pathology report for further details.  ______________________________________     Electronically signed by resident: HITESH ADHIKARI  Date: 01/31/18  Time: 13:53            As the supervising and teaching physician, I personally reviewed the images and resident's interpretation and I agree with the findings.            Electronically signed by: CRESCENCIO BARNES MD  Date: 01/31/18  Time: 15:51

## 2018-02-08 NOTE — ED NOTES
Fall risk band applied to patients arm.  Allergy risk band applied to patients arm.  Limb alert applied to wrist.

## 2018-02-08 NOTE — TELEPHONE ENCOUNTER
[place] was [place] [facility] [place] on an adjusted dose of Tikosyn. He was admitted on 250mcg, and discharged on 125mcg due to renal function. Bedside delivery or PU of Tikosyn at [facility] was not coordinated. Sofia Oleary, ShantanuD was informed. She discussed situation with MD and patient advised to continue 250mcg dose until 125mcg Rx could be obtained. Daughter handles patient's affairs. She states that Tikosyn is currently filled at [facility] but she gets a lot of back-n-forth about which [facility] carries and can dispense Tikosyn. She would prefer to get Tikosyn with [facility]. Patient is coming for an appt at [facility] on 2/9/18, she will have him  Tikosyn 125mcg after appt. Directions and contact information for [facility] given to patient. $146 copay (004) - daughter states they pay ~$100 for the 250mcg - explained that costs do vary among different doses and patient is responsible for 50% until he meets the coverage gap. She agreed to the initial fill, but would like [facility] to look for further assistance for future fills. [facility] to call her for financial information to proceed with assistance applications. Patient has been on the medication, so further consultation and initial f/u declined. All questions answered to patient's satisfaction. [facility] to continue to reach out and assist patient with refills. TTN

## 2018-02-08 NOTE — ED NOTES
LOC: The patient is awake, alert and aware of environment with an appropriate affect, the patient is oriented x 3 and speaking appropriately.  APPEARANCE: Patient resting comfortably and in no acute distress, patient is clean and well groomed, patient's clothing is properly fastened.  SKIN: The skin is warm and dry, patient has normal skin turgor and moist mucus membranes, skin intact, no breakdown or brusing noted.  MUSCULOSKELETAL: Patient moving all extremities well, no obvious swelling or deformities noted.  RESPIRATORY: Airway is open and patent, respirations are spontaneous, patient has a normal effort and rate. Breath sounds are clear and equal bilaterally.  CARDIAC: Normal heart sounds. No peripheral edema. LVAD drive line present, no redness/swelling.   ABDOMEN: Soft and non tender to palpation, no distention noted. Bowel sounds present.

## 2018-02-09 ENCOUNTER — TELEPHONE (OUTPATIENT)
Dept: ADMINISTRATIVE | Facility: CLINIC | Age: 78
End: 2018-02-09

## 2018-02-09 ENCOUNTER — TELEPHONE (OUTPATIENT)
Dept: PHARMACY | Facility: CLINIC | Age: 78
End: 2018-02-09

## 2018-02-09 PROBLEM — I10 ESSENTIAL HYPERTENSION: Status: ACTIVE | Noted: 2017-12-01

## 2018-02-09 PROBLEM — T82.9XXD LEFT VENTRICULAR ASSIST DEVICE (LVAD) COMPLICATION, SUBSEQUENT ENCOUNTER: Status: ACTIVE | Noted: 2018-02-09

## 2018-02-09 LAB
ANION GAP SERPL CALC-SCNC: 7 MMOL/L
APTT BLDCRRT: 26.6 SEC
BACTERIA SPEC ANAEROBE CULT: NORMAL
BASOPHILS # BLD AUTO: 0.03 K/UL
BASOPHILS NFR BLD: 0.5 %
BUN SERPL-MCNC: 27 MG/DL
CALCIUM SERPL-MCNC: 11.1 MG/DL
CHLORIDE SERPL-SCNC: 108 MMOL/L
CO2 SERPL-SCNC: 27 MMOL/L
CREAT SERPL-MCNC: 2 MG/DL
CRP SERPL-MCNC: 0.7 MG/L
DIFFERENTIAL METHOD: ABNORMAL
EOSINOPHIL # BLD AUTO: 0.1 K/UL
EOSINOPHIL NFR BLD: 2.2 %
ERYTHROCYTE [DISTWIDTH] IN BLOOD BY AUTOMATED COUNT: 18.3 %
EST. GFR  (AFRICAN AMERICAN): 36.7 ML/MIN/1.73 M^2
EST. GFR  (NON AFRICAN AMERICAN): 31.7 ML/MIN/1.73 M^2
GLUCOSE SERPL-MCNC: 78 MG/DL
HCT VFR BLD AUTO: 30.6 %
HGB BLD-MCNC: 10.1 G/DL
IMM GRANULOCYTES # BLD AUTO: 0.03 K/UL
IMM GRANULOCYTES NFR BLD AUTO: 0.5 %
INR PPP: 2.4
LDH SERPL L TO P-CCNC: 214 U/L
LYMPHOCYTES # BLD AUTO: 1 K/UL
LYMPHOCYTES NFR BLD: 16 %
MAGNESIUM SERPL-MCNC: 2.5 MG/DL
MCH RBC QN AUTO: 29.4 PG
MCHC RBC AUTO-ENTMCNC: 33 G/DL
MCV RBC AUTO: 89 FL
MITRAL VALVE MOBILITY: NORMAL
MONOCYTES # BLD AUTO: 0.8 K/UL
MONOCYTES NFR BLD: 12.9 %
NEUTROPHILS # BLD AUTO: 4.4 K/UL
NEUTROPHILS NFR BLD: 67.9 %
NRBC BLD-RTO: 0 /100 WBC
PHOSPHATE SERPL-MCNC: 2.9 MG/DL
PLATELET # BLD AUTO: 112 K/UL
PMV BLD AUTO: 11.9 FL
POTASSIUM SERPL-SCNC: 3.9 MMOL/L
PREALB SERPL-MCNC: 28 MG/DL
PROTHROMBIN TIME: 23.2 SEC
RBC # BLD AUTO: 3.43 M/UL
RETIRED EF AND QEF - SEE NOTES: 15 (ref 55–65)
SODIUM SERPL-SCNC: 142 MMOL/L
WBC # BLD AUTO: 6.42 K/UL

## 2018-02-09 PROCEDURE — 25000003 PHARM REV CODE 250: Performed by: INTERNAL MEDICINE

## 2018-02-09 PROCEDURE — 84134 ASSAY OF PREALBUMIN: CPT

## 2018-02-09 PROCEDURE — G8989 SELF CARE D/C STATUS: HCPCS | Mod: CK

## 2018-02-09 PROCEDURE — 83615 LACTATE (LD) (LDH) ENZYME: CPT

## 2018-02-09 PROCEDURE — 93307 TTE W/O DOPPLER COMPLETE: CPT

## 2018-02-09 PROCEDURE — 99222 1ST HOSP IP/OBS MODERATE 55: CPT | Mod: ,,, | Performed by: INTERNAL MEDICINE

## 2018-02-09 PROCEDURE — G8988 SELF CARE GOAL STATUS: HCPCS | Mod: CJ

## 2018-02-09 PROCEDURE — 83735 ASSAY OF MAGNESIUM: CPT

## 2018-02-09 PROCEDURE — 63600175 PHARM REV CODE 636 W HCPCS: Performed by: INTERNAL MEDICINE

## 2018-02-09 PROCEDURE — 86140 C-REACTIVE PROTEIN: CPT

## 2018-02-09 PROCEDURE — 97802 MEDICAL NUTRITION INDIV IN: CPT | Performed by: DIETITIAN, REGISTERED

## 2018-02-09 PROCEDURE — 85610 PROTHROMBIN TIME: CPT

## 2018-02-09 PROCEDURE — 97116 GAIT TRAINING THERAPY: CPT

## 2018-02-09 PROCEDURE — 85025 COMPLETE CBC W/AUTO DIFF WBC: CPT

## 2018-02-09 PROCEDURE — 36415 COLL VENOUS BLD VENIPUNCTURE: CPT

## 2018-02-09 PROCEDURE — 80048 BASIC METABOLIC PNL TOTAL CA: CPT

## 2018-02-09 PROCEDURE — 93307 TTE W/O DOPPLER COMPLETE: CPT | Mod: 26,,, | Performed by: INTERNAL MEDICINE

## 2018-02-09 PROCEDURE — 20600001 HC STEP DOWN PRIVATE ROOM

## 2018-02-09 PROCEDURE — 92610 EVALUATE SWALLOWING FUNCTION: CPT

## 2018-02-09 PROCEDURE — G8987 SELF CARE CURRENT STATUS: HCPCS | Mod: CK

## 2018-02-09 PROCEDURE — 97166 OT EVAL MOD COMPLEX 45 MIN: CPT

## 2018-02-09 PROCEDURE — 27000248 HC VAD-ADDITIONAL DAY

## 2018-02-09 PROCEDURE — 97162 PT EVAL MOD COMPLEX 30 MIN: CPT

## 2018-02-09 PROCEDURE — 85730 THROMBOPLASTIN TIME PARTIAL: CPT

## 2018-02-09 PROCEDURE — 97535 SELF CARE MNGMENT TRAINING: CPT

## 2018-02-09 PROCEDURE — 84100 ASSAY OF PHOSPHORUS: CPT

## 2018-02-09 RX ORDER — MEROPENEM 1 G/1
1 INJECTION, POWDER, FOR SOLUTION INTRAVENOUS
Status: DISCONTINUED | OUTPATIENT
Start: 2018-02-09 | End: 2018-02-14 | Stop reason: HOSPADM

## 2018-02-09 RX ORDER — LISINOPRIL 10 MG/1
10 TABLET ORAL 2 TIMES DAILY
Status: DISCONTINUED | OUTPATIENT
Start: 2018-02-09 | End: 2018-02-14 | Stop reason: HOSPADM

## 2018-02-09 RX ORDER — NIFEDIPINE 10 MG/1
30 CAPSULE ORAL EVERY 8 HOURS
Status: DISCONTINUED | OUTPATIENT
Start: 2018-02-10 | End: 2018-02-10

## 2018-02-09 RX ORDER — HYDRALAZINE HYDROCHLORIDE 20 MG/ML
10 INJECTION INTRAMUSCULAR; INTRAVENOUS EVERY 6 HOURS PRN
Status: DISCONTINUED | OUTPATIENT
Start: 2018-02-09 | End: 2018-02-12

## 2018-02-09 RX ORDER — WARFARIN 4 MG/1
4 TABLET ORAL ONCE
Status: COMPLETED | OUTPATIENT
Start: 2018-02-11 | End: 2018-02-11

## 2018-02-09 RX ADMIN — Medication 1 CAPSULE: at 09:02

## 2018-02-09 RX ADMIN — MEROPENEM 1 G: 1 INJECTION, POWDER, FOR SOLUTION INTRAVENOUS at 09:02

## 2018-02-09 RX ADMIN — BUPROPION HYDROCHLORIDE 450 MG: 150 TABLET, EXTENDED RELEASE ORAL at 09:02

## 2018-02-09 RX ADMIN — FERROUS SULFATE TAB EC 325 MG (65 MG FE EQUIVALENT) 325 MG: 325 (65 FE) TABLET DELAYED RESPONSE at 09:02

## 2018-02-09 RX ADMIN — CARBIDOPA AND LEVODOPA 1 TABLET: 25; 100 TABLET ORAL at 09:02

## 2018-02-09 RX ADMIN — HYDRALAZINE HYDROCHLORIDE 150 MG: 50 TABLET ORAL at 03:02

## 2018-02-09 RX ADMIN — HYDRALAZINE HYDROCHLORIDE 150 MG: 50 TABLET ORAL at 07:02

## 2018-02-09 RX ADMIN — WARFARIN SODIUM 6 MG: 2 TABLET ORAL at 05:02

## 2018-02-09 RX ADMIN — LISINOPRIL 10 MG: 10 TABLET ORAL at 09:02

## 2018-02-09 RX ADMIN — MEROPENEM 1 G: 1 INJECTION, POWDER, FOR SOLUTION INTRAVENOUS at 12:02

## 2018-02-09 RX ADMIN — DOFETILIDE 125 MCG: 0.12 CAPSULE ORAL at 12:02

## 2018-02-09 RX ADMIN — HYDRALAZINE HYDROCHLORIDE 150 MG: 50 TABLET ORAL at 04:02

## 2018-02-09 RX ADMIN — ASPIRIN 81 MG 81 MG: 81 TABLET ORAL at 09:02

## 2018-02-09 RX ADMIN — CARBIDOPA AND LEVODOPA 1 TABLET: 25; 100 TABLET ORAL at 05:02

## 2018-02-09 RX ADMIN — MAGNESIUM OXIDE TAB 400 MG (241.3 MG ELEMENTAL MG) 400 MG: 400 (241.3 MG) TAB at 09:02

## 2018-02-09 RX ADMIN — LISINOPRIL 10 MG: 10 TABLET ORAL at 07:02

## 2018-02-09 RX ADMIN — FOLIC ACID 1 MG: 1 TABLET ORAL at 09:02

## 2018-02-09 RX ADMIN — ISOSORBIDE DINITRATE 40 MG: 40 TABLET ORAL at 03:02

## 2018-02-09 RX ADMIN — ALLOPURINOL 300 MG: 300 TABLET ORAL at 09:02

## 2018-02-09 RX ADMIN — DOXAZOSIN MESYLATE 8 MG: 2 TABLET ORAL at 09:02

## 2018-02-09 RX ADMIN — AMLODIPINE BESYLATE 10 MG: 10 TABLET ORAL at 09:02

## 2018-02-09 RX ADMIN — CARBIDOPA AND LEVODOPA 1 TABLET: 25; 100 TABLET ORAL at 03:02

## 2018-02-09 RX ADMIN — CARBIDOPA AND LEVODOPA 1 TABLET: 25; 100 TABLET ORAL at 04:02

## 2018-02-09 RX ADMIN — PANTOPRAZOLE SODIUM 40 MG: 40 TABLET, DELAYED RELEASE ORAL at 05:02

## 2018-02-09 RX ADMIN — DOFETILIDE 125 MCG: 0.12 CAPSULE ORAL at 09:02

## 2018-02-09 RX ADMIN — ATORVASTATIN CALCIUM 10 MG: 10 TABLET, FILM COATED ORAL at 09:02

## 2018-02-09 RX ADMIN — ISOSORBIDE DINITRATE 40 MG: 40 TABLET ORAL at 07:02

## 2018-02-09 RX ADMIN — ISOSORBIDE DINITRATE 40 MG: 40 TABLET ORAL at 04:02

## 2018-02-09 NOTE — PROGRESS NOTES
[facility]  Heart Transplant  Progress Note    Patient Name: Kev Vasquez  MRN: [id number]  Admission Date: 2/8/2018  Hospital Length of Stay: 0 days  Attending Physician: Caitlin Wells MD  Primary Care Provider: Mega Collins MD  Principal Problem:Syncope    Subjective:     Interval History: No acute issues overnight. No additional low flow alarms since [place] [facility]. Discussed case with caregiver / daughter this AM. Unclear etiology but suspect this is due to his parkinsons and its effect on his autonomic dysfunction (ie presyncopal)     Continuous Infusions:  Scheduled Meds:   allopurinol  300 mg Oral Daily    aspirin  81 mg Oral Daily    atorvastatin  10 mg Oral Daily    buPROPion  450 mg Oral Daily    carbidopa-levodopa  mg  1 tablet Oral Q4H While awake    dofetilide  125 mcg Oral Q12H    doxazosin  8 mg Oral Daily    ferrous sulfate  325 mg Oral Daily    folic acid  1 mg Oral Daily    hydrALAZINE  150 mg Oral Q8H    isosorbide dinitrate  40 mg Oral TID    Lactobacillus rhamnosus GG  1 capsule Oral Daily    lisinopril  10 mg Oral BID    magnesium oxide  400 mg Oral BID    meropenem (MERREM) IVPB  1 g Intravenous Q12H    [START ON 2/10/2018] NIFEdipine  30 mg Oral Q8H    pantoprazole  40 mg Oral BID AC    [START ON 2/11/2018] warfarin  4 mg Oral Once    [START ON 2/10/2018] warfarin  6 mg Oral Once    warfarin  6 mg Oral Once     PRN Meds:acetaminophen, docusate sodium, hydrALAZINE    Review of patient's allergies indicates:   Allergen Reactions    Aldactone [spironolactone]       persistent hyperkalemia.    Sulfa (sulfonamide antibiotics)      Objective:     Vital Signs (Most Recent):  Temp: 97.9 °F (36.6 °C) (02/09/18 1205)  Pulse: 66 (02/09/18 1100)  Resp: 18 (02/09/18 1205)  BP: (!) 70/0 (02/09/18 1205)  SpO2: (!) 92 % (02/09/18 1205) Vital Signs (24h Range):  Temp:  [97.4 °F (36.3 °C)-99 °F (37.2 °C)] 97.9 °F (36.6 °C)  Pulse:  [50-97] 66  Resp:   [15-20] 18  SpO2:  [86 %-99 %] 92 %  BP: ()/(0-91) 70/0     Patient Vitals for the past 72 hrs (Last 3 readings):   Weight   02/08/18 1457 81.2 kg (179 lb)     Body mass index is 24.28 kg/m².    Intake/Output Summary (Last 24 hours) at 02/09/18 1423  Last data filed at 02/09/18 0500   Gross per 24 hour   Intake                0 ml   Output                0 ml   Net                0 ml     Physical Exam   Constitutional: He is oriented to person, place, and time. Frail appearing.   Neck: No JVD present.   Cardiovascular: LVAD hum- smooth   Pulmonary/Chest: Effort normal and breath sounds normal.   Abdominal: Soft.   Musculoskeletal: He exhibits no edema or tenderness.   Neurological: He is alert and oriented to person, place, and time.   Skin: Skin is warm and dry.   Nursing note and vitals reviewed (BP elevated with MAPS )    Significant Labs:  CBC:    Recent Labs  Lab 02/07/18  0537 02/08/18  1530 02/09/18  0515   WBC 6.60 6.36 6.42   RBC 3.53* 3.57* 3.43*   HGB 10.5* 10.4* 10.1*   HCT 31.7* 32.1* 30.6*   * 109* 112*   MCV 90 90 89   MCH 29.7 29.1 29.4   MCHC 33.1 32.4 33.0     BNP:    Recent Labs  Lab 02/05/18  0533 02/07/18  0537 02/08/18  1530   * 202* 215*     CMP:    Recent Labs  Lab 02/05/18  0533  02/07/18  0537 02/08/18  1530 02/09/18  0515   GLU 86  < > 89 105 78   CALCIUM 10.5  < > 11.2* 11.3* 11.1*   ALBUMIN 3.0*  --  3.1* 3.1*  --    PROT 6.3  --  6.8 6.9  --      < > 141 139 142   K 4.0  < > 4.0 4.4 3.9   CO2 27  < > 27 27 27     < > 108 106 108   BUN 22  < > 25* 26* 27*   CREATININE 1.9*  < > 1.9* 2.2* 2.0*   ALKPHOS 70  --  76 76  --    ALT 8*  --  9* <5*  --    AST 23  --  22 22  --    BILITOT 0.3  --  0.4 0.4  --    < > = values in this interval not displayed.   Coagulation:     Recent Labs  Lab 02/07/18  0537 02/08/18  1530 02/09/18  0515   INR 2.6* 3.0* 2.4*   APTT  --   --  26.6     LDH:    Recent Labs  Lab 02/07/18  0537 02/09/18  0515    214     I  have reviewed all pertinent labs within the past 24 hours.    Estimated Creatinine Clearance: 35 mL/min (based on SCr of 2 mg/dL (H)).    Diagnostic Results:  I have reviewed all pertinent imaging results/findings within the past 24 hours.   TTE (Pending)     Assessment and Plan:     Mr. 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 Balwinder BiV ICD, HILLARY/BiPAP, HTN, CKD, HLP, gout and depression who presents with confusion and lethargy a day after discharge. Wife and caretaker are at bedside and report that patient was doing well for a day after discharge only to experience acute confusion and lethargy at lunch time today. He was being walked to dining table when he felt weak and had to be eased to a wheel chair and once on the table, he started gagging and threw up once on taking a cranberry. Caretake mention that patient is avoiding taking fluids due to the added thickener which he says is making it taste bad. Patient has also lost weight and is eating less with notable significant weight loss. He recently started on parkinson medication (sinemet) without any reported side effects. During the whole event, caretake mention LVAD low power alarm,or despite normal map during the event. Currently, patient is currently back to baseline and mention only feeling mild leg contraction.    Syncope    - Low flow alarms do correlate with time of event   - Will have VAD coordinator send waveforms to company for review   - Repeat TTE today   - Suspect this is vasovagal response (given underlying autonomic dysfunction) vs. Suction event vs. HTN  - Will observe on telemetry and attempt to control his BP better         LVAD (left ventricular assist device) present    - Hm3 @5800  - INR goal 2-3. INR therapeutic   - Continue coumadin    - Continue low dose ASA daily   -  and   - Restarted lisinopril 10mg BID (TANESHA resolved)  - Increased hydralazine to 150mg TID and continue isordil at 40mg TID and cardura 8mg daily   - Hold amlodipine and start nifedine in the AM (able to titrate to higher dose). Would like to avoid clonidine   - MAP 60-80          Confusion    - Improved this AM per family / caregiver   - Suspect he has underlying cognitive dysfunction (degree uncertain)           Stage 3 chronic kidney disease    - Follow BMP   - TANESHA resolved        Left ventricular assist device (LVAD) complication, subsequent encounter    - Chronic drive line infection   - Culture growing pseudomonas (now resistant to cefepime and cipro)  - Spoke with ID (Dr. Saez). Will start meropenem 1g BID for 4 weeks with follow up in ID clinic   - Weekly CBC / CMP while on meropenem         Essential hypertension               Acute on chronic systolic and diastolic heart failure, NYHA class 4               VT (ventricular tachycardia)    Continue Tikosyn   Will have device interrogated             José Luis tavarez, DO  Heart Transplant  [facility]

## 2018-02-09 NOTE — PT/OT/SLP EVAL
Occupational Therapy   Evaluation    Name: Kev Vasquez  MRN: [id number]  Admitting Diagnosis:  Syncope      Recommendations:     Discharge Recommendations: home with home health (with transition to OP therapy)  Discharge Equipment Recommendations:  none  Barriers to discharge:  Inaccessible home environment    History:     Occupational Profile:  Pt lives w/ wife, daughter, and son-in-law in a 1SH w/ TTE. Pt's house has multiple thresholds inside that he will need to be able to negotiate.  Prior to admission, patient required 24 hour assist from his caregiver/sitter for transfers, ambulation w/ RW, and ADLs. Pt's sitter was giving him sponge baths while seated on the bedside commode.  Pt's sitter reported that pt was very unsteady while at home b/w recent admit and this one- often leaning to one side when ambulating. Patient has the following equipment: bedside commode, walker, rolling, wheelchair.  DME owned (not currently used): shower chair.  Upon discharge, patient will have assistance from his sitter 24h/7..    Past Medical History:   Diagnosis Date    AICD (automatic cardioverter/defibrillator) present 2014    [employer]    Chronic anticoagulation 7/21/2017    Chronic combined systolic and diastolic congestive heart failure 7/27/2015 11-16-17   1 - Moderate left ventricular enlargement.    2 - Severely depressed left ventricular systolic function (EF 15-20%).    3 - Impaired LV relaxation, normal LAP (grade 1 diastolic dysfunction).    4 - Biatrial enlargement.    5 - Right ventricle is upper limit of normal in size with not well seen systolic function.    6 - Severe tricuspid regurgitation.    7 - Increased central venous pressure.    8 - The estimated PA systolic pressure is 40 mmHg.    9 - Heartmate III LVAD; speed 5800.     Complication involving left ventricular assist device (LVAD) 7/29/2017    Coronary artery disease involving native coronary artery of native heart without angina pectoris 7/27/2015     Gait instability     Hypertensive urgency, malignant 11/15/2017    ICD (implantable cardioverter-defibrillator), biventricular, in situ 7/27/2015    Ischemic cardiomyopathy 7/20/2017    S/p HMIII     Kidney stones     LVAD (left ventricular assist device) present 7/20/2017    status post Heartmate III 10/16/16 LVAD    HILLARY on CPAP 7/27/2015    Peripheral neuropathy     Pulmonary hypertension due to left ventricular systolic dysfunction 7/29/2015    Restless leg syndrome 1992    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    Skin cancer     excision 2013    Skin yeast infection 8/31/2017    Stage 3 chronic kidney disease 7/20/2017    Syncope 7/20/2017    Ventricular tachycardia 7/25/2017       Past Surgical History:   Procedure Laterality Date    CARDIAC PACEMAKER PLACEMENT      pacemaker previously, then AICD in 2006. AICD [employer] serial #[id number]    CORONARY ARTERY BYPASS GRAFT  1993    KNEE SURGERY Left 2001    complicated by infection, hardware had to be taken out and replaced    LEFT VENTRICULAR ASSIST DEVICE  10/19/2016       Subjective     Chief Complaint: Pt reports feeling tired; per sitter, pt in and out of confused state  Patient/Family stated goals: Return home safely  Communicated with: RN prior to session.  Pain/Comfort:  · Pain Rating 1: 0/10  · Pain Rating Post-Intervention 1: 0/10    Patients cultural, spiritual, Nondenominational conflicts given the current situation: none    Objective:     Patient found with: telemetry, LVAD    General Precautions: Standard, fall, LVAD, honey thick   Orthopedic Precautions:N/A   Braces: N/A     Occupational Performance:    Bed Mobility:    · Patient completed Supine to Sit with moderate assistance at trunk  · Patient completed Sit to Supine with stand by assistance    Functional Mobility/Transfers:  · Patient completed Sit <> Stand Transfer from EOB with minimum assistance x 2 person assist using RW  · Functional Mobility: Within room  to door ~30 ft with Min A using RW; R lateral lean while standing    Activities of Daily Living:  · UB Dressing: maximal assistance to don LVAD bag; requires assist with ADLs at baseline    Cognitive/Visual Perceptual:  Cognitive/Psychosocial Skills:     -       Oriented to: Person, Place and Time   -       Follows Commands/attention:Easily distracted and Follows one-step commands  -       Communication: clear/fluent  -       Safety awareness/insight to disability: impaired     Physical Exam:  Balance:   -Static Sit EOB w/ initial MaxA due to far right lean, w/ cueing and side rail pt able to sit w/ midline posture and SBA  -Static stand w/ RW and initial ModA due to right lean, w/ cueing and time only required CGA    Postural examination/scapula alignment:    -       No postural abnormalities identified  Upper Extremity Range of Motion:     -       Right Upper Extremity: WFL  -       Left Upper Extremity: WFL  Upper Extremity Strength:    -       Right Upper Extremity: WFL  -       Left Upper Extremity: WFL   Strength:    -       Right Upper Extremity: WFL  -       Left Upper Extremity: WFL    Patient left supine with all lines intact, call button in reach, RN notified and wife and caregiver present    [facility] 6 Click:  [facility] Total Score: 15    Treatment & Education:  OT/PT eval; educated on OT role and POC, pt familiar to service with recent admission to [facility] and only home for one day before returning; discussed D/C recs with pt, wife, and caregiver; RW ordered for use in room  Education:    Assessment:     Kev Vasquez is a 77 y.o. male with a medical diagnosis of Syncope.  He presents with performance deficits including weakness, impaired endurance, impaired self care skills, impaired functional mobilty, gait instability, impaired balance, impaired cognition, decreased safety awareness, impaired cardiopulmonary response to activity. Pt would continue to benefit from OT to increase independence and safety.  "Recommend HH with transition to OP therapy for balance and ADL deficits.     Rehab Prognosis:  Good; patient would benefit from acute skilled OT services to address these deficits and reach maximum level of function.         Clinical Decision Makin.  OT Mod:  "Pt evaluation falls under moderate complexity for evaluation coding due to identification of 3-5 performance deficits noted as stated above. Eval required Min/Mod assistance to complete on this date and detailed assessment(s) were utilized. Moreover, an expanded review of history and occupational profile obtained with additional review of cognitive, physical and psychosocial hx."     Plan:     Patient to be seen 4 x/week to address the above listed problems via self-care/home management, therapeutic activities, therapeutic exercises, neuromuscular re-education  · Plan of Care Expires: 18  · Plan of Care Reviewed with: patient, spouse, caregiver    This Plan of care has been discussed with the patient who was involved in its development and understands and is in agreement with the identified goals and treatment plan    GOALS:    Occupational Therapy Goals        Problem: Occupational Therapy Goal    Goal Priority Disciplines Outcome Interventions   Occupational Therapy Goal     OT, PT/OT Ongoing (interventions implemented as appropriate)    Description:  Goals to be met by: 7 days (18)     Patient will increase functional independence with ADLs by performing:    UE Dressing with Supervision.  Grooming while standing at sink with Contact Guard Assistance.  Toileting from toilet with Contact Guard Assistance for hygiene and clothing management.   Sitting at edge of bed x10 minutes with Stand-by Assistance.  Supine to sit with Contact Guard Assistance.  Toilet transfer to toilet with Contact Guard Assistance.  Pt will perform functional mobility household distance with CGA using RW.                      Time Tracking:     OT Date of Treatment: " 02/09/18  OT Start Time: 1424  OT Stop Time: 1447  OT Total Time (min): 23 min    Billable Minutes:Evaluation 23 minutes    SHAUNA Hanks  2/9/2018

## 2018-02-09 NOTE — ASSESSMENT & PLAN NOTE
- poor oral intake versus GDMT related  - hold lisinopril for now  - no IVF for now due to elevated MAP.  - BP control with hydralazine, amlodipine and cardura  - encourage oral intake

## 2018-02-09 NOTE — PT/OT/SLP EVAL
Speech Language Pathology Evaluation  Bedside Swallow    Patient Name:  Kev Vasquez   MRN:  [id number]   326/326 A    Admitting Diagnosis: Syncope    Recommendations:                 General Recommendations:  Dysphagia therapy, RD consult if not following for possible nutritional supplements  Diet recommendations:  Regular, Honey Thick   Aspiration Precautions: 1 bite/sip at a time, Alternating bites/sips, Assistance with meals and Assistance with thickening liquids, Eliminate distractions, Feed only when awake/alert, HOB to 90 degrees, Meds whole buried in puree, No straws, Small bites/sips and Strict aspiration precautions   · To achieve honey thick liquid: 4 oz of any liquid to 1 pack of thickener  · Avoid mixed consistencies (soup, cereal with milk, juicy fruits).  · No ice cream, jello, or ice.  · Encourage chin tuck when drinking     General Precautions: Standard, aspiration, fall, LVAD  Communication strategies:  go to room if call light pushed    History:     Past Medical History:   Diagnosis Date    AICD (automatic cardioverter/defibrillator) present 2014    [employer]    Chronic anticoagulation 7/21/2017    Chronic combined systolic and diastolic congestive heart failure 7/27/2015    11-16-17   1 - Moderate left ventricular enlargement.    2 - Severely depressed left ventricular systolic function (EF 15-20%).    3 - Impaired LV relaxation, normal LAP (grade 1 diastolic dysfunction).    4 - Biatrial enlargement.    5 - Right ventricle is upper limit of normal in size with not well seen systolic function.    6 - Severe tricuspid regurgitation.    7 - Increased central venous pressure.    8 - The estimated PA systolic pressure is 40 mmHg.    9 - Heartmate III LVAD; speed 5800.     Complication involving left ventricular assist device (LVAD) 7/29/2017    Coronary artery disease involving native coronary artery of native heart without angina pectoris 7/27/2015    Gait instability     Hypertensive urgency,  malignant 11/15/2017    ICD (implantable cardioverter-defibrillator), biventricular, in situ 7/27/2015    Ischemic cardiomyopathy 7/20/2017    S/p HMIII     Kidney stones     LVAD (left ventricular assist device) present 7/20/2017    status post Heartmate III 10/16/16 LVAD    HILLARY on CPAP 7/27/2015    Peripheral neuropathy     Pulmonary hypertension due to left ventricular systolic dysfunction 7/29/2015    Restless leg syndrome 1992    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    Skin cancer     excision 2013    Skin yeast infection 8/31/2017    Stage 3 chronic kidney disease 7/20/2017    Syncope 7/20/2017    Ventricular tachycardia 7/25/2017       Past Surgical History:   Procedure Laterality Date    CARDIAC PACEMAKER PLACEMENT      pacemaker previously, then AICD in 2006. AICD [employer] serial #[id number]    CORONARY ARTERY BYPASS GRAFT  1993    KNEE SURGERY Left 2001    complicated by infection, hardware had to be taken out and replaced    LEFT VENTRICULAR ASSIST DEVICE  10/19/2016         Modified Barium Swallow: 1/31/2018:   Patient demonstrates mild-moderate oropharyngeal dysphagia. Weakness to BOT results in loss of bolus control causing pt to penetrate nectar thick liquids. Pt at risk of airway compromise of thin liquids. Chin tuck deemed beneficial in eliminating airway compromise of thin liquid via spoon and nectar thick liquid via cup in isolation of food. Chin tuck not beneficial in eliminating airway compromise of nectar thick liquid following solid trial. Cough is should not be considered reliable compensatory strategy. Pt remains at high aspiration risk and strict monitoring of diet is highly recommended. Pt's reported decreased cognitive state should be considered during meals.    Chest X-Rays:   Results for orders placed or performed during the hospital encounter of 02/08/18   X-Ray Chest 1 View    Narrative    One view: Central line right atrium. There is a pacer in the  "LVAD. There is cardiomegaly, mild edema, postoperative change, and no change.      Electronically signed by: GUILLERMO OCONNOR MD  Date:     02/08/18  Time:    16:09        Prior diet: reg/ht last hospital admission      Subjective     Pt found in room with caregiver and wife present. Communicated with RN s/p BSS who reported family has been giving pt ice despite education on risk to aspirate ice.   Caregiver states, " He hates that thick-it."    Objective:     Oral Musculature Evaluation  · Oral Musculature: WFL  · Dentition: present and adequate  · Mucosal Quality: adequate  · Lingual Strength and Mobility: WFL  · Velar Elevation: WFL  · Buccal Strength and Mobility: WFL  · Volitional Cough: elicited  · Volitional Swallow: timely  · Voice Prior to PO Intake: clear    Bedside Swallow Eval:   Consistencies Assessed:  · Honey thick liquids sips of honey thick water via cup with chin tuck (pt required cues to complete chin tuck)  · Puree tsp bites of pudding  · Solids bites of berhane cracker     Oral Phase:   · WFL    Pharyngeal Phase:   · no overt clinical signs/symptoms of aspiration  · no overt clinical signs/symptoms of pharyngeal dysphagia    Compensatory Strategies  · Chin tuck    Treatment: SLP provided education on continued recommendation for honey thick liquids, SLP role, s/s and risks of aspiraiton, safe swallow precautions, and POC. Caregiver asking if pt can have a thinner consistency or 'just a little sip of normal Coke." SLP explained high risk to aspirate all thin and nectar thick liquids. Caregiver asking for list of swallowing exercise. Handout provided listing sw exercises and safe swallowing precautions. Caregiver reporting concerns about pt getting enough hydration and asking about alternative means. SLP provided education on possible nutritional supplements. Questions about feeding tubes addressed. Pt demonstrated all swallowing exercises with min cues. White board updated. Pt's caregiver and wife " verbalized understanding of all discussed. No further questions.       Assessment:     Kev Vasquez is a 77 y.o. male with an SLP diagnosis of Dysphagia. ST will continue to follow.     Goals:    SLP Goals        Problem: SLP Goal    Goal Priority Disciplines Outcome   SLP Goal     SLP Ongoing (interventions implemented as appropriate)   Description:  Speech Therapy Short Term Goals  Goal expected to be met by 2/23  1. Pt will tolerate a regular diet and honey thick liquids with no overt s/s of aspiration.   2. Pt will demonstrate 3 safe swallowing precautions with no cues.   3. Pt will complete dysphagia therapy exercises x10 each given min cues.                         Plan:     · Patient to be seen:  4 x/week   · Plan of Care expires:  03/10/18  · Plan of Care reviewed with:  patient, caregiver, spouse   · SLP Follow-Up:  Yes       Discharge recommendations:  home with home health       Time Tracking:     SLP Treatment Date:   02/09/18  Speech Start Time:  1520  Speech Stop Time:  1548     Speech Total Time (min):  28 min    Billable Minutes: Eval Swallow and Oral Function 10 and Seld Care/Home Management Training 18    SANTOSH Henderson, CCC-SLP   Pager: [phone]  02/09/2018

## 2018-02-09 NOTE — PT/OT/SLP EVAL
"Physical Therapy Evaluation    Patient Name:  Kev Vasquez   MRN:  [id number]    Recommendations:     Discharge Recommendations:  home health PT, home health OT, home health speech therapy   Discharge Equipment Recommendations: none   Barriers to discharge: None    Assessment:     Kev Vasquez is a 77 y.o. male admitted with a medical diagnosis of Syncope.  He presents with the following impairments/functional limitations:  impaired endurance, impaired self care skills, impaired functional mobilty, gait instability, impaired balance, decreased coordination, decreased upper extremity function, decreased lower extremity function, impaired coordination, impaired fine motor requiring Ida(x1-2) for transfers and ambulation w/ RW. Pt was recently diagnosed w/ PD per his caregiver. Pt is appropriate for acute PT and will begin PT POC.    Rehab Prognosis:  Good; patient would benefit from acute skilled PT services to address these deficits and reach maximum level of function.      Recent Surgery: * No surgery found *      Plan:     During this hospitalization, patient to be seen 5 x/week to address the above listed problems via gait training, therapeutic activities, therapeutic exercises, neuromuscular re-education  · Plan of Care Expires:  03/11/18   Plan of Care Reviewed with: patient, spouse, caregiver    Subjective     Communicated with nursing prior to session.  Patient found supine upon PT entry to room, agreeable to evaluation.      Chief Complaint: instability  Patient comments/goals: to improve functional mobility  "I'm weak and tired."  Pain/Comfort:  · Pain Rating 1: 0/10    Patients cultural, spiritual, Hindu conflicts given the current situation: none reported    Living Environment:  Pt lives w/ wife, daughter, and son-in-law in a 1SH w/ TTE. Pt's house has multiple thresholds inside that he will need to be able to negotiate.  Prior to admission, patient required 24 hour assist from his caregiver/sitter " for transfers, ambulation w/ RW, and ADLs. Pt's sitter was giving him sponge baths while seated on the bedside commode.  Pt's sitter reported that pt was very unsteady while at home b/w recent admit and this one- often leaning to one side when ambulating. Patient has the following equipment: bedside commode, walker, rolling, wheelchair.  DME owned (not currently used): shower chair.  Upon discharge, patient will have assistance from his sitter 24h/7..    Objective:     Patient found with: telemetry, LVAD (LVAD to wall power)     General Precautions: Standard, fall, honey thick   Orthopedic Precautions:N/A   Braces: N/A     Exams:  · Cognitive Exam:  Patient is oriented to Person, Place, Time and Situation and follows 100% of verbal commands   · Postural Exam:  Patient presented with the following abnormalities:    · -       Rounded shoulders  · -       Forward head  · -       Posterior pelvic tilt  · Sensation:    · -       Intact  · RLE ROM: WFL  · RLE Strength: WFL  · LLE ROM: WFL  · LLE Strength: WFL    Functional Mobility:  · Bed Mobility:     · Supine to Sit: moderate assistance for trunk, pt able to roll and move BLE off side of bed but unable to lift trunk to upright position  · Sit to Supine: stand by assistance, cueing for technique  · Transfers:     · Sit to Stand:  minimum assistance and of 2 persons with rolling walker  · To/from EOB  · Cueing for hand placement and forward lean  · Gait:   · ~30ft in room w/ RW and Min/CGA for stability, 2nd person present for safety  · inc'd difficulty w/ RW management  · Pt able to follow cues to inc step length w/ delayed responses  · Balance:   · Static Sit EOB w/ initial MaxA due to far right lean, w/ cueing and side rail pt able to sit w/ midline posture and SBA  · Static stand w/ RW and initial ModA due to right lean, w/ cueing and time only required CGA    AM-PAC 6 CLICK MOBILITY  Total Score:16       Therapeutic Activities and Exercises:   Pt educated on PT role  and POC. Pt verb understanding. White board updated.    Patient left supine with all lines intact, call button in reach and wife and sitter present.    GOALS:    Physical Therapy Goals        Problem: Physical Therapy Goal    Goal Priority Disciplines Outcome Goal Variances Interventions   Physical Therapy Goal     PT/OT, PT Ongoing (interventions implemented as appropriate)     Description:  Goals to be met by: 18     Patient will increase functional independence with mobility by performin. Supine to sit with Set-up [place]  2. Sit to supine with Set-up [place]  3. Sit to stand transfer with Stand-by Assistance  4. Bed to chair transfer with Stand-by Assistance using Rolling Walker  5. Gait  x 150 feet with Contact Guard Assistance using Rolling Walker.                       History:     Past Medical History:   Diagnosis Date    AICD (automatic cardioverter/defibrillator) present     [employer]    Chronic anticoagulation 2017    Chronic combined systolic and diastolic congestive heart failure 2015-17   1 - Moderate left ventricular enlargement.    2 - Severely depressed left ventricular systolic function (EF 15-20%).    3 - Impaired LV relaxation, normal LAP (grade 1 diastolic dysfunction).    4 - Biatrial enlargement.    5 - Right ventricle is upper limit of normal in size with not well seen systolic function.    6 - Severe tricuspid regurgitation.    7 - Increased central venous pressure.    8 - The estimated PA systolic pressure is 40 mmHg.    9 - Heartmate III LVAD; speed 5800.     Complication involving left ventricular assist device (LVAD) 2017    Coronary artery disease involving native coronary artery of native heart without angina pectoris 2015    Gait instability     Hypertensive urgency, malignant 11/15/2017    ICD (implantable cardioverter-defibrillator), biventricular, in situ 2015    Ischemic cardiomyopathy 2017    S/p HMIII      Kidney stones     LVAD (left ventricular assist device) present 7/20/2017    status post Heartmate III 10/16/16 LVAD    HILLARY on CPAP 7/27/2015    Peripheral neuropathy     Pulmonary hypertension due to left ventricular systolic dysfunction 7/29/2015    Restless leg syndrome 1992    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    Skin cancer     excision 2013    Skin yeast infection 8/31/2017    Stage 3 chronic kidney disease 7/20/2017    Syncope 7/20/2017    Ventricular tachycardia 7/25/2017       Past Surgical History:   Procedure Laterality Date    CARDIAC PACEMAKER PLACEMENT      pacemaker previously, then AICD in 2006. AICD [facility] [employer] serial #[id number]    CORONARY ARTERY BYPASS GRAFT  1993    KNEE SURGERY Left 2001    complicated by infection, hardware had to be taken out and replaced    LEFT VENTRICULAR ASSIST DEVICE  10/19/2016       Clinical Decision Making:     History  Co-morbidities and personal factors that may impact the plan of care Examination  Body Structures and Functions, activity limitations and participation restrictions that may impact the plan of care Clinical Presentation   Decision Making/ Complexity Score   Co-morbidities:   [] Time since onset of injury / illness / exacerbation  [x] Status of current condition  [x]Patient's cognitive status and safety concerns    [x] Multiple Medical Problems (see med hx)  Personal Factors:   [] Patient's age  [] Prior Level of function   [] Patient's home situation (environment and family support)  [] Patient's level of motivation  [] Expected progression of patient      HISTORY:(criteria)    [] 77534 - no personal factors/history    [] 73096 - has 1-2 personal factor/comorbidity     [x] 56755 - has >3 personal factor/comorbidity     Body Regions:  [] Objective examination findings  [] Head     []  Neck  [] Trunk   [] Upper Extremity  [] Lower Extremity    Body Systems:  [] For communication ability, affect, cognition, language, and  learning style: the assessment of the ability to make needs known, consciousness, orientation (person, place, and time), expected emotional /behavioral responses, and learning preferences (eg, learning barriers, education  needs)  [x] For the neuromuscular system: a general assessment of gross coordinated movement (eg, balance, gait, locomotion, transfers, and transitions) and motor function  (motor control and motor learning)  [] For the musculoskeletal system: the assessment of gross symmetry, gross range of motion, gross strength, height, and weight  [] For the integumentary system: the assessment of pliability(texture), presence of scar formation, skin color, and skin integrity  [] For cardiovascular/pulmonary system: the assessment of heart rate, respiratory rate, blood pressure, and edema     Activity limitations:    [x] Patient's cognitive status and saf ety concerns          [x] Status of current condition      [] Weight bearing restriction  [] Cardiopulmunary Restriction    Participation Restrictions:   [] Goals and goal agreement with the patient     [] Rehab potential (prognosis) and probable outcome      Examination of Body System: (criteria)    [] 06594 - addressing 1-2 elements    [x] 23199 - addressing a total of 3 or more elements     [] 59770 -  Addressing a total of 4 or more elements         Clinical Presentation: (criteria)  Evolving - 79415     On examination of body system using standardized tests and measures patient presents with 3 or more elements from any of the following: body structures and functions, activity limitations, and/or participation restrictions.  Leading to a clinical presentation that is considered evolving with changing characteristics                              Clinical Decision Making  (Eval Complexity):  Moderate - 36626     Time Tracking:     PT Received On: 02/09/18  PT Start Time: 1423     PT Stop Time: 1447  PT Total Time (min): 24 min     Billable Minutes:  Evaluation 10, Gait Training 14 and Total Time 24      Ninfa Mims, PT  02/09/2018

## 2018-02-09 NOTE — H&P
[facility]  Heart Transplant  H&P    Patient Name: Kev Vasquez  MRN: [id number]  Admission Date: 2/8/2018  Attending Physician: Franklyn Block MD  Primary Care Provider: Mega Collins MD  Principal Problem:<principal problem not specified>    Subjective:     History of Present Illness:  Mr. 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 Balwinder BiV ICD, HILLARY/BiPAP, HTN, CKD, HLP, gout and depression who presents with confusion and lethargy a day after discharge. Wife and caretaker are at bedside and report that patient was doing well for a day after discharge only to experience acute confusion and lethargy at lunch time today. He was being walked to dining table when he felt weak and had to be eased to a wheel chair and once on the table, he started gagging and threw up once on taking a cranberry. Caretake mention that patient is avoiding taking fluids due to the added thickener which he says is making it taste bad. Patient has also lost weight and is eating less with notable significant weight loss. He recently started on parkinson medication (sinemet) without any reported side effects. During the whole event, caretake mention LVAD low power alarm,or despite normal map during the event. Currently, patient is currently back to baseline and mention only feeling mild leg contraction.    Past Medical History:   Diagnosis Date    AICD (automatic cardioverter/defibrillator) present 2014    [employer]    Chronic anticoagulation 7/21/2017    Chronic combined systolic and diastolic congestive heart failure 7/27/2015 11-16-17   1 - Moderate left ventricular enlargement.    2 - Severely depressed left ventricular systolic function (EF 15-20%).    3 - Impaired LV relaxation, normal LAP (grade 1  diastolic dysfunction).    4 - Biatrial enlargement.    5 - Right ventricle is upper limit of normal in size with not well seen systolic function.    6 - Severe tricuspid regurgitation.    7 - Increased central venous pressure.    8 - The estimated PA systolic pressure is 40 mmHg.    9 - Heartmate III LVAD; speed 5800.     Complication involving left ventricular assist device (LVAD) 7/29/2017    Coronary artery disease involving native coronary artery of native heart without angina pectoris 7/27/2015    Gait instability     Hypertensive urgency, malignant 11/15/2017    ICD (implantable cardioverter-defibrillator), biventricular, in situ 7/27/2015    Ischemic cardiomyopathy 7/20/2017    S/p HMIII     Kidney stones     LVAD (left ventricular assist device) present 7/20/2017    status post Heartmate III 10/16/16 LVAD    HILLARY on CPAP 7/27/2015    Peripheral neuropathy     Pulmonary hypertension due to left ventricular systolic dysfunction 7/29/2015    Restless leg syndrome 1992    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    Skin cancer     excision 2013    Skin yeast infection 8/31/2017    Stage 3 chronic kidney disease 7/20/2017    Syncope 7/20/2017    Ventricular tachycardia 7/25/2017       Past Surgical History:   Procedure Laterality Date    CARDIAC PACEMAKER PLACEMENT      pacemaker previously, then AICD in 2006. AICD [facility] [employer] serial #[id number]    CORONARY ARTERY BYPASS GRAFT  1993    KNEE SURGERY Left 2001    complicated by infection, hardware had to be taken out and replaced    LEFT VENTRICULAR ASSIST DEVICE  10/19/2016       Review of patient's allergies indicates:   Allergen Reactions    Aldactone [spironolactone]       persistent hyperkalemia.    Sulfa (sulfonamide antibiotics)        Current Facility-Administered Medications   Medication    acetaminophen tablet 650 mg    [START ON 2/9/2018] allopurinol tablet 300 mg    [START ON 2/9/2018] amLODIPine tablet 10 mg     [START ON 2/9/2018] aspirin chewable tablet 81 mg    [START ON 2/9/2018] atorvastatin tablet 10 mg    [START ON 2/9/2018] buPROPion TB24 tablet 450 mg    carbidopa-levodopa  mg per tablet 1 tablet    ceFEPIme injection 2 g    docusate sodium capsule 100 mg    dofetilide capsule 125 mcg    [START ON 2/9/2018] doxazosin tablet 8 mg    [START ON 2/9/2018] ferrous sulfate EC tablet 325 mg    [START ON 2/9/2018] folic acid tablet 1 mg    [START ON 2/9/2018] hydrALAZINE tablet 150 mg    hydrALAZINE tablet 150 mg    isosorbide dinitrate tablet 40 mg    [START ON 2/9/2018] Lactobacillus rhamnosus GG capsule 1 capsule    magnesium oxide tablet 400 mg    [START ON 2/9/2018] pantoprazole EC tablet 40 mg    [START ON 2/9/2018] warfarin (COUMADIN) tablet 4 mg     Current Outpatient Prescriptions   Medication Sig    allopurinol (ZYLOPRIM) 300 MG tablet Take 1 tablet (300 mg total) by mouth once daily.    amLODIPine (NORVASC) 10 MG tablet Take 1 tablet (10 mg total) by mouth once daily.    aspirin 81 MG Chew Take 81 mg by mouth once daily.    atorvastatin (LIPITOR) 10 MG tablet Take 10 mg by mouth once daily.    buPROPion 450 mg Tb24 Take 450 mg by mouth once daily.    calcium-vitamin D tablet 600 mg-200 units Take 1 tablet by mouth once daily.    carbidopa-levodopa  mg (SINEMET)  mg per tablet Take 1 tablet by mouth every 4 (four) hours while awake.    CEFEPIME HCL (CEFEPIME 2 G/50 ML D5W, READY TO MIX SYSTEM,) Inject 2 g into the vein 2 (two) times daily.    docusate sodium (COLACE) 100 MG capsule Take 100 mg by mouth daily as needed for Constipation.    dofetilide (TIKOSYN) 125 MCG Cap Take 1 capsule (125 mcg total) by mouth every 12 (twelve) hours.    doxazosin (CARDURA) 8 MG Tab Take 1 tablet (8 mg total) by mouth once daily.    ferrous sulfate 324 mg (65 mg iron) TbEC Take 1 tablet (324 mg total) by mouth once daily.    folic acid (FOLVITE) 1 MG tablet Take 1 mg by mouth once  daily.    hydrALAZINE (APRESOLINE) 100 MG tablet Take 1 tablet (100 mg total) by mouth every 8 (eight) hours.    isosorbide dinitrate (ISORDIL) 40 MG Tab Take 1 tablet (40 mg total) by mouth 3 (three) times daily.    Lactobacillus rhamnosus GG (CULTURELLE) 10 billion cell capsule Take 1 capsule by mouth once daily.    lisinopril (PRINIVIL,ZESTRIL) 20 MG tablet Take 1 tablet (20 mg total) by mouth 2 (two) times daily.    magnesium oxide (MAG-OX) 400 mg tablet Take 1 tablet (400 mg total) by mouth 2 (two) times daily.    multivitamin capsule Take 1 capsule by mouth once daily.    pantoprazole (PROTONIX) 40 MG tablet Take 1 tablet (40 mg total) by mouth 2 (two) times daily before meals.    warfarin (COUMADIN) 4 MG tablet Take 4 mg orally on Sun, Tue, Thurs and 6 mg orally on other days as directed by coumadin clinic     Family History     Problem Relation (Age of Onset)    Cancer Daughter (50)    Diabetes Daughter    Heart disease Daughter        Social History Main Topics    Smoking status: Never Smoker    Smokeless tobacco: Never Used    Alcohol use No    Drug use: No    Sexual activity: Not on file      Comment:      Review of Systems   Constitutional: Positive for fatigue. Negative for chills and fever.   Respiratory: Negative for apnea, cough, chest tightness and shortness of breath.    Cardiovascular: Negative for chest pain, palpitations and leg swelling.   Musculoskeletal: Negative for arthralgias.     Objective:     Vital Signs (Most Recent):  Temp: 98.7 °F (37.1 °C) (02/08/18 1745)  Pulse: 69 (02/08/18 1927)  Resp: 20 (02/08/18 1457)  BP: 133/86 (02/08/18 1702)  SpO2: 97 % (02/08/18 1927) Vital Signs (24h Range):  Temp:  [98.7 °F (37.1 °C)] 98.7 °F (37.1 °C)  Pulse:  [66-78] 69  Resp:  [20] 20  SpO2:  [92 %-99 %] 97 %  BP: ()/(0-86) 133/86     Patient Vitals for the past 72 hrs (Last 3 readings):   Weight   02/08/18 1457 81.2 kg (179 lb)     Body mass index is 24.28 kg/m².    No  intake or output data in the 24 hours ending 02/08/18 2051    Physical Exam   Constitutional: He is oriented to person, place, and time.   Neck: No JVD present.   Cardiovascular:   LVAD hum- smooth   Pulmonary/Chest: Effort normal and breath sounds normal.   Abdominal: Soft.   Musculoskeletal: He exhibits no edema or tenderness.   Calf atrophy   Neurological: He is alert and oriented to person, place, and time.   Mild resting trauma in the upper extremities bilaterally   Skin: Skin is warm and dry.   Nursing note and vitals reviewed.      Significant Labs:  CBC:    Recent Labs  Lab 02/06/18 0433 02/07/18  0537 02/08/18  1530   WBC 6.04 6.60 6.36   RBC 3.44* 3.53* 3.57*   HGB 10.0* 10.5* 10.4*   HCT 30.6* 31.7* 32.1*   * 112* 109*   MCV 89 90 90   MCH 29.1 29.7 29.1   MCHC 32.7 33.1 32.4     BNP:    Recent Labs  Lab 02/05/18  0533 02/07/18  0537 02/08/18  1530   * 202* 215*     CMP:    Recent Labs  Lab 02/05/18  0533 02/06/18  0433 02/07/18  0537 02/08/18  1530   GLU 86 80 89 105   CALCIUM 10.5 10.6* 11.2* 11.3*   ALBUMIN 3.0*  --  3.1* 3.1*   PROT 6.3  --  6.8 6.9    142 141 139   K 4.0 4.0 4.0 4.4   CO2 27 27 27 27    109 108 106   BUN 22 24* 25* 26*   CREATININE 1.9* 1.9* 1.9* 2.2*   ALKPHOS 70  --  76 76   ALT 8*  --  9* <5*   AST 23  --  22 22   BILITOT 0.3  --  0.4 0.4      Coagulation:     Recent Labs  Lab 02/06/18 0433 02/07/18  0537 02/08/18  1530   INR 2.3* 2.6* 3.0*     LDH:    Recent Labs  Lab 02/06/18 0433 02/07/18  0537    172     Microbiology:  Microbiology Results (last 7 days)     ** No results found for the last 168 hours. **          I have reviewed all pertinent labs within the past 24 hours.    Diagnostic Results:  I have reviewed and interpreted all pertinent imaging results/findings within the past 24 hours.    Assessment/Plan:     Confusion    - unclear etiology. Could be related to parkinson dz or dehydration. No evidence of acute focal neurological  deficit  - patient with decreased PO intake due to liquid thickener for dyspahgia  - will consider speech therapy re-evaluation on appropriate liquid constitution.  - will consider IV bolus if necessary but for now encourage oral intake        Essential hypertension    - severe (stage 2) and on multiple agents  - hold ACEI due to kathy on ckd  - increased hydralazine to 150mg from 100mg onward  - Keep Map in the LVAD goal (60-80)          Stage 3 chronic kidney disease    - poor oral intake versus GDMT related  - hold lisinopril for now  - no IVF for now due to elevated MAP.  - BP control with hydralazine, amlodipine and cardura  - encourage oral intake        LVAD (left ventricular assist device) present    - Hm3 @5800  - Had PI event with low flow alarm during the episode  - INR therapeutic at 3  - continue current dose of coumadin  - continue antihypertensives and increased hydralazine to 150mg from 100mg to keep MAP between (60-80)  - LVAD care protocol and interrogation           Acute on chronic systolic and diastolic heart failure, NYHA class 4    - continue GDMT with increased dose of hydralazine as current. Holding ACEI for now            Discussed plan of care with dr. Russell Bennett MD  Heart Transplant  [facility]

## 2018-02-09 NOTE — SUBJECTIVE & OBJECTIVE
Interval History: No acute issues overnight. No additional low flow alarms since arrival to [facility] [place] [facility] ED. Discussed case with caregiver / daughter this AM. Unclear etiology but suspect this is due to his parkinsons and its effect on his autonomic dysfunction (ie presyncopal)     Continuous Infusions:  Scheduled Meds:   allopurinol  300 mg Oral Daily    aspirin  81 mg Oral Daily    atorvastatin  10 mg Oral Daily    buPROPion  450 mg Oral Daily    carbidopa-levodopa  mg  1 tablet Oral Q4H While awake    dofetilide  125 mcg Oral Q12H    doxazosin  8 mg Oral Daily    ferrous sulfate  325 mg Oral Daily    folic acid  1 mg Oral Daily    hydrALAZINE  150 mg Oral Q8H    isosorbide dinitrate  40 mg Oral TID    Lactobacillus rhamnosus GG  1 capsule Oral Daily    lisinopril  10 mg Oral BID    magnesium oxide  400 mg Oral BID    meropenem (MERREM) IVPB  1 g Intravenous Q12H    [START ON 2/10/2018] NIFEdipine  30 mg Oral Q8H    pantoprazole  40 mg Oral BID AC    [START ON 2/11/2018] warfarin  4 mg Oral Once    [START ON 2/10/2018] warfarin  6 mg Oral Once    warfarin  6 mg Oral Once     PRN Meds:acetaminophen, docusate sodium, hydrALAZINE    Review of patient's allergies indicates:   Allergen Reactions    Aldactone [spironolactone]       persistent hyperkalemia.    Sulfa (sulfonamide antibiotics)      Objective:     Vital Signs (Most Recent):  Temp: 97.9 °F (36.6 °C) (02/09/18 1205)  Pulse: 66 (02/09/18 1100)  Resp: 18 (02/09/18 1205)  BP: (!) 70/0 (02/09/18 1205)  SpO2: (!) 92 % (02/09/18 1205) Vital Signs (24h Range):  Temp:  [97.4 °F (36.3 °C)-99 °F (37.2 °C)] 97.9 °F (36.6 °C)  Pulse:  [50-97] 66  Resp:  [15-20] 18  SpO2:  [86 %-99 %] 92 %  BP: ()/(0-91) 70/0     Patient Vitals for the past 72 hrs (Last 3 readings):   Weight   02/08/18 1457 81.2 kg (179 lb)     Body mass index is 24.28 kg/m².    Intake/Output Summary (Last 24 hours) at 02/09/18 1423  Last data filed at 02/09/18 0500   Gross per 24  hour   Intake                0 ml   Output                0 ml   Net                0 ml     Physical Exam   Constitutional: He is oriented to person, place, and time. Frail appearing.   Neck: No JVD present.   Cardiovascular: LVAD hum- smooth   Pulmonary/Chest: Effort normal and breath sounds normal.   Abdominal: Soft.   Musculoskeletal: He exhibits no edema or tenderness.   Neurological: He is alert and oriented to person, place, and time.   Skin: Skin is warm and dry.   Nursing note and vitals reviewed (BP elevated with MAPS )    Significant Labs:  CBC:    Recent Labs  Lab 02/07/18  0537 02/08/18  1530 02/09/18  0515   WBC 6.60 6.36 6.42   RBC 3.53* 3.57* 3.43*   HGB 10.5* 10.4* 10.1*   HCT 31.7* 32.1* 30.6*   * 109* 112*   MCV 90 90 89   MCH 29.7 29.1 29.4   MCHC 33.1 32.4 33.0     BNP:    Recent Labs  Lab 02/05/18 0533 02/07/18  0537 02/08/18  1530   * 202* 215*     CMP:    Recent Labs  Lab 02/05/18  0533  02/07/18  0537 02/08/18  1530 02/09/18  0515   GLU 86  < > 89 105 78   CALCIUM 10.5  < > 11.2* 11.3* 11.1*   ALBUMIN 3.0*  --  3.1* 3.1*  --    PROT 6.3  --  6.8 6.9  --      < > 141 139 142   K 4.0  < > 4.0 4.4 3.9   CO2 27  < > 27 27 27     < > 108 106 108   BUN 22  < > 25* 26* 27*   CREATININE 1.9*  < > 1.9* 2.2* 2.0*   ALKPHOS 70  --  76 76  --    ALT 8*  --  9* <5*  --    AST 23  --  22 22  --    BILITOT 0.3  --  0.4 0.4  --    < > = values in this interval not displayed.   Coagulation:     Recent Labs  Lab 02/07/18  0537 02/08/18  1530 02/09/18  0515   INR 2.6* 3.0* 2.4*   APTT  --   --  26.6     LDH:    Recent Labs  Lab 02/07/18  0537 02/09/18  0515    214     I have reviewed all pertinent labs within the past 24 hours.    Estimated Creatinine Clearance: 35 mL/min (based on SCr of 2 mg/dL (H)).    Diagnostic Results:  I have reviewed all pertinent imaging results/findings within the past 24 hours.   TTE (Pending)

## 2018-02-09 NOTE — ASSESSMENT & PLAN NOTE
- Low flow alarms do correlate with time of event   - Will have VAD coordinator send waveforms to company for review   - Repeat TTE today   - Suspect this is vasovagal response (given underlying autonomic dysfunction) vs. Suction event vs. HTN  - Will observe on telemetry and attempt to control his BP better

## 2018-02-09 NOTE — SUBJECTIVE & OBJECTIVE
Past Medical History:   Diagnosis Date    AICD (automatic cardioverter/defibrillator) present 2014    [employer]    Chronic anticoagulation 7/21/2017    Chronic combined systolic and diastolic congestive heart failure 7/27/2015 11-16-17   1 - Moderate left ventricular enlargement.    2 - Severely depressed left ventricular systolic function (EF 15-20%).    3 - Impaired LV relaxation, normal LAP (grade 1 diastolic dysfunction).    4 - Biatrial enlargement.    5 - Right ventricle is upper limit of normal in size with not well seen systolic function.    6 - Severe tricuspid regurgitation.    7 - Increased central venous pressure.    8 - The estimated PA systolic pressure is 40 mmHg.    9 - Heartmate III LVAD; speed 5800.     Complication involving left ventricular assist device (LVAD) 7/29/2017    Coronary artery disease involving native coronary artery of native heart without angina pectoris 7/27/2015    Gait instability     Hypertensive urgency, malignant 11/15/2017    ICD (implantable cardioverter-defibrillator), biventricular, in situ 7/27/2015    Ischemic cardiomyopathy 7/20/2017    S/p HMIII     Kidney stones     LVAD (left ventricular assist device) present 7/20/2017    status post Heartmate III 10/16/16 LVAD    HILLARY on CPAP 7/27/2015    Peripheral neuropathy     Pulmonary hypertension due to left ventricular systolic dysfunction 7/29/2015    Restless leg syndrome 1992    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    Skin cancer     excision 2013    Skin yeast infection 8/31/2017    Stage 3 chronic kidney disease 7/20/2017    Syncope 7/20/2017    Ventricular tachycardia 7/25/2017       Past Surgical History:   Procedure Laterality Date    CARDIAC PACEMAKER PLACEMENT      pacemaker previously, then AICD in 2006. AICD [employer] serial #[id number]    CORONARY ARTERY BYPASS GRAFT  1993    KNEE SURGERY Left 2001    complicated by infection, hardware had to be taken out and replaced     LEFT VENTRICULAR ASSIST DEVICE  10/19/2016       Review of patient's allergies indicates:   Allergen Reactions    Aldactone [spironolactone]       persistent hyperkalemia.    Sulfa (sulfonamide antibiotics)        Current Facility-Administered Medications   Medication    acetaminophen tablet 650 mg    [START ON 2/9/2018] allopurinol tablet 300 mg    [START ON 2/9/2018] amLODIPine tablet 10 mg    [START ON 2/9/2018] aspirin chewable tablet 81 mg    [START ON 2/9/2018] atorvastatin tablet 10 mg    [START ON 2/9/2018] buPROPion TB24 tablet 450 mg    carbidopa-levodopa  mg per tablet 1 tablet    ceFEPIme injection 2 g    docusate sodium capsule 100 mg    dofetilide capsule 125 mcg    [START ON 2/9/2018] doxazosin tablet 8 mg    [START ON 2/9/2018] ferrous sulfate EC tablet 325 mg    [START ON 2/9/2018] folic acid tablet 1 mg    [START ON 2/9/2018] hydrALAZINE tablet 150 mg    hydrALAZINE tablet 150 mg    isosorbide dinitrate tablet 40 mg    [START ON 2/9/2018] Lactobacillus rhamnosus GG capsule 1 capsule    magnesium oxide tablet 400 mg    [START ON 2/9/2018] pantoprazole EC tablet 40 mg    [START ON 2/9/2018] warfarin (COUMADIN) tablet 4 mg     Current Outpatient Prescriptions   Medication Sig    allopurinol (ZYLOPRIM) 300 MG tablet Take 1 tablet (300 mg total) by mouth once daily.    amLODIPine (NORVASC) 10 MG tablet Take 1 tablet (10 mg total) by mouth once daily.    aspirin 81 MG Chew Take 81 mg by mouth once daily.    atorvastatin (LIPITOR) 10 MG tablet Take 10 mg by mouth once daily.    buPROPion 450 mg Tb24 Take 450 mg by mouth once daily.    calcium-vitamin D tablet 600 mg-200 units Take 1 tablet by mouth once daily.    carbidopa-levodopa  mg (SINEMET)  mg per tablet Take 1 tablet by mouth every 4 (four) hours while awake.    CEFEPIME HCL (CEFEPIME 2 G/50 ML D5W, READY TO MIX SYSTEM,) Inject 2 g into the vein 2 (two) times daily.    docusate sodium (COLACE)  100 MG capsule Take 100 mg by mouth daily as needed for Constipation.    dofetilide (TIKOSYN) 125 MCG Cap Take 1 capsule (125 mcg total) by mouth every 12 (twelve) hours.    doxazosin (CARDURA) 8 MG Tab Take 1 tablet (8 mg total) by mouth once daily.    ferrous sulfate 324 mg (65 mg iron) TbEC Take 1 tablet (324 mg total) by mouth once daily.    folic acid (FOLVITE) 1 MG tablet Take 1 mg by mouth once daily.    hydrALAZINE (APRESOLINE) 100 MG tablet Take 1 tablet (100 mg total) by mouth every 8 (eight) hours.    isosorbide dinitrate (ISORDIL) 40 MG Tab Take 1 tablet (40 mg total) by mouth 3 (three) times daily.    Lactobacillus rhamnosus GG (CULTURELLE) 10 billion cell capsule Take 1 capsule by mouth once daily.    lisinopril (PRINIVIL,ZESTRIL) 20 MG tablet Take 1 tablet (20 mg total) by mouth 2 (two) times daily.    magnesium oxide (MAG-OX) 400 mg tablet Take 1 tablet (400 mg total) by mouth 2 (two) times daily.    multivitamin capsule Take 1 capsule by mouth once daily.    pantoprazole (PROTONIX) 40 MG tablet Take 1 tablet (40 mg total) by mouth 2 (two) times daily before meals.    warfarin (COUMADIN) 4 MG tablet Take 4 mg orally on Sun, Tue, Thurs and 6 mg orally on other days as directed by coumadin clinic     Family History     Problem Relation (Age of Onset)    Cancer Daughter (50)    Diabetes Daughter    Heart disease Daughter        Social History Main Topics    Smoking status: Never Smoker    Smokeless tobacco: Never Used    Alcohol use No    Drug use: No    Sexual activity: Not on file      Comment:      Review of Systems   Constitutional: Positive for fatigue. Negative for chills and fever.   Respiratory: Negative for apnea, cough, chest tightness and shortness of breath.    Cardiovascular: Negative for chest pain, palpitations and leg swelling.   Musculoskeletal: Negative for arthralgias.     Objective:     Vital Signs (Most Recent):  Temp: 98.7 °F (37.1 °C) (02/08/18  1745)  Pulse: 69 (02/08/18 1927)  Resp: 20 (02/08/18 1457)  BP: 133/86 (02/08/18 1702)  SpO2: 97 % (02/08/18 1927) Vital Signs (24h Range):  Temp:  [98.7 °F (37.1 °C)] 98.7 °F (37.1 °C)  Pulse:  [66-78] 69  Resp:  [20] 20  SpO2:  [92 %-99 %] 97 %  BP: ()/(0-86) 133/86     Patient Vitals for the past 72 hrs (Last 3 readings):   Weight   02/08/18 1457 81.2 kg (179 lb)     Body mass index is 24.28 kg/m².    No intake or output data in the 24 hours ending 02/08/18 2051    Physical Exam   Constitutional: He is oriented to person, place, and time.   Neck: No JVD present.   Cardiovascular:   LVAD hum- smooth   Pulmonary/Chest: Effort normal and breath sounds normal.   Abdominal: Soft.   Musculoskeletal: He exhibits no edema or tenderness.   Calf atrophy   Neurological: He is alert and oriented to person, place, and time.   Mild resting trauma in the upper extremities bilaterally   Skin: Skin is warm and dry.   Nursing note and vitals reviewed.      Significant Labs:  CBC:    Recent Labs  Lab 02/06/18 0433 02/07/18 0537 02/08/18  1530   WBC 6.04 6.60 6.36   RBC 3.44* 3.53* 3.57*   HGB 10.0* 10.5* 10.4*   HCT 30.6* 31.7* 32.1*   * 112* 109*   MCV 89 90 90   MCH 29.1 29.7 29.1   MCHC 32.7 33.1 32.4     BNP:    Recent Labs  Lab 02/05/18 0533 02/07/18  0537 02/08/18  1530   * 202* 215*     CMP:    Recent Labs  Lab 02/05/18 0533 02/06/18 0433 02/07/18  0537 02/08/18  1530   GLU 86 80 89 105   CALCIUM 10.5 10.6* 11.2* 11.3*   ALBUMIN 3.0*  --  3.1* 3.1*   PROT 6.3  --  6.8 6.9    142 141 139   K 4.0 4.0 4.0 4.4   CO2 27 27 27 27    109 108 106   BUN 22 24* 25* 26*   CREATININE 1.9* 1.9* 1.9* 2.2*   ALKPHOS 70  --  76 76   ALT 8*  --  9* <5*   AST 23  --  22 22   BILITOT 0.3  --  0.4 0.4      Coagulation:     Recent Labs  Lab 02/06/18  0433 02/07/18  0537 02/08/18  1530   INR 2.3* 2.6* 3.0*     LDH:    Recent Labs  Lab 02/06/18  0433 02/07/18  0537    172     Microbiology:  Microbiology  Results (last 7 days)     ** No results found for the last 168 hours. **          I have reviewed all pertinent labs within the past 24 hours.    Diagnostic Results:  I have reviewed and interpreted all pertinent imaging results/findings within the past 24 hours.

## 2018-02-09 NOTE — HPI
Mr. 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 BiV ICD, HILLARY/BiPAP, HTN, CKD, HLP, gout and depression who presents with confusion and lethargy a day after discharge. Wife and caretaker are at bedside and report that patient was doing well for a day after discharge only to experience acute confusion and lethargy at lunch time today. He was being walked to dining table when he felt weak and had to be eased to a wheel chair and once on the table, he started gagging and threw up once on taking a cranberry. Caretake mention that patient is avoiding taking fluids due to the added thickener which he says is making it taste bad. Patient has also lost weight and is eating less with notable significant weight loss. He recently started on parkinson medication (sinemet) without any reported side effects. During the whole event, caretake mention LVAD low power alarm,or despite normal map during the event. Currently, patient is currently back to baseline and mention only feeling mild leg contraction.

## 2018-02-09 NOTE — PROGRESS NOTES
Admit Note/Discharge Note     Met with patient and sitter to assess needs. Patient is a 77 y.o.  male, admitted for Syncope [R55]  LVAD (left ventricular assist device) present [Z95.811]. Pt has an LVAD.     Patient admitted from ED on 2/8/2018.  At this time, patient presents as alert/oriented to self and place. Pt and somewhat confused, but able to communicate.  At this time, patients caregiver is present, alert/oriented x4 and pleasant.     Pt has a personal care attendant at home 24/7.    Household/Family Systems     Patient resides with patient's spouse, daughter and son in law, at:     [address]  [place] [address]     Support system includes spouse, daughter and son in law.    Patient does not have dependents that are need of being cared for.     Patients primary caregiver is bill Manriquez daughter, phone number [phone].    Spouse cell:  [phone] (per pt spouse is hard of hearing)     Additional emergency contacts  Alannah Vasquez (Daughter and HCPA) [phone]    During admission, patient's caregiver plans to visit.  Confirmed patient and patients caregivers do have access to reliable transportation.    Cognitive Status/Learning     Patient reports reading ability as college and due to current mental status patient having issues with memory, cognition and comprehension. Pt was reading news paper and able to comprehend what he was reading. Pt is able to see and hear. Pt generally prefers to learn by reading and verbal instruction.     Needed: No.   Highest education level: Attended College/Technical School    Vocation/Disability     Working for Income: No  If no, reason not working: Patient Choice - Retired    Patient is a retired school super intendant.     Adherence     Patient reports a high level of adherence to patients health care regimen.  Adherence counseling and education provided. Patient verbalizes understanding.    Substance Use    Patient reports the  following substance usage.    Tobacco: none, patient denies any use.  Alcohol: Pt reports he will have a Albert and Coke once in a while.  Illicit Drugs/Non-prescribed Medications: none, patient denies any use.  Patient states clear understanding of the potential impact of substance use.  Substance abstinence/cessation counseling, education and resources provided and reviewed.     Services Utilizing/ADLS    Infusion Service: Prior to admission, patient utilizing? [facility] full service for [facility]. Lisandro with [facility] notified of pt's admit.  Home Health: Prior to admission, patient utilizing?  Pt used [facility] until a few days ago.  DME: Prior to admission, yes walker with wheels, wheelchair, rollator, shower chair and BSC.  Pulmonary/Cardiac Rehab: Prior to admission, no. Pt given referral to out pt PT/OT/ST earlier this week, however pt was admitted to hospital before beginning these services.   Dialysis:  Prior to admission, no  Transplant Specialty Pharmacy:  Prior to admission, no.    Prior to admission, patient reports patient was not independent with ADLS (pt can dress himself, but not walk)  and was not driving. The pt's spouse and extended family drive.  Patient reports patient is not able to care for self at this time due to compromised medical condition (as documented in medical record) and physical weakness.  Patient indicates a willingness to care for self once medically cleared to do so.    Insurance/Medications    Insured by   Payor/Plan Subscr  Sex Relation Sub. Ins. ID Effective Group Num   1. MEDICARE - MELC MARTINEZ 1940 Male  [id number]                                    [address]   2. [place] CROSS * LC JEAN-BAPTISTE 1940 Male  [id number]                                   [address] [address]        Primary Insurance (for UNOS reporting): Public Insurance - Medicare FFS (Fee For Service)  Secondary Insurance (for UNOS reporting): Private Insurance    Patient reports patient is  able to obtain and afford medications at this time and at time of discharge.    Living Will/Healthcare Power of     Patient states patient has a LW and/or HCPA. The pt's HCPA is Alannah Vasquez (pt's daughter) [phone].      Coping/Mental Health    Patient is coping adequately with the aid of  family members.  Patient has a history of mental health difficulties. Pt takes Wellbutrin.    Discharge Planning    At time of discharge, patient plans to return to patient's home under the care of spouse, daughter and aid.  Patients aid or family will transport patient.  Per rounds today, expected discharge date has not been determined. Patient and patients caregiver  verbalize understanding and are involved in treatment planning and discharge process.      Additional Concerns    Patient is being followed for needs, education, resources, information, emotional support, supportive counseling, and for supportive and skilled discharge plan of care.  providing ongoing psychosocial support, education, resources and d/c planning as needed.  SW remains available. Patient denies additional needs and/or concerns at this time. Patient verbalizes understanding and agreement with information reviewed, social work availability, and how to access available resources as needed.

## 2018-02-09 NOTE — PLAN OF CARE
Problem: Patient Care Overview  Goal: Plan of Care Review    Recommendations     Recommendation/Intervention:   1. Continue current cardiac diet with texture/liquid modifications per SLP.   2. Encourage foods naturally high in fluids like fruits/vegetables, yogurt, pudding to provide additional fluids without having to drink thickened liquids. Recommended caretaker to speak with SLP for additional strategies to increase fluid intake.   3. RD following.     Goals: Intake >/=85% EEN/EPN with adequate fluid intake and compliance with honey thick liquids  Nutrition Goal Status: new

## 2018-02-09 NOTE — PROGRESS NOTES
DP: 102/0; ; ntofieid Dr. Bennett; ordered to administer 06:00AM BP medications now. Order to be carried out.

## 2018-02-09 NOTE — PLAN OF CARE
Problem: Physical Therapy Goal  Goal: Physical Therapy Goal  Goals to be met by: 18     Patient will increase functional independence with mobility by performin. Supine to sit with Set-up [place]  2. Sit to supine with Set-up [place]  3. Sit to stand transfer with Stand-by Assistance  4. Bed to chair transfer with Stand-by Assistance using Rolling Walker  5. Gait  x 150 feet with Contact Guard Assistance using Rolling Walker.     Outcome: Ongoing (interventions implemented as appropriate)  PT eval completed. Pt will begin PT POC.    Ninfa Mims, PT  2018

## 2018-02-09 NOTE — PROGRESS NOTES
[facility]  Adult Nutrition  Consult Note    SUMMARY     Recommendations    Recommendation/Intervention:   1. Continue current cardiac diet with texture/liquid modifications per SLP.   2. Encourage foods naturally high in fluids like fruits/vegetables, yogurt, pudding to provide additional fluids without having to drink thickened liquids. Recommended caretaker to speak with SLP for additional strategies to increase fluid intake.   3. RD following.    Goals: Intake >/=85% EEN/EPN with adequate fluid intake and compliance with honey thick liquids  Nutrition Goal Status: new  Communication of RD Recs:  (POC)    Reason for Assessment    Reason for Assessment: physician consult  Diagnosis:  (confusion)  Relevant Medical History: CHF s/p LVAD (10/2016), s/p AICD, ICM, CAD, CKD3   Interdisciplinary Rounds: attended     General Information Comments: Spoke with caretaker at bedside. She reports changes in pt's appetite between discharge and being readmitted. Caretaker reports UBW between 180-190. Weighed pt at home at 167 but admit wt was 179lb. Pt dislikes thickener so caretaker worried about risk for dehydration.    Nutrition Discharge Planning: Adequate PO intake to prevent weight loss on texture per SLP    Nutrition Prescription Ordered    Current Diet Order: Cardiac  Nutrition Order Comments: Honey thick liquids                 Evaluation of Received Nutrients/Fluid Intake                                                                                                     % Intake of Estimated Energy Needs: 0 - 25 %  % Meal Intake: Other: diet just advanced     Nutrition Risk Screen     Nutrition Risk Screen: dysphagia or difficulty swallowing    Nutrition/Diet History    Patient Reported Diet/Restrictions/Preferences: low salt  Typical Food/Fluid Intake: Acute change in appetite since discharge per caretaker; asking for desserts rather than normal foods  Food Preferences: No cultural/Sabianism  preferences noted.        Factors Affecting Nutritional Intake: behavioral (mealtime) (confusion)                Labs/Tests/Procedures/Meds       Pertinent Labs Reviewed: reviewed, pertinent  Pertinent Labs Comments: BUN 27, Crt 2.0, GFR 31.7, Ca 11.1, Alb 3.1  Pertinent Medications Reviewed: reviewed, pertinent  Pertinent Medications Comments: statin, Fe, folic acid, lactobacillus, Mg, pantoprazole    Physical Findings    Overall Physical Appearance: nourished, lethargic     Oral/Mouth Cavity: WDL  Skin: intact    Anthropometrics    Temp: 97.9 °F (36.6 °C)     Height: 6' (182.9 cm)  Weight Method: Stated  Weight: 81.2 kg (179 lb)  Ideal Body Weight (IBW), Male: 178 lb     % Ideal Body Weight, Male (lb): 100.56 lb     BMI (Calculated): 24.3  BMI Grade: 18.5-24.9 - normal     Usual Body Weight (UBW), k.8 kg     % Usual Body Weight: 99.47  % Weight Change From Usual Weight: -0.74 %             Estimated/Assessed Needs    Weight Used For Calorie Calculations: 81.2 kg (179 lb 0.2 oz)      Energy Calorie Requirements (kcal): 1902  Energy Need Method: Pamlico-St Jeor (x 1.2 (PAL))      RMR (Pamlico-St. Jeor Equation): 1585        Weight Used For Protein Calculations: 81.2 kg (179 lb 0.2 oz)  Protein Requirements: 81 gm (1.0 gm/kg)  Fluid Requirements (mL): per MD           RDA Method (mL): 1902               Assessment and Plan    Confusion    Nutrition Problem  Inadequate fluid intake    Related to (etiology):   Swallowing difficulty    Signs and Symptoms (as evidenced by):   Reported decreased fluid intake as pt dislikes thickened liquids     Interventions/Recommendations (treatment strategy):  See recs.    Nutrition Diagnosis Status:   New            Monitor and Evaluation    Food and Nutrient Intake: energy intake, food and beverage intake  Food and Nutrient Adminstration: diet order     Physical Activity and Function: nutrition-related ADLs and IADLs  Anthropometric Measurements: weight, weight change, body mass  index  Biochemical Data, Medical Tests and Procedures: electrolyte and renal panel, gastrointestinal profile, glucose/endocrine profile, inflammatory profile  Nutrition-Focused Physical Findings: overall appearance    Nutrition Risk    Level of Risk:  (F/u 1x weekly)    Nutrition Follow-Up    RD Follow-up?: Yes

## 2018-02-09 NOTE — CONSULTS
"      Cardiology Consult Note  Attending Physician: Franklyn Block MD  Reason for Consult: LVAD LFAs with LOC     HPI:   77 y.o. 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 He was discharged with a Doppler BP of 90 due to prior episodes of dizziness/LH and LOC related to mean MAPs b/w 60-80. His family reports he felt well this morning until lunch time. Around 12:00PM he was walking towards the kitchen to get lunch with his walker and his legs buckled, became incoherent so his care taker put him on a chair and slid him to the table. On the table he was attempting to have honey thickened liquids with a strawberry and subsequently vomited. He once again became incoherent and not answering to verbal or tactile stimulus. According to the family he fell "asleep" on a chair. The family called EMS, and they arrived in 15 minutes. He did not regain consciousness for at least 20 minutes. It was noted he had low flow alarms on his LVAD at the time of LOC. The family does not recall the number of his flow but remember his PI was 10.5 to 11.7. The LFAs continued until arrival to the ED.     In the ED, he was more arousable. His EKG showed A sense with intermittent A pace with Bi V pacing at 67 BPM. Tn 0.137, , AST 22, ALT <5, TBILI 0.4, Cr 2.2 (up from baseline 1.7-1.9), INR 3.0, Hb 10.4 (stable). His BP was 92/68, Doppler 82/0, P 66, RR 20, afebrile. He denied any ongoing symptoms in the ED except reports feeling weak. The patient's caretaker reports she saw discharge from the DLES yesterday.    LVAD interrogation: No ongoing LFAs currently; Speed fluctuation noted (5800 --> 5850 --> 5900 --> 5800); Flow 4.6-4.9, PI 3.2, Power 4.5    Bedside TTE showed LVEF 25%, LVAD HM III inflow " cannula visualized, AV opens with every beat, mild AI, trivial MR, Septum ML, no significant changes in LV or RV size, moderate-severely depressed RVSF, mild TR, PASP 21, RAP 3. Almost collapsed IVC noted indicating low intravascular volume.    Device Interrogation:  Device:  SJM  Placed:  06/24/14  Intrinisic rhythm: NSR  Chamber type: Dual  Mode: DDDR     Battery:  Estimated longevity: 3.4 years  Auto mode switch: <1%  Nonsustained VT: No    The patient had 0 treated episodes. No arrhythmias noted. No shocks delivered.    ROS:    Constitution: Negative for fever, chills, weight loss or gain.   HENT: Negative for sore throat, rhinorrhea, or headache.  Eyes: Negative for blurred or double vision.   Cardiovascular: See above  Pulmonary: Negative for SOB   Gastrointestinal: Negative for abdominal pain, nausea, vomiting, or diarrhea.   : Negative for dysuria.   Neurological: Negative for focal weakness or sensory changes.  PMH:     Past Medical History:   Diagnosis Date    AICD (automatic cardioverter/defibrillator) present 2014    [employer]    Chronic anticoagulation 7/21/2017    Chronic combined systolic and diastolic congestive heart failure 7/27/2015 11-16-17   1 - Moderate left ventricular enlargement.    2 - Severely depressed left ventricular systolic function (EF 15-20%).    3 - Impaired LV relaxation, normal LAP (grade 1 diastolic dysfunction).    4 - Biatrial enlargement.    5 - Right ventricle is upper limit of normal in size with not well seen systolic function.    6 - Severe tricuspid regurgitation.    7 - Increased central venous pressure.    8 - The estimated PA systolic pressure is 40 mmHg.    9 - Heartmate III LVAD; speed 5800.     Complication involving left ventricular assist device (LVAD) 7/29/2017    Coronary artery disease involving native coronary artery of native heart without angina pectoris 7/27/2015    Gait instability     Hypertensive urgency, malignant 11/15/2017    ICD  (implantable cardioverter-defibrillator), biventricular, in situ 7/27/2015    Ischemic cardiomyopathy 7/20/2017    S/p HMIII     Kidney stones     LVAD (left ventricular assist device) present 7/20/2017    status post Heartmate III 10/16/16 LVAD    HILLARY on CPAP 7/27/2015    Peripheral neuropathy     Pulmonary hypertension due to left ventricular systolic dysfunction 7/29/2015    Restless leg syndrome 1992    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    Skin cancer     excision 2013    Skin yeast infection 8/31/2017    Stage 3 chronic kidney disease 7/20/2017    Syncope 7/20/2017    Ventricular tachycardia 7/25/2017     Past Surgical History:   Procedure Laterality Date    CARDIAC PACEMAKER PLACEMENT      pacemaker previously, then AICD in 2006. AICD [employer] serial #[id number]    CORONARY ARTERY BYPASS GRAFT  1993    KNEE SURGERY Left 2001    complicated by infection, hardware had to be taken out and replaced    LEFT VENTRICULAR ASSIST DEVICE  10/19/2016     Allergies:     Review of patient's allergies indicates:   Allergen Reactions    Aldactone [spironolactone]       persistent hyperkalemia.    Sulfa (sulfonamide antibiotics)      Medications:     Current Facility-Administered Medications on File Prior to Encounter   Medication Dose Route Frequency Provider Last Rate Last Dose    [DISCONTINUED] acetaminophen tablet 650 mg  650 mg Oral Q6H PRN Charlette Jasmine, RON   650 mg at 02/02/18 1414    [DISCONTINUED] allopurinol tablet 300 mg  300 mg Oral Daily José Luis Yeh DO   300 mg at 02/07/18 0933    [DISCONTINUED] amLODIPine tablet 10 mg  10 mg Oral Daily Cathy Farmer PA-C   10 mg at 02/07/18 0933    [DISCONTINUED] aspirin chewable tablet 81 mg  81 mg Oral Daily José Luis Yeh DO   81 mg at 02/07/18 0933    [DISCONTINUED] atorvastatin tablet 10 mg  10 mg Oral Daily José Luis Yeh DO   10 mg at 02/07/18 0933    [DISCONTINUED] buPROPion TB24 tablet 450 mg  450 mg Oral  Daily José Luis Yeh DO   450 mg at 02/07/18 0933    [DISCONTINUED] carbidopa-levodopa  mg per tablet 1 tablet  1 tablet Oral Q4H While awake Charlette Jasmine NP   1 tablet at 02/07/18 1441    [DISCONTINUED] ceFEPIme injection 2 g  2 g Intravenous Q12H José Luis Yeh DO   2 g at 02/07/18 1430    [DISCONTINUED] docusate sodium capsule 100 mg  100 mg Oral Daily PRN José Luis Yeh DO   100 mg at 01/29/18 1801    [DISCONTINUED] docusate sodium capsule 100 mg  100 mg Oral BID Charlette Jasmine, NP   100 mg at 02/07/18 0934    [DISCONTINUED] dofetilide capsule 125 mcg  125 mcg Oral Q12H Caitlin Wells MD        [DISCONTINUED] doxazosin tablet 8 mg  8 mg Oral Daily Clotilde Early MD   8 mg at 02/07/18 0934    [DISCONTINUED] folic acid tablet 1 mg  1 mg Oral Daily José Luis Yeh DO   1 mg at 02/07/18 0934    [DISCONTINUED] hydrALAZINE tablet 100 mg  100 mg Oral Q8H José Luis Yeh DO   100 mg at 02/07/18 1441    [DISCONTINUED] isosorbide dinitrate tablet 40 mg  40 mg Oral TID José Luis Yeh DO   40 mg at 02/07/18 1440    [DISCONTINUED] Lactobacillus rhamnosus GG capsule 1 capsule  1 capsule Oral Daily José Luis Yeh DO   1 capsule at 02/07/18 0932    [DISCONTINUED] lisinopril tablet 20 mg  20 mg Oral BID José Luis Yeh DO   20 mg at 02/07/18 0934    [DISCONTINUED] magnesium oxide tablet 400 mg  400 mg Oral BID José Luis Yeh DO   400 mg at 02/07/18 0933    [DISCONTINUED] omnipaque 300 iohexol 10 mL  10 mL Intrathecal ONCE PRN Anni Henderson MD        [DISCONTINUED] ondansetron injection 4 mg  4 mg Intravenous Q6H PRN José Luis Yeh DO        [DISCONTINUED] pantoprazole EC tablet 40 mg  40 mg Oral BID AC Charlette Jasmine, NP   40 mg at 02/07/18 0941    [DISCONTINUED] polyethylene glycol packet 17 g  17 g Oral Daily Charlette Jasmine NP   17 g at 02/05/18 0904    [DISCONTINUED] promethazine tablet 12.5 mg  12.5 mg Oral Q6H PRN José Luis Yeh DO        [DISCONTINUED] warfarin tablet 6 mg   6 mg Oral Daily Charlette Jasmine NP   6 mg at 02/06/18 1729     Current Outpatient Prescriptions on File Prior to Encounter   Medication Sig Dispense Refill    allopurinol (ZYLOPRIM) 300 MG tablet Take 1 tablet (300 mg total) by mouth once daily. 90 tablet 4    amLODIPine (NORVASC) 10 MG tablet Take 1 tablet (10 mg total) by mouth once daily. 30 tablet 11    aspirin 81 MG Chew Take 81 mg by mouth once daily.      atorvastatin (LIPITOR) 10 MG tablet Take 10 mg by mouth once daily.      buPROPion 450 mg Tb24 Take 450 mg by mouth once daily. 30 tablet 11    calcium-vitamin D tablet 600 mg-200 units Take 1 tablet by mouth once daily.      carbidopa-levodopa  mg (SINEMET)  mg per tablet Take 1 tablet by mouth every 4 (four) hours while awake. 150 tablet 11    CEFEPIME HCL (CEFEPIME 2 G/50 ML D5W, READY TO MIX SYSTEM,) Inject 2 g into the vein 2 (two) times daily.      docusate sodium (COLACE) 100 MG capsule Take 100 mg by mouth daily as needed for Constipation.      dofetilide (TIKOSYN) 125 MCG Cap Take 1 capsule (125 mcg total) by mouth every 12 (twelve) hours. 60 capsule 11    doxazosin (CARDURA) 8 MG Tab Take 1 tablet (8 mg total) by mouth once daily. 30 tablet 11    ferrous sulfate 324 mg (65 mg iron) TbEC Take 1 tablet (324 mg total) by mouth once daily. 30 tablet 11    folic acid (FOLVITE) 1 MG tablet Take 1 mg by mouth once daily.      hydrALAZINE (APRESOLINE) 100 MG tablet Take 1 tablet (100 mg total) by mouth every 8 (eight) hours. 90 tablet 11    isosorbide dinitrate (ISORDIL) 40 MG Tab Take 1 tablet (40 mg total) by mouth 3 (three) times daily. 90 tablet 11    Lactobacillus rhamnosus GG (CULTURELLE) 10 billion cell capsule Take 1 capsule by mouth once daily.      lisinopril (PRINIVIL,ZESTRIL) 20 MG tablet Take 1 tablet (20 mg total) by mouth 2 (two) times daily. 180 tablet 3    magnesium oxide (MAG-OX) 400 mg tablet Take 1 tablet (400 mg total) by mouth 2 (two) times daily. 60  tablet 11    multivitamin capsule Take 1 capsule by mouth once daily.      pantoprazole (PROTONIX) 40 MG tablet Take 1 tablet (40 mg total) by mouth 2 (two) times daily before meals. 60 tablet 11    warfarin (COUMADIN) 4 MG tablet Take 4 mg orally on Sun, Tue, Thurs and 6 mg orally on other days as directed by coumadin clinic  11    [DISCONTINUED] allopurinol (ZYLOPRIM) 300 MG tablet Take 300 mg by mouth once daily.      [DISCONTINUED] amLODIPine (NORVASC) 10 MG tablet Take 1 tablet (10 mg total) by mouth once daily. 30 tablet 11       Inpatient Medications   Continuous Infusions:  Scheduled Meds:   [START ON 2/9/2018] allopurinol  300 mg Oral Daily    [START ON 2/9/2018] amLODIPine  10 mg Oral Daily    [START ON 2/9/2018] aspirin  81 mg Oral Daily    [START ON 2/9/2018] atorvastatin  10 mg Oral Daily    [START ON 2/9/2018] buPROPion  450 mg Oral Daily    carbidopa-levodopa  mg  1 tablet Oral Q4H While awake    ceFEPIme  2 g Intravenous Q24H    dofetilide  125 mcg Oral Q12H    [START ON 2/9/2018] doxazosin  8 mg Oral Daily    [START ON 2/9/2018] ferrous sulfate  325 mg Oral Daily    [START ON 2/9/2018] folic acid  1 mg Oral Daily    hydrALAZINE  100 mg Oral Q8H    isosorbide dinitrate  40 mg Oral TID    [START ON 2/9/2018] Lactobacillus rhamnosus GG  1 capsule Oral Daily    magnesium oxide  400 mg Oral BID    [START ON 2/9/2018] pantoprazole  40 mg Oral BID AC    [START ON 2/9/2018] warfarin  4 mg Oral Daily     PRN Meds:acetaminophen, docusate sodium     Social History:     Social History   Substance Use Topics    Smoking status: Never Smoker    Smokeless tobacco: Never Used    Alcohol use No     Family History:     Family History   Problem Relation Age of Onset    Cancer Daughter 50     breast    Heart disease Daughter      MI x 3, CABG    Diabetes Daughter      Physical Exam:     Vitals:  Temp:  [98.7 °F (37.1 °C)]   Pulse:  [66-78]   Resp:  [20]   BP: ()/(0-86)   SpO2:   [92 %-99 %]     /86   Pulse 69   Temp 98.7 °F (37.1 °C) (Oral)   Resp 20   Ht 6' (1.829 m)   Wt 81.2 kg (179 lb)   SpO2 97%   BMI 24.28 kg/m²   I/O's:  No intake or output data in the 24 hours ending 02/08/18 2007    Wt Readings from Last 10 Encounters:   02/08/18 81.2 kg (179 lb)   02/07/18 81.5 kg (179 lb 10.8 oz)   01/18/18 82.1 kg (181 lb)   01/05/18 90.3 kg (199 lb)   12/29/17 90.3 kg (199 lb)   12/28/17 90.3 kg (199 lb)   12/15/17 90.2 kg (198 lb 13.7 oz)   12/14/17 86.2 kg (190 lb)   12/14/17 89.1 kg (196 lb 6.9 oz)   12/04/17 85.9 kg (189 lb 6 oz)        Constitutional: NAD, conversant  HEENT: Sclera anicteric, PERRLA, EOMI  Neck: No JVD, no carotid bruits  CV: LVAD hum smooth   Pulm: CTAB with no wheezes, rales, or rhonchi  GI: Abdomen soft, NTND, +BS  Extremities: No LE edema, warm and well perfused, No cyanosis, No clubbing; RUE PICC line dressing C/D/I. No tenderness over site.  Skin: No ecchymosis, erythema, or ulcers  Psych: AOx3, appropriate affect  Neuro: CNII-XII intact, no focal deficits    Labs:       Recent Labs  Lab 02/06/18  0433 02/07/18  0537 02/08/18  1530    141 139   K 4.0 4.0 4.4    108 106   CO2 27 27 27   BUN 24* 25* 26*   CREATININE 1.9* 1.9* 2.2*   ANIONGAP 6* 6* 6*       Recent Labs  Lab 02/05/18  0533  02/07/18  0537 02/08/18  1530   AST 23  --  22 22   ALT 8*  --  9* <5*   ALKPHOS 70  --  76 76   BILITOT 0.3  --  0.4 0.4   BILIDIR 0.2  --  0.2  --    ALBUMIN 3.0*  --  3.1* 3.1*     < > 172  --    < > = values in this interval not displayed.    Recent Labs  Lab 02/05/18  0533 02/07/18  0537 02/08/18  1530   TROPONINI  --   --  0.137*   * 202* 215*     No results for input(s): PH, PCO2, PO2, HCO3, POCSATURATED in the last 168 hours.   Recent Labs  Lab 02/06/18  0433 02/07/18  0537 02/08/18  1530   WBC 6.04 6.60 6.36   HGB 10.0* 10.5* 10.4*   HCT 30.6* 31.7* 32.1*   * 112* 109*   GRAN 71.4  4.3 70.4  4.7 74.1*  4.7       Recent  Labs  Lab 02/08/18  1530   INR 3.0*     Lab Results   Component Value Date    CHOL 124 10/18/2017    HDL 59 10/18/2017    LDLCALC 55.8 (L) 10/18/2017    TRIG 46 10/18/2017     Lab Results   Component Value Date    HGBA1C 5.3 07/28/2017    TSH 1.810 07/28/2017        Micro:  Blood Cultures  Lab Results   Component Value Date    LABBLOO No growth after 5 days. 01/24/2018    LABBLOO No growth after 5 days. 01/24/2018    LABBLOO No growth after 5 days. 08/30/2017    LABBLOO No growth after 5 days. 07/28/2017    LABBLOO No growth after 5 days. 07/28/2017     Urine Cultures  Lab Results   Component Value Date    LABURIN No growth 07/29/2017       Imaging:   CXR: One view: Central line right atrium. There is a pacer in the LVAD. There is cardiomegaly, mild edema, postoperative change, and no change.    TTE: see HPI for bedside evaluation    TTE (2/4/18):  1 - Heartmate III LVAD; speed 5800; aortic valve opens w every beat and septum is midline.     2 - Severe left atrial enlargement.     3 - Severe left ventricular enlargement.     4 - Eccentric hypertrophy.     5 - Wall motion abnormalities.     6 - Severely depressed left ventricular systolic function (EF 25-30%) - see details above.     7 - Severe right ventricular enlargement with severely depressed systolic function.     8 - The estimated PA systolic pressure is 24 mmHg.     9 - Mild tricuspid regurgitation.     EF   Date Value Ref Range Status   02/04/2018 25 (A) 55 - 65    01/25/2018 10 (A) 55 - 65    12/01/2017 25 (A) 55 - 65    11/16/2017 15 (A) 55 - 65    10/18/2017 35 (A) 55 - 65        EKG: A sense, Bi V paced with intermittent A pacing      Assessment:   77 y.o. 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 He presented to the ED  with c/o LOC and low flow alarms on his LVAD.    Plan:   LVAD HM III with low flow alarms  - official LVAD interrogation in AM  - likely precipitated by intravascular volume depletion  - place in obs under HTS service  - NS bolus 250 cc over 3 hours  - check LDH daily  - check INR daily; dose coumadin  - given prior hx of hypotension induced LH/LOC - would hold lisinopril in the mean time  - official TTE w/ CFD in AM    TANESHA on CKD  - likely pre-renal 2/2 dehydration  - NS bolus  - encourage PO fluid intake  - hold lisinopril for now - can resume once TANESHA resolved and volume status improved at a lower dose    Chronic DLI  - c/w home cefepime via PICC line  - check Bcx, UA, Ucx given recent discharge    VT   - given renal dysfunction - would dose it accordingly  - no VT episodes on interrogation    HTN  - resume home meds except lisinopril    Discussed with Dr. Wells.    Signed:  Jaylene Salomon MD  Cardiology Fellow  Pager: [phone]  2/8/2018 8:07 PM

## 2018-02-09 NOTE — ASSESSMENT & PLAN NOTE
- Improved this AM per family / caregiver   - Suspect he has underlying cognitive dysfunction (degree uncertain)

## 2018-02-09 NOTE — ASSESSMENT & PLAN NOTE
Nutrition Problem  Inadequate fluid intake    Related to (etiology):   Swallowing difficulty    Signs and Symptoms (as evidenced by):   Reported decreased fluid intake as pt dislikes thickened liquids     Interventions/Recommendations (treatment strategy):  See recs.    Nutrition Diagnosis Status:   New

## 2018-02-09 NOTE — PLAN OF CARE
Problem: Patient Care Overview  Goal: Plan of Care Review  Outcome: Ongoing (interventions implemented as appropriate)  DP & MAP slightly elevated this shift; see flowsheets.  VAD #s WNL. Pt had low flow alarms on his history settings from previous shift; MD aware. VSS. O2 sats stable on RA. Pt denies CP, SOB, palpitations, lightheadedness and dizziness. DLES: 4, open, gauze saturated with quarter sized old blood & puss drainage. Next drsg change due: 2/10/18. Site cleaned & drsg changed using sterile technique.  Fall precautions maintained this shift, pt remains free from falls, trauma and injury. POC reviewed with pt, verbalized understanding. NADN. Will continue to monitor.

## 2018-02-09 NOTE — ASSESSMENT & PLAN NOTE
- 3 @7040  - Had PI event with low flow alarm during the episode  - INR therapeutic at 3  - continue current dose of coumadin  - continue antihypertensives and increased hydralazine to 150mg from 100mg to keep MAP between (60-80)  - LVAD care protocol and interrogation

## 2018-02-09 NOTE — PLAN OF CARE
Problem: Occupational Therapy Goal  Goal: Occupational Therapy Goal  Goals to be met by: 7 days (2/16/18)     Patient will increase functional independence with ADLs by performing:    UE Dressing with Supervision.  Grooming while standing at sink with Contact Guard Assistance.  Toileting from toilet with Contact Guard Assistance for hygiene and clothing management.   Sitting at edge of bed x10 minutes with Stand-by Assistance.  Supine to sit with Contact Guard Assistance.  Toilet transfer to toilet with Contact Guard Assistance.  Pt will perform functional mobility household distance with CGA using RW.    Outcome: Ongoing (interventions implemented as appropriate)  Eval and POC set 2/9/18

## 2018-02-09 NOTE — ASSESSMENT & PLAN NOTE
- severe (stage 2) and on multiple agents  - hold ACEI due to kathy on ckd  - increased hydralazine to 150mg from 100mg onward  - Keep Map in the LVAD goal (60-80)

## 2018-02-09 NOTE — ASSESSMENT & PLAN NOTE
- Hm3 @5800  - INR goal 2-3. INR therapeutic   - Continue coumadin   - Continue low dose ASA daily   -  and   - Restarted lisinopril 10mg BID (TANESHA resolved)  - Increased hydralazine to 150mg TID and continue isordil at 40mg TID and cardura 8mg daily   - Hold amlodipine and start nifedine in the AM (able to titrate to higher dose). Would like to avoid clonidine   - MAP 60-80

## 2018-02-09 NOTE — PLAN OF CARE
Problem: SLP Goal  Goal: SLP Goal  Speech Therapy Short Term Goals  Goal expected to be met by 2/23  1. Pt will tolerate a regular diet and honey thick liquids with no overt s/s of aspiration.   2. Pt will demonstrate 3 safe swallowing precautions with no cues.   3. Pt will complete dysphagia therapy exercises x10 each given min cues.       Outcome: Ongoing (interventions implemented as appropriate)  BSS completed. Pt familiar to ST department from recent previous admission. Recommend: regular diet, honey thick liquids. Whole meds buried in puree, strict aspiration precautions. ST will continue to follow.   JEWEL Khan., CCC-SLP  Pager: [phone]  02/09/2018

## 2018-02-09 NOTE — PLAN OF CARE
Mr. 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  Unfortunately the isolate is increasingly becoming resistant.  It stopped being susceptible to ciprofloxacin and hence he was placed on IV cefepime.  It has now stopped being susceptible to cefepime.  He was scheduled to see me today in clinic where I was going to discuss this with him.  However he is now admitted.  I spoke with the admitting providers.  Plan is to switch him to IV meropenem 1 gram q 12 (renally dosed).  He is to be discharged on this for 4 weeks.  We may extend this when he is seen in clinic.

## 2018-02-09 NOTE — PLAN OF CARE
[facility]   Heart Transplant Clinic  [address] [place] [address]   [phone] after hours        HOME  HEALTH ORDERS      Admit to Home Health    Diagnosis:   Patient Active Problem List   Diagnosis    Chronic combined systolic and diastolic congestive heart failure    Coronary artery disease involving native coronary artery of native heart without angina pectoris    S/P CABG (coronary artery bypass graft)    ICD (implantable cardioverter-defibrillator), biventricular, in situ    HILLARY on CPAP    Pulmonary hypertension due to left ventricular systolic dysfunction    Ischemic cardiomyopathy    Stage 3 chronic kidney disease    LVAD (left ventricular assist device) present    Chronic anticoagulation    VT (ventricular tachycardia)    Delirium    Restless leg syndrome    Gait instability    Acute on chronic systolic and diastolic heart failure, NYHA class 4    Essential hypertension    Vomiting    Hallucinations    Confusion    Gastroesophageal reflux disease    Cardiomyopathy    Wound infection    Syncope     Patient is homebound due to:  LVAD    Diet: Heart Healthy Diet with Honey Thick Liquids     Acitivities: As tolerated     Nursing:   SN to complete comprehensive assessment including routine vital signs. Instruct on disease process and s/s of complications to report to MD. Review/verify medication list sent home with the patient at time of discharge  and instruct patient/caregiver as needed. Frequency may be adjusted depending on start of care date.    Notify MD if SBP > 160 or < 90; DBP > 90 or < 50; HR > 120 or < 50; Temp > 101; Weight gain >3lbs in 1 day or 5lbs in 1 week.    LABS:  SN to perform labs: CBC / CMP weekly       HOME INFUSION THERAPY:  SN to perform Infusion Therapy/Central Line Care.  Review Central Line Care & Central Line Flush with patient.    Administer (drug and dose):   Meropenem 1g IV Q12 hours for 4 weeks  (last dose 3/9/2018)    **For questions or concerns, please call [phone] and ask for Pre-Heart transplant clinic, M-F 8-5. After hours, weekends, call [phone] and ask for the Heart Transplant Cardiologist on call.**    Central line care:  Scrub the Hub: Prior to accessing the line, always perform a 30 second alcohol scrub  Each lumen of the central line is to be flushed at least daily with 10 mL Normal Saline and 3 mL Heparin flush (100 units/mL)  Skilled Nurse (SN) may draw blood from IV access  Blood Draw Procedure:   - Aspirate at least 5 mL of blood   - Discard   - Obtain specimen   - Change posiflow cap   - Flush with 20 mL Normal Saline followed by a                 3-5 mL Heparin flush (100 units/mL)    Central :   - Sterile dressing changes are done weekly and as needed.   - Use chlor-hexadine scrub to cleanse site, apply Biopatch to insertion site,       apply securement device dressing   - Posi-flow caps are changed weekly and after EVERY lab draw.   - If sterile gauze is under dressing to control oozing,                 dressing change must be performed every 24 hours until gauze is not needed.    Send initial Home Health orders to HTS attending physician on call.                                             Send follow up questions to [phone] or fax:                     Heart Failure:      [phone]

## 2018-02-09 NOTE — ASSESSMENT & PLAN NOTE
- unclear etiology. Could be related to parkinson dz or dehydration. No evidence of acute focal neurological deficit  - patient with decreased PO intake due to liquid thickener for dyspahgia  - will consider speech therapy re-evaluation on appropriate liquid constitution.  - will consider IV bolus if necessary but for now encourage oral intake

## 2018-02-09 NOTE — ASSESSMENT & PLAN NOTE
- Chronic drive line infection   - Culture growing pseudomonas (now resistant to cefepime and cipro)  - Spoke with ID (Dr. Saez). Will start meropenem 1g BID for 4 weeks with follow up in ID clinic   - Weekly CBC / CMP while on meropenem

## 2018-02-10 LAB
ANION GAP SERPL CALC-SCNC: 6 MMOL/L
APTT BLDCRRT: 25.6 SEC
BASOPHILS # BLD AUTO: 0.04 K/UL
BASOPHILS NFR BLD: 0.6 %
BUN SERPL-MCNC: 23 MG/DL
CALCIUM SERPL-MCNC: 11.5 MG/DL
CHLORIDE SERPL-SCNC: 108 MMOL/L
CO2 SERPL-SCNC: 28 MMOL/L
CREAT SERPL-MCNC: 2.1 MG/DL
DIFFERENTIAL METHOD: ABNORMAL
EOSINOPHIL # BLD AUTO: 0.2 K/UL
EOSINOPHIL NFR BLD: 2.7 %
ERYTHROCYTE [DISTWIDTH] IN BLOOD BY AUTOMATED COUNT: 18.5 %
EST. GFR  (AFRICAN AMERICAN): 34.6 ML/MIN/1.73 M^2
EST. GFR  (NON AFRICAN AMERICAN): 29.9 ML/MIN/1.73 M^2
GLUCOSE SERPL-MCNC: 85 MG/DL
HCT VFR BLD AUTO: 31.6 %
HGB BLD-MCNC: 10.6 G/DL
IMM GRANULOCYTES # BLD AUTO: 0.03 K/UL
IMM GRANULOCYTES NFR BLD AUTO: 0.5 %
INR PPP: 2.1
LDH SERPL L TO P-CCNC: 161 U/L
LYMPHOCYTES # BLD AUTO: 0.9 K/UL
LYMPHOCYTES NFR BLD: 14.7 %
MAGNESIUM SERPL-MCNC: 2.5 MG/DL
MCH RBC QN AUTO: 30.1 PG
MCHC RBC AUTO-ENTMCNC: 33.5 G/DL
MCV RBC AUTO: 90 FL
MONOCYTES # BLD AUTO: 0.8 K/UL
MONOCYTES NFR BLD: 12.3 %
NEUTROPHILS # BLD AUTO: 4.3 K/UL
NEUTROPHILS NFR BLD: 69.2 %
NRBC BLD-RTO: 0 /100 WBC
PLATELET # BLD AUTO: 116 K/UL
PMV BLD AUTO: 11.2 FL
POTASSIUM SERPL-SCNC: 4.1 MMOL/L
PROTHROMBIN TIME: 20.1 SEC
RBC # BLD AUTO: 3.52 M/UL
SODIUM SERPL-SCNC: 142 MMOL/L
WBC # BLD AUTO: 6.24 K/UL

## 2018-02-10 PROCEDURE — 83615 LACTATE (LD) (LDH) ENZYME: CPT

## 2018-02-10 PROCEDURE — 25000003 PHARM REV CODE 250: Performed by: INTERNAL MEDICINE

## 2018-02-10 PROCEDURE — 80048 BASIC METABOLIC PNL TOTAL CA: CPT

## 2018-02-10 PROCEDURE — 99232 SBSQ HOSP IP/OBS MODERATE 35: CPT | Mod: ,,, | Performed by: INTERNAL MEDICINE

## 2018-02-10 PROCEDURE — 63600175 PHARM REV CODE 636 W HCPCS: Performed by: INTERNAL MEDICINE

## 2018-02-10 PROCEDURE — 85610 PROTHROMBIN TIME: CPT

## 2018-02-10 PROCEDURE — 27000248 HC VAD-ADDITIONAL DAY

## 2018-02-10 PROCEDURE — 85025 COMPLETE CBC W/AUTO DIFF WBC: CPT

## 2018-02-10 PROCEDURE — 20600001 HC STEP DOWN PRIVATE ROOM

## 2018-02-10 PROCEDURE — 85730 THROMBOPLASTIN TIME PARTIAL: CPT

## 2018-02-10 PROCEDURE — 83735 ASSAY OF MAGNESIUM: CPT

## 2018-02-10 RX ORDER — NIFEDIPINE 20 MG/1
40 CAPSULE ORAL EVERY 8 HOURS
Status: DISCONTINUED | OUTPATIENT
Start: 2018-02-10 | End: 2018-02-11

## 2018-02-10 RX ORDER — SODIUM CHLORIDE 9 MG/ML
INJECTION, SOLUTION INTRAVENOUS CONTINUOUS
Status: DISCONTINUED | OUTPATIENT
Start: 2018-02-10 | End: 2018-02-11

## 2018-02-10 RX ADMIN — ASPIRIN 81 MG 81 MG: 81 TABLET ORAL at 08:02

## 2018-02-10 RX ADMIN — HYDRALAZINE HYDROCHLORIDE 150 MG: 50 TABLET ORAL at 09:02

## 2018-02-10 RX ADMIN — ISOSORBIDE DINITRATE 40 MG: 40 TABLET ORAL at 06:02

## 2018-02-10 RX ADMIN — Medication 1 CAPSULE: at 08:02

## 2018-02-10 RX ADMIN — WARFARIN SODIUM 7 MG: 5 TABLET ORAL at 04:02

## 2018-02-10 RX ADMIN — ATORVASTATIN CALCIUM 10 MG: 10 TABLET, FILM COATED ORAL at 08:02

## 2018-02-10 RX ADMIN — CARBIDOPA AND LEVODOPA 1 TABLET: 25; 100 TABLET ORAL at 06:02

## 2018-02-10 RX ADMIN — FERROUS SULFATE TAB EC 325 MG (65 MG FE EQUIVALENT) 325 MG: 325 (65 FE) TABLET DELAYED RESPONSE at 08:02

## 2018-02-10 RX ADMIN — ISOSORBIDE DINITRATE 40 MG: 40 TABLET ORAL at 09:02

## 2018-02-10 RX ADMIN — LISINOPRIL 10 MG: 10 TABLET ORAL at 09:02

## 2018-02-10 RX ADMIN — LISINOPRIL 10 MG: 10 TABLET ORAL at 08:02

## 2018-02-10 RX ADMIN — FOLIC ACID 1 MG: 1 TABLET ORAL at 08:02

## 2018-02-10 RX ADMIN — MEROPENEM 1 G: 1 INJECTION, POWDER, FOR SOLUTION INTRAVENOUS at 10:02

## 2018-02-10 RX ADMIN — NIFEDIPINE 30 MG: 10 CAPSULE, LIQUID FILLED ORAL at 03:02

## 2018-02-10 RX ADMIN — PANTOPRAZOLE SODIUM 40 MG: 40 TABLET, DELAYED RELEASE ORAL at 06:02

## 2018-02-10 RX ADMIN — CARBIDOPA AND LEVODOPA 1 TABLET: 25; 100 TABLET ORAL at 08:02

## 2018-02-10 RX ADMIN — CARBIDOPA AND LEVODOPA 1 TABLET: 25; 100 TABLET ORAL at 03:02

## 2018-02-10 RX ADMIN — MAGNESIUM OXIDE TAB 400 MG (241.3 MG ELEMENTAL MG) 400 MG: 400 (241.3 MG) TAB at 09:02

## 2018-02-10 RX ADMIN — SODIUM CHLORIDE: 0.9 INJECTION, SOLUTION INTRAVENOUS at 06:02

## 2018-02-10 RX ADMIN — HYDRALAZINE HYDROCHLORIDE 150 MG: 50 TABLET ORAL at 06:02

## 2018-02-10 RX ADMIN — HYDRALAZINE HYDROCHLORIDE 10 MG: 20 INJECTION INTRAMUSCULAR; INTRAVENOUS at 12:02

## 2018-02-10 RX ADMIN — CARBIDOPA AND LEVODOPA 1 TABLET: 25; 100 TABLET ORAL at 09:02

## 2018-02-10 RX ADMIN — DOFETILIDE 125 MCG: 0.12 CAPSULE ORAL at 08:02

## 2018-02-10 RX ADMIN — ALLOPURINOL 300 MG: 300 TABLET ORAL at 08:02

## 2018-02-10 RX ADMIN — DOFETILIDE 125 MCG: 0.12 CAPSULE ORAL at 09:02

## 2018-02-10 RX ADMIN — DOXAZOSIN MESYLATE 8 MG: 2 TABLET ORAL at 08:02

## 2018-02-10 RX ADMIN — HYDRALAZINE HYDROCHLORIDE 150 MG: 50 TABLET ORAL at 03:02

## 2018-02-10 RX ADMIN — NIFEDIPINE 40 MG: 10 CAPSULE, LIQUID FILLED ORAL at 09:02

## 2018-02-10 RX ADMIN — MAGNESIUM OXIDE TAB 400 MG (241.3 MG ELEMENTAL MG) 400 MG: 400 (241.3 MG) TAB at 08:02

## 2018-02-10 RX ADMIN — ISOSORBIDE DINITRATE 40 MG: 40 TABLET ORAL at 03:02

## 2018-02-10 RX ADMIN — MEROPENEM 1 G: 1 INJECTION, POWDER, FOR SOLUTION INTRAVENOUS at 12:02

## 2018-02-10 RX ADMIN — NIFEDIPINE 30 MG: 10 CAPSULE, LIQUID FILLED ORAL at 06:02

## 2018-02-10 RX ADMIN — BUPROPION HYDROCHLORIDE 450 MG: 150 TABLET, EXTENDED RELEASE ORAL at 08:02

## 2018-02-10 RX ADMIN — PANTOPRAZOLE SODIUM 40 MG: 40 TABLET, DELAYED RELEASE ORAL at 04:02

## 2018-02-10 NOTE — ASSESSMENT & PLAN NOTE
- 3 @3078  - INR goal 2-3. INR therapeutic   - Continue coumadin   - Continue low dose ASA daily   - LDH acceptable   - Lisinopril 10mg BID   - Continue Hydralazine 150mg TID, isordil at 40mg TID and cardura 8mg daily   - Continue nifedine 30mg TID   - MAP goal of 60-80

## 2018-02-10 NOTE — PLAN OF CARE
Problem: Patient Care Overview  Goal: Plan of Care Review  Outcome: Ongoing (interventions implemented as appropriate)  DP & MAP elevated this shift; MD aware; see flowsheets & previous notes.  VAD #s WNL. All other VSS. O2 sats stable on RA. Pt denies CP, SOB, palpitations, lightheadedness and dizziness. LVAD drsg CDI. Drsg change due: 2/10/18. Fall precautions maintained this shift, pt remains free from falls, trauma and injury. POC reviewed with pt, verbalized understanding. NADN. Will continue to monitor.

## 2018-02-10 NOTE — ASSESSMENT & PLAN NOTE
- Suspect this is vasovagal response (given underlying autonomic dysfunction) vs. Suction event vs. HTN  - No low flows in past 36 hours.   - No telemetry events

## 2018-02-10 NOTE — SUBJECTIVE & OBJECTIVE
Interval History: No acute issues overnight. BP better controlled (MAP 64 this AM). No LVAD alarms or syncopal events. Did have vomiting around time of dinner last night. Kept down breakfast with no problems.     Continuous Infusions:  Scheduled Meds:   allopurinol  300 mg Oral Daily    aspirin  81 mg Oral Daily    atorvastatin  10 mg Oral Daily    buPROPion  450 mg Oral Daily    carbidopa-levodopa  mg  1 tablet Oral Q4H While awake    dofetilide  125 mcg Oral Q12H    doxazosin  8 mg Oral Daily    ferrous sulfate  325 mg Oral Daily    folic acid  1 mg Oral Daily    hydrALAZINE  150 mg Oral Q8H    isosorbide dinitrate  40 mg Oral TID    Lactobacillus rhamnosus GG  1 capsule Oral Daily    lisinopril  10 mg Oral BID    magnesium oxide  400 mg Oral BID    meropenem (MERREM) IVPB  1 g Intravenous Q12H    NIFEdipine  30 mg Oral Q8H    pantoprazole  40 mg Oral BID AC    [START ON 2/11/2018] warfarin  4 mg Oral Once    warfarin  7 mg Oral Once     PRN Meds:acetaminophen, docusate sodium, hydrALAZINE    Review of patient's allergies indicates:   Allergen Reactions    Aldactone [spironolactone]       persistent hyperkalemia.    Sulfa (sulfonamide antibiotics)      Objective:     Vital Signs (Most Recent):  Temp: 98 °F (36.7 °C) (02/10/18 1200)  Pulse: 69 (02/10/18 1048)  Resp: 16 (02/10/18 0716)  BP: (!) 66/0 (02/10/18 1200)  SpO2: 96 % (02/10/18 1200) Vital Signs (24h Range):  Temp:  [97.5 °F (36.4 °C)-98.2 °F (36.8 °C)] 98 °F (36.7 °C)  Pulse:  [66-95] 69  Resp:  [16-18] 16  SpO2:  [94 %-97 %] 96 %  BP: ()/(0-93) 66/0     Patient Vitals for the past 72 hrs (Last 3 readings):   Weight   02/10/18 0800 80.4 kg (177 lb 5.8 oz)   02/08/18 1457 81.2 kg (179 lb)     Body mass index is 24.05 kg/m².    Intake/Output Summary (Last 24 hours) at 02/10/18 1359  Last data filed at 02/09/18 2200   Gross per 24 hour   Intake              900 ml   Output                0 ml   Net              900 ml      Physical Exam  Constitutional: He is oriented to person, place, and time. Frail appearing.   Neck: No JVD present.   Cardiovascular: LVAD hum- smooth   Pulmonary/Chest: Effort normal and breath sounds normal.   Abdominal: Soft.   Musculoskeletal: He exhibits no edema or tenderness.   Neurological: He is alert and oriented to person, place, and time.   Skin: Skin is warm and dry.   Nursing note and vitals reviewed (BP elevated with MAPS )    Significant Labs:  CBC:    Recent Labs  Lab 02/08/18 1530 02/09/18 0515 02/10/18  0450   WBC 6.36 6.42 6.24   RBC 3.57* 3.43* 3.52*   HGB 10.4* 10.1* 10.6*   HCT 32.1* 30.6* 31.6*   * 112* 116*   MCV 90 89 90   MCH 29.1 29.4 30.1   MCHC 32.4 33.0 33.5     BNP:    Recent Labs  Lab 02/05/18 0533 02/07/18  0537 02/08/18  1530   * 202* 215*     CMP:    Recent Labs  Lab 02/05/18 0533  02/07/18  0537 02/08/18 1530 02/09/18 0515 02/10/18  0450   GLU 86  < > 89 105 78 85   CALCIUM 10.5  < > 11.2* 11.3* 11.1* 11.5*   ALBUMIN 3.0*  --  3.1* 3.1*  --   --    PROT 6.3  --  6.8 6.9  --   --      < > 141 139 142 142   K 4.0  < > 4.0 4.4 3.9 4.1   CO2 27  < > 27 27 27 28     < > 108 106 108 108   BUN 22  < > 25* 26* 27* 23   CREATININE 1.9*  < > 1.9* 2.2* 2.0* 2.1*   ALKPHOS 70  --  76 76  --   --    ALT 8*  --  9* <5*  --   --    AST 23  --  22 22  --   --    BILITOT 0.3  --  0.4 0.4  --   --    < > = values in this interval not displayed.   Coagulation:     Recent Labs  Lab 02/08/18  1530 02/09/18  0515 02/10/18  0450   INR 3.0* 2.4* 2.1*   APTT  --  26.6 25.6     LDH:    Recent Labs  Lab 02/09/18 0515 02/10/18  0450    161     I have reviewed all pertinent labs within the past 24 hours.    Estimated Creatinine Clearance: 33.4 mL/min (based on SCr of 2.1 mg/dL (H)).    Diagnostic Results:  TTE (02/09/2018):       1 - Heartmate III LVAD; speed 5800, aortic valve opens on every beat, septum is midline.     2 - Moderate left ventricular  enlargement.     3 - Severely depressed left ventricular systolic function (EF 15-20%).     4 - Right ventricular enlargement with severely depressed systolic function.

## 2018-02-10 NOTE — PROGRESS NOTES
[facility]  Heart Transplant  Progress Note    Patient Name: Kev Vasquez  MRN: [id number]  Admission Date: 2/8/2018  Hospital Length of Stay: 1 days  Attending Physician: Caitlin Wells MD  Primary Care Provider: Mega Collins MD  Principal Problem:Syncope    Subjective:     Interval History: No acute issues overnight. BP better controlled (MAP 64 this AM). No LVAD alarms or syncopal events. Did have vomiting around time of dinner last night. Kept down breakfast with no problems.     Continuous Infusions:  Scheduled Meds:   allopurinol  300 mg Oral Daily    aspirin  81 mg Oral Daily    atorvastatin  10 mg Oral Daily    buPROPion  450 mg Oral Daily    carbidopa-levodopa  mg  1 tablet Oral Q4H While awake    dofetilide  125 mcg Oral Q12H    doxazosin  8 mg Oral Daily    ferrous sulfate  325 mg Oral Daily    folic acid  1 mg Oral Daily    hydrALAZINE  150 mg Oral Q8H    isosorbide dinitrate  40 mg Oral TID    Lactobacillus rhamnosus GG  1 capsule Oral Daily    lisinopril  10 mg Oral BID    magnesium oxide  400 mg Oral BID    meropenem (MERREM) IVPB  1 g Intravenous Q12H    NIFEdipine  30 mg Oral Q8H    pantoprazole  40 mg Oral BID AC    [START ON 2/11/2018] warfarin  4 mg Oral Once    warfarin  7 mg Oral Once     PRN Meds:acetaminophen, docusate sodium, hydrALAZINE    Review of patient's allergies indicates:   Allergen Reactions    Aldactone [spironolactone]       persistent hyperkalemia.    Sulfa (sulfonamide antibiotics)      Objective:     Vital Signs (Most Recent):  Temp: 98 °F (36.7 °C) (02/10/18 1200)  Pulse: 69 (02/10/18 1048)  Resp: 16 (02/10/18 0716)  BP: (!) 66/0 (02/10/18 1200)  SpO2: 96 % (02/10/18 1200) Vital Signs (24h Range):  Temp:  [97.5 °F (36.4 °C)-98.2 °F (36.8 °C)] 98 °F (36.7 °C)  Pulse:  [66-95] 69  Resp:  [16-18] 16  SpO2:  [94 %-97 %] 96 %  BP: ()/(0-93) 66/0     Patient Vitals for the past 72 hrs (Last 3 readings):   Weight   02/10/18  0800 80.4 kg (177 lb 5.8 oz)   02/08/18 1457 81.2 kg (179 lb)     Body mass index is 24.05 kg/m².    Intake/Output Summary (Last 24 hours) at 02/10/18 1359  Last data filed at 02/09/18 2200   Gross per 24 hour   Intake              900 ml   Output                0 ml   Net              900 ml     Physical Exam  Constitutional: He is oriented to person, place, and time. Frail appearing.   Neck: No JVD present.   Cardiovascular: LVAD hum- smooth   Pulmonary/Chest: Effort normal and breath sounds normal.   Abdominal: Soft.   Musculoskeletal: He exhibits no edema or tenderness.   Neurological: He is alert and oriented to person, place, and time.   Skin: Skin is warm and dry.   Nursing note and vitals reviewed (BP elevated with MAPS )    Significant Labs:  CBC:    Recent Labs  Lab 02/08/18  1530 02/09/18  0515 02/10/18  0450   WBC 6.36 6.42 6.24   RBC 3.57* 3.43* 3.52*   HGB 10.4* 10.1* 10.6*   HCT 32.1* 30.6* 31.6*   * 112* 116*   MCV 90 89 90   MCH 29.1 29.4 30.1   MCHC 32.4 33.0 33.5     BNP:    Recent Labs  Lab 02/05/18  0533 02/07/18  0537 02/08/18  1530   * 202* 215*     CMP:    Recent Labs  Lab 02/05/18  0533  02/07/18  0537 02/08/18  1530 02/09/18  0515 02/10/18  0450   GLU 86  < > 89 105 78 85   CALCIUM 10.5  < > 11.2* 11.3* 11.1* 11.5*   ALBUMIN 3.0*  --  3.1* 3.1*  --   --    PROT 6.3  --  6.8 6.9  --   --      < > 141 139 142 142   K 4.0  < > 4.0 4.4 3.9 4.1   CO2 27  < > 27 27 27 28     < > 108 106 108 108   BUN 22  < > 25* 26* 27* 23   CREATININE 1.9*  < > 1.9* 2.2* 2.0* 2.1*   ALKPHOS 70  --  76 76  --   --    ALT 8*  --  9* <5*  --   --    AST 23  --  22 22  --   --    BILITOT 0.3  --  0.4 0.4  --   --    < > = values in this interval not displayed.   Coagulation:     Recent Labs  Lab 02/08/18  1530 02/09/18  0515 02/10/18  0450   INR 3.0* 2.4* 2.1*   APTT  --  26.6 25.6     LDH:    Recent Labs  Lab 02/09/18  0515 02/10/18  0450    161     I have reviewed all  pertinent labs within the past 24 hours.    Estimated Creatinine Clearance: 33.4 mL/min (based on SCr of 2.1 mg/dL (H)).    Diagnostic Results:  TTE (02/09/2018):       1 - Heartmate III LVAD; speed 5800, aortic valve opens on every beat, septum is midline.     2 - Moderate left ventricular enlargement.     3 - Severely depressed left ventricular systolic function (EF 15-20%).     4 - Right ventricular enlargement with severely depressed systolic function.     Assessment and Plan:     Mr. 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 BiV ICD, HILLARY/BiPAP, HTN, CKD, HLP, gout and depression who presents with confusion and lethargy a day after discharge. Wife and caretaker are at bedside and report that patient was doing well for a day after discharge only to experience acute confusion and lethargy at lunch time today. He was being walked to dining table when he felt weak and had to be eased to a wheel chair and once on the table, he started gagging and threw up once on taking a cranberry. Caretake mention that patient is avoiding taking fluids due to the added thickener which he says is making it taste bad. Patient has also lost weight and is eating less with notable significant weight loss. He recently started on parkinson medication (sinemet) without any reported side effects. During the whole event, caretake mention LVAD low power alarm,or despite normal map during the event. Currently, patient is currently back to baseline and mention only feeling mild leg contraction.    * Syncope    - Suspect this is vasovagal response (given underlying autonomic dysfunction) vs. Suction event vs. HTN  - No low flows in past 36 hours.   - No telemetry events         LVAD (left ventricular assist device) present    - 3  @5800  - INR goal 2-3. INR therapeutic   - Continue coumadin   - Continue low dose ASA daily   - LDH acceptable   - Lisinopril 10mg BID   - Continue Hydralazine 150mg TID, isordil at 40mg TID and cardura 8mg daily   - Continue nifedine 30mg TID   - MAP goal of 60-80          Confusion    - Intermittent   - Suspect he has underlying cognitive dysfunction from his parkinsons (degree uncertain)           Stage 3 chronic kidney disease    - Follow BMP         Left ventricular assist device (LVAD) complication, subsequent encounter    - Chronic drive line infection   - Culture growing pseudomonas (now resistant to cefepime and cipro)  - Meropenem 1g BID for 4 weeks with follow up in ID clinic   - Weekly CBC / CMP while on meropenem       VT (ventricular tachycardia)    Continue Tikosyn   Will have device interrogated           José Luis tavarez, DO  Heart Transplant  [facility]

## 2018-02-10 NOTE — ASSESSMENT & PLAN NOTE
- Intermittent   - Suspect he has underlying cognitive dysfunction from his parkinsons (degree uncertain)

## 2018-02-10 NOTE — PROGRESS NOTES
Pt's /0; ; PRN Hydralazine IVP given @ 0000; notified Dr. Sabrina MD ordered to assess DP & MAP while pt is sitting up to side of bed with feet dangling. DP 80/0with pt sitting to side of bed; notified Dr. Sabrina MD ordered to take all BP from now on with pt sitting up to side of bed. No new medications orders @ this time.

## 2018-02-10 NOTE — PROGRESS NOTES
02/10/18 0000 02/10/18 0016 02/10/18 0042   Vital Signs   BP (!) 98/0 117/86 (!) 100/0   MAP (mmHg) --  98 --    BP Location Left arm --  --    BP Method Doppler --  Doppler   Patient Position Lying --  --        02/10/18 0043 02/10/18 0117 02/10/18 0119   Vital Signs   /85 (!) 120/0 (!) 130/93   MAP (mmHg) 99 --  106   BP Location --  Left arm Left arm   BP Method Automatic Doppler Automatic   Patient Position --  Lying Lying       02/10/18 0128   Vital Signs   BP (!) 80/0   MAP (mmHg) --    BP Location Left arm   BP Method Doppler   Patient Position Sitting     Pt's /0; ; PRN Hydralazine IVP given @ 0000; notified Dr. Sabrina MD ordered to assess DP & MAP while pt is sitting up to side of bed with feet dangling. DP 80/0with pt sitting to side of bed; notified Dr. Sabrina MD ordered to take all BP from now on with pt sitting up to side of bed. No new medications orders @ this time.

## 2018-02-10 NOTE — PLAN OF CARE
Problem: Patient Care Overview  Goal: Plan of Care Review  Outcome: Ongoing (interventions implemented as appropriate)  Pt free of falls and injury throughout the shift. Skin is CDI with blanchable redness noted to saccral area, VSS, NAD. LVAD working properly throughout the shift with no complications. Dressing is CDI and due to be changed everyday with soap and water. BP well controlled throughout the shift. POC reviewed and questions answered. Pt tolerating plan of care.

## 2018-02-10 NOTE — PROGRESS NOTES
Pt's /0 & MAP 96 notified Dr. Bennett; ordered to admin PM BP medications early instead of giving IVP Hydralazine on top of night scheduled BP medications.

## 2018-02-11 LAB
ANION GAP SERPL CALC-SCNC: 6 MMOL/L
APTT BLDCRRT: 26.8 SEC
BASOPHILS # BLD AUTO: 0.04 K/UL
BASOPHILS NFR BLD: 0.6 %
BUN SERPL-MCNC: 32 MG/DL
CALCIUM SERPL-MCNC: 11.3 MG/DL
CHLORIDE SERPL-SCNC: 105 MMOL/L
CO2 SERPL-SCNC: 28 MMOL/L
CREAT SERPL-MCNC: 2.3 MG/DL
DIFFERENTIAL METHOD: ABNORMAL
EOSINOPHIL # BLD AUTO: 0.2 K/UL
EOSINOPHIL NFR BLD: 2.9 %
ERYTHROCYTE [DISTWIDTH] IN BLOOD BY AUTOMATED COUNT: 18.4 %
EST. GFR  (AFRICAN AMERICAN): 31 ML/MIN/1.73 M^2
EST. GFR  (NON AFRICAN AMERICAN): 26.8 ML/MIN/1.73 M^2
GLUCOSE SERPL-MCNC: 86 MG/DL
HCT VFR BLD AUTO: 30.9 %
HGB BLD-MCNC: 10.1 G/DL
IMM GRANULOCYTES # BLD AUTO: 0.05 K/UL
IMM GRANULOCYTES NFR BLD AUTO: 0.8 %
INR PPP: 2.6
LDH SERPL L TO P-CCNC: 149 U/L
LYMPHOCYTES # BLD AUTO: 0.9 K/UL
LYMPHOCYTES NFR BLD: 13.7 %
MAGNESIUM SERPL-MCNC: 2.6 MG/DL
MCH RBC QN AUTO: 29.7 PG
MCHC RBC AUTO-ENTMCNC: 32.7 G/DL
MCV RBC AUTO: 91 FL
MONOCYTES # BLD AUTO: 0.9 K/UL
MONOCYTES NFR BLD: 13.7 %
NEUTROPHILS # BLD AUTO: 4.5 K/UL
NEUTROPHILS NFR BLD: 68.3 %
NRBC BLD-RTO: 0 /100 WBC
PLATELET # BLD AUTO: 116 K/UL
PMV BLD AUTO: 11.9 FL
POTASSIUM SERPL-SCNC: 4.1 MMOL/L
PROTHROMBIN TIME: 25.5 SEC
RBC # BLD AUTO: 3.4 M/UL
SODIUM SERPL-SCNC: 139 MMOL/L
WBC # BLD AUTO: 6.55 K/UL

## 2018-02-11 PROCEDURE — 85025 COMPLETE CBC W/AUTO DIFF WBC: CPT

## 2018-02-11 PROCEDURE — 83735 ASSAY OF MAGNESIUM: CPT

## 2018-02-11 PROCEDURE — 25000003 PHARM REV CODE 250: Performed by: INTERNAL MEDICINE

## 2018-02-11 PROCEDURE — 80048 BASIC METABOLIC PNL TOTAL CA: CPT

## 2018-02-11 PROCEDURE — 83615 LACTATE (LD) (LDH) ENZYME: CPT

## 2018-02-11 PROCEDURE — 27000248 HC VAD-ADDITIONAL DAY

## 2018-02-11 PROCEDURE — 99232 SBSQ HOSP IP/OBS MODERATE 35: CPT | Mod: ,,, | Performed by: INTERNAL MEDICINE

## 2018-02-11 PROCEDURE — 63600175 PHARM REV CODE 636 W HCPCS: Performed by: INTERNAL MEDICINE

## 2018-02-11 PROCEDURE — 85610 PROTHROMBIN TIME: CPT

## 2018-02-11 PROCEDURE — 97530 THERAPEUTIC ACTIVITIES: CPT

## 2018-02-11 PROCEDURE — 20600001 HC STEP DOWN PRIVATE ROOM

## 2018-02-11 PROCEDURE — 85730 THROMBOPLASTIN TIME PARTIAL: CPT

## 2018-02-11 RX ORDER — NIFEDIPINE 30 MG/1
60 TABLET, EXTENDED RELEASE ORAL DAILY
Status: DISCONTINUED | OUTPATIENT
Start: 2018-02-11 | End: 2018-02-12

## 2018-02-11 RX ADMIN — WARFARIN SODIUM 4 MG: 4 TABLET ORAL at 05:02

## 2018-02-11 RX ADMIN — LISINOPRIL 10 MG: 10 TABLET ORAL at 09:02

## 2018-02-11 RX ADMIN — HYDRALAZINE HYDROCHLORIDE 150 MG: 50 TABLET ORAL at 09:02

## 2018-02-11 RX ADMIN — ISOSORBIDE DINITRATE 40 MG: 40 TABLET ORAL at 05:02

## 2018-02-11 RX ADMIN — HYDRALAZINE HYDROCHLORIDE 150 MG: 50 TABLET ORAL at 05:02

## 2018-02-11 RX ADMIN — CARBIDOPA AND LEVODOPA 1 TABLET: 25; 100 TABLET ORAL at 01:02

## 2018-02-11 RX ADMIN — DOFETILIDE 125 MCG: 0.12 CAPSULE ORAL at 09:02

## 2018-02-11 RX ADMIN — ALLOPURINOL 300 MG: 300 TABLET ORAL at 09:02

## 2018-02-11 RX ADMIN — ISOSORBIDE DINITRATE 40 MG: 40 TABLET ORAL at 01:02

## 2018-02-11 RX ADMIN — ATORVASTATIN CALCIUM 10 MG: 10 TABLET, FILM COATED ORAL at 09:02

## 2018-02-11 RX ADMIN — Medication 1 CAPSULE: at 09:02

## 2018-02-11 RX ADMIN — HYDRALAZINE HYDROCHLORIDE 150 MG: 50 TABLET ORAL at 01:02

## 2018-02-11 RX ADMIN — BUPROPION HYDROCHLORIDE 450 MG: 150 TABLET, EXTENDED RELEASE ORAL at 09:02

## 2018-02-11 RX ADMIN — PANTOPRAZOLE SODIUM 40 MG: 40 TABLET, DELAYED RELEASE ORAL at 05:02

## 2018-02-11 RX ADMIN — CARBIDOPA AND LEVODOPA 1 TABLET: 25; 100 TABLET ORAL at 09:02

## 2018-02-11 RX ADMIN — MEROPENEM 1 G: 1 INJECTION, POWDER, FOR SOLUTION INTRAVENOUS at 11:02

## 2018-02-11 RX ADMIN — DOXAZOSIN MESYLATE 8 MG: 2 TABLET ORAL at 09:02

## 2018-02-11 RX ADMIN — MAGNESIUM OXIDE TAB 400 MG (241.3 MG ELEMENTAL MG) 400 MG: 400 (241.3 MG) TAB at 09:02

## 2018-02-11 RX ADMIN — CARBIDOPA AND LEVODOPA 1 TABLET: 25; 100 TABLET ORAL at 05:02

## 2018-02-11 RX ADMIN — FERROUS SULFATE TAB EC 325 MG (65 MG FE EQUIVALENT) 325 MG: 325 (65 FE) TABLET DELAYED RESPONSE at 09:02

## 2018-02-11 RX ADMIN — NIFEDIPINE 60 MG: 30 TABLET, FILM COATED, EXTENDED RELEASE ORAL at 01:02

## 2018-02-11 RX ADMIN — ASPIRIN 81 MG 81 MG: 81 TABLET ORAL at 09:02

## 2018-02-11 RX ADMIN — NIFEDIPINE 40 MG: 10 CAPSULE, LIQUID FILLED ORAL at 05:02

## 2018-02-11 RX ADMIN — MEROPENEM 1 G: 1 INJECTION, POWDER, FOR SOLUTION INTRAVENOUS at 01:02

## 2018-02-11 RX ADMIN — ISOSORBIDE DINITRATE 40 MG: 40 TABLET ORAL at 09:02

## 2018-02-11 RX ADMIN — FOLIC ACID 1 MG: 1 TABLET ORAL at 09:02

## 2018-02-11 NOTE — PLAN OF CARE
Problem: Infection, Risk/Actual (Adult)  Goal: Identify Related Risk Factors and Signs and Symptoms  Related risk factors and signs and symptoms are identified upon initiation of Human Response Clinical Practice Guideline (CPG)   Outcome: Ongoing (interventions implemented as appropriate)  POC reviewed with pt. VS and VAD numbers stable. Pt received scheduled ABx. Pt received 600 cc IVF on shift. Pt AAOx3; d/o to situation. Dressing changed this shift; DLES 2 with small amount of drainage-- chronic pseudomonas DLES infection. Foam wedge utilized and repositioned to prevent skin breakdown. Pt remains free from falls, trauma, injury; pt tolerating POC well. Will continue to monitor.

## 2018-02-11 NOTE — ASSESSMENT & PLAN NOTE
- Suspect this is vasovagal response (given underlying autonomic dysfunction) vs. Polypharmacy (but I'm not sure which meds could be safely stopped)   - No more syncopal events or low flow alarms since admission.

## 2018-02-11 NOTE — PROGRESS NOTES
LVAD dressing change completed. DLES 2, small amount of serosanguineous and purulent drainage noted; pt had no complaints of tenderness. Pt tolerated well.

## 2018-02-11 NOTE — PROGRESS NOTES
[facility]  Heart Transplant  Progress Note    Patient Name: Kev Vasquez  MRN: [id number]  Admission Date: 2/8/2018  Hospital Length of Stay: 2 days  Attending Physician: Caitlin Wells MD  Primary Care Provider: Mega Collins MD  Principal Problem:Syncope    Subjective:     Interval History: No acute issues overnight. MAPs have been in 60-70 range with switch from amlodipine to nifedipine. No syncopal events or low flows.     Continuous Infusions:  Scheduled Meds:   allopurinol  300 mg Oral Daily    aspirin  81 mg Oral Daily    atorvastatin  10 mg Oral Daily    buPROPion  450 mg Oral Daily    carbidopa-levodopa  mg  1 tablet Oral Q4H While awake    dofetilide  125 mcg Oral Q12H    doxazosin  8 mg Oral Daily    ferrous sulfate  325 mg Oral Daily    folic acid  1 mg Oral Daily    hydrALAZINE  150 mg Oral Q8H    isosorbide dinitrate  40 mg Oral TID    Lactobacillus rhamnosus GG  1 capsule Oral Daily    lisinopril  10 mg Oral BID    magnesium oxide  400 mg Oral BID    meropenem (MERREM) IVPB  1 g Intravenous Q12H    NIFEdipine  40 mg Oral Q8H    pantoprazole  40 mg Oral BID AC    warfarin  4 mg Oral Once     PRN Meds:acetaminophen, docusate sodium, hydrALAZINE    Review of patient's allergies indicates:   Allergen Reactions    Aldactone [spironolactone]       persistent hyperkalemia.    Sulfa (sulfonamide antibiotics)      Objective:     Vital Signs (Most Recent):  Temp: 98.5 °F (36.9 °C) (02/11/18 0516)  Pulse: 73 (02/11/18 0701)  Resp: 18 (02/11/18 0516)  BP: (!) 82/0 (02/11/18 0516)  SpO2: 98 % (02/11/18 0516) Vital Signs (24h Range):  Temp:  [97.5 °F (36.4 °C)-98.5 °F (36.9 °C)] 98.5 °F (36.9 °C)  Pulse:  [64-82] 73  Resp:  [15-18] 18  SpO2:  [95 %-99 %] 98 %  BP: (64-90)/(0) 82/0     Patient Vitals for the past 72 hrs (Last 3 readings):   Weight   02/10/18 0800 80.4 kg (177 lb 5.8 oz)   02/08/18 1457 81.2 kg (179 lb)     Body mass index is 24.05  kg/m².    Intake/Output Summary (Last 24 hours) at 02/11/18 0735  Last data filed at 02/11/18 0600   Gross per 24 hour   Intake              360 ml   Output                0 ml   Net              360 ml     Physical Exam  Constitutional: He is oriented to person, place, and time. Frail appearing.   Neck: No JVD present.   Cardiovascular: LVAD hum- smooth   Pulmonary/Chest: Effort normal and breath sounds normal.   Abdominal: Soft.   Musculoskeletal: He exhibits no edema or tenderness.   Neurological: He is alert and oriented to person, place, and time.   Skin: Skin is warm and dry.   Nursing note and vitals reviewed (BP elevated with MAPS )    Significant Labs:  CBC:    Recent Labs  Lab 02/09/18  0515 02/10/18  0450 02/11/18  0516   WBC 6.42 6.24 6.55   RBC 3.43* 3.52* 3.40*   HGB 10.1* 10.6* 10.1*   HCT 30.6* 31.6* 30.9*   * 116* 116*   MCV 89 90 91   MCH 29.4 30.1 29.7   MCHC 33.0 33.5 32.7     BNP:    Recent Labs  Lab 02/05/18  0533 02/07/18  0537 02/08/18  1530   * 202* 215*     CMP:    Recent Labs  Lab 02/05/18  0533  02/07/18  0537 02/08/18  1530 02/09/18  0515 02/10/18  0450 02/11/18  0516   GLU 86  < > 89 105 78 85 86   CALCIUM 10.5  < > 11.2* 11.3* 11.1* 11.5* 11.3*   ALBUMIN 3.0*  --  3.1* 3.1*  --   --   --    PROT 6.3  --  6.8 6.9  --   --   --      < > 141 139 142 142 139   K 4.0  < > 4.0 4.4 3.9 4.1 4.1   CO2 27  < > 27 27 27 28 28     < > 108 106 108 108 105   BUN 22  < > 25* 26* 27* 23 32*   CREATININE 1.9*  < > 1.9* 2.2* 2.0* 2.1* 2.3*   ALKPHOS 70  --  76 76  --   --   --    ALT 8*  --  9* <5*  --   --   --    AST 23  --  22 22  --   --   --    BILITOT 0.3  --  0.4 0.4  --   --   --    < > = values in this interval not displayed.   Coagulation:     Recent Labs  Lab 02/09/18  0515 02/10/18  0450 02/11/18  0516   INR 2.4* 2.1* 2.6*   APTT 26.6 25.6 26.8     LDH:    Recent Labs  Lab 02/09/18  0515 02/10/18  0450 02/11/18  0516    161 149     I have reviewed all  pertinent labs within the past 24 hours.    Estimated Creatinine Clearance: 30.5 mL/min (based on SCr of 2.3 mg/dL (H)).    Diagnostic Results:  TTE (02/09/2018):       1 - Heartmate III LVAD; speed 5800, aortic valve opens on every beat, septum is midline.     2 - Moderate left ventricular enlargement.     3 - Severely depressed left ventricular systolic function (EF 15-20%).     4 - Right ventricular enlargement with severely depressed systolic function.     Assessment and Plan:     * Syncope    - Suspect this is vasovagal response (given underlying autonomic dysfunction) vs. Polypharmacy (but I'm not sure which meds could be safely stopped)   - No more syncopal events or low flow alarms since admission.         LVAD (left ventricular assist device) present    - Hm3 @5800 as DT   - INR goal 2-3. INR therapeutic   - Continue coumadin   - Continue low dose ASA daily   - LDH acceptable   - Continue Hydralazine 150mg TID, Lisinopril 10mg BID, Nifedine 30mg TID  isordil at 40mg TID and cardura 8mg daily   - MAP goal of 60-80          Confusion    - DDx: polypharmacy vs. Dementia (or both)   - Although formal cognitive testing has not been done his cognitive impairment / dementia is obvious   - Discussed need for hearing aids / corrective lenses while in the hospital.   - Has not been having acute delirium or sundowing while admitted.           Stage 3 chronic kidney disease    - Cr slightly higher than baseline but eGFR stable   - Attempted gentle hydration with IV saline 75/hr for 8 hours   - Despite slight rise in Cr will continue half dose lisinopril unless Cr drastically rises in next 24 - 48 hours.        Left ventricular assist device (LVAD) complication, subsequent encounter    - Chronic drive line infection   - Culture growing pseudomonas (now resistant to cefepime and cipro)  - Meropenem 1g BID for 4 weeks with follow up in ID clinic   - Weekly CBC / CMP while on meropenem         Essential hypertension                Acute on chronic systolic and diastolic heart failure, NYHA class 4               VT (ventricular tachycardia)    Continue Donte tavarez, DO  Heart Transplant  [facility]

## 2018-02-11 NOTE — SUBJECTIVE & OBJECTIVE
Interval History: No acute issues overnight. MAPs have been in 60-70 range with switch from amlodipine to nifedipine. No syncopal events or low flows.     Continuous Infusions:  Scheduled Meds:   allopurinol  300 mg Oral Daily    aspirin  81 mg Oral Daily    atorvastatin  10 mg Oral Daily    buPROPion  450 mg Oral Daily    carbidopa-levodopa  mg  1 tablet Oral Q4H While awake    dofetilide  125 mcg Oral Q12H    doxazosin  8 mg Oral Daily    ferrous sulfate  325 mg Oral Daily    folic acid  1 mg Oral Daily    hydrALAZINE  150 mg Oral Q8H    isosorbide dinitrate  40 mg Oral TID    Lactobacillus rhamnosus GG  1 capsule Oral Daily    lisinopril  10 mg Oral BID    magnesium oxide  400 mg Oral BID    meropenem (MERREM) IVPB  1 g Intravenous Q12H    NIFEdipine  40 mg Oral Q8H    pantoprazole  40 mg Oral BID AC    warfarin  4 mg Oral Once     PRN Meds:acetaminophen, docusate sodium, hydrALAZINE    Review of patient's allergies indicates:   Allergen Reactions    Aldactone [spironolactone]       persistent hyperkalemia.    Sulfa (sulfonamide antibiotics)      Objective:     Vital Signs (Most Recent):  Temp: 98.5 °F (36.9 °C) (02/11/18 0516)  Pulse: 73 (02/11/18 0701)  Resp: 18 (02/11/18 0516)  BP: (!) 82/0 (02/11/18 0516)  SpO2: 98 % (02/11/18 0516) Vital Signs (24h Range):  Temp:  [97.5 °F (36.4 °C)-98.5 °F (36.9 °C)] 98.5 °F (36.9 °C)  Pulse:  [64-82] 73  Resp:  [15-18] 18  SpO2:  [95 %-99 %] 98 %  BP: (64-90)/(0) 82/0     Patient Vitals for the past 72 hrs (Last 3 readings):   Weight   02/10/18 0800 80.4 kg (177 lb 5.8 oz)   02/08/18 1457 81.2 kg (179 lb)     Body mass index is 24.05 kg/m².    Intake/Output Summary (Last 24 hours) at 02/11/18 0735  Last data filed at 02/11/18 0600   Gross per 24 hour   Intake              360 ml   Output                0 ml   Net              360 ml     Physical Exam  Constitutional: He is oriented to person, place, and time. Frail appearing.   Neck: No JVD  present.   Cardiovascular: LVAD hum- smooth   Pulmonary/Chest: Effort normal and breath sounds normal.   Abdominal: Soft.   Musculoskeletal: He exhibits no edema or tenderness.   Neurological: He is alert and oriented to person, place, and time.   Skin: Skin is warm and dry.   Nursing note and vitals reviewed (BP elevated with MAPS )    Significant Labs:  CBC:    Recent Labs  Lab 02/09/18  0515 02/10/18  0450 02/11/18  0516   WBC 6.42 6.24 6.55   RBC 3.43* 3.52* 3.40*   HGB 10.1* 10.6* 10.1*   HCT 30.6* 31.6* 30.9*   * 116* 116*   MCV 89 90 91   MCH 29.4 30.1 29.7   MCHC 33.0 33.5 32.7     BNP:    Recent Labs  Lab 02/05/18 0533 02/07/18  0537 02/08/18  1530   * 202* 215*     CMP:    Recent Labs  Lab 02/05/18 0533 02/07/18  0537 02/08/18  1530 02/09/18  0515 02/10/18  0450 02/11/18  0516   GLU 86  < > 89 105 78 85 86   CALCIUM 10.5  < > 11.2* 11.3* 11.1* 11.5* 11.3*   ALBUMIN 3.0*  --  3.1* 3.1*  --   --   --    PROT 6.3  --  6.8 6.9  --   --   --      < > 141 139 142 142 139   K 4.0  < > 4.0 4.4 3.9 4.1 4.1   CO2 27  < > 27 27 27 28 28     < > 108 106 108 108 105   BUN 22  < > 25* 26* 27* 23 32*   CREATININE 1.9*  < > 1.9* 2.2* 2.0* 2.1* 2.3*   ALKPHOS 70  --  76 76  --   --   --    ALT 8*  --  9* <5*  --   --   --    AST 23  --  22 22  --   --   --    BILITOT 0.3  --  0.4 0.4  --   --   --    < > = values in this interval not displayed.   Coagulation:     Recent Labs  Lab 02/09/18  0515 02/10/18  0450 02/11/18  0516   INR 2.4* 2.1* 2.6*   APTT 26.6 25.6 26.8     LDH:    Recent Labs  Lab 02/09/18  0515 02/10/18  0450 02/11/18  0516    161 149     I have reviewed all pertinent labs within the past 24 hours.    Estimated Creatinine Clearance: 30.5 mL/min (based on SCr of 2.3 mg/dL (H)).    Diagnostic Results:  TTE (02/09/2018):       1 - Heartmate III LVAD; speed 5800, aortic valve opens on every beat, septum is midline.     2 - Moderate left ventricular enlargement.     3 -  Severely depressed left ventricular systolic function (EF 15-20%).     4 - Right ventricular enlargement with severely depressed systolic function.

## 2018-02-11 NOTE — ASSESSMENT & PLAN NOTE
- Cr slightly higher than baseline but eGFR stable   - Attempted gentle hydration with IV saline 75/hr for 8 hours   - Despite slight rise in Cr will continue half dose lisinopril unless Cr drastically rises in next 24 - 48 hours.

## 2018-02-11 NOTE — PT/OT/SLP PROGRESS
"Physical Therapy Treatment    Patient Name:  Kev Vasquez   MRN:  [id number]    Recommendations:     Discharge Recommendations:  home with home health   Discharge Equipment Recommendations: hospital bed   Barriers to discharge: Increased assist required at this time    Assessment:     Kev Vasquez is a 77 y.o. male admitted with a medical diagnosis of Syncope.  He presents with the following impairments/functional limitations:  weakness, impaired endurance, impaired self care skills, impaired functional mobilty, gait instability, impaired balance, impaired cognition, decreased upper extremity function, decreased lower extremity function, abnormal tone, decreased safety awareness, impaired coordination, impaired fine motor, impaired skin, edema, impaired cardiopulmonary response to activity. Pt demo good participation with PT despite late PM session. Pt continues to req CGA-mod A for majority of mobility. Pt with x2 instances of significant LOB this date; req return to sitting EOB and then emergent sitting in chair with max A x2 persons. Will progress as tolerated.     Rehab Prognosis:  Good; patient would benefit from acute skilled PT services to address these deficits and reach maximum level of function.      Recent Surgery: * No surgery found *      Plan:     During this hospitalization, patient to be seen 5 x/week to address the above listed problems via gait training, therapeutic activities, therapeutic exercises, neuromuscular re-education  · Plan of Care Expires:  03/11/18   Plan of Care Reviewed with: patient    Subjective     Communicated with RN (Jayson) prior to and after session.  Patient found resting in L sidelying upon PT entry to room, agreeable to treatment.      Chief Complaint: "This foam wedge is not making me comfortable. I need to get up and walk today."  Patient comments/goals: "I will try anything you ask. It is funny to see you all putting my shoes on at the same time."  Pain/Comfort:  · Pain " Rating 1: 0/10  · Pain Rating Post-Intervention 1: 0/10    Patients cultural, spiritual, Congregation conflicts given the current situation: None noted    Objective:     Patient found with: telemetry, LVAD     General Precautions: Standard, aspiration, fall, LVAD, honey thick (NO STRAWS)   Orthopedic Precautions:N/A   Braces: N/A     Functional Mobility:  Bed Mobility:   Supine to Sit: From L sidelying. Contact Guard Assistance   Sit to Supine: To L sidelying. Minimal Assistance for mnmgnt of B LE   Scooting to HOB: Maximum Assistance x2 via drawsheet   Scooting at EOB: Stand-by Assistance for feet flat on floor    Sitting Balance at Edge of Bed:   Assistance Level Required: Stand-by Assistance and Contact Guard Assistance   Time: x5 min prior to gait trial   Postural deviations noted:    -       Rounded shoulders  -       Forward head  -       Posterior pelvic tilt  -       Lateral weight shift of hips  -       Abnormal trunk flexion   Encouraged: feet flat on floor, equal WB through pelvis and B hands on mattress, attention to task, forward gaze; PT and pt's caregiver provided total assist for donning of tennis shoes per pt request.    Transfers:   Sit to Stand: Minimal Assistance and Moderate Assistance with No Assistive Device and Rolling Walker from EOB x2 trials; from bedside chair x1 trial; (x2 person assist for second and third trials); Upon first standing trial; pt req mod A 2/2 LOB to the R due to attempted stepping with L LE   Stand to Sit: Moderate Assistance with Rolling Walker for controlled descent to surface (EOB, chair) and to squat position in room   Chair to Bed: Stand Pivot with Moderate Assistance with No Assistive Device x2 persons for safety    Gait:   Distance Ambulated: Pt ambulated x10 feet in hospital room with RW and CGA from PT. Pt demo short step length and forward flexed posture. x1 standing rest break for WS ontol L LE 2/2 increased R lateral lean. Pt demo good recovery; however,  immediately reported feeling weak with increased trunk and B LE flexion. Pt's sitter immediately presented chair. PT lowered pt slowly into squat position and RN present to assist with lift pt onto chair surface. Per sitter this is a weekly occurrence at home.   Assistance level: Contact Guard Assistance and Maximum Assistance   Assistive Device used:  Rolling Walker   Gait Pattern: reciprocal gait   Gait Deviation(s): decreased william, decreased step length, decreased swing-to-stance ratio, decreased toe-to-floor clearance and decreased weight-shifting ability   Impairments due to: fatigue, decreased activity tolerance, impaired motor control, impaired cognition    Therapeutic Activities and Exercises:   PT arrived to pt's room to find pt resting quietly; agreeable to PT session. Pt performed mobility as above. Upon return to chair, pt assisted back to bed level by PT and RN. PT remained with pt and pt's caregiver to ensure pt in stable condition. Per caregiver, pt typically demo these types of near falls in the home and she is planning to utilize the W/C more often 2/2 progressive weakness and impaired cognition (inability to indicate a need to sit/weakness). Questions/concerns addressed within PT scope of practice; pt and family/caregiver with no further questions. Caregiver requesting for follow up PT session 2/12 in AM.    AM-PAC 6 CLICK MOBILITY  Turning over in bed (including adjusting bedclothes, sheets and blankets)?: 3  Sitting down on and standing up from a chair with arms (e.g., wheelchair, bedside commode, etc.): 3  Moving from lying on back to sitting on the side of the bed?: 3  Moving to and from a bed to a chair (including a wheelchair)?: 3  Need to walk in hospital room?: 2  Climbing 3-5 steps with a railing?: 1  Total Score: 15     Patient left HOB elevated with all lines intact, call button in reach and RN notified..    GOALS:    Physical Therapy Goals        Problem: Physical Therapy Goal     Goal Priority Disciplines Outcome Goal Variances Interventions   Physical Therapy Goal     PT/OT, PT Ongoing (interventions implemented as appropriate)     Description:  Goals to be met by: 18     Patient will increase functional independence with mobility by performin. Supine to sit with Set-up [place]  2. Sit to supine with Set-up [place]  3. Sit to stand transfer with Stand-by Assistance  4. Bed to chair transfer with Stand-by Assistance using Rolling Walker  5. Gait  x 150 feet with Contact Guard Assistance using Rolling Walker.                     Time Tracking:     PT Received On: 18  PT Start Time: 1500     PT Stop Time: 1530  PT Total Time (min): 30 min     Billable Minutes: Therapeutic Activity 23    Treatment Type: Treatment  PT/PTA: PT        Carmencita Doherty, PT, DPT  [phone]  2018

## 2018-02-11 NOTE — PROGRESS NOTES
Pt doppler right before BP med administration was 66/0. Notified Dr. Bennett; telephone orders to administer all BP meds ordered. Will continue to monitor pt.

## 2018-02-11 NOTE — ASSESSMENT & PLAN NOTE
- Hm3 @5800 as DT   - INR goal 2-3. INR therapeutic   - Continue coumadin   - Continue low dose ASA daily   - LDH acceptable   - Continue Hydralazine 150mg TID, Lisinopril 10mg BID, Nifedine 30mg TID  isordil at 40mg TID and cardura 8mg daily   - MAP goal of 60-80

## 2018-02-11 NOTE — ASSESSMENT & PLAN NOTE
- DDx: polypharmacy vs. Dementia (or both)   - Although formal cognitive testing has not been done his cognitive impairment / dementia is obvious   - Discussed need for hearing aids / corrective lenses while in the hospital.   - Has not been having acute delirium or sundowing while admitted.

## 2018-02-11 NOTE — PLAN OF CARE
Problem: Physical Therapy Goal  Goal: Physical Therapy Goal  Goals to be met by: 18     Patient will increase functional independence with mobility by performin. Supine to sit with Set-up [place]  2. Sit to supine with Set-up [place]  3. Sit to stand transfer with Stand-by Assistance  4. Bed to chair transfer with Stand-by Assistance using Rolling Walker  5. Gait  x 150 feet with Contact Guard Assistance using Rolling Walker.      Outcome: Ongoing (interventions implemented as appropriate)    Goals updated and appropriate to reflect pt's current mobility needs.    Carmencita Doherty, PT, DPT  [phone]  2018

## 2018-02-11 NOTE — ASSESSMENT & PLAN NOTE
- Chronic drive line infection   - Culture growing pseudomonas (now resistant to cefepime and cipro)  - Meropenem 1g BID for 4 weeks with follow up in ID clinic   - Weekly CBC / CMP while on meropenem

## 2018-02-12 PROBLEM — I10 UNCONTROLLED HYPERTENSION: Status: ACTIVE | Noted: 2018-02-12

## 2018-02-12 LAB
ANION GAP SERPL CALC-SCNC: 7 MMOL/L
BASOPHILS # BLD AUTO: 0.02 K/UL
BASOPHILS NFR BLD: 0.3 %
BUN SERPL-MCNC: 30 MG/DL
CALCIUM SERPL-MCNC: 11.4 MG/DL
CHLORIDE SERPL-SCNC: 106 MMOL/L
CO2 SERPL-SCNC: 27 MMOL/L
CREAT SERPL-MCNC: 2.2 MG/DL
CRP SERPL-MCNC: 1.1 MG/L
DIFFERENTIAL METHOD: ABNORMAL
EOSINOPHIL # BLD AUTO: 0.2 K/UL
EOSINOPHIL NFR BLD: 3.1 %
ERYTHROCYTE [DISTWIDTH] IN BLOOD BY AUTOMATED COUNT: 18.3 %
EST. GFR  (AFRICAN AMERICAN): 32.7 ML/MIN/1.73 M^2
EST. GFR  (NON AFRICAN AMERICAN): 28.3 ML/MIN/1.73 M^2
GLUCOSE SERPL-MCNC: 70 MG/DL
HCT VFR BLD AUTO: 30.7 %
HGB BLD-MCNC: 10 G/DL
IMM GRANULOCYTES # BLD AUTO: 0.04 K/UL
IMM GRANULOCYTES NFR BLD AUTO: 0.5 %
INR PPP: 3
LDH SERPL L TO P-CCNC: 166 U/L
LYMPHOCYTES # BLD AUTO: 0.9 K/UL
LYMPHOCYTES NFR BLD: 11.1 %
MAGNESIUM SERPL-MCNC: 2.5 MG/DL
MCH RBC QN AUTO: 29.5 PG
MCHC RBC AUTO-ENTMCNC: 32.6 G/DL
MCV RBC AUTO: 91 FL
MONOCYTES # BLD AUTO: 0.9 K/UL
MONOCYTES NFR BLD: 11.5 %
NEUTROPHILS # BLD AUTO: 5.7 K/UL
NEUTROPHILS NFR BLD: 73.5 %
NRBC BLD-RTO: 0 /100 WBC
PLATELET # BLD AUTO: 115 K/UL
PMV BLD AUTO: 11.7 FL
POTASSIUM SERPL-SCNC: 4.3 MMOL/L
PREALB SERPL-MCNC: 30 MG/DL
PROTHROMBIN TIME: 28.8 SEC
RBC # BLD AUTO: 3.39 M/UL
SODIUM SERPL-SCNC: 140 MMOL/L
WBC # BLD AUTO: 7.81 K/UL

## 2018-02-12 PROCEDURE — 25000003 PHARM REV CODE 250: Performed by: INTERNAL MEDICINE

## 2018-02-12 PROCEDURE — 63600175 PHARM REV CODE 636 W HCPCS: Performed by: INTERNAL MEDICINE

## 2018-02-12 PROCEDURE — 97116 GAIT TRAINING THERAPY: CPT

## 2018-02-12 PROCEDURE — 86140 C-REACTIVE PROTEIN: CPT

## 2018-02-12 PROCEDURE — 83615 LACTATE (LD) (LDH) ENZYME: CPT

## 2018-02-12 PROCEDURE — 99233 SBSQ HOSP IP/OBS HIGH 50: CPT | Mod: ,,, | Performed by: INTERNAL MEDICINE

## 2018-02-12 PROCEDURE — 97530 THERAPEUTIC ACTIVITIES: CPT

## 2018-02-12 PROCEDURE — 85610 PROTHROMBIN TIME: CPT

## 2018-02-12 PROCEDURE — 27000248 HC VAD-ADDITIONAL DAY

## 2018-02-12 PROCEDURE — 84134 ASSAY OF PREALBUMIN: CPT

## 2018-02-12 PROCEDURE — 83735 ASSAY OF MAGNESIUM: CPT

## 2018-02-12 PROCEDURE — 80048 BASIC METABOLIC PNL TOTAL CA: CPT

## 2018-02-12 PROCEDURE — 20600001 HC STEP DOWN PRIVATE ROOM

## 2018-02-12 PROCEDURE — 85025 COMPLETE CBC W/AUTO DIFF WBC: CPT

## 2018-02-12 PROCEDURE — 92526 ORAL FUNCTION THERAPY: CPT

## 2018-02-12 RX ORDER — CLONIDINE HYDROCHLORIDE 0.1 MG/1
0.1 TABLET ORAL EVERY 4 HOURS PRN
Status: DISCONTINUED | OUTPATIENT
Start: 2018-02-12 | End: 2018-02-14 | Stop reason: HOSPADM

## 2018-02-12 RX ORDER — NIFEDIPINE 30 MG/1
30 TABLET, EXTENDED RELEASE ORAL ONCE
Status: COMPLETED | OUTPATIENT
Start: 2018-02-12 | End: 2018-02-12

## 2018-02-12 RX ORDER — NIFEDIPINE 30 MG/1
120 TABLET, EXTENDED RELEASE ORAL DAILY
Status: DISCONTINUED | OUTPATIENT
Start: 2018-02-13 | End: 2018-02-14 | Stop reason: HOSPADM

## 2018-02-12 RX ORDER — NIFEDIPINE 30 MG/1
90 TABLET, EXTENDED RELEASE ORAL DAILY
Status: DISCONTINUED | OUTPATIENT
Start: 2018-02-13 | End: 2018-02-12

## 2018-02-12 RX ORDER — WARFARIN 2.5 MG/1
2.5 TABLET ORAL ONCE
Status: COMPLETED | OUTPATIENT
Start: 2018-02-12 | End: 2018-02-12

## 2018-02-12 RX ORDER — WARFARIN 4 MG/1
4 TABLET ORAL
Status: DISCONTINUED | OUTPATIENT
Start: 2018-02-13 | End: 2018-02-14 | Stop reason: HOSPADM

## 2018-02-12 RX ADMIN — LISINOPRIL 10 MG: 10 TABLET ORAL at 08:02

## 2018-02-12 RX ADMIN — NIFEDIPINE 30 MG: 30 TABLET, FILM COATED, EXTENDED RELEASE ORAL at 10:02

## 2018-02-12 RX ADMIN — MEROPENEM 1 G: 1 INJECTION, POWDER, FOR SOLUTION INTRAVENOUS at 10:02

## 2018-02-12 RX ADMIN — HYDRALAZINE HYDROCHLORIDE 150 MG: 50 TABLET ORAL at 02:02

## 2018-02-12 RX ADMIN — CARBIDOPA AND LEVODOPA 1 TABLET: 25; 100 TABLET ORAL at 05:02

## 2018-02-12 RX ADMIN — NIFEDIPINE 60 MG: 30 TABLET, FILM COATED, EXTENDED RELEASE ORAL at 09:02

## 2018-02-12 RX ADMIN — BUPROPION HYDROCHLORIDE 450 MG: 150 TABLET, EXTENDED RELEASE ORAL at 08:02

## 2018-02-12 RX ADMIN — PANTOPRAZOLE SODIUM 40 MG: 40 TABLET, DELAYED RELEASE ORAL at 05:02

## 2018-02-12 RX ADMIN — FOLIC ACID 1 MG: 1 TABLET ORAL at 08:02

## 2018-02-12 RX ADMIN — ISOSORBIDE DINITRATE 40 MG: 40 TABLET ORAL at 08:02

## 2018-02-12 RX ADMIN — CARBIDOPA AND LEVODOPA 1 TABLET: 25; 100 TABLET ORAL at 10:02

## 2018-02-12 RX ADMIN — DOFETILIDE 125 MCG: 0.12 CAPSULE ORAL at 08:02

## 2018-02-12 RX ADMIN — ATORVASTATIN CALCIUM 10 MG: 10 TABLET, FILM COATED ORAL at 08:02

## 2018-02-12 RX ADMIN — NIFEDIPINE 30 MG: 30 TABLET, FILM COATED, EXTENDED RELEASE ORAL at 12:02

## 2018-02-12 RX ADMIN — MAGNESIUM OXIDE TAB 400 MG (241.3 MG ELEMENTAL MG) 400 MG: 400 (241.3 MG) TAB at 08:02

## 2018-02-12 RX ADMIN — ISOSORBIDE DINITRATE 40 MG: 40 TABLET ORAL at 05:02

## 2018-02-12 RX ADMIN — CARBIDOPA AND LEVODOPA 1 TABLET: 25; 100 TABLET ORAL at 08:02

## 2018-02-12 RX ADMIN — ASPIRIN 81 MG 81 MG: 81 TABLET ORAL at 08:02

## 2018-02-12 RX ADMIN — ISOSORBIDE DINITRATE 40 MG: 40 TABLET ORAL at 02:02

## 2018-02-12 RX ADMIN — CARBIDOPA AND LEVODOPA 1 TABLET: 25; 100 TABLET ORAL at 02:02

## 2018-02-12 RX ADMIN — ALLOPURINOL 300 MG: 300 TABLET ORAL at 08:02

## 2018-02-12 RX ADMIN — DOXAZOSIN MESYLATE 8 MG: 2 TABLET ORAL at 08:02

## 2018-02-12 RX ADMIN — WARFARIN SODIUM 2.5 MG: 2.5 TABLET ORAL at 05:02

## 2018-02-12 RX ADMIN — HYDRALAZINE HYDROCHLORIDE 150 MG: 50 TABLET ORAL at 08:02

## 2018-02-12 RX ADMIN — HYDRALAZINE HYDROCHLORIDE 150 MG: 50 TABLET ORAL at 05:02

## 2018-02-12 RX ADMIN — FERROUS SULFATE TAB EC 325 MG (65 MG FE EQUIVALENT) 325 MG: 325 (65 FE) TABLET DELAYED RESPONSE at 09:02

## 2018-02-12 NOTE — SUBJECTIVE & OBJECTIVE
Interval History: Pt reported feeling better this morning.   Denied CP, SOB or palpitation.       Review of Systems   Constitution: Negative.   HENT: Negative.    Eyes: Negative.    Cardiovascular: Negative.    Respiratory: Negative.    Gastrointestinal: Negative.    Genitourinary: Negative.    Neurological: Negative.      Objective:     Vital Signs (Most Recent):  Temp: 98.9 °F (37.2 °C) (02/12/18 1204)  Pulse: 81 (02/12/18 1500)  Resp: 18 (02/12/18 1204)  BP: (!) 118/0 (02/12/18 1410)  SpO2: 96 % (02/12/18 1204) Vital Signs (24h Range):  Temp:  [97.6 °F (36.4 °C)-98.9 °F (37.2 °C)] 98.9 °F (37.2 °C)  Pulse:  [68-82] 81  Resp:  [18] 18  SpO2:  [94 %-98 %] 96 %  BP: ()/(0-84) 118/0     Weight: 82.1 kg (181 lb 1.6 oz)  Body mass index is 24.56 kg/m².     SpO2: 96 %  O2 Device (Oxygen Therapy): room air      Intake/Output Summary (Last 24 hours) at 02/12/18 1538  Last data filed at 02/12/18 0500   Gross per 24 hour   Intake                0 ml   Output                0 ml   Net                0 ml       Lines/Drains/Airways     Peripherally Inserted Central Catheter Line                 PICC Double Lumen 02/05/18 1114 right basilic 7 days          Line                 VAD Left ventricular assist device HeartMate 3 -- days          Peripheral Intravenous Line                 Peripheral IV - Single Lumen Left Forearm -- days                Physical Exam   Constitutional: He is oriented to person, place, and time. He appears well-developed and well-nourished.   HENT:   Head: Normocephalic and atraumatic.   Mouth/Throat: Oropharynx is clear and moist.   Eyes: EOM are normal. Pupils are equal, round, and reactive to light. Right eye exhibits no discharge. Left eye exhibits no discharge. No scleral icterus.   Neck: Normal range of motion. Neck supple. No JVD present. No tracheal deviation present. No thyromegaly present.   Cardiovascular:   LVAD positive and smooth  Pulsatile pulse   MAP in 90-95   Pulmonary/Chest:  Effort normal and breath sounds normal. No respiratory distress. He has no rales. He exhibits no tenderness.   Abdominal: Soft. Bowel sounds are normal. He exhibits no distension and no mass. There is no rebound.   Musculoskeletal: Normal range of motion. He exhibits no edema, tenderness or deformity.   Lymphadenopathy:     He has no cervical adenopathy.   Neurological: He is alert and oriented to person, place, and time. He has normal reflexes. He displays normal reflexes. No cranial nerve deficit. He exhibits normal muscle tone. Coordination normal.   Skin: Skin is warm and dry.   Psychiatric: He has a normal mood and affect. Judgment and thought content normal.   Nursing note and vitals reviewed.      Significant Labs:   CMP   Recent Labs  Lab 02/11/18  0516 02/12/18  0443    140   K 4.1 4.3    106   CO2 28 27   GLU 86 70   BUN 32* 30*   CREATININE 2.3* 2.2*   CALCIUM 11.3* 11.4*   ANIONGAP 6* 7*   ESTGFRAFRICA 31.0* 32.7*   EGFRNONAA 26.8* 28.3*   , CBC   Recent Labs  Lab 02/11/18  0516 02/12/18  0443   WBC 6.55 7.81   HGB 10.1* 10.0*   HCT 30.9* 30.7*   * 115*    and INR   Recent Labs  Lab 02/11/18  0516 02/12/18  0443   INR 2.6* 3.0*       Significant Imaging: X-Ray: CXR: X-Ray Chest 1 View (CXR):   Results for orders placed or performed during the hospital encounter of 02/08/18   X-Ray Chest 1 View    Narrative    One view: Central line right atrium. There is a pacer in the LVAD. There is cardiomegaly, mild edema, postoperative change, and no change.      Electronically signed by: GUILLERMO OCONNOR MD  Date:     02/08/18  Time:    16:09

## 2018-02-12 NOTE — PLAN OF CARE
Problem: Patient Care Overview  Goal: Individualization & Mutuality  Outcome: Ongoing (interventions implemented as appropriate)  POC reviewed with pt. VS and VAD numbers stable. Pt repositioned and wedge applied to prevent skin breakdown. Pt remains free from falls, trauma, injury; pt tolerating POC well. Will continue to monitor.

## 2018-02-12 NOTE — ASSESSMENT & PLAN NOTE
- Continued high readings on dopplers with pulsatile pulse.   - Pt already on maxed out Hydralazine, Isordil, Lisinopril 10 BID, Corura 8 mg daily   - nifedipine dose increased to 120 mg this morning and Clonidine 0.1 mg PRN was added to the regimen  - Plan to continue the above regimen

## 2018-02-12 NOTE — PLAN OF CARE
Problem: Patient Care Overview  Goal: Plan of Care Review  Outcome: Revised  Plan of care discussed with patient, wife, and caregivert. Patient is free of fall/trauma/injury. Smooth LVAD hum. Ex-WNL VS reported to MD (see previous notes). Patient complaint to honey thick liquids. INR: 3.0. Denies CP, SOB, or pain/discomfort. All questions addressed. Will continue to monitor

## 2018-02-12 NOTE — PROGRESS NOTES
"LVAD dressing change completed using sterile technique with soap and water. DLES is a "4" with moderate creamy yellow & light brown drainage noted on the drain sponge. Tolerated without any complication. No Sutures  Intact. Redness present at exit site. no tenderness noted.     Zita LVAD coordinator updated.   "

## 2018-02-12 NOTE — PROGRESS NOTES
[facility]  Cardiology  Progress Note    Patient Name: Kev Vasquez  MRN: [id number]  Admission Date: 2/8/2018  Hospital Length of Stay: 3 days  Code Status: Full Code   Attending Physician: Caitlin Wells MD   Primary Care Physician: Mega Collins MD  Expected Discharge Date: 2/14/2018  Principal Problem:Syncope    Subjective:     Hospital Course:   No notes on file    Interval History: Pt reported feeling better this morning.   Denied CP, SOB or palpitation.       Review of Systems   Constitution: Negative.   HENT: Negative.    Eyes: Negative.    Cardiovascular: Negative.    Respiratory: Negative.    Gastrointestinal: Negative.    Genitourinary: Negative.    Neurological: Negative.      Objective:     Vital Signs (Most Recent):  Temp: 98.9 °F (37.2 °C) (02/12/18 1204)  Pulse: 81 (02/12/18 1500)  Resp: 18 (02/12/18 1204)  BP: (!) 118/0 (02/12/18 1410)  SpO2: 96 % (02/12/18 1204) Vital Signs (24h Range):  Temp:  [97.6 °F (36.4 °C)-98.9 °F (37.2 °C)] 98.9 °F (37.2 °C)  Pulse:  [68-82] 81  Resp:  [18] 18  SpO2:  [94 %-98 %] 96 %  BP: ()/(0-84) 118/0     Weight: 82.1 kg (181 lb 1.6 oz)  Body mass index is 24.56 kg/m².     SpO2: 96 %  O2 Device (Oxygen Therapy): room air      Intake/Output Summary (Last 24 hours) at 02/12/18 1538  Last data filed at 02/12/18 0500   Gross per 24 hour   Intake                0 ml   Output                0 ml   Net                0 ml       Lines/Drains/Airways     Peripherally Inserted Central Catheter Line                 PICC Double Lumen 02/05/18 1114 right basilic 7 days          Line                 VAD Left ventricular assist device HeartMate 3 -- days          Peripheral Intravenous Line                 Peripheral IV - Single Lumen Left Forearm -- days                Physical Exam   Constitutional: He is oriented to person, place, and time. He appears well-developed and well-nourished.   HENT:   Head: Normocephalic and atraumatic.   Mouth/Throat:  Oropharynx is clear and moist.   Eyes: EOM are normal. Pupils are equal, round, and reactive to light. Right eye exhibits no discharge. Left eye exhibits no discharge. No scleral icterus.   Neck: Normal range of motion. Neck supple. No JVD present. No tracheal deviation present. No thyromegaly present.   Cardiovascular:   LVAD positive and smooth  Pulsatile pulse   MAP in 90-95   Pulmonary/Chest: Effort normal and breath sounds normal. No respiratory distress. He has no rales. He exhibits no tenderness.   Abdominal: Soft. Bowel sounds are normal. He exhibits no distension and no mass. There is no rebound.   Musculoskeletal: Normal range of motion. He exhibits no edema, tenderness or deformity.   Lymphadenopathy:     He has no cervical adenopathy.   Neurological: He is alert and oriented to person, place, and time. He has normal reflexes. He displays normal reflexes. No cranial nerve deficit. He exhibits normal muscle tone. Coordination normal.   Skin: Skin is warm and dry.   Psychiatric: He has a normal mood and affect. Judgment and thought content normal.   Nursing note and vitals reviewed.      Significant Labs:   CMP   Recent Labs  Lab 02/11/18  0516 02/12/18  0443    140   K 4.1 4.3    106   CO2 28 27   GLU 86 70   BUN 32* 30*   CREATININE 2.3* 2.2*   CALCIUM 11.3* 11.4*   ANIONGAP 6* 7*   ESTGFRAFRICA 31.0* 32.7*   EGFRNONAA 26.8* 28.3*   , CBC   Recent Labs  Lab 02/11/18  0516 02/12/18  0443   WBC 6.55 7.81   HGB 10.1* 10.0*   HCT 30.9* 30.7*   * 115*    and INR   Recent Labs  Lab 02/11/18  0516 02/12/18  0443   INR 2.6* 3.0*       Significant Imaging: X-Ray: CXR: X-Ray Chest 1 View (CXR):   Results for orders placed or performed during the hospital encounter of 02/08/18   X-Ray Chest 1 View    Narrative    One view: Central line right atrium. There is a pacer in the LVAD. There is cardiomegaly, mild edema, postoperative change, and no change.      Electronically signed by: GUILLERMO OCONNOR  MD  Date:     02/08/18  Time:    16:09      Assessment and Plan:     Brief HPI:     Uncontrolled hypertension    - Continued high readings on dopplers with pulsatile pulse.   - Pt already on maxed out Hydralazine, Isordil, Lisinopril 10 BID, Corura 8 mg daily   - nifedipine dose increased to 120 mg this morning and Clonidine 0.1 mg PRN was added to the regimen  - Plan to continue the above regimen          Left ventricular assist device (LVAD) complication, subsequent encounter    -        VT (ventricular tachycardia)    - No more episodes  - Continue Tikosyn        LVAD (left ventricular assist device) present    - Chronic DLES   - Cultures positive for pseudomonas,   - previously on home Cefepime and Cipro.   - Continue Meropenem 1 g BID 4 weeks  - Upon discharge the pt will undergo cbc/cmp weekly while on Meropenem.   - INR at 3.0 this morning  - Coumadine dose decreased to 4 mg daily.         Stage 3 chronic kidney disease    - Non-oliguric, continue   - Cretinine above the baseline of 1.7; today it was 2.2  - Follow the trend            VTE Risk Mitigation         Ordered     warfarin tablet 6 mg  Every Tues, Thurs, Sat, Sun     Route:  Oral        02/12/18 1341     warfarin (COUMADIN) tablet 4 mg  Every Mon, Wed, Fri     Route:  Oral        02/12/18 1341     warfarin (COUMADIN) tablet 2.5 mg  Once     Route:  Oral        02/12/18 1134          Yanet Smith MD  Cardiology  [facility]

## 2018-02-12 NOTE — ASSESSMENT & PLAN NOTE
- Chronic DLES   - Cultures positive for pseudomonas,   - previously on home Cefepime and Cipro.   - Continue Meropenem 1 g BID 4 weeks  - Upon discharge the pt will undergo cbc/cmp weekly while on Meropenem.   - INR at 3.0 this morning  - Coumadine dose decreased to 4 mg daily.

## 2018-02-12 NOTE — PT/OT/SLP PROGRESS
"Physical Therapy Treatment    Patient Name:  Kev Vasquez   MRN:  [id number]    Recommendations:     Discharge Recommendations:  home with home health   Discharge Equipment Recommendations: hospital bed  Barriers to discharge: Inaccessible home and Decreased caregiver support    Assessment:     Kev Vasquez is a 77 y.o. male admitted with a medical diagnosis of Syncope.  He presents with the following impairments/functional limitations:  weakness, impaired endurance, impaired self care skills, impaired sensation, impaired functional mobilty, gait instability, impaired balance, decreased lower extremity function, decreased upper extremity function, impaired cognition, decreased safety awareness, decreased ROM, abnormal tone, impaired coordination, impaired fine motor, impaired joint extensibility, impaired muscle length, impaired cardiopulmonary response to activity. Pt demo excellent participation with PT this AM. Tolerated hallway ambulation x200 feet using RW and min A from PT. Pt continues with Parkinsons related gait impairments. Will benefit from hospital bed upon D/C to decrease caregiver burden and fall risk.    Rehab Prognosis:  Good; patient would benefit from acute skilled PT services to address these deficits and reach maximum level of function.      Recent Surgery: * No surgery found *      Plan:     During this hospitalization, patient to be seen 5 x/week to address the above listed problems via gait training, therapeutic activities, therapeutic exercises, neuromuscular re-education  · Plan of Care Expires:  03/11/18   Plan of Care Reviewed with: patient, spouse, caregiver    Subjective     Communicated with RN (Rodríguez) prior to and after session.  Patient found resting in supine upon PT entry to room, agreeable to treatment.      Chief Complaint: "Well this is the 5th day in a row that the doctors came in to tell me I wasn't going to go home, which is what they told me I would be able to do " "before."  Patient comments/goals: "I would like to practice walking. Thanks."  Pain/Comfort:  · Pain Rating 1: 0/10  · Pain Rating Post-Intervention 1: 0/10    Patients cultural, spiritual, Druze conflicts given the current situation: None noted    Objective:     Patient found with: telemetry, LVAD     General Precautions: Standard, aspiration, fall, LVAD, honey thick   Orthopedic Precautions:N/A   Braces: N/A     Functional Mobility:  Bed Mobility:   Rolling to the R and L: Contact Guard Assistance   Supine to Sit: From L sidelying. Contact Guard Assistance x2 trials   Sit to Supine: To L sidelying. Minimal Assistance x1 trial after found to be soiled prior to gait trials   Scooting at EOB: Stand-by Assistance for feet flat on floor    Sitting Balance at Edge of Bed/Chair:   Assistance Level Required: Stand-by Assistance   Time: x10 min total throughout session   Postural deviations noted:    -       Rounded shoulders  -       Forward head  -       Posterior pelvic tilt   Encouraged: upright posture, feet flat on floor    Transfers:   Sit to Stand: Minimal Assistance with RW from EOB x2 trials   Stand to Sit: Contact Guard Assistance with RW to EOB; Moderate assist to chair for RW mnmgnt; pt demo dis-coordination of trunk upon attempts to sit, req mod A for appropriate redirection/safety    Gait:   Distance Ambulated: Pt ambulated x200 feet in hallway setting with firm VCs for steps 1-10. Pt demo forward flexed posture, festination of gait, freezing of gait and req increased assist for turning. Pt demo significant improvement given strong verbals cues and intermittent standing rest breaks to re-set initial conditions.    Assistance level: Contact Guard Assistance and Minimal Assistance   Assistive Device used:  Rolling Walker   Gait Pattern: reciprocal gait   Gait Deviation(s): decreased william, decreased step length, decreased swing-to-stance ratio and decreased weight-shifting ability   Impairments due to: " impaired motor planning, instability of gait    Therapeutic Activities and Exercises:  PT arrived to pt's room to find pt resting quietly; agreeable to PT session. Pt performed mobility as above. Upon return to room, pt agreeable to remain UIC with sitter at bedside. Pt agreeable to follow up session  if still admitted to hospital.    AM-PAC 6 CLICK MOBILITY  Turning over in bed (including adjusting bedclothes, sheets and blankets)?: 3  Sitting down on and standing up from a chair with arms (e.g., wheelchair, bedside commode, etc.): 3  Moving from lying on back to sitting on the side of the bed?: 3  Moving to and from a bed to a chair (including a wheelchair)?: 3  Need to walk in hospital room?: 3  Climbing 3-5 steps with a railing?: 3  Total Score: 18     Patient left up in chair with all lines intact, call button in reach, RN notified and spouse and sitter present..    GOALS:    Physical Therapy Goals        Problem: Physical Therapy Goal    Goal Priority Disciplines Outcome Goal Variances Interventions   Physical Therapy Goal     PT/OT, PT Ongoing (interventions implemented as appropriate)     Description:  Goals to be met by: 18     Patient will increase functional independence with mobility by performin. Supine to sit with Set-up [place]  2. Sit to supine with Set-up [place]  3. Sit to stand transfer with Stand-by Assistance  4. Bed to chair transfer with Stand-by Assistance using Rolling Walker  5. Gait  x 150 feet with Contact Guard Assistance using Rolling Walker.                       Time Tracking:     PT Received On: 18  PT Start Time: 0935     PT Stop Time: 1007  PT Total Time (min): 32 min     Billable Minutes: Gait Training 10 and Therapeutic Activity 15    Treatment Type: Treatment  PT/PTA: PT       Carmencita Doherty, PT, DPT  [phone]  2018

## 2018-02-12 NOTE — PLAN OF CARE
Problem: SLP Goal  Goal: SLP Goal  Speech Therapy Short Term Goals  Goal expected to be met by 2/23  1. Pt will tolerate a regular diet and honey thick liquids with no overt s/s of aspiration.   2. Pt will demonstrate 3 safe swallowing precautions with no cues.   3. Pt will complete dysphagia therapy exercises x10 each given min cues.        Outcome: Ongoing (interventions implemented as appropriate)  Pt progressing towards goals. ST to continue to follow. Thank you.    JEWEL Woo., [facility]SLP  Speech-Language Pathology  Pager: [phone]  2/12/2018

## 2018-02-12 NOTE — ASSESSMENT & PLAN NOTE
- Non-oliguric, continue   - Cretinine above the baseline of 1.7; today it was 2.2  - Follow the trend

## 2018-02-12 NOTE — PROGRESS NOTES
"   02/12/18 1205   Vital Signs   BP (!) 90/0   BP Location Left arm   BP Method Doppler   Patient Position Sitting   nonpulsatile. Neuro assessment- no changes. Patient reporting "numbness" in hands and feet. Sitter @ bedside stating patient has reported this in past ~4 wks ago. Patient received carvidopa levodopa as ordered this am. MD Sarah updated.   "

## 2018-02-12 NOTE — PROGRESS NOTES
02/12/18 1400 02/12/18 1410   Vital Signs   /84 (!) 118/0   MAP (mmHg) 95 --    BP Location Left arm Left arm   BP Method Automatic Doppler   Patient Position Sitting Sitting   patient received scheduled 1400 Hydralazine & Isosorbide post BP recheck. Patient has received all ordered Procardia as ordered. MD Sarah updated.

## 2018-02-12 NOTE — PT/OT/SLP PROGRESS
"Speech Language Pathology Treatment    Patient Name:  Kev Vasquez   MRN:  [id number]  Admitting Diagnosis: Syncope  The primary encounter diagnosis was Syncope. A diagnosis of LVAD (left ventricular assist device) present was also pertinent to this visit.    Recommendations:                 General Recommendations:  Dysphagia therapy  Diet recommendations:  Regular, Liquid Diet Level: Honey Thick   Aspiration Precautions: 1 bite/sip at a time, Alternating bites/sips, Assistance with meals and Assistance with thickening liquids, Eliminate distractions, Feed only when awake/alert, HOB to 90 degrees, Meds whole buried in puree, No straws, Small bites/sips and Strict aspiration precautions   · To achieve honey thick liquid: 4 oz of any liquid to 1 pack of thickener  · Avoid mixed consistencies (soup, cereal with milk, juicy fruits).  · No ice cream, jello, or ice.  · Encourage chin tuck when drinking     General Precautions: Standard, aspiration, fall, LVAD  Communication strategies:  go to room if call light pushed    Subjective     SLP reviewed Pt with nurse, nurse reports caregiver occasionally using straw with Pt   Pt presents calm and cooperative  He explains, " I don't have any feeling in my hands and feet when I sit up too long"  Caregiver explains, "I didn't knwo I was not suppose to use a straw earlier, but we stopped doing that"  Pt denies pain  Pain/Comfort:  · Pain Rating 1: 0/10  · Pain Rating Post-Intervention 1: 0/10    Objective:     Has the patient been evaluated by SLP for swallowing?   Yes  Keep patient NPO? No   Current Respiratory Status: room air      Pt seen for ongoing monitoring of tolerance of diet. He presents alert and awake in bed, famiyl and caregiver at bedside. Pt reports god appetite. Pt and family deny difficulty with medications or meals. Pt accepts cup edge sips honey-thickened liquids x3 with assistance from SLP. No overt S/S aspiration noted with honey-thickened liquids provided " minimal verbal cueing for use of chin tuck . He declines additional bites/sips 2/2 restroom needs and feeling full.  Pt and family educated on ongoing swallow precautions, compensatory strategies (chin tuck), S/S aspiration and thickener guidelines. Pt and family v/u.  Pt asking for help with restroom and session discontinued as caregiver helps pt. No further questions noted. Whiteboard current.     Assessment:     Kev Vasquez is a 77 y.o. male with an SLP diagnosis of Dysphagia.  He requires minimal verbal and visual cueing to demonstrate chin tuck with cup edge sips honey-thickened liquids today. ST to continue to follow.     Goals:    SLP Goals        Problem: SLP Goal    Goal Priority Disciplines Outcome   SLP Goal     SLP Ongoing (interventions implemented as appropriate)   Description:  Speech Therapy Short Term Goals  Goal expected to be met by 2/23  1. Pt will tolerate a regular diet and honey thick liquids with no overt s/s of aspiration.   2. Pt will demonstrate 3 safe swallowing precautions with no cues.   3. Pt will complete dysphagia therapy exercises x10 each given min cues.                         Plan:     · Patient to be seen:  4 x/week   · Plan of Care expires:  03/10/18  · Plan of Care reviewed with:  patient, caregiver, spouse   · SLP Follow-Up:  Yes       Discharge recommendations:  home with home health   Barriers to Discharge:  None    Time Tracking:     SLP Treatment Date:   02/12/18  Speech Start Time:  1148  Speech Stop Time:  1157     Speech Total Time (min):  9 min    Billable Minutes: Treatment Swallowing Dysfunction 9    JEWEL Woo., [facility]-SLP  Speech-Language Pathology  Pager: [phone]      02/12/2018

## 2018-02-12 NOTE — PLAN OF CARE
Problem: Physical Therapy Goal  Goal: Physical Therapy Goal  Goals to be met by: 18     Patient will increase functional independence with mobility by performin. Supine to sit with Set-up [place]  2. Sit to supine with Set-up [place]  3. Sit to stand transfer with Stand-by Assistance  4. Bed to chair transfer with Stand-by Assistance using Rolling Walker  5. Gait  x 150 feet with Contact Guard Assistance using Rolling Walker.      Outcome: Ongoing (interventions implemented as appropriate)    Goals updated and appropriate to reflect pt's current mobility needs.    Carmencita Doherty, PT, DPT  [phone]  2018

## 2018-02-13 LAB
ANION GAP SERPL CALC-SCNC: 7 MMOL/L
BASOPHILS # BLD AUTO: 0.03 K/UL
BASOPHILS NFR BLD: 0.5 %
BUN SERPL-MCNC: 31 MG/DL
CALCIUM SERPL-MCNC: 11.5 MG/DL
CHLORIDE SERPL-SCNC: 109 MMOL/L
CO2 SERPL-SCNC: 28 MMOL/L
CREAT SERPL-MCNC: 2 MG/DL
DIFFERENTIAL METHOD: ABNORMAL
EOSINOPHIL # BLD AUTO: 0.3 K/UL
EOSINOPHIL NFR BLD: 5.4 %
ERYTHROCYTE [DISTWIDTH] IN BLOOD BY AUTOMATED COUNT: 18.5 %
EST. GFR  (AFRICAN AMERICAN): 36.7 ML/MIN/1.73 M^2
EST. GFR  (NON AFRICAN AMERICAN): 31.7 ML/MIN/1.73 M^2
GLUCOSE SERPL-MCNC: 85 MG/DL
HCT VFR BLD AUTO: 30 %
HGB BLD-MCNC: 9.8 G/DL
IMM GRANULOCYTES # BLD AUTO: 0.03 K/UL
IMM GRANULOCYTES NFR BLD AUTO: 0.5 %
INR PPP: 2.6
LDH SERPL L TO P-CCNC: 166 U/L
LYMPHOCYTES # BLD AUTO: 1 K/UL
LYMPHOCYTES NFR BLD: 15.8 %
MAGNESIUM SERPL-MCNC: 2.6 MG/DL
MCH RBC QN AUTO: 29.4 PG
MCHC RBC AUTO-ENTMCNC: 32.7 G/DL
MCV RBC AUTO: 90 FL
MONOCYTES # BLD AUTO: 0.8 K/UL
MONOCYTES NFR BLD: 12.7 %
NEUTROPHILS # BLD AUTO: 4 K/UL
NEUTROPHILS NFR BLD: 65.1 %
NRBC BLD-RTO: 0 /100 WBC
PLATELET # BLD AUTO: 120 K/UL
PMV BLD AUTO: 11.9 FL
POTASSIUM SERPL-SCNC: 4.2 MMOL/L
PROTHROMBIN TIME: 25.2 SEC
RBC # BLD AUTO: 3.33 M/UL
SODIUM SERPL-SCNC: 144 MMOL/L
WBC # BLD AUTO: 6.12 K/UL

## 2018-02-13 PROCEDURE — 25000003 PHARM REV CODE 250: Performed by: INTERNAL MEDICINE

## 2018-02-13 PROCEDURE — 83615 LACTATE (LD) (LDH) ENZYME: CPT

## 2018-02-13 PROCEDURE — 97530 THERAPEUTIC ACTIVITIES: CPT

## 2018-02-13 PROCEDURE — 85610 PROTHROMBIN TIME: CPT

## 2018-02-13 PROCEDURE — 27000248 HC VAD-ADDITIONAL DAY

## 2018-02-13 PROCEDURE — 85025 COMPLETE CBC W/AUTO DIFF WBC: CPT

## 2018-02-13 PROCEDURE — 99233 SBSQ HOSP IP/OBS HIGH 50: CPT | Mod: ,,, | Performed by: INTERNAL MEDICINE

## 2018-02-13 PROCEDURE — 20600001 HC STEP DOWN PRIVATE ROOM

## 2018-02-13 PROCEDURE — 83735 ASSAY OF MAGNESIUM: CPT

## 2018-02-13 PROCEDURE — 80048 BASIC METABOLIC PNL TOTAL CA: CPT

## 2018-02-13 PROCEDURE — 63600175 PHARM REV CODE 636 W HCPCS: Performed by: INTERNAL MEDICINE

## 2018-02-13 RX ORDER — HYDRALAZINE HYDROCHLORIDE 50 MG/1
100 TABLET, FILM COATED ORAL EVERY 8 HOURS
Status: DISCONTINUED | OUTPATIENT
Start: 2018-02-13 | End: 2018-02-14 | Stop reason: HOSPADM

## 2018-02-13 RX ORDER — DOXAZOSIN 2 MG/1
8 TABLET ORAL EVERY 12 HOURS
Status: DISCONTINUED | OUTPATIENT
Start: 2018-02-13 | End: 2018-02-14

## 2018-02-13 RX ORDER — CARBIDOPA AND LEVODOPA 25; 100 MG/1; MG/1
1 TABLET ORAL
Status: DISCONTINUED | OUTPATIENT
Start: 2018-02-13 | End: 2018-02-14 | Stop reason: HOSPADM

## 2018-02-13 RX ADMIN — LISINOPRIL 10 MG: 10 TABLET ORAL at 10:02

## 2018-02-13 RX ADMIN — DOXAZOSIN MESYLATE 8 MG: 2 TABLET ORAL at 09:02

## 2018-02-13 RX ADMIN — Medication 1 CAPSULE: at 09:02

## 2018-02-13 RX ADMIN — ISOSORBIDE DINITRATE 40 MG: 40 TABLET ORAL at 06:02

## 2018-02-13 RX ADMIN — PANTOPRAZOLE SODIUM 40 MG: 40 TABLET, DELAYED RELEASE ORAL at 06:02

## 2018-02-13 RX ADMIN — ISOSORBIDE DINITRATE 40 MG: 40 TABLET ORAL at 02:02

## 2018-02-13 RX ADMIN — MEROPENEM 1 G: 1 INJECTION, POWDER, FOR SOLUTION INTRAVENOUS at 10:02

## 2018-02-13 RX ADMIN — ALLOPURINOL 300 MG: 300 TABLET ORAL at 09:02

## 2018-02-13 RX ADMIN — CARBIDOPA AND LEVODOPA 1 TABLET: 25; 100 TABLET ORAL at 04:02

## 2018-02-13 RX ADMIN — CARBIDOPA AND LEVODOPA 1 TABLET: 25; 100 TABLET ORAL at 10:02

## 2018-02-13 RX ADMIN — DOXAZOSIN MESYLATE 8 MG: 2 TABLET ORAL at 10:02

## 2018-02-13 RX ADMIN — MEROPENEM 1 G: 1 INJECTION, POWDER, FOR SOLUTION INTRAVENOUS at 11:02

## 2018-02-13 RX ADMIN — HYDRALAZINE HYDROCHLORIDE 150 MG: 50 TABLET ORAL at 06:02

## 2018-02-13 RX ADMIN — HYDRALAZINE HYDROCHLORIDE 100 MG: 50 TABLET ORAL at 02:02

## 2018-02-13 RX ADMIN — CARBIDOPA AND LEVODOPA 1 TABLET: 25; 100 TABLET ORAL at 06:02

## 2018-02-13 RX ADMIN — PANTOPRAZOLE SODIUM 40 MG: 40 TABLET, DELAYED RELEASE ORAL at 04:02

## 2018-02-13 RX ADMIN — CLONIDINE HYDROCHLORIDE 0.1 MG: 0.1 TABLET ORAL at 12:02

## 2018-02-13 RX ADMIN — ATORVASTATIN CALCIUM 10 MG: 10 TABLET, FILM COATED ORAL at 09:02

## 2018-02-13 RX ADMIN — DOFETILIDE 125 MCG: 0.12 CAPSULE ORAL at 10:02

## 2018-02-13 RX ADMIN — MAGNESIUM OXIDE TAB 400 MG (241.3 MG ELEMENTAL MG) 400 MG: 400 (241.3 MG) TAB at 10:02

## 2018-02-13 RX ADMIN — FERROUS SULFATE TAB EC 325 MG (65 MG FE EQUIVALENT) 325 MG: 325 (65 FE) TABLET DELAYED RESPONSE at 09:02

## 2018-02-13 RX ADMIN — LISINOPRIL 10 MG: 10 TABLET ORAL at 09:02

## 2018-02-13 RX ADMIN — HYDRALAZINE HYDROCHLORIDE 100 MG: 50 TABLET ORAL at 10:02

## 2018-02-13 RX ADMIN — ISOSORBIDE DINITRATE 40 MG: 40 TABLET ORAL at 10:02

## 2018-02-13 RX ADMIN — FOLIC ACID 1 MG: 1 TABLET ORAL at 09:02

## 2018-02-13 RX ADMIN — NIFEDIPINE 120 MG: 30 TABLET, FILM COATED, EXTENDED RELEASE ORAL at 09:02

## 2018-02-13 RX ADMIN — ASPIRIN 81 MG 81 MG: 81 TABLET ORAL at 09:02

## 2018-02-13 RX ADMIN — DOFETILIDE 125 MCG: 0.12 CAPSULE ORAL at 09:02

## 2018-02-13 RX ADMIN — MAGNESIUM OXIDE TAB 400 MG (241.3 MG ELEMENTAL MG) 400 MG: 400 (241.3 MG) TAB at 09:02

## 2018-02-13 RX ADMIN — ACETAMINOPHEN 650 MG: 325 TABLET, FILM COATED ORAL at 04:02

## 2018-02-13 RX ADMIN — WARFARIN SODIUM 6 MG: 2 TABLET ORAL at 04:02

## 2018-02-13 RX ADMIN — BUPROPION HYDROCHLORIDE 450 MG: 150 TABLET, EXTENDED RELEASE ORAL at 09:02

## 2018-02-13 RX ADMIN — CARBIDOPA AND LEVODOPA 1 TABLET: 25; 100 TABLET ORAL at 09:02

## 2018-02-13 NOTE — PROGRESS NOTES
[facility]  Cardiology  Progress Note    Patient Name: Kev Vasquez  MRN: [id number]  Admission Date: 2/8/2018  Hospital Length of Stay: 4 days  Code Status: Full Code   Attending Physician: Caitlin Wells MD   Primary Care Physician: Mega Collins MD  Expected Discharge Date: 2/14/2018  Principal Problem:Syncope    Subjective:     Hospital Course:   No notes on file    Interval History: Patient reported feeling better this AM  Denied CP, SOB or palpitation.       Review of Systems   Constitution: Negative.   HENT: Negative.    Cardiovascular: Negative.    Respiratory: Negative.    Musculoskeletal: Negative.    Gastrointestinal: Negative.    Genitourinary: Negative.    Neurological: Negative.    Psychiatric/Behavioral: Negative.      Objective:     Vital Signs (Most Recent):  Temp: 98.1 °F (36.7 °C) (02/13/18 1131)  Pulse: 65 (02/13/18 1415)  Resp: 20 (02/13/18 1131)  BP: 99/76 (02/13/18 1415)  SpO2: 97 % (02/13/18 1131) Vital Signs (24h Range):  Temp:  [97.5 °F (36.4 °C)-99.1 °F (37.3 °C)] 98.1 °F (36.7 °C)  Pulse:  [65-81] 65  Resp:  [16-20] 20  SpO2:  [96 %-97 %] 97 %  BP: ()/(0-85) 99/76     Weight: 82.1 kg (181 lb 1.6 oz)  Body mass index is 24.56 kg/m².     SpO2: 97 %  O2 Device (Oxygen Therapy): room air      Intake/Output Summary (Last 24 hours) at 02/13/18 1430  Last data filed at 02/13/18 0600   Gross per 24 hour   Intake             1020 ml   Output                0 ml   Net             1020 ml       Lines/Drains/Airways     Peripherally Inserted Central Catheter Line                 PICC Double Lumen 02/05/18 1114 right basilic 8 days          Line                 VAD Left ventricular assist device HeartMate 3 -- days          Peripheral Intravenous Line                 Peripheral IV - Single Lumen Left Forearm -- days                Physical Exam   Constitutional: He is oriented to person, place, and time. He appears well-developed and well-nourished. No distress.   HENT:    Head: Normocephalic and atraumatic.   Mouth/Throat: Oropharynx is clear and moist.   Eyes: EOM are normal. Pupils are equal, round, and reactive to light. Right eye exhibits no discharge. Left eye exhibits no discharge. No scleral icterus.   Neck: Normal range of motion. Neck supple. No JVD present. No tracheal deviation present. No thyromegaly present.   Cardiovascular: Intact distal pulses.  Exam reveals no gallop and no friction rub.    No murmur heard.  LVAD hum present and smooth    Pulmonary/Chest: Effort normal and breath sounds normal. No respiratory distress. He has no rales. He exhibits no tenderness.   Abdominal: Soft. Bowel sounds are normal. He exhibits no distension and no mass. There is no rebound.   Musculoskeletal: Normal range of motion. He exhibits no edema, tenderness or deformity.   Lymphadenopathy:     He has no cervical adenopathy.   Neurological: He is alert and oriented to person, place, and time. He has normal reflexes.   Skin: Skin is warm and dry. He is not diaphoretic.   Psychiatric: He has a normal mood and affect. His behavior is normal. Judgment normal.   Nursing note and vitals reviewed.      Significant Labs:   CMP   Recent Labs  Lab 02/12/18 0443 02/13/18  0454    144   K 4.3 4.2    109   CO2 27 28   GLU 70 85   BUN 30* 31*   CREATININE 2.2* 2.0*   CALCIUM 11.4* 11.5*   ANIONGAP 7* 7*   ESTGFRAFRICA 32.7* 36.7*   EGFRNONAA 28.3* 31.7*   , CBC   Recent Labs  Lab 02/12/18 0443 02/13/18  0454   WBC 7.81 6.12   HGB 10.0* 9.8*   HCT 30.7* 30.0*   * 120*    and INR   Recent Labs  Lab 02/12/18 0443 02/13/18  0454   INR 3.0* 2.6*       Significant Imaging: X-Ray: CXR: X-Ray Chest 1 View (CXR):   Results for orders placed or performed during the hospital encounter of 02/08/18   X-Ray Chest 1 View    Narrative    One view: Central line right atrium. There is a pacer in the LVAD. There is cardiomegaly, mild edema, postoperative change, and no  change.      Electronically signed by: GUILLERMO OCONNOR MD  Date:     02/08/18  Time:    16:09      Assessment and Plan:     Brief HPI:    Uncontrolled hypertension    - Pt has had improved MAP ysterday with couple of readings high this morning  - Increased the dose of Doxazosyn to 8 mg BID and reduced the dose of Hydralazin to 100 mg TID.   - Continue Lisinopril 10 mg BID, Isordil and Nifedipine at current dosing.   - Contineu Clonidine 0.1 mg PRN was added to the regimen  - Plan to continue the above regimen          Left ventricular assist device (LVAD) complication, subsequent encounter    - Chronic DLES   - Cultures positive for pseudomonas,   - previously on home Cefepime and Cipro.   - Continue Meropenem 1 g BID 4 weeks  - Upon discharge the pt will undergo cbc/cmp weekly while on Meropenem.   - INR at 3.0 this morning          VT (ventricular tachycardia)    - No more episodes  - Continue Tikosyn        LVAD (left ventricular assist device) present    -Speed 5800; INR therapeutic at 2.6  -LDH at 166        Stage 3 chronic kidney disease    - Non-oliguric, continue   - Cretinine above the baseline of 1.7; today it was 2.0  - Follow the trend; fluctuant trend.             VTE Risk Mitigation         Ordered     warfarin tablet 6 mg  Every Tues, Thurs, Sat, Sun     Route:  Oral        02/12/18 1341     warfarin (COUMADIN) tablet 4 mg  Every Mon, Wed, Fri     Route:  Oral        02/12/18 1341          Yanet Smith MD  Cardiology  [facility]

## 2018-02-13 NOTE — ASSESSMENT & PLAN NOTE
- Pt has had improved MAP ysterday with couple of readings high this morning  - Increased the dose of Doxazosyn to 8 mg BID and reduced the dose of Hydralazin to 100 mg TID.   - Continue Lisinopril 10 mg BID, Isordil and Nifedipine at current dosing.   - Contineu Clonidine 0.1 mg PRN was added to the regimen  - Plan to continue the above regimen

## 2018-02-13 NOTE — PLAN OF CARE
Problem: Patient Care Overview  Goal: Plan of Care Review  Outcome: Ongoing (interventions implemented as appropriate)  POC reviewed with pt. VS and VAD numbers stable. Pt repositioned to prevent skin breakdown. PICC line  Dressing changed this shift. 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 PRN clonidine added for MAP >90. Planning to readjust some of BP regimen today and possibly d/c later this week.  Pt remains free from falls, trauma, injury; pt tolerating POC well. Will continue to monitor.

## 2018-02-13 NOTE — PLAN OF CARE
Problem: Physical Therapy Goal  Goal: Physical Therapy Goal  Goals to be met by: 18     Patient will increase functional independence with mobility by performin. Supine to sit with Set-up [place]  2. Sit to supine with Set-up [place]  3. Sit to stand transfer with Stand-by Assistance using Rolling Walker  4. Bed to chair transfer with Stand-by Assistance using Rolling Walker  5. Gait x150 feet with Contact Guard Assistance using Rolling Walker     Outcome: Ongoing (interventions implemented as appropriate)    Goals updated and appropriate to reflect pt's current mobility needs.    Carmencita Doherty, PT, DPT  [phone]  2018

## 2018-02-13 NOTE — PROGRESS NOTES
02/13/18 1131 02/13/18 1137   Vital Signs   /85 (!) 84/0   MAP (mmHg) 94 --    BP Location Left arm Left arm   BP Method Automatic Doppler   Patient Position Sitting Sitting   patient pulsatile. MD Sarah updated. Ordered give prn Clonidine

## 2018-02-13 NOTE — SUBJECTIVE & OBJECTIVE
Interval History: Patient reported feeling better this AM  Denied CP, SOB or palpitation.       Review of Systems   Constitution: Negative.   HENT: Negative.    Cardiovascular: Negative.    Respiratory: Negative.    Musculoskeletal: Negative.    Gastrointestinal: Negative.    Genitourinary: Negative.    Neurological: Negative.    Psychiatric/Behavioral: Negative.      Objective:     Vital Signs (Most Recent):  Temp: 98.1 °F (36.7 °C) (02/13/18 1131)  Pulse: 65 (02/13/18 1415)  Resp: 20 (02/13/18 1131)  BP: 99/76 (02/13/18 1415)  SpO2: 97 % (02/13/18 1131) Vital Signs (24h Range):  Temp:  [97.5 °F (36.4 °C)-99.1 °F (37.3 °C)] 98.1 °F (36.7 °C)  Pulse:  [65-81] 65  Resp:  [16-20] 20  SpO2:  [96 %-97 %] 97 %  BP: ()/(0-85) 99/76     Weight: 82.1 kg (181 lb 1.6 oz)  Body mass index is 24.56 kg/m².     SpO2: 97 %  O2 Device (Oxygen Therapy): room air      Intake/Output Summary (Last 24 hours) at 02/13/18 1430  Last data filed at 02/13/18 0600   Gross per 24 hour   Intake             1020 ml   Output                0 ml   Net             1020 ml       Lines/Drains/Airways     Peripherally Inserted Central Catheter Line                 PICC Double Lumen 02/05/18 1114 right basilic 8 days          Line                 VAD Left ventricular assist device HeartMate 3 -- days          Peripheral Intravenous Line                 Peripheral IV - Single Lumen Left Forearm -- days                Physical Exam   Constitutional: He is oriented to person, place, and time. He appears well-developed and well-nourished. No distress.   HENT:   Head: Normocephalic and atraumatic.   Mouth/Throat: Oropharynx is clear and moist.   Eyes: EOM are normal. Pupils are equal, round, and reactive to light. Right eye exhibits no discharge. Left eye exhibits no discharge. No scleral icterus.   Neck: Normal range of motion. Neck supple. No JVD present. No tracheal deviation present. No thyromegaly present.   Cardiovascular: Intact distal pulses.   Exam reveals no gallop and no friction rub.    No murmur heard.  LVAD hum present and smooth    Pulmonary/Chest: Effort normal and breath sounds normal. No respiratory distress. He has no rales. He exhibits no tenderness.   Abdominal: Soft. Bowel sounds are normal. He exhibits no distension and no mass. There is no rebound.   Musculoskeletal: Normal range of motion. He exhibits no edema, tenderness or deformity.   Lymphadenopathy:     He has no cervical adenopathy.   Neurological: He is alert and oriented to person, place, and time. He has normal reflexes.   Skin: Skin is warm and dry. He is not diaphoretic.   Psychiatric: He has a normal mood and affect. His behavior is normal. Judgment normal.   Nursing note and vitals reviewed.      Significant Labs:   CMP   Recent Labs  Lab 02/12/18 0443 02/13/18  0454    144   K 4.3 4.2    109   CO2 27 28   GLU 70 85   BUN 30* 31*   CREATININE 2.2* 2.0*   CALCIUM 11.4* 11.5*   ANIONGAP 7* 7*   ESTGFRAFRICA 32.7* 36.7*   EGFRNONAA 28.3* 31.7*   , CBC   Recent Labs  Lab 02/12/18 0443 02/13/18  0454   WBC 7.81 6.12   HGB 10.0* 9.8*   HCT 30.7* 30.0*   * 120*    and INR   Recent Labs  Lab 02/12/18 0443 02/13/18  0454   INR 3.0* 2.6*       Significant Imaging: X-Ray: CXR: X-Ray Chest 1 View (CXR):   Results for orders placed or performed during the hospital encounter of 02/08/18   X-Ray Chest 1 View    Narrative    One view: Central line right atrium. There is a pacer in the LVAD. There is cardiomegaly, mild edema, postoperative change, and no change.      Electronically signed by: GUILLERMO OCONNOR MD  Date:     02/08/18  Time:    16:09

## 2018-02-13 NOTE — ASSESSMENT & PLAN NOTE
- Chronic DLES   - Cultures positive for pseudomonas,   - previously on home Cefepime and Cipro.   - Continue Meropenem 1 g BID 4 weeks  - Upon discharge the pt will undergo cbc/cmp weekly while on Meropenem.   - INR at 3.0 this morning

## 2018-02-13 NOTE — PT/OT/SLP PROGRESS
"Physical Therapy Treatment    Patient Name:  Kev Vasquez   MRN:  [id number]    Recommendations:     Discharge Recommendations:  home with home health   Discharge Equipment Recommendations: hospital bed   Barriers to discharge: Inaccessible home and Decreased caregiver support    Assessment:     Kev Vasquez is a 77 y.o. male admitted with a medical diagnosis of Syncope.  He presents with the following impairments/functional limitations:  impaired endurance, impaired sensation, impaired self care skills, impaired functional mobilty, impaired cognition, impaired balance, gait instability, decreased safety awareness, decreased lower extremity function, decreased upper extremity function, abnormal tone, decreased ROM, impaired coordination, impaired fine motor, impaired cardiopulmonary response to activity. Pt demo good participation with PT this AM. Agreeable to OOB activity. Pt continues to req increased assist for simple mobility; min A for sit to stand and transfers to chair. Will progress as tolerated.    Rehab Prognosis:  Good; patient would benefit from acute skilled PT services to address these deficits and reach maximum level of function.      Recent Surgery: * No surgery found *      Plan:     During this hospitalization, patient to be seen 5 x/week to address the above listed problems via gait training, therapeutic activities, therapeutic exercises, neuromuscular re-education  · Plan of Care Expires:  03/11/18   Plan of Care Reviewed with: patient    Subjective     Communicated with RN (Rodríguez) prior to session.  Patient found resting in supine on wall power upon PT entry to room, agreeable to treatment.      Chief Complaint: "I am a prisoner here. They won't let me out of this bed."  Patient comments/goals: "I would like to get out of this room."  Pain/Comfort:  · Pain Rating 1: 0/10  · Pain Rating Post-Intervention 1: 0/10    Patients cultural, spiritual, Jainism conflicts given the current situation: None " noted    Objective:     Patient found with: telemetry, LVAD     General Precautions: Standard, aspiration, fall, honey thick, LVAD   Orthopedic Precautions:N/A   Braces: N/A     Functional Mobility:  Bed Mobility:   Rolling to the R and L: Stand-by Assistance   Supine to Sit: From L sidelying with HOB elevated to 45 degrees: Stand-by Assistance   Scooting at EOB: Stand-by Assistance    Sitting Balance at Edge of Bed:   Assistance Level Required: Stand-by Assistance   Time: x16 min for switching VAD to battery power, B UE/trunk stretches   Postural deviations noted:    -       Rounded shoulders  -       Forward head  -       Posterior pelvic tilt  -       Lateral weight shift of hips   Encouraged: midline, neutral pelvis    Transfers:   Sit to Stand: Minimal Assistance with No Assistive Device    Stand to Sit: Minimal Assistance with No Assistive Device for controlled descent to chair   Bed to Chair: Stand Pivot with Minimal Assistance with No Assistive Device VCs for sequencing/stepping    Therapeutic Activities and Exercises:  PT arrived to pt's room to find pt resting quietly; agreeable to PT session. Pt performed mobility as above. Pt req total A for switching of LVAD from wall to battery power.  Upon sitting EOB, PT instructed pt on B UE/trunk stretches in all planes including trunk rotations, forward flexion, thoracic extension 3x30 seconds each. Pt demo improved body mechanics and fluidity of sit to stand following trunk exercises. Pt assisted to chair with min A and transported to [facility] for out of room activity per pt's request; emergency bag in tow. PT provided daily orientation and orientation to CSU. Questions/concerns addressed within PT scope of practice; pt and family with no further questions. PT returned to pt's room in later PM to ensure pt comfortable and provide report of session to pt's spouse. RN aware of pt's mobility needs and status.    AM-PAC 6 CLICK MOBILITY  Turning over in bed  (including adjusting bedclothes, sheets and blankets)?: 4  Sitting down on and standing up from a chair with arms (e.g., wheelchair, bedside commode, etc.): 3  Moving from lying on back to sitting on the side of the bed?: 3  Moving to and from a bed to a chair (including a wheelchair)?: 3  Need to walk in hospital room?: 3  Climbing 3-5 steps with a railing?: 2  Total Score: 18     Patient left up in chair with all lines intact, call button in reach, RN notified and family present..    GOALS:    Physical Therapy Goals        Problem: Physical Therapy Goal    Goal Priority Disciplines Outcome Goal Variances Interventions   Physical Therapy Goal     PT/OT, PT Ongoing (interventions implemented as appropriate)     Description:  Goals to be met by: 18     Patient will increase functional independence with mobility by performin. Supine to sit with Set-up [place]  2. Sit to supine with Set-up [place]  3. Sit to stand transfer with Stand-by Assistance using Rolling Walker  4. Bed to chair transfer with Stand-by Assistance using Rolling Walker  5. Gait x150 feet with Contact Guard Assistance using Rolling Walker                       Time Tracking:     PT Received On: 18  PT Start Time: 1045     PT Stop Time: 1115  PT Total Time (min): 30 min     Billable Minutes: Therapeutic Activity 23    Treatment Type: Treatment  PT/PTA: PT       Caremncita Doherty, PT, DPT  [phone]  2018

## 2018-02-13 NOTE — ASSESSMENT & PLAN NOTE
- Non-oliguric, continue   - Cretinine above the baseline of 1.7; today it was 2.0  - Follow the trend; fluctuant trend.

## 2018-02-14 VITALS
TEMPERATURE: 98 F | BODY MASS INDEX: 25.59 KG/M2 | HEART RATE: 68 BPM | WEIGHT: 188.94 LBS | OXYGEN SATURATION: 97 % | RESPIRATION RATE: 20 BRPM | SYSTOLIC BLOOD PRESSURE: 64 MMHG | HEIGHT: 72 IN

## 2018-02-14 LAB
ANION GAP SERPL CALC-SCNC: 6 MMOL/L
BASOPHILS # BLD AUTO: 0.03 K/UL
BASOPHILS NFR BLD: 0.5 %
BUN SERPL-MCNC: 28 MG/DL
CALCIUM SERPL-MCNC: 11.4 MG/DL
CHLORIDE SERPL-SCNC: 109 MMOL/L
CO2 SERPL-SCNC: 28 MMOL/L
CREAT SERPL-MCNC: 1.9 MG/DL
CRP SERPL-MCNC: 1.6 MG/L
DIFFERENTIAL METHOD: ABNORMAL
EOSINOPHIL # BLD AUTO: 0.3 K/UL
EOSINOPHIL NFR BLD: 5.7 %
ERYTHROCYTE [DISTWIDTH] IN BLOOD BY AUTOMATED COUNT: 18.1 %
EST. GFR  (AFRICAN AMERICAN): 39 ML/MIN/1.73 M^2
EST. GFR  (NON AFRICAN AMERICAN): 33.7 ML/MIN/1.73 M^2
GLUCOSE SERPL-MCNC: 68 MG/DL
HCT VFR BLD AUTO: 28.2 %
HGB BLD-MCNC: 9.4 G/DL
IMM GRANULOCYTES # BLD AUTO: 0.02 K/UL
IMM GRANULOCYTES NFR BLD AUTO: 0.3 %
INR PPP: 2.5
LDH SERPL L TO P-CCNC: 137 U/L
LYMPHOCYTES # BLD AUTO: 1.1 K/UL
LYMPHOCYTES NFR BLD: 17.6 %
MAGNESIUM SERPL-MCNC: 2.3 MG/DL
MCH RBC QN AUTO: 29.9 PG
MCHC RBC AUTO-ENTMCNC: 33.3 G/DL
MCV RBC AUTO: 90 FL
MONOCYTES # BLD AUTO: 0.8 K/UL
MONOCYTES NFR BLD: 12.9 %
NEUTROPHILS # BLD AUTO: 3.7 K/UL
NEUTROPHILS NFR BLD: 63 %
NRBC BLD-RTO: 0 /100 WBC
PLATELET # BLD AUTO: 117 K/UL
PMV BLD AUTO: 11 FL
POTASSIUM SERPL-SCNC: 4.1 MMOL/L
PREALB SERPL-MCNC: 28 MG/DL
PROTHROMBIN TIME: 24.4 SEC
RBC # BLD AUTO: 3.14 M/UL
SODIUM SERPL-SCNC: 143 MMOL/L
WBC # BLD AUTO: 5.95 K/UL

## 2018-02-14 PROCEDURE — 25000003 PHARM REV CODE 250: Performed by: INTERNAL MEDICINE

## 2018-02-14 PROCEDURE — 99238 HOSP IP/OBS DSCHRG MGMT 30/<: CPT | Mod: ,,, | Performed by: INTERNAL MEDICINE

## 2018-02-14 PROCEDURE — 84134 ASSAY OF PREALBUMIN: CPT

## 2018-02-14 PROCEDURE — 80048 BASIC METABOLIC PNL TOTAL CA: CPT

## 2018-02-14 PROCEDURE — 83615 LACTATE (LD) (LDH) ENZYME: CPT

## 2018-02-14 PROCEDURE — 63600175 PHARM REV CODE 636 W HCPCS: Performed by: INTERNAL MEDICINE

## 2018-02-14 PROCEDURE — 93750 INTERROGATION VAD IN PERSON: CPT | Mod: ,,, | Performed by: INTERNAL MEDICINE

## 2018-02-14 PROCEDURE — 86140 C-REACTIVE PROTEIN: CPT

## 2018-02-14 PROCEDURE — 83735 ASSAY OF MAGNESIUM: CPT

## 2018-02-14 PROCEDURE — 27000248 HC VAD-ADDITIONAL DAY

## 2018-02-14 PROCEDURE — 85610 PROTHROMBIN TIME: CPT

## 2018-02-14 PROCEDURE — 85025 COMPLETE CBC W/AUTO DIFF WBC: CPT

## 2018-02-14 PROCEDURE — 92526 ORAL FUNCTION THERAPY: CPT

## 2018-02-14 RX ORDER — LISINOPRIL 10 MG/1
10 TABLET ORAL 2 TIMES DAILY
Qty: 180 TABLET | Refills: 3 | Status: SHIPPED | OUTPATIENT
Start: 2018-02-14 | End: 2019-02-14

## 2018-02-14 RX ORDER — NIFEDIPINE 60 MG/1
120 TABLET, EXTENDED RELEASE ORAL DAILY
Qty: 60 TABLET | Refills: 11 | Status: SHIPPED | OUTPATIENT
Start: 2018-02-15 | End: 2019-02-15

## 2018-02-14 RX ORDER — MEROPENEM 1 G/1
1 INJECTION, POWDER, FOR SOLUTION INTRAVENOUS EVERY 12 HOURS
Start: 2018-02-14 | End: 2018-04-12

## 2018-02-14 RX ORDER — WARFARIN 4 MG/1
TABLET ORAL
Refills: 11
Start: 2018-02-14

## 2018-02-14 RX ORDER — DOXAZOSIN 2 MG/1
8 TABLET ORAL DAILY
Status: DISCONTINUED | OUTPATIENT
Start: 2018-02-15 | End: 2018-02-14 | Stop reason: HOSPADM

## 2018-02-14 RX ADMIN — MEROPENEM 1 G: 1 INJECTION, POWDER, FOR SOLUTION INTRAVENOUS at 12:02

## 2018-02-14 RX ADMIN — HYDRALAZINE HYDROCHLORIDE 100 MG: 50 TABLET ORAL at 03:02

## 2018-02-14 RX ADMIN — CARBIDOPA AND LEVODOPA 1 TABLET: 25; 100 TABLET ORAL at 05:02

## 2018-02-14 RX ADMIN — ATORVASTATIN CALCIUM 10 MG: 10 TABLET, FILM COATED ORAL at 09:02

## 2018-02-14 RX ADMIN — ISOSORBIDE DINITRATE 40 MG: 40 TABLET ORAL at 03:02

## 2018-02-14 RX ADMIN — PANTOPRAZOLE SODIUM 40 MG: 40 TABLET, DELAYED RELEASE ORAL at 03:02

## 2018-02-14 RX ADMIN — PANTOPRAZOLE SODIUM 40 MG: 40 TABLET, DELAYED RELEASE ORAL at 06:02

## 2018-02-14 RX ADMIN — Medication 1 CAPSULE: at 09:02

## 2018-02-14 RX ADMIN — HYDRALAZINE HYDROCHLORIDE 100 MG: 50 TABLET ORAL at 06:02

## 2018-02-14 RX ADMIN — DOFETILIDE 125 MCG: 0.12 CAPSULE ORAL at 09:02

## 2018-02-14 RX ADMIN — LISINOPRIL 10 MG: 10 TABLET ORAL at 09:02

## 2018-02-14 RX ADMIN — ASPIRIN 81 MG 81 MG: 81 TABLET ORAL at 09:02

## 2018-02-14 RX ADMIN — CARBIDOPA AND LEVODOPA 1 TABLET: 25; 100 TABLET ORAL at 03:02

## 2018-02-14 RX ADMIN — CLONIDINE HYDROCHLORIDE 0.1 MG: 0.1 TABLET ORAL at 05:02

## 2018-02-14 RX ADMIN — ISOSORBIDE DINITRATE 40 MG: 40 TABLET ORAL at 06:02

## 2018-02-14 RX ADMIN — MAGNESIUM OXIDE TAB 400 MG (241.3 MG ELEMENTAL MG) 400 MG: 400 (241.3 MG) TAB at 09:02

## 2018-02-14 RX ADMIN — DOXAZOSIN MESYLATE 8 MG: 2 TABLET ORAL at 09:02

## 2018-02-14 RX ADMIN — ALLOPURINOL 300 MG: 300 TABLET ORAL at 09:02

## 2018-02-14 RX ADMIN — CARBIDOPA AND LEVODOPA 1 TABLET: 25; 100 TABLET ORAL at 09:02

## 2018-02-14 RX ADMIN — WARFARIN SODIUM 4 MG: 4 TABLET ORAL at 05:02

## 2018-02-14 RX ADMIN — BUPROPION HYDROCHLORIDE 450 MG: 150 TABLET, EXTENDED RELEASE ORAL at 09:02

## 2018-02-14 RX ADMIN — FOLIC ACID 1 MG: 1 TABLET ORAL at 09:02

## 2018-02-14 RX ADMIN — FERROUS SULFATE TAB EC 325 MG (65 MG FE EQUIVALENT) 325 MG: 325 (65 FE) TABLET DELAYED RESPONSE at 09:02

## 2018-02-14 RX ADMIN — NIFEDIPINE 120 MG: 30 TABLET, FILM COATED, EXTENDED RELEASE ORAL at 09:02

## 2018-02-14 NOTE — PLAN OF CARE
Problem: Patient Care Overview  Goal: Individualization & Mutuality  Outcome: Ongoing (interventions implemented as appropriate)  POC reviewed with pt. VS and VAD numbers stable. LVAD dressing changed this shift. INR 2.6 on 2/13. Pt repositioned and barrier cream applied to sacrum. Pt remains free from falls, trauma, injury; pt tolerating POC well. Will continue to monitor.

## 2018-02-14 NOTE — PT/OT/SLP PROGRESS
Physical Therapy      Patient Name:  Kev Vasquez   MRN:  [id number]    Patient not seen today secondary to  (pt refused stating that he was being discharged and had no concerns. Pt family and paid sitter expressed no concerns.).  Sofía Cooper, PT

## 2018-02-14 NOTE — PLAN OF CARE
Problem: SLP Goal  Goal: SLP Goal  Speech Therapy Short Term Goals  Goal expected to be met by 2/23  1. Pt will tolerate a regular diet and honey thick liquids with no overt s/s of aspiration.   2. Pt will demonstrate 3 safe swallowing precautions with no cues.   3. Pt will complete dysphagia therapy exercises x10 each given min cues.        Outcome: Ongoing (interventions implemented as appropriate)  Pt progressing towards goals. He would benefit from ongoing HH ST upon d/c from acute. Thank you.    JEWEL Woo., CCC-SLP  Speech-Language Pathology  Pager: [phone]  2/14/2018

## 2018-02-14 NOTE — PROGRESS NOTES
LVAD dressing change completed with soap and water. DLES 3, small amount of serosanguineous purulent drainage noted and  No complaints of tenderness. Pt tolerated well.

## 2018-02-14 NOTE — PROGRESS NOTES
Pharmacy consulted about scheduled Sinemet 25/100- awaiting pharm arrival so last given at 1630. Scheduled Q4H. Stated will change administration times appropriately. Will update primary PM RN

## 2018-02-14 NOTE — PLAN OF CARE
Problem: Patient Care Overview  Goal: Plan of Care Review  Outcome: Revised  Plan of care discussed with patient, caregiver, and wife at bedside. Ex- WNL VS reported to MD & tx with prn BP medication. Smooth LVAD hum. No skin breakdown noted on assessment- barrier cream & weight shifting implemented; mattress overlay ordered & to be implemented. Patient is free of fall/trauma/injury. Denies CP, SOB, or pain/discomfort. All questions addressed. Will continue to monitor

## 2018-02-14 NOTE — PROCEDURES
TXP LVAD INTERROGATIONS 2/14/2018 2/14/2018 2/14/2018 2/14/2018 2/14/2018 2/13/2018 2/13/2018   Type HeartMate3 HeartMate3 HeartMate3 (No Data) HeartMate3 HeartMate3 HeartMate3   Flow 5.2 5.4 5.1 - 4.7 5 5.1   Speed 5800 5800 5800 - 5800 5800 5800   PI 3.3 2.3 3.3 - 3.5 3.4 3.2   Power (Mendez) 4.4 4.5 4.4 - 4.4 4.5 4.4   LSL - - 5400 - - - 5400   Pulsatility - - - - - - Intermittent pulse     Reviewed interrogation of VAD, there were several PI events noted over the course of the last 2 days, otherwise no low flows, power spikes, or significant speed drops noted, lowest it got to was 5500.     Interrogation of Ventricular assist device was performed with physician analysis of device parameters and review of device function. I have personally reviewed the interrogation findings and agree with findings as stated.

## 2018-02-14 NOTE — PROGRESS NOTES
Mr. Kev Vasquez is being discharged today following admission for evaluation of confusion and lethargy. 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     During his hospital stay his blood pressure regimen was modified. His previous antibiotic iv cefepime was changed to meropenem per infectious disease consultation.     His inr goal is 2-3, with aspirin 81mg orally daily. His inr today is 2.5. He was instructed to resume his prior home dose of coumadin with 4mg orally on Tues/Thur and 6mg orally on other days. Follow up inr requested with  and coumadin clinic for Monday.     Thank you.

## 2018-02-14 NOTE — PROGRESS NOTES
D/C note:    SW to pt's room for D/c. Pt awake, alert, and oriented to person and place. Pt reports in agreement with plan to D/C home today with IV abx. SW notified Lisandro with CPP of plan for D/C today. Lisandro to access pt's chart through epic. Per Lisandro with CPP, it will cost pt $110 a week for pt to have PICC care at [facility]. BRAEDEN discussed with dtr Ena, who reports preference of [facility]. SW spoke to Faith and arranged [facility] for SN, PT, OT, and ST. Pt will go to out pt PT/OT/ST once abx are complete.  PT recommending hospital bed, and Dr. Smith reports willing to order the same. Pt's wife and sitter to provide transport. Pt and family report no other needs at this time. SW providing psychosocial and counseling support, education, assistance, resources, and D/C planning as indicated. SW remains available.

## 2018-02-14 NOTE — PT/OT/SLP PROGRESS
"Speech Language Pathology Treatment    Patient Name:  Kev Vasquez   MRN:  [id number]  Admitting Diagnosis: Syncope  The primary encounter diagnosis was Syncope. A diagnosis of LVAD (left ventricular assist device) present was also pertinent to this visit.    Recommendations:                 General Recommendations:  Dysphagia therapy  Diet recommendations:  Regular, Liquid Diet Level: Honey Thick   Aspiration Precautions: 1 bite/sip at a time, Alternating bites/sips, Assistance with meals and Assistance with thickening liquids, Chin tuck, Eliminate distractions, Feed only when awake/alert, No straws and Small bites/sips   General Precautions: Standard, aspiration, fall, honey thick, LVAD  Communication strategies:  none    Subjective     SLP reviews Pt with nurse, nurse explains Pt scheduled for d/c later this service day and clears for ST  Pt presents calm and cooperative  He explains, "I sometimes forget to use that chin tuck"  He denies pain    Pain/Comfort:  · Pain Rating 1: 0/10  · Pain Rating Post-Intervention 1: 0/10    Objective:     Has the patient been evaluated by SLP for swallowing?   Yes  Keep patient NPO? No   Current Respiratory Status: room air      Pt seen for ongoing monitoring of swallow and dysphagia therapy. Pt presents calm and cooperative. He reports adequate appetite. Spouse and caregiver at bedside and endorse Pt with good appetite. Pt DEP for recall of thickener guidelines.  He recalls safe swallow strategies with 50% accuracy I'ly. Pt caregiver recalls aspiration precautions and thickener guidelines appropriately. Pt seen with trials of nectar-thickened liquids x4 (tsp x1, cup edge x3.) No overt S/S aspiration noted with nectar-thickened liquids provide moderate verbal and visual reminders for use of chin tuck with each sip.  He completes lingual protrusion and fercho exercises x5 each with set-up today. Pt, spouse and caregiver educated on ongoing thickener guidelines for honey-thickened " liquids, dysphagia exercises for BOT, aspiration precuations and recommendations for ongoing ST following d/c from acute.     Assessment:     Kev Vasquez is a 77 y.o. male with an SLP diagnosis of Dysphagia.  He presents with tolerance of nectar-thickened liquids trials in isolation of solid foods today provided moderate cues to use chin tuck with each sip. Ongoing HH ST recommended upon d/c from acute. Thank you.     Goals:    SLP Goals        Problem: SLP Goal    Goal Priority Disciplines Outcome   SLP Goal     SLP Ongoing (interventions implemented as appropriate)   Description:  Speech Therapy Short Term Goals  Goal expected to be met by 2/23  1. Pt will tolerate a regular diet and honey thick liquids with no overt s/s of aspiration.   2. Pt will demonstrate 3 safe swallowing precautions with no cues.   3. Pt will complete dysphagia therapy exercises x10 each given min cues.                         Plan:     · Patient to be seen:  4 x/week   · Plan of Care expires:  03/10/18  · Plan of Care reviewed with:  patient, caregiver, spouse   · SLP Follow-Up:  Yes       Discharge recommendations:  home health speech therapy   Barriers to Discharge:  None    Time Tracking:     SLP Treatment Date:   02/14/18  Speech Start Time:  1458  Speech Stop Time:  1507     Speech Total Time (min):  9 min    Billable Minutes: Treatment Swallowing Dysfunction 9    JEWEL Woo., [facility]-SLP  Speech-Language Pathology  Pager: [phone]      02/14/2018

## 2018-02-14 NOTE — PLAN OF CARE
[facility]    HOME HEALTH ORDERS  FACE TO FACE ENCOUNTER    Patient Name: Kev Vasquez  YOB: 1940    PCP: Mega Collins MD   PCP Address: [address] / [place] [address]  PCP Phone Number: [phone]  PCP Fax: [phone]    Encounter Date: 02/14/2018    Admit to Home Health    Diagnoses:  Active Hospital Problems    Diagnosis  POA    *Syncope [R55]  Yes    Uncontrolled hypertension [I10]  Yes     Priority: 1 - High    Left ventricular assist device (LVAD) complication, subsequent encounter [T82.9XXD]  Not Applicable    Confusion [R41.0]  Yes    VT (ventricular tachycardia) [I47.2]  Yes    Stage 3 chronic kidney disease [N18.3]  Yes    LVAD (left ventricular assist device) present [Z95.811]  Not Applicable     status post Heartmate III 10/16/16 LVAD        Resolved Hospital Problems    Diagnosis Date Resolved POA   No resolved problems to display.       Future Appointments  Date Time Provider Department Center   2/15/2018 3:30 PM Terese Horvath, CCC-SLP [facility]   2/20/2018 4:15 PM Tracy Araiza, PT [facility] OP [facility]   3/6/2018 11:00 AM Tucker Devries OT [facility] OP [facility]   3/15/2018 8:00 AM ECHO, [facility] ECHOLAB [facility]   3/15/2018 9:00 AM LAB, APPOINTMENT [facility] LAB [facility]   3/15/2018 11:00 AM HEARTTRANSPLANT, LVADN [facility] HEARTTX [facility]   3/15/2018 11:00 AM Jesus To MD [facility] HEARTTX [facility]   3/15/2018 1:00 PM Payam Muhammad MD [facility] ID [facility]           Advanced heart failure requiring LVAD   Parkinson disease  Need for chronic IV antibiotic therapy for chronic drive line exit site infection.     Allergies:  Review of patient's allergies indicates:   Allergen Reactions    Aldactone [spironolactone]       persistent hyperkalemia.    Sulfa (sulfonamide antibiotics)        Diet: cardiac diet    Activities: activity as tolerated    Nursing:   SN to complete comprehensive  assessment including routine vital signs. Instruct on disease process and s/s of complications to report to MD. Review/verify medication list sent home with the patient at time of discharge  and instruct patient/caregiver as needed. Frequency may be adjusted depending on start of care date.    Notify MD if SBP > 160 or < 90; DBP > 90 or < 50; HR > 120 or < 50; Temp > 101; Other:         CONSULTS:    Physical Therapy to evaluate and treat. Evaluate for home safety and equipment needs; Establish/upgrade home exercise program. Perform / instruct on therapeutic exercises, gait training, transfer training, and Range of Motion.  Occupational Therapy to evaluate and treat. Evaluate home environment for safety and equipment needs. Perform/Instruct on transfers, ADL training, ROM, and therapeutic exercises.  Speech Therapy  to evaluate and treat for  Swallowing.    MISCELLANEOUS CARE:  Heart Failure:      SN to instruct on the following:    Instruct on the definition of CHF.   Instruct on the signs/sympoms of CHF to be reported.   Instruct on and monitor daily weights.   Instruct on factors that cause exacerbation.   Instruct on action, dose, schedule, and side effects of medications.   Instruct on diet as prescribed.   Instruct on activity allowed.   Instruct on life-style modifications for life long management of CHF   SN to assess compliance with daily weights, diet, medications, fluid retention,    safety precautions, activities permitted and life-style modifications.   Additional 1-2 SN visits per week as needed for signs and symptoms     of CHF exacerbation.      For Weight Gain > 2-3 lbs in 1 day or 4-6 lbs over 1 week notify PCP:  Pt to coordinate with     WOUND CARE ORDERS  n/a    MEDICATION:     Meropenem 1g Q12 Hrs x 26 days: Follow up with ID clinic ( [facility] ) on 03/15/2018      I certify that this patient is confined to his home and needs physical therapy, speech therapy and occupational  therapy.

## 2018-02-14 NOTE — PLAN OF CARE
[facility]   Heart Transplant/VAD Clinic   [address] [place] [address]   [phone] after hours          [phone] fax     VAD HOME  HEALTH ORDERS      Admit to Home Health    Diagnosis:   Patient Active Problem List   Diagnosis    Chronic combined systolic and diastolic congestive heart failure    Coronary artery disease involving native coronary artery of native heart without angina pectoris    S/P CABG (coronary artery bypass graft)    ICD (implantable cardioverter-defibrillator), biventricular, in situ    HILLARY on CPAP    Pulmonary hypertension due to left ventricular systolic dysfunction    Ischemic cardiomyopathy    Stage 3 chronic kidney disease    LVAD (left ventricular assist device) present    Chronic anticoagulation    VT (ventricular tachycardia)    Delirium    Restless leg syndrome    Gait instability    Acute on chronic systolic and diastolic heart failure, NYHA class 4    Essential hypertension    Vomiting    Hallucinations    Confusion    Gastroesophageal reflux disease    Cardiomyopathy    Wound infection    Syncope    Left ventricular assist device (LVAD) complication, subsequent encounter    Uncontrolled hypertension       Patient is homebound due to:      Diet: Low Fat, Low cholesterol, 2Gm Na, Coumadin restrictions.    Acitivities: No Swimming, bathing, vacuuming, contact sports.    Fresh implants= Sternal Precautions    Nursing:   SN to complete comprehensive assessment including routine vital signs. Instruct on disease process and s/s of complications to report to MD. Review/verify medication list sent home with the patient at time of discharge  and instruct patient/caregiver as needed. Frequency may be adjusted depending on start of care date.    **LVAD driveline exit site dressing change is to be completed per LVAD patient/caregiver only**.    Notify MD if SBP > 160 or < 90; DBP > 90 or < 50; HR > 120 or <  50; Temp > 101; Weight gain >3lbs in 1 day or 5lbs in 1 week.  Other:         LABS:  SN to perform labs: PT/INR per Coumadin clinic [phone].   Follow up INR date:  No Finger Sticks        Administer (drug and dose):     Meropenem 1 g Q12 HR X 22 Days: Follow up with ID in their clinic. As per ID service the patient is aware of the date for follow up.     Central line care:  Scrub the Hub: Prior to accessing the line, always perform a 30 second alcohol scrub  Each lumen of the central line is to be flushed at least daily with 10 mL Normal Saline and 3 mL Heparin flush (100 units/mL)  Skilled Nurse (SN) may draw blood from IV access  Blood Draw Procedure:   - Aspirate at least 5 mL of blood   - Discard   - Obtain specimen   - Change posiflow cap   - Flush with 20 mL Normal Saline followed by a                 3-5 mL Heparin flush (100 units/mL)  Central :   - Sterile dressing changes are done weekly and as needed.   - Use chlor-hexadine scrub to cleanse site, apply Biopatch to insertion site,       apply securement device dressing   - Posi-flow caps are changed weekly and after EVERY lab draw.   - If sterile gauze is under dressing to control oozing,                 dressing change must be performed every 24 hours until gauze is not needed.    CONSULTS:   Physical Therapy to evaluate and treat. Evaluate for home safety and equipment needs; Establish/upgrade home exercise program. Perform / instruct on therapeutic exercises, gait training, transfer training, and Range of Motion.    Occupational Therapy to evaluate and treat. Evaluate home environment for safety and equipment needs. Perform/Instruct on transfers, ADL training, ROM, and therapeutic exercises.    Speech Therapy  to evaluate and treat for:  Language  Swallowing  Cognition                                                       to evaluate for community resources/long-range planning.     Aide to provide assistance with  personal care, ADLs, and vital signs    Send initial Home Health orders to HTS attending physician on call.  Send follow up questions to VAD clinic MD [phone] or [phone].

## 2018-02-14 NOTE — PROGRESS NOTES
02/14/18 0500 02/14/18 0506   Vital Signs   Temp 97.7 °F (36.5 °C) --    Temp src Oral --    Pulse 68 --    Heart Rate Source Monitor --    Resp 18 --    SpO2 98 % --    Pulse Oximetry Type Intermittent --    O2 Device (Oxygen Therapy) room air --    /88 (!) 98/0   MAP (mmHg) 99 --    BP Location Left arm --    BP Method Automatic Doppler   Patient Position Sitting --      Pt has no complaints; notified Dr. Early. Telephone order to administer PRN clonidine, recheck in one hour, and administer scheduled isorsobide and hydralazine. Will continue to monitor.

## 2018-02-14 NOTE — PLAN OF CARE
Problem: Patient Care Overview  Goal: Plan of Care Review  Outcome: Revised  Patient is ready for discharge. Wife & sitter at bedside. VSS. Smooth LVAD hum. LVAD inventory complete (see flowsheets). Carepoint at bedside, reviewed education- Meropenem to be delivered to patient house per Lisandro, Nori RN. Patient stable alert and oriented. No PIV present. PICC- CDI. No skin breakdown noted during shift- barrier cream applied to sacrum to further protect against skin breakdown. No complaints of pain. Discussed discharge plan. Reviewed medications and side effects, appointments, and answered questions with patient and family. Will continue to monitor.

## 2018-02-14 NOTE — DISCHARGE SUMMARY
[facility]  Cardiology  Discharge Summary      Patient Name: Kev Vasquez  MRN: [id number]  Admission Date: 2/8/2018  Hospital Length of Stay: 5 days  Discharge Date and Time:  02/14/2018 6:59 PM  Attending Physician: Caitlin Wells MD  Discharging Provider: Yanet Smith MD  Primary Care Physician: Mega Collins MD    HPI:   Mr. 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 BiV ICD, HILLARY/BiPAP, HTN, CKD, HLP, gout and depression who presented with confusion and lethargy a day after discharge. Wife and caretaker were at bedside at the time of admission and report that patient was doing well for a day after discharge only to experience acute confusion and lethargy at lunch time today. He was being walked to dining table when he felt weak and had to be eased to a wheel chair and once on the table, he started gagging and threw up once on taking a cranberry. Caretake mention that patient is avoiding taking fluids due to the added thickener which he says is making it taste bad. Patient has also lost weight and is eating less with notable significant weight loss. He recently started on parkinson medication (sinemet) without any reported side effects. During the whole event, caretake mention LVAD low power alarm,or despite normal map during the event. Currently, patient is currently back to baseline and mention only feeling mild leg contraction.    * No surgery found *     Indwelling Lines/Drains at time of discharge:  Lines/Drains/Airways     Peripherally Inserted Central Catheter Line                 PICC Double Lumen 02/05/18 1114 right basilic 9 days          Line                 VAD Left ventricular assist device HeartMate 3 -- days                Hospital Course (synopsis of major diagnoses, care, treatment, and  services provided during the course of the hospital stay):     Pt was admitted and his symptoms of syncope were deemed to be a combination of vasovagal phenomenon plus the labile blood pressures. Most of the time the patient's blood pressure was not properly controlled and it always remained at the upper end of the his range of systolic blood pressure.   His confusion was attributed to his underlying dementia and parkinson disease.   His blood pressure control remained an issue throughout this admission. Initially the team adjusted his Hydralazin to 150 mg TID without significant improvement. Later on , Nifedipine was added at 60 mg a day and Amolidipine was discontinued. His dose for nifedepine escalated to 120 mg a day before brining the MAP when palsatile and MAP when non-palsatile, to less than 90.     Pt was to be discharged today with follow up in heart failure clinic.         Consults:   Consults         Status Ordering Provider     Inpatient consult to Cardiology  Once     Provider:  (Not yet assigned)    Completed JAUQELINE YOON     Inpatient consult to Registered Dietitian/Nutritionist  Once     Provider:  (Not yet assigned)    Completed AL CORONEL     IP consult to Heart Transplant  Once     Provider:  (Not yet assigned)    Completed JAQUELINE YOON          Significant Diagnostic Studies: Labs:   CMP   Recent Labs  Lab 02/13/18  0454 02/14/18  0500    143   K 4.2 4.1    109   CO2 28 28   GLU 85 68*   BUN 31* 28*   CREATININE 2.0* 1.9*   CALCIUM 11.5* 11.4*   ANIONGAP 7* 6*   ESTGFRAFRICA 36.7* 39.0*   EGFRNONAA 31.7* 33.7*   , CBC   Recent Labs  Lab 02/13/18  0454 02/14/18  0500   WBC 6.12 5.95   HGB 9.8* 9.4*   HCT 30.0* 28.2*   * 117*    and INR   Lab Results   Component Value Date    INR 2.5 (H) 02/14/2018    INR 2.6 (H) 02/13/2018    INR 3.0 (H) 02/12/2018       Pending Diagnostic Studies:     None          Final Active Diagnoses:    Diagnosis Date Noted POA     PRINCIPAL PROBLEM:  Syncope [R55] 02/08/2018 Yes    Uncontrolled hypertension [I10] 02/12/2018 Yes    Left ventricular assist device (LVAD) complication, subsequent encounter [T82.9XXD] 02/09/2018 Not Applicable    Confusion [R41.0] 01/24/2018 Yes    VT (ventricular tachycardia) [I47.2] 07/25/2017 Yes    Stage 3 chronic kidney disease [N18.3] 07/20/2017 Yes    LVAD (left ventricular assist device) present [Z95.811] 07/20/2017 Not Applicable      Problems Resolved During this Admission:    Diagnosis Date Noted Date Resolved POA       Discharged Condition: stable    Follow Up:  Follow-up Information     Karen Valladares MD In 2 weeks.    Specialties:  Transplant, Cardiology  Contact information:  [facility] [address] [phone]                 Patient Instructions:     HOSPITAL BED FOR HOME USE   Order Specific Question Answer Comments   Type: Semi-electric    Length of need (1-99 months): 9    Height: 6' (1.829 m)    Weight: 85.7 kg (188 lb 15 oz)    Please check all that apply: Patient requires positioning of the body in ways not feasible in an ordinary bed due to a medical condition which is expected to last at least one month.        Medications:  Reconciled Home Medications:   Current Discharge Medication List      START taking these medications    Details   meropenem (MERREM) 1 gram injection Inject 1 g into the vein every 12 (twelve) hours.      NIFEdipine (PROCARDIA-XL) 60 MG (OSM) 24 hr tablet Take 2 tablets (120 mg total) by mouth once daily.  Qty: 60 tablet, Refills: 11         CONTINUE these medications which have CHANGED    Details   lisinopril 10 MG tablet Take 1 tablet (10 mg total) by mouth 2 (two) times daily.  Qty: 180 tablet, Refills: 3      warfarin (COUMADIN) 4 MG tablet Take 4 mg orally daily on Tue, Thu and 6 mg orally daily all other days other days as directed by coumadin clinic  Refills: 11         CONTINUE these medications which have NOT CHANGED    Details    allopurinol (ZYLOPRIM) 300 MG tablet Take 1 tablet (300 mg total) by mouth once daily.  Qty: 90 tablet, Refills: 4      aspirin 81 MG Chew Take 81 mg by mouth once daily.      atorvastatin (LIPITOR) 10 MG tablet Take 10 mg by mouth once daily.      buPROPion 450 mg Tb24 Take 450 mg by mouth once daily.  Qty: 30 tablet, Refills: 11      calcium-vitamin D tablet 600 mg-200 units Take 1 tablet by mouth once daily.      carbidopa-levodopa  mg (SINEMET)  mg per tablet Take 1 tablet by mouth every 4 (four) hours while awake.  Qty: 150 tablet, Refills: 11      docusate sodium (COLACE) 100 MG capsule Take 100 mg by mouth daily as needed for Constipation.      dofetilide (TIKOSYN) 125 MCG Cap Take 1 capsule (125 mcg total) by mouth every 12 (twelve) hours.  Qty: 60 capsule, Refills: 11      doxazosin (CARDURA) 8 MG Tab Take 1 tablet (8 mg total) by mouth once daily.  Qty: 30 tablet, Refills: 11      ferrous sulfate 324 mg (65 mg iron) TbEC Take 1 tablet (324 mg total) by mouth once daily.  Qty: 30 tablet, Refills: 11      folic acid (FOLVITE) 1 MG tablet Take 1 mg by mouth once daily.      hydrALAZINE (APRESOLINE) 100 MG tablet Take 1 tablet (100 mg total) by mouth every 8 (eight) hours.  Qty: 90 tablet, Refills: 11      isosorbide dinitrate (ISORDIL) 40 MG Tab Take 1 tablet (40 mg total) by mouth 3 (three) times daily.  Qty: 90 tablet, Refills: 11      Lactobacillus rhamnosus GG (CULTURELLE) 10 billion cell capsule Take 1 capsule by mouth once daily.      magnesium oxide (MAG-OX) 400 mg tablet Take 1 tablet (400 mg total) by mouth 2 (two) times daily.  Qty: 60 tablet, Refills: 11      multivitamin capsule Take 1 capsule by mouth once daily.      pantoprazole (PROTONIX) 40 MG tablet Take 1 tablet (40 mg total) by mouth 2 (two) times daily before meals.  Qty: 60 tablet, Refills: 11         STOP taking these medications       amLODIPine (NORVASC) 10 MG tablet Comments:   Reason for Stopping:         CEFEPIME HCL  (CEFEPIME 2 G/50 ML D5W, READY TO MIX SYSTEM,) Comments:   Reason for Stopping:               Time spent on the discharge of patient: 45 minutes    Yanet Smith MD  Cardiology  [facility]

## 2018-02-15 NOTE — PT/OT/SLP DISCHARGE
Occupational Therapy Discharge Summary    Kev Vasquez  MRN: [id number]   Principal Problem: Syncope      Patient Discharged from acute Occupational Therapy on 2/14/18.  Please refer to prior OT note dated 2/9/18 for functional status.    Assessment:      Patient appropriate for care in another setting.    Objective:     GOALS:    Occupational Therapy Goals        Problem: Occupational Therapy Goal    Goal Priority Disciplines Outcome Interventions   Occupational Therapy Goal     OT, PT/OT Ongoing (interventions implemented as appropriate)    Description:  Goals to be met by: 7 days (2/16/18)     Patient will increase functional independence with ADLs by performing:    UE Dressing with Supervision.  Grooming while standing at sink with Contact Guard Assistance.  Toileting from toilet with Contact Guard Assistance for hygiene and clothing management.   Sitting at edge of bed x10 minutes with Stand-by Assistance.  Supine to sit with Contact Guard Assistance.  Toilet transfer to toilet with Contact Guard Assistance.  Pt will perform functional mobility household distance with CGA using RW.                      Reasons for Discontinuation of Therapy Services  Transfer to alternate level of care.      Plan:     Patient Discharged to: Home with Home Health Service with transition to OP therapy    SHAUNA Hanks  2/15/2018

## 2018-02-15 NOTE — PHYSICIAN QUERY
PT Name: Kev Vasquez  MR #: [id number]     Physician Query Form - Documentation Clarification      CDS/: Zacarias Ahn Jr RN              Contact information:[phone]    This form is a permanent document in the medical record.     Query Date: February 15, 2018    By submitting this query, we are merely seeking further clarification of documentation. Please utilize your independent clinical judgment when addressing the question(s) below.    The Medical record reflects the following:    Supporting Clinical Findings Location in Medical Record    Essential hypertension   - severe (stage 2) and on multiple agents  - hold ACEI due to kathy on ckd    Pt's /0; ; PRN Hydralazine IVP given @ 0000      Uncontrolled hypertension   - Pt has had improved MAP ysterday with couple of readings high this morning  - Increased the dose of Doxazosyn to 8 mg BID and reduced the dose of Hydralazin to 100 mg TID.   - Continue Lisinopril 10 mg BID, Isordil and Nifedipine at current dosing.   - Contineu Clonidine 0.1 mg PRN was added to the regimen  - Plan to continue the above regimen    Pt was admitted and his symptoms of syncope were deemed to be a combination of vasovagal phenomenon plus the labile blood pressures. Most of the time the patient's blood pressure was not properly controlled and it always remained at the upper end of the his range of systolic blood pressure.     Uncontrolled hypertension  2/8 H&P        2/10 Nursing Progress Note    2/13 Cardiology Progress Note                  2/14 Discharge Summary            2/14 Discharge Summary                                                                                      Doctor, Please specify diagnosis or diagnoses associated with above clinical findings.  Please Further Specify the Uncontrolled Hypertension    Provider Use Only    (    )Hypertensive Emergency    (    )Other________________________________________________                                                                                                            [ x ] Clinically undetermined

## 2018-02-15 NOTE — TELEPHONE ENCOUNTER
Contacted pt's daughter to check status of Tikosyn assistance application. Pt was discharged to home yesterday and she has not had a chance to complete the vcítor. She will get it together and email back in the next couple of days. Will continue to follow up.

## 2018-02-16 ENCOUNTER — LAB VISIT (OUTPATIENT)
Dept: LAB | Facility: HOSPITAL | Age: 78
End: 2018-02-16
Attending: INTERNAL MEDICINE
Payer: MEDICARE

## 2018-02-16 ENCOUNTER — ANTI-COAG VISIT (OUTPATIENT)
Dept: CARDIOLOGY | Facility: CLINIC | Age: 78
End: 2018-02-16

## 2018-02-16 DIAGNOSIS — Z79.01 LONG TERM (CURRENT) USE OF ANTICOAGULANTS: Primary | ICD-10-CM

## 2018-02-16 DIAGNOSIS — Z79.01 CHRONIC ANTICOAGULATION: ICD-10-CM

## 2018-02-16 DIAGNOSIS — Z95.811 LVAD (LEFT VENTRICULAR ASSIST DEVICE) PRESENT: ICD-10-CM

## 2018-02-16 LAB
INR PPP: 2.2
PROTHROMBIN TIME: 24.2 SEC

## 2018-02-16 PROCEDURE — 85610 PROTHROMBIN TIME: CPT | Mod: PO

## 2018-02-16 PROCEDURE — 36415 COLL VENOUS BLD VENIPUNCTURE: CPT | Mod: PO

## 2018-02-19 ENCOUNTER — HOSPITAL ENCOUNTER (INPATIENT)
Facility: HOSPITAL | Age: 78
LOS: 4 days | Discharge: HOME HEALTH | DRG: 071 | End: 2018-02-23
Attending: EMERGENCY MEDICINE | Admitting: INTERNAL MEDICINE
Payer: MEDICARE

## 2018-02-19 DIAGNOSIS — R55 SYNCOPE: ICD-10-CM

## 2018-02-19 DIAGNOSIS — I10 ESSENTIAL HYPERTENSION: ICD-10-CM

## 2018-02-19 DIAGNOSIS — R41.0 DELIRIUM: ICD-10-CM

## 2018-02-19 DIAGNOSIS — R41.0 CONFUSION: ICD-10-CM

## 2018-02-19 DIAGNOSIS — Z95.811 LVAD (LEFT VENTRICULAR ASSIST DEVICE) PRESENT: ICD-10-CM

## 2018-02-19 LAB
ALBUMIN SERPL BCP-MCNC: 3 G/DL
ALP SERPL-CCNC: 105 U/L
ALT SERPL W/O P-5'-P-CCNC: <5 U/L
AMPHET+METHAMPHET UR QL: NEGATIVE
ANION GAP SERPL CALC-SCNC: 6 MMOL/L
APAP SERPL-MCNC: <3 UG/ML
APTT BLDCRRT: 30.8 SEC
AST SERPL-CCNC: 27 U/L
BARBITURATES UR QL SCN>200 NG/ML: NEGATIVE
BASOPHILS # BLD AUTO: 0.03 K/UL
BASOPHILS NFR BLD: 0.5 %
BENZODIAZ UR QL SCN>200 NG/ML: NEGATIVE
BILIRUB SERPL-MCNC: 0.4 MG/DL
BILIRUB UR QL STRIP: NEGATIVE
BNP SERPL-MCNC: 490 PG/ML
BUN SERPL-MCNC: 31 MG/DL
BZE UR QL SCN: NEGATIVE
CALCIUM SERPL-MCNC: 10.2 MG/DL
CANNABINOIDS UR QL SCN: NEGATIVE
CHLORIDE SERPL-SCNC: 112 MMOL/L
CK SERPL-CCNC: 114 U/L
CLARITY UR REFRACT.AUTO: CLEAR
CO2 SERPL-SCNC: 30 MMOL/L
COLOR UR AUTO: YELLOW
CREAT SERPL-MCNC: 2 MG/DL
CREAT UR-MCNC: 72 MG/DL
DIFFERENTIAL METHOD: ABNORMAL
EOSINOPHIL # BLD AUTO: 0.3 K/UL
EOSINOPHIL NFR BLD: 5.2 %
ERYTHROCYTE [DISTWIDTH] IN BLOOD BY AUTOMATED COUNT: 18.1 %
EST. GFR  (AFRICAN AMERICAN): 36.7 ML/MIN/1.73 M^2
EST. GFR  (NON AFRICAN AMERICAN): 31.7 ML/MIN/1.73 M^2
ETHANOL SERPL-MCNC: <10 MG/DL
GLUCOSE SERPL-MCNC: 96 MG/DL
GLUCOSE UR QL STRIP: NEGATIVE
HCT VFR BLD AUTO: 28.8 %
HGB BLD-MCNC: 9.4 G/DL
HGB UR QL STRIP: NEGATIVE
IMM GRANULOCYTES # BLD AUTO: 0.02 K/UL
IMM GRANULOCYTES NFR BLD AUTO: 0.3 %
INR PPP: 2.1
KETONES UR QL STRIP: NEGATIVE
LEUKOCYTE ESTERASE UR QL STRIP: NEGATIVE
LYMPHOCYTES # BLD AUTO: 0.8 K/UL
LYMPHOCYTES NFR BLD: 13.1 %
MCH RBC QN AUTO: 30.3 PG
MCHC RBC AUTO-ENTMCNC: 32.6 G/DL
MCV RBC AUTO: 93 FL
METHADONE UR QL SCN>300 NG/ML: NEGATIVE
MONOCYTES # BLD AUTO: 0.9 K/UL
MONOCYTES NFR BLD: 14.8 %
NEUTROPHILS # BLD AUTO: 4 K/UL
NEUTROPHILS NFR BLD: 66.1 %
NITRITE UR QL STRIP: NEGATIVE
NRBC BLD-RTO: 0 /100 WBC
OPIATES UR QL SCN: NEGATIVE
PCP UR QL SCN>25 NG/ML: NEGATIVE
PH UR STRIP: 7 [PH] (ref 5–8)
PLATELET # BLD AUTO: 125 K/UL
PMV BLD AUTO: 11.1 FL
POCT GLUCOSE: 104 MG/DL (ref 70–110)
POTASSIUM SERPL-SCNC: 3.8 MMOL/L
PROT SERPL-MCNC: 6.5 G/DL
PROT UR QL STRIP: NEGATIVE
PROTHROMBIN TIME: 20.5 SEC
RBC # BLD AUTO: 3.1 M/UL
SALICYLATES SERPL-MCNC: <5 MG/DL
SODIUM SERPL-SCNC: 148 MMOL/L
SP GR UR STRIP: 1.01 (ref 1–1.03)
TOXICOLOGY INFORMATION: NORMAL
TROPONIN I SERPL DL<=0.01 NG/ML-MCNC: 0.17 NG/ML
URN SPEC COLLECT METH UR: NORMAL
UROBILINOGEN UR STRIP-ACNC: NEGATIVE EU/DL
WBC # BLD AUTO: 6.1 K/UL

## 2018-02-19 PROCEDURE — 93005 ELECTROCARDIOGRAM TRACING: CPT | Mod: 59

## 2018-02-19 PROCEDURE — 80307 DRUG TEST PRSMV CHEM ANLYZR: CPT

## 2018-02-19 PROCEDURE — 25000003 PHARM REV CODE 250: Performed by: INTERNAL MEDICINE

## 2018-02-19 PROCEDURE — 93010 ELECTROCARDIOGRAM REPORT: CPT | Mod: ,,, | Performed by: INTERNAL MEDICINE

## 2018-02-19 PROCEDURE — 93750 INTERROGATION VAD IN PERSON: CPT | Performed by: INTERNAL MEDICINE

## 2018-02-19 PROCEDURE — 80053 COMPREHEN METABOLIC PANEL: CPT

## 2018-02-19 PROCEDURE — 83880 ASSAY OF NATRIURETIC PEPTIDE: CPT

## 2018-02-19 PROCEDURE — 80329 ANALGESICS NON-OPIOID 1 OR 2: CPT

## 2018-02-19 PROCEDURE — 99223 1ST HOSP IP/OBS HIGH 75: CPT | Mod: ,,, | Performed by: INTERNAL MEDICINE

## 2018-02-19 PROCEDURE — 80320 DRUG SCREEN QUANTALCOHOLS: CPT

## 2018-02-19 PROCEDURE — 99285 EMERGENCY DEPT VISIT HI MDM: CPT | Mod: ,,, | Performed by: INTERNAL MEDICINE

## 2018-02-19 PROCEDURE — 99285 EMERGENCY DEPT VISIT HI MDM: CPT | Mod: 25

## 2018-02-19 PROCEDURE — 87040 BLOOD CULTURE FOR BACTERIA: CPT | Mod: 59

## 2018-02-19 PROCEDURE — 85025 COMPLETE CBC W/AUTO DIFF WBC: CPT

## 2018-02-19 PROCEDURE — 27000248 HC VAD-ADDITIONAL DAY

## 2018-02-19 PROCEDURE — 82550 ASSAY OF CK (CPK): CPT

## 2018-02-19 PROCEDURE — 81003 URINALYSIS AUTO W/O SCOPE: CPT

## 2018-02-19 PROCEDURE — 84484 ASSAY OF TROPONIN QUANT: CPT

## 2018-02-19 PROCEDURE — 20600001 HC STEP DOWN PRIVATE ROOM

## 2018-02-19 PROCEDURE — 85730 THROMBOPLASTIN TIME PARTIAL: CPT

## 2018-02-19 PROCEDURE — 85610 PROTHROMBIN TIME: CPT

## 2018-02-19 PROCEDURE — 82962 GLUCOSE BLOOD TEST: CPT | Mod: 59

## 2018-02-19 RX ORDER — WARFARIN 2 MG/1
4 TABLET ORAL DAILY
Status: DISCONTINUED | OUTPATIENT
Start: 2018-02-20 | End: 2018-02-23 | Stop reason: HOSPADM

## 2018-02-19 RX ORDER — FERROUS SULFATE 325(65) MG
325 TABLET, DELAYED RELEASE (ENTERIC COATED) ORAL DAILY
Status: DISCONTINUED | OUTPATIENT
Start: 2018-02-20 | End: 2018-02-23 | Stop reason: HOSPADM

## 2018-02-19 RX ORDER — BUPROPION HYDROCHLORIDE 150 MG/1
450 TABLET ORAL DAILY
Status: DISCONTINUED | OUTPATIENT
Start: 2018-02-20 | End: 2018-02-23 | Stop reason: HOSPADM

## 2018-02-19 RX ORDER — DOFETILIDE 0.12 MG/1
125 CAPSULE ORAL EVERY 12 HOURS
Status: DISCONTINUED | OUTPATIENT
Start: 2018-02-19 | End: 2018-02-23 | Stop reason: HOSPADM

## 2018-02-19 RX ORDER — LANOLIN ALCOHOL/MO/W.PET/CERES
400 CREAM (GRAM) TOPICAL 2 TIMES DAILY
Status: DISCONTINUED | OUTPATIENT
Start: 2018-02-19 | End: 2018-02-23 | Stop reason: HOSPADM

## 2018-02-19 RX ORDER — NIFEDIPINE 60 MG/1
120 TABLET, EXTENDED RELEASE ORAL DAILY
Status: DISCONTINUED | OUTPATIENT
Start: 2018-02-20 | End: 2018-02-23 | Stop reason: HOSPADM

## 2018-02-19 RX ORDER — LISINOPRIL 10 MG/1
10 TABLET ORAL 2 TIMES DAILY
Status: DISCONTINUED | OUTPATIENT
Start: 2018-02-19 | End: 2018-02-23 | Stop reason: HOSPADM

## 2018-02-19 RX ORDER — PANTOPRAZOLE SODIUM 40 MG/1
40 TABLET, DELAYED RELEASE ORAL
Status: DISCONTINUED | OUTPATIENT
Start: 2018-02-20 | End: 2018-02-23 | Stop reason: HOSPADM

## 2018-02-19 RX ORDER — ISOSORBIDE DINITRATE 40 MG/1
40 TABLET ORAL 3 TIMES DAILY
Status: DISCONTINUED | OUTPATIENT
Start: 2018-02-19 | End: 2018-02-20

## 2018-02-19 RX ORDER — NAPROXEN SODIUM 220 MG/1
81 TABLET, FILM COATED ORAL DAILY
Status: DISCONTINUED | OUTPATIENT
Start: 2018-02-20 | End: 2018-02-23 | Stop reason: HOSPADM

## 2018-02-19 RX ORDER — ACETAMINOPHEN 325 MG/1
650 TABLET ORAL EVERY 6 HOURS PRN
Status: DISCONTINUED | OUTPATIENT
Start: 2018-02-19 | End: 2018-02-23 | Stop reason: HOSPADM

## 2018-02-19 RX ORDER — DOXAZOSIN 4 MG/1
8 TABLET ORAL DAILY
Status: DISCONTINUED | OUTPATIENT
Start: 2018-02-20 | End: 2018-02-23 | Stop reason: HOSPADM

## 2018-02-19 RX ORDER — HYDRALAZINE HYDROCHLORIDE 50 MG/1
100 TABLET, FILM COATED ORAL EVERY 8 HOURS
Status: DISCONTINUED | OUTPATIENT
Start: 2018-02-19 | End: 2018-02-20

## 2018-02-19 RX ORDER — ALLOPURINOL 300 MG/1
300 TABLET ORAL DAILY
Status: DISCONTINUED | OUTPATIENT
Start: 2018-02-20 | End: 2018-02-23 | Stop reason: HOSPADM

## 2018-02-19 RX ORDER — DOCUSATE SODIUM 100 MG/1
100 CAPSULE, LIQUID FILLED ORAL DAILY PRN
Status: DISCONTINUED | OUTPATIENT
Start: 2018-02-19 | End: 2018-02-23 | Stop reason: HOSPADM

## 2018-02-19 RX ORDER — CARBIDOPA AND LEVODOPA 25; 100 MG/1; MG/1
1 TABLET ORAL
Status: DISCONTINUED | OUTPATIENT
Start: 2018-02-19 | End: 2018-02-20

## 2018-02-19 RX ORDER — MEROPENEM 1 G/1
1 INJECTION, POWDER, FOR SOLUTION INTRAVENOUS EVERY 12 HOURS
Status: DISCONTINUED | OUTPATIENT
Start: 2018-02-19 | End: 2018-02-23 | Stop reason: HOSPADM

## 2018-02-19 RX ORDER — ATORVASTATIN CALCIUM 10 MG/1
10 TABLET, FILM COATED ORAL DAILY
Status: DISCONTINUED | OUTPATIENT
Start: 2018-02-20 | End: 2018-02-23 | Stop reason: HOSPADM

## 2018-02-19 RX ADMIN — MAGNESIUM OXIDE TAB 400 MG (241.3 MG ELEMENTAL MG) 400 MG: 400 (241.3 MG) TAB at 10:02

## 2018-02-19 RX ADMIN — HYDRALAZINE HYDROCHLORIDE 100 MG: 50 TABLET ORAL at 10:02

## 2018-02-19 RX ADMIN — LISINOPRIL 10 MG: 10 TABLET ORAL at 10:02

## 2018-02-19 RX ADMIN — ACETAMINOPHEN 650 MG: 325 TABLET, FILM COATED ORAL at 08:02

## 2018-02-19 RX ADMIN — CARBIDOPA AND LEVODOPA 1 TABLET: 25; 100 TABLET ORAL at 10:02

## 2018-02-19 NOTE — ED NOTES
Hourly rounding complete. Patient resting comfortably in stretcher. Pt. AA, respirations even and unlabored. No acute distress noted. Updated on plan of care. Denies pain. Bed in lowest position, locked, side rails up x 2, and call bell in reach.

## 2018-02-19 NOTE — PT/OT/SLP DISCHARGE
Physical Therapy Discharge Summary    Name: Kev Vasquez  MRN: [id number]   Principal Problem: Syncope     Patient Discharged from acute Physical Therapy on 2018.  Please refer to prior PT noted date on 2018 for functional status.     Assessment:     Patient appropriate for care in another setting.    Objective:     GOALS:    Physical Therapy Goals        Problem: Physical Therapy Goal    Goal Priority Disciplines Outcome Goal Variances Interventions   Physical Therapy Goal     PT/OT, PT      Description:  Goals to be met by: 18     Patient will increase functional independence with mobility by performin. Supine to sit with Set-up Hart  2. Sit to supine with Set-up Hart  3. Sit to stand transfer with Stand-by Assistance using Rolling Walker  4. Bed to chair transfer with Stand-by Assistance using Rolling Walker  5. Gait x150 feet with Contact Guard Assistance using Rolling Walker                       Reasons for Discontinuation of Therapy Services  Transfer to alternate level of care.      Plan:     Patient Discharged to: Home with Home Health Service.    Carmencita Doherty, PT  2018

## 2018-02-19 NOTE — ED PROVIDER NOTES
Encounter Date: 2/19/2018       History     Chief Complaint   Patient presents with    Fatigue     Fall this AM, lethargic, and extremily weak. LVAD pt.      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  Going to caregiver patient is usually alert and oriented to things around him, however over the past couple of days as had increased confusion and decreased responsiveness.  Patient also had syncopal episode this morning where he fell.  Fall was unwitnessed however whenever he was found essentially underneath the bed.  No obvious trauma.  Patient is alert and not acting acutely altered.  Unknown loss of consciousness at that time.  Patient is on Coumadin.  No nausea/vomiting since event.    Patient was recently discharged from the hospital around 1 week ago.  At which point he was hospitalized for warmth going off on his LVAD.  Which was a readmission for prior presentation in which he has had increased confusion and nausea and vomiting.  According to wife patient has been decreased mood over the past several months.  Not eating appropriately.  Associated with weight loss.    Today patient gave very little history.  Patient states that he feels like he is in his normal state of health which is a little bit more sleepy.  Only complaint is that light hurts his eyes and increased fatigue, however has no difficulty keeping his eyes open..  Not associated with headache fever chills.  Review of patient's allergies indicates:   Allergen Reactions    Aldactone [spironolactone]       persistent hyperkalemia.    Sulfa (sulfonamide antibiotics)      Past Medical History:   Diagnosis Date    AICD (automatic cardioverter/defibrillator) present 2014    [employer]    Chronic anticoagulation 7/21/2017    Chronic combined systolic and diastolic congestive heart failure 7/27/2015     11-16-17   1 - Moderate left ventricular enlargement.    2 - Severely depressed left ventricular systolic function (EF 15-20%).    3 - Impaired LV relaxation, normal LAP (grade 1 diastolic dysfunction).    4 - Biatrial enlargement.    5 - Right ventricle is upper limit of normal in size with not well seen systolic function.    6 - Severe tricuspid regurgitation.    7 - Increased central venous pressure.    8 - The estimated PA systolic pressure is 40 mmHg.    9 - Heartmate III LVAD; speed 5800.     Complication involving left ventricular assist device (LVAD) 7/29/2017    Coronary artery disease involving native coronary artery of native heart without angina pectoris 7/27/2015    Gait instability     Hypertensive urgency, malignant 11/15/2017    ICD (implantable cardioverter-defibrillator), biventricular, in situ 7/27/2015    Ischemic cardiomyopathy 7/20/2017    S/p HMIII     Kidney stones     LVAD (left ventricular assist device) present 7/20/2017    status post Heartmate III 10/16/16 LVAD    HILLARY on CPAP 7/27/2015    Peripheral neuropathy     Pulmonary hypertension due to left ventricular systolic dysfunction 7/29/2015    Restless leg syndrome 1992    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    Skin cancer     excision 2013    Skin yeast infection 8/31/2017    Stage 3 chronic kidney disease 7/20/2017    Syncope 7/20/2017    Ventricular tachycardia 7/25/2017     Past Surgical History:   Procedure Laterality Date    CARDIAC PACEMAKER PLACEMENT      pacemaker previously, then AICD in 2006. AICD [employer] serial #[id number]    CORONARY ARTERY BYPASS GRAFT  1993    KNEE SURGERY Left 2001    complicated by infection, hardware had to be taken out and replaced    LEFT VENTRICULAR ASSIST DEVICE  10/19/2016     Family History   Problem Relation Age of Onset    Cancer Daughter 50     breast    Heart disease Daughter      MI x 3, CABG    Diabetes Daughter      Social History   Substance Use  Topics    Smoking status: Never Smoker    Smokeless tobacco: Never Used    Alcohol use No     Review of Systems   Constitutional: Negative for fever.   HENT: Negative for sore throat.    Respiratory: Negative for shortness of breath.    Cardiovascular: Negative for chest pain.   Gastrointestinal: Negative for nausea.   Genitourinary: Negative for dysuria.   Musculoskeletal: Negative for back pain.   Skin: Negative for rash.   Neurological: Positive for syncope. Negative for weakness.   Hematological: Does not bruise/bleed easily.       Physical Exam     Initial Vitals [02/19/18 1321]   BP Pulse Resp Temp SpO2   (!) 70/30 (!) 59 (!) 26 98 °F (36.7 °C) 98 %      MAP       43.33         Physical Exam    Nursing note and vitals reviewed.  Constitutional: He appears well-developed and well-nourished. He is not diaphoretic. No distress.   Patient with eyes closed during most of the exam however will respond appropriately to any stimuli that he get him.   HENT:   Head: Normocephalic and atraumatic.   Right Ear: External ear normal.   Left Ear: External ear normal.   Nose: Nose normal.   Mouth/Throat: Oropharynx is clear and moist. No oropharyngeal exudate.   Eyes: Conjunctivae and EOM are normal. Pupils are equal, round, and reactive to light. Right eye exhibits no discharge. Left eye exhibits no discharge. No scleral icterus.   Neck: Normal range of motion. Neck supple. No tracheal deviation present.   Cardiovascular:   Faint/minimal pulses.  LVAD heard on auscultation.  Normal heart sounds are noted.   Pulmonary/Chest: Breath sounds normal. No respiratory distress. He has no wheezes. He has no rhonchi. He has no rales. He exhibits no tenderness.   Abdominal: Soft. Bowel sounds are normal. He exhibits no distension and no mass. There is no tenderness. There is no rebound and no guarding.   Musculoskeletal: Normal range of motion. He exhibits no edema or tenderness.   Neurological: He is alert and oriented to person,  place, and time.   Skin: Skin is warm and dry. Capillary refill takes less than 2 seconds. No erythema.   Psychiatric: He has a normal mood and affect. Thought content normal.         ED Course   Procedures  Labs Reviewed - No data to display          Medical Decision Making:   Initial Assessment:   PGY-1 MDM     Pt is a 77 y.o. male presenting with altered mental status and    Hypotensive but at baseline.   Exam significant for LVAD in place, pt with decreased mentation with eyes closed during most of exam.   DDx includes but not limited to acs, chf exacerbation, lvad malfunction, metabolic, arrhythmia.    We'll get cardiac labs, syncopal labs.  Fall with questionable loss consciousness and on blood thinners so we'll get a head CT to further evaluate.  CXR to evaluate for CHF exacerbation.  Patient currently sitting and waiting no acute distress will continue to evaluate with plans to consult cardiology for further management of LVAD.       Don Griggs M.D.  [facility] Emergency Medicine, PGY-1  02/19/2018 2:19 PM    HO1 Update:  Pts labs returned significant for BNP of 480, which is greatly increased from his baseline of around 200. H/H of 9/28, which is at his baseline.  Patient appears mildly dehydrated with sodium and chloride increase.  Creatinine at baseline. CK normal   INR therapeutic. Trop at baseline, CXR stable with some pleural fluid in left. CT head showed stable exam with no acute changes. Will consult Cardiology.     Don Griggs M.D.  [facility] Emergency Medicine, PGY-1  02/19/2018 4:23 PM                      Clinical Impression:   Diagnoses of Syncope, Confusion, Essential hypertension, and LVAD (left ventricular assist device) present were pertinent to this visit.                           Don Griggs MD  Resident  02/20/18 2040

## 2018-02-19 NOTE — ED NOTES
Hourly rounding complete. Patient resting comfortably in stretcher. Respirations even and unlabored. No acute distress noted. Updated on plan of care. Bed in lowest position, locked, side rails up x 2, and call bell in reach.

## 2018-02-19 NOTE — ED TRIAGE NOTES
Patient arrives to ED via EMS with CC of change in behavior, generalized weakness and unwitnessed fall with possible head injury per family, unsure of LOC, family reports patient with multiple episodes of agitation, slurred speech and restlessness onset Saturday.     Patient identifiers verified and correct for Kev Vasquez.    LOC: The patient is resting with eyes closed, open eyes to verbal commands. Answers questions and follows commands with delayed responses.  APPEARANCE: Patient resting comfortably and in no acute distress. Pt is clean and well groomed. No JVD visible. Pt reports pain level of 0.  SKIN: Skin is warm dry and intact, and color is consistent with ethnicity. No tenting observed and capillary refill <3 seconds. No clubbing noted to nail beds. No breakdown or brusing visible and mucus membranes moist and acyanotic.  MUSCULOSKELETAL: Full range of motion present in all extremities. Hand  equal and leg strength strong +5 bilaterally.  RESPIRATORY: Airway is open and patent. Respirations-unlabored, regular rate, equal bilaterally on inspiration and expiration. No accessory muscle use noted.   CARDIAC: Patient has regular heart rate and rhythm.  No peripheral edema noted, and patient has no c/o chest pain. LVAD patient, patient reports backup batteries present.   ABDOMEN: Soft and non-tender to palpation with no distention noted.   NEUROLOGIC: Eyes open spontaneously and facial expression symmetrical. Pt behavior appropriate to situation, and pt follows commands.  Pt reports sensation present in all extremities when touched with a finger. JAMEE

## 2018-02-19 NOTE — ED NOTES
Hourly rounding complete. Patient resting comfortably in stretcher. No acute distress noted. Bed in lowest position, locked, side rails up x 2, and call bell in reach.

## 2018-02-20 PROBLEM — R25.8 NOCTURNAL LEG MOVEMENTS: Status: ACTIVE | Noted: 2017-07-30

## 2018-02-20 PROBLEM — R25.1 TREMOR: Status: ACTIVE | Noted: 2018-02-20

## 2018-02-20 PROBLEM — R41.0 DELIRIUM: Status: RESOLVED | Noted: 2017-07-29 | Resolved: 2018-02-20

## 2018-02-20 PROBLEM — R68.89 VIVID DREAM: Status: ACTIVE | Noted: 2018-02-20

## 2018-02-20 PROBLEM — F09 COGNITIVE DYSFUNCTION: Status: ACTIVE | Noted: 2018-02-20

## 2018-02-20 LAB
ANION GAP SERPL CALC-SCNC: 7 MMOL/L
BASOPHILS # BLD AUTO: 0.03 K/UL
BASOPHILS NFR BLD: 0.6 %
BUN SERPL-MCNC: 32 MG/DL
CALCIUM SERPL-MCNC: 10.3 MG/DL
CHLORIDE SERPL-SCNC: 113 MMOL/L
CO2 SERPL-SCNC: 29 MMOL/L
CREAT SERPL-MCNC: 1.9 MG/DL
DIFFERENTIAL METHOD: ABNORMAL
EOSINOPHIL # BLD AUTO: 0.5 K/UL
EOSINOPHIL NFR BLD: 9 %
ERYTHROCYTE [DISTWIDTH] IN BLOOD BY AUTOMATED COUNT: 18.3 %
EST. GFR  (AFRICAN AMERICAN): 39 ML/MIN/1.73 M^2
EST. GFR  (NON AFRICAN AMERICAN): 33.7 ML/MIN/1.73 M^2
FOLATE SERPL-MCNC: 14.1 NG/ML
GLUCOSE SERPL-MCNC: 78 MG/DL
HCT VFR BLD AUTO: 28.5 %
HGB BLD-MCNC: 9.3 G/DL
IMM GRANULOCYTES # BLD AUTO: 0.02 K/UL
IMM GRANULOCYTES NFR BLD AUTO: 0.4 %
INR PPP: 2.1
LDH SERPL L TO P-CCNC: 184 U/L
LYMPHOCYTES # BLD AUTO: 0.9 K/UL
LYMPHOCYTES NFR BLD: 17 %
MAGNESIUM SERPL-MCNC: 2.5 MG/DL
MCH RBC QN AUTO: 29.7 PG
MCHC RBC AUTO-ENTMCNC: 32.6 G/DL
MCV RBC AUTO: 91 FL
MONOCYTES # BLD AUTO: 0.7 K/UL
MONOCYTES NFR BLD: 13.1 %
NEUTROPHILS # BLD AUTO: 3.1 K/UL
NEUTROPHILS NFR BLD: 59.9 %
NRBC BLD-RTO: 0 /100 WBC
PLATELET # BLD AUTO: 128 K/UL
PMV BLD AUTO: 11.1 FL
POTASSIUM SERPL-SCNC: 3.6 MMOL/L
PROTHROMBIN TIME: 20.1 SEC
RBC # BLD AUTO: 3.13 M/UL
SODIUM SERPL-SCNC: 149 MMOL/L
VIT B12 SERPL-MCNC: 463 PG/ML
WBC # BLD AUTO: 5.12 K/UL

## 2018-02-20 PROCEDURE — 85025 COMPLETE CBC W/AUTO DIFF WBC: CPT

## 2018-02-20 PROCEDURE — 99223 1ST HOSP IP/OBS HIGH 75: CPT | Mod: GC,,, | Performed by: PSYCHIATRY & NEUROLOGY

## 2018-02-20 PROCEDURE — 86592 SYPHILIS TEST NON-TREP QUAL: CPT

## 2018-02-20 PROCEDURE — 82746 ASSAY OF FOLIC ACID SERUM: CPT

## 2018-02-20 PROCEDURE — 25000003 PHARM REV CODE 250: Performed by: NURSE PRACTITIONER

## 2018-02-20 PROCEDURE — 83615 LACTATE (LD) (LDH) ENZYME: CPT

## 2018-02-20 PROCEDURE — 84425 ASSAY OF VITAMIN B-1: CPT

## 2018-02-20 PROCEDURE — 83921 ORGANIC ACID SINGLE QUANT: CPT

## 2018-02-20 PROCEDURE — 93750 INTERROGATION VAD IN PERSON: CPT | Mod: ,,, | Performed by: INTERNAL MEDICINE

## 2018-02-20 PROCEDURE — 80048 BASIC METABOLIC PNL TOTAL CA: CPT

## 2018-02-20 PROCEDURE — 83735 ASSAY OF MAGNESIUM: CPT

## 2018-02-20 PROCEDURE — 99233 SBSQ HOSP IP/OBS HIGH 50: CPT | Mod: ,,, | Performed by: INTERNAL MEDICINE

## 2018-02-20 PROCEDURE — 84207 ASSAY OF VITAMIN B-6: CPT

## 2018-02-20 PROCEDURE — 36415 COLL VENOUS BLD VENIPUNCTURE: CPT

## 2018-02-20 PROCEDURE — 25000003 PHARM REV CODE 250: Performed by: INTERNAL MEDICINE

## 2018-02-20 PROCEDURE — 20600001 HC STEP DOWN PRIVATE ROOM

## 2018-02-20 PROCEDURE — 82607 VITAMIN B-12: CPT

## 2018-02-20 PROCEDURE — 85610 PROTHROMBIN TIME: CPT

## 2018-02-20 PROCEDURE — 27000248 HC VAD-ADDITIONAL DAY

## 2018-02-20 PROCEDURE — 97802 MEDICAL NUTRITION INDIV IN: CPT | Performed by: DIETITIAN, REGISTERED

## 2018-02-20 PROCEDURE — 84252 ASSAY OF VITAMIN B-2: CPT

## 2018-02-20 PROCEDURE — 63600175 PHARM REV CODE 636 W HCPCS: Performed by: INTERNAL MEDICINE

## 2018-02-20 RX ORDER — CARBIDOPA AND LEVODOPA 25; 100 MG/1; MG/1
1 TABLET ORAL 2 TIMES DAILY
Status: DISCONTINUED | OUTPATIENT
Start: 2018-02-20 | End: 2018-02-21

## 2018-02-20 RX ADMIN — MAGNESIUM OXIDE TAB 400 MG (241.3 MG ELEMENTAL MG) 400 MG: 400 (241.3 MG) TAB at 09:02

## 2018-02-20 RX ADMIN — CARBIDOPA AND LEVODOPA 1 TABLET: 25; 100 TABLET ORAL at 11:02

## 2018-02-20 RX ADMIN — ASPIRIN 81 MG CHEWABLE TABLET 81 MG: 81 TABLET CHEWABLE at 09:02

## 2018-02-20 RX ADMIN — HYDRALAZINE HYDROCHLORIDE 100 MG: 50 TABLET ORAL at 05:02

## 2018-02-20 RX ADMIN — DOFETILIDE 125 MCG: 0.12 CAPSULE ORAL at 11:02

## 2018-02-20 RX ADMIN — PANTOPRAZOLE SODIUM 40 MG: 40 TABLET, DELAYED RELEASE ORAL at 04:02

## 2018-02-20 RX ADMIN — MEROPENEM 1 G: 1 INJECTION, POWDER, FOR SOLUTION INTRAVENOUS at 11:02

## 2018-02-20 RX ADMIN — ALLOPURINOL 300 MG: 300 TABLET ORAL at 09:02

## 2018-02-20 RX ADMIN — Medication 1 CAPSULE: at 09:02

## 2018-02-20 RX ADMIN — MAGNESIUM OXIDE TAB 400 MG (241.3 MG ELEMENTAL MG) 400 MG: 400 (241.3 MG) TAB at 11:02

## 2018-02-20 RX ADMIN — ATORVASTATIN CALCIUM 10 MG: 10 TABLET, FILM COATED ORAL at 09:02

## 2018-02-20 RX ADMIN — LISINOPRIL 10 MG: 10 TABLET ORAL at 11:02

## 2018-02-20 RX ADMIN — ISOSORBIDE DINITRATE 40 MG: 40 TABLET ORAL at 05:02

## 2018-02-20 RX ADMIN — CARBIDOPA AND LEVODOPA 1 TABLET: 25; 100 TABLET ORAL at 05:02

## 2018-02-20 RX ADMIN — ACETAMINOPHEN 650 MG: 325 TABLET, FILM COATED ORAL at 04:02

## 2018-02-20 RX ADMIN — PANTOPRAZOLE SODIUM 40 MG: 40 TABLET, DELAYED RELEASE ORAL at 09:02

## 2018-02-20 RX ADMIN — MEROPENEM 1 G: 1 INJECTION, POWDER, FOR SOLUTION INTRAVENOUS at 09:02

## 2018-02-20 RX ADMIN — FERROUS SULFATE TAB EC 325 MG (65 MG FE EQUIVALENT) 325 MG: 325 (65 FE) TABLET DELAYED RESPONSE at 09:02

## 2018-02-20 RX ADMIN — DOFETILIDE 125 MCG: 0.12 CAPSULE ORAL at 12:02

## 2018-02-20 RX ADMIN — WARFARIN SODIUM 4 MG: 2 TABLET ORAL at 06:02

## 2018-02-20 RX ADMIN — ISOSORBIDE DINITRATE 40 MG: 40 TABLET ORAL at 12:02

## 2018-02-20 RX ADMIN — DOXAZOSIN 8 MG: 4 TABLET ORAL at 09:02

## 2018-02-20 RX ADMIN — NIFEDIPINE 120 MG: 60 TABLET, FILM COATED, EXTENDED RELEASE ORAL at 09:02

## 2018-02-20 RX ADMIN — MEROPENEM 1 G: 1 INJECTION, POWDER, FOR SOLUTION INTRAVENOUS at 12:02

## 2018-02-20 RX ADMIN — DOFETILIDE 125 MCG: 0.12 CAPSULE ORAL at 09:02

## 2018-02-20 RX ADMIN — LISINOPRIL 10 MG: 10 TABLET ORAL at 09:02

## 2018-02-20 RX ADMIN — BUPROPION HYDROCHLORIDE 450 MG: 150 TABLET, EXTENDED RELEASE ORAL at 09:02

## 2018-02-20 NOTE — HPI
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 BiV ICD, HILLARY/BiPAP, HTN, CKD, HLP, gout and depression who presents with a 1 week history of worsening confusion and lethargy.    Patient was recently discharged due to similar sx but family reports that since Saturday, patient has also had associated spontaneous movements of his upper extremities. They also report decreased appetite and PO intake recently. Earlier today, he was found at the side of his bed with his head underneath the sidepost of his bed. Per his family, he has not had any worsening SOB or WHELAN. However, he is too weak to ambulate independently. He was recently started on Sinemet by neurology who saw him in late January during that admission due to presumed PD. During his last visit, he was switched from Cefepime to IV Meropenem based on ID recommendations.     He denies chest pain, SOB, WHELAN, orthopnea, PND, LE edema. Denies any LVAD flow alarms. No episodes of reported lightheadedness when standing but family reports he has not been able to get up on his own over the last few days. +Dry cough.

## 2018-02-20 NOTE — CONSULTS
[facility]  Cardiology  Consult Note    Patient Name: Kev Vasquez  MRN: [id number]  Admission Date: 2/19/2018  Hospital Length of Stay: 0 days  Code Status: Prior   Attending Provider: Jim Bonilla MD   Consulting Provider: Ansley Sauceda MD  Primary Care Physician: Mega Collins MD  Principal Problem:<principal problem not specified>    Patient information was obtained from patient, spouse/SO, caregiver / friend and past medical records.     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 BiV ICD, HILLARY/BiPAP, HTN, CKD, HLP, gout and depression who presents with a 1 week history of worsening confusion and lethargy.    Patient was recently discharged due to similar sx but family reports that since Saturday, patient has also had associated spontaneous movements of his upper extremities. They also report decreased appetite and PO intake recently. Earlier today, he was found at the side of his bed with his head underneath the sidepost of his bed. Per his family, he has not had any worsening SOB or WHELAN. However, he is too weak to ambulate independently. He was recently started on Sinemet by neurology who saw him in late January during that admission due to presumed PD. During his last visit, he was switched from Cefepime to IV Meropenem based on ID recommendations.     He denies chest pain, SOB, WHELAN, orthopnea, PND, LE edema. Denies any LVAD flow alarms. No episodes of reported lightheadedness when standing but family reports he has not been able to get up on  his own over the last few days. +Dry cough.     Past Medical History:   Diagnosis Date    AICD (automatic cardioverter/defibrillator) present 2014    [employer]    Chronic anticoagulation 7/21/2017    Chronic combined systolic and diastolic congestive heart failure 7/27/2015 11-16-17   1 - Moderate left ventricular enlargement.    2 - Severely depressed left ventricular systolic function (EF 15-20%).    3 - Impaired LV relaxation, normal LAP (grade 1 diastolic dysfunction).    4 - Biatrial enlargement.    5 - Right ventricle is upper limit of normal in size with not well seen systolic function.    6 - Severe tricuspid regurgitation.    7 - Increased central venous pressure.    8 - The estimated PA systolic pressure is 40 mmHg.    9 - Heartmate III LVAD; speed 5800.     Complication involving left ventricular assist device (LVAD) 7/29/2017    Coronary artery disease involving native coronary artery of native heart without angina pectoris 7/27/2015    Gait instability     Hypertensive urgency, malignant 11/15/2017    ICD (implantable cardioverter-defibrillator), biventricular, in situ 7/27/2015    Ischemic cardiomyopathy 7/20/2017    S/p HMIII     Kidney stones     LVAD (left ventricular assist device) present 7/20/2017    status post Heartmate III 10/16/16 LVAD    HILLARY on CPAP 7/27/2015    Peripheral neuropathy     Pulmonary hypertension due to left ventricular systolic dysfunction 7/29/2015    Restless leg syndrome 1992    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    Skin cancer     excision 2013    Skin yeast infection 8/31/2017    Stage 3 chronic kidney disease 7/20/2017    Syncope 7/20/2017    Ventricular tachycardia 7/25/2017       Past Surgical History:   Procedure Laterality Date    CARDIAC PACEMAKER PLACEMENT      pacemaker previously, then AICD in 2006. AICD [employer] serial #[id number]    CORONARY ARTERY BYPASS GRAFT  1993    KNEE SURGERY Left 2001    complicated by  infection, hardware had to be taken out and replaced    LEFT VENTRICULAR ASSIST DEVICE  10/19/2016       Review of patient's allergies indicates:   Allergen Reactions    Aldactone [spironolactone]       persistent hyperkalemia.    Sulfa (sulfonamide antibiotics)        No current facility-administered medications on file prior to encounter.      Current Outpatient Prescriptions on File Prior to Encounter   Medication Sig    allopurinol (ZYLOPRIM) 300 MG tablet Take 1 tablet (300 mg total) by mouth once daily.    aspirin 81 MG Chew Take 81 mg by mouth once daily.    atorvastatin (LIPITOR) 10 MG tablet Take 10 mg by mouth once daily.    buPROPion 450 mg Tb24 Take 450 mg by mouth once daily.    calcium-vitamin D tablet 600 mg-200 units Take 1 tablet by mouth once daily.    carbidopa-levodopa  mg (SINEMET)  mg per tablet Take 1 tablet by mouth every 4 (four) hours while awake.    docusate sodium (COLACE) 100 MG capsule Take 100 mg by mouth daily as needed for Constipation.    dofetilide (TIKOSYN) 125 MCG Cap Take 1 capsule (125 mcg total) by mouth every 12 (twelve) hours.    doxazosin (CARDURA) 8 MG Tab Take 1 tablet (8 mg total) by mouth once daily.    ferrous sulfate 324 mg (65 mg iron) TbEC Take 1 tablet (324 mg total) by mouth once daily.    folic acid (FOLVITE) 1 MG tablet Take 1 mg by mouth once daily.    hydrALAZINE (APRESOLINE) 100 MG tablet Take 1 tablet (100 mg total) by mouth every 8 (eight) hours.    isosorbide dinitrate (ISORDIL) 40 MG Tab Take 1 tablet (40 mg total) by mouth 3 (three) times daily.    Lactobacillus rhamnosus GG (CULTURELLE) 10 billion cell capsule Take 1 capsule by mouth once daily.    lisinopril 10 MG tablet Take 1 tablet (10 mg total) by mouth 2 (two) times daily.    magnesium oxide (MAG-OX) 400 mg tablet Take 1 tablet (400 mg total) by mouth 2 (two) times daily.    meropenem (MERREM) 1 gram injection Inject 1 g into the vein every 12 (twelve) hours.     multivitamin capsule Take 1 capsule by mouth once daily.    NIFEdipine (PROCARDIA-XL) 60 MG (OSM) 24 hr tablet Take 2 tablets (120 mg total) by mouth once daily.    pantoprazole (PROTONIX) 40 MG tablet Take 1 tablet (40 mg total) by mouth 2 (two) times daily before meals.    warfarin (COUMADIN) 4 MG tablet Take 4 mg orally daily on Tue, Thu and 6 mg orally daily all other days other days as directed by coumadin clinic     Family History     Problem Relation (Age of Onset)    Cancer Daughter (50)    Diabetes Daughter    Heart disease Daughter        Social History Main Topics    Smoking status: Never Smoker    Smokeless tobacco: Never Used    Alcohol use No    Drug use: No    Sexual activity: Not on file      Comment:      Review of Systems   Constitution: Positive for weakness and malaise/fatigue. Negative for chills and fever.   HENT: Negative for hoarse voice and sore throat.    Cardiovascular: Positive for near-syncope. Negative for chest pain, claudication, cyanosis, dyspnea on exertion, irregular heartbeat, leg swelling, orthopnea, palpitations, paroxysmal nocturnal dyspnea and syncope.   Respiratory: Positive for cough. Negative for hemoptysis and shortness of breath.    Musculoskeletal: Positive for falls. Negative for back pain, joint pain and joint swelling.   Gastrointestinal: Negative for abdominal pain, constipation, diarrhea, hematochezia, melena, nausea and vomiting.   Genitourinary: Negative for dysuria, hematuria and incomplete emptying.   Neurological: Positive for focal weakness, loss of balance and tremors. Negative for dizziness, headaches and light-headedness.   Psychiatric/Behavioral: Negative for altered mental status, depression and suicidal ideas. The patient is not nervous/anxious.      Objective:     Vital Signs (Most Recent):  Temp: 98 °F (36.7 °C) (02/19/18 1321)  Pulse: 66 (02/19/18 1645)  Resp: 20 (02/19/18 1645)  BP: (!) 92/0 (LVAD patient) (02/19/18 1645)  SpO2: (!) 94  % (02/19/18 1645) Vital Signs (24h Range):  Temp:  [98 °F (36.7 °C)] 98 °F (36.7 °C)  Pulse:  [59-70] 66  Resp:  [20-26] 20  SpO2:  [94 %-98 %] 94 %  BP: (70-92)/(0-30) 92/0     Weight: 84.4 kg (186 lb)  Body mass index is 25.23 kg/m².    SpO2: (!) 94 %       No intake or output data in the 24 hours ending 02/19/18 1855    Lines/Drains/Airways     Peripherally Inserted Central Catheter Line                 PICC Double Lumen 02/05/18 1114 right basilic 14 days          Line                 VAD Left ventricular assist device HeartMate 3 -- days                Physical Exam   Constitutional: He is oriented to person, place, and time. He appears well-developed and well-nourished. No distress.   HENT:   Head: Normocephalic and atraumatic.   Mouth/Throat: Oropharynx is clear and moist. No oropharyngeal exudate.   Eyes: Conjunctivae and EOM are normal. Pupils are equal, round, and reactive to light. Right eye exhibits no discharge. Left eye exhibits no discharge. No scleral icterus.   Neck: Normal range of motion. Neck supple. No JVD present. No tracheal deviation present. No thyromegaly present.   Cardiovascular: Exam reveals no gallop and no friction rub.    No murmur heard.  +Smooth LVAD hum, DLES 3   Pulmonary/Chest: Effort normal. No respiratory distress. He has no wheezes. He has no rales.   Decreased BS in LLL   Abdominal: Soft. Bowel sounds are normal. He exhibits no distension and no mass. There is no tenderness. There is no rebound and no guarding.   Musculoskeletal: Normal range of motion.   Lymphadenopathy:     He has no cervical adenopathy.   Neurological: He is alert and oriented to person, place, and time. No cranial nerve deficit.   Skin: Skin is warm and dry. He is not diaphoretic.   Psychiatric: He has a normal mood and affect. His behavior is normal. Judgment and thought content normal.   Nursing note and vitals reviewed.      Significant Labs:   BMP:   Recent Labs  Lab 02/19/18  1410   GLU 96   *    K 3.8   *   CO2 30*   BUN 31*   CREATININE 2.0*   CALCIUM 10.2   , CMP   Recent Labs  Lab 02/19/18  1410   *   K 3.8   *   CO2 30*   GLU 96   BUN 31*   CREATININE 2.0*   CALCIUM 10.2   PROT 6.5   ALBUMIN 3.0*   BILITOT 0.4   ALKPHOS 105   AST 27   ALT <5*   ANIONGAP 6*   ESTGFRAFRICA 36.7*   EGFRNONAA 31.7*    and Troponin   Recent Labs  Lab 02/19/18  1410   TROPONINI 0.175*       Significant Imaging: CXR (2/19/18)  Cardiac size is mildly enlarged.  An AICD unit and LVAD device are in place.  Vascular catheter since its tip in the superior vena cava and lungs are clear without cardiac failure.  There may be some pleural fluid present on the left.    CT Head (2/19/18)  No evidence of acute intracranial pathology.    Mild chronic ischemic changes and generalized cerebral volume loss.      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 Balwinder BiV ICD, HILLARY/BiPAP, HTN, CKD, HLP, gout and depression who presents with a 1 week history of worsening confusion and lethargy.     Pt shows no obvious signs of volume overload clinically and radiographically. His BNP is elevated from previous but family reports decreased appetite since hospital discharge. His work-up is unrevealing so far and does not explain his worsening confusion and lethargy. Review of his hospital records show similar presentations within the past few months. Was seen by neurology recently who recommended initiation of Sinemet for presumed PD. Family reports finding him at the side of his bed earlier today. Device interrogated at the bedside, no episodes of VT / VF. CT head shows no evidence of ICH. He has no focal neurological deficits when examined at the bedside. Will discuss tapering Sinemet with neurology as patient's sx has shown little improvement while  on this medication. Utox negative. No significant metabolic distubrances on CBC. Will admit to HTS to further work-up and treatment and complete infectious work-up to r/o metabolic causes of worsening neurological status.     VTE Risk Mitigation     None          Thank you for your consult. Admit to HTS. Discussed with on-call HTS fellow, Dr. Miky Kraus MD  Cardiology   [facility]

## 2018-02-20 NOTE — CONSULTS
[facility]  Neurology  Consult Note    Patient Name: Kev Vasquez  MRN: [id number]  Admission Date: 2/19/2018  Hospital Length of Stay: 1 days  Code Status: Full Code   Attending Provider: Carlito Santana MD   Consulting Provider: Tamara Chawla PA-C  Primary Care Physician: Mega Collins MD  Principal Problem:Confusion    Consults   Subjective:     Chief Complaint: LVAD, delirium ?NPH/ PD/dementia    HPI:   77 y.o. Male with hx of HTN, HLD, CKD, ischemic cardiomyopathy s/p LVAD (10/16/16) with chronic pseudomonas drive line infection, sick sinus syndrome s/p ICD, Vtach on Tikosyn, HILLARY with BiPAP, gout and depression presented 2/19/18 with one wk hx of worsening confusion. Yesterday, he was found with his head underneath the post of the bed. Caregiver reports increased lethargy and decreased PO intake lately. He is less mobile and having trouble transferring and getting around the house. Of note, he was admitted 1/24-2/7/18 and Neurology consulted for ?NPH. He had LP on 1/30 removing 13 cc and post gait assessment was reportedly worse. Ropinirole was discontinued and Sinemet trial started CD/LD  q 3-4 hr while awake. There was question of LBD at that time given hx of hallucinations, delirium and acting out dreams. IV Abx for chronic drive line infection were changed per ID recs. He was admitted again 2/8-2/14 for syncopal episodes thought to be vasovagal with labile bp. AMS was noted on that admission as well and attributed to possible underlying dementia. Per chart review, patient had 2 neuropsych assessments at OSH, both unremarkable for signs of underlying dementia. Wife denies hx of Parkinson's disease. This admission, caregiver says she has noticed a considerable decline after the recent two admissions and wonders if Sinemet is making him more confused and lethargic.      Past Medical History:   Diagnosis Date    AICD (automatic cardioverter/defibrillator) present 2014    [employer]     Chronic anticoagulation 7/21/2017    Chronic combined systolic and diastolic congestive heart failure 7/27/2015 11-16-17   1 - Moderate left ventricular enlargement.    2 - Severely depressed left ventricular systolic function (EF 15-20%).    3 - Impaired LV relaxation, normal LAP (grade 1 diastolic dysfunction).    4 - Biatrial enlargement.    5 - Right ventricle is upper limit of normal in size with not well seen systolic function.    6 - Severe tricuspid regurgitation.    7 - Increased central venous pressure.    8 - The estimated PA systolic pressure is 40 mmHg.    9 - Heartmate III LVAD; speed 5800.     Complication involving left ventricular assist device (LVAD) 7/29/2017    Coronary artery disease involving native coronary artery of native heart without angina pectoris 7/27/2015    Gait instability     Hypertensive urgency, malignant 11/15/2017    ICD (implantable cardioverter-defibrillator), biventricular, in situ 7/27/2015    Ischemic cardiomyopathy 7/20/2017    S/p HMIII     Kidney stones     LVAD (left ventricular assist device) present 7/20/2017    status post Heartmate III 10/16/16 LVAD    HILLARY on CPAP 7/27/2015    Peripheral neuropathy     Pulmonary hypertension due to left ventricular systolic dysfunction 7/29/2015    Restless leg syndrome 1992    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    Skin cancer     excision 2013    Skin yeast infection 8/31/2017    Stage 3 chronic kidney disease 7/20/2017    Syncope 7/20/2017    Ventricular tachycardia 7/25/2017     Past Surgical History:   Procedure Laterality Date    CARDIAC PACEMAKER PLACEMENT      pacemaker previously, then AICD in 2006. AICD [employer] serial #[id number]    CORONARY ARTERY BYPASS GRAFT  1993    KNEE SURGERY Left 2001    complicated by infection, hardware had to be taken out and replaced    LEFT VENTRICULAR ASSIST DEVICE  10/19/2016     Review of patient's allergies indicates:   Allergen Reactions     Aldactone [spironolactone]       persistent hyperkalemia.    Sulfa (sulfonamide antibiotics)      No current facility-administered medications on file prior to encounter.      Current Outpatient Prescriptions on File Prior to Encounter   Medication Sig    allopurinol (ZYLOPRIM) 300 MG tablet Take 1 tablet (300 mg total) by mouth once daily.    aspirin 81 MG Chew Take 81 mg by mouth once daily.    atorvastatin (LIPITOR) 10 MG tablet Take 10 mg by mouth once daily.    buPROPion 450 mg Tb24 Take 450 mg by mouth once daily.    calcium-vitamin D tablet 600 mg-200 units Take 1 tablet by mouth once daily.    carbidopa-levodopa  mg (SINEMET)  mg per tablet Take 1 tablet by mouth every 4 (four) hours while awake.    docusate sodium (COLACE) 100 MG capsule Take 100 mg by mouth daily as needed for Constipation.    dofetilide (TIKOSYN) 125 MCG Cap Take 1 capsule (125 mcg total) by mouth every 12 (twelve) hours.    doxazosin (CARDURA) 8 MG Tab Take 1 tablet (8 mg total) by mouth once daily.    ferrous sulfate 324 mg (65 mg iron) TbEC Take 1 tablet (324 mg total) by mouth once daily.    folic acid (FOLVITE) 1 MG tablet Take 1 mg by mouth once daily.    hydrALAZINE (APRESOLINE) 100 MG tablet Take 1 tablet (100 mg total) by mouth every 8 (eight) hours.    isosorbide dinitrate (ISORDIL) 40 MG Tab Take 1 tablet (40 mg total) by mouth 3 (three) times daily.    Lactobacillus rhamnosus GG (CULTURELLE) 10 billion cell capsule Take 1 capsule by mouth once daily.    lisinopril 10 MG tablet Take 1 tablet (10 mg total) by mouth 2 (two) times daily.    magnesium oxide (MAG-OX) 400 mg tablet Take 1 tablet (400 mg total) by mouth 2 (two) times daily.    meropenem (MERREM) 1 gram injection Inject 1 g into the vein every 12 (twelve) hours.    multivitamin capsule Take 1 capsule by mouth once daily.    NIFEdipine (PROCARDIA-XL) 60 MG (OSM) 24 hr tablet Take 2 tablets (120 mg total) by mouth once daily.     pantoprazole (PROTONIX) 40 MG tablet Take 1 tablet (40 mg total) by mouth 2 (two) times daily before meals.    warfarin (COUMADIN) 4 MG tablet Take 4 mg orally daily on Tue, Thu and 6 mg orally daily all other days other days as directed by coumadin clinic     Family History     Problem Relation (Age of Onset)    Cancer Daughter (50)    Diabetes Daughter    Heart disease Daughter        Social History Main Topics    Smoking status: Never Smoker    Smokeless tobacco: Never Used    Alcohol use No    Drug use: No    Sexual activity: Not on file      Comment:      Review of Systems   Constitutional: Positive for activity change and fatigue.   Respiratory: Negative for shortness of breath.    Cardiovascular: Negative for chest pain.   Musculoskeletal: Positive for gait problem.   Neurological: Positive for tremors and weakness. Negative for dizziness and light-headedness.   Psychiatric/Behavioral: Positive for confusion, decreased concentration and sleep disturbance. Negative for hallucinations.     Objective:     Vital Signs (Most Recent):  Temp: 97.6 °F (36.4 °C) (02/20/18 1113)  Pulse: (!) 52 (02/20/18 1113)  Resp: 18 (02/20/18 1113)  BP: (!) 50/0 (02/20/18 1312)  SpO2: (!) 92 % (02/20/18 1113) Vital Signs (24h Range):  Temp:  [97.6 °F (36.4 °C)-97.9 °F (36.6 °C)] 97.6 °F (36.4 °C)  Pulse:  [52-74] 52  Resp:  [18-20] 18  SpO2:  [90 %-97 %] 92 %  BP: ()/(0-92) 50/0     Weight: 73.2 kg (161 lb 6 oz)  Body mass index is 22.51 kg/m².    Physical Exam   Constitutional: No distress.   HENT:   Head: Normocephalic and atraumatic.   Eyes: EOM are normal.   Neck: Neck supple.   Pulmonary/Chest: Effort normal.   Skin: He is not diaphoretic.   Psychiatric: His speech is normal.     NEUROLOGICAL EXAMINATION:     MENTAL STATUS   Oriented to person.   Oriented to place.   Disoriented to month. Oriented to year.   Follows 2 step (slow to follow commands) commands.   Attention: decreased. Concentration: decreased.    Speech: speech is normal   Level of consciousness: drowsy ,  arousable by verbal stimuli    CRANIAL NERVES     CN III, IV, VI   Extraocular motions are normal.   Right pupil: Shape: regular. Reactivity: brisk.   Left pupil: Shape: regular. Reactivity: brisk.   Nystagmus: none   Ophthalmoparesis: none    CN VIII   Hearing: intact    CN XII   CN XII normal.     MOTOR EXAM   Muscle bulk: normal  Overall muscle tone: normal    Strength   Right biceps: 5/5  Left biceps: 5/5  Right triceps: 5/5  Left triceps: 5/5  Right interossei: 5/5  Left interossei: 5/5  Right iliopsoas: 5/5  Left iliopsoas: 5/5  Right anterior tibial: 5/5  Left anterior tibial: 5/5  Right posterior tibial: 5/5  Left posterior tibial: 5/5  Right peroneal: 5/5  Left peroneal: 5/5    SENSORY EXAM   Light touch normal.     GAIT AND COORDINATION        Diffusely tremulous, worse with outstretched arms  No myoclonus noted      Significant Labs:   Hemoglobin A1c: No results for input(s): HGBA1C in the last 720 hours.  Blood Culture:   Recent Labs  Lab 02/19/18 2001   LABBLOO No Growth to date  No Growth to date     CBC:   Recent Labs  Lab 02/19/18  1410 02/20/18  0533   WBC 6.10 5.12   HGB 9.4* 9.3*   HCT 28.8* 28.5*   * 128*     CMP:   Recent Labs  Lab 02/19/18  1410 02/20/18  0533   GLU 96 78   * 149*   K 3.8 3.6   * 113*   CO2 30* 29   BUN 31* 32*   CREATININE 2.0* 1.9*   CALCIUM 10.2 10.3   MG  --  2.5   PROT 6.5  --    ALBUMIN 3.0*  --    BILITOT 0.4  --    ALKPHOS 105  --    AST 27  --    ALT <5*  --    ANIONGAP 6* 7*   EGFRNONAA 31.7* 33.7*     Inflammatory Markers: No results for input(s): SEDRATE, CRP, PROCAL in the last 48 hours.  Urine Culture: No results for input(s): LABURIN in the last 48 hours.  Urine Studies:   Recent Labs  Lab 02/19/18  1621   COLORU Yellow   APPEARANCEUA Clear   PHUR 7.0   SPECGRAV 1.010   PROTEINUA Negative   GLUCUA Negative   KETONESU Negative   BILIRUBINUA Negative   OCCULTUA Negative   NITRITE  Negative   UROBILINOGEN Negative   LEUKOCYTESUR Negative     All pertinent lab results from the past 24 hours have been reviewed.    Significant Imaging: I have reviewed and interpreted all pertinent imaging results/findings within the past 24 hours.     CT Head WO contrast (2/19/18):  No evidence of acute intracranial pathology.  Mild chronic ischemic changes and generalized cerebral volume loss    Assessment and Plan:     * Confusion    Episode yesterday where patient was found with head under bed. Recently more lethargic and less mobile per caregiver  CT Head unremarkable for acute intracranial pathology. Unable to obtain MRI 2/2 ICD  Oriented on exam and able to follow multi-step commands    ->suspect delirium in setting of frequent hospitalizations  -delirium precautions with regulation of sleep/wake cycles and minimize sedating medications as able      Cognitive dysfunction    Report of possible underlying dementia  -per chart review, 2 neuropsych assessments at outside facility without evidence of dementia or cognitive impairment  -consider checking B1, B2, B6, B12, folate, MMA, RPR       Tremor    3-4 yr hx of tremulousness  Exam not consistent with PD or NPH    ->suspect tremor is related to infectious/metabolic derangements   -please stop requip as patient says tylenol is just as effective and stop sinemet (started last admission)       LVAD (left ventricular assist device) present    S/p LVAD 10/2016 with chronic pseudomonas drive line infection  -monitoring per primary team     VTE Risk Mitigation         Ordered     warfarin (COUMADIN) tablet 4 mg  Daily     Route:  Oral        02/19/18 5271        Thank you for your consult. I will follow-up with patient. Please contact us if you have any additional questions.    Tamara Chawla PA-C  General Neurology Consult  Neuro Consult [place] # [phone]

## 2018-02-20 NOTE — PROGRESS NOTES
[facility]  Heart Transplant  Progress Note    Patient Name: Kev Vasquez  MRN: [id number]  Admission Date: 2/19/2018  Hospital Length of Stay: 1 days  Attending Physician: Carlito Santana MD  Primary Care Provider: Mega Collins MD  Principal Problem:Confusion    Subjective:     Interval History: No complaints this am.     Continuous Infusions:  Scheduled Meds:   allopurinol  300 mg Oral Daily    aspirin  81 mg Oral Daily    atorvastatin  10 mg Oral Daily    buPROPion  450 mg Oral Daily    carbidopa-levodopa  mg  1 tablet Oral BID    dofetilide  125 mcg Oral Q12H    doxazosin  8 mg Oral Daily    ferrous sulfate  325 mg Oral Daily    hydrALAZINE  100 mg Oral Q8H    isosorbide dinitrate  40 mg Oral TID    Lactobacillus rhamnosus GG  1 capsule Oral Daily    lisinopril  10 mg Oral BID    magnesium oxide  400 mg Oral BID    meropenem  1 g Intravenous Q12H    NIFEdipine  120 mg Oral Daily    pantoprazole  40 mg Oral BID AC    warfarin  4 mg Oral Daily     PRN Meds:acetaminophen, docusate sodium    Review of patient's allergies indicates:   Allergen Reactions    Aldactone [spironolactone]       persistent hyperkalemia.    Sulfa (sulfonamide antibiotics)      Objective:     Vital Signs (Most Recent):  Temp: 97.9 °F (36.6 °C) (02/20/18 0738)  Pulse: 72 (02/20/18 0738)  Resp: 18 (02/20/18 0738)  BP: (!) 124/92 (02/20/18 0744)  SpO2: (!) 94 % (02/20/18 0738) Vital Signs (24h Range):  Temp:  [97.6 °F (36.4 °C)-98 °F (36.7 °C)] 97.9 °F (36.6 °C)  Pulse:  [57-74] 72  Resp:  [18-26] 18  SpO2:  [90 %-98 %] 94 %  BP: ()/(0-92) 124/92     Patient Vitals for the past 72 hrs (Last 3 readings):   Weight   02/20/18 0700 73.2 kg (161 lb 6 oz)   02/19/18 2314 82 kg (180 lb 11.2 oz)   02/19/18 1321 84.4 kg (186 lb)     Body mass index is 22.51 kg/m².      Intake/Output Summary (Last 24 hours) at 02/20/18 0810  Last data filed at 02/20/18 0600   Gross per 24 hour   Intake              180  ml   Output                0 ml   Net              180 ml       Hemodynamic Parameters:       Telemetry: NSR    Physical Exam   Constitutional: He is oriented to person, place, and time. He appears well-developed and well-nourished.   HENT:   Head: Normocephalic.   Eyes: Pupils are equal, round, and reactive to light.   Neck: Normal range of motion. Neck supple.   Cardiovascular: Normal rate and regular rhythm.    VAD hum   Pulmonary/Chest: Effort normal and breath sounds normal.   Abdominal: Soft. Bowel sounds are normal.   Musculoskeletal: Normal range of motion.   Neurological: He is alert and oriented to person, place, and time.   Skin: Skin is warm and dry.   Psychiatric: He has a normal mood and affect. His behavior is normal.   Nursing note and vitals reviewed.      Significant Labs:  CBC:    Recent Labs  Lab 02/14/18  0500 02/19/18  1410 02/20/18  0533   WBC 5.95 6.10 5.12   RBC 3.14* 3.10* 3.13*   HGB 9.4* 9.4* 9.3*   HCT 28.2* 28.8* 28.5*   * 125* 128*   MCV 90 93 91   MCH 29.9 30.3 29.7   MCHC 33.3 32.6 32.6     BNP:    Recent Labs  Lab 02/19/18  1410   *     CMP:    Recent Labs  Lab 02/14/18  0500 02/19/18  1410 02/20/18  0533   GLU 68* 96 78   CALCIUM 11.4* 10.2 10.3   ALBUMIN  --  3.0*  --    PROT  --  6.5  --     148* 149*   K 4.1 3.8 3.6   CO2 28 30* 29    112* 113*   BUN 28* 31* 32*   CREATININE 1.9* 2.0* 1.9*   ALKPHOS  --  105  --    ALT  --  <5*  --    AST  --  27  --    BILITOT  --  0.4  --       Coagulation:     Recent Labs  Lab 02/16/18  1304 02/19/18  1410 02/20/18  0533   INR 2.2* 2.1* 2.1*   APTT  --  30.8  --      LDH:    Recent Labs  Lab 02/20/18  0533        Microbiology:  Microbiology Results (last 7 days)     Procedure Component Value Units Date/Time    Blood culture [[id number]] Collected:  02/19/18 2001    Order Status:  Completed Specimen:  Blood from Peripheral, Hand, Left Updated:  02/20/18 0345     Blood Culture, Routine No Growth to date    Blood  "culture [[id number]] Collected:  02/19/18 2001    Order Status:  Completed Specimen:  Blood from Peripheral, Forearm, Left Updated:  02/20/18 0345     Blood Culture, Routine No Growth to date          I have reviewed all pertinent labs within the past 24 hours.    Estimated Creatinine Clearance: 34.8 mL/min (A) (based on SCr of 1.9 mg/dL (H)).    Diagnostic Results:  I have reviewed all pertinent imaging results/findings within the past 24 hours.    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 BiV ICD, HILLARY/BiPAP, HTN, CKD, HLP, gout and depression who presents with a 1 week history of worsening confusion and lethargy.     Patient was recently discharged due to similar sx but family reports that since Saturday, patient has also had associated spontaneous movements of his upper extremities. They also report decreased appetite and PO intake recently. Earlier today, he was found at the side of his bed with his head underneath the sidepost of his bed. Per his family, he has not had any worsening SOB or WHELAN. However, he is too weak to ambulate independently. He was recently started on Sinemet by neurology who saw him in late January during that admission due to presumed PD. During his last visit, he was switched from Cefepime to IV Meropenem based on ID recommendations.      He denies chest pain, SOB, WHELAN, orthopnea, PND, LE edema. Denies any LVAD flow alarms. No episodes of reported lightheadedness when standing but family reports he has not been able to get up on his own over the last few days. +Dry cough."     * Confusion    - Recurring problem. LP done 1/30/18 with removal of only 13cc; opening pressure 21, closing pressure 15. His post procedure gait was significantly worse. Discussed with Neuro and NSGY. Per NSGY, no immediate " need for cisternogram, shunt, etc, given failed improvement in gait. Now considering Parkinsons/- started Sinemet per Neuro's rec and patient was supposed to f/u in the [facility] as an outpatient  - D/C'd Ropinirole again 2/2 gait disturbance  - Pt currently taking Sinemet  Q4h while awake. Dr. Carpio note on 2/1/18 recommends a total of  per day. Will decrease dose to BID and follow response.         LVAD (left ventricular assist device) present    - S/P HM III 10/16/16 as DT in [place]  - Current speed 5800  - TTE 1/25/18 LVEDD 6.5, LVEF 10-15%, diastolic dysfunction, RVE, severely depressed RV systolic function, PAS 20, ARTHUR intermittently and septum midline.   - INR theraputic.  Coumadin with goal INR 2.0-3.0.   - LDH stable  - previously on home Cefepime and Cipro. Continue Meropenem 1 g BID.  -Continue Doxazosin 8mg daily, Hydralazine 100mg TID, Isosorbide dinitrate 40mg TID, Lisinopril 10mg BID, Nifedipine 120mg daily.         Stage 3 chronic kidney disease    - Will monitor.  (baseline looks ~ 1.6-1.8)          Jim Morejon NP  Heart Transplant  [facility]

## 2018-02-20 NOTE — PLAN OF CARE
Problem: Patient Care Overview  Goal: Plan of Care Review  Plan of care discussed with patient. Fall precautions in place. Patient has no complaints of pain. Discussed medications and care. Patient has no questions at this time.White board updated. Bed locked in lowest position. Side rails up x2. Will continue to monitor.

## 2018-02-20 NOTE — ASSESSMENT & PLAN NOTE
3-4 yr hx of tremulousness  Exam not consistent with PD or NPH    ->suspect tremor is related to infectious/metabolic derangements   -please stop requip as patient says tylenol is just as effective and stop sinemet (started last admission)

## 2018-02-20 NOTE — ASSESSMENT & PLAN NOTE
Report of possible underlying dementia  -per chart review, 2 neuropsych assessments at outside facility without evidence of dementia or cognitive impairment  -consider checking B1, B2, B6, B12, folate, MMA, RPR

## 2018-02-20 NOTE — SUBJECTIVE & OBJECTIVE
Interval History: No complaints this am.     Continuous Infusions:  Scheduled Meds:   allopurinol  300 mg Oral Daily    aspirin  81 mg Oral Daily    atorvastatin  10 mg Oral Daily    buPROPion  450 mg Oral Daily    carbidopa-levodopa  mg  1 tablet Oral BID    dofetilide  125 mcg Oral Q12H    doxazosin  8 mg Oral Daily    ferrous sulfate  325 mg Oral Daily    hydrALAZINE  100 mg Oral Q8H    isosorbide dinitrate  40 mg Oral TID    Lactobacillus rhamnosus GG  1 capsule Oral Daily    lisinopril  10 mg Oral BID    magnesium oxide  400 mg Oral BID    meropenem  1 g Intravenous Q12H    NIFEdipine  120 mg Oral Daily    pantoprazole  40 mg Oral BID AC    warfarin  4 mg Oral Daily     PRN Meds:acetaminophen, docusate sodium    Review of patient's allergies indicates:   Allergen Reactions    Aldactone [spironolactone]       persistent hyperkalemia.    Sulfa (sulfonamide antibiotics)      Objective:     Vital Signs (Most Recent):  Temp: 97.9 °F (36.6 °C) (02/20/18 0738)  Pulse: 72 (02/20/18 0738)  Resp: 18 (02/20/18 0738)  BP: (!) 124/92 (02/20/18 0744)  SpO2: (!) 94 % (02/20/18 0738) Vital Signs (24h Range):  Temp:  [97.6 °F (36.4 °C)-98 °F (36.7 °C)] 97.9 °F (36.6 °C)  Pulse:  [57-74] 72  Resp:  [18-26] 18  SpO2:  [90 %-98 %] 94 %  BP: ()/(0-92) 124/92     Patient Vitals for the past 72 hrs (Last 3 readings):   Weight   02/20/18 0700 73.2 kg (161 lb 6 oz)   02/19/18 2314 82 kg (180 lb 11.2 oz)   02/19/18 1321 84.4 kg (186 lb)     Body mass index is 22.51 kg/m².      Intake/Output Summary (Last 24 hours) at 02/20/18 0810  Last data filed at 02/20/18 0600   Gross per 24 hour   Intake              180 ml   Output                0 ml   Net              180 ml       Hemodynamic Parameters:       Telemetry: NSR    Physical Exam   Constitutional: He is oriented to person, place, and time. He appears well-developed and well-nourished.   HENT:   Head: Normocephalic.   Eyes: Pupils are equal, round, and  reactive to light.   Neck: Normal range of motion. Neck supple.   Cardiovascular: Normal rate and regular rhythm.    VAD hum   Pulmonary/Chest: Effort normal and breath sounds normal.   Abdominal: Soft. Bowel sounds are normal.   Musculoskeletal: Normal range of motion.   Neurological: He is alert and oriented to person, place, and time.   Skin: Skin is warm and dry.   Psychiatric: He has a normal mood and affect. His behavior is normal.   Nursing note and vitals reviewed.      Significant Labs:  CBC:    Recent Labs  Lab 02/14/18  0500 02/19/18  1410 02/20/18  0533   WBC 5.95 6.10 5.12   RBC 3.14* 3.10* 3.13*   HGB 9.4* 9.4* 9.3*   HCT 28.2* 28.8* 28.5*   * 125* 128*   MCV 90 93 91   MCH 29.9 30.3 29.7   MCHC 33.3 32.6 32.6     BNP:    Recent Labs  Lab 02/19/18  1410   *     CMP:    Recent Labs  Lab 02/14/18  0500 02/19/18  1410 02/20/18  0533   GLU 68* 96 78   CALCIUM 11.4* 10.2 10.3   ALBUMIN  --  3.0*  --    PROT  --  6.5  --     148* 149*   K 4.1 3.8 3.6   CO2 28 30* 29    112* 113*   BUN 28* 31* 32*   CREATININE 1.9* 2.0* 1.9*   ALKPHOS  --  105  --    ALT  --  <5*  --    AST  --  27  --    BILITOT  --  0.4  --       Coagulation:     Recent Labs  Lab 02/16/18  1304 02/19/18  1410 02/20/18  0533   INR 2.2* 2.1* 2.1*   APTT  --  30.8  --      LDH:    Recent Labs  Lab 02/20/18  0533        Microbiology:  Microbiology Results (last 7 days)     Procedure Component Value Units Date/Time    Blood culture [[id number]] Collected:  02/19/18 2001    Order Status:  Completed Specimen:  Blood from Peripheral, Hand, Left Updated:  02/20/18 0345     Blood Culture, Routine No Growth to date    Blood culture [[id number]] Collected:  02/19/18 2001    Order Status:  Completed Specimen:  Blood from Peripheral, Forearm, Left Updated:  02/20/18 0345     Blood Culture, Routine No Growth to date          I have reviewed all pertinent labs within the past 24 hours.    Estimated Creatinine Clearance: 34.8  mL/min (A) (based on SCr of 1.9 mg/dL (H)).    Diagnostic Results:  I have reviewed all pertinent imaging results/findings within the past 24 hours.

## 2018-02-20 NOTE — HPI
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 Balwinder BiV ICD, HILLARY/BiPAP, HTN, CKD, HLP, gout and depression who presents with a 1 week history of worsening confusion and lethargy.     Patient was recently discharged due to similar sx but family reports that since Saturday, patient has also had associated spontaneous movements of his upper extremities. They also report decreased appetite and PO intake recently. Earlier today, he was found at the side of his bed with his head underneath the sidepost of his bed. Per his family, he has not had any worsening SOB or WHELAN. However, he is too weak to ambulate independently. He was recently started on Sinemet by neurology who saw him in late January during that admission due to presumed PD. During his last visit, he was switched from Cefepime to IV Meropenem based on ID recommendations.      He denies chest pain, SOB, WHELAN, orthopnea, PND, LE edema. Denies any LVAD flow alarms. No episodes of reported lightheadedness when standing but family reports he has not been able to get up on his own over the last few days. +Dry cough."   "

## 2018-02-20 NOTE — ASSESSMENT & PLAN NOTE
- Recurring problem. LP done 1/30/18 with removal of only 13cc; opening pressure 21, closing pressure 15. His post procedure gait was significantly worse. Discussed with Neuro and NSGY. Per NSGY, no immediate need for cisternogram, shunt, etc, given failed improvement in gait. Now considering Parkinsons/- started Sinemet per Neuro's rec and patient was supposed to f/u in the [facility] as an outpatient  - D/C'd Ropinirole again 2/2 gait disturbance  - Pt currently taking Sinemet  Q4h while awake. Dr. Carpio note on 2/1/18 recommends a total of  per day. Will decrease dose to BID and follow response.

## 2018-02-20 NOTE — PROCEDURES
VAD interrogation completed this AM in the event changes needed to be made. Will continue to monitor for further issues.     Pulsatile: Yes   VAD Sounds: HM3  Smooth  Problems / Issues / Alarms with VAD if any: None noted    VAD Interrogation:  TXP LVAD INTERROGATIONS 2/20/2018 2/20/2018 2/19/2018 2/19/2018 2/19/2018 2/19/2018 2/14/2018   Type HeartMate3 HeartMate3 HeartMate3 HeartMate3 HeartMate II;HeartMate3 HeartMate3 HeartMate3   Flow 4.9 4.9 5.3 5.4 5.4 5.4 5.2   Speed 5800 5800 5800 5800 5800 5800 5800   PI 3.4 3.7 3.3 3.4 3.4 3.3 3.3   Power (Mendez) 4.4 4.5 4.4 4.4 4.4 4.5 4.4   LSL - - - - - - -   Pulsatility - - - - - - -   }

## 2018-02-20 NOTE — UM SECONDARY REVIEW
Physician Advisor External    Level of Care Issue    Approved Inpatient-   02/20/2018 @ 0930- per Dr. Juan A Riggins, EHR, approves Inpatient.

## 2018-02-20 NOTE — PROGRESS NOTES
Pt driveline site rated 3. With brown drainage. Notified Mignon Hidalgo, LVAD coordinator. No further orders were given. Will continue to monitor.

## 2018-02-20 NOTE — HPI
77 y.o. Male with hx of HTN, HLD, CKD, ischemic cardiomyopathy s/p LVAD (10/16/16) with chronic pseudomonas drive line infection, sick sinus syndrome s/p ICD, Vtach on Tikosyn, HILLARY with BiPAP,   gout and depression presented 2/19/18 with one wk hx of worsening confusion. Yesterday, he was found with his head underneath the post of the bed. Caregiver reports increased lethargy and decreased PO intake lately. He is less mobile and having trouble transferring and getting around the house. Of note, he was admitted 1/24-2/7/18 and Neurology consulted for ?NPH. He had LP on 1/30 removing 13 cc and post gait assessment was reportedly worse. Ropinirole was discontinued and Sinemet trial started CD/LD  q 3-4 hr while awake. There was question of LBD at that time given hx of hallucinations, delirium and acting out dreams. IV Abx for chronic drive line infection were changed per ID recs. He was admitted again 2/8-2/14 for syncopal episodes thought to be vasovagal with labile bp. AMS was noted on that admission as well and attributed to possible underlying dementia. Per chart review, patient had 2 neuropsych assessments at OSH, both unremarkable for signs of underlying dementia. Wife denies hx of Parkinson's disease. This admission, caregiver says she has noticed a considerable decline after the recent two admissions and wonders if Sinemet is making him more confused and lethargic.

## 2018-02-20 NOTE — ED NOTES
Hourly rounding complete. Patient resting comfortably in stretcher. Pt. AA, respirations even and unlabored. No acute distress noted. Denies pain. Bed in lowest position, locked, side rails up x 2, and call bell in reach.

## 2018-02-20 NOTE — SUBJECTIVE & OBJECTIVE
Past Medical History:   Diagnosis Date    AICD (automatic cardioverter/defibrillator) present 2014    [employer] Balwinder    Chronic anticoagulation 7/21/2017    Chronic combined systolic and diastolic congestive heart failure 7/27/2015 11-16-17   1 - Moderate left ventricular enlargement.    2 - Severely depressed left ventricular systolic function (EF 15-20%).    3 - Impaired LV relaxation, normal LAP (grade 1 diastolic dysfunction).    4 - Biatrial enlargement.    5 - Right ventricle is upper limit of normal in size with not well seen systolic function.    6 - Severe tricuspid regurgitation.    7 - Increased central venous pressure.    8 - The estimated PA systolic pressure is 40 mmHg.    9 - Heartmate III LVAD; speed 5800.     Complication involving left ventricular assist device (LVAD) 7/29/2017    Coronary artery disease involving native coronary artery of native heart without angina pectoris 7/27/2015    Gait instability     Hypertensive urgency, malignant 11/15/2017    ICD (implantable cardioverter-defibrillator), biventricular, in situ 7/27/2015    Ischemic cardiomyopathy 7/20/2017    S/p HMIII     Kidney stones     LVAD (left ventricular assist device) present 7/20/2017    status post Heartmate III 10/16/16 LVAD    HILLRAY on CPAP 7/27/2015    Peripheral neuropathy     Pulmonary hypertension due to left ventricular systolic dysfunction 7/29/2015    Restless leg syndrome 1992    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    Skin cancer     excision 2013    Skin yeast infection 8/31/2017    Stage 3 chronic kidney disease 7/20/2017    Syncope 7/20/2017    Ventricular tachycardia 7/25/2017     Past Surgical History:   Procedure Laterality Date    CARDIAC PACEMAKER PLACEMENT      pacemaker previously, then AICD in 2006. AICD [employer] Balwinder serial #[id number]    CORONARY ARTERY BYPASS GRAFT  1993    KNEE SURGERY Left 2001    complicated by infection, hardware had to be taken out and replaced     LEFT VENTRICULAR ASSIST DEVICE  10/19/2016     Review of patient's allergies indicates:   Allergen Reactions    Aldactone [spironolactone]       persistent hyperkalemia.    Sulfa (sulfonamide antibiotics)      Current Facility-Administered Medications   Medication    acetaminophen tablet 650 mg    [START ON 2/20/2018] allopurinol tablet 300 mg    [START ON 2/20/2018] aspirin chewable tablet 81 mg    [START ON 2/20/2018] atorvastatin tablet 10 mg    [START ON 2/20/2018] buPROPion TB24 tablet 450 mg    carbidopa-levodopa  mg per tablet 1 tablet    docusate sodium capsule 100 mg    dofetilide capsule 125 mcg    [START ON 2/20/2018] doxazosin tablet 8 mg    [START ON 2/20/2018] ferrous sulfate EC tablet 325 mg    hydrALAZINE tablet 100 mg    isosorbide dinitrate tablet 40 mg    [START ON 2/20/2018] Lactobacillus rhamnosus GG capsule 1 capsule    lisinopril tablet 10 mg    magnesium oxide tablet 400 mg    meropenem injection 1 g    [START ON 2/20/2018] NIFEdipine 24 hr tablet 120 mg    [START ON 2/20/2018] pantoprazole EC tablet 40 mg    [START ON 2/20/2018] warfarin (COUMADIN) tablet 4 mg     Family History     Problem Relation (Age of Onset)    Cancer Daughter (50)    Diabetes Daughter    Heart disease Daughter        Social History Main Topics    Smoking status: Never Smoker    Smokeless tobacco: Never Used    Alcohol use No    Drug use: No    Sexual activity: Not on file      Comment:      Review of Systems   Constitution: Positive for weakness and malaise/fatigue. Negative for chills and fever.   HENT: Negative for hoarse voice and sore throat.    Cardiovascular: Positive for near-syncope. Negative for chest pain, claudication, cyanosis, dyspnea on exertion, irregular heartbeat, leg swelling, orthopnea, palpitations, paroxysmal nocturnal dyspnea and syncope.   Respiratory: Positive for cough. Negative for hemoptysis and shortness of breath.    Musculoskeletal: Positive for  falls. Negative for back pain, joint pain and joint swelling.   Gastrointestinal: Negative for abdominal pain, constipation, diarrhea, hematochezia, melena, nausea and vomiting.   Genitourinary: Negative for dysuria, hematuria and incomplete emptying.   Neurological: Positive for focal weakness, loss of balance and tremors. Negative for dizziness, headaches and light-headedness.   Psychiatric/Behavioral: Negative for altered mental status, depression and suicidal ideas. The patient is not nervous/anxious.     Objective:     Vital Signs (Most Recent):  Temp: 97.9 °F (36.6 °C) (02/19/18 2304)  Pulse: 66 (02/19/18 2201)  Resp: 18 (02/19/18 2201)  BP: (!) 92/0 (02/19/18 2108)  SpO2: 96 % (02/19/18 2304) Vital Signs (24h Range):  Temp:  [97.9 °F (36.6 °C)-98 °F (36.7 °C)] 97.9 °F (36.6 °C)  Pulse:  [59-74] 66  Resp:  [18-26] 18  SpO2:  [90 %-98 %] 96 %  BP: (70-92)/(0-30) 92/0   Patient Vitals for the past 72 hrs (Last 3 readings):   Weight   02/19/18 1321 84.4 kg (186 lb)     Body mass index is 25.23 kg/m².    No intake or output data in the 24 hours ending 02/19/18 2305    Physical Exam   Constitutional: He is oriented to person, place, and time. He appears well-developed and well-nourished. No distress.   HENT:   Head: Normocephalic and atraumatic.   Eyes: Pupils are equal, round, and reactive to light. No scleral icterus.   Neck: Neck supple. No JVD present.   Cardiovascular:   Smooth LVAD HUM   Pulmonary/Chest: Effort normal. He has no rales.   Poor air movement in LLL    Abdominal: Soft. Bowel sounds are normal. He exhibits no distension. There is no tenderness.   Musculoskeletal: Normal range of motion. He exhibits no edema.   Neurological: He is alert and oriented to person, place, and time.   Skin: Skin is warm and dry. Capillary refill takes 2 to 3 seconds.   Vitals reviewed.    Significant Labs:  CBC:    Recent Labs  Lab 02/13/18  0454 02/14/18  0500 02/19/18  1410   WBC 6.12 5.95 6.10   RBC 3.33* 3.14* 3.10*    HGB 9.8* 9.4* 9.4*   HCT 30.0* 28.2* 28.8*   * 117* 125*   MCV 90 90 93   MCH 29.4 29.9 30.3   MCHC 32.7 33.3 32.6     BNP:    Recent Labs  Lab 02/19/18  1410   *     CMP:    Recent Labs  Lab 02/13/18  0454 02/14/18  0500 02/19/18  1410   GLU 85 68* 96   CALCIUM 11.5* 11.4* 10.2   ALBUMIN  --   --  3.0*   PROT  --   --  6.5    143 148*   K 4.2 4.1 3.8   CO2 28 28 30*    109 112*   BUN 31* 28* 31*   CREATININE 2.0* 1.9* 2.0*   ALKPHOS  --   --  105   ALT  --   --  <5*   AST  --   --  27   BILITOT  --   --  0.4      Coagulation:     Recent Labs  Lab 02/14/18  0500 02/16/18  1304 02/19/18  1410   INR 2.5* 2.2* 2.1*   APTT  --   --  30.8     I have reviewed all pertinent labs within the past 24 hours.    Diagnostic Results:  CT: Ct Head Without Contrast    Result Date: 2/19/2018   No evidence of acute intracranial pathology. Mild chronic ischemic changes and generalized cerebral volume loss. ______________________________________ Electronically signed by resident: TONNY CURTIS Date:     02/19/18 Time:    16:24 As the supervising and teaching physician, I personally reviewed the images and resident's interpretation and I agree with the findings. Electronically signed by: LATOYA GARCIA MD Date:     02/19/18 Time:    16:33

## 2018-02-20 NOTE — PLAN OF CARE
Problem: Nutrition, Imbalanced: Inadequate Oral Intake (Adult)  Goal: Improved Oral Intake  Patient will demonstrate the desired outcomes by discharge/transition of care.    Recommendations     Recommendation/Intervention:   1. Please order thickener packets for nectar thick liquids.   2. Large weight discrepancy noted between yesterday and today (25 lb).   -Please obtain accurate weight to assess if any weight loss has occurred.   3. Encourage PO intake and provide 1:1 assistance as needed and monitor for food pocketing.   4. RD following.     Goals: Intake >/=85% EEN/EPN  Nutrition Goal Status: new

## 2018-02-20 NOTE — CONSULTS
[facility]  Adult Nutrition  Consult Note    SUMMARY     Recommendations    Recommendation/Intervention:   1. Please order thickener packets for nectar thick liquids.   2. Large weight discrepancy noted between yesterday and today (25 lb).   -Please obtain accurate weight to assess if any weight loss has occurred.   3. Encourage PO intake and provide 1:1 assistance as needed and monitor for food pocketing.   4. RD following.    Goals: Intake >/=85% EEN/EPN  Nutrition Goal Status: new  Communication of RD Recs:  (POC)    Reason for Assessment    Reason for Assessment: physician consult  Diagnosis:  (confusion)  Relevant Medical History: CHF s/p LVAD (10/2016), CAD, HTN, s/p ICD, ICM, s/p CABG, CKD3   Interdisciplinary Rounds: attended     General Information Comments: Pt drowsy at time of visit. Caretaker reports that pt's intake has been poor 2/2 drowsiness. Also reports pt is pocketing food. Caretaker states pt does not like ONS.    Nutrition Discharge Planning: Adequate PO intake to prevent further weight loss on cardiac diet    Nutrition Prescription Ordered    Current Diet Order: Regular  Nutrition Order Comments: Nectar thick liquids                 Evaluation of Received Nutrients/Fluid Intake                                                                                                     % Intake of Estimated Energy Needs: 25 - 50 %  % Meal Intake: 50%     Nutrition Risk Screen     Nutrition Risk Screen: dysphagia or difficulty swallowing    Nutrition/Diet History    Patient Reported Diet/Restrictions/Preferences: low salt     Food Preferences: No cultural/Restorationist preferences noted.        Factors Affecting Nutritional Intake: behavioral (mealtime), impaired cognitive status/motor control, decreased appetite                Labs/Tests/Procedures/Meds       Pertinent Labs Reviewed: reviewed, pertinent  Pertinent Labs Comments: Na 149, BUN 32, Crt 1.9, GFR 33.7, Alb 3.0  Pertinent  "Medications Reviewed: reviewed, pertinent  Pertinent Medications Comments: statin, Fe, lactobacillus, Mg, pantoprazole, warfarin    Physical Findings    Overall Physical Appearance: loss of muscle mass     Oral/Mouth Cavity: WDL  Skin: intact    Anthropometrics    Temp: 97.6 °F (36.4 °C)     Height: 5' 11" (180.3 cm)  Weight Method: Standard Scale  Weight: 73.2 kg (161 lb 6 oz)  Ideal Body Weight (IBW), Male: 172 lb     % Ideal Body Weight, Male (lb): 93.83 lb     BMI (Calculated): 22.6  BMI Grade: 18.5-24.9 - normal     Usual Body Weight (UBW), k.8 kg                      Estimated/Assessed Needs    Weight Used For Calorie Calculations: 73.2 kg (161 lb 6 oz)      Energy Calorie Requirements (kcal): 1787  Energy Need Method: [place]-St Jeor (x 1.2 (PAL))      RMR (Mo[place]-St. Jeor Equation): 1489.12        Weight Used For Protein Calculations: 73.2 kg (161 lb 6 oz)  Protein Requirements: 73 gm (1.0 gm/kg)     Fluid Need Method: RDA Method (1 ml/kcal or per MD)        RDA Method (mL): 1787               Assessment and Plan    * Confusion    Nutrition Problem  Inadequate energy intake    Related to (etiology):   Drowsiness and altered cognition    Signs and Symptoms (as evidenced by):   Observed lunch tray and reported intake PTA     Interventions/Recommendations (treatment strategy):  See recs.    Nutrition Diagnosis Status:   New            Monitor and Evaluation    Food and Nutrient Intake: energy intake, food and beverage intake  Food and Nutrient Adminstration: diet order     Physical Activity and Function: nutrition-related ADLs and IADLs  Anthropometric Measurements: weight, weight change, body mass index  Biochemical Data, Medical Tests and Procedures: electrolyte and renal panel, gastrointestinal profile, glucose/endocrine profile, inflammatory profile  Nutrition-Focused Physical Findings: overall appearance    Nutrition Risk    Level of Risk:  (F/u 2x weekly)    Nutrition Follow-Up    RD Follow-up?: " Yes

## 2018-02-20 NOTE — PROGRESS NOTES
Admit Note/Discharge Note     Met with patient and Janett flynn, to assess needs. Patient is a 77 y.o.  male, admitted for confusion and weakness. Pt has had multiple recent admissions for the same. Pt has an LVAD. Pt well known to  and \[facility] [place] [facility]\"" team.    Patient admitted from ED on 2/19/2018.  At this time, patient presents as very sleepy and alert/oriented x2. Pt appears confused.  At this time, patients caregiver is present, alert/oriented x4 and pleasant.     Pt has a personal care attendant at home 24/7.    Household/Family Systems     Patient resides with patient's spouse, daughter and son in law, at:     [address]  [place] [address]     Support system includes spouse, daughter and son in law.    Patient does not have dependents that are need of being cared for.     Patients primary caregiver is bill Manriquez daughter, phone number [phone].    Spouse cell:  [phone] (per pt spouse is hard of hearing)     Additional emergency contacts  Alannah Vasquez (Daughter and HCPA) [phone]    During admission, patient's caregiver plans to visit.  Confirmed patient and patients caregivers do have access to reliable transportation.    Cognitive Status/Learning     Patient reports reading ability as college and due to current mental status patient having issues with memory, cognition and comprehension. Pt is able to see and hear. Pt generally prefers to learn by reading and verbal instruction.     Needed: No.   Highest education level: Attended College/Technical School    Vocation/Disability     Working for Income: No  If no, reason not working: Patient Choice - Retired    Patient is a retired school super intendant.     Adherence     Patient reports a high level of adherence to patients health care regimen.  Adherence counseling and education provided. Patient verbalizes understanding.    Substance Use    Patient reports the following substance usage.    Tobacco: None  Alcohol:  None  Illicit Drugs/Non-prescribed Medications: none, patient denies any use.  Patient states clear understanding of the potential impact of substance use.  Substance abstinence/cessation counseling, education and resources provided and reviewed.     Services Utilizing/ADLS    Infusion Service: Prior to admission, patient utilizing? [facility] for [facility]. [facility]  Home Health: Prior to admission, patient utilizing?  [facility].  DME: Prior to admission, yes walker with wheels, wheelchair, rollator, shower chair and BSC. Pt also has a hospital bed. Per rina's report caring for pt has become increasingly difficult due to his mental status changes and physical weakness.  Pulmonary/Cardiac Rehab: Prior to admission, no.    Dialysis:  Prior to admission, no  Transplant Specialty Pharmacy:  Prior to admission, no.    Prior to admission, patient reports patient was not independent with ADL and was not driving. The pt's spouse and extended family drive.  Patient reports patient is not able to care for self at this time due to compromised medical condition (as documented in medical record) and physical weakness.  Patient indicates a willingness to care for self once medically cleared to do so.    Insurance/Medications    Insured by   Payor/Plan Subscr  Sex Relation Sub. Ins. ID Effective Group Num   1. [place]* LC JEAN-BAPTISTE 1940 Male  [id number]                                    [address]   2. [employer] [facility]* LC JEAN-BAPTISTE 1940 Male  [id number]                                   [address] [address]        Primary Insurance (for UNOS reporting): Public Insurance - Medicare FFS (Fee For Service)  Secondary Insurance (for UNOS reporting): Private Insurance    Patient reports patient is able to obtain and afford medications at this time and at time of discharge.    Living Will/Healthcare Power of     Patient states patient has a LW and/or HCPA. The pt's  HCPA is Alannah Vsaquez (pt's daughter) [phone].      Coping/Mental Health    Patient is coping adequately with the aid of  family members.  Patient has a history of mental health difficulties. Pt takes Wellbutrin.    Discharge Planning    At time of discharge, patient plans to return to patient's home under the care of spouse, daughter and aid.  Patients aid or family will transport patient.  Per rounds today, expected discharge date has not been determined. Patient and patients caregiver  verbalize understanding and are involved in treatment planning and discharge process.      Additional Concerns    Patient is being followed for needs, education, resources, information, emotional support, supportive counseling, and for supportive and skilled discharge plan of care.  providing ongoing psychosocial support, education, resources and d/c planning as needed.  SW remains available. Patient denies additional needs and/or concerns at this time. Patient verbalizes understanding and agreement with information reviewed, social work availability, and how to access available resources as needed.

## 2018-02-20 NOTE — ASSESSMENT & PLAN NOTE
Episode yesterday where patient was found with head under bed. Recently more lethargic and less mobile per caregiver  CT Head unremarkable for acute intracranial pathology. Unable to obtain MRI 2/2 ICD  Oriented on exam and able to follow multi-step commands    ->suspect delirium in setting of frequent hospitalizations  -delirium precautions with regulation of sleep/wake cycles and minimize sedating medications as able

## 2018-02-20 NOTE — ASSESSMENT & PLAN NOTE
Patient denies hallucinations, but says dreams are very vivid and sometimes wakes up looking for someone he saw in his dreams

## 2018-02-20 NOTE — NURSING
Patient Doppler 50/0. Unable to obtain BP reading. Jim VELASQUEZ informed. Instructed to hold 2:00 pm scheduled blood pressure meds. Will continue to monitor.

## 2018-02-20 NOTE — H&P
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 BiV ICD, HILLARY/BiPAP, HTN, CKD, HLP, gout and depression who presents with a 1 week history of worsening confusion and lethargy.     Patient was recently discharged due to similar sx but family reports that since Saturday, patient has also had associated spontaneous movements of his upper extremities. They also report decreased appetite and PO intake recently. Earlier today, he was found at the side of his bed with his head underneath the sidepost of his bed. Per his family, he has not had any worsening SOB or WHELAN. However, he is too weak to ambulate independently. He was recently started on Sinemet by neurology who saw him in late January during that admission due to presumed PD. During his last visit, he was switched from Cefepime to IV Meropenem based on ID recommendations.      He denies chest pain, SOB, WHELAN, orthopnea, PND, LE edema. Denies any LVAD flow alarms. No episodes of reported lightheadedness when standing but family reports he has not been able to get up on his own over the last few days. +Dry cough."     Past Medical History:   Diagnosis Date    AICD (automatic cardioverter/defibrillator) present 2014    [employer]    Chronic anticoagulation 7/21/2017    Chronic combined systolic and diastolic congestive heart failure 7/27/2015 11-16-17   1 - Moderate left ventricular enlargement.    2 - Severely depressed left ventricular systolic function (EF 15-20%).    3 - Impaired LV relaxation, normal LAP (grade 1 " diastolic dysfunction).    4 - Biatrial enlargement.    5 - Right ventricle is upper limit of normal in size with not well seen systolic function.    6 - Severe tricuspid regurgitation.    7 - Increased central venous pressure.    8 - The estimated PA systolic pressure is 40 mmHg.    9 - Heartmate III LVAD; speed 5800.     Complication involving left ventricular assist device (LVAD) 7/29/2017    Coronary artery disease involving native coronary artery of native heart without angina pectoris 7/27/2015    Gait instability     Hypertensive urgency, malignant 11/15/2017    ICD (implantable cardioverter-defibrillator), biventricular, in situ 7/27/2015    Ischemic cardiomyopathy 7/20/2017    S/p HMIII     Kidney stones     LVAD (left ventricular assist device) present 7/20/2017    status post Heartmate III 10/16/16 LVAD    HILLARY on CPAP 7/27/2015    Peripheral neuropathy     Pulmonary hypertension due to left ventricular systolic dysfunction 7/29/2015    Restless leg syndrome 1992    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    Skin cancer     excision 2013    Skin yeast infection 8/31/2017    Stage 3 chronic kidney disease 7/20/2017    Syncope 7/20/2017    Ventricular tachycardia 7/25/2017     Past Surgical History:   Procedure Laterality Date    CARDIAC PACEMAKER PLACEMENT      pacemaker previously, then AICD in 2006. AICD [employer] serial #[id number]    CORONARY ARTERY BYPASS GRAFT  1993    KNEE SURGERY Left 2001    complicated by infection, hardware had to be taken out and replaced    LEFT VENTRICULAR ASSIST DEVICE  10/19/2016     Review of patient's allergies indicates:   Allergen Reactions    Aldactone [spironolactone]       persistent hyperkalemia.    Sulfa (sulfonamide antibiotics)      Current Facility-Administered Medications   Medication    acetaminophen tablet 650 mg    [START ON 2/20/2018] allopurinol tablet 300 mg    [START ON 2/20/2018] aspirin chewable tablet 81 mg     [START ON 2/20/2018] atorvastatin tablet 10 mg    [START ON 2/20/2018] buPROPion TB24 tablet 450 mg    carbidopa-levodopa  mg per tablet 1 tablet    docusate sodium capsule 100 mg    dofetilide capsule 125 mcg    [START ON 2/20/2018] doxazosin tablet 8 mg    [START ON 2/20/2018] ferrous sulfate EC tablet 325 mg    hydrALAZINE tablet 100 mg    isosorbide dinitrate tablet 40 mg    [START ON 2/20/2018] Lactobacillus rhamnosus GG capsule 1 capsule    lisinopril tablet 10 mg    magnesium oxide tablet 400 mg    meropenem injection 1 g    [START ON 2/20/2018] NIFEdipine 24 hr tablet 120 mg    [START ON 2/20/2018] pantoprazole EC tablet 40 mg    [START ON 2/20/2018] warfarin (COUMADIN) tablet 4 mg     Family History     Problem Relation (Age of Onset)    Cancer Daughter (50)    Diabetes Daughter    Heart disease Daughter        Social History Main Topics    Smoking status: Never Smoker    Smokeless tobacco: Never Used    Alcohol use No    Drug use: No    Sexual activity: Not on file      Comment:      Review of Systems   Constitution: Positive for weakness and malaise/fatigue. Negative for chills and fever.   HENT: Negative for hoarse voice and sore throat.    Cardiovascular: Positive for near-syncope. Negative for chest pain, claudication, cyanosis, dyspnea on exertion, irregular heartbeat, leg swelling, orthopnea, palpitations, paroxysmal nocturnal dyspnea and syncope.   Respiratory: Positive for cough. Negative for hemoptysis and shortness of breath.    Musculoskeletal: Positive for falls. Negative for back pain, joint pain and joint swelling.   Gastrointestinal: Negative for abdominal pain, constipation, diarrhea, hematochezia, melena, nausea and vomiting.   Genitourinary: Negative for dysuria, hematuria and incomplete emptying.   Neurological: Positive for focal weakness, loss of balance and tremors. Negative for dizziness, headaches and light-headedness.   Psychiatric/Behavioral:  Negative for altered mental status, depression and suicidal ideas. The patient is not nervous/anxious.     Objective:     Vital Signs (Most Recent):  Temp: 97.9 °F (36.6 °C) (02/19/18 2304)  Pulse: 66 (02/19/18 2201)  Resp: 18 (02/19/18 2201)  BP: (!) 92/0 (02/19/18 2108)  SpO2: 96 % (02/19/18 2304) Vital Signs (24h Range):  Temp:  [97.9 °F (36.6 °C)-98 °F (36.7 °C)] 97.9 °F (36.6 °C)  Pulse:  [59-74] 66  Resp:  [18-26] 18  SpO2:  [90 %-98 %] 96 %  BP: (70-92)/(0-30) 92/0   Patient Vitals for the past 72 hrs (Last 3 readings):   Weight   02/19/18 1321 84.4 kg (186 lb)     Body mass index is 25.23 kg/m².    No intake or output data in the 24 hours ending 02/19/18 2305    Physical Exam   Constitutional: He is oriented to person, place, and time. He appears well-developed and well-nourished. No distress.   HENT:   Head: Normocephalic and atraumatic.   Eyes: Pupils are equal, round, and reactive to light. No scleral icterus.   Neck: Neck supple. No JVD present.   Cardiovascular:   Smooth LVAD HUM   Pulmonary/Chest: Effort normal. He has no rales.   Poor air movement in LLL    Abdominal: Soft. Bowel sounds are normal. He exhibits no distension. There is no tenderness.   Musculoskeletal: Normal range of motion. He exhibits no edema.   Neurological: He is alert and oriented to person, place, and time.   Skin: Skin is warm and dry. Capillary refill takes 2 to 3 seconds.   Vitals reviewed.    Significant Labs:  CBC:    Recent Labs  Lab 02/13/18  0454 02/14/18  0500 02/19/18  1410   WBC 6.12 5.95 6.10   RBC 3.33* 3.14* 3.10*   HGB 9.8* 9.4* 9.4*   HCT 30.0* 28.2* 28.8*   * 117* 125*   MCV 90 90 93   MCH 29.4 29.9 30.3   MCHC 32.7 33.3 32.6     BNP:    Recent Labs  Lab 02/19/18  1410   *     CMP:    Recent Labs  Lab 02/13/18  0454 02/14/18  0500 02/19/18  1410   GLU 85 68* 96   CALCIUM 11.5* 11.4* 10.2   ALBUMIN  --   --  3.0*   PROT  --   --  6.5    143 148*   K 4.2 4.1 3.8   CO2 28 28 30*    109  112*   BUN 31* 28* 31*   CREATININE 2.0* 1.9* 2.0*   ALKPHOS  --   --  105   ALT  --   --  <5*   AST  --   --  27   BILITOT  --   --  0.4      Coagulation:     Recent Labs  Lab 02/14/18  0500 02/16/18  1304 02/19/18  1410   INR 2.5* 2.2* 2.1*   APTT  --   --  30.8     I have reviewed all pertinent labs within the past 24 hours.    Diagnostic Results:  CT: Ct Head Without Contrast    Result Date: 2/19/2018   No evidence of acute intracranial pathology. Mild chronic ischemic changes and generalized cerebral volume loss. ______________________________________ Electronically signed by resident: TONNY CURTIS Date:     02/19/18 Time:    16:24 As the supervising and teaching physician, I personally reviewed the images and resident's interpretation and I agree with the findings. Electronically signed by: LATOYA GARCIA MD Date:     02/19/18 Time:    16:33     Assessment/Plan:     78 yo male s/p HM3 who presents with recurrent episodes of confusion and AMS. ICD / LVAD interrogation benign. Workup pending for infectious etiology (UA negative) vs. Progressive delirium / dementia.     Confusion     - DDx: infectious vs. polypharmacy vs. Dementia vs. Delirium     - Will ask Neurology to re-evaluate since starting sinemet in January (PD vs. NPH vs. Persistent delirium)   - Await blood cultures but UA, vitals and history point away from infection (although has chronic drive line infection)   - New meds include nifedipine, meropenem and sinemet (all needed)   - Seems to wax and wain (near baseline in ED). Would like to get formal cognitive testing to evaluate for dementia / coginitive impairment.            LVAD (left ventricular assist device) present     - Hm3 @5800 as DT   - INR goal 2-3. INR therapeutic   - Continue coumadin and low dose ASA  - LDH pending in the AM   - No low flow alarms   - Continue Hydralazine 100mg TID, Lisinopril 10mg BID, Nifedine 120mg daily, isordil at 40mg TID and cardura 8mg daily   - MAP goal of  60-80           Stage 3 chronic kidney disease     - Stable.   - Daily BMP           Left ventricular assist device (LVAD) complication, subsequent encounter (Drive line infection)      - Chronic drive line infection   - Culture growing pseudomonas (now resistant to cefepime and cipro)  - Continue Meropenem 1g BID  - Blood cultures pending           VT (ventricular tachycardia)     Continue PTA Tihesham tavarez, DO  Heart Transplant  [facility]

## 2018-02-20 NOTE — ASSESSMENT & PLAN NOTE
Nutrition Problem  Inadequate energy intake    Related to (etiology):   Drowsiness and altered cognition    Signs and Symptoms (as evidenced by):   Observed lunch tray and reported intake PTA     Interventions/Recommendations (treatment strategy):  See recs.    Nutrition Diagnosis Status:   New

## 2018-02-20 NOTE — ASSESSMENT & PLAN NOTE
- S/P HM III 10/16/16 as DT in [place]  - Current speed 5800  - TTE 1/25/18 LVEDD 6.5, LVEF 10-15%, diastolic dysfunction, RVE, severely depressed RV systolic function, PAS 20, ARTHUR intermittently and septum midline.   - INR theraputic.  Coumadin with goal INR 2.0-3.0.   - LDH stable  - previously on home Cefepime and Cipro. Continue Meropenem 1 g BID.  -Continue Doxazosin 8mg daily, Hydralazine 100mg TID, Isosorbide dinitrate 40mg TID, Lisinopril 10mg BID, Nifedipine 120mg daily.

## 2018-02-20 NOTE — SUBJECTIVE & OBJECTIVE
Past Medical History:   Diagnosis Date    AICD (automatic cardioverter/defibrillator) present 2014    [employer]    Chronic anticoagulation 7/21/2017    Chronic combined systolic and diastolic congestive heart failure 7/27/2015 11-16-17   1 - Moderate left ventricular enlargement.    2 - Severely depressed left ventricular systolic function (EF 15-20%).    3 - Impaired LV relaxation, normal LAP (grade 1 diastolic dysfunction).    4 - Biatrial enlargement.    5 - Right ventricle is upper limit of normal in size with not well seen systolic function.    6 - Severe tricuspid regurgitation.    7 - Increased central venous pressure.    8 - The estimated PA systolic pressure is 40 mmHg.    9 - Heartmate III LVAD; speed 5800.     Complication involving left ventricular assist device (LVAD) 7/29/2017    Coronary artery disease involving native coronary artery of native heart without angina pectoris 7/27/2015    Gait instability     Hypertensive urgency, malignant 11/15/2017    ICD (implantable cardioverter-defibrillator), biventricular, in situ 7/27/2015    Ischemic cardiomyopathy 7/20/2017    S/p HMIII     Kidney stones     LVAD (left ventricular assist device) present 7/20/2017    status post Heartmate III 10/16/16 LVAD    HILLARY on CPAP 7/27/2015    Peripheral neuropathy     Pulmonary hypertension due to left ventricular systolic dysfunction 7/29/2015    Restless leg syndrome 1992    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    Skin cancer     excision 2013    Skin yeast infection 8/31/2017    Stage 3 chronic kidney disease 7/20/2017    Syncope 7/20/2017    Ventricular tachycardia 7/25/2017     Past Surgical History:   Procedure Laterality Date    CARDIAC PACEMAKER PLACEMENT      pacemaker previously, then AICD in 2006. AICD [employer] serial #[id number]    CORONARY ARTERY BYPASS GRAFT  1993    KNEE SURGERY Left 2001    complicated by infection, hardware had to be taken out and replaced     LEFT VENTRICULAR ASSIST DEVICE  10/19/2016     Review of patient's allergies indicates:   Allergen Reactions    Aldactone [spironolactone]       persistent hyperkalemia.    Sulfa (sulfonamide antibiotics)      No current facility-administered medications on file prior to encounter.      Current Outpatient Prescriptions on File Prior to Encounter   Medication Sig    allopurinol (ZYLOPRIM) 300 MG tablet Take 1 tablet (300 mg total) by mouth once daily.    aspirin 81 MG Chew Take 81 mg by mouth once daily.    atorvastatin (LIPITOR) 10 MG tablet Take 10 mg by mouth once daily.    buPROPion 450 mg Tb24 Take 450 mg by mouth once daily.    calcium-vitamin D tablet 600 mg-200 units Take 1 tablet by mouth once daily.    carbidopa-levodopa  mg (SINEMET)  mg per tablet Take 1 tablet by mouth every 4 (four) hours while awake.    docusate sodium (COLACE) 100 MG capsule Take 100 mg by mouth daily as needed for Constipation.    dofetilide (TIKOSYN) 125 MCG Cap Take 1 capsule (125 mcg total) by mouth every 12 (twelve) hours.    doxazosin (CARDURA) 8 MG Tab Take 1 tablet (8 mg total) by mouth once daily.    ferrous sulfate 324 mg (65 mg iron) TbEC Take 1 tablet (324 mg total) by mouth once daily.    folic acid (FOLVITE) 1 MG tablet Take 1 mg by mouth once daily.    hydrALAZINE (APRESOLINE) 100 MG tablet Take 1 tablet (100 mg total) by mouth every 8 (eight) hours.    isosorbide dinitrate (ISORDIL) 40 MG Tab Take 1 tablet (40 mg total) by mouth 3 (three) times daily.    Lactobacillus rhamnosus GG (CULTURELLE) 10 billion cell capsule Take 1 capsule by mouth once daily.    lisinopril 10 MG tablet Take 1 tablet (10 mg total) by mouth 2 (two) times daily.    magnesium oxide (MAG-OX) 400 mg tablet Take 1 tablet (400 mg total) by mouth 2 (two) times daily.    meropenem (MERREM) 1 gram injection Inject 1 g into the vein every 12 (twelve) hours.    multivitamin capsule Take 1 capsule by mouth once daily.     NIFEdipine (PROCARDIA-XL) 60 MG (OSM) 24 hr tablet Take 2 tablets (120 mg total) by mouth once daily.    pantoprazole (PROTONIX) 40 MG tablet Take 1 tablet (40 mg total) by mouth 2 (two) times daily before meals.    warfarin (COUMADIN) 4 MG tablet Take 4 mg orally daily on Tue, Thu and 6 mg orally daily all other days other days as directed by coumadin clinic     Family History     Problem Relation (Age of Onset)    Cancer Daughter (50)    Diabetes Daughter    Heart disease Daughter        Social History Main Topics    Smoking status: Never Smoker    Smokeless tobacco: Never Used    Alcohol use No    Drug use: No    Sexual activity: Not on file      Comment:      Review of Systems   Constitutional: Positive for activity change and fatigue.   Respiratory: Negative for shortness of breath.    Cardiovascular: Negative for chest pain.   Musculoskeletal: Positive for gait problem.   Neurological: Positive for tremors and weakness. Negative for dizziness and light-headedness.   Psychiatric/Behavioral: Positive for confusion, decreased concentration and sleep disturbance. Negative for hallucinations.     Objective:     Vital Signs (Most Recent):  Temp: 97.6 °F (36.4 °C) (02/20/18 1113)  Pulse: (!) 52 (02/20/18 1113)  Resp: 18 (02/20/18 1113)  BP: (!) 50/0 (02/20/18 1312)  SpO2: (!) 92 % (02/20/18 1113) Vital Signs (24h Range):  Temp:  [97.6 °F (36.4 °C)-97.9 °F (36.6 °C)] 97.6 °F (36.4 °C)  Pulse:  [52-74] 52  Resp:  [18-20] 18  SpO2:  [90 %-97 %] 92 %  BP: ()/(0-92) 50/0     Weight: 73.2 kg (161 lb 6 oz)  Body mass index is 22.51 kg/m².    Physical Exam   Constitutional: No distress.   HENT:   Head: Normocephalic and atraumatic.   Eyes: EOM are normal.   Neck: Neck supple.   Pulmonary/Chest: Effort normal.   Skin: He is not diaphoretic.   Psychiatric: His speech is normal.     NEUROLOGICAL EXAMINATION:     MENTAL STATUS   Oriented to person.   Oriented to place.   Disoriented to month. Oriented to  year.   Follows 2 step (slow to follow commands) commands.   Attention: decreased. Concentration: decreased.   Speech: speech is normal   Level of consciousness: drowsy ,  arousable by verbal stimuli    CRANIAL NERVES     CN III, IV, VI   Extraocular motions are normal.   Right pupil: Shape: regular. Reactivity: brisk.   Left pupil: Shape: regular. Reactivity: brisk.   Nystagmus: none   Ophthalmoparesis: none    CN VIII   Hearing: intact    CN XII   CN XII normal.     MOTOR EXAM   Muscle bulk: normal  Overall muscle tone: normal    Strength   Right biceps: 5/5  Left biceps: 5/5  Right triceps: 5/5  Left triceps: 5/5  Right interossei: 5/5  Left interossei: 5/5  Right iliopsoas: 5/5  Left iliopsoas: 5/5  Right anterior tibial: 5/5  Left anterior tibial: 5/5  Right posterior tibial: 5/5  Left posterior tibial: 5/5  Right peroneal: 5/5  Left peroneal: 5/5    SENSORY EXAM   Light touch normal.     GAIT AND COORDINATION        Diffusely tremulous, worse with outstretched arms  No myoclonus noted      Significant Labs:   Hemoglobin A1c: No results for input(s): HGBA1C in the last 720 hours.  Blood Culture:   Recent Labs  Lab 02/19/18 2001   LABBLOO No Growth to date  No Growth to date     CBC:   Recent Labs  Lab 02/19/18  1410 02/20/18  0533   WBC 6.10 5.12   HGB 9.4* 9.3*   HCT 28.8* 28.5*   * 128*     CMP:   Recent Labs  Lab 02/19/18  1410 02/20/18  0533   GLU 96 78   * 149*   K 3.8 3.6   * 113*   CO2 30* 29   BUN 31* 32*   CREATININE 2.0* 1.9*   CALCIUM 10.2 10.3   MG  --  2.5   PROT 6.5  --    ALBUMIN 3.0*  --    BILITOT 0.4  --    ALKPHOS 105  --    AST 27  --    ALT <5*  --    ANIONGAP 6* 7*   EGFRNONAA 31.7* 33.7*     Inflammatory Markers: No results for input(s): SEDRATE, CRP, PROCAL in the last 48 hours.  Urine Culture: No results for input(s): LABURIN in the last 48 hours.  Urine Studies:   Recent Labs  Lab 02/19/18  1621   COLORU Yellow   APPEARANCEUA Clear   PHUR 7.0   SPECGRAV 1.010    PROTEINUA Negative   GLUCUA Negative   KETONESU Negative   BILIRUBINUA Negative   OCCULTUA Negative   NITRITE Negative   UROBILINOGEN Negative   LEUKOCYTESUR Negative     All pertinent lab results from the past 24 hours have been reviewed.    Significant Imaging: I have reviewed and interpreted all pertinent imaging results/findings within the past 24 hours.     CT Head WO contrast (2/19/18):  No evidence of acute intracranial pathology.  Mild chronic ischemic changes and generalized cerebral volume loss

## 2018-02-20 NOTE — SUBJECTIVE & OBJECTIVE
Past Medical History:   Diagnosis Date    AICD (automatic cardioverter/defibrillator) present 2014    [employer]    Chronic anticoagulation 7/21/2017    Chronic combined systolic and diastolic congestive heart failure 7/27/2015 11-16-17   1 - Moderate left ventricular enlargement.    2 - Severely depressed left ventricular systolic function (EF 15-20%).    3 - Impaired LV relaxation, normal LAP (grade 1 diastolic dysfunction).    4 - Biatrial enlargement.    5 - Right ventricle is upper limit of normal in size with not well seen systolic function.    6 - Severe tricuspid regurgitation.    7 - Increased central venous pressure.    8 - The estimated PA systolic pressure is 40 mmHg.    9 - Heartmate III LVAD; speed 5800.     Complication involving left ventricular assist device (LVAD) 7/29/2017    Coronary artery disease involving native coronary artery of native heart without angina pectoris 7/27/2015    Gait instability     Hypertensive urgency, malignant 11/15/2017    ICD (implantable cardioverter-defibrillator), biventricular, in situ 7/27/2015    Ischemic cardiomyopathy 7/20/2017    S/p HMIII     Kidney stones     LVAD (left ventricular assist device) present 7/20/2017    status post Heartmate III 10/16/16 LVAD    HILLARY on CPAP 7/27/2015    Peripheral neuropathy     Pulmonary hypertension due to left ventricular systolic dysfunction 7/29/2015    Restless leg syndrome 1992    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    Skin cancer     excision 2013    Skin yeast infection 8/31/2017    Stage 3 chronic kidney disease 7/20/2017    Syncope 7/20/2017    Ventricular tachycardia 7/25/2017       Past Surgical History:   Procedure Laterality Date    CARDIAC PACEMAKER PLACEMENT      pacemaker previously, then AICD in 2006. AICD [employer] serial #[id number]    CORONARY ARTERY BYPASS GRAFT  1993    KNEE SURGERY Left 2001    complicated by infection, hardware had to be taken out and replaced     LEFT VENTRICULAR ASSIST DEVICE  10/19/2016       Review of patient's allergies indicates:   Allergen Reactions    Aldactone [spironolactone]       persistent hyperkalemia.    Sulfa (sulfonamide antibiotics)        No current facility-administered medications on file prior to encounter.      Current Outpatient Prescriptions on File Prior to Encounter   Medication Sig    allopurinol (ZYLOPRIM) 300 MG tablet Take 1 tablet (300 mg total) by mouth once daily.    aspirin 81 MG Chew Take 81 mg by mouth once daily.    atorvastatin (LIPITOR) 10 MG tablet Take 10 mg by mouth once daily.    buPROPion 450 mg Tb24 Take 450 mg by mouth once daily.    calcium-vitamin D tablet 600 mg-200 units Take 1 tablet by mouth once daily.    carbidopa-levodopa  mg (SINEMET)  mg per tablet Take 1 tablet by mouth every 4 (four) hours while awake.    docusate sodium (COLACE) 100 MG capsule Take 100 mg by mouth daily as needed for Constipation.    dofetilide (TIKOSYN) 125 MCG Cap Take 1 capsule (125 mcg total) by mouth every 12 (twelve) hours.    doxazosin (CARDURA) 8 MG Tab Take 1 tablet (8 mg total) by mouth once daily.    ferrous sulfate 324 mg (65 mg iron) TbEC Take 1 tablet (324 mg total) by mouth once daily.    folic acid (FOLVITE) 1 MG tablet Take 1 mg by mouth once daily.    hydrALAZINE (APRESOLINE) 100 MG tablet Take 1 tablet (100 mg total) by mouth every 8 (eight) hours.    isosorbide dinitrate (ISORDIL) 40 MG Tab Take 1 tablet (40 mg total) by mouth 3 (three) times daily.    Lactobacillus rhamnosus GG (CULTURELLE) 10 billion cell capsule Take 1 capsule by mouth once daily.    lisinopril 10 MG tablet Take 1 tablet (10 mg total) by mouth 2 (two) times daily.    magnesium oxide (MAG-OX) 400 mg tablet Take 1 tablet (400 mg total) by mouth 2 (two) times daily.    meropenem (MERREM) 1 gram injection Inject 1 g into the vein every 12 (twelve) hours.    multivitamin capsule Take 1 capsule by mouth once  daily.    NIFEdipine (PROCARDIA-XL) 60 MG (OSM) 24 hr tablet Take 2 tablets (120 mg total) by mouth once daily.    pantoprazole (PROTONIX) 40 MG tablet Take 1 tablet (40 mg total) by mouth 2 (two) times daily before meals.    warfarin (COUMADIN) 4 MG tablet Take 4 mg orally daily on Tue, Thu and 6 mg orally daily all other days other days as directed by coumadin clinic     Family History     Problem Relation (Age of Onset)    Cancer Daughter (50)    Diabetes Daughter    Heart disease Daughter        Social History Main Topics    Smoking status: Never Smoker    Smokeless tobacco: Never Used    Alcohol use No    Drug use: No    Sexual activity: Not on file      Comment:      Review of Systems   Constitution: Positive for weakness and malaise/fatigue. Negative for chills and fever.   HENT: Negative for hoarse voice and sore throat.    Cardiovascular: Positive for near-syncope. Negative for chest pain, claudication, cyanosis, dyspnea on exertion, irregular heartbeat, leg swelling, orthopnea, palpitations, paroxysmal nocturnal dyspnea and syncope.   Respiratory: Positive for cough. Negative for hemoptysis and shortness of breath.    Musculoskeletal: Positive for falls. Negative for back pain, joint pain and joint swelling.   Gastrointestinal: Negative for abdominal pain, constipation, diarrhea, hematochezia, melena, nausea and vomiting.   Genitourinary: Negative for dysuria, hematuria and incomplete emptying.   Neurological: Positive for focal weakness, loss of balance and tremors. Negative for dizziness, headaches and light-headedness.   Psychiatric/Behavioral: Negative for altered mental status, depression and suicidal ideas. The patient is not nervous/anxious.      Objective:     Vital Signs (Most Recent):  Temp: 98 °F (36.7 °C) (02/19/18 1321)  Pulse: 66 (02/19/18 1645)  Resp: 20 (02/19/18 1645)  BP: (!) 92/0 (LVAD patient) (02/19/18 1645)  SpO2: (!) 94 % (02/19/18 1645) Vital Signs (24h Range):  Temp:   [98 °F (36.7 °C)] 98 °F (36.7 °C)  Pulse:  [59-70] 66  Resp:  [20-26] 20  SpO2:  [94 %-98 %] 94 %  BP: (70-92)/(0-30) 92/0     Weight: 84.4 kg (186 lb)  Body mass index is 25.23 kg/m².    SpO2: (!) 94 %       No intake or output data in the 24 hours ending 02/19/18 1855    Lines/Drains/Airways     Peripherally Inserted Central Catheter Line                 PICC Double Lumen 02/05/18 1114 right basilic 14 days          Line                 VAD Left ventricular assist device HeartMate 3 -- days                Physical Exam   Constitutional: He is oriented to person, place, and time. He appears well-developed and well-nourished. No distress.   HENT:   Head: Normocephalic and atraumatic.   Mouth/Throat: Oropharynx is clear and moist. No oropharyngeal exudate.   Eyes: Conjunctivae and EOM are normal. Pupils are equal, round, and reactive to light. Right eye exhibits no discharge. Left eye exhibits no discharge. No scleral icterus.   Neck: Normal range of motion. Neck supple. No JVD present. No tracheal deviation present. No thyromegaly present.   Cardiovascular: Exam reveals no gallop and no friction rub.    No murmur heard.  +Smooth LVAD hum, DLES 3   Pulmonary/Chest: Effort normal. No respiratory distress. He has no wheezes. He has no rales.   Decreased BS in LLL   Abdominal: Soft. Bowel sounds are normal. He exhibits no distension and no mass. There is no tenderness. There is no rebound and no guarding.   Musculoskeletal: Normal range of motion.   Lymphadenopathy:     He has no cervical adenopathy.   Neurological: He is alert and oriented to person, place, and time. No cranial nerve deficit.   Skin: Skin is warm and dry. He is not diaphoretic.   Psychiatric: He has a normal mood and affect. His behavior is normal. Judgment and thought content normal.   Nursing note and vitals reviewed.      Significant Labs:   BMP:   Recent Labs  Lab 02/19/18  1410   GLU 96   *   K 3.8   *   CO2 30*   BUN 31*   CREATININE  2.0*   CALCIUM 10.2   , CMP   Recent Labs  Lab 02/19/18  1410   *   K 3.8   *   CO2 30*   GLU 96   BUN 31*   CREATININE 2.0*   CALCIUM 10.2   PROT 6.5   ALBUMIN 3.0*   BILITOT 0.4   ALKPHOS 105   AST 27   ALT <5*   ANIONGAP 6*   ESTGFRAFRICA 36.7*   EGFRNONAA 31.7*    and Troponin   Recent Labs  Lab 02/19/18  1410   TROPONINI 0.175*       Significant Imaging: CXR (2/19/18)  Cardiac size is mildly enlarged.  An AICD unit and LVAD device are in place.  Vascular catheter since its tip in the superior vena cava and lungs are clear without cardiac failure.  There may be some pleural fluid present on the left.    CT Head (2/19/18)  No evidence of acute intracranial pathology.    Mild chronic ischemic changes and generalized cerebral volume loss.

## 2018-02-21 LAB
ALBUMIN SERPL BCP-MCNC: 2.8 G/DL
ALP SERPL-CCNC: 107 U/L
ALT SERPL W/O P-5'-P-CCNC: 5 U/L
AMMONIA PLAS-SCNC: 23 UMOL/L
ANION GAP SERPL CALC-SCNC: 6 MMOL/L
AST SERPL-CCNC: 24 U/L
BASOPHILS # BLD AUTO: 0.04 K/UL
BASOPHILS NFR BLD: 0.7 %
BILIRUB DIRECT SERPL-MCNC: 0.2 MG/DL
BILIRUB SERPL-MCNC: 0.4 MG/DL
BNP SERPL-MCNC: 490 PG/ML
BUN SERPL-MCNC: 34 MG/DL
CALCIUM SERPL-MCNC: 9.6 MG/DL
CHLORIDE SERPL-SCNC: 112 MMOL/L
CO2 SERPL-SCNC: 29 MMOL/L
CREAT SERPL-MCNC: 2.3 MG/DL
CRP SERPL-MCNC: 26.6 MG/L
DIFFERENTIAL METHOD: ABNORMAL
EOSINOPHIL # BLD AUTO: 0.5 K/UL
EOSINOPHIL NFR BLD: 8 %
ERYTHROCYTE [DISTWIDTH] IN BLOOD BY AUTOMATED COUNT: 18 %
EST. GFR  (AFRICAN AMERICAN): 31 ML/MIN/1.73 M^2
EST. GFR  (NON AFRICAN AMERICAN): 26.8 ML/MIN/1.73 M^2
GLUCOSE SERPL-MCNC: 77 MG/DL
HCT VFR BLD AUTO: 28.3 %
HGB BLD-MCNC: 9.1 G/DL
IMM GRANULOCYTES # BLD AUTO: 0.04 K/UL
IMM GRANULOCYTES NFR BLD AUTO: 0.7 %
INR PPP: 2.4
LDH SERPL L TO P-CCNC: 181 U/L
LYMPHOCYTES # BLD AUTO: 1.2 K/UL
LYMPHOCYTES NFR BLD: 20.1 %
MAGNESIUM SERPL-MCNC: 2.5 MG/DL
MCH RBC QN AUTO: 28.9 PG
MCHC RBC AUTO-ENTMCNC: 32.2 G/DL
MCV RBC AUTO: 90 FL
MONOCYTES # BLD AUTO: 0.6 K/UL
MONOCYTES NFR BLD: 10.4 %
NEUTROPHILS # BLD AUTO: 3.6 K/UL
NEUTROPHILS NFR BLD: 60.1 %
NRBC BLD-RTO: 0 /100 WBC
PLATELET # BLD AUTO: 134 K/UL
PMV BLD AUTO: 10.6 FL
POTASSIUM SERPL-SCNC: 3.6 MMOL/L
PREALB SERPL-MCNC: 22 MG/DL
PROT SERPL-MCNC: 6.2 G/DL
PROTHROMBIN TIME: 23 SEC
RBC # BLD AUTO: 3.15 M/UL
RPR SER QL: NORMAL
SODIUM SERPL-SCNC: 147 MMOL/L
WBC # BLD AUTO: 5.98 K/UL

## 2018-02-21 PROCEDURE — 95819 EEG AWAKE AND ASLEEP: CPT | Mod: 26,,, | Performed by: PSYCHIATRY & NEUROLOGY

## 2018-02-21 PROCEDURE — 99233 SBSQ HOSP IP/OBS HIGH 50: CPT | Mod: ,,, | Performed by: INTERNAL MEDICINE

## 2018-02-21 PROCEDURE — 82140 ASSAY OF AMMONIA: CPT

## 2018-02-21 PROCEDURE — 63600175 PHARM REV CODE 636 W HCPCS: Performed by: INTERNAL MEDICINE

## 2018-02-21 PROCEDURE — 83735 ASSAY OF MAGNESIUM: CPT

## 2018-02-21 PROCEDURE — 86140 C-REACTIVE PROTEIN: CPT

## 2018-02-21 PROCEDURE — 83615 LACTATE (LD) (LDH) ENZYME: CPT

## 2018-02-21 PROCEDURE — 84134 ASSAY OF PREALBUMIN: CPT

## 2018-02-21 PROCEDURE — 99233 SBSQ HOSP IP/OBS HIGH 50: CPT | Mod: ,,, | Performed by: PSYCHIATRY & NEUROLOGY

## 2018-02-21 PROCEDURE — 80048 BASIC METABOLIC PNL TOTAL CA: CPT

## 2018-02-21 PROCEDURE — 85610 PROTHROMBIN TIME: CPT

## 2018-02-21 PROCEDURE — 93750 INTERROGATION VAD IN PERSON: CPT | Mod: ,,, | Performed by: INTERNAL MEDICINE

## 2018-02-21 PROCEDURE — 27000248 HC VAD-ADDITIONAL DAY

## 2018-02-21 PROCEDURE — 25000003 PHARM REV CODE 250: Performed by: INTERNAL MEDICINE

## 2018-02-21 PROCEDURE — 85025 COMPLETE CBC W/AUTO DIFF WBC: CPT

## 2018-02-21 PROCEDURE — 95816 EEG AWAKE AND DROWSY: CPT

## 2018-02-21 PROCEDURE — 80076 HEPATIC FUNCTION PANEL: CPT

## 2018-02-21 PROCEDURE — 20600001 HC STEP DOWN PRIVATE ROOM

## 2018-02-21 PROCEDURE — 83880 ASSAY OF NATRIURETIC PEPTIDE: CPT

## 2018-02-21 RX ORDER — HYDRALAZINE HYDROCHLORIDE 20 MG/ML
10 INJECTION INTRAMUSCULAR; INTRAVENOUS EVERY 6 HOURS PRN
Status: DISCONTINUED | OUTPATIENT
Start: 2018-02-21 | End: 2018-02-23 | Stop reason: HOSPADM

## 2018-02-21 RX ADMIN — BUPROPION HYDROCHLORIDE 450 MG: 150 TABLET, EXTENDED RELEASE ORAL at 08:02

## 2018-02-21 RX ADMIN — PANTOPRAZOLE SODIUM 40 MG: 40 TABLET, DELAYED RELEASE ORAL at 05:02

## 2018-02-21 RX ADMIN — DOXAZOSIN 8 MG: 4 TABLET ORAL at 06:02

## 2018-02-21 RX ADMIN — NIFEDIPINE 120 MG: 60 TABLET, FILM COATED, EXTENDED RELEASE ORAL at 06:02

## 2018-02-21 RX ADMIN — ASPIRIN 81 MG CHEWABLE TABLET 81 MG: 81 TABLET CHEWABLE at 08:02

## 2018-02-21 RX ADMIN — HYDRALAZINE HYDROCHLORIDE 10 MG: 20 INJECTION INTRAMUSCULAR; INTRAVENOUS at 01:02

## 2018-02-21 RX ADMIN — ACETAMINOPHEN 650 MG: 325 TABLET, FILM COATED ORAL at 03:02

## 2018-02-21 RX ADMIN — DOFETILIDE 125 MCG: 0.12 CAPSULE ORAL at 08:02

## 2018-02-21 RX ADMIN — MAGNESIUM OXIDE TAB 400 MG (241.3 MG ELEMENTAL MG) 400 MG: 400 (241.3 MG) TAB at 08:02

## 2018-02-21 RX ADMIN — PANTOPRAZOLE SODIUM 40 MG: 40 TABLET, DELAYED RELEASE ORAL at 03:02

## 2018-02-21 RX ADMIN — HYDRALAZINE HYDROCHLORIDE 10 MG: 20 INJECTION INTRAMUSCULAR; INTRAVENOUS at 08:02

## 2018-02-21 RX ADMIN — Medication 1 CAPSULE: at 09:02

## 2018-02-21 RX ADMIN — MEROPENEM 1 G: 1 INJECTION, POWDER, FOR SOLUTION INTRAVENOUS at 08:02

## 2018-02-21 RX ADMIN — ATORVASTATIN CALCIUM 10 MG: 10 TABLET, FILM COATED ORAL at 08:02

## 2018-02-21 RX ADMIN — LISINOPRIL 10 MG: 10 TABLET ORAL at 06:02

## 2018-02-21 RX ADMIN — ALLOPURINOL 300 MG: 300 TABLET ORAL at 08:02

## 2018-02-21 RX ADMIN — FERROUS SULFATE TAB EC 325 MG (65 MG FE EQUIVALENT) 325 MG: 325 (65 FE) TABLET DELAYED RESPONSE at 08:02

## 2018-02-21 RX ADMIN — WARFARIN SODIUM 4 MG: 2 TABLET ORAL at 03:02

## 2018-02-21 RX ADMIN — LISINOPRIL 10 MG: 10 TABLET ORAL at 08:02

## 2018-02-21 NOTE — ASSESSMENT & PLAN NOTE
Caregiver reports worsening delirium since admission (2/8-2/14/18). Ldopa from Sinemet trial that was started during Jan admission possibly worsened delirium vs. infectious/metabolic process.    2/21-exam improved, more alert and interactive.    Blood cx NGTD and ammonia wnl     -no infectious etiology on labs  -continue delirium precautions

## 2018-02-21 NOTE — ASSESSMENT & PLAN NOTE
Possible underlying neurodegenerative disorder with vivid dreaming, hallucinations and periodic leg movements with sleep.     ->follow-up with Neurology outpatient

## 2018-02-21 NOTE — PT/OT/SLP PROGRESS
Occupational Therapy      Patient Name:  Kev Vasquez   MRN:  [id number]    Patient not seen today secondary to Other (EEG). Will follow-up tomorrow for evaluation.    SHAUNA Hanks  2/21/2018

## 2018-02-21 NOTE — SUBJECTIVE & OBJECTIVE
Subjective:     Interval History:   NAEON. No dropping events with standing per caregiver.  LVAD interrogated this morning without any issues or alarms  Awaiting EEG and serum studies pending     Current Facility-Administered Medications   Medication Dose Route Frequency Provider Last Rate Last Dose    acetaminophen tablet 650 mg  650 mg Oral Q6H PRN José Luis Yeh, DO   650 mg at 02/20/18 1627    allopurinol tablet 300 mg  300 mg Oral Daily José Luis Yeh, DO   300 mg at 02/21/18 0852    aspirin chewable tablet 81 mg  81 mg Oral Daily José Luis Yeh, DO   81 mg at 02/21/18 0851    atorvastatin tablet 10 mg  10 mg Oral Daily José Luis Yeh, DO   10 mg at 02/21/18 0852    buPROPion TB24 tablet 450 mg  450 mg Oral Daily José Luis Yeh, DO   450 mg at 02/21/18 0851    docusate sodium capsule 100 mg  100 mg Oral Daily PRN José Luis Yeh,         dofetilide capsule 125 mcg  125 mcg Oral Q12H José Luis Yeh, DO   125 mcg at 02/21/18 0852    doxazosin tablet 8 mg  8 mg Oral Daily José Luis Yeh, DO   8 mg at 02/21/18 0600    ferrous sulfate EC tablet 325 mg  325 mg Oral Daily José Luis Yeh, DO   325 mg at 02/21/18 0852    hydrALAZINE injection 10 mg  10 mg Intravenous Q6H PRN José Luis Yeh, DO   10 mg at 02/21/18 0129    Lactobacillus rhamnosus GG capsule 1 capsule  1 capsule Oral Daily José Luis Yeh, DO   1 capsule at 02/21/18 0905    lisinopril tablet 10 mg  10 mg Oral BID José Luis Yeh, DO   10 mg at 02/21/18 0600    magnesium oxide tablet 400 mg  400 mg Oral BID José Luis Yeh, DO   400 mg at 02/21/18 0852    meropenem injection 1 g  1 g Intravenous Q12H José Luis Yeh, DO   1 g at 02/21/18 0852    NIFEdipine 24 hr tablet 120 mg  120 mg Oral Daily José Luis Yeh, DO   120 mg at 02/21/18 0600    pantoprazole EC tablet 40 mg  40 mg Oral BID AC José Luis Yeh, DO   40 mg at 02/21/18 0548    warfarin (COUMADIN) tablet 4 mg  4 mg Oral Daily José Luis Yeh DO   4 mg at 02/20/18 1816     Review of  Systems   Constitutional: Positive for activity change and fatigue.   Neurological: Positive for tremors.   Psychiatric/Behavioral: Positive for decreased concentration.     Objective:     Vital Signs (Most Recent):  Temp: 98.2 °F (36.8 °C) (02/21/18 1149)  Pulse: 62 (02/21/18 1149)  Resp: 18 (02/21/18 1149)  BP: (!) 72/0 (02/21/18 1153)  SpO2: 97 % (02/21/18 1149) Vital Signs (24h Range):  Temp:  [97.1 °F (36.2 °C)-98.2 °F (36.8 °C)] 98.2 °F (36.8 °C)  Pulse:  [60-80] 62  Resp:  [18-20] 18  SpO2:  [93 %-99 %] 97 %  BP: ()/(0-102) 72/0     Weight: 79.5 kg (175 lb 4.3 oz)  Body mass index is 24.44 kg/m².    Physical Exam   Constitutional:   Sitting in chair on the side of the bed about to read to newspaper   Psychiatric: His speech is normal.     NEUROLOGICAL EXAMINATION:     MENTAL STATUS   Oriented to person.   Oriented to place.   Follows 2 step commands.   Speech: speech is normal   Level of consciousness: alert       Said 2017 for year  Knows we are in February      MOTOR EXAM   Muscle bulk: normal       Tremulous with outstretched hands  Asterixis improved     Significant Labs:   Blood Culture:   Recent Labs  Lab 02/19/18 2001   LABBLOO No Growth to date  No Growth to date  No Growth to date  No Growth to date     CBC:   Recent Labs  Lab 02/19/18  1410 02/20/18  0533 02/21/18  0612   WBC 6.10 5.12 5.98   HGB 9.4* 9.3* 9.1*   HCT 28.8* 28.5* 28.3*   * 128* 134*     CMP:   Recent Labs  Lab 02/19/18  1410 02/20/18  0533 02/21/18  0612   GLU 96 78 77   * 149* 147*   K 3.8 3.6 3.6   * 113* 112*   CO2 30* 29 29   BUN 31* 32* 34*   CREATININE 2.0* 1.9* 2.3*   CALCIUM 10.2 10.3 9.6   MG  --  2.5 2.5   PROT 6.5  --  6.2   ALBUMIN 3.0*  --  2.8*   BILITOT 0.4  --  0.4   ALKPHOS 105  --  107   AST 27  --  24   ALT <5*  --  5*   ANIONGAP 6* 7* 6*   EGFRNONAA 31.7* 33.7* 26.8*     Urine Culture: No results for input(s): LABURIN in the last 48 hours.  Urine Studies:   Recent Labs  Lab  02/19/18  1621   COLORU Yellow   APPEARANCEUA Clear   PHUR 7.0   SPECGRAV 1.010   PROTEINUA Negative   GLUCUA Negative   KETONESU Negative   BILIRUBINUA Negative   OCCULTUA Negative   NITRITE Negative   UROBILINOGEN Negative   LEUKOCYTESUR Negative     Ammonia wnl (23)  B12 and folate wnl    In process: B1, B2, B6, RPR, MMA    All pertinent lab results from the past 24 hours have been reviewed.    Routine EEG (pending)    Significant Imaging: I have reviewed and interpreted all pertinent imaging results/findings within the past 24 hours.

## 2018-02-21 NOTE — PLAN OF CARE
Problem: Patient Care Overview  Goal: Plan of Care Review  Plan of care discussed with patient. Fall precautions in place. Patient has no complaints of pain. Discussed medications and care. VAD numbers WNL. Dopplers elevated. Administered PRN hydralazine. Patient has no questions at this time.White board updated. Bed locked in lowest position. Side rails up x2. Will continue to monitor.

## 2018-02-21 NOTE — PROGRESS NOTES
Pt doppler 100/0. Notified MD Rao. Ordered PRN Hydralazine. No further orders given. Will continue to monitor.

## 2018-02-21 NOTE — PROGRESS NOTES
[facility]  Heart Transplant  Progress Note    Patient Name: Kev Vasquez  MRN: [id number]  Admission Date: 2/19/2018  Hospital Length of Stay: 2 days  Attending Physician: Carlito Santana MD  Primary Care Provider: Mega Collins MD  Principal Problem:Delirium    Subjective:     Interval History: No complaints this am. States that he is feeling a bit better. Family still reports intermittent confusion.    Continuous Infusions:  Scheduled Meds:   allopurinol  300 mg Oral Daily    aspirin  81 mg Oral Daily    atorvastatin  10 mg Oral Daily    buPROPion  450 mg Oral Daily    dofetilide  125 mcg Oral Q12H    doxazosin  8 mg Oral Daily    ferrous sulfate  325 mg Oral Daily    Lactobacillus rhamnosus GG  1 capsule Oral Daily    lisinopril  10 mg Oral BID    magnesium oxide  400 mg Oral BID    meropenem  1 g Intravenous Q12H    NIFEdipine  120 mg Oral Daily    pantoprazole  40 mg Oral BID AC    warfarin  4 mg Oral Daily     PRN Meds:acetaminophen, docusate sodium, hydrALAZINE    Review of patient's allergies indicates:   Allergen Reactions    Aldactone [spironolactone]       persistent hyperkalemia.    Sulfa (sulfonamide antibiotics)      Objective:     Vital Signs (Most Recent):  Temp: 98.2 °F (36.8 °C) (02/21/18 0735)  Pulse: 80 (02/21/18 0735)  Resp: 18 (02/21/18 0735)  BP: (!) 60/0 (02/21/18 0735)  SpO2: 97 % (02/21/18 0735) Vital Signs (24h Range):  Temp:  [97.1 °F (36.2 °C)-98.2 °F (36.8 °C)] 98.2 °F (36.8 °C)  Pulse:  [52-80] 80  Resp:  [18-20] 18  SpO2:  [92 %-99 %] 97 %  BP: ()/(0-102) 60/0     Patient Vitals for the past 72 hrs (Last 3 readings):   Weight   02/21/18 0700 79.5 kg (175 lb 4.3 oz)   02/20/18 0700 73.2 kg (161 lb 6 oz)   02/19/18 2314 82 kg (180 lb 11.2 oz)     Body mass index is 24.44 kg/m².      Intake/Output Summary (Last 24 hours) at 02/21/18 0823  Last data filed at 02/21/18 0600   Gross per 24 hour   Intake             1630 ml   Output                0  ml   Net             1630 ml            Telemetry: NSR    Physical Exam   Constitutional: He is oriented to person, place, and time. He appears well-developed and well-nourished.   HENT:   Head: Normocephalic.   Eyes: Pupils are equal, round, and reactive to light.   Neck: Normal range of motion. Neck supple.   Cardiovascular: Normal rate and regular rhythm.    VAD hum   Pulmonary/Chest: Effort normal and breath sounds normal.   Abdominal: Soft. Bowel sounds are normal.   Musculoskeletal: Normal range of motion.   Neurological: He is alert and oriented to person, place, and time.   Skin: Skin is warm and dry.   Psychiatric: He has a normal mood and affect. His behavior is normal.   Nursing note and vitals reviewed.      Significant Labs:  CBC:    Recent Labs  Lab 02/19/18 1410 02/20/18 0533 02/21/18 0612   WBC 6.10 5.12 5.98   RBC 3.10* 3.13* 3.15*   HGB 9.4* 9.3* 9.1*   HCT 28.8* 28.5* 28.3*   * 128* 134*   MCV 93 91 90   MCH 30.3 29.7 28.9   MCHC 32.6 32.6 32.2     BNP:    Recent Labs  Lab 02/19/18 1410 02/21/18  0612   * 490*     CMP:    Recent Labs  Lab 02/19/18 1410 02/20/18  0533 02/21/18  0612   GLU 96 78 77   CALCIUM 10.2 10.3 9.6   ALBUMIN 3.0*  --  2.8*   PROT 6.5  --  6.2   * 149* 147*   K 3.8 3.6 3.6   CO2 30* 29 29   * 113* 112*   BUN 31* 32* 34*   CREATININE 2.0* 1.9* 2.3*   ALKPHOS 105  --  107   ALT <5*  --  5*   AST 27  --  24   BILITOT 0.4  --  0.4      Coagulation:     Recent Labs  Lab 02/19/18 1410 02/20/18  0533 02/21/18  0612   INR 2.1* 2.1* 2.4*   APTT 30.8  --   --      LDH:    Recent Labs  Lab 02/20/18 0533 02/21/18  0612    181     Microbiology:  Microbiology Results (last 7 days)     Procedure Component Value Units Date/Time    Blood culture [[id number]] Collected:  02/19/18 2001    Order Status:  Completed Specimen:  Blood from Peripheral, Hand, Left Updated:  02/20/18 2212     Blood Culture, Routine No Growth to date     Blood Culture, Routine No  "Growth to date    Blood culture [[id number]] Collected:  02/19/18 2001    Order Status:  Completed Specimen:  Blood from Peripheral, Forearm, Left Updated:  02/20/18 2212     Blood Culture, Routine No Growth to date     Blood Culture, Routine No Growth to date          I have reviewed all pertinent labs within the past 24 hours.    Estimated Creatinine Clearance: 29.6 mL/min (A) (based on SCr of 2.3 mg/dL (H)).    Diagnostic Results:  I have reviewed all pertinent imaging results/findings within the past 24 hours.    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 BiV ICD, HILLARY/BiPAP, HTN, CKD, HLP, gout and depression who presents with a 1 week history of worsening confusion and lethargy.     Patient was recently discharged due to similar sx but family reports that since Saturday, patient has also had associated spontaneous movements of his upper extremities. They also report decreased appetite and PO intake recently. Earlier today, he was found at the side of his bed with his head underneath the sidepost of his bed. Per his family, he has not had any worsening SOB or WHELAN. However, he is too weak to ambulate independently. He was recently started on Sinemet by neurology who saw him in late January during that admission due to presumed PD. During his last visit, he was switched from Cefepime to IV Meropenem based on ID recommendations.      He denies chest pain, SOB, WHELAN, orthopnea, PND, LE edema. Denies any LVAD flow alarms. No episodes of reported lightheadedness when standing but family reports he has not been able to get up on his own over the last few days. +Dry cough."     * Delirium    - Recurring problem. LP done 1/30/18 with removal of only 13cc; opening pressure 21, closing pressure 15. His post procedure gait was " "significantly worse. Discussed with Neuro and NSGY. Per NSGY, no immediate need for cisternogram, shunt, etc, given failed improvement in gait.   -Appreciate Neuro help->"does not have parkinson's disease, though could have a synucleinopathy (cause of mild parkinsonism, vivid dreaming, intermittent hallucinations, leg movements in sleep)."  - D/C'd Ropinirole, Will stop Sinemet today, EEG, Serum blood cultures, Ammonia and uric acid level level        LVAD (left ventricular assist device) present    - S/P HM III 10/16/16 as DT in [place]  - Current speed 5800  - TTE 1/25/18 LVEDD 6.5, LVEF 10-15%, diastolic dysfunction, RVE, severely depressed RV systolic function, PAS 20, ARTHUR intermittently and septum midline.   - INR theraputic.  Coumadin with goal INR 2.0-3.0.   - LDH stable  - previously on home Cefepime and Cipro. Continue Meropenem 1 g BID.  -BP very Labile. Will get pressures while sitting and lying today. Continue Doxazosin 8mg daily, Hydralazine 100mg TID, Isosorbide dinitrate 40mg TID, Lisinopril 10mg BID, Nifedipine 120mg daily.         Stage 3 chronic kidney disease    - Will monitor.  (baseline looks ~ 1.6-1.8)            Jim Morejon NP  Heart Transplant  [facility]  "

## 2018-02-21 NOTE — ASSESSMENT & PLAN NOTE
S/p LVAD 10/2016 with chronic pseudomonas drive line infection  -interrogation this am without any issues or alarms  -monitoring per primary team

## 2018-02-21 NOTE — PROGRESS NOTES
Pt doppler pressure 102/0. Notified MD Rao. Ordered to give scheduled lisinopril.No further orders given. Will continue to monitor.

## 2018-02-21 NOTE — SUBJECTIVE & OBJECTIVE
Interval History: No complaints this am. States that he is feeling a bit better. Family still reports intermittent confusion.    Continuous Infusions:  Scheduled Meds:   allopurinol  300 mg Oral Daily    aspirin  81 mg Oral Daily    atorvastatin  10 mg Oral Daily    buPROPion  450 mg Oral Daily    dofetilide  125 mcg Oral Q12H    doxazosin  8 mg Oral Daily    ferrous sulfate  325 mg Oral Daily    Lactobacillus rhamnosus GG  1 capsule Oral Daily    lisinopril  10 mg Oral BID    magnesium oxide  400 mg Oral BID    meropenem  1 g Intravenous Q12H    NIFEdipine  120 mg Oral Daily    pantoprazole  40 mg Oral BID AC    warfarin  4 mg Oral Daily     PRN Meds:acetaminophen, docusate sodium, hydrALAZINE    Review of patient's allergies indicates:   Allergen Reactions    Aldactone [spironolactone]       persistent hyperkalemia.    Sulfa (sulfonamide antibiotics)      Objective:     Vital Signs (Most Recent):  Temp: 98.2 °F (36.8 °C) (02/21/18 0735)  Pulse: 80 (02/21/18 0735)  Resp: 18 (02/21/18 0735)  BP: (!) 60/0 (02/21/18 0735)  SpO2: 97 % (02/21/18 0735) Vital Signs (24h Range):  Temp:  [97.1 °F (36.2 °C)-98.2 °F (36.8 °C)] 98.2 °F (36.8 °C)  Pulse:  [52-80] 80  Resp:  [18-20] 18  SpO2:  [92 %-99 %] 97 %  BP: ()/(0-102) 60/0     Patient Vitals for the past 72 hrs (Last 3 readings):   Weight   02/21/18 0700 79.5 kg (175 lb 4.3 oz)   02/20/18 0700 73.2 kg (161 lb 6 oz)   02/19/18 2314 82 kg (180 lb 11.2 oz)     Body mass index is 24.44 kg/m².      Intake/Output Summary (Last 24 hours) at 02/21/18 0823  Last data filed at 02/21/18 0600   Gross per 24 hour   Intake             1630 ml   Output                0 ml   Net             1630 ml            Telemetry: NSR    Physical Exam   Constitutional: He is oriented to person, place, and time. He appears well-developed and well-nourished.   HENT:   Head: Normocephalic.   Eyes: Pupils are equal, round, and reactive to light.   Neck: Normal range of motion.  Neck supple.   Cardiovascular: Normal rate and regular rhythm.    VAD hum   Pulmonary/Chest: Effort normal and breath sounds normal.   Abdominal: Soft. Bowel sounds are normal.   Musculoskeletal: Normal range of motion.   Neurological: He is alert and oriented to person, place, and time.   Skin: Skin is warm and dry.   Psychiatric: He has a normal mood and affect. His behavior is normal.   Nursing note and vitals reviewed.      Significant Labs:  CBC:    Recent Labs  Lab 02/19/18  1410 02/20/18  0533 02/21/18  0612   WBC 6.10 5.12 5.98   RBC 3.10* 3.13* 3.15*   HGB 9.4* 9.3* 9.1*   HCT 28.8* 28.5* 28.3*   * 128* 134*   MCV 93 91 90   MCH 30.3 29.7 28.9   MCHC 32.6 32.6 32.2     BNP:    Recent Labs  Lab 02/19/18  1410 02/21/18  0612   * 490*     CMP:    Recent Labs  Lab 02/19/18 1410 02/20/18  0533 02/21/18  0612   GLU 96 78 77   CALCIUM 10.2 10.3 9.6   ALBUMIN 3.0*  --  2.8*   PROT 6.5  --  6.2   * 149* 147*   K 3.8 3.6 3.6   CO2 30* 29 29   * 113* 112*   BUN 31* 32* 34*   CREATININE 2.0* 1.9* 2.3*   ALKPHOS 105  --  107   ALT <5*  --  5*   AST 27  --  24   BILITOT 0.4  --  0.4      Coagulation:     Recent Labs  Lab 02/19/18  1410 02/20/18  0533 02/21/18  0612   INR 2.1* 2.1* 2.4*   APTT 30.8  --   --      LDH:    Recent Labs  Lab 02/20/18  0533 02/21/18  0612    181     Microbiology:  Microbiology Results (last 7 days)     Procedure Component Value Units Date/Time    Blood culture [[id number]] Collected:  02/19/18 2001    Order Status:  Completed Specimen:  Blood from Peripheral, Hand, Left Updated:  02/20/18 2212     Blood Culture, Routine No Growth to date     Blood Culture, Routine No Growth to date    Blood culture [[id number]] Collected:  02/19/18 2001    Order Status:  Completed Specimen:  Blood from Peripheral, Forearm, Left Updated:  02/20/18 2212     Blood Culture, Routine No Growth to date     Blood Culture, Routine No Growth to date          I have reviewed all pertinent  labs within the past 24 hours.    Estimated Creatinine Clearance: 29.6 mL/min (A) (based on SCr of 2.3 mg/dL (H)).    Diagnostic Results:  I have reviewed all pertinent imaging results/findings within the past 24 hours.

## 2018-02-21 NOTE — PLAN OF CARE
Problem: Patient Care Overview  Goal: Plan of Care Review  Outcome: Ongoing (interventions implemented as appropriate)  Pt verbalizes no complaints. Denies CP, SOB, or other discomforts. VAD numbers and dopplers WNL. Pt appears more oriented today. Pt remains free of fall or injury. Pt verbalizes understanding of plan of care. Will continue to monitor.

## 2018-02-21 NOTE — ASSESSMENT & PLAN NOTE
- S/P HM III 10/16/16 as DT in [place]  - Current speed 5800  - TTE 1/25/18 LVEDD 6.5, LVEF 10-15%, diastolic dysfunction, RVE, severely depressed RV systolic function, PAS 20, ARTHUR intermittently and septum midline.   - INR theraputic.  Coumadin with goal INR 2.0-3.0.   - LDH stable  - previously on home Cefepime and Cipro. Continue Meropenem 1 g BID.  -BP very Labile. Will get pressures while sitting and lying today. Continue Doxazosin 8mg daily, Hydralazine 100mg TID, Isosorbide dinitrate 40mg TID, Lisinopril 10mg BID, Nifedipine 120mg daily.

## 2018-02-21 NOTE — ASSESSMENT & PLAN NOTE
Irregular tremor and asterixis seen on exam 2/20, improved somewhat today    Presentation not consistent with PD, could have synucleinopathy causing hallucinations and parkinsonism    -ammonia wnl, consider checking uric acid  -EEG pending

## 2018-02-21 NOTE — NURSING
Plan of care discussed with patient, no questions at this time. Fall pre-cautions in place.ALVARADO in place. Will continue to monitor.

## 2018-02-21 NOTE — ASSESSMENT & PLAN NOTE
2 neuropsych assessments at outside facility without evidence of dementia or cognitive impairment    -B12, folate wnl  -other B vitamins pending

## 2018-02-21 NOTE — PROGRESS NOTES
02/21/18 0550   Vital Signs   BP (!) 110/0     Notified MD Rao. Ordered to give 0900 nefedipine, doxazosin, and lisinopril now. No further orders were given. Will continue to monitor.

## 2018-02-21 NOTE — ASSESSMENT & PLAN NOTE
"- Recurring problem. LP done 1/30/18 with removal of only 13cc; opening pressure 21, closing pressure 15. His post procedure gait was significantly worse. Discussed with Neuro and NSGY. Per NSGY, no immediate need for cisternogram, shunt, etc, given failed improvement in gait.   -Appreciate Neuro help->"does not have parkinson's disease, though could have a synucleinopathy (cause of mild parkinsonism, vivid dreaming, intermittent hallucinations, leg movements in sleep)."  - D/C'd Ropinirole, Will stop Sinemet today, EEG, Serum blood cultures, Ammonia and uric acid level level  "

## 2018-02-21 NOTE — PROGRESS NOTES
[facility]  Neurology  Progress Note    Patient Name: Kev Vasquez  MRN: [id number]  Admission Date: 2/19/2018  Hospital Length of Stay: 2 days  Code Status: Full Code   Attending Provider: Carlito Santana MD  Primary Care Physician: Mega Collins MD   Principal Problem:Delirium      Subjective:     Interval History:   NAEON. No dropping events with standing per caregiver.  LVAD interrogated this morning without any issues or alarms  Awaiting EEG and serum studies pending     Current Facility-Administered Medications   Medication Dose Route Frequency Provider Last Rate Last Dose    acetaminophen tablet 650 mg  650 mg Oral Q6H PRN José Luis Yeh, DO   650 mg at 02/20/18 1627    allopurinol tablet 300 mg  300 mg Oral Daily José Luis Yeh, DO   300 mg at 02/21/18 0852    aspirin chewable tablet 81 mg  81 mg Oral Daily José Luis Yeh, DO   81 mg at 02/21/18 0851    atorvastatin tablet 10 mg  10 mg Oral Daily José Luis Yeh, DO   10 mg at 02/21/18 0852    buPROPion TB24 tablet 450 mg  450 mg Oral Daily José Luis Yeh, DO   450 mg at 02/21/18 0851    docusate sodium capsule 100 mg  100 mg Oral Daily PRN José Luis Yeh, DO        dofetilide capsule 125 mcg  125 mcg Oral Q12H José Luis Yeh, DO   125 mcg at 02/21/18 0852    doxazosin tablet 8 mg  8 mg Oral Daily José Luis Yeh, DO   8 mg at 02/21/18 0600    ferrous sulfate EC tablet 325 mg  325 mg Oral Daily José Luis Yeh, DO   325 mg at 02/21/18 0852    hydrALAZINE injection 10 mg  10 mg Intravenous Q6H PRN José Luis Yeh, DO   10 mg at 02/21/18 0129    Lactobacillus rhamnosus GG capsule 1 capsule  1 capsule Oral Daily José Luis Yeh, DO   1 capsule at 02/21/18 0905    lisinopril tablet 10 mg  10 mg Oral BID José Luis Yeh, DO   10 mg at 02/21/18 0600    magnesium oxide tablet 400 mg  400 mg Oral BID José Luis Yeh, DO   400 mg at 02/21/18 0852    meropenem injection 1 g  1 g Intravenous Q12H José Luis Yeh,    1 g at 02/21/18 4308     NIFEdipine 24 hr tablet 120 mg  120 mg Oral Daily José Luis Yeh, DO   120 mg at 02/21/18 0600    pantoprazole EC tablet 40 mg  40 mg Oral BID AC José Luis Yeh, DO   40 mg at 02/21/18 0548    warfarin (COUMADIN) tablet 4 mg  4 mg Oral Daily José Luis Yeh, DO   4 mg at 02/20/18 1816     Review of Systems   Constitutional: Positive for activity change and fatigue.   Neurological: Positive for tremors.   Psychiatric/Behavioral: Positive for decreased concentration.     Objective:     Vital Signs (Most Recent):  Temp: 98.2 °F (36.8 °C) (02/21/18 1149)  Pulse: 62 (02/21/18 1149)  Resp: 18 (02/21/18 1149)  BP: (!) 72/0 (02/21/18 1153)  SpO2: 97 % (02/21/18 1149) Vital Signs (24h Range):  Temp:  [97.1 °F (36.2 °C)-98.2 °F (36.8 °C)] 98.2 °F (36.8 °C)  Pulse:  [60-80] 62  Resp:  [18-20] 18  SpO2:  [93 %-99 %] 97 %  BP: ()/(0-102) 72/0     Weight: 79.5 kg (175 lb 4.3 oz)  Body mass index is 24.44 kg/m².    Physical Exam   Constitutional:   Sitting in chair on the side of the bed about to read to newspaper   Psychiatric: His speech is normal.     NEUROLOGICAL EXAMINATION:     MENTAL STATUS   Oriented to person.   Oriented to place.   Follows 2 step commands.   Speech: speech is normal   Level of consciousness: alert       Said 2017 for year  Knows we are in February      MOTOR EXAM   Muscle bulk: normal       Tremulous with outstretched hands  Asterixis improved     Significant Labs:   Blood Culture:   Recent Labs  Lab 02/19/18 2001   LABBLOO No Growth to date  No Growth to date  No Growth to date  No Growth to date     CBC:   Recent Labs  Lab 02/19/18  1410 02/20/18  0533 02/21/18  0612   WBC 6.10 5.12 5.98   HGB 9.4* 9.3* 9.1*   HCT 28.8* 28.5* 28.3*   * 128* 134*     CMP:   Recent Labs  Lab 02/19/18  1410 02/20/18  0533 02/21/18  0612   GLU 96 78 77   * 149* 147*   K 3.8 3.6 3.6   * 113* 112*   CO2 30* 29 29   BUN 31* 32* 34*   CREATININE 2.0* 1.9* 2.3*   CALCIUM 10.2 10.3 9.6   MG  --  2.5  2.5   PROT 6.5  --  6.2   ALBUMIN 3.0*  --  2.8*   BILITOT 0.4  --  0.4   ALKPHOS 105  --  107   AST 27  --  24   ALT <5*  --  5*   ANIONGAP 6* 7* 6*   EGFRNONAA 31.7* 33.7* 26.8*     Urine Culture: No results for input(s): LABURIN in the last 48 hours.  Urine Studies:   Recent Labs  Lab 02/19/18  1621   COLORU Yellow   APPEARANCEUA Clear   PHUR 7.0   SPECGRAV 1.010   PROTEINUA Negative   GLUCUA Negative   KETONESU Negative   BILIRUBINUA Negative   OCCULTUA Negative   NITRITE Negative   UROBILINOGEN Negative   LEUKOCYTESUR Negative     Ammonia wnl (23)  B12 and folate wnl    In process: B1, B2, B6, RPR, MMA    All pertinent lab results from the past 24 hours have been reviewed.    Routine EEG (pending)    Significant Imaging: I have reviewed and interpreted all pertinent imaging results/findings within the past 24 hours.    Assessment and Plan:     * Delirium    Caregiver reports worsening delirium since admission (2/8-2/14/18). Ldopa from Sinemet trial that was started during Jan admission possibly worsened delirium vs. infectious/metabolic process.    2/21-exam improved, more alert and interactive.    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     ->follow-up with Neurology outpatient        VTE Risk Mitigation         Ordered     warfarin (COUMADIN) tablet 4 mg  Daily     Route:  Oral        02/19/18 2292        Tamara Chawla PA-C  General Neurology Consult  Neuro Consult [facility] # [id number]

## 2018-02-21 NOTE — PT/OT/SLP PROGRESS
Physical Therapy      Patient Name:  Kev Vasquez   MRN:  [id number]    Patient not seen today secondary to Other (Comment) (EEG). Will follow-up tomorrow for PT evaluation.    Ninfa Mims, PT   2/21/2018

## 2018-02-21 NOTE — PROCEDURES
VAD interrogation completed this AM in the event changes needed to be made. Will continue to monitor for further issues.     Pulsatile: Yes, intermittent   VAD Sounds: HM3  Smooth  Problems / Issues / Alarms with VAD if any: None noted    VAD Interrogation:  TXP LVAD INTERROGATIONS 2/21/2018 2/21/2018 2/20/2018 2/20/2018 2/20/2018 2/20/2018 2/20/2018   Type HeartMate3 HeartMate3 HeartMate3 HeartMate3 HeartMate3 HeartMate3 HeartMate3   Flow 5.4 4.5 5.3 5.3 5.1 4.9 4.9   Speed 5800 5800 5800 5800 5800 5800 5800   PI 3.2 4.2 3.3 3.1 2.7 3.4 3.7   Power (M[place]) 4.4 4.3 4.4 4.5 4.2 4.4 4.5   LSL - - - 5400 - - -   Pulsatility - - - - - - -   }

## 2018-02-22 LAB
ANION GAP SERPL CALC-SCNC: 8 MMOL/L
BASOPHILS # BLD AUTO: 0.02 K/UL
BASOPHILS NFR BLD: 0.3 %
BUN SERPL-MCNC: 29 MG/DL
CALCIUM SERPL-MCNC: 9.5 MG/DL
CHLORIDE SERPL-SCNC: 110 MMOL/L
CO2 SERPL-SCNC: 28 MMOL/L
CREAT SERPL-MCNC: 1.8 MG/DL
DIFFERENTIAL METHOD: ABNORMAL
EOSINOPHIL # BLD AUTO: 0.5 K/UL
EOSINOPHIL NFR BLD: 8 %
ERYTHROCYTE [DISTWIDTH] IN BLOOD BY AUTOMATED COUNT: 17.8 %
EST. GFR  (AFRICAN AMERICAN): 41.6 ML/MIN/1.73 M^2
EST. GFR  (NON AFRICAN AMERICAN): 36 ML/MIN/1.73 M^2
GLUCOSE SERPL-MCNC: 73 MG/DL
HCT VFR BLD AUTO: 27.8 %
HGB BLD-MCNC: 9.1 G/DL
IMM GRANULOCYTES # BLD AUTO: 0.02 K/UL
IMM GRANULOCYTES NFR BLD AUTO: 0.3 %
INR PPP: 2.6
LDH SERPL L TO P-CCNC: 193 U/L
LYMPHOCYTES # BLD AUTO: 1.2 K/UL
LYMPHOCYTES NFR BLD: 21 %
MAGNESIUM SERPL-MCNC: 2.2 MG/DL
MCH RBC QN AUTO: 29.6 PG
MCHC RBC AUTO-ENTMCNC: 32.7 G/DL
MCV RBC AUTO: 91 FL
MONOCYTES # BLD AUTO: 0.5 K/UL
MONOCYTES NFR BLD: 9.1 %
NEUTROPHILS # BLD AUTO: 3.6 K/UL
NEUTROPHILS NFR BLD: 61.3 %
NRBC BLD-RTO: 0 /100 WBC
PLATELET # BLD AUTO: 132 K/UL
PMV BLD AUTO: 11.7 FL
POTASSIUM SERPL-SCNC: 3.7 MMOL/L
PROTHROMBIN TIME: 25.6 SEC
RBC # BLD AUTO: 3.07 M/UL
SODIUM SERPL-SCNC: 146 MMOL/L
WBC # BLD AUTO: 5.91 K/UL

## 2018-02-22 PROCEDURE — 80048 BASIC METABOLIC PNL TOTAL CA: CPT

## 2018-02-22 PROCEDURE — 83615 LACTATE (LD) (LDH) ENZYME: CPT

## 2018-02-22 PROCEDURE — 83735 ASSAY OF MAGNESIUM: CPT

## 2018-02-22 PROCEDURE — 97161 PT EVAL LOW COMPLEX 20 MIN: CPT

## 2018-02-22 PROCEDURE — 25000003 PHARM REV CODE 250: Performed by: NURSE PRACTITIONER

## 2018-02-22 PROCEDURE — 92610 EVALUATE SWALLOWING FUNCTION: CPT

## 2018-02-22 PROCEDURE — 20600001 HC STEP DOWN PRIVATE ROOM

## 2018-02-22 PROCEDURE — 93750 INTERROGATION VAD IN PERSON: CPT | Mod: ,,, | Performed by: INTERNAL MEDICINE

## 2018-02-22 PROCEDURE — 97112 NEUROMUSCULAR REEDUCATION: CPT

## 2018-02-22 PROCEDURE — 97165 OT EVAL LOW COMPLEX 30 MIN: CPT

## 2018-02-22 PROCEDURE — 63600175 PHARM REV CODE 636 W HCPCS: Performed by: INTERNAL MEDICINE

## 2018-02-22 PROCEDURE — 99233 SBSQ HOSP IP/OBS HIGH 50: CPT | Mod: ,,, | Performed by: INTERNAL MEDICINE

## 2018-02-22 PROCEDURE — 27000248 HC VAD-ADDITIONAL DAY

## 2018-02-22 PROCEDURE — 25000003 PHARM REV CODE 250: Performed by: INTERNAL MEDICINE

## 2018-02-22 PROCEDURE — 85025 COMPLETE CBC W/AUTO DIFF WBC: CPT

## 2018-02-22 PROCEDURE — 85610 PROTHROMBIN TIME: CPT

## 2018-02-22 PROCEDURE — 99233 SBSQ HOSP IP/OBS HIGH 50: CPT | Mod: ,,, | Performed by: PSYCHIATRY & NEUROLOGY

## 2018-02-22 RX ORDER — HYDRALAZINE HYDROCHLORIDE 50 MG/1
50 TABLET, FILM COATED ORAL EVERY 12 HOURS
Status: DISCONTINUED | OUTPATIENT
Start: 2018-02-22 | End: 2018-02-23 | Stop reason: HOSPADM

## 2018-02-22 RX ORDER — ISOSORBIDE DINITRATE 10 MG/1
20 TABLET ORAL 3 TIMES DAILY
Status: DISCONTINUED | OUTPATIENT
Start: 2018-02-22 | End: 2018-02-22

## 2018-02-22 RX ORDER — HYDRALAZINE HYDROCHLORIDE 50 MG/1
50 TABLET, FILM COATED ORAL EVERY 8 HOURS
Status: DISCONTINUED | OUTPATIENT
Start: 2018-02-22 | End: 2018-02-22

## 2018-02-22 RX ADMIN — WARFARIN SODIUM 4 MG: 2 TABLET ORAL at 04:02

## 2018-02-22 RX ADMIN — HYDRALAZINE HYDROCHLORIDE 50 MG: 50 TABLET ORAL at 08:02

## 2018-02-22 RX ADMIN — ALLOPURINOL 300 MG: 300 TABLET ORAL at 08:02

## 2018-02-22 RX ADMIN — ATORVASTATIN CALCIUM 10 MG: 10 TABLET, FILM COATED ORAL at 08:02

## 2018-02-22 RX ADMIN — Medication 1 CAPSULE: at 08:02

## 2018-02-22 RX ADMIN — MEROPENEM 1 G: 1 INJECTION, POWDER, FOR SOLUTION INTRAVENOUS at 08:02

## 2018-02-22 RX ADMIN — DOFETILIDE 125 MCG: 0.12 CAPSULE ORAL at 08:02

## 2018-02-22 RX ADMIN — PANTOPRAZOLE SODIUM 40 MG: 40 TABLET, DELAYED RELEASE ORAL at 05:02

## 2018-02-22 RX ADMIN — MAGNESIUM OXIDE TAB 400 MG (241.3 MG ELEMENTAL MG) 400 MG: 400 (241.3 MG) TAB at 08:02

## 2018-02-22 RX ADMIN — LISINOPRIL 10 MG: 10 TABLET ORAL at 08:02

## 2018-02-22 RX ADMIN — PANTOPRAZOLE SODIUM 40 MG: 40 TABLET, DELAYED RELEASE ORAL at 04:02

## 2018-02-22 RX ADMIN — DOXAZOSIN 8 MG: 4 TABLET ORAL at 08:02

## 2018-02-22 RX ADMIN — NIFEDIPINE 120 MG: 60 TABLET, FILM COATED, EXTENDED RELEASE ORAL at 08:02

## 2018-02-22 RX ADMIN — BUPROPION HYDROCHLORIDE 450 MG: 150 TABLET, EXTENDED RELEASE ORAL at 08:02

## 2018-02-22 RX ADMIN — FERROUS SULFATE TAB EC 325 MG (65 MG FE EQUIVALENT) 325 MG: 325 (65 FE) TABLET DELAYED RESPONSE at 08:02

## 2018-02-22 RX ADMIN — ASPIRIN 81 MG CHEWABLE TABLET 81 MG: 81 TABLET CHEWABLE at 08:02

## 2018-02-22 RX ADMIN — HYDRALAZINE HYDROCHLORIDE 50 MG: 50 TABLET ORAL at 05:02

## 2018-02-22 RX ADMIN — HYDRALAZINE HYDROCHLORIDE 50 MG: 50 TABLET ORAL at 12:02

## 2018-02-22 NOTE — PLAN OF CARE
Problem: Occupational Therapy Goal  Goal: Occupational Therapy Goal  Goals to be met by: 7 days (3/1/18)     Patient will increase functional independence with ADLs by performing:    Grooming while EOB with Stand-by Assistance.  Toileting from toilet with Minimal Assistance for hygiene and clothing management.   Supine to sit with Supervision.  Toilet transfer to toilet with Contact Guard Assistance.  Pt will perform functional mobility household distance with CGA using AD as needed.    Outcome: Ongoing (interventions implemented as appropriate)  Eval and POC set 2/22/18

## 2018-02-22 NOTE — PT/OT/SLP EVAL
"Physical Therapy Evaluation and Discharge Note    Patient Name:  Kev Vasquez   MRN:  [id number]    Recommendations:     Discharge Recommendations:  home with home health   Discharge Equipment Recommendations: none   Barriers to discharge: None    Assessment:     Kev Vasquez is a 77 y.o. male admitted with a medical diagnosis of Confusion.  At this time, patient is functioning at their prior level of function and does not require further acute PT services. Pt is known to this therapist and therapy team; pt appears at or near baseline mobility. No acute PT services required at this time. Pt appropriate for OOB activity with sitters and RN staff. Pt currently req CGA-min A. Tolerated hallway ambulation using RW. Will benefit from HH PT.    Recent Surgery: * No surgery found *      Plan:     During this hospitalization, patient does not require further acute PT services.  Please re-consult if situation changes.     Plan of Care Reviewed with: patient, caregiver    Subjective     Communicated with RN (Eli) prior to session.  Patient found sitting UIC upon PT entry to room, agreeable to evaluation.      Chief Complaint: "You always seems to show up."  Patient comments/goals: "I will go for a long walk."  Pain/Comfort:  · Pain Rating 1: 0/10  · Pain Rating Post-Intervention 1: 0/10    Patients cultural, spiritual, Alevism conflicts given the current situation: None noted    Living Environment:  Per OT note from previous admission: Pt lives w/ wife, daughter, and son-in-law in a 1SH w/ TTE. Pt's house has multiple thresholds inside that he will need to be able to negotiate.  Prior to admission, patient required 24 hour assist from his caregiver/sitter for transfers, ambulation w/ RW, and ADLs. Pt's sitter was giving him sponge baths while seated on the bedside commode.  Pt's sitter reported that pt was very unsteady while at home b/w recent admit and this one- often leaning to one side when ambulating. Patient has the " following equipment: bedside commode, walker, rolling, wheelchair.  DME owned (not currently used): shower chair.  Upon discharge, patient will have assistance from his sitter 24h/7.    Objective:     Patient found with: telemetry     General Precautions: Standard, fall, LVAD   Orthopedic Precautions:N/A   Braces: N/A     Exams:  · Cognitive Exam:  Patient is oriented to Person, Place and Situation and follows 100% of simple commands   · Fine Motor Coordination:    · -       Impaired     · Gross Motor Coordination:  WFL  · Skin Integrity/Edema:      · -       Skin integrity: Thin and Dry  · -       Edema: None noted      Functional Mobility:  · Transfers:     · Sit to Stand:  contact guard assistance with rolling walker and no AD x3 trials total  · Balance:  Pt req CGA/min A for standing balance and transitions    · Gait/NMR:    · Pt ambulated x60 feet in hallway setting with use of RW and bedside chair follow. CGA provided by PT with intermittent Min A 2/2 LOB. Pt req standing rest break for donning of briefs. Pt demo R lateral lean.   · Pt req seated rest break x2 min prior to second bout of gait training.  · Pt ambulated x100 feet in hallway setting with CGA; pt with single LOB to the R. Pt demo impaired dual tasking in presence of multiple distractions in hallway setting.  · Emphasis placed on sit to stand and stand to sit during gait trials, appropriate WS during turning and stepping tasks.     AM-PAC 6 CLICK MOBILITY  Total Score:17     Therapeutic Activities and Exercises:   PT arrived to pt's room to find pt resting quietly; agreeable to PT session. Pt on battery power seated UIC - req total A for positioning of VAD bag around neck/waist. Pt performed mobility as above. Upon return to room, PT reviewed mobility needs and recommendations for increased use of W/C in home environment to decrease chance of falls/near falls at this time. Questions/concerns addressed within PT scope of practice; pt and sitter with  no further questions.    Patient left up in chair with all lines intact, call button in reach, RN notified and sitter present.    History:     Past Medical History:   Diagnosis Date    AICD (automatic cardioverter/defibrillator) present 2014    [employer]    Chronic anticoagulation 7/21/2017    Chronic combined systolic and diastolic congestive heart failure 7/27/2015 11-16-17   1 - Moderate left ventricular enlargement.    2 - Severely depressed left ventricular systolic function (EF 15-20%).    3 - Impaired LV relaxation, normal LAP (grade 1 diastolic dysfunction).    4 - Biatrial enlargement.    5 - Right ventricle is upper limit of normal in size with not well seen systolic function.    6 - Severe tricuspid regurgitation.    7 - Increased central venous pressure.    8 - The estimated PA systolic pressure is 40 mmHg.    9 - Heartmate III LVAD; speed 5800.     Complication involving left ventricular assist device (LVAD) 7/29/2017    Coronary artery disease involving native coronary artery of native heart without angina pectoris 7/27/2015    Gait instability     Hypertensive urgency, malignant 11/15/2017    ICD (implantable cardioverter-defibrillator), biventricular, in situ 7/27/2015    Ischemic cardiomyopathy 7/20/2017    S/p HMIII     Kidney stones     LVAD (left ventricular assist device) present 7/20/2017    status post Heartmate III 10/16/16 LVAD    HILLARY on CPAP 7/27/2015    Peripheral neuropathy     Pulmonary hypertension due to left ventricular systolic dysfunction 7/29/2015    Restless leg syndrome 1992    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    Skin cancer     excision 2013    Skin yeast infection 8/31/2017    Stage 3 chronic kidney disease 7/20/2017    Syncope 7/20/2017    Ventricular tachycardia 7/25/2017       Past Surgical History:   Procedure Laterality Date    CARDIAC PACEMAKER PLACEMENT      pacemaker previously, then AICD in 2006. AICD [employer] serial  #[id number]    CORONARY ARTERY BYPASS GRAFT  1993    KNEE SURGERY Left 2001    complicated by infection, hardware had to be taken out and replaced    LEFT VENTRICULAR ASSIST DEVICE  10/19/2016       Clinical Decision Making:     History  Co-morbidities and personal factors that may impact the plan of care Examination  Body Structures and Functions, activity limitations and participation restrictions that may impact the plan of care Clinical Presentation   Decision Making/ Complexity Score   Co-morbidities:   [] Time since onset of injury / illness / exacerbation  [] Status of current condition  [x]Patient's cognitive status and safety concerns    [x] Multiple Medical Problems (see med hx)  Personal Factors:   [] Patient's age  [x] Prior Level of function   [x] Patient's home situation (environment and family support)  [] Patient's level of motivation  [] Expected progression of patient      HISTORY:(criteria)    [] 09290 - no personal factors/history    [x] 31940 - has 1-2 personal factor/comorbidity     [] 86099 - has >3 personal factor/comorbidity     Body Regions:  [] Objective examination findings  [] Head     []  Neck  [] Trunk   [] Upper Extremity  [] Lower Extremity    Body Systems:  [] For communication ability, affect, cognition, language, and learning style: the assessment of the ability to make needs known, consciousness, orientation (person, place, and time), expected emotional /behavioral responses, and learning preferences (eg, learning barriers, education  needs)  [x] For the neuromuscular system: a general assessment of gross coordinated movement (eg, balance, gait, locomotion, transfers, and transitions) and motor function  (motor control and motor learning)  [] For the musculoskeletal system: the assessment of gross symmetry, gross range of motion, gross strength, height, and weight  [] For the integumentary system: the assessment of pliability(texture), presence of scar formation, skin color, and  skin integrity  [x] For cardiovascular/pulmonary system: the assessment of heart rate, respiratory rate, blood pressure, and edema     Activity limitations:    [x] Patient's cognitive status and saf ety concerns          [] Status of current condition      [] Weight bearing restriction  [] Cardiopulmunary Restriction    Participation Restrictions:   [] Goals and goal agreement with the patient     [] Rehab potential (prognosis) and probable outcome      Examination of Body System: (criteria)    [x] 83879 - addressing 1-2 elements    [] 58593 - addressing a total of 3 or more elements     [] 21384 -  Addressing a total of 4 or more elements         Clinical Presentation: (criteria)  Stable - 38685     On examination of body system using standardized tests and measures patient presents with 1-2 elements from any of the following: body structures and functions, activity limitations, and/or participation restrictions.  Leading to a clinical presentation that is considered stable and/or uncomplicated                              Clinical Decision Making  (Eval Complexity):  Low- 94971     Time Tracking:     PT Received On: 02/22/18  PT Start Time: 1023     PT Stop Time: 1045  PT Total Time (min): 22 min     Billable Minutes: Evaluation 10 and NMR 12    Carmencita Doherty, PT, DPT  [phone]  2/22/2018

## 2018-02-22 NOTE — PROCEDURES
DATE OF PROCEDURE:  02/21/2018    EEG NUMBER:  [id number]    REFERRING PHYSICIAN:  Dr. Santana.    This EEG was performed to assess for subclinical seizures.    ELECTROENCEPHALOGRAM REPORT      METHODOLOGY:  Electroencephalographic (EEG) recording is recorded with   electrodes placed according to the International 10-20 placement system.  Thirty   two (32) channels of digital signal (sampling rate of 512/sec), including T1   and T2, were simultaneously recorded from the scalp and may include EKG, EMG,   and/or eye monitors.  Recording band pass was 0.1 to 512 Hz.  Digital video   recording of the patient is simultaneously recorded with the EEG.  The patient   is instructed to report clinical symptoms which may occur during the recording   session.  EEG and video recording are stored and archived in digital format.    Activation procedures, which include photic stimulation, hyperventilation and   instructing patients to perform simple tasks, are done in selected patients.    The EEG is displayed on a monitor screen and can be reviewed using different   montages.  Computer assisted-analysis is employed to detect spike and   electrographic seizure activity.  The entire record is submitted for computer   analysis.  The entire recording is visually reviewed, and the times identified   by computer analysis as being spikes or seizures are reviewed again.    Compressed spectral analysis (CSA) is also performed on the activity recorded   from each individual channel.  This is displayed as a power display of   frequencies from 0 to 30 Hz over time.  The CSA is reviewed looking for   asymmetries in power between homologous areas of the scalp, then compared with   the original EEG recording.    [employer] software was also utilized in the review of this study.  This software   suite analyzes the EEG recording in multiple domains.  Coherence and rhythmicity   are computed to identify EEG sections which may contain organized seizures.     Each channel undergoes analysis to detect the presence of spike and sharp waves   which have special and morphological characteristics of epileptic activity.  The   routine EEG recording is converted from special into frequency domain.  This is   then displayed comparing homologous areas to identify areas of significant   asymmetry.  Algorithm to identify non-cortically generated artifact is used to   separate artifact from the EEG.    EEG FINDINGS:  The recording was obtained with a number of standard bipolar and   referential montages during wakefulness, drowsiness and sleep.  In the alert   state, the posterior background rhythm was a symmetric, well-modulated,   nonsustained 9 Hz alpha rhythm, which reacted symmetrically to eye opening.    Excessive mixed theta range activity was noted throughout wakefulness.  During   drowsiness, the background rhythm waxed and waned and there were periods of   slowing.  During stage II sleep, symmetric V waves and sleep spindles were   noted.  There were no interictal epileptiform abnormalities and no clinical or   electrographic seizures were recorded.  Activation procedures were not   performed.    The EKG channel was obscured by technical artifacts.    IMPRESSION:  This is an abnormal EEG during wakefulness, drowsiness and sleep.    Mild disorganization in the background was noted.    CLINICAL CORRELATION:  The patient is a 77-year-old male who presented with   cognitive dysfunction and is currently not maintained on any anti-seizure   medications.  This is a mildly abnormal EEG during wakefulness, drowsiness and   sleep.  The overall degree of disorganization in this study is suggestive of a   mild encephalopathy, nonspecific to the cause.  There is no evidence of an   epileptic process on this recording.  No seizures were recorded during this   study.      FAK/KYLIE  dd: 02/22/2018 10:57:38 (CST)  td: 02/22/2018 11:19:16 (CST)  Doc ID   #[id number]  Job ID #[id number]    CC:

## 2018-02-22 NOTE — PROGRESS NOTES
[facility]  Heart Transplant  Progress Note    Patient Name: Kev Vasquez  MRN: [id number]  Admission Date: 2/19/2018  Hospital Length of Stay: 3 days  Attending Physician: Carlito Santana MD  Primary Care Provider: Mega Collins MD  Principal Problem:Confusion    Subjective:     Interval History: No complaints this am. States that he is feeling better. Slept well.     Continuous Infusions:  Scheduled Meds:   allopurinol  300 mg Oral Daily    aspirin  81 mg Oral Daily    atorvastatin  10 mg Oral Daily    buPROPion  450 mg Oral Daily    dofetilide  125 mcg Oral Q12H    doxazosin  8 mg Oral Daily    ferrous sulfate  325 mg Oral Daily    hydrALAZINE  50 mg Oral Q8H    Lactobacillus rhamnosus GG  1 capsule Oral Daily    lisinopril  10 mg Oral BID    magnesium oxide  400 mg Oral BID    meropenem  1 g Intravenous Q12H    NIFEdipine  120 mg Oral Daily    pantoprazole  40 mg Oral BID AC    warfarin  4 mg Oral Daily     PRN Meds:acetaminophen, docusate sodium, hydrALAZINE    Review of patient's allergies indicates:   Allergen Reactions    Aldactone [spironolactone]       persistent hyperkalemia.    Sulfa (sulfonamide antibiotics)      Objective:     Vital Signs (Most Recent):  Temp: 97.9 °F (36.6 °C) (02/22/18 0744)  Pulse: (!) 57 (02/22/18 0744)  Resp: 17 (02/22/18 0744)  BP: (!) 139/104 (02/22/18 0745)  SpO2: 96 % (02/22/18 0744) Vital Signs (24h Range):  Temp:  [97.6 °F (36.4 °C)-98.2 °F (36.8 °C)] 97.9 °F (36.6 °C)  Pulse:  [52-74] 57  Resp:  [17-19] 17  SpO2:  [93 %-99 %] 96 %  BP: ()/(0-107) 139/104     Patient Vitals for the past 72 hrs (Last 3 readings):   Weight   02/22/18 0711 81.4 kg (179 lb 7.3 oz)   02/21/18 0700 79.5 kg (175 lb 4.3 oz)   02/20/18 0700 73.2 kg (161 lb 6 oz)     Body mass index is 25.03 kg/m².      Intake/Output Summary (Last 24 hours) at 02/22/18 0909  Last data filed at 02/22/18 0400   Gross per 24 hour   Intake             1920 ml   Output                 0 ml   Net             1920 ml          Telemetry: NSR    Physical Exam   Constitutional: He is oriented to person, place, and time. He appears well-developed and well-nourished.   HENT:   Head: Normocephalic.   Eyes: Pupils are equal, round, and reactive to light.   Neck: Normal range of motion. Neck supple.   Cardiovascular: Normal rate and regular rhythm.    VAD hum   Pulmonary/Chest: Effort normal and breath sounds normal.   Abdominal: Soft. Bowel sounds are normal.   Musculoskeletal: Normal range of motion.   Neurological: He is alert and oriented to person, place, and time.   Skin: Skin is warm and dry.   Psychiatric: He has a normal mood and affect. His behavior is normal.   Nursing note and vitals reviewed.      Significant Labs:  CBC:    Recent Labs  Lab 02/20/18 0533 02/21/18 0612 02/22/18  0552   WBC 5.12 5.98 5.91   RBC 3.13* 3.15* 3.07*   HGB 9.3* 9.1* 9.1*   HCT 28.5* 28.3* 27.8*   * 134* 132*   MCV 91 90 91   MCH 29.7 28.9 29.6   MCHC 32.6 32.2 32.7     BNP:    Recent Labs  Lab 02/19/18 1410 02/21/18  0612   * 490*     CMP:    Recent Labs  Lab 02/19/18 1410 02/20/18  0533 02/21/18  0612 02/22/18  0552   GLU 96 78 77 73   CALCIUM 10.2 10.3 9.6 9.5   ALBUMIN 3.0*  --  2.8*  --    PROT 6.5  --  6.2  --    * 149* 147* 146*   K 3.8 3.6 3.6 3.7   CO2 30* 29 29 28   * 113* 112* 110   BUN 31* 32* 34* 29*   CREATININE 2.0* 1.9* 2.3* 1.8*   ALKPHOS 105  --  107  --    ALT <5*  --  5*  --    AST 27  --  24  --    BILITOT 0.4  --  0.4  --       Coagulation:     Recent Labs  Lab 02/19/18  1410 02/20/18  0533 02/21/18  0612 02/22/18  0552   INR 2.1* 2.1* 2.4* 2.6*   APTT 30.8  --   --   --      LDH:    Recent Labs  Lab 02/20/18  0533 02/21/18  0612 02/22/18  0552    181 193     Microbiology:  Microbiology Results (last 7 days)     Procedure Component Value Units Date/Time    Blood culture [[id number]] Collected:  02/19/18 2001    Order Status:  Completed Specimen:  Blood from  "Peripheral, Hand, Left Updated:  02/21/18 2212     Blood Culture, Routine No Growth to date     Blood Culture, Routine No Growth to date     Blood Culture, Routine No Growth to date    Blood culture [[id number]] Collected:  02/19/18 2001    Order Status:  Completed Specimen:  Blood from Peripheral, Forearm, Left Updated:  02/21/18 2212     Blood Culture, Routine No Growth to date     Blood Culture, Routine No Growth to date     Blood Culture, Routine No Growth to date    Blood culture [[id number]]     Order Status:  Canceled Specimen:  Blood     Blood culture [[id number]]     Order Status:  Canceled Specimen:  Blood           I have reviewed all pertinent labs within the past 24 hours.    Estimated Creatinine Clearance: 37.8 mL/min (A) (based on SCr of 1.8 mg/dL (H)).    Diagnostic Results:  I have reviewed all pertinent imaging results/findings within the past 24 hours.    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 BiV ICD, HILLARY/BiPAP, HTN, CKD, HLP, gout and depression who presents with a 1 week history of worsening confusion and lethargy.     Patient was recently discharged due to similar sx but family reports that since Saturday, patient has also had associated spontaneous movements of his upper extremities. They also report decreased appetite and PO intake recently. Earlier today, he was found at the side of his bed with his head underneath the sidepost of his bed. Per his family, he has not had any worsening SOB or WHELAN. However, he is too weak to ambulate independently. He was recently started on Sinemet by neurology who saw him in late January during that admission due to presumed PD. During his last visit, he was switched from Cefepime to IV Meropenem based on ID recommendations.      He denies chest pain, SOB, WHELAN, " "orthopnea, PND, LE edema. Denies any LVAD flow alarms. No episodes of reported lightheadedness when standing but family reports he has not been able to get up on his own over the last few days. +Dry cough."     * Confusion    - Recurring problem. LP done 1/30/18 with removal of only 13cc; opening pressure 21, closing pressure 15. His post procedure gait was significantly worse. Discussed with Neuro and NSGY. Per NSGY, no immediate need for cisternogram, shunt, etc, given failed improvement in gait.   -Appreciate Neuro help->"does not have parkinson's disease, though could have a synucleinopathy (cause of mild parkinsonism, vivid dreaming, intermittent hallucinations, leg movements in sleep)."  - D/C'd Ropinirole, and Sinemet, EEG pending, Serum blood cultures NGTD.  - Will f/u with neuro at DC.         LVAD (left ventricular assist device) present    - S/P HM III 10/16/16 as DT in [place]  - Current speed 5800  - TTE 1/25/18 LVEDD 6.5, LVEF 10-15%, diastolic dysfunction, RVE, severely depressed RV systolic function, PAS 20, ARTHUR intermittently and septum midline.   - INR theraputic.  Coumadin with goal INR 2.0-3.0.   - LDH stable  - previously on home Cefepime and Cipro. Continue Meropenem 1 g BID.  -BP very Labile. Pt pulsatile. Follow MAPS only. Continue Doxazosin 8mg daily, Hydralazine 50mg TID, Lisinopril 10mg BID, Nifedipine 120mg daily.         Stage 3 chronic kidney disease    - Will monitor.  (baseline looks ~ 1.6-1.8)            Jim Morejon NP  Heart Transplant  [facility]  "

## 2018-02-22 NOTE — NURSING
DOPPLER 54. ASYMPTOMATIC. REPORTED TO WENDY RODRIGUEZ. NO NE ORDERS AT PRESENT. CAREGIVER INSTRUCTED TO CALL FOR ASSIST WITH REANSFER. VERBALIZE UNDERSTANDNG.

## 2018-02-22 NOTE — PROGRESS NOTES
[facility]  Neurology  Progress Note    Patient Name: Kev Vasquez  MRN: [id number]  Admission Date: 2/19/2018  Hospital Length of Stay: 3 days  Code Status: Full Code   Attending Provider: Carlito Santana MD  Primary Care Physician: Mega Collins MD   Principal Problem:Confusion      Subjective:     Interval History:  Fully back to baseline, per caregiver.  Working with PT.    HPI 2/20/18:  77 y.o. Male with hx of HTN, HLD, CKD, ischemic cardiomyopathy s/p LVAD (10/16/16) with chronic pseudomonas drive line infection, sick sinus syndrome s/p ICD, Vtach on Tikosyn, HILLARY with BiPAP,   gout and depression presented 2/19/18 with one wk hx of worsening confusion. Yesterday, he was found with his head underneath the post of the bed. Caregiver reports increased lethargy and decreased PO intake lately. He is less mobile and having trouble transferring and getting around the house. Of note, he was admitted 1/24-2/7/18 and Neurology consulted for ?NPH. He had LP on 1/30 removing 13 cc and post gait assessment was reportedly worse. Ropinirole was discontinued and Sinemet trial started CD/LD  q 3-4 hr while awake. There was question of LBD at that time given hx of hallucinations, delirium and acting out dreams. IV Abx for chronic drive line infection were changed per ID recs. He was admitted again 2/8-2/14 for syncopal episodes thought to be vasovagal with labile bp. AMS was noted on that admission as well and attributed to possible underlying dementia. Per chart review, patient had 2 neuropsych assessments at OSH, both unremarkable for signs of underlying dementia. Wife denies hx of Parkinson's disease. This admission, caregiver says she has noticed a considerable decline after the recent two admissions and wonders if Sinemet is making him more confused and lethargic.       Current Facility-Administered Medications   Medication Dose Route Frequency Provider Last Rate Last Dose    acetaminophen  tablet 650 mg  650 mg Oral Q6H PRN José Luis Yeh, DO   650 mg at 02/21/18 1521    allopurinol tablet 300 mg  300 mg Oral Daily José Luis Yeh, DO   300 mg at 02/22/18 0845    aspirin chewable tablet 81 mg  81 mg Oral Daily José Luis Yeh, DO   81 mg at 02/22/18 0844    atorvastatin tablet 10 mg  10 mg Oral Daily José Luis Yeh, DO   10 mg at 02/22/18 0844    buPROPion TB24 tablet 450 mg  450 mg Oral Daily José Luis Yeh, DO   450 mg at 02/22/18 0844    docusate sodium capsule 100 mg  100 mg Oral Daily PRN José Luis Yeh,         dofetilide capsule 125 mcg  125 mcg Oral Q12H José Luis Yeh, DO   125 mcg at 02/22/18 0844    doxazosin tablet 8 mg  8 mg Oral Daily José Luis Yeh, DO   8 mg at 02/22/18 0844    ferrous sulfate EC tablet 325 mg  325 mg Oral Daily José Luis Yeh, DO   325 mg at 02/22/18 0845    hydrALAZINE injection 10 mg  10 mg Intravenous Q6H PRN José Luis Yeh, DO   10 mg at 02/21/18 2028    hydrALAZINE tablet 50 mg  50 mg Oral Q8H José Luis Yeh, DO   50 mg at 02/22/18 0540    Lactobacillus rhamnosus GG capsule 1 capsule  1 capsule Oral Daily José Luis Yeh, DO   1 capsule at 02/22/18 0844    lisinopril tablet 10 mg  10 mg Oral BID José Luis Yeh, DO   10 mg at 02/22/18 0845    magnesium oxide tablet 400 mg  400 mg Oral BID José Luis Yeh, DO   400 mg at 02/22/18 0845    meropenem injection 1 g  1 g Intravenous Q12H José Luis Yeh, DO   1 g at 02/22/18 0845    NIFEdipine 24 hr tablet 120 mg  120 mg Oral Daily José Luis Yeh, DO   120 mg at 02/22/18 0844    pantoprazole EC tablet 40 mg  40 mg Oral BID AC José Luis Yeh, DO   40 mg at 02/22/18 0541    warfarin (COUMADIN) tablet 4 mg  4 mg Oral Daily José Luis Yeh, DO   4 mg at 02/21/18 1521       Review of Systems  Objective:     Vital Signs (Most Recent):  Temp: 97.9 °F (36.6 °C) (02/22/18 0744)  Pulse: (!) 57 (02/22/18 0744)  Resp: 17 (02/22/18 0744)  BP: (!) 139/104 (02/22/18 0745)  SpO2: 96 % (02/22/18 0744) Vital Signs (24h  Range):  Temp:  [97.6 °F (36.4 °C)-98.2 °F (36.8 °C)] 97.9 °F (36.6 °C)  Pulse:  [52-74] 57  Resp:  [17-19] 17  SpO2:  [93 %-99 %] 96 %  BP: ()/(0-107) 139/104     Weight: 81.4 kg (179 lb 7.3 oz)  Body mass index is 25.03 kg/m².    Physical Exam    Awake, alert, oriented to person, place, situation  Mild tremor in outstretched hands, also some decrement with open/close  No bradykinesia       Significant Labs:  In process: B1, B2, B6, MMA        Routine EEG (pending)      Blood Culture:   Recent Labs  Lab 02/19/18 2001   LABBLOO No Growth to date  No Growth to date  No Growth to date  No Growth to date       Ammonia wnl (23)  RPR,B12 and folate wnl             Assessment and Plan:     * Confusion    Caregiver reports worsening delirium since admission (2/8-2/14/18).  Improved 2/21, resolved 2/22.        Vivid dream    Possible underlying neurodegenerative disorder with vivid dreaming, hallucinations and periodic leg movements with sleep.     ->follow-up with Neurology outpatient        Tremor    Irregular tremor and asterixis seen on exam 2/20, improved somewhat 2/21.  Today, there is no irregular tremor or asterixis on exam.  He still has a postural tremor in hands.    Presentation not consistent with PD, though could have synucleinopathy causing hallucinations and parkinsonism.  It is not clear what caused his delirium this admission, but I would NOT resume sinemet at this time.   -> followup with me as outpatient.              Cognitive dysfunction    2 neuropsych assessments at outside facility without evidence of dementia or cognitive impairment    -B12, folate wnl  -other B vitamins pending        LVAD (left ventricular assist device) present    S/p LVAD 10/2016 with chronic pseudomonas drive line infection  -interrogation this am without any issues or alarms  -monitoring per primary team              Alyssa Rico MD  Neurology  [facility]

## 2018-02-22 NOTE — PROGRESS NOTES
02/22/18 0523   Vital Signs   BP (!) 120/0    Notified MD Rao. No further orders were given. Will continue to monitor.

## 2018-02-22 NOTE — ASSESSMENT & PLAN NOTE
Caregiver reports worsening delirium since admission (2/8-2/14/18).  Improved 2/21, resolved 2/22.

## 2018-02-22 NOTE — SUBJECTIVE & OBJECTIVE
Subjective:     Interval History:  Fully back to baseline, per caregiver.  Working with PT.    HPI 2/20/18:  77 y.o. Male with hx of HTN, HLD, CKD, ischemic cardiomyopathy s/p LVAD (10/16/16) with chronic pseudomonas drive line infection, sick sinus syndrome s/p ICD, Vtach on Tikosyn, HILLARY with BiPAP,   gout and depression presented 2/19/18 with one wk hx of worsening confusion. Yesterday, he was found with his head underneath the post of the bed. Caregiver reports increased lethargy and decreased PO intake lately. He is less mobile and having trouble transferring and getting around the house. Of note, he was admitted 1/24-2/7/18 and Neurology consulted for ?NPH. He had LP on 1/30 removing 13 cc and post gait assessment was reportedly worse. Ropinirole was discontinued and Sinemet trial started CD/LD  q 3-4 hr while awake. There was question of LBD at that time given hx of hallucinations, delirium and acting out dreams. IV Abx for chronic drive line infection were changed per ID recs. He was admitted again 2/8-2/14 for syncopal episodes thought to be vasovagal with labile bp. AMS was noted on that admission as well and attributed to possible underlying dementia. Per chart review, patient had 2 neuropsych assessments at OSH, both unremarkable for signs of underlying dementia. Wife denies hx of Parkinson's disease. This admission, caregiver says she has noticed a considerable decline after the recent two admissions and wonders if Sinemet is making him more confused and lethargic.       Current Facility-Administered Medications   Medication Dose Route Frequency Provider Last Rate Last Dose    acetaminophen tablet 650 mg  650 mg Oral Q6H PRN José Luis Yeh, DO   650 mg at 02/21/18 1521    allopurinol tablet 300 mg  300 mg Oral Daily José Luis Yeh, DO   300 mg at 02/22/18 0845    aspirin chewable tablet 81 mg  81 mg Oral Daily José Luis eYh, DO   81 mg at 02/22/18 0844    atorvastatin tablet 10 mg  10 mg  Oral Daily José Luis Yeh, DO   10 mg at 02/22/18 0844    buPROPion TB24 tablet 450 mg  450 mg Oral Daily José Luisshameka Yeh, DO   450 mg at 02/22/18 0844    docusate sodium capsule 100 mg  100 mg Oral Daily PRN José Luis Yeh, DO        dofetilide capsule 125 mcg  125 mcg Oral Q12H José Luis Yeh, DO   125 mcg at 02/22/18 0844    doxazosin tablet 8 mg  8 mg Oral Daily José Luis Yeh, DO   8 mg at 02/22/18 0844    ferrous sulfate EC tablet 325 mg  325 mg Oral Daily José Luisshameka Yeh, DO   325 mg at 02/22/18 0845    hydrALAZINE injection 10 mg  10 mg Intravenous Q6H PRN José Luis eYh, DO   10 mg at 02/21/18 2028    hydrALAZINE tablet 50 mg  50 mg Oral Q8H José Luisbrittni Yeh, DO   50 mg at 02/22/18 0540    Lactobacillus rhamnosus GG capsule 1 capsule  1 capsule Oral Daily José Luis Yeh, DO   1 capsule at 02/22/18 0844    lisinopril tablet 10 mg  10 mg Oral BID José Luis Yeh, DO   10 mg at 02/22/18 0845    magnesium oxide tablet 400 mg  400 mg Oral BID José Luis Yeh, DO   400 mg at 02/22/18 0845    meropenem injection 1 g  1 g Intravenous Q12H José Luis Yeh, DO   1 g at 02/22/18 0845    NIFEdipine 24 hr tablet 120 mg  120 mg Oral Daily José Luis Yeh, DO   120 mg at 02/22/18 0844    pantoprazole EC tablet 40 mg  40 mg Oral BID AC José Luis Yeh, DO   40 mg at 02/22/18 0541    warfarin (COUMADIN) tablet 4 mg  4 mg Oral Daily José Luis Yeh, DO   4 mg at 02/21/18 1521       Review of Systems  Objective:     Vital Signs (Most Recent):  Temp: 97.9 °F (36.6 °C) (02/22/18 0744)  Pulse: (!) 57 (02/22/18 0744)  Resp: 17 (02/22/18 0744)  BP: (!) 139/104 (02/22/18 0745)  SpO2: 96 % (02/22/18 0744) Vital Signs (24h Range):  Temp:  [97.6 °F (36.4 °C)-98.2 °F (36.8 °C)] 97.9 °F (36.6 °C)  Pulse:  [52-74] 57  Resp:  [17-19] 17  SpO2:  [93 %-99 %] 96 %  BP: ()/(0-107) 139/104     Weight: 81.4 kg (179 lb 7.3 oz)  Body mass index is 25.03 kg/m².    Physical Exam    Awake, alert, oriented to person, place, situation  Mild  tremor in outstretched hands, also some decrement with open/close  No bradykinesia       Significant Labs:  In process: B1, B2, B6, MMA        Routine EEG (pending)      Blood Culture:   Recent Labs  Lab 02/19/18 2001   LABBLOO No Growth to date  No Growth to date  No Growth to date  No Growth to date       Ammonia wnl (23)  RPR,B12 and folate wnl

## 2018-02-22 NOTE — PT/OT/SLP EVAL
Occupational Therapy   Evaluation    Name: Kev Vasquez  MRN: [id number]  Admitting Diagnosis:  Confusion    History of LVAD    Recommendations:     Discharge Recommendations: home with home health  Discharge Equipment Recommendations:  none  Barriers to discharge:  None    History:     Occupational Profile:  Living Environment: Pt lives with spouse in 1-story house; has 24 hr caregiver.  Previous level of function: RW for short distances, assist with all ADLs and LVAD management  Equipment Owned:  bedside commode, walker, rolling, wheelchair, shower chair  Assistance upon Discharge: Has 24 hr caregivers    Past Medical History:   Diagnosis Date    AICD (automatic cardioverter/defibrillator) present 2014    [employer]    Chronic anticoagulation 7/21/2017    Chronic combined systolic and diastolic congestive heart failure 7/27/2015 11-16-17   1 - Moderate left ventricular enlargement.    2 - Severely depressed left ventricular systolic function (EF 15-20%).    3 - Impaired LV relaxation, normal LAP (grade 1 diastolic dysfunction).    4 - Biatrial enlargement.    5 - Right ventricle is upper limit of normal in size with not well seen systolic function.    6 - Severe tricuspid regurgitation.    7 - Increased central venous pressure.    8 - The estimated PA systolic pressure is 40 mmHg.    9 - Heartmate III LVAD; speed 5800.     Complication involving left ventricular assist device (LVAD) 7/29/2017    Coronary artery disease involving native coronary artery of native heart without angina pectoris 7/27/2015    Gait instability     Hypertensive urgency, malignant 11/15/2017    ICD (implantable cardioverter-defibrillator), biventricular, in situ 7/27/2015    Ischemic cardiomyopathy 7/20/2017    S/p HMIII     Kidney stones     LVAD (left ventricular assist device) present 7/20/2017    status post Heartmate III 10/16/16 LVAD    HILLARY on CPAP 7/27/2015    Peripheral neuropathy     Pulmonary hypertension due to left  ventricular systolic dysfunction 7/29/2015    Restless leg syndrome 1992    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    Skin cancer     excision 2013    Skin yeast infection 8/31/2017    Stage 3 chronic kidney disease 7/20/2017    Syncope 7/20/2017    Ventricular tachycardia 7/25/2017       Past Surgical History:   Procedure Laterality Date    CARDIAC PACEMAKER PLACEMENT      pacemaker previously, then AICD in 2006. AICD [employer] serial #[id number]    CORONARY ARTERY BYPASS GRAFT  1993    KNEE SURGERY Left 2001    complicated by infection, hardware had to be taken out and replaced    LEFT VENTRICULAR ASSIST DEVICE  10/19/2016       Subjective     Chief Complaint: Wanted to return to bed; tired  Patient/Family stated goals: Return home  Communicated with: RN prior to session.  Pain/Comfort:  · Pain Rating 1: 0/10  · Pain Rating Post-Intervention 1: 0/10    Patients cultural, spiritual, Sabianism conflicts given the current situation: None    Objective:     Patient found with: telemetry, LVAD    General Precautions: Standard, fall, LVAD   Orthopedic Precautions:N/A   Braces: N/A     Occupational Performance:    Bed Mobility:    · Patient completed Scooting/Bridging with maximal assistance in supine to HOB  · Patient completed Sit to Supine with stand by assistance    Functional Mobility/Transfers:  · Patient completed Sit <> Stand Transfer with minimum assistance x 2 person assist from bedside chair with no AD, B HHA; posterior lean in standing    · Functional Mobility: Few steps from bedside chair to EOB with Min A x 2 person assist for balance    Activities of Daily Living:  · Did not occur; requires assist at baseline    Cognitive/Visual Perceptual:  Cognitive/Psychosocial Skills:     -       Oriented to: Person and Place   -       Follows Commands/attention:Follows one-step commands  -       Communication: clear/fluent  -       Safety awareness/insight to disability: impaired     Physical  "Exam:  Balance:    -       close SBA for sitting, Min A for standing  Postural examination/scapula alignment:    -       No postural abnormalities identified  Upper Extremity Range of Motion:     -       Right Upper Extremity: WFL  -       Left Upper Extremity: WFL  Upper Extremity Strength:    -       Right Upper Extremity: WFL  -       Left Upper Extremity: WFL   Strength:    -       Right Upper Extremity: WFL  -       Left Upper Extremity: WFL  Noted to have B UE resting tremors    Patient left HOB elevated with all lines intact, call button in reach, RN notified and caregiver present    [facility] 6 Click:  [facility] Total Score: 14    Treatment & Education:  OT eval; educated on OT role and POC, familiar to OT service from previous admissions; white board updated  Education:    Assessment:     Kev Vasquez is a 77 y.o. male with a medical diagnosis of confusion, history of LVAD.  He presents with performance deficits including impaired endurance, impaired functional mobilty, decreased safety awareness, impaired balance. Pt would continue to benefit from OT to increase independence and safety. Recommend return home with HH and 24 hr assistance/supervision.     Rehab Prognosis:  Good; patient would benefit from acute skilled OT services to address these deficits and reach maximum level of function.         Clinical Decision Makin.  OT Low:  "Pt evaluation falls under low complexity for evaluation coding due to performance deficits noted in 1-3 areas as stated above and 0 co-morbities affecting current functional status. Data obtained from problem focused assessments. No modifications or assistance was required for completion of evaluation. Only brief occupational profile and history review completed."     Plan:     Patient to be seen 3 x/week to address the above listed problems via self-care/home management, therapeutic activities, therapeutic exercises  · Plan of Care Expires: 18  · Plan of Care " Reviewed with: patient, caregiver    This Plan of care has been discussed with the patient who was involved in its development and understands and is in agreement with the identified goals and treatment plan    GOALS:    Occupational Therapy Goals        Problem: Occupational Therapy Goal    Goal Priority Disciplines Outcome Interventions   Occupational Therapy Goal     OT, PT/OT Ongoing (interventions implemented as appropriate)    Description:  Goals to be met by: 7 days (3/1/18)     Patient will increase functional independence with ADLs by performing:    Grooming while EOB with Stand-by Assistance.  Toileting from toilet with Minimal Assistance for hygiene and clothing management.   Supine to sit with Supervision.  Toilet transfer to toilet with Contact Guard Assistance.  Pt will perform functional mobility household distance with CGA using AD as needed.                      Time Tracking:     OT Date of Treatment: 02/22/18  OT Start Time: 1402  OT Stop Time: 1412  OT Total Time (min): 10 min    Billable Minutes:Evaluation 10 minutes    SHAUNA Hanks  2/22/2018

## 2018-02-22 NOTE — PLAN OF CARE
Problem: Physical Therapy Goal  Goal: Physical Therapy Goal  Outcome: Ongoing (interventions implemented as appropriate)    PT Evaluation complete. Recommending HH PT upon D/C. Pt is at baseline function. No further Acute PT needs. D/C Acute PT services 2/22/2018. Please see full note for details.    Carmencita Doherty, PT, DPT  [phone]  2/22/2018

## 2018-02-22 NOTE — ASSESSMENT & PLAN NOTE
Irregular tremor and asterixis seen on exam 2/20, improved somewhat 2/21.  Today, there is no irregular tremor or asterixis on exam.  He still has a postural tremor in hands.    Presentation not consistent with PD, though could have synucleinopathy causing hallucinations and parkinsonism.  It is not clear what caused his delirium this admission, but I would NOT resume sinemet at this time.   -> followup with me as outpatient.

## 2018-02-22 NOTE — PROGRESS NOTES
02/21/18 1931   Vital Signs   BP (!) 131/107   doppler 130/0. Notified MD Rao. Ordered to give 2100 dose of lisinopril and PRN Hydralazine now. No further orders were given. Will continue to monitor.

## 2018-02-22 NOTE — PROGRESS NOTES
02/21/18 2341   Vital Signs   BP (!) 124/0     Notified MD Rao. Awaiting orders. Will continue to monitor.

## 2018-02-22 NOTE — SUBJECTIVE & OBJECTIVE
Interval History: No complaints this am. States that he is feeling better. Slept well.     Continuous Infusions:  Scheduled Meds:   allopurinol  300 mg Oral Daily    aspirin  81 mg Oral Daily    atorvastatin  10 mg Oral Daily    buPROPion  450 mg Oral Daily    dofetilide  125 mcg Oral Q12H    doxazosin  8 mg Oral Daily    ferrous sulfate  325 mg Oral Daily    hydrALAZINE  50 mg Oral Q8H    Lactobacillus rhamnosus GG  1 capsule Oral Daily    lisinopril  10 mg Oral BID    magnesium oxide  400 mg Oral BID    meropenem  1 g Intravenous Q12H    NIFEdipine  120 mg Oral Daily    pantoprazole  40 mg Oral BID AC    warfarin  4 mg Oral Daily     PRN Meds:acetaminophen, docusate sodium, hydrALAZINE    Review of patient's allergies indicates:   Allergen Reactions    Aldactone [spironolactone]       persistent hyperkalemia.    Sulfa (sulfonamide antibiotics)      Objective:     Vital Signs (Most Recent):  Temp: 97.9 °F (36.6 °C) (02/22/18 0744)  Pulse: (!) 57 (02/22/18 0744)  Resp: 17 (02/22/18 0744)  BP: (!) 139/104 (02/22/18 0745)  SpO2: 96 % (02/22/18 0744) Vital Signs (24h Range):  Temp:  [97.6 °F (36.4 °C)-98.2 °F (36.8 °C)] 97.9 °F (36.6 °C)  Pulse:  [52-74] 57  Resp:  [17-19] 17  SpO2:  [93 %-99 %] 96 %  BP: ()/(0-107) 139/104     Patient Vitals for the past 72 hrs (Last 3 readings):   Weight   02/22/18 0711 81.4 kg (179 lb 7.3 oz)   02/21/18 0700 79.5 kg (175 lb 4.3 oz)   02/20/18 0700 73.2 kg (161 lb 6 oz)     Body mass index is 25.03 kg/m².      Intake/Output Summary (Last 24 hours) at 02/22/18 0909  Last data filed at 02/22/18 0400   Gross per 24 hour   Intake             1920 ml   Output                0 ml   Net             1920 ml          Telemetry: NSR    Physical Exam   Constitutional: He is oriented to person, place, and time. He appears well-developed and well-nourished.   HENT:   Head: Normocephalic.   Eyes: Pupils are equal, round, and reactive to light.   Neck: Normal range of motion.  Neck supple.   Cardiovascular: Normal rate and regular rhythm.    VAD hum   Pulmonary/Chest: Effort normal and breath sounds normal.   Abdominal: Soft. Bowel sounds are normal.   Musculoskeletal: Normal range of motion.   Neurological: He is alert and oriented to person, place, and time.   Skin: Skin is warm and dry.   Psychiatric: He has a normal mood and affect. His behavior is normal.   Nursing note and vitals reviewed.      Significant Labs:  CBC:    Recent Labs  Lab 02/20/18 0533 02/21/18 0612 02/22/18  0552   WBC 5.12 5.98 5.91   RBC 3.13* 3.15* 3.07*   HGB 9.3* 9.1* 9.1*   HCT 28.5* 28.3* 27.8*   * 134* 132*   MCV 91 90 91   MCH 29.7 28.9 29.6   MCHC 32.6 32.2 32.7     BNP:    Recent Labs  Lab 02/19/18 1410 02/21/18  0612   * 490*     CMP:    Recent Labs  Lab 02/19/18 1410 02/20/18 0533 02/21/18 0612 02/22/18  0552   GLU 96 78 77 73   CALCIUM 10.2 10.3 9.6 9.5   ALBUMIN 3.0*  --  2.8*  --    PROT 6.5  --  6.2  --    * 149* 147* 146*   K 3.8 3.6 3.6 3.7   CO2 30* 29 29 28   * 113* 112* 110   BUN 31* 32* 34* 29*   CREATININE 2.0* 1.9* 2.3* 1.8*   ALKPHOS 105  --  107  --    ALT <5*  --  5*  --    AST 27  --  24  --    BILITOT 0.4  --  0.4  --       Coagulation:     Recent Labs  Lab 02/19/18 1410 02/20/18  0533 02/21/18 0612 02/22/18  0552   INR 2.1* 2.1* 2.4* 2.6*   APTT 30.8  --   --   --      LDH:    Recent Labs  Lab 02/20/18 0533 02/21/18 0612 02/22/18  0552    181 193     Microbiology:  Microbiology Results (last 7 days)     Procedure Component Value Units Date/Time    Blood culture [[id number]] Collected:  02/19/18 2001    Order Status:  Completed Specimen:  Blood from Peripheral, Hand, Left Updated:  02/21/18 2212     Blood Culture, Routine No Growth to date     Blood Culture, Routine No Growth to date     Blood Culture, Routine No Growth to date    Blood culture [[id number]] Collected:  02/19/18 2001    Order Status:  Completed Specimen:  Blood from Peripheral,  Forearm, Left Updated:  02/21/18 2212     Blood Culture, Routine No Growth to date     Blood Culture, Routine No Growth to date     Blood Culture, Routine No Growth to date    Blood culture [[id number]]     Order Status:  Canceled Specimen:  Blood     Blood culture [[id number]]     Order Status:  Canceled Specimen:  Blood           I have reviewed all pertinent labs within the past 24 hours.    Estimated Creatinine Clearance: 37.8 mL/min (A) (based on SCr of 1.8 mg/dL (H)).    Diagnostic Results:  I have reviewed all pertinent imaging results/findings within the past 24 hours.

## 2018-02-22 NOTE — ASSESSMENT & PLAN NOTE
"- Recurring problem. LP done 1/30/18 with removal of only 13cc; opening pressure 21, closing pressure 15. His post procedure gait was significantly worse. Discussed with Neuro and NSGY. Per NSGY, no immediate need for cisternogram, shunt, etc, given failed improvement in gait.   -Appreciate Neuro help->"does not have parkinson's disease, though could have a synucleinopathy (cause of mild parkinsonism, vivid dreaming, intermittent hallucinations, leg movements in sleep)."  - D/C'd Ropinirole, and Sinemet, EEG pending, Serum blood cultures NGTD.  - Will f/u with neuro at [place].   "

## 2018-02-22 NOTE — ASSESSMENT & PLAN NOTE
- S/P HM III 10/16/16 as DT in [place]  - Current speed 5800  - TTE 1/25/18 LVEDD 6.5, LVEF 10-15%, diastolic dysfunction, RVE, severely depressed RV systolic function, PAS 20, ARTHUR intermittently and septum midline.   - INR theraputic.  Coumadin with goal INR 2.0-3.0.   - LDH stable  - previously on home Cefepime and Cipro. Continue Meropenem 1 g BID.  -BP very Labile. Pt pulsatile. Follow MAPS only. Continue Doxazosin 8mg daily, Hydralazine 50mg TID, Lisinopril 10mg BID, Nifedipine 120mg daily.

## 2018-02-22 NOTE — PLAN OF CARE
[facility]              Heart Transplant/[facility]   [address] [place] [address]              [phone] after hours          [phone] fax                 VAD HOME  HEALTH ORDERS        Admit to Home Health     Diagnosis:       Patient Active Problem List   Diagnosis    Chronic combined systolic and diastolic congestive heart failure    Coronary artery disease involving native coronary artery of native heart without angina pectoris    S/P CABG (coronary artery bypass graft)    ICD (implantable cardioverter-defibrillator), biventricular, in situ    HILLARY on CPAP    Pulmonary hypertension due to left ventricular systolic dysfunction    Ischemic cardiomyopathy    Stage 3 chronic kidney disease    LVAD (left ventricular assist device) present    Chronic anticoagulation    VT (ventricular tachycardia)    Delirium    Restless leg syndrome    Gait instability    Acute on chronic systolic and diastolic heart failure, NYHA class 4    Essential hypertension    Vomiting    Hallucinations    Confusion    Gastroesophageal reflux disease    Cardiomyopathy    Wound infection    Syncope    Left ventricular assist device (LVAD) complication, subsequent encounter    Uncontrolled hypertension         Patient is homebound due to:       Diet: Low Fat, Low cholesterol, 2Gm Na, Coumadin restrictions.     Acitivities: No Swimming, bathing, vacuuming, contact sports.     Fresh implants= Sternal Precautions     Nursing:   SN to complete comprehensive assessment including routine vital signs. Instruct on disease process and s/s of complications to report to MD. Review/verify medication list sent home with the patient at time of discharge  and instruct patient/caregiver as needed. Frequency may be adjusted depending on start of care date.     **LVAD driveline exit site dressing change is to be completed per LVAD patient/caregiver only**.     Notify MD if  SBP > 160 or < 90; DBP > 90 or < 50; HR > 120 or < 50; Temp > 101; Weight gain >3lbs in 1 day or 5lbs in 1 week.  Other:        LABS:  SN to perform labs: PT/INR per Coumadin clinic [phone].   Follow up INR date: 2/27/18  No Finger Sticks     Administer (drug and dose): Meropenem 1 g Q12 HR through 3/9/18.     Central line care:  Scrub the Hub: Prior to accessing the line, always perform a 30 second alcohol scrub  Each lumen of the central line is to be flushed at least daily with 10 mL Normal Saline and 3 mL Heparin flush (100 units/mL)  Skilled Nurse (SN) may draw blood from IV access  Blood Draw Procedure:              - Aspirate at least 5 mL of blood              - Discard              - Obtain specimen              - Change posiflow cap              - Flush with 20 mL Normal Saline followed by a                 3-5 mL Heparin flush (100 units/mL)  Central :              - Sterile dressing changes are done weekly and as needed.              - Use chlor-hexadine scrub to cleanse site, apply Biopatch to insertion site,                 apply securement device dressing              - Posi-flow caps are changed weekly and after EVERY lab draw.              - If sterile gauze is under dressing to control oozing,                 dressing change must be performed every 24 hours until gauze is not needed.     CONSULTS:   Physical Therapy to evaluate and treat. Evaluate for home safety and equipment needs; Establish/upgrade home exercise program. Perform / instruct on therapeutic exercises, gait training, transfer training, and Range of Motion.     Occupational Therapy to evaluate and treat. Evaluate home environment for safety and equipment needs. Perform/Instruct on transfers, ADL training, ROM, and therapeutic exercises.     Speech Therapy  to evaluate and treat for:  Language  Swallowing  Cognition                                                       to evaluate for community  resources/long-range planning.      Aide to provide assistance with personal care, ADLs, and vital signs     Send initial Home Health orders to HTS attending physician on call.  Send follow up questions to VAD clinic MD [phone] or [phone].

## 2018-02-22 NOTE — PLAN OF CARE
Problem: Patient Care Overview  Goal: Plan of Care Review  Plan of care discussed with patient. Fall precautions in place. Patient has no complaints of pain. Discussed medications and care. VAD number WNL. Dopplers elevated up to 130/0. Patient has no questions at this time.White board updated. Bed locked in lowest position. Side rails up x2. Will continue to monitor.

## 2018-02-22 NOTE — PROCEDURES
VAD interrogation completed this AM in the event changes needed to be made. Will continue to monitor for further issues.     Pulsatile: Yes   VAD Sounds: HM3  Smooth  Problems / Issues / Alarms with VAD if any: None noted  VAD Interrogation:  TXP LVAD INTERROGATIONS 2/22/2018 2/22/2018 2/22/2018 2/21/2018 2/21/2018 2/21/2018 2/21/2018   Type HeartMate3 HeartMate3 HeartMate3 HeartMate3 HeartMate3 HeartMate3 HeartMate3   Flow 4.0 3.8 4.5 4.2 5.4 5.3 5.4   Speed 5800 5800 5800 5800 5800 5800 5800   PI 6.4 6.9 4.6 4.3 3.2 3.4 3.2   Power (Mendez) 4.3 4.3 4.4 4.3 4.4 4.4 4.4   LSL 5400 - - - - - -   Pulsatility - - - - - - -   }

## 2018-02-22 NOTE — PLAN OF CARE
Problem: SLP Goal  Goal: SLP Goal  Pt seen for clinical swallow evaluation at the bedside. Pt caregiver at the bedside. Reviewed with Pt previous diet recommendations. Pt reports dislikes thickener and works with home health SLP to use chin tuck with thin liquids. SLP reviewed aspiration precautions, signs and risk for complications. Pt otherwise appearing safe to continue current diet. No further skilled speech services warranted.     Sara Mahmood MS, CCC-SLP  Speech Language Pathologist  Pager: [phone]  Date 2/22/2018

## 2018-02-23 VITALS
HEART RATE: 64 BPM | BODY MASS INDEX: 25.28 KG/M2 | RESPIRATION RATE: 17 BRPM | OXYGEN SATURATION: 96 % | SYSTOLIC BLOOD PRESSURE: 58 MMHG | WEIGHT: 180.56 LBS | HEIGHT: 71 IN | TEMPERATURE: 98 F

## 2018-02-23 LAB
ALBUMIN SERPL BCP-MCNC: 2.7 G/DL
ALP SERPL-CCNC: 112 U/L
ALT SERPL W/O P-5'-P-CCNC: 22 U/L
ANION GAP SERPL CALC-SCNC: 9 MMOL/L
AST SERPL-CCNC: 27 U/L
BASOPHILS # BLD AUTO: 0.03 K/UL
BASOPHILS NFR BLD: 0.6 %
BILIRUB DIRECT SERPL-MCNC: 0.2 MG/DL
BILIRUB SERPL-MCNC: 0.3 MG/DL
BNP SERPL-MCNC: 861 PG/ML
BUN SERPL-MCNC: 26 MG/DL
CALCIUM SERPL-MCNC: 9.1 MG/DL
CHLORIDE SERPL-SCNC: 109 MMOL/L
CO2 SERPL-SCNC: 27 MMOL/L
CREAT SERPL-MCNC: 1.7 MG/DL
CRP SERPL-MCNC: 10.9 MG/L
DIFFERENTIAL METHOD: ABNORMAL
EOSINOPHIL # BLD AUTO: 0.6 K/UL
EOSINOPHIL NFR BLD: 10.4 %
ERYTHROCYTE [DISTWIDTH] IN BLOOD BY AUTOMATED COUNT: 17.6 %
EST. GFR  (AFRICAN AMERICAN): 44.6 ML/MIN/1.73 M^2
EST. GFR  (NON AFRICAN AMERICAN): 38.6 ML/MIN/1.73 M^2
GLUCOSE SERPL-MCNC: 96 MG/DL
HCT VFR BLD AUTO: 27.6 %
HGB BLD-MCNC: 9 G/DL
IMM GRANULOCYTES # BLD AUTO: 0.03 K/UL
IMM GRANULOCYTES NFR BLD AUTO: 0.6 %
INR PPP: 2.8
LDH SERPL L TO P-CCNC: 194 U/L
LYMPHOCYTES # BLD AUTO: 1.1 K/UL
LYMPHOCYTES NFR BLD: 20.2 %
MAGNESIUM SERPL-MCNC: 1.9 MG/DL
MCH RBC QN AUTO: 29.5 PG
MCHC RBC AUTO-ENTMCNC: 32.6 G/DL
MCV RBC AUTO: 91 FL
MONOCYTES # BLD AUTO: 0.4 K/UL
MONOCYTES NFR BLD: 8.3 %
NEUTROPHILS # BLD AUTO: 3.2 K/UL
NEUTROPHILS NFR BLD: 59.9 %
NRBC BLD-RTO: 0 /100 WBC
PLATELET # BLD AUTO: 126 K/UL
PMV BLD AUTO: 10.6 FL
POTASSIUM SERPL-SCNC: 3.5 MMOL/L
PREALB SERPL-MCNC: 21 MG/DL
PROT SERPL-MCNC: 6 G/DL
PROTHROMBIN TIME: 27.5 SEC
RBC # BLD AUTO: 3.05 M/UL
SODIUM SERPL-SCNC: 145 MMOL/L
VIT B1 SERPL-MCNC: 52 UG/L (ref 38–122)
WBC # BLD AUTO: 5.29 K/UL

## 2018-02-23 PROCEDURE — 85610 PROTHROMBIN TIME: CPT

## 2018-02-23 PROCEDURE — 84134 ASSAY OF PREALBUMIN: CPT

## 2018-02-23 PROCEDURE — 93750 INTERROGATION VAD IN PERSON: CPT | Mod: ,,, | Performed by: INTERNAL MEDICINE

## 2018-02-23 PROCEDURE — 83735 ASSAY OF MAGNESIUM: CPT

## 2018-02-23 PROCEDURE — 99233 SBSQ HOSP IP/OBS HIGH 50: CPT | Mod: ,,, | Performed by: INTERNAL MEDICINE

## 2018-02-23 PROCEDURE — 63600175 PHARM REV CODE 636 W HCPCS: Performed by: INTERNAL MEDICINE

## 2018-02-23 PROCEDURE — 80076 HEPATIC FUNCTION PANEL: CPT

## 2018-02-23 PROCEDURE — 25000003 PHARM REV CODE 250: Performed by: INTERNAL MEDICINE

## 2018-02-23 PROCEDURE — 85025 COMPLETE CBC W/AUTO DIFF WBC: CPT

## 2018-02-23 PROCEDURE — 83615 LACTATE (LD) (LDH) ENZYME: CPT

## 2018-02-23 PROCEDURE — 86140 C-REACTIVE PROTEIN: CPT

## 2018-02-23 PROCEDURE — 83880 ASSAY OF NATRIURETIC PEPTIDE: CPT

## 2018-02-23 PROCEDURE — 25000003 PHARM REV CODE 250: Performed by: NURSE PRACTITIONER

## 2018-02-23 PROCEDURE — 27000248 HC VAD-ADDITIONAL DAY

## 2018-02-23 PROCEDURE — 80048 BASIC METABOLIC PNL TOTAL CA: CPT

## 2018-02-23 RX ORDER — HYDRALAZINE HYDROCHLORIDE 50 MG/1
50 TABLET, FILM COATED ORAL EVERY 12 HOURS
Qty: 60 TABLET | Refills: 11 | Status: SHIPPED | OUTPATIENT
Start: 2018-02-23 | End: 2019-02-23

## 2018-02-23 RX ADMIN — HYDRALAZINE HYDROCHLORIDE 10 MG: 20 INJECTION INTRAMUSCULAR; INTRAVENOUS at 02:02

## 2018-02-23 RX ADMIN — DOFETILIDE 125 MCG: 0.12 CAPSULE ORAL at 09:02

## 2018-02-23 RX ADMIN — FERROUS SULFATE TAB EC 325 MG (65 MG FE EQUIVALENT) 325 MG: 325 (65 FE) TABLET DELAYED RESPONSE at 09:02

## 2018-02-23 RX ADMIN — LISINOPRIL 10 MG: 10 TABLET ORAL at 09:02

## 2018-02-23 RX ADMIN — MAGNESIUM OXIDE TAB 400 MG (241.3 MG ELEMENTAL MG) 400 MG: 400 (241.3 MG) TAB at 09:02

## 2018-02-23 RX ADMIN — ASPIRIN 81 MG CHEWABLE TABLET 81 MG: 81 TABLET CHEWABLE at 09:02

## 2018-02-23 RX ADMIN — DOXAZOSIN 8 MG: 4 TABLET ORAL at 09:02

## 2018-02-23 RX ADMIN — HYDRALAZINE HYDROCHLORIDE 10 MG: 20 INJECTION INTRAMUSCULAR; INTRAVENOUS at 05:02

## 2018-02-23 RX ADMIN — NIFEDIPINE 120 MG: 60 TABLET, FILM COATED, EXTENDED RELEASE ORAL at 09:02

## 2018-02-23 RX ADMIN — ALLOPURINOL 300 MG: 300 TABLET ORAL at 09:02

## 2018-02-23 RX ADMIN — Medication 1 CAPSULE: at 09:02

## 2018-02-23 RX ADMIN — ATORVASTATIN CALCIUM 10 MG: 10 TABLET, FILM COATED ORAL at 09:02

## 2018-02-23 RX ADMIN — MEROPENEM 1 G: 1 INJECTION, POWDER, FOR SOLUTION INTRAVENOUS at 09:02

## 2018-02-23 RX ADMIN — PANTOPRAZOLE SODIUM 40 MG: 40 TABLET, DELAYED RELEASE ORAL at 05:02

## 2018-02-23 RX ADMIN — BUPROPION HYDROCHLORIDE 450 MG: 150 TABLET, EXTENDED RELEASE ORAL at 09:02

## 2018-02-23 RX ADMIN — HYDRALAZINE HYDROCHLORIDE 50 MG: 50 TABLET ORAL at 09:02

## 2018-02-23 NOTE — ASSESSMENT & PLAN NOTE
- S/P HM III 10/16/16 as DT in [place]  - Current speed 5800  - TTE 1/25/18 LVEDD 6.5, LVEF 10-15%, diastolic dysfunction, RVE, severely depressed RV systolic function, PAS 20, ARTHUR intermittently and septum midline.   - INR theraputic.  Coumadin with goal INR 2.0-3.0.   - LDH stable  - previously on home Cefepime and Cipro. Continue Meropenem 1 g BID.  -BP very Labile. Pt pulsatile. Follow MAPS only. Hypertensive at night and normotensive during day. Continue Doxazosin 8mg daily, Hydralazine 50mg TID, Lisinopril 10mg BID, Nifedipine 120mg daily.

## 2018-02-23 NOTE — DISCHARGE SUMMARY
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 BiV ICD, HILLARY/BiPAP, HTN, CKD, HLP, gout and depression who presents with a 1 week history of worsening confusion and lethargy.     Patient was recently discharged due to similar sx but family reports that since Saturday, patient has also had associated spontaneous movements of his upper extremities. They also report decreased appetite and PO intake recently. Earlier today, he was found at the side of his bed with his head underneath the sidepost of his bed. Per his family, he has not had any worsening SOB or WHELAN. However, he is too weak to ambulate independently. He was recently started on Sinemet by neurology who saw him in late January during that admission due to presumed PD. During his last visit, he was switched from Cefepime to IV Meropenem based on ID recommendations. He denies chest pain, SOB, WHELAN, orthopnea, PND, LE edema. Denies any LVAD flow alarms. No episodes of reported lightheadedness when standing but family reports he has not been able to get up on his own over the last few days. +Dry cough."   * No surgery found *     Hospital Course: He was admitted and Sinemet dose initially decreased. His mentation cleared up and neurology was consulted. 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 His Sinemet and ropinirole were stopped. Pt's mentation seemed to improve daily. His BP was very Labile,. He tended to be hypertensive at night and low/normotensive during the day. BP regimen was adjusted to prevent hypotension during the day. Once medically stable, he was discharged home in good condition. He will f/u in VAD clinic in 2-3 weeks and with neurology(Dr Rico) within the next few weeks as well.     Consults         Status Ordering Provider     Inpatient consult to Cardiology  Once     Provider:  (Not yet assigned)    Completed ÓSCAR LANDERS     Inpatient consult to Neurology  Once     Provider:  (Not yet assigned)    Completed LALITA ROSALES     Inpatient consult to Registered Dietitian/Nutritionist  Once     Provider:  (Not yet assigned)    Completed LALITA ROSALES          Pending Diagnostic Studies:     Procedure Component Value Units Date/Time    Methylmalonic acid, serum [[id number]] Collected:  02/20/18 1615    Order Status:  Sent Lab Status:  In process Updated:  02/20/18 1630    Specimen:  Blood from Blood     Vitamin B1 [[id number]] Collected:  02/20/18 1845    Order Status:  Sent Lab Status:  In process Updated:  02/20/18 1855    Specimen:  Blood from Blood     Vitamin B2 [[id number]] Collected:  02/20/18 1845    Order Status:  Sent Lab Status:  In process Updated:  02/20/18 1856    Specimen:  Blood from Blood     Vitamin B6 [[id number]] Collected:  02/20/18 1845    Order Status:  Sent Lab Status:  In process Updated:  02/20/18 1855    Specimen:  Blood from Blood         Final Active Diagnoses:    Diagnosis Date Noted POA    PRINCIPAL PROBLEM:  Confusion [R41.0] 01/24/2018 Yes    LVAD (left ventricular assist device) present [Z95.811] 07/20/2017 Not Applicable    Essential hypertension [I10] 12/01/2017 Yes    Vivid dream [F51.8] 02/20/2018 Yes    Tremor [R25.1] 02/20/2018 Yes    Cognitive dysfunction [F09] 02/20/2018 Yes    VT  (ventricular tachycardia) [I47.2] 07/25/2017 Yes    Stage 3 chronic kidney disease [N18.3] 07/20/2017 Yes      Problems Resolved During this Admission:    Diagnosis Date Noted Date Resolved POA      Discharged Condition: fair    Follow Up:  Follow-up Information     Alyssa Rico MD In 4 weeks.    Specialty:  Neurology  Why:  follow-up tremor and parkinsonian signs  Contact information:  Oriana Nieto  [facility] [id number] [phone]                 Patient Instructions:   No discharge procedures on file.  Medications:  Reconciled Home Medications:   Current Discharge Medication List      CONTINUE these medications which have CHANGED    Details   hydrALAZINE (APRESOLINE) 50 MG tablet Take 1 tablet (50 mg total) by mouth every 12 (twelve) hours.  Qty: 60 tablet, Refills: 11         CONTINUE these medications which have NOT CHANGED    Details   allopurinol (ZYLOPRIM) 300 MG tablet Take 1 tablet (300 mg total) by mouth once daily.  Qty: 90 tablet, Refills: 4      aspirin 81 MG Chew Take 81 mg by mouth once daily.      atorvastatin (LIPITOR) 10 MG tablet Take 10 mg by mouth once daily.      buPROPion 450 mg Tb24 Take 450 mg by mouth once daily.  Qty: 30 tablet, Refills: 11      calcium-vitamin D tablet 600 mg-200 units Take 1 tablet by mouth once daily.      docusate sodium (COLACE) 100 MG capsule Take 100 mg by mouth daily as needed for Constipation.      dofetilide (TIKOSYN) 125 MCG Cap Take 1 capsule (125 mcg total) by mouth every 12 (twelve) hours.  Qty: 60 capsule, Refills: 11      doxazosin (CARDURA) 8 MG Tab Take 1 tablet (8 mg total) by mouth once daily.  Qty: 30 tablet, Refills: 11      ferrous sulfate 324 mg (65 mg iron) TbEC Take 1 tablet (324 mg total) by mouth once daily.  Qty: 30 tablet, Refills: 11      folic acid (FOLVITE) 1 MG tablet Take 1 mg by mouth once daily.      Lactobacillus rhamnosus GG (CULTURELLE) 10 billion cell capsule Take 1 capsule by mouth once daily.      lisinopril 10 MG  tablet Take 1 tablet (10 mg total) by mouth 2 (two) times daily.  Qty: 180 tablet, Refills: 3      magnesium oxide (MAG-OX) 400 mg tablet Take 1 tablet (400 mg total) by mouth 2 (two) times daily.  Qty: 60 tablet, Refills: 11      meropenem (MERREM) 1 gram injection Inject 1 g into the vein every 12 (twelve) hours.      multivitamin capsule Take 1 capsule by mouth once daily.      NIFEdipine (PROCARDIA-XL) 60 MG (OSM) 24 hr tablet Take 2 tablets (120 mg total) by mouth once daily.  Qty: 60 tablet, Refills: 11      pantoprazole (PROTONIX) 40 MG tablet Take 1 tablet (40 mg total) by mouth 2 (two) times daily before meals.  Qty: 60 tablet, Refills: 11      warfarin (COUMADIN) 4 MG tablet Take 4 mg orally daily on Tue, Thu and 6 mg orally daily all other days other days as directed by coumadin clinic  Refills: 11         STOP taking these medications       carbidopa-levodopa  mg (SINEMET)  mg per tablet Comments:   Reason for Stopping:         isosorbide dinitrate (ISORDIL) 40 MG Tab Comments:   Reason for Stopping:               Jim Morejon NP  Heart Transplant  [facility]

## 2018-02-23 NOTE — PLAN OF CARE
Problem: Patient Care Overview  Goal: Plan of Care Review  Outcome: Ongoing (interventions implemented as appropriate)  Plan of care discussed with pt. PT oriented x 4. 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   LVAD working properly and sounds smooth. Scheduled meds given for MAP 85. LVAD dressing remains CDI.  VSS & NADN. Pt denies CP, SOB, or pain/discomfort at this time.Pt free of injuries this shift. Bed alarm on. Call light within reach.  All questions addressed.  Will continue to monitor.

## 2018-02-23 NOTE — PROGRESS NOTES
02/23/18 0511   Vital Signs   BP (!) 126/103   MAP (mmHg) 112   BP Location Left arm   Patient Position Lying     Recheck in an hr. Given extra dose of 10mg IV hydralazine per Dr. Yeh verbal order.

## 2018-02-23 NOTE — PHYSICIAN QUERY
PT Name: Kev Vasquez  MR #: [id number]     Physician Query Form - Documentation Clarification      CDS/: Julieta Morejon RN, CCDS              Contact information: [email or web address]    This form is a permanent document in the medical record.     Query Date: February 23, 2018    By submitting this query, we are merely seeking further clarification of documentation. Please utilize your independent clinical judgment when addressing the question(s) below.    The Medical record reflects the following:    Supporting Clinical Findings Location in Medical Record    presents with increased confusion over the past week.     presents with a 1 week history of worsening confusion and lethargy    Confusion-   DDx: infectious vs. polypharmacy vs. Dementia vs. Delirium     Confusion- recurring problem    Delirium- recurring problem    Worsening delirium and debility, per caregiver, since last admission.  Delirium with asterixis  Levodopa can worsen delirium.    Ldopa from Sinemet trial that was started during Jan admission possibly worsened delirium vs. infectious/metabolic process. 2/19 ed note      2/19 h/p      2/19 h/p        2/20, 2/22 , 2/23 prog note     2/21-    2/20 neuro note          2/21 neuro note     The overall degree of disorganization in this study is suggestive of a   mild encephalopathy,       2/21 eeg, 2/22, 2/23 prog note                                                                            Doctor, Please specify diagnosis or diagnoses associated with above clinical findings.  Please further specify etiology of confusion/delirium.    Provider Use Only      (  x  )  Delirium 2/2 Metabolic Encephalopathy    (  x  )  Delirium 2/2 Sinemet    (    )  Delirium 2/2 general medical condition     (    )  Delirium 2/2 other, specify_________________                                                                                                                       [  ] Clinically undetermined

## 2018-02-23 NOTE — PROGRESS NOTES
[facility]  Heart Transplant  Progress Note    Patient Name: Kev Vasquez  MRN: [id number]  Admission Date: 2/19/2018  Hospital Length of Stay: 4 days  Attending Physician: Carlito Santana MD  Primary Care Provider: Mega Collins MD  Principal Problem:Confusion    Subjective:     Interval History: No complaints this am. Didn't sleep too well.     Continuous Infusions:  Scheduled Meds:   allopurinol  300 mg Oral Daily    aspirin  81 mg Oral Daily    atorvastatin  10 mg Oral Daily    buPROPion  450 mg Oral Daily    dofetilide  125 mcg Oral Q12H    doxazosin  8 mg Oral Daily    ferrous sulfate  325 mg Oral Daily    hydrALAZINE  50 mg Oral Q12H    Lactobacillus rhamnosus GG  1 capsule Oral Daily    lisinopril  10 mg Oral BID    magnesium oxide  400 mg Oral BID    meropenem  1 g Intravenous Q12H    NIFEdipine  120 mg Oral Daily    pantoprazole  40 mg Oral BID AC    warfarin  4 mg Oral Daily     PRN Meds:acetaminophen, docusate sodium, hydrALAZINE    Review of patient's allergies indicates:   Allergen Reactions    Aldactone [spironolactone]       persistent hyperkalemia.    Sulfa (sulfonamide antibiotics)      Objective:     Vital Signs (Most Recent):  Temp: 98.1 °F (36.7 °C) (02/23/18 0753)  Pulse: 76 (02/23/18 0753)  Resp: 17 (02/23/18 0753)  BP: (!) 130/0 (02/23/18 0755)  SpO2: 96 % (02/23/18 0753) Vital Signs (24h Range):  Temp:  [97.2 °F (36.2 °C)-98.2 °F (36.8 °C)] 98.1 °F (36.7 °C)  Pulse:  [64-78] 76  Resp:  [17-20] 17  SpO2:  [92 %-97 %] 96 %  BP: ()/(0-103) 130/0     Patient Vitals for the past 72 hrs (Last 3 readings):   Weight   02/22/18 0711 81.4 kg (179 lb 7.3 oz)   02/21/18 0700 79.5 kg (175 lb 4.3 oz)     Body mass index is 25.03 kg/m².      Intake/Output Summary (Last 24 hours) at 02/23/18 0806  Last data filed at 02/23/18 0400   Gross per 24 hour   Intake             1700 ml   Output                0 ml   Net             1700 ml        Telemetry:  NSR    Physical Exam   Constitutional: He is oriented to person, place, and time. He appears well-developed and well-nourished.   HENT:   Head: Normocephalic.   Eyes: Pupils are equal, round, and reactive to light.   Neck: Normal range of motion. Neck supple.   Cardiovascular: Normal rate and regular rhythm.    VAD hum   Pulmonary/Chest: Effort normal and breath sounds normal.   Abdominal: Soft. Bowel sounds are normal.   Musculoskeletal: Normal range of motion.   Neurological: He is alert and oriented to person, place, and time.   Skin: Skin is warm and dry.   Psychiatric: He has a normal mood and affect. His behavior is normal.   Nursing note and vitals reviewed.    Significant Labs:  CBC:    Recent Labs  Lab 02/21/18  0612 02/22/18  0552 02/23/18  0257   WBC 5.98 5.91 5.29   RBC 3.15* 3.07* 3.05*   HGB 9.1* 9.1* 9.0*   HCT 28.3* 27.8* 27.6*   * 132* 126*   MCV 90 91 91   MCH 28.9 29.6 29.5   MCHC 32.2 32.7 32.6     BNP:    Recent Labs  Lab 02/19/18  1410 02/21/18  0612 02/23/18  0257   * 490* 861*     CMP:    Recent Labs  Lab 02/19/18  1410  02/21/18  0612 02/22/18  0552 02/23/18  0257   GLU 96  < > 77 73 96   CALCIUM 10.2  < > 9.6 9.5 9.1   ALBUMIN 3.0*  --  2.8*  --  2.7*   PROT 6.5  --  6.2  --  6.0   *  < > 147* 146* 145   K 3.8  < > 3.6 3.7 3.5   CO2 30*  < > 29 28 27   *  < > 112* 110 109   BUN 31*  < > 34* 29* 26*   CREATININE 2.0*  < > 2.3* 1.8* 1.7*   ALKPHOS 105  --  107  --  112   ALT <5*  --  5*  --  22   AST 27  --  24  --  27   BILITOT 0.4  --  0.4  --  0.3   < > = values in this interval not displayed.   Coagulation:     Recent Labs  Lab 02/19/18  1410  02/21/18  0612 02/22/18  0552 02/23/18  0257   INR 2.1*  < > 2.4* 2.6* 2.8*   APTT 30.8  --   --   --   --    < > = values in this interval not displayed.  LDH:    Recent Labs  Lab 02/21/18  0612 02/22/18  0552 02/23/18  0257    193 194     Microbiology:  Microbiology Results (last 7 days)     Procedure Component  "Value Units Date/Time    Blood culture [[id number]] Collected:  02/19/18 2001    Order Status:  Completed Specimen:  Blood from Peripheral, Forearm, Left Updated:  02/22/18 2212     Blood Culture, Routine No Growth to date     Blood Culture, Routine No Growth to date     Blood Culture, Routine No Growth to date     Blood Culture, Routine No Growth to date    Blood culture [[id number]] Collected:  02/19/18 2001    Order Status:  Completed Specimen:  Blood from Peripheral, Hand, Left Updated:  02/22/18 2212     Blood Culture, Routine No Growth to date     Blood Culture, Routine No Growth to date     Blood Culture, Routine No Growth to date     Blood Culture, Routine No Growth to date    Blood culture [[id number]]     Order Status:  Canceled Specimen:  Blood     Blood culture [[id number]]     Order Status:  Canceled Specimen:  Blood           I have reviewed all pertinent labs within the past 24 hours.    Estimated Creatinine Clearance: 40 mL/min (A) (based on SCr of 1.7 mg/dL (H)).    Diagnostic Results:  I have reviewed all pertinent imaging results/findings within the past 24 hours.    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 BiV ICD, HILLARY/BiPAP, HTN, CKD, HLP, gout and depression who presents with a 1 week history of worsening confusion and lethargy.     Patient was recently discharged due to similar sx but family reports that since Saturday, patient has also had associated spontaneous movements of his upper extremities. They also report decreased appetite and PO intake recently. Earlier today, he was found at the side of his bed with his head underneath the sidepost of his bed. Per his family, he has not had any worsening SOB or WHELAN. However, he is too weak to ambulate independently. He was recently started on Sinemet by " "neurology who saw him in late January during that admission due to presumed PD. During his last visit, he was switched from Cefepime to IV Meropenem based on ID recommendations.      He denies chest pain, SOB, WHELAN, orthopnea, PND, LE edema. Denies any LVAD flow alarms. No episodes of reported lightheadedness when standing but family reports he has not been able to get up on his own over the last few days. +Dry cough."     * Confusion    - Recurring problem. LP done 1/30/18 with removal of only 13cc; opening pressure 21, closing pressure 15. His post procedure gait was significantly worse. Discussed with Neuro and NSGY. Per NSGY, no immediate need for cisternogram, shunt, etc, given failed improvement in gait.   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  - EEG  mildly abnormal during wakefulness, drowsiness and sleep. The overall degree of disorganization in this study is suggestive of a mild encephalopathy, nonspecific to the cause.  There is no evidence of an   epileptic process on this recording.  No seizures were recorded during this study.  - Will f/u with neuro at DC.         LVAD (left ventricular assist device) present    - S/P HM III 10/16/16 as DT in [place]  - Current speed 5800  - TTE 1/25/18 LVEDD 6.5, LVEF 10-15%, diastolic dysfunction, RVE, severely depressed RV systolic function, PAS 20, ARTHUR intermittently and septum midline.   - INR theraputic.  Coumadin with goal INR 2.0-3.0.   - LDH stable  - previously on home Cefepime and Cipro. Continue Meropenem 1 g BID.  -BP very Labile. Pt pulsatile. Follow MAPS only. Hypertensive at night and normotensive during day. Continue Doxazosin 8mg daily, Hydralazine 50mg TID, Lisinopril 10mg BID, Nifedipine 120mg daily.         Stage 3 chronic kidney " disease    - Will monitor.  (baseline looks ~ 1.6-1.8)            Jim Morejon NP  Heart Transplant  [facility]

## 2018-02-23 NOTE — PROGRESS NOTES
Mr. Kev Vasquez is being discharged today following admission for evaluation of confusion and lethargy. His pMH is signficiant for HM3 lvad, pseudomonas driveline infection, VT, HTN, CKD, and HL.     During his hospital stay he was evaluated by neurology. Sinemet and ropinirole were discontinued. His inr goal remains 2-3 with aspirin 81mg orally daily. His inr today is 2.8 following doses of coumadin 4mg orally daily. He was instructed to resume his previous home dose at discharge of 4mg tues/thur and 6mg on other days.     Follow up requested with coumadin clinic for Monday. Thank you.

## 2018-02-23 NOTE — PROGRESS NOTES
02/23/18 0249   Vital Signs   BP (!) 122/97   MAP (mmHg) 106   BP Location Left arm   BP Method Automatic   Patient Position Sitting    PRN IV hydralazine given for . WTCM

## 2018-02-23 NOTE — SUBJECTIVE & OBJECTIVE
Interval History: No complaints this am. Didn't sleep too well.     Continuous Infusions:  Scheduled Meds:   allopurinol  300 mg Oral Daily    aspirin  81 mg Oral Daily    atorvastatin  10 mg Oral Daily    buPROPion  450 mg Oral Daily    dofetilide  125 mcg Oral Q12H    doxazosin  8 mg Oral Daily    ferrous sulfate  325 mg Oral Daily    hydrALAZINE  50 mg Oral Q12H    Lactobacillus rhamnosus GG  1 capsule Oral Daily    lisinopril  10 mg Oral BID    magnesium oxide  400 mg Oral BID    meropenem  1 g Intravenous Q12H    NIFEdipine  120 mg Oral Daily    pantoprazole  40 mg Oral BID AC    warfarin  4 mg Oral Daily     PRN Meds:acetaminophen, docusate sodium, hydrALAZINE    Review of patient's allergies indicates:   Allergen Reactions    Aldactone [spironolactone]       persistent hyperkalemia.    Sulfa (sulfonamide antibiotics)      Objective:     Vital Signs (Most Recent):  Temp: 98.1 °F (36.7 °C) (02/23/18 0753)  Pulse: 76 (02/23/18 0753)  Resp: 17 (02/23/18 0753)  BP: (!) 130/0 (02/23/18 0755)  SpO2: 96 % (02/23/18 0753) Vital Signs (24h Range):  Temp:  [97.2 °F (36.2 °C)-98.2 °F (36.8 °C)] 98.1 °F (36.7 °C)  Pulse:  [64-78] 76  Resp:  [17-20] 17  SpO2:  [92 %-97 %] 96 %  BP: ()/(0-103) 130/0     Patient Vitals for the past 72 hrs (Last 3 readings):   Weight   02/22/18 0711 81.4 kg (179 lb 7.3 oz)   02/21/18 0700 79.5 kg (175 lb 4.3 oz)     Body mass index is 25.03 kg/m².      Intake/Output Summary (Last 24 hours) at 02/23/18 0806  Last data filed at 02/23/18 0400   Gross per 24 hour   Intake             1700 ml   Output                0 ml   Net             1700 ml        Telemetry: NSR    Physical Exam   Constitutional: He is oriented to person, place, and time. He appears well-developed and well-nourished.   HENT:   Head: Normocephalic.   Eyes: Pupils are equal, round, and reactive to light.   Neck: Normal range of motion. Neck supple.   Cardiovascular: Normal rate and regular rhythm.    VAD  hum   Pulmonary/Chest: Effort normal and breath sounds normal.   Abdominal: Soft. Bowel sounds are normal.   Musculoskeletal: Normal range of motion.   Neurological: He is alert and oriented to person, place, and time.   Skin: Skin is warm and dry.   Psychiatric: He has a normal mood and affect. His behavior is normal.   Nursing note and vitals reviewed.    Significant Labs:  CBC:    Recent Labs  Lab 02/21/18 0612 02/22/18 0552 02/23/18 0257   WBC 5.98 5.91 5.29   RBC 3.15* 3.07* 3.05*   HGB 9.1* 9.1* 9.0*   HCT 28.3* 27.8* 27.6*   * 132* 126*   MCV 90 91 91   MCH 28.9 29.6 29.5   MCHC 32.2 32.7 32.6     BNP:    Recent Labs  Lab 02/19/18 1410 02/21/18 0612 02/23/18  0257   * 490* 861*     CMP:    Recent Labs  Lab 02/19/18 1410 02/21/18 0612 02/22/18 0552 02/23/18  0257   GLU 96  < > 77 73 96   CALCIUM 10.2  < > 9.6 9.5 9.1   ALBUMIN 3.0*  --  2.8*  --  2.7*   PROT 6.5  --  6.2  --  6.0   *  < > 147* 146* 145   K 3.8  < > 3.6 3.7 3.5   CO2 30*  < > 29 28 27   *  < > 112* 110 109   BUN 31*  < > 34* 29* 26*   CREATININE 2.0*  < > 2.3* 1.8* 1.7*   ALKPHOS 105  --  107  --  112   ALT <5*  --  5*  --  22   AST 27  --  24  --  27   BILITOT 0.4  --  0.4  --  0.3   < > = values in this interval not displayed.   Coagulation:     Recent Labs  Lab 02/19/18 1410 02/21/18 0612 02/22/18 0552 02/23/18  0257   INR 2.1*  < > 2.4* 2.6* 2.8*   APTT 30.8  --   --   --   --    < > = values in this interval not displayed.  LDH:    Recent Labs  Lab 02/21/18  0612 02/22/18  0552 02/23/18  0257    193 194     Microbiology:  Microbiology Results (last 7 days)     Procedure Component Value Units Date/Time    Blood culture [[id number]] Collected:  02/19/18 2001    Order Status:  Completed Specimen:  Blood from Peripheral, Forearm, Left Updated:  02/22/18 2212     Blood Culture, Routine No Growth to date     Blood Culture, Routine No Growth to date     Blood Culture, Routine No Growth to date      Blood Culture, Routine No Growth to date    Blood culture [[id number]] Collected:  02/19/18 2001    Order Status:  Completed Specimen:  Blood from Peripheral, Hand, Left Updated:  02/22/18 2212     Blood Culture, Routine No Growth to date     Blood Culture, Routine No Growth to date     Blood Culture, Routine No Growth to date     Blood Culture, Routine No Growth to date    Blood culture [[id number]]     Order Status:  Canceled Specimen:  Blood     Blood culture [[id number]]     Order Status:  Canceled Specimen:  Blood           I have reviewed all pertinent labs within the past 24 hours.    Estimated Creatinine Clearance: 40 mL/min (A) (based on SCr of 1.7 mg/dL (H)).    Diagnostic Results:  I have reviewed all pertinent imaging results/findings within the past 24 hours.

## 2018-02-23 NOTE — H&P (VIEW-ONLY)
"[facility]  Gastroenterology  Consult Note    Patient Name: Kev Vasquez  MRN: [id number]  Admission Date: 1/24/2018  Hospital Length of Stay: 1 days  Code Status: Full Code   Attending Provider: Anni Henderson MD   Consulting Provider: Brian Michaels MD  Primary Care Physician: Mega Collins MD  Principal Problem:Vomiting    Inpatient consult to Gastroenterology  Consult performed by: BRIAN MICHAELS  Consult ordered by: LALITA ROSALES        Subjective:     HPI:  77 year old male with a past medical history of ICM s/p LVAD10/16/16 in [place], SSS s/p [employer] 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 GI consulted for persistent emesis and concern for gastroparesis.     Per HPI its reported that for a couple of months patient has been having emesis when drinking water. When drinking water he would sneeze-->cough-->emesis. Then within the last couple of weeks he has been complaining of "vomiting undigested foods" either before or after meals. Now per patient this morning she states that he has no trouble moving food bolus from mouth to esophageal. However he feels that food becomes "stuck in stomach" not esophagus which causes him to bring it up. Does report reflux and weight in the last couple of months but no odynophagia. Drinks occasional alcohol but not a prior smoker.     Prior GI procedures:  ?EGD 3 years ago    Past Medical History:   Diagnosis Date    AICD (automatic cardioverter/defibrillator) present 2014    [employer] Balwinder    Chronic anticoagulation 7/21/2017    Chronic combined systolic and diastolic congestive heart failure 7/27/2015 11-16-17   1 - Moderate left ventricular enlargement.    2 - Severely depressed left ventricular systolic function (EF 15-20%).    3 - Impaired LV relaxation, normal LAP (grade 1 diastolic dysfunction).    4 - Biatrial enlargement.    5 - Right ventricle is upper limit of " normal in size with not well seen systolic function.    6 - Severe tricuspid regurgitation.    7 - Increased central venous pressure.    8 - The estimated PA systolic pressure is 40 mmHg.    9 - Heartmate III LVAD; speed 5800.     Complication involving left ventricular assist device (LVAD) 7/29/2017    Coronary artery disease involving native coronary artery of native heart without angina pectoris 7/27/2015    Gait instability     Hypertensive urgency, malignant 11/15/2017    ICD (implantable cardioverter-defibrillator), biventricular, in situ 7/27/2015    Ischemic cardiomyopathy 7/20/2017    S/p HMIII     Kidney stones     LVAD (left ventricular assist device) present 7/20/2017    status post Heartmate III 10/16/16 LVAD    HILLARY on CPAP 7/27/2015    Peripheral neuropathy     Pulmonary hypertension due to left ventricular systolic dysfunction 7/29/2015    Restless leg syndrome 1992    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    Skin cancer     excision 2013    Skin yeast infection 8/31/2017    Stage 3 chronic kidney disease 7/20/2017    Syncope 7/20/2017    Ventricular tachycardia 7/25/2017       Past Surgical History:   Procedure Laterality Date    CARDIAC PACEMAKER PLACEMENT      pacemaker previously, then AICD in 2006. AICD [employer] Balwinder serial #[id number]    CORONARY ARTERY BYPASS GRAFT  1993    KNEE SURGERY Left 2001    complicated by infection, hardware had to be taken out and replaced    LEFT VENTRICULAR ASSIST DEVICE  10/19/2016       Review of patient's allergies indicates:   Allergen Reactions    Aldactone [spironolactone]       persistent hyperkalemia.    Sulfa (sulfonamide antibiotics)      Family History     Problem Relation (Age of Onset)    Cancer Daughter (50)    Diabetes Daughter    Heart disease Daughter        Social History Main Topics    Smoking status: Never Smoker    Smokeless tobacco: Never Used    Alcohol use No    Drug use: No    Sexual activity: Not on  file      Comment:      Review of Systems   Unable to perform ROS: Mental status change     Objective:     Vital Signs (Most Recent):  Temp: 98.1 °F (36.7 °C) (01/25/18 0512)  Pulse: 62 (01/25/18 0700)  Resp: 18 (01/25/18 0512)  BP: 117/89 (01/25/18 0420)  SpO2: (!) 92 % (01/25/18 0512) Vital Signs (24h Range):  Temp:  [97.5 °F (36.4 °C)-98.5 °F (36.9 °C)] 98.1 °F (36.7 °C)  Pulse:  [] 62  Resp:  [16-20] 18  SpO2:  [92 %-99 %] 92 %  BP: (104-147)/(0-102) 117/89     Weight: 85.7 kg (188 lb 15 oz) (01/24/18 2300)  Body mass index is 25.62 kg/m².      Intake/Output Summary (Last 24 hours) at 01/25/18 0805  Last data filed at 01/25/18 0500   Gross per 24 hour   Intake              360 ml   Output              200 ml   Net              160 ml       Lines/Drains/Airways     Peripherally Inserted Central Catheter Line                 PICC Double Lumen 12/28/17 1029 other (see comments) 27 days          Line                 VAD Left ventricular assist device HeartMate 3 -- days                Physical Exam   Constitutional: No distress.   HENT:   Head: Normocephalic and atraumatic.   Eyes: No scleral icterus.   Neck: Normal range of motion.   Cardiovascular: Normal rate and regular rhythm.    Pulmonary/Chest: Effort normal and breath sounds normal.   Abdominal: Soft. Bowel sounds are normal.   Neurological:   Alert and only oriented to person   Skin: He is not diaphoretic.       Significant Labs:  CBC:   Recent Labs  Lab 01/24/18  1536 01/25/18  0421   WBC 6.50 6.89  6.89   HGB 11.2* 11.6*  11.6*   HCT 34.4* 35.5*  35.5*   * 128*  128*     CMP:   Recent Labs  Lab 01/25/18  0421   GLU 89  89   CALCIUM 10.5  10.5   ALBUMIN 3.2*   PROT 6.9     140   K 4.3  4.3   CO2 27  27     107   BUN 31*  31*   CREATININE 2.0*  2.0*   ALKPHOS 71   ALT 15   AST 22   BILITOT 0.4     Coagulation:   Recent Labs  Lab 01/25/18  0421   INR 2.3*  2.3*       Significant Imaging:  Imaging results within  the past 24 hours have been reviewed.    Assessment/Plan:     * Vomiting    77 year old male with a past medical history of ICM s/p LVAD10/16/16 in [place], [facility], S/P [employer]. 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 GI consulted for persistent emesis and concern for gastroparesis. Upon speaking to patient we are able to obtain a limited history therefore our differential remains wide from possible gastroparesis with nausea/emesis vs regurgitation/remunination in the setting of GERD. Regardless of limited history we should start with an EGD and we will provide further recs after procedure.    Recommendations:  -CLD today and NPO after MN for EGD tomorrow  -Obtain KUB to rule out obstruction  -Start Protonix 40 mg PO BID            Thank you for your consult. I will follow-up with patient. Please contact us if you have any additional questions.    Eileen Bermudez M.D.  Gastroenterology Fellow, PGY-IV  Pager: [phone]  [facility]     electronic

## 2018-02-23 NOTE — PROCEDURES
VAD interrogation completed this AM in the event changes needed to be made. Will continue to monitor for further issues.     Pulsatile: Yes   VAD Sounds: HM3  Smooth  Problems / Issues / Alarms with VAD if any: Intermittent speed drops.  VAD Interrogation:  TXP LVAD INTERROGATIONS 2/23/2018 2/23/2018 2/23/2018 2/22/2018 2/22/2018 2/22/2018 2/22/2018   Type HeartMate3 HeartMate3 - HeartMate3 HeartMate3 HeartMate3 HeartMate3   Flow 3.6 4.0 4.3 5.0 5.1 5.6 4.0   Speed 5800 5800 5800 5800 5800 5800 5800   PI 6.1 6.1 5.3 3.3 3.5 2.0 6.4   Power (Mendez) 4.4 4.3 4.5 4.4 4.4 4.4 4.3   LSL - - - - - - 5400   Pulsatility Pulse Pulse Pulse Pulse - No Pulse -   }

## 2018-02-23 NOTE — PROGRESS NOTES
02/23/18 0400   Vital Signs   BP (!) 129/91   MAP (mmHg) 104    MD notified Dr. Coyle stating to check in an hr if it hasnt gone down to give extra 10mg IV hydralazine.

## 2018-02-23 NOTE — PLAN OF CARE
Problem: Pressure Ulcer Risk (Candelario Scale) (Adult,Obstetrics,Pediatric)  Goal: Skin Integrity  Patient will demonstrate the desired outcomes by discharge/transition of care.   Outcome: Ongoing (interventions implemented as appropriate)  PT AWAKE AND ALERT. ORIENTED TO SELF AND LOCATION. RESPONDS APPROPRIATELY. DOPPLER PRESSURES ELEVATED OVERNIGHT AND IN AM.NO PRN'S ADMIN. MIDDAY DOPPLER 54, HYDRALAZINE DISCONTINUED. POC DISCUSSED WITH CARE GIVER PT AND SPOUSE. VERBALIZE UNDERSTANDING. SLP EVAL COMPLETE. CAREGIVER ENCOURAGED TO USE THICKENER IN LIQUIDS. NO FALL RELATED INJURY. WILL NO VAD ISSUES. WILL CONTINUE TO MONITOR.

## 2018-02-23 NOTE — ASSESSMENT & PLAN NOTE
"- Recurring problem. LP done 1/30/18 with removal of only 13cc; opening pressure 21, closing pressure 15. His post procedure gait was significantly worse. Discussed with Neuro and NSGY. Per NSGY, no immediate need for cisternogram, shunt, etc, given failed improvement in gait.   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  - EEG  mildly abnormal during wakefulness, drowsiness and sleep. The overall degree of disorganization in this study is suggestive of a mild encephalopathy, nonspecific to the cause.  There is no evidence of an   epileptic process on this recording.  No seizures were recorded during this study.  - Will f/u with neuro at [place].   "

## 2018-02-23 NOTE — PROGRESS NOTES
D/C note:    SW to pt's room for d/c. Pt awake, alert, and oriented to self and place. Pt's sitter, Janett, aaox4, calm, and pleasant. Pt and caregiver report in agreement with plan to D/C home today with IV abx through [facility] and [place]. Lisandro with [facility] and Amelia with [facility] notified of D/C, both to access pt's records through epic. Pt's sitter and wife to provide transport. SW also called pt's dtr, Ena, to discuss D/C. Pt's dtr reports in agreement with plan to D/C home today with IV abx and [facility]. Pt's dtr reports no other needs at this time. SW providing psychosocial and counseling support, education, assistance, resources, and D/C planning as indicated. SW remains available.

## 2018-02-23 NOTE — NURSING
Discharged to home with home health. home antibiotics at bedside. Instructions re: activity, medication changes and f/u given to spouse .Verbalize understanding. Instructed to  [facility] [place] [facility] [place]. Verbalize understanding. Sitting at bedside awaiting escort.accompanied by care giver ad spouse. No distress noted.

## 2018-02-24 LAB
BACTERIA BLD CULT: NORMAL
BACTERIA BLD CULT: NORMAL

## 2018-02-26 ENCOUNTER — TELEPHONE (OUTPATIENT)
Dept: ADMINISTRATIVE | Facility: CLINIC | Age: 78
End: 2018-02-26

## 2018-02-26 LAB
METHYLMALONATE SERPL-SCNC: 0.75 UMOL/L
PYRIDOXAL SERPL-MCNC: 12 UG/L (ref 5–50)

## 2018-02-26 NOTE — PROGRESS NOTES
Per note: Mr. Kev Vasquez is being discharged today following admission for evaluation of confusion and lethargy. His pMH is signficiant for HM3 lvad, pseudomonas driveline infection, VT, HTN, CKD, and HL.       During his hospital stay he was evaluated by neurology. Sinemet and ropinirole were discontinued. His inr goal remains 2-3 with aspirin 81mg orally daily. His inr today is 2.8 following doses of coumadin 4mg orally daily. He was instructed to resume his previous home dose at discharge of 4mg tues/thur and 6mg on other days. Per Charis, INR 2/27. Daughter confirms dose. INR 2/27

## 2018-02-26 NOTE — PATIENT INSTRUCTIONS
Home health notification:      OCCUPATIONAL THERAPY AND PHYSICAL THERAPY EVALUATIONS TO BE COMPLETED ON 02/26/18.      Thanks,      Brianna Gillespie  [phone]

## 2018-02-26 NOTE — PT/OT/SLP DISCHARGE
Occupational Therapy Discharge Summary    Kev Vasquez  MRN: [id number]   Principal Problem: Confusion      Patient Discharged from acute Occupational Therapy on 2/23/18.  Please refer to prior OT note dated 2/22/18 for functional status.    Assessment:      Patient appropriate for care in another setting.    Objective:     GOALS:    Occupational Therapy Goals        Problem: Occupational Therapy Goal    Goal Priority Disciplines Outcome Interventions   Occupational Therapy Goal     OT, PT/OT Ongoing (interventions implemented as appropriate)    Description:  Goals to be met by: 7 days (3/1/18)     Patient will increase functional independence with ADLs by performing:    Grooming while EOB with Stand-by Assistance.  Toileting from toilet with Minimal Assistance for hygiene and clothing management.   Supine to sit with Supervision.  Toilet transfer to toilet with Contact Guard Assistance.  Pt will perform functional mobility household distance with CGA using AD as needed.                      Reasons for Discontinuation of Therapy Services  Transfer to alternate level of care.      Plan:     Patient Discharged to: Home with Home Health Service    SHAUNA Hanks  2/26/2018

## 2018-02-27 ENCOUNTER — PATIENT OUTREACH (OUTPATIENT)
Dept: ADMINISTRATIVE | Facility: CLINIC | Age: 78
End: 2018-02-27

## 2018-02-27 ENCOUNTER — LAB VISIT (OUTPATIENT)
Dept: LAB | Facility: HOSPITAL | Age: 78
End: 2018-02-27
Attending: INTERNAL MEDICINE
Payer: MEDICARE

## 2018-02-27 DIAGNOSIS — Z79.899 DRUG THERAPY: ICD-10-CM

## 2018-02-27 DIAGNOSIS — T82.7XXA INFECTION AND INFLAMMATORY REACTION DUE TO CARDIAC DEVICE, IMPLANT, AND GRAFT: Primary | ICD-10-CM

## 2018-02-27 DIAGNOSIS — R79.1 ABNORMAL INR: ICD-10-CM

## 2018-02-27 LAB
ALBUMIN SERPL BCP-MCNC: 2.8 G/DL
ALP SERPL-CCNC: 118 U/L
ALT SERPL W/O P-5'-P-CCNC: 26 U/L
ANION GAP SERPL CALC-SCNC: 6 MMOL/L
AST SERPL-CCNC: 34 U/L
BASOPHILS # BLD AUTO: 0.02 K/UL
BASOPHILS NFR BLD: 0.3 %
BILIRUB SERPL-MCNC: 0.4 MG/DL
BNP SERPL-MCNC: 763 PG/ML
BUN SERPL-MCNC: 24 MG/DL
CALCIUM SERPL-MCNC: 9.4 MG/DL
CHLORIDE SERPL-SCNC: 108 MMOL/L
CO2 SERPL-SCNC: 27 MMOL/L
CREAT SERPL-MCNC: 1.6 MG/DL
DIFFERENTIAL METHOD: ABNORMAL
EOSINOPHIL # BLD AUTO: 0.7 K/UL
EOSINOPHIL NFR BLD: 10.2 %
ERYTHROCYTE [DISTWIDTH] IN BLOOD BY AUTOMATED COUNT: 17.8 %
ERYTHROCYTE [SEDIMENTATION RATE] IN BLOOD BY WESTERGREN METHOD: 6 MM/HR
EST. GFR  (AFRICAN AMERICAN): 48 ML/MIN/1.73 M^2
EST. GFR  (NON AFRICAN AMERICAN): 41.5 ML/MIN/1.73 M^2
GLUCOSE SERPL-MCNC: 91 MG/DL
HCT VFR BLD AUTO: 26.3 %
HGB BLD-MCNC: 8.7 G/DL
IMM GRANULOCYTES # BLD AUTO: 0.04 K/UL
IMM GRANULOCYTES NFR BLD AUTO: 0.6 %
INR PPP: 3.4
LYMPHOCYTES # BLD AUTO: 1 K/UL
LYMPHOCYTES NFR BLD: 15.6 %
MCH RBC QN AUTO: 29.6 PG
MCHC RBC AUTO-ENTMCNC: 33.1 G/DL
MCV RBC AUTO: 90 FL
MONOCYTES # BLD AUTO: 0.6 K/UL
MONOCYTES NFR BLD: 8.5 %
NEUTROPHILS # BLD AUTO: 4.2 K/UL
NEUTROPHILS NFR BLD: 64.8 %
NRBC BLD-RTO: 0 /100 WBC
PLATELET # BLD AUTO: 150 K/UL
PMV BLD AUTO: 12 FL
POTASSIUM SERPL-SCNC: 4.3 MMOL/L
PROT SERPL-MCNC: 6 G/DL
PROTHROMBIN TIME: 33.2 SEC
RBC # BLD AUTO: 2.94 M/UL
SODIUM SERPL-SCNC: 141 MMOL/L
WBC # BLD AUTO: 6.47 K/UL

## 2018-02-27 PROCEDURE — 80053 COMPREHEN METABOLIC PANEL: CPT

## 2018-02-27 PROCEDURE — 83880 ASSAY OF NATRIURETIC PEPTIDE: CPT

## 2018-02-27 PROCEDURE — 85610 PROTHROMBIN TIME: CPT

## 2018-02-27 PROCEDURE — 85025 COMPLETE CBC W/AUTO DIFF WBC: CPT

## 2018-02-27 PROCEDURE — 85651 RBC SED RATE NONAUTOMATED: CPT

## 2018-02-27 NOTE — PROGRESS NOTES
C3 nurse attempted to contact patient. No answer. The following message was left for the patient to return the call:  Good morning  I am a nurse calling on behalf of [facility] from the [facility]ination [facility].  This is a Transitional Care Call for Kev Vasquez . When you have a moment please contact us at [phone] or [phone] Monday through Friday, between the hours of 8 am to 4 pm. We look forward to speaking with you. On behalf of [facility] have a nice day.    The patient has a scheduled HEARTTX appointment with Jesus To on 3\15\18 @ 1100.

## 2018-02-27 NOTE — PATIENT INSTRUCTIONS
Sepsis     To treat sepsis, antibiotics and fluids may by given through an intravenous (IV) line.     Sepsis happens when your body responds with widespread inflammation to a bad infection or bacteremia--the presence of bacteria in your bloodstream. Sepsis can be deadly. Blood pressure may drop and the lungs and kidneys may start to fail. Emergency care for sepsis is crucial.  Risk factors  Those most at risk for sepsis are:  · Infants or older adults  · People who have an illness that weakens their immune system, such as cancer, AIDS, or diabetes  · People being treated with chemotherapy medicines or radiation, which weakens the immune system  · People who have had a transplant  · People with a very severe infection such as pneumonia, meningitis, or a urinary tract infection  When to go to the emergency department (ED)  Sepsis is an emergency. Go to the nearest ED if you have a fever with any of these symptoms:  · Chills and shaking  · Rapid heartbeat and breathing  · Trouble breathing  · Severe nausea or uncontrolled vomiting  · Confusion, disorientation, drowsiness, or dizziness  · Decreased urination  · Severe pain, including in the back or joints   What to expect in the ED  · Blood and urine tests are done to look for bacteria. They also check for organ failure.  · Blood, urine, or sputum cultures may be taken. The samples are sent to a lab. They are placed in a special container. Any bacteria should grow in 24 hours.  · X-rays or other imaging tests may be done.  A person with sepsis will be admitted to the hospital and treated with antibiotics. Treatment may also include oxygen and intravenous fluids.  Date Last Reviewed: 10/1/2016  © 8005-4204 [employer]. [address] [place] [address], [place] [address]. All rights reserved. This information is not intended as a substitute for professional medical care. Always follow your healthcare professional's instructions.

## 2018-02-28 ENCOUNTER — ANTI-COAG VISIT (OUTPATIENT)
Dept: CARDIOLOGY | Facility: CLINIC | Age: 78
End: 2018-02-28

## 2018-02-28 ENCOUNTER — OFFICE VISIT (OUTPATIENT)
Dept: INFECTIOUS DISEASES | Facility: CLINIC | Age: 78
End: 2018-02-28
Payer: MEDICARE

## 2018-02-28 VITALS — HEIGHT: 72 IN | WEIGHT: 190 LBS | TEMPERATURE: 98 F | BODY MASS INDEX: 25.73 KG/M2

## 2018-02-28 DIAGNOSIS — Z79.01 CHRONIC ANTICOAGULATION: ICD-10-CM

## 2018-02-28 DIAGNOSIS — T82.9XXD LEFT VENTRICULAR ASSIST DEVICE (LVAD) COMPLICATION, SUBSEQUENT ENCOUNTER: ICD-10-CM

## 2018-02-28 DIAGNOSIS — N18.30 STAGE 3 CHRONIC KIDNEY DISEASE: ICD-10-CM

## 2018-02-28 DIAGNOSIS — I25.5 ISCHEMIC CARDIOMYOPATHY: ICD-10-CM

## 2018-02-28 DIAGNOSIS — A49.8 PSEUDOMONAS AERUGINOSA INFECTION: Primary | ICD-10-CM

## 2018-02-28 DIAGNOSIS — Z95.811 LVAD (LEFT VENTRICULAR ASSIST DEVICE) PRESENT: ICD-10-CM

## 2018-02-28 PROCEDURE — 99213 OFFICE O/P EST LOW 20 MIN: CPT | Mod: PBBFAC | Performed by: INTERNAL MEDICINE

## 2018-02-28 PROCEDURE — 99215 OFFICE O/P EST HI 40 MIN: CPT | Mod: S$PBB,,, | Performed by: INTERNAL MEDICINE

## 2018-02-28 PROCEDURE — 99999 PR PBB SHADOW E&M-EST. PATIENT-LVL III: CPT | Mod: PBBFAC,,, | Performed by: INTERNAL MEDICINE

## 2018-02-28 NOTE — PROGRESS NOTES
Daughter Mitra  questioned and confirmed dose .  Pt Left eye sclera is red noticed 2/27.  Pt felt dizzy and has a cough on 2/25 and 2/26. Has been taking OTC delsym cough syrup and Coricidin.  Daughter stated pt reports he feels fine.  Pt skipped lunch on 2/26.

## 2018-03-01 ENCOUNTER — ANTI-COAG VISIT (OUTPATIENT)
Dept: CARDIOLOGY | Facility: CLINIC | Age: 78
End: 2018-03-01

## 2018-03-01 ENCOUNTER — LAB VISIT (OUTPATIENT)
Dept: LAB | Facility: HOSPITAL | Age: 78
End: 2018-03-01
Attending: INTERNAL MEDICINE
Payer: MEDICARE

## 2018-03-01 DIAGNOSIS — Z95.811 LVAD (LEFT VENTRICULAR ASSIST DEVICE) PRESENT: ICD-10-CM

## 2018-03-01 DIAGNOSIS — Z79.01 CHRONIC ANTICOAGULATION: ICD-10-CM

## 2018-03-01 DIAGNOSIS — Z79.01 ANTICOAGULATED: Primary | ICD-10-CM

## 2018-03-01 LAB
INR PPP: 2.5
PROTHROMBIN TIME: 27.3 SEC

## 2018-03-01 PROCEDURE — 36415 COLL VENOUS BLD VENIPUNCTURE: CPT | Mod: PO

## 2018-03-01 PROCEDURE — 85610 PROTHROMBIN TIME: CPT | Mod: PO

## 2018-03-01 NOTE — PROGRESS NOTES
Subjective:      Chief Complaint: Pseudomonas DLES infection    History of Present Illness  77-year-old male with history of ICM s/p HM III 10/16/16 as [place], VT on Tikosyn, SSS s/p BiV ICD, CKD stage III, presents for follow-up of DLES inefction. Patient has been on several courses of antibiotics including cefepime and ciprofloxacin. Recently, Pseudomonas isolate found to be resistant to cefepime - he was started on meropenem 2/9/2018. Patient was recently admitted for lethargy and confusion - patient's parkinson disease medications were discontinued with improvement in his mentation. Patient feeling okay - denied any fevers, chills, sweats. No nausea, abdominal pain, diarrhea on meropenem. No pain, redness, swelling around picc line.    Review of Systems   Constitutional: Negative for chills, diaphoresis, fever and weight loss.   HENT: Negative for congestion, sinus pain and sore throat.    Eyes: Negative for photophobia and pain.   Respiratory: Positive for cough and shortness of breath. Negative for sputum production.    Cardiovascular: Negative for chest pain and leg swelling.   Gastrointestinal: Positive for vomiting. Negative for abdominal pain, diarrhea and nausea.   Genitourinary: Negative for dysuria and hematuria.   Musculoskeletal: Negative for joint pain.   Skin: Negative for rash.   Neurological: Negative for focal weakness and headaches.   Psychiatric/Behavioral: Positive for depression. The patient is not nervous/anxious.        Objective:       Physical Exam   Constitutional: He is oriented to person, place, and time. No distress.   Elderly   HENT:   Head: Normocephalic and atraumatic.   Eyes: Conjunctivae and EOM are normal.   Neck: Normal range of motion. Neck supple.   Cardiovascular: Normal rate.    Pulmonary/Chest: Effort normal. No respiratory distress.   Abdominal: Soft. He exhibits no distension.   DLES with surrounding redness, yellow granulation tissue   Musculoskeletal: Normal range of  "motion. He exhibits no edema.   Neurological: He is alert and oriented to person, place, and time.   Skin: Skin is warm and dry. No rash noted. He is not diaphoretic. No erythema.   Psychiatric: Mood, memory, affect and judgment normal.   Vitals reviewed.            Pseudomonas aeruginosa    CULTURE, AEROBIC  (SPECIFY SOURCE)    Amikacin <=16 "><=16  Sensitive    Cefepime >16  Resistant    Ciprofloxacin >2  Resistant    Gentamicin <=4 "><=4  Sensitive    Meropenem <=4 "><=4  Sensitive    Piperacillin/Tazo 64  Sensitive    Tobramycin <=4 "><=4  Sensitive           Assessment:   77-year-old male  - ICM s/p LVAD  - Pseudomonas DLES infection, improving on meropenem  - Stage III CKD    Plan:   - Continue meropenem IV for now, plan for 4 week course   - qweekly CBC with diff, CMP  - Follow-up with Dr. Muhammad in 2 weeks to assess ultimate duration of therapy    Anahi Terrazas MD MPH  [facility] [place]    "

## 2018-03-02 LAB — VIT B2 SERPL-MCNC: 8 MCG/L (ref 1–19)

## 2018-03-05 ENCOUNTER — TELEPHONE (OUTPATIENT)
Dept: PHARMACY | Facility: CLINIC | Age: 78
End: 2018-03-05

## 2018-03-05 ENCOUNTER — TELEPHONE (OUTPATIENT)
Dept: TRANSPLANT | Facility: CLINIC | Age: 78
End: 2018-03-05

## 2018-03-05 ENCOUNTER — LAB VISIT (OUTPATIENT)
Dept: LAB | Facility: HOSPITAL | Age: 78
End: 2018-03-05
Attending: INTERNAL MEDICINE
Payer: MEDICARE

## 2018-03-05 DIAGNOSIS — T82.7XXA: ICD-10-CM

## 2018-03-05 DIAGNOSIS — Z95.811 LVAD (LEFT VENTRICULAR ASSIST DEVICE) PRESENT: Primary | ICD-10-CM

## 2018-03-05 PROCEDURE — 85610 PROTHROMBIN TIME: CPT | Mod: PO

## 2018-03-05 PROCEDURE — 80053 COMPREHEN METABOLIC PANEL: CPT | Mod: PO

## 2018-03-05 PROCEDURE — 85025 COMPLETE CBC W/AUTO DIFF WBC: CPT | Mod: PO

## 2018-03-05 PROCEDURE — 86140 C-REACTIVE PROTEIN: CPT | Mod: PO

## 2018-03-05 PROCEDURE — 36415 COLL VENOUS BLD VENIPUNCTURE: CPT | Mod: PO

## 2018-03-05 NOTE — TELEPHONE ENCOUNTER
[facility] to report that patient fell on his back on Saturday. He states there was a small bruise on his Thoracic area but no other issues and patient was able to complete their session and do well. PT reported pt feels fine with no issues.

## 2018-03-06 ENCOUNTER — ANTI-COAG VISIT (OUTPATIENT)
Dept: CARDIOLOGY | Facility: CLINIC | Age: 78
End: 2018-03-06

## 2018-03-06 DIAGNOSIS — Z79.01 CHRONIC ANTICOAGULATION: ICD-10-CM

## 2018-03-06 DIAGNOSIS — Z95.811 LVAD (LEFT VENTRICULAR ASSIST DEVICE) PRESENT: ICD-10-CM

## 2018-03-06 LAB
ALBUMIN SERPL BCP-MCNC: 3.5 G/DL
ALP SERPL-CCNC: 102 U/L
ALT SERPL W/O P-5'-P-CCNC: 34 U/L
ANION GAP SERPL CALC-SCNC: 12 MMOL/L
AST SERPL-CCNC: 32 U/L
BASOPHILS # BLD AUTO: 0.04 K/UL
BASOPHILS NFR BLD: 0.7 %
BILIRUB SERPL-MCNC: 0.2 MG/DL
BUN SERPL-MCNC: 25 MG/DL
CALCIUM SERPL-MCNC: 9.2 MG/DL
CHLORIDE SERPL-SCNC: 107 MMOL/L
CO2 SERPL-SCNC: 26 MMOL/L
CREAT SERPL-MCNC: 1.56 MG/DL
CRP SERPL-MCNC: 0.51 MG/DL
DIFFERENTIAL METHOD: ABNORMAL
EOSINOPHIL # BLD AUTO: 0.6 K/UL
EOSINOPHIL NFR BLD: 9.1 %
ERYTHROCYTE [DISTWIDTH] IN BLOOD BY AUTOMATED COUNT: 19.1 %
EST. GFR  (AFRICAN AMERICAN): 49.5 ML/MIN/1.73 M^2
EST. GFR  (NON AFRICAN AMERICAN): 42.8 ML/MIN/1.73 M^2
GLUCOSE SERPL-MCNC: 63 MG/DL
HCT VFR BLD AUTO: 28.4 %
HGB BLD-MCNC: 8.9 G/DL
INR PPP: 2.7
LYMPHOCYTES # BLD AUTO: 0.9 K/UL
LYMPHOCYTES NFR BLD: 15.5 %
MCH RBC QN AUTO: 30.5 PG
MCHC RBC AUTO-ENTMCNC: 31.3 G/DL
MCV RBC AUTO: 97 FL
MONOCYTES # BLD AUTO: 0.9 K/UL
MONOCYTES NFR BLD: 14.8 %
NEUTROPHILS # BLD AUTO: 3.6 K/UL
NEUTROPHILS NFR BLD: 59.9 %
PLATELET # BLD AUTO: 148 K/UL
PMV BLD AUTO: 12.6 FL
POTASSIUM SERPL-SCNC: 4.3 MMOL/L
PROT SERPL-MCNC: 6.3 G/DL
PROTHROMBIN TIME: 29.2 SEC
RBC # BLD AUTO: 2.92 M/UL
SODIUM SERPL-SCNC: 145 MMOL/L
WBC # BLD AUTO: 6.07 K/UL

## 2018-03-06 NOTE — PROGRESS NOTES
Contact Numbers    [facility] Main Line: [phone]    Triage Nurse Line: [phone]      Please call the [facility] Nurse Triage line if you experience a temperature greater than or equal to 100.5, shaking chills, have uncontrolled nausea, vomiting and/or diarrhea, dizziness, shortness of breath, bleeding not relieved with pressure, or if you have any other questions or concerns.     If it is after hours, weekends, or holidays, please call the main hospital  at  [phone] and ask to speak to the adult Oncology doctor on call.     If you are having any concerning symptoms or wish to speak to a provider before your next infusion visit, please call your care coordinator or triage them so we can adequately serve you.      If you need to refill your narcotic prescription or other medication, please call triage before your infusion appointment.        March 2018 Sunday Monday Tuesday Wednesday Thursday Friday Saturday                       1     2     3       4     5     6     [facility]    8:15 AM   (60 min.)   [facility]   [facility] Lab Draw     [facility] RETURN    8:35 AM   (90 min.)   Jossie Sparrow PA   [facility] ONC INFUSION 60   10:00 AM   (60 min.)    ONCOLOGY INFUSION   [facility] 7     8     9     10       11     12     13     14     15     16     17       18     19     20     21     22     23     24       25     26     ECH COMPLETE    9:00 AM   (60 min.)   UCECHCR2   [facility] Echo     CT CHEST/ABDOMEN/PELVIS W   10:05 AM   (20 min.)   UCCT2   [facility] Imaging Center CT 27     [facility] LAB DRAW    8:00 AM   (15 min.)    [facility] LAB DRAW   [facility] Lab Draw     P RETURN    8:15 AM   (30 min.)   Lisandro Aguiar MD   [facility] ONC INFUSION 60    9:30 AM   (60 min.)    ONCOLOGY INFUSION   [facility] 28     29     30     31         [facility]  Heart Transplant  Progress Note    Patient Name: Kev Vasquez  MRN: [id number]  Admission Date: 1/24/2018  Hospital Length of Stay: 13 days  Attending Physician: Karen Valladares MD  Primary Care Provider: Mega Collins MD  Principal Problem:Vomiting    Subjective:     Interval History: patient oriented this morning, no issues overnight. Awaiting final recs for DLES infection cultures from ID    Continuous Infusions:  Scheduled Meds:   allopurinol  300 mg Oral Daily    amLODIPine  10 mg Oral Daily    aspirin  81 mg Oral Daily    atorvastatin  10 mg Oral Daily    buPROPion  450 mg Oral Daily    carbidopa-levodopa  mg  1 tablet Oral Q4H While awake    ceFEPime (MAXIPIME) IVPB  2 g Intravenous Q12H    docusate sodium  100 mg Oral BID    dofetilide  250 mcg Oral Q12H    doxazosin  8 mg Oral Daily    folic acid  1 mg Oral Daily    hydrALAZINE  100 mg Oral Q8H    isosorbide dinitrate  40 mg Oral TID    Lactobacillus rhamnosus GG  1 capsule Oral Daily    lisinopril  20 mg Oral BID    magnesium oxide  400 mg Oral BID    pantoprazole  40 mg Oral BID AC    polyethylene glycol  17 g Oral Daily    warfarin  6 mg Oral Daily     PRN Meds:acetaminophen, docusate sodium, omnipaque 300 iohexol, ondansetron, promethazine    Review of patient's allergies indicates:   Allergen Reactions    Aldactone [spironolactone]       persistent hyperkalemia.    Sulfa (sulfonamide antibiotics)      Objective:     Vital Signs (Most Recent):  Temp: 98.1 °F (36.7 °C) (02/06/18 0410)  Pulse: 69 (02/06/18 1100)  Resp: 18 (02/06/18 0410)  BP: (!) 80/0 (02/06/18 0500)  SpO2: (!) 93 % (02/06/18 0410) Vital Signs (24h Range):  Temp:  [97 °F (36.1 °C)-98.2 °F (36.8 °C)] 98.1 °F (36.7 °C)  Pulse:  [66-82] 69  Resp:  [16-18] 18  SpO2:  [93 %-95 %] 93 %  BP: ()/(0-75) 80/0     Patient Vitals for the past 72 hrs (Last 3 readings):   Weight   02/06/18 0700 81.6 kg (179 lb 14.3 oz)   02/05/18 0800          April 2018 Sunday Monday Tuesday Wednesday Thursday Friday Saturday   1     2     3     4     5     6     7       8     9     10     11     12     13     14       15     16     17     18     19     20     21       22     23     24     25     26     27     28       29     30                                          Recent Results (from the past 24 hour(s))   CBC with platelets differential    Collection Time: 03/06/18  8:22 AM   Result Value Ref Range    WBC 9.0 4.0 - 11.0 10e9/L    RBC Count 4.01 3.8 - 5.2 10e12/L    Hemoglobin 12.1 11.7 - 15.7 g/dL    Hematocrit 38.0 35.0 - 47.0 %    MCV 95 78 - 100 fl    MCH 30.2 26.5 - 33.0 pg    MCHC 31.8 31.5 - 36.5 g/dL    RDW 13.2 10.0 - 15.0 %    Platelet Count 186 150 - 450 10e9/L    Diff Method Automated Method     % Neutrophils 64.9 %    % Lymphocytes 25.7 %    % Monocytes 5.6 %    % Eosinophils 3.0 %    % Basophils 0.6 %    % Immature Granulocytes 0.2 %    Nucleated RBCs 0 0 /100    Absolute Neutrophil 5.8 1.6 - 8.3 10e9/L    Absolute Lymphocytes 2.3 0.8 - 5.3 10e9/L    Absolute Monocytes 0.5 0.0 - 1.3 10e9/L    Absolute Eosinophils 0.3 0.0 - 0.7 10e9/L    Absolute Basophils 0.1 0.0 - 0.2 10e9/L    Abs Immature Granulocytes 0.0 0 - 0.4 10e9/L    Absolute Nucleated RBC 0.0    Comprehensive metabolic panel    Collection Time: 03/06/18  8:22 AM   Result Value Ref Range    Sodium 141 133 - 144 mmol/L    Potassium 3.9 3.4 - 5.3 mmol/L    Chloride 107 94 - 109 mmol/L    Carbon Dioxide 28 20 - 32 mmol/L    Anion Gap 6 3 - 14 mmol/L    Glucose 98 70 - 99 mg/dL    Urea Nitrogen 12 7 - 30 mg/dL    Creatinine 0.76 0.52 - 1.04 mg/dL    GFR Estimate 77 >60 mL/min/1.7m2    GFR Estimate If Black >90 >60 mL/min/1.7m2    Calcium 8.7 8.5 - 10.1 mg/dL    Bilirubin Total 0.2 0.2 - 1.3 mg/dL    Albumin 3.3 (L) 3.4 - 5.0 g/dL    Protein Total 7.4 6.8 - 8.8 g/dL    Alkaline Phosphatase 38 (L) 40 - 150 U/L    ALT 15 0 - 50 U/L    AST 14 0 - 45 U/L   CA 27.29 Breast tumor marker     80.5 kg (177 lb 7.5 oz)     Body mass index is 25.81 kg/m².      Intake/Output Summary (Last 24 hours) at 02/06/18 1147  Last data filed at 02/05/18 1400   Gross per 24 hour   Intake              720 ml   Output                3 ml   Net              717 ml       Hemodynamic Parameters:         Physical Exam   Constitutional: He appears well-developed and well-nourished.   HENT:   Head: Normocephalic and atraumatic.   Eyes: Conjunctivae are normal. Pupils are equal, round, and reactive to light.   Neck: Normal range of motion. Neck supple. No JVD present.   Cardiovascular: Normal rate and regular rhythm.    Smooth VAD hum. Intermittently pulsatile   Pulmonary/Chest: Effort normal and breath sounds normal.   Abdominal: Soft. Bowel sounds are normal.   Genitourinary:   Genitourinary Comments: Incontinent   Musculoskeletal: He exhibits no edema.   Neurological: He is alert.   Oriented to self and place but not year this morning   Skin: Skin is warm and dry. Capillary refill takes 2 to 3 seconds.       Significant Labs:  CBC:    Recent Labs  Lab 02/04/18 0523 02/05/18 0533 02/06/18  0433   WBC 6.02 5.66 6.04   RBC 3.51* 3.41* 3.44*   HGB 10.3* 10.1* 10.0*   HCT 30.8* 30.0* 30.6*   * 116* 104*   MCV 88 88 89   MCH 29.3 29.6 29.1   MCHC 33.4 33.7 32.7     BNP:    Recent Labs  Lab 01/31/18 0443 02/02/18  0501 02/05/18  0533   * 86 157*     CMP:    Recent Labs  Lab 01/31/18 0443 02/02/18  0501  02/04/18 0523 02/05/18  0533 02/06/18  0433   GLU 81  < > 91  < > 82 86 80   CALCIUM 10.0  < > 9.9  < > 10.5 10.5 10.6*   ALBUMIN 3.2*  --  2.9*  --   --  3.0*  --    PROT 6.7  --  6.3  --   --  6.3  --      < > 141  < > 140 140 142   K 3.8  < > 4.0  < > 3.8 4.0 4.0   CO2 24  < > 24  < > 26 27 27     < > 109  < > 107 107 109   BUN 20  < > 20  < > 19 22 24*   CREATININE 1.7*  < > 1.7*  < > 1.7* 1.9* 1.9*   ALKPHOS 73  --  68  --   --  70  --    ALT 13  --  <5*  --   --  8*  --    AST 19  --  18  --   --   23  --    BILITOT 0.4  --  0.3  --   --  0.3  --    < > = values in this interval not displayed.   Coagulation:     Recent Labs  Lab 02/04/18 0523 02/05/18  0533 02/06/18  0433   INR 1.8* 2.2* 2.3*     LDH:    Recent Labs  Lab 02/04/18 0523 02/05/18  0533 02/06/18  0433    178 198     Microbiology:  Microbiology Results (last 7 days)     Procedure Component Value Units Date/Time    Aerobic culture [[id number]] Collected:  02/05/18 1650    Order Status:  Completed Specimen:  Wound from Abdomen Updated:  02/06/18 0745     Aerobic Bacterial Culture No growth    Narrative:       DLES    Culture, Anaerobe [[id number]] Collected:  02/05/18 1650    Order Status:  Completed Specimen:  Wound from Abdomen Updated:  02/06/18 0738     Anaerobic Culture Culture in progress    Narrative:       DLES    Gram stain [[id number]] Collected:  02/05/18 1650    Order Status:  Completed Specimen:  Wound from Abdomen Updated:  02/06/18 0133     Gram Stain Result Few WBC's      No organisms seen    Narrative:       DLES          I have reviewed all pertinent labs within the past 24 hours.    Estimated Creatinine Clearance: 34.7 mL/min (based on SCr of 1.9 mg/dL (H)).    Diagnostic Results:  I have reviewed and interpreted all pertinent imaging results/findings within the past 24 hours.    Assessment and Plan:     Mr. 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inder BiV ICD, HILLARY/BiPAP, HTN, CKD, HLP, gout and depression who presents with confusion. On further questioning of his wife and his caregiver who were both at bedside, they noticed some odd behaviours such as combing his cheek/nose, getting dressed to go hunting when that is not his hobby and admitted to seeing visual hallucinations of his dead brother. They stated that he  Collection Time: 03/06/18  8:22 AM   Result Value Ref Range    CA 27-29 11 0 - 39 U/mL   CEA    Collection Time: 03/06/18  8:22 AM   Result Value Ref Range    CEA 0.5 0 - 2.5 ug/L          is not sleeping well and naps throughout the day. They wonder if his BiPAP is not in the proper settings anymore and he now has made a game out of looking at his watch to see when he will get to sleep. He stated it was 2017, January, thought he was in [place] (moved from there in June), and stated [place] as the president. Altogether, he denies SOB, CP, LE edema, orthopnea and PND. He has no fevers or chills, cough, or diarrhea. They state that he does have some worsening vomiting. They stated that a few months ago, he would vomit after he drank some water: it would start with a sneeze, then a cough, then the vomit. It would happen every now and then, but over the past few weeks, it has worsened to 3-4x/day. They stated that now it occurs without drinking, sometimes before dinner and he would throw up almost undigested food. He admitted to throwing up pills too at times. There have been no VAD alarms noted.       * Vomiting    - Much improved  - Appreciate GI's help. KUB unremarkable. EGD also negative.  - PPI bid  - Appreciate ST's help given coughing and sneezing that can precede emesis. MBSS done and patient now requires honey thick liquids with strict aspiration precautions        Confusion    - CT of head 1/24/18 with no acute changes  - Given constellation of confusion, gait disturbance and urinary incontinence, consulted Neuro (NPH ?). Appreciate their help. LP done 1/30/18 with removal of only 13cc; opening pressure 21, closing pressure 15. His post procedure gait was significantly worse. Discussed with Neuro and NSGY. Per NSGY, no immediate need for cisternogram, shunt, etc, given failed improvement in gait. Now considering PD - started Sinemet per Neuro's rec and patient will f/u in the [facility] as an outpatient  - D/C'd Ropinirole again 2/2 gait disturbance  - Delirium protocol        Essential hypertension    - He has a long h/o orthostatic hypotension, so BP's should only be checked while  sitting or standing  - Goal MAP ~ 95 or less (some permissive HTN due to dizziness and orthostasis)  - Continue Norvasc, Hydralazine, Isordil, Lisinopril and Doxazosin   - finalize price with pharmacy today on cardura         Gait instability    - PT/OT  - See confusion above        VT (ventricular tachycardia)    - Continue Tikosyn  - Will get EKG for QT monitoring  - Has [employer] BiV ICD  - Keep K+ > 4.0 and Mg+ > 2.0        LVAD (left ventricular assist device) present    - S/P HM III 10/16/16 as DT in [place]  - Current speed 5800  - TTE 1/25/18 shows LVEDD 6.5, LVEF 10-15%, diastolic dysfunction, RVE, severely depressed RV systolic function, PAS 20, ARTHUR intermittently and septum midline. No comment on IVC - per Dr. Lanza, costal views were too poor to assess IVC.   -holding Lasix  - INR theraputic.  Coumadin with goal INR 2.0-3.0. Home dose 4 T/Th, 6 all other days.  - LDH stable  - Continue IV Cefepime for chronic Pseudomonas DL infection   - consulted ID will await final growth from culture yesterday and discharge once antibiotic regimen set for home    - given Rx for PT/OT/speech to patients daughter         Stage 3 chronic kidney disease    - Admit creatinine 2.3, 1.9 today, (baseline looks ~ 1.6-1.8)            SHARONA Reyes  Heart Transplant  [facility]

## 2018-03-07 ENCOUNTER — RESEARCH ENCOUNTER (OUTPATIENT)
Dept: RESEARCH | Facility: HOSPITAL | Age: 78
End: 2018-03-07

## 2018-03-07 NOTE — PROGRESS NOTES
IRB# [id number]  PI:  JUAN Harris    TC to [facility] office and [facility] for call back.  ([phone])

## 2018-03-08 ENCOUNTER — LAB VISIT (OUTPATIENT)
Dept: LAB | Facility: HOSPITAL | Age: 78
End: 2018-03-08
Attending: INTERNAL MEDICINE
Payer: MEDICARE

## 2018-03-08 DIAGNOSIS — Z95.810 PRE-OPERATIVE CARDIOVASCULAR EXAMINATION, ICD IN PLACE: Primary | ICD-10-CM

## 2018-03-08 DIAGNOSIS — Z01.810 PRE-OPERATIVE CARDIOVASCULAR EXAMINATION, ICD IN PLACE: Primary | ICD-10-CM

## 2018-03-08 DIAGNOSIS — R79.1 ABNORMAL BLEEDING TIME: ICD-10-CM

## 2018-03-08 DIAGNOSIS — T82.9XXA COMPLICATIONS DUE TO HEART VALVE PROSTHESIS: ICD-10-CM

## 2018-03-08 LAB
ERYTHROCYTE [SEDIMENTATION RATE] IN BLOOD BY WESTERGREN METHOD: 2 MM/HR
INR PPP: 2.4
PROTHROMBIN TIME: 23.5 SEC

## 2018-03-08 PROCEDURE — 85610 PROTHROMBIN TIME: CPT

## 2018-03-08 PROCEDURE — 85651 RBC SED RATE NONAUTOMATED: CPT

## 2018-03-09 ENCOUNTER — ANTI-COAG VISIT (OUTPATIENT)
Dept: CARDIOLOGY | Facility: CLINIC | Age: 78
End: 2018-03-09

## 2018-03-09 DIAGNOSIS — Z95.811 LVAD (LEFT VENTRICULAR ASSIST DEVICE) PRESENT: ICD-10-CM

## 2018-03-09 DIAGNOSIS — Z79.01 CHRONIC ANTICOAGULATION: ICD-10-CM

## 2018-03-13 ENCOUNTER — RESEARCH ENCOUNTER (OUTPATIENT)
Dept: RESEARCH | Facility: HOSPITAL | Age: 78
End: 2018-03-13

## 2018-03-13 ENCOUNTER — LAB VISIT (OUTPATIENT)
Dept: LAB | Facility: HOSPITAL | Age: 78
End: 2018-03-13
Attending: INTERNAL MEDICINE
Payer: MEDICARE

## 2018-03-13 ENCOUNTER — ANTI-COAG VISIT (OUTPATIENT)
Dept: CARDIOLOGY | Facility: CLINIC | Age: 78
End: 2018-03-13

## 2018-03-13 DIAGNOSIS — Z95.811 LVAD (LEFT VENTRICULAR ASSIST DEVICE) PRESENT: ICD-10-CM

## 2018-03-13 DIAGNOSIS — Z79.01 LONG TERM (CURRENT) USE OF ANTICOAGULANTS: Primary | ICD-10-CM

## 2018-03-13 DIAGNOSIS — Z79.01 CHRONIC ANTICOAGULATION: ICD-10-CM

## 2018-03-13 LAB
INR PPP: 2.9
PROTHROMBIN TIME: 31.2 SEC

## 2018-03-13 PROCEDURE — 36415 COLL VENOUS BLD VENIPUNCTURE: CPT | Mod: PO

## 2018-03-13 PROCEDURE — 85610 PROTHROMBIN TIME: CPT | Mod: PO

## 2018-03-13 NOTE — PROGRESS NOTES
IRB# [id number]  PI:  JUAN Harris    [facility], so she is unable to view Mr. Vasquez Interm[facility] data.  She will call back when their [facility] system is open regarding transferring the patient in the system.

## 2018-03-13 NOTE — PROGRESS NOTES
"IRB# [id number]  PI:  JUAN Harris    TC to [facility] and spoke to Siobhan.  She will follow-up regarding transferring the patient in Interm[facility] and last follow-up.   Siobhan's direct line [phone]    Spoke to Siobhan - the patient is BTT in the CAP study.  Anni could not verify in the previous phone call, but called back - he is not in Intermacs. Spoke to Cathleen Munson, and "yes" he is in the CAP study for her.   "

## 2018-03-15 ENCOUNTER — OFFICE VISIT (OUTPATIENT)
Dept: INFECTIOUS DISEASES | Facility: CLINIC | Age: 78
End: 2018-03-15
Payer: MEDICARE

## 2018-03-15 ENCOUNTER — ANTI-COAG VISIT (OUTPATIENT)
Dept: CARDIOLOGY | Facility: CLINIC | Age: 78
End: 2018-03-15

## 2018-03-15 ENCOUNTER — PATIENT MESSAGE (OUTPATIENT)
Dept: INFECTIOUS DISEASES | Facility: CLINIC | Age: 78
End: 2018-03-15

## 2018-03-15 ENCOUNTER — CLINICAL SUPPORT (OUTPATIENT)
Dept: TRANSPLANT | Facility: CLINIC | Age: 78
End: 2018-03-15
Payer: MEDICARE

## 2018-03-15 ENCOUNTER — OFFICE VISIT (OUTPATIENT)
Dept: TRANSPLANT | Facility: CLINIC | Age: 78
End: 2018-03-15
Payer: MEDICARE

## 2018-03-15 ENCOUNTER — RESEARCH ENCOUNTER (OUTPATIENT)
Dept: RESEARCH | Facility: HOSPITAL | Age: 78
End: 2018-03-15

## 2018-03-15 ENCOUNTER — HOSPITAL ENCOUNTER (OUTPATIENT)
Dept: CARDIOLOGY | Facility: CLINIC | Age: 78
Discharge: HOME | End: 2018-03-15
Attending: INTERNAL MEDICINE
Payer: MEDICARE

## 2018-03-15 VITALS — WEIGHT: 196 LBS | BODY MASS INDEX: 29.03 KG/M2 | HEIGHT: 69 IN | TEMPERATURE: 99 F | SYSTOLIC BLOOD PRESSURE: 78 MMHG

## 2018-03-15 DIAGNOSIS — I10 ESSENTIAL HYPERTENSION: ICD-10-CM

## 2018-03-15 DIAGNOSIS — Z95.811 LVAD (LEFT VENTRICULAR ASSIST DEVICE) PRESENT: ICD-10-CM

## 2018-03-15 DIAGNOSIS — Z79.01 CHRONIC ANTICOAGULATION: ICD-10-CM

## 2018-03-15 DIAGNOSIS — T82.9XXD LEFT VENTRICULAR ASSIST DEVICE (LVAD) COMPLICATION, SUBSEQUENT ENCOUNTER: ICD-10-CM

## 2018-03-15 DIAGNOSIS — I50.42 CHRONIC COMBINED SYSTOLIC AND DIASTOLIC CONGESTIVE HEART FAILURE: ICD-10-CM

## 2018-03-15 DIAGNOSIS — I25.5 ISCHEMIC CARDIOMYOPATHY: ICD-10-CM

## 2018-03-15 DIAGNOSIS — Z95.811 LVAD (LEFT VENTRICULAR ASSIST DEVICE) PRESENT: Primary | ICD-10-CM

## 2018-03-15 DIAGNOSIS — Z95.811 HEART REPLACED BY HEART ASSIST DEVICE: ICD-10-CM

## 2018-03-15 DIAGNOSIS — I42.9 CARDIOMYOPATHY: ICD-10-CM

## 2018-03-15 DIAGNOSIS — R26.81 GAIT INSTABILITY: ICD-10-CM

## 2018-03-15 DIAGNOSIS — A49.8 PSEUDOMONAS AERUGINOSA INFECTION: ICD-10-CM

## 2018-03-15 DIAGNOSIS — T84.7XXD HARDWARE COMPLICATING WOUND INFECTION, SUBSEQUENT ENCOUNTER: ICD-10-CM

## 2018-03-15 DIAGNOSIS — I42.9 CARDIOMYOPATHY, UNSPECIFIED TYPE: ICD-10-CM

## 2018-03-15 DIAGNOSIS — R55 SYNCOPE, UNSPECIFIED SYNCOPE TYPE: ICD-10-CM

## 2018-03-15 DIAGNOSIS — T82.9XXD LEFT VENTRICULAR ASSIST DEVICE (LVAD) COMPLICATION, SUBSEQUENT ENCOUNTER: Primary | ICD-10-CM

## 2018-03-15 LAB
DIASTOLIC DYSFUNCTION: YES
ESTIMATED PA SYSTOLIC PRESSURE: 41.42
RETIRED EF AND QEF - SEE NOTES: 15 (ref 55–65)
TRICUSPID VALVE REGURGITATION: ABNORMAL

## 2018-03-15 PROCEDURE — 93306 TTE W/DOPPLER COMPLETE: CPT | Mod: PBBFAC | Performed by: INTERNAL MEDICINE

## 2018-03-15 PROCEDURE — 99213 OFFICE O/P EST LOW 20 MIN: CPT | Mod: PBBFAC,27 | Performed by: INTERNAL MEDICINE

## 2018-03-15 PROCEDURE — 93750 INTERROGATION VAD IN PERSON: CPT | Mod: ,,, | Performed by: INTERNAL MEDICINE

## 2018-03-15 PROCEDURE — 87077 CULTURE AEROBIC IDENTIFY: CPT

## 2018-03-15 PROCEDURE — 87070 CULTURE OTHR SPECIMN AEROBIC: CPT

## 2018-03-15 PROCEDURE — 99214 OFFICE O/P EST MOD 30 MIN: CPT | Mod: S$PBB,,, | Performed by: INTERNAL MEDICINE

## 2018-03-15 PROCEDURE — 99999 PR PBB SHADOW E&M-EST. PATIENT-LVL III: CPT | Mod: PBBFAC,,, | Performed by: INTERNAL MEDICINE

## 2018-03-15 PROCEDURE — 87205 SMEAR GRAM STAIN: CPT

## 2018-03-15 PROCEDURE — 99999 PR PBB SHADOW E&M-EST. PATIENT-LVL I: CPT | Mod: PBBFAC,,, | Performed by: INTERNAL MEDICINE

## 2018-03-15 PROCEDURE — 99211 OFF/OP EST MAY X REQ PHY/QHP: CPT | Mod: PBBFAC | Performed by: INTERNAL MEDICINE

## 2018-03-15 PROCEDURE — 87075 CULTR BACTERIA EXCEPT BLOOD: CPT

## 2018-03-15 PROCEDURE — 87186 SC STD MICRODIL/AGAR DIL: CPT

## 2018-03-15 RX ORDER — FUROSEMIDE 40 MG/1
40 TABLET ORAL DAILY
Qty: 30 TABLET | Refills: 11 | Status: SHIPPED | OUTPATIENT
Start: 2018-03-15 | End: 2019-03-15

## 2018-03-15 NOTE — PROGRESS NOTES
Subjective:      Patient ID: Kev Vasquez is a 77 y.o. male.    Chief Complaint:No chief complaint on file.    History of Present Illness    76 y/o male with a history of CHF managed with an LVAD.  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  It is resistant to ciprofloxacin and cefepime.  He has been on IV meropenem.  He is here today for follow up.    Review of Systems   All other systems reviewed and are negative.    Objective:   Physical Exam   Constitutional: He is oriented to person, place, and time. He appears well-developed and well-nourished. No distress.   HENT:   Head: Normocephalic and atraumatic.   Right Ear: External ear normal.   Left Ear: External ear normal.   Nose: Nose normal.   Mouth/Throat: Oropharynx is clear and moist. No oropharyngeal exudate.   Eyes: Conjunctivae and EOM are normal. Pupils are equal, round, and reactive to light. Right eye exhibits no discharge. Left eye exhibits no discharge. No scleral icterus.   Neck: Normal range of motion. Neck supple. No JVD present. No tracheal deviation present. No thyromegaly present.   Cardiovascular: Normal rate, regular rhythm and intact distal pulses.  Exam reveals no gallop and no friction rub.    No murmur heard.  Pulmonary/Chest: Effort normal and breath sounds normal. No stridor. No respiratory distress. He has no wheezes. He has no rales. He exhibits no tenderness.   Abdominal: Soft. Bowel sounds are normal. He exhibits no distension and no mass. There is no tenderness. There is no rebound and no guarding.   #1. LVAD DLES:  There is yellow drainage and some surrounding erythema.   Musculoskeletal: Normal range of motion. He exhibits no edema or tenderness.   Lymphadenopathy:     He has no cervical adenopathy.   Neurological: He is alert and oriented to person, place, and time. He has normal reflexes. He displays normal reflexes. No cranial nerve deficit. He exhibits normal muscle tone. Coordination normal.   Skin: Skin  is warm. No rash noted. He is not diaphoretic. No erythema. No pallor.   Psychiatric: He has a normal mood and affect. His behavior is normal. Judgment and thought content normal.   Nursing note and vitals reviewed.          Assessment:       1. Left ventricular assist device (LVAD) complication, subsequent encounter    2. Hardware complicating wound infection, subsequent encounter    3. Pseudomonas aeruginosa infection        76 y/o currently completed 4 weeks of IV meropenem for pseudmonas infection of his drivelline.  Site continues to have some drainage and surrounding erythema.  Will extend his meropenem for 2 extra weeks.  Will repeat cultures today to see if the isolate is still susceptible to meropenem.  Follow up in 4 weeks.  Plan:       Left ventricular assist device (LVAD) complication, subsequent encounter  -     Gram stain; Future; Expected date: 03/15/2018  -     Aerobic culture; Future; Expected date: 03/15/2018  -     Culture, Anaerobic; Future; Expected date: 03/15/2018    Hardware complicating wound infection, subsequent encounter  -     Gram stain; Future; Expected date: 03/15/2018  -     Aerobic culture; Future; Expected date: 03/15/2018  -     Culture, Anaerobic; Future; Expected date: 03/15/2018    Pseudomonas aeruginosa infection  -     Gram stain; Future; Expected date: 03/15/2018  -     Aerobic culture; Future; Expected date: 03/15/2018  -     Culture, Anaerobic; Future; Expected date: 03/15/2018

## 2018-03-15 NOTE — PROGRESS NOTES
Date Consent signed: 15Mar2018 Addendum    Sponsor: [employer]    Study Title/IRB Number: [facility] 3 CAP [id number]    Principle Investigator: Dr. Bradley Hemphill    Present for Discussion: Swapnil Regaladotoni [facility], Mrs. Vasquez     Is LAR Consenting for Subject: No    Prior to the Informed Consent (IC) being signed, or any study protocol required data collection, testing, procedure, or intervention being performed, the following was done and/or discussed:   Patient was given a copy of the IC for review    Purpose of the study and qualifications to participate    Study design, Follow up schedule, and tests or procedures done at each visit   Confidentiality and HIPAA Authorization for Release of Medical Records for the research trial/ subject's rights/research related injury   Participation in the research trial is voluntary and patient may withdraw at anytime   Contact information for study related questions    Patient verbalizes understanding of the above: Yes  Contact information for CRC and PI given to patient: Yes  Patient able to adequately summarize: the purpose of the study, the risks associated with the study, and all procedures, testing, and follow-ups associated with the study: Yes    Mr. Vasquez signed the informed consent form addendum for the [facility] research study with an IRB approval date of 1/15/2018.  Each page of the consent form was reviewed with the patient (and pts family) and all questions answered satisfactorily. Mr. Vasquez signed the consent form and received a copy of same. The original consent was scanned into electronic medical records (EPIC) and filed into the subject's research study binder.

## 2018-03-15 NOTE — PROCEDURES
TXP SANTOSH INTERROGATIONS 3/15/2018 2/23/2018 2/23/2018 2/23/2018 2/23/2018 2/22/2018 2/22/2018   Type HeartMate3 - - - - - -   Flow 5.2 - - - - - -   Speed 5800 - - - - - -   PI 3.3 - - - - - -   Power (Mendez) 4.5 - - - - - -   LSL 5400 - - - - - -   Pulsatility Pulse No Pulse Pulse Pulse Pulse Pulse No Pulse   }'  Interrogation of Ventricular assist device was performed with physician analysis of device parameters and review of device function. I have personally reviewed the interrogation findings and agree with findings as stated.     NEYDA To MD  Transplant Cardiology

## 2018-03-15 NOTE — PATIENT INSTRUCTIONS
1. Start Lasix 40mg daily x 3 days, then as needed after that   - after the 3 days, if you gain more than 3lbs in 1 day or 5lbs in 1 week, take 1 lasix and call

## 2018-03-15 NOTE — PROGRESS NOTES
"Date of Implant with Heartmate 3 LVAD: 10/19/16    PATIENT ARRIVED IN CLINIC:  Ambulatory  Accompanied by:Wife and caregiver    Vitals  Doppler:78  Pulsatile:yes  Temperature, oral: 98.8  PAIN:0 on 0-10 pain scale,   location of pain:   na,   description of pain:  na  Is patient currently on medications for pain?no   What kind? na  Does this help relieve the pain? na    VAD Interrogation:  TXP SANTOSH INTERROGATIONS 3/15/2018   Type HeartMate3   Flow 5.2   Speed 5800   PI 3.3   Power (Mendez) 4.5   LSL 5400   Pulsatility Pulse       Flow in history: 4.2-5.7  History Lo.[id number]  HCT:30.4    Problems / Issues / Alarms with VAD if any:none   Any Equipment Issues? (Refer to equipment coordinators note for complete details):none  Emergency Equipment With Patient:yes   VAD Binder With Patient: no   Reviewed VAD Numbers In Binder:no  VAD Sounds: HM3 sound   LVAD Dressing/DLES:  Appearance Of Driveline:"2" with moderate drainage. Patient seen by Dr. Muhammad in clinic today. Patient and patient's wife state they ran out of antibiotics on Saturday and did not call for refill. Dr. Muhammad instructed patient continue IV antibiotics for now and would set up refills for patient.   Antibiotics:YES see documentation above  Velour:no  Frequency of Dressing Changes:Daily with soap and water   Stabilization Device In Use: YES Escobedo anchor    Manual & Visual Inspection Of Driveline:  NO KINKS OR TEARS NOTED   Modular Cable Connection Intact(no yellow exposed): YES    Attempted to unscrew modular cable to ensure it will be able to come lose in the event we ever need to change the modular cable while pt held the driveline in place so it would not move. Modular cable connection able to be unscrewed and re-tightened.  Instructed pt to perform this weekly.     Pt In Need Of Management Kits?:yes 2 boxes of soap and water  It is medically necessary to have VAD management kits in order to prevent infection or to assist in the healing of an " infected DLES.    Assessment:   Complaints/reason for visit today: Routine follow up  Complaints Of Nausea / Vomiting:none   Appearance and Frequency Of Stools:normal and formed without blood every other day  Patient reports urine is clear and yellow:  YES     Coping/Depression/Anxiety:patient coping ok  Sleep Habits:5 hrs /night  Sleep Aids:none  Showering :NO, Reminded patient to change dressing immediately after shower and drying off.   Activity/Exercise:30 min per day   Driving:no, Reminded to pull over should there be an alarm before looking down at controller.     Labs:    Chemistry        Component Value Date/Time     03/15/2018 0922    K 4.0 03/15/2018 0922     03/15/2018 0922    CO2 22 (L) 03/15/2018 0922    BUN 19 03/15/2018 0922    CREATININE 1.4 03/15/2018 0922    GLU 84 03/15/2018 0922        Component Value Date/Time    CALCIUM 9.8 03/15/2018 0922    ALKPHOS 115 03/15/2018 0922    AST 21 03/15/2018 0922    ALT 18 03/15/2018 0922    BILITOT 0.7 03/15/2018 0922    ESTGFRAFRICA 56.4 (A) 03/15/2018 0922    EGFRNONAA 48.8 (A) 03/15/2018 0922            Magnesium   Date Value Ref Range Status   03/15/2018 2.2 1.6 - 2.6 mg/dL Final       Lab Results   Component Value Date    WBC 6.43 03/15/2018    HGB 10.0 (L) 03/15/2018    HCT 30.4 (L) 03/15/2018    MCV 94 03/15/2018     03/15/2018       Lab Results   Component Value Date    INR 2.4 (H) 03/15/2018    INR 2.9 (H) 03/13/2018    INR 2.4 (H) 03/08/2018       BNP   Date Value Ref Range Status   03/15/2018 786 (H) 0 - 99 pg/mL Final     Comment:     Values of less than 100 pg/ml are consistent with non-CHF populations.   02/27/2018 763 (H) 0 - 99 pg/mL Final     Comment:     Values of less than 100 pg/ml are consistent with non-CHF populations.   02/23/2018 861 (H) 0 - 99 pg/mL Final     Comment:     Values of less than 100 pg/ml are consistent with non-CHF populations.       LD   Date Value Ref Range Status   03/15/2018 204 110 - 260 U/L Final      Comment:     Results are increased in hemolyzed samples.   02/23/2018 194 110 - 260 U/L Final     Comment:     Results are increased in hemolyzed samples.   02/22/2018 193 110 - 260 U/L Final     Comment:     Results are increased in hemolyzed samples.       Labs reviewed with patient: YES      Patient Satisfaction Survey completed per patient: no  (explained about signature and box to check)  Medication reconciliation: per MA.  Purple card updated today:NO  Coumadin Managed by: [facility],     Education: Reviewed driveline care, emergency procedures, alarms with patient.  Reminded patient never to change the controller without paging the VAD coordinator first.   Also reviewed how to get in touch with a VAD coordinator in the event of an emergency.         Plans/Needs:Patient presents today for routine followup. Patient doing well today and feeling well. Walking today without walker. See documentation above regarding driveline. Per Dr. To, patient to take lasix 40 mg for three days and them PRN after than. Patient verbalized understanding to all instructions given. Patient to RTC 1 month with ID. Refer to MD note.

## 2018-03-15 NOTE — PROGRESS NOTES
Subjective:   Patient ID:  Kev Vasquez is a 77 y.o. male who presents for LVAD followup visit.    Implant Date:10-19-16 @ [facility] 3 RPM 5800     INR goal: 2-3   Bridge with Heparin   Antiplatelets: ASA 81 mg    TXP SANTOSH INTERROGATIONS 3/15/2018   Type HeartMate3   Flow 5.2   Speed 5800   PI 3.3   Power (Mendez) 4.5   LSL 5400   Pulsatility Pulse       HPI   77 y.o. male s/p HM III 10/16/16 as DT for ICM in [place] (5800), chronic Pseudomonas DL infection, on IV Cefepime and followed by Dr. Muhammad, [place], on Tikosyn (intolerant to Amiodarone per outside records), h/o SSS, S/P [facility] BiV ICD, HILLARY/BiPAP, HTN, CKD, HLP, gout and depression who comes for a follow-up visit. He was recently discharged from the hospital after being treated for ADHF 12/1-12/4. Went to ED 1/7/18 for oozing around his PICC. The site was treated and sent home. He is on Merrem for DLI and followed by ID. he Last seen 1/18/18 when he was doing well except for coughing and near-syncope. He was admitted soon after for n/v, syncope, and AMS/hallucinations/encephalopathy 1/24-2/7 during which time he underwent LP that was negative; concern for PD so referral to movement clinic (however determined to not have PD). He was readmitted 2/8-2/14 for syncope (worked up and thought to be due to vasovagal) and his BP meds titrated. He was then admitted again 2/19-2/23 for confusion and lethargy. His Sinemet and Ropinirole were stopped (Neurology Dr. 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 His BP was very Labile,. He tended to be hypertensive at night and low/normotensive during the day. BP regimen was adjusted to prevent hypotension during the day. Once medically stable, he was discharged home in good condition.    Today, he and his family report doing well. VAD speed is at 5800 rpm. No VAD alarms noted on interrogation occasional  "PI events. BP is 78. DLES is a "2" with mild drainage. He's been on Merrem (ran out Saturday but seen by Dr. Muhammad with ID today in clinic who will restart). He is cracking jokes and in very good spirits.    INR is therapeutic at 2.4. LDH is 204 and  up from baseline. Scr is 1.4    Review of Systems   Constitution: Positive for weakness. Negative for chills, decreased appetite, diaphoresis, fever, malaise/fatigue, night sweats, weight gain and weight loss.   Eyes: Negative.    Cardiovascular: Positive for dyspnea on exertion. Negative for chest pain, claudication, cyanosis, irregular heartbeat, leg swelling, near-syncope, orthopnea, palpitations, paroxysmal nocturnal dyspnea and syncope.   Respiratory: Negative for cough, hemoptysis and shortness of breath.    Endocrine: Negative.    Hematologic/Lymphatic: Negative.    Skin: Negative for color change, dry skin and nail changes.   Musculoskeletal: Negative.    Gastrointestinal: Negative.    Genitourinary: Negative.        Objective:   Blood pressure (!) 78/0, temperature 98.8 °F (37.1 °C), temperature source Oral, height 5' 9" (1.753 m), weight 88.9 kg (196 lb).body mass index is 28.94 kg/m².    Doppler: 78    Physical Exam   Constitutional: He appears well-developed.   HENT:   Head: Normocephalic.   Neck: JVD (JVP ~14cm above RA) present. Carotid bruit is not present.   Cardiovascular: Regular rhythm and normal heart sounds.    No murmur heard.  Smooth VAD hum. DLES is "2-3" with moderate drainage   Pulmonary/Chest: Effort normal and breath sounds normal. No respiratory distress. He has no wheezes. He has no rales.   Abdominal: Soft. Bowel sounds are normal. He exhibits no distension. There is no tenderness. There is no rebound.   Musculoskeletal: He exhibits no edema.   Neurological: He is alert.   Skin: Skin is warm.   Vitals reviewed.    Improving      Lab Results   Component Value Date    WBC 6.43 03/15/2018    HGB 10.0 (L) 03/15/2018    HCT 30.4 (L) " 03/15/2018    MCV 94 03/15/2018     03/15/2018    CO2 22 (L) 03/15/2018    CREATININE 1.4 03/15/2018    CALCIUM 9.8 03/15/2018    ALBUMIN 3.7 03/15/2018    AST 21 03/15/2018     (H) 03/15/2018    ALT 18 03/15/2018     03/15/2018       Lab Results   Component Value Date    INR 2.4 (H) 03/15/2018    INR 2.9 (H) 03/13/2018    INR 2.4 (H) 03/08/2018       BNP   Date Value Ref Range Status   03/15/2018 786 (H) 0 - 99 pg/mL Final     Comment:     Values of less than 100 pg/ml are consistent with non-CHF populations.   02/27/2018 763 (H) 0 - 99 pg/mL Final     Comment:     Values of less than 100 pg/ml are consistent with non-CHF populations.   02/23/2018 861 (H) 0 - 99 pg/mL Final     Comment:     Values of less than 100 pg/ml are consistent with non-CHF populations.       LD   Date Value Ref Range Status   03/15/2018 204 110 - 260 U/L Final     Comment:     Results are increased in hemolyzed samples.   02/23/2018 194 110 - 260 U/L Final     Comment:     Results are increased in hemolyzed samples.   02/22/2018 193 110 - 260 U/L Final     Comment:     Results are increased in hemolyzed samples.       Assessment:      1. LVAD (left ventricular assist device) present    2. Heart replaced by heart assist device    3. Chronic combined systolic and diastolic congestive heart failure    4. Cardiomyopathy, unspecified type    5. Essential hypertension    6. Ischemic cardiomyopathy    7. Syncope, unspecified syncope type    8. Gait instability    9. Left ventricular assist device (LVAD) complication, subsequent encounter        Plan:   LVAD  -Patient is now NYHA class II-III. BP is controlled.  INR is therapeutic (2.4).  -VAD interrogation was performed in clinic  -Provided with VAD supplies.   -doing very well; best he's looked since we met him  -Echo reviewed and no speed change; IVC dilated and JVP elevated today  - will give 40mg lasix daily for 3 days then prn--after the 3 days, if you gain more than 3lbs  in 1 day or 5lbs in 1 week, take 1 lasix and call  - otherwise no changes today    Recommend 2 gram sodium restriction and 1500cc fluid restriction.  Encourage physical activity with graded exercise program.  Requested patient to weigh themselves daily, and to notify us if their weight increases by more than 3 lbs in 1 day or 5 lbs in 1 week.     Listed for transplant: DT    UNOS Patient Status  Functional Status: 80% - Normal activity with effort: some symptoms of disease  Physical Capacity: No Limitations  Working for Income: Unknown    NEYDA To MD  Transplant Cardiology

## 2018-03-16 LAB
GRAM STN SPEC: NORMAL
GRAM STN SPEC: NORMAL

## 2018-03-19 ENCOUNTER — LAB VISIT (OUTPATIENT)
Dept: LAB | Facility: HOSPITAL | Age: 78
End: 2018-03-19
Attending: INTERNAL MEDICINE
Payer: MEDICARE

## 2018-03-19 DIAGNOSIS — T82.7XXA INFECTION AND INFLAMMATORY REACTION DUE TO CARDIAC DEVICE, IMPLANT, AND GRAFT: Primary | ICD-10-CM

## 2018-03-19 LAB
ALBUMIN SERPL BCP-MCNC: 3.2 G/DL
ALP SERPL-CCNC: 97 U/L
ALT SERPL W/O P-5'-P-CCNC: 14 U/L
ANION GAP SERPL CALC-SCNC: 8 MMOL/L
AST SERPL-CCNC: 20 U/L
BACTERIA SPEC AEROBE CULT: NORMAL
BASOPHILS # BLD AUTO: 0.04 K/UL
BASOPHILS NFR BLD: 0.7 %
BILIRUB SERPL-MCNC: 0.4 MG/DL
BUN SERPL-MCNC: 30 MG/DL
CALCIUM SERPL-MCNC: 9.3 MG/DL
CHLORIDE SERPL-SCNC: 107 MMOL/L
CO2 SERPL-SCNC: 24 MMOL/L
CREAT SERPL-MCNC: 1.5 MG/DL
CRP SERPL-MCNC: 10.3 MG/L
DIFFERENTIAL METHOD: ABNORMAL
EOSINOPHIL # BLD AUTO: 0.6 K/UL
EOSINOPHIL NFR BLD: 10.7 %
ERYTHROCYTE [DISTWIDTH] IN BLOOD BY AUTOMATED COUNT: 19 %
ERYTHROCYTE [SEDIMENTATION RATE] IN BLOOD BY WESTERGREN METHOD: 7 MM/HR
EST. GFR  (AFRICAN AMERICAN): 51.9 ML/MIN/1.73 M^2
EST. GFR  (NON AFRICAN AMERICAN): 44.9 ML/MIN/1.73 M^2
GLUCOSE SERPL-MCNC: 76 MG/DL
HCT VFR BLD AUTO: 27.5 %
HGB BLD-MCNC: 9 G/DL
IMM GRANULOCYTES # BLD AUTO: 0.02 K/UL
IMM GRANULOCYTES NFR BLD AUTO: 0.4 %
LYMPHOCYTES # BLD AUTO: 0.9 K/UL
LYMPHOCYTES NFR BLD: 15.7 %
MCH RBC QN AUTO: 31.6 PG
MCHC RBC AUTO-ENTMCNC: 32.7 G/DL
MCV RBC AUTO: 97 FL
MONOCYTES # BLD AUTO: 0.8 K/UL
MONOCYTES NFR BLD: 14.8 %
NEUTROPHILS # BLD AUTO: 3.2 K/UL
NEUTROPHILS NFR BLD: 57.7 %
NRBC BLD-RTO: 0 /100 WBC
PLATELET # BLD AUTO: 149 K/UL
PMV BLD AUTO: 11.5 FL
POTASSIUM SERPL-SCNC: 4 MMOL/L
PROT SERPL-MCNC: 6.3 G/DL
RBC # BLD AUTO: 2.85 M/UL
SODIUM SERPL-SCNC: 139 MMOL/L
WBC # BLD AUTO: 5.49 K/UL

## 2018-03-19 PROCEDURE — 85651 RBC SED RATE NONAUTOMATED: CPT

## 2018-03-19 PROCEDURE — 85025 COMPLETE CBC W/AUTO DIFF WBC: CPT

## 2018-03-19 PROCEDURE — 86140 C-REACTIVE PROTEIN: CPT

## 2018-03-19 PROCEDURE — 80053 COMPREHEN METABOLIC PANEL: CPT

## 2018-03-20 LAB — BACTERIA SPEC ANAEROBE CULT: NORMAL

## 2018-03-21 ENCOUNTER — TELEPHONE (OUTPATIENT)
Dept: INFECTIOUS DISEASES | Facility: CLINIC | Age: 78
End: 2018-03-21

## 2018-03-21 ENCOUNTER — LAB VISIT (OUTPATIENT)
Dept: LAB | Facility: HOSPITAL | Age: 78
End: 2018-03-21
Attending: INTERNAL MEDICINE
Payer: MEDICARE

## 2018-03-21 ENCOUNTER — ANTI-COAG VISIT (OUTPATIENT)
Dept: CARDIOLOGY | Facility: CLINIC | Age: 78
End: 2018-03-21

## 2018-03-21 DIAGNOSIS — Z79.01 LONG TERM (CURRENT) USE OF ANTICOAGULANTS: Primary | ICD-10-CM

## 2018-03-21 DIAGNOSIS — Z95.811 LVAD (LEFT VENTRICULAR ASSIST DEVICE) PRESENT: ICD-10-CM

## 2018-03-21 DIAGNOSIS — Z79.01 CHRONIC ANTICOAGULATION: ICD-10-CM

## 2018-03-21 LAB
INR PPP: 2.5
PROTHROMBIN TIME: 26.8 SEC

## 2018-03-21 PROCEDURE — 36415 COLL VENOUS BLD VENIPUNCTURE: CPT | Mod: PO

## 2018-03-21 PROCEDURE — 85610 PROTHROMBIN TIME: CPT | Mod: PO

## 2018-03-22 NOTE — TELEPHONE ENCOUNTER
Cultures reviewed.  The pseudomonas is resistant to meropenem which he is on.  The isolate has been noted to be resistant to cipro and cefepime in the past.  I will like to place him on tobramycin for 2 weeks.  He has some chronic kidney disease so there are some risks with this.  Will touch base with the heart failure group.

## 2018-03-23 NOTE — TELEPHONE ENCOUNTER
Mr. Vasquez is a 76 y/o male with pseudomonas infection of his driveline.  The pseudomonas isolate is resistant to cipro, cefepime and meropenem.  He is currently on meropenem.  I spoke to his daughter about starting a 2 week course of tobramycin.  She would prefer that he is not admitted again as he gets a bit more de-conditioned with hospitalizations.  He has been doing much better with ambulation and she is worried about a set back.    Assessment  1.  MDR pseudomonas infection of his LVAD driveline.    Plan  1.  Tobramycin 350 mg IV q 24 hours.  2.  Check tobramycin trough, cbc, bmp twice a week.  3.  Duration of therapy would be 2 weeks.  4.  Will give first dose of antibiotic in our infusion suite in order to avoid an inpatient admission per patient's daughter's preference.

## 2018-03-24 ENCOUNTER — PATIENT MESSAGE (OUTPATIENT)
Dept: INFECTIOUS DISEASES | Facility: CLINIC | Age: 78
End: 2018-03-24

## 2018-03-26 ENCOUNTER — TELEPHONE (OUTPATIENT)
Dept: INFECTIOUS DISEASES | Facility: CLINIC | Age: 78
End: 2018-03-26

## 2018-03-26 ENCOUNTER — PATIENT MESSAGE (OUTPATIENT)
Dept: INFECTIOUS DISEASES | Facility: CLINIC | Age: 78
End: 2018-03-26

## 2018-03-26 NOTE — TELEPHONE ENCOUNTER
Patient was not able to be seen in ID infusion suite because there was no available infusion chair. I spoke with [facility] and asked if the patient could be seen there on Wednesday. The pharmacy tech explained to me that she would reach out to her nursing staff and contact the patient or his daughter fidel to schedule first dosing. AYESHA

## 2018-03-27 ENCOUNTER — LAB VISIT (OUTPATIENT)
Dept: LAB | Facility: HOSPITAL | Age: 78
End: 2018-03-27
Attending: INTERNAL MEDICINE
Payer: MEDICARE

## 2018-03-27 DIAGNOSIS — I50.43 ACUTE ON CHRONIC COMBINED SYSTOLIC AND DIASTOLIC HEART FAILURE: ICD-10-CM

## 2018-03-27 DIAGNOSIS — T82.7XXA INFECTION AND INFLAMMATORY REACTION DUE TO CARDIAC DEVICE, IMPLANT, AND GRAFT: Primary | ICD-10-CM

## 2018-03-27 LAB
ALBUMIN SERPL BCP-MCNC: 3.3 G/DL
ALP SERPL-CCNC: 99 U/L
ALT SERPL W/O P-5'-P-CCNC: 15 U/L
ANION GAP SERPL CALC-SCNC: 9 MMOL/L
AST SERPL-CCNC: 27 U/L
BASOPHILS # BLD AUTO: 0.05 K/UL
BASOPHILS NFR BLD: 0.8 %
BILIRUB SERPL-MCNC: 0.5 MG/DL
BNP SERPL-MCNC: 288 PG/ML
BUN SERPL-MCNC: 25 MG/DL
CALCIUM SERPL-MCNC: 9.4 MG/DL
CHLORIDE SERPL-SCNC: 107 MMOL/L
CO2 SERPL-SCNC: 24 MMOL/L
CREAT SERPL-MCNC: 1.4 MG/DL
CRP SERPL-MCNC: 2.2 MG/L
DIFFERENTIAL METHOD: ABNORMAL
EOSINOPHIL # BLD AUTO: 0.3 K/UL
EOSINOPHIL NFR BLD: 4.1 %
ERYTHROCYTE [DISTWIDTH] IN BLOOD BY AUTOMATED COUNT: 17.9 %
ERYTHROCYTE [SEDIMENTATION RATE] IN BLOOD BY WESTERGREN METHOD: 3 MM/HR
EST. GFR  (AFRICAN AMERICAN): 56.4 ML/MIN/1.73 M^2
EST. GFR  (NON AFRICAN AMERICAN): 48.8 ML/MIN/1.73 M^2
GLUCOSE SERPL-MCNC: 85 MG/DL
HCT VFR BLD AUTO: 29.5 %
HGB BLD-MCNC: 9.2 G/DL
IMM GRANULOCYTES # BLD AUTO: 0.03 K/UL
IMM GRANULOCYTES NFR BLD AUTO: 0.5 %
LYMPHOCYTES # BLD AUTO: 0.7 K/UL
LYMPHOCYTES NFR BLD: 11.7 %
MCH RBC QN AUTO: 30.9 PG
MCHC RBC AUTO-ENTMCNC: 31.2 G/DL
MCV RBC AUTO: 99 FL
MONOCYTES # BLD AUTO: 0.7 K/UL
MONOCYTES NFR BLD: 11.4 %
NEUTROPHILS # BLD AUTO: 4.5 K/UL
NEUTROPHILS NFR BLD: 71.5 %
NRBC BLD-RTO: 0 /100 WBC
PLATELET # BLD AUTO: 181 K/UL
PMV BLD AUTO: 11.4 FL
POTASSIUM SERPL-SCNC: 4.9 MMOL/L
PROT SERPL-MCNC: 6.6 G/DL
RBC # BLD AUTO: 2.98 M/UL
SODIUM SERPL-SCNC: 140 MMOL/L
WBC # BLD AUTO: 6.3 K/UL

## 2018-03-27 PROCEDURE — 85651 RBC SED RATE NONAUTOMATED: CPT

## 2018-03-27 PROCEDURE — 86140 C-REACTIVE PROTEIN: CPT

## 2018-03-27 PROCEDURE — 80053 COMPREHEN METABOLIC PANEL: CPT

## 2018-03-27 PROCEDURE — 83880 ASSAY OF NATRIURETIC PEPTIDE: CPT

## 2018-03-27 PROCEDURE — 85025 COMPLETE CBC W/AUTO DIFF WBC: CPT

## 2018-03-28 NOTE — PROGRESS NOTES
Haydee STONE called 03/28/18 to kristy appt 3/27/18 to 3/28/18. [facility] did a lab/venipuncture on 3/27/18,the lab did not run it. HH will draw again today. Haydee STONE called back today 3/28/18 to kristy appt for pt, he and wife is getting car serviced will be gone all day. Pt will come to [facility] on 3/29/18 to have labs on 2nd floor labs.

## 2018-03-29 ENCOUNTER — LAB VISIT (OUTPATIENT)
Dept: LAB | Facility: HOSPITAL | Age: 78
End: 2018-03-29
Payer: MEDICARE

## 2018-03-29 ENCOUNTER — ANTI-COAG VISIT (OUTPATIENT)
Dept: CARDIOLOGY | Facility: CLINIC | Age: 78
End: 2018-03-29

## 2018-03-29 DIAGNOSIS — Z95.811 LVAD (LEFT VENTRICULAR ASSIST DEVICE) PRESENT: ICD-10-CM

## 2018-03-29 DIAGNOSIS — Z95.811 HEART REPLACED BY HEART ASSIST DEVICE: ICD-10-CM

## 2018-03-29 DIAGNOSIS — Z79.01 CHRONIC ANTICOAGULATION: ICD-10-CM

## 2018-03-29 LAB
INR PPP: 1.9
PROTHROMBIN TIME: 18.3 SEC

## 2018-03-29 PROCEDURE — 85610 PROTHROMBIN TIME: CPT

## 2018-03-29 PROCEDURE — 36415 COLL VENOUS BLD VENIPUNCTURE: CPT

## 2018-04-02 ENCOUNTER — ANTI-COAG VISIT (OUTPATIENT)
Dept: CARDIOLOGY | Facility: CLINIC | Age: 78
End: 2018-04-02

## 2018-04-02 ENCOUNTER — LAB VISIT (OUTPATIENT)
Dept: LAB | Facility: HOSPITAL | Age: 78
End: 2018-04-02
Attending: INTERNAL MEDICINE
Payer: MEDICARE

## 2018-04-02 DIAGNOSIS — Z95.811 LVAD (LEFT VENTRICULAR ASSIST DEVICE) PRESENT: ICD-10-CM

## 2018-04-02 DIAGNOSIS — Z79.01 CHRONIC ANTICOAGULATION: ICD-10-CM

## 2018-04-02 DIAGNOSIS — I50.9 CHF (CONGESTIVE HEART FAILURE): Primary | ICD-10-CM

## 2018-04-02 LAB
BASOPHILS # BLD AUTO: 0.02 K/UL
BASOPHILS NFR BLD: 0.3 %
CRP SERPL-MCNC: 1.42 MG/DL
DIFFERENTIAL METHOD: ABNORMAL
EOSINOPHIL # BLD AUTO: 0.2 K/UL
EOSINOPHIL NFR BLD: 3.1 %
ERYTHROCYTE [DISTWIDTH] IN BLOOD BY AUTOMATED COUNT: 17.4 %
ERYTHROCYTE [SEDIMENTATION RATE] IN BLOOD BY WESTERGREN METHOD: 12 MM/HR
HCT VFR BLD AUTO: 30.4 %
HGB BLD-MCNC: 9.7 G/DL
INR PPP: 2.7
LYMPHOCYTES # BLD AUTO: 0.7 K/UL
LYMPHOCYTES NFR BLD: 10.9 %
MCH RBC QN AUTO: 31.5 PG
MCHC RBC AUTO-ENTMCNC: 31.9 G/DL
MCV RBC AUTO: 99 FL
MONOCYTES # BLD AUTO: 0.8 K/UL
MONOCYTES NFR BLD: 12.8 %
NEUTROPHILS # BLD AUTO: 4.4 K/UL
NEUTROPHILS NFR BLD: 72.6 %
PLATELET # BLD AUTO: 158 K/UL
PMV BLD AUTO: 11.4 FL
PROTHROMBIN TIME: 29.4 SEC
RBC # BLD AUTO: 3.08 M/UL
VANCOMYCIN TROUGH SERPL-MCNC: <1.1 UG/ML
WBC # BLD AUTO: 6.08 K/UL

## 2018-04-02 PROCEDURE — 85610 PROTHROMBIN TIME: CPT | Mod: PO

## 2018-04-02 PROCEDURE — 85025 COMPLETE CBC W/AUTO DIFF WBC: CPT | Mod: PO

## 2018-04-02 PROCEDURE — 86140 C-REACTIVE PROTEIN: CPT | Mod: PO

## 2018-04-02 PROCEDURE — 85652 RBC SED RATE AUTOMATED: CPT

## 2018-04-02 PROCEDURE — 80202 ASSAY OF VANCOMYCIN: CPT

## 2018-04-03 ENCOUNTER — TELEPHONE (OUTPATIENT)
Dept: PHARMACY | Facility: CLINIC | Age: 78
End: 2018-04-03

## 2018-04-05 ENCOUNTER — LAB VISIT (OUTPATIENT)
Dept: LAB | Facility: HOSPITAL | Age: 78
End: 2018-04-05
Attending: INTERNAL MEDICINE
Payer: MEDICARE

## 2018-04-05 DIAGNOSIS — A49.8 INFECTION, PSEUDOMONAS: Primary | ICD-10-CM

## 2018-04-05 LAB
ANION GAP SERPL CALC-SCNC: 10 MMOL/L
BASOPHILS # BLD AUTO: 0.03 K/UL
BASOPHILS NFR BLD: 0.5 %
BUN SERPL-MCNC: 31 MG/DL
CALCIUM SERPL-MCNC: 9.1 MG/DL
CHLORIDE SERPL-SCNC: 109 MMOL/L
CO2 SERPL-SCNC: 22 MMOL/L
CREAT SERPL-MCNC: 2 MG/DL
DIFFERENTIAL METHOD: ABNORMAL
EOSINOPHIL # BLD AUTO: 0.1 K/UL
EOSINOPHIL NFR BLD: 1.8 %
ERYTHROCYTE [DISTWIDTH] IN BLOOD BY AUTOMATED COUNT: 17.8 %
EST. GFR  (AFRICAN AMERICAN): 37 ML/MIN/1.73 M^2
EST. GFR  (NON AFRICAN AMERICAN): 32 ML/MIN/1.73 M^2
GLUCOSE SERPL-MCNC: 91 MG/DL
HCT VFR BLD AUTO: 28 %
HGB BLD-MCNC: 8.9 G/DL
INR PPP: 2.6
LYMPHOCYTES # BLD AUTO: 0.7 K/UL
LYMPHOCYTES NFR BLD: 11.3 %
MCH RBC QN AUTO: 31 PG
MCHC RBC AUTO-ENTMCNC: 31.8 G/DL
MCV RBC AUTO: 98 FL
MONOCYTES # BLD AUTO: 0.7 K/UL
MONOCYTES NFR BLD: 11.8 %
NEUTROPHILS # BLD AUTO: 4.7 K/UL
NEUTROPHILS NFR BLD: 74.4 %
PLATELET # BLD AUTO: 142 K/UL
PMV BLD AUTO: 11 FL
POTASSIUM SERPL-SCNC: 4.6 MMOL/L
PROTHROMBIN TIME: 27.4 SEC
RBC # BLD AUTO: 2.87 M/UL
SODIUM SERPL-SCNC: 141 MMOL/L
WBC # BLD AUTO: 6.28 K/UL

## 2018-04-05 PROCEDURE — 80048 BASIC METABOLIC PNL TOTAL CA: CPT

## 2018-04-05 PROCEDURE — 85025 COMPLETE CBC W/AUTO DIFF WBC: CPT

## 2018-04-05 PROCEDURE — 36415 COLL VENOUS BLD VENIPUNCTURE: CPT

## 2018-04-05 PROCEDURE — 80200 ASSAY OF TOBRAMYCIN: CPT

## 2018-04-05 PROCEDURE — 85610 PROTHROMBIN TIME: CPT

## 2018-04-06 ENCOUNTER — ANTI-COAG VISIT (OUTPATIENT)
Dept: CARDIOLOGY | Facility: CLINIC | Age: 78
End: 2018-04-06

## 2018-04-06 DIAGNOSIS — Z95.811 LVAD (LEFT VENTRICULAR ASSIST DEVICE) PRESENT: ICD-10-CM

## 2018-04-06 DIAGNOSIS — Z79.01 CHRONIC ANTICOAGULATION: ICD-10-CM

## 2018-04-06 LAB — TOBRAMYCIN TROUGH SERPL-MCNC: 10.4 UG/ML

## 2018-04-06 NOTE — TELEPHONE ENCOUNTER
Patient needs clinic collect labs put in so that he can have labs drawn in infusion Monday. His son is requesting that the pts labs done here since the pt has a clinic appointment Monday morning. AYESHA

## 2018-04-09 ENCOUNTER — OFFICE VISIT (OUTPATIENT)
Dept: NEUROLOGY | Facility: CLINIC | Age: 78
End: 2018-04-09
Payer: MEDICARE

## 2018-04-09 ENCOUNTER — ANTI-COAG VISIT (OUTPATIENT)
Dept: CARDIOLOGY | Facility: CLINIC | Age: 78
End: 2018-04-09

## 2018-04-09 ENCOUNTER — INFUSION (OUTPATIENT)
Dept: INFECTIOUS DISEASES | Facility: HOSPITAL | Age: 78
End: 2018-04-09
Attending: INTERNAL MEDICINE
Payer: MEDICARE

## 2018-04-09 VITALS — OXYGEN SATURATION: 97 % | HEART RATE: 68 BPM

## 2018-04-09 VITALS — HEIGHT: 69 IN

## 2018-04-09 DIAGNOSIS — Z79.01 CHRONIC ANTICOAGULATION: ICD-10-CM

## 2018-04-09 DIAGNOSIS — Z79.2 LONG TERM (CURRENT) USE OF ANTIBIOTICS: Primary | ICD-10-CM

## 2018-04-09 DIAGNOSIS — Z79.01 ANTICOAGULATION MONITORING BY PHARMACIST: ICD-10-CM

## 2018-04-09 DIAGNOSIS — R45.4 IRRITABILITY: ICD-10-CM

## 2018-04-09 DIAGNOSIS — R25.1 TREMOR: ICD-10-CM

## 2018-04-09 DIAGNOSIS — Z95.810 ICD (IMPLANTABLE CARDIOVERTER-DEFIBRILLATOR), BIVENTRICULAR, IN SITU: ICD-10-CM

## 2018-04-09 DIAGNOSIS — I25.5 ISCHEMIC CARDIOMYOPATHY: ICD-10-CM

## 2018-04-09 DIAGNOSIS — F09 COGNITIVE DYSFUNCTION: ICD-10-CM

## 2018-04-09 DIAGNOSIS — Z79.2 LONG TERM (CURRENT) USE OF ANTIBIOTICS: ICD-10-CM

## 2018-04-09 DIAGNOSIS — Z95.811 LVAD (LEFT VENTRICULAR ASSIST DEVICE) PRESENT: ICD-10-CM

## 2018-04-09 DIAGNOSIS — R44.3 HALLUCINATIONS: ICD-10-CM

## 2018-04-09 DIAGNOSIS — R41.0 CONFUSION: Primary | ICD-10-CM

## 2018-04-09 PROBLEM — T14.8XXA WOUND INFECTION: Status: RESOLVED | Noted: 2018-02-06 | Resolved: 2018-04-09

## 2018-04-09 PROBLEM — R11.10 VOMITING: Status: RESOLVED | Noted: 2018-01-30 | Resolved: 2018-04-09

## 2018-04-09 PROBLEM — L08.9 WOUND INFECTION: Status: RESOLVED | Noted: 2018-02-06 | Resolved: 2018-04-09

## 2018-04-09 PROBLEM — I50.43 ACUTE ON CHRONIC SYSTOLIC AND DIASTOLIC HEART FAILURE, NYHA CLASS 4: Status: RESOLVED | Noted: 2017-12-01 | Resolved: 2018-04-09

## 2018-04-09 LAB
ALBUMIN SERPL BCP-MCNC: 3.5 G/DL
ALP SERPL-CCNC: 101 U/L
ALT SERPL W/O P-5'-P-CCNC: 16 U/L
ANION GAP SERPL CALC-SCNC: 7 MMOL/L
AST SERPL-CCNC: 18 U/L
BASOPHILS # BLD AUTO: 0.03 K/UL
BASOPHILS NFR BLD: 0.5 %
BILIRUB SERPL-MCNC: 0.5 MG/DL
BUN SERPL-MCNC: 31 MG/DL
CALCIUM SERPL-MCNC: 9.2 MG/DL
CHLORIDE SERPL-SCNC: 111 MMOL/L
CO2 SERPL-SCNC: 23 MMOL/L
CREAT SERPL-MCNC: 2 MG/DL
DIFFERENTIAL METHOD: ABNORMAL
EOSINOPHIL # BLD AUTO: 0.2 K/UL
EOSINOPHIL NFR BLD: 2.3 %
ERYTHROCYTE [DISTWIDTH] IN BLOOD BY AUTOMATED COUNT: 17.1 %
EST. GFR  (AFRICAN AMERICAN): 36.7 ML/MIN/1.73 M^2
EST. GFR  (NON AFRICAN AMERICAN): 31.7 ML/MIN/1.73 M^2
GLUCOSE SERPL-MCNC: 78 MG/DL
HCT VFR BLD AUTO: 28.3 %
HGB BLD-MCNC: 9.1 G/DL
IMM GRANULOCYTES # BLD AUTO: 0.02 K/UL
IMM GRANULOCYTES NFR BLD AUTO: 0.3 %
INR PPP: 1.5
LYMPHOCYTES # BLD AUTO: 0.7 K/UL
LYMPHOCYTES NFR BLD: 11.1 %
MCH RBC QN AUTO: 31.6 PG
MCHC RBC AUTO-ENTMCNC: 32.2 G/DL
MCV RBC AUTO: 98 FL
MONOCYTES # BLD AUTO: 0.8 K/UL
MONOCYTES NFR BLD: 11.7 %
NEUTROPHILS # BLD AUTO: 4.7 K/UL
NEUTROPHILS NFR BLD: 74.1 %
NRBC BLD-RTO: 0 /100 WBC
PLATELET # BLD AUTO: 152 K/UL
PMV BLD AUTO: 11.6 FL
POTASSIUM SERPL-SCNC: 4.4 MMOL/L
PROT SERPL-MCNC: 6.8 G/DL
PROTHROMBIN TIME: 15.3 SEC
RBC # BLD AUTO: 2.88 M/UL
SODIUM SERPL-SCNC: 141 MMOL/L
WBC # BLD AUTO: 6.4 K/UL

## 2018-04-09 PROCEDURE — 85025 COMPLETE CBC W/AUTO DIFF WBC: CPT

## 2018-04-09 PROCEDURE — 99212 OFFICE O/P EST SF 10 MIN: CPT | Mod: PBBFAC,25 | Performed by: PSYCHIATRY & NEUROLOGY

## 2018-04-09 PROCEDURE — 99999 PR PBB SHADOW E&M-EST. PATIENT-LVL II: CPT | Mod: PBBFAC,,, | Performed by: PSYCHIATRY & NEUROLOGY

## 2018-04-09 PROCEDURE — 36592 COLLECT BLOOD FROM PICC: CPT

## 2018-04-09 PROCEDURE — 99214 OFFICE O/P EST MOD 30 MIN: CPT | Mod: S$PBB,,, | Performed by: PSYCHIATRY & NEUROLOGY

## 2018-04-09 PROCEDURE — 85610 PROTHROMBIN TIME: CPT

## 2018-04-09 PROCEDURE — 80053 COMPREHEN METABOLIC PANEL: CPT

## 2018-04-09 NOTE — TELEPHONE ENCOUNTER
FOR DOCUMENTATION ONLY:  Financial Assistance for Tikosyn approved from 4/5/18 to 12/31/18  Source: [employer]  Phone: [phone]  Fax: [phone]  ID#: [id number]

## 2018-04-09 NOTE — PROGRESS NOTES
Kev WOOD Chief Complaints during this visit:  f/u Patient visit for  Tremor, confusion      Primary Care Physician  Mega Collins MD  [address]  [facility] [id number]        History of present illness:   77 y.o.  male seen in f/u (seen in hospital) for tremor and confusion.  Accompanied by wife, Lucretia and sitter, Lucretia (24hr/day, 7days/week).    Tremors come and go- he can feed himself, but knocks stuff over.  Confusion comes and goes- examples are that he asks about his mother () or asks about a dripping faucet (not present).  Also gets easily agitated.    They are moving back to [place] in 3 weeks.  Will be re-establishing care there.  Will be seeing neuropsychologist in [place], once moved back.  She has seen her twice.    He has not had syncopal event.    HPI 18:  77 y.o. Male with hx of HTN, HLD, CKD, ischemic cardiomyopathy s/p LVAD (10/16/16) with chronic pseudomonas drive line infection, sick sinus syndrome s/p ICD, Vtach on Tikosyn, HILLARY with BiPAP,   gout and depression presented 18 with one wk hx of worsening confusion. Yesterday, he was found with his head underneath the post of the bed. Caregiver reports increased lethargy and decreased PO intake lately. He is less mobile and having trouble transferring and getting around the house. Of note, he was admitted -18 and Neurology consulted for ?NPH. He had LP on  removing 13 cc and post gait assessment was reportedly worse. Ropinirole was discontinued and Sinemet trial started CD/LD  q 3-4 hr while awake. There was question of LBD at that time given hx of hallucinations, delirium and acting out dreams. IV Abx for chronic drive line infection were changed per ID recs. He was admitted again - for syncopal episodes thought to be vasovagal with labile bp. AMS was noted on that admission as well and attributed to possible underlying dementia. Per chart review, patient had 2 neuropsych assessments at  [facility]H, both unremarkable for signs of underlying dementia. Wife denies hx of Parkinson's disease. This admission, caregiver says she has noticed a considerable decline after the recent two admissions and wonders if Sinemet is making him more confused and lethargic.     2/20/18 plan:  Tremor     Irregular tremor and asterixis seen on exam 2/20, improved somewhat 2/21.  Today, there is no irregular tremor or asterixis on exam.  He still has a postural tremor in hands.     Presentation not consistent with PD, though could have synucleinopathy causing hallucinations and parkinsonism.  It is not clear what caused his delirium this admission, but I would NOT resume sinemet at this time.              -> followup with me as outpatient.     Cognitive dysfunction     2 neuropsych assessments at outside facility without evidence of dementia or cognitive impairment     -B12, folate wnl  -other B vitamins pending       II.  Review of systems:  As in HPI,  otherwise, balance 10 systems reviewed and are negative.    III.  Past Medical History:   Diagnosis Date    AICD (automatic cardioverter/defibrillator) present 2014    [facility]    Chronic anticoagulation 7/21/2017    Chronic combined systolic and diastolic congestive heart failure 7/27/2015    11-16-17   1 - Moderate left ventricular enlargement.    2 - Severely depressed left ventricular systolic function (EF 15-20%).    3 - Impaired LV relaxation, normal LAP (grade 1 diastolic dysfunction).    4 - Biatrial enlargement.    5 - Right ventricle is upper limit of normal in size with not well seen systolic function.    6 - Severe tricuspid regurgitation.    7 - Increased central venous pressure.    8 - The estimated PA systolic pressure is 40 mmHg.    9 - Heartmate III LVAD; speed 5800.     Complication involving left ventricular assist device (LVAD) 7/29/2017    Coronary artery disease involving native coronary artery of native heart without angina pectoris 7/27/2015    Gait  instability     Hypertensive urgency, malignant 11/15/2017    ICD (implantable cardioverter-defibrillator), biventricular, in situ 7/27/2015    Ischemic cardiomyopathy 7/20/2017    S/p HMIII     Kidney stones     LVAD (left ventricular assist device) present 7/20/2017    status post Heartmate III 10/16/16 LVAD    HILLARY on CPAP 7/27/2015    Peripheral neuropathy     Pulmonary hypertension due to left ventricular systolic dysfunction 7/29/2015    Restless leg syndrome 1992    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    Skin cancer     excision 2013    Skin yeast infection 8/31/2017    Stage 3 chronic kidney disease 7/20/2017    Syncope 7/20/2017    Ventricular tachycardia 7/25/2017     Family History   Problem Relation Age of Onset    Cancer Daughter 50     breast    Heart disease Daughter      MI x 3, CABG    Diabetes Daughter      Social History     Social History    Marital status:      Spouse name: N/A    Number of children: N/A    Years of education: N/A     Social History Main Topics    Smoking status: Never Smoker    Smokeless tobacco: Never Used    Alcohol use No    Drug use: No    Sexual activity: Not Asked      Comment:      Other Topics Concern    None     Social History Narrative    , lives in [place]. Retired .          Current Outpatient Prescriptions on File Prior to Visit   Medication Sig Dispense Refill    allopurinol (ZYLOPRIM) 300 MG tablet Take 1 tablet (300 mg total) by mouth once daily. 90 tablet 4    aspirin 81 MG Chew Take 81 mg by mouth once daily.      atorvastatin (LIPITOR) 10 MG tablet Take 10 mg by mouth once daily.      buPROPion 450 mg Tb24 Take 450 mg by mouth once daily. 30 tablet 11    calcium-vitamin D tablet 600 mg-200 units Take 1 tablet by mouth once daily.      docusate sodium (COLACE) 100 MG capsule Take 100 mg by mouth daily as needed for Constipation.      dofetilide (TIKOSYN)  "125 MCG Cap Take 1 capsule (125 mcg total) by mouth every 12 (twelve) hours. 60 capsule 11    doxazosin (CARDURA) 8 MG Tab Take 1 tablet (8 mg total) by mouth once daily. 30 tablet 11    ferrous sulfate 324 mg (65 mg iron) TbEC Take 1 tablet (324 mg total) by mouth once daily. 30 tablet 11    folic acid (FOLVITE) 1 MG tablet Take 1 mg by mouth once daily.      furosemide (LASIX) 40 MG tablet Take 1 tablet (40 mg total) by mouth once daily. 30 tablet 11    hydrALAZINE (APRESOLINE) 50 MG tablet Take 1 tablet (50 mg total) by mouth every 12 (twelve) hours. 60 tablet 11    Lactobacillus rhamnosus GG (CULTURELLE) 10 billion cell capsule Take 1 capsule by mouth once daily.      lisinopril 10 MG tablet Take 1 tablet (10 mg total) by mouth 2 (two) times daily. (Patient taking differently: Take 10 mg by mouth 2 (two) times daily. ) 180 tablet 3    magnesium oxide (MAG-OX) 400 mg tablet Take 1 tablet (400 mg total) by mouth 2 (two) times daily. 60 tablet 11    meropenem (MERREM) 1 gram injection Inject 1 g into the vein every 12 (twelve) hours.      multivitamin capsule Take 1 capsule by mouth once daily.      NIFEdipine (PROCARDIA-XL) 60 MG (OSM) 24 hr tablet Take 2 tablets (120 mg total) by mouth once daily. 60 tablet 11    pantoprazole (PROTONIX) 40 MG tablet Take 1 tablet (40 mg total) by mouth 2 (two) times daily before meals. 60 tablet 11    warfarin (COUMADIN) 4 MG tablet Take 4 mg orally daily on Tue, Thu and 6 mg orally daily all other days other days as directed by coumadin clinic  11     No current facility-administered medications on file prior to visit.        PRIOR problem-specific medications tried:  Sinemet (no effect)    Review of patient's allergies indicates:   Allergen Reactions    Aldactone [spironolactone]       persistent hyperkalemia.    Sulfa (sulfonamide antibiotics)        IV. Physical Exam    Vitals:    04/09/18 0908   Height: 5' 9" (1.753 m)     General appearance: Well nourished, " well developed, no acute distress.         Cardiovascular:  pedal pulses 2, no edema or cyanosis, heart regular rate and rhythym, no carotid bruits.         -------------------------------------------------------------  Facial Expression: normal       Affect: full       Orientation to time & place:  Oriented to time, place, person and situation, but had a couple comments that were off-topic       Attention & concentration:  Normal attention span and concentration       Memory:  1/3  Language: Spontaneous, fluent; able to repeat and name objects        Fund of knowledge:  Aware of current events        Speech:  normal (not dysarthric)  -------------------------------------------------------  Cranial nerves: normal visual acuity, visual fields full, optic discs not visualized, pupils equal round and reactive, extraocular movements intact,       facial sensation intact, face symmetrical, hearing intact to whisper, palate raises midline, shoulder shrug strength normal, tongue protrudes midline.        -------------------------------------------------------  Musculoskeletal  Muscle tone: mild LUE tonicity      Muscle Bulk: all 4 extremities normal        Muscle strength:  5/5 in all 4 extremities        No pronator drift  Sensation: Intact to light touch in all extremities        Deep tendon Reflexes: 1 bilateral biceps, triceps, patella and ankles        --------------------------------------------------------------  Cerebellar and Coordination  Gait:  Significant retropulsion.  Able to walk unassisted, but with contact guard.        Finger-nose: no dysmetria       Rapid Alternating Movements (pronation/supination):  R normal; L normal  --------------------------------------------------------------  MOVEMENT DISORDERS FOCUSED EXAM  Abnormality of movement (bradykinesia, hyperkinesia) present? No    Tremor present?   Yes, bilateral postural tremors in hands   Posture:  normal  Postural stability:  Very off-balance, needs  "guard assist      V.  Laboratory/ Radiological Data:    Lab Results   Component Value Date    CHLIYIUU30 463 02/20/2018     Lab Results   Component Value Date    TSH 1.810 07/28/2017     Thiamine 52, 2018           VI. Assessment and Plan          I have high suspicion of synucleinopathy such as LBD or tauopathy (alzheimer's, given the irritability); however, he has significant co-morbidities that can easily explain intermittent delerium and confusion.  Also, I do not have access to the outside neuropsych tests that "ruled out dementia" per wife.  As he is returning to that provider in another state, I deferred any interventions this visit.      Problem List Items Addressed This Visit        1 - High    Confusion - Primary    Hallucinations    Overview     Dream carry-over.            2     Ischemic cardiomyopathy    Overview     S/p HMIII          Tremor    Overview     NOT parkinson's disease, NOT NPH.  2/2018         Irritability    ICD (implantable cardioverter-defibrillator), biventricular, in situ       3     Cognitive dysfunction    Overview     Intermittent delirium with infections, but no evidence of underlying dementia (outside neuropsych tests).                 No Follow-up on file.     Patient instructions:  Please send me name of neuropsychologist and primary care doctor in [place] so I can forward my notes.      "

## 2018-04-09 NOTE — PATIENT INSTRUCTIONS
Please send me name of neuropsychologist and primary care doctor in [place] so I can forward my notes.

## 2018-04-09 NOTE — PROGRESS NOTES
Daughter priti questioned and confirmed correct dose . Cashews intake .    Denies any missed doses, bleeding, bruising , greens or etc.

## 2018-04-12 ENCOUNTER — ANTI-COAG VISIT (OUTPATIENT)
Dept: CARDIOLOGY | Facility: CLINIC | Age: 78
End: 2018-04-12

## 2018-04-12 ENCOUNTER — OFFICE VISIT (OUTPATIENT)
Dept: TRANSPLANT | Facility: CLINIC | Age: 78
End: 2018-04-12
Attending: INTERNAL MEDICINE
Payer: MEDICARE

## 2018-04-12 ENCOUNTER — OFFICE VISIT (OUTPATIENT)
Dept: INFECTIOUS DISEASES | Facility: CLINIC | Age: 78
End: 2018-04-12
Payer: MEDICARE

## 2018-04-12 ENCOUNTER — TELEPHONE (OUTPATIENT)
Dept: INFECTIOUS DISEASES | Facility: CLINIC | Age: 78
End: 2018-04-12

## 2018-04-12 ENCOUNTER — INFUSION (OUTPATIENT)
Dept: INFECTIOUS DISEASES | Facility: HOSPITAL | Age: 78
End: 2018-04-12
Attending: INTERNAL MEDICINE
Payer: MEDICARE

## 2018-04-12 ENCOUNTER — LAB VISIT (OUTPATIENT)
Dept: LAB | Facility: HOSPITAL | Age: 78
End: 2018-04-12
Attending: INTERNAL MEDICINE
Payer: MEDICARE

## 2018-04-12 ENCOUNTER — CLINICAL SUPPORT (OUTPATIENT)
Dept: TRANSPLANT | Facility: CLINIC | Age: 78
End: 2018-04-12
Payer: MEDICARE

## 2018-04-12 VITALS
HEIGHT: 72 IN | BODY MASS INDEX: 28.31 KG/M2 | WEIGHT: 209 LBS | HEART RATE: 65 BPM | SYSTOLIC BLOOD PRESSURE: 88 MMHG | TEMPERATURE: 98 F

## 2018-04-12 DIAGNOSIS — A49.8 PSEUDOMONAS AERUGINOSA INFECTION: ICD-10-CM

## 2018-04-12 DIAGNOSIS — T82.9XXD LEFT VENTRICULAR ASSIST DEVICE (LVAD) COMPLICATION, SUBSEQUENT ENCOUNTER: Primary | ICD-10-CM

## 2018-04-12 DIAGNOSIS — T84.7XXD HARDWARE COMPLICATING WOUND INFECTION, SUBSEQUENT ENCOUNTER: ICD-10-CM

## 2018-04-12 DIAGNOSIS — Z79.01 CHRONIC ANTICOAGULATION: ICD-10-CM

## 2018-04-12 DIAGNOSIS — Z95.811 HEART REPLACED BY HEART ASSIST DEVICE: ICD-10-CM

## 2018-04-12 DIAGNOSIS — Z95.811 LVAD (LEFT VENTRICULAR ASSIST DEVICE) PRESENT: ICD-10-CM

## 2018-04-12 LAB
ALBUMIN SERPL BCP-MCNC: 3.5 G/DL
ALP SERPL-CCNC: 100 U/L
ALT SERPL W/O P-5'-P-CCNC: 15 U/L
ANION GAP SERPL CALC-SCNC: 10 MMOL/L
AST SERPL-CCNC: 18 U/L
BASOPHILS # BLD AUTO: 0.03 K/UL
BASOPHILS NFR BLD: 0.4 %
BILIRUB DIRECT SERPL-MCNC: 0.3 MG/DL
BILIRUB SERPL-MCNC: 0.5 MG/DL
BNP SERPL-MCNC: 577 PG/ML
BUN SERPL-MCNC: 37 MG/DL
CALCIUM SERPL-MCNC: 9.1 MG/DL
CHLORIDE SERPL-SCNC: 111 MMOL/L
CO2 SERPL-SCNC: 21 MMOL/L
CREAT SERPL-MCNC: 2.3 MG/DL
CRP SERPL-MCNC: 7.8 MG/L
DIFFERENTIAL METHOD: ABNORMAL
EOSINOPHIL # BLD AUTO: 0.2 K/UL
EOSINOPHIL NFR BLD: 2.7 %
ERYTHROCYTE [DISTWIDTH] IN BLOOD BY AUTOMATED COUNT: 16.9 %
EST. GFR  (AFRICAN AMERICAN): 31 ML/MIN/1.73 M^2
EST. GFR  (NON AFRICAN AMERICAN): 26.8 ML/MIN/1.73 M^2
GLUCOSE SERPL-MCNC: 98 MG/DL
HCT VFR BLD AUTO: 27.7 %
HGB BLD-MCNC: 8.8 G/DL
IMM GRANULOCYTES # BLD AUTO: 0.02 K/UL
IMM GRANULOCYTES NFR BLD AUTO: 0.3 %
INR PPP: 2.5
LDH SERPL L TO P-CCNC: 174 U/L
LYMPHOCYTES # BLD AUTO: 0.7 K/UL
LYMPHOCYTES NFR BLD: 10.5 %
MAGNESIUM SERPL-MCNC: 2.6 MG/DL
MCH RBC QN AUTO: 31.3 PG
MCHC RBC AUTO-ENTMCNC: 31.8 G/DL
MCV RBC AUTO: 99 FL
MONOCYTES # BLD AUTO: 0.8 K/UL
MONOCYTES NFR BLD: 11.8 %
NEUTROPHILS # BLD AUTO: 5 K/UL
NEUTROPHILS NFR BLD: 74.3 %
NRBC BLD-RTO: 0 /100 WBC
PHOSPHATE SERPL-MCNC: 3.7 MG/DL
PLATELET # BLD AUTO: 110 K/UL
PMV BLD AUTO: 11.2 FL
POTASSIUM SERPL-SCNC: 4.6 MMOL/L
PREALB SERPL-MCNC: 25 MG/DL
PROT SERPL-MCNC: 6.8 G/DL
PROTHROMBIN TIME: 23.9 SEC
RBC # BLD AUTO: 2.81 M/UL
SODIUM SERPL-SCNC: 142 MMOL/L
WBC # BLD AUTO: 6.68 K/UL

## 2018-04-12 PROCEDURE — 93750 INTERROGATION VAD IN PERSON: CPT | Mod: PBBFAC | Performed by: INTERNAL MEDICINE

## 2018-04-12 PROCEDURE — 99214 OFFICE O/P EST MOD 30 MIN: CPT | Mod: S$PBB,,, | Performed by: INTERNAL MEDICINE

## 2018-04-12 PROCEDURE — 99213 OFFICE O/P EST LOW 20 MIN: CPT | Mod: PBBFAC | Performed by: INTERNAL MEDICINE

## 2018-04-12 PROCEDURE — 83880 ASSAY OF NATRIURETIC PEPTIDE: CPT

## 2018-04-12 PROCEDURE — 99212 OFFICE O/P EST SF 10 MIN: CPT | Mod: PBBFAC,27 | Performed by: INTERNAL MEDICINE

## 2018-04-12 PROCEDURE — 83735 ASSAY OF MAGNESIUM: CPT

## 2018-04-12 PROCEDURE — 99999 PR PBB SHADOW E&M-EST. PATIENT-LVL II: CPT | Mod: PBBFAC,,, | Performed by: INTERNAL MEDICINE

## 2018-04-12 PROCEDURE — 86140 C-REACTIVE PROTEIN: CPT

## 2018-04-12 PROCEDURE — 84100 ASSAY OF PHOSPHORUS: CPT

## 2018-04-12 PROCEDURE — 36415 COLL VENOUS BLD VENIPUNCTURE: CPT

## 2018-04-12 PROCEDURE — 93750 INTERROGATION VAD IN PERSON: CPT | Mod: S$PBB,,, | Performed by: INTERNAL MEDICINE

## 2018-04-12 PROCEDURE — 82248 BILIRUBIN DIRECT: CPT

## 2018-04-12 PROCEDURE — 83615 LACTATE (LD) (LDH) ENZYME: CPT

## 2018-04-12 PROCEDURE — 99999 PR PBB SHADOW E&M-EST. PATIENT-LVL III: CPT | Mod: PBBFAC,,, | Performed by: INTERNAL MEDICINE

## 2018-04-12 PROCEDURE — 84134 ASSAY OF PREALBUMIN: CPT

## 2018-04-12 PROCEDURE — 85025 COMPLETE CBC W/AUTO DIFF WBC: CPT

## 2018-04-12 PROCEDURE — 80053 COMPREHEN METABOLIC PANEL: CPT

## 2018-04-12 PROCEDURE — 85610 PROTHROMBIN TIME: CPT

## 2018-04-12 NOTE — PROGRESS NOTES
"Subjective:   Patient ID:  Kev Vasquez is a 77 y.o. male who presents for LVAD followup visit.    Implant Date:10-19-16 @ [facility]  Initials:LFR     HM 3 RPM 5800     INR goal: 2-3   Bridge with Heparin   Antiplatelets: ASA 81 mg     DLES:"3" red with moderate thick yellow drainage  ABX: 3/23/18:Tobramycin 350 mg IV q 24 hours  Stopped due to PICC     TXP SANTOSH INTERROGATIONS 4/12/2018   Type HeartMate3   Flow 5.1   Speed 5800   PI 3.3   Power (Mendez) 4.4   LSL 5400   Pulsatility No Pulse       HPI   s/p HM III 10/16/16 as DT for ICM in [place] (5800), chronic Pseudomonas DL infection, on IV Cefepime and followed by Dr. Muhammad, [place], on Tikosyn (intolerant to Amiodarone per outside records), h/o SSS, S/P [facility] [facility] BiV ICD, HILLARY/BiPAP, HTN, CKD, HLP, gout and depression who comes for a follow-up visit    Today he informed me that he is moving back to [place] this week. Also reports painless hematuria earlier this week. DLES:"3" red with moderate thick yellow drainage.      ECHO  1 - Moderate left ventricular enlargement.     2 - Severely depressed left ventricular systolic function (EF 15-20%).     3 - Impaired LV relaxation, normal LAP (grade 1 diastolic dysfunction).     4 - Moderately to severely depressed right ventricular systolic function .     5 - Pulmonary hypertension. The estimated PA systolic pressure is 41 mmHg.     6 - Severe tricuspid regurgitation.     7 - Increased central venous pressure.     8 - Severe left atrial enlargement.     9 - Heartmate III LVAD; speed 5800.   There is a Heartmate III LVAD in place. Inflow cannula is not visualized. Outflow graft is not visualized. Baseline speed is 5800 rpms. The aortic valve opens with every beat. Septum is midline.     Review of Systems   Constitution: Negative for decreased appetite, weight gain and weight loss.   Cardiovascular: Negative for chest pain, dyspnea on exertion, leg swelling, near-syncope, orthopnea and palpitations.   Respiratory: " Negative for cough and shortness of breath.    Musculoskeletal: Negative for myalgias.   Gastrointestinal: Negative for jaundice.       Objective:   Blood pressure (!) 88/0, pulse 65, temperature 98.3 °F (36.8 °C), temperature source Oral, height 6' (1.829 m), weight 94.8 kg (209 lb).body mass index is 28.35 kg/m².    Doppler: 88 /  MAP 80    Physical Exam   Constitutional: He is oriented to person, place, and time. He appears well-developed and well-nourished. He is active. He is not intubated.   BP (!) 88/0   Pulse 65   Temp 98.3 °F (36.8 °C) (Oral)   Ht 6' (1.829 m)   Wt 94.8 kg (209 lb)   BMI 28.35 kg/m²      HENT:   Head: Normocephalic and atraumatic. Hair is normal.   Right Ear: External ear normal.   Left Ear: External ear normal.   Nose: Nose normal. No nasal deformity. No epistaxis.  No foreign bodies.   Mouth/Throat: Mucous membranes are normal. Mucous membranes are not cyanotic. No oropharyngeal exudate.   Eyes: Conjunctivae and EOM are normal. Pupils are equal, round, and reactive to light.   Neck: Neck supple. No hepatojugular reflux and no JVD present.   Cardiovascular: Normal rate, regular rhythm, normal heart sounds and normal pulses.  Exam reveals no gallop.    Pulmonary/Chest: Effort normal and breath sounds normal. No apnea and no tachypnea. He is not intubated. No respiratory distress. He exhibits no tenderness.   Abdominal: Soft. Normal appearance and bowel sounds are normal. There is no tenderness. No hernia.   Musculoskeletal: Normal range of motion.   Neurological: He is alert and oriented to person, place, and time. He displays no seizure activity.   Skin: Skin is warm, dry and intact. No rash noted. No pallor.   Psychiatric: He has a normal mood and affect. His speech is normal and behavior is normal. Thought content normal. Cognition and memory are normal.       Lab Results   Component Value Date    WBC 6.68 04/12/2018    HGB 8.8 (L) 04/12/2018    HCT 27.7 (L) 04/12/2018    MCV 99 (H)  04/12/2018     (L) 04/12/2018    CO2 21 (L) 04/12/2018    CREATININE 2.3 (H) 04/12/2018    CALCIUM 9.1 04/12/2018    ALBUMIN 3.5 04/12/2018    AST 18 04/12/2018     (H) 04/12/2018    ALT 15 04/12/2018     04/12/2018       Lab Results   Component Value Date    INR 2.5 (H) 04/12/2018    INR 1.5 (H) 04/09/2018    INR 2.6 (H) 04/05/2018       BNP   Date Value Ref Range Status   04/12/2018 577 (H) 0 - 99 pg/mL Final     Comment:     Values of less than 100 pg/ml are consistent with non-CHF populations.   03/27/2018 288 (H) 0 - 99 pg/mL Final     Comment:     Values of less than 100 pg/ml are consistent with non-CHF populations.   03/15/2018 786 (H) 0 - 99 pg/mL Final     Comment:     Values of less than 100 pg/ml are consistent with non-CHF populations.       LD   Date Value Ref Range Status   04/12/2018 174 110 - 260 U/L Final     Comment:     Results are increased in hemolyzed samples.   03/15/2018 204 110 - 260 U/L Final     Comment:     Results are increased in hemolyzed samples.   02/23/2018 194 110 - 260 U/L Final     Comment:     Results are increased in hemolyzed samples.         Assessment:      1. Heart replaced by heart assist device        Plan:   Patient is now NYHA class II. BP is controlled.  INR is therapeutic.  VAD interrogation was performed in clinic  Seen by ID in our clinic - they recommended that he stop his IV antibiotics for now He will need to see ID doctor at his new LVAD center to start antibiotics  Most recent echo report noted in HPI  UPDATED plan /followup noted in patient instruction / followup section in addition to being note below  Any VAD management kits dispensed today medically necessary  Fasting lipid profile, HGB A1C and 2D echo with CFD ordered  Recommend 2 gram sodium restriction and 1500cc fluid restriction.  Encourage physical activity with graded exercise program.  Requested patient to weigh themselves daily, and to notify us if their weight increases by  more than 3 lbs in 1 day or 5 lbs in 1 week.     Listed for transplant: No

## 2018-04-12 NOTE — PROCEDURES
TXP SANTOSH INTERROGATIONS 4/12/2018 3/15/2018 2/23/2018 2/23/2018 2/23/2018 2/23/2018 2/22/2018   Type HeartMate3 HeartMate3 - - - - -   Flow 5.1 5.2 - - - - -   Speed 5800 5800 - - - - -   PI 3.3 3.3 - - - - -   Power (Mendez) 4.4 4.5 - - - - -   LSL 5400 5400 - - - - -   Pulsatility No Pulse Pulse No Pulse Pulse Pulse Pulse Pulse   }

## 2018-04-12 NOTE — PROGRESS NOTES
JAYLAN PICC line removed, pt tolerated well. Pt instructed to leave dressing on for 24hrs, verbalized understanding. Pt left in NAD.

## 2018-04-12 NOTE — PROGRESS NOTES
Date of Implant with Heartmate 3 LVAD: 10/19/16    PATIENT ARRIVED IN CLINIC:  wheelchair  Accompanied by: wife and caregiver    Vitals  Temperature, oral: 98.3  PAIN: 0 on 0-10 pain scale  Is patient currently on medications for pain? no    VAD Interrogation:  TXP SANTOSH INTERROGATIONS 2018   Type HeartMate3   Flow 5.1   Speed 5800   PI 3.3   Power (Mendez) 4.4   LSL 5400   Pulsatility No Pulse       Flow in history: 4-5s  History Lo.[id number]    Lab Results   Component Value Date    HCT 27.7 (L) 2018    HCT 40 2017         Problems / Issues / Alarms with VAD if any: low voltage, power disconnected 18 0532. Pt's caregiver states that when he got out of bed, the power cable pulled out of the wall slightly  Any Equipment Issues: None noted (Refer to  note for complete details)   Emergency Equipment With Patient: yes   VAD Binder With Patient: yes   Reviewed VAD Numbers In Binder: yes  VAD Sounds: HM3 sound Smooth  Manual & Visual Inspection Of Driveline: No kinks or tears noted    LVAD Dressing/DLES:  Appearance Of Driveline: 3. Dr. Samson saw pt in clinic.  Antibiotics: YES. Dr. Samson to change.   Velour: yes  Frequency of Dressing Changes: daily & soap and water dressing  Stabilization Device In Use: yes, silverio securement device    Modular Cable Connection Intact: No yellow exposed  Pt In Need Of Management Kits?:Yes - 1  It is medically necessary to have VAD management kits in order to prevent infection or to assist in the healing of an infected DLES.    Assessment:   Complaints/reason for visit today: routine  Complaints Of Nausea / Vomiting: None noted    Appearance and Frequency Of Stools: normal and formed without blood & every other day  Color Of Urine: tea  Coping/Depression/Anxiety: coping okay  Sleep Habits: 5 hrs /night  Sleep Aids: None noted  Showering: No  Activity/Exercise: walking 2-3 times weekly   Driving: No.    Labs:    Chemistry        Component Value  Date/Time     04/12/2018 0820    K 4.6 04/12/2018 0820     (H) 04/12/2018 0820    CO2 21 (L) 04/12/2018 0820    BUN 37 (H) 04/12/2018 0820    CREATININE 2.3 (H) 04/12/2018 0820    GLU 98 04/12/2018 0820        Component Value Date/Time    CALCIUM 9.1 04/12/2018 0820    ALKPHOS 100 04/12/2018 0820    AST 18 04/12/2018 0820    ALT 15 04/12/2018 0820    BILITOT 0.5 04/12/2018 0820    ESTGFRAFRICA 31.0 (A) 04/12/2018 0820    EGFRNONAA 26.8 (A) 04/12/2018 0820            Magnesium   Date Value Ref Range Status   04/12/2018 2.6 1.6 - 2.6 mg/dL Final       Lab Results   Component Value Date    WBC 6.68 04/12/2018    HGB 8.8 (L) 04/12/2018    HCT 27.7 (L) 04/12/2018    MCV 99 (H) 04/12/2018     (L) 04/12/2018       Lab Results   Component Value Date    INR 2.5 (H) 04/12/2018    INR 1.5 (H) 04/09/2018    INR 2.6 (H) 04/05/2018       BNP   Date Value Ref Range Status   04/12/2018 577 (H) 0 - 99 pg/mL Final     Comment:     Values of less than 100 pg/ml are consistent with non-CHF populations.   03/27/2018 288 (H) 0 - 99 pg/mL Final     Comment:     Values of less than 100 pg/ml are consistent with non-CHF populations.   03/15/2018 786 (H) 0 - 99 pg/mL Final     Comment:     Values of less than 100 pg/ml are consistent with non-CHF populations.       LD   Date Value Ref Range Status   04/12/2018 174 110 - 260 U/L Final     Comment:     Results are increased in hemolyzed samples.   03/15/2018 204 110 - 260 U/L Final     Comment:     Results are increased in hemolyzed samples.   02/23/2018 194 110 - 260 U/L Final     Comment:     Results are increased in hemolyzed samples.       Labs reviewed with patient: YES      Patient Satisfaction Survey completed per patient: no  (explained about signature and box to check)  Medication reconciliation: per MA.  Purple card updated today: no  Coumadin Managed by: [facility]    Education: Reviewed driveline care, emergency procedures, how to change the  controller, alarms with patient, as well as discussed how to page the VAD coordinator in case of an emergency.      Plans/Needs: Pt states that he has been doing well. Pt and wife will be moving back to [place] in 2 weeks. Plan is to return to [facility] where his VAD was implanted. Dr. Samson saw pt in clinic to f/u with DLES infection and antibiotics. Plan is to D/C PICC and pt to f/u with ID in [place]. Pt admits to blood in urine yesterday. Will obtain a U/A today. Cr 2.3, last check was 2.0. INR 2.5. No pain or burning with urination. See MD note.      Hurricane Season: No

## 2018-04-13 ENCOUNTER — TELEPHONE (OUTPATIENT)
Dept: TRANSPLANT | Facility: CLINIC | Age: 78
End: 2018-04-13

## 2018-04-13 NOTE — TELEPHONE ENCOUNTER
Patient's daughter confirmed getting Tikosyn from [facility] on 4/12/18.  She will contact [facility] to ensure patient information gets updated for Pf[employer] PAP when their move to [place] in a few weeks gets finalized.

## 2018-04-13 NOTE — TELEPHONE ENCOUNTER
Spoke with pt's daughter, Rubi. She states that pt has not had anymore blood in his urine nor does he complain of pain or burning with urination. Rubi informs that he did have a Urologist in [place] and she will get him an appt with this doctor when they move back to [place] in 1.5 weeks.         ----- Message from Caitlin Wells MD sent at 4/12/2018  1:20 PM CDT -----  IF symptoms of dysuria can treat for UTI IF needs to be repeated at next visit needs followup with urology

## 2018-04-14 NOTE — PROGRESS NOTES
Subjective:      Patient ID: Kev Vasquez is a 77 y.o. male.    Chief Complaint:No chief complaint on file.    History of Present Illness    76 y/o male with a history of CHF managed with an LVAD.  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  It is resistant to ciprofloxacin and cefepime.  He had been on IV meropenem.  However at his last clinic visit on march 15, he was noted to still have a lot of drainage from the driveline.  Repeat cultures were obtained and revealed resistance to meropenem.  He was started on IV tobramycin around march 27.  He is here today for follow up.  He completed a 2 week course.  He is still having drainage from the site however his caregiver and his wife both feel that the drainage is much better.    Review of Systems   All other systems reviewed and are negative.    Objective:   Physical Exam   Constitutional: He is oriented to person, place, and time. He appears well-developed and well-nourished. No distress.   HENT:   Head: Normocephalic and atraumatic.   Right Ear: External ear normal.   Left Ear: External ear normal.   Nose: Nose normal.   Mouth/Throat: Oropharynx is clear and moist. No oropharyngeal exudate.   Eyes: Conjunctivae and EOM are normal. Pupils are equal, round, and reactive to light. Right eye exhibits no discharge. Left eye exhibits no discharge. No scleral icterus.   Neck: Normal range of motion. Neck supple. No JVD present. No tracheal deviation present. No thyromegaly present.   Cardiovascular: Normal rate, regular rhythm and intact distal pulses.  Exam reveals no gallop and no friction rub.    No murmur heard.  Pulmonary/Chest: Effort normal and breath sounds normal. No stridor. No respiratory distress. He has no wheezes. He has no rales. He exhibits no tenderness.   Abdominal: Soft. Bowel sounds are normal. He exhibits no distension and no mass. There is no tenderness. There is no rebound and no guarding.   #1. LVAD DLES:  There is yellow  drainage and some surrounding erythema.   Musculoskeletal: Normal range of motion. He exhibits no edema or tenderness.   Lymphadenopathy:     He has no cervical adenopathy.   Neurological: He is alert and oriented to person, place, and time. He has normal reflexes. He displays normal reflexes. No cranial nerve deficit. He exhibits normal muscle tone. Coordination normal.   Skin: Skin is warm. No rash noted. He is not diaphoretic. No erythema. No pallor.   Psychiatric: He has a normal mood and affect. His behavior is normal. Judgment and thought content normal.   Nursing note and vitals reviewed.            Assessment:       1. Left ventricular assist device (LVAD) complication, subsequent encounter    2. Hardware complicating wound infection, subsequent encounter    3. Pseudomonas aeruginosa infection        Site looks slightly better today on my exam but still with drainage.  Unfortunately, they will be moving back to [place].  I have asked that they schedule an urgent appointment with their cardiologist up there.  He has completed and tolerated 2 weeks of IV tobramycin.  I will discontinue the antibiotic and remove the picc line.  Plan:       Left ventricular assist device (LVAD) complication, subsequent encounter  -     Nursing communication; Future; Expected date: 04/12/2018    Hardware complicating wound infection, subsequent encounter  -     Nursing communication; Future; Expected date: 04/12/2018    Pseudomonas aeruginosa infection  -     Nursing communication; Future; Expected date: 04/12/2018

## 2018-04-16 ENCOUNTER — ANTI-COAG VISIT (OUTPATIENT)
Dept: CARDIOLOGY | Facility: CLINIC | Age: 78
End: 2018-04-16

## 2018-04-16 ENCOUNTER — LAB VISIT (OUTPATIENT)
Dept: LAB | Facility: HOSPITAL | Age: 78
End: 2018-04-16
Attending: INTERNAL MEDICINE
Payer: MEDICARE

## 2018-04-16 DIAGNOSIS — Z79.01 CHRONIC ANTICOAGULATION: ICD-10-CM

## 2018-04-16 DIAGNOSIS — Z79.01 CHRONIC ANTICOAGULATION: Primary | ICD-10-CM

## 2018-04-16 DIAGNOSIS — Z95.811 LVAD (LEFT VENTRICULAR ASSIST DEVICE) PRESENT: ICD-10-CM

## 2018-04-16 LAB
INR PPP: 2
PROTHROMBIN TIME: 21.6 SEC

## 2018-04-16 PROCEDURE — 36415 COLL VENOUS BLD VENIPUNCTURE: CPT | Mod: PO

## 2018-04-16 PROCEDURE — 85610 PROTHROMBIN TIME: CPT | Mod: PO

## 2018-04-17 NOTE — PROGRESS NOTES
Lucia with [facility]  called to report that INR is due 4/19 but states Patient is only being seen once a week, next SN visit is on 4/23 and also that will be his last home health visit--Patient to be discharged from home health on 4/23

## 2018-04-19 ENCOUNTER — LAB VISIT (OUTPATIENT)
Dept: LAB | Facility: HOSPITAL | Age: 78
End: 2018-04-19
Attending: INTERNAL MEDICINE
Payer: MEDICARE

## 2018-04-19 ENCOUNTER — ANTI-COAG VISIT (OUTPATIENT)
Dept: CARDIOLOGY | Facility: CLINIC | Age: 78
End: 2018-04-19

## 2018-04-19 DIAGNOSIS — Z95.811 LVAD (LEFT VENTRICULAR ASSIST DEVICE) PRESENT: ICD-10-CM

## 2018-04-19 DIAGNOSIS — Z79.01 CHRONIC ANTICOAGULATION: ICD-10-CM

## 2018-04-19 LAB
INR PPP: 2.5
PROTHROMBIN TIME: 26.9 SEC

## 2018-04-19 PROCEDURE — 36415 COLL VENOUS BLD VENIPUNCTURE: CPT | Mod: PO

## 2018-04-19 PROCEDURE — 85610 PROTHROMBIN TIME: CPT | Mod: PO

## 2018-04-25 ENCOUNTER — ANTI-COAG VISIT (OUTPATIENT)
Dept: CARDIOLOGY | Facility: CLINIC | Age: 78
End: 2018-04-25

## 2018-04-25 ENCOUNTER — LAB VISIT (OUTPATIENT)
Dept: LAB | Facility: HOSPITAL | Age: 78
End: 2018-04-25
Attending: INTERNAL MEDICINE
Payer: MEDICARE

## 2018-04-25 DIAGNOSIS — Z95.811 HEART REPLACED BY HEART ASSIST DEVICE: ICD-10-CM

## 2018-04-25 DIAGNOSIS — Z95.811 LVAD (LEFT VENTRICULAR ASSIST DEVICE) PRESENT: ICD-10-CM

## 2018-04-25 DIAGNOSIS — Z79.01 CHRONIC ANTICOAGULATION: ICD-10-CM

## 2018-04-25 LAB
INR PPP: 3.9
PROTHROMBIN TIME: 42.2 SEC

## 2018-04-25 PROCEDURE — 85610 PROTHROMBIN TIME: CPT | Mod: PO

## 2018-04-25 PROCEDURE — 36415 COLL VENOUS BLD VENIPUNCTURE: CPT | Mod: PO

## 2018-04-25 NOTE — PROGRESS NOTES
Mitra advised . Declined labs 4/27. Reports that patient is moving to [place] permanently on 4/26  and will follow up with a new LVAD team in [place] on 5/2.  She is willing to retest 4/29 at a lab in [place] if possible

## 2018-04-27 NOTE — TELEPHONE ENCOUNTER
Spoke to patient's daughter, Ena.  Patient in currently on the way to [place].  He will be contacting his cardiologist in [place] to continue receiving Tikosyn from [facility].  Patient received at 3 month supply on 4/12/18.  [facility] will F/U with patient to ensure he gets his medication.

## 2018-05-01 NOTE — PROGRESS NOTES
Called to check status daughter Mitra  Reports that patient has made it to [place] . Was seen in ER 4/30 due to pulling bandage off hand and tearing skin. Pt was advised by hospital  to miss 4/30 dose . Patient will be seeing a physician at [facility] on 5/2  and all coumadin monitoring  ,health care, etc will be transferred per rosalinda mitra .

## 2018-05-03 ENCOUNTER — PATIENT MESSAGE (OUTPATIENT)
Dept: TRANSPLANT | Facility: CLINIC | Age: 78
End: 2018-05-03

## 2018-05-03 ENCOUNTER — PATIENT MESSAGE (OUTPATIENT)
Dept: INFECTIOUS DISEASES | Facility: CLINIC | Age: 78
End: 2018-05-03

## 2018-05-04 ENCOUNTER — ANTI-COAG VISIT (OUTPATIENT)
Dept: CARDIOLOGY | Facility: CLINIC | Age: 78
End: 2018-05-04

## 2018-05-04 DIAGNOSIS — Z95.811 LVAD (LEFT VENTRICULAR ASSIST DEVICE) PRESENT: ICD-10-CM

## 2018-05-04 DIAGNOSIS — Z79.01 CHRONIC ANTICOAGULATION: ICD-10-CM

## 2018-05-11 DIAGNOSIS — N18.9 CHRONIC KIDNEY DISEASE, UNSPECIFIED CKD STAGE: ICD-10-CM

## 2018-05-17 ENCOUNTER — RESEARCH ENCOUNTER (OUTPATIENT)
Dept: RESEARCH | Facility: HOSPITAL | Age: 78
End: 2018-05-17

## 2018-05-17 NOTE — PROGRESS NOTES
Patient a participant in the [employer] CAP trial. He moved back to [place] for VAD care. Notified research coordinator that he will transfer back to [place] on 4/26/18. The research coordinator from [place] notified me of patient's death on 5/3/18. Meghan notified [facility] of patient's death. Will remove patient from the trial. Unsure if family will return any components. Meghan is working with family to determine this.

## 2018-05-21 ENCOUNTER — TELEPHONE (OUTPATIENT)
Dept: ADMINISTRATIVE | Facility: CLINIC | Age: 78
End: 2018-05-21

## 2019-03-11 NOTE — PT/OT/SLP EVAL
Last seen: 02.08.19     Future: 03.27.19 ( with Dr. Gisell Araujo)     Last refill per PDMP:  02.08.19   Speech Language Pathology Evaluation  Bedside Swallow    Patient Name:  Kev Vasquez   MRN:  [id number]  Admitting Diagnosis: Confusion    Recommendations:                 General Recommendations:  Dysphagia therapy w/ home health SLP   Diet recommendations:  Dental Soft, Thin (with use of chin tuck )   Aspiration Precautions: 1 bite/sip at a time, HOB to 90 degrees, Small bites/sips and Standard aspiration precautions  Discussed with caregiver thickening liquids at end of day given Pt overall reduced stamina throughout the day to optimize swallow safety   General Precautions: Standard, fall, LVAD  Communication strategies:  none    History:     Past Medical History:   Diagnosis Date    AICD (automatic cardioverter/defibrillator) present 2014    [employer]    Chronic anticoagulation 7/21/2017    Chronic combined systolic and diastolic congestive heart failure 7/27/2015 11-16-17   1 - Moderate left ventricular enlargement.    2 - Severely depressed left ventricular systolic function (EF 15-20%).    3 - Impaired LV relaxation, normal LAP (grade 1 diastolic dysfunction).    4 - Biatrial enlargement.    5 - Right ventricle is upper limit of normal in size with not well seen systolic function.    6 - Severe tricuspid regurgitation.    7 - Increased central venous pressure.    8 - The estimated PA systolic pressure is 40 mmHg.    9 - Heartmate III LVAD; speed 5800.     Complication involving left ventricular assist device (LVAD) 7/29/2017    Coronary artery disease involving native coronary artery of native heart without angina pectoris 7/27/2015    Gait instability     Hypertensive urgency, malignant 11/15/2017    ICD (implantable cardioverter-defibrillator), biventricular, in situ 7/27/2015    Ischemic cardiomyopathy 7/20/2017    S/p HMIII     Kidney stones     LVAD (left ventricular assist device) present 7/20/2017    status post Heartmate III 10/16/16 LVAD    HILLARY on CPAP 7/27/2015    Peripheral neuropathy   "   Pulmonary hypertension due to left ventricular systolic dysfunction 7/29/2015    Restless leg syndrome 1992    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    Skin cancer     excision 2013    Skin yeast infection 8/31/2017    Stage 3 chronic kidney disease 7/20/2017    Syncope 7/20/2017    Ventricular tachycardia 7/25/2017       Past Surgical History:   Procedure Laterality Date    CARDIAC PACEMAKER PLACEMENT      pacemaker previously, then AICD in 2006. AICD [employer] serial #[id number]    CORONARY ARTERY BYPASS GRAFT  1993    KNEE SURGERY Left 2001    complicated by infection, hardware had to be taken out and replaced    LEFT VENTRICULAR ASSIST DEVICE  10/19/2016     Modified Barium Swallow: 1/31/18   " Patient demonstrates mild-moderate oropharyngeal dysphagia. Weakness to BOT results in loss of bolus control causing pt to penetrate nectar thick liquids. Pt at risk of airway compromise of thin liquids. Chin tuck deemed beneficial in eliminating airway compromise of thin liquid via spoon and nectar thick liquid via cup in isolation of food. Chin tuck not beneficial in eliminating airway compromise of nectar thick liquid following solid trial. Cough is should not be considered reliable compensatory strategy. Pt remains at high aspiration risk and strict monitoring of diet is highly recommended. Pt's reported decreased cognitive state should be considered during meals."    Recommendations at that time:   General Recommendations:  Dysphagia therapy  Diet recommendations:  Regular, Honey Thick   Aspiration Precautions: 1 bite/sip at a time, Alternating bites/sips, Assistance with meals and Assistance with thickening liquids, Eliminate distractions, Feed only when awake/alert, HOB to 90 degrees, Meds whole buried in puree, No straws, Small bites/sips and Strict aspiration precautions   · To achieve honey thick liquid: 4 oz of any liquid to 1 pack of thickener  · Avoid mixed consistencies (soup, " cereal with milk, juicy fruits).  · No ice cream, jello, or ice.  · Encourage chin tuck when drinking    Prior diet: Per caregiver and Pt report Pt consumes diet of regular solids and thin liquids. Per caregiver she attempts to thicken liquids however Pt refuses to consume. Caregiver further notes Pt has been working with home health SLP and will consistently utilize chin tuck with single sips of thin liquids.     Subjective     Pt in chair upon arrival; intermittently drowsy     Objective:     Oral Musculature Evaluation  · Oral Musculature: WFL  · Dentition: present and adequate  · Mandibular Strength and Mobility: WFL  · Oral Labial Strength and Mobility: WFL  · Volitional Swallow: prompt  · Voice Prior to PO Intake: strong and clear     Bedside Swallow Eval:   Consistencies Assessed:  · Thin liquids single sips via straw with use of chin tuck      Oral Phase:   · WFL    Pharyngeal Phase:   · Pt consistently utlizing chin tuck per each single sips of thin liquid   · Pt with clear vocal quality post trials   · No overt clinical signs of aspiration observed   · Given Pt non compliance with thickener at baseline discussed ongoing use of chin tuck strategy     Compensatory Strategies  · Chin tuck per each single sip of thin liquids   · Discussed with caregiver at length concern for overall fatigue throughout the day and importance of encouraging acceptance of at least nectar thick consistency especially towards arnold of the day; caregiver demonstrated understanding     Assessment:     Kev Vasquez is a 77 y.o. male with an SLP diagnosis of gradually resolving dysphagia. Appears safe to continue current diet with ongoing home based therapy services. No further skilled speech services warranted in acute care setting at this time.     Plan:      · SLP Follow-Up:  No       Discharge recommendations:  home with home health   Barriers to Discharge:  None    Time Tracking:     SLP Treatment Date:   02/22/18  Speech Start  Time:  1433  Speech Stop Time:  1450     Speech Total Time (min):  17 min    Billable Minutes: Treatment Swallowing Dysfunction 17    Sara Mahmood CCC-SLP  02/22/2018

## 2020-02-20 NOTE — SUBJECTIVE & OBJECTIVE
Interval History: patient oriented this morning, no issues overnight. Awaiting final recs for DLES infection cultures from ID    Continuous Infusions:  Scheduled Meds:   allopurinol  300 mg Oral Daily    amLODIPine  10 mg Oral Daily    aspirin  81 mg Oral Daily    atorvastatin  10 mg Oral Daily    buPROPion  450 mg Oral Daily    carbidopa-levodopa  mg  1 tablet Oral Q4H While awake    ceFEPime (MAXIPIME) IVPB  2 g Intravenous Q12H    docusate sodium  100 mg Oral BID    dofetilide  250 mcg Oral Q12H    doxazosin  8 mg Oral Daily    folic acid  1 mg Oral Daily    hydrALAZINE  100 mg Oral Q8H    isosorbide dinitrate  40 mg Oral TID    Lactobacillus rhamnosus GG  1 capsule Oral Daily    lisinopril  20 mg Oral BID    magnesium oxide  400 mg Oral BID    pantoprazole  40 mg Oral BID AC    polyethylene glycol  17 g Oral Daily    warfarin  6 mg Oral Daily     PRN Meds:acetaminophen, docusate sodium, omnipaque 300 iohexol, ondansetron, promethazine    Review of patient's allergies indicates:   Allergen Reactions    Aldactone [spironolactone]       persistent hyperkalemia.    Sulfa (sulfonamide antibiotics)      Objective:     Vital Signs (Most Recent):  Temp: 98.1 °F (36.7 °C) (02/06/18 0410)  Pulse: 69 (02/06/18 1100)  Resp: 18 (02/06/18 0410)  BP: (!) 80/0 (02/06/18 0500)  SpO2: (!) 93 % (02/06/18 0410) Vital Signs (24h Range):  Temp:  [97 °F (36.1 °C)-98.2 °F (36.8 °C)] 98.1 °F (36.7 °C)  Pulse:  [66-82] 69  Resp:  [16-18] 18  SpO2:  [93 %-95 %] 93 %  BP: ()/(0-75) 80/0     Patient Vitals for the past 72 hrs (Last 3 readings):   Weight   02/06/18 0700 81.6 kg (179 lb 14.3 oz)   02/05/18 0800 80.5 kg (177 lb 7.5 oz)     Body mass index is 25.81 kg/m².      Intake/Output Summary (Last 24 hours) at 02/06/18 1147  Last data filed at 02/05/18 1400   Gross per 24 hour   Intake              720 ml   Output                3 ml   Net              717 ml       Hemodynamic Parameters:         Physical  Per Dr. Dominguez- Ms. Toth is unable to perform Visual Field testing due to her physical limitations.    Exam   Constitutional: He appears well-developed and well-nourished.   HENT:   Head: Normocephalic and atraumatic.   Eyes: Conjunctivae are normal. Pupils are equal, round, and reactive to light.   Neck: Normal range of motion. Neck supple. No JVD present.   Cardiovascular: Normal rate and regular rhythm.    Smooth VAD hum. Intermittently pulsatile   Pulmonary/Chest: Effort normal and breath sounds normal.   Abdominal: Soft. Bowel sounds are normal.   Genitourinary:   Genitourinary Comments: Incontinent   Musculoskeletal: He exhibits no edema.   Neurological: He is alert.   Oriented to self and place but not year this morning   Skin: Skin is warm and dry. Capillary refill takes 2 to 3 seconds.       Significant Labs:  CBC:    Recent Labs  Lab 02/04/18 0523 02/05/18  0533 02/06/18  0433   WBC 6.02 5.66 6.04   RBC 3.51* 3.41* 3.44*   HGB 10.3* 10.1* 10.0*   HCT 30.8* 30.0* 30.6*   * 116* 104*   MCV 88 88 89   MCH 29.3 29.6 29.1   MCHC 33.4 33.7 32.7     BNP:    Recent Labs  Lab 01/31/18 0443 02/02/18  0501 02/05/18  0533   * 86 157*     CMP:    Recent Labs  Lab 01/31/18 0443 02/02/18  0501 02/04/18 0523 02/05/18  0533 02/06/18  0433   GLU 81  < > 91  < > 82 86 80   CALCIUM 10.0  < > 9.9  < > 10.5 10.5 10.6*   ALBUMIN 3.2*  --  2.9*  --   --  3.0*  --    PROT 6.7  --  6.3  --   --  6.3  --      < > 141  < > 140 140 142   K 3.8  < > 4.0  < > 3.8 4.0 4.0   CO2 24  < > 24  < > 26 27 27     < > 109  < > 107 107 109   BUN 20  < > 20  < > 19 22 24*   CREATININE 1.7*  < > 1.7*  < > 1.7* 1.9* 1.9*   ALKPHOS 73  --  68  --   --  70  --    ALT 13  --  <5*  --   --  8*  --    AST 19  --  18  --   --  23  --    BILITOT 0.4  --  0.3  --   --  0.3  --    < > = values in this interval not displayed.   Coagulation:     Recent Labs  Lab 02/04/18 0523 02/05/18  0533 02/06/18  0433   INR 1.8* 2.2* 2.3*     LDH:    Recent Labs  Lab 02/04/18 0523 02/05/18  0533 02/06/18  0433    178 198      Microbiology:  Microbiology Results (last 7 days)     Procedure Component Value Units Date/Time    Aerobic culture [[id number]] Collected:  02/05/18 1650    Order Status:  Completed Specimen:  Wound from Abdomen Updated:  02/06/18 0745     Aerobic Bacterial Culture No growth    Narrative:       STEPHAN    Culture, Anaerobe [[id number]] Collected:  02/05/18 1650    Order Status:  Completed Specimen:  Wound from Abdomen Updated:  02/06/18 0738     Anaerobic Culture Culture in progress    Narrative:       STEPAHN    Gram stain [[id number]] Collected:  02/05/18 1650    Order Status:  Completed Specimen:  Wound from Abdomen Updated:  02/06/18 0133     Gram Stain Result Few WBC's      No organisms seen    Narrative:       STEPHAN          I have reviewed all pertinent labs within the past 24 hours.    Estimated Creatinine Clearance: 34.7 mL/min (based on SCr of 1.9 mg/dL (H)).    Diagnostic Results:  I have reviewed and interpreted all pertinent imaging results/findings within the past 24 hours.

## 2020-05-15 NOTE — PROGRESS NOTES
02/06/18 2017 02/06/18 2018   Vital Signs   /85 (!) 120/0   MAP (mmHg) 96 --    BP Location Left arm Left arm   BP Method --  Doppler   Patient Position Lying Lying     Notified Dr. Sabrina MD of patient above Bp, MAP, and doppler. Ordered to give scheduled hydralazine, isosorbide, and lisinopril. Will administer and continue to monitor.    Viral pleurisy

## 2021-02-17 NOTE — ASSESSMENT & PLAN NOTE
Stable    8/14:  CRT 1.7 -- d/c fluid restriction  8/17:  CRT 1.9 -- encourage fluids to 1600cc/day   Show Additional Area 5: Yes

## 2021-05-13 NOTE — PROGRESS NOTES
Bariatric Surgery Patient to  via wheelchair for picc placement, Carol Kingsley NP at the bedside for consent, unable to get a BP reading, patient states 'can't usually get a BP reading because of my LVAD', patient's respirations even and non-labored, no s/s of distress   Surgery Surgery

## 2022-01-01 NOTE — TELEPHONE ENCOUNTER
Please advise and place orders if appropriate:    Good Morning,   Patient's daughter Ena called requesting to   restart physical therapy for patient. She feels   he is getting a little weaker at this time.       Thanks,   Carol Yeh RN [facility]    I will STOP taking the medications listed below when I get home from the hospital:  None

## 2022-02-01 NOTE — PLAN OF CARE
Problem: Patient Care Overview  Goal: Plan of Care Review  Outcome: Ongoing (interventions implemented as appropriate)  Pt free of falls and injury throughout the shift. Skin is CDI with no breakdown oted; VSS, NAD. LVAD working properly throughout the shift with no complication. Dressing is CDI and due to be changed everyday with soap and water. POC reviewed and questions answered. Pt tolerating plan of care.        165.1

## 2022-02-24 NOTE — PROGRESS NOTES
DISCHARGE    SW to pt's room for discharge today.  Pt presents as lying in bed, aao x4, calm, cooperative, and asking and answering questions appropriately.  Pt verbalizes understanding and agreement with plan for txfer to [facility] Rehab today.  Per  AMAYA Marie, [facility] has accepted pt and has a bed ready today.  Per AMAYA Marie, bedside nurse reports pt will be ready for pickup by 12pm.  SW arranged w/c van transportation with [facility] ([phone]) for 12pm and notified pt's bedside nurse.  Prior to admission, pt had [facility] and 24/7 private pay sitters at home.  Pt and family are requesting bedside commode for home use, which will need to be ordered at time of d/c from [facility].  Pt reports coping adequately at this time, and denies any needs or concerns to SW.  SW providing ongoing psychosocial and counseling support, education, resources, assistance, and discharge planning as indicated.  SW remains available.   show

## 2022-09-03 NOTE — PLAN OF CARE
Problem: Patient Care Overview  Goal: Plan of Care Review  Outcome: Ongoing (interventions implemented as appropriate)  Pt free of falls and injury throughout the shift. Skin is CDI with no breakdown noted; VSS, NAD. LVAD working properly throughout the shift with no complications. DLES dressing is CDI and due to be changed everyday with soap and water. MAPs and Doppler pressure were elevated; gave PRN IV hydralazine per order. Heparin gtt infused throughout the shift per order. Patient oriented throughout the night. POC reviewed and questions answered. Pt tolerating plan of care.        [Cough] : cough [As Noted in HPI] : as noted in HPI [Negative] : Heme/Lymph [FreeTextEntry2] : Recent COVID-19 infection as in HPI [FreeTextEntry6] : Recent chest tightness

## 2022-10-06 NOTE — ASSESSMENT & PLAN NOTE
- Chronic drive line infection   - Culture growing pseudomonas (now resistant to cefepime and cipro)  - Meropenem 1g BID for 4 weeks with follow up in ID clinic   - Weekly CBC / CMP while on meropenem    show

## 2022-12-02 NOTE — ASSESSMENT & PLAN NOTE
- cardene drip weaned off today. Parameters changed to wean for MAP < 90.   - Still awaiting all outside records from [facility] clinic  - increased amlodipine to 10- mg po daily, now on 5 BID of lisinopril. Will not up titrate ACE due to patient experiencing orthostasis during PT.   - Transfer out of the [facility] if BP remains stable   4 = No assist / stand by assistance

## 2023-04-18 NOTE — PLAN OF CARE
Problem: Patient Care Overview  Goal: Plan of Care Review  Outcome: Ongoing (interventions implemented as appropriate)  Nutrition assessment complete. Please see RD note below.        [phone]

## 2023-06-06 NOTE — PROGRESS NOTES
Per note: Pt was admitted and his symptoms of syncope were deemed to be a combination of vasovagal phenomenon plus the labile blood pressures. Most of the time the patient's blood pressure was not properly controlled and it always remained at the upper end of the his range of systolic blood pressure.   His confusion was attributed to his underlying dementia and parkinson disease.   His blood pressure control remained an issue throughout this admission. Per medcard d/c on coumadin 4 mg orally daily on Tue, Thu and 6 mg orally daily all other days other days. INR 2/16 STAT per Charis.   
physician. These instructions were explained to me and I had the opportunity to ask questions. I understand and acknowledge receipt of the instructions indicated above.                                                                                                                                                Physician's or R.N.'s Signature                                                                  Date/Time                                                                                                                                              Patient or Representative Signature                                                          Date/Time

## 2023-08-15 NOTE — PLAN OF CARE
Problem: Physical Therapy Goal  Goal: Physical Therapy Goal  Goals to be met by: 17     Patient will increase functional independence with mobility by performin. Supine to sit with Moderate Assistance  2. Sit to supine with Moderate Assistance  3. Sit to stand transfer with Moderate Assistance  4. Bed to chair transfer with Moderate Assistance using Rolling Walker or appropriate AD  5. Gait  x 10 feet with Moderate Assistance using Rolling Walker or appropriate AD.   6. Stand for 2 minutes with Minimal Assistance using Rolling Walker or appropriate AD without LOB  7. Lower extremity exercise program x10 reps, with supervision, in order to increase LE strength and (I) with functional mobility.      Outcome: Ongoing (interventions implemented as appropriate)  Goals remain appropriate       Zygomaticofacial Flap Text: Given the location of the defect, shape of the defect and the proximity to free margins a zygomaticofacial flap was deemed most appropriate for repair.  Using a sterile surgical marker, the appropriate flap was drawn incorporating the defect and placing the expected incisions within the relaxed skin tension lines where possible. The area thus outlined was incised deep to adipose tissue with a #15 scalpel blade with preservation of a vascular pedicle.  The skin margins were undermined to an appropriate distance in all directions utilizing iris scissors.  The flap was then placed into the defect and anchored with interrupted buried subcutaneous sutures.

## 2023-09-20 NOTE — PATIENT INSTRUCTIONS
Home Health notification:      WHILE RESUMING PATIENT TO [facility], SN FOUND SOME MED INTERACTIONS THAT I WOULD LIKE TO NOTIFY DR WHITEHEAD OF.    MED INTERACTIONS SEVERITY LEVEL 2-SEVERE     CARBIDOPA -LEVODOPA WITH FERROUS SULFATE    PLEASE LET US KNOW IF ANY CHANGES NEED TO BE MADE. IF ANY QUESTIONS PLEASE CONTACT [facility] [place] [phone]        Thanks   Yes

## 2024-04-20 NOTE — PROCEDURES
Patient ambulated 50  feet with assist of stand by.  Writer contacted RN d/t patient SOB, weak, nose bleed.     Problem: Activity/Exercise Intolerance  Goal: Patient will tolerate activity/exercise  Intervention: Progress activity per Cardiac Rehab protocol  Note: Intervention Status  Done  Intervention: Progressive graded ambulation  Note: Intervention Status  Done  Intervention: Monitor and document progressive activity response  Note: Intervention Status  Done  Intervention: Encourage hourly incentive spirometry, cough and deep breathe  Note: Intervention Status  Done  Intervention: Stairs prior to discharge  Note: Intervention Status  Not Done      Patient awake with no family at bedside. VAD interrogation completed this AM in the event changes needed to be made. Will continue to monitor for further issues.     Pulsatile: Yes, intermittent   VAD Sounds: HM3  Smooth  Problems / Issues / Alarms with VAD if any: None noted  HCT: 31        VAD Interrogation:  TXP LVAD INTERROGATIONS 2/7/2018 2/7/2018 2/7/2018 2/7/2018 2/6/2018 2/6/2018 2/6/2018   Type HeartMate3 HeartMate3 HeartMate3 HeartMate3 HeartMate3 HeartMate3 HeartMate3   Flow 4.6 4.8 4.0 4.4 4.7 4.6 4.1   Speed 5800 5800 5800 5800 5800 5800 5800   PI 4.2 3.3 5.7 4.3 3.7 3.5 3.2   Power (Mendez) 4.8 4.4 4.5 4.4 4.4 4.2 4.6   LSL - 5400 - - - - -   Pulsatility Intermittent pulse Intermittent pulse Pulse Pulse Pulse - -   }